# Patient Record
Sex: MALE | Race: WHITE | NOT HISPANIC OR LATINO | Employment: UNEMPLOYED | URBAN - METROPOLITAN AREA
[De-identification: names, ages, dates, MRNs, and addresses within clinical notes are randomized per-mention and may not be internally consistent; named-entity substitution may affect disease eponyms.]

---

## 2017-10-11 ENCOUNTER — THERAPY VISIT (OUTPATIENT)
Dept: PHYSICAL THERAPY | Facility: CLINIC | Age: 22
End: 2017-10-11
Payer: COMMERCIAL

## 2017-10-11 DIAGNOSIS — M79.605 PAIN OF LEFT LOWER EXTREMITY: Primary | ICD-10-CM

## 2017-10-11 DIAGNOSIS — M54.2 NECK PAIN ON RIGHT SIDE: ICD-10-CM

## 2017-10-11 DIAGNOSIS — R07.81 RIB PAIN ON LEFT SIDE: ICD-10-CM

## 2017-10-11 PROCEDURE — 97110 THERAPEUTIC EXERCISES: CPT | Mod: GP | Performed by: PHYSICAL THERAPIST

## 2017-10-11 PROCEDURE — 97161 PT EVAL LOW COMPLEX 20 MIN: CPT | Mod: GP | Performed by: PHYSICAL THERAPIST

## 2017-10-11 NOTE — PROGRESS NOTES
Subjective:    Patient is a 22 year old male presenting with rehab left ankle/foot hpi.   Jerel Day is a 22 year old male with a left ankle condition.  Condition occurred with:  Other reason.  Condition occurred: other.  This is a chronic and recurrent condition  MD order . Jerel rolled his ankle over and injured his ankle and was in boot for a while at age 15. He was diagnosed with scoliosis at age 14. He was playing baseball and hockey and was unable to continue due to repeated hip injuries following ankle injury. He went to PT which helped a little bit. He was diagnosed with Schizophrenia Jan 2017 and is under treatment currently. He has been having neck pain and foot pain for years. Walking, sitting for prolonged period time increases pain back of his neck, left hip and left foot. He mentioned this to his provider and was sent to PT for further management.    Patient stated he was admitted in a mental health facility for the past 6 days. He mentions that no one seems to believe his pain issues are appropriate and think he is mentally ill which frustrates him a lot. He is back to college today and is nervous. .    Patient reports pain:  Lateral, metatarsal heads, great toe and lower leg.    Pain is described as aching and is intermittent and reported as 3/10.  Associated symptoms:  Buckling/giving out, loss of strength and loss of motion/stiffness. Pain is worse in the P.M..  Symptoms are exacerbated by walking and relieved by rest and activity/movement (massage).  Since onset symptoms are unchanged.        General health as reported by patient is fair.  Pertinent medical history includes:  History of fractures, high blood pressure, mental illness, depression, unexplained weight loss, migraines and smoking (numbness/ tingling, paina t rest, changes in skin color, dizziness, presistent fever/ chills, weakness, calf pain, swelling, warmth, scoliosis).  Medical allergies: yes (pencillin).  Other surgeries  include:  None reported.  Medication history: antipsychotic, antihistamin.  Current occupation is Student- Psychology.    Primary job tasks include:  Prolonged sitting.                                Objective:          Flexibility/Screens:       Lower Extremity:  Decreased left lower extremity flexibility:Hip Flexors; Hamstrings; Gastroc and Soleus    Decreased right lower extremity flexibility:  Hip Flexors; Hamstrings; Gastroc and Soleus          Ankle/Foot Evaluation  ROM:  AROM is normal.      Strength is normal.  LIGAMENT TESTING: normal              SPECIAL TESTS: normal    PALPATION:   Left ankle tenderness present at:  anterior tibialis; plantar fascia; anterior talofibular ligament; posterior talofibular ligament and calcaneofibular ligament    EDEMA: normal          MOBILITY TESTING: normal                        Cervical/Thoracic Evaluation  Cervical AROM: normal   Thoracic AROM: normal                Cervical Palpation:  : right lower ribs.  Tenderness present at Left:    Upper Trap; Levator; Erector Spinae and Suboccipitals                                         Hip Evaluation  HIP AROM:  AROM:    Left Hip:     Normal    Right Hip:   Normal                    Hip Strength:  Hip Strength:    Left:    Normal  Right:  Normal                          Hip Special Testing:    Left hip positive for the following special tests:  Dusty's and Jermaine      Hip Palpation:    Left hip tenderness present at:   IT Band; hip flexors; ASIS; Abductors and Gluteus Medius         Knee Evaluation:  ROM:  AROM: normal  Strength:  Normal            Ligament Testing:  Normal                Special Tests: Normal      Palpation:    Left knee tenderness present at:  Medial Joint Line and Lateral Joint Line    Edema:  Normal    Mobility Testing:  Normal                  General     ROS    Assessment/Plan:      Patient is a 22 year old male with cervical, thoracic, left side hip, left side knee and left side ankle complaints.     Patient has the following significant findings with corresponding treatment plan.                Diagnosis 1:  Left ankle, knee and hip pain, left rib pain, right sided neck pain, scoliosis  Pain -  hot/cold therapy, US, electric stimulation, manual therapy, STS, splint/taping/bracing/orthotics, self management, education, directional preference exercise and home program  Decreased ROM/flexibility - manual therapy, therapeutic exercise, therapeutic activity and home program  Impaired gait - gait training and home program  Decreased function - therapeutic activities and home program    Therapy Evaluation Codes:   1) History comprised of:   Personal factors that impact the plan of care:      Overall behavior pattern, Past/current experiences and Time since onset of symptoms.    Comorbidity factors that impact the plan of care are:      Concussion, Depression, Dizziness, High blood pressure, Mental illness, Migraines/headaches, Numbness/tingling, Pain at night/rest, Smoking and Weakness.     Medications impacting care: antipsychotic, antihistamin.  2) Examination of Body Systems comprised of:   Body structures and functions that impact the plan of care:      Ankle, Cervical spine, Hip, Knee and ribs.   Activity limitations that impact the plan of care are:      Jumping, Lifting, Sitting, Squatting/kneeling, Stairs, Standing and Walking.  3) Clinical presentation characteristics are:   Stable/Uncomplicated.  4) Decision-Making    Low complexity using standardized patient assessment instrument and/or measureable assessment of functional outcome.  Cumulative Therapy Evaluation is: Low complexity.    Previous and current functional limitations:  (See Goal Flow Sheet for this information)    Short term and Long term goals: (See Goal Flow Sheet for this information)     Communication ability:  Patient appears to be able to clearly communicate and understand verbal and written communication and follow directions  correctly.  Treatment Explanation - The following has been discussed with the patient:   RX ordered/plan of care  Anticipated outcomes  Possible risks and side effects  This patient would benefit from PT intervention to resume normal activities.   Rehab potential is good.    Frequency:  1 X week, once daily  Duration:  for 6 weeks  Discharge Plan:  Achieve all LTG.  Independent in home treatment program.  Reach maximal therapeutic benefit.    Patient thinks his whole body is misaligned and needs to be fixed. His ankle pain at age 15 and lead to other issues like knee and hip pain. He was diagnosed with scoliosis at age 15 and has been having neck and ribs issues since then. He wants to learn strengthening exercise and needs help to realign his body to be able to function without pain. Advised to to keep a open mind to therapy and work on issues that he needs to be addressed to gain strength in his body overall to be able to function and perform his ADL without pain.    Please refer to the daily flowsheet for treatment today, total treatment time and time spent performing 1:1 timed codes.

## 2017-10-20 ENCOUNTER — THERAPY VISIT (OUTPATIENT)
Dept: PHYSICAL THERAPY | Facility: CLINIC | Age: 22
End: 2017-10-20
Payer: COMMERCIAL

## 2017-10-20 DIAGNOSIS — M79.605 PAIN OF LEFT LOWER EXTREMITY: ICD-10-CM

## 2017-10-20 DIAGNOSIS — R07.81 RIB PAIN ON LEFT SIDE: ICD-10-CM

## 2017-10-20 DIAGNOSIS — M54.2 NECK PAIN ON RIGHT SIDE: ICD-10-CM

## 2017-10-20 PROCEDURE — 97112 NEUROMUSCULAR REEDUCATION: CPT | Mod: GP | Performed by: PHYSICAL THERAPIST

## 2017-10-20 PROCEDURE — 97110 THERAPEUTIC EXERCISES: CPT | Mod: GP | Performed by: PHYSICAL THERAPIST

## 2017-10-27 ENCOUNTER — THERAPY VISIT (OUTPATIENT)
Dept: PHYSICAL THERAPY | Facility: CLINIC | Age: 22
End: 2017-10-27
Payer: COMMERCIAL

## 2017-10-27 DIAGNOSIS — M79.605 PAIN OF LEFT LOWER EXTREMITY: ICD-10-CM

## 2017-10-27 DIAGNOSIS — R07.81 RIB PAIN ON LEFT SIDE: ICD-10-CM

## 2017-10-27 DIAGNOSIS — M54.2 NECK PAIN ON RIGHT SIDE: ICD-10-CM

## 2017-10-27 PROCEDURE — 97110 THERAPEUTIC EXERCISES: CPT | Mod: GP | Performed by: PHYSICAL THERAPIST

## 2017-10-27 PROCEDURE — 97530 THERAPEUTIC ACTIVITIES: CPT | Mod: GP | Performed by: PHYSICAL THERAPIST

## 2017-10-27 PROCEDURE — 97112 NEUROMUSCULAR REEDUCATION: CPT | Mod: GP | Performed by: PHYSICAL THERAPIST

## 2017-11-30 NOTE — TELEPHONE ENCOUNTER
APPT INFO    Date /Time: 12/8/17 at 11:15 AM   Reason for Appt: Neck/Throat Irritation   Ref Provider/Clinic: Self   Are there internal records? If yes, list: Yes;   FREDERICK Culver   Patient Contact (Y/N) & Call Details: Yes, emailed pt   Action: No records per pt     OUTSIDE RECORDS CHECKLIST     CLINIC NAME COMMENTS REC (x) IMG (x)   None

## 2017-12-05 PROBLEM — M54.2 NECK PAIN ON RIGHT SIDE: Status: RESOLVED | Noted: 2017-10-11 | Resolved: 2017-12-05

## 2017-12-05 PROBLEM — M79.605 PAIN OF LEFT LOWER EXTREMITY: Status: RESOLVED | Noted: 2017-10-11 | Resolved: 2017-12-05

## 2017-12-05 PROBLEM — R07.81 RIB PAIN ON LEFT SIDE: Status: RESOLVED | Noted: 2017-10-11 | Resolved: 2017-12-05

## 2017-12-05 NOTE — PROGRESS NOTES
Subjective:    HPI                    Objective:    System    Physical Exam    General     ROS    Assessment/Plan:      DISCHARGE REPORT    Progress reporting period is from 10/11/17 to 12/5/17.       SUBJECTIVE  Subjective changes noted by patient:   Subjective: Jerel got is massage today and feels better. Decreased pain with some misrotation.     Current pain level is  Current Pain level: 4/10.     Previous pain level was    .   Changes in function:  Yes (See Goal flowsheet attached for changes in current functional level)  Adverse reaction to treatment or activity: None    OBJECTIVE  Changes noted in objective findings:  Yes,   Objective: Jerel states his hip and lowerleg are rotated along with his neck while eprforming exercises. He wants to address the twisting as he thinks he is getting stronger. Explaines to the patient that his legs and neck are not rotated upon palpation. He thinks nobdy is able to understand his problem and wants to find the cure all by himself and also try otolaryngology to see if it can take care of his neck issues. PAtient wants to keep his chart open to see if he needs therapy after he has tried other options.      ASSESSMENT/PLAN  Updated problem list and treatment plan: Diagnosis 1:  Left ankle pain    STG/LTGs have been met or progress has been made towards goals:  Yes (See Goal flow sheet completed today.)  Assessment of Progress: The patient's condition is improving.  The patient's condition has potential to improve.  The patient has had set backs in their progress.  Self Management Plans:  Patient is independent in a home treatment program.  Patient is independent in self management of symptoms.  I have re-evaluated this patient and find that the nature, scope, duration and intensity of the therapy is appropriate for the medical condition of the patient.  Jerel continues to require the following intervention to meet STG and LTG's:  PT intervention is no longer required to meet  STG/LTG.    Recommendations:  This patient is ready to be discharged from therapy and continue their home treatment program.    Please refer to the daily flowsheet for treatment today, total treatment time and time spent performing 1:1 timed codes.

## 2017-12-08 ENCOUNTER — PRE VISIT (OUTPATIENT)
Dept: OTOLARYNGOLOGY | Facility: CLINIC | Age: 22
End: 2017-12-08

## 2017-12-08 ENCOUNTER — OFFICE VISIT (OUTPATIENT)
Dept: OTOLARYNGOLOGY | Facility: CLINIC | Age: 22
End: 2017-12-08

## 2017-12-08 VITALS — BODY MASS INDEX: 19.55 KG/M2 | HEIGHT: 69 IN | WEIGHT: 132 LBS

## 2017-12-08 DIAGNOSIS — K21.9 GASTROESOPHAGEAL REFLUX DISEASE WITHOUT ESOPHAGITIS: Primary | ICD-10-CM

## 2017-12-08 RX ORDER — QUETIAPINE FUMARATE 100 MG/1
TABLET, FILM COATED ORAL
Status: ON HOLD | COMMUNITY
Start: 2017-11-22 | End: 2018-01-09

## 2017-12-08 RX ORDER — DIPHENHYDRAMINE HCL 50 MG
50 CAPSULE ORAL
Status: ON HOLD | COMMUNITY
Start: 2017-10-09 | End: 2018-01-09

## 2017-12-08 RX ORDER — ALPRAZOLAM 0.5 MG
.5-1 TABLET ORAL
Status: ON HOLD | COMMUNITY
Start: 2017-11-03 | End: 2018-01-09

## 2017-12-08 RX ORDER — LITHIUM CARBONATE 300 MG/1
900 TABLET, FILM COATED, EXTENDED RELEASE ORAL
Status: ON HOLD | COMMUNITY
Start: 2017-10-20 | End: 2018-01-09

## 2017-12-08 NOTE — NURSING NOTE
Chief Complaint   Patient presents with     Consult     throat and neck irritation     Jayy Foster LPN

## 2017-12-08 NOTE — PATIENT INSTRUCTIONS
The patient presents with a history of a gastroesophageal reflux and neck discomfort.  The patient's physical examination is consistent with gastroesophageal reflux.  I have discussed with the patient dietary restrictions including avoiding eating spicy foods, greasy foods, and soda and well as the need to avoid caffeine and alcohol.  The patient and I have discussed avoiding eating within two hours of sleeping or lying down to rest.  The patient will be seen again in two months to assess response to these efforts to control the symptoms.   8/4/17-HgA1c 11.3, Glucose 303, 8/3/17- Albumin 4.0

## 2017-12-08 NOTE — LETTER
Date:December 11, 2017      Patient was self referred, no letter generated. Do not send.        [facility] Health Information

## 2017-12-08 NOTE — MR AVS SNAPSHOT
After Visit Summary   12/8/2017    Jerel Day    MRN: [id number]           Patient Information     Date Of Birth          1995        Visit Information        Provider Department      12/8/2017 11:15 AM Shelton Guo MD M [facility]        Today's Diagnoses     Gastroesophageal reflux disease without esophagitis    -  1      Care Instructions    The patient presents with a history of a gastroesophageal reflux and neck discomfort.  The patient's physical examination is consistent with gastroesophageal reflux.  I have discussed with the patient dietary restrictions including avoiding eating spicy foods, greasy foods, and soda and well as the need to avoid caffeine and alcohol.  The patient and I have discussed avoiding eating within two hours of sleeping or lying down to rest.  The patient will be seen again in two months to assess response to these efforts to control the symptoms.          Follow-ups after your visit        Your next 10 appointments already scheduled     Dec 08, 2017 11:15 AM CST   (Arrive by 11:00 AM)   New Patient Visit with MD HARJINDER Melgar [facility] ([facility])    [address] [place] [id number] [phone]            Feb 01, 2018  9:30 AM CST   (Arrive by 9:15 AM)   New Patient Visit with Shelton Guo MD   [facility] Nose [facility] Throat [facility])    [address] [place] [id number] [phone]              Who to contact     Please call your clinic at [phone] to:    Ask questions about your health    Make or cancel appointments    Discuss your medicines    Learn about your test results    Speak to your doctor   If you have compliments or concerns about an experience at your clinic, or if you wish to file a complaint, please contact [facility] Patient Relations at  "[phone] or email us at [email or web address]         Additional Information About Your Visit        [employer] Information     [employer] is an electronic gateway that provides easy, online access to your medical records. With [employer], you can request a clinic appointment, read your test results, renew a prescription or communicate with your care team.     To sign up for [employer] visit the website at [email or web address]   You will be asked to enter the access code listed below, as well as some personal information. Please follow the directions to create your username and password.     Your access code is: [email or web address]  Expires: 2018  6:30 AM     Your access code will  in 90 days. If you need help or a new code, please contact your [facility] or call [phone] for assistance.        Care EveryWhere ID     This is your Care EveryWhere ID. This could be used by other organizations to access your [place] medical records  [id number]        Your Vitals Were     Height BMI (Body Mass Index)                1.74 m (5' 8.5\") 19.78 kg/m2           Blood Pressure from Last 3 Encounters:   No data found for BP    Weight from Last 3 Encounters:   17 59.9 kg (132 lb)              We Performed the Following     LARYNGOSCOPY FLEX FIBEROPTIC, DIAGNOSTIC        Primary Care Provider Fax #    Physician No Ref-Primary [phone]       No address on file        Equal Access to Services     MUNIRA MADDOX : Hadii zev ku hadasho Sodorisali, waaxda luqadaha, qaybta kaalmada adeegyada, keagan moser . So [facility] [phone].    ATENCIÓN: Si habla español, tiene a grimm disposición servicios gratuitos de asistencia lingüística. Llame al [phone].    We comply with applicable federal civil rights laws and [place] laws. We do not discriminate on the basis of race, color, national origin, age, disability, sex, sexual orientation, or gender " identity.            Thank you!     Thank you for choosing [facility] E[facility] NOSE [facility] THROAT  for your care. Our goal is always to provide you with excellent care. Hearing back from our patients is one way we can continue to improve our services. Please take a few minutes to complete the written survey that you may receive in the mail after your visit with us. Thank you!             Your Updated Medication List - Protect others around you: Learn how to safely use, store and throw away your medicines at www.disposemymeds.org.          This list is accurate as of: 12/8/17  9:59 AM.  Always use your most recent med list.                   Brand Name Dispense Instructions for use Diagnosis    ALPRAZolam 0.5 MG tablet    XANAX     Take 0.5-1 mg by mouth        cholecalciferol 1000 UNIT tablet    vitamin D3     Take 1,000 Units by mouth        diphenhydrAMINE 50 MG capsule    BENADRYL     Take 50 mg by mouth        lithium 300 MG CR tablet    ESKALITH/LITHOBID     Take 900 mg by mouth        QUEtiapine 100 MG tablet    SEROquel     Take 3 tabs qHS - Also 1 daily prn

## 2017-12-08 NOTE — LETTER
12/8/2017       RE: Jerel Day  [address] [address]     Dear Colleague,    Thank you for referring your patient, Jerel Day, to the [facility] E[facility] at [facility]. Please see a copy of my visit note below.    The patient presents with a history of a globus sensation and mild dysphagia. He has been working with a chiropractor who has informed him that his neck muscles became twisted due to neurological issues and that his throat pain is related to his spine becoming of a lightning teodora for pain in his body.  The patient denies odynophagia, hemoptysis, hematemasis, or unexplained weight loss.  The patient reports that the symptoms have been progressively worsening over the past few months.  The patient reports symptoms of heartburn intermittently.  The patient denies sinusitis, rhinitis, facial pain, nasal obstruction or purulent nasal discharge. The patient denies chronic or recurrent tonsillitis, chronic or recurrent pharyngitis. The patient denies otalgia, otorrhea, eustachian tube dysfunction, ear infections, dizziness or tinnitus.     This patient is seen in consultation as a self referral to my clinic.     All other systems were reviewed and they are either negative or they are not directly pertinent to this Otolaryngology examination.    Past Medical History:    No past medical history on file.    Past Surgical History:    No past surgical history on file.    Medications:      Current Outpatient Prescriptions:      ALPRAZolam (XANAX) 0.5 MG tablet, Take 0.5-1 mg by mouth, Disp: , Rfl:      cholecalciferol (VITAMIN D3) 1000 UNIT tablet, Take 1,000 Units by mouth, Disp: , Rfl:      diphenhydrAMINE (BENADRYL) 50 MG capsule, Take 50 mg by mouth, Disp: , Rfl:      lithium (ESKALITH/LITHOBID) 300 MG CR tablet, Take 900 mg by mouth, Disp: , Rfl:      QUEtiapine (SEROQUEL) 100 MG tablet, Take 3 tabs qHS - Also 1 daily prn, Disp: , Rfl:      Allergies:    Penicillins    Physical Examination:    The patient is a well developed, well nourished male in no apparent distress.  He is normocephalic, atraumatic with pupils equally round and reactive to light.    Oral Cavity Examination:  Normal mucosa with no masses or lesions  Nasal Examination: Normal mucosa with no masses or lesions  Ear Examination: Ear canals clear, tympanic membranes and middle ear spaces normal  Neurological Examination: Facial nerve function intact and symmetric  Integumentary Examination: No lesions on the skin of the head and neck  Neck Examination: No masses or lesions, no lymphadenopathy  Endocrine Examination: Normal thyroid examination  Flexible Fiberoptic Laryngoscopy: Normal nasopharynx, valleculae, pyriform sinuses, false vocal cords and true vocal cords. There is some whitish coating on the base of the tongue consistent with gastroesophageal reflux.  The vocal cords are moving normally and there are no lesions or masses in the larynx.     Assessment and Plan:    The patient presents with a history of a gastroesophageal reflux and neck discomfort.  The patient's physical examination is consistent with gastroesophageal reflux.  I have discussed with the patient dietary restrictions including avoiding eating spicy foods, greasy foods, and soda and well as the need to avoid caffeine and alcohol.  The patient and I have discussed avoiding eating within two hours of sleeping or lying down to rest.  The patient will be seen again in two months to assess response to these efforts to control the symptoms.      Again, thank you for allowing me to participate in the care of your patient.      Sincerely,    Shelton Guo MD

## 2017-12-08 NOTE — PROGRESS NOTES
The patient presents with a history of a globus sensation and mild dysphagia. He has been working with a chiropractor who has informed him that his neck muscles became twisted due to neurological issues and that his throat pain is related to his spine becoming of a lightning teodora for pain in his body.  The patient denies odynophagia, hemoptysis, hematemasis, or unexplained weight loss.  The patient reports that the symptoms have been progressively worsening over the past few months.  The patient reports symptoms of heartburn intermittently.  The patient denies sinusitis, rhinitis, facial pain, nasal obstruction or purulent nasal discharge. The patient denies chronic or recurrent tonsillitis, chronic or recurrent pharyngitis. The patient denies otalgia, otorrhea, eustachian tube dysfunction, ear infections, dizziness or tinnitus.     This patient is seen in consultation as a self referral to my clinic.     All other systems were reviewed and they are either negative or they are not directly pertinent to this Otolaryngology examination.    Past Medical History:    No past medical history on file.    Past Surgical History:    No past surgical history on file.    Medications:      Current Outpatient Prescriptions:      ALPRAZolam (XANAX) 0.5 MG tablet, Take 0.5-1 mg by mouth, Disp: , Rfl:      cholecalciferol (VITAMIN D3) 1000 UNIT tablet, Take 1,000 Units by mouth, Disp: , Rfl:      diphenhydrAMINE (BENADRYL) 50 MG capsule, Take 50 mg by mouth, Disp: , Rfl:      lithium (ESKALITH/LITHOBID) 300 MG CR tablet, Take 900 mg by mouth, Disp: , Rfl:      QUEtiapine (SEROQUEL) 100 MG tablet, Take 3 tabs qHS - Also 1 daily prn, Disp: , Rfl:     Allergies:    Penicillins    Physical Examination:    The patient is a well developed, well nourished male in no apparent distress.  He is normocephalic, atraumatic with pupils equally round and reactive to light.    Oral Cavity Examination:  Normal mucosa with no masses or lesions  Nasal  Examination: Normal mucosa with no masses or lesions  Ear Examination: Ear canals clear, tympanic membranes and middle ear spaces normal  Neurological Examination: Facial nerve function intact and symmetric  Integumentary Examination: No lesions on the skin of the head and neck  Neck Examination: No masses or lesions, no lymphadenopathy  Endocrine Examination: Normal thyroid examination  Flexible Fiberoptic Laryngoscopy: Normal nasopharynx, valleculae, pyriform sinuses, false vocal cords and true vocal cords. There is some whitish coating on the base of the tongue consistent with gastroesophageal reflux.  The vocal cords are moving normally and there are no lesions or masses in the larynx.     Assessment and Plan:    The patient presents with a history of a gastroesophageal reflux and neck discomfort.  The patient's physical examination is consistent with gastroesophageal reflux.  I have discussed with the patient dietary restrictions including avoiding eating spicy foods, greasy foods, and soda and well as the need to avoid caffeine and alcohol.  The patient and I have discussed avoiding eating within two hours of sleeping or lying down to rest.  The patient will be seen again in two months to assess response to these efforts to control the symptoms.

## 2017-12-18 ENCOUNTER — OFFICE VISIT (OUTPATIENT)
Dept: ORTHOPEDICS | Facility: CLINIC | Age: 22
End: 2017-12-18
Payer: COMMERCIAL

## 2017-12-18 ENCOUNTER — RADIANT APPOINTMENT (OUTPATIENT)
Dept: GENERAL RADIOLOGY | Facility: CLINIC | Age: 22
End: 2017-12-18
Attending: FAMILY MEDICINE
Payer: COMMERCIAL

## 2017-12-18 VITALS
SYSTOLIC BLOOD PRESSURE: 104 MMHG | BODY MASS INDEX: 19.64 KG/M2 | HEIGHT: 69 IN | WEIGHT: 132.6 LBS | HEART RATE: 53 BPM | DIASTOLIC BLOOD PRESSURE: 71 MMHG

## 2017-12-18 DIAGNOSIS — M25.552 LEFT HIP PAIN: ICD-10-CM

## 2017-12-18 DIAGNOSIS — M24.852 SNAPPING HIP SYNDROME, LEFT: ICD-10-CM

## 2017-12-18 DIAGNOSIS — M25.552 LEFT HIP PAIN: Primary | ICD-10-CM

## 2017-12-18 NOTE — MR AVS SNAPSHOT
After Visit Summary   12/18/2017    Jerel Day    MRN: [id number]           Patient Information     Date Of Birth          1995        Visit Information        Provider Department      12/18/2017 1:20 PM aKrl Mireles MD  [facility]        Today's Diagnoses     Left hip pain    -  1    Snapping hip syndrome, left           Follow-ups after your visit        Additional Services     [facility], HAND, AND CHIROPRACTIC REFERRAL       Physical Therapy Referral  Recommend work on pelvic stability, left hip mobility                  Your next 10 appointments already scheduled     Jan 19, 2018 11:00 AM CST   Return Walk In Ortho with Karl Mireles MD    [facility] ([facility])    [address] [place] [address] [phone]            Feb 01, 2018  9:30 AM CST   (Arrive by 9:15 AM)   Return Visit with Shelton Guo MD   [facility] ([facility])    [address] [place] [address] [phone]              Who to contact     Please call your clinic at [phone] to:    Ask questions about your health    Make or cancel appointments    Discuss your medicines    Learn about your test results    Speak to your doctor   If you have compliments or concerns about an experience at your clinic, or if you wish to file a complaint, please contact [facility] Patient Relations at [phone] or email us at [email or web address]         Additional Information About Your Visit        MyChart Information     [employer] is an electronic gateway that provides easy, online access to your medical records. With [employer], you can request a clinic appointment, read your test results, renew a prescription or communicate with your care team.     To sign up for MyChart visit  "the website at [email or web address]   You will be asked to enter the access code listed below, as well as some personal information. Please follow the directions to create your username and password.     Your access code is: [email or web address]  Expires: 2018  6:30 AM     Your access code will  in 90 days. If you need help or a new code, please contact your [facility] or call [phone] for assistance.        Care EveryWhere ID     This is your Care EveryWhere ID. This could be used by other organizations to access your [place] medical records  [id number]        Your Vitals Were     Pulse Height BMI (Body Mass Index)             53 1.746 m (5' 8.75\") 19.72 kg/m2          Blood Pressure from Last 3 Encounters:   17 104/71    Weight from Last 3 Encounters:   17 60.1 kg (132 lb 9.6 oz)   17 59.9 kg (132 lb)              We Performed the Following     [facility] PT, HAND, AND CHIROPRACTIC REFERRAL        Primary Care Provider Fax #    Physician No Ref-Primary [phone]       No address on file        Equal Access to Services     OTIS MADDOX : Hadii zev barrientoso Chary, waaxda luqadaha, qaybta kaalmada adeegyada, keagan moser . So [facility] [phone].    ATENCIÓN: Si habla español, tiene a grimm disposición servicios gratuitos de asistencia lingüística. Llame al [phone].    We comply with applicable federal civil rights laws and [place] laws. We do not discriminate on the basis of race, color, national origin, age, disability, sex, sexual orientation, or gender identity.            Thank you!     Thank you for choosing [facility] CLINIC  for your care. Our goal is always to provide you with excellent care. Hearing back from our patients is one way we can continue to improve our services. Please take a few minutes to complete the written survey that you may receive in the mail after your visit with " us. Thank you!             Your Updated Medication List - Protect others around you: Learn how to safely use, store and throw away your medicines at www.disposemymeds.org.          This list is accurate as of: 12/18/17  3:15 PM.  Always use your most recent med list.                   Brand Name Dispense Instructions for use Diagnosis    ALPRAZolam 0.5 MG tablet    XANAX     Take 0.5-1 mg by mouth        cholecalciferol 1000 UNIT tablet    vitamin D3     Take 1,000 Units by mouth        diphenhydrAMINE 50 MG capsule    BENADRYL     Take 50 mg by mouth        lithium 300 MG CR tablet    ESKALITH/LITHOBID     Take 900 mg by mouth        QUEtiapine 100 MG tablet    SEROquel     Take 3 tabs qHS - Also 1 daily prn

## 2017-12-18 NOTE — PROGRESS NOTES
SPORTS & ORTHOPEDIC WALK-IN VISIT 12/18/2017    Primary Care Physician: None    Reason for visit:     What part of your body is injured / painful?  left hip    What caused the injury /pain? Unsure    How long ago did your injury occur or pain begin? several years ago    What are your most bothersome symptoms? Pain and Clicking, popping    How would you characterize your symptom?  aching    What makes your symptoms better? Rest, yoga    What makes your symptoms worse? Sitting too long    Have you been previously seen for this problem? Chiropractor, April,2017    Medical History:    Any recent changes to your medical history? No    Any new medication prescribed since last visit? No    Have you had surgery on this body part before? No    Social History:    Occupation: Student at [employer]    Handedness: Right    Exercise: Occasionally at home, push ups    Review of Systems:    Do you have fever, chills, weight loss? No    Do you have any vision problems? No    Do you have any chest pain or edema? Yes,    Do you have any shortness of breath or wheezing?  No    Do you have stomach problems? No    Do you have any numbness or focal weakness? No    Do you have diabetes? No    Do you have problems with bleeding or clotting? No    Do you have an rashes or other skin lesions? No

## 2017-12-18 NOTE — Clinical Note
"12/18/2017       RE: Jerel Day  [address] [address]     Dear Colleague,    Thank you for referring your patient, Jerel Day, to the [facility] SP[facility]TS AND ORTHOPAEDIC WALK IN CLINIC at [facility]. Please see a copy of my visit note below.          SPORTS & ORTHOPEDIC WALK-IN VISIT 12/18/2017    Primary Care Physician: None    Reason for visit:     What part of your body is injured / painful?  left hip    What caused the injury /pain? Unsure    How long ago did your injury occur or pain begin? several years ago    What are your most bothersome symptoms? Pain and Clicking, popping    How would you characterize your symptom?  aching    What makes your symptoms better? Rest, yoga    What makes your symptoms worse? Sitting too long    Have you been previously seen for this problem? Chiropractor, April,2017    Medical History:    Any recent changes to your medical history? No    Any new medication prescribed since last visit? No    Have you had surgery on this body part before? No    Social History:    Occupation: Student at [employer]    Handedness: Right    Exercise: Occasionally at home, push ups    Review of Systems:    Do you have fever, chills, weight loss? No    Do you have any vision problems? No    Do you have any chest pain or edema? Yes,    Do you have any shortness of breath or wheezing?  No    Do you have stomach problems? No    Do you have any numbness or focal weakness? No    Do you have diabetes? No    Do you have problems with bleeding or clotting? No    Do you have an rashes or other skin lesions? No           [facility] Walk[facility] Clinic Note      Patient is a 22 year old {GENDER:425453::\"male\"} who presents to the office today for: ***     Began: ***  Located:***  Quality:***  Aggravating factors: ***  Alleviating factors:***  Prior injury:***  Denies weakness, numbness, tingling, clicking, locking, or catching.      Past Medical " "History, Current Medications, and Allergies are reviewed in the electronic medical record as appropriate.     ROS: Pertinent items are noted in HPI.  Constitutional: negative for fevers, chills and malaise***  Cardiovascular: negative for dyspnea, fatigue, lower extremity edema***  Integument/breast: negative for rash, skin lesion(s) and skin color change***  Neurological: negative for paresthesia and weakness***      EXAM:/71  Pulse 53  Ht 5' 8.75\" (1.746 m)  Wt 132 lb 9.6 oz (60.1 kg)  BMI 19.72 kg/m2    ***    Radiographs: ***      Assessment: Patient is a 22 year old {GENDER:440453::\"male\"} with ***    Recommendations: ***              Again, thank you for allowing me to participate in the care of your patient.      Sincerely,    Karl Mireles MD    "

## 2017-12-18 NOTE — PROGRESS NOTES
"[facility] Sports and Orthopedic Walk-in Clinic Note      Patient is a 22 year old male who presents to the office today for: left hip pain.   Has had left hip issues for years but seems to be worse over the recent months.  Reports clicking and popping in the anterior hip/groin area.  Also has some pain in the left hip and pelvic region although he has difficulty describing this pain as well as localizing.  No radiation of symptoms down his leg.  No tingling numbness or weakness.  Pain is worse when sitting for too long.  He has tried chiropractic care which helps for a brief period.  Stretching and rest also seem to help.  He has never had a specific injury or trauma to the left hip.  No back pain.      Past Medical History, Current Medications, and Allergies are reviewed in the electronic medical record as appropriate.     ROS: Pertinent items are noted in HPI.  Constitutional: negative for fevers, chills and malaise  Cardiovascular: negative for dyspnea, fatigue, lower extremity edema  Integument/breast: negative for rash, skin lesion(s) and skin color change  Neurological: negative for paresthesia and weakness      EXAM:/71  Pulse 53  Ht 5' 8.75\" (1.746 m)  Wt 132 lb 9.6 oz (60.1 kg)  BMI 19.72 kg/m2    Exam:  Patient is alert, in no acute distress, pleasant and conversational.    Gait: Nonantalgic.  Normal heel toe gait    Standing Exam:  Cervical, Thoracic and Lumbar Spine nontender to palpation at spinous processes.     lumbar spine AROM normal. Negative Stork sign.    Patient is able to perform a 2 legged-squat without any reported pain    left Hip:  Supine PROM:  Flexion: Approximately 115 , no tenderness.  External rotation: approximately 60 , with some mild discomfort  Internal rotation: Approximately 30 , no tenderness  No subjective pain reported with range of motion testing. ROM IS symmetric with uninjured side     Strength Testing:  Hip flexion: 5/5.  Hip adduction: 5/5.  Hip abduction, " gluteus makenna: 4+/5.  Hip abduction, gluteus medius: 4-/5  No subjective pain reported with strength testing. Strength IS symmetric with uninjured side.     Palpation:  negative tenderness to palpation over the greater trochanter.  positive  tenderness to palpation over psoas  negative tenderness to palpation over ASIS  negative tenderness to palpation over Iliac crest  negative tenderness to palpation along the piriformis.  negative tenderness to palpation of the SI joint    Special Tests:  negative Dusty's test.   positive  CRISTIN's test for pain in groin.   negative FADIR's test  negative Scour test  negative Reported pain with resisted hip flexion to opposite shoulder     Neurovascularly intact in bilateral lower extremities        Radiographs: X-rays of the left hip were performed and reviewed independently demonstrating no acute abnormality.  Essentially normal left hip.      Assessment: Patient is a 22 year old male with left hip pain and discomfort.  Some parts of the history and exam support diagnosis of snapping hip, although other parts the history not clearly correlated.    Recommendations:   Recommended course of physical therapy to work on dynamic pelvic stabilization and strengthening about the hip.  May consider ice and ice cup massage to the hip flexor tendons.  May also use NSAIDs as needed.  Follow-up in 6 weeks for reevaluation.    Karl Mireles MD

## 2018-01-02 ENCOUNTER — HOSPITAL ENCOUNTER (INPATIENT)
Facility: CLINIC | Age: 23
LOS: 6 days | Discharge: HOME | End: 2018-01-09
Attending: PSYCHIATRY & NEUROLOGY | Admitting: PSYCHIATRY & NEUROLOGY
Payer: COMMERCIAL

## 2018-01-02 DIAGNOSIS — F29 ATYPICAL PSYCHOSIS (H): ICD-10-CM

## 2018-01-02 DIAGNOSIS — F25.0 SCHIZOAFFECTIVE DISORDER, BIPOLAR TYPE (H): ICD-10-CM

## 2018-01-02 LAB
ALBUMIN UR-MCNC: NEGATIVE MG/DL
AMPHETAMINES UR QL SCN: NEGATIVE
APPEARANCE UR: CLEAR
BARBITURATES UR QL: NEGATIVE
BENZODIAZ UR QL: POSITIVE
BILIRUB UR QL STRIP: NEGATIVE
CANNABINOIDS UR QL SCN: POSITIVE
COCAINE UR QL: NEGATIVE
COLOR UR AUTO: YELLOW
ETHANOL UR QL SCN: NEGATIVE
GLUCOSE UR STRIP-MCNC: NEGATIVE MG/DL
HGB UR QL STRIP: NEGATIVE
KETONES UR STRIP-MCNC: NEGATIVE MG/DL
LEUKOCYTE ESTERASE UR QL STRIP: NEGATIVE
MUCOUS THREADS #/AREA URNS LPF: PRESENT /LPF
NITRATE UR QL: NEGATIVE
OPIATES UR QL SCN: NEGATIVE
PH UR STRIP: 6 PH (ref 5–7)
RBC #/AREA URNS AUTO: <1 /HPF (ref 0–2)
SOURCE: ABNORMAL
SP GR UR STRIP: 1.01 (ref 1–1.03)
UROBILINOGEN UR STRIP-MCNC: NORMAL MG/DL (ref 0–2)
WBC #/AREA URNS AUTO: 1 /HPF (ref 0–2)

## 2018-01-02 PROCEDURE — 81001 URINALYSIS AUTO W/SCOPE: CPT | Performed by: FAMILY MEDICINE

## 2018-01-02 PROCEDURE — 80307 DRUG TEST PRSMV CHEM ANLYZR: CPT | Performed by: FAMILY MEDICINE

## 2018-01-02 PROCEDURE — 99284 EMERGENCY DEPT VISIT MOD MDM: CPT | Mod: Z6 | Performed by: PSYCHIATRY & NEUROLOGY

## 2018-01-02 PROCEDURE — 99285 EMERGENCY DEPT VISIT HI MDM: CPT | Mod: 25 | Performed by: PSYCHIATRY & NEUROLOGY

## 2018-01-02 PROCEDURE — 80320 DRUG SCREEN QUANTALCOHOLS: CPT | Performed by: FAMILY MEDICINE

## 2018-01-02 PROCEDURE — 90791 PSYCH DIAGNOSTIC EVALUATION: CPT

## 2018-01-02 ASSESSMENT — ENCOUNTER SYMPTOMS
FEVER: 0
CONFUSION: 1
ABDOMINAL PAIN: 0
DECREASED CONCENTRATION: 1
NERVOUS/ANXIOUS: 1
DYSPHORIC MOOD: 0
HALLUCINATIONS: 1
SLEEP DISTURBANCE: 1
SHORTNESS OF BREATH: 0

## 2018-01-02 NOTE — IP AVS SNAPSHOT
Young Adult [facility]    [facility] [address]    [address] [address]    Phone:  [phone]                                       After Visit Summary   1/2/2018    Jerel Day    MRN: [id number]           After Visit Summary Signature Page     I have received my discharge instructions, and my questions have been answered. I have discussed any challenges I see with this plan with the nurse or doctor.    ..........................................................................................................................................  Patient/Patient Representative Signature      ..........................................................................................................................................  Patient Representative Print Name and Relationship to Patient    ..................................................               ................................................  Date                                            Time    ..........................................................................................................................................  Reviewed by Signature/Title    ...................................................              ..............................................  Date                                                            Time

## 2018-01-02 NOTE — ED NOTES
Bed: ED10  Expected date:   Expected time:   Means of arrival:   Comments:  [address]. 22 y.o M. Behavioral issues. Cooperative at this time. Yellow. 20 mins

## 2018-01-02 NOTE — IP AVS SNAPSHOT
MRN:[id number]                      After Visit Summary   1/2/2018    Jerel Day    MRN: [id number]           Patient Information     Date Of Birth          1995        Designated Caregiver       Most Recent Value    Caregiver    Will someone help with your care after discharge? no      About your hospital stay     You were admitted on:  January 3, 2018 You last received care in the:  [facility]    You were discharged on:  January 9, 2018       Who to Call     For medical emergencies, please call 911.  For non-urgent questions about your medical care, please call your primary care provider or clinic, None          Attending Provider     Provider Specialty    Missael España MD Emergency Medicine    Jermaine Lopez MD Psychiatry       Primary Care Provider Fax #    Physician No Ref-Primary [phone]      Your next 10 appointments already scheduled     Donnell 15, 2018  1:00 PM CST   Navigation Evaluation with Shai Londono [facility] ([facility])    [address] [address]   [phone]            Jan 19, 2018 11:00 AM CST   Return Walk In Ortho with Karl Mireles MD   [facility] Orthopaedic Walk In [facility] ([facility])    [address] [place] [id number] [phone]            Feb 01, 2018  9:30 AM CST   (Arrive by 9:15 AM)   Return Visit with Shelton Guo MD   [facility] Nose [facility] Throat ([facility])    [address] [id number] [phone]              Further instructions from your care team         Behavioral Discharge Planning and Instructions      Summary: You were admitted on 1/2/2018 to [facility] due to psychosis.  You were treated by Debra Naegele, APRN, CNP and discharged on 01/09/18  to Home    Principal Diagnosis: Schizophrenia.    Health Care  "Follow-up Appointments:    [facility] Intake Appointment  Date: Monday, 1/15/18  Time:  1:00pm  Provider:  Shai Londono  [facility]   [facility], [address] 2nd Floor   [address] [place] [address]   Phone: [phone]     Medication Management  Date: Friday, 1/26/17  Time: 4:00pm      Provider: Dr. Donaldson  Address: [facility]. [address] [place] [address] [facility] [place]  [address].   Phone:[phone]   [facility] has faxed the Discharge Summary and AVS to this provider at Fax: [phone]      Attend all scheduled appointments with your outpatient providers. Call at least 24 hours in advance if you need to reschedule an appointment to ensure continued access to your outpatient providers.   Major Treatments, Procedures and Findings: You were provided with: a psychiatric assessment, assessed for medical stability, medication evaluation and/or management, group therapy and milieu management    Symptoms to Report: feeling more aggressive, increased confusion, losing more sleep, mood getting worse or thoughts of suicide    Early warning signs can include:  increased depression or anxiety sleep disturbances increased thoughts or behaviors of suicide or self-harm  increased unusual thinking, such as paranoia or hearing voices    Safety and Wellness:  Take all medicines as directed.  Make no changes unless your doctor suggests them.      Follow treatment recommendations.  Refrain from alcohol and non-prescribed drugs.  If there is a concern for safety, call 911.    Resources: Mental health crisis response for your Critical access hospital is offered 24 hours a day, 7 days a week. A trained counselor will assess your current situation, offer support and counseling and connect you with local resources. Please call  [facility] Crisis (COPE) Response - Adult [phone]    Text 4 Life: txt \"LIFE\" to [address] for immediate support and crisis intervention  Crisis text line: Text " "\"START\" to 760-062. Free, confidential, .  Crisis Intervention: 542.178.4445 or 884-749-3746. Call anytime for help.     The treatment team has appreciated the opportunity to work with you. Jerel, please take care and make your recovery a daily recovery. If you have any questions or concerns our unit number is [phone].  You will be receiving a follow-up phone call within the next three days from a representative from behavioral health.  You have identified the best phone number to reach you as [phone] (home)               Pending Results     No orders found from 2017 to 1/3/2018.            Admission Information     Date & Time Provider Department Dept. Phone    2018 Jermaine Lopez MD [facility]patient Mental Health [phone]      Your Vitals Were     Blood Pressure Pulse Temperature Respirations Height Weight    120/68 56 97.5  F (36.4  C) (Oral) 16 1.756 m (5' 9.13\") 60.1 kg (132 lb 7.9 oz)    Pulse Oximetry BMI (Body Mass Index)                100% 19.49 kg/m2          MyChart Information     [employer] lets you send messages to your doctor, view your test results, renew your prescriptions, schedule appointments and more. To sign up, go to [email or web address] . Click on \"Log in\" on the left side of the screen, which will take you to the Welcome page. Then click on \"Sign up Now\" on the right side of the page.     You will be asked to enter the access code listed below, as well as some personal information. Please follow the directions to create your username and password.     Your access code is: [email or web address]  Expires: 2018  6:30 AM     Your access code will  in 90 days. If you need help or a new code, please call your [facility] or [phone].        Care EveryWhere ID     This is your Care EveryWhere ID. This could be used by other organizations to access your [place] medical records  [id number]        Equal Access to Services     MUNIRA MADDOX AH: Cristobal brothers " alfredo Diez, waaxda luqadaha, qaybta kaalmada adeegtessada, keagan tonyin hayaakp maiergaurang eliscarol laDavidsusan aly. So [facility] [phone].    ATENCIÓN: Si cindyla kirsten, tiene a grimm disposición servicios gratuitos de asistencia lingüística. Case al [phone].    We comply with applicable federal civil rights laws and [place] laws. We do not discriminate on the basis of race, color, national origin, age, disability, sex, sexual orientation, or gender identity.               Review of your medicines      START taking        Dose / Directions    OLANZapine 10 MG tablet   Commonly known as:  zyPREXA   Used for:  Schizoaffective disorder, bipolar type (H)        Dose:  10 mg   Take 1 tablet (10 mg) by mouth At Bedtime   Quantity:  30 tablet   Refills:  1         CONTINUE these medicines which may have CHANGED, or have new prescriptions. If we are uncertain of the size of tablets/capsules you have at home, strength may be listed as something that might have changed.        Dose / Directions    lithium 450 MG CR tablet   Commonly known as:  ESKALITH   This may have changed:    - medication strength  - when to take this   Used for:  Schizoaffective disorder, bipolar type (H)        Dose:  900 mg   Take 2 tablets (900 mg) by mouth At Bedtime   Quantity:  60 tablet   Refills:  1         STOP taking     ALPRAZolam 0.5 MG tablet   Commonly known as:  XANAX           cholecalciferol 1000 UNIT tablet   Commonly known as:  vitamin D3           diphenhydrAMINE 50 MG capsule   Commonly known as:  BENADRYL           QUEtiapine 100 MG tablet   Commonly known as:  SEROquel                Where to get your medicines      These medications were sent to [facility] [place], [place] - [address]  [address] [address]     Phone:  [phone]     lithium 450 MG CR tablet    OLANZapine 10 MG tablet                Protect others around you: Learn how to safely use, store and throw away your medicines  at www.disposemymeds.org.             Medication List: This is a list of all your medications and when to take them. Check marks below indicate your daily home schedule. Keep this list as a reference.      Medications           Morning Afternoon Evening Bedtime As Needed    lithium 450 MG CR tablet   Commonly known as:  ESKALITH   Take 2 tablets (900 mg) by mouth At Bedtime   Last time this was given:  900 mg on 1/8/2018  8:40 PM                                   OLANZapine 10 MG tablet   Commonly known as:  zyPREXA   Take 1 tablet (10 mg) by mouth At Bedtime

## 2018-01-03 PROBLEM — F25.9 SCHIZOAFFECTIVE DISORDER (H): Status: ACTIVE | Noted: 2018-01-03

## 2018-01-03 LAB
ALBUMIN SERPL-MCNC: 3.5 G/DL (ref 3.4–5)
ALP SERPL-CCNC: 66 U/L (ref 40–150)
ALT SERPL W P-5'-P-CCNC: 37 U/L (ref 0–70)
ANION GAP SERPL CALCULATED.3IONS-SCNC: 6 MMOL/L (ref 3–14)
AST SERPL W P-5'-P-CCNC: 19 U/L (ref 0–45)
BASOPHILS # BLD AUTO: 0 10E9/L (ref 0–0.2)
BASOPHILS NFR BLD AUTO: 0.4 %
BILIRUB SERPL-MCNC: 0.3 MG/DL (ref 0.2–1.3)
BUN SERPL-MCNC: 13 MG/DL (ref 7–30)
CALCIUM SERPL-MCNC: 8.5 MG/DL (ref 8.5–10.1)
CHLORIDE SERPL-SCNC: 107 MMOL/L (ref 94–109)
CHOLEST SERPL-MCNC: 135 MG/DL
CO2 SERPL-SCNC: 29 MMOL/L (ref 20–32)
CREAT SERPL-MCNC: 0.73 MG/DL (ref 0.66–1.25)
DIFFERENTIAL METHOD BLD: NORMAL
EOSINOPHIL # BLD AUTO: 0.3 10E9/L (ref 0–0.7)
EOSINOPHIL NFR BLD AUTO: 3.7 %
ERYTHROCYTE [DISTWIDTH] IN BLOOD BY AUTOMATED COUNT: 12.6 % (ref 10–15)
GFR SERPL CREATININE-BSD FRML MDRD: >90 ML/MIN/1.7M2
GLUCOSE SERPL-MCNC: 73 MG/DL (ref 70–99)
HCT VFR BLD AUTO: 42.7 % (ref 40–53)
HDLC SERPL-MCNC: 46 MG/DL
HGB BLD-MCNC: 13.9 G/DL (ref 13.3–17.7)
IMM GRANULOCYTES # BLD: 0 10E9/L (ref 0–0.4)
IMM GRANULOCYTES NFR BLD: 0.5 %
LDLC SERPL CALC-MCNC: 70 MG/DL
LITHIUM SERPL-SCNC: 0.88 MMOL/L (ref 0.6–1.2)
LYMPHOCYTES # BLD AUTO: 1.5 10E9/L (ref 0.8–5.3)
LYMPHOCYTES NFR BLD AUTO: 19.1 %
MCH RBC QN AUTO: 30.9 PG (ref 26.5–33)
MCHC RBC AUTO-ENTMCNC: 32.6 G/DL (ref 31.5–36.5)
MCV RBC AUTO: 95 FL (ref 78–100)
MONOCYTES # BLD AUTO: 0.6 10E9/L (ref 0–1.3)
MONOCYTES NFR BLD AUTO: 7.9 %
NEUTROPHILS # BLD AUTO: 5.5 10E9/L (ref 1.6–8.3)
NEUTROPHILS NFR BLD AUTO: 68.4 %
NONHDLC SERPL-MCNC: 89 MG/DL
NRBC # BLD AUTO: 0 10*3/UL
NRBC BLD AUTO-RTO: 0 /100
PLATELET # BLD AUTO: 241 10E9/L (ref 150–450)
POTASSIUM SERPL-SCNC: 4 MMOL/L (ref 3.4–5.3)
PROT SERPL-MCNC: 6.8 G/DL (ref 6.8–8.8)
RBC # BLD AUTO: 4.5 10E12/L (ref 4.4–5.9)
SODIUM SERPL-SCNC: 142 MMOL/L (ref 133–144)
TRIGL SERPL-MCNC: 93 MG/DL
TSH SERPL DL<=0.005 MIU/L-ACNC: 1.53 MU/L (ref 0.4–4)
WBC # BLD AUTO: 8 10E9/L (ref 4–11)

## 2018-01-03 PROCEDURE — 99223 1ST HOSP IP/OBS HIGH 75: CPT | Mod: AI | Performed by: CLINICAL NURSE SPECIALIST

## 2018-01-03 PROCEDURE — 36415 COLL VENOUS BLD VENIPUNCTURE: CPT | Performed by: CLINICAL NURSE SPECIALIST

## 2018-01-03 PROCEDURE — 84443 ASSAY THYROID STIM HORMONE: CPT | Performed by: CLINICAL NURSE SPECIALIST

## 2018-01-03 PROCEDURE — 90853 GROUP PSYCHOTHERAPY: CPT

## 2018-01-03 PROCEDURE — 80178 ASSAY OF LITHIUM: CPT | Performed by: CLINICAL NURSE SPECIALIST

## 2018-01-03 PROCEDURE — 25000132 ZZH RX MED GY IP 250 OP 250 PS 637: Performed by: PSYCHIATRY & NEUROLOGY

## 2018-01-03 PROCEDURE — 25000132 ZZH RX MED GY IP 250 OP 250 PS 637: Performed by: CLINICAL NURSE SPECIALIST

## 2018-01-03 PROCEDURE — 97150 GROUP THERAPEUTIC PROCEDURES: CPT | Mod: GO

## 2018-01-03 PROCEDURE — 80053 COMPREHEN METABOLIC PANEL: CPT | Performed by: CLINICAL NURSE SPECIALIST

## 2018-01-03 PROCEDURE — 80061 LIPID PANEL: CPT | Performed by: CLINICAL NURSE SPECIALIST

## 2018-01-03 PROCEDURE — 12400001 ZZH R&B MH UMMC

## 2018-01-03 PROCEDURE — 85025 COMPLETE CBC W/AUTO DIFF WBC: CPT | Performed by: CLINICAL NURSE SPECIALIST

## 2018-01-03 RX ORDER — OLANZAPINE 10 MG/1
10 TABLET, ORALLY DISINTEGRATING ORAL AT BEDTIME
Status: DISCONTINUED | OUTPATIENT
Start: 2018-01-03 | End: 2018-01-09

## 2018-01-03 RX ORDER — TRAZODONE HYDROCHLORIDE 50 MG/1
50 TABLET, FILM COATED ORAL
Status: DISCONTINUED | OUTPATIENT
Start: 2018-01-03 | End: 2018-01-09 | Stop reason: HOSPADM

## 2018-01-03 RX ORDER — ACETAMINOPHEN 325 MG/1
650 TABLET ORAL EVERY 6 HOURS PRN
Status: DISCONTINUED | OUTPATIENT
Start: 2018-01-03 | End: 2018-01-09 | Stop reason: HOSPADM

## 2018-01-03 RX ORDER — OLANZAPINE 5 MG/1
5-10 TABLET, ORALLY DISINTEGRATING ORAL 3 TIMES DAILY PRN
Status: DISCONTINUED | OUTPATIENT
Start: 2018-01-03 | End: 2018-01-09 | Stop reason: HOSPADM

## 2018-01-03 RX ORDER — DOCUSATE SODIUM 100 MG/1
100 CAPSULE, LIQUID FILLED ORAL 2 TIMES DAILY PRN
Status: DISCONTINUED | OUTPATIENT
Start: 2018-01-03 | End: 2018-01-09 | Stop reason: HOSPADM

## 2018-01-03 RX ORDER — HYDROXYZINE HYDROCHLORIDE 25 MG/1
25-50 TABLET, FILM COATED ORAL EVERY 4 HOURS PRN
Status: DISCONTINUED | OUTPATIENT
Start: 2018-01-03 | End: 2018-01-09 | Stop reason: HOSPADM

## 2018-01-03 RX ORDER — LITHIUM CARBONATE 450 MG
900 TABLET, EXTENDED RELEASE ORAL AT BEDTIME
Status: DISCONTINUED | OUTPATIENT
Start: 2018-01-03 | End: 2018-01-09 | Stop reason: HOSPADM

## 2018-01-03 RX ORDER — QUETIAPINE FUMARATE 100 MG/1
300 TABLET, FILM COATED ORAL AT BEDTIME
Status: DISCONTINUED | OUTPATIENT
Start: 2018-01-03 | End: 2018-01-03

## 2018-01-03 RX ORDER — ALUMINA, MAGNESIA, AND SIMETHICONE 2400; 2400; 240 MG/30ML; MG/30ML; MG/30ML
30 SUSPENSION ORAL EVERY 4 HOURS PRN
Status: DISCONTINUED | OUTPATIENT
Start: 2018-01-03 | End: 2018-01-09 | Stop reason: HOSPADM

## 2018-01-03 RX ADMIN — QUETIAPINE FUMARATE 300 MG: 100 TABLET ORAL at 02:25

## 2018-01-03 RX ADMIN — OLANZAPINE 10 MG: 5 TABLET, ORALLY DISINTEGRATING ORAL at 14:30

## 2018-01-03 RX ADMIN — LITHIUM CARBONATE 900 MG: 450 TABLET, EXTENDED RELEASE ORAL at 02:25

## 2018-01-03 ASSESSMENT — ACTIVITIES OF DAILY LIVING (ADL)
RETIRED_EATING: 0-->INDEPENDENT
DRESS: INDEPENDENT
PRIOR_FUNCTIONAL_LEVEL_COMMENT: IND
DRESS: 0-->INDEPENDENT
RETIRED_COMMUNICATION: 0-->UNDERSTANDS/COMMUNICATES WITHOUT DIFFICULTY
BATHING: 0-->INDEPENDENT
COGNITION: 2 - DIFFICULTY WITH ORGANIZING THOUGHTS
DRESS: INDEPENDENT
GROOMING: INDEPENDENT
ORAL_HYGIENE: INDEPENDENT
TOILETING: 0-->INDEPENDENT
HYGIENE/GROOMING: INDEPENDENT
ORAL_HYGIENE: INDEPENDENT
TRANSFERRING: 1-->ASSISTIVE EQUIPMENT
SWALLOWING: 0-->SWALLOWS FOODS/LIQUIDS WITHOUT DIFFICULTY
FALL_HISTORY_WITHIN_LAST_SIX_MONTHS: NO
AMBULATION: 0-->INDEPENDENT
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
## 2018-01-03 NOTE — PROGRESS NOTES
01/03/18 0213   Patient Belongings   Did you bring any home meds/supplements to the hospital?  No   Patient Belongings clothing;shoes   Disposition of Belongings Locker   Belongings Search Yes   Clothing Search Yes   Second Staff Duc GATICA.     Items in patient's storage bin: grey hooded sweatshirt w/ strings, 2 blue t-shirts, gray long johns, black sweatpants w/ strings, black stocking cap, sneakers w/ laces.    *at the time of admission, no items were sent to security.    Brought 1/5/18:  Green t-shirt, red t-shirt, gray pants, brown sweatshirt, orange long-sleeve, gray socks (too long for unit)    A               Admission:  I am responsible for any personal items that are not sent to the safe or pharmacy.  [facility] is not responsible for loss, theft or damage of any property in my possession.    Signature:  _________________________________ Date: _______  Time: _____                                              Staff Signature:  ____________________________ Date: ________  Time: _____      2nd Staff person, if patient is unable/unwilling to sign:    Signature: ________________________________ Date: ________  Time: _____     Discharge:  [facility] has returned all of my personal belongings:    Signature: _________________________________ Date: ________  Time: _____                                          Staff Signature:  ____________________________ Date: ________  Time: _____
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
## 2018-01-03 NOTE — ED NOTES
Report given to Tristin RN on unit 4A. He asked for the MD to put a MSSA order since patient was positive for Benzo and with medications to support treatment, writer asked MD and was told that this is not something they do. Nurse on 4A was informed and he said he would call back. Waiting on call back from 4A to transport patient
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
## 2018-01-03 NOTE — PROGRESS NOTES
"Patient in his room--tearful,curled up in bed, seemed very distraught. Stated that he just wanted to \"go\" --and when pressed further he stated that he didn't want to be alive and be done \"with it\" but  stated he had \"no plans\".  States he is hearing voices but that they are \"real\" about something that was \"stolen\" from him \"a long time ago\".  Difficult to get further information from him.  Offered Xyprexa and he intially declined but then was receptive when this writer offered to bring it to his room.  He did take the Zyprexa.  Will continue to monitor.   10 minutes post Zyprexa, patient sleeping quietly.  "

## 2018-01-03 NOTE — PROGRESS NOTES
Pt was present and visible in the milieu  Pt attended most groups and was an active participant    Pt spent much of the day in the milieu. When not in the milieu the pt spent time napping in own room. Pt had a very flat and quiet affect during the day. Pt was social with very few other pts or staff. Pt was polite when approached. Pt seemed to have good insight into own situation. Pt endorsed some feeling of depression and described it like an existential depression. Pt denied SI but hesitated. When pushed farther on the subject pt stated always having some amount of thought about SI but that today it was very minimal. Pt also endorsed some visual hallucinations stating that if something was stared at for a while it would distort or change color.     Pt endorsed SI SIB       01/03/18 1413   Behavioral Health   Hallucinations visual   Thinking distractable   Orientation person: oriented;place: oriented;time: oriented   Memory baseline memory   Insight admits / accepts   Judgement intact   Eye Contact at examiner   Affect blunted, flat   Mood mood is calm   Physical Appearance/Attire attire appropriate to age and situation   Hygiene well groomed   Suicidality chronic thoughts with no stated plan   1. Wish to be Dead No   2. Non-Specific Active Suicidal Thoughts  No   Self Injury other (see comment)  (denies)   Elopement (no concerns at this time)   Activity withdrawn;other (see comment)  (Present in the milieu)   Speech clear;coherent   Medication Sensitivity no stated side effects;no observed side effects   Psychomotor / Gait balanced;steady   Activities of Daily Living   Hygiene/Grooming independent   Oral Hygiene independent   Dress independent   Room Organization independent

## 2018-01-03 NOTE — PROGRESS NOTES
Attendence: Pt. Attended scheduled 2 of 3 OT sessions today.   Observations: pt was friendly and positive, remaining engaged in the groups. Pt did appear to be distracted due to requiring repetition 2-3x times in order to remember it was pt turn, otherwise pt answered questions. Pt was seen crying just prior to last OT group but received assist by other staff. Pt did not return.     01/03/18 1600   Occupational Therapy   Type of Intervention structured groups   Response Participates with encouragement   Hours 2

## 2018-01-03 NOTE — PLAN OF CARE
Problem: Patient Care Overview  Goal: Team Discussion  Team Plan:  BEHAVIORAL TEAM DISCUSSION    Participants: Debra Naegele APRN CNS, Mango Lane RN, Sayda Booker RN, Mesha CASTELAN  Progress: Initial evaluation  Continued Stay Criteria/Rationale: New patient admitted for psychosis  Medical/Physical: see medical consult  Precautions:   Behavioral Orders   Procedures     Code 1 - Restrict to Unit     Routine Programming     As clinically indicated     Status 15     Every 15 minutes.     Plan: Psychiatric Assessment. Medication Management. Therapeutic Mileu. Individual and group support.  Rationale for change in precautions or plan: initial plan

## 2018-01-04 PROCEDURE — 25000132 ZZH RX MED GY IP 250 OP 250 PS 637: Performed by: CLINICAL NURSE SPECIALIST

## 2018-01-04 PROCEDURE — 99232 SBSQ HOSP IP/OBS MODERATE 35: CPT | Performed by: CLINICAL NURSE SPECIALIST

## 2018-01-04 PROCEDURE — 12400007 ZZH R&B MH INTERMEDIATE UMMC

## 2018-01-04 PROCEDURE — 97150 GROUP THERAPEUTIC PROCEDURES: CPT | Mod: GO

## 2018-01-04 PROCEDURE — 90853 GROUP PSYCHOTHERAPY: CPT

## 2018-01-04 PROCEDURE — 25000132 ZZH RX MED GY IP 250 OP 250 PS 637: Performed by: PSYCHIATRY & NEUROLOGY

## 2018-01-04 RX ADMIN — LITHIUM CARBONATE 900 MG: 450 TABLET, EXTENDED RELEASE ORAL at 21:00

## 2018-01-04 RX ADMIN — OLANZAPINE 10 MG: 10 TABLET, ORALLY DISINTEGRATING ORAL at 21:00

## 2018-01-04 RX ADMIN — VITAMIN D, TAB 1000IU (100/BT) 1000 UNITS: 25 TAB at 10:15

## 2018-01-04 RX ADMIN — HYDROXYZINE HYDROCHLORIDE 50 MG: 25 TABLET ORAL at 15:05

## 2018-01-04 ASSESSMENT — ACTIVITIES OF DAILY LIVING (ADL)
GROOMING: INDEPENDENT
DRESS: INDEPENDENT
ORAL_HYGIENE: INDEPENDENT
GROOMING: INDEPENDENT
ORAL_HYGIENE: INDEPENDENT
DRESS: INDEPENDENT

## 2018-01-04 NOTE — PROGRESS NOTES
Patient received prn for agitation at 1430. Patient went to sleep.  Rounds completed q. 15 mins. Nurse assessed patient.  Patient is sleeping.  Breathing is normal.  Skin color is pink.  Chest rise and fall evenly.  Patient arouses to voice.  Patient did not receive bedtime medication to prevent increased drowsiness.  Will continue to monitor patient.

## 2018-01-04 NOTE — PROGRESS NOTES
Pt spent entire shift in his room sleeping besides when his parents came to visit. Pt was visited by parents for about 30min. Pt was woken up for visiting, was not observed talking much during visit, and went right back to bed when finished visiting.          01/03/18 2200   Behavioral Health   Hallucinations other (see comment)  (BHUPENDRA)   Thinking other (see comment)  (BHUPENDRA)   Orientation other (see comment)  (BHUPENDRA)   Memory other (see comment)  (BHUPENDRA)   Insight other (see comment)  (BHUPENDRA)   Judgement (BHUPENDRA)   Eye Contact (BHUPENDRA)   Affect blunted, flat   Mood mood is calm   Physical Appearance/Attire attire appropriate to age and situation   Hygiene neglected grooming - unclean body, hair, teeth   Suicidality other (see comments)  (BHUPENDRA)   Self Injury other (see comment)  (BHUPENDRA)   Elopement (BHUPENDRA)   Activity isolative;withdrawn   Speech other (see comments)  (BHUPENDAR)   Medication Sensitivity other (see comment)  (BHUPENDRA)   Psychomotor / Gait balanced   Psycho Education   Type of Intervention 1:1 intervention   Response unavailable   Activities of Daily Living   Hygiene/Grooming independent   Oral Hygiene independent   Dress independent   Room Organization independent

## 2018-01-04 NOTE — PLAN OF CARE
"Problem: Cognitive Impairment (Psychotic Signs/Symptoms) (Adult)  Goal: Improved Thought Clarity/Organization (Psychotic Signs/Symptoms)  Outcome: No Change  Pt continues to have hallucinations but can't really state if they are auditory or visual. Pt has been testing the unit doors asking, \"when do I get to leave\" \"when can I get off this floor?\" Pt has not been put on elopement precautions and is in behavioral scrubs. Pt declines to do 1:1 check in with writer besides what writer previously charted. Will continue to monitor.       "

## 2018-01-04 NOTE — PROGRESS NOTES
Pt continues to laugh to self and not make sentences that make sense. Pt requested medication for anxiety, prn hydroxyzine given.

## 2018-01-04 NOTE — PROGRESS NOTES
Re: First Episode      Referral made for our first episode program.      Nicole Cordova, LPCC, [facility]

## 2018-01-04 NOTE — PROGRESS NOTES
Attendence: Pt. Attended scheduled 2 of 2 OT sessions today.   Observations: pt required encouragement to join all groups though was agreeable and open, particularly to peer encouragement. Pt did appear distractable during group, pt appeared to have difficulty remaining on task completing projects and tasks, and would inconsistently engage in discussion, though would smile intermittently and laugh with peers. When spoken to pt answered 50% of the time, though remained in groups.     01/04/18 1600   Occupational Therapy   Type of Intervention structured groups   Response Participates   Hours 2
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
## 2018-01-04 NOTE — PROGRESS NOTES
"Pt approaches desk and asks staff if the unit is alcohol free. Writer informs pt that it is. He says he could \"really use a beer\" and that it would \"hit him hard right now\" which would be nice. Writer restates this is an alcohol free unit. Pt then asks if he can get a Xanax. Writer informs nurse he would like an anxiety medication.   "

## 2018-01-04 NOTE — PROGRESS NOTES
"Visible in the milieu, reported that he feels good sometime. Denies SI, SIB, and endorsed hallucinations. According to Patient, he hears voces that tell him  \"to do something influential such as watching TV\". Asked to tell this writer more about how watching television creates influence, Pt responded: \" I saw something on television that tells me about remote presence\". He stated that he feels depressed all the time because he does not have stable life-style or money to enable him to buy RV van or school bus  to travel around the country. Pt rates his mood at seven out of ten. Overall,this  patient appears distractible, confused, delusional, closes his eyes while talking, looks away with great concentration in space, then opens his eyes wide with incongruent smile. He appears socially withdrawn, and accepts redirections.       01/04/18 1742   Behavioral Health   Hallucinations auditory   Thinking confused;delusional;distractable   Orientation person: oriented;place: oriented   Memory baseline memory   Insight poor   Judgement impaired   Eye Contact at examiner;into space   Affect blunted, flat   Mood anxious   Physical Appearance/Attire appears stated age   Hygiene other (see comment)  (Adequate)   Suicidality other (see comments)  (Pt denies)   1. Wish to be Dead No   2. Non-Specific Active Suicidal Thoughts  No   Self Injury other (see comment)  (Pt denies)   Elopement Loitering near exit doors   Activity withdrawn   Speech flight of ideas;tangential;other (see comments)  (makes illogical statements)   Psychomotor / Gait balanced;steady   Sleep/Rest/Relaxation   Day/Evening Time Hours up all shift   Safety   Elopement status 15;no shoes;behavioral scrubs (pajamas);signs posted on unit entrance / exit doors   Coping/Psychosocial   Verbalized Emotional State acceptance;happiness   Supportive Measures active listening utilized;verbalization of feelings encouraged;relaxation techniques promoted;problem solving " facilitated;positive reinforcement provided   Trust Relationship/Rapport thoughts/feelings acknowledged;questions encouraged;questions answered;empathic listening provided   Activities of Daily Living   Hygiene/Grooming independent   Oral Hygiene independent   Dress independent   Room Organization independent   Activity   Activity Assistance Provided independent

## 2018-01-04 NOTE — PROGRESS NOTES
"Pt came to medication window per writer request. Pt was given morning vitamin. When writer was asking pt questions regarding orientation to person, place, time and date, pt became very guarded and said \"these questions are not going to help me with my memory\". Pt thought he was in [place], but answered all the other questions appropriately. When writer asked about his mental health pt answered, \"I don't want to talk to everyone about my mental health, but I did have hallucinations when I was just on the exercise bike.\" Writer then asked if they were auditory or visual. Pt stated \"I could see stuff in my mind\" pt then walked away. Pt then said \"I could use something like Adderal to control my thinking.\"  "

## 2018-01-05 PROCEDURE — 25000132 ZZH RX MED GY IP 250 OP 250 PS 637: Performed by: CLINICAL NURSE SPECIALIST

## 2018-01-05 PROCEDURE — 97150 GROUP THERAPEUTIC PROCEDURES: CPT | Mod: GO

## 2018-01-05 PROCEDURE — 25000132 ZZH RX MED GY IP 250 OP 250 PS 637: Performed by: PSYCHIATRY & NEUROLOGY

## 2018-01-05 PROCEDURE — 99232 SBSQ HOSP IP/OBS MODERATE 35: CPT | Performed by: CLINICAL NURSE SPECIALIST

## 2018-01-05 PROCEDURE — 12400007 ZZH R&B MH INTERMEDIATE UMMC

## 2018-01-05 PROCEDURE — 90853 GROUP PSYCHOTHERAPY: CPT

## 2018-01-05 RX ADMIN — VITAMIN D, TAB 1000IU (100/BT) 1000 UNITS: 25 TAB at 08:10

## 2018-01-05 RX ADMIN — OLANZAPINE 10 MG: 10 TABLET, ORALLY DISINTEGRATING ORAL at 21:18

## 2018-01-05 RX ADMIN — LITHIUM CARBONATE 900 MG: 450 TABLET, EXTENDED RELEASE ORAL at 21:18

## 2018-01-05 ASSESSMENT — ACTIVITIES OF DAILY LIVING (ADL)
ORAL_HYGIENE: INDEPENDENT
DRESS: INDEPENDENT
GROOMING: INDEPENDENT
ORAL_HYGIENE: INDEPENDENT
DRESS: SCRUBS (BEHAVIORAL HEALTH);INDEPENDENT
GROOMING: INDEPENDENT

## 2018-01-05 NOTE — PROGRESS NOTES
"Pt was visible in the milieu for most of the day  Pt attended one some groups and participated    Pt spent the day alternating between own room and being in the milieu. While in own room pt would frequently lay on bed awake or nap. When in the milieu pt would sit quietly off to the side and would not socialize with others. Pt spent some time loitering near the exit door. Pt did not need to be redirected away form the door because the pt would only linger for a minute or so at a time. Pt seemed confused and very distractable during the day. Pt was seen staring wide at eyed at his own hand or standing near a wall with eyes closed for a few minutes. Pt reported hearing voices. Pt stated the voices werent saying anything mean but were simply making him worry about illogical things like grandparents not being where they were supposed to be. Pt reported depressed and anxious. Pt also stated having chronic thoughts of SI but no current active plan. During a check in with this writer when asked about thoughts of SI pt stated \"I fasciously thought about jumping off the bridge over there\" and then nodded toward a bridge visible from the window.        01/05/18 1350   Behavioral Health   Hallucinations auditory   Thinking distractable;poor concentration;delusional   Orientation person: oriented;place: oriented   Memory baseline memory   Insight poor   Judgement impaired   Eye Contact at examiner;into space   Affect blunted, flat   Mood anxious;depressed   Physical Appearance/Attire attire appropriate to age and situation   Hygiene well groomed   Suicidality chronic thoughts with no stated plan   1. Wish to be Dead No   2. Non-Specific Active Suicidal Thoughts  No   Self Injury other (see comment)  (denies)   Elopement Loitering near exit doors   Activity withdrawn   Speech clear;pressured   Medication Sensitivity no stated side effects;no observed side effects   Psychomotor / Gait balanced;steady   Activities of Daily Living "   Hygiene/Grooming independent   Oral Hygiene independent   Dress independent   Room Organization independent

## 2018-01-05 NOTE — PROGRESS NOTES
Attendence: Pt. Attended scheduled 3 of 3 OT sessions today.   Observations: pt comes and goes, wonders in and out of group often requiring extra encouragement to remain in group, pt completes tasks remaining focused for approx 10-15mn at a time, though pt appears disorganized in behavior, pt often has his eyes closed while in conversation or while standing. Pt conversation minimal though roughly appropriate with peers. Pt will laugh frequently throughout group while not engaged with peer conversation appearing to be responding to internal stimuli.      01/05/18 1600   Occupational Therapy   Type of Intervention structured groups   Response Participates   Hours 3

## 2018-01-05 NOTE — PROGRESS NOTES
"[facility], [place]   Psychiatric Progress Note        Interim History:   The patient's care was discussed with the treatment team during the daily team meeting and/or staff's chart notes were reviewed.  Staff report patient is attending groups.     Patient signed a CONNOR for his parents. Left messages for his parents. Patient has been improving but continues to be disorganized. He is less volatile. He continues to talk about pressures, closes his eyes due to light patterns and makes odd statements about his surroundings. Continue Zyprexa 10 mg and Lithium 900 mg.     Patient does not feel he is supported at home. Discussed with patient that he was referred to the [facility] program for more support at home. He appeared to be encouraged with this information.          Medications:       cholecalciferol  1,000 Units Oral Daily     lithium  900 mg Oral At Bedtime     OLANZapine zydis  10 mg Oral At Bedtime          Allergies:     Allergies   Allergen Reactions     Penicillins      Rash, Generalized          Labs:   No results found for this or any previous visit (from the past 24 hour(s)).       Psychiatric Examination:     /76  Pulse 70  Temp 98  F (36.7  C) (Oral)  Resp 16  Ht 1.756 m (5' 9.13\")  Wt 60.1 kg (132 lb 7.9 oz)  SpO2 100%  BMI 19.49 kg/m2  Weight is 132 lbs 7.94 oz  Body mass index is 19.49 kg/(m^2).  Orthostatic Vitals       Most Recent      Sitting Orthostatic /76 01/05 0800    Sitting Orthostatic Pulse (bpm) 70 01/05 0800    Standing Orthostatic /84 01/04 0847    Standing Orthostatic Pulse (bpm) 72 01/04 0847      Appearance: awake, alert, adequately groomed and dressed in hospital scrubs  Attitude:  cooperative  Eye Contact:  good  Mood:  anxious  Affect:  intensity is blunted  Speech:  normal prosody  Psychomotor Behavior:  no evidence of tardive dyskinesia, dystonia, or tics  Throught Process:  disorganized  Associations:  loosening of associations " present  Thought Content:  passive suicidal ideation present  Insight:  limited  Judgement:  limited  Oriented to:  time, person, and place  Attention Span and Concentration:  intact  Recent and Remote Memory:  intact            Precautions:     Behavioral Orders   Procedures     Code 1 - Restrict to Unit     Elopement precautions     Patient tried to leave     Routine Programming     As clinically indicated     Status 15     Every 15 minutes.          DIagnoses:   1.  Schizophrenia.   2.  Cannabis use disorder, moderate.   3.  Alcohol use disorder, moderate.   4.  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          Plan:     Legal: Voluntary      Medication management: Started Zyprexa 10 mg and discontinue Seroquel 300 mg.      Dispo: Stabilization with medications and return to home with [facility] program.

## 2018-01-06 PROCEDURE — 25000132 ZZH RX MED GY IP 250 OP 250 PS 637: Performed by: PSYCHIATRY & NEUROLOGY

## 2018-01-06 PROCEDURE — 12400007 ZZH R&B MH INTERMEDIATE UMMC

## 2018-01-06 PROCEDURE — 25000132 ZZH RX MED GY IP 250 OP 250 PS 637: Performed by: CLINICAL NURSE SPECIALIST

## 2018-01-06 RX ADMIN — VITAMIN D, TAB 1000IU (100/BT) 1000 UNITS: 25 TAB at 10:02

## 2018-01-06 RX ADMIN — OLANZAPINE 10 MG: 10 TABLET, ORALLY DISINTEGRATING ORAL at 21:29

## 2018-01-06 RX ADMIN — LITHIUM CARBONATE 900 MG: 450 TABLET, EXTENDED RELEASE ORAL at 21:29

## 2018-01-06 ASSESSMENT — ACTIVITIES OF DAILY LIVING (ADL)
GROOMING: INDEPENDENT
DRESS: SCRUBS (BEHAVIORAL HEALTH);INDEPENDENT
GROOMING: INDEPENDENT
ORAL_HYGIENE: INDEPENDENT
ORAL_HYGIENE: INDEPENDENT
LAUNDRY: UNABLE TO COMPLETE
DRESS: SCRUBS (BEHAVIORAL HEALTH)

## 2018-01-06 NOTE — PROGRESS NOTES
Pt had an okay shift. Pt was present on milieu but was in and out of groups. Pt appeared responding to hallucinations and was often observed talking to self with eyes closed, as well as touching the pictures on the walls. Pt had visit with family and that seemed to go well. Pt used colored pencils and doretha briefly, but did not remain with this activity for long. Pt did not interact much with other pts and kept to himself. Pt denied mental health symptoms. Pt mentioned wanting to leave at beginning of shift, but did not mention it again.    SI/SIB: denies  Appetite: typical  Sleep: typical  Hallucinations: appears responding, auditory       01/05/18 1800   Behavioral Health   Hallucinations auditory;appears responding   Thinking confused;distractable;poor concentration   Orientation person: oriented;place: oriented   Memory baseline memory   Insight poor   Judgement impaired   Eye Contact into space;staring;blinking;at examiner   Affect full range affect   Mood anxious;mood is calm   Physical Appearance/Attire attire appropriate to age and situation   Hygiene well groomed   Suicidality other (see comments)  (denies)   1. Wish to be Dead No   2. Non-Specific Active Suicidal Thoughts  No   Self Injury other (see comment)  (denies)   Elopement Loitering near exit doors;Statements about wanting to leave   Activity withdrawn;other (see comment)  (present on milieu)   Speech flight of ideas;pressured;clear   Medication Sensitivity no stated side effects;no observed side effects   Psychomotor / Gait paces;balanced;steady   Activities of Daily Living   Hygiene/Grooming independent   Oral Hygiene independent   Dress scrubs (behavioral health);independent   Room Organization independent   Activity   Activity Assistance Provided independent

## 2018-01-06 NOTE — PROGRESS NOTES
"Pt indicated goal is to get out of here, plans towards discharge include talking to a doctor  Pt was withdrawn and isolated, did not participate in any group, not quite visible in the milieu but was out for meals and when parents visited  Pt indicated feeling depressed at 6/10, irritable,sad,confused and anxious  Pt indicated responding to auditory hallucination\" I always here voices\"  Pt indicated voices telling him to take steps to get better and sometime,critic/attack on self esteem  Pt denies SI/SIB thoughts, denies homicidal ideations  PT was concern about integrating in the society and finding a girl friend  Pt indicated good appetite,good sleep with pain on back and hip        01/06/18 1313   Behavioral Health   Hallucinations auditory;appears responding   Thinking confused;distractable;poor concentration   Orientation person: oriented;place: oriented;date: oriented;time: oriented   Memory baseline memory   Insight admits / accepts;poor   Judgement impaired   Eye Contact at examiner   Affect sad;full range affect;irritable   Mood depressed;hopeless;anxious;irritable   Physical Appearance/Attire disheveled;attire appropriate to age and situation   Suicidality other (see comments)  (Pt denies SI thoughts)   1. Wish to be Dead No   2. Non-Specific Active Suicidal Thoughts  No   Elopement Loitering near exit doors;Statements about wanting to leave  (Pt is monitored for risk of elopement)   Safety   Elopement behavioral scrubs (pajamas);signs posted on unit entrance / exit doors;staff positioned near patient during heightened activity on unit;distraction;engagement in unit activities;identify factors which lead to feelings of wanting leave   Psycho Education   Type of Intervention 1:1 intervention   Response participates, initiates socially appropriate   Hours 0.5   Daily Care   Activity activity encouraged   Activities of Daily Living   Hygiene/Grooming independent   Oral Hygiene independent   Dress scrubs " (behavioral health);independent   Room Organization independent   Activity   Activity Assistance Provided independent

## 2018-01-07 PROCEDURE — 97150 GROUP THERAPEUTIC PROCEDURES: CPT | Mod: GO

## 2018-01-07 PROCEDURE — 25000132 ZZH RX MED GY IP 250 OP 250 PS 637: Performed by: CLINICAL NURSE SPECIALIST

## 2018-01-07 PROCEDURE — 12400007 ZZH R&B MH INTERMEDIATE UMMC

## 2018-01-07 PROCEDURE — 25000132 ZZH RX MED GY IP 250 OP 250 PS 637: Performed by: PSYCHIATRY & NEUROLOGY

## 2018-01-07 PROCEDURE — 90853 GROUP PSYCHOTHERAPY: CPT

## 2018-01-07 RX ADMIN — LITHIUM CARBONATE 900 MG: 450 TABLET, EXTENDED RELEASE ORAL at 20:27

## 2018-01-07 RX ADMIN — OLANZAPINE 10 MG: 10 TABLET, ORALLY DISINTEGRATING ORAL at 20:27

## 2018-01-07 ASSESSMENT — ACTIVITIES OF DAILY LIVING (ADL)
LAUNDRY: UNABLE TO COMPLETE
ORAL_HYGIENE: INDEPENDENT
DRESS: INDEPENDENT
DRESS: INDEPENDENT
ORAL_HYGIENE: INDEPENDENT
GROOMING: INDEPENDENT
GROOMING: INDEPENDENT

## 2018-01-07 NOTE — PROGRESS NOTES
Pt was occasionally visible in the milieu  Pt attended some groups    Pt alternated between long stretches of sleeping and sitting in a meditative position on the floor. While present in the milieu for groups and meals pt was withdrawn and hardly spoke to anyone. Pt had a flat affect and put on a very dishelved look. Pt had an overall calm demeanor. Pt was also seen standing in the milieu with eyes shut for a couple of minutes at time without moving.       01/07/18 1432   Behavioral Health   Hallucinations appears responding   Thinking distractable   Orientation person: oriented;place: oriented   Memory baseline memory   Insight admits / accepts   Judgement impaired   Eye Contact at examiner   Affect blunted, flat   Mood depressed;anxious   Physical Appearance/Attire untidy   Hygiene neglected grooming - unclean body, hair, teeth   Suicidality other (see comments)  (none stated or observed)   Self Injury other (see comment)  (none stated or observed)   Elopement (no concerns)   Activity withdrawn   Speech clear   Medication Sensitivity no observed side effects   Psychomotor / Gait balanced;steady   Activities of Daily Living   Hygiene/Grooming independent   Oral Hygiene independent   Dress independent   Room Organization independent

## 2018-01-07 NOTE — PLAN OF CARE
Problem: Psychotic Symptoms  Goal: Psychotic Symptoms  Signs and symptoms of listed problems will be absent or manageable.  Outcome: No Change  Patient visible in milieu, calm and cooperative.  Flat affect, denies SI/SIB, rated depression at 6/10, anxiety at 4/10.  Patient seemed responding to internal stimuli.  Took scheduled medications with no problem.  Sleeping comfortably in room, will continue to monitor closely.

## 2018-01-07 NOTE — PROGRESS NOTES
Attendence: Pt. Attended scheduled 3 of 3 OT sessions today.   Observations: pt minimally verbalized throughout group, smiling incongruently, pt often had eyes closed for 5-10 minutes at a time, and would stare at paper for 4-5minutes, though did engage in focused task for up to 10 minutes throughout group, pt did painting and when prompted contributed to group discussion but would always required a cue to what the topic was       01/07/18 1600   Occupational Therapy   Type of Intervention structured groups   Response Participates with cues/redirection   Hours 3

## 2018-01-08 PROCEDURE — H2032 ACTIVITY THERAPY, PER 15 MIN: HCPCS

## 2018-01-08 PROCEDURE — 25000132 ZZH RX MED GY IP 250 OP 250 PS 637: Performed by: CLINICAL NURSE SPECIALIST

## 2018-01-08 PROCEDURE — 99231 SBSQ HOSP IP/OBS SF/LOW 25: CPT | Performed by: CLINICAL NURSE SPECIALIST

## 2018-01-08 PROCEDURE — 12400007 ZZH R&B MH INTERMEDIATE UMMC

## 2018-01-08 PROCEDURE — 25000132 ZZH RX MED GY IP 250 OP 250 PS 637: Performed by: PSYCHIATRY & NEUROLOGY

## 2018-01-08 RX ADMIN — VITAMIN D, TAB 1000IU (100/BT) 1000 UNITS: 25 TAB at 10:21

## 2018-01-08 RX ADMIN — LITHIUM CARBONATE 900 MG: 450 TABLET, EXTENDED RELEASE ORAL at 20:40

## 2018-01-08 RX ADMIN — OLANZAPINE 10 MG: 10 TABLET, ORALLY DISINTEGRATING ORAL at 20:40

## 2018-01-08 ASSESSMENT — ACTIVITIES OF DAILY LIVING (ADL)
DRESS: SCRUBS (BEHAVIORAL HEALTH);INDEPENDENT
ORAL_HYGIENE: INDEPENDENT
GROOMING: INDEPENDENT
DRESS: SCRUBS (BEHAVIORAL HEALTH)
GROOMING: INDEPENDENT
ORAL_HYGIENE: INDEPENDENT

## 2018-01-08 NOTE — PROGRESS NOTES
Pt indicated goal is to get discharge to leave  Plans for discharge include talking to doctor, not interested in completing personal care plan  Pt indicated paranoid thoughts,irritable, and anxious  Pt denies SI/SIB thoughts and concern is about getting out     01/08/18 1351   Behavioral Health   Hallucinations denies / not responding to hallucinations   Thinking confused;distractable   Orientation person: oriented;place: oriented;date: oriented;time: oriented   Memory baseline memory   Insight admits / accepts;poor   Judgement impaired   Eye Contact blinking;out of corner of eyes;at examiner   Affect blunted, flat;irritable   Mood anxious;irritable   Physical Appearance/Attire disheveled;attire appropriate to age and situation   Suicidality other (see comments)  (Pt denies SI thoughts)   1. Wish to be Dead No   2. Non-Specific Active Suicidal Thoughts  No   Elopement Loitering near exit doors;Statements about wanting to leave;Hallucinations directing behavior  (Pt is monitored for risk of elopement)   Activity (Pt participated in grp,socialized ,visible in the milieu)   Speech clear;coherent   Medication Sensitivity no stated side effects;no observed side effects   Psychomotor / Gait balanced;steady   Safety   Elopement room away from unit doors;behavioral scrubs (pajamas);signs posted on unit entrance / exit doors;staff positioned near patient during heightened activity on unit;distraction;engagement in unit activities;identify factors which lead to feelings of wanting leave   Psycho Education   Type of Intervention 1:1 intervention   Response participates with encouragement   Hours 0.5   Daily Care   Activity activity encouraged   Activities of Daily Living   Hygiene/Grooming independent   Oral Hygiene independent   Dress scrubs (behavioral health);independent   Activity   Activity Assistance Provided independent   Behavioral Health Interventions   Psychotic Symptoms decrease environmental stimulation;redirection  of intrusive behaviors;redirection of aggressive behaviors;assist patient in developing safety plan;assist patient in following safety plan;encourage nutrition and hydration;encourage participation / independence with adls;provide emotional support;establish therapeutic relationship;assist with developing & utilizing healthy coping strategies;build upon strengths   Social and Therapeutic Interventions (Psychotic Symptoms) encourage socialization with peers;encourage effective boundaries with peers;encourage participation in therapeutic groups and milieu activities
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
## 2018-01-08 NOTE — PROGRESS NOTES
"   01/08/18 1700   Art Therapy   Type of Intervention 1:1 intervention   Response participates with cues/redirection   Hours 2.5   Pt attended all morning of groups. He made several paintings, was engaged in the conversation and said he was interested in pursuing art classes.     In the afternoon he was falling asleep at the table, groggy and not tracking with the group activity. He also was in and out of the room and unable to focus. This was a big change from the mornings presentation.     In the morning he was irritated with a female peer that he felt was being \" passive aggressive\" with himself and another female peer.  "

## 2018-01-08 NOTE — PROGRESS NOTES
"[facility], [place]   Psychiatric Progress Note        Interim History:   The patient's care was discussed with the treatment team during the daily team meeting and/or staff's chart notes were reviewed.  Staff report patient is attending groups. Patient stated he liked the yoga group.     Patient is more organized. Patient says that he notices he can follow the group instructions better. He is talking less about pressures, light patterns and is able to stay on track with the conversation.     Patient has been taking Zyprexa 10 mg. He feels this medication has been helpful to him. Continue Lithium 900 mg. Discussed his condition . He asked questions about having a thought disorder and how that impacts him going to the [facility].     Patient referred to the [facility] Program. Discussed treatment plan with mother today. Proposed discharge is Tuesday or Wednesday.          Medications:       cholecalciferol  1,000 Units Oral Daily     lithium  900 mg Oral At Bedtime     OLANZapine zydis  10 mg Oral At Bedtime          Allergies:     Allergies   Allergen Reactions     Penicillins      Rash, Generalized          Labs:   No results found for this or any previous visit (from the past 24 hour(s)).       Psychiatric Examination:     /74  Pulse 63  Temp 97.8  F (36.6  C) (Oral)  Resp 16  Ht 1.756 m (5' 9.13\")  Wt 60.1 kg (132 lb 7.9 oz)  SpO2 100%  BMI 19.49 kg/m2  Weight is 132 lbs 7.94 oz  Body mass index is 19.49 kg/(m^2).  Orthostatic Vitals       Most Recent      Sitting Orthostatic /74 01/08 0845    Sitting Orthostatic Pulse (bpm) 63 01/08 0845    Standing Orthostatic /72 01/08 0845    Standing Orthostatic Pulse (bpm) 85 01/08 0845            Appearance: awake, alert, dressed in hospital scrubs and slightly unkempt  Attitude:  cooperative  Eye Contact:  fair  Mood:  better  Affect:  appropriate and in normal range  Speech:  normal prosody  Psychomotor Behavior:  no " evidence of tardive dyskinesia, dystonia, or tics  Throught Process:  linear  Associations:  no loose associations  Thought Content:  no evidence of suicidal ideation or homicidal ideation  Insight:  limited  Judgement:  limited  Oriented to:  time, person, and place  Attention Span and Concentration:  intact  Recent and Remote Memory:  intact         Precautions:     Behavioral Orders   Procedures     Code 1 - Restrict to Unit     Elopement precautions     Patient tried to leave     Routine Programming     As clinically indicated     Status 15     Every 15 minutes.          DIagnoses:   1.  Schizophrenia.   2.  Cannabis use disorder, moderate.   3.  Alcohol use disorder, moderate.   4.  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           Plan:     Legal: Voluntary       Medication management: Started Zyprexa 10 mg and discontinue Seroquel 300 mg.       Dispo: Stabilization with medications and return to home with [facility] program. Proposed discharge date is 1/9.

## 2018-01-08 NOTE — PROGRESS NOTES
Writer spoke with pt's mother Mesha about the [facility] program. Pt was referred to the program by another CTC. Writer sent follow-up msg via [employer] to the  inquiring about pt's fit for the program.

## 2018-01-08 NOTE — PROGRESS NOTES
Writer met with patient. Introduced self and role. Will continue discharge planning and schedule appointments when ready to d/c.  At this time waiting to hear back from [facility]

## 2018-01-09 VITALS
HEIGHT: 69 IN | BODY MASS INDEX: 19.62 KG/M2 | DIASTOLIC BLOOD PRESSURE: 68 MMHG | HEART RATE: 56 BPM | RESPIRATION RATE: 16 BRPM | WEIGHT: 132.5 LBS | SYSTOLIC BLOOD PRESSURE: 120 MMHG | TEMPERATURE: 97.5 F | OXYGEN SATURATION: 100 %

## 2018-01-09 PROCEDURE — H2032 ACTIVITY THERAPY, PER 15 MIN: HCPCS

## 2018-01-09 PROCEDURE — 25000132 ZZH RX MED GY IP 250 OP 250 PS 637: Performed by: PSYCHIATRY & NEUROLOGY

## 2018-01-09 PROCEDURE — 99239 HOSP IP/OBS DSCHRG MGMT >30: CPT | Performed by: CLINICAL NURSE SPECIALIST

## 2018-01-09 RX ORDER — LITHIUM CARBONATE 450 MG
900 TABLET, EXTENDED RELEASE ORAL AT BEDTIME
Qty: 60 TABLET | Refills: 1 | Status: SHIPPED | OUTPATIENT
Start: 2018-01-09 | End: 2018-03-05

## 2018-01-09 RX ORDER — OLANZAPINE 10 MG/1
10 TABLET ORAL AT BEDTIME
Status: DISCONTINUED | OUTPATIENT
Start: 2018-01-09 | End: 2018-01-09 | Stop reason: HOSPADM

## 2018-01-09 RX ORDER — OLANZAPINE 10 MG/1
10 TABLET ORAL AT BEDTIME
Qty: 30 TABLET | Refills: 1 | Status: SHIPPED | OUTPATIENT
Start: 2018-01-09 | End: 2018-01-29

## 2018-01-09 RX ADMIN — VITAMIN D, TAB 1000IU (100/BT) 1000 UNITS: 25 TAB at 10:17

## 2018-01-09 ASSESSMENT — ACTIVITIES OF DAILY LIVING (ADL)
ORAL_HYGIENE: INDEPENDENT
GROOMING: INDEPENDENT
LAUNDRY: WITH SUPERVISION
DRESS: SCRUBS (BEHAVIORAL HEALTH)

## 2018-01-09 NOTE — PROGRESS NOTES
"   01/09/18 1700   General Information   Art Directive house-tree-person  (ASSESSMENT)   Task Orientation    Task orientation skills works independently;other (see comments)   Task orientation concerns hurries through tasks   Social Interaction   Social interaction skills other (see comments)   Social interaction concerns other (see comments)   Product/Content   Product/Content image reflects current feelings;image reflects positive aspects;devalues product;spontaneous and free image;has own expressive language;presence of a metaphor/theme   Developmental level   Approximate developmental level of art expression age appropriate expression;age appropriate motor skills   Pt has been doing lots of abstract watercolors in groups. He is very interested in experimentation with the amt of water and color mixing. For this assessment he rushed through it. It is very quickly drawn with a blue colored pencil. He expressed an interest in taking college level art course in group and asked questions about education for art.  He states that he is here for \" bad health.\"  He \"Jerel\" lives in the house. The feeling inside is blue and sad. He likes that it is spacious, he doesn't like that the upstairs is slanted.  Tree is 60 years ld and has lots of leaves that he likes. He doesn't like that it leans towards the house. The tree needs sunlight.  Person is 66 years old and standing. He is proud he has a ?\"house\" ( difficult to read ). He doesn't like that the person has no face. He least likes that the legs are uneven.He needs love. Pt is sometimes focused in group and sometimes tired and confused, mornings are clearer for him than afternoons writer has observed.  "

## 2018-01-09 NOTE — DISCHARGE INSTRUCTIONS
Behavioral Discharge Planning and Instructions      Summary: You were admitted on 1/2/2018 to [facility] due to psychosis.  You were treated by Debra Naegele, APRN, CNP and discharged on 01/09/18  to Home    Principal Diagnosis: Schizophrenia.    Health Care Follow-up Appointments:   [facility] NAVIG[facility] Intake Appointment  Date: Monday, 1/15/18  Time:  1:00pm  Provider:  Shai Londono  [facility]   [facility], [address], 2nd Floor   [address] [place] [address]   Phone: [phone]     Medication Management  Date: Friday, 1/26/17  Time: 4:00pm      Provider: Dr. Donaldson  Address: [address] [facility] [facility] [place]  [address].   Phone:[phone]   [facility] has faxed the Discharge Summary and AVS to this provider at Fax: [phone]      Attend all scheduled appointments with your outpatient providers. Call at least 24 hours in advance if you need to reschedule an appointment to ensure continued access to your outpatient providers.   Major Treatments, Procedures and Findings: You were provided with: a psychiatric assessment, assessed for medical stability, medication evaluation and/or management, group therapy and milieu management    Symptoms to Report: feeling more aggressive, increased confusion, losing more sleep, mood getting worse or thoughts of suicide    Early warning signs can include:  increased depression or anxiety sleep disturbances increased thoughts or behaviors of suicide or self-harm  increased unusual thinking, such as paranoia or hearing voices    Safety and Wellness:  Take all medicines as directed.  Make no changes unless your doctor suggests them.      Follow treatment recommendations.  Refrain from alcohol and non-prescribed drugs.  If there is a concern for safety, call 101.    Resources: Mental health crisis response for your [facility] is offered 24 hours a day, 7 days a week. A trained counselor will assess your  "current situation, offer support and counseling and connect you with local resources. Please call  [facility] Crisis (COPE) Response - Adult [phone]    Text 4 Life: txt \"LIFE\" to 49409 for immediate support and crisis intervention  Crisis text line: Text \"START\" to 580-015. Free, confidential, 24/7.  Crisis Intervention: 395.915.7651 or 908-238-5563. Call anytime for help.     The treatment team has appreciated the opportunity to work with you. Jerel, please take care and make your recovery a daily recovery. If you have any questions or concerns our unit number is [phone].  You will be receiving a follow-up phone call within the next three days from a representative from behavioral health.  You have identified the best phone number to reach you as [phone] (home)             "

## 2018-01-09 NOTE — PROGRESS NOTES
Writer spoke with patient's mother regarding discharge appointments. Writer met with patient and went over discharge plans and patient's relapse prevention plan. Patient verbalized understanding.  Copy of plan placed in chart.
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
## 2018-01-09 NOTE — PLAN OF CARE
Problem: Psychotic Symptoms  Goal: Psychotic Symptoms  Signs and symptoms of listed problems will be absent or manageable.   Outcome: Improving  Pt was discharged into the care of Mother. Discharge teaching, including f/u care and medication teaching, complete. Pt completed Coping Plan and denies SI/SIB/HI. [facility] Relapse Prevention Plan completed with patient. Logged on billing sheet. Copy given to patient and original placed in patient's chart.

## 2018-01-09 NOTE — PLAN OF CARE
"Problem: Psychotic Symptoms  Goal: Psychotic Symptoms  Signs and symptoms of listed problems will be absent or manageable.   Outcome: Improving   01/08/18 2002   Psychotic Symptoms   Psychotic Symptoms Assessed all   Psychotic Symptoms Present anxiety   48 HOUR ASSESSMENT: Pt has had a good day.  Pt reports he feels better today and is ready to be discharged.  Pt reports a \"little anxiety\" about his parents wanting him to go right back to school.  However, pt agreed he was going to take life one day at a time.  To monitor.      "

## 2018-01-18 ENCOUNTER — OFFICE VISIT (OUTPATIENT)
Dept: PSYCHIATRY | Facility: CLINIC | Age: 23
End: 2018-01-18
Payer: COMMERCIAL

## 2018-01-18 DIAGNOSIS — F25.9 SCHIZOAFFECTIVE DISORDER (H): Primary | ICD-10-CM

## 2018-01-19 ENCOUNTER — OFFICE VISIT (OUTPATIENT)
Dept: ORTHOPEDICS | Facility: CLINIC | Age: 23
End: 2018-01-19
Payer: COMMERCIAL

## 2018-01-19 DIAGNOSIS — M25.552 LEFT HIP PAIN: Primary | ICD-10-CM

## 2018-01-19 NOTE — MR AVS SNAPSHOT
After Visit Summary   1/19/2018    Jerel Day    MRN: [id number]           Patient Information     Date Of Birth          1995        Visit Information        Provider Department      1/19/2018 11:00 AM Karl Mireles MD [facility] Sports and Orthopaedic Walk In Clinic        Today's Diagnoses     Left hip pain    -  1       Follow-ups after your visit        Additional Services     PHYSICAL THERAPY REFERRAL (External-Prints)       Physical Therapy Referral                  Your next 10 appointments already scheduled     Jan 23, 2018  4:00 PM CST   Navigation Evaluation with Shai Londono [facility] Psychiatry ([facility])    [address]  [facility] [address]   [phone]            Feb 01, 2018  9:30 AM CST   (Arrive by 9:15 AM)   Return Visit with Shelton Guo MD   [facility] Ear Nose and Throat ([facility])    [address]  [address] [facility] [id number] [phone]              Who to contact     Please call your clinic at [phone] to:    Ask questions about your health    Make or cancel appointments    Discuss your medicines    Learn about your test results    Speak to your doctor   If you have compliments or concerns about an experience at your clinic, or if you wish to file a complaint, please contact [facility] Patient Relations at [phone] or email us at [email or web address]         Additional Information About Your Visit        MyChart Information     LiveOpst is an electronic gateway that provides easy, online access to your medical records. With ReelBig, you can request a clinic appointment, read your test results, renew a prescription or communicate with your care team.     To sign up for LiveOpst visit the website at [email or web address]   You will be asked to enter the access code listed below, as well as some personal  information. Please follow the directions to create your username and password.     Your access code is: [email or web address]  Expires: 2018  6:30 AM     Your access code will  in 90 days. If you need help or a new code, please contact your [facility] or call [phone] for assistance.        Care EveryWhere ID     This is your Care EveryWhere ID. This could be used by other organizations to access your [place] medical records  [id number]         Blood Pressure from Last 3 Encounters:   18 120/68   17 104/71    Weight from Last 3 Encounters:   18 132 lb 7.9 oz (60.1 kg)   17 132 lb 9.6 oz (60.1 kg)   17 132 lb (59.9 kg)              We Performed the Following     PHYSICAL THERAPY REFERRAL (External-Prints)        Primary Care Provider Fax #    Physician No Ref-Primary [phone]       No address on file        Equal Access to Services     MUNIRA MADDOX : Hadii aad ku hadasho Soomaali, waaxda luqadaha, qaybta kaalmada adeegyada, waxay sallie moser . So [facility] [phone].    ATENCIÓN: Si habla español, tiene a grimm disposición servicios gratuitos de asistencia lingüística. Llame al [phone].    We comply with applicable federal civil rights laws and [place] laws. We do not discriminate on the basis of race, color, national origin, age, disability, sex, sexual orientation, or gender identity.            Thank you!     Thank you for choosing [facility] SPORTS AND ORTHOPAEDIC WALK IN CLINIC  for your care. Our goal is always to provide you with excellent care. Hearing back from our patients is one way we can continue to improve our services. Please take a few minutes to complete the written survey that you may receive in the mail after your visit with us. Thank you!             Your Updated Medication List - Protect others around you: Learn how to safely use, store and throw away your medicines at www.disposemymeds.org.           This list is accurate as of: 1/19/18 12:36 PM.  Always use your most recent med list.                   Brand Name Dispense Instructions for use Diagnosis    lithium 450 MG CR tablet    ESKALITH    60 tablet    Take 2 tablets (900 mg) by mouth At Bedtime    Schizoaffective disorder, bipolar type (H)       OLANZapine 10 MG tablet    zyPREXA    30 tablet    Take 1 tablet (10 mg) by mouth At Bedtime    Schizoaffective disorder, bipolar type (H)

## 2018-01-19 NOTE — PROGRESS NOTES
SPORTS & ORTHOPEDIC WALK-IN FOLLOW-UP VISIT 1/19/2018    Interval History:     Follow up reason: 1 month recheck left hip    Date of injury: Chronic pain    Date last seen: 12/18/17    Following Therapeutic Plan: No, has not done PT, but done to rec center and done things on his own. Has iced    Pain: Unchanged, feels like a different kind of pain    Function: Unchanged    Interval History: N/A     Medical History:    Any recent changes to your medical history? No    Any new medication prescribed since last visit? Yes, Zyprexa and Eskalith

## 2018-01-19 NOTE — PROGRESS NOTES
[facility] Sports and Orthopedic Walk-in Clinic Note      Patient is a 22 year old male who presents to the office today for: follow up left hip   Has not done PT since last visit. Left hip symptoms are unchanged. Describes symptoms as out of the green and but not quite red, more of a teal. Describes mixed up wires in leg, back, and hip. He has a diagram of how his pain feels and it was reviewed with the patient, though the description is mostly nonsensical.       Recently was admitted to hospital for psychosis. No other changes in medical history. Medication changes are noted in EMR.       ROS: Pertinent items are noted in HPI.      EXAM:There were no vitals taken for this visit.    Exam:  General: alert, disheveled and unkempt, tangential speech,     Gait: Nonantalgic.  Normal heel toe gait    left Hip:  Supine PROM:  Flexion: Approximately 115 , with mild discomfort at full flexion  External rotation: approximately 60 , no tenderness.  Internal rotation: Approximately 30 , no tenderness      Strength Testing:  Hip flexion: 5/5.  Hip adduction: 5/5.  Hip abduction, gluteus makenna: 4+/5.  Hip abduction, gluteus medius: 4-/5    Palpation:  negative tenderness to palpation over the greater trochanter.  negative tenderness to palpation over psoas  negative tenderness to palpation over ASIS  negative tenderness to palpation over Iliac crest    Special Tests:  negative Dusty's test.       Neurovascularly intact in bilateral lower extremities    Radiographs: none new      Assessment: Patient is a 22 year old male with left hip pain. Etiology very unclear today because of his very odd description of his symptoms.     Recommendations:   Given home exercises.   Recommended follow up with PT  May use nsaids prn for pain  Recommended follow up with primary care.     Karl Mireles MD

## 2018-01-24 ENCOUNTER — OFFICE VISIT (OUTPATIENT)
Dept: PSYCHIATRY | Facility: CLINIC | Age: 23
End: 2018-01-24
Payer: COMMERCIAL

## 2018-01-24 ENCOUNTER — OFFICE VISIT (OUTPATIENT)
Dept: PSYCHIATRY | Facility: CLINIC | Age: 23
End: 2018-01-24

## 2018-01-24 DIAGNOSIS — F25.0 SCHIZOAFFECTIVE DISORDER, BIPOLAR TYPE (H): Primary | ICD-10-CM

## 2018-01-24 NOTE — MR AVS SNAPSHOT
After Visit Summary   1/24/2018    Jerel Day    MRN: [id number]           Patient Information     Date Of Birth          1995        Visit Information        Provider Department      1/24/2018 6:00 PM Shai Londono, [facility] Psychiatry        Today's Diagnoses     Schizoaffective disorder, bipolar type (H)    -  1       Follow-ups after your visit        Your next 10 appointments already scheduled     Jan 29, 2018  3:45 PM CST   Navigate Medication Follow Up with JAVY Bhatt CNP   [facility] Psychiatry ([facility])    [address] [place] [address]   [phone]            Jan 29, 2018  4:15 PM CST   Navigate Family Therapy with Shai Londono [facility] Psychiatry ([facility])    [address] [place] [address]   [phone]            Jan 29, 2018  4:15 PM CST   Navigate Psychotherapy with Anahi Summers [facility] Psychiatry ([facility])    [address] [place] [address]   [phone]            Feb 01, 2018  9:30 AM CST   (Arrive by 9:15 AM)   Return Visit with Shelton Guo MD   [facility] Ear Nose and Throat ([facility])    [address] [place] [address] [phone]              Who to contact     Please call your clinic at [phone] to:    Ask questions about your health    Make or cancel appointments    Discuss your medicines    Learn about your test results    Speak to your doctor   If you have compliments or concerns about an experience at your clinic, or if you wish to file a complaint, please contact [facility] Patient Relations at [phone] or email us at [email or web address]         Additional Information About Your Visit        MyChart Information     Buzzwiret is an electronic gateway that provides easy, online access to your medical records. With  Peas-Corphart, you can request a clinic appointment, read your test results, renew a prescription or communicate with your care team.     To sign up for Convoke Systems visit the website at [email or web address]   You will be asked to enter the access code listed below, as well as some personal information. Please follow the directions to create your username and password.     Your access code is: [email or web address]  Expires: 2018  6:30 AM     Your access code will  in 90 days. If you need help or a new code, please contact your [facility] or call [phone] for assistance.        Care EveryWhere ID     This is your Care EveryWhere ID. This could be used by other organizations to access your [place] medical records  [id number]         Blood Pressure from Last 3 Encounters:   18 120/68   17 104/71    Weight from Last 3 Encounters:   18 60.1 kg (132 lb 7.9 oz)   17 60.1 kg (132 lb 9.6 oz)   17 59.9 kg (132 lb)              Today, you had the following     No orders found for display       Primary Care Provider Fax #    Physician No Ref-Primary [phone]       No address on file        Equal Access to Services     MUNIRA MADDOX : Hadii zev ku hadasho Soomaali, waaxda luqadaha, qaybta kaalmada adeegyada, keagan moser . So [facility] [phone].    ATENCIÓN: Si habla español, tiene a grimm disposición servicios gratuitos de asistencia lingüística. Llame al [phone].    We comply with applicable federal civil rights laws and [place] laws. We do not discriminate on the basis of race, color, national origin, age, disability, sex, sexual orientation, or gender identity.            Thank you!     Thank you for choosing [facility] PSYCHIATRY  for your care. Our goal is always to provide you with excellent care. Hearing back from our patients is one way we can continue to improve our services. Please take a few minutes to complete the written  survey that you may receive in the mail after your visit with us. Thank you!             Your Updated Medication List - Protect others around you: Learn how to safely use, store and throw away your medicines at www.disposemymeds.org.          This list is accurate as of 1/24/18 11:59 PM.  Always use your most recent med list.                   Brand Name Dispense Instructions for use Diagnosis    lithium 450 MG CR tablet    ESKALITH    60 tablet    Take 2 tablets (900 mg) by mouth At Bedtime    Schizoaffective disorder, bipolar type (H)       OLANZapine 10 MG tablet    zyPREXA    30 tablet    Take 1 tablet (10 mg) by mouth At Bedtime    Schizoaffective disorder, bipolar type (H)

## 2018-01-24 NOTE — MR AVS SNAPSHOT
After Visit Summary   1/24/2018    Jerel Day    MRN: [id number]           Patient Information     Date Of Birth          1995        Visit Information        Provider Department      1/24/2018 6:30 PM Brittani Cage APRN CNP [facility] Psych[facility]        Today's Diagnoses     Schizoaffective disorder, bipolar type (H)    -  1      Care Instructions    - Continue olanzapine 10mg and lithium 900mg daily          Follow-ups after your visit        Follow-up notes from your care team     Return in about 2 weeks (around 2/7/2018).      Your next 10 appointments already scheduled     Feb 08, 2018  1:00 PM CST   Navigate Psychotherapy with Anahi Summers [facility] ([facility])    [address]  [facility] [address] [phone]            Feb 08, 2018  1:00 PM CST   Navigate Family Therapy with Shai Londono [facility])    [address]  [facility] [address] [phone]            Feb 14, 2018  1:00 PM CST   Navigate Family Therapy with Shai Londono [facility] Psychiatry ([facility])    [address]  [facility] [address] [phone]            Feb 14, 2018  1:00 PM CST   Navigate Psychotherapy with Anahi Summers [facility] Psychiatry ([facility])    [address]  [facility] [address] [phone]            Feb 14, 2018  2:45 PM CST   Navigate Medication Follow Up with JAVY Bhatt CNP   [facility] ([facility])    [address]  [facility] [address] [phone]              Who to contact     Please call your clinic at [phone] to:    Ask questions about your health    Make or cancel appointments    Discuss your medicines    Learn about your test results    Speak to your doctor   If you have compliments or concerns about an experience at your clinic, or if you wish to  file a complaint, please contact [facility] Patient Relations at [phone] or email us at [email or web address]         Additional Information About Your Visit        Specialized PharmaceuticalssharLockdown Networks Information     [email or web address] is an electronic gateway that provides easy, online access to your medical records. With [email or web address], you can request a clinic appointment, read your test results, renew a prescription or communicate with your care team.     To sign up for [email or web address] visit the website at [email or web address]   You will be asked to enter the access code listed below, as well as some personal information. Please follow the directions to create your username and password.     Your access code is: [email or web address]  Expires: 2018  6:30 AM     Your access code will  in 90 days. If you need help or a new code, please contact your [facility] or call [phone] for assistance.        Care EveryWhere ID     This is your Care EveryWhere ID. This could be used by other organizations to access your [place] medical records  [id number]         Blood Pressure from Last 3 Encounters:   18 119/66   18 120/68   17 104/71    Weight from Last 3 Encounters:   18 64.4 kg (142 lb)   18 63.6 kg (140 lb 3.2 oz)   18 60.1 kg (132 lb 7.9 oz)              Today, you had the following     No orders found for display       Primary Care Provider Fax #    Physician No Ref-Primary [phone]       No address on file        Equal Access to Services     OTIS [facility] : Hadii aad ku hadasho Soomaali, waaxda luqadaha, qaybta kaalmada adeegyada, keagan moser . So [facility] [phone].    ATENCIÓN: Si habla español, tiene a grimm disposición servicios gratuitos de asistencia lingüística. Llame al [phone].    We comply with applicable federal civil rights laws and [place] laws. We do not discriminate on the basis of race, color,  national origin, age, disability, sex, sexual orientation, or gender identity.            Thank you!     Thank you for choosing [facility] PSYCHIATRY  for your care. Our goal is always to provide you with excellent care. Hearing back from our patients is one way we can continue to improve our services. Please take a few minutes to complete the written survey that you may receive in the mail after your visit with us. Thank you!             Your Updated Medication List - Protect others around you: Learn how to safely use, store and throw away your medicines at www.disposemymeds.org.          This list is accurate as of 1/24/18 11:59 PM.  Always use your most recent med list.                   Brand Name Dispense Instructions for use Diagnosis    lithium 450 MG CR tablet    ESKALITH    60 tablet    Take 2 tablets (900 mg) by mouth At Bedtime    Schizoaffective disorder, bipolar type (H)
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
## 2018-01-28 NOTE — PROGRESS NOTES
NAVIGATE Program Explanation of Findings  A Part of the [facility] First Episode of Psychosis Program     Patient Name: Jerel Day  /Age:  1995 (22 year old)  Diagnosis(es): Schizoaffective disorder, bipolar type   Diagnostic Assessment Completed: Yes; please see in chart review for details    1. Type of contact:   Explanation of Findings  NAVIGATE Orientation    2. People present:   Client: Yes  Significant Other/Family/Friend: Mother and Father  NAVIGATE Director/Family Clinician: AGA Narvaez LGSW  NAVIGATE IRT: AGA Reynolds LGSW NAVIGATE Prescriber: Brittani Cage    3. Total number of persons who participated in contact: 5, including NAVIGATE team    4. Length of Actual Contact: 30 minutes, Record Minutes Travel Time: 0 minutes    5. Location of contact:  Psychiatry Clinic, [facility]    6. Techniques utilized:   [place] announced at beginning of session  Review of each team member's role and summarization of progress made  Review of diagnosis, symptoms to substantiate the diagnosis, and affected level of functioning  Review of safety risks  (ie SI and HI) and characteristics and interventions to mitigate risk  Review of medications  Review of goals and associated strengths, barriers, objectives, and interventions  Review of frequency of appointments and anticipated length of treatment  Illicit client/family feedback    7. Assessment/Progress Note:     The above named individuals met for the purposes of reviewing the findings of the diagnostic assessment recently completed.     Informed Jerel of diagnostic impressions corresponding with diagnoses of schizoaffective disorder, bipolar type. Jerel's response was unremarkable. Family's response was unremarkable, though both parents and Jerel spoke openly about symptoms that align with diagnosis.     Informed Jerel that he does seem appropriate for NAVIGATE. Writer has provided verbal and/or written information about [facility] NAVIGATE including review  of services outlined below.    NAVIGATE Services:  -Medication Management (Psychiatry)  -Individual Resiliency Training/IRT (Counseling)  -Supported Employment and Education/SEE  -Family Psychoeducation and Support  -Case Management/CM     Jerel agreed to engage in all Navigate services. Family agreed to services. Jerel's goals for treatment include going back to school, reducing body aches, and improving thinking.     8. Plan/Referrals:     Will begin Navigate week of 1/29    Shai Londono [facility]   NAVIGATE Direction & Family Clinician    Attestation:    I did not see this patient directly. This patient is discussed with me in individual clinical social work supervision, and I agree with the plan as documented.     AGA Huerta, [facility], January 28, 2018

## 2018-01-29 ENCOUNTER — OFFICE VISIT (OUTPATIENT)
Dept: PSYCHIATRY | Facility: CLINIC | Age: 23
End: 2018-01-29
Payer: COMMERCIAL

## 2018-01-29 VITALS
TEMPERATURE: 98.2 F | HEART RATE: 60 BPM | WEIGHT: 140.2 LBS | BODY MASS INDEX: 21.25 KG/M2 | HEIGHT: 68 IN | RESPIRATION RATE: 16 BRPM | DIASTOLIC BLOOD PRESSURE: 66 MMHG | SYSTOLIC BLOOD PRESSURE: 119 MMHG

## 2018-01-29 DIAGNOSIS — F25.0 SCHIZOAFFECTIVE DISORDER, BIPOLAR TYPE (H): Primary | ICD-10-CM

## 2018-01-29 RX ORDER — ARIPIPRAZOLE 10 MG/1
10 TABLET ORAL DAILY
Qty: 30 TABLET | Refills: 1 | Status: SHIPPED | OUTPATIENT
Start: 2018-01-29 | End: 2018-05-01

## 2018-01-29 ASSESSMENT — PAIN SCALES - GENERAL: PAINLEVEL: MODERATE PAIN (4)
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
## 2018-01-29 NOTE — PROGRESS NOTES
"NAVIGATE Medication Management Progress Note  A Part of the [facility] First Episode of Psychosis Program    NAVIGATE Enrollee: Jerel Day (1995)     MRN: [id number]  Date:  1/29/18         Contributors to the Assessment     Chart Reviewed.   Interview completed with Jerel Day.  Collateral information obtained from mother and father.         Chief Complaint       \"I wonder about the metabolism of my medications\"           Interim History      Jerel Day is a 22 year old male who was last seen in clinic on 1/24/18 at which time olanzapine 10mg and lithium 900mg daily were continued. The patient reports poor treatment adherence.  History was provided by Jerel who was a poor/disorganized  historian.    Since the last visit:  - Planning to move from his current living situation this week. He is not looking forward to moving back home with his parents. He is attempting to decide if he should move elsewhere, move to a new location in [facility] or go on vacation to avoid being there too much. He is mostly worried about his substance use in the parent home. He has been been using cannabis again and he thinks this will cause a lot of tension.   - Finished \"around 3/4\" of his degree but never really \"solidified his area of study\". He is interested in being an  but hasn't determined what area he would be interested in.   - Taking medications somewhat sporadically, mostly when he he remembers which seems to be when he \"needs a boost\" or possibly when he is experiencing symptoms. He is \"mostly using alcohol instead\". He reports having 3 shots at a time, sometimes starting right in the morning.  - Spending most of his time \"sitting around thinking\" and not doing much else, this is not satisfactory for him.   - Exploring the option of psychedelics, he has been researching this since last summer. He thinks that this will \"replant a part of his brain\" or do a reset.   - Sleep is \"apnea\", as in he had 1 hour of sleep last " "night. He was up all night researching.   - \"Drugz with a 'z' are more helpful than medicines with a 'cine'\", he is \"trying to connect the dots\". He is \"always fizzing up and the medications go in and dissolve\". He is concerned about how it would affect his body and if it would impact his ability to be creative.   - He has taken risperidone in the past and was really \"clampy\", it took down his potential and made him sleepy.     DEPRESSION:  reports-suicidal ideation, (passive), depressed mood, insomnia and poor concentration /memory;  DENIES- anhedonia, low energy, hypersomnia and feeling worthless  KELLI/HYPOMANIA:  reports-decreased sleep need, increased activity, grandiosity, distractibility  and racing thoughts;  DENIES- excessive spending  PSYCHOSIS:  reports-delusions, auditory hallucinations, disorganized behavior and disorganized speech (difficulty communicating);  DENIES- visual hallucinations  ANXIETY:  social anxiety and nervous/overwhelmed  SLEEP:  Sleep may be reduced again, impulsivity may be increased during nighttime     EATING DISORDER: none     RECENT SUBSTANCE USE:     ALCOHOL- Daily EtOH use, 3-13 shots of hard liquor + beer and wine daily          TOBACCO- 1/2 PPD             CAFFEINE- 1 cups/day of coffee  OPIOIDS- none       NARCAN KIT- N/A       CANNABIS- daily use          OTHER ILLICIT DRUGS- hallucinogens      CURRENT SOCIAL HISTORY:  FINANCIAL SUPPORT- family or friend       CHILDREN- none       LIVING SITUATION- Currently transitioning from a rental unit on the [facility] back to his parent's [facility] in [place], [place]      SOCIAL/ SPIRITUAL SUPPORT- unknown       FEELS SAFE AT HOME- Yes      MEDICAL ROS:  Reports none      Denies akathisia, unusual movements and wt gain   10 point ROS neg other than the symptoms noted above in the HPI. Patient does report multiple physical concern including back, neck, hip and foot pain, concerns about metabolism. These complaints have been evaluated by " sports medicine and primary care with unremarkable findings. PT exercises have been recommended.      Of note, history is positive for multiple sports injuries (former ) including concussion x2 with LOC once; ankle and hip injuries; scoliosis. His disorganized communication is likely interfering with an adequate health assessment.         First Episode of Psychosis History      DUP (duration untreated psychosis):  Likely first experienced auditory hallucinations during the spring of 2016, possibly in the context of cannabis and LSD use. Did not seek treatment until behavior and presentation became significantly disorganized in January 2017. Medication adherence has been poor over the course of this last year.   Route to initial care: ED for disorganized behavior, somatic concerns   Medication adherence overall:  Fair to poor  General frequency of visits:  Recommend weekly to biweekly  Participation in groups:  none  Cognitive Remediation:  none  Other treatment history: See pertinent background      Reviewed for completion of First Episode work-up:  Yes  First episode workup:  Not Done (if completed, see LABS for results)  MATRICS Consensus Cognitive Battery:  Not Done (if completed, see LABS for results)         Medical/Surgical History     Penicillins    Patient Active Problem List   Diagnosis     Schizoaffective disorder (H)            Medications     Current Outpatient Prescriptions   Medication Sig Dispense Refill     cholecalciferol (VITAMIN D3) 1000 UNIT tablet Take 1,000 Units by mouth       ALPRAZOLAM PO Take 0.5 mg by mouth daily       lithium (ESKALITH) 450 MG CR tablet Take 2 tablets (900 mg) by mouth At Bedtime 60 tablet 1     OLANZapine (ZYPREXA) 10 MG tablet Take 1 tablet (10 mg) by mouth At Bedtime 30 tablet 1     Previous medication trials:  Zoloft- stopped because of ASE (?)  fluoxetine  Risperidone 1 mg, reported akathisia at 3mg dose  Haloperidol 5mg bid, reported dystonia relieved  "with benadryl   Divalproex 750mg  Quetiapine 300mg- discontinued 1/2/18  Olanzapine 10mg- discontinued 1/29/18, EPS?  Alprazolam 0.5-1mg PRN          Vitals     /66 (BP Location: Right arm, Patient Position: Chair, Cuff Size: Adult Regular)  Pulse 60  Temp 98.2  F (36.8  C)  Resp 16  Ht 1.73 m (5' 8.11\")  Wt 63.6 kg (140 lb 3.2 oz)  BMI 21.25 kg/m2      Weight prior to medication: 130s         Mental Status Exam     Alertness: alert  and oriented  Appearance: casually groomed and malodorous  Behavior/Demeanor: cooperative, with fair  eye contact   Speech: pressured and disorganized  Language: intact  Psychomotor: fidgety  Mood: anxious  Affect: full range; was congruent to mood; was congruent to content  Thought Process/Associations: difficult to follow, disorganized, loose associations and neologisms/ word salad  Thought Content:  Reports suicidal and violent ideation, delusions, preoccupations, over-valued ideas and paranoid ideation;  Denies obsessions , phobia  and magical thinking  Perception:  Reports auditory hallucinations;  Denies visual hallucinations  Insight: poor  Judgment: limited  Cognition: does  appear grossly intact; formal cognitive testing was not done         Labs and Data     RATING SCALES:  AIMS: determine next due    PHQ9 TODAY =   No flowsheet data found.    ANTIPSYCHOTIC LABS ROUTINE    [glu, A1C, lipids (focus LDL), liver enzymes, WBC, ANEU, Hgb, plts]   q12 mo  Recent Labs   Lab Test  01/03/18   1054   GLC  73     Recent Labs   Lab Test  01/03/18   1054   CHOL  135   TRIG  93   LDL  70   HDL  46     Recent Labs   Lab Test  01/03/18   1054   AST  19   ALT  37   ALKPHOS  66     Recent Labs   Lab Test  01/03/18   1054   WBC  8.0   ANEU  5.5   HGB  13.9   PLT  241            Psychiatric Diagnoses     Schizoaffective disorder, bipolar type (F25.0)         Assessment     This patient is a 22 year old male who provides a history supporting the diagnoses listed directly above.  " Further diagnostic clarification is not needed.  There are medical comorbidities which impact this treatment [suicidal ideation, psychosis [sxs include delusions, AH, disorganized behavior], mutiple psychotropic trials, severe med reaction, psych hosp (<3) and SUBSTANCE USE: hallucinogens and cannabis].     DISCUSSION: Patient presents as disorganized; demonstrates difficulty in communicating due to use of word salad and loose associations. Patient reports concerns about adhering to his medication but seemed receptive to discussing how medications work when taken regularly. Patient seemed to be contemplative about restarting medications. Discussed possible switch from olanzapine to aripiprazole for the option of HO. Patient at first said he would consider it and then changed his mind when his parents were included in the discussion and agreed to switch medications now as he was restarting his commitment to take meds every day.      SUICIDE RISK ASSESSMENT [details described above]:  Today Jerel Day reports passive suicidal thoughts.  In addition, he has notable risk factors for self-harm including worsening symptoms, hallucinations [command, persecutory ], substance use [alcohol, cannabis, hallucinogens], off meds, recent loss and male.  However, risk is mitigated by no plan or intent, h/o seeking help when needed, good social support   and stable housing.  Based on all available evidence he does not appear to be at imminent risk for self-harm therefore does not meet criteria for a 72-hr hold/  involuntary hospitalization.  However, based on degree of symptoms therapy and close psych FU was recommended which the pt did agree to.      [place] PRESCRIPTION MONITORING PROGRAM [] was not checked today:  not using controlled substances.     PSYCHOTROPIC DRUG INTERACTIONS:   No major interactions.  Concomitant use of olanzapine and lithium may increase s/e of dizziness, drowsiness, confusion, and difficulty  concentrating     MANAGEMENT:  Monitoring for adverse effects, routine vitals, routine labs, using lowest therapeutic dose of [olanzapine and lithium] and patient is aware of risks         Plan     1) PSYCHOTROPIC MEDICATIONS:  Due to poor adherence, no need to cross-titrate    - Stop olanzapine 10mg  - Start aripiprazole 10mg  - Continue/RESTART lithium 900mg at HS    2) THERAPY:  Continue    3) NEXT DUE:    Labs- Results available in care everywhere, last Li level 0.88. Levels should be checked again due to sporadic adherence.  Rating Scales- AIMS at RTC    4) REFERRALS:    No Referrals needed    5) RTC: 2 weeks    6) CRISIS NUMBERS:   Provided routinely in AVS.    TREATMENT RISK STATEMENT:  The risks, benefits, alternatives and potential adverse effects have been discussed and are understood by the pt. The pt understands the risks of using street drugs or alcohol. There are no medical contraindications, the pt agrees to treatment with the ability to do so. The pt knows to call the clinic for any problems or to access emergency care if needed.  Medical and substance use concerns are documented above.  Psychotropic drug interaction check was done, including changes made today.      PROVIDER:  JAVY Bhatt CNP

## 2018-01-29 NOTE — MR AVS SNAPSHOT
After Visit Summary   1/29/2018    Jerel Day    MRN: [id number]           Patient Information     Date Of Birth          1995        Visit Information        Provider Department      1/29/2018 4:15 PM Shai Londono [facility]        Today's Diagnoses     Schizoaffective disorder, bipolar type (H)    -  1       Follow-ups after your visit        Your next 10 appointments already scheduled     Feb 05, 2018  3:00 PM CST   Navigate Psychotherapy with Anahi Summers [facility] ([facility])    [address] [place] [address]   [phone]            Feb 08, 2018  1:00 PM CST   Navigate Psychotherapy with Anahi Summers [facility] ([facility])    [address] [place] [address]   [phone]            Feb 08, 2018  1:00 PM CST   Navigate Family Therapy with Shai Londono [facility] ([facility])    [address] [place] [address]   [phone]            Feb 14, 2018  1:00 PM CST   Navigate Family Therapy with Shai Londono [facility] ([facility])    [address] [place] [address]   [phone]            Feb 14, 2018  1:00 PM CST   Navigate Psychotherapy with Anahi Summers [facility] ([facility])    [address] [place] [address]   [phone]            Feb 14, 2018  2:45 PM CST   Navigate Medication Follow Up with JAVY Bhatt CNP   [facility] ([facility])    [address] [place] [address] [phone]              Who to contact     Please call your clinic at [phone] to:    Ask questions about your health    Make or cancel appointments    Discuss your medicines    Learn about your test results    Speak to your doctor   If you have compliments or concerns about an experience  at your clinic, or if you wish to file a complaint, please contact [facility] Patient Relations at [phone] or email us at [email or web address]         Additional Information About Your Visit        [employer] Information     [employer] is an electronic gateway that provides easy, online access to your medical records. With [employer], you can request a clinic appointment, read your test results, renew a prescription or communicate with your care team.     To sign up for [employer] visit the website at [email or web address]   You will be asked to enter the access code listed below, as well as some personal information. Please follow the directions to create your username and password.     Your access code is: [email or web address]  Expires: 2018  6:30 AM     Your access code will  in 90 days. If you need help or a new code, please contact your [facility] or call [phone] for assistance.        Care EveryWhere ID     This is your Care EveryWhere ID. This could be used by other organizations to access your [place] medical records  [id number]         Blood Pressure from Last 3 Encounters:   18 119/66   18 120/68   17 104/71    Weight from Last 3 Encounters:   18 64.4 kg (142 lb)   18 63.6 kg (140 lb 3.2 oz)   18 60.1 kg (132 lb 7.9 oz)              Today, you had the following     No orders found for display         Today's Medication Changes          These changes are accurate as of 18 11:59 PM.  If you have any questions, ask your nurse or doctor.               Start taking these medicines.        Dose/Directions    ARIPiprazole 10 MG tablet   Commonly known as:  ABILIFY   Used for:  Schizoaffective disorder, bipolar type (H)   Started by:  Brittani Cage APRN CNP        Dose:  10 mg   Take 1 tablet (10 mg) by mouth daily   Quantity:  30 tablet   Refills:  1            Where to get your medicines       These medications were sent to [id number] - [place], [place] - [address]  [address], [facility] [address]     Phone:  [phone]     ARIPiprazole 10 MG tablet                Primary Care Provider Fax #    Physician No Ref-Primary [phone]       No address on file        Equal Access to Services     [facility]: Hadii aad ku hadasho Soomaali, waaxda luqadaha, qaybta kaalmada adeegyada, waxay tonyin haylindan adeeg elisnoreensantiago moser . So [facility] [phone].    ATENCIÓN: Si habla español, tiene a grimm disposición servicios gratuitos de asistencia lingüística. Case al [phone].    We comply with applicable federal civil rights laws and [place] laws. We do not discriminate on the basis of race, color, national origin, age, disability, sex, sexual orientation, or gender identity.            Thank you!     Thank you for choosing [facility]  for your care. Our goal is always to provide you with excellent care. Hearing back from our patients is one way we can continue to improve our services. Please take a few minutes to complete the written survey that you may receive in the mail after your visit with us. Thank you!             Your Updated Medication List - Protect others around you: Learn how to safely use, store and throw away your medicines at www.disposemymeds.org.          This list is accurate as of 1/29/18 11:59 PM.  Always use your most recent med list.                   Brand Name Dispense Instructions for use Diagnosis    ALPRAZOLAM PO      Take 0.5 mg by mouth daily        ARIPiprazole 10 MG tablet    ABILIFY    30 tablet    Take 1 tablet (10 mg) by mouth daily    Schizoaffective disorder, bipolar type (H)       cholecalciferol 1000 UNIT tablet    vitamin D3     Take 1,000 Units by mouth        lithium 450 MG CR tablet    ESKALITH    60 tablet    Take 2 tablets (900 mg) by mouth At Bedtime    Schizoaffective disorder, bipolar type (H)

## 2018-01-29 NOTE — PATIENT INSTRUCTIONS
- Recommend N-acetyl Cysteine (NAC) and Fish oil supplements   - Consider changing from Zyprexa to Abilify

## 2018-01-29 NOTE — MR AVS SNAPSHOT
After Visit Summary   2018    Jerel Day    MRN: [id number]           Patient Information     Date Of Birth          1995        Visit Information        Provider Department      2018 3:45 PM Brittani Cage APRN CNP [facility]        Care Instructions    - Recommend N-acetyl Cysteine (NAC) and Fish oil supplements   - Consider changing from Zyprexa to Abilify          Follow-ups after your visit        Follow-up notes from your care team     Return in about 2 weeks (around 2018).      Your next 10 appointments already scheduled     2018  9:30 AM CST   (Arrive by 9:15 AM)   Return Visit with Shelton Guo MD   [facility] ([facility] [place])    [address] [place] [address] [phone]              Who to contact     Please call your clinic at [phone] to:    Ask questions about your health    Make or cancel appointments    Discuss your medicines    Learn about your test results    Speak to your doctor   If you have compliments or concerns about an experience at your clinic, or if you wish to file a complaint, please contact [facility] Patient Relations at [phone] or email us at [email or web address]         Additional Information About Your Visit        MyChart Information     [employer] is an electronic gateway that provides easy, online access to your medical records. With [employer], you can request a clinic appointment, read your test results, renew a prescription or communicate with your care team.     To sign up for [employer] visit the website at [email or web address]   You will be asked to enter the access code listed below, as well as some personal information. Please follow the directions to create your username and password.     Your access code is: [email or web address]  Expires: 2018  6:30 AM     Your access code will  in 90 days.  "If you need help or a new code, please contact your [facility] or call [phone] for assistance.        Care EveryWhere ID     This is your Care EveryWhere ID. This could be used by other organizations to access your [place] medical records  [id number]        Your Vitals Were     Pulse Temperature Respirations Height BMI (Body Mass Index)       60 98.2  F (36.8  C) 16 5' 8.11\" (173 cm) 21.25 kg/m2        Blood Pressure from Last 3 Encounters:   01/29/18 119/66   01/09/18 120/68   12/18/17 104/71    Weight from Last 3 Encounters:   01/29/18 140 lb 3.2 oz (63.6 kg)   01/03/18 132 lb 7.9 oz (60.1 kg)   12/18/17 132 lb 9.6 oz (60.1 kg)              Today, you had the following     No orders found for display       Primary Care Provider Fax #    Physician No Ref-Primary [phone]       No address on file        Equal Access to Services     [facility] MAD[facility] : Hadii aad ku hadasho Soomaali, waaxda luqadaha, qaybta kaalmada adeegyada, keagan moser . So [facility] [phone].    ATENCIÓN: Si habla español, tiene a grimm disposición servicios gratuitos de asistencia lingüística. Llame al [phone].    We comply with applicable federal civil rights laws and [place] laws. We do not discriminate on the basis of race, color, national origin, age, disability, sex, sexual orientation, or gender identity.            Thank you!     Thank you for choosing [facility]  for your care. Our goal is always to provide you with excellent care. Hearing back from our patients is one way we can continue to improve our services. Please take a few minutes to complete the written survey that you may receive in the mail after your visit with us. Thank you!             Your Updated Medication List - Protect others around you: Learn how to safely use, store and throw away your medicines at www.disposemymeds.org.          This list is accurate as of 1/29/18  5:09 PM.  Always use your " most recent med list.                   Brand Name Dispense Instructions for use Diagnosis    ALPRAZOLAM PO      Take 0.5 mg by mouth daily        cholecalciferol 1000 UNIT tablet    vitamin D3     Take 1,000 Units by mouth        lithium 450 MG CR tablet    ESKALITH    60 tablet    Take 2 tablets (900 mg) by mouth At Bedtime    Schizoaffective disorder, bipolar type (H)       OLANZapine 10 MG tablet    zyPREXA    30 tablet    Take 1 tablet (10 mg) by mouth At Bedtime    Schizoaffective disorder, bipolar type (H)

## 2018-01-29 NOTE — MR AVS SNAPSHOT
After Visit Summary   1/29/2018    Jerel Day    MRN: [id number]           Patient Information     Date Of Birth          1995        Visit Information        Provider Department      1/29/2018 4:15 PM Anahi Summers [facility] Psychiatry        Today's Diagnoses     Schizoaffective disorder, bipolar type (H)    -  1       Follow-ups after your visit        Your next 10 appointments already scheduled     Jan 31, 2018  4:00 PM CST   Navigate Family Therapy with Shai Londono [facility] Psychiatry ([facility])    [address] [place] [address]   [phone]            Jan 31, 2018  4:00 PM CST   Navigate Psychotherapy with Anahi Summers [facility] Psychiatry (Critical access hospital)    [address] [place] [address]   [phone]            Feb 01, 2018  9:30 AM CST   (Arrive by 9:15 AM)   Return Visit with Shelton Guo MD   [facility] ([facility])    [address] [place] [address]   [phone]            Feb 05, 2018  3:00 PM CST   Navigate Psychotherapy with Anahi Summers [facility] ([facility])    [address] [place] [address]   [phone]            Feb 05, 2018  3:00 PM CST   Navigate Family Therapy with Shai Londono [facility] ([facility])    [address] [place] [address]   [phone]            Feb 08, 2018  1:00 PM CST   Navigate Psychotherapy with Anahi Summers [facility] ([facility])    [address] [place] [address]   [phone]            Feb 08, 2018  1:00 PM CST   Navigate Family Therapy with Shai Londono [facility] ([facility])    [address] [place] [address]   [phone]            Feb 14, 2018  1:00 PM CST    Navigate Family Therapy with Shai Londono, [facility] Psychiatry ([facility])    [address]  [facility] [address] [phone]            2018  1:00 PM CST   Navigate Psychotherapy with Anahigaston Summers, [facility] Psychiatry ([facility])    [address]  [facility] [address] [phone]            2018  2:45 PM CST   Navigate Medication Follow Up with JAVY Bhatt [facility] Psychiatry ([facility])    [address]  [facility] [id number] [phone]              Who to contact     Please call your clinic at [phone] to:    Ask questions about your health    Make or cancel appointments    Discuss your medicines    Learn about your test results    Speak to your doctor   If you have compliments or concerns about an experience at your clinic, or if you wish to file a complaint, please contact [facility] Patient Relations at [phone] or email us at [email or web address]         Additional Information About Your Visit        InterAtlashartydy Information     Group [employer] Record[employer] is an electronic gateway that provides easy, online access to your medical records. With [employer], you can request a clinic appointment, read your test results, renew a prescription or communicate with your care team.     To sign up for [employer] visit the website at [email or web address]   You will be asked to enter the access code listed below, as well as some personal information. Please follow the directions to create your username and password.     Your access code is: [email or web address]  Expires: 2018  6:30 AM     Your access code will  in 90 days. If you need help or a new code, please contact your [facility] or call [phone] for assistance.        Care EveryWhere ID     This is your Care EveryWhere ID. This could be used by other  organizations to access your [place] medical records  [id number]         Blood Pressure from Last 3 Encounters:   01/29/18 119/66   01/09/18 120/68   12/18/17 104/71    Weight from Last 3 Encounters:   01/29/18 63.6 kg (140 lb 3.2 oz)   01/03/18 60.1 kg (132 lb 7.9 oz)   12/18/17 60.1 kg (132 lb 9.6 oz)              Today, you had the following     No orders found for display         Today's Medication Changes          These changes are accurate as of 1/29/18 11:59 PM.  If you have any questions, ask your nurse or doctor.               Start taking these medicines.        Dose/Directions    ARIPiprazole 10 MG tablet   Commonly known as:  ABILIFY   Used for:  Schizoaffective disorder, bipolar type (H)   Started by:  Brittani Cage APRN CNP        Dose:  10 mg   Take 1 tablet (10 mg) by mouth daily   Quantity:  30 tablet   Refills:  1            Where to get your medicines      These medications were sent to [facility] #[id number] - [address] [address]     Phone:  [phone]     ARIPiprazole 10 MG tablet                Primary Care Provider Fax #    Physician No Ref-Primary [phone]       No address on file        Equal Access to Services     MUNIRA MADDOX : Cristobal barrientoso Sobrittany, waaxda luqadaha, qaybta kaalmada adeegyada, keagan lu. So [facility] [phone].    ATENCIÓN: Si habla español, tiene a grimm disposición servicios gratuitos de asistencia lingüística. Llame al [phone].    We comply with applicable federal civil rights laws and [place] laws. We do not discriminate on the basis of race, color, national origin, age, disability, sex, sexual orientation, or gender identity.            Thank you!     Thank you for choosing [email or web address] PSYCHIATRY  for your care. Our goal is always to provide you with excellent care. Hearing back from our patients is one way we can continue to improve our services. Please take a  few minutes to complete the written survey that you may receive in the mail after your visit with us. Thank you!             Your Updated Medication List - Protect others around you: Learn how to safely use, store and throw away your medicines at www.disposemymeds.org.          This list is accurate as of 1/29/18 11:59 PM.  Always use your most recent med list.                   Brand Name Dispense Instructions for use Diagnosis    ALPRAZOLAM PO      Take 0.5 mg by mouth daily        ARIPiprazole 10 MG tablet    ABILIFY    30 tablet    Take 1 tablet (10 mg) by mouth daily    Schizoaffective disorder, bipolar type (H)       cholecalciferol 1000 UNIT tablet    vitamin D3     Take 1,000 Units by mouth        lithium 450 MG CR tablet    ESKALITH    60 tablet    Take 2 tablets (900 mg) by mouth At Bedtime    Schizoaffective disorder, bipolar type (H)

## 2018-01-30 ASSESSMENT — ANXIETY QUESTIONNAIRES
1. FEELING NERVOUS, ANXIOUS, OR ON EDGE: NEARLY EVERY DAY
2. NOT BEING ABLE TO STOP OR CONTROL WORRYING: MORE THAN HALF THE DAYS
3. WORRYING TOO MUCH ABOUT DIFFERENT THINGS: MORE THAN HALF THE DAYS
7. FEELING AFRAID AS IF SOMETHING AWFUL MIGHT HAPPEN: MORE THAN HALF THE DAYS
6. BECOMING EASILY ANNOYED OR IRRITABLE: NEARLY EVERY DAY
5. BEING SO RESTLESS THAT IT IS HARD TO SIT STILL: MORE THAN HALF THE DAYS
GAD7 TOTAL SCORE: 16

## 2018-01-30 ASSESSMENT — PATIENT HEALTH QUESTIONNAIRE - PHQ9
SUM OF ALL RESPONSES TO PHQ QUESTIONS 1-9: 17
5. POOR APPETITE OR OVEREATING: MORE THAN HALF THE DAYS

## 2018-01-30 NOTE — PROGRESS NOTES
NAVIGATE Clinician Contact & Progress Note  For Individual Resiliency Training (IRT)  A Part of the [facility] First Episode of Psychosis Program    NAVIGATE Enrollee: Jerel Day (1995)     MRN: [id number]  Date:  1/29/18  Diagnosis: Schizoaffective disorder, bipolar type (F25.0)  Clinician: LILLIANA Individual Resiliency Trainer, EDELMIRA Reynolds     1. Type of contact: (majority of time spent)  IRT Session    2. People present:   Client  NAVIGATE IRT/writer    3. Total number of persons who participated in contact: 2, including writer    4. Length of Actual Contact: Start Time: 4:15; End Time: 5:20   Traveled?  No    5. Location of contact:  Psychiatry Clinic, [facility]    6. Did the client complete the home practice option(s) from the previous session: NA     7. Motivational Teaching Strategies:  Connect info and skills with personal goals  Promote hope and positive expectations  Explore pros and cons of change  Re-frame experiences in positive light    8. Educational Teaching Strategies:  Review of written material/education  Relate information to client's experience  Ask questions to check comprehension  Break down information into small chunks  Adopt client's language    9. CBT Teaching Strategies:  Reinforcement and shaping (positive feedback for steps towards goals, gains in knowledge & skills, follow-through on home assignments)    Social skills training (rationale for skill, modeling, role play practice, feedback, plan home practice)    10. IRT Module(s) Addressed:  Module 1 - Orientation    11. Techniques utilized:   [place] announced at beginning of session  Review of goal  Summarize progress made in current session    12. Mental Status Exam:    Alertness: alert  and oriented  Appearance: casually groomed  Behavior/Demeanor: cooperative, with fair  eye contact   Speech: regular rate and rhythm  Language: intact. Preferred language identified as English.  Psychomotor: normal or unremarkable  Mood:  "anxious  Affect: flat; was not congruent to mood; was not congruent to content  Thought Process/Associations: overinclusive , loose associations and neologisms/ word salad  Thought Content:  Reports suicidal ideation and delusions;  Denies none  Perception:  Reports derealization;  Denies visual hallucinations  Insight: limited  Judgment: limited  Cognition: does  appear grossly intact; formal cognitive testing was not done  Suicidal ideation: reports SI, denies intent,  and denies plan  Homicidal Ideation: reports    13. Assessment/Progress Note:     This writer met with Jerel for an initial orientation session. Jerel talked using word salad, loose associations, and metaphors throughout the session. Jerel stated he is at a 4/10 with suicidal thoughts currently, but does not have a plan or the means. He noted if he feels suicidal, he can go into another dimension to cope. He shared he is at a 1/10 for homicidal ideation. He stated he would never aggressively attack someone else, but he only has these feelings with those who \"push him.\" He also shared he does not have the means or intent to hurt anyone at this time. This writer provided crisis resources to him. He mentioned he feels as though he is dizzy because his spine is not aligned with his hips and his metabolic system is off. He shared he will be living with his parents and knows he will be bored there, but feels he can spend time in the basement alone. He noted he drank about 1 bottle of [place]osc[place] wine in the morning today. He typically drinks anywhere from 1 beer with 2 shots to 13 shots daily. He is only concerned about how this can change his motivation and lethargic state. He endorses smoking marijuana daily currently. One of his main goals is to have a girlfriend. He feels this needs to happen before he can move out of his parent's house. [place] will wait for a significant other first. He asked this writer several " "times about how to find a significant other. Another goal of his is to have more friendships. He noted he struggles with social interaction and does not feel he has the skills to obtain friends. At the end of the session, Jerel stated he didn't know if he could make it to the next appointment because he may be in [place]. He said he often thinks about visiting [place] on a whim and staying there for a while. Jerel then joined his parents and Brittani (prescriber) for a summary of the visits and determination of next steps.     14. Plan/Referrals:     This writer will meet with Jerel to continue orientation to IRT services.    This writer will continue to assess for safety and provide coping mechanisms.     Billing for \"Interactive Complexity\"?  No    EDELMIRA Reynolds    NAVIGATE Individual Resiliency Trainer    Attestation:    I did not see this patient directly. This patient is discussed with me in individual clinical social work supervision, and I agree with the plan as documented.     AGA Huerta, [facility], February 2, 2018    "

## 2018-01-31 ENCOUNTER — BEH TREATMENT PLAN (OUTPATIENT)
Dept: PSYCHIATRY | Facility: CLINIC | Age: 23
End: 2018-01-31

## 2018-01-31 ENCOUNTER — OFFICE VISIT (OUTPATIENT)
Dept: PSYCHIATRY | Facility: CLINIC | Age: 23
End: 2018-01-31
Payer: COMMERCIAL

## 2018-01-31 DIAGNOSIS — F25.0 SCHIZOAFFECTIVE DISORDER, BIPOLAR TYPE (H): Primary | ICD-10-CM

## 2018-01-31 ASSESSMENT — ANXIETY QUESTIONNAIRES: GAD7 TOTAL SCORE: 16

## 2018-01-31 ASSESSMENT — PATIENT HEALTH QUESTIONNAIRE - PHQ9: SUM OF ALL RESPONSES TO PHQ QUESTIONS 1-9: 19

## 2018-01-31 NOTE — MR AVS SNAPSHOT
After Visit Summary   1/31/2018    Jerel Day    MRN: [id number]           Patient Information     Date Of Birth          1995        Visit Information        Provider Department      1/31/2018 4:00 PM Shai Londono [facility] Psychiatry        Today's Diagnoses     Schizoaffective disorder, bipolar type (H)    -  1       Follow-ups after your visit        Your next 10 appointments already scheduled     Feb 05, 2018  3:00 PM CST   Navigate Psychotherapy with Anahi Summers [facility] Psychiatry ([facility])    [address] [place] [address]   [phone]            Feb 08, 2018  1:00 PM CST   Navigate Psychotherapy with Anahi Summers [facility] ([facility])    [address] [place] [address]   [phone]            Feb 08, 2018  1:00 PM CST   Navigate Family Therapy with Shai Londono [facility] ([facility])    [address] [place] [address]   [phone]            Feb 14, 2018  1:00 PM CST   Navigate Family Therapy with Shai Londono [facility] Psychiatry ([facility])    [address] [place] [address]   [phone]            Feb 14, 2018  1:00 PM CST   Navigate Psychotherapy with Anahi Summers [facility] Psychiatry ([facility])    [address] [place] [address]   [phone]            Feb 14, 2018  2:45 PM CST   Navigate Medication Follow Up with JAVY Bhatt CNP   [facility]iatry ([facility])    [address] [place] [address] [phone]              Who to contact     Please call your clinic at [phone] to:    Ask questions about your health    Make or cancel appointments    Discuss your medicines    Learn about your test results    Speak to your doctor   If you have compliments or concerns about an experience  at your clinic, or if you wish to file a complaint, please contact [facility] Patient Relations at [phone] or email us at [email or web address]         Additional Information About Your Visit        1006.tvharelias Information     Knotice is an electronic gateway that provides easy, online access to your medical records. With K[employer], you can request a clinic appointment, read your test results, renew a prescription or communicate with your care team.     To sign up for Knotice visit the website at [email or web address]   You will be asked to enter the access code listed below, as well as some personal information. Please follow the directions to create your username and password.     Your access code is: [email or web address]  Expires: 2018  6:30 AM     Your access code will  in 90 days. If you need help or a new code, please contact your [facility] or call [phone] for assistance.        Care EveryWhere ID     This is your Care EveryWhere ID. This could be used by other organizations to access your [place] medical records  [id number]         Blood Pressure from Last 3 Encounters:   18 119/66   18 120/68   17 104/71    Weight from Last 3 Encounters:   18 64.4 kg (142 lb)   18 63.6 kg (140 lb 3.2 oz)   18 60.1 kg (132 lb 7.9 oz)              Today, you had the following     No orders found for display       Primary Care Provider Fax #    Physician No Ref-Primary [phone]       No address on file        Equal Access to Services     MUNIRA MADDOX : Hadii aad ku hadasho Sodorisali, waaxda luqadaha, qaybta kaalmada tracey, keagan lu. So [facility] [phone].    ATENCIÓN: Si habla español, tiene a grimm disposición servicios gratuitos de asistencia lingüística. Llame al [phone].    We comply with applicable federal civil rights laws and [place] laws. We do not  discriminate on the basis of race, color, national origin, age, disability, sex, sexual orientation, or gender identity.            Thank you!     Thank you for choosing [facility] PSYCHIATRY  for your care. Our goal is always to provide you with excellent care. Hearing back from our patients is one way we can continue to improve our services. Please take a few minutes to complete the written survey that you may receive in the mail after your visit with us. Thank you!             Your Updated Medication List - Protect others around you: Learn how to safely use, store and throw away your medicines at www.disposemymeds.org.          This list is accurate as of 1/31/18 11:59 PM.  Always use your most recent med list.                   Brand Name Dispense Instructions for use Diagnosis    ALPRAZOLAM PO      Take 0.5 mg by mouth daily        ARIPiprazole 10 MG tablet    ABILIFY    30 tablet    Take 1 tablet (10 mg) by mouth daily    Schizoaffective disorder, bipolar type (H)       cholecalciferol 1000 UNIT tablet    vitamin D3     Take 1,000 Units by mouth        lithium 450 MG CR tablet    ESKALITH    60 tablet    Take 2 tablets (900 mg) by mouth At Bedtime    Schizoaffective disorder, bipolar type (H)

## 2018-01-31 NOTE — MR AVS SNAPSHOT
After Visit Summary   1/31/2018    Jerel Day    MRN: [id number]           Patient Information     Date Of Birth          1995        Visit Information        Provider Department      1/31/2018 4:00 PM Anahi Summers [facility] Psychiatry        Today's Diagnoses     Schizoaffective disorder, bipolar type (H)    -  1       Follow-ups after your visit        Your next 10 appointments already scheduled     Feb 05, 2018  3:00 PM CST   Navigate Psychotherapy with Anahi Summers [facility] Psychiatry ([facility])    [address] [place] [address]   [phone]            Feb 08, 2018  1:00 PM CST   Navigate Psychotherapy with Anahi Summers [facility] Psychiatry ([facility])    [address] [place] [address]   [phone]            Feb 08, 2018  1:00 PM CST   Nav[facility])    [address] [place] [address]   [phone]            Feb 14, 2018  1:00 PM CST   Navigate Family Therapy with Shai Londono [facility] Psychiatry ([facility])    [address] [place] [address]   [phone]            Feb 14, 2018  1:00 PM CST   Navigate Psychotherapy with Anahi Summers [facility] Psychiatry ([facility])    [address] [place] [address]   [phone]            Feb 14, 2018  2:45 PM CST   Navigate Medication Follow Up with JAVY Bhatt CNP   [facility] Psychiatry ([facility])    [address] [place] [address] [phone]              Who to contact     Please call your clinic at [phone] to:    Ask questions about your health    Make or cancel appointments    Discuss your medicines    Learn about your test results    Speak to your doctor   If you have compliments or concerns about an experience at  your clinic, or if you wish to file a complaint, please contact [facility] Physicians Patient Relations at [phone] or email us at [email or web address]         Additional Information About Your Visit        Control Medical Technologyhariversity Information     NoviMedicine is an electronic gateway that provides easy, online access to your medical records. With NoviMedicine, you can request a clinic appointment, read your test results, renew a prescription or communicate with your care team.     To sign up for NoviMedicine visit the website at [email or web address]   You will be asked to enter the access code listed below, as well as some personal information. Please follow the directions to create your username and password.     Your access code is: [email or web address]  Expires: 2018  6:30 AM     Your access code will  in 90 days. If you need help or a new code, please contact your [facility] Clinic or call [phone] for assistance.        Care EveryWhere ID     This is your Care EveryWhere ID. This could be used by other organizations to access your [place] medical records  [id number]         Blood Pressure from Last 3 Encounters:   18 119/66   18 120/68   17 104/71    Weight from Last 3 Encounters:   18 64.4 kg (142 lb)   18 63.6 kg (140 lb 3.2 oz)   18 60.1 kg (132 lb 7.9 oz)              Today, you had the following     No orders found for display       Primary Care Provider Fax #    Physician No Ref-Primary [phone]       No address on file        Equal Access to Services     MUNIRA MADDOX : Hadii zev muniz Sobrittany, waaxda luqadaha, qaybta kaalmada tracey, keagan lu. So [facility] [phone].    ATENCIÓN: Si habla español, tiene a grimm disposición servicios gratuitos de asistencia lingüística. Llame al [phone].    We comply with applicable federal civil rights laws and [place] laws. We do not discriminate  on the basis of race, color, national origin, age, disability, sex, sexual orientation, or gender identity.            Thank you!     Thank you for choosing [facility] PSYCHIATRY  for your care. Our goal is always to provide you with excellent care. Hearing back from our patients is one way we can continue to improve our services. Please take a few minutes to complete the written survey that you may receive in the mail after your visit with us. Thank you!             Your Updated Medication List - Protect others around you: Learn how to safely use, store and throw away your medicines at www.disposemymeds.org.          This list is accurate as of 1/31/18 11:59 PM.  Always use your most recent med list.                   Brand Name Dispense Instructions for use Diagnosis    ALPRAZOLAM PO      Take 0.5 mg by mouth daily        ARIPiprazole 10 MG tablet    ABILIFY    30 tablet    Take 1 tablet (10 mg) by mouth daily    Schizoaffective disorder, bipolar type (H)       cholecalciferol 1000 UNIT tablet    vitamin D3     Take 1,000 Units by mouth        lithium 450 MG CR tablet    ESKALITH    60 tablet    Take 2 tablets (900 mg) by mouth At Bedtime    Schizoaffective disorder, bipolar type (H)

## 2018-02-01 ENCOUNTER — OFFICE VISIT (OUTPATIENT)
Dept: OTOLARYNGOLOGY | Facility: CLINIC | Age: 23
End: 2018-02-01
Payer: COMMERCIAL

## 2018-02-01 VITALS — BODY MASS INDEX: 21.52 KG/M2 | HEIGHT: 68 IN | WEIGHT: 142 LBS

## 2018-02-01 DIAGNOSIS — R13.10 DYSPHAGIA, UNSPECIFIED TYPE: Primary | ICD-10-CM

## 2018-02-01 ASSESSMENT — PAIN SCALES - GENERAL: PAINLEVEL: MILD PAIN (2)

## 2018-02-01 NOTE — MR AVS SNAPSHOT
"              After Visit Summary   2/1/2018    Jerel Day    MRN: [id number]           Patient Information     Date Of Birth          1995        Visit Information        Provider Department      2/1/2018 9:30 AM Shelton Guo MD [facility] Ear Nose and Throat        Today's Diagnoses     Dysphagia, unspecified type    -  1      Care Instructions    The patient presents with a history of a gastroesophageal reflux and neck discomfort.  The patient has noted some improvement of his symptoms since his last visit, but he continues to have some symptoms. The patient will be referred for a CT scan of the neck and a video swallow evaluation and he will be seen again after these evaluations are completed.           Follow-ups after your visit        Additional Services     SPEECH THERAPY REFERRAL       *This therapy referral will be filtered to a centralized scheduling office at [facility] and the patient will receive a call to schedule an appointment at a [facility] location most convenient for them. *     [facility] provides Speech Therapy evaluation and treatment and many specialty services across the [facility] system.  If requesting a specialty program, please choose from the list below.  If you have not heard from the scheduling office within 2 business days, please call [phone] for all locations, with the exception of [place], please call [phone].       Treatment: Evaluation & Treatment  Speech Treatment Diagnosis: Dysphagia  Special Instructions: Eval and Treat  Special Programs: Video Swallow Study    Please be aware that coverage of these services is subject to the terms and limitations of your health insurance plan.  Call member services at your health plan with any benefit or coverage questions.      **Note to Provider:  If you are referring outside of [facility] for the therapy appointment, please list the name of the location in the \"special " "instructions\" above, print the referral and give to the patient to schedule the appointment.                  Your next 10 appointments already scheduled     Feb 01, 2018 12:20 PM CST   (Arrive by 12:05 PM)   CT SOFT TISSUE NECK W/O & W CONTRAST with UC[facility]1   [facility] CT ([facility])    [address] [id number] [phone]           Please bring any scans or X-rays taken at other hospitals, if similar tests were done. Also bring a list of your medicines, including vitamins, minerals and over-the-counter drugs. It is safest to leave personal items at home.  Be sure to tell your doctor:   If you have any allergies.   If there s any chance you are pregnant.   If you are breastfeeding.   If you have any special needs.  You will have contrast for this exam. To prepare:   Do not eat or drink for 2 hours before your exam. If you need to take medicine, you may take it with small sips of water. (We may ask you to take liquid medicine as well.)   The day before your exam, drink extra fluids at least six 8-ounce glasses (unless your doctor tells you to restrict your fluids).  Patients over 70 or patients with diabetes or kidney problems:   If you haven t had a blood test (creatinine test) within the last 30 days, go to your clinic or Diagnostic Imaging Department for this test.  If you have diabetes:   If your kidney function is normal, continue taking your metformin (Avandamet, Glucophage, Glucovance, Metaglip) on the day of your exam.   If your kidney function is abnormal, wait 48 hours before restarting this medicine.  Please wear loose clothing, such as a sweat suit or jogging clothes. Avoid snaps, zippers and other metal. We may ask you to undress and put on a hospital gown.  If you have any questions, please call the Imaging Department where you will have your exam.            Feb 05, 2018  3:00 PM CST   Navigate Psychotherapy with Anahi Summers, " [facility] ([facility])    [address]  [facility] [address]   [phone]            Feb 05, 2018  3:00 PM CST   Navigate Family Therapy with Shai Londono [facility] ([facility])    [address]  [facility] [address]   [phone]            Feb 08, 2018  1:00 PM CST   Navigate Psychotherapy with Anahi Summers [facility] ([facility])    [address]  [facility] [address]   [phone]            Feb 08, 2018  1:00 PM CST   Navigate Family Therapy with Shai Londono [facility] ([facility])    [address] [place] [address]   [phone]            Feb 14, 2018  1:00 PM CST   Navigate Family Therapy with Shai Londono [facility] ([facility])    [address] [place] [address]   [phone]            Feb 14, 2018  1:00 PM CST   Navigate Psychotherapy with Anahi Summers [facility] ([facility])    [address] [place] [address]   [phone]            Feb 14, 2018  2:45 PM CST   Navigate Medication Follow Up with JAVY Bhatt [facility] ([facility])    [address]  [facility] [address]   [phone]              Future tests that were ordered for you today     Open Future Orders        Priority Expected Expires Ordered    XR Video Swallow w Esophagram Routine 2/1/2018 2/1/2019 2/1/2018    CT Soft tissue neck w & w/o contrast Routine  2/1/2019 2/1/2018    Urea nitrogen Routine  2/1/2019 2/1/2018    Creatinine Routine  2/1/2019 2/1/2018            Who to contact     Please call your clinic at [phone] to:    Ask questions about your health    Make or cancel appointments    Discuss your medicines    Learn about your test results    Speak to your doctor   If you  "have compliments or concerns about an experience at your clinic, or if you wish to file a complaint, please contact [facility] Patient Relations at [phone] or email us at [email or web address]         Additional Information About Your Visit        [employer] Information     eReceipts is an electronic gateway that provides easy, online access to your medical records. With eReceipts, you can request a clinic appointment, read your test results, renew a prescription or communicate with your care team.     To sign up for eReceipts visit the website at [email or web address]   You will be asked to enter the access code listed below, as well as some personal information. Please follow the directions to create your username and password.     Your access code is: [email or web address]  Expires: 2018  6:30 AM     Your access code will  in 90 days. If you need help or a new code, please contact your [facility] or call [phone] for assistance.        Care EveryWhere ID     This is your Care EveryWhere ID. This could be used by other organizations to access your [place] medical records  [id number]        Your Vitals Were     Height BMI (Body Mass Index)                1.73 m (5' 8.11\") 21.52 kg/m2           Blood Pressure from Last 3 Encounters:   18 119/66   18 120/68   17 104/71    Weight from Last 3 Encounters:   18 64.4 kg (142 lb)   18 63.6 kg (140 lb 3.2 oz)   18 60.1 kg (132 lb 7.9 oz)              We Performed the Following     SPEECH THERAPY REFERRAL        Primary Care Provider Fax #    Physician No Ref-Primary [phone]       No address on file        Equal Access to Services     MUNIRA MADDOX : Cristobal Diez, dariel gan, kaela webb, keagan lu. So [facility] [phone].    ATENCIÓN: Si habla español, tiene a grimm disposición servicios " marissa de asistencia lingüística. Case morataya [phone].    We comply with applicable federal civil rights laws and [place] laws. We do not discriminate on the basis of race, color, national origin, age, disability, sex, sexual orientation, or gender identity.            Thank you!     Thank you for choosing [facility] EAR NOSE AND THROAT  for your care. Our goal is always to provide you with excellent care. Hearing back from our patients is one way we can continue to improve our services. Please take a few minutes to complete the written survey that you may receive in the mail after your visit with us. Thank you!             Your Updated Medication List - Protect others around you: Learn how to safely use, store and throw away your medicines at www.disposemymeds.org.          This list is accurate as of 2/1/18  9:40 AM.  Always use your most recent med list.                   Brand Name Dispense Instructions for use Diagnosis    ALPRAZOLAM PO      Take 0.5 mg by mouth daily        ARIPiprazole 10 MG tablet    ABILIFY    30 tablet    Take 1 tablet (10 mg) by mouth daily    Schizoaffective disorder, bipolar type (H)       cholecalciferol 1000 UNIT tablet    vitamin D3     Take 1,000 Units by mouth        lithium 450 MG CR tablet    ESKALITH    60 tablet    Take 2 tablets (900 mg) by mouth At Bedtime    Schizoaffective disorder, bipolar type (H)

## 2018-02-01 NOTE — PROGRESS NOTES
The patient presents with a history of a globus sensation and mild dysphagia. He has been working with a chiropractor who has informed him that his neck muscles became twisted due to neurological issues and that his throat pain is related to his spine becoming of a lightning teodora for pain in his body.  The patient denies odynophagia, hemoptysis, hematemasis, or unexplained weight loss.  The patient reports some improvement of his symptoms since his last visit, but he continues to be concerned about the tightness in his neck and throat.     All other systems were reviewed and they are either negative or they are not directly pertinent to this Otolaryngology examination.    Past Medical History:    Past Medical History:   Diagnosis Date     Anxiety      Arthritis      Depressive disorder      Gastroesophageal reflux disease      Head injury      Problem, psychiatric        Past Surgical History:    No past surgical history on file.    Medications:      Current Outpatient Prescriptions:      cholecalciferol (VITAMIN D3) 1000 UNIT tablet, Take 1,000 Units by mouth, Disp: , Rfl:      ALPRAZOLAM PO, Take 0.5 mg by mouth daily, Disp: , Rfl:      ARIPiprazole (ABILIFY) 10 MG tablet, Take 1 tablet (10 mg) by mouth daily, Disp: 30 tablet, Rfl: 1     lithium (ESKALITH) 450 MG CR tablet, Take 2 tablets (900 mg) by mouth At Bedtime, Disp: 60 tablet, Rfl: 1    Allergies:    Penicillins    Physical Examination:    The patient is a well developed, well nourished male in no apparent distress.  He is normocephalic, atraumatic with pupils equally round and reactive to light.    Oral Cavity Examination:  Normal mucosa with no masses or lesions  Nasal Examination: Normal mucosa with no masses or lesions  Neurological Examination: Facial nerve function intact and symmetric  Integumentary Examination: No lesions on the skin of the head and neck  Neck Examination: No masses or lesions, no lymphadenopathy  Endocrine Examination: Normal  thyroid examination  Mirror Examination of the Larynx: Normal nasopharynx, valleculae, pyriform sinuses, false vocal cords and true vocal cords. There is some whitish coating on the base of the tongue consistent with gastroesophageal reflux.  The vocal cords are moving normally and there are no lesions or masses in the larynx.     Assessment and Plan:    The patient presents with a history of a gastroesophageal reflux and neck discomfort.  The patient has noted some improvement of his symptoms since his last visit, but he continues to have some symptoms. The patient will be referred for a CT scan of the neck and a video swallow evaluation and he will be seen again after these evaluations are completed.

## 2018-02-01 NOTE — LETTER
2/1/2018       RE: Jerel Day  [address] [facility] [address]     Dear Colleague,    Thank you for referring your patient, Jerel Day, to the [facility] EAR NOSE AND THROAT at [facility]. Please see a copy of my visit note below.    The patient presents with a history of a globus sensation and mild dysphagia. He has been working with a chiropractor who has informed him that his neck muscles became twisted due to neurological issues and that his throat pain is related to his spine becoming of a lightning teodora for pain in his body.  The patient denies odynophagia, hemoptysis, hematemasis, or unexplained weight loss.  The patient reports some improvement of his symptoms since his last visit, but he continues to be concerned about the tightness in his neck and throat.     All other systems were reviewed and they are either negative or they are not directly pertinent to this Otolaryngology examination.    Past Medical History:    Past Medical History:   Diagnosis Date     Anxiety      Arthritis      Depressive disorder      Gastroesophageal reflux disease      Head injury      Problem, psychiatric        Past Surgical History:    No past surgical history on file.    Medications:      Current Outpatient Prescriptions:      cholecalciferol (VITAMIN D3) 1000 UNIT tablet, Take 1,000 Units by mouth, Disp: , Rfl:      ALPRAZOLAM PO, Take 0.5 mg by mouth daily, Disp: , Rfl:      ARIPiprazole (ABILIFY) 10 MG tablet, Take 1 tablet (10 mg) by mouth daily, Disp: 30 tablet, Rfl: 1     lithium (ESKALITH) 450 MG CR tablet, Take 2 tablets (900 mg) by mouth At Bedtime, Disp: 60 tablet, Rfl: 1    Allergies:    Penicillins    Physical Examination:    The patient is a well developed, well nourished male in no apparent distress.  He is normocephalic, atraumatic with pupils equally round and reactive to light.    Oral Cavity Examination:  Normal mucosa with no masses or lesions  Nasal  Examination: Normal mucosa with no masses or lesions  Neurological Examination: Facial nerve function intact and symmetric  Integumentary Examination: No lesions on the skin of the head and neck  Neck Examination: No masses or lesions, no lymphadenopathy  Endocrine Examination: Normal thyroid examination  Mirror Examination of the Larynx: Normal nasopharynx, valleculae, pyriform sinuses, false vocal cords and true vocal cords. There is some whitish coating on the base of the tongue consistent with gastroesophageal reflux.  The vocal cords are moving normally and there are no lesions or masses in the larynx.     Assessment and Plan:    The patient presents with a history of a gastroesophageal reflux and neck discomfort.  The patient has noted some improvement of his symptoms since his last visit, but he continues to have some symptoms. The patient will be referred for a CT scan of the neck and a video swallow evaluation and he will be seen again after these evaluations are completed.       Again, thank you for allowing me to participate in the care of your patient.      Sincerely,    Shelton Guo MD

## 2018-02-01 NOTE — PATIENT INSTRUCTIONS
The patient presents with a history of a gastroesophageal reflux and neck discomfort.  The patient has noted some improvement of his symptoms since his last visit, but he continues to have some symptoms. The patient will be referred for a CT scan of the neck and a video swallow evaluation and he will be seen again after these evaluations are completed.

## 2018-02-01 NOTE — NURSING NOTE
"Chief Complaint   Patient presents with     RECHECK     throat and neck irritation      Height 1.73 m (5' 8.11\"), weight 64.4 kg (142 lb).  Declined to see  . States that he is getting psychological therapy on [place].  Jayy Foster LPN    "

## 2018-02-02 ASSESSMENT — PATIENT HEALTH QUESTIONNAIRE - PHQ9: SUM OF ALL RESPONSES TO PHQ QUESTIONS 1-9: 13

## 2018-02-03 NOTE — PROGRESS NOTES
NAVIGATE Clinician Contact & Progress Note   For Family Education Program    NAVIGATE Enrollee: Jerel Day (1995)     MRN: [id number]  Date:  1/29/18  Diagnosis(es):   Schizoaffective disorder, bipolar type  Clinician: NAVIGATE Director & Family Clinician, Shai Londono [facility]     1. Type of contact: (majority of time spent)   Family Session    2. People present:   Writer  Client: No  Significant Other/Family/Friend:  Mother and Father    3. Total number of persons who participated in contact: 3, including writer    4. Length of Actual Contact: Start Time: 415; End Time: 515   Traveled?    No     5. Location of contact:  [facility], [place]    6. Did the client complete the home practice option(s) from the previous session:   Not Applicable    7. Motivational Teaching Strategies:   Connect info and skills with personal goals, Promote hope and positive expectations, Re-frame experiences in positive light    8. Educational Teaching Strategies:   Review of written material/education, Relate information to client's experience, Ask questions to check comprehension, Break down information into small chunks    9. CBT Teaching Strategies:   Reinforcement and shaping  positive feedback for steps towards goals gains in knowledge & skills, Relapse prevention planning  review of stressors early warning signs written plan to respond to signs    10. Psychoeducational Topic(s) Addressed:  Family Education Orientation & Tip Sheet  [place] Manuel's Story  Just the Facts - Psychosis  Just the Facts - Medications for Psychosis  Just the Facts - Coping with Stress  Just the Facts - Strategies to Build Resiliency  Just the Facts - Relapse Prevention Planning  Just the Facts - Developing a Collaboration with Mental Health Professionals  Just the Facts - Effective Communication  Just the Facts - A Relative s Guide to Supporting Recovery from Psychosis  Just the Facts - Basic Facts About Alcohol and Drugs    11. Techniques  utilized:   [place] announced at beginning of session  Review of homework  Review of goal  Review of previous meeting  Present new material  Role-play  Problem-solving practice  Help family choose a home practice option  Summarize progress made in current session  Review of safety risks  (ie SI and HI) and characteristics and interventions to mitigate risk  Review of medications  Review of goals and associated strengths, barriers, objectives, and interventions  Review of frequency of appointments and anticipated length of treatment  Illicit client/family feedback    12. Assessment/Progress Note:   Met with Jerel's parents and reviewed symptoms medication use functioning and crisis planning. Writer provided crisis phone numbers and steps to take if they are concerned about Jerel safety and well-being. We discussed the plan moving forward and writer also recommended Jerel see Anahi Summers IRT, two times a week, due to his level of acuity. Writer also indicated our number one goal at this time is Jerel taking his medication and stabilization.    13. Plan/Referrals:   Writer will update team and discuss two times per week visits with IRT and one time per week with Med management. Next week will complete family orientation. Writer requested family permission to tape for program [place] to which they agreed. Parents will sign permission document prior to next week's meeting.    Will meet with family weekly as schedule allows for evidence based family psychoeducation and therapeutic support aimed at maximizing Jerel's opportunity for recovery from psychosis.     Shai Londono, [facility]   NAVIGATE Director & Family Clinician     Attestation:    I did not see this patient directly. This patient is discussed with me in individual clinical social work supervision, and I agree with the plan as documented.     AGA Huerta, [facility], February 7, 2018

## 2018-02-03 NOTE — PROGRESS NOTES
NAVIGATE Orientation  A Part of the [facility] First Episode of Psychosis Program     Patient Name: Jerel Day  /Age:  1995 (22 year old)  Diagnosis(es): Schizoaffective disorder, bipolar type    Spoke with Jerel's mother briefly to check in and verify upcoming scheduled appointments. Also checked in on Jerel's functioning. She indicated Jerel was doing better than mother, referencing he is speaking more coherently, and appears to be a bit less disorganized. We briefly reviewed what to do in a crisis, and writer reinforced goal of Jerel taking his medications as primary.         Shai Londono, [facility]  NAVIGATE Director & Family Clinician

## 2018-02-05 ENCOUNTER — OFFICE VISIT (OUTPATIENT)
Dept: PSYCHIATRY | Facility: CLINIC | Age: 23
End: 2018-02-05
Payer: COMMERCIAL

## 2018-02-05 DIAGNOSIS — F25.0 SCHIZOAFFECTIVE DISORDER, BIPOLAR TYPE (H): Primary | ICD-10-CM

## 2018-02-05 NOTE — PROGRESS NOTES
[facility] NAVIGATE Program Treatment Plan Summary  A Part of the [facility] First Episode of Psychosis Program     NAVIGATE Enrollee: Jerel Day  /Age:  1995 (22 year old)    Date of Initial Service: 18  Date of INTIAL Treatment Plan: 18  Last Review/Update Date:  18  90-Day Review Date: 18    The following is a REVIEWED treatment plan?  No   The following is an UPDATED treatment plan?  No    Participants at Collaborative Treatment Planning Meeting:   Client    NAVIGATE IRT Clinician: AGA Reynolds    1. DSM-V Diagnosis (include numeric code)  Schizoaffective disorder, bipolar type (F25.0)    2. Current symptoms and circumstances that substantiate the diagnosis:  Jerel has been experiencing delusions, auditory hallucinations, visual hallucinations, and negative symptoms.     3. How symptoms and/or behaviors are affecting level of function:   Jerel has not been able to fully engage in school, work, relationships, or the community.     4. Risk Assessment:  Suicide:  Assessed Level of Immediate Risk: High  Ideation: Varies, but past ideation  Plan:  No  Means: No  Intent: No (past intent)    Homicide/Violence:  Assessed Level of Immediate Risk: Low  Ideation: No  Plan: No  Means: No  Intent: No    If on a medication, please include name and dosage:    Current Outpatient Prescriptions   Medication     cholecalciferol (VITAMIN D3) 1000 UNIT tablet     ALPRAZOLAM PO     ARIPiprazole (ABILIFY) 10 MG tablet     lithium (ESKALITH) 450 MG CR tablet     No current facility-administered medications for this visit.        5. Treatment Goals    Domain: Illness Management & Recovery  Goal: Identify and engage possible areas of improvement related to +/- symptoms, ability to manage illness, medications, and/or substance use/abuse, SI/SIB/HI    Objectives & Target Date   - Continue with mediation recommendations, Target Date: 18  - Complete a safety plan with therapist and share with support system,  Target Date: 5/1/18  - Verbalize an understanding of the motives for self-destructive behavior patterns, Target Date: 5/1/18  - Verbally report and demonstrate an increased sense of hope for self, Target Date: 5/1/18    - Identify steps to enhancing independence and moving into apartment, Target Date: 5/1/18   - Identify negative automatic thoughts and replace them with positive self-talk messages, Target Date: 5/1/18   - Verbally identify the stressors that contribute to the reactive psychosis, Target Date: 5/1/18   - Report a diminishing or absence of hallucinations and/or delusions, Target Date: 5/1/18   - Decrease the frequency of somatic complaints, Target Date: 5/1/18   - Report an absence or decrease of negative symptoms (depression, slowed speech, low energy, low motivation), Target Date: 5/1/18  - Develop and implement relapse prevention strategies for managing possible future episodes of psychosis, Target Date: 5/1/18  - Increase communication with the parents, resulting in feeling attended to and understood, Target Date: 5/1/18  - Participate in family therapy to explore and change family dynamics that contribute to the emergence of irritability, Target Date: 5/1/18  - Family members verbalize increased understanding of an knowledge about Jerel s illness and treatment, Target Date: 5/1/18   - Family members increase positive support of Jerel to reduce the chances of acute exacerbation of the psychotic episode, Target Date: 5/1/18  - Family members terminate any hostile, critical responses to Jerel and increase their statements of praise, optimism, and affirmation, Target Date: 5/1/18   - Family members share their feelings of guilt, frustration, and fear associated with Jerel s mental illness, Target Date: 5/1/18   - Family members identify specific activities for Jerel that will facilitate development of positive self-esteem, Target Date: 5/1/18   - Learn and implement calming, communication, conflict  resolution, and thinking skills as part of a new way to manage reactions to frustration, Target Date: 5/1/18    Strengths   - Bravery & Valor    - Intelligence  - Determination    - Caution, Prudence, & Discretion    - Curiosity    - Honest, Authentic, Genuine    - Industry, diligence, & perseverance    - Judgment, critical thinking, & open-mindedness    - Love of learning     - Medication adherence (P)  - Modesty & Humility    - Perspective ([place])    - Supportive friends, family, recovery environment (P)    Barriers  - Communication, limited communication with family/support network  - Coping, limited coping strategies  - Depression and/or Hopelessness  - Drug/Alcohol use/abuse/dependence  - Environmental Stress (e.g. family conflict or criticism) (S)  - Grandiose or Persecutory Delusions (S)  - Impulsive  - Male (S)  - Recent loss of social support (S)  - SI/SIB/HI  - Single (S)  - Symptoms of psychosis, positive (delusions, hallucinations)  - Symptoms of psychosis, negative (flat affect, avolition, anhedonia, alogia, and/or apathy)    Provider & Intervention   IRT  - Motivational Interviewing (Connect info and skills with personal goals, Promote hope and positive expectations, Explore pros and cons of change, Re-frame experiences in a positive light)  - Educational Teaching Strategies (Review written material/education on: Assessment/Initial Goal Setting, Education about Psychosis, Relapse Prevention Planning, Processing the Psychotic Episode, Developing Resiliency -Standard Sessions, Building a Bridging to Your Goals, Dealing with Negative Feelings, Coping with Symptoms, Substance Use, Having Fun and Developing Good Relationships, Making Choices about Smoking, Nutrition and Exercise, Developing Resiliency)  - CBT (Reinforcement and shaping, Social skills training, Relapse prevention planning, Coping skills training, Relaxation training, Cognitive restructuring, Behavioral tailoring)  Family  Therapy  - Motivational Interviewing (Connect info and skills with personal goals, Promote hope and positive expectations, Explore pros and cons of change, Re-frame experiences in a positive light)  - Educational Teaching Strategies (Review written material/education on: Psychosis, Medications for psychosis, Coping with stress, Strategies to build resiliency, Relapse prevention planning, Developing a collaboration with mental health professionals, Effective communication, A relative s guide to supporting recovery from psychosis, Basic facts about alcohol and drugs)  - CBT (Reinforcement and shaping, Social skills training, Relapse prevention planning, Coping skills training, Relaxation training, Cognitive restructuring, Behavioral tailoring)      Domain: Health & Basic Living Needs  Goal: Identify and engage possible areas of improvement related to basic needs being met and maintaining or improving overall health and well-being    Objectives & Target Date   - Learn and implement healthy nutritional practices, Target Date: 5/1/18  - Reestablish a consistent eating and sleeping pattern, Target Date: 5/1/18   - Establish a plan to increase physical exercise, Target Date: 5/1/18  - Explore motivation for treatment toward making a commitment to change, Target Date: 5/1/18   - Decrease the level of denial around using substances as evidenced by fewer statements about minimizing amount of use and its negative impact on life, Target Date: 5/1/18    - Verbalize a commitment to a harm reduction approach to using substances, Target Date: 5/1/18   - Verbalize an understanding of personal, social, and family factors that can contribute to development of chemical dependence and pose risks for relapse, Target Date: 5/1/18    - Enroll and participate in CBT to learn and implement knowledge and skills for overcoming substance use, Target Date: 5/1/18   - Identify, challenge and replace destructive self-talk with positive,  strength-building self-talk, Target Date: 5/1/18   - Learn and implement coping strategies to manage urges to use substances, Target Date: 5/1/18    - Implement relapse prevention strategies for managing possible future situations with high risk for relapse, Target Date: 5/1/18   - Develop a written aftercare plan that will support the maintenance of long-term sobriety, Target Date: 5/1/18  - Family members demonstrate support of [place]s sobriety by minimizing environmental triggers for future substance use, Target Date: 5/1/18  - Family members agree to not financially support Bradfords substance use, Target Date: 5/1/18  - Family members verbally reinforce Jerel's active attempts to decrease substance by providing praise, encouragement, and affirmations, Target Date: 5/1/18     Strengths   - Bravery & Valor    - Intelligence  - Determination    - Caution, Prudence, & Discretion    - Curiosity    - Honest, Authentic, Genuine    - Industry, diligence, & perseverance    - Judgment, critical thinking, & open-mindedness    - Love of learning     - Medication adherence (P)  - Modesty & Humility    - Perspective ([place])    - Supportive friends, family, recovery environment (P)    Barriers   - Communication, limited communication with family/support network  - Coping, limited coping strategies  - Depression and/or Hopelessness  - Drug/Alcohol use/abuse/dependence  - Environmental Stress (e.g. family conflict or criticism) (S)  - Grandiose or Persecutory Delusions (S)  - Impulsive  - Male (S)  - Recent loss of social support (S)  - SI/SIB/HI  - Single (S)  - Symptoms of psychosis, positive (delusions, hallucinations)  - Symptoms of psychosis, negative (flat affect, avolition, anhedonia, alogia, and/or apathy)    Provider & Intervention   IRT  - Motivational Interviewing (Connect info and skills with personal goals, Promote hope and positive expectations, Explore pros and cons of change, Re-frame experiences in a positive  light)  - Educational Teaching Strategies (Review written material/education on: Assessment/Initial Goal Setting, Education about Psychosis, Relapse Prevention Planning, Processing the Psychotic Episode, Developing Resiliency -Standard Sessions, Building a Bridging to Your Goals, Dealing with Negative Feelings, Coping with Symptoms, Substance Use, Having Fun and Developing Good Relationships, Making Choices about Smoking, Nutrition and Exercise, Developing Resiliency)  - CBT (Reinforcement and shaping, Social skills training, Relapse prevention planning, Coping skills training, Relaxation training, Cognitive restructuring, Behavioral tailoring)      Domain: Family & Other Supports  Goal: Identify and engage possible areas of improvement related to engaging family, friends and other supports    Objectives & Target Date     - Identify strengths and interests that can be used to initiate social contacts and develop peer friendships, Target Date: 5/1/18   - Learn and implement calming and coping strategies to manage anxiety symptoms and focus attention usefully during moments of social anxiety, Target Date: 5/1/18     - Learn and implement social problem-solving skills for managing social stresses, solving daily problems, and resolving conflicts effectively, Target Date: 5/1/18   - Strengthen the social support network with friends by initiating social contact and participating in social activities with peers, Target Date: 5/1/18    - Increase the frequency of positive interactions with parents, Target Date: 5/1/18  - Increase participation in interpersonal or peer group activities, Target Date: 5/1/18    - Family members learn skills that strengthen and support Jerel's positive behavior change, Target Date: 5/1/18   - Family members teach and reinforce healthy social skills and attitudes, Target Date: 5/1/18      Strengths  - Bravery & Valor    - Intelligence  - Determination    - Caution, Prudence, & Discretion     - Curiosity    - Honest, Authentic, Genuine    - Industry, diligence, & perseverance    - Judgment, critical thinking, & open-mindedness    - Love of learning     - Medication adherence (P)  - Modesty & Humility    - Perspective ([place])    - Supportive friends, family, recovery environment (P)    Barriers   - Communication, limited communication with family/support network  - Coping, limited coping strategies  - Depression and/or Hopelessness  - Drug/Alcohol use/abuse/dependence  - Environmental Stress (e.g. family conflict or criticism) (S)  - Grandiose or Persecutory Delusions (S)  - Impulsive  - Male (S)  - Recent loss of social support (S)  - SI/SIB/HI  - Single (S)  - Symptoms of psychosis, positive (delusions, hallucinations)  - Symptoms of psychosis, negative (flat affect, avolition, anhedonia, alogia, and/or apathy)    Provider & Intervention   Family Therapy  - Motivational Interviewing (Connect info and skills with personal goals, Promote hope and positive expectations, Explore pros and cons of change, Re-frame experiences in a positive light)  - Educational Teaching Strategies (Review written material/education on: Psychosis, Medications for psychosis, Coping with stress, Strategies to build resiliency, Relapse prevention planning, Developing a collaboration with mental health professionals, Effective communication, A relative s guide to supporting recovery from psychosis, Basic facts about alcohol and drugs)  - CBT (Reinforcement and shaping, Social skills training, Relapse prevention planning, Coping skills training, Relaxation training, Cognitive restructuring, Behavioral tailoring)      Domain: Social, Academic, & Employment  Goal: Identify and engage possible areas of improvement related to education, employment, and social activities     Objectives & Target Date   - Explore areas of interest for continued educational opportunities, Target Date: 5/1/18   - Explore areas of interest for employment  "purposes, Target Date: 5/1/18  - Increase participation in interpersonal or peer group activities, Target Date: 5/1/18  - Family members provide praise, encouragement for Jerel's attempts and successes at school/work, Target Date: 5/1/18     Strengths  - Bravery & Valor    - Intelligence  - Determination    - Caution, Prudence, & Discretion    - Curiosity    - Honest, Authentic, Genuine    - Industry, diligence, & perseverance    - Judgment, critical thinking, & open-mindedness    - Love of learning     - Medication adherence (P)  - Modesty & Humility    - Perspective ([place])    - Supportive friends, family, recovery environment (P)    Barriers  - Command Hallucinations  - Communication, limited communication with family/support network  - Coping, limited coping strategies  - Depression and/or Hopelessness  - Drug/Alcohol use/abuse/dependence  - Environmental Stress (e.g. family conflict or criticism) (S)  - Grandiose or Persecutory Delusions (S)  - Impulsive  - Male (S)  - Recent loss of social support (S)  - SI/SIB/HI  - Single (S)  - Symptoms of psychosis, positive (delusions, hallucinations)  - Symptoms of psychosis, negative (flat affect, avolition, anhedonia, alogia, and/or apathy)       Provider & Intervention   SEE  - Career Inventory  - Education Assistance & Supports (Discuss/plan use of student disability services, School searching, Interview preparation/skills training, Complete forms/applications, Follow along supports for requesting accommodations, development and use of natural supports, problem solving difficulties, stress management, develop/use of coping skills for symptoms, develop/use of coping skills for cognitive difficulties, social skills training)  - Employment Assistance & Supports (Information gathering/planning re: \"benefits applications\" or \"work incentive planning\", Job searching, Interview preparation/skills training, Complete resume, Complete applications, Follow along supports for " requesting accommodations, development and use of natural supports, problem solving difficulties, stress management, develop/use of coping skills for symptoms, develop/use of coping skills for cognitive difficulties, social skills training)    6. Frequency of Sessions: Weekly    7. Expected duration of treatment:  1 year     8. Participants in therapy plan (family, friends, support network): Family, [facility], Anahi Summers [facility], Ana Simon      See scanned document for Acknowledgement of Current Treatment Plan    Regulatory Guidelines for Updating Treatment Plan  [place] Medical Assistance: Reviewed & signed at least every 90 days  Medicare:  Update per policy

## 2018-02-05 NOTE — PROGRESS NOTES
NAVIGATE Clinician Contact & Progress Note  For Individual Resiliency Training (IRT)  A Part of the [facility] First Episode of Psychosis Program    NAVIGATE Enrollee: Jerel Day (1995)     MRN: [id number]  Date:  1/31/18  Diagnosis: Schizoaffective disorder, bipolar type (F25.0)  Clinician: LILLIANA Individual Resiliency Trainer, EDELMIRA Reynolds     1. Type of contact: (majority of time spent)  IRT Session    2. People present:   Client  NAVIGATE IRT/writer    3. Total number of persons who participated in contact: 2, including writer    4. Length of Actual Contact: Start Time: 4:00; End Time: 5:00   Traveled?  No    5. Location of contact:  Psychiatry Clinic, [facility]    6. Did the client complete the home practice option(s) from the previous session: NA     7. Motivational Teaching Strategies:  Connect info and skills with personal goals  Promote hope and positive expectations  Explore pros and cons of change  Re-frame experiences in positive light    8. Educational Teaching Strategies:  Review of written material/education  Relate information to client's experience  Ask questions to check comprehension  Break down information into small chunks  Adopt client's language    9. CBT Teaching Strategies:  Reinforcement and shaping (positive feedback for steps towards goals, gains in knowledge & skills, follow-through on home assignments)    10. IRT Module(s) Addressed:  Module 1 - Orientation    11. Techniques utilized:   Stopover announced at beginning of session  Review of previous meeting  Present new material  Problem-solving practice  Summarize progress made in current session    12. Mental Status Exam:    Alertness: alert  and oriented  Appearance: casually groomed  Behavior/Demeanor: pleasant and guarded, with good  eye contact   Speech: normal and regular rate and rhythm  Language: intact. Preferred language identified as English.  Psychomotor: fidgety  Mood: worried  Affect: appropriate; was congruent  "to mood; was congruent to content  Thought Process/Associations: disorganized and loose associations  Thought Content:  Reports delusions;  Denies suicidal and violent ideation  Perception:  Reports none;  Denies auditory hallucinations and visual hallucinations  Insight: fair  Judgment: fair  Cognition: does  appear grossly intact; formal cognitive testing was not done  Suicidal ideation: denies SI, denies intent,  and denies plan  Homicidal Ideation: denies    13. Assessment/Progress Note:     This writer met with Jerel for a follow-up Orientation session to IRT services. Jerel was observed to have less word salad and loose associations than the prior visit. He was able to remain focused on the conversation and was more engaged. He shared he has not had any suicidal or homicidal ideation since the last visit. He noted this is because he is \"not in that place anymore.\" He feels as though the SI/HI come and go, depending on his thoughts and mood. He reported an increase in neck and hip pain. Jerel shared he no longer wants to move to [place], but moreso wants to visit his cousin there. He noted he does not get enough Vitamin D in [place] and needs the sun for this reason. He thinks this will balance out his internal systems. He shared taking a supplement doesn't metabolize the same way. He is feeling bored living at this parents' house and wants to explore activities. However, he is concerned about the cost and having to be in a social situation. He mentioned he feels he does not have anyone to go with to events and doesn't feel \"good enough to be around others.\" He is open to attending activities with Ana Michel (SEE). His interests are painting, cooking, listening to music, and massages. He said he would like to start cooking more within his home, but would need to talk with his parents more about the groceries bought. He also feels like he is not a great cook. He is willing to look into recipes he would like " "to try over this week for home practice. He also shared he is uncertain about his future living and employment/education options. He is thinking about either joining the workforce and starting his own business or transferring to a college in [place].     14. Plan/Referrals:     This writer will follow up with Ana Michel (SEE) about potential activities in the community.    This writer will continue to consult with the team.    This writer will continue to monitor safety and symptoms, as well as coping mechanisms.     Billing for \"Interactive Complexity\"?  No    Anahi Summers ARIANNE TIJERINA Individual Resiliency Trainer    Attestation:    I did not see this patient directly. This patient is discussed with the NAVIGATE Team Director, me in individual clinical social work supervision, and I agree with the plan as documented.     AGA Huerta, [facility], February 7, 2018    "

## 2018-02-05 NOTE — MR AVS SNAPSHOT
After Visit Summary   2/5/2018    Jerel Day    MRN: [id number]           Patient Information     Date Of Birth          1995        Visit Information        Provider Department      2/5/2018 3:00 PM Anahi Summers [facility] Psychiatry         Follow-ups after your visit        Your next 10 appointments already scheduled     Feb 08, 2018  1:00 PM CST   Navigate Psychotherapy with Anahi Summers [facility] Psychiatry ([facility])    [address]  [facility] [address]   [phone]            Feb 08, 2018  1:00 PM CST   Navigate Family Therapy with Shai Londono [facility] Psychiatry ([facility])    [address]  [facility] [address] [phone]            Feb 14, 2018  1:00 PM CST   Navigate Family Therapy with Shai Londono [facility] Psychiatry ([facility])    [address]  [facility] [address] [phone]            Feb 14, 2018  1:00 PM CST   Navigate Psychotherapy with Anahi Summers [facility] Psychiatry ([facility])    [address]  [facility] [address] [phone]            Feb 14, 2018  2:45 PM CST   Navigate Medication Follow Up with JAVY Bhatt [facility] Psychiatry ([facility])    [address]  [facility] [address] [phone]              Who to contact     Please call your clinic at [phone] to:    Ask questions about your health    Make or cancel appointments    Discuss your medicines    Learn about your test results    Speak to your doctor   If you have compliments or concerns about an experience at your clinic, or if you wish to file a complaint, please contact [facility] Patient Relations at [phone] or email us at [email or web address]         Additional Information About Your Visit        MyChart Information     Kent is an  electronic gateway that provides easy, online access to your medical records. With Str[employer] Springs, you can request a clinic appointment, read your test results, renew a prescription or communicate with your care team.     To sign up for Struts & Springs visit the website at [email or web address]   You will be asked to enter the access code listed below, as well as some personal information. Please follow the directions to create your username and password.     Your access code is: [email or web address]  Expires: 2018  6:30 AM     Your access code will  in 90 days. If you need help or a new code, please contact your [facility] or call [phone] for assistance.        Care EveryWhere ID     This is your Care EveryWhere ID. This could be used by other organizations to access your [place] medical records  [id number]         Blood Pressure from Last 3 Encounters:   18 119/66   18 120/68   17 104/71    Weight from Last 3 Encounters:   18 142 lb (64.4 kg)   18 140 lb 3.2 oz (63.6 kg)   18 132 lb 7.9 oz (60.1 kg)              Today, you had the following     No orders found for display       Primary Care Provider Fax #    Physician No Ref-Primary [phone]       No address on file        Equal Access to Services     MUNIRA MADDOX : Hadii aad ku hadasho Sodorisali, waaxda luqadaha, qaybta kaalmada adecarmenzada, keagan moser . So [facility] [phone].    ATENCIÓN: Si habla español, tiene a grimm disposición servicios gratuitos de asistencia lingüística. Llame al [phone].    We comply with applicable federal civil rights laws and [place] laws. We do not discriminate on the basis of race, color, national origin, age, disability, sex, sexual orientation, or gender identity.            Thank you!     Thank you for choosing [facility] PSYCHIATRY  for your care. Our goal is always to provide you with excellent care. Hearing back from our patients is  one way we can continue to improve our services. Please take a few minutes to complete the written survey that you may receive in the mail after your visit with us. Thank you!             Your Updated Medication List - Protect others around you: Learn how to safely use, store and throw away your medicines at www.disposemymeds.org.          This list is accurate as of 2/5/18  4:09 PM.  Always use your most recent med list.                   Brand Name Dispense Instructions for use Diagnosis    ALPRAZOLAM PO      Take 0.5 mg by mouth daily        ARIPiprazole 10 MG tablet    ABILIFY    30 tablet    Take 1 tablet (10 mg) by mouth daily    Schizoaffective disorder, bipolar type (H)       cholecalciferol 1000 UNIT tablet    vitamin D3     Take 1,000 Units by mouth        lithium 450 MG CR tablet    ESKALITH    60 tablet    Take 2 tablets (900 mg) by mouth At Bedtime    Schizoaffective disorder, bipolar type (H)

## 2018-02-06 ENCOUNTER — TELEPHONE (OUTPATIENT)
Dept: PSYCHIATRY | Facility: CLINIC | Age: 23
End: 2018-02-06

## 2018-02-06 ASSESSMENT — PATIENT HEALTH QUESTIONNAIRE - PHQ9: SUM OF ALL RESPONSES TO PHQ QUESTIONS 1-9: 17

## 2018-02-06 NOTE — PROGRESS NOTES
NAVIGATE Clinician Contact & Progress Note  For Individual Resiliency Training (IRT)  A Part of the [facility] First Episode of Psychosis Program    NAVIGATE Enrollee: Jerel Day (1995)     MRN: [id number]  Date:  2/05/18  Diagnosis: Schizoaffective disorder, bipolar type (F25.0)  Clinician: LILLIANA Individual Resiliency Trainer, EDELMIRA Reynolds     1. Type of contact: (majority of time spent)  IRT Session    2. People present:   Client  NAVIGATE IRT/writer    3. Total number of persons who participated in contact: 2, including writer    4. Length of Actual Contact: Start Time: 3:00; End Time: 4:00   Traveled?  No    5. Location of contact:  [facility]iatry [facility], [facility]    6. Did the client complete the home practice option(s) from the previous session: No    7. Motivational Teaching Strategies:  Connect info and skills with personal goals  Promote hope and positive expectations  Explore pros and cons of change  Re-frame experiences in positive light    8. Educational Teaching Strategies:  Review of written material/education  Relate information to client's experience  Ask questions to check comprehension  Break down information into small chunks  Adopt client's language    9. CBT Teaching Strategies:  Reinforcement and shaping (positive feedback for steps towards goals, gains in knowledge & skills, follow-through on home assignments)    Social skills training (rationale for skill, modeling, role play practice, feedback, plan home practice)    Coping skills training (review current coping skills, increase currently used skills, model new skill, role play new skill, feedback, plan home practice)    10. IRT Module(s) Addressed:  Module 1 - Orientation  Module 2 - Assessment/Initial Goal Setting    11. Techniques utilized:   Arrington announced at beginning of session  Review of homework  Review of goal  Review of previous meeting  Present new material  Problem-solving practice  Help client choose a home practice  option  Summarize progress made in current session    12. Mental Status Exam:    Alertness: alert  and oriented  Appearance: casually groomed  Behavior/Demeanor: cooperative and pleasant, with good  eye contact   Speech: normal and regular rate and rhythm  Language: intact. Preferred language identified as English.  Psychomotor: normal or unremarkable  Mood: anxious  Affect: flat; was congruent to mood; was congruent to content  Thought Process/Associations: loose associations  Thought Content:  Reports suicidal ideation, delusions and paranoid ideation;  Denies violent ideation  Perception:  Reports auditory hallucinations and visual hallucinations;  Denies none  Insight: good  Judgment: fair  Cognition: does  appear grossly intact; formal cognitive testing was not done  Suicidal ideation: denies current SI, denies intent,  and denies plan  Homicidal Ideation: denies    13. Assessment/Progress Note:     This writer met with Jerel for a follow-up IRT Session. This writer completed the [place] Symptom Index (CSI) with Jerel. He indicated loneliness, hearing/seeing something, forgetfulness, paranoia, and racing thoughts daily. He shared he often feels as though others have a malicious intent and he finds it difficult to trust others. He experiences paranoia that others will kill him about twice per week. He noted this past week, he viewed a helicopter overhead and thought they were going to shoot him. Jerel endorses passive suicidal thoughts, but does indicate he's had a plan in the past. He also noted he does not have the necessary means. He shared he does not want to die, but does not have a lot to look forward to. He denied any thoughts of self-harm at this time. This writer provided him crisis resources. Jerel noted he has been sleeping a lot. He mentioned he sleeps at least 7 hours at night and then takes naps regularly. He has noticed trouble with concentration during transition times and if he feels he is not  "being productive. He also does not experience as much enjoyment from activities as he formerly did. Jerel has had auditory hallucinations in a whisper saying things like, \"Jerel is a loser.\" He also has a visual hallucination of blue/pink light specs out of the corner of his eyes. He feels he can visualize what is wrong within his body. He noted he believes others are able to read his thoughts. He also stated he sometimes knows what others are thinking. In the past, he has had command hallucinations telling him to jump off a bridge. However, he denies any commands since October. Jerel stated he has an Internet/porn addiction and needs to find other activities that are fulfilling and enjoyable. He feels as though his parents are restricting his freedom by taking away his ability to drive. He believes many community activities do not have people his own age. He said he is confused about social norms, such as when to ask someone for their number or to spend time together.     14. Plan/Referrals:     This writer will continue to monitor safety and symptoms.    This writer will provide him with social skill training, coping skills, and psycho-education.     Billing for \"Interactive Complexity\"?  No    EDELMIRA Reynolds Individual Resiliency Trainer    Attestation:    I did not see this patient directly. This patient is discussed with the NAVIGATE Team Director, me in individual clinical social work supervision, and I agree with the plan as documented.     AGA Huerta, [facility], February 7, 2018    "

## 2018-02-06 NOTE — TELEPHONE ENCOUNTER
NAVIGATE SEE Outgoing Telephone Call  For Supported Employment & Education    NAVIGATE Enrollee: Jerel Day (1995)     MRN: [id number]  Date of Call: 2/6/2018  Contacted: PEPE on home phone    Discussed:   PEPE to schedule appt with SEE on Thur before appts. Asked family to call back otherwise introductions can be made briefly before appts at 1.     Ana Michel

## 2018-02-08 ENCOUNTER — OFFICE VISIT (OUTPATIENT)
Dept: PSYCHIATRY | Facility: CLINIC | Age: 23
End: 2018-02-08
Payer: COMMERCIAL

## 2018-02-08 DIAGNOSIS — F25.0 SCHIZOAFFECTIVE DISORDER, BIPOLAR TYPE (H): Primary | ICD-10-CM

## 2018-02-08 NOTE — Clinical Note
"Bee Peter,  Could you do more comprehensive mental status and use MOCA or something else to assess for driving safety? At bottom of \"assessemnt/progress note\" section, I comment about this. "

## 2018-02-08 NOTE — MR AVS SNAPSHOT
After Visit Summary   2018    Jerel Day    MRN: [id number]           Patient Information     Date Of Birth          1995        Visit Information        Provider Department      2018 12:30 PM Ana Michel [facility] Psychiat[facility]         Follow-ups after your visit        Your next 10 appointments already scheduled     2018  1:00 PM CST   Navigate Family Therapy with Shai HARJINDER Bry [facility] Psychiatry ([facility])    [address]  [facility] [address] [phone]            2018  1:00 PM CST   Navigate Psychotherapy with Anahi Kristopher, [facility] Psychiatry ([facility])    [address]  [facility] [address] [phone]            2018  2:45 PM CST   Navigate Medication Follow Up with JAVY Bhatt [facility] Psychiatry ([facility])    [address]  [facility] [address] [phone]              Who to contact     Please call your clinic at [phone] to:    Ask questions about your health    Make or cancel appointments    Discuss your medicines    Learn about your test results    Speak to your doctor            Additional Information About Your Visit        MyChart Information     Peopleclick Authoria is an electronic gateway that provides easy, online access to your medical records. With People[employer] Author[employer], you can request a clinic appointment, read your test results, renew a prescription or communicate with your care team.     To sign up for CaseReadert visit the website at [email or web address]   You will be asked to enter the access code listed below, as well as some personal information. Please follow the directions to create your username and password.     Your access code is: [email or web address]  Expires: 2018  6:30 AM     Your access code will  in 90 days. If you need help or a new code, please contact your [facility] or  call [phone] for assistance.        Care EveryWhere ID     This is your Care EveryWhere ID. This could be used by other organizations to access your [place] medical records  [id number]         Blood Pressure from Last 3 Encounters:   01/29/18 119/66   01/09/18 120/68   12/18/17 104/71    Weight from Last 3 Encounters:   02/01/18 142 lb (64.4 kg)   01/29/18 140 lb 3.2 oz (63.6 kg)   01/03/18 132 lb 7.9 oz (60.1 kg)              Today, you had the following     No orders found for display       Primary Care Provider Office Phone # Fax #    [facility] [phone]       [address] [place] [address]        Equal Access to Services     MUNIRA MADDOX : Hadii aad ku hadasho Soomaali, waaxda luqadaha, qaybta kaalmada adeegyada, keagan lu. So [facility] [phone].    ATENCIÓN: Si habla español, tiene a grimm disposición servicios gratuitos de asistencia lingüística. Case al [phone].    We comply with applicable federal civil rights laws and [place] laws. We do not discriminate on the basis of race, color, national origin, age, disability, sex, sexual orientation, or gender identity.            Thank you!     Thank you for choosing [facility] PSYCHIATRY  for your care. Our goal is always to provide you with excellent care. Hearing back from our patients is one way we can continue to improve our services. Please take a few minutes to complete the written survey that you may receive in the mail after your visit with us. Thank you!             Your Updated Medication List - Protect others around you: Learn how to safely use, store and throw away your medicines at www.disposemymeds.org.          This list is accurate as of 2/8/18  1:04 PM.  Always use your most recent med list.                   Brand Name Dispense Instructions for use Diagnosis    ALPRAZOLAM PO      Take 0.5 mg by mouth daily        ARIPiprazole 10 MG tablet    ABILIFY    30 tablet     Take 1 tablet (10 mg) by mouth daily    Schizoaffective disorder, bipolar type (H)       cholecalciferol 1000 UNIT tablet    vitamin D3     Take 1,000 Units by mouth        lithium 450 MG CR tablet    ESKALITH    60 tablet    Take 2 tablets (900 mg) by mouth At Bedtime    Schizoaffective disorder, bipolar type (H)

## 2018-02-08 NOTE — MR AVS SNAPSHOT
After Visit Summary   2018    Jerel Day    MRN: [id number]           Patient Information     Date Of Birth          1995        Visit Information        Provider Department      2018 1:00 PM Anahi Summers [facility] Psychiatry         Follow-ups after your visit        Your next 10 appointments already scheduled     2018  1:00 PM CST   Navigate Family Therapy with Shai Londono [facility] Psychiatry ([facility])    [address]  [facility] [address]   [phone]            2018  1:00 PM CST   Navigate Psychotherapy with Anahi Kristopher [facility] Psychiatry ([facility])    [address]  [facility] [address] [phone]            2018  2:45 PM CST   Navigate Medication Follow Up with JAVY Bhatt [facility] Psychiatry ([facility])    [address]  [facility] [address] [phone]              Who to contact     Please call your clinic at [phone] to:    Ask questions about your health    Make or cancel appointments    Discuss your medicines    Learn about your test results    Speak to your doctor            Additional Information About Your Visit        MyChart Information     AdCampt is an electronic gateway that provides easy, online access to your medical records. With Siteminis, you can request a clinic appointment, read your test results, renew a prescription or communicate with your care team.     To sign up for AdCampt visit the website at [email or web address]   You will be asked to enter the access code listed below, as well as some personal information. Please follow the directions to create your username and password.     Your access code is: [email or web address]  Expires: 2018  6:30 AM     Your access code will  in 90 days. If you need help or a new code, please contact your [facility] or  call [phone] for assistance.        Care EveryWhere ID     This is your Care EveryWhere ID. This could be used by other organizations to access your [place] medical records  [id number]         Blood Pressure from Last 3 Encounters:   01/29/18 119/66   01/09/18 120/68   12/18/17 104/71    Weight from Last 3 Encounters:   02/01/18 142 lb (64.4 kg)   01/29/18 140 lb 3.2 oz (63.6 kg)   01/03/18 132 lb 7.9 oz (60.1 kg)              Today, you had the following     No orders found for display       Primary Care Provider Office Phone # Fax #    [facility] [phone]       [address] [place] [address]        Equal Access to Services     MUNIRA MADDOX : Hadii aad ku hadasho Soomaali, waaxda luqadaha, qaybta kaalmada adeegyada, keagan lu. So [facility] [phone].    ATENCIÓN: Si habla español, tiene a grimm disposición servicios gratuitos de asistencia lingüística. Case al [phone].    We comply with applicable federal civil rights laws and [place] laws. We do not discriminate on the basis of race, color, national origin, age, disability, sex, sexual orientation, or gender identity.            Thank you!     Thank you for choosing [facility] PSYCHIATRY  for your care. Our goal is always to provide you with excellent care. Hearing back from our patients is one way we can continue to improve our services. Please take a few minutes to complete the written survey that you may receive in the mail after your visit with us. Thank you!             Your Updated Medication List - Protect others around you: Learn how to safely use, store and throw away your medicines at www.disposemymeds.org.          This list is accurate as of 2/8/18  2:57 PM.  Always use your most recent med list.                   Brand Name Dispense Instructions for use Diagnosis    ALPRAZOLAM PO      Take 0.5 mg by mouth daily        ARIPiprazole 10 MG tablet    ABILIFY    30 tablet     Take 1 tablet (10 mg) by mouth daily    Schizoaffective disorder, bipolar type (H)       cholecalciferol 1000 UNIT tablet    vitamin D3     Take 1,000 Units by mouth        lithium 450 MG CR tablet    ESKALITH    60 tablet    Take 2 tablets (900 mg) by mouth At Bedtime    Schizoaffective disorder, bipolar type (H)

## 2018-02-08 NOTE — MR AVS SNAPSHOT
After Visit Summary   2018    Jerel Day    MRN: [id number]           Patient Information     Date Of Birth          1995        Visit Information        Provider Department      2018 1:00 PM Shai Londono, [facility]         Follow-ups after your visit        Your next 10 appointments already scheduled     2018  1:00 PM CST   Navigate Family Therapy with Shai Londono [facility] ([facility])    [address]  [facility] [address]   [phone]            2018  1:00 PM CST   Navigate Psychotherapy with Anahi Kristopher [facility] ([facility])    [address]  [facility] [id number] [phone]            2018  2:45 PM CST   Navigate Medication Follow Up with JAVY Bhatt [facility] ([facility])    [address]  [facility] [id number] [phone]              Who to contact     Please call your clinic at [phone] to:    Ask questions about your health    Make or cancel appointments    Discuss your medicines    Learn about your test results    Speak to your doctor            Additional Information About Your Visit        MyChart Information     Titansant is an electronic gateway that provides easy, online access to your medical records. With KBLE, you can request a clinic appointment, read your test results, renew a prescription or communicate with your care team.     To sign up for Titansant visit the website at [email or web address]   You will be asked to enter the access code listed below, as well as some personal information. Please follow the directions to create your username and password.     Your access code is: [email or web address]  Expires: 2018  6:30 AM     Your access code will  in 90 days. If you need help or a new code, please contact your [facility] or  call [phone] for assistance.        Care EveryWhere ID     This is your Care EveryWhere ID. This could be used by other organizations to access your [facility] medical records  [id number]         Blood Pressure from Last 3 Encounters:   01/29/18 119/66   01/09/18 120/68   12/18/17 104/71    Weight from Last 3 Encounters:   02/01/18 142 lb (64.4 kg)   01/29/18 140 lb 3.2 oz (63.6 kg)   01/03/18 132 lb 7.9 oz (60.1 kg)              Today, you had the following     No orders found for display       Primary Care Provider Office Phone # Fax #    [facility] [phone]       [address] [place] [address]        Equal Access to Services     MUNIRA MADDOX : Hadii aad ku hadasho Soomaali, waaxda luqadaha, qaybta kaalmada adeegyada, keagan lu. So [facility] [phone].    ATENCIÓN: Si habla español, tiene a grimm disposición servicios gratuitos de asistencia lingüística. Case al [phone].    We comply with applicable federal civil rights laws and [place] laws. We do not discriminate on the basis of race, color, national origin, age, disability, sex, sexual orientation, or gender identity.            Thank you!     Thank you for choosing [facility]  for your care. Our goal is always to provide you with excellent care. Hearing back from our patients is one way we can continue to improve our services. Please take a few minutes to complete the written survey that you may receive in the mail after your visit with us. Thank you!             Your Updated Medication List - Protect others around you: Learn how to safely use, store and throw away your medicines at www.disposemymeds.org.          This list is accurate as of 2/8/18  2:57 PM.  Always use your most recent med list.                   Brand Name Dispense Instructions for use Diagnosis    ALPRAZOLAM PO      Take 0.5 mg by mouth daily        ARIPiprazole 10 MG tablet    ABILIFY    30 tablet     Take 1 tablet (10 mg) by mouth daily    Schizoaffective disorder, bipolar type (H)       cholecalciferol 1000 UNIT tablet    vitamin D3     Take 1,000 Units by mouth        lithium 450 MG CR tablet    ESKALITH    60 tablet    Take 2 tablets (900 mg) by mouth At Bedtime    Schizoaffective disorder, bipolar type (H)

## 2018-02-09 NOTE — PROGRESS NOTES
LILLIANA SEE Progress Note   For Supported Employment & Education    LILLIANA Enrollee: Jerel Day (1995)     MRN: [id number]  Date:  2/9/2018  Clinician: LILLIANA Supported Employment & , Ana Michel    SEE met with Jerel Day and his parents to introduce services and explain what that SEE will conduct orientation and begin completing the Career and Education Inventory. Also explained that SEE will work with person to create goals and work with them to achieve these goals.     Orientation appointment set for next Tuesday at noon at [facility].    Ana Michel, Supported Employment and , LILLIANA

## 2018-02-13 ENCOUNTER — OFFICE VISIT (OUTPATIENT)
Dept: PSYCHIATRY | Facility: CLINIC | Age: 23
End: 2018-02-13
Payer: COMMERCIAL

## 2018-02-13 DIAGNOSIS — F25.0 SCHIZOAFFECTIVE DISORDER, BIPOLAR TYPE (H): Primary | ICD-10-CM

## 2018-02-13 NOTE — PROGRESS NOTES
NAVIGATE SEE Progress Note   For Supported Employment & Education    NAVIGATE Enrollee: Jerel Day (1995)     MRN: [id number]  Date:  2/13/18  Clinician: NAVIGATE Supported Employment & , Ana Michel    1. Client Status Update:    Jerel Day is interested in employment Orientation to SEE services completed    2. People present:   SEE/Writer  Client: Jerel Day       3. Total number of persons who participated in contact: (do not count yourself/SEE) 1    4. Length of Actual Contact: 60 minutes   Traveled? Yes  Total Travel Time: 40 minutes    5. Location of contact:   Client's Home    6. Brief description of session, contact, or client status (include: strategies, interventions, client reaction to contact, next steps, etc)    This writer and Jerel met at his home in [place] to complete Orientation to SEE Services and begin the CEI. Jerel has been playing instruments, listening to music and playing video games and reports that his strengths are analytical and abstract thinking, talking to others, sports (hockey and baseball) and outdoorsy-type activities like hiking and camping. Jerel reports that he has significant neck and hip pain that started when first had psychosis. He stretches, takes xanax and sometimes does PT to cope with his feelings of being uncomfortable.     Jerel is interested in getting a better daytime routine, reducing his feelings of paranoia, getting a job through practicing interview skills, brushing up his resume and 'sorting out his schooling'. Jerel would like to visit friends on the [facility] and visit [place], though he would need to drive to [place] and he isn't sure if he could or should go. Jerel is not happy with his current living situation and would like to make new friends, try new activities and find roommates to live with. Jerel understands this takes time and says he has trouble meeting new people. He is open to participating in activities with his  "SEE.    Jerel reports associating with his diagnosis of schizoaffective disorder, bipolar type, but has significantly improved since taking his medications (8/10). He says he still feels paranoid and can hear voices that 'confuse him by alternating dimensions'. He reports that he isn't sure that IRT will help him but is willing to try it, though he \"has a ton of appts and I've been tired'. Jerel reports sleeping 10-12 hours per night but sometimes he doesn't need to sleep at all.     Jerel showed this writer the area where he spends most of his time in his basement. There are several pinball games, LOTR posters, many pillows and video games and a [employer] plant set up where he is growing herbs and house plants.   Jerel said he may be in [place] next week so he denied another scheduled appt until he knows more about his plans.     7. Completion of mutually agreed upon client task from previous meeting:   Not Applicable    8. Orientation and Treatment Planning:   SEE orientation meeting    9. Assessment:   Gathering SEE information/inventory regarding work and/or education history, skills, goals, and preferences with client    10. Placement:   Not Applicable    11. Follow Along Supports: (for clients who are working or attending school)    Not Applicable    12. Mutually agreed upon client task for next meeting:     na    13. Next Meeting Scheduled for: nika TIJEIRNA Supported Employment &   "

## 2018-02-13 NOTE — PROGRESS NOTES
NAVIGATE Clinician Contact & Progress Note  For Individual Resiliency Training (IRT)  A Part of the [facility] First Episode of Psychosis Program    NAVIGATE Enrollee: Jerel Day (1995)     MRN: [id number]  Date:  2/08/18  Diagnosis: Schizoaffective disorder, bipolar type (F25.0)  Clinician: LILLIANA Individual Resiliency Trainer, EDELMIRA Reynolsd     1. Type of contact: (majority of time spent)  IRT Session    2. People present:   Client  NAVIGATE IRT/writer    3. Total number of persons who participated in contact: 2, including writer    4. Length of Actual Contact: Start Time: 1:00; End Time: 2:00   Traveled?  No    5. Location of contact:  Psychiatry Clinic, [facility]    6. Did the client complete the home practice option(s) from the previous session: No    7. Motivational Teaching Strategies:  Connect info and skills with personal goals  Promote hope and positive expectations  Explore pros and cons of change  Re-frame experiences in positive light    8. Educational Teaching Strategies:  Review of written material/education  Relate information to client's experience  Ask questions to check comprehension  Break down information into small chunks  Adopt client's language    9. CBT Teaching Strategies:  Reinforcement and shaping (positive feedback for steps towards goals, gains in knowledge & skills, follow-through on home assignments)    Coping skills training (review current coping skills, increase currently used skills, model new skill, role play new skill, feedback, plan home practice)    10. IRT Module(s) Addressed:  Module 2 - Assessment/Initial Goal Setting    11. Techniques utilized:   [place] announced at beginning of session  Review of homework  Review of goal  Review of previous meeting  Present new material  Problem-solving practice  Help client choose a home practice option    12. Mental Status Exam:    Alertness: alert  and oriented  Appearance: casually groomed  Behavior/Demeanor: guarded, with  good  eye contact   Speech: normal and regular rate and rhythm  Language: intact. Preferred language identified as English.  Psychomotor: normal or unremarkable  Mood: depressed and agitated  Affect: guarded and indifferent; was congruent to mood; was congruent to content  Thought Process/Associations: perseverative  Thought Content:  Reports delusions;  Denies suicidal and violent ideation  Perception:  Reports auditory hallucinations and visual hallucinations;  Denies none  Insight: good  Judgment: fair  Cognition: does  appear grossly intact; formal cognitive testing was not done  Suicidal ideation: reports SI, denies intent,  and denies plan  Homicidal Ideation: denies    13. Assessment/Progress Note:     This writer met with Jerel for a follow-up IRT Session. Jerel reported experiencing brain fog, drowsiness, low motivation, low energy, dry mouth, and some internal restlessness. He said he needs a Xanax refill and will be talking to his outside prescriber about this. He noted a decrease in frequency and intensity of his auditory hallucination (whisper). He has not had thought broadcasting. He stated the improvement can be attributed to having his neck in the right place currently. He noted he is at a 2 out of 10 in suicidal ideation and has not had any command hallucinations, intent, or plans. He mentioned he has been drinking about twice a week, but less of an amount than when he was in college. He has smoked marijuana once throughout this week, about .2 grams. This is his reported typical amount per week. He has experienced an increase in paranoid thinking and anxiety when using marijuana and drinking in the past. This writer and Jerel discussed his IRT goals. His long-term goal includes getting a job. He wrote down the steps needed to obtain this, which are: searching for job opportunities, completing a resume, filling out 3 applications (total), practicing interview questions with Ana, buying interview  "clothes, and attending an interview. He stated he feels he needs to move out of his parent's house prior to getting a job. He indicated to locate an apartment, he will: reach out to people on a Facebook subleasing group, research apartment buildings, visit and view apartments, and then finalize lease and paperwork. He noted his parents are funding his apartment, so he does not need to be concerned about budgeting. Another goal of his is to find new relationships (girlfriend and friends). He is unsure how to work towards this goal, but agreed to talk through social skills in IRT. His main focus currently is getting permission to drive from his parents. He said his parents want him to be \"stable,\" but he is not sure what would fit into this category. He agreed to join a family session to talk over their suggestions prior to getting access to a car. He also is agreeable to continue talking through stressors in IRT. Following this session, Jerel joined his family and Shai Londono. Please see Shai Londono's note dated 2/8/18 for more information.     14. Plan/Referrals:     This writer will continue to identify goals and steps to achieve them.    This writer will continue with Education About Psychosis.     Billing for \"Interactive Complexity\"?  No    EDELMIRA Reynolds Individual Resiliency Trainer    Attestation:    I did not see this patient directly. This patient is discussed with me in individual clinical social work supervision, and I agree with the plan as documented.     AGA Alexandre, SHIRA, February 13, 2018    Attestation:    I did not see this patient directly. This patient is discussed with the NAVIGATE Team Director, AGA Narvaez, SHIRA, and myself in individual clinical social work supervision, and I agree with the plan as documented.     AGA Huerta, SHIRA, 7/6/18  "

## 2018-02-13 NOTE — PROGRESS NOTES
NAVIGATE Clinician Contact & Progress Note   For Family Education Program    NAVIGATE Enrollee: Jerel Day (1995)     MRN: [id number]  Date:  2/08/18  Diagnosis(es): Schizoaffective disorder, bipolar type  Clinician: NAVIGATE Director & Family Clinician, Shai Londono [facility]     1. Type of contact: (majority of time spent)   Family Session, Family/Other Support Contact    2. People present:   Writer  Client: Yes - (client present for 35 minutes of 95 minute session)   Significant Other/Family/Friend:  Mother and Father    3. Total number of persons who participated in contact: 4, including writer    4. Length of Actual Contact: Start Time: 1pm; End Time: 235pm   Traveled?    No     5. Location of contact:  Psychiatry Clinic, [place]    6. Did the client complete the home practice option(s) from the previous session:   Not Applicable    7. Motivational Teaching Strategies:   Connect info and skills with personal goals, Promote hope and positive expectations, Re-frame experiences in positive light    8. Educational Teaching Strategies:   Review of written material/education, Relate information to client's experience, Ask questions to check comprehension, Break down information into small chunks, Adopt client's language     9. CBT Teaching Strategies:   Reinforcement and shaping  gains in knowledge & skills follow-through on home assignments    10. Psychoeducational Topic(s) Addressed:  Family Education Orientation & Tip Sheet    11. Techniques utilized:   [place] announced at beginning of session  Review of previous meeting  Present new material  Help family choose a home practice option  Summarize progress made in current session  Review of each team member's role and summarization of progress made  Review of diagnosis, symptoms to substantiate the diagnosis, and affected level of functioning  Review of safety risks  (ie SI and HI) and characteristics and interventions to mitigate risk  Review of  "medications  Review of goals and associated strengths, barriers, objectives, and interventions  Review of frequency of appointments and anticipated length of treatment  Illicit client/family feedback    12. Assessment/Progress Note:   Reviewed NAVIGATE treatment components and associated team members. Provided an introduction to the NAVIGATE Family Program. Family  was open to meeting weekly for family therapy appts.  Dialogued about safety. Identified [place] SI/HI safety risk as High due to first episode, past SI/HI. Discussed safety plan to include utilization of crisis hotlines (eg Crisis Text Line ([place] \"[place]\" [place] [address] or call 6-360-401-IKMX, 1-670.954.6117)), calling 9-1-1, and visiting the nearest ED should there be concerns for Jerel's safety or the safety of others. Discussed the \"Tip Sheet for Helping People in NAVIGATE\" and identified tips that seemed relevant to family's situation, particularly:    - Keeping expectations minimal, but don't let them all go  - Encourage but do not nag. Choose your battles  - Help your relative keep to as close to a normal routine as possible  - Don't argue with a relative over worrisome thoughts  - Continue to do enjoyable activities together    Offered hope for recovery. Praised family for their involvement and seeking services. Informed them of evidence suggesting recovery rates tend to be higher with family involvement. Emphasized the importance of self care.     Met for additional 35 minutes to discuss safety and Jerel's use of family vehicles. This issue by family and Jerel's report has been distressing and led to Jerel threatening to damage things in the home due to not being able to drive.  Expressed concerns around symptoms; impulsivity, disorganization, memory, focus, concentration, internal stimuli. Jerel subtly acknowledged, though continued to express strong desire to use car.  Jerel's insight and judgment on these issues are poor, and family negotiated use of vehicle " to be within a 5 mile radius-Giving him access to health club, gas station, grocery store, hockey rink, and several other stores. Jerel reluctantly accepted this compromise-Writer indicated team would like to complete more comprehensive evaluation of symptoms to assess for safety around driving in next medication management appointment with Brittani Cage (Navigate Prescriber).     Home practice identified as:  Read Carlos Manuel's Story.     Overall family was engaged in conversation. They did express interest in continuing to meet for family therapy and psychoeducation. As of today's appt their insight into Jerel's mental illness appears fair. They seem they would benefit from continued clinical intervention aimed at assisting them implement helpful strategies at home and increase their understanding of psychosis.    13. Plan/Referrals:     Will meet with family weekly as schedule allows for evidence based family psychoeducation and therapeutic support aimed at maximizing Jerel's opportunity for recovery from psychosis.     Shai Londono, [facility]   NAVIGATE Director & Family Clinician

## 2018-02-13 NOTE — MR AVS SNAPSHOT
After Visit Summary   2/13/2018    Jerel Day    MRN: [id number]           Patient Information     Date Of Birth          1995        Visit Information        Provider Department      2/13/2018 12:00 PM Ana Michel [facility] Psychiat[facility]        Today's Diagnoses     Schizoaffective disorder, bipolar type (H)    -  1       Follow-ups after your visit        Your next 10 appointments already scheduled     Feb 14, 2018  1:00 PM CST   Navigate Family Therapy with Shai Londono [facility] ([facility])    [address] [place] [address]   [phone]            Feb 14, 2018  1:00 PM CST   Navigate Psychotherapy with Anahi Summers [facility] ([facility])    [address] [place] [address]   [phone]            Feb 14, 2018  2:45 PM CST   Navigate Medication Follow Up with JAVY Bhatt [facility] ([facility])    [address] [place] [address]   [phone]            Feb 26, 2018  3:00 PM CST   Navigate Psychotherapy with Anahi Summers [facility] ([facility])    [address] [place] [address]   [phone]            Feb 26, 2018  3:00 PM CST   Navigate Family Therapy with Shai Londono [facility] ([facility])    [address] [place] [address]   [phone]            Mar 05, 2018  3:00 PM CST   Navigate Psychotherapy with Anahi Summers [facility] ([facility])    [address] [place] [address]   [phone]            Mar 05, 2018  3:00 PM CST   Navigate Family Therapy with Shai Londono [facility] ([facility])    [address] [place] [address]   [phone]            Mar 14, 2018  5:00 PM CDT   Navigate Psychotherapy with Anahi Summers  [facility] ([facility])    [address]  [facility] [address] [phone]            Mar 14, 2018  5:00 PM CDT   Navigate Family Therapy with Shai Londono, [facility] ([facility])    [address]  [facility] [address]   [phone]              Who to contact     Please call your clinic at [phone] to:    Ask questions about your health    Make or cancel appointments    Discuss your medicines    Learn about your test results    Speak to your doctor            Additional Information About Your Visit        MyChart Information     National Recovery Serviceshart is an electronic gateway that provides easy, online access to your medical records. With [employer], you can request a clinic appointment, read your test results, renew a prescription or communicate with your care team.     To sign up for Recogniat visit the website at [email or web address]   You will be asked to enter the access code listed below, as well as some personal information. Please follow the directions to create your username and password.     Your access code is: [email or web address]  Expires: 2018  6:30 AM     Your access code will  in 90 days. If you need help or a new code, please contact your [facility] or call [phone] for assistance.        Care EveryWhere ID     This is your Care EveryWhere ID. This could be used by other organizations to access your [place] medical records  [id number]         Blood Pressure from Last 3 Encounters:   18 119/66   18 120/68   17 104/71    Weight from Last 3 Encounters:   18 64.4 kg (142 lb)   18 63.6 kg (140 lb 3.2 oz)   18 60.1 kg (132 lb 7.9 oz)              Today, you had the following     No orders found for display       Primary Care Provider Office Phone # Fax #    [facility] [phone]       [address] [place] [address]  101  [facility] [address]        Equal Access to Services     [facility] : Hadii zev fuentes flavio Diez, wazhouda lucrisslatonyaha, qamandata salvadordelilahkeagan maravillaonreensantiago lu. So [facility] [phone].    ATENCIÓN: Si habla español, tiene a grimm disposición servicios gratuitos de asistencia lingüística. Julioame al [phone].    We comply with applicable federal civil rights laws and [place] laws. We do not discriminate on the basis of race, color, national origin, age, disability, sex, sexual orientation, or gender identity.            Thank you!     Thank you for choosing [facility]  for your care. Our goal is always to provide you with excellent care. Hearing back from our patients is one way we can continue to improve our services. Please take a few minutes to complete the written survey that you may receive in the mail after your visit with us. Thank you!             Your Updated Medication List - Protect others around you: Learn how to safely use, store and throw away your medicines at www.disposemymeds.org.          This list is accurate as of 2/13/18  4:05 PM.  Always use your most recent med list.                   Brand Name Dispense Instructions for use Diagnosis    ALPRAZOLAM PO      Take 0.5 mg by mouth daily        ARIPiprazole 10 MG tablet    ABILIFY    30 tablet    Take 1 tablet (10 mg) by mouth daily    Schizoaffective disorder, bipolar type (H)       cholecalciferol 1000 UNIT tablet    vitamin D3     Take 1,000 Units by mouth        lithium 450 MG CR tablet    ESKALITH    60 tablet    Take 2 tablets (900 mg) by mouth At Bedtime    Schizoaffective disorder, bipolar type (H)

## 2018-02-14 ENCOUNTER — OFFICE VISIT (OUTPATIENT)
Dept: PSYCHIATRY | Facility: CLINIC | Age: 23
End: 2018-02-14
Payer: COMMERCIAL

## 2018-02-14 VITALS
DIASTOLIC BLOOD PRESSURE: 58 MMHG | WEIGHT: 147.8 LBS | SYSTOLIC BLOOD PRESSURE: 114 MMHG | HEIGHT: 68 IN | HEART RATE: 58 BPM | BODY MASS INDEX: 22.4 KG/M2 | TEMPERATURE: 99 F

## 2018-02-14 DIAGNOSIS — F25.0 SCHIZOAFFECTIVE DISORDER, BIPOLAR TYPE (H): Primary | ICD-10-CM

## 2018-02-14 ASSESSMENT — PAIN SCALES - GENERAL: PAINLEVEL: NO PAIN (1)

## 2018-02-14 NOTE — MR AVS SNAPSHOT
After Visit Summary   2/14/2018    Jerel Day    MRN: [id number]           Patient Information     Date Of Birth          1995        Visit Information        Provider Department      2/14/2018 1:00 [facility]         Follow-ups after your visit        Your next 10 appointments already scheduled     Feb 20, 2018  1:00 PM CST   Navigate Family Therapy with Shai Londono [facility] ([facility])    [address] [place] [address]   [phone]            Feb 20, 2018  1:00 PM CST   Navigate Psychotherapy with Anahi Summers [facility] ([facility])    [address] [place] [address]   [phone]            Feb 26, 2018  3:00 PM CST   Navigate Psychotherapy with Anahi Summers [facility] ([facility])    [address] [place] [address]   [phone]            Feb 26, 2018  3:00 PM CST   Navigate Family Therapy with Shai Londono [facility] ([facility])    [address] [place] [address]   [phone]            Mar 05, 2018  3:00 PM CST   Navigate Psychotherapy with Anahi Summers [facility] ([facility])    [address] [place] [address]   [phone]            Mar 05, 2018  3:00 PM CST   Navigate Family Therapy with Shai Londono [facility] ([facility]    [address] [place] [address]   [phone]            Mar 14, 2018  5:00 PM CDT   Navigate Psychotherapy with Anahi Summers [facility] ([facility])    [address] [place] [address]   [phone]            Mar 14, 2018  5:00 PM CDT   Navigate Family Therapy with Shai Londono [facility] ([facility])    [address]  [facility] [address] [phone]              Who to contact     Please call your clinic at [phone] to:    Ask questions about your health    Make or cancel appointments    Discuss your medicines    Learn about your test results    Speak to your doctor            Additional Information About Your Visit        MyChart Information     B[employer] Cycle[employer]st is an electronic gateway that provides easy, online access to your medical records. With StackAdapt, you can request a clinic appointment, read your test results, renew a prescription or communicate with your care team.     To sign up for StackAdapt visit the website at [email or web address]   You will be asked to enter the access code listed below, as well as some personal information. Please follow the directions to create your username and password.     Your access code is: [email or web address]  Expires: 2018  6:30 AM     Your access code will  in 90 days. If you need help or a new code, please contact your [facility] or call [phone] for assistance.        Care EveryWhere ID     This is your Care EveryWhere ID. This could be used by other organizations to access your [place] medical records  [id number]         Blood Pressure from Last 3 Encounters:   18 114/58   18 119/66   18 120/68    Weight from Last 3 Encounters:   18 147 lb 12.8 oz (67 kg)   18 142 lb (64.4 kg)   18 140 lb 3.2 oz (63.6 kg)              Today, you had the following     No orders found for display       Primary Care Provider Office Phone # Fax #    [facility] [phone]       [facility] [address] [address]        Equal Access to Services     MUNIRA MADDOX AH: Hadii zev Diez, wazhouda luqadaha, qaybta kaalmada adeegyada, keagan lu. So [facility] [phone].    ATENCIÓN: Si habla español, tiene a grimm disposición servicios gratuitos de  asistencia lingüística. Case al [phone].    We comply with applicable federal civil rights laws and [place] laws. We do not discriminate on the basis of race, color, national origin, age, disability, sex, sexual orientation, or gender identity.            Thank you!     Thank you for choosing [facility]  for your care. Our goal is always to provide you with excellent care. Hearing back from our patients is one way we can continue to improve our services. Please take a few minutes to complete the written survey that you may receive in the mail after your visit with us. Thank you!             Your Updated Medication List - Protect others around you: Learn how to safely use, store and throw away your medicines at www.disposemymeds.org.          This list is accurate as of 2/14/18  3:37 PM.  Always use your most recent med list.                   Brand Name Dispense Instructions for use Diagnosis    ALPRAZOLAM PO      Take 0.5 mg by mouth daily        ARIPiprazole 10 MG tablet    ABILIFY    30 tablet    Take 1 tablet (10 mg) by mouth daily    Schizoaffective disorder, bipolar type (H)       cholecalciferol 1000 UNIT tablet    vitamin D3     Take 1,000 Units by mouth        lithium 450 MG CR tablet    ESKALITH    60 tablet    Take 2 tablets (900 mg) by mouth At Bedtime    Schizoaffective disorder, bipolar type (H)

## 2018-02-14 NOTE — MR AVS SNAPSHOT
After Visit Summary   2/14/2018    Jerel Day    MRN: [id number]           Patient Information     Date Of Birth          1995        Visit Information        Provider Department      2/14/2018 1:00 PM Anahi Summers [facility]         Follow-ups after your visit        Your next 10 appointments already scheduled     Feb 20, 2018  1:00 PM CST   Navigate Family Therapy with Shai Londono [facility] ([facility])    [address] [place] [address]   [phone]            Feb 20, 2018  1:00 PM CST   Navigate Psychotherapy with Anahi Summers [facility] ([facility])    [address] [place] [address]   [phone]            Feb 26, 2018  3:00 PM CST   Navigate Psychotherapy with Anahi Summers [facility] ([facility])    [address] [place] [address]   [phone]            Feb 26, 2018  3:00 PM CST   Navigate Family Therapy with Shai Londono [facility] ([facility])    [address] [place] [address]   [phone]            Mar 05, 2018  3:00 PM CST   Navigate Psychotherapy with Anahi Summers [facility] ([facility])    [address] [place] [address]   [phone]            Mar 05, 2018  3:00 PM CST   Navigate Family Therapy with Shai Londono [facility]    [address] [place] [address]   [phone]            Mar 14, 2018  5:00 PM CDT   Navigate Psychotherapy with Anahi Summers [facility] ([facility])    [address] [place] [address]   [phone]            Mar 14, 2018  5:00 PM CDT   Navigate Family Therapy with Shai Londono [facility]    [address]  [facility] [address] [phone]              Who to contact     Please call your clinic at [phone] to:    Ask questions about your health    Make or cancel appointments    Discuss your medicines    Learn about your test results    Speak to your doctor            Additional Information About Your Visit        MyChart Information     DDVTECHt is an electronic gateway that provides easy, online access to your medical records. With FamilyApp, you can request a clinic appointment, read your test results, renew a prescription or communicate with your care team.     To sign up for FamilyApp visit the website at [email or web address]   You will be asked to enter the access code listed below, as well as some personal information. Please follow the directions to create your username and password.     Your access code is: [email or web address]  Expires: 2018  6:30 AM     Your access code will  in 90 days. If you need help or a new code, please contact your [facility] or call [phone] for assistance.        Care EveryWhere ID     This is your Care EveryWhere ID. This could be used by other organizations to access your [place] medical records  [id number]         Blood Pressure from Last 3 Encounters:   18 114/58   18 119/66   18 120/68    Weight from Last 3 Encounters:   18 147 lb 12.8 oz (67 kg)   18 142 lb (64.4 kg)   18 140 lb 3.2 oz (63.6 kg)              Today, you had the following     No orders found for display       Primary Care Provider Office Phone # Fax #    [facility] [phone]       [employer] [address] [address]        Equal Access to Services     MUNIRA MADDOX AH: Hadii zev Diez, wazhouda luqadaha, qaybta kaalmada adeegyada, keagan lu. So [facility] [phone].    ATENCIÓN: Si habla español, tiene a grimm disposición servicios gratuitos de  asistencia lingüística. Case al [phone].    We comply with applicable federal civil rights laws and [place] laws. We do not discriminate on the basis of race, color, national origin, age, disability, sex, sexual orientation, or gender identity.            Thank you!     Thank you for choosing [facility] PSYCHIAT[facility]  for your care. Our goal is always to provide you with excellent care. Hearing back from our patients is one way we can continue to improve our services. Please take a few minutes to complete the written survey that you may receive in the mail after your visit with us. Thank you!             Your Updated Medication List - Protect others around you: Learn how to safely use, store and throw away your medicines at www.disposemymeds.org.          This list is accurate as of 2/14/18  3:37 PM.  Always use your most recent med list.                   Brand Name Dispense Instructions for use Diagnosis    ALPRAZOLAM PO      Take 0.5 mg by mouth daily        ARIPiprazole 10 MG tablet    ABILIFY    30 tablet    Take 1 tablet (10 mg) by mouth daily    Schizoaffective disorder, bipolar type (H)       cholecalciferol 1000 UNIT tablet    vitamin D3     Take 1,000 Units by mouth        lithium 450 MG CR tablet    ESKALITH    60 tablet    Take 2 tablets (900 mg) by mouth At Bedtime    Schizoaffective disorder, bipolar type (H)

## 2018-02-14 NOTE — MR AVS SNAPSHOT
After Visit Summary   2/14/2018    Jerel Day    MRN: [id number]           Patient Information     Date Of Birth          1995        Visit Information        Provider Department      2/14/2018 2:45 PM Brittani Cage APRN CNP [facility] Psychiatry         Follow-ups after your visit        Your next 10 appointments already scheduled     Feb 20, 2018  1:00 PM CST   Navigate Family Therapy with Shai Londono [facility] ([facility])    [address] [place] [address]   [phone]            Feb 20, 2018  1:00 PM CST   Navigate Psychotherapy with Anahi Summers [facility] ([facility])    [address] [place] [address]   [phone]            Feb 26, 2018  3:00 PM CST   Navigate Psychotherapy with Anahi Summers [facility] ([facility])    [address]  [facility] [address]   [phone]            Feb 26, 2018  3:00 PM CST   Navigate Family Therapy with Shai Londono [facility] ([facility])    [address]  [facility] [address]   [phone]            Mar 05, 2018  2:15 PM CST   Navigate Medication Follow Up with JAVY Bhatt [facility] Psychiatry ([facility])    [address] [place] [address]   [phone]            Mar 05, 2018  3:00 PM CST   Navigate Psychotherapy with Anahi Summers [facility] ([facility])    [address]  [facility] [address]   [phone]            Mar 05, 2018  3:00 PM CST   Navigate Family Therapy with Shai Londono [facility] ([facility])    [address] [place] [address]   [phone]            Mar 14, 2018  5:00 PM CDT   Navigate Psychotherapy with Anahi Summers [facility]    [address]  [facility] [address] [phone]            Mar 14, 2018  5:00 PM CDT   Navigate Family Therapy with [facility] ([facility])    [address]  [facility] [address] [phone]              Who to contact     Please call your clinic at [phone] to:    Ask questions about your health    Make or cancel appointments    Discuss your medicines    Learn about your test results    Speak to your doctor            Additional Information About Your Visit        Angstrohart Information     Medialive is an electronic gateway that provides easy, online access to your medical records. With Medialive, you can request a clinic appointment, read your test results, renew a prescription or communicate with your care team.     To sign up for Medialive visit the website at [email or web address]   You will be asked to enter the access code listed below, as well as some personal information. Please follow the directions to create your username and password.     Your access code is: [email or web address]  Expires: 2018  6:30 AM     Your access code will  in 90 days. If you need help or a new code, please contact your [facility] or call [phone] for assistance.        Care EveryWhere ID     This is your Care EveryWhere ID. This could be used by other organizations to access your [place] medical records  [id number]        Your Vitals Were     Pulse Temperature Height BMI (Body Mass Index)          58 99  F (37.2  C) (Temporal) 5' 8.11\" (173 cm) 22.4 kg/m2         Blood Pressure from Last 3 Encounters:   18 114/58   18 119/66   18 120/68    Weight from Last 3 Encounters:   18 147 lb 12.8 oz (67 kg)   18 142 lb (64.4 kg)   18 140 lb 3.2 oz (63.6 kg)              Today, you had the following     No orders found for display       Primary Care Provider Office Phone # Fax #    [facility] [phone]       [address] [place] [address]        Equal Access to Services     MUNIRA MADDOX : Hadii aad ku hadgustavo Diez, wazhouda luqana maria, qamandata kadelilahda tracey, keagan olguin laDavidsusan lu. So [facility] [phone].    ATENCIÓN: Si habla español, tiene a grimm disposición servicios gratuitos de asistencia lingüística. Llame al [phone].    We comply with applicable federal civil rights laws and [place] laws. We do not discriminate on the basis of race, color, national origin, age, disability, sex, sexual orientation, or gender identity.            Thank you!     Thank you for choosing [facility] PS[facility]AT[facility]  for your care. Our goal is always to provide you with excellent care. Hearing back from our patients is one way we can continue to improve our services. Please take a few minutes to complete the written survey that you may receive in the mail after your visit with us. Thank you!             Your Updated Medication List - Protect others around you: Learn how to safely use, store and throw away your medicines at www.disposemymeds.org.          This list is accurate as of 2/14/18  4:02 PM.  Always use your most recent med list.                   Brand Name Dispense Instructions for use Diagnosis    ALPRAZOLAM PO      Take 0.5 mg by mouth daily        ARIPiprazole 10 MG tablet    ABILIFY    30 tablet    Take 1 tablet (10 mg) by mouth daily    Schizoaffective disorder, bipolar type (H)       cholecalciferol 1000 UNIT tablet    vitamin D3     Take 1,000 Units by mouth        lithium 450 MG CR tablet    ESKALITH    60 tablet    Take 2 tablets (900 mg) by mouth At Bedtime    Schizoaffective disorder, bipolar type (H)
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
## 2018-02-15 ENCOUNTER — TELEPHONE (OUTPATIENT)
Dept: PSYCHIATRY | Facility: CLINIC | Age: 23
End: 2018-02-15

## 2018-02-15 NOTE — PROGRESS NOTES
NAVIGATE Clinician Contact & Progress Note  For Individual Resiliency Training (IRT)  A Part of the [facility] First Episode of Psychosis Program    NAVIGATE Enrollee: Jerel Day (1995)     MRN: [id number]  Date:  2/14/18  Diagnosis: Schizoaffective disorder, bipolar type (F25.0)  Clinician: LILLIANA Individual Resiliency Trainer, EDELMIRA Reynolds     1. Type of contact: (majority of time spent)  IRT Session    2. People present:   Client  NAVIGATE IRT/writer    3. Total number of persons who participated in contact: 2, including writer    4. Length of Actual Contact: Start Time: 1:00; End Time: 2:00   Traveled?  No    5. Location of contact:  Psychiatry Clinic, [facility]    6. Did the client complete the home practice option(s) from the previous session: NA     7. Motivational Teaching Strategies:  Connect info and skills with personal goals  Promote hope and positive expectations  Explore pros and cons of change  Re-frame experiences in positive light    8. Educational Teaching Strategies:  Review of written material/education  Relate information to client's experience  Ask questions to check comprehension  Break down information into small chunks  Adopt client's language    9. CBT Teaching Strategies:  Reinforcement and shaping (positive feedback for steps towards goals, gains in knowledge & skills, follow-through on home assignments)    Relapse prevention planning (review of stressors, early warning signs, written plan to respond to signs, rehearse plan)    Cognitive restructuring (identify thoughts related to negative feelings, examine the evidence, change thought or form action plan)    10. IRT Module(s) Addressed:  Module 2 - Assessment/Initial Goal Setting  Module 3 -- Education About Psychosis    11. Techniques utilized:   Creighton announced at beginning of session  Review of homework  Review of goal  Review of previous meeting  Present new material  Problem-solving practice  Summarize progress made in  "current session    12. Mental Status Exam:    Alertness: alert  and oriented  Appearance: casually groomed  Behavior/Demeanor: cooperative, pleasant and calm, with good  eye contact   Speech: normal and regular rate and rhythm  Language: intact. Preferred language identified as English.  Psychomotor: fidgety  Mood: anxious  Affect: flat; was not congruent to mood; was congruent to content  Thought Process/Associations: perseverative, loose associations and neologisms/ word salad  Thought Content:  Reports delusions and paranoid ideation;  Denies suicidal and violent ideation  Perception:  Reports auditory hallucinations;  Denies none  Insight: fair  Judgment: fair  Cognition: does  appear grossly intact; formal cognitive testing was not done  Suicidal ideation: denies SI, denies intent,  and denies plan  Homicidal Ideation: denies    13. Assessment/Progress Note:     This writer met with Jerel for a follow-up IRT Session. Jerel mentioned he has had an increase in auditory hallucinations. He has been hearing the voices of women he's had a romantic interest in previously. He did not want to share what the voices were saying to him, and noted he felt embarrassed about it. Jerel stated due to these hallucinations, he has been perseverating on how to obtain a girlfriend. He said he is unsure how to navigate the computer/electronic-based communication style prevalent today. He would like to learn what the social \"norms\" are outside of the Internet. Jerel denied any suicidal thoughts and reported a decrease in paranoia. He said this improvement is due to yoga and the \"right kind of marijuana.\" He shared marijuana can sometimes cause him to have more anxiety or paranoia, but he has to test it out to find if it will have adverse effects or not. He asked this writer if he could be referred for a spinal evaluation. He then asked if these thoughts are more tied to physical misalignment or psychosis. He currently believes both " "psychosis and scoliosis have an equal part in causing symptoms. Jerel noted he used to use CBD oil and Kratom to feel relaxed, but is not currently able to afford these. He said Kratom was a sedative that diffused his metabolic energy to places outside of his spine. Jerel stated he is not happy with his current life situation. He feels he is not being productive and wants to get started with a job as soon as possible. He no longer feels as though returning to school is an option for him. He shared talking about his \"problems\" is helpful, but he wants to accomplish something tangible. Jerel said he wants to visit his friend in [place], in order to obtain Vitamin D. When discussing this friend, Jerel switched between describing the friend as male or female several times. He ultimately said he is male and a childhood friend. Jerel feels as though he's broken his promise to this friend, as he said he would visit in January. He is frustrated that his parents won't allow him to utilize the car to drive there. He said if he continues to feel frustrated, he plans to take out a loan to buy a car or fly to [place]. However, after discussing in more detail, he seemed contemplative about this. Jerel mentioned he has been participating in [employer] hockey regularly in [place]. He feels this may be an opportunity to meet friends or a girlfriend. He is nervous [place] doesn't have people his age to connect with. Jerel reported music as a strong coping skill of his. However, lately when playing music, he has had increased paranoia about the neighbors overhearing it. He then described negative self-talk, such as, \"They think I'm not very good at it.\"     14. Plan/Referrals:     This writer will continue to assess for symptoms and coping mechanisms.    This writer will provide psycho-education and cognitive restructuring, aimed at promoting a positive recovery.     Billing for \"Interactive Complexity\"?  No    Anahi Summers ARIANNE    NAVIGATE " Individual Resiliency Trainer      Attestation:    I did not see this patient directly. This patient is discussed with me in individual clinical social work supervision, and I agree with the plan as documented.     AGA Alexandre, SHIRA, February 19, 2018    Attestation:    I did not see this patient directly. This patient is discussed with the NAVIGATE Team Director, AGA Narvaez, SHIRA, and myself in individual clinical social work supervision, and I agree with the plan as documented.     AGA Huerta, SHIRA, 7/6/18

## 2018-02-15 NOTE — TELEPHONE ENCOUNTER
Medical Certification form completed, signed by NP and faxed to number provided on 2/15/18. Original mailed back to patient and copy sent to be scanned into EPIC as well.    Ana María Mcdonough CMA

## 2018-02-16 ENCOUNTER — TELEPHONE (OUTPATIENT)
Dept: PSYCHIATRY | Facility: CLINIC | Age: 23
End: 2018-02-16

## 2018-02-16 ASSESSMENT — PATIENT HEALTH QUESTIONNAIRE - PHQ9: SUM OF ALL RESPONSES TO PHQ QUESTIONS 1-9: 15

## 2018-02-16 NOTE — TELEPHONE ENCOUNTER
"-pt's father Raf called back to discuss pt's ability to drive.   -Writer went over some of Brittani Cage's APRN CNP assessment findings in last clinic visit in relation to pt's driving.   -Terrelljennifer states that one of the main reasons they are concerned about his driving is pt's wish to take family car and drive down to [place] to see a friend.  They are mainly worried as this would be at least a 24 hour drive, causing concern for pt's safety while driving that long.   -Domer agreed that they are also worried about pt using substances before driving, or not taking medications.    -Raf also reported that they are aware that pt has not been taking his medications on a regular basis, writer enquired as to how many doses he misses in a week.  Raf states that his wife would know more exactly, but he knows that he misses his medications a couple of times a week, but also does not take them at regular times.  Raf states that pt seems to view his medications as an option and therefore takes them \"optionally.\"      -Raf states they have tried to suggest that pt talks more about driving with Navigate Team, but when he brings this up pt often threatens to not attend Navigate appointments.    -Raf stated he knows that Navigate team is still in the building rapport phase, but wants to continue to discuss this issue in future Navigate appointments.   -Raf states that he is unsure how to talk to his son about some of their concerns, writer suggested that in next appointment with Shai Londono, they discuss communication strategies.  -writer also let Raf know that this information will passed onto other members of Navigate team.      "

## 2018-02-16 NOTE — TELEPHONE ENCOUNTER
-Writer called pt's father to discuss pt being able to drive.   -Received voicemail; left message to return call to clinic.

## 2018-02-20 ENCOUNTER — OFFICE VISIT (OUTPATIENT)
Dept: PSYCHIATRY | Facility: CLINIC | Age: 23
End: 2018-02-20
Payer: COMMERCIAL

## 2018-02-20 DIAGNOSIS — F25.0 SCHIZOAFFECTIVE DISORDER, BIPOLAR TYPE (H): Primary | ICD-10-CM

## 2018-02-20 NOTE — MR AVS SNAPSHOT
After Visit Summary   2/20/2018    Jerel Day    MRN: [id number]           Patient Information     Date Of Birth          1995        Visit Information        Provider Department      [facility]2018 [facility]         Follow-ups after your visit        Your next 10 appointments already scheduled     Feb 26, 2018  3:00 PM CST   Navigate Psychotherapy with Anahi Summers [facility] Psychiatry ([facility])    [address] [place] [address]   [phone]            Feb 26, 2018  3:00 PM CST   Navigate Family Therapy with Shai Londono [facility] Psychiatry ([facility])    [address] [place] [address]   [phone]            Mar 05, 2018  2:15 PM CST   Navigate Medication Follow Up with JAVY Bhatt [facility] Psychiatry ([facility])    [address] [place] [address]   [phone]            Mar 05, 2018  3:00 PM CST   Navigate Psychotherapy with Anahi Summers [facility] Psychiatry ([facility])    [address] [place] [address]   [phone]            Mar 05, 2018  3:00 PM CST   Navigate Family Therapy with Shai Londono [facility] Psychiatry ([facility])    [address] [place] [address]   [phone]            Mar 08, 2018 10:00 AM CST   XR VIDEO SPEECH EVALUATION WITH ESOPHAGRAM with UCXR2, UC GIGU [facility] Imaging Center Xray ([facility])    [address] [place] [address] [phone]           Please bring a list of your current medicines to your exam. (Include vitamins, minerals and over-the-counter medicines.) Leave your valuables at home.  Tell the doctor if there is a chance you could be pregnant.  Do not eat for 4 hours before the exam. Keep drinking clear liquids until 2 hours before  the exam.  You may take pain medicine (with a sip of water) up to 4 hours before the exam.  Do not swallow any other medicines unless your doctor tells you to. Talk to your doctor to be sure it s safe to stop your medicines.  Please call the Imaging Department at your exam site with any questions.            Mar 08, 2018 10:00 AM CST   Video Swallow with BOBBI Garcia   [facility] Rehab ([facility] [place])    [address]  4th Floor  [facility] [id number] [phone]           Please check in for this visit in Imaging on the 1st floor.            Mar 14, 2018  5:00 PM CDT   Navigate Psychotherapy with Anahi Summers [facility] Psychiatry ([email or web address]    [address] [place] [id number] [phone]            Mar 14, 2018  5:00 PM CDT   Navigate Family Therapy with Shai Londono [facility] Psychiatry ([facility])    [address] [place] [phone]              Who to contact     Please call your clinic at [phone] to:    Ask questions about your health    Make or cancel appointments    Discuss your medicines    Learn about your test results    Speak to your doctor            Additional Information About Your Visit        MyChart Information     ZeroTurnaroundt is an electronic gateway that provides easy, online access to your medical records. With MyDocTime, you can request a clinic appointment, read your test results, renew a prescription or communicate with your care team.     To sign up for ZeroTurnaroundt visit the website at [email or web address]   You will be asked to enter the access code listed below, as well as some personal information. Please follow the directions to create your username and password.     Your access code is: [email or web address]  Expires: 2018  6:30 AM     Your access code will  in 90 days. If you need help or a new code, please contact your [facility]  [place] [facility] or call [phone] for assistance.        Care EveryWhere ID     This is your Care EveryWhere ID. This could be used by other organizations to access your [place] medical records  [id number]         Blood Pressure from Last 3 Encounters:   02/14/18 114/58   01/29/18 119/66   01/09/18 120/68    Weight from Last 3 Encounters:   02/14/18 147 lb 12.8 oz (67 kg)   02/01/18 142 lb (64.4 kg)   01/29/18 140 lb 3.2 oz (63.6 kg)              Today, you had the following     No orders found for display       Primary Care Provider Office Phone # Fax #    [facility] [phone]       [address] [place] [address]        Equal Access to Services     MUNIRA MADDOX : Hadii zev fuentes hadasho Soomaali, waaxda luqadaha, qaybta kaalmada adeegyada, keagan lu. So [facility] [phone].    ATENCIÓN: Si habla español, tiene a grimm disposición servicios gratuitos de asistencia lingüística. Case al [phone].    We comply with applicable federal civil rights laws and [place] laws. We do not discriminate on the basis of race, color, national origin, age, disability, sex, sexual orientation, or gender identity.            Thank you!     Thank you for choosing [facility] PSYCHIATRY  for your care. Our goal is always to provide you with excellent care. Hearing back from our patients is one way we can continue to improve our services. Please take a few minutes to complete the written survey that you may receive in the mail after your visit with us. Thank you!             Your Updated Medication List - Protect others around you: Learn how to safely use, store and throw away your medicines at www.disposemymeds.org.          This list is accurate as of 2/20/18  3:02 PM.  Always use your most recent med list.                   Brand Name Dispense Instructions for use Diagnosis    ALPRAZOLAM PO      Take 0.5 mg by mouth daily        ARIPiprazole 10 MG  tablet    ABILIFY    30 tablet    Take 1 tablet (10 mg) by mouth daily    Schizoaffective disorder, bipolar type (H)       cholecalciferol 1000 UNIT tablet    vitamin D3     Take 1,000 Units by mouth        lithium 450 MG CR tablet    ESKALITH    60 tablet    Take 2 tablets (900 mg) by mouth At Bedtime    Schizoaffective disorder, bipolar type (H)

## 2018-02-20 NOTE — MR AVS SNAPSHOT
After Visit Summary   2/20/2018    Jerel Day    MRN: [id number]           Patient Information     Date Of Birth          1995        Visit Information        Provider Department      2/20/2018 12:00 PM Anahi Summers [facility] Psychiatry         Follow-ups after your visit        Your next 10 appointments already scheduled     Feb 26, 2018  3:00 PM CST   Navigate Psychotherapy with Anahi Summers [facility] Psychiatry ([facility])    [address] [place] [address]   [phone]            Feb 26, 2018  3:00 PM CST   Navigate Family Therapy with Shai Londono [facility] Psychiatry ([facility])    [address] [place] [address]   [phone]            Mar 05, 2018  2:15 PM CST   Navigate Medication Follow Up with JAVY Bhatt [facility] Psychiatry ([facility])    [address] [place] [address]   [phone]            Mar 05, 2018  3:00 PM CST   Navigate Psychotherapy with Anahi Summers [facility] Psychiatry ([facility])    [address]  [facility] [address]   [phone]            Mar 05, 2018  3:00 PM CST   Navigate Family Therapy with Shai Londono [facility] Psychiatry ([facility])    [address] [place] [address]   [phone]            Mar 08, 2018 10:00 AM CST   XR VIDEO SPEECH EVALUATION WITH ESOPHAGRAM with UCXR2, UC GIGU [facility] Imaging Center Xray ([facility])    [address] [place] [address] [phone]           Please bring a list of your current medicines to your exam. (Include vitamins, minerals and over-the-counter medicines.) Leave your valuables at home.  Tell the doctor if there is a chance you could be pregnant.  Do not eat for 4 hours before the exam. Keep drinking clear liquids until 2 hours before the  exam.  You may take pain medicine (with a sip of water) up to 4 hours before the exam.  Do not swallow any other medicines unless your doctor tells you to. Talk to your doctor to be sure it s safe to stop your medicines.  Please call the Imaging Department at your exam site with any questions.            Mar 08, 2018 10:00 AM CST   Video Swallow with BOBBI Garcia   [facility] Rehab ([facility] [place])    [address]  4th Floor  [facility] [id number] [phone]           Please check in for this visit in Imaging on the 1st floor.            Mar 14, 2018  5:00 PM CDT   Navigate Psychotherapy with Anahi Summers [facility] Psychiatry ([facility])    [address] [place] [id number] [phone]            Mar 14, 2018  5:00 PM CDT   Navigate Family Therapy with Shai Londono [facility] Psychiatry ([facility])    [address] [place] [phone]              Who to contact     Please call your clinic at [phone] to:    Ask questions about your health    Make or cancel appointments    Discuss your medicines    Learn about your test results    Speak to your doctor            Additional Information About Your Visit        MyChart Information     [employer] is an electronic gateway that provides easy, online access to your medical records. With [employer], you can request a clinic appointment, read your test results, renew a prescription or communicate with your care team.     To sign up for [employer] visit the website at [email or web address]   You will be asked to enter the access code listed below, as well as some personal information. Please follow the directions to create your username and password.     Your access code is: [email or web address]  Expires: 2018  6:30 AM     Your access code will  in 90 days. If you need help or a new code, please contact your [facility] or call [phone] for assistance.        Care EveryWhere ID     This is your Care EveryWhere ID. This could be used by other organizations to access your [place] medical records  [id number]         Blood Pressure from Last 3 Encounters:   02/14/18 114/58   01/29/18 119/66   01/09/18 120/68    Weight from Last 3 Encounters:   02/14/18 147 lb 12.8 oz (67 kg)   02/01/18 142 lb (64.4 kg)   01/29/18 140 lb 3.2 oz (63.6 kg)              Today, you had the following     No orders found for display       Primary Care Provider Office Phone # Fax #    [facility] [phone]       [address] [place] [address]        Equal Access to Services     MUNIRA MADDOX : Hadii aad ku hadasho Soomaali, waaxda luqadaha, qaybta kaalmada adegaurangyada, keagan lu. So [facility] [phone].    ATENCIÓN: Si habla español, tiene a grimm disposición servicios gratuitos de asistencia lingüística. Case al [phone].    We comply with applicable federal civil rights laws and [place] laws. We do not discriminate on the basis of race, color, national origin, age, disability, sex, sexual orientation, or gender identity.            Thank you!     Thank you for choosing [facility] PSYCHIATRY  for your care. Our goal is always to provide you with excellent care. Hearing back from our patients is one way we can continue to improve our services. Please take a few minutes to complete the written survey that you may receive in the mail after your visit with us. Thank you!             Your Updated Medication List - Protect others around you: Learn how to safely use, store and throw away your medicines at www.disposemymeds.org.          This list is accurate as of 2/20/18  3:02 PM.  Always use your most recent med list.                   Brand Name Dispense Instructions for use Diagnosis    ALPRAZOLAM PO      Take 0.5 mg by mouth daily        ARIPiprazole 10 MG tablet     ABILIFY    30 tablet    Take 1 tablet (10 mg) by mouth daily    Schizoaffective disorder, bipolar type (H)       cholecalciferol 1000 UNIT tablet    vitamin D3     Take 1,000 Units by mouth        lithium 450 MG CR tablet    ESKALITH    60 tablet    Take 2 tablets (900 mg) by mouth At Bedtime    Schizoaffective disorder, bipolar type (H)

## 2018-02-22 ASSESSMENT — ANXIETY QUESTIONNAIRES
2. NOT BEING ABLE TO STOP OR CONTROL WORRYING: SEVERAL DAYS
6. BECOMING EASILY ANNOYED OR IRRITABLE: MORE THAN HALF THE DAYS
1. FEELING NERVOUS, ANXIOUS, OR ON EDGE: MORE THAN HALF THE DAYS
7. FEELING AFRAID AS IF SOMETHING AWFUL MIGHT HAPPEN: MORE THAN HALF THE DAYS
5. BEING SO RESTLESS THAT IT IS HARD TO SIT STILL: SEVERAL DAYS
3. WORRYING TOO MUCH ABOUT DIFFERENT THINGS: SEVERAL DAYS
IF YOU CHECKED OFF ANY PROBLEMS ON THIS QUESTIONNAIRE, HOW DIFFICULT HAVE THESE PROBLEMS MADE IT FOR YOU TO DO YOUR WORK, TAKE CARE OF THINGS AT HOME, OR GET ALONG WITH OTHER PEOPLE: SOMEWHAT DIFFICULT
GAD7 TOTAL SCORE: 11

## 2018-02-22 ASSESSMENT — PATIENT HEALTH QUESTIONNAIRE - PHQ9: 5. POOR APPETITE OR OVEREATING: MORE THAN HALF THE DAYS

## 2018-02-22 NOTE — PROGRESS NOTES
NAVIGATE Clinician Contact & Progress Note   For Family Education Program    NAVIGATE Enrollee: Jerel Day (1995)     MRN: [id number]  Date:  2/20/18  Diagnosis(es):   Schizoaffective disorder, bipolar type  Clinician: NAVIGATE Director & Family Clinician, Shai Londono [facility]     1. Type of contact: (majority of time spent)   Family Session    2. People present:   Writer  Client: No  Significant Other/Family/Friend:  Mother and Father    3. Total number of persons who participated in contact: 3, including writer    4. Length of Actual Contact: Start Time: 12pm; End Time: 1pm   Traveled?    No     5. Location of contact:  Psychiatry Clinic, [place]    6. Did the client complete the home practice option(s) from the previous session:   Completed    7. Motivational Teaching Strategies:   Connect info and skills with personal goals, Promote hope and positive expectations, Re-frame experiences in positive light    8. Educational Teaching Strategies:   Review of written material/education, Relate information to client's experience, Ask questions to check comprehension, Break down information into small chunks    9. CBT Teaching Strategies:   Reinforcement and shaping  positive feedback for steps towards goals gains in knowledge & skills follow-through on home assignments    10. Psychoeducational Topic(s) Addressed:  Carlos Manuel's Story    11. Techniques utilized:   [place] announced at beginning of session  Review of homework  Review of goal  Review of previous meeting  Present new material  Problem-solving practice  Help family choose a home practice option  Summarize progress made in current session  Review of safety risks  (ie SI and HI) and characteristics and interventions to mitigate risk  Review of medications  Review of goals and associated strengths, barriers, objectives, and interventions  Illicit client/family feedback    12. Assessment/Progress Note:     Reviewed & discussed Carlos Manuel's Story,  expectations/hoped for outcomes of treatment, and reviewed past week.  Family reported Jerel as becoming more agitated and restless with being at home, and marginal medication adherence. Family reports Jerel does not like to be reminded to take his medications but they notice he forgets or misses doses if they do not provide such reminders. Family reported no new symptoms, and indicated his communication and engagement in activities has improved. However, organization in thought and planning remains challenging along with excessive perseveration and fixation on ways to gain more independence and freedom such as taking trips out of state. Jerel feels as though his freedoms are being restricted too much, by his parents.      In discussing Carlos Manuel's story, parents verbalize realistic hopes for recovery, while indicating they just want Jerel to feel happy and healthy in his life.     Overall family was engaged in conversation. They did express interest in continuing to meet for family therapy and psychoeducation. As of today's appt their insight into Jerel's mental illness appears adequate. They seem they would benefit from continued clinical intervention aimed at assisting them implement helpful strategies at home and increase their understanding of psychosis.    13. Plan/Referrals:     Will meet with family weekly as schedule allows for evidence based family psychoeducation and therapeutic support aimed at maximizing Jerel's opportunity for recovery from psychosis.         Shai Londono, [facility]   NAVIGATE Director & Family Clinician

## 2018-02-22 NOTE — PROGRESS NOTES
NAVIGATE Clinician Contact & Progress Note  For Individual Resiliency Training (IRT)  A Part of the [facility] First Episode of Psychosis Program    NAVIGATE Enrollee: Jerel Day (1995)     MRN: [id number]  Date:  2/20/18  Diagnosis: Schizoaffective disorder, bipolar type (F25.0)  Clinician: LILLIANA Individual Resiliency Trainer, EDELMIRA Reynolds     1. Type of contact: (majority of time spent)  IRT Session    2. People present:   Client  NAVIGATE IRT/writer    3. Total number of persons who participated in contact: 2, including writer    4. Length of Actual Contact: Start Time: 12:00; End Time: 1:00   Traveled?  No    5. Location of contact:  Psychiatry Clinic, [facility]    6. Did the client complete the home practice option(s) from the previous session: NA     7. Motivational Teaching Strategies:  Connect info and skills with personal goals  Promote hope and positive expectations  Explore pros and cons of change  Re-frame experiences in positive light    8. Educational Teaching Strategies:  Review of written material/education  Relate information to client's experience  Ask questions to check comprehension  Break down information into small chunks  Adopt client's language    9. CBT Teaching Strategies:  Reinforcement and shaping (positive feedback for steps towards goals, gains in knowledge & skills, follow-through on home assignments)    Coping skills training (review current coping skills, increase currently used skills, model new skill, role play new skill, feedback, plan home practice)    10. IRT Module(s) Addressed:  Module 2 - Assessment/Initial Goal Setting    11. Techniques utilized:   [place] announced at beginning of session  Review of previous meeting  Present new material  Problem-solving practice  Help client choose a home practice option  Summarize progress made in current session    12. Mental Status Exam:    Alertness: alert  and oriented  Appearance: casually groomed  Behavior/Demeanor:  "passive, with fair  eye contact   Speech: normal and regular rate and rhythm  Language: intact. Preferred language identified as English.  Psychomotor: fidgety  Mood: anxious  Affect: flat; was not congruent to mood; was not congruent to content  Thought Process/Associations: perseverative  Thought Content:  Reports delusions and paranoid ideation;  Denies suicidal and violent ideation  Perception:  Reports auditory hallucinations and visual hallucinations;  Denies none  Insight: fair  Judgment: fair  Cognition: does  appear grossly intact; formal cognitive testing was not done  Suicidal ideation: denies SI, denies intent,  and denies plan  Homicidal Ideation: denies    13. Assessment/Progress Note:     This writer met with Jerel for a follow-up IRT Session. Jerel stated he has had a change in symptoms over the past week. He is experiencing visual hallucinations of a face similar to his own. He noted this face typically says things like, \"I'm going to hack your information.\" He said he knows they are hacking his personality features/looks and turning it into a movie. He has experienced ideas of reference when watching several movies, feeling as though they are about him. He mentioned the voices have been more negative this past week, targeting his self-esteem. He indicated an increase in paranoia in public spaces and feels he needs to hide regularly. He is noticing an increase in \"dullness\" and is not enjoying music as much as previously. He indicated he would like to stop taking his medications because they are creating a dull feeling and are \"not benefiting\" him. He agreed to continue taking them because his parents enforce the need for medication. He is open to talking with Brittani Cage (prescriber) more about the changes he's noticed. Jerel stated he is planning to go to [place] to visit his friend tomorrow. He is hoping to buy a bus ticket later today. He shared frustration that his parents wanted to discuss this " "more with the NAVIGATE Team before giving approval. Jerel is hoping to test his ability to problem solve by visiting [place]. He feels he cannot do so in [place]. Jerel was observed to have rigid thinking and this writer utilized reality testing and thought challenging throughout. This writer and Jerel problem solved ways to cope with symptoms if they increase (whether in [place] or [place]).     14. Plan/Referrals:     This writer will continue to provide evidence-based psycho-education and coping skills, aimed at improving recovery for Jerel.    This writer will continue to consult with the team.    Billing for \"Interactive Complexity\"?  No    EDELMIRA Reynolds Individual Resiliency Trainer      Attestation:    I did not see this patient directly. This patient is discussed with me in individual clinical social work supervision, and I agree with the plan as documented.     AGA Alexandre, SHIRA, February 22, 2018    Attestation:    I did not see this patient directly. This patient is discussed with the NAVIGATE Team Director, AGA Narvaez, SHIRA, and myself in individual clinical social work supervision, and I agree with the plan as documented.     AGA Huerta, SHIRA, 7/6/18  "

## 2018-02-23 ASSESSMENT — ANXIETY QUESTIONNAIRES: GAD7 TOTAL SCORE: 11

## 2018-02-23 ASSESSMENT — PATIENT HEALTH QUESTIONNAIRE - PHQ9: SUM OF ALL RESPONSES TO PHQ QUESTIONS 1-9: 14

## 2018-02-26 ENCOUNTER — TELEPHONE (OUTPATIENT)
Dept: PSYCHIATRY | Facility: CLINIC | Age: 23
End: 2018-02-26

## 2018-02-26 DIAGNOSIS — F25.0 SCHIZOAFFECTIVE DISORDER, BIPOLAR TYPE (H): Primary | ICD-10-CM

## 2018-02-26 NOTE — TELEPHONE ENCOUNTER
NAVIGATE SEE Outgoing Telephone Call  For Supported Employment & Education    NAVIGATE Enrollee: Jerel Day (1995)     MRN: [id number]  Date of Call: 2/26/2018  Contacted: Jerel    Discussed:   Texted Jerel to schedule appt to complete CEI. Jerel responded and said he is in [place] and will call when he returns.    Ana Michel

## 2018-02-27 ENCOUNTER — TELEPHONE (OUTPATIENT)
Dept: PSYCHIATRY | Facility: CLINIC | Age: 23
End: 2018-02-27

## 2018-02-27 NOTE — TELEPHONE ENCOUNTER
-Writer called pt's father to ask if family and pt wanted appointments this week, as they are not on the schedule.   -Received voicemail; left message to return call to clinic.

## 2018-02-28 NOTE — TELEPHONE ENCOUNTER
-Writer received a phone call from Samanta pt's mother, she informed writer that Jerel was out of town and that they prefer to wait until next week for appointments.  She thanked writer for checking in.

## 2018-03-05 ENCOUNTER — TELEPHONE (OUTPATIENT)
Dept: PSYCHIATRY | Facility: CLINIC | Age: 23
End: 2018-03-05

## 2018-03-05 ENCOUNTER — OFFICE VISIT (OUTPATIENT)
Dept: PSYCHIATRY | Facility: CLINIC | Age: 23
End: 2018-03-05
Payer: COMMERCIAL

## 2018-03-05 VITALS
SYSTOLIC BLOOD PRESSURE: 115 MMHG | WEIGHT: 146.4 LBS | BODY MASS INDEX: 21.68 KG/M2 | DIASTOLIC BLOOD PRESSURE: 65 MMHG | HEART RATE: 67 BPM | TEMPERATURE: 98.4 F | HEIGHT: 69 IN | RESPIRATION RATE: 16 BRPM

## 2018-03-05 DIAGNOSIS — F25.0 SCHIZOAFFECTIVE DISORDER, BIPOLAR TYPE (H): Primary | ICD-10-CM

## 2018-03-05 RX ORDER — LITHIUM CARBONATE 450 MG
900 TABLET, EXTENDED RELEASE ORAL AT BEDTIME
Qty: 60 TABLET | Refills: 1 | Status: SHIPPED | OUTPATIENT
Start: 2018-03-05 | End: 2018-05-01

## 2018-03-05 ASSESSMENT — PAIN SCALES - GENERAL: PAINLEVEL: MILD PAIN (3)

## 2018-03-05 NOTE — Clinical Note
Jerel is willing to start Maintena. Order was sent to Frank, could you plan to schedule administration with him next week at [facility]? Let me know if there needs to be any additional documentation to support the PA.  Also- he has not had Li levels drawn. Are we able to do that here or would he need to go to his primary?

## 2018-03-05 NOTE — MR AVS SNAPSHOT
After Visit Summary   3/5/2018    Jerel Day    MRN: [id number]           Patient Information     Date Of Birth          1995        Visit Information        Provider Department      3/5/2018 3:00 PM Anahi Summers [facility]         Follow-ups after your visit        Your next 10 appointments already scheduled     Mar 08, 2018 10:00 AM CST   XR VIDEO SPEECH EVALUATION WITH ESOPHAGRAM with UCXR2, UC GIGU RAD   [facility] ([facility])    [address] [place] [address]  1st Floor  [facility] [id number] [phone]           Please bring a list of your current medicines to your exam. (Include vitamins, minerals and over-the-counter medicines.) Leave your valuables at home.  Tell the doctor if there is a chance you could be pregnant.  Do not eat for 4 hours before the exam. Keep drinking clear liquids until 2 hours before the exam.  You may take pain medicine (with a sip of water) up to 4 hours before the exam.  Do not swallow any other medicines unless your doctor tells you to. Talk to your doctor to be sure it s safe to stop your medicines.  Please call the Imaging Department at your exam site with any questions.            Mar 08, 2018 10:00 AM CST   Video Swallow with BOBBI Garcia   [facility] ([facility])    [address]  4th Floor  [facility] [id number] [phone]           Please check in for this visit in Imaging on the 1st floor.            Mar 14, 2018  5:00 PM CDT   Navigate Psychotherapy with Anahi Summers [facility] ([facility])    [address]  [facility] [address]   [phone]            Mar 14, 2018  5:00 PM CDT   Navigate Family Therapy with Shai Londono [facility] ([facility])    [address]  [facility] [address] [phone]            Mar 19, 2018  3:00 PM CDT   Navigate Family  Therapy with Shai Londono, [facility] ([facility])    [address]  [facility] [address]   [phone]            Mar 19, 2018  3:00 PM CDT   Navigate Psychotherapy with Anahi Kristopher [facility] ([facility])    [address]  [facility] [address]   [phone]            Mar 28, 2018  4:00 PM CDT   Navigate Family Therapy with Shai Londono [facility] ([facility])    [address]  [facility] [address]   [phone]            Mar 28, 2018  4:00 PM CDT   Navigate Psychotherapy with Anahi Kristopher [facility] ([facility])    [address]  [facility] [address]   [phone]            2018  5:00 PM CDT   Navigate Family Therapy with Shai Londono [facility])    [address]  [facility] [address]   [phone]              Who to contact     Please call your clinic at [phone] to:    Ask questions about your health    Make or cancel appointments    Discuss your medicines    Learn about your test results    Speak to your doctor            Additional Information About Your Visit        MyChart Information     [employer] is an electronic gateway that provides easy, online access to your medical records. With [facility], you can request a clinic appointment, read your test results, renew a prescription or communicate with your care team.     To sign up for [employer] visit the website at [email or web address]   You will be asked to enter the access code listed below, as well as some personal information. Please follow the directions to create your username and password.     Your access code is: [email or web address]  Expires: 2018  6:30 AM     Your access code will  in 90 days. If you need help or a new code, please contact your [facility]  Clinic or call [phone] for assistance.        Care EveryWhere ID     This is your Care EveryWhere ID. This could be used by other organizations to access your [facility] medical records  [id number]         Blood Pressure from Last 3 Encounters:   03/05/18 115/65   02/14/18 114/58   01/29/18 119/66    Weight from Last 3 Encounters:   03/05/18 146 lb 6.4 oz (66.4 kg)   02/14/18 147 lb 12.8 oz (67 kg)   02/01/18 142 lb (64.4 kg)              Today, you had the following     No orders found for display       Primary Care Provider Office Phone # Fax #    [facility] [phone]       [address] [place] [address]        Equal Access to Services     MUNIRA MADDOX : Hadii aad ku hadasho Soomaali, waaxda luqadaha, qaybta kaalmada adegaurangyada, keagan lu. So [facility] [phone].    ATENCIÓN: Si habla español, tiene a grmim disposición servicios gratuitos de asistencia lingüística. Case al [phone].    We comply with applicable federal civil rights laws and [place] laws. We do not discriminate on the basis of race, color, national origin, age, disability, sex, sexual orientation, or gender identity.            Thank you!     Thank you for choosing [facility]  for your care. Our goal is always to provide you with excellent care. Hearing back from our patients is one way we can continue to improve our services. Please take a few minutes to complete the written survey that you may receive in the mail after your visit with us. Thank you!             Your Updated Medication List - Protect others around you: Learn how to safely use, store and throw away your medicines at www.disposemymeds.org.          This list is accurate as of 3/5/18  3:26 PM.  Always use your most recent med list.                   Brand Name Dispense Instructions for use Diagnosis    ALPRAZOLAM PO      Take 0.5 mg by mouth daily        ARIPiprazole 10 MG tablet    ABILIFY    30  tablet    Take 1 tablet (10 mg) by mouth daily    Schizoaffective disorder, bipolar type (H)       cholecalciferol 1000 UNIT tablet    vitamin D3     Take 1,000 Units by mouth        lithium 450 MG CR tablet    ESKALITH    60 tablet    Take 2 tablets (900 mg) by mouth At Bedtime    Schizoaffective disorder, bipolar type (H)

## 2018-03-05 NOTE — PROGRESS NOTES
"NAVIGATE Medication Management Progress Note  A Part of the [facility] First Episode of Psychosis Program    NAVIGATE Enrollee: Jerel Day (1995)     MRN: [id number]  Date:  3/05/18         Contributors to the Assessment     Chart Reviewed.   Interview completed with Jerel Day.  Collateral information obtained from Jerel's parents         Chief Complaint       \"I am feeling loopy today\"           Interim History      Jerel Day is a 22 year old male who was last seen in clinic on 2/14/18 at which time he lithium 900mg and  aripiprazole 10mg were continued. The patient reports poor treatment adherence even with assistance from his mother.  History was provided by Jerel who was a fair historian.  Since the last visit:  - Feeling \"loopy\" today. He has been sleeping on and off all day. Feeling really depressed. Increased AH- command and threatening. Feels like the messages are ominous. This has been going on since he was home. Weather, boredom seem to be contributing factors.   - Went to [place] for 6 days to see a friend at his college; wishes he would have done more but was glad to get home. He smoked cannabis while at his friends, has not smoked since he was home. He didn't take meds while he was out of town. Resumed meds when he returned home on March 1st.   - He is noticing that his peers are moving past him.   - He only has doctor appts scheduled in the upcoming week  - Feeling like he was \"hacked\" since his phone's recent operating system update.   - Still fixated on back pain, feeling like his muscles aren't aligning.  - Denies ASE with meds, \"I don't think much about it\"  - Appetite has been \"OK\", some foods are not really appealing   - Fleeting SI  - Suspicious: someone I hacking his phone, feeling like someone might be out to get him or attack him or kidnap him. Sometimes people he knows. Thoughts that people might read his mind.   - Feels like he has been clenching his teeth- some soreness, some headache. " "  AIMS today- 0  - Reports daily anxiety, often related to karyn and not knowing what to prioritize in his life.   - Paranoia r/t neighbors listening to him. If he is \"confident\" this doesn't bother him as much.   - Sleeping \"more than usual\", around 10-12. His usual is 7-8 hours/night, but he is still feeling fatigued during the day. The past couple of days he has been   - Feeling annoyed with his parents and his sister. Wants to consider moving out again, maybe to a warmer climate.  -  He is constantly worried that he has to be determining what other people are thinking and their intent toward him.  - VH/bright lights, he is wearing sunglasses indoors sometimes to help cope with discomfort from this s/e.  - He has been granted privileges to drive within a 5 mile radius. He says he hasn't had the interest to do this in the past week, he has been sleeping all day.  - He denies drinking since [place].      Collateral information from parents is that Jerel has been taking his medications sporadically since being home as well. They are noticing an increase in sx and are concerned. Jerel agreed to start Abilify Maintena to reduce pill burden, at least for a trial period to see how well he can tolerate the injection.     DEPRESSION:  reports-suicidal ideation, (passive), depressed mood, insomnia and poor concentration /memory;  DENIES- anhedonia, low energy, hypersomnia and feeling worthless  KELLI/HYPOMANIA:  reports-distractibility  and racing thoughts;  DENIES- excessive spending, decreased sleep need, increased activity and grandiosity  PSYCHOSIS:  reports-delusions, auditory hallucinations, disorganized behavior and disorganized speech (difficulty communicating);  DENIES- visual hallucinations  ANXIETY:  social anxiety and nervous/overwhelmed  SLEEP:  Sleep has increased, 9-10 hours per night  EATING DISORDER: none     RECENT SUBSTANCE USE:     ALCOHOL- Denies current dauly use. Previously- nearly daily EtOH use, 3-13 " shots of hard liquor + beer and wine           TOBACCO- 1/2 PPD             CAFFEINE- 1 cups/day of coffee  OPIOIDS- none       NARCAN KIT- N/A       CANNABIS- Denies current daily use          OTHER ILLICIT DRUGS- hallucinogens (previously)      CURRENT SOCIAL HISTORY:  FINANCIAL SUPPORT- family or friend       CHILDREN- none       LIVING SITUATION- Currently staying with his parent's [facility] in [place], [place]      SOCIAL/ SPIRITUAL SUPPORT- unknown       FEELS SAFE AT HOME- Yes      MEDICAL ROS:  Reports none      Denies akathisia, unusual movements and wt gain   10 point ROS neg other than the symptoms noted above in the HPI. Patient does report multiple physical concern including back, neck, hip and foot pain, concerns about metabolism. These complaints have been evaluated by sports medicine and primary care with unremarkable findings. PT exercises have been recommended.      Of note, history is positive for multiple sports injuries (former ) including concussion x2 with LOC once; ankle and hip injuries; scoliosis. His disorganized communication is likely interfering with an adequate health assessment.         First Episode of Psychosis History      DUP (duration untreated psychosis):  Likely first experienced auditory hallucinations during the spring of 2016, possibly in the context of cannabis and LSD use. Did not seek treatment until behavior and presentation became significantly disorganized in January 2017. Medication adherence has been poor over the course of this last year.   Route to initial care: ED for disorganized behavior, somatic concerns   Medication adherence overall:  Fair to poor  General frequency of visits:  Recommend weekly to biweekly  Participation in groups:  none  Cognitive Remediation:  none  Other treatment history: See pertinent background      Reviewed for completion of First Episode work-up:  Yes  First episode workup:  Not Done (if completed, see LABS for results)  MATRICS  Consensus Cognitive Battery:  Not Done (if completed, see LABS for results)         Medical/Surgical History     Penicillins    Patient Active Problem List   Diagnosis     Schizoaffective disorder (H)            Medications     Current Outpatient Prescriptions   Medication Sig Dispense Refill     cholecalciferol (VITAMIN D3) 1000 UNIT tablet Take 1,000 Units by mouth       ALPRAZOLAM PO Take 0.5 mg by mouth daily       ARIPiprazole (ABILIFY) 10 MG tablet Take 1 tablet (10 mg) by mouth daily 30 tablet 1     lithium (ESKALITH) 450 MG CR tablet Take 2 tablets (900 mg) by mouth At Bedtime 60 tablet 1     Previous medication trials:  Zoloft- stopped because of ASE (?)  fluoxetine  Risperidone 1 mg, reported akathisia at 3mg dose  Haloperidol 5mg bid, reported dystonia relieved with benadryl   Divalproex 750mg  Quetiapine 300mg- discontinued 1/2/18  Olanzapine 10mg- discontinued 1/29/18, EPS?  Alprazolam 0.5-1mg PRN          Vitals     There were no vitals taken for this visit.      Weight prior to medication: 130s         Mental Status Exam     Alertness: alert and oriented  Appearance: casually groomed   Behavior/Demeanor: cooperative, with good eye contact   Speech: spontaneous and unremarkable   Language: intact  Psychomotor: fidgety  Mood: anxious  Affect: full range; was congruent to mood; was congruent to content  Thought Process/Associations: more organized, still containaing loose associations and neologisms/ word salad  Thought Content:  Reports suicidal and violent ideation (without intent or plan), delusions, preoccupations, over-valued ideas and paranoid ideation;  Denies obsessions , phobia  and magical thinking  Perception:  Reports auditory hallucinations;  Denies visual hallucinations  Insight: poor  Judgment: limited  Cognition: does  appear grossly intact; formal cognitive testing was not done         Labs and Data     RATING SCALES:  AIMS: done 3/5/18 with total score of 0    PHQ9 TODAY = 21  PHQ-9  "SCORE 2/15/2018 2/21/2018 2/22/2018   Total Score 15 17 14       ANTIPSYCHOTIC LABS ROUTINE    [glu, A1C, lipids (focus LDL), liver enzymes, WBC, ANEU, Hgb, plts]   q12 mo  Recent Labs   Lab Test  01/03/18   1054   GLC  73     Recent Labs   Lab Test  01/03/18   1054   CHOL  135   TRIG  93   LDL  70   HDL  46     Recent Labs   Lab Test  01/03/18   1054   AST  19   ALT  37   ALKPHOS  66     Recent Labs   Lab Test  01/03/18   1054   WBC  8.0   ANEU  5.5   HGB  13.9   PLT  241            Psychiatric Diagnoses     Schizoaffective disorder, bipolar type (F25.0)         Assessment     This patient is a 22 year old male who provides a history supporting the diagnoses listed directly above.  Further diagnostic clarification is not needed.  There are medical comorbidities which impact this treatment [suicidal ideation, psychosis [sxs include delusions, AH, disorganized behavior], multiple psychotropic trials, severe med reaction, psych hosp (<3) and SUBSTANCE USE: hallucinogens and cannabis].     DISCUSSION: Patient presents as more organized; disorganized communication consisting of word salad and loose associations are still observed but do not impede his ability for expressive and receptive communication. Patient thinks \"medications aren't doing much\", helping or hurting, so he is amenable to continue to take medications as prescribed. He does admit that he did not take meds while in [place], however reports that he resumed them after returning home. He reports ongoing annoyance and frustration with parents reminding him to take his medications every day. Denies substance use since returning home, endorses cannabis and EtOH use in [place]. Depression is worse today also. Patient was not able to make the connection between med adherence and improvment in mood and psychotic sx.   Collateral info from parents reveal that he has not been adherent with medications since his return. They think he has taken maybe 2 doses and they are " unsure of what or how he has been taking his medications. They are concerned because he is reporting and they are observing an increase in sx, similar to what they have observed in the past in the context of med noncompliance. Due to hx of multiple relapse, HO was discussed and Jerel was amenable to it.    SUICIDE RISK ASSESSMENT [details described above]:  Today Jerel Day reports passive suicidal thoughts.  In addition, he has notable risk factors for self-harm including hallucinations [command, persecutory ], substance use [alcohol, cannabis, hallucinogens], hx of stopping meds, recent loss and male.  However, risk is mitigated by no plan or intent, h/o seeking help when needed, good social support and stable housing.  Based on all available evidence he does not appear to be at imminent risk for self-harm therefore does not meet criteria for a 72-hr hold/  involuntary hospitalization.  However, based on degree of symptoms therapy and close psych FU was recommended which the pt did agree to.      [place] PRESCRIPTION MONITORING PROGRAM [] was not checked today:  last ALPRAZOLAM 0.5 MG TABLET  dispensed 12/01/2017    PSYCHOTROPIC DRUG INTERACTIONS:   No major interactions.  Concomitant use of aripiprazole and lithium may increase risk for EPS     MANAGEMENT:  Monitoring for adverse effects, routine vitals, routine labs, using lowest therapeutic dose of [olanzapine and lithium] and patient is aware of risks         Plan     1) PSYCHOTROPIC MEDICATIONS:  - Continue lithium 900mg at HS  - Continue aripiprazole 10mg oral medication  - Start Abilify Maintena 400mg 3/14/18   - Continue aripiprazole 10mg oral medication for 1 days after the injection    2) THERAPY:  Continue IRT. Encouraged to meet with Ana WEBER to help with organizing some decisions around housing and school.   Meet with RN Coordinator for IM on 3/14/18    3) NEXT DUE:    Labs- Results available in care everywhere, last Li level 0.88. Levels should  be checked again due to sporadic adherence.  Rating Scales- AIMS 9/2018 or sooner if needed    4) REFERRALS:    No Referrals needed    5) RTC: 2 weeks    6) CRISIS NUMBERS:   Provided routinely in AVS.    TREATMENT RISK STATEMENT:  The risks, benefits, alternatives and potential adverse effects have been discussed and are understood by the pt. The pt understands the risks of using street drugs or alcohol. There are no medical contraindications, the pt agrees to treatment with the ability to do so. The pt knows to call the clinic for any problems or to access emergency care if needed.  Medical and substance use concerns are documented above.  Psychotropic drug interaction check was done, including changes made today.      PROVIDER:  JAVY Bhatt [facility]      Psychiatry Clinic Individual Psychotherapy Note                                                                     [16]   Start time - 2:43pm       End time - 3:55  Date reviewed - 2/14/18       Date next due - 2/14/19    Subjective: This supportive psychotherapy session addressed issues related to family of origin and difficulty transitioning back home and health /somatic concerns that may be deusions (?).  Patient's reaction: Pre-contemplation in the context of mental status appropriate for ambulatory setting.  Progress: fair  Psychotherapy services during this visit included  myself and the patient.   Treatment Plan      SYMPTOMS; PROBLEMS   MEASURABLE GOALS;    FUNCTIONAL IMPROVEMENT INTERVENTIONS;   GAINS MADE DISCHARGE CRITERIA   Medications:  educate patient and reduce use obstacles   reduce frequency/ intensity of false beliefs monitoring of adherence marked symptom improvement   Psychosocial: health issues, limited social support and mental health symptoms   learn 2 new ways of coping with routine stressors increase coping skills marked symptom improvement

## 2018-03-05 NOTE — TELEPHONE ENCOUNTER
Brittani Cage, Brittani Mota CNP, RN                     Jerel is willing to start Maintena. Order was sent to Frank, could you plan to schedule administration with him next week at RTC?   Let me know if there needs to be any additional documentation to support the PA.

## 2018-03-05 NOTE — PATIENT INSTRUCTIONS
- Continue aripiprazole 10mg oral medication  - Start Abilify Maintena 400mg 3/14/18   - Continue aripiprazole 10mg oral medication for 1 days after the injection

## 2018-03-05 NOTE — TELEPHONE ENCOUNTER
-Received message from JAVY Bhatt CNP stating she is starting pt on Abilify Maintena.   -Sent referral paperwork to Frank to get PA initiated/get injection filled and delivered to clinic.

## 2018-03-05 NOTE — MR AVS SNAPSHOT
After Visit Summary   3/5/2018    Jeerl Day    MRN: [id number]           Patient Information     Date Of Birth          1995        Visit Information        Provider Department      3/5/2018 2:00 PM Shai Londono, [facility]        Today's Diagnoses     Schizoaffective disorder, bipolar type (H)    -  1       Follow-ups after your visit        Your next 10 appointments already scheduled     Mar 14, 2018  5:00 PM CDT   Navigate Psychotherapy with Anahi Summers [facility]iat[facility] ([facility])    [address]  [facility] [address]   [phone]            Mar 14, 2018  5:00 PM CDT   Navigate Family Therapy with Shai Londono [facility]iatry ([facility])    [address]  [facility] [address]   [phone]            Mar 19, 2018  2:15 PM CDT   Navigate Medication Follow Up with JAVY Bhatt [facility] Psychiatry ([facility])    [address]  [facility] [address]   [phone]            Mar 19, 2018  3:00 PM CDT   Navigate Family Therapy with Shai Londono [facility]iatry ([facility])    [address]  [facility] [address]   [phone]            Mar 19, 2018  3:00 PM CDT   Navigate Psychotherapy with Anahi Summers [facility] ([facility])    [address]  [facility] [address]   [phone]            Mar 28, 2018  4:00 PM CDT   Navigate Family Therapy with Shai Londono [facility] ([facility])    [address]  [facility] [address]   [phone]            Mar 28, 2018  4:00 PM CDT   Navigate Psychotherapy with Anahi Summers [facility] ([facility])    [address]  [facility] [address]   [phone]            Apr 04, 2018  5:00 PM CDT   Navigate Family Therapy with Shai GRANDE  Bry, [facility] Psychiatry ([facility])    [address]  [facility] [address]   [phone]            2018  5:00 PM CDT   Navigate Psychotherapy with Anahigaston Summers [facility]iatry ([facility])    [address]  [facility] [address]   [phone]            2018  5:00 PM CDT   Navigate Family Therapy with Shai HARJINDER Londono, [facility]iatry ([facility])    [address]  [facility] [address]   [phone]              Who to contact     Please call your clinic at [phone] to:    Ask questions about your health    Make or cancel appointments    Discuss your medicines    Learn about your test results    Speak to your doctor            Additional Information About Your Visit        [employer] Information     DealerRater is an electronic gateway that provides easy, online access to your medical records. With DealerRater, you can request a clinic appointment, read your test results, renew a prescription or communicate with your care team.     To sign up for DealerRater visit the website at [email or web address]   You will be asked to enter the access code listed below, as well as some personal information. Please follow the directions to create your username and password.     Your access code is: [email or web address]  Expires: 2018  7:30 AM     Your access code will  in 90 days. If you need help or a new code, please contact your [facility] or call [phone] for assistance.        Care EveryWhere ID     This is your Care EveryWhere ID. This could be used by other organizations to access your [place] medical records  [id number]         Blood Pressure from Last 3 Encounters:   18 115/65   18 114/58   18 119/66    Weight from Last 3 Encounters:   18 66.4 kg (146 lb 6.4 oz)   18 67 kg (147 lb 12.8 oz)   18 64.4 kg (142  lb)              Today, you had the following     No orders found for display         Today's Medication Changes          These changes are accurate as of 3/5/18 11:59 PM.  If you have any questions, ask your nurse or doctor.               These medicines have changed or have updated prescriptions.        Dose/Directions    * ARIPiprazole 10 MG tablet   Commonly known as:  ABILIFY   This may have changed:  Another medication with the same name was added. Make sure you understand how and when to take each.   Used for:  Schizoaffective disorder, bipolar type (H)   Changed by:  Brittani Cage APRN CNP        Dose:  10 mg   Take 1 tablet (10 mg) by mouth daily   Quantity:  30 tablet   Refills:  1       * ARIPiprazole  MG extended release inj syringe   Commonly known as:  ABILIFY MAINTENA   This may have changed:  You were already taking a medication with the same name, and this prescription was added. Make sure you understand how and when to take each.   Used for:  Schizoaffective disorder, bipolar type (H)   Changed by:  Brittani Cage APRN CNP        Dose:  400 mg   Inject 1 Syringe (400 mg) into the muscle once for 1 dose   Quantity:  1 Syringe   Refills:  0       * Notice:  This list has 2 medication(s) that are the same as other medications prescribed for you. Read the directions carefully, and ask your doctor or other care provider to review them with you.         Where to get your medicines      These medications [place] [facility] [place] [address], [place] [address]     Phone:  [phone]     ARIPiprazole  MG extended release inj syringe         These medications were sent to [facility][id number] - [facility] [address] [address] [facility] [address]     Phone:  [phone]     lithium 450 MG CR tablet                Primary Care Provider Office Phone # Fax #    [facility]  [phone]       [address] [place] [address]        Equal Access to Services     MUNIRA MADDOX : Hadii zev fuentes flavio Diez, dariel gan, kaela kadelilahda tracey, keagan olguin lamarco akp seema Xiao [facility] [phone].    ATENCIÓN: Si habla español, tiene a grimm disposición servicios gratuitos de asistencia lingüística. Llame al [phone].    We comply with applicable federal civil rights laws and [place] laws. We do not discriminate on the basis of race, color, national origin, age, disability, sex, sexual orientation, or gender identity.            Thank you!     Thank you for choosing [facility]  for your care. Our goal is always to provide you with excellent care. Hearing back from our patients is one way we can continue to improve our services. Please take a few minutes to complete the written survey that you may receive in the mail after your visit with us. Thank you!             Your Updated Medication List - Protect others around you: Learn how to safely use, store and throw away your medicines at www.disposemymeds.org.          This list is accurate as of 3/5/18 11:59 PM.  Always use your most recent med list.                   Brand Name Dispense Instructions for use Diagnosis    ALPRAZOLAM PO      Take 0.5 mg by mouth daily        * ARIPiprazole 10 MG tablet    ABILIFY    30 tablet    Take 1 tablet (10 mg) by mouth daily    Schizoaffective disorder, bipolar type (H)       * ARIPiprazole  MG extended release inj syringe    ABILIFY MAINTENA    1 Syringe    Inject 1 Syringe (400 mg) into the muscle once for 1 dose    Schizoaffective disorder, bipolar type (H)       cholecalciferol 1000 UNIT tablet    vitamin D3     Take 1,000 Units by mouth        lithium 450 MG CR tablet    ESKALITH    60 tablet    Take 2 tablets (900 mg) by mouth At Bedtime    Schizoaffective disorder, bipolar type (H)       * Notice:  This list has 2 medication(s) that are the same  as other medications prescribed for you. Read the directions carefully, and ask your doctor or other care provider to review them with you.

## 2018-03-06 NOTE — PROGRESS NOTES
NAVIGATE Clinician Contact & Progress Note  For Individual Resiliency Training (IRT)  A Part of the [facility] First Episode of Psychosis Program    NAVIGATE Enrollee: Jerel Day (1995)     MRN: [id number]  Date:  3/05/18  Diagnosis: Schizoaffective disorder, bipolar type (F25.0)  Clinician: LILLIANA Individual Resiliency Trainer, EDELMIRA Reynolds     1. Type of contact: (majority of time spent)  IRT Session    2. People present:   Client  NAVIGATE IRT/writer    3. Total number of persons who participated in contact: 2, including writer    4. Length of Actual Contact: Start Time: 2:55; End Time: 3:30   Traveled?  No    5. Location of contact:  Psychiatry Clinic, [facility]    6. Did the client complete the home practice option(s) from the previous session: NA     7. Motivational Teaching Strategies:  Connect info and skills with personal goals  Promote hope and positive expectations  Explore pros and cons of change  Re-frame experiences in positive light    8. Educational Teaching Strategies:  Review of written material/education  Relate information to client's experience  Ask questions to check comprehension  Break down information into small chunks  Adopt client's language    9. CBT Teaching Strategies:  Reinforcement and shaping (positive feedback for steps towards goals, gains in knowledge & skills, follow-through on home assignments)    10. IRT Module(s) Addressed:  Module 2 - Assessment/Initial Goal Setting  Module 3 - Education about Psychosis    11. Techniques utilized:   [place] announced at beginning of session  Review of goal  Review of previous meeting  Present new material  Problem-solving practice  Help client choose a home practice option  Summarize progress made in current session    12. Mental Status Exam:    Alertness: drowsy and sleepy  Appearance: disheveled  Behavior/Demeanor: calm and passive, with poor eye contact   Speech: normal and regular rate and rhythm  Language: intact. Preferred  language identified as English.  Psychomotor: slowed  Mood: depressed  Affect: flat and indifferent; was congruent to mood; was congruent to content  Thought Process/Associations: unremarkable  Thought Content:  Reports delusions and paranoid ideation;  Denies suicidal and violent ideation  Perception:  Reports none;  Denies auditory hallucinations and visual hallucinations  Insight: adequate  Judgment: adequate for safety  Cognition: does  appear grossly intact; formal cognitive testing was not done  Suicidal ideation: denies SI, denies intent,  and denies plan  Homicidal Ideation: denies    13. Assessment/Progress Note:     This writer met with Jerel following his prescriber appointment. Jerel appeared to be very tired and sluggish. He stated he slept more than 20 hours, including naps throughout the day. He stated this is because he is feeling depressed. He reported a decrease in appetite and feelings of hopelessness/sadness. He denied any suicidal, homicidal, or self-harm thoughts at this time. Jerel reported paranoia, but is beginning to reality test the thought that a hacker is stealing his information. He did mention the hacker should be brought to justice, if it is real. He stated he had a good time visiting his friend in [place]. He did not expect his friend to be as busy with classes, but Jerel enjoyed touring the campus. He denied any obstacles besides the transportation time (bus ride) being lengthy. Jerel then wanted to discuss the possibility of him moving out of his parents' house into an apartment. He stated he would like to regain his independence, but is unsure if it would be best to have roommates. This writer provided education about why social interaction is important to recovery. He then completed a pro's and con's list for moving, as well as individually/with roommates. He indicated he may struggle with isolation, financial burdens, and home care (chores). He mentioned he would like to continue  "talking about this topic during the next session. Jerel agreed to talk with the research coordinator following the appointment.     14. Plan/Referrals:     This writer will continue consulting with the team.     This writer will continue providing education about psychosis, aimed at improving recovery.     Billing for \"Interactive Complexity\"?  No    EDELMIRA ReynoldsATE Individual Resiliency Trainer    Attestation:    I did not see this patient directly. This patient is discussed with me in individual clinical social work supervision, and I agree with the plan as documented.     AGA Alexandre, SHIRA, March 9, 2018    Attestation:    I did not see this patient directly. This patient is discussed with the NAVIGATE Team Director, AGA Narvaez, SHIRA, and myself in individual clinical social work supervision, and I agree with the plan as documented.     AGA Huerta, SHIRA, 7/6/18  "

## 2018-03-08 ENCOUNTER — RADIANT APPOINTMENT (OUTPATIENT)
Dept: GENERAL RADIOLOGY | Facility: CLINIC | Age: 23
End: 2018-03-08
Attending: OTOLARYNGOLOGY
Payer: COMMERCIAL

## 2018-03-08 ENCOUNTER — THERAPY VISIT (OUTPATIENT)
Dept: SPEECH THERAPY | Facility: CLINIC | Age: 23
End: 2018-03-08
Attending: OTOLARYNGOLOGY
Payer: COMMERCIAL

## 2018-03-08 DIAGNOSIS — R13.12 OROPHARYNGEAL DYSPHAGIA: Primary | ICD-10-CM

## 2018-03-08 DIAGNOSIS — R13.10 DYSPHAGIA, UNSPECIFIED TYPE: ICD-10-CM

## 2018-03-08 NOTE — MR AVS SNAPSHOT
After Visit Summary   3/8/2018    Jerel Day    MRN: [id number]           Patient Information     Date Of Birth          1995        Visit Information        Provider Department      3/8/2018 10:00 AM Sanjeev, Azeb, SLP [facility]        Today's Diagnoses     Oropharyngeal dysphagia    -  1       Follow-ups after your visit        Your next 10 appointments already scheduled     Mar 14, 2018  5:00 PM CDT   Navigate Psychotherapy with Anahi Summers [facility]iatry ([facility])    [address]  [facility] [address]   [phone]            Mar 14, 2018  5:00 PM CDT   Navigate Family Therapy with Shai Londono [facility] Psychiatry ([facility])    [address] [place] [address]   [phone]            Mar 19, 2018  2:15 PM CDT   Navigate Medication Follow Up with JAVY Bhatt [facility] Psychiatry ([facility])    [address] [place] [address]   [phone]            Mar 19, 2018  3:00 PM CDT   Navigate Family Therapy with Shai Londono [facility]iat[facility] ([facility])    [address] [place] [address]   [phone]            Mar 19, 2018  3:00 PM CDT   Navigate Psychotherapy with Anahi Summers [facility] ([facility])    [address]  [facility] [address]   [phone]            Mar 28, 2018  4:00 PM CDT   Navigate Family Therapy with Shai Londono [facility]iatry ([facility])    [address]  [facility] [address]   [phone]            Mar 28, 2018  4:00 PM CDT   Navigate Psychotherapy with Anahi Summers [facility]iatry ([facility])    [address]  [facility] [address]   [phone]            Apr 04, 2018  5:00 PM CDT   Navigate Family Therapy with Shai Londono [facility]  Psychiatry ([facility])    [address]  [facility] [address]   [phone]            2018  5:00 PM CDT   Navigate Psychotherapy with Anahi Summers [facility] Psychiatry ([facility])    [address]  [facility] [address]   [phone]            2018  5:00 PM CDT   Navigate Family Therapy with Shai Londono [facility]iat[facility] ([facility])    [address]  [facility] [address]   [phone]              Who to contact     Please call your clinic at [phone] to:    Ask questions about your health    Make or cancel appointments    Discuss your medicines    Learn about your test results    Speak to your doctor            Additional Information About Your Visit        [employer] Information     RENTISH is an electronic gateway that provides easy, online access to your medical records. With RENTISH, you can request a clinic appointment, read your test results, renew a prescription or communicate with your care team.     To sign up for RENTISH visit the website at [email or web address]   You will be asked to enter the access code listed below, as well as some personal information. Please follow the directions to create your username and password.     Your access code is: [email or web address]  Expires: 2018  6:30 AM     Your access code will  in 90 days. If you need help or a new code, please contact your [facility] or call [phone] for assistance.        Care EveryWhere ID     This is your Care EveryWhere ID. This could be used by other organizations to access your [place] medical records  [id number]         Blood Pressure from Last 3 Encounters:   18 115/65   18 114/58   18 119/66    Weight from Last 3 Encounters:   18 66.4 kg (146 lb 6.4 oz)   18 67 kg (147 lb 12.8 oz)   18 64.4 kg (142 lb)               Today, you had the following     No orders found for display       Primary Care Provider Office Phone # Fax #    [facility] [phone]       [facility] [address] [address]        Equal Access to Services     MUNIRA MADDOX : Hadsari zev fuentes floydo Soomaali, waaxda luqadaha, qaybta kaalmada adeegyada, keagan tonyin hayaan liviergaurang olguin shanti lu. So [facility] [phone].    ATENCIÓN: Si habla español, tiene a grimm disposición servicios gratuitos de asistencia lingüística. Llame al [phone].    We comply with applicable federal civil rights laws and [place] laws. We do not discriminate on the basis of race, color, national origin, age, disability, sex, sexual orientation, or gender identity.            Thank you!     Thank you for choosing [facility]  for your care. Our goal is always to provide you with excellent care. Hearing back from our patients is one way we can continue to improve our services. Please take a few minutes to complete the written survey that you may receive in the mail after your visit with us. Thank you!             Your Updated Medication List - Protect others around you: Learn how to safely use, store and throw away your medicines at www.disposemymeds.org.          This list is accurate as of 3/8/18 10:47 AM.  Always use your most recent med list.                   Brand Name Dispense Instructions for use Diagnosis    ALPRAZOLAM PO      Take 0.5 mg by mouth daily        * ARIPiprazole 10 MG tablet    ABILIFY    30 tablet    Take 1 tablet (10 mg) by mouth daily    Schizoaffective disorder, bipolar type (H)       * ARIPiprazole  MG extended release inj syringe    ABILIFY MAINTENA    1 Syringe    Inject 1 Syringe (400 mg) into the muscle once for 1 dose    Schizoaffective disorder, bipolar type (H)       cholecalciferol 1000 UNIT tablet    vitamin D3     Take 1,000 Units by mouth        lithium 450 MG CR tablet    ESKALITH    60 tablet    Take 2  tablets (900 mg) by mouth At Bedtime    Schizoaffective disorder, bipolar type (H)       * Notice:  This list has 2 medication(s) that are the same as other medications prescribed for you. Read the directions carefully, and ask your doctor or other care provider to review them with you.

## 2018-03-08 NOTE — PROGRESS NOTES
03/08/18 1000   General Information   Type Of Visit Initial   Start Of Care Date 03/08/18   Referring Physician Dr. Shelton Guo   Orders Evaluate And Treat   Orders Comment Video swallow study   Medical Diagnosis Dysphagia   Onset Of Illness/injury Or Date Of Surgery 09/01/17   Precautions/limitations No Known Precautions/limitations   Hearing WFL   Pertinent History of Current Problem/OT: Additional Occupational Profile Info Jerel Day is a 22-year-old male with a PMH significant for anxiety, depressive disorder, GERD, and a head injury. Pt was referred by ENT for a VFSS d/t reports of globus sensation and dysphagia. Upon clinical interview, pt reported eating regular textures and thin liquids. He reported he feels as if the muscles in his throat/neck become twisted at times and he sometimes has trouble getting food and liquids such as beer to go through his throat. He reported this began ~6 months ago. He stated he most frequently feels foods such as crackers getting stuck in his throat. He endorsed he often uses a double swallow and is more conscious about his swallow. He reported occasionally feeling pills get stuck in his throat. He endorsed occasional coughing/choking with PO intake, occuring ~1x every few days. He reported he was told he had some reflux but he does not currently take any medications for this. He denied current odynophagia, unintentional weight loss, or recent pneumonias.    Respiratory Status Room air   Prior Level Of Function Swallowing   Prior Level Of Function Comment Regular textures, thin liquids   General Observations Pt appeared anxious throughout the study. He was pleasant and fully participatory.    Patient/family Goals To get through the swallow study.    Pain Assessment   Pain Reported No   Fall Risk Screen   Fall screen completed by SLP   Have you fallen 2 or more times in the past year? No   Have you fallen and had an injury in the past year? No   Is patient a fall  risk? No   Clinical Swallow Evaluation   Oral Musculature generally intact   Structural Abnormalities none present   Dentition present and adequate   Mucosal Quality good   Mandibular Strength and Mobility intact   Oral Labial Strength and Mobility WFL   Lingual Strength and Mobility WFL   Velar Elevation intact   Laryngeal Function Swallow;Voicing initiated   Oral Musculature Comments WFL   Additional evaluation(s) completed today Yes;Recommended   Rationale for completing additional evaluation VFSS recommended to further assess pharyngeal swallow given report of feeling of pharyngeal stasis with PO intake.    VFSS Evaluation   VFSS Additional Documentation Yes   VFSS Eval: Radiology   Radiologist Resident   Views Taken left lateral;A/P   Physical Location of Procedure [facility]   VFSS Eval: Thin Liquid Texture Trial   Mode of Presentation, Thin Liquid spoon;cup;straw;self-fed   Order of Presentation 1, 2, 3, 4, 7, 10, 11, 12   Preparatory Phase WFL   Oral Phase, Thin Liquid other (see comments)  (bolus fractionation)   Pharyngeal Phase, Thin Liquid Delayed swallow reflex   Rosenbek's Penetration Aspiration Scale: Thin Liquid Trial Results 1 - no aspiration, contrast does not enter airway   Diagnostic Statement Occasional bolus fractionation. Mild delay in swallow response. Effective airway protection. Trace pharyngeal residuals.    VFSS Eval: Puree Solid Texture Trial   Mode of Presentation, Puree spoon;self-fed   Order of Presentation 5, 6   Preparatory Phase WFL   Oral Phase, Puree other (see comments)  (bolus fractionation)   Pharyngeal Phase, Puree other (see comments)  (residue on BOT)   Rosenbek's Penetration Aspiration Scale: Puree Food Trial Results 1 - no aspiration, contrast does not enter airway   Successful Strategies Trialed During Procedure, Puree other (see comments)  (liquid wash)   Diagnostic Statement Bolus fractionation with larger bite. Prompt swallow response with effective airway  protection. 1x mild residue on BOT of tongue which cleared with use of liquid wash.    VFSS Eval: Solid Food Texture Trial   Mode of Presentation, Solid self-fed   Order of Presentation 8, 9   Preparatory Phase WFL   Oral Phase, Solid other (see comments)  (bolus fractionation)   Pharyngeal Phase, Solid WFL   Rosenbek's Penetration Aspiration Scale: Solid Food Trial Results 1 - no aspiration, contrast does not enter airway   Diagnostic Statement Adequate mastication. Bolus fractionation present. Prompt swallow response with effective airway protection and pharyngeal clearance.    Swallow Compensations   Swallow Compensations Alternate viscosity of consistencies   Results No difficulties noted   Educational Assessment   Barriers to Learning No barriers   Esophageal Phase of Swallow   Patient reports or presents with symptoms of esophageal dysphagia Yes   Esophageal comments GERD   Swallow Eval: Clinical Impressions   Skilled Criteria for Therapy Intervention No problems identified which require skilled intervention   Dysphagia Outcome Severity Scale (DIANA) Level 6 - DIANA   Treatment Diagnosis Functional oral and pharyngeal swallow   Diet texture recommendations Regular diet;Thin liquids   Recommended Feeding/Eating Techniques alternate between small bites and sips of food/liquid;small sips/bites;other (see comments)  (double swallow, oral cares)   Anticipated Discharge Disposition home   Risks and Benefits of Treatment have been explained. Yes   Patient, family and/or staff in agreement with Plan of Care Yes   Clinical Impression Comments Pt presents with functional oral and pharyngeal swallow. Oral phase is marked by adequate strength, coordination, and ROM of all oral musculature. Pt demonstrates adequate mastication and bolus control. Frequent bolus fractionation is noted with larger bites and solid textures. Pharyngeal phase is characterized by frequent mild delay in swallow response with thin liquids; prompt  swallow response present with solid textures. He exhibits consistent, effective airway protection with no penetration or aspiration of any consistency. Trace pharyngeal residuals present with thin liquids and 1x mild residue noted on base of tongue after bite of puree textures. Residuals are effectively cleared with subsequent dry swallow or liquid wash. Barium tablet was swallowed without difficulty.     Given results of today's VFSS, pt is at low risk of aspiration. Recommend continue regular textures and thin liquids with general swallowing precautions including small bites/sips, alternate solids/liquids, double swallow as needed, and regular oral cares. Pt participated in education re: results and recommendations of VFSS after today's evaluation. No additional SLP services are warranted at this time.    Total Session Time   Total Session Time 30   Total Evaluation Time 30     Thank you for the referral of Jerel Day. If you have any questions about this report, please contact me using the information below.     Azeb Pace MA, CCC-SLP  Speech-Language Pathologist   [facility] [place]  Department of Otolaryngology/D&T - 4th floor  Pager: [phone]  Phone: [phone]  Email: [email or web address]

## 2018-03-09 ENCOUNTER — OFFICE VISIT (OUTPATIENT)
Dept: PSYCHIATRY | Facility: CLINIC | Age: 23
End: 2018-03-09
Payer: COMMERCIAL

## 2018-03-09 ENCOUNTER — TELEPHONE (OUTPATIENT)
Dept: PSYCHIATRY | Facility: CLINIC | Age: 23
End: 2018-03-09

## 2018-03-09 DIAGNOSIS — F25.0 SCHIZOAFFECTIVE DISORDER, BIPOLAR TYPE (H): Primary | ICD-10-CM

## 2018-03-09 ASSESSMENT — PATIENT HEALTH QUESTIONNAIRE - PHQ9: SUM OF ALL RESPONSES TO PHQ QUESTIONS 1-9: 20

## 2018-03-09 NOTE — MR AVS SNAPSHOT
After Visit Summary   3/9/2018    Jerel Day    MRN: [id number]           Patient Information     Date Of Birth          1995        Visit Information        Provider Department      3/9/2018 2:30 PM Ana Michel [facility] Psychiat[facility]         Follow-ups after your visit        Your next 10 appointments already scheduled     Mar 14, 2018  5:00 PM CDT   Navigate Psychotherapy with Anahi Summers [facility] Psychiatry ([facility])    [address] [place] [address]   [phone]            Mar 14, 2018  5:00 PM CDT   Navigate Family Therapy with Shai Londono [facility] ([facility])    [address] [place] [address]   [phone]            Mar 19, 2018  2:15 PM CDT   Navigate Medication Follow Up with JAVY Bhatt [facility] Psychiatry ([facility])    [address] [place] [address]   [phone]            Mar 19, 2018  3:00 PM CDT   Navigate Family Therapy with Shai Londono [facility] Psychiatry ([facility])    [address] [place] [address]   [phone]            Mar 19, 2018  3:00 PM CDT   Navigate Psychotherapy with Anahi Summers [facility]iatry ([facility])    [address] [place] [address]   [phone]            Mar 28, 2018  4:00 PM CDT   Navigate Family Therapy with Shai Londono [facility] ([facility])    [address] [place] [address]   [phone]            Mar 28, 2018  4:00 PM CDT   Navigate Psychotherapy with Anahi Summers [facility]iat[facility] ([facility])    [address] [place] [address]   [phone]            Apr 04, 2018  5:00 PM CDT   Navigate Family Therapy with Shai Londono [facility] Psychiat[facility] ([facility]    [address]  [address]  [facility] [address]   [phone]            2018  5:00 PM CDT   Navigate Psychotherapy with Anahi Summers [facility] Psychiatry ([facility])    [address]  [facility] [address]   [phone]            2018  5:00 PM CDT   Navigate Family Therapy with Shai Londono [facility] Psychiatry ([facility])    [address]  [facility] [address]   [phone]              Who to contact     Please call your clinic at [phone] to:    Ask questions about your health    Make or cancel appointments    Discuss your medicines    Learn about your test results    Speak to your doctor            Additional Information About Your Visit        [employer] Information     [employer] is an electronic gateway that provides easy, online access to your medical records. With [employer] you can request a clinic appointment, read your test results, renew a prescription or communicate with your care team.     To sign up for [employer] visit the website at [email or web address]   You will be asked to enter the access code listed below, as well as some personal information. Please follow the directions to create your username and password.     Your access code is: [email or web address]  Expires: 2018  6:30 AM     Your access code will  in 90 days. If you need help or a new code, please contact your [facility] or call [phone] for assistance.        Care EveryWhere ID     This is your Care EveryWhere ID. This could be used by other organizations to access your [facility] medical records  [id number]         Blood Pressure from Last 3 Encounters:   18 115/65   18 114/58   18 119/66    Weight from Last 3 Encounters:   18 146 lb 6.4 oz (66.4 kg)   18 147 lb 12.8 oz (67 kg)   18 142 lb (64.4 kg)              Today, you had the following     No orders found for display        Primary Care Provider Office Phone # Fax #    [facility] [phone] [address] [address]        Equal Access to Services     MUNIRA MADDOX : Hadii zev ku floydo Sodorisali, waaxda luqadaha, qaybta kaalmada adewally, keagan olguin laDavidsusan lu. So [facility] [phone].    ATENCIÓN: Si habla español, tiene a grimm disposición servicios gratuitos de asistencia lingüística. Llame al [phone].    We comply with applicable federal civil rights laws and [place] laws. We do not discriminate on the basis of race, color, national origin, age, disability, sex, sexual orientation, or gender identity.            Thank you!     Thank you for choosing [facility]  for your care. Our goal is always to provide you with excellent care. Hearing back from our patients is one way we can continue to improve our services. Please take a few minutes to complete the written survey that you may receive in the mail after your visit with us. Thank you!             Your Updated Medication List - Protect others around you: Learn how to safely use, store and throw away your medicines at www.disposemymeds.org.          This list is accurate as of 3/9/18  7:53 AM.  Always use your most recent med list.                   Brand Name Dispense Instructions for use Diagnosis    ALPRAZOLAM PO      Take 0.5 mg by mouth daily        * ARIPiprazole 10 MG tablet    ABILIFY    30 tablet    Take 1 tablet (10 mg) by mouth daily    Schizoaffective disorder, bipolar type (H)       * ARIPiprazole  MG extended release inj syringe    ABILIFY MAINTENA    1 Syringe    Inject 1 Syringe (400 mg) into the muscle once for 1 dose    Schizoaffective disorder, bipolar type (H)       cholecalciferol 1000 UNIT tablet    vitamin D3     Take 1,000 Units by mouth        lithium 450 MG CR tablet    ESKALITH    60 tablet    Take 2 tablets (900 mg) by mouth At Bedtime    Schizoaffective disorder,  bipolar type (H)       * Notice:  This list has 2 medication(s) that are the same as other medications prescribed for you. Read the directions carefully, and ask your doctor or other care provider to review them with you.

## 2018-03-09 NOTE — TELEPHONE ENCOUNTER
-Writer received a phone call from [facility] stating that they figured out how to run patients insurance for injection.   -That being said co-pay for injection is $1,199.81.  Sosa states she is going to contact the [facility] Rep they work with on Monday and see if they can get a coupon card to bring down the price of the injection.  They will keep writer updated on what they find out from the rep.

## 2018-03-12 NOTE — PROGRESS NOTES
NAVIGATE SEE Progress Note   For Supported Employment & Education    NAVIGATE Enrollee: Jerel Day (1995)     MRN: [id number]  Date:  3/9/18  Clinician: MIQUELATE Supported Employment & , Ana Michel    1. Client Status Update:    Jerel Day is interested in employment Client working on completing Career Profile    2. People present:   SEE/Writer  Client: Jerel Day     3. Total number of persons who participated in contact: (do not count yourself/SEE) 1    4. Length of Actual Contact: 60 minutes   Traveled? Yes  Total Travel Time: 40 minutes,   5. Location of contact:   Client's Home    6. Brief description of session, contact, or client status (include: strategies, interventions, client reaction to contact, next steps, etc)    Jerel and this writer met to complete the Career and Education Inventory. Jerel reports feeling very tired, bored and depressed. He says he has been playing guitar and doing PT for his neck and back, both of which have been helpful but has been sleeping a lot. Jerel says his goals are to find his own apt, write a resume, get a summer job and find more activities of things to do. This writer emailed Jerel the Things to Do list and asked that he go through it and choose a few activities to engage in with his SEE. Jerel said he would be open to doing that and finding a volunteer opportunity working with animals.   Jerel reports that money is a current stressor and would like more allowance from his parents. Jerel said he parents received a bill from [employer] for $29,000 which has been causing the family stress. Jerel said he and his sister are not getting along and have been struggling to share the family vehicle. Jerel reports smoking a pipe with tobacco and marijuana about 3 days a week but has been drinking alcohol a lot less than what he was.   When asked what some of his skills and past work history was, Jerel listed that he was a  in high school at the  [employer] and was awarded the employee of the year award. He says he has no other work history but would like to work 'as a  or as a manager of a nonprofit'. 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     7. Completion of mutually agreed upon client task from previous meeting:   Not Applicable    8. Orientation and Treatment Planning:   Developing SEE treatment plan goals    9. Assessment:   Gathering SEE information/inventory regarding work and/or education history, skills, goals, and preferences with client    10. Placement:   Not Applicable    11. Follow Along Supports: (for clients who are working or attending school)    Not Applicable    12. Mutually agreed upon client task for next meeting:     Go through things to do and find activities. Search for a volunteer opp    13. Next Meeting Scheduled for: nika LAFLEURATE Supported Employment &

## 2018-03-13 NOTE — PROGRESS NOTES
NAVIGATE Clinician Contact & Progress Note   For Family Education Program    NAVIGATE Enrollee: Jerel Day (1995)     MRN: [id number]  Date:  3/05/18  Diagnosis(es):  Schizoaffective disorder, bipolar type  Clinician: NAVIGATE Director & Family Clinician, SHIRA Alexandre     1. Type of contact: (majority of time spent)   Family Session    2. People present:   Writer  Client: No  Significant Other/Family/Friend:  Mother and Father    3. Total number of persons who participated in contact: 3, including writer    4. Length of Actual Contact: Start Time: 2pm; End Time: 3pm   Traveled?    No     5. Location of contact:  Psychiatry Clinic, [place]    6. Did the client complete the home practice option(s) from the previous session:   Completed    7. Motivational Teaching Strategies:   Connect info and skills with personal goals  Promote hope and positive expectations    8. Educational Teaching Strategies:   Review of written material/education  Relate information to client's experience  Ask questions to check comprehension  Break down information into small chunks  Adopt client's language     9. CBT Teaching Strategies:   Reinforcement and shaping (positive feedback for steps towards goals and gains in knowledge & skills)    10. Psychoeducational Topic(s) Addressed:  Just the Facts - Basic Facts About Alcohol and Drugs      11. Techniques utilized:   [place] announced at beginning of session  Review of homework  Review of goal  Review of previous meeting  Present new material  Role-play  Problem-solving practice  Help family choose a home practice option  Summarize progress made in current session      12. Assessment/Progress Note:     Herb with Jerel's parents for update on symptoms progress and recovery. Parents indicated Jerel had stopped taking his medications and left on a [employer] bus to [place] last week to visit friends. They indicated his thinking and planning remains disorganized however noted  "he did make his way to and from [place] safely without incident. Further, parents indicated Jerel's planning for what's next in terms of school and work continues to be a changing day-to-day, and that recently Jerel reported some depressive symptoms to his mother about these things. However, parents indicated Jerel does not seem to have much awareness into his symptoms and they are continually concerned about his impulsivity.     Writer queried family around areas of personality in adolescence to date. Parents indicated Jerel has always seem to struggle with perspective-taking, having her demonstrating empathy or expressing a full range of emotions.  Mom provided an example of when she received a breast cancer diagnosis and was going to chemotherapy sharing that Jerel's dad asked him to go with her to which he replied I'll only go with you because Dad can't. Parents report they have never seen him demonstrate sadness or concern for others, but has never been violent or intentionally \"mean\" to other people, rather, just not able to connect or engage beyond a superficial level.  We reviewed first part of Just the Facts Alcohol and Drugs, and gave a homework practice option of understanding Jerel's reasons and the pros and cons for alcohol and marijuana use. They reported he uses alcohol a couple times a week and also carry's marijuana in his backpack most of the time. Dad indicated he would engage in this discussion with Jerel for our discussion during next week session.      13. Plan/Referrals:     Will meet with family weekly as schedule allows for evidence based family psychoeducation and therapeutic support aimed at maximizing Jerel's opportunity for recovery from psychosis.     Shai Londono, [facility]   NAVIGATE Director & Family Clinician     "

## 2018-03-14 ENCOUNTER — APPOINTMENT (OUTPATIENT)
Dept: PSYCHIATRY | Facility: CLINIC | Age: 23
End: 2018-03-14
Payer: COMMERCIAL

## 2018-03-14 ENCOUNTER — OFFICE VISIT (OUTPATIENT)
Dept: PSYCHIATRY | Facility: CLINIC | Age: 23
End: 2018-03-14
Payer: COMMERCIAL

## 2018-03-14 DIAGNOSIS — F25.0 SCHIZOAFFECTIVE DISORDER, BIPOLAR TYPE (H): Primary | ICD-10-CM

## 2018-03-14 NOTE — MR AVS SNAPSHOT
After Visit Summary   3/14/2018    Jerel Day    MRN: [id number]           Patient Information     Date Of Birth          1995        Visit Information        Provider Department      3/14/2018 5:00 PM Anahi Summers [facility] Psychiatry        Today's Diagnoses     Schizoaffective disorder, bipolar type (H)    -  1       Follow-ups after your visit        Your next 10 appointments already scheduled     Mar 28, 2018  4:00 PM CDT   Navigate Family Therapy with Shai Londono [facility] Psychiatry ([facility])    [address] [place] [address]   [phone]            Mar 28, 2018  4:00 PM CDT   Navigate Psychotherapy with Anahi Summers [facility] Psychiatry ([facility])    [address] [place] [address]   [phone]            Apr 04, 2018  5:00 PM CDT   Navigate Family Therapy with Shai Londono [facility]iat[facility] ([facility])    [address]  [facility] [address]   [phone]            Apr 04, 2018  5:00 PM CDT   Navigate Psychotherapy with Anahi Summers [facility]ry ([facility])    [address] [place] [address]   [phone]            Apr 11, 2018  5:00 PM CDT   Navigate Family Therapy with Shai Londono [facility] ([facility])    [address] [place] [address]   [phone]            Apr 11, 2018  5:00 PM CDT   Navigate Psychotherapy with Anahi Summers [facility]iatry ([facility])    [address]  [facility] [address]   [phone]            Apr 18, 2018  3:00 PM CDT   Navigate Psychotherapy with Anahi Summers [facility] ([facility])    [address] [place] [address]   [phone]            Apr 18, 2018  3:00 PM CDT   Navigate Family Therapy with Shai Londono [facility]    [facility] Psychiatry ([facility])    [address]  [facility] [address]   [phone]            2018  4:15 PM CDT   Navigate Medication Follow Up with JAVY Bhatt CNP   [facility] Psychiatry ([facility])    [address]  [facility] [address]   [phone]            2018  5:00 PM CDT   Navigate Psychotherapy with EDELMIRA Reynolds   [facility]iat[facility] ([facility])    [address]  [facility] [address]   [phone]              Who to contact     Please call your clinic at [phone] to:    Ask questions about your health    Make or cancel appointments    Discuss your medicines    Learn about your test results    Speak to your doctor            Additional Information About Your Visit        MyChart Information     Kontest is an electronic gateway that provides easy, online access to your medical records. With Kontest, you can request a clinic appointment, read your test results, renew a prescription or communicate with your care team.     To sign up for Kontest visit the website at [email or web address]   You will be asked to enter the access code listed below, as well as some personal information. Please follow the directions to create your username and password.     Your access code is: [email or web address]  Expires: 2018  7:30 AM     Your access code will  in 90 days. If you need help or a new code, please contact your [facility] or call [phone] for assistance.        Care EveryWhere ID     This is your Care EveryWhere ID. This could be used by other organizations to access your [place] medical records  [id number]         Blood Pressure from Last 3 Encounters:   18 115/63   18 115/65   18 114/58    Weight from Last 3 Encounters:   18 67.5 kg (148 lb 12.8 oz)   18 66.4 kg (146 lb 6.4 oz)   18 67 kg (147 lb  12.8 oz)              Today, you had the following     No orders found for display       Primary Care Provider Office Phone # Fax #    [facility] [phone] [address] [address]        Equal Access to Services     MUNIRA MADDOX : Hadii aad ku hadsurendrao Soomaali, waaxda luqadaha, qaybta kaalmada adeegyada, waxalberta jimenezin haylindan adegaurang clau shanti lu. So [facility] [phone].    ATENCIÓN: Si habla español, tiene a grimm disposición servicios gratuitos de asistencia lingüística. Llame al [phone].    We comply with applicable federal civil rights laws and [place] laws. We do not discriminate on the basis of race, color, national origin, age, disability, sex, sexual orientation, or gender identity.            Thank you!     Thank you for choosing [facility]  for your care. Our goal is always to provide you with excellent care. Hearing back from our patients is one way we can continue to improve our services. Please take a few minutes to complete the written survey that you may receive in the mail after your visit with us. Thank you!             Your Updated Medication List - Protect others around you: Learn how to safely use, store and throw away your medicines at www.disposemymeds.org.          This list is accurate as of 3/14/18 11:59 PM.  Always use your most recent med list.                   Brand Name Dispense Instructions for use Diagnosis    ALPRAZOLAM PO      Take 0.5 mg by mouth daily        ARIPiprazole 10 MG tablet    ABILIFY    30 tablet    Take 1 tablet (10 mg) by mouth daily    Schizoaffective disorder, bipolar type (H)       cholecalciferol 1000 UNIT tablet    vitamin D3     Take 1,000 Units by mouth        lithium 450 MG CR tablet    ESKALITH    60 tablet    Take 2 tablets (900 mg) by mouth At Bedtime    Schizoaffective disorder, bipolar type (H)

## 2018-03-14 NOTE — MR AVS SNAPSHOT
After Visit Summary   3/14/2018    Jerel Day    MRN: [id number]           Patient Information     Date Of Birth          1995        Visit Information        Provider Department      3/14/2018 5:00 PM Shai Londono, [facility] Psychiatry        Today's Diagnoses     Schizoaffective disorder, bipolar type (H)    -  1       Follow-ups after your visit        Your next 10 appointments already scheduled     Mar 28, 2018  4:00 PM CDT   Navigate Family Therapy with Shai Londono [facility] Psychiatry ([facility])    [address] [place] [address]   [phone]            Mar 28, 2018  4:00 PM CDT   Navigate Psychotherapy with Anahi Summers [facility] ([facility])    [address] [place] [address]   [phone]            Apr 04, 2018  5:00 PM CDT   Navigate Family Therapy with Shai Londono [facility] Psychiatry ([facility])    [address] [place] [address]   [phone]            Apr 04, 2018  5:00 PM CDT   Navigate Psychotherapy with Anahi Summers [facility] ([facility])    [address] [place] [address]   [phone]            Apr 11, 2018  5:00 PM CDT   Navigate Family Therapy with Shai Londono [facility] ([facility])    [address] [place] [address]   [phone]            Apr 11, 2018  5:00 PM CDT   Navigate Psychotherapy with Anahi Summers [facility] ([facility])    [address]  [facility] [address]   [phone]            Apr 18, 2018  3:00 PM CDT   Navigate Psychotherapy with Anahi Summers [facility] ([facility])    [address] [place] [address]   [phone]            Apr 18, 2018  3:00 PM CDT   [facility]   [facility] ([facility])    [address]  [facility] [address]   [phone]            2018  4:15 PM CDT   Navigate Medication Follow Up with JAVY Bhatt CNP   [facility]iatry ([facility])    [address]  [facility] [address]   [phone]            2018  5:00 PM CDT   Navigate Psychotherapy with EDELMIRA Reynolds   [facility] ([facility])    [address]  [facility] [address] [phone]              Who to contact     Please call your clinic at [phone] to:    Ask questions about your health    Make or cancel appointments    Discuss your medicines    Learn about your test results    Speak to your doctor            Additional Information About Your Visit        On2 Technologieshart Information     PromisePay is an electronic gateway that provides easy, online access to your medical records. With PromisePay, you can request a clinic appointment, read your test results, renew a prescription or communicate with your care team.     To sign up for PromisePay visit the website at [email or web address]   You will be asked to enter the access code listed below, as well as some personal information. Please follow the directions to create your username and password.     Your access code is: [email or web address]  Expires: 2018  7:30 AM     Your access code will  in 90 days. If you need help or a new code, please contact your [facility] or call [phone] for assistance.        Care EveryWhere ID     This is your Care EveryWhere ID. This could be used by other organizations to access your [place] medical records  [id number]         Blood Pressure from Last 3 Encounters:   18 115/63   18 115/65   18 114/58    Weight from Last 3 Encounters:   18 67.5 kg (148 lb 12.8 oz)   18 66.4 kg (146 lb 6.4 oz)   18 67 kg (147  lb 12.8 oz)              Today, you had the following     No orders found for display       Primary Care Provider Office Phone # Fax #    [facility] [phone]       [facility] [address] [address]        Equal Access to Services     MUNIRA MADDOX : Hadii zev ku hadsurendrao Soomaali, waaxda luqadaha, qaybta kaalmada adeegyada, waxalberta jimenezin haylindan russell gillisnoreensantiago lu. So [facility] [place] [phone].    ATENCIÓN: Si habla español, tiene a grimm disposición servicios gratuitos de asistencia lingüística. Llame al [phone].    We comply with applicable federal civil rights laws and [place] laws. We do not discriminate on the basis of race, color, national origin, age, disability, sex, sexual orientation, or gender identity.            Thank you!     Thank you for choosing [facility] PSYCHIATRY  for your care. Our goal is always to provide you with excellent care. Hearing back from our patients is one way we can continue to improve our services. Please take a few minutes to complete the written survey that you may receive in the mail after your visit with us. Thank you!             Your Updated Medication List - Protect others around you: Learn how to safely use, store and throw away your medicines at www.disposemymeds.org.          This list is accurate as of 3/14/18 11:59 PM.  Always use your most recent med list.                   Brand Name Dispense Instructions for use Diagnosis    ALPRAZOLAM PO      Take 0.5 mg by mouth daily        ARIPiprazole 10 MG tablet    ABILIFY    30 tablet    Take 1 tablet (10 mg) by mouth daily    Schizoaffective disorder, bipolar type (H)       cholecalciferol 1000 UNIT tablet    vitamin D3     Take 1,000 Units by mouth        lithium 450 MG CR tablet    ESKALITH    60 tablet    Take 2 tablets (900 mg) by mouth At Bedtime    Schizoaffective disorder, bipolar type (H)

## 2018-03-15 NOTE — TELEPHONE ENCOUNTER
-Writer called and spoke with Sosa at [facility].  They are still working to see if they can obtain a coupon card to lower price of injection.  They will follow up with writer when they have more information.

## 2018-03-15 NOTE — TELEPHONE ENCOUNTER
-León Day returned writer's phone call.    -Writer informed León of the heaven and Frank's attempts to get a coupon card to lower the cost, writer informed León that they will inform them of final cost injection would be as soon as Frank hears back from [employer] Rep on coupon card.

## 2018-03-15 NOTE — TELEPHONE ENCOUNTER
-Writer called numbers listed for León Day, pt's father, Jerel answered one of these phone numbers and stated that he was unsure if he wanted to get the injection, and is still thinking about it.  Encouraged Jerel to discuss this more with JAVY Bhatt CNP at next appointment.   -Writer left a message for León Shay on secondary number.

## 2018-03-15 NOTE — TELEPHONE ENCOUNTER
-Received a phone call from Sosa from [facility].    -States that she got a coupon, but it is not showing money saved.   -She is going to call [employer] services to see what can be done.

## 2018-03-16 ENCOUNTER — TELEPHONE (OUTPATIENT)
Dept: PSYCHIATRY | Facility: CLINIC | Age: 23
End: 2018-03-16

## 2018-03-16 ENCOUNTER — VIRTUAL VISIT (OUTPATIENT)
Dept: PSYCHIATRY | Facility: CLINIC | Age: 23
End: 2018-03-16
Payer: COMMERCIAL

## 2018-03-16 DIAGNOSIS — F25.0 SCHIZOAFFECTIVE DISORDER, BIPOLAR TYPE (H): Primary | ICD-10-CM

## 2018-03-16 NOTE — TELEPHONE ENCOUNTER
-Writer called and spoke with Sosa from [facility] letting her know that parents agreed to pay the copay for the injection.   -Sosa has León Day phone number so she is going to call him to arrange payment, once this is taken care of Sosa can get injection out to clinic.   -Writer informed Sosa that pt does have appointments on Monday.  She states she will try to have injection delivered today by 5pm or injection will be delivered Monday morning before pt's appointments.

## 2018-03-16 NOTE — PROGRESS NOTES
"Returned a call to Jerel and his father Raf to inform them that the charge for Opal Mcgregor was an out of pocket expense to be paid toward their high-deductible plan. Weighed pros and cons of paying the cost of the injection and ultimately Raf decided the cost was worth it to help Jerel maintain stability.  Jerel was apprehensive because of the cost. He thinks that the expenses incurred during his hospitalization and talked about suing the insurance company and thought maybe he would post his hospital bill online to start a discussion about how he had been targeted. He wonders if [employer] is targeting his family because they have money?  Writer discussed high-deductible insurance plans with Jerel in an attempt to challenge his thoughts that this was an action to target him directly. Reassured him that his deductible would be met soon, if it hadn't already and once his dad was able to get everything straightened out, they might be reimbursed for any overpayments. Reminded Jerel about the benefits he had identified when he initially agreed to take the Maintena, including the strain that daily oral medications has placed on his relationship with his mother. He has expressed that he wants to move out because he is frusterated with her daily \"check ins\" to see if he has taken the medication.   Ultimately Jerel agreed to initiate the injection next week. Will f/u with Brittani Estrella RN about having the IM delivered for his appt on Monday.     This call was initiated at 1100 and terminated at 1132.  "

## 2018-03-16 NOTE — TELEPHONE ENCOUNTER
Brittani Cage APRN CNP Swanson, Melanie A, RN        Caller: Unspecified (1 week ago,  3:42 PM)                     I just got off the phone with them. Jerel agrees to have the IM, parents agreed to pay for it.   Would we be able to have it here by Monday for his appt?   Also- how best to process the charge for the parents?            Previous Messages

## 2018-03-16 NOTE — MR AVS SNAPSHOT
After Visit Summary   3/16/2018    Jerel Day    MRN: [id number]           Patient Information     Date Of Birth          1995        Visit Information        Provider Department      3/16/2018 10:30 AM Brittani Cage APRN CNP [facility] Psychiatry        Today's Diagnoses     Schizoaffective disorder, bipolar type (H)    -  1       Follow-ups after your visit        Your next 10 appointments already scheduled     Mar 19, 2018  2:15 PM CDT   Navigate Medication Follow Up with JAVY Bhatt CNP   [facility] ([facility])    [address] [place] [address]   [phone]            Mar 19, 2018  3:00 PM CDT   Navigate Family Therapy with Shai Londono [facility]iatry ([facility])    [address] [place] [address]   [phone]            Mar 19, 2018  3:00 PM CDT   Navigate Psychotherapy with Anahi Summers [facility]iatry ([facility])    [address] [place] [address]   [phone]            Mar 28, 2018  4:00 PM CDT   Navigate Family Therapy with Shai Londono [facility]iatry ([facility])    [address] [place] [address]   [phone]            Mar 28, 2018  4:00 PM CDT   Navigate Psychotherapy with Anahi Summers [facility]iatry ([facility])    [address] [place] [address]   [phone]            Apr 04, 2018  5:00 PM CDT   Navigate Family Therapy with Shai Londono [facility]iatry ([facility])    [address] [place] [address]   [phone]            Apr 04, 2018  5:00 PM CDT   Navigate Psychotherapy with Anahi Summers [facility]iat[facility] ([facility])    [address] [place] [address]   [phone]            Apr 11, 2018  5:00 PM CDT   Navigate Family Therapy with  Shai Londono, [facility]iat[facility] ([facility])    [address]  [facility] [address]   [phone]            2018  5:00 PM CDT   Navigate Psychotherapy with Anahi Summers [facility] Psychiatry ([facility])    [address]  [facility] [address]   [phone]            2018  3:00 PM CDT   Navigate Psychotherapy with Anahi Summers [facility] Psychiatry ([facility])    [address]  [facility] [address]   [phone]              Who to contact     Please call your clinic at [phone] to:    Ask questions about your health    Make or cancel appointments    Discuss your medicines    Learn about your test results    Speak to your doctor            Additional Information About Your Visit        MyChart Information     The Old Reader is an electronic gateway that provides easy, online access to your medical records. With The Old Reader, you can request a clinic appointment, read your test results, renew a prescription or communicate with your care team.     To sign up for The Old Reader visit the website at [email or web address]   You will be asked to enter the access code listed below, as well as some personal information. Please follow the directions to create your username and password.     Your access code is: [email or web address]  Expires: 2018  7:30 AM     Your access code will  in 90 days. If you need help or a new code, please contact your [facility] or call [phone] for assistance.        Care EveryWhere ID     This is your Care EveryWhere ID. This could be used by other organizations to access your [place] medical records  [id number]         Blood Pressure from Last 3 Encounters:   18 115/65   18 114/58   18 119/66    Weight from Last 3 Encounters:   18 66.4 kg (146 lb 6.4 oz)   18 67 kg (147 lb 12.8 oz)   18 64.4 kg  (142 lb)              Today, you had the following     No orders found for display       Primary Care Provider Office Phone # Fax #    [facility] [phone]       [facility] [address] [address]        Equal Access to Services     MNUIRA MADDOX : Hadii aad ku hadsurendrao Soomaali, waaxda luqadaha, qaybta kaalmada adeegyada, waxay tonyin haylindan adegaurang olguin shanti lu. So [facility] [phone].    ATENCIÓN: Si habla español, tiene a grimm disposición servicios gratuitos de asistencia lingüística. Llame al [phone].    We comply with applicable federal civil rights laws and [place] laws. We do not discriminate on the basis of race, color, national origin, age, disability, sex, sexual orientation, or gender identity.            Thank you!     Thank you for choosing [facility] PSYCHIATRY  for your care. Our goal is always to provide you with excellent care. Hearing back from our patients is one way we can continue to improve our services. Please take a few minutes to complete the written survey that you may receive in the mail after your visit with us. Thank you!             Your Updated Medication List - Protect others around you: Learn how to safely use, store and throw away your medicines at www.disposemymeds.org.          This list is accurate as of 3/16/18 11:59 AM.  Always use your most recent med list.                   Brand Name Dispense Instructions for use Diagnosis    ALPRAZOLAM PO      Take 0.5 mg by mouth daily        ARIPiprazole 10 MG tablet    ABILIFY    30 tablet    Take 1 tablet (10 mg) by mouth daily    Schizoaffective disorder, bipolar type (H)       cholecalciferol 1000 UNIT tablet    vitamin D3     Take 1,000 Units by mouth        lithium 450 MG CR tablet    ESKALITH    60 tablet    Take 2 tablets (900 mg) by mouth At Bedtime    Schizoaffective disorder, bipolar type (H)

## 2018-03-16 NOTE — TELEPHONE ENCOUNTER
-Received a phone call from Sosa at [facility].   -She stated that she tried several other things to get cost of injection lowered, but it is still showing that copay for injection is $1,199.81.  Sosa states that in her attempts to try to get injection price lowered she found that they have a high deductible and this is the reason they cannot get price lower.    -She states she is going to try one more thing, but has little hope that it would work.

## 2018-03-16 NOTE — TELEPHONE ENCOUNTER
LILLIANA (Patient F/U call)  A Part of the [facility] First Episode of Psychosis Program     Patient Name: Jerel Day  /Age:  1995 (22 year old)  Diagnosis(es): Schizoaffective Disorder, bipolar type    Spoke with Jerel to check in on symptoms and safety. Jerel indicated he has not had any SI, is sleeping and eating ok, and currently feels safe.  He said he is worried about his future, and worried he may have to wok a minimum wage job, and this thought has been bothering him. He stated he is feeling pretty irritable, but has had no verbal or other altercations with family.  He indicated he has been taking his medications as prescribed, and that he has only missed one dose in the past week.  He also indicated he has refrained from any marijuana use since our last visit in clinic, and he has just stayed home.     He asked if there were alternative (more natural) vitamins/supplements he could use in place of his current meds, as he is worried about current medications stifling creativity and other.  Writer indicated there were no other vitamins/supplements that I was aware of, that would assist in eliminating current set of symptoms. Reinforced the importance of continuing no MJ use, and continuing to take medications daily.  Encouraged him to speak with Navigate Prescriber about additional vitamins/supplements in his next medication visit.     Jerel indicated he felt safe through the weekend.  I encouraged him to use provided crisis resources, talk with patients, and go to hospital if he is feeling suicidal and unsafe.  Also reminded Jerel that recovery is possible, and to stick with his treatment plan, and the recommendations provided.       Shai Londono, [facility]  NAVIGATE Director & Family Clinician

## 2018-03-19 ENCOUNTER — OFFICE VISIT (OUTPATIENT)
Dept: PSYCHIATRY | Facility: CLINIC | Age: 23
End: 2018-03-19
Payer: COMMERCIAL

## 2018-03-19 ENCOUNTER — ALLIED HEALTH/NURSE VISIT (OUTPATIENT)
Dept: PSYCHIATRY | Facility: CLINIC | Age: 23
End: 2018-03-19
Payer: COMMERCIAL

## 2018-03-19 ENCOUNTER — TELEPHONE (OUTPATIENT)
Dept: PSYCHIATRY | Facility: CLINIC | Age: 23
End: 2018-03-19

## 2018-03-19 VITALS
TEMPERATURE: 98.3 F | HEIGHT: 69 IN | DIASTOLIC BLOOD PRESSURE: 63 MMHG | SYSTOLIC BLOOD PRESSURE: 115 MMHG | WEIGHT: 148.8 LBS | BODY MASS INDEX: 22.04 KG/M2 | HEART RATE: 64 BPM | RESPIRATION RATE: 16 BRPM

## 2018-03-19 DIAGNOSIS — F25.0 SCHIZOAFFECTIVE DISORDER, BIPOLAR TYPE (H): Primary | ICD-10-CM

## 2018-03-19 ASSESSMENT — PAIN SCALES - GENERAL: PAINLEVEL: NO PAIN (1)

## 2018-03-19 NOTE — PROGRESS NOTES
"NAVIGATE Medication Management Progress Note  A Part of the [facility] First Episode of Psychosis Program    NAVIGATE Enrollee: Jerel Day (1995)     MRN: [id number]  Date:  3/19/18         Contributors to the Assessment     Chart Reviewed.   Interview completed with Jerel Day.         Chief Complaint       \"I am feeling loopy today\"           Interim History      Jerel Day is a 22 year old male who was last seen in clinic on 3/5/18 at which time he lithium 900mg and  aripiprazole 10mg were continued. The patient reports adequate treatment adherence with assistance from his mother.  History was provided by Jerel who was a fair historian.  Since the last visit:  - He is feeling like he is consuming a lot of calories and his brain isn't getting any of them. His brain is dried up   - Slept well last night at his brother's. Overall, sleep hs been poor because he is not able to get comfortable. Denies that this is r/t pain specifically.  - Continues to focus on somatic concerns, especially with his neck. He relates this to taking LSD and doing yoga and taking his practice \"too far\". He thinks that he was moving in a way that was beyond the practice he had developed.   - Thoughts of death are daily but passing. Denies plan or intent.  - Resentful and annoyed with parents who just think he is sick and won't let him drive the car or do things. He is also annoyed that his mom has jose a checking in about his medications.   - He acknowledges that he is having difficulty with communication and this is lending itself to some isolation. He thinks his future might involve getting a van and move west and living alone.   - Denies ASE with meds, \"I don't think much about it\". Doesn't think the meds are doing much.   - Appetite has been \"OK\", some foods are not really appealing   - VH/bright lights, he is wearing sunglasses indoors sometimes to help cope with discomfort from this s/e.      DEPRESSION:  reports-suicidal ideation, " (passive), depressed mood, insomnia and poor concentration /memory;  DENIES- anhedonia, low energy, hypersomnia and feeling worthless  KELLI/HYPOMANIA:  reports-distractibility  and racing thoughts;  DENIES- excessive spending, decreased sleep need, increased activity and grandiosity  PSYCHOSIS:  reports-delusions, auditory hallucinations, disorganized behavior and disorganized speech (difficulty communicating);  DENIES- visual hallucinations  ANXIETY:  social anxiety and nervous/overwhelmed  SLEEP:  Sleep has increased, 9-10 hours per night  EATING DISORDER: none     RECENT SUBSTANCE USE:     ALCOHOL- Denies current dauly use. Previously- nearly daily EtOH use, 3-13 shots of hard liquor + beer and wine           TOBACCO- 1/2 PPD             CAFFEINE- 1 cups/day of coffee  OPIOIDS- none       NARCAN KIT- N/A       CANNABIS- Denies current daily use          OTHER ILLICIT DRUGS- hallucinogens (previously)      CURRENT SOCIAL HISTORY:  FINANCIAL SUPPORT- family or friend       CHILDREN- none       LIVING SITUATION- Currently staying with his parent's [facility] in [place], [place]      SOCIAL/ SPIRITUAL SUPPORT- unknown       FEELS SAFE AT HOME- Yes      MEDICAL ROS:  Reports none      Denies akathisia, unusual movements and wt gain   10 point ROS neg other than the symptoms noted above in the HPI. Patient does report multiple physical concern including back, neck, hip and foot pain, concerns about metabolism. These complaints have been evaluated by sports medicine and primary care with unremarkable findings. PT exercises have been recommended.      Of note, history is positive for multiple sports injuries (former ) including concussion x2 with LOC once; ankle and hip injuries; scoliosis. His disorganized communication is likely interfering with an adequate health assessment.         First Episode of Psychosis History      DUP (duration untreated psychosis):  Likely first experienced auditory hallucinations during  "the spring of 2016, possibly in the context of cannabis and LSD use. Did not seek treatment until behavior and presentation became significantly disorganized in January 2017. Medication adherence has been poor over the course of this last year.   Route to initial care: ED for disorganized behavior, somatic concerns   Medication adherence overall:  Fair to poor  General frequency of visits:  Recommend weekly to biweekly  Participation in groups:  none  Cognitive Remediation:  none  Other treatment history: See pertinent background      Reviewed for completion of First Episode work-up:  Yes  First episode workup:  Not Done (if completed, see LABS for results)  MATRICS Consensus Cognitive Battery:  Not Done (if completed, see LABS for results)         Medical/Surgical History     Penicillins    Patient Active Problem List   Diagnosis     Schizoaffective disorder (H)            Medications     Current Outpatient Prescriptions   Medication Sig Dispense Refill     lithium (ESKALITH) 450 MG CR tablet Take 2 tablets (900 mg) by mouth At Bedtime 60 tablet 1     cholecalciferol (VITAMIN D3) 1000 UNIT tablet Take 1,000 Units by mouth       ALPRAZOLAM PO Take 0.5 mg by mouth daily       ARIPiprazole (ABILIFY) 10 MG tablet Take 1 tablet (10 mg) by mouth daily 30 tablet 1     Previous medication trials:  Zoloft- stopped because of ASE (?)  fluoxetine  Risperidone 1 mg, reported akathisia at 3mg dose  Haloperidol 5mg bid, reported dystonia relieved with benadryl   Divalproex 750mg  Quetiapine 300mg- discontinued 1/2/18  Olanzapine 10mg- discontinued 1/29/18, EPS?  Alprazolam 0.5-1mg PRN          Vitals     /63 (BP Location: Right arm, Patient Position: Chair, Cuff Size: Adult Regular)  Pulse 64  Temp 98.3  F (36.8  C)  Resp 16  Ht 1.75 m (5' 8.9\")  Wt 67.5 kg (148 lb 12.8 oz)  BMI 22.04 kg/m2      Weight prior to medication: 130s         Mental Status Exam     Alertness: alert and oriented  Appearance: casually groomed "   Behavior/Demeanor: cooperative, with good eye contact   Speech: spontaneous and unremarkable   Language: intact  Psychomotor: fidgety  Mood: anxious  Affect: full range; was congruent to mood; was congruent to content  Thought Process/Associations: more organized, still containaing loose associations and neologisms/ word salad  Thought Content:  Reports suicidal and violent ideation (without intent or plan), delusions, preoccupations, over-valued ideas and paranoid ideation;  Denies obsessions , phobia  and magical thinking  Perception:  Reports auditory hallucinations;  Denies visual hallucinations  Insight: poor  Judgment: limited  Cognition: does  appear grossly intact; formal cognitive testing was not done         Labs and Data     RATING SCALES:  AIMS: done 3/5/18 with total score of 0    PHQ9 TODAY = 18  PHQ-9 SCORE 2/21/2018 2/22/2018 3/8/2018   Total Score 17 14 20       ANTIPSYCHOTIC LABS ROUTINE    [glu, A1C, lipids (focus LDL), liver enzymes, WBC, ANEU, Hgb, plts]   q12 mo  Recent Labs   Lab Test  01/03/18   1054   GLC  73     Recent Labs   Lab Test  01/03/18   1054   CHOL  135   TRIG  93   LDL  70   HDL  46     Recent Labs   Lab Test  01/03/18   1054   AST  19   ALT  37   ALKPHOS  66     Recent Labs   Lab Test  01/03/18   1054   WBC  8.0   ANEU  5.5   HGB  13.9   PLT  241            Psychiatric Diagnoses     Schizoaffective disorder, bipolar type (F25.0)         Assessment     This patient is a 22 year old male who provides a history supporting the diagnoses listed directly above.  Further diagnostic clarification is not needed.  There are medical comorbidities which impact this treatment [suicidal ideation, psychosis [sxs include delusions, AH, disorganized behavior], multiple psychotropic trials, severe med reaction, psych hosp (<3) and SUBSTANCE USE: hallucinogens and cannabis].     DISCUSSION: Patient presents as more organized; disorganized communication consisting of word salad and loose  "associations are still observed but do not impede his ability for expressive and receptive communication. Patient thinks \"medications aren't doing much\", helping or hurting, so he is amenable to continue to take medications as prescribed but continues to report ongoing annoyance and frustration with parents reminding him to take his medications every day. He continues to be amenable to HO- plan to administer today,   Depression and SI continues to be a concern, patient was not able to make the connection between med adherence and improvment in mood and psychotic sx.     SUICIDE RISK ASSESSMENT [details described above]:  Today Jerel Day reports passive suicidal thoughts.  In addition, he has notable risk factors for self-harm including hallucinations [command, persecutory ], substance use [alcohol, cannabis, hallucinogens], hx of stopping meds, recent loss and male.  However, risk is mitigated by no plan or intent, h/o seeking help when needed, good social support and stable housing.  Based on all available evidence he does not appear to be at imminent risk for self-harm therefore does not meet criteria for a 72-hr hold/  involuntary hospitalization.  However, based on degree of symptoms therapy and close psych FU was recommended which the pt did agree to.      [place] PRESCRIPTION MONITORING PROGRAM [] was not checked today:  last ALPRAZOLAM 0.5 MG TABLET  dispensed 12/01/2017    PSYCHOTROPIC DRUG INTERACTIONS:   No major interactions.  Concomitant use of aripiprazole and lithium may increase risk for EPS     MANAGEMENT:  Monitoring for adverse effects, routine vitals, routine labs, using lowest therapeutic dose of [olanzapine and lithium] and patient is aware of risks         Plan     1) PSYCHOTROPIC MEDICATIONS:  - Continue lithium 900mg at HS  - Start Abilify Maintena 400mg 3/19/18   - Continue aripiprazole 10mg oral medication for 14 days after the injection    2) THERAPY:  Continue IRT. Encouraged to meet " with Ana WEBER to help with organizing some decisions around housing and school.   Meet with [facility] for IM on 3/19/18    3) NEXT DUE:    Labs- Results available in care everywhere, last Li level 0.88. Levels should be checked again due to sporadic adherence.  Rating Scales- AIMS 9/2018 or sooner if needed    4) REFERRALS:    No Referrals needed    5) RTC: 2 weeks    6) CRISIS NUMBERS:   Provided routinely in AVS.    TREATMENT RISK STATEMENT:  The risks, benefits, alternatives and potential adverse effects have been discussed and are understood by the pt. The pt understands the risks of using street drugs or alcohol. There are no medical contraindications, the pt agrees to treatment with the ability to do so. The pt knows to call the clinic for any problems or to access emergency care if needed.  Medical and substance use concerns are documented above.  Psychotropic drug interaction check was done, including changes made today.      PROVIDER:  JAVY Bhatt [facility]      Psychiatry Clinic Individual Psychotherapy Note                                                                     [16]   Start time - 2:43pm       End time - 3:55  Date reviewed - 2/14/18       Date next due - 2/14/19    Subjective: This supportive psychotherapy session addressed issues related to family of origin and difficulty transitioning back home and health /somatic concerns that may be deusions (?).  Patient's reaction: Pre-contemplation in the context of mental status appropriate for ambulatory setting.  Progress: fair  Psychotherapy services during this visit included  myself and the patient.   Treatment Plan      SYMPTOMS; PROBLEMS   MEASURABLE GOALS;    FUNCTIONAL IMPROVEMENT INTERVENTIONS;   GAINS MADE DISCHARGE CRITERIA   Medications:  educate patient and reduce use obstacles   reduce frequency/ intensity of false beliefs monitoring of adherence marked symptom improvement   Psychosocial: health issues, limited social support and  mental health symptoms   learn 2 new ways of coping with routine stressors increase coping skills marked symptom improvement

## 2018-03-19 NOTE — TELEPHONE ENCOUNTER
Writer received verbal orders from JAVY Bhatt CNP to give Abilify Maintena 400 mg IM injection today after prescriber visit.

## 2018-03-19 NOTE — MR AVS SNAPSHOT
After Visit Summary   3/19/2018    Jerel Day    MRN: [id number]           Patient Information     Date Of Birth          1995        Visit Information        Provider Department      3/19/2018 2:45 PM [facility] PSYCHIATRY NURSE [facility] Psychiatry         Follow-ups after your visit        Your next 10 appointments already scheduled     Mar 28, 2018  4:00 PM CDT   Navigate Family Therapy with Shai Londono [facility] Psychiatry ([facility])    [address]  [facility] [address]   [phone]            Mar 28, 2018  4:00 PM CDT   Navigate Psychotherapy with Anahi Summers [facility] Psychiatry ([facility])    [address]  [facility] [address]   [phone]            Apr 04, 2018  5:00 PM CDT   Navigate Family Therapy with Shai Londono [facility] Psychiatry ([facility])    [address]  [facility] [address]   [phone]            Apr 04, 2018  5:00 PM CDT   Navigate Psychotherapy with Anahi Summers [facility] Psychiatry ([facility])    [address]  [facility] [address]   [phone]            Apr 11, 2018  5:00 PM CDT   Navigate Family Therapy with Shai Londono [facility]ry ([facility])    [address] [place] [address]   [phone]            Apr 11, 2018  5:00 PM CDT   Navigate Psychotherapy with Anahi Summers [facility] Psychiatry ([facility])    [address]  [facility] [address]   [phone]            Apr 18, 2018  3:00 PM CDT   Navigate Psychotherapy with Anahi Summers [facility] Psychiatry ([facility])    [address]  [facility] [address]   [phone]            Apr 18, 2018  3:00 PM CDT   Navigate Family Therapy with Shai Londono [facility] Psychiatry ([facility])    [address]  [facility] [address]   [phone]            2018  4:15 PM CDT   Navigate Medication Follow Up with JAVY Bhatt CNP   [facility]    [address]  [facility] [address]   [phone]            2018  5:00 PM CDT   Navigate Psychotherapy with EDELMIRA Reynolds   [facility] ([facility])    [address]  [facility] [address] [phone]              Who to contact     Please call your clinic at [phone] to:    Ask questions about your health    Make or cancel appointments    Discuss your medicines    Learn about your test results    Speak to your doctor            Additional Information About Your Visit        MEPS Real-TimeharDogVacay Information     Ozmott is an electronic gateway that provides easy, online access to your medical records. With Ozmott, you can request a clinic appointment, read your test results, renew a prescription or communicate with your care team.     To sign up for Ozmott visit the website at [email or web address]   You will be asked to enter the access code listed below, as well as some personal information. Please follow the directions to create your username and password.     Your access code is: [email or web address]  Expires: 2018  7:30 AM     Your access code will  in 90 days. If you need help or a new code, please contact your [facility] or call [phone] for assistance.        Care EveryWhere ID     This is your Care EveryWhere ID. This could be used by other organizations to access your [place] medical records  [id number]         Blood Pressure from Last 3 Encounters:   18 115/63   18 115/65   18 114/58    Weight from Last 3 Encounters:   18 148 lb 12.8 oz (67.5 kg)   18 146 lb 6.4 oz (66.4 kg)   18 147 lb 12.8 oz (67 kg)              Today, you had the following     No orders found for  cassandra       Primary Care Provider Office Phone # Fax #    [facility] [phone]       [facility] [address] [address]        Equal Access to Services     MUNIRA MADDOX : Cristobal fuentes floydo Sodorisali, waaxda luqadaha, qaybta kaalmada adewally, keagan olguin laDavidsusan lu. So [facility] [phone].    ATENCIÓN: Si habla español, tiene a grimm disposición servicios gratuitos de asistencia lingüística. Llame al [phone].    We comply with applicable federal civil rights laws and [place] laws. We do not discriminate on the basis of race, color, national origin, age, disability, sex, sexual orientation, or gender identity.            Thank you!     Thank you for choosing [facility] PSYCHIATRY  for your care. Our goal is always to provide you with excellent care. Hearing back from our patients is one way we can continue to improve our services. Please take a few minutes to complete the written survey that you may receive in the mail after your visit with us. Thank you!             Your Updated Medication List - Protect others around you: Learn how to safely use, store and throw away your medicines at www.disposemymeds.org.          This list is accurate as of 3/19/18  3:50 PM.  Always use your most recent med list.                   Brand Name Dispense Instructions for use Diagnosis    ALPRAZOLAM PO      Take 0.5 mg by mouth daily        ARIPiprazole 10 MG tablet    ABILIFY    30 tablet    Take 1 tablet (10 mg) by mouth daily    Schizoaffective disorder, bipolar type (H)       cholecalciferol 1000 UNIT tablet    vitamin D3     Take 1,000 Units by mouth        lithium 450 MG CR tablet    ESKALITH    60 tablet    Take 2 tablets (900 mg) by mouth At Bedtime    Schizoaffective disorder, bipolar type (H)

## 2018-03-19 NOTE — MR AVS SNAPSHOT
After Visit Summary   3/19/2018    Jerel Day    MRN: [id number]           Patient Information     Date Of Birth          1995        Visit Information        Provider Department      3/19/2018 3:00 PM Anahi Summers [facility] Psychiatry        Today's Diagnoses     Schizoaffective disorder, bipolar type (H)    -  1       Follow-ups after your visit        Your next 10 appointments already scheduled     Mar 28, 2018  4:00 PM CDT   Navigate Family Therapy with Shai Londono [facility] Psychiatry ([facility])    [address]  [facility] [address]   [phone]            Mar 28, 2018  4:00 PM CDT   Navigate Psychotherapy with Anahi Summers [facility] Psychiatry ([facility])    [address] [place] [address]   [phone]            Apr 04, 2018  5:00 PM CDT   Navigate Family Therapy with Shai Londono [facility] ([facility])    [address]  [facility] [address]   [phone]            Apr 04, 2018  5:00 PM CDT   Navigate Psychotherapy with Anahi Summers [facility] Psychiatry ([facility])    [address]  [facility] [address]   [phone]            Apr 11, 2018  5:00 PM CDT   Navigate Family Therapy with Shai Londono [facility]ry ([facility])    [address] [place] [address]   [phone]            Apr 11, 2018  5:00 PM CDT   Navigate Psychotherapy with Anahi Summers [facility] Psychiatry ([facility])    [address]  [facility] [address]   [phone]            Apr 18, 2018  3:00 PM CDT   Navigate Psychotherapy with Anahi Summers [facility] ([facility])    [address]  [facility] [address]   [phone]            Apr 18, 2018  3:00 PM CDT   Navigate Family Therapy with Shai Londono [facility]    [facility] ([facility])    [address]  [facility] [address]   [phone]            2018  4:15 PM CDT   Navigate Medication Follow Up with JAVY Bhatt CNP   [facility] ([facility])    [address]  [facility] [address]   [phone]            2018  5:00 PM CDT   Navigate Psychotherapy with EDELMIRA Reynolds   [facility] ([facility])    [address]  [facility] [address]   [phone]              Who to contact     Please call your clinic at [phone] to:    Ask questions about your health    Make or cancel appointments    Discuss your medicines    Learn about your test results    Speak to your doctor            Additional Information About Your Visit        MyChart Information     [employer] is an electronic gateway that provides easy, online access to your medical records. With [employer], you can request a clinic appointment, read your test results, renew a prescription or communicate with your care team.     To sign up for [employer] visit the website at [email or web address]   You will be asked to enter the access code listed below, as well as some personal information. Please follow the directions to create your username and password.     Your access code is: [email or web address]  Expires: 2018  7:30 AM     Your access code will  in 90 days. If you need help or a new code, please contact your [facility] or call [phone] for assistance.        Care EveryWhere ID     This is your Care EveryWhere ID. This could be used by other organizations to access your [place] medical records  [id number]         Blood Pressure from Last 3 Encounters:   18 115/63   18 115/65   18 114/58    Weight from Last 3 Encounters:   18 67.5 kg (148 lb 12.8 oz)   18 66.4 kg (146 lb 6.4 oz)   18 67 kg (147 lb  12.8 oz)              Today, you had the following     No orders found for display         Today's Medication Changes          These changes are accurate as of 3/19/18 11:59 PM.  If you have any questions, ask your nurse or doctor.               These medicines have changed or have updated prescriptions.        Dose/Directions    * ARIPiprazole 10 MG tablet   Commonly known as:  ABILIFY   This may have changed:  Another medication with the same name was added. Make sure you understand how and when to take each.   Used for:  Schizoaffective disorder, bipolar type (H)   Changed by:  Brittani Cage APRN CNP        Dose:  10 mg   Take 1 tablet (10 mg) by mouth daily   Quantity:  30 tablet   Refills:  1       * ARIPiprazole  MG extended release inj syringe   Commonly known as:  ABILIFY MAINTENA   This may have changed:  You were already taking a medication with the same name, and this prescription was added. Make sure you understand how and when to take each.   Used for:  Schizoaffective disorder, bipolar type (H)   Changed by:  Brittani Cage APRN CNP        Dose:  400 mg   Inject 1 Syringe (400 mg) into the muscle once for 1 dose   Quantity:  1 Syringe   Refills:  11       * Notice:  This list has 2 medication(s) that are the same as other medications prescribed for you. Read the directions carefully, and ask your doctor or other care provider to review them with you.         Where to get your medicines      These [id number] - [address] [address]     Phone:  [phone]     ARIPiprazole  MG extended release inj syringe                Primary Care Provider Office Phone # Fax #    [facility] [phone]       [address] [place] [address]        Equal Access to Services     MUNIRA MADDOX AH: dariel Guajardo qaybta kaalmada adeegyada,  keagan rizo ryan magaña'aan ah. So [facility] [phone].    ATENCIÓN: Si caprice curtis, tiene a grimm disposición servicios gratuitos de asistencia lingüística. Case morataya [phone].    We comply with applicable federal civil rights laws and [place] laws. We do not discriminate on the basis of race, color, national origin, age, disability, sex, sexual orientation, or gender identity.            Thank you!     Thank you for choosing [facility] PSYCHIAT[facility]  for your care. Our goal is always to provide you with excellent care. Hearing back from our patients is one way we can continue to improve our services. Please take a few minutes to complete the written survey that you may receive in the mail after your visit with us. Thank you!             Your Updated Medication List - Protect others around you: Learn how to safely use, store and throw away your medicines at www.disposemymeds.org.          This list is accurate as of 3/19/18 11:59 PM.  Always use your most recent med list.                   Brand Name Dispense Instructions for use Diagnosis    ALPRAZOLAM PO      Take 0.5 mg by mouth daily        * ARIPiprazole 10 MG tablet    ABILIFY    30 tablet    Take 1 tablet (10 mg) by mouth daily    Schizoaffective disorder, bipolar type (H)       * ARIPiprazole  MG extended release inj syringe    ABILIFY MAINTENA    1 Syringe    Inject 1 Syringe (400 mg) into the muscle once for 1 dose    Schizoaffective disorder, bipolar type (H)       cholecalciferol 1000 UNIT tablet    vitamin D3     Take 1,000 Units by mouth        lithium 450 MG CR tablet    ESKALITH    60 tablet    Take 2 tablets (900 mg) by mouth At Bedtime    Schizoaffective disorder, bipolar type (H)       * Notice:  This list has 2 medication(s) that are the same as other medications prescribed for you. Read the directions carefully, and ask your doctor or other care provider to review them with you.

## 2018-03-19 NOTE — MR AVS SNAPSHOT
After Visit Summary   3/19/2018    Jerel Day    MRN: [id number]           Patient Information     Date Of Birth          1995        Visit Information        Provider Department      3/19/2018 3:00 PM Shai Londono, [facility] Psychiatry        Today's Diagnoses     Schizoaffective disorder, bipolar type (H)    -  1       Follow-ups after your visit        Your next 10 appointments already scheduled     Mar 28, 2018  4:00 PM CDT   Navigate Family Therapy with Shai Londono [facility] Psychiatry ([facility])    [address] [place] [address]   [phone]            Mar 28, 2018  4:00 PM CDT   Navigate Psychotherapy with Anahi Summers [facility] Psychiatry ([facility])    [address] [place] [address]   [phone]            Apr 04, 2018  5:00 PM CDT   Navigate Family Therapy with Shai Londono [facility] ([facility])    [address]  [facility] [address]   [phone]            Apr 04, 2018  5:00 PM CDT   Navigate Psychotherapy with Anahi Summers [facility] Psychiatry ([facility])    [address] [place] [address]   [phone]            Apr 11, 2018  5:00 PM CDT   Navigate Family Therapy with Shai Londono [facility] ([facility])    [address] [place] [address]   [phone]            Apr 11, 2018  5:00 PM CDT   Navigate Psychotherapy with Anahi Summers [facility] ([facility])    [address]  [facility] [address]   [phone]            Apr 18, 2018  3:00 PM CDT   Navigate Psychotherapy with Anahi Summers [facility] Psychiatry ([facility])    [address] [place] [address]   [phone]            Apr 18, 2018  3:00 PM CDT   [facility]   [facility] ([facility])    [address]  [facility] [address]   [phone]            2018  4:15 PM CDT   Navigate Medication Follow Up with JAVY Bhatt CNP   [facility] Psychiatry ([facility])    [address]  [facility] [address]   [phone]            2018  5:00 PM CDT   Navigate Psychotherapy with EDELMIRA Reynolds   [facility] Psychiatry ([facility])    [address]  [facility] [address] [phone]              Who to contact     Please call your clinic at [phone] to:    Ask questions about your health    Make or cancel appointments    Discuss your medicines    Learn about your test results    Speak to your doctor            Additional Information About Your Visit        [employer] Information     [employer] is an electronic gateway that provides easy, online access to your medical records. With [employer], you can request a clinic appointment, read your test results, renew a prescription or communicate with your care team.     To sign up for [employer] visit the website at [email or web address]   You will be asked to enter the access code listed below, as well as some personal information. Please follow the directions to create your username and password.     Your access code is: [email or web address]  Expires: 2018  7:30 AM     Your access code will  in 90 days. If you need help or a new code, please contact your [facility] or call [phone] for assistance.        Care EveryWhere ID     This is your Care EveryWhere ID. This could be used by other organizations to access your [place] medical records  [id number]         Blood Pressure from Last 3 Encounters:   18 115/63   18 115/65   18 114/58    Weight from Last 3 Encounters:   18 67.5 kg (148 lb 12.8 oz)   18 66.4 kg (146 lb 6.4 oz)   18 67 kg (147  lb 12.8 oz)              Today, you had the following     No orders found for display         Today's Medication Changes          These changes are accurate as of 3/19/18 11:59 PM.  If you have any questions, ask your nurse or doctor.               These medicines have changed or have updated prescriptions.        Dose/Directions    * ARIPiprazole 10 MG tablet   Commonly known as:  ABILIFY   This may have changed:  Another medication with the same name was added. Make sure you understand how and when to take each.   Used for:  Schizoaffective disorder, bipolar type (H)   Changed by:  Brittani Cage APRN CNP        Dose:  10 mg   Take 1 tablet (10 mg) by mouth daily   Quantity:  30 tablet   Refills:  1       * ARIPiprazole  MG extended release inj syringe   Commonly known as:  ABILIFY MAINTENA   This may have changed:  You were already taking a medication with the same name, and this prescription was added. Make sure you understand how and when to take each.   Used for:  Schizoaffective disorder, bipolar type (H)   Changed by:  Brittani Cage APRN CNP        Dose:  400 mg   Inject 1 Syringe (400 mg) into the muscle once for 1 dose   Quantity:  1 Syringe   Refills:  11       * Notice:  This list has 2 medication(s) that are the same as other medications prescribed for you. Read the directions carefully, and ask your doctor or other care provider to review them with you.         Where to get your medicines      [id number] [place] [address] [place] [address] [facility] [address]     Phone:  [phone]     ARIPiprazole  MG extended release inj syringe                Primary Care Provider Office Phone # Fax #    [facility] [phone]       [address] [place] [address] [address] [place] [address]        Equal Access to Services     MUNIRA MADDOX AH: dariel Guajardo qaybta kaalmada adeegyada,  keagan rizo ryan magaña'aan ah. So [facility] [phone].    ATENCIÓN: Si caprice curtis, tiene a grimm disposición servicios gratuitos de asistencia lingüística. Case morataya [phone].    We comply with applicable federal civil rights laws and [place] laws. We do not discriminate on the basis of race, color, national origin, age, disability, sex, sexual orientation, or gender identity.            Thank you!     Thank you for choosing [facility] PSYCHIATRY  for your care. Our goal is always to provide you with excellent care. Hearing back from our patients is one way we can continue to improve our services. Please take a few minutes to complete the written survey that you may receive in the mail after your visit with us. Thank you!             Your Updated Medication List - Protect others around you: Learn how to safely use, store and throw away your medicines at www.disposemymeds.org.          This list is accurate as of 3/19/18 11:59 PM.  Always use your most recent med list.                   Brand Name Dispense Instructions for use Diagnosis    ALPRAZOLAM PO      Take 0.5 mg by mouth daily        * ARIPiprazole 10 MG tablet    ABILIFY    30 tablet    Take 1 tablet (10 mg) by mouth daily    Schizoaffective disorder, bipolar type (H)       * ARIPiprazole  MG extended release inj syringe    ABILIFY MAINTENA    1 Syringe    Inject 1 Syringe (400 mg) into the muscle once for 1 dose    Schizoaffective disorder, bipolar type (H)       cholecalciferol 1000 UNIT tablet    vitamin D3     Take 1,000 Units by mouth        lithium 450 MG CR tablet    ESKALITH    60 tablet    Take 2 tablets (900 mg) by mouth At Bedtime    Schizoaffective disorder, bipolar type (H)       * Notice:  This list has 2 medication(s) that are the same as other medications prescribed for you. Read the directions carefully, and ask your doctor or other care provider to review them with you.

## 2018-03-19 NOTE — MR AVS SNAPSHOT
After Visit Summary   3/19/2018    Jerel Day    MRN: [id number]           Patient Information     Date Of Birth          1995        Visit Information        Provider Department      3/19/2018 2:15 PM Brittani Cage APRN [facility] Psychiatry         Follow-ups after your visit        Your next 10 appointments already scheduled     Mar 28, 2018  4:00 PM CDT   Navigate Family Therapy with Shai Londono [facility] Psychiatry ([facility])    [address] [place] [address]   [phone]            Mar 28, 2018  4:00 PM CDT   Navigate Psychotherapy with Anahi Summers [facility] Psychiatry ([facility])    [address] [place] [address]   [phone]            Apr 04, 2018  5:00 PM CDT   Navigate Family Therapy with Shai Londono [facility] Psychiatry ([facility])    [address] [place] [address]   [phone]            Apr 04, 2018  5:00 PM CDT   Navigate Psychotherapy with Anahi Summers [facility] Psychiatry ([facility])    [address] [place] [address]   [phone]            Apr 11, 2018  5:00 PM CDT   Navigate Family Therapy with Shai Londono [facility] Psychiatry ([facility])    [address] [place] [address]   [phone]            Apr 11, 2018  5:00 PM CDT   Navigate Psychotherapy with Anahi Summers [facility] Psychiatry ([facility])    [address] [place] [address]   [phone]            Apr 18, 2018  3:00 PM CDT   Navigate Psychotherapy with Anahi Summers [facility] Psychiatry ([facility])    [address] [place] [address]   [phone]            Apr 18, 2018  3:00 PM CDT   Navigate Family Therapy with Shai Londono [facility]ry ([facility]    [address]  [facility] [address]   [phone]            2018  4:15 PM CDT   Navigate Medication Follow Up with JAVY Bhatt CNP   [facility] Psychiatry ([facility])    [address]  [facility] [address]   [phone]            2018  5:00 PM CDT   Navigate Psychotherapy with EDELMIRA Reynolds   [facility] Psychiat[facility] ([facility])    [address] [facility] [address]  [facility] [address] [phone]              Who to contact     Please call your clinic at [phone] to:    Ask questions about your health    Make or cancel appointments    Discuss your medicines    Learn about your test results    Speak to your doctor            Additional Information About Your Visit        [employer] Information     [employer] is an electronic gateway that provides easy, online access to your medical records. With [employer], you can request a clinic appointment, read your test results, renew a prescription or communicate with your care team.     To sign up for [employer] visit the website at [email or web address]   You will be asked to enter the access code listed below, as well as some personal information. Please follow the directions to create your username and password.     Your access code is: [email or web address]  Expires: 2018  7:30 AM     Your access code will  in 90 days. If you need help or a new code, please contact your [facility] or call [phone] for assistance.        Care EveryWhere ID     This is your Care EveryWhere ID. This could be used by other organizations to access your [place] medical records  [id number]        Your Vitals Were     Pulse Temperature Respirations Height BMI (Body Mass Index)       64 98.3  F (36.8  C) 16 5' 8.9\" (175 cm) 22.04 kg/m2        Blood Pressure from Last 3 Encounters:   18 115/63   18 115/65   18 114/58    Weight from Last 3 Encounters:   18 148 lb " 12.8 oz (67.5 kg)   03/05/18 146 lb 6.4 oz (66.4 kg)   02/14/18 147 lb 12.8 oz (67 kg)              Today, you had the following     No orders found for display       Primary Care Provider Office Phone # Fax #    [facility] [phone]       [facility] [address] [address]        Equal Access to Services     MUNIRA MADDOX : Hadii aad ku hadasho Soomaali, waaxda luqadaha, qaybta kaalmada adeegyada, waxay idiin hayaan adeeg elisnoreensh lacatarina . So [facility] [phone].    ATENCIÓN: Si cindyla espmarisa, tiene a grimm disposición servicios gratuitos de asistencia lingüística. Llame al [phone].    We comply with applicable federal civil rights laws and [place] laws. We do not discriminate on the basis of race, color, national origin, age, disability, sex, sexual orientation, or gender identity.            Thank you!     Thank you for choosing [facility] PSYCHIATRY  for your care. Our goal is always to provide you with excellent care. Hearing back from our patients is one way we can continue to improve our services. Please take a few minutes to complete the written survey that you may receive in the mail after your visit with us. Thank you!             Your Updated Medication List - Protect others around you: Learn how to safely use, store and throw away your medicines at www.disposemymeds.org.          This list is accurate as of 3/19/18  3:50 PM.  Always use your most recent med list.                   Brand Name Dispense Instructions for use Diagnosis    ALPRAZOLAM PO      Take 0.5 mg by mouth daily        ARIPiprazole 10 MG tablet    ABILIFY    30 tablet    Take 1 tablet (10 mg) by mouth daily    Schizoaffective disorder, bipolar type (H)       cholecalciferol 1000 UNIT tablet    vitamin D3     Take 1,000 Units by mouth        lithium 450 MG CR tablet    ESKALITH    60 tablet    Take 2 tablets (900 mg) by mouth At Bedtime    Schizoaffective disorder, bipolar type (H)

## 2018-03-19 NOTE — NURSING NOTE
Chief Complaint   Patient presents with     Imm/Inj     Abilify Maintena 400 mg        -Jerel Day comes into clinic today at the request of JAVY Bhatt CNP Ordering Provider for Med Injection only Abilify Maintena .    Medication checked by: Denver Pinto RNCC  Prior to injection verified patient identity using patient's name and date of birth.   Patient instructed to report any s/e from injection to RN; pt does have therapy appointment in clinic following this appointment.      Appearance: Casual Dress, weather appropriate    Mood: good  Affect: flat   Eye contact: poor   Side effects: denies   Level of cooperation: cooperative   Education: provided education on how injection works; Jerel verbalized understanding and asked questions to further clarify  Next appt: 3/28//18 with SUSAN Reynolds        This service provided today was under the supervising provider of the day JAVY Bhatt CNP, who was available if needed.    MILAGRO THOMPSON

## 2018-03-20 NOTE — PROGRESS NOTES
NAVIGATE Clinician Contact & Progress Note   For Family Education Program    NAVIGATE Enrollee: Jerel Day (1995)     MRN: [id number]  Date:  3/14/18  Diagnosis(es): Schizoaffective disorder, bipolar type  Clinician: NAVIGATE Director & Family Clinician, SHIRA Alexandre     1. Type of contact: (majority of time spent)  Family Session    2. People present:   Writer  Client: No  Significant Other/Family/Friend:  Mother and Father    3. Total number of persons who participated in contact: 3, including writer    4. Length of Actual Contact: Start Time: 5pm; End Time: 6pm   Traveled?    No     [facility]    6. Did the client complete the home practice option(s) from the previous session: Completed    7. Motivational Teaching Strategies:  Connect info and skills with personal goals  Promote hope and positive expectations    8. Educational Teaching Strategies:  Review of written material/education  Relate information to client's experience  Ask questions to check comprehension  Break down information into small chunks    9. CBT Teaching Strategies:  Coping skills training (review current coping skills, increase currently used skills, feedback and plan home practice)    10. Psychoeducational Topic(s) Addressed:  Just the Facts - Coping with Stress    11. Techniques utilized:   [place] announced at beginning of session  Review of homework  Review of goal  Review of previous meeting  Present new material  Help client choose a home practice option  Summarize progress made in current session    12. Assessment/Progress Note:   Began Just the Facts - Coping with Stress. Engaged family in a discussion of their own experiences with stress and observations of Jerel under stress.     Life Events Checklist and stressful life events completed for both parents. Both indicated stressful events in past year related to maternal medical condition, paternal job related stress, and  Jerel's symptoms and return to living at home due. Daily Hassles Checklist also completed for both and included managing Bradfords behaviors, household chores, concern about Bradfords safety and well-being, suggesting a high level of stress being experienced by both parents.  Conversed about how to recognize stress, and discussed strategies already used to cope with stress.     Home practice identified as: Go over life events and daily hassles checklist with a family member or supportive person to identify stressful events. Ask your family member what daily events he or she finds stressful.  Jerel's father said he wanted to complete this with Jerel.       13. Plan/Referrals:     Will meet with family weekly as schedule allows for evidence based family psychoeducation and therapeutic support aimed at maximizing Jerel's opportunity for recovery from psychosis.     Shai Londono, [facility]   NAVIGATE Director & Family Clinician

## 2018-03-20 NOTE — PROGRESS NOTES
NAVIGATE Clinician Contact & Progress Note  For Individual Resiliency Training (IRT)  A Part of the [facility] First Episode of Psychosis Program    NAVIGATE Enrollee: Jerel Day (1995)     MRN: [id number]  Date:  3/19/18  Diagnosis: Schizoaffective disorder, bipolar type (F25.0)  Clinician: [facility] Individual Resiliency Trainer, EDELMIRA Reynolds     1. Type of contact: (majority of time spent)  IRT Session    2. People present:   Client  NAVIGATE IRT/writer    3. Total number of persons who participated in contact: 2, including writer    4. Length of Actual Contact: Start Time: 3:05; End Time: 3:55   Traveled?  No    5. Location of contact:  Psychiatry Clinic, [facility]    6. Did the client complete the home practice option(s) from the previous session: NA     7. Motivational Teaching Strategies:  Connect info and skills with personal goals  Promote hope and positive expectations  Explore pros and cons of change  Re-frame experiences in positive light    8. Educational Teaching Strategies:  Review of written material/education  Relate information to client's experience  Ask questions to check comprehension  Break down information into small chunks  Adopt client's language    9. CBT Teaching Strategies:  Reinforcement and shaping (positive feedback for steps towards goals, gains in knowledge & skills, follow-through on home assignments)    Relapse prevention planning (review of stressors, early warning signs, written plan to respond to signs, rehearse plan)    Coping skills training (review current coping skills, increase currently used skills, model new skill, role play new skill, feedback, plan home practice)    10. IRT Module(s) Addressed:  Module 2 - Assessment/Initial Goal Setting  Module 3 - Education about Psychosis    11. Techniques utilized:   [place] announced at beginning of session  Review of goal  Review of previous meeting  Present new material  Problem-solving practice  Help client choose a home  practice option  Summarize progress made in current session    12. Mental Status Exam:    Alertness: oriented and drowsy  Appearance: casually groomed  Behavior/Demeanor: calm and passive, with good  eye contact   Speech: normal and regular rate and rhythm  Language: intact. Preferred language identified as English.  Psychomotor: slowed  Mood: depressed and agitated  Affect: flat and indifferent; was congruent to mood; was congruent to content  Thought Process/Associations: unremarkable  Thought Content:  Reports delusions and preoccupations;  Denies suicidal and violent ideation  Perception:  Reports auditory hallucinations;  Denies visual hallucinations  Insight: adequate  Judgment: good  Cognition: does  appear grossly intact; formal cognitive testing was not done  Suicidal ideation: denies SI, denies intent,  and denies plan  Homicidal Ideation: denies    13. Assessment/Progress Note:     This writer met with Jerel for a follow-up IRT session. He said he is feeling better now, as he got 10 hours of sleep. He has abstained from marijuana and has taken his medications regularly since 3/14/18. He did drink 1 beer and 2 shots for [employer] [employer]. He continues to believe he was hacked and his information was stolen, but believes there is nothing he can do to change this. This writer provided psycho-education about the stress-vulnerability model. He indicated his main stressors include: not having a car, financial concerns, living situation, relationships, and deciding between returning to school or entering the workforce. He stated he has a family history of anxiety. He recently obtained a gym membership and plans to increase his exercise. His reported symptoms include: slowed speech, muted reactions, discomfort, paranoia, auditory hallucinations, and over alertness to reactions in social situations. He stated he currently isn't finding much fulfillment in life. He stated going to a bar to meet others, visiting the  "music store in [place], or going to [place] to explore breathing at different elevations would bring meaning to him. He is experiencing significant loneliness and is unsure how to reconnect with high school friends. He believes the former friends won't engage because his scoliosis has changed his personality.  He agreed to continue abstaining from substances and playing his guitar to de-stress over the week.     14. Plan/Referrals:     This writer will continue to explore symptoms and coping mechanisms.     This writer will provide social skills to target loneliness.     This writer will continue to consult with the team.    Billing for \"Interactive Complexity\"?  No    EDELMIRA Reynolds Individual Resiliency Trainer    Attestation:    I did not see this patient directly. This patient is discussed with me in individual clinical social work supervision, and I agree with the plan as documented.     AGA Alexandre, SHIRA, March 20, 2018    Attestation:    I did not see this patient directly. This patient is discussed with the NAVIGATE Team Director, AGA Narvaez, SHIRA, and myself in individual clinical social work supervision, and I agree with the plan as documented.     AGA Huerta, SHIRA, 7/6/18  "

## 2018-03-20 NOTE — PROGRESS NOTES
NAVIGATE Clinician Contact & Progress Note  For Individual Resiliency Training (IRT)  A Part of the [facility] First Episode of Psychosis Program    NAVIGATE Enrollee: Jerel Day (1995)     MRN: [id number]  Date:  3/14/18  Diagnosis: Schizoaffective disorder, bipolar type (F25.0)  Clinician: [facility]IA[facility] Individual Resiliency Trainer, EDELMIRA Reynolds     1. Type of contact: (majority of time spent)  IRT Session    2. People present:   Client  NAVIGATE IRT/writer    3. Total number of persons who participated in contact: 2, including writer    4. Length of Actual Contact: Start Time: 5:05; End Time: 6:20   Traveled?  No    5. Location of contact:  Psychiatry Clinic, [facility]    6. Did the client complete the home practice option(s) from the previous session: No    7. Motivational Teaching Strategies:  Connect info and skills with personal goals  Promote hope and positive expectations  Explore pros and cons of change  Re-frame experiences in positive light    8. Educational Teaching Strategies:  Review of written material/education  Relate information to client's experience  Ask questions to check comprehension  Break down information into small chunks  Adopt client's language    9. CBT Teaching Strategies:  Reinforcement and shaping (positive feedback for steps towards goals, gains in knowledge & skills, follow-through on home assignments)    Coping skills training (review current coping skills, increase currently used skills, model new skill, role play new skill, feedback, plan home practice)    10. IRT Module(s) Addressed:  Module 3 - Education about Psychosis  Module 9 - Coping with Symptoms    11. Techniques utilized:   [place] announced at beginning of session  Review of homework  Review of goal  Review of previous meeting  Present new material  Problem-solving practice  Summarize progress made in current session    12. Mental Status Exam:    Alertness: drowsy and sleepy  Appearance: casually  "groomed  Behavior/Demeanor: agitated and guarded, with fair  eye contact   Speech: normal and regular rate and rhythm  Language: intact. Preferred language identified as English.  Psychomotor: slowed  Mood: anxious, irritable and agitated  Affect: indifferent; was congruent to mood; was congruent to content  Thought Process/Associations: perseverative  Thought Content:  Reports suicidal ideation, preoccupations and paranoid ideation;  Denies violent ideation  Perception:  Reports auditory hallucinations;  Denies visual hallucinations  Insight: adequate  Judgment: fair  Cognition: does  appear grossly intact; formal cognitive testing was not done  Suicidal ideation: reports SI, denies intent,  and denies plan  Homicidal Ideation: denies    13. Assessment/Progress Note:     This writer met with Jerel for a follow-up IRT session. He first asked this writer if the session could be cut short because of increased paranoia and stomach pain. He shared he has been receiving synchretic messages from the tv and computer. He was locked out of his personal computer and can no longer log in. He is concerned there are hackers taking his money and information. He has also been experiencing auditory hallucinations that are \"taunting\" him. He had rigid thinking and irritability regarding this and stated, \"There's nothing that can fix it. I'm either going to lose all my money or not.\" He mentioned some paranoia about institutions trying to get money from him, as he received a $29,000 bill from the hospital stay. However, he also believes [facility] is trying to drain his money. He mentioned he is not feeling safe and he said, \"the only way to be safe is to kill myself or move to an isolated island.\" He described many feelings of hopelessness and was not able to safety plan. He noted he has a \"gut instinct\" telling him that he's unsafe. He did not agree to tell his parents about his current thoughts or safety concerns. He agreed going to " "a hospital might be helpful, but did not want to incur more hospital bills.     This writer then asked Shai Londono, , to assess Jerel for safety. Please see Shai Londono's encounter dated 3/14/18 for more information.     14. Plan/Referrals:     This writer will monitor safety and coping skills.     This writer will follow up on safety plan.     Billing for \"Interactive Complexity\"?  Yes - This client was agitated and irritable throughout. He was describing unsafe thoughts and hopelessness. Therefore, the session was geared towards safety planning and distress tolerance.     Anahi Summers [facility]NE [facility] Individual Resiliency Trainer    Attestation:    I did not see this patient directly. This patient is discussed with me in individual clinical social work supervision, and I agree with the plan as documented.     AGA Alexandre, [facility], March 20, 2018    Attestation:    I did not see this patient directly. This patient is discussed with the NAVIGATE Team Director, AGA Narvaez, LICSW, and myself in individual clinical social work supervision, and I agree with the plan as documented.     AGA Huerta, LICSW, 7/6/18  "

## 2018-03-21 ENCOUNTER — TELEPHONE (OUTPATIENT)
Dept: PSYCHIATRY | Facility: CLINIC | Age: 23
End: 2018-03-21

## 2018-03-21 ASSESSMENT — PATIENT HEALTH QUESTIONNAIRE - PHQ9: SUM OF ALL RESPONSES TO PHQ QUESTIONS 1-9: 21

## 2018-03-21 NOTE — TELEPHONE ENCOUNTER
-Writer received a phone call from Robin at [facility], he is a care coordinator there.   -He informed writer that he has been speaking with León Day, pt's father regarding price of injection and the issues they have been having with their insurance.   -Robin informed writer that family has met their deductible for out of pocket costs.   -Injection will need a PA going forth, information for PA was provided by [facility].   -[facility] is the pharmacy benefits-their number for prior auth's is [phone], informed writer that they might ask for a password which is Anhelo.   -Fax number for jovany is [phone].  -Writer will follow up with [place] [facility] regarding PA needed for injection

## 2018-03-22 ASSESSMENT — PATIENT HEALTH QUESTIONNAIRE - PHQ9: SUM OF ALL RESPONSES TO PHQ QUESTIONS 1-9: 1

## 2018-03-22 NOTE — PROGRESS NOTES
"NAVIGATE Clinician Contact & Progress Note   For Family Education Program    NAVIGATE Enrollee: Jerel Day (1995)     MRN: [id number]  Date:  2/14/18  Diagnosis(es): Schizoaffective disorder, bipolar type  Clinician: NAVIGATE Director & Family Clinician, Shai Londono [facility]     1. Type of contact: (majority of time spent)  Family Session    2. People present:   Writer  Client: No  Significant Other/Family/Friend:  Mother and Father    3. Total number of persons who participated in contact: 3, including writer    4. Length of Actual Contact: Start Time: 1pm; End Time: 2pm   Traveled?    No     5. Location of contact:  Psychiatry Clinic, [place]    6. Did the client complete the home practice option(s) from the previous session: Completed    7. Motivational Teaching Strategies:  Connect info and skills with personal goals  Promote hope and positive expectations    8. Educational Teaching Strategies:  Review of written material/education  Relate information to client's experience  Ask questions to check comprehension  Break down information into small chunks  Adopt client's language     9. CBT Teaching Strategies:  Reinforcement and shaping (positive feedback for steps towards goals, gains in knowledge & skills and follow-through on home assignments)    10. Psychoeducational Topic(s) Addressed:  Just the Facts - Psychosis    [place] announced at beginning of session  Review of homework  Review of goal  Review of previous meeting  Present new material  Role-play  Summarize progress made in current session    12. Assessment/Progress Note:     Began Just the Facts - Psychosis. Identified psychosis as different for each individual but consisting of common types of symptoms (hallucinations, delusions, confused thinking). Family members reported Jerel has experienced all. Other \"sometimes\" associated symptoms were discussed. Family reported Jerel has experienced high levels of energy, impulsivity, racing " thoughts, poor communication, irritability.     Explained how a diagnosis of psychosis and affiliated illnesses are made. Reviewed criteria for schizophreniform, schizophrenia, and schizoaffective disorders. Identified Bradfords as best categorized as schizoaffective.     Dialogued about potential causes of psychosis and the Stress-Vulnerability Model. Emphasized that no body causes psychosis and causes are most often easily identified in hindsight.      Defined first episode psychosis as being in three phases - prodrome, acute and recovery. Family was able to identified Jerel as having experienced prodrome and acute phase(s).    Discussed treatment recommendations to include antipsychotic medication, individual, group, and family therapy, taking steps toward school/employment goals, socialization, abstinence from alcohol and drugs, learning strategies to manage stress and symptoms, exercise and health eating, strong family communication, and ongoing involvement in NAVIGATE.     Home practice identified as: Discuss symptoms of psychosis with a family member, and ask Jerel what he is experiencing.    Overall family was engaged in conversation. They did express interest in continuing to meet for family therapy and psychoeducation. As of today's appt their insight into Annita mental illness appears adequate. They seem they would benefit from continued clinical intervention aimed at assisting them implement helpful strategies at home and increase their understanding of psychosis.    13. Plan/Referrals:     Will meet with family weekly as schedule allows for evidence based family psychoeducation and therapeutic support aimed at maximizing Bradfords opportunity for recovery from psychosis.     Shai Londono, [facility]   NAVIGATE Director & Family Clinician

## 2018-03-22 NOTE — PROGRESS NOTES
NAVIGATE Clinician Contact & Progress Note   For Family Education Program    NAVIGATE Enrollee: Jerel Day (1995)     MRN: [id number]  Date:  3/19/18  Diagnosis(es): Schizoaffective disorder, bipolar type  Clinician: NAVIGATE Director & Family Clinician, [facility]o [facility]     1. Type of contact: (majority of time spent)  Family Session    2. People present:   Writer  Client: No  Significant Other/Family/Friend:  Mother and Father    3. Total number of persons who participated in contact: 3, including writer    4. Length of Actual Contact: Start Time: 3pm; End Time: 4pm   Traveled?    No     [facility]. [facility]    6. Did the client complete the home practice option(s) from the previous session: Completed    7. Motivational Teaching Strategies:  Connect info and skills with personal goals  Promote hope and positive expectations  Re-frame experiences in positive light    8. Educational Teaching Strategies:  Review of written material/education  Relate information to client's experience  Ask questions to check comprehension  Break down information into small chunks  Adopt client's language     9. CBT Teaching Strategies:  Reinforcement and shaping (positive feedback for steps towards goals and gains in knowledge & skills)    10. Psychoeducational Topic(s) Addressed:  Just the Facts - Coping with Stress    11. Techniques utilized:   [place] announced at beginning of session  Review of homework  Review of goal  Review of previous meeting  Present new material  Help client choose a home practice option  Summarize progress made in current session    12. Assessment/Progress Note:     Reviewed homework and the remainder of Just the Facts - Coping with Stress module.  Engaged family in a discussion of their own experiences with stress and observations of Jerel under stress.     Home practice identified as: Continue to review and notice coping strategies used by self and Jerel.   Openly discuss and explore additional ways in which family members can keep stress levels and conflict low. Discussed Just the Facts-Effective Communication module, and strategies that can be used to increase positive communication, while also setting safe and appropriate limits around Jerel's at times, aggessive and terse communications and behavior.    Overall family was engaged in conversation. They did express interest in continuing to meet for family therapy and psychoeducation. As of today's appt their insight into Jerel's mental illness appears good. They seem they would benefit from continued clinical intervention aimed at assisting them implement helpful strategies at home and increase their understanding of psychosis.    13. Plan/Referrals:     Will meet with family weekly as schedule allows for evidence based family psychoeducation and therapeutic support aimed at maximizing Jerel's opportunity for recovery from psychosis.       Shai Londono, [facility]   NAVIGATE Director & Family Clinician

## 2018-03-22 NOTE — TELEPHONE ENCOUNTER
-updated [facility] that issues with injection should be solved, but injection will need a PA going forth.  -Sosa at [facility] will make a note of this.    -Writer will initiate PA.

## 2018-03-27 ENCOUNTER — TELEPHONE (OUTPATIENT)
Dept: PSYCHIATRY | Facility: CLINIC | Age: 23
End: 2018-03-27

## 2018-03-27 DIAGNOSIS — F25.0 SCHIZOAFFECTIVE DISORDER, BIPOLAR TYPE (H): Primary | ICD-10-CM

## 2018-03-27 NOTE — TELEPHONE ENCOUNTER
Last seen: 3/19/18  RTC: unknown  Cancel: none  No-show: none  Next appt: 4/18/18     Incoming refill from [facility] via fax    Medication requested: Abilify Maintena 400 mg IM   Directions: Inject 400 mg IM once  Month   Qty: 1 syringe   Last refilled: 3/16/18    Routed to JAVY Bhatt CNP as last visit note is unsigned.

## 2018-03-27 NOTE — TELEPHONE ENCOUNTER
Brittani Cage APRN CNP Swanson, Melanie A, RN        Caller: Unspecified (Today,  8:33 AM)                     Yes, I would approve a year for this order-- if this is possible with all of the insurance issues??

## 2018-03-29 ENCOUNTER — OFFICE VISIT (OUTPATIENT)
Dept: PSYCHIATRY | Facility: CLINIC | Age: 23
End: 2018-03-29
Payer: COMMERCIAL

## 2018-03-29 DIAGNOSIS — F25.0 SCHIZOAFFECTIVE DISORDER, BIPOLAR TYPE (H): Primary | ICD-10-CM

## 2018-03-29 NOTE — MR AVS SNAPSHOT
After Visit Summary   3/29/2018    Jerel Day    MRN: [id number]           Patient Information     Date Of Birth          1995        Visit Information        Provider Department      3/29/2018 1:00 [facility]         Follow-ups after your visit        Your next 10 appointments already scheduled     Apr 04, 2018  5:00 PM CDT   Navigate Family Therapy with Shai Londono [facility] Psychiatry ([facility])    [address] [place] [address]   [phone]            Apr 04, 2018  5:00 PM CDT   Navigate Psychotherapy with Anahi Summers [facility] ([facility]    [address] [place] [address]   [phone]            Apr 11, 2018  5:00 PM CDT   Navigate Family Therapy with Shai Londono [facility]    [address] [place] [address]   [phone]            Apr 11, 2018  5:00 PM CDT   Navigate Psychotherapy with Anahi Summers [facility] Psychiatry ([facility])    [address] [place] [address]   [phone]            Apr 18, 2018  3:00 PM CDT   Navigate Psychotherapy with Anahi Summers [facility] Psychiatry ([facility])    [address] [place] [address]   [phone]            Apr 18, 2018  3:00 PM CDT   Navigate Family Therapy with Shai Londono [facility] Psychiatry ([facility])    [address]  [facility] [address]   [phone]            Apr 18, 2018  4:15 PM CDT   Navigate Medication Follow Up with JAVY Bhatt [facility] Psychiatry ([facility])    [address] [place] [address]   [phone]            Apr 25, 2018  5:00 PM CDT   Navigate Psychotherapy with Anahi Summers [facility]ry [facility])    [address]  [place] [address]  [facility] [address]   [phone]            2018  5:00 PM CDT   [facility] ([facility])    [address]  [facility] [address]   [phone]              Who to contact     Please call your clinic at [phone] to:    Ask questions about your health    Make or cancel appointments    Discuss your medicines    Learn about your test results    Speak to your doctor            Additional Information About Your Visit        Strobehart Information     GeoQuip is an electronic gateway that provides easy, online access to your medical records. With GeoQuip, you can request a clinic appointment, read your test results, renew a prescription or communicate with your care team.     To sign up for GeoQuip visit the website at [email or web address]   You will be asked to enter the access code listed below, as well as some personal information. Please follow the directions to create your username and password.     Your access code is: [email or web address]  Expires: 2018  7:30 AM     Your access code will  in 90 days. If you need help or a new code, please contact your [facility] or call [phone] for assistance.        Care EveryWhere ID     This is your Care EveryWhere ID. This could be used by other organizations to access your [facility] medical records  [id number]         Blood Pressure from Last 3 Encounters:   18 115/63   18 115/65   18 114/58    Weight from Last 3 Encounters:   18 148 lb 12.8 oz (67.5 kg)   18 146 lb 6.4 oz (66.4 kg)   18 147 lb 12.8 oz (67 kg)              Today, you had the following     No orders found for display       Primary Care Provider Office Phone # Fax #    [facility] [phone]       [address] [place] [address]        Equal Access to Services      MUNIRA MADDOX : Hadii aad ku flavio Diez, waaxda luqadaha, qaybta kaalmada adewally, keagan sallie kelechikp wells hebersantiago moser . So [facility] [phone].    ATENCIÓN: Si habla kirsten, tiene a grimm disposición servicios gratuitos de asistencia lingüística. Llame al [phone].    We comply with applicable federal civil rights laws and [place] laws. We do not discriminate on the basis of race, color, national origin, age, disability, sex, sexual orientation, or gender identity.            Thank you!     Thank you for choosing [facility] PSY[facility]IATRY  for your care. Our goal is always to provide you with excellent care. Hearing back from our patients is one way we can continue to improve our services. Please take a few minutes to complete the written survey that you may receive in the mail after your visit with us. Thank you!             Your Updated Medication List - Protect others around you: Learn how to safely use, store and throw away your medicines at www.disposemymeds.org.          This list is accurate as of 3/29/18  2:02 PM.  Always use your most recent med list.                   Brand Name Dispense Instructions for use Diagnosis    ALPRAZOLAM PO      Take 0.5 mg by mouth daily        * ARIPiprazole 10 MG tablet    ABILIFY    30 tablet    Take 1 tablet (10 mg) by mouth daily    Schizoaffective disorder, bipolar type (H)       * ARIPiprazole  MG extended release inj syringe    ABILIFY MAINTENA    1 Syringe    Inject 1 Syringe (400 mg) into the muscle every 28 days    Schizoaffective disorder, bipolar type (H)       cholecalciferol 1000 UNIT tablet    vitamin D3     Take 1,000 Units by mouth        lithium 450 MG CR tablet    ESKALITH    60 tablet    Take 2 tablets (900 mg) by mouth At Bedtime    Schizoaffective disorder, bipolar type (H)       * Notice:  This list has 2 medication(s) that are the same as other medications prescribed for you. Read the directions carefully, and ask your doctor or other care  provider to review them with you.

## 2018-03-29 NOTE — MR AVS SNAPSHOT
After Visit Summary   3/29/2018    Jerel Day    MRN: [id number]           Patient Information     Date Of Birth          1995        Visit Information        Provider Department      3/29/2018 1:00 PM Anahi Summers [facility] Psychiatry         Follow-ups after your visit        Your next 10 appointments already scheduled     Apr 04, 2018  5:00 PM CDT   Navigate Family Therapy with Shai Londono [facility] Psychiatry ([facility])    [address] [place] [address]   [phone]            Apr 04, 2018  5:00 PM CDT   Navigate Psychotherapy with Anahi Summers [facility] Psychiatry ([facility])    [address] [place] [address]   [phone]            Apr 11, 2018  5:00 PM CDT   Navigate Family Therapy with Shai Londono [facility] Psychiatry ([facility])    [address] [place] [address]   [phone]            Apr 11, 2018  5:00 PM CDT   Navigate Psychotherapy with Anahi Summers [facility] Psychiatry ([facility])    [address] [place] [address]   [phone]            Apr 18, 2018  3:00 PM CDT   Navigate Psychotherapy with Anahi Summers [facility] Psychiatry ([facility])    [address] [place] [address]   [phone]            Apr 18, 2018  3:00 PM CDT   Navigate Family Therapy with Shai Londono [facility] Psychiatry ([facility])    [address]  [facility] [address]   [phone]            Apr 18, 2018  4:15 PM CDT   Navigate Medication Follow Up with JAVY Bhatt [facility] Psychiatry ([facility])    [address] [place] [address]   [phone]            Apr 25, 2018  5:00 PM CDT   Navigate Psychotherapy with Anahi Summers [facility] Psychiatry ([facility])    [address]  [facility] [address] [phone]            2018  5:00 PM CDT   [facility])    [address]  [facility] [address] [phone]              Who to contact     Please call your clinic at [phone] to:    Ask questions about your health    Make or cancel appointments    Discuss your medicines    Learn about your test results    Speak to your doctor            Additional Information About Your Visit        NeoantigenicsharSkyhood Information     EQO is an electronic gateway that provides easy, online access to your medical records. With EQO, you can request a clinic appointment, read your test results, renew a prescription or communicate with your care team.     To sign up for EQO visit the website at [email or web address]   You will be asked to enter the access code listed below, as well as some personal information. Please follow the directions to create your username and password.     Your access code is: [email or web address]  Expires: 2018  7:30 AM     Your access code will  in 90 days. If you need help or a new code, please contact your [facility] or call [phone] for assistance.        Care EveryWhere ID     This is your Care EveryWhere ID. This could be used by other organizations to access your [place] medical records  [id number]         Blood Pressure from Last 3 Encounters:   18 115/63   18 115/65   18 114/58    Weight from Last 3 Encounters:   18 148 lb 12.8 oz (67.5 kg)   18 146 lb 6.4 oz (66.4 kg)   18 147 lb 12.8 oz (67 kg)              Today, you had the following     No orders found for display       Primary Care Provider Office Phone # Fax #    [facility] [phone]       [facility] [address] [address]        Equal Access to Services     MUNIRA MADDOX  AH: Rosauraii zev phillipssurendraisabel Chary, wazhouda luqadaha, qaybta kapina webb, keagan tonyin hayaakp wells clau shanti lu. So [facility] [phone].    ATENCIÓN: Si caprice curtis, tiene a grimm disposición servicios gratuitos de asistencia lingüística. Llame al [phone].    We comply with applicable federal civil rights laws and [place] laws. We do not discriminate on the basis of race, color, national origin, age, disability, sex, sexual orientation, or gender identity.            Thank you!     Thank you for choosing [facility] PSYCHIATRY  for your care. Our goal is always to provide you with excellent care. Hearing back from our patients is one way we can continue to improve our services. Please take a few minutes to complete the written survey that you may receive in the mail after your visit with us. Thank you!             Your Updated Medication List - Protect others around you: Learn how to safely use, store and throw away your medicines at www.disposemymeds.org.          This list is accurate as of 3/29/18  2:03 PM.  Always use your most recent med list.                   Brand Name Dispense Instructions for use Diagnosis    ALPRAZOLAM PO      Take 0.5 mg by mouth daily        * ARIPiprazole 10 MG tablet    ABILIFY    30 tablet    Take 1 tablet (10 mg) by mouth daily    Schizoaffective disorder, bipolar type (H)       * ARIPiprazole  MG extended release inj syringe    ABILIFY MAINTENA    1 Syringe    Inject 1 Syringe (400 mg) into the muscle every 28 days    Schizoaffective disorder, bipolar type (H)       cholecalciferol 1000 UNIT tablet    vitamin D3     Take 1,000 Units by mouth        lithium 450 MG CR tablet    ESKALITH    60 tablet    Take 2 tablets (900 mg) by mouth At Bedtime    Schizoaffective disorder, bipolar type (H)       * Notice:  This list has 2 medication(s) that are the same as other medications prescribed for you. Read the directions carefully, and ask your doctor or other care provider to  review them with you.

## 2018-03-29 NOTE — PROGRESS NOTES
NAVIGATE SEE Progress Note   For Supported Employment & Education    NAVIGATE Enrollee: Jerel Day (1995)     MRN: [id number]  Date:  3/29/18  Clinician: MIQUELATE Supported Employment & , Ana Michel    1. Client Status Update:   Jerel Day is interested in employment (Client developed employement goals)    2. People present:   SEE/Writer  Client: Jerel Day    3. Total number of persons who participated in contact: (do not count yourself/SEE) 1    4. Length of Actual Contact: 40 minutes   Traveled? No    5. Location of contact:  Psychiatry Clinic, [place]    6. Brief description of session, contact, or client status (include: strategies, interventions, client reaction to contact, next steps, etc)    Jerel and this writer met at the [facility] [place]. Jerel says he has been 'up to the same old things' like playing guitar, video games, going for walks, working out, spending time with family and looking for some part time jobs. He says he has been feeling a little down lately and not sure what he wants to do for school or work. He would like his own apartment but realizes he will need money for this. He also feels unsure about returning to school especially since he doesn't have a clear idea of what his major could be since he has mostly generals complete at this time. He did say he 'just wants to get his degree done, but am not sure if its the right time'. This writer offered to review his transcripts and see what majors might be possible in the future.   Jerel said he is not currently interested in volunteering, but would like a very part time job just to get some money. He suggested maybe a restaurant job in [place], where he'd like to move. He does not have a resume and said he isn't very good at interviewing, so the next appt we will start a resume and take the  [facility] interest  online to see what types of jobs might be a good fit for him.   Jerel informed this  writer that he is concerned about his injections and that it hurt his arm for a week so he is debating whether or not to get another one. This writer will route comment to Brittani Cage.    7. Completion of mutually agreed upon client task from previous meeting:  Nothing Completed     8. Orientation and Treatment Planning:  Pursuing current SEE goals    9. Assessment:  Gathering SEE information/inventory regarding work and/or education history, skills, goals, and preferences with client    10. Placement:  Not Applicable    11. Follow Along Supports: (for clients who are working or attending school)   Not Applicable    12. Mutually agreed upon client task for next meeting:     Come up with list of skills for resume    13. Next Meeting Scheduled for: next wed at 1pm at his house    Ana LAFLEURATE Supported Employment &

## 2018-03-29 NOTE — MR AVS SNAPSHOT
After Visit Summary   3/29/2018    Jerel Day    MRN: [id number]           Patient Information     Date Of Birth          1995        Visit Information        Provider Department      3/29/2018 2:00 PM Ana Michel [facility] Psychiatry         Follow-ups after your visit        Your next 10 appointments already scheduled     Apr 04, 2018  5:00 PM CDT   Navigate Family Therapy with Shai Londono [facility] Psychiatry ([facility])    [address] [place] [address]   [phone]            Apr 04, 2018  5:00 PM CDT   Navigate Psychotherapy with Anahi Summers [facility] Psychiatry ([facility])    [address] [place] [address]   [phone]            Apr 11, 2018  5:00 PM CDT   Navigate Family Therapy with Shai Londono [facility] Psychiatry ([facility])    [address] [place] [address]   [phone]            Apr 11, 2018  5:00 PM CDT   Navigate Psychotherapy with Anahi Summers [facility] Psychiatry ([facility])    [address] [place] [address]   [phone]            Apr 18, 2018  3:00 PM CDT   Navigate Psychotherapy with Anahi Summers [facility] Psychiatry ([facility])    [address] [place] [address]   [phone]            Apr 18, 2018  3:00 PM CDT   Navigate Family Therapy with Shai Londono [facility] Psychiatry ([facility])    [address]  [facility] [address]   [phone]            Apr 18, 2018  4:15 PM CDT   Navigate Medication Follow Up with JAVY Bhatt [facility] Psychiatry ([facility])    [address]  [facility] [address]   [phone]            Apr 25, 2018  5:00 PM CDT   Navigate Psychotherapy with Anahi Summers [facility] Psychiatry ([facility])    [address]  [facility] [address] [phone]            2018  5:00 PM CDT   [facility])    [address]  [facility] [address] [phone]              Who to contact     Please call your clinic at [phone] to:    Ask questions about your health    Make or cancel appointments    Discuss your medicines    Learn about your test results    Speak to your doctor            Additional Information About Your Visit        FliptopharVoiceTrust Information     AcademixDirect is an electronic gateway that provides easy, online access to your medical records. With AcademixDirect, you can request a clinic appointment, read your test results, renew a prescription or communicate with your care team.     To sign up for AcademixDirect visit the website at [email or web address]   You will be asked to enter the access code listed below, as well as some personal information. Please follow the directions to create your username and password.     Your access code is: [email or web address]  Expires: 2018  7:30 AM     Your access code will  in 90 days. If you need help or a new code, please contact your [facility] or call [phone] for assistance.        Care EveryWhere ID     This is your Care EveryWhere ID. This could be used by other organizations to access your [place] medical records  [id number]         Blood Pressure from Last 3 Encounters:   18 115/63   18 115/65   18 114/58    Weight from Last 3 Encounters:   18 148 lb 12.8 oz (67.5 kg)   18 146 lb 6.4 oz (66.4 kg)   18 147 lb 12.8 oz (67 kg)              Today, you had the following     No orders found for display       Primary Care Provider Office Phone # Fax #    [facility] [phone]       [facility] [address] [address]        Equal Access to Services     MUNIRA MADDOX  AH: Rosauraii zev phillipssurendraisabel Chary, wazhouda luqadaha, qaybta kapina webb, keagan tonyin hayaakp wells clau shanti lu. So [facility] [phone].    ATENCIÓN: Si caprice curtis, tiene a grimm disposición servicios gratuitos de asistencia lingüística. Llame al [phone].    We comply with applicable federal civil rights laws and [place] laws. We do not discriminate on the basis of race, color, national origin, age, disability, sex, sexual orientation, or gender identity.            Thank you!     Thank you for choosing [facility] PSYCHIATRY  for your care. Our goal is always to provide you with excellent care. Hearing back from our patients is one way we can continue to improve our services. Please take a few minutes to complete the written survey that you may receive in the mail after your visit with us. Thank you!             Your Updated Medication List - Protect others around you: Learn how to safely use, store and throw away your medicines at www.disposemymeds.org.          This list is accurate as of 3/29/18  2:58 PM.  Always use your most recent med list.                   Brand Name Dispense Instructions for use Diagnosis    ALPRAZOLAM PO      Take 0.5 mg by mouth daily        * ARIPiprazole 10 MG tablet    ABILIFY    30 tablet    Take 1 tablet (10 mg) by mouth daily    Schizoaffective disorder, bipolar type (H)       * ARIPiprazole  MG extended release inj syringe    ABILIFY MAINTENA    1 Syringe    Inject 1 Syringe (400 mg) into the muscle every 28 days    Schizoaffective disorder, bipolar type (H)       cholecalciferol 1000 UNIT tablet    vitamin D3     Take 1,000 Units by mouth        lithium 450 MG CR tablet    ESKALITH    60 tablet    Take 2 tablets (900 mg) by mouth At Bedtime    Schizoaffective disorder, bipolar type (H)       * Notice:  This list has 2 medication(s) that are the same as other medications prescribed for you. Read the directions carefully, and ask your doctor or other care provider to  review them with you.

## 2018-03-30 NOTE — PROGRESS NOTES
NAVIGATE Clinician Contact & Progress Note  For Individual Resiliency Training (IRT)  A Part of the [facility] First Episode of Psychosis Program    NAVIGATE Enrollee: Jerel Day (1995)     MRN: [id number]  Date:  3/29/18  Diagnosis: Schizoaffective disorder, bipolar type (F25.0)  Clinician: LILLIANA Individual Resiliency Trainer, EDELMIRA Reynolds     1. Type of contact: (majority of time spent)  IRT Session    2. People present:   Client  NAVIGATE IRT/writer    3. Total number of persons who participated in contact: 2, including writer    4. Length of Actual Contact: Start Time: 1:05; End Time: 1:42   Traveled?  No    5. Location of contact:  Psychiatry Clinic, [facility]    6. Did the client complete the home practice option(s) from the previous session: No    7. Motivational Teaching Strategies:  Connect info and skills with personal goals  Promote hope and positive expectations  Explore pros and cons of change  Re-frame experiences in positive light    8. Educational Teaching Strategies:  Review of written material/education  Relate information to client's experience  Ask questions to check comprehension  Break down information into small chunks  Adopt client's language    9. CBT Teaching Strategies:  Reinforcement and shaping (positive feedback for steps towards goals, gains in knowledge & skills, follow-through on home assignments)    Relapse prevention planning (review of stressors, early warning signs, written plan to respond to signs, rehearse plan)    10. IRT Module(s) Addressed:  Module 3 - Education about Psychosis  Module 10 - Substance Use    11. Techniques utilized:   [place] announced at beginning of session  Review of previous meeting  Present new material  Problem-solving practice  Help client choose a home practice option  Summarize progress made in current session    12. Mental Status Exam:    Alertness: drowsy  Appearance: casually groomed  Behavior/Demeanor: cooperative and calm, with fair   "eye contact   Speech: normal and regular rate and rhythm  Language: intact. Preferred language identified as English.  Psychomotor: normal or unremarkable   Mood: depressed  Affect: flat; was congruent to mood; was congruent to content  Thought Process/Associations: unremarkable  Thought Content:  Reports delusions and paranoid ideation;  Denies suicidal and violent ideation  Perception:  Reports auditory hallucinations;  Denies none  Insight: adequate  Judgment: adequate for safety  Cognition: does  appear grossly intact; formal cognitive testing was not done  Suicidal ideation: denies SI, denies intent,  and denies plan  Homicidal Ideation: denies    13. Assessment/Progress Note:     This writer met with Jerel for a follow-up IRT Session. He mentioned he has been feeling less depressed. He used a small amount of marijuana once since last week. He reported drinking a 20 oz. beer yesterday and \"feeling a buzz\" on Friday night. He said marijuana helps him to feel euphoria and have less stomach pain. He does notice an increase in paranoia if he's using in a \"bad surrounding,\" such as when people around him are stressed or if there is bad traffic. However, he indicated marijuana improves his mood, makes him more creative, allows him to socialize, and increases his humor. He noted his use has caused him to have lowered motivation, stress with roommates in past, intensified paranoia, and arguments with parents.  He is not experiencing the same amount of euphoria as prior to the episode. Alcohol is a way for him to connect with friends and makes it easier to communicate. He does report hangover effects (headache, upset stomach) and says stretching helps with this. He shared tobacco has been a way to increase his focus/concentration. He believes tobacco has caused more anxiety and rumination, which decreases his energy. Jerel discussed having paranoia daily. He said he believes the entity is sometimes a person and " "occasionally an alien. He believes they are taking the information he is learning and broadcasting it. He is able to reality test this most days. He said the paranoia is much better on days when his back is in alignment. Jerel denied any suicidal or homicidal thoughts at this time.    14. Plan/Referrals:     This writer will continue to utilize motivational interviewing and psycho-education to reduce substance use.     This writer will continue to provide coping mechanisms and stress management skills.     Billing for \"Interactive Complexity\"?  No    [facility] Individual Resiliency Trainer    Attestation:    I did not see this patient directly. This patient is discussed with me in individual clinical social work supervision, and I agree with the plan as documented.     AGA Alexandre, SHIRA, April 1, 2018    Attestation:    I did not see this patient directly. This patient is discussed with the NAVIGATE Team Director, AGA Narvaez LICSW, and myself in individual clinical social work supervision, and I agree with the plan as documented.     AGA Huerta, SHIRA, 7/6/18  "

## 2018-03-31 ASSESSMENT — PATIENT HEALTH QUESTIONNAIRE - PHQ9: SUM OF ALL RESPONSES TO PHQ QUESTIONS 1-9: 12

## 2018-04-01 NOTE — PROGRESS NOTES
NAVIGATE Clinician Contact & Progress Note   For Family Education Program    NAVIGATE Enrollee: Jerel Day (1995)     MRN: [id number]  Date:  3/29/18  Diagnosis(es): Schizoaffective disorder, bipolar type  Clinician: NAVIGATE Director & Family Clinician, [facility]     1. Type of contact: (majority of time spent)  Family Session     2. People present:   Writer  Significant Other/Family/Friend: Mother, Father     3. Total number of persons who participated in contact: 3, including writer     4. Length of Actual Contact: Start Time: 1pm; End Time: 2pm                          Traveled?    No      5. Location of contact:  Psychiatry Clinic, [place]     6. Did the client complete the home practice option(s) from the previous session: Completed     7. Motivational Teaching Strategies:  Connect info and skills with personal goals  Promote hope and positive expectations  Re-frame experiences in positive light     8. Educational Teaching Strategies:  Review of written material/education  Relate information to client's experience  Ask questions to check comprehension  Break down information into small chunks  Adopt client's language      9. CBT Teaching Strategies:  Reinforcement and shaping (positive feedback for steps towards goals, gains in knowledge & skills and follow-through on home assignments)     10. Psychoeducational Topic(s) Addressed:  Just the Facts - Effective Communication     11. Techniques utilized:   [place] announced at beginning of session  Review of homework  Review of goal  Review of previous meeting  Present new material  Help client choose a home practice option  Summarize progress made in current session     12. Assessment/Progress Note:      Began Just the Facts - Effective Communication. Discussed how psychosis can disrupt communication.  Acknowledged that these problems with communication can lead to high levels of stress in families, thus potentially interfering with  "relationships and contributing to an increase or relapse in symptoms of psychosis.     Taught strategies/pointers for improving communication, resolving conflict, and developing a supportive family environment including - get to the point, use \"I\" statements, use feeling words, speak for yourself and not for others, listen at least as much as you talk, and focus on behavior instead of personality. Parents indicated the following communication problems/challenges;    -Responding to irritability  -Using \"I\" statements and expressing positive feelings  -Intervening versus not intervening in the absence of self care and self med-mgmt  -Responding to impulsivity   -Making requests for assistance   -Interacting verbally outside of an illness mgmt. Or practical (day to day) context.  -Engaging in fun activities together    Taught techniques for improved communication including:  -Expressing positive feelings (ie tell the person what he/she did that pleased you and how it made you feel)  -Making positive requests (ie make a specific request and tell the person how you would feel if that request were granted)  -Expressing negative feelings (ie tell the person what he/she did that displeased you, how it made you feel, and if possible, make a request for change)  -Compromise and negotiation (ie explain your viewpoint, listen to the other's viewpoint, repeat back what you heard, suggest a compromise, and be open to talk ing over other possible compromises)  -Requesting a time-out (ie indicate that the situation is stressful, tell the person that it is interfering with good communications, explain that you would like to take a temporary break, and say when you will be ready to talk and problem solve the situation)    Home practice identified as: Father indicated he will practice listening, and mother indicated she would practice expressing positive feelings and using \"I\" statements.  Writer Emphasized the importance of " practice.    Overall family was engaged in conversation. They did express interest in continuing to meet for family therapy and psychoeducation. As of today's appt their insight into Jerel's mental illness appears adequate. They seem they would benefit from continued clinical intervention aimed at assisting them implement helpful strategies at home and increase their understanding of psychosis.       13. Plan/Referrals:       Will complete second part of Just the Facts - Effective Communication     Will meet with family weekly as schedule allows for evidence based family psychoeducation and therapeutic support aimed at maximizing Kervin's opportunity for recovery from psychosis.        Shai Londono, [facility]   NAVIGATE Director & Family Clinician

## 2018-04-04 ENCOUNTER — TELEPHONE (OUTPATIENT)
Dept: PSYCHIATRY | Facility: CLINIC | Age: 23
End: 2018-04-04

## 2018-04-04 ENCOUNTER — OFFICE VISIT (OUTPATIENT)
Dept: PSYCHIATRY | Facility: CLINIC | Age: 23
End: 2018-04-04
Payer: COMMERCIAL

## 2018-04-04 DIAGNOSIS — F25.0 SCHIZOAFFECTIVE DISORDER, BIPOLAR TYPE (H): Primary | ICD-10-CM

## 2018-04-04 NOTE — TELEPHONE ENCOUNTER
NAVIGATE SEE Incoming Telephone Call  For Supported Employment & Education    NAVIGATE Enrollee: Ralf Day (1995)     MRN: [id number]  Incoming Call Received on: 4/4/18 12:45pm  Call Received from: Ralf WEBER's response to incoming call:   This writer had texted ralf reminding him of his appt at 1pm at his house. He called this writer back at 1245 and stated that he would not be able to attend the appt as he was at the [facility] with his brother. He requested that I meet with him tomorrow at 530 pm as he has an appt in clinic w/anahi. This writer informed him that his appt with Anahi is actually scheduled for today. We rescheduled appt for Thur at 1pm at his house.     Ana Michel

## 2018-04-04 NOTE — MR AVS SNAPSHOT
After Visit Summary   4/4/2018    Jerel Day    MRN: [id number]           Patient Information     Date Of Birth          1995        Visit Information        Provider Department      4/4/2018 5:00 PM Shai Londono, [facility] Psychiatry        Today's Diagnoses     Schizoaffective disorder, bipolar type (H)    -  1       Follow-ups after your visit        Your next 10 appointments already scheduled     Apr 11, 2018  5:00 PM CDT   Navigate Family Therapy with Shai Londono, [facility] Psychiatry ([facility])    [address] [place] [id number]   [phone]            Apr 11, 2018  5:00 PM CDT   Navigate Psychotherapy with Anahi Summers [facility] Psychiatry ([facility])    [address] [place] [address]   [phone]            Apr 18, 2018  3:00 PM CDT   Navigate Psychotherapy with Anahi Summers [facility] Psychiatry ([facility])    [address] [place] [address]   [phone]            Apr 18, 2018  3:00 PM CDT   Navigate Family Therapy with Shai Londono [facility] Psychiatry ([facility])    [address] [place] [address]   [phone]            Apr 18, 2018  4:15 PM CDT   Navigate Medication Follow Up with JAVY Bhatt [facility] Psychiatry ([facility])    [address] [place] [id number]   [phone]              Who to contact     Please call your clinic at [phone] to:    Ask questions about your health    Make or cancel appointments    Discuss your medicines    Learn about your test results    Speak to your doctor            Additional Information About Your Visit        Jamalonhart Information     [email or web address] is an electronic gateway that provides easy, online access to your medical records. With [email or web address], you can request a clinic appointment, read your test results, renew a  prescription or communicate with your care team.     To sign up for MyChart visit the website at [email or web address]   You will be asked to enter the access code listed below, as well as some personal information. Please follow the directions to create your username and password.     Your access code is: [email or web address]  Expires: 2018  7:30 AM     Your access code will  in 90 days. If you need help or a new code, please contact your [facility] or call [phone] for assistance.        Care EveryWhere ID     This is your Care EveryWhere ID. This could be used by other organizations to access your [place] medical records  [id number]         Blood Pressure from Last 3 Encounters:   18 115/63   18 115/65   18 114/58    Weight from Last 3 Encounters:   18 67.5 kg (148 lb 12.8 oz)   18 66.4 kg (146 lb 6.4 oz)   18 67 kg (147 lb 12.8 oz)              Today, you had the following     No orders found for display       Primary Care Provider Office Phone # Fax #    [facility] [phone]       [address] [place] [address]        Equal Access to Services     MUNIRA MADDOX AH: Hadii aad ku hadasho Soomaali, waaxda luqadaha, qaybta kaalmada adeegyada, waxay idiin hayaan russell moser . So [facility] [phone].    ATENCIÓN: Si habla español, tiene a grimm disposición servicios gratuitos de asistencia lingüística. Llame al [phone].    We comply with applicable federal civil rights laws and [place] laws. We do not discriminate on the basis of race, color, national origin, age, disability, sex, sexual orientation, or gender identity.            Thank you!     Thank you for choosing [facility] PSYCHIATRY  for your care. Our goal is always to provide you with excellent care. Hearing back from our patients is one way we can continue to improve our services. Please take a few minutes to complete the written  survey that you may receive in the mail after your visit with us. Thank you!             Your Updated Medication List - Protect others around you: Learn how to safely use, store and throw away your medicines at www.disposemymeds.org.          This list is accurate as of 4/4/18 11:59 PM.  Always use your most recent med list.                   Brand Name Dispense Instructions for use Diagnosis    ALPRAZOLAM PO      Take 0.5 mg by mouth daily        * ARIPiprazole 10 MG tablet    ABILIFY    30 tablet    Take 1 tablet (10 mg) by mouth daily    Schizoaffective disorder, bipolar type (H)       * ARIPiprazole  MG extended release inj syringe    ABILIFY MAINTENA    1 Syringe    Inject 1 Syringe (400 mg) into the muscle every 28 days    Schizoaffective disorder, bipolar type (H)       cholecalciferol 1000 UNIT tablet    vitamin D3     Take 1,000 Units by mouth        lithium 450 MG CR tablet    ESKALITH    60 tablet    Take 2 tablets (900 mg) by mouth At Bedtime    Schizoaffective disorder, bipolar type (H)       * Notice:  This list has 2 medication(s) that are the same as other medications prescribed for you. Read the directions carefully, and ask your doctor or other care provider to review them with you.

## 2018-04-04 NOTE — MR AVS SNAPSHOT
After Visit Summary   4/4/2018    Jerel Day    MRN: [id number]           Patient Information     Date Of Birth          1995        Visit Information        Provider Department      4/4/2018 5:00 PM Anahi Summers [facility] Psychiatry        Today's Diagnoses     Schizoaffective disorder, bipolar type (H)    -  1       Follow-ups after your visit        Your next 10 appointments already scheduled     Apr 11, 2018  5:00 PM CDT   Navigate Family Therapy with Shai Londono [facility] Psychiatry ([facility])    [address] [place] [address]   [phone]            Apr 11, 2018  5:00 PM CDT   Navigate Psychotherapy with Anahi Summers [facility] Psychiatry ([facility])    [address] [place] [address]   [phone]            Apr 18, 2018  3:00 PM CDT   Navigate Psychotherapy with Anahi Summers [facility] Psychiatry ([facility])    [address] [place] [address]   [phone]            Apr 18, 2018  3:00 PM CDT   Navigate Family Therapy with Shai Londono [facility] Psychiatry ([facility])    [address] [place] [address]   [phone]            Apr 18, 2018  4:15 PM CDT   Navigate Medication Follow Up with JAVY Bhatt [facility] Psychiatry ([facility])    [address] [place] [address]   [phone]            Apr 25, 2018  5:00 PM CDT   Navigate Psychotherapy with Anahi Summers [facility] Psychiatry ([facility])    [address] [place] [address]   [phone]            Apr 25, 2018  5:00 PM CDT   Navigate Family Therapy with Shai Londono [facility] Psychiatry ([facility])    [address] [place] [id number] [phone]              Who to contact     Please call your clinic at [phone]  to:    Ask questions about your health    Make or cancel appointments    Discuss your medicines    Learn about your test results    Speak to your doctor            Additional Information About Your Visit        MyChart Information     [employer] is an electronic gateway that provides easy, online access to your medical records. With [employer], you can request a clinic appointment, read your test results, renew a prescription or communicate with your care team.     To sign up for [employer] visit the website at [email or web address]   You will be asked to enter the access code listed below, as well as some personal information. Please follow the directions to create your username and password.     Your access code is: [email or web address]  Expires: 2018  7:30 AM     Your access code will  in 90 days. If you need help or a new code, please contact your [facility] or call [phone] for assistance.        Care EveryWhere ID     This is your Care EveryWhere ID. This could be used by other organizations to access your [place] medical records  [id number]         Blood Pressure from Last 3 Encounters:   18 115/63   18 115/65   18 114/58    Weight from Last 3 Encounters:   18 67.5 kg (148 lb 12.8 oz)   18 66.4 kg (146 lb 6.4 oz)   18 67 kg (147 lb 12.8 oz)              Today, you had the following     No orders found for display       Primary Care Provider Office Phone # Fax #    [facility] [phone]       [address] [place] [address]        Equal Access to Services     [facility] : Hadii aad ku hadasho Soomaali, waaxda luqadaha, qaybta kaalmada adeegyada, keagan lu. So [facility] [phone].    ATENCIÓN: Si habla español, tiene a grimm disposición servicios gratuitos de asistencia lingüística. Julioame al [phone].    We comply with applicable federal civil rights laws and  [place] laws. We do not discriminate on the basis of race, color, national origin, age, disability, sex, sexual orientation, or gender identity.            Thank you!     Thank you for choosing [facility] PSYCHIATRY  for your care. Our goal is always to provide you with excellent care. Hearing back from our patients is one way we can continue to improve our services. Please take a few minutes to complete the written survey that you may receive in the mail after your visit with us. Thank you!             Your Updated Medication List - Protect others around you: Learn how to safely use, store and throw away your medicines at www.disposemymeds.org.          This list is accurate as of 4/4/18 11:59 PM.  Always use your most recent med list.                   Brand Name Dispense Instructions for use Diagnosis    ALPRAZOLAM PO      Take 0.5 mg by mouth daily        * ARIPiprazole 10 MG tablet    ABILIFY    30 tablet    Take 1 tablet (10 mg) by mouth daily    Schizoaffective disorder, bipolar type (H)       * ARIPiprazole  MG extended release inj syringe    ABILIFY MAINTENA    1 Syringe    Inject 1 Syringe (400 mg) into the muscle every 28 days    Schizoaffective disorder, bipolar type (H)       cholecalciferol 1000 UNIT tablet    vitamin D3     Take 1,000 Units by mouth        lithium 450 MG CR tablet    ESKALITH    60 tablet    Take 2 tablets (900 mg) by mouth At Bedtime    Schizoaffective disorder, bipolar type (H)       * Notice:  This list has 2 medication(s) that are the same as other medications prescribed for you. Read the directions carefully, and ask your doctor or other care provider to review them with you.

## 2018-04-05 ENCOUNTER — OFFICE VISIT (OUTPATIENT)
Dept: PSYCHIATRY | Facility: CLINIC | Age: 23
End: 2018-04-05
Payer: COMMERCIAL

## 2018-04-05 DIAGNOSIS — F25.0 SCHIZOAFFECTIVE DISORDER, BIPOLAR TYPE (H): Primary | ICD-10-CM

## 2018-04-05 NOTE — MR AVS SNAPSHOT
After Visit Summary   4/5/2018    Jerel Day    MRN: [id number]           Patient Information     Date Of Birth          1995        Visit Information        Provider Department      4/5/2018 1:00 PM Ana Michel [facility] Psychiatry        Today's Diagnoses     Schizoaffective disorder, bipolar type (H)    -  1       Follow-ups after your visit        Your next 10 appointments already scheduled     Apr 11, 2018  5:00 PM CDT   Navigate Family Therapy with Shai Londono [facility] Psychiatry ([facility])    [address] [place] [address]   [phone]            Apr 11, 2018  5:00 PM CDT   Navigate Psychotherapy with Anahi Summers [facility] Psychiatry ([facility])    [address] [place] [address]   [phone]            Apr 18, 2018  3:00 PM CDT   Navigate Psychotherapy with Anahi Summers [facility] Psychiatry ([facility])    [address] [place] [address]   [phone]            Apr 18, 2018  3:00 PM CDT   Navigate Family Therapy with Shai Londono [facility] Psychiatry ([facility])    [address] [place] [address]   [phone]            Apr 18, 2018  4:15 PM CDT   Navigate Medication Follow Up with JAVY Bhatt [facility] Psychiatry ([facility])    [address] [place] [address]   [phone]            Apr 25, 2018  5:00 PM CDT   Navigate Psychotherapy with Anahi Summers [facility] Psychiatry ([facility])    [address] [place] [address] [phone]            Apr 25, 2018  5:00 PM CDT   Navigate Family Therapy with Shai Londono [facility] Psychiatry ([facility])    [address] [place] [id number] [phone]              Who to contact     Please call your clinic at [phone]  to:    Ask questions about your health    Make or cancel appointments    Discuss your medicines    Learn about your test results    Speak to your doctor            Additional Information About Your Visit        MyChart Information     [employer] is an electronic gateway that provides easy, online access to your medical records. With [employer], you can request a clinic appointment, read your test results, renew a prescription or communicate with your care team.     To sign up for [employer] visit the website at [email or web address]   You will be asked to enter the access code listed below, as well as some personal information. Please follow the directions to create your username and password.     Your access code is: [email or web address]  Expires: 2018  7:30 AM     Your access code will  in 90 days. If you need help or a new code, please contact your [facility] or call [phone] for assistance.        Care EveryWhere ID     This is your Care EveryWhere ID. This could be used by other organizations to access your [facility] medical records  [id number]         Blood Pressure from Last 3 Encounters:   18 115/63   18 115/65   18 114/58    Weight from Last 3 Encounters:   18 67.5 kg (148 lb 12.8 oz)   18 66.4 kg (146 lb 6.4 oz)   18 67 kg (147 lb 12.8 oz)              Today, you had the following     No orders found for display       Primary Care Provider Office Phone # Fax #    [facility] [phone]       [address] [place] [address]        Equal Access to Services     [facility] : Hadii aad ku hadasho Soomaali, waaxda luqadaha, qaybta kaalmada adeegyada, keagan lu. So [facility] [phone].    ATENCIÓN: Si habla español, tiene a grimm disposición servicios gratuitos de asistencia lingüística. Julioame al [phone].    We comply with applicable federal civil rights laws and  [place] laws. We do not discriminate on the basis of race, color, national origin, age, disability, sex, sexual orientation, or gender identity.            Thank you!     Thank you for choosing [facility] PSYCHIATRY  for your care. Our goal is always to provide you with excellent care. Hearing back from our patients is one way we can continue to improve our services. Please take a few minutes to complete the written survey that you may receive in the mail after your visit with us. Thank you!             Your Updated Medication List - Protect others around you: Learn how to safely use, store and throw away your medicines at www.disposemymeds.org.          This list is accurate as of 4/5/18 11:59 PM.  Always use your most recent med list.                   Brand Name Dispense Instructions for use Diagnosis    ALPRAZOLAM PO      Take 0.5 mg by mouth daily        * ARIPiprazole 10 MG tablet    ABILIFY    30 tablet    Take 1 tablet (10 mg) by mouth daily    Schizoaffective disorder, bipolar type (H)       * ARIPiprazole  MG extended release inj syringe    ABILIFY MAINTENA    1 Syringe    Inject 1 Syringe (400 mg) into the muscle every 28 days    Schizoaffective disorder, bipolar type (H)       cholecalciferol 1000 UNIT tablet    vitamin D3     Take 1,000 Units by mouth        lithium 450 MG CR tablet    ESKALITH    60 tablet    Take 2 tablets (900 mg) by mouth At Bedtime    Schizoaffective disorder, bipolar type (H)       * Notice:  This list has 2 medication(s) that are the same as other medications prescribed for you. Read the directions carefully, and ask your doctor or other care provider to review them with you.

## 2018-04-05 NOTE — PROGRESS NOTES
NAVIGSTEPHEN Clinician Contact & Progress Note  For Individual Resiliency Training (IRT)  A Part of the [facility] First Episode of Psychosis Program    NAVIGATE Enrollee: Jerel Day (1995)     MRN: [id number]  Date:  4/04/18  Diagnosis: Schizoaffective disorder, bipolar type (F25.0)  Clinician: LILLIANA Individual Resiliency Trainer, EDELMIRA Reynolds     1. Type of contact: (majority of time spent)  IRT Session    2. People present:   Writer  Client: Jerel Day    3. Total number of persons who participated in contact: 2, including writer    4. Length of Actual Contact: Start Time: 5:10; End Time: 5:55   Traveled?    No     5. Location of contact:  [facility] [place]    6. Did the client complete the home practice option(s) from the previous session: Nothing Completed    7. Motivational Teaching Strategies:  Connect info and skills with personal goals  Promote hope and positive expectations  Re-frame experiences in positive light    8. Educational Teaching Strategies:  Relate information to client's experience  Ask questions to check comprehension  Break down information into small chunks  Adopt client's language     9. CBT Teaching Strategies:  Reinforcement and shaping (positive feedback for steps towards goals and gains in knowledge & skills)  Relapse prevention planning (review of stressors)    10. IRT Module(s) Addressed:  Module 10 - Substance Use    11. Techniques utilized:   [place] announced at beginning of session  Review of homework  Review of goal  Review of previous meeting  Problem-solving practice  Summarize progress made in current session    12. Mental Status Exam:    Alertness: alert , drowsy and sleepy  Appearance: disheveled  Behavior/Demeanor: calm, passive and guarded, with fair  eye contact   Speech: normal and regular rate and rhythm  Language: intact. Preferred language identified as English.  Psychomotor: normal or unremarkable  Mood: depressed  Affect: flat and indifferent;  "was congruent to mood; was not congruent to content  Thought Process/Associations: unremarkable  Thought Content:  Reports delusions and paranoid ideation;  Denies suicidal and violent ideation  Perception:  Reports none;  Denies visual hallucinations  Insight: adequate  Judgment: good  Cognition: does  appear grossly intact; formal cognitive testing was not done  Suicidal ideation: denies SI, denies intent,  and denies plan  Homicidal Ideation: denies    13. Assessment/Progress Note:     This writer met with Jerel for a follow-up IRT Session. Jerel mentioned he is continuing to experience paranoia daily and is noticing brain fog or a reduced thinking speed. He stated his back has begun hurting more frequently and he would like to start pain medications. He has experienced anhedonia while playing video games or searching the Internet. He has made some new friends near his brother's apartment. However, he would like to meet more women and is unsure how to initiate this. He described feeling lonely often. He agreed to purchase a new book that he has been interested in and begin reading, in order to increase enjoyment. This writer had Jerel describe an enjoyable day. He said this would include: yoga, breakfast, playing guitar, going to lunch for [employer], and going for a walk around the city. Most of the above he currently is doing, but feels limited on transportation. He reported having low self-esteem. After saying this, he was observed to \"shut down\" emotionally and did not want to continue the conversation. This writer re-engaged him with a conversation about substance use. He said he has used a small amount of marijuana, but more alcohol over the past week. He would like more information regarding how substances alter psychosis symptoms.     14. Plan/Referrals:     This writer will send articles and videos noting the impact of marijuana to his email.     This writer will continue to provide education and coping " "strategies.    This writer will continue to consult with the team.    Billing for \"Interactive Complexity\"?    No      [facility] Individual Resiliency Trainer    Attestation:    I did not see this patient directly. This patient is discussed with me in individual clinical social work supervision, and I agree with the plan as documented.     AGA Alexandre, SHIRA, April 13, 2018    Attestation:    I did not see this patient directly. This patient is discussed with the NAVIGATE Team Director, AGA Narvaez, SHIRA, and myself in individual clinical social work supervision, and I agree with the plan as documented.     AGA Huerta, SHIRA, 7/6/18  "

## 2018-04-06 NOTE — PROGRESS NOTES
NAVIGATE SEE Progress Note   For Supported Employment & Education    NAVIGATE Enrollee: Jerel Day (1995)     MRN: [id number]  Date:  4/5/18  Clinician: NAVIGATE Supported Employment & , Ana Michel    1. Client Status Update:   Jerel Day is interested in employment (Client completed Career Profile)    2. People present:   SEE/Writer  Client: Jerel Day    3. Total number of persons who participated in contact: (do not count yourself/SEE) 1    4. Length of Actual Contact: 75 minutes   Traveled? Yes  Total Travel Time: 30 minutes    5. Location of contact:  Client's Home    6. Brief description of session, contact, or client status (include: strategies, interventions, client reaction to contact, next steps, etc)    Jerel and this writer met at his home in [place]. Jerel says he is has been doing ok, but has been bored and feels a little depressed. Jerel and this writer created a resume for him and reviewed his previous work and school experiences. Of note, Jerel was an AP scholar in high school, had an almost perfect GPA and took 10 AP tests. 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 Jerel also took the [place] D[employer] interest profile assessment though the results were not helpful to Jerel. Our next appt we will review part time jobs that this writer and Jerel will explore and weigh the pros and cons and/or tour businesses        7. Completion of mutually agreed upon client task from previous meeting:  Not Applicable    8. Orientation and Treatment Planning:  Developing SEE treatment plan goals    9. Assessment:  Gathering SEE information/inventory regarding work and/or education history, skills, goals, and preferences with client    10. Placement:  Not Applicable    11. Follow Along Supports: (for clients who are working or attending school)    Not Applicable    12. Mutually agreed upon client task for next meeting:     na    13. Next Meeting Scheduled for: next wed at 1pm    Ana TIJERINA Supported Employment &

## 2018-04-10 ASSESSMENT — PATIENT HEALTH QUESTIONNAIRE - PHQ9: SUM OF ALL RESPONSES TO PHQ QUESTIONS 1-9: 12

## 2018-04-10 NOTE — PROGRESS NOTES
NAVIGATE Clinician Contact & Progress Note   For Family Education Program    NAVIGATE Enrollee: Jerel Day (1995)     MRN: [id number]  Date:  4/04/18  Diagnosis(es): Schizoaffective disorder, bipolar type  Clinician: NAVIGATE Director & Family Clinician, SHIRA Alexandre     1. Type of contact: (majority of time spent)  Family Session    2. People present:   Writer  Client: No  Significant Other/Family/Friend:  Mother and Father    3. Total number of persons who participated in contact: 3, including writer    4. Length of Actual Contact: Start Time: 5pm; End Time: 6pm   Traveled?    No     [facility]    6. Did the client complete the home practice option(s) from the previous session: Completed    7. Motivational Teaching Strategies:  Connect info and skills with personal goals  Promote hope and positive expectations  Re-frame experiences in positive light    8. Educational Teaching Strategies:  Review of written material/education  Relate information to client's experience  Ask questions to check comprehension  Break down information into small chunks  Adopt client's language     9. CBT Teaching Strategies:  Reinforcement and shaping (positive feedback for steps towards goals, gains in knowledge & skills and follow-through on home assignments)    10. Psychoeducational Topic(s) Addressed:  Family Education Orientation & Tip Sheet  Carlos Manuel's Story  Just the Facts - Psychosis  Just the Facts - Medications for Psychosis  Just the Facts - Coping with Stress  Just the Facts - Strategies to Build Resiliency  Just the Facts - Relapse Prevention Planning  Just the Facts - Developing a Collaboration with Mental Health Professionals  Just the Facts - Effective Communication  Just the Facts - A Relative s Guide to Supporting Recovery from Psychosis    11. Techniques utilized:   [place] announced at beginning of session  Review of homework  Review of goal  Review of  previous meeting  Present new material  Role-play  Problem-solving practice  Help client choose a home practice option  Summarize progress made in current session    12. Assessment/Progress Note:     Completed Clinical Guidelines for  Just the Facts - A Relative s Guide to Supporting Recovery from Psychosis   This module is designed to provide relatives with key points on how they can support recovery from psychosis--by supporting engagement in treatment, by keeping conflict and tension in the family to a minimum, and by pursuing personally meaningful goals. Reviewed the key points of supporting recovery from earlier handouts;      Take medication as prescribed.     Avoid drug and alcohol use.     Participate in a rehabilitation program and/or find something productive to do.     Limit the amount of stress experienced within the family.     Informed participants about the link between low rates of family conflict and criticism and better outcomes.  Informed participants that data show that the client who has relatives who are pursuing personal goals and continuing to develop themselves does better.  Encouraged participants to support engagement in treatment, give praise for positive behavior rather than criticism for negative behavior, and take care of themselves.    Homework Practice Options    What are four ways relatives can help support recovery?   Describe the impact of intensive criticism on a person with psychosis.    13. Plan/Referrals:     Will meet with family weekly as schedule allows for evidence based family psychoeducation and therapeutic support aimed at maximizing Jerel's opportunity for recovery from psychosis.     Family will complete the four ways they can help support Jerel's ongoing recovery      Shai Londono, [facility]   NAVIGATE Director & Family Clinician

## 2018-04-11 ENCOUNTER — OFFICE VISIT (OUTPATIENT)
Dept: PSYCHIATRY | Facility: CLINIC | Age: 23
End: 2018-04-11
Payer: COMMERCIAL

## 2018-04-11 DIAGNOSIS — F25.0 SCHIZOAFFECTIVE DISORDER, BIPOLAR TYPE (H): Primary | ICD-10-CM

## 2018-04-11 NOTE — MR AVS SNAPSHOT
After Visit Summary   4/11/2018    Jerel Day    MRN: [id number]           Patient Information     Date Of Birth          1995        Visit Information        Provider Department      4/11/2018 1:00 PM Ana Michel [facility] Psychiatry        Today's Diagnoses     Schizoaffective disorder, bipolar type (H)    -  1       Follow-ups after your visit        Your next 10 appointments already scheduled     Apr 18, 2018  3:00 PM CDT   Navigate Psychotherapy with Anahi Summers [facility] Psychiatry ([facility])    [address] [place] [address]   [phone]            Apr 18, 2018  4:15 PM CDT   Navigate Medication Follow Up with JAVY Bhatt [facility] Psychiatry ([facility])    [address] [place] [address]   [phone]            May 10, 2018  1:00 PM CDT   Navigate Family Therapy with Shai Londono [facility] Psychiatry ([facility])    [address] [place] [address]   [phone]            May 10, 2018  1:00 PM CDT   Navigate Psychotherapy with Anahi Summers [facility] Psychiatry ([facility])    [address] [place] [address]   [phone]            May 16, 2018  3:00 PM CDT   Navigate Psychotherapy with Anahi Summers [facility] Psychiatry ([facility])    [address] [place] [address]   [phone]            May 23, 2018  3:00 PM CDT   Navigate Psychotherapy with Anahi Summers [facility] Psychiatry ([facility])    [address]  [facility] [address]   [phone]            May 30, 2018  5:00 PM CDT   Navigate Family Therapy with Shai Londono [facility] Psychiatry ([facility])    [address] [place] [address]   [phone]            May 30, 2018  5:00 PM CDT   Navigate Psychotherapy with Anahi Summers  [facility] Psychiatry ([facility])    [address]  [facility] [address] [phone]              Who to contact     Please call your clinic at [phone] to:    Ask questions about your health    Make or cancel appointments    Discuss your medicines    Learn about your test results    Speak to your doctor            Additional Information About Your Visit        MyChart Information     [employer] is an electronic gateway that provides easy, online access to your medical records. With [employer], you can request a clinic appointment, read your test results, renew a prescription or communicate with your care team.     To sign up for [employer] visit the website at [email or web address]   You will be asked to enter the access code listed below, as well as some personal information. Please follow the directions to create your username and password.     Your access code is: [email or web address]  Expires: 2018  7:30 AM     Your access code will  in 90 days. If you need help or a new code, please contact your [facility] or call [phone] for assistance.        Care EveryWhere ID     This is your Care EveryWhere ID. This could be used by other organizations to access your [facility] medical records  [id number]         Blood Pressure from Last 3 Encounters:   18 115/63   18 115/65   18 114/58    Weight from Last 3 Encounters:   18 67.5 kg (148 lb 12.8 oz)   18 66.4 kg (146 lb 6.4 oz)   18 67 kg (147 lb 12.8 oz)              Today, you had the following     No orders found for display       Primary Care Provider Office Phone # Fax #    Valerie Rocallet [facility] [phone]       [address] [place] [address]        Equal Access to Services     MUNIRA MADDOX AH: Cristobal muniz Sobrittany, waaxda luqadaha, qaybta kaalmada adeegyakofi, keagan lu. So [facility]  [phone].    ATENCIÓN: Si caprice curtis, tiene a grimm disposición servicios gratuitos de asistencia lingüística. Case morataya [phone].    We comply with applicable federal civil rights laws and [place] laws. We do not discriminate on the basis of race, color, national origin, age, disability, sex, sexual orientation, or gender identity.            Thank you!     Thank you for choosing [facility] PSYCHIATRY  for your care. Our goal is always to provide you with excellent care. Hearing back from our patients is one way we can continue to improve our services. Please take a few minutes to complete the written survey that you may receive in the mail after your visit with us. Thank you!             Your Updated Medication List - Protect others around you: Learn how to safely use, store and throw away your medicines at www.disposemymeds.org.          This list is accurate as of 4/11/18 11:59 PM.  Always use your most recent med list.                   Brand Name Dispense Instructions for use Diagnosis    ALPRAZOLAM PO      Take 0.5 mg by mouth daily        * ARIPiprazole 10 MG tablet    ABILIFY    30 tablet    Take 1 tablet (10 mg) by mouth daily    Schizoaffective disorder, bipolar type (H)       * ARIPiprazole  MG extended release inj syringe    ABILIFY MAINTENA    1 Syringe    Inject 1 Syringe (400 mg) into the muscle every 28 days    Schizoaffective disorder, bipolar type (H)       cholecalciferol 1000 UNIT tablet    vitamin D3     Take 1,000 Units by mouth        lithium 450 MG CR tablet    ESKALITH    60 tablet    Take 2 tablets (900 mg) by mouth At Bedtime    Schizoaffective disorder, bipolar type (H)       * Notice:  This list has 2 medication(s) that are the same as other medications prescribed for you. Read the directions carefully, and ask your doctor or other care provider to review them with you.

## 2018-04-11 NOTE — MR AVS SNAPSHOT
After Visit Summary   4/11/2018    Jerel Day    MRN: [id number]           Patient Information     Date Of Birth          1995        Visit Information        Provider Department      4/11/2018 5:00 PM Shai Londono, [facility] Psychiatry        Today's Diagnoses     Schizoaffective disorder, bipolar type (H)    -  1       Follow-ups after your visit        Your next 10 appointments already scheduled     Apr 18, 2018  3:00 PM CDT   Navigate Psychotherapy with Anahi Summers [facility] Psychiatry ([facility])    [address] [place] [address]   [phone]            Apr 18, 2018  4:15 PM CDT   Navigate Medication Follow Up with JAVY Bhatt [facility] Psychiatry ([facility])    [address] [place] [address]   [phone]            May 10, 2018  1:00 PM CDT   Navigate Family Therapy with Shai Londono [facility] Psychiatry ([facility])    [address] [place] [address]   [phone]            May 10, 2018  1:00 PM CDT   Navigate Psychotherapy with Anahi Summers [facility] Psychiatry ([facility])    [address] [place] [address]   [phone]            May 16, 2018  3:00 PM CDT   Navigate Psychotherapy with Anahi Summers [facility] Psychiatry ([facility])    [address] [place] [address]   [phone]            May 23, 2018  3:00 PM CDT   Navigate Psychotherapy with Anahi Summers [facility] Psychiatry ([facility])    [address] [place] [address]   [phone]            May 30, 2018  5:00 PM CDT   Navigate Family Therapy with Shai Londono [facility] Psychiatry ([facility])    [address] [place] [address]   [phone]            May 30, 2018  5:00 PM CDT   Navigate Psychotherapy with Anahi  EDELMIRA Summers   [email or web address]ry ([email or web address])    [address]  [facility] [id number] [phone]              Who to contact     Please call your clinic at [phone] to:    Ask questions about your health    Make or cancel appointments    Discuss your medicines    Learn about your test results    Speak to your doctor            Additional Information About Your Visit        MyChart Information     Kirusa is an electronic gateway that provides easy, online access to your medical records. With Kirusa, you can request a clinic appointment, read your test results, renew a prescription or communicate with your care team.     To sign up for Kirusa visit the website at [email or web address]   You will be asked to enter the access code listed below, as well as some personal information. Please follow the directions to create your username and password.     Your access code is: [email or web address]  Expires: 2018  7:30 AM     Your access code will  in 90 days. If you need help or a new code, please contact your [facility] or call [phone] for assistance.        Care EveryWhere ID     This is your Care EveryWhere ID. This could be used by other organizations to access your [place] medical records  [id number]         Blood Pressure from Last 3 Encounters:   18 115/63   18 115/65   18 114/58    Weight from Last 3 Encounters:   18 67.5 kg (148 lb 12.8 oz)   18 66.4 kg (146 lb 6.4 oz)   18 67 kg (147 lb 12.8 oz)              Today, you had the following     No orders found for display       Primary Care Provider Office Phone # Fax #    Valerie Rocallet [facility] [phone]       [address] [place] [address]        Equal Access to Services     MUNIRA MADDOX AH: Cristobal muniz Sobrittany, waaxda luqadaha, qaybta kaalmada adeegyada, keagan lu. So [facility]  [phone].    ATENCIÓN: Si caprice curtis, tiene a grimm disposición servicios gratuitos de asistencia lingüística. Case morataya [phone].    We comply with applicable federal civil rights laws and [place] laws. We do not discriminate on the basis of race, color, national origin, age, disability, sex, sexual orientation, or gender identity.            Thank you!     Thank you for choosing [facility] PSYCHIATRY  for your care. Our goal is always to provide you with excellent care. Hearing back from our patients is one way we can continue to improve our services. Please take a few minutes to complete the written survey that you may receive in the mail after your visit with us. Thank you!             Your Updated Medication List - Protect others around you: Learn how to safely use, store and throw away your medicines at www.disposemymeds.org.          This list is accurate as of 4/11/18 11:59 PM.  Always use your most recent med list.                   Brand Name Dispense Instructions for use Diagnosis    ALPRAZOLAM PO      Take 0.5 mg by mouth daily        * ARIPiprazole 10 MG tablet    ABILIFY    30 tablet    Take 1 tablet (10 mg) by mouth daily    Schizoaffective disorder, bipolar type (H)       * ARIPiprazole  MG extended release inj syringe    ABILIFY MAINTENA    1 Syringe    Inject 1 Syringe (400 mg) into the muscle every 28 days    Schizoaffective disorder, bipolar type (H)       cholecalciferol 1000 UNIT tablet    vitamin D3     Take 1,000 Units by mouth        lithium 450 MG CR tablet    ESKALITH    60 tablet    Take 2 tablets (900 mg) by mouth At Bedtime    Schizoaffective disorder, bipolar type (H)       * Notice:  This list has 2 medication(s) that are the same as other medications prescribed for you. Read the directions carefully, and ask your doctor or other care provider to review them with you.

## 2018-04-11 NOTE — MR AVS SNAPSHOT
After Visit Summary   4/11/2018    Jerel Day    MRN: [id number]           Patient Information     Date Of Birth          1995        Visit Information        Provider Department      4/11/2018 5:00 PM Anahi Summers [facility] Psychiatry        Today's Diagnoses     Schizoaffective disorder, bipolar type (H)    -  1       Follow-ups after your visit        Your next 10 appointments already scheduled     Apr 18, 2018  3:00 PM CDT   Navigate Psychotherapy with Anahi Summers [facility] Psychiatry ([facility])    [address] [place] [address]   [phone]            Apr 18, 2018  4:15 PM CDT   Navigate Medication Follow Up with JAVY Bhatt [facility] Psychiatry ([facility])    [address] [place] [address]   [phone]            May 10, 2018  1:00 PM CDT   Navigate Family Therapy with Shai Londono [facility] Psychiatry ([facility])    [address] [place] [address]   [phone]            May 10, 2018  1:00 PM CDT   Navigate Psychotherapy with Anahi Summers [facility] Psychiatry ([facility])    [address] [place] [address]   [phone]            May 16, 2018  3:00 PM CDT   Navigate Psychotherapy with Anahi Summers [facility] Psychiatry ([facility])    [address] [place] [address]   [phone]            May 23, 2018  3:00 PM CDT   Navigate Psychotherapy with Anahi Summers [facility] Psychiatry ([facility])    [address] [place] [address]   [phone]            May 30, 2018  5:00 PM CDT   Navigate Family Therapy with Shai Londono [facility] Psychiatry ([facility])    [address] [place] [address]   [phone]            May 30, 2018  5:00 PM CDT   Navigate Psychotherapy with Anahi Summers  [facility] Psychiatry ([facility])    [address]  [facility] [address] [phone]              Who to contact     Please call your clinic at [phone] to:    Ask questions about your health    Make or cancel appointments    Discuss your medicines    Learn about your test results    Speak to your doctor            Additional Information About Your Visit        MyChart Information     Rei-Frontiert is an electronic gateway that provides easy, online access to your medical records. With Phase Focus, you can request a clinic appointment, read your test results, renew a prescription or communicate with your care team.     To sign up for Rei-Frontiert visit the website at [email or web address]   You will be asked to enter the access code listed below, as well as some personal information. Please follow the directions to create your username and password.     Your access code is: [email or web address]  Expires: 2018  7:30 AM     Your access code will  in 90 days. If you need help or a new code, please contact your [facility] or call [phone] for assistance.        Care EveryWhere ID     This is your Care EveryWhere ID. This could be used by other organizations to access your [place] medical records  [id number]         Blood Pressure from Last 3 Encounters:   18 115/63   18 115/65   18 114/58    Weight from Last 3 Encounters:   18 67.5 kg (148 lb 12.8 oz)   18 66.4 kg (146 lb 6.4 oz)   18 67 kg (147 lb 12.8 oz)              Today, you had the following     No orders found for display       Primary Care Provider Office Phone # Fax #    aVlerie Rocallet [facility] [phone]       [address] [place] [address]        Equal Access to Services     MUNIRA MADDOX AH: Cristobal muniz Sobrittany, waaxda luqadaha, qaybta kaalmada adeegyakofi, keagan lu. So [facility]  [phone].    ATENCIÓN: Si caprice curtis, tiene a grimm disposición servicios gratuitos de asistencia lingüística. Case morataya [phone].    We comply with applicable federal civil rights laws and [place] laws. We do not discriminate on the basis of race, color, national origin, age, disability, sex, sexual orientation, or gender identity.            Thank you!     Thank you for choosing [facility] PSYCHIATRY  for your care. Our goal is always to provide you with excellent care. Hearing back from our patients is one way we can continue to improve our services. Please take a few minutes to complete the written survey that you may receive in the mail after your visit with us. Thank you!             Your Updated Medication List - Protect others around you: Learn how to safely use, store and throw away your medicines at www.disposemymeds.org.          This list is accurate as of 4/11/18 11:59 PM.  Always use your most recent med list.                   Brand Name Dispense Instructions for use Diagnosis    ALPRAZOLAM PO      Take 0.5 mg by mouth daily        * ARIPiprazole 10 MG tablet    ABILIFY    30 tablet    Take 1 tablet (10 mg) by mouth daily    Schizoaffective disorder, bipolar type (H)       * ARIPiprazole  MG extended release inj syringe    ABILIFY MAINTENA    1 Syringe    Inject 1 Syringe (400 mg) into the muscle every 28 days    Schizoaffective disorder, bipolar type (H)       cholecalciferol 1000 UNIT tablet    vitamin D3     Take 1,000 Units by mouth        lithium 450 MG CR tablet    ESKALITH    60 tablet    Take 2 tablets (900 mg) by mouth At Bedtime    Schizoaffective disorder, bipolar type (H)       * Notice:  This list has 2 medication(s) that are the same as other medications prescribed for you. Read the directions carefully, and ask your doctor or other care provider to review them with you.

## 2018-04-13 NOTE — PROGRESS NOTES
" NAVIGATE SEE Progress Note   For Supported Employment & Education    NAVIGATE Enrollee: Jerel Day (1995)     MRN: [id number]  Date:  4/11/18   Clinician: NAVIGATE Supported Employment & , Ana Michel    1. Client Status Update:   Jerel Day is interested in employment (Other, explain: reviewed positions)    2. People present:   SEE/Writer  Client: Jerel Day      3. Total number of persons who participated in contact: (do not count yourself/SEE) 1    4. Length of Actual Contact: 15 minutes   Traveled? Yes  Total Travel Time: 40 minutes    5. Location of contact:  Telephone    6. Brief description of session, contact, or client status (include: strategies, interventions, client reaction to contact, next steps, etc)    This writer texted Jerel at 11am to remind him of his 1pm appt. THis writer did not hear from Jerel but still drove to his house. No one was home and Jerel's phone was off when writer attempted to call. 15 min later Jerel called this writer and said he was on campus and apologized. He wanted to still talk on the phone so we reviewed a few positions that this writer had found. Jerel is interested in a position where he will have minimal communication with customers but still be challenged. A few of the positions we reviewed Jerel had applied to earlier in the year but said that \"he never got a call back because I was too honest on the assessments\". This writer offered an appt tomorrow or Friday but he declined and watned to meet next week.      7. Completion of mutually agreed upon client task from previous meeting:  Not Applicable    8. Orientation and Treatment Planning:  Pursuing current SEE goals    9. Assessment:  Gathering SEE information/inventory regarding work and/or education history, skills, goals, and preferences with client    10. Placement:  Employment  (Job searching (Internet search))    11. Follow Along Supports: (for clients who are working or " attending school)   Not Applicable    12. Mutually agreed upon client task for next meeting:     Find 2 positions he is interested in applying    13. Next Meeting Scheduled for: next week wed at 1      Ana TIJERINA Supported Employment &

## 2018-04-16 NOTE — PROGRESS NOTES
"NAVIGATE Clinician Contact & Progress Note   For Family Education Program    NAVIGATE Enrollee: Jerel Day (1995)     MRN: [id number]  Date:  4/11/18  Diagnosis(es): Schizoaffective disorder, bipolar type  Clinician: NAVIGATE Director & Family Clinician, Shai Londono [facility]     1. Type of contact: (majority of time spent)  Family Session    2. People present:   Writer  Client: No  Significant Other/Family/Friend:  Mother and Father    3. Total number of persons who participated in contact: 3, including writer    4. Length of Actual Contact: Start Time: 5pm; End Time: 6pm   Traveled?    No     5. Location of contact:  Psychiatry Clinic, [place]    6. Did the client complete the home practice option(s) from the previous session: Completed    7. Motivational Teaching Strategies:  Connect info and skills with personal goals  Promote hope and positive expectations    8. Educational Teaching Strategies:  Review of written material/education  Relate information to client's experience    9. CBT Teaching Strategies:  Reinforcement and shaping (positive feedback for steps towards goals and gains in knowledge & skills)    10. Psychoeducational Topic(s) Addressed:  Family Tip Sheet  Just the Facts - A Relative s Guide to Supporting Recovery from Psychosis    11. Techniques utilized:   Plains announced at beginning of session  Review of homework  Review of goal  Review of previous meeting  Summarize progress made in current session    12. Assessment/Progress Note:     Reviewed homework from past week, and reviewed tip sheet prior to shifting to bi-weekly or monthly visits.  Family has completed the 12 family session modules, but will continue with Navigate family program on a less frequent visit schedule. (Per Navigate model)    Discussed safety plan to include utilization of crisis hotlines (eg Crisis Text Line ([place] \"[place]\" [place] [address] or call 7-991-201-CYVD, 1-431.354.6784)), calling 9-1-1, and visiting the " "nearest ED should there be concerns for Jerel's safety or the safety of others. Discussed the \"Tip Sheet for Helping People in NAVIGATE\" and identified tips that seemed relevant to family's situation, particularly:    - Keeping expectations minimal, but don't let them all go  - Encourage but do not nag. Choose your battles  - Help your relative keep to as close to a normal routine as possible  - Don't argue with a relative over worrisome thoughts  - Continue to do enjoyable activities together    Offered hope for recovery. Praised family for their involvement and seeking services. Informed them of evidence suggesting recovery rates tend to be higher with family involvement. Emphasized the importance of self care.       Overall family was engaged in conversation. They did express interest in continuing to meet for family therapy and psychoeducation. As of today's appt their insight into Jerel's mental illness appears good. They seem they would benefit from continued clinical intervention aimed at assisting them implement helpful strategies at home and increase their understanding of psychosis.     13. Plan/Referrals:     Will meet with family bi-weekly or monthly as schedule allows for evidence based family psychoeducation and therapeutic support aimed at maximizing Jerel's opportunity for recovery from psychosis.       Shai Londono, [facility]   NAVIGATE Director & Family Clinician   "

## 2018-04-18 ENCOUNTER — TELEPHONE (OUTPATIENT)
Dept: PSYCHIATRY | Facility: CLINIC | Age: 23
End: 2018-04-18

## 2018-04-18 ENCOUNTER — OFFICE VISIT (OUTPATIENT)
Dept: PSYCHIATRY | Facility: CLINIC | Age: 23
End: 2018-04-18
Payer: COMMERCIAL

## 2018-04-18 VITALS
WEIGHT: 153.4 LBS | SYSTOLIC BLOOD PRESSURE: 111 MMHG | RESPIRATION RATE: 16 BRPM | BODY MASS INDEX: 22.72 KG/M2 | HEART RATE: 60 BPM | DIASTOLIC BLOOD PRESSURE: 64 MMHG | HEIGHT: 69 IN

## 2018-04-18 DIAGNOSIS — F25.0 SCHIZOAFFECTIVE DISORDER, BIPOLAR TYPE (H): Primary | ICD-10-CM

## 2018-04-18 ASSESSMENT — PAIN SCALES - GENERAL: PAINLEVEL: NO PAIN (0)

## 2018-04-18 NOTE — TELEPHONE ENCOUNTER
-Received a call from [employer]osa stating that they are working on getting injection through insurance as when we had last spoke it was a cost exceeds issue, and were told by insurance that they just need to call to get an override.  They have been unable to talk to correct person at insurance company to get issue resloved.  Sosa reports she knows injection is due this week and has been working on this.  Sosa will let clinic know once they have it figured out.

## 2018-04-18 NOTE — MR AVS SNAPSHOT
After Visit Summary   4/18/2018    Jerel Day    MRN: [id number]           Patient Information     Date Of Birth          1995        Visit Information        Provider Department      4/18/2018 4:15 PM Brittani Cage APRN CNP [facility]        Today's Diagnoses     Schizoaffective disorder, bipolar type (H)    -  1       Follow-ups after your visit        Follow-up notes from your care team     Return in about 1 month (around 5/18/2018).      Your next 10 appointments already scheduled     May 10, 2018  1:00 PM CDT   Navigate Family Therapy with Shai Londono [facility] ([facility])    [address] [place] [address]   [phone]            May 10, 2018  1:00 PM CDT   Navigate Psychotherapy with Anahi Summers [facility] ([facility])    [address] [place] [address]   [phone]            May 16, 2018  3:00 PM CDT   Navigate Psychotherapy with Anahi Summers [facility] ([facility])    [address] [place] [address]   [phone]            May 23, 2018  2:30 PM CDT   Nurse Visit with [facility] PSYCHIATRY NURSE   [facility] ([facility])    [address] [place] [id number]   [phone]            May 23, 2018  3:00 PM CDT   Navigate Psychotherapy with Anahi Summers [facility] ([facility])    [address] [place] [address]   [phone]            May 30, 2018  5:00 PM CDT   Navigate Family Therapy with Shai Londono [facility] ([facility])    [address] [place] [id number]   [phone]            May 30, 2018  5:00 PM CDT   Navigate Psychotherapy with Anahi Summers [facility] ([facility])    [address] [place] [address]   [phone]     "          Who to contact     Please call your clinic at [phone] to:    Ask questions about your health    Make or cancel appointments    Discuss your medicines    Learn about your test results    Speak to your doctor            Additional Information About Your Visit        MyChart Information     [employer] is an electronic gateway that provides easy, online access to your medical records. With [employer], you can request a clinic appointment, read your test results, renew a prescription or communicate with your care team.     To sign up for [employer] visit the website at [email or web address]   You will be asked to enter the access code listed below, as well as some personal information. Please follow the directions to create your username and password.     Your access code is: [email or web address]  Expires: 2018  7:30 AM     Your access code will  in 90 days. If you need help or a new code, please contact your [facility] or call [phone] for assistance.        Care EveryWhere ID     This is your Care EveryWhere ID. This could be used by other organizations to access your [facility] medical records  [id number]        Your Vitals Were     Pulse Respirations Height BMI (Body Mass Index)          60 16 1.74 m (5' 8.5\") 22.99 kg/m2         Blood Pressure from Last 3 Encounters:   18 106/60   18 111/64   18 115/63    Weight from Last 3 Encounters:   18 69.6 kg (153 lb 6.4 oz)   18 67.5 kg (148 lb 12.8 oz)   18 66.4 kg (146 lb 6.4 oz)              Today, you had the following     No orders found for display         Where to get your medicines      These medications were [place] to [facility] [place] [address], [place] [address]     Phone:  [phone]     lithium 450 MG CR tablet          Primary Care Provider Office Phone # Fax #    [facility] [phone] "       [address] [address]        Equal Access to Services     MUNIRA MADDOX : Hadii aad ku hadsurendraisabel Ninoskabrittany, wazhoukofi gan, liannewilbur francodelilahkofi webb, keagan gillisnoreensantiago lu. So [facility] [phone].    ATENCIÓN: Si habla español, tiene a grimm disposición servicios gratuitos de asistencia lingüística. Llame al [phone].    We comply with applicable federal civil rights laws and [place] laws. We do not discriminate on the basis of race, color, national origin, age, disability, sex, sexual orientation, or gender identity.            Thank you!     Thank you for choosing [facility]  for your care. Our goal is always to provide you with excellent care. Hearing back from our patients is one way we can continue to improve our services. Please take a few minutes to complete the written survey that you may receive in the mail after your visit with us. Thank you!             Your Updated Medication List - Protect others around you: Learn how to safely use, store and throw away your medicines at www.disposemymeds.org.          This list is accurate as of 4/18/18 11:59 PM.  Always use your most recent med list.                   Brand Name Dispense Instructions for use Diagnosis    ALPRAZOLAM PO      Take 0.5 mg by mouth daily        ARIPiprazole  MG extended release inj syringe    ABILIFY MAINTENA    1 Syringe    Inject 1 Syringe (400 mg) into the muscle every 28 days    Schizoaffective disorder, bipolar type (H)       cholecalciferol 1000 UNIT tablet    vitamin D3     Take 1,000 Units by mouth        lithium 450 MG CR tablet    ESKALITH    60 tablet    Take 2 tablets (900 mg) by mouth At Bedtime    Schizoaffective disorder, bipolar type (H)

## 2018-04-18 NOTE — PROGRESS NOTES
NAVIGSTEPHEN Clinician Contact & Progress Note  For Individual Resiliency Training (IRT)  A Part of the [facility] First Episode of Psychosis Program    NAVIGATE Enrollee: Jerel Day (1995)     MRN: [id number]  Date:  4/11/18  Diagnosis: Schizoaffective disorder, bipolar type (F25.0)  Clinician: LILLIANA Individual Resiliency Trainer, EDELMIRA Reynolds     1. Type of contact: (majority of time spent)  IRT Session    2. People present:   Writer  Client: Jerel Day    3. Total number of persons who participated in contact: 2, including writer    4. Length of Actual Contact: Start Time: 5:10; End Time: 6:00   Traveled?    No     5. Location of contact:  [facility], [place]    6. Did the client complete the home practice option(s) from the previous session: Not Applicable    7. Motivational Teaching Strategies:  Connect info and skills with personal goals  Promote hope and positive expectations  Explore pros and cons of change  Re-frame experiences in positive light    8. Educational Teaching Strategies:  Review of written material/education  Relate information to client's experience  Break down information into small chunks  Adopt client's language     9. CBT Teaching Strategies:  Reinforcement and shaping (positive feedback for steps towards goals and gains in knowledge & skills)  Social skills training (rationale for skill, modeling, feedback and plan home practice)    10. IRT Module(s) Addressed:  Module 11 - Having Fun and Developing Good Relationships    11. Techniques utilized:   Gilman announced at beginning of session  Review of goal  Review of previous meeting  Present new material  Problem-solving practice  Help client choose a home practice option  Summarize progress made in current session    12. Mental Status Exam:    Alertness: alert  and oriented  Appearance: casually groomed  Behavior/Demeanor: cooperative, pleasant and calm, with good  eye contact   Speech: normal and regular rate and  "rhythm  Language: intact. Preferred language identified as English.  Psychomotor: normal or unremarkable  Mood: description consistent with euthymia  Affect: appropriate; was congruent to mood; was congruent to content  Thought Process/Associations: unremarkable  Thought Content:  Reports delusions;  Denies suicidal and violent ideation  Perception:  Reports auditory hallucinations;  Denies visual hallucinations  Insight: adequate  Judgment: adequate for safety  Cognition: does  appear grossly intact; formal cognitive testing was not done  Suicidal ideation: denies SI, denies intent,  and denies plan  Homicidal Ideation: denies    13. Assessment/Progress Note:     This writer met with Jerel for a follow-up IRT session. Throughout the session, Jerel was engaged and appropriately expressing affect. He stated he has taken his Lithium at a half dose, ingesting one pill instead of two. He mentioned he forgets to take the Lithium about once every 4-5 days. He noted he continues to use marijuana once a week, about .4 grams. He indicated alcohol use to the point of intoxication happens about once a week as well. He said marijuana helps his back pain and assists him in \"bursting through the mucus membrane.\" However, he has noticed becoming increasingly distressed and restless in the middle of the high. Jerel shared about his recent delusional thoughts. He stated he feels [employer] is taking his data and making playlists with it. He also said his old roommate, Kenroy, is jealous of him and is likely taking his data. He recalled when he was in college, he believed his teachers were changing his grades at inappropriate times. Jerel stated he has been attending parties at his brother's apartment at the  of . He met a woman on the bus when commuting to the [facility]. He smiled throughout when discussing their conversation. This writer helped Jerel to problem solve how to move the conversation forward. He then said he met another woman at " "his brother's apartment building during a party. He is unsure how to initiate a conversation. This writer discussed the pro's and con's of doing so with Jerel and then discussed when/where it could be appropriate. Jerel denied any suicidal or homicidal thoughts at this time.     14. Plan/Referrals:     This writer will continue to provide social skills training and problem solving support.    This writer will provide coping skills and stress management strategies.     This writer will continue to consult with the team.    Billing for \"Interactive Complexity\"?    No    EDELMIRA Reynolds Individual Resiliency Trainer    Attestation:    I did not see this patient directly. This patient is discussed with me in individual clinical social work supervision, and I agree with the plan as documented.     AGA Alexandre, SHIRA, April 20, 2018    Attestation:    I did not see this patient directly. This patient is discussed with the NAVIGATE Team Director, AGA Narvaez, SHIRA, and myself in individual clinical social work supervision, and I agree with the plan as documented.     AGA Huerta, SHIRA, 7/6/18  "

## 2018-04-18 NOTE — PROGRESS NOTES
"NAVIGATE Medication Management Progress Note  A Part of the [facility] First Episode of Psychosis Program    NAVIGATE Enrollee: Jerel Day (1995)     MRN: [id number]  Date:  4/18/18         Contributors to the Assessment     Chart Reviewed.   Interview completed with Jerel Day.         Chief Complaint       \"I am feeling loopy today\"           Interim History      Jerel Day is a 22 year old male who was last seen in clinic on 3/27/18 at which time  lithium 900mg and Abilify Maintena 400mg were continued. The patient reports poor treatment adherence.  History was provided by Jerel who was a good to fair historian.  Since the last visit:  - He is taking only half the dose of lithium, maybe only taking one dose a day. He doesn't think that it has ever been helpful for him, he doesn't think there has ever been a difference in his mood and he is concerned about side effects.  - He is smoking cannabis once or twice a week and feels that it has been helpful in addressing his symptoms of depression.  - He is feeling lonely and this is causing him to feel depressed. He realizes that he has no connections with friends or with a woman. He would like to have a relationship but he does not have any prospects. He is sleeping 15 hours a day because he is so depressed.   - Still some sensitivity to bright lights but overall less focus on somatic concerns, less complaints about his neck.   - Thoughts of death are daily but passing. Denies plan or intent.  - Resentful and annoyed with parents and with his situation. [place] wanting to be closer to people his own age. He wants to be successful and he feels like he is doing nothing.   - Denies ASE with meds, \"I don't think much about it\". Doesn't think the meds are doing much.   - Appetite has been \"OK\", he is trying to eat good goods like avocados  - VH/bright lights, he is wearing sunglasses indoors sometimes to help cope with discomfort from " this s/e.      DEPRESSION:  reports-suicidal ideation, (passive), depressed mood, hypersomnia and poor concentration /memory, distractability;  DENIES- anhedonia, low energy, insomnia and feeling worthless  KELLI/HYPOMANIA:  reports-none;  DENIES- excessive spending, decreased sleep need, increased activity and grandiosity  PSYCHOSIS:  reports-delusions, auditory hallucinations, disorganized behavior and disorganized speech (difficulty communicating)-- disorganized sx have improved greatly with the addition of the HO;  DENIES- visual hallucinations  ANXIETY:  social anxiety and nervous/overwhelmed  SLEEP:  Sleep has increased, 9-10 hours per night, sometime up to 15 hours a day with naps  EATING DISORDER: none     RECENT SUBSTANCE USE:     ALCOHOL- Denies current daily use. Previously- nearly daily EtOH use, 3-13 shots of hard liquor + beer and wine           TOBACCO- 1/2 PPD             CAFFEINE- 1 cups/day of coffee  OPIOIDS- none       NARCAN KIT- N/A       CANNABIS- Denies current daily use          OTHER ILLICIT DRUGS- hallucinogens (previously)      CURRENT SOCIAL HISTORY:  FINANCIAL SUPPORT- family or friend       CHILDREN- none       LIVING SITUATION- Currently staying with his parent's [facility] in [place], [place]      SOCIAL/ SPIRITUAL SUPPORT- unknown       FEELS SAFE AT HOME- Yes      MEDICAL ROS:  Reports none      Denies akathisia, unusual movements and wt gain   10 point ROS neg other than the symptoms noted above in the HPI. Patient does report multiple physical concern including back, neck, hip and foot pain, concerns about metabolism. These complaints have been evaluated by sports medicine and primary care with unremarkable findings. PT exercises have been recommended.      Of note, history is positive for multiple sports injuries (former ) including concussion x2 with LOC once; ankle and hip injuries; scoliosis. His disorganized communication is likely interfering with an adequate health  assessment.         First Episode of Psychosis History      DUP (duration untreated psychosis):  Likely first experienced auditory hallucinations during the spring of 2016, possibly in the context of cannabis and LSD use. Did not seek treatment until behavior and presentation became significantly disorganized in January 2017. Medication adherence has been poor over the course of this last year.   Route to initial care: ED for disorganized behavior, somatic concerns   Medication adherence overall:  Fair to poor  General frequency of visits:  Recommend weekly to biweekly  Participation in groups:  none  Cognitive Remediation:  none  Other treatment history: See pertinent background      Reviewed for completion of First Episode work-up:  Yes  First episode workup:  Not Done (if completed, see LABS for results)  MATRICS Consensus Cognitive Battery:  Not Done (if completed, see LABS for results)         Medical/Surgical History     Penicillins    Patient Active Problem List   Diagnosis     Schizoaffective disorder (H)            Medications     Current Outpatient Prescriptions   Medication Sig Dispense Refill     ALPRAZOLAM PO Take 0.5 mg by mouth daily       ARIPiprazole (ABILIFY) 10 MG tablet Take 1 tablet (10 mg) by mouth daily 30 tablet 1     ARIPiprazole ER (ABILIFY MAINTENA) 400 MG extended release inj syringe Inject 1 Syringe (400 mg) into the muscle every 28 days 1 Syringe 11     cholecalciferol (VITAMIN D3) 1000 UNIT tablet Take 1,000 Units by mouth       lithium (ESKALITH) 450 MG CR tablet Take 2 tablets (900 mg) by mouth At Bedtime 60 tablet 1     Previous medication trials:  Zoloft- stopped because of ASE (?)  fluoxetine  Risperidone 1 mg, reported akathisia at 3mg dose  Haloperidol 5mg bid, reported dystonia relieved with benadryl   Divalproex 750mg  Quetiapine 300mg- discontinued 1/2/18  Olanzapine 10mg- discontinued 1/29/18, EPS?  Alprazolam 0.5-1mg PRN          Vitals     /64 (BP Location: Right  "arm, Patient Position: Chair, Cuff Size: Adult Regular)  Pulse 60  Resp 16  Ht 1.74 m (5' 8.5\")  Wt 69.6 kg (153 lb 6.4 oz)  BMI 22.99 kg/m2      Weight prior to medication: 130s         Mental Status Exam     Alertness: alert and oriented  Appearance: casually groomed   Behavior/Demeanor: cooperative, with good eye contact   Speech: spontaneous and unremarkable   Language: intact  Psychomotor: fidgety  Mood: anxious  Affect: full range; was congruent to mood; was congruent to content  Thought Process/Associations: more organized, still containaing loose associations and neologisms/ word salad  Thought Content:  Reports suicidal and violent ideation (without intent or plan), delusions, preoccupations, over-valued ideas and paranoid ideation;  Denies obsessions , phobia  and magical thinking  Perception:  Reports auditory hallucinations;  Denies visual hallucinations  Insight: poor  Judgment: limited  Cognition: does  appear grossly intact; formal cognitive testing was not done         Labs and Data     RATING SCALES:  AIMS: done 3/5/18 with total score of 0    PHQ9 TODAY = 18  PHQ-9 SCORE 3/21/2018 3/30/2018 4/9/2018   Total Score 1 12 12       ANTIPSYCHOTIC LABS ROUTINE    [glu, A1C, lipids (focus LDL), liver enzymes, WBC, ANEU, Hgb, plts]   q12 mo  Recent Labs   Lab Test  01/03/18   1054   GLC  73     Recent Labs   Lab Test  01/03/18   1054   CHOL  135   TRIG  93   LDL  70   HDL  46     Recent Labs   Lab Test  01/03/18   1054   AST  19   ALT  37   ALKPHOS  66     Recent Labs   Lab Test  01/03/18   1054   WBC  8.0   ANEU  5.5   HGB  13.9   PLT  241            Psychiatric Diagnoses     Schizoaffective disorder, bipolar type (F25.0)         Assessment     This patient is a 22 year old male who provides a history supporting the diagnoses listed directly above.  Further diagnostic clarification is not needed.  There are medical comorbidities which impact this treatment [suicidal ideation, psychosis [sxs include " "delusions, AH, disorganized behavior], multiple psychotropic trials, severe med reaction, psych hosp (<3) and SUBSTANCE USE: hallucinogens and cannabis].     DISCUSSION: Patient presents as more organized; disorganized communication consisting of word salad and loose associations are still observed but are much more infrequent and do not impede his ability for expressive and receptive communication. Patient thinks \"medications aren't doing much\", helping or hurting, so he is amenable to continue to take medications as prescribed but continues to report ongoing annoyance and frustration with parents reminding him to take his medications every day. He continues to be amenable to HO- he is due for monthly administration today. But there are ongoing issues with his insurance paying for the medication. RN will administer once the medication is secured.  Depressive sx continue to increase and seem to be related to patient having awareness that he is not currently working toward specific goals of occupational and social success. The patient was not able to make the connection between med adherence and improvment in mood and psychotic sx. Will continue to work on med adherence to address mood sx. AP med is clearly providing benefit for psychotic sx, uma disorganized thinking.   No immediate safety concerns were identified today.    SUICIDE RISK ASSESSMENT [details described above]:  Today Jerel Day reports passive suicidal thoughts.  In addition, he has notable risk factors for self-harm including hallucinations [command, persecutory ], substance use [alcohol, cannabis, hallucinogens], hx of stopping meds, recent loss and male.  However, risk is mitigated by no plan or intent, h/o seeking help when needed, good social support and stable housing.  Based on all available evidence he does not appear to be at imminent risk for self-harm therefore does not meet criteria for a 72-hr hold/  involuntary hospitalization.  " However, based on degree of symptoms therapy and close psych FU was recommended which the pt did agree to.      [place] PRESCRIPTION MONITORING PROGRAM [] was not checked today:  last ALPRAZOLAM 0.5 MG TABLET  dispensed 12/01/2017    PSYCHOTROPIC DRUG INTERACTIONS:   No major interactions.  Concomitant use of aripiprazole and lithium may increase risk for EPS     MANAGEMENT:  Monitoring for adverse effects, routine vitals, routine labs, using lowest therapeutic dose of [olanzapine and lithium] and patient is aware of risks         Plan     1) PSYCHOTROPIC MEDICATIONS:  - Continue lithium 900mg at HS and Abilify Maintena 400mg- next administration due 4/18/18    2) THERAPY:  Continue IRT. Encouraged to meet with Ana WEBER to help with organizing some decisions around housing and school.     3) NEXT DUE:    Labs- Results available in care everywhere, last Li level 0.88. Levels should be checked again due to sporadic adherence.  Rating Scales- AIMS 9/2018 or sooner if needed    4) REFERRALS:    No Referrals needed    5) RTC: 2 weeks    6) CRISIS NUMBERS:   Provided routinely in AVS.    TREATMENT RISK STATEMENT:  The risks, benefits, alternatives and potential adverse effects have been discussed and are understood by the pt. The pt understands the risks of using street drugs or alcohol. There are no medical contraindications, the pt agrees to treatment with the ability to do so. The pt knows to call the clinic for any problems or to access emergency care if needed.  Medical and substance use concerns are documented above.  Psychotropic drug interaction check was done, including changes made today.      PROVIDER:  [facility]      Psychiatry Clinic Individual Psychotherapy Note                                                                     [16]   Start time - 2:43pm       End time - 3:55  Date reviewed - 2/14/18       Date next due - 2/14/19    Subjective: This supportive psychotherapy session  addressed issues related to family of origin and difficulty transitioning back home and health /somatic concerns that may be deusions (?).  Patient's reaction: Pre-contemplation in the context of mental status appropriate for ambulatory setting.  Progress: fair  Psychotherapy services during this visit included  myself and the patient.   Treatment Plan      SYMPTOMS; PROBLEMS   MEASURABLE GOALS;    FUNCTIONAL IMPROVEMENT INTERVENTIONS;   GAINS MADE DISCHARGE CRITERIA   Medications:  educate patient and reduce use obstacles   reduce frequency/ intensity of false beliefs monitoring of adherence marked symptom improvement   Psychosocial: health issues, limited social support and mental health symptoms   learn 2 new ways of coping with routine stressors increase coping skills marked symptom improvement

## 2018-04-18 NOTE — MR AVS SNAPSHOT
After Visit Summary   4/18/2018    Jerel Day    MRN: [id number]           Patient Information     Date Of Birth          1995        Visit Information        Provider Department      4/18/2018 3:00 PM Anahi Summers [facility] Psychiatry        Today's Diagnoses     Schizoaffective disorder, bipolar type (H)    -  1       Follow-ups after your visit        Your next 10 appointments already scheduled     May 10, 2018  1:00 PM CDT   Navigate Family Therapy with Shai Londono [facility] Psychiatry ([facility])    [address] [place] [address]   [phone]            May 10, 2018  1:00 PM CDT   Navigate Psychotherapy with Anahi Summers [facility] Psychiatry ([facility])    [address] [place] [address]   [phone]            May 16, 2018  3:00 PM CDT   Navigate Psychotherapy with Anahi Summers [facility]ry ([facility])    [address] [place] [address]   [phone]            May 23, 2018  3:00 PM CDT   Navigate Psychotherapy with Anahi Summers [facility]ry ([facility])    [address] [place] [address]   [phone]            May 30, 2018  5:00 PM CDT   Navigate Family Therapy with Shai Londono [facility] Psychiatry ([facility])    [address] [place] [address]   [phone]            May 30, 2018  5:00 PM CDT   Navigate Psychotherapy with Anahi Summers [facility]iat[facility] ([facility])    [address]  [facility] [address]   [phone]              Who to contact     Please call your clinic at [phone] to:    Ask questions about your health    Make or cancel appointments    Discuss your medicines    Learn about your test results    Speak to your doctor            Additional Information About Your Visit        Armand  Information     [employer] is an electronic gateway that provides easy, online access to your medical records. With [employer], you can request a clinic appointment, read your test results, renew a prescription or communicate with your care team.     To sign up for [employer] visit the website at [email or web address]   You will be asked to enter the access code listed below, as well as some personal information. Please follow the directions to create your username and password.     Your access code is: [email or web address]  Expires: 2018  7:30 AM     Your access code will  in 90 days. If you need help or a new code, please contact your [facility] or call [phone] for assistance.        Care EveryWhere ID     This is your Care EveryWhere ID. This could be used by other organizations to access your [facility] medical records  [id number]         Blood Pressure from Last 3 Encounters:   18 111/64   18 115/63   18 115/65    Weight from Last 3 Encounters:   18 69.6 kg (153 lb 6.4 oz)   18 67.5 kg (148 lb 12.8 oz)   18 66.4 kg (146 lb 6.4 oz)              Today, you had the following     No orders found for display       Primary Care Provider Office Phone # Fax #    [facility] [phone]       [address] [place] [address]        Equal Access to Services     MUNIRA MADDOX : Hadii aad ku hadasho Soomaali, waaxda luqadaha, qaybta kaalmada adeegyada, keagan moser . So [facility] [phone].    ATENCIÓN: Si habla kisrten, tiene a grimm disposición servicios gratuitos de asistencia lingüística. Llame al [phone].    We comply with applicable federal civil rights laws and [place] laws. We do not discriminate on the basis of race, color, national origin, age, disability, sex, sexual orientation, or gender identity.            Thank you!     Thank you for choosing [facility] PSYCHIATRY  for your  care. Our goal is always to provide you with excellent care. Hearing back from our patients is one way we can continue to improve our services. Please take a few minutes to complete the written survey that you may receive in the mail after your visit with us. Thank you!             Your Updated Medication List - Protect others around you: Learn how to safely use, store and throw away your medicines at www.disposemymeds.org.          This list is accurate as of 4/18/18 11:59 PM.  Always use your most recent med list.                   Brand Name Dispense Instructions for use Diagnosis    ALPRAZOLAM PO      Take 0.5 mg by mouth daily        * ARIPiprazole 10 MG tablet    ABILIFY    30 tablet    Take 1 tablet (10 mg) by mouth daily    Schizoaffective disorder, bipolar type (H)       * ARIPiprazole  MG extended release inj syringe    ABILIFY MAINTENA    1 Syringe    Inject 1 Syringe (400 mg) into the muscle every 28 days    Schizoaffective disorder, bipolar type (H)       cholecalciferol 1000 UNIT tablet    vitamin D3     Take 1,000 Units by mouth        lithium 450 MG CR tablet    ESKALITH    60 tablet    Take 2 tablets (900 mg) by mouth At Bedtime    Schizoaffective disorder, bipolar type (H)       * Notice:  This list has 2 medication(s) that are the same as other medications prescribed for you. Read the directions carefully, and ask your doctor or other care provider to review them with you.

## 2018-04-20 NOTE — TELEPHONE ENCOUNTER
-Writer received a phone call from [employer] stating that the extra insurance information given to writer by pt's father was very helpful.  They were able to get the injection to go through and will be sending the injection out on Monday.  Writer will call to update Pt's father and patient.  Patient appears to be coming in on Wednesday for a team meeting.  Will see if we can give injection then.

## 2018-04-20 NOTE — PROGRESS NOTES
NAVIGATE Clinician Contact & Progress Note  For Individual Resiliency Training (IRT)  A Part of the [facility] First Episode of Psychosis Program    NAVIGATE Enrollee: Jerel Day (1995)     MRN: [id number]  Date:  4/18/18  Diagnosis: Schizoaffective disorder, bipolar type (F25.0)  Clinician: LILLIANA Individual Resiliency Trainer, EDELMIRA Reynolds     1. Type of contact: (majority of time spent)  IRT Session    2. People present:   Writer  Client: Jerel Day    3. Total number of persons who participated in contact: 2, including writer    4. Length of Actual Contact: Start Time: 3:20; End Time: 3:55    Traveled?    No     5. Location of contact:  Psychiatry Clinic, [facility]    6. Did the client complete the home practice option(s) from the previous session: Nothing Completed    7. Motivational Teaching Strategies:  Promote hope and positive expectations  Re-frame experiences in positive light    8. Educational Teaching Strategies:  Review of written material/education  Relate information to client's experience  Ask questions to check comprehension  Break down information into small chunks  Adopt client's language     9. CBT Teaching Strategies:  Reinforcement and shaping (positive feedback for steps towards goals and gains in knowledge & skills)  Coping skills training (increase currently used skills)    10. IRT Module(s) Addressed:  Module 9 - Coping with Symptoms  Module 10 - Substance Use    11. Techniques utilized:   [place] announced at beginning of session  Review of homework  Review of goal  Review of previous meeting  Present new material  Problem-solving practice  Help client choose a home practice option  Summarize progress made in current session    12. Mental Status Exam:    Alertness: drowsy and sleepy  Appearance: casually groomed  Behavior/Demeanor: passive and guarded, with fair  eye contact   Speech: normal and regular rate and rhythm  Language: intact. Preferred language identified as  "English.  Psychomotor: normal or unremarkable  Mood: depressed  Affect: restricted; was congruent to mood; was congruent to content  Thought Process/Associations: unremarkable  Thought Content:  Reports paranoid ideation;  Denies suicidal and violent ideation  Perception:  Reports none;  Denies none  Insight: adequate  Judgment: adequate for safety  Cognition: does  appear grossly intact; formal cognitive testing was not done  Suicidal ideation: denies SI, denies intent,  and denies plan  Homicidal Ideation: denies    13. Assessment/Progress Note:     This writer met with Jerel for a follow-up IRT Session. Jerel said he is depressed because of the dreary weather. Jerel noted his paranoia has improved slightly this week and he is unsure why. He is sleeping about 15 hours a day, including naps, but continues to feel tired/groggy. During the meeting, Jerel was intermittently irritated. He said his goals are to get an apartment and to obtain a job, but he does not feel closer to either. He indicated he would be reaching out to Ana Michel (TIA) for a follow-up appointment. He shared he was rejected earlier in the week by the woman he met on the bus. She was uncomfortable with continuing their communication. This writer reviewed the potential benefits in discussing coping with paranoia and anxiety. Jerel described having panic attacks while driving. He was engaged in increasing awareness of his automatic thoughts when driving, but then said, \"it's not that bad.\" He stated his drinking has increased to 2 40 oz. beers per week. He continues to use the same amount of marijuana weekly. Jerel would like to move to every other week IRT sessions. This will be further discussed at the treatment planning meeting.     14. Plan/Referrals:     This writer will meet with Jerel and the NAVIGATE team for a treatment planning meeting to discuss his goals and treatment next steps.     Billing for \"Interactive Complexity\"?    No      Anahi " EDELMIRA Summers    [facility] Individual Resiliency Trainer    Attestation:    I did not see this patient directly. This patient is discussed with me in individual clinical social work supervision, and I agree with the plan as documented.     AGA Alexandre, SHIRA, April 20, 2018    Attestation:    I did not see this patient directly. This patient is discussed with the NAVIGATE Team Director, AGA Narvaez, SHIRA, and myself in individual clinical social work supervision, and I agree with the plan as documented.     AGA Huerta, SHIRA, 7/6/18

## 2018-04-23 ENCOUNTER — OFFICE VISIT (OUTPATIENT)
Dept: PSYCHIATRY | Facility: CLINIC | Age: 23
End: 2018-04-23
Payer: COMMERCIAL

## 2018-04-23 DIAGNOSIS — F25.0 SCHIZOAFFECTIVE DISORDER, BIPOLAR TYPE (H): Primary | ICD-10-CM

## 2018-04-23 NOTE — MR AVS SNAPSHOT
After Visit Summary   4/23/2018    Jerel Day    MRN: [id number]           Patient Information     Date Of Birth          1995        Visit Information        Provider Department      4/23/2018 2:30 PM Ana Michel [facility] Psychiatry        Today's Diagnoses     Schizoaffective disorder, bipolar type (H)    -  1       Follow-ups after your visit        Your next 10 appointments already scheduled     Apr 25, 2018  3:00 PM CDT   Nurse Visit with [facility] PSYCHIATRY NURSE   [facility] Psychiat[facility] ([facility])    [address] [place] [address]   [phone]            May 10, 2018  1:00 PM CDT   Navigate Family Therapy with Shai Londono [facility] Psychiatry ([facility])    [address] [place] [address]   [phone]            May 10, 2018  1:00 PM CDT   Navigate Psychotherapy with Anahi Summers [facility] Psychiatry ([facility])    [address] [place] [address]   [phone]            May 16, 2018  3:00 PM CDT   Navigate Psychotherapy with Anahi Summers [facility] Psychiatry ([facility])    [address] [place] [address]   [phone]            May 23, 2018  3:00 PM CDT   Navigate Psychotherapy with Anahi Summers [facility]iatry ([facility])    [address] [place] [address]   [phone]            May 30, 2018  5:00 PM CDT   Navigate Family Therapy with Shai Londono [facility] Psychiatry ([facility])    [address] [place] [address]   [phone]            May 30, 2018  5:00 PM CDT   Navigate Psychotherapy with Anahi Summers [facility]iatry ([facility])    [address] [place] [address]   [phone]              Who to contact     Please call your clinic at [phone] to:    Ask questions about  your health    Make or cancel appointments    Discuss your medicines    Learn about your test results    Speak to your doctor            Additional Information About Your Visit        MyChart Information     [employer] is an electronic gateway that provides easy, online access to your medical records. With WhoAPI, you can request a clinic appointment, read your test results, renew a prescription or communicate with your care team.     To sign up for WhoAPI visit the website at [email or web address]   You will be asked to enter the access code listed below, as well as some personal information. Please follow the directions to create your username and password.     Your access code is: [email or web address]  Expires: 2018  7:30 AM     Your access code will  in 90 days. If you need help or a new code, please contact your [facility] or call [phone] for assistance.        Care EveryWhere ID     This is your Care EveryWhere ID. This could be used by other organizations to access your [place] medical records  [id number]         Blood Pressure from Last 3 Encounters:   18 111/64   18 115/63   18 115/65    Weight from Last 3 Encounters:   18 69.6 kg (153 lb 6.4 oz)   18 67.5 kg (148 lb 12.8 oz)   18 66.4 kg (146 lb 6.4 oz)              Today, you had the following     No orders found for display       Primary Care Provider Office Phone # Fax #    [facility] [phone]       [address] [place] [address]        Equal Access to Services     [facility] : Hadii aad ku hadasho Soomaali, waaxda luqadaha, qaybta kaalmada adeegyada, keagan lu. So [facility] [phone].    ATENCIÓN: Si habla español, tiene a grimm disposición servicios gratuitos de asistencia lingüística. Llame al [phone].    We comply with applicable federal civil rights laws and [place] laws. We do not  discriminate on the basis of race, color, national origin, age, disability, sex, sexual orientation, or gender identity.            Thank you!     Thank you for choosing [facility] PSYCHIATRY  for your care. Our goal is always to provide you with excellent care. Hearing back from our patients is one way we can continue to improve our services. Please take a few minutes to complete the written survey that you may receive in the mail after your visit with us. Thank you!             Your Updated Medication List - Protect others around you: Learn how to safely use, store and throw away your medicines at www.disposemymeds.org.          This list is accurate as of 4/23/18 11:59 PM.  Always use your most recent med list.                   Brand Name Dispense Instructions for use Diagnosis    ALPRAZOLAM PO      Take 0.5 mg by mouth daily        * ARIPiprazole 10 MG tablet    ABILIFY    30 tablet    Take 1 tablet (10 mg) by mouth daily    Schizoaffective disorder, bipolar type (H)       * ARIPiprazole  MG extended release inj syringe    ABILIFY MAINTENA    1 Syringe    Inject 1 Syringe (400 mg) into the muscle every 28 days    Schizoaffective disorder, bipolar type (H)       cholecalciferol 1000 UNIT tablet    vitamin D3     Take 1,000 Units by mouth        lithium 450 MG CR tablet    ESKALITH    60 tablet    Take 2 tablets (900 mg) by mouth At Bedtime    Schizoaffective disorder, bipolar type (H)       * Notice:  This list has 2 medication(s) that are the same as other medications prescribed for you. Read the directions carefully, and ask your doctor or other care provider to review them with you.

## 2018-04-24 ASSESSMENT — PATIENT HEALTH QUESTIONNAIRE - PHQ9
SUM OF ALL RESPONSES TO PHQ QUESTIONS 1-9: 13

## 2018-04-24 NOTE — PROGRESS NOTES
NAVIGATE SEE Progress Note   For Supported Employment & Education    NAVIGATE Enrollee: Jerel Day (1995)     MRN: [id number]  Date:  4/23/18  Clinician: MIQUELATE Supported Employment & , Ana Michel    1. Client Status Update:   Jerel Day is interested in employment (Client developed employement goals)    2. People present:   SEE/Writer  Client: Jerel Day      3. Total number of persons who participated in contact: (do not count yourself/SEE) 1    4. Length of Actual Contact: 60 minutes   Traveled? Yes  Total Travel Time: 40 minutes    5. Location of contact:  Client's Home    6. Brief description of session, contact, or client status (include: strategies, interventions, client reaction to contact, next steps, etc)    Jerel and this writer met for a SEE Session. Jerel reports feeling better but still a little down. He got his hair cut after attempting to trim it and is ready to start working. We reviewed different types of positions available in his area and what he liked about each posting and what he didn't. Jerel is looking for a position where he has little in person customer interaction, uses a computer, has minimal to no heavy lifting and that pays at least $13/hr.   Jerel is interested in applying to several positions we found, including , , and . Jerel and this writer plan on visiting on Wed after his tx planning meeting to start applying. Jerel is also interested in interview help and practice as well as planning for a return to school.     7. Completion of mutually agreed upon client task from previous meeting:  Partially Completed    8. Orientation and Treatment Planning:  Pursuing current SEE goals    9. Assessment:  Assisting client to visit work or school settings to develop client preferences and goals re: work and/or school    10. Placement:  Employment  (Job searching (Internet search))    11. Follow Along Supports: (for  clients who are working or attending school)   Not Applicable    12. Mutually agreed upon client task for next meeting:     Review positions that SEE Emailed to him, start writing cover letters    13. Next Meeting Scheduled for: wed, 4/25    Ana TIJERINA Supported Employment &

## 2018-04-25 ENCOUNTER — OFFICE VISIT (OUTPATIENT)
Dept: PSYCHIATRY | Facility: CLINIC | Age: 23
End: 2018-04-25
Payer: COMMERCIAL

## 2018-04-25 ENCOUNTER — BEH TREATMENT PLAN (OUTPATIENT)
Dept: PSYCHIATRY | Facility: CLINIC | Age: 23
End: 2018-04-25

## 2018-04-25 ENCOUNTER — TELEPHONE (OUTPATIENT)
Dept: PSYCHIATRY | Facility: CLINIC | Age: 23
End: 2018-04-25

## 2018-04-25 ENCOUNTER — ALLIED HEALTH/NURSE VISIT (OUTPATIENT)
Dept: PSYCHIATRY | Facility: CLINIC | Age: 23
End: 2018-04-25
Payer: COMMERCIAL

## 2018-04-25 VITALS — SYSTOLIC BLOOD PRESSURE: 106 MMHG | DIASTOLIC BLOOD PRESSURE: 60 MMHG

## 2018-04-25 DIAGNOSIS — F25.0 SCHIZOAFFECTIVE DISORDER, BIPOLAR TYPE (H): Primary | ICD-10-CM

## 2018-04-25 NOTE — PROGRESS NOTES
NAVIGATE Program Treatment Planning Meeting  A Part of the [facility] First Episode of Psychosis Program     Patient Name: Jerel Day  /Age:  1995 (22 year old)  Diagnosis(es):   Schizoaffective disorder, bipolar type (F25.0)  Treatment Plan Encounter Dated:  18; please see in chart review for details  Treatment Plan was Reviewed?  No  Treatment Plan was Updated?  Yes    1. Type of contact:   Treatment Planning Meeting    2. People present:   Client: Yes  Significant Other/Family/Friend: Mother and Father  NAVIGATE Director/Family Clinician: AGA Narvaez, ARIANNE TIJERINA IRT: AGA Reynolds, ARIANNE TIJERINA Supported : Ana TIJERINA Prescriber: JAVY Bhatt    3. Total number of persons who participated in contact: 6, including NAVIGATE team    4. Length of Actual Contact: 2:15 minutes, Record Minutes Travel Time: 3:10 minutes    5. Location of contact:  Psychiatry Clinic, [facility]    6. Techniques utilized:   [place] announced at beginning of session  Review of each team member's role and summarization of progress made  Review of diagnosis, symptoms to substantiate the diagnosis, and affected level of functioning  Review of safety risks  (ie SI and HI) and characteristics and interventions to mitigate risk  Review of medications  Review of goals:    - Domain: Illness Management & Recovery. Goal: Identify and engage possible areas of improvement related to +/- symptoms, ability to manage illness, medications, and/or substance use/abuse, SI/SIB/HI    - Domain: Health & Basic Living Needs. Goal: Identify and engage possible areas of improvement related to basic needs being met and maintaining or improving overall health and well-being    - Domain: Family & Other Supports. Goal: Identify and engage possible areas of improvement related to engaging family, friends and other supports    - Domain: Social, Academic & Employment. Goal: Identify and engage possible  areas of improvement related to education, employment, and social activities     Review of associated strengths, barriers, objectives, and interventions  Review of frequency of appointments and anticipated length of treatment  Illicit client/family feedback    7. Assessment/Progress Note:     The above named individuals met for the purposes of a reviewing and completing a client-centered, strengths-based treatment plan utilizing a shared decision making model of care. Treatment recommendations have been outlined in the treatment plan encounter dated 4/25/18. Please see the treatment plan encounter in chart review for details.      The client/family seemed agreeable to all current goals and updated a few in each area. Jerel was engaged and expressed appropriate affect throughout.     8. Plan/Referrals:     This writer will continue to modify and review Jerel's goals and motivations.     90 Day Treatment Plan Review Date: 7/25/18    JAVY Bhatt CNP Health NAVIGATE Program    Attestation:    I did see this patient directly. This patient is discussed with me in individual clinical social work supervision, and I agree with the plan as documented.     AGA Alexandre, [facility], May 3, 2018

## 2018-04-25 NOTE — MR AVS SNAPSHOT
After Visit Summary   4/25/2018    Jerel Day    MRN: [id number]           Patient Information     Date Of Birth          1995        Visit Information        Provider Department      4/25/2018 1:45 PM Ana Michel; Shai Londono, [facility]; Anahi Summers, [facility]; Brittani Cage APRN CNP [facility] Psychiatry         Follow-ups after your visit        Your next 10 appointments already scheduled     May 10, 2018  1:00 PM CDT   Navigate Family Therapy with Shai Londono [facility] Psychiatry ([facility])    [address] [place] [address]   [phone]            May 10, 2018  1:00 PM CDT   Navigate Psychotherapy with Anahi Summers [facility]    [address] [place] [address]   [phone]            May 16, 2018  3:00 PM CDT   Navigate Psychotherapy with Anahi Summers [facility]iat[facility]    [address] [place] [address]   [phone]            May 23, 2018  2:30 PM CDT   Nurse Visit with [facility] PSYCHIATRY NURSE   [facility]    [address] [place] [address]   [phone]            May 23, 2018  3:00 PM CDT   Navigate Psychotherapy with Anahi Summers [facility]iatry ([facility])    [address] [place] [address] [phone]            May 30, 2018  5:00 PM CDT   Navigate Family Therapy with Shai Londono [facility] Psychiatry ([facility])    [address] [place] [address] [phone]            May 30, 2018  5:00 PM CDT   Navigate Psychotherapy with EDELMIRA Reynolds   [facility]    [address] [place] [id number] [phone]              Who to contact     Please call your clinic at [phone] to:    Ask questions about your  health    Make or cancel appointments    Discuss your medicines    Learn about your test results    Speak to your doctor            Additional Information About Your Visit        MyChart Information     [employer] is an electronic gateway that provides easy, online access to your medical records. With [facility] you can request a clinic appointment, read your test results, renew a prescription or communicate with your care team.     To sign up for [facility]. visit the website at [email or web address]   You will be asked to enter the access code listed below, as well as some personal information. Please follow the directions to create your username and password.     Your access code is: [email or web address]  Expires: 2018  7:30 AM     Your access code will  in 90 days. If you need help or a new code, please contact your [facility] or call [phone] for assistance.        Care EveryWhere ID     This is your Care EveryWhere ID. This could be used by other organizations to access your [place] medical records  [id number]         Blood Pressure from Last 3 Encounters:   18 106/60   18 111/64   18 115/63    Weight from Last 3 Encounters:   18 153 lb 6.4 oz (69.6 kg)   18 148 lb 12.8 oz (67.5 kg)   18 146 lb 6.4 oz (66.4 kg)              Today, you had the following     No orders found for display       Primary Care Provider Office Phone # Fax #    [facility] [phone]       [address] [place] [address]        Equal Access to Services     [facility] : Hadii aad ku hadasho Soomaali, waaxda luqadaha, qaybta kaalmada adeegyada, keagan lu. So [facility] [phone].    ATENCIÓN: Si habla español, tiene a grimm disposición servicios gratuitos de asistencia lingüística. Llame al [phone].    We comply with applicable federal civil rights laws and [place] laws. We do not  discriminate on the basis of race, color, national origin, age, disability, sex, sexual orientation, or gender identity.            Thank you!     Thank you for choosing [facility] PSYCHIATRY  for your care. Our goal is always to provide you with excellent care. Hearing back from our patients is one way we can continue to improve our services. Please take a few minutes to complete the written survey that you may receive in the mail after your visit with us. Thank you!             Your Updated Medication List - Protect others around you: Learn how to safely use, store and throw away your medicines at www.disposemymeds.org.          This list is accurate as of 4/25/18  3:34 PM.  Always use your most recent med list.                   Brand Name Dispense Instructions for use Diagnosis    ALPRAZOLAM PO      Take 0.5 mg by mouth daily        * ARIPiprazole 10 MG tablet    ABILIFY    30 tablet    Take 1 tablet (10 mg) by mouth daily    Schizoaffective disorder, bipolar type (H)       * ARIPiprazole  MG extended release inj syringe    ABILIFY MAINTENA    1 Syringe    Inject 1 Syringe (400 mg) into the muscle every 28 days    Schizoaffective disorder, bipolar type (H)       cholecalciferol 1000 UNIT tablet    vitamin D3     Take 1,000 Units by mouth        lithium 450 MG CR tablet    ESKALITH    60 tablet    Take 2 tablets (900 mg) by mouth At Bedtime    Schizoaffective disorder, bipolar type (H)       * Notice:  This list has 2 medication(s) that are the same as other medications prescribed for you. Read the directions carefully, and ask your doctor or other care provider to review them with you.

## 2018-04-25 NOTE — NURSING NOTE
Chief Complaint   Patient presents with     Imm/Inj     Abilify Maintena 400 mg IM      Jerel Day comes into clinic today at the request of DIO Bhatt CNP Ordering Provider for Med Injection only Abilifiy Maintena 400 mg IM .    Medication checked by: BYRON Gabriel  Prior to administration pt identified by name and : yes    Appearance: Dressed appropriately    Mood: Per patient stated it was ok; states Treatment Planning meeting went smoothly   Affect: flat  Eye contact: fair   Side effects: none  Level of cooperation: Patient reported that arm was sore after last injection and states that he may have clenched his arm during injection.  Writer offered to give injection in ventroglut instead.  Patient agreed at first, but then once writer land marked area and showed him where injection would go he was hesitant.  Patient has had some somatic complaints regarding hip area in past.  Ultimately patient requested that injection be given in deltoid.  Education: Educated patient on injection efficacy vs. Oral efficacy   Next appt: 18 for next injection        This service provided today was under the supervising provider of the day JAVY Bhatt CNP, who was available if needed.    MILAGRO THOMPSON

## 2018-04-25 NOTE — MR AVS SNAPSHOT
After Visit Summary   4/25/2018    Jerel Day    MRN: [id number]           Patient Information     Date Of Birth          1995        Visit Information        Provider Department      4/25/2018 3:00 PM [facility] PSYCHIATRY NURSE [facility] Psychiat[facility]         Follow-ups after your visit        Your next 10 appointments already scheduled     May 10, 2018  1:00 PM CDT   Navigate Family Therapy with Shai Londono [facility] Psychiatry ([facility])    [address] [place] [address]   [phone]            May 10, 2018  1:00 PM CDT   Navigate Psychotherapy with Anahi Summers [facility] Psychiatry ([facility])    [address] [place] [address]   [phone]            May 16, 2018  3:00 PM CDT   Navigate Psychotherapy with Anahi Summers [facility] Psychiatry ([facility])    [address] [place] [address]   [phone]            May 23, 2018  2:30 PM CDT   Nurse Visit with [facility] PSYCHIATRY NURSE   [facility] Psychiatry ([facility])    [address] [place] [address]   [phone]            May 23, 2018  3:00 PM CDT   Navigate Psychotherapy with Anahi Summers [facility] Psychiatry ([facility])    [address] [place] [address]   [phone]            May 30, 2018  5:00 PM CDT   Navigate Family Therapy with Shai Londono [facility] Psychiatry ([facility])    [address] [place] [address]   [phone]            May 30, 2018  5:00 PM CDT   Navigate Psychotherapy with Anahi Summers [facility] Psychiatry ([facility])    [address]  [facility] [address]   [phone]              Who to contact     Please call your clinic at [phone] to:    Ask questions about your health    Make or cancel appointments    Discuss your  medicines    Learn about your test results    Speak to your doctor            Additional Information About Your Visit        MyChart Information     Castle[employer]hart is an electronic gateway that provides easy, online access to your medical records. With [employer], you can request a clinic appointment, read your test results, renew a prescription or communicate with your care team.     To sign up for [employer] visit the website at [email or web address]   You will be asked to enter the access code listed below, as well as some personal information. Please follow the directions to create your username and password.     Your access code is: [email or web address]  Expires: 2018  7:30 AM     Your access code will  in 90 days. If you need help or a new code, please contact your [facility] or call [phone] for assistance.        Care EveryWhere ID     This is your Care EveryWhere ID. This could be used by other organizations to access your [place] medical records  [id number]         Blood Pressure from Last 3 Encounters:   18 106/60   18 111/64   18 115/63    Weight from Last 3 Encounters:   18 153 lb 6.4 oz (69.6 kg)   18 148 lb 12.8 oz (67.5 kg)   18 146 lb 6.4 oz (66.4 kg)              Today, you had the following     No orders found for display       Primary Care Provider Office Phone # Fax #    [facility] [phone]       [address] [place] [address]        Equal Access to Services     MUNIRA MADDOX AH: Hadii aad ku hadasho Soomaali, waaxda luqadaha, qaybta kaalmada adeegyada, keagan lu. So [facility] [phone].    ATENCIÓN: Si habla español, tiene a grimm disposición servicios gratuitos de asistencia lingüística. Llame al [phone].    We comply with applicable federal civil rights laws and [place] laws. We do not discriminate on the basis of race, color, national origin, age,  disability, sex, sexual orientation, or gender identity.            Thank you!     Thank you for choosing [facility] PSYCHIATRY  for your care. Our goal is always to provide you with excellent care. Hearing back from our patients is one way we can continue to improve our services. Please take a few minutes to complete the written survey that you may receive in the mail after your visit with us. Thank you!             Your Updated Medication List - Protect others around you: Learn how to safely use, store and throw away your medicines at www.disposemymeds.org.          This list is accurate as of 4/25/18  3:40 PM.  Always use your most recent med list.                   Brand Name Dispense Instructions for use Diagnosis    ALPRAZOLAM PO      Take 0.5 mg by mouth daily        * ARIPiprazole 10 MG tablet    ABILIFY    30 tablet    Take 1 tablet (10 mg) by mouth daily    Schizoaffective disorder, bipolar type (H)       * ARIPiprazole  MG extended release inj syringe    ABILIFY MAINTENA    1 Syringe    Inject 1 Syringe (400 mg) into the muscle every 28 days    Schizoaffective disorder, bipolar type (H)       cholecalciferol 1000 UNIT tablet    vitamin D3     Take 1,000 Units by mouth        lithium 450 MG CR tablet    ESKALITH    60 tablet    Take 2 tablets (900 mg) by mouth At Bedtime    Schizoaffective disorder, bipolar type (H)       * Notice:  This list has 2 medication(s) that are the same as other medications prescribed for you. Read the directions carefully, and ask your doctor or other care provider to review them with you.

## 2018-04-27 NOTE — PROGRESS NOTES
[facility] NAVIGATE Program Treatment Plan Summary  A Part of the [facility] First Episode of Psychosis Program     NAVIGATE Enrollee: Jerel Day  /Age:  1995 (22 year old)     Date of Initial Service: 18  Date of INTIAL Treatment Plan: 18  Last Review/Update Date:  18                                    90-Day Review Date: 18     The following is a REVIEWED treatment plan?  No   The following is an UPDATED treatment plan?  No     Participants at Collaborative Treatment Planning Meeting:                          Client                           NAVIGATE IRT Clinician: AGA Reynolds [facility]    LILLIANA Family Clinician: AGA Narvaez [facility]    MIQUELATE SEE: Ana LAFLEURATE Prescriber: Dr. Fauzia ZABALA APRN    Client's Mother    Client's Father     1. DSM-V Diagnosis (include numeric code)  Schizoaffective disorder, bipolar type (F25.0)     2. Current symptoms and circumstances that substantiate the diagnosis:  Jerel has been experiencing delusions, auditory hallucinations, visual hallucinations, and negative symptoms.      3. How symptoms and/or behaviors are affecting level of function:       Jerel has not been able to fully engage in school, work, relationships, or the community.      4. Risk Assessment:  Suicide:  Assessed Level of Immediate Risk: High  Ideation: Varies, but past ideation  Plan:  No  Means: No  Intent: No (past intent)     Homicide/Violence:  Assessed Level of Immediate Risk: Low  Ideation: No  Plan: No  Means: No  Intent: No     Current Outpatient Prescriptions   Medication     ARIPiprazole ER (ABILIFY MAINTENA) 400 MG extended release inj syringe     lithium (ESKALITH) 450 MG CR tablet     No current facility-administered medications for this visit.            5. Treatment Goals     Domain: Illness Management & Recovery  Goal: Identify and engage possible areas of improvement related to +/- symptoms, ability to manage illness, medications, and/or substance  "use/abuse, SI/SIB/HI     Objectives & Target Date     Continue with mediation recommendations, Target Date: 7/25/18    Verbalize an understanding of the motives for self-destructive behavior patterns, Target Date: 7/25/18    Verbally report and demonstrate an increased sense of hope for self, Target Date: 7/25/18     Identify steps to enhancing independence and moving into apartment, Target Date: 7/25/18     Identify negative automatic thoughts and replace them with positive self-talk messages, Target Date: 7/25/18    Verbally identify the stressors that contribute to the reactive psychosis, Target Date: 7/25/18     Report a diminishing or absence of hallucinations and/or delusions, Target Date: 7/25/18     Decrease the frequency of somatic complaints, Target Date: 7/25/18    Decrease stress regarding transportation limitations, Target Date: 7/25/18    Improve critical thinking skills, Target Date: 7/25/18    Understand and decrease the frequency of \"addiction cycles\", Target Date: 7/25/18    Develop and implement relapse prevention strategies for managing possible future episodes of psychosis, Target Date: 7/25/18    Increase communication with the parents, resulting in feeling attended to and understood, Target Date: 7/25/18    Participate in family therapy to explore and change family dynamics that contribute to the emergence of irritability, Target Date: 7/25/18     Family members increase positive support of Jerel to reduce the chances of acute exacerbation of the psychotic episode, Target Date: 7/25/18    Family members terminate any hostile, critical responses to Jerel and increase their statements of praise, optimism, and affirmation, Target Date: 7/25/18    Family members identify specific activities for Jerle that will facilitate development of positive self-esteem, Target Date: 7/25/18     Strengths     Bravery & Valor      Intelligence    Determination      Caution, Prudence, & Discretion      Curiosity "      Honest, Authentic, Genuine      Industry, diligence, & perseverance      Judgment, critical thinking, & open-mindedness      Love of learning       Medication adherence (P)    Modesty & Humility      Perspective ([place])      Supportive friends, family, recovery environment (P)     Barriers    Communication, limited communication with family/support network    Coping, limited coping strategies    Depression and/or Hopelessness    Drug/Alcohol use/abuse/dependence    Environmental Stress (e.g. family conflict or criticism) (S)    Grandiose or Persecutory Delusions (S)    Male (S)    Recent loss of social support (S)    SI/SIB/HI    Single (S)    Symptoms of psychosis, positive (delusions, hallucinations)    Symptoms of psychosis, negative (flat affect, avolition, anhedonia, alogia, and/or apathy)     Provider & Intervention   IRT    Motivational Interviewing (Connect info and skills with personal goals, Promote hope and positive expectations, Explore pros and cons of change, Re-frame experiences in a positive light)    Educational Teaching Strategies (Review written material/education on: Assessment/Initial Goal Setting, Education about Psychosis, Relapse Prevention Planning, Processing the Psychotic Episode, Developing Resiliency -Standard Sessions, Building a Bridging to Your Goals, Dealing with Negative Feelings, Coping with Symptoms, Substance Use, Having Fun and Developing Good Relationships, Making Choices about Smoking, Nutrition and Exercise, Developing Resiliency)    CBT (Reinforcement and shaping, Social skills training, Relapse prevention planning, Coping skills training, Relaxation training, Cognitive restructuring, Behavioral tailoring)  Family Therapy    Motivational Interviewing (Connect info and skills with personal goals, Promote hope and positive expectations, Explore pros and cons of change, Re-frame experiences in a positive light)    Educational Teaching Strategies (Review written  material/education on: Psychosis, Medications for psychosis, Coping with stress, Strategies to build resiliency, Relapse prevention planning, Developing a collaboration with mental health professionals, Effective communication, A relative s guide to supporting recovery from psychosis, Basic facts about alcohol and drugs)    CBT (Reinforcement and shaping, Social skills training, Relapse prevention planning, Coping skills training, Relaxation training, Cognitive restructuring, Behavioral tailoring)        Domain: Health & Basic Living Needs  Goal: Identify and engage possible areas of improvement related to basic needs being met and maintaining or improving overall health and well-being     Objectives & Target Date     Learn and implement healthy nutritional practices, Target Date: 7/25/18    Reestablish a consistent eating and sleeping pattern, Target Date: 7/25/18     Establish a plan to increase physical exercise, Target Date: 7/25/18    Explore motivation for treatment toward making a commitment to change, Target Date: 7/25/18     Decrease the level of denial around using substances as evidenced by fewer statements about minimizing amount of use and its negative impact on life, Target Date: 7/25/18      Verbalize a commitment to a harm reduction approach to using substances, Target Date: 7/25/18    Increase understanding of sleep's impact on well-being, Target Date: 7/25/18    Increase understanding of how nicotine impacts symptoms and well-being, Target Date: 7/25/18    Improve knowledge and understanding regarding how medications can decrease symptoms, Target Date: 7/25/18     Enroll and participate in CBT to learn and implement knowledge and skills for overcoming substance use, Target Date: 7/25/18     Learn and implement coping strategies to manage urges to use substances, Target Date: 7/25/18    Implement relapse prevention strategies for managing possible future situations with high risk for relapse, Target  Date: 7/25/18    Develop a written aftercare plan that will support the maintenance of long-term sobriety, Target Date: 7/25/18    Family members demonstrate support of [place]s sobriety by minimizing environmental triggers for future substance use, Target Date: 7/25/18    Family members agree to not financially support [place]s substance use, Target Date: 7/25/18    Family members verbally reinforce Jerel's active attempts to decrease substance by providing praise, encouragement, and affirmations, Target Date: 7/25/18     Strengths     Bravery & Valor      Intelligence    Determination      Caution, Prudence, & Discretion      Curiosity      Honest, Authentic, Genuine      Industry, diligence, & perseverance      Judgment, critical thinking, & open-mindedness      Love of learning       Medication adherence (P)    Modesty & Humility      Perspective ([place])      Supportive friends, family, recovery environment (P)     Barriers     Communication, limited communication with family/support network    Coping, limited coping strategies    Depression and/or Hopelessness    Drug/Alcohol use/abuse/dependence    Environmental Stress (e.g. family conflict or criticism) (S)    Grandiose or Persecutory Delusions (S)    Male (S)    Recent loss of social support (S)    SI/SIB/HI    Single (S)    Symptoms of psychosis, positive (delusions, hallucinations)    Symptoms of psychosis, negative (flat affect, avolition, anhedonia, alogia, and/or apathy)     Provider & Intervention   IRT    Motivational Interviewing (Connect info and skills with personal goals, Promote hope and positive expectations, Explore pros and cons of change, Re-frame experiences in a positive light)    Educational Teaching Strategies (Review written material/education on: Assessment/Initial Goal Setting, Education about Psychosis, Relapse Prevention Planning, Processing the Psychotic Episode, Developing Resiliency -Standard Sessions, Building a Bridging to Your  Goals, Dealing with Negative Feelings, Coping with Symptoms, Substance Use, Having Fun and Developing Good Relationships, Making Choices about Smoking, Nutrition and Exercise, Developing Resiliency)    CBT (Reinforcement and shaping, Social skills training, Relapse prevention planning, Coping skills training, Relaxation training, Cognitive restructuring, Behavioral tailoring)        Domain: Family & Other Supports  Goal: Identify and engage possible areas of improvement related to engaging family, friends and other supports     Objectives & Target Date        Identify strengths and interests that can be used to initiate social contacts and develop peer friendships, Target Date: 7/25/18     Learn and implement calming and coping strategies to manage anxiety symptoms and focus attention usefully during moments of social anxiety, Target Date: 7/25/18      Learn and implement social problem-solving skills for managing social stresses, solving daily problems, and resolving conflicts effectively, Target Date: 7/25/18    Increase meaningful time spent with family members, Target Date: 7/25/18     Increase participation in interpersonal or peer group activities, Target Date: 7/25/18     Family members learn skills that strengthen and support Jerel's positive behavior change, Target Date: 7/25/18     Family members teach and reinforce healthy social skills and attitudes, Target Date: 7/25/18      Strengths    Bravery & Valor      Intelligence    Determination      Caution, Prudence, & Discretion      Curiosity      Honest, Authentic, Genuine      Industry, diligence, & perseverance      Judgment, critical thinking, & open-mindedness      Love of learning       Medication adherence (P)    Modesty & Humility      Perspective ([place])      Supportive friends, family, recovery environment (P)     Barriers     Communication, limited communication with family/support network    Coping, limited coping strategies    Depression and/or  Hopelessness    Drug/Alcohol use/abuse/dependence    Environmental Stress (e.g. family conflict or criticism) (S)    Grandiose or Persecutory Delusions (S)    Male (S)    Recent loss of social support (S)    SI/SIB/HI    Single (S)    Symptoms of psychosis, positive (delusions, hallucinations)    Symptoms of psychosis, negative (flat affect, avolition, anhedonia, alogia, and/or apathy)     Provider & Intervention   Family Therapy    Motivational Interviewing (Connect info and skills with personal goals, Promote hope and positive expectations, Explore pros and cons of change, Re-frame experiences in a positive light)    Educational Teaching Strategies (Review written material/education on: Psychosis, Medications for psychosis, Coping with stress, Strategies to build resiliency, Relapse prevention planning, Developing a collaboration with mental health professionals, Effective communication, A relative s guide to supporting recovery from psychosis, Basic facts about alcohol and drugs)    CBT (Reinforcement and shaping, Social skills training, Relapse prevention planning, Coping skills training, Relaxation training, Cognitive restructuring, Behavioral tailoring)        Domain: Social, Academic, & Employment  Goal: Identify and engage possible areas of improvement related to education, employment, and social activities      Objectives & Target Date     Explore areas of interest for continued educational opportunities, Target Date: 7/25/18    Explore areas of interest for employment purposes, Target Date: 7/25/18    Participate in informational interviews within prospective career opportunities, Target Date: 7/25/18    Maintain a job that provides a livable wage, Target Date: 7/25/18    Family members provide praise, encouragement for Jerel's attempts and successes at school/work, Target Date: 7/25/18      Strengths    Bravery & Valor      Intelligence    Determination      Caution, Prudence, & Discretion      Curiosity  "     Honest, Authentic, Genuine      Industry, diligence, & perseverance      Judgment, critical thinking, & open-mindedness      Love of learning       Medication adherence (P)    Modesty & Humility      Perspective ([place])      Supportive friends, family, recovery environment (P)     Barriers    Command Hallucinations    Communication, limited communication with family/support network    Coping, limited coping strategies    Depression and/or Hopelessness    Drug/Alcohol use/abuse/dependence    Environmental Stress (e.g. family conflict or criticism) (S)    Grandiose or Persecutory Delusions (S)    Male (S)    Recent loss of social support (S)    SI/SIB/HI    Single (S)    Symptoms of psychosis, positive (delusions, hallucinations)    Symptoms of psychosis, negative (flat affect, avolition, anhedonia, alogia, and/or apathy)         Provider & Intervention   SEE    Education Assistance & Supports (Discuss/plan use of student disability services, School searching, Interview preparation/skills training, Complete forms/applications, Follow along supports for requesting accommodations, development and use of natural supports, problem solving difficulties, stress management, develop/use of coping skills for symptoms, develop/use of coping skills for cognitive difficulties, social skills training)    Employment Assistance & Supports (Information gathering/planning re: \"benefits applications\" or \"work incentive planning\", Job searching, Interview preparation/skills training, Complete resume, Complete applications, Follow along supports for requesting accommodations, development and use of natural supports, problem solving difficulties, stress management, develop/use of coping skills for symptoms, develop/use of coping skills for cognitive difficulties, social skills training)     6. Frequency of Sessions: Weekly     7. Expected duration of treatment:  1 year                       8. Participants in therapy plan (family, " friends, support network): Family, Shai Sutherlandan MSW LICSW, Anahi Summers MSW LGSW, Dr. Fauzia ZABALA APRN, Brittani Estrella RN, Ana Michel        See scanned document for Acknowledgement of Current Treatment Plan     Regulatory Guidelines for Updating Treatment Plan  [place] Medical Assistance: Reviewed & signed at least every 90 days  Medicare:  Update per policy

## 2018-04-28 ASSESSMENT — PATIENT HEALTH QUESTIONNAIRE - PHQ9: SUM OF ALL RESPONSES TO PHQ QUESTIONS 1-9: 12

## 2018-04-30 ENCOUNTER — OFFICE VISIT (OUTPATIENT)
Dept: PSYCHIATRY | Facility: CLINIC | Age: 23
End: 2018-04-30
Payer: COMMERCIAL

## 2018-04-30 DIAGNOSIS — F25.0 SCHIZOAFFECTIVE DISORDER, BIPOLAR TYPE (H): Primary | ICD-10-CM

## 2018-04-30 NOTE — Clinical Note
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 :)

## 2018-04-30 NOTE — MR AVS SNAPSHOT
After Visit Summary   4/30/2018    Jerel Day    MRN: [id number]           Patient Information     Date Of Birth          1995        Visit Information        Provider Department      4/30/2018 1:00 PM Ana Michel [facility] Psychiatry        Today's Diagnoses     Schizoaffective disorder, bipolar type (H)    -  1       Follow-ups after your visit        Your next 10 appointments already scheduled     May 10, 2018  1:00 PM CDT   Navigate Family Therapy with Shai Londono [facility] Psychiatry ([facility])    [address] [place] [address]   [phone]            May 10, 2018  1:00 PM CDT   Navigate Psychotherapy with Anahi Summers [facility] Psychiatry ([facility])    [address] [place] [address]   [phone]            May 16, 2018  3:00 PM CDT   Navigate Psychotherapy with Anahi Summers [facility]iatry ([facility]    [address] [place] [address]   [phone]            May 23, 2018  2:30 PM CDT   Nurse Visit with [facility] PSYCHIATRY NURSE   [facility] Psychiatry ([facility])    [address] [place] [address]   [phone]            May 23, 2018  3:00 PM CDT   Navigate Psychotherapy with Anahi Summers [facility] Psychiatry ([facility])    [address] [place] [address]   [phone]            May 30, 2018  5:00 PM CDT   Navigate Family Therapy with Shai Londono [facility] Psychiatry ([facility])    [address] [place] [address]   [phone]            May 30, 2018  5:00 PM CDT   Navigate Psychotherapy with Anahi Summers [facility] Psychiatry ([facility])    [address] [place] [address]   [phone]              Who to contact     Please call your clinic at [phone] to:    Ask questions about  your health    Make or cancel appointments    Discuss your medicines    Learn about your test results    Speak to your doctor            Additional Information About Your Visit        MyChart Information     Tidalwave Tradert is an electronic gateway that provides easy, online access to your medical records. With RocketOz, you can request a clinic appointment, read your test results, renew a prescription or communicate with your care team.     To sign up for RocketOz visit the website at [email or web address]   You will be asked to enter the access code listed below, as well as some personal information. Please follow the directions to create your username and password.     Your access code is: [email or web address]  Expires: 2018  7:30 AM     Your access code will  in 90 days. If you need help or a new code, please contact your [facility] or call [phone] for assistance.        Care EveryWhere ID     This is your Care EveryWhere ID. This could be used by other organizations to access your [place] medical records  [id number]         Blood Pressure from Last 3 Encounters:   18 106/60   18 111/64   18 115/63    Weight from Last 3 Encounters:   18 69.6 kg (153 lb 6.4 oz)   18 67.5 kg (148 lb 12.8 oz)   18 66.4 kg (146 lb 6.4 oz)              Today, you had the following     No orders found for display       Primary Care Provider Office Phone # Fax #    [facility] [phone]       [address] [place] [address]        Equal Access to Services     [facility] : Hadii aad ku hadasho Soomaali, waaxda luqadaha, qaybta kaalmada adeegyada, keagan lu. So [facility] [phone].    ATENCIÓN: Si habla español, tiene a grimm disposición servicios gratuitos de asistencia lingüística. Llame al [phone].    We comply with applicable federal civil rights laws and [place] laws. We do not  discriminate on the basis of race, color, national origin, age, disability, sex, sexual orientation, or gender identity.            Thank you!     Thank you for choosing [facility] PSYCHIATRY  for your care. Our goal is always to provide you with excellent care. Hearing back from our patients is one way we can continue to improve our services. Please take a few minutes to complete the written survey that you may receive in the mail after your visit with us. Thank you!             Your Updated Medication List - Protect others around you: Learn how to safely use, store and throw away your medicines at www.disposemymeds.org.          This list is accurate as of 4/30/18  3:58 PM.  Always use your most recent med list.                   Brand Name Dispense Instructions for use Diagnosis    ALPRAZOLAM PO      Take 0.5 mg by mouth daily        * ARIPiprazole 10 MG tablet    ABILIFY    30 tablet    Take 1 tablet (10 mg) by mouth daily    Schizoaffective disorder, bipolar type (H)       * ARIPiprazole  MG extended release inj syringe    ABILIFY MAINTENA    1 Syringe    Inject 1 Syringe (400 mg) into the muscle every 28 days    Schizoaffective disorder, bipolar type (H)       cholecalciferol 1000 UNIT tablet    vitamin D3     Take 1,000 Units by mouth        lithium 450 MG CR tablet    ESKALITH    60 tablet    Take 2 tablets (900 mg) by mouth At Bedtime    Schizoaffective disorder, bipolar type (H)       * Notice:  This list has 2 medication(s) that are the same as other medications prescribed for you. Read the directions carefully, and ask your doctor or other care provider to review them with you.

## 2018-05-01 RX ORDER — LITHIUM CARBONATE 450 MG
900 TABLET, EXTENDED RELEASE ORAL AT BEDTIME
Qty: 60 TABLET | Refills: 1 | Status: SHIPPED | OUTPATIENT
Start: 2018-05-01 | End: 2018-07-02

## 2018-05-03 ENCOUNTER — OFFICE VISIT (OUTPATIENT)
Dept: PSYCHIATRY | Facility: CLINIC | Age: 23
End: 2018-05-03
Payer: COMMERCIAL

## 2018-05-03 DIAGNOSIS — F25.0 SCHIZOAFFECTIVE DISORDER, BIPOLAR TYPE (H): Primary | ICD-10-CM

## 2018-05-03 NOTE — MR AVS SNAPSHOT
After Visit Summary   5/3/2018    Jerel Day    MRN: [id number]           Patient Information     Date Of Birth          1995        Visit Information        Provider Department      5/3/2018 1:00 PM Shai Londono, [facility] Psychiatry        Today's Diagnoses     Schizoaffective disorder, bipolar type (H)    -  1       Follow-ups after your visit        Your next 10 appointments already scheduled     May 10, 2018  1:00 PM CDT   Navigate Family Therapy with Shai Londono [facility]iatry ([facility])    [address] [place] [address]   [phone]            May 10, 2018  1:00 PM CDT   Navigate Psychotherapy with Anahi Summers [facility] Psychiatry ([facility])    [address] [place] [address]   [phone]            May 16, 2018  3:00 PM CDT   Navigate Psychotherapy with Anahi Summers [facility]iatry ([facility]    [address] [place] [address]   [phone]            May 23, 2018  2:30 PM CDT   Nurse Visit with [facility] PSYCHIATRY NURSE   [facility] Psychiatry ([facility])    [address] [place] [address]   [phone]            May 23, 2018  3:00 PM CDT   Navigate Psychotherapy with Anahi Summers [facility] Psychiatry ([facility])    [address] [place] [address]   [phone]            May 30, 2018  5:00 PM CDT   Navigate Family Therapy with Shai Londono [facility] Psychiatry ([facility])    [address] [place] [address] [phone]            May 30, 2018  5:00 PM CDT   Navigate Psychotherapy with Anahi Summers [facility]iatry ([facility])    [address] [place] [address] [phone]              Who to contact     Please call your clinic at [phone] to:    Ask questions about  your health    Make or cancel appointments    Discuss your medicines    Learn about your test results    Speak to your doctor            Additional Information About Your Visit        MyChart Information     Accel Diagnosticst is an electronic gateway that provides easy, online access to your medical records. With [employer], you can request a clinic appointment, read your test results, renew a prescription or communicate with your care team.     To sign up for Little Quest visit the website at [email or web address]   You will be asked to enter the access code listed below, as well as some personal information. Please follow the directions to create your username and password.     Your access code is: [email or web address]  Expires: 2018  7:30 AM     Your access code will  in 90 days. If you need help or a new code, please contact your [facility] or call [phone] for assistance.        Care EveryWhere ID     This is your Care EveryWhere ID. This could be used by other organizations to access your [place] medical records  [id number]         Blood Pressure from Last 3 Encounters:   18 106/60   18 111/64   18 115/63    Weight from Last 3 Encounters:   18 69.6 kg (153 lb 6.4 oz)   18 67.5 kg (148 lb 12.8 oz)   18 66.4 kg (146 lb 6.4 oz)              Today, you had the following     No orders found for display       Primary Care Provider Office Phone # Fax #    [facility] [phone]       [address] [place] [address]        Equal Access to Services     [facility] : Hadii aad ku hadasho Soomaali, waaxda luqadaha, qaybta kaalmada adeegyada, keagan lu. So [facility] [phone].    ATENCIÓN: Si habla español, tiene a grimm disposición servicios gratuitos de asistencia lingüística. Llame al [phone].    We comply with applicable federal civil rights laws and [place] laws. We do not  discriminate on the basis of race, color, national origin, age, disability, sex, sexual orientation, or gender identity.            Thank you!     Thank you for choosing [facility] PSYCHIATRY  for your care. Our goal is always to provide you with excellent care. Hearing back from our patients is one way we can continue to improve our services. Please take a few minutes to complete the written survey that you may receive in the mail after your visit with us. Thank you!             Your Updated Medication List - Protect others around you: Learn how to safely use, store and throw away your medicines at www.disposemymeds.org.          This list is accurate as of 5/3/18 11:59 PM.  Always use your most recent med list.                   Brand Name Dispense Instructions for use Diagnosis    ALPRAZOLAM PO      Take 0.5 mg by mouth daily        ARIPiprazole  MG extended release inj syringe    ABILIFY MAINTENA    1 Syringe    Inject 1 Syringe (400 mg) into the muscle every 28 days    Schizoaffective disorder, bipolar type (H)       cholecalciferol 1000 UNIT tablet    vitamin D3     Take 1,000 Units by mouth        lithium 450 MG CR tablet    ESKALITH    60 tablet    Take 2 tablets (900 mg) by mouth At Bedtime    Schizoaffective disorder, bipolar type (H)

## 2018-05-03 NOTE — MR AVS SNAPSHOT
After Visit Summary   5/3/2018    Jerel Day    MRN: [id number]           Patient Information     Date Of Birth          1995        Visit Information        Provider Department      5/3/2018 11:00 AM Ana Michel [facility] Psychiatry        Today's Diagnoses     Schizoaffective disorder, bipolar type (H)    -  1       Follow-ups after your visit        Your next 10 appointments already scheduled     May 10, 2018  1:00 PM CDT   Navigate Family Therapy with Shai Londono [facility] Psychiatry ([facility])    [address] [place] [address]   [phone]            May 10, 2018  1:00 PM CDT   Navigate Psychotherapy with Anahi Summers [facility] Psychiatry ([facility])    [address] [place] [address]   [phone]            May 16, 2018  3:00 PM CDT   Navigate Psychotherapy with Anahi Summers [facility]iatry ([facility])    [address] [place] [address]   [phone]            May 23, 2018  2:30 PM CDT   Nurse Visit with [facility] PSYCHIATRY NURSE   [facility]iatry ([facility])    [address] [place] [address]   [phone]            May 23, 2018  3:00 PM CDT   Navigate Psychotherapy with Anahi Summers [facility]iatry ([facility])    [address] [place] [address]   [phone]            May 30, 2018  5:00 PM CDT   Navigate Family Therapy with Shai Londono [facility] Psychiatry ([facility])    [address] [place] [address]   [phone]            May 30, 2018  5:00 PM CDT   Navigate Psychotherapy with Anahi Summers [facility] Psychiatry ([facility])    [address] [place] [address]   [phone]              Who to contact     Please call your clinic at [phone] to:    Ask questions about  your health    Make or cancel appointments    Discuss your medicines    Learn about your test results    Speak to your doctor            Additional Information About Your Visit        MyChart Information     SafeAwaket is an electronic gateway that provides easy, online access to your medical records. With Ash Access Technology, you can request a clinic appointment, read your test results, renew a prescription or communicate with your care team.     To sign up for Ash Access Technology visit the website at [email or web address]   You will be asked to enter the access code listed below, as well as some personal information. Please follow the directions to create your username and password.     Your access code is: [email or web address]  Expires: 2018  7:30 AM     Your access code will  in 90 days. If you need help or a new code, please contact your [facility] or call [phone] for assistance.        Care EveryWhere ID     This is your Care EveryWhere ID. This could be used by other organizations to access your [facility] medical records  [id number]         Blood Pressure from Last 3 Encounters:   18 106/60   18 111/64   18 115/63    Weight from Last 3 Encounters:   18 69.6 kg (153 lb 6.4 oz)   18 67.5 kg (148 lb 12.8 oz)   18 66.4 kg (146 lb 6.4 oz)              Today, you had the following     No orders found for display       Primary Care Provider Office Phone # Fax #    [facility] [phone]       [address] [place] [address]        Equal Access to Services     [facility] : Hadii aad ku hadasho Soomaali, waaxda luqadaha, qaybta kaalmada adeegyada, keagan lu. So [facility] [phone].    ATENCIÓN: Si habla español, tiene a grimm disposición servicios gratuitos de asistencia lingüística. Llame al [phone].    We comply with applicable federal civil rights laws and [place] laws. We do not  discriminate on the basis of race, color, national origin, age, disability, sex, sexual orientation, or gender identity.            Thank you!     Thank you for choosing [facility] PSYCHIATRY  for your care. Our goal is always to provide you with excellent care. Hearing back from our patients is one way we can continue to improve our services. Please take a few minutes to complete the written survey that you may receive in the mail after your visit with us. Thank you!             Your Updated Medication List - Protect others around you: Learn how to safely use, store and throw away your medicines at www.disposemymeds.org.          This list is accurate as of 5/3/18 11:59 PM.  Always use your most recent med list.                   Brand Name Dispense Instructions for use Diagnosis    ALPRAZOLAM PO      Take 0.5 mg by mouth daily        ARIPiprazole  MG extended release inj syringe    ABILIFY MAINTENA    1 Syringe    Inject 1 Syringe (400 mg) into the muscle every 28 days    Schizoaffective disorder, bipolar type (H)       cholecalciferol 1000 UNIT tablet    vitamin D3     Take 1,000 Units by mouth        lithium 450 MG CR tablet    ESKALITH    60 tablet    Take 2 tablets (900 mg) by mouth At Bedtime    Schizoaffective disorder, bipolar type (H)

## 2018-05-05 ASSESSMENT — PATIENT HEALTH QUESTIONNAIRE - PHQ9: SUM OF ALL RESPONSES TO PHQ QUESTIONS 1-9: 12

## 2018-05-07 ENCOUNTER — OFFICE VISIT (OUTPATIENT)
Dept: PSYCHIATRY | Facility: CLINIC | Age: 23
End: 2018-05-07
Payer: COMMERCIAL

## 2018-05-07 DIAGNOSIS — F25.0 SCHIZOAFFECTIVE DISORDER, BIPOLAR TYPE (H): Primary | ICD-10-CM

## 2018-05-07 NOTE — MR AVS SNAPSHOT
After Visit Summary   5/7/2018    Jerel Day    MRN: [id number]           Patient Information     Date Of Birth          1995        Visit Information        Provider Department      5/7/2018 2:00 PM Ana Michel [facility] Psychiatry        Today's Diagnoses     Schizoaffective disorder, bipolar type (H)    -  1       Follow-ups after your visit        Your next 10 appointments already scheduled     May 10, 2018  1:00 PM CDT   Navigate Family Therapy with Shai Londono [facility] Psychiatry ([facility])    [address] [place] [address]   [phone]            May 10, 2018  1:00 PM CDT   Navigate Psychotherapy with Anahi Summers [facility]iatry ([facility]    [address] [place] [address]   [phone]            May 16, 2018  3:00 PM CDT   Navigate Psychotherapy with Anahi Summers [facility]iatry ([facility])    [address] [place] [address]   [phone]            May 23, 2018  2:30 PM CDT   Nurse Visit with [facility] PSYCHIATRY NURSE   [facility]iatry ([facility])    [address] [place] [address]   [phone]            May 23, 2018  3:00 PM CDT   Navigate Psychotherapy with Anahi Summers [facility]iatry ([facility])    [address] [place] [address]   [phone]            May 30, 2018  5:00 PM CDT   Navigate Family Therapy with Shai Londono [facility] Psychiatry ([facility])    [address] [place] [address]   [phone]            May 30, 2018  5:00 PM CDT   Navigate Psychotherapy with Anahi Summers [facility] Psychiatry ([facility])    [address] [place] [address]   [phone]              Who to contact     Please call your clinic at [phone] to:    Ask questions about your  health    Make or cancel appointments    Discuss your medicines    Learn about your test results    Speak to your doctor            Additional Information About Your Visit        MyChart Information     Kamegot is an electronic gateway that provides easy, online access to your medical records. With [facility], you can request a clinic appointment, read your test results, renew a prescription or communicate with your care team.     To sign up for [facility] visit the website at [email or web address]   You will be asked to enter the access code listed below, as well as some personal information. Please follow the directions to create your username and password.     Your access code is: [email or web address]  Expires: 2018  7:30 AM     Your access code will  in 90 days. If you need help or a new code, please contact your [facility] or call [phone] for assistance.        Care EveryWhere ID     This is your Care EveryWhere ID. This could be used by other organizations to access your [place] medical records  [id number]         Blood Pressure from Last 3 Encounters:   18 106/60   18 111/64   18 115/63    Weight from Last 3 Encounters:   18 69.6 kg (153 lb 6.4 oz)   18 67.5 kg (148 lb 12.8 oz)   18 66.4 kg (146 lb 6.4 oz)              Today, you had the following     No orders found for display       Primary Care Provider Office Phone # Fax #    [facility] [phone]       [address] [place] [address]        Equal Access to Services     [facility] : Hadii aad ku hadasho Soomaali, waaxda luqadaha, qaybta kaalmada adeegyada, keagan lu. So [facility] [phone].    ATENCIÓN: Si habla español, tiene a grimm disposición servicios gratuitos de asistencia lingüística. Llame al [phone].    We comply with applicable federal civil rights laws and [place] laws. We do not  discriminate on the basis of race, color, national origin, age, disability, sex, sexual orientation, or gender identity.            Thank you!     Thank you for choosing [facility] PSYCHIATRY  for your care. Our goal is always to provide you with excellent care. Hearing back from our patients is one way we can continue to improve our services. Please take a few minutes to complete the written survey that you may receive in the mail after your visit with us. Thank you!             Your Updated Medication List - Protect others around you: Learn how to safely use, store and throw away your medicines at www.disposemymeds.org.          This list is accurate as of 5/7/18 11:59 PM.  Always use your most recent med list.                   Brand Name Dispense Instructions for use Diagnosis    ALPRAZOLAM PO      Take 0.5 mg by mouth daily        ARIPiprazole  MG extended release inj syringe    ABILIFY MAINTENA    1 Syringe    Inject 1 Syringe (400 mg) into the muscle every 28 days    Schizoaffective disorder, bipolar type (H)       cholecalciferol 1000 UNIT tablet    vitamin D3     Take 1,000 Units by mouth        lithium 450 MG CR tablet    ESKALITH    60 tablet    Take 2 tablets (900 mg) by mouth At Bedtime    Schizoaffective disorder, bipolar type (H)

## 2018-05-07 NOTE — PROGRESS NOTES
NAVIGATE SEE Progress Note   For Supported Employment & Education    NAVIGATE Enrollee: Jerel Day (1995)     MRN: [id number]  Date:  5/3/18  Clinician: MIQUELATE Supported Employment & , Ana Michel    1. Client Status Update:   Jerel Day is interested in employment (Client visited potential place of employment)    2. People present:   SEE/Writer  Client: Jerel Day    3. Total number of persons who participated in contact: (do not count yourself/SEE) 1    4. Length of Actual Contact: 60 minutes   Traveled? Yes  Total Travel Time: 40 minutes    5. Location of contact:  Client's Home    6. Brief description of session, contact, or client status (include: strategies, interventions, client reaction to contact, next steps, etc)    Jerel and this writer met for a f/u SEE session. Jerel was asked to write a cover letter for the [employer] store we visited last week and Jerel shared this with me. He did well, though there were a few grammatic errors and run-on sentences, Jerel said he did not want to fix it. This writer encouraged him to before dropping it off. Jerel has an appt with Shai Londono today for cognitive testing so Jerel plans on dropping it off before that. Jerel completed the application and did well. We reviewed several more job postings and applied to [employer] and a [employer] place.     7. Completion of mutually agreed upon client task from previous meeting:  Completed    8. Orientation and Treatment Planning:  Pursuing current SEE goals    9. Assessment:  Assisting client to visit work or school settings to develop client preferences and goals re: work and/or school    10. Placement:  Employment  (Interview preparation/skills training)    11. Follow Along Supports: (for clients who are working or attending school)   Not Applicable    12. Mutually agreed upon client task for next meeting:     Keep looking and applying for jobs    13. Next Meeting Scheduled for: Monday  at 2    Ana TIJERINA Supported Employment &

## 2018-05-08 NOTE — PROGRESS NOTES
"NAVIGATE SEE Progress Note   For Supported Employment & Education    NAVIGATE Enrollee: Jerel Day (1995)     MRN: [id number]  Date:  5/7/18  Clinician: NAVIGATE Supported Employment & , Ana Michel    1. Client Status Update:   Jerel Day is interested in employment (Client developed employement goals)    2. People present:   SEE/Writer  Client: Jerel Day    3. Total number of persons who participated in contact: (do not count yourself/SEE) 1    4. Length of Actual Contact: 60 minutes   Traveled? Yes  Total Travel Time: 40 minutes    5. Location of contact:  Client's Home    6. Brief description of session, contact, or client status (include: strategies, interventions, client reaction to contact, next steps, etc)    Jerel and this writer met for a SEE session. Jerel reports that he head back from [employer] and has an interview tomorrow. We looked at [employer] for their interview questions and practiced. Jerel did very well with the role play and answered questions really well, but with flat affect. This writer and Jerel practiced smiling and introducing ourselves as well as using intonation and tips to stay positive. Jerel said he didn't feel great about it because the person who called him for the interview \"sounded mean\". We discussed that he potentially was having a bad day or was rushed when he called and to not take it personally. We wrote down potential questions for Jerel to ask as well as chose an interview outfit.     7. Completion of mutually agreed upon client task from previous meeting:  Completed    8. Orientation and Treatment Planning:  Pursuing current SEE goals    9. Assessment:  Assisting client to visit work or school settings to develop client preferences and goals re: work and/or school    10. Placement:  Employment  (Interview preparation/skills training)    11. Follow Along Supports: (for clients who are working or attending school)   Not " Applicable    12. Mutually agreed upon client task for next meeting:     Practice with parents, f/u with writer after interview    13. Next Meeting Scheduled for: next week    Ana TIJERINA Supported Employment &

## 2018-05-10 ENCOUNTER — OFFICE VISIT (OUTPATIENT)
Dept: PSYCHIATRY | Facility: CLINIC | Age: 23
End: 2018-05-10
Payer: COMMERCIAL

## 2018-05-10 DIAGNOSIS — F25.0 SCHIZOAFFECTIVE DISORDER, BIPOLAR TYPE (H): Primary | ICD-10-CM

## 2018-05-10 NOTE — PROGRESS NOTES
"NAVIGATE Program Explanation of Findings  A Part of the [facility] First Episode of Psychosis Program     Patient Name: Jerel Dya  /Age:  1995 (22 year old)  Diagnosis(es): Schizoaffective disorder bipolar type  Cognitive Testing Completed: 5/3/2018; please see in chart review for details    1. Type of contact:   Explanation of Findings    2. People present:   Client: Yes  NAVIGATE Director/Family Clinician: AGA Narvaez LICSW NAVIGATE IRT: AGA Reynolds LGSW    3. Total number of persons who participated in contact: 3, including NAVIGATE team    4. Length of Actual Contact: 30 minutes    Start: 1pm    End: 130pm    5. Location of contact:  Psychiatry Clinic, [facility]    6. Techniques utilized:   Review of cognitive testing results  Illicit client/family feedback    7. Assessment/Progress Note:     The above named individuals met for the purposes of reviewing the findings of the cognitive testing recently completed.      Jerel agreed with agreed with some of the results.  He thought he may have over reported in some areas.  Jerel did not wish to have his family present for this review.     Informed Jerel that these results will inform treatment planning, and recommended he choose the \"problem\" areas that he wants to work on with team members.                                                         8. Plan/Referrals:     Jerel will continue NAVIGATE treatment.    SHIRA Alexandre   NAVIGSTEPHEN Direction & Family Clinician    "

## 2018-05-10 NOTE — MR AVS SNAPSHOT
After Visit Summary   5/10/2018    Jerel Day    MRN: [id number]           Patient Information     Date Of Birth          1995        Visit Information        Provider Department      5/10/2018 1:00 PM [facility] Psychiatry        Today's Diagnoses     Schizoaffective disorder, bipolar type (H)    -  1       Follow-ups after your visit        Your next 10 appointments already scheduled     May 16, 2018  3:00 PM CDT   Navigate Psychotherapy with [facility] Psychiatry ([facility])    [address]  [facility] [address]   [phone]            May 23, 2018  2:30 PM CDT   Nurse Visit with [facility] PSYCHIATRY NURSE   [facility] Psychiatry ([facility])    [address]  [facility] [address] [phone]            May 23, 2018  3:00 PM CDT   Navigate Psychotherapy with [facility] Psychiatry ([facility])    [address]  [facility] [address]   [phone]            May 30, 2018  5:00 PM CDT   Navigate Family Therapy with Shai Londono [facility] Psychiatry ([facility])    [address]  [facility] [address] [phone]            May 30, 2018  5:00 PM CDT   Navigate Psychotherapy with [facility] Psychiatry ([facility])    [address]  [facility] [address] [phone]              Who to contact     Please call your clinic at [phone] to:    Ask questions about your health    Make or cancel appointments    Discuss your medicines    Learn about your test results    Speak to your doctor            Additional Information About Your Visit        EmbraneharCloudOn Information     WARSTUFF is an electronic gateway that provides easy, online access to your medical records. With WARSTUFF, you can request a clinic appointment, read your test results, renew a prescription or communicate  with your care team.     To sign up for lingoking GmbHhart visit the website at [email or web address]   You will be asked to enter the access code listed below, as well as some personal information. Please follow the directions to create your username and password.     Your access code is: [email or web address]  Expires: 2018  7:30 AM     Your access code will  in 90 days. If you need help or a new code, please contact your [facility] or call [phone] for assistance.        Care EveryWhere ID     This is your Care EveryWhere ID. This could be used by other organizations to access your [place] medical records  [id number]         Blood Pressure from Last 3 Encounters:   18 106/60   18 111/64   18 115/63    Weight from Last 3 Encounters:   18 69.6 kg (153 lb 6.4 oz)   18 67.5 kg (148 lb 12.8 oz)   18 66.4 kg (146 lb 6.4 oz)              Today, you had the following     No orders found for display       Primary Care Provider Office Phone # Fax #    [facility] [phone]       [address] [place] [address]        Equal Access to Services     MUNIRA MADDOX AH: Hadii zev ku hadasho Soomaali, waaxda luqadaha, qaybta kaalmada adeegyada, keagan rizo haysusan moser . So [facility] [phone].    ATENCIÓN: Si habla español, tiene a grimm disposición servicios gratuitos de asistencia lingüística. Llame al [phone].    We comply with applicable federal civil rights laws and [place] laws. We do not discriminate on the basis of race, color, national origin, age, disability, sex, sexual orientation, or gender identity.            Thank you!     Thank you for choosing [facility] PSYCHIATRY  for your care. Our goal is always to provide you with excellent care. Hearing back from our patients is one way we can continue to improve our services. Please take a few minutes to complete the written survey that you may receive  in the mail after your visit with us. Thank you!             Your Updated Medication List - Protect others around you: Learn how to safely use, store and throw away your medicines at www.disposemymeds.org.          This list is accurate as of 5/10/18  3:39 PM.  Always use your most recent med list.                   Brand Name Dispense Instructions for use Diagnosis    ALPRAZOLAM PO      Take 0.5 mg by mouth daily        ARIPiprazole  MG extended release inj syringe    ABILIFY MAINTENA    1 Syringe    Inject 1 Syringe (400 mg) into the muscle every 28 days    Schizoaffective disorder, bipolar type (H)       cholecalciferol 1000 UNIT tablet    vitamin D3     Take 1,000 Units by mouth        lithium 450 MG CR tablet    ESKALITH    60 tablet    Take 2 tablets (900 mg) by mouth At Bedtime    Schizoaffective disorder, bipolar type (H)

## 2018-05-10 NOTE — MR AVS SNAPSHOT
After Visit Summary   5/10/2018    Jerle Day    MRN: [id number]           Patient Information     Date Of Birth          1995        Visit Information        Provider Department      5/10/2018 1:00 PM Shai Londono, [facility] Psychiatry        Today's Diagnoses     Schizoaffective disorder, bipolar type (H)    -  1       Follow-ups after your visit        Your next 10 appointments already scheduled     May 16, 2018  3:00 PM CDT   Navigate Psychotherapy with [facility] Sum[facility] Psychiatry ([facility])    [address]  [facility] [address]   [phone]            May 23, 2018  2:30 PM CDT   Nurse Visit with [facility] PSYCHIATRY NURSE   [facility] Psychiatry ([facility])    [address]  [facility] [address]   [phone]            May 23, 2018  3:00 PM CDT   Navigate Psychotherapy with Anahi Summers [facility] Psychiatry ([facility])    [address]  [facility] [address]   [phone]            May 30, 2018  5:00 PM CDT   Navigate Family Therapy with Shai Londono [facility] Psychiatry ([facility])    [address]  [facility] [address] [phone]            May 30, 2018  5:00 PM CDT   Navigate Psychotherapy with Anahi Summers [facility] Psychiatry ([facility])    [address]  [facility] [address] [phone]              Who to contact     Please call your clinic at [phone] to:    Ask questions about your health    Make or cancel appointments    Discuss your medicines    Learn about your test results    Speak to your doctor            Additional Information About Your Visit        Graphite Softwarehart Information     [employer] is an electronic gateway that provides easy, online access to your medical records. With [employer], you can request a clinic appointment, read your test results, renew a prescription or  communicate with your care team.     To sign up for Loom Decorhart visit the website at [email or web address]   You will be asked to enter the access code listed below, as well as some personal information. Please follow the directions to create your username and password.     Your access code is: [email or web address]  Expires: 2018  7:30 AM     Your access code will  in 90 days. If you need help or a new code, please contact your [facility] or call [phone] for assistance.        Care EveryWhere ID     This is your Care EveryWhere ID. This could be used by other organizations to access your [place] medical records  [id number]         Blood Pressure from Last 3 Encounters:   18 106/60   18 111/64   18 115/63    Weight from Last 3 Encounters:   18 69.6 kg (153 lb 6.4 oz)   18 67.5 kg (148 lb 12.8 oz)   18 66.4 kg (146 lb 6.4 oz)              Today, you had the following     No orders found for display       Primary Care Provider Office Phone # Fax #    [facility] [phone]       [address] [place] [address]        Equal Access to Services     MUNIRA MADDOX AH: Hadii zev ku hadasho Soomaali, waaxda luqadaha, qaybta kaalmada adeegyada, keagan rizo haysusan moser . So [facility] [phone].    ATENCIÓN: Si habla español, tiene a grimm disposición servicios gratuitos de asistencia lingüística. Llame al [phone].    We comply with applicable federal civil rights laws and [place] laws. We do not discriminate on the basis of race, color, national origin, age, disability, sex, sexual orientation, or gender identity.            Thank you!     Thank you for choosing [facility] PSYCHIATRY  for your care. Our goal is always to provide you with excellent care. Hearing back from our patients is one way we can continue to improve our services. Please take a few minutes to complete the written survey that you  may receive in the mail after your visit with us. Thank you!             Your Updated Medication List - Protect others around you: Learn how to safely use, store and throw away your medicines at www.disposemymeds.org.          This list is accurate as of 5/10/18  1:51 PM.  Always use your most recent med list.                   Brand Name Dispense Instructions for use Diagnosis    ALPRAZOLAM PO      Take 0.5 mg by mouth daily        ARIPiprazole  MG extended release inj syringe    ABILIFY MAINTENA    1 Syringe    Inject 1 Syringe (400 mg) into the muscle every 28 days    Schizoaffective disorder, bipolar type (H)       cholecalciferol 1000 UNIT tablet    vitamin D3     Take 1,000 Units by mouth        lithium 450 MG CR tablet    ESKALITH    60 tablet    Take 2 tablets (900 mg) by mouth At Bedtime    Schizoaffective disorder, bipolar type (H)

## 2018-05-10 NOTE — PROGRESS NOTES
NAVIGSTEPHEN Clinician Contact & Progress Note  For Individual Resiliency Training (IRT)  A Part of the [facility] First Episode of Psychosis Program    NAVIGATE Enrollee: Jerel Day (1995)     MRN: [id number]  Date:  5/10/18  Diagnosis: Schizoaffective disorder, bipolar type (F25.0)  Clinician: LILLIANA Individual Resiliency Trainer, EDELMIRA Reynolds     1. Type of contact: (majority of time spent)  IRT Session    2. People present:   Writer  Client: Jerel Day    3. Total number of persons who participated in contact: 2, including writer    4. Length of Actual Contact: Start Time: 1:30; End Time: 2:00   Traveled?    No     5. Location of contact:  Psychiatry Clinic, [place]    6. Did the client complete the home practice option(s) from the previous session: Not Applicable    7. Motivational Teaching Strategies:  Connect info and skills with personal goals  Promote hope and positive expectations  Re-frame experiences in positive light    8. Educational Teaching Strategies:  Review of written material/education  Relate information to client's experience  Adopt client's language     9. CBT Teaching Strategies:  Reinforcement and shaping (positive feedback for steps towards goals and gains in knowledge & skills)    10. IRT Module(s) Addressed:  Module 2 - Assessment/Initial Goal Setting    11. Techniques utilized:   Review of goal  Present new material  Problem-solving practice  Help client choose a home practice option  Summarize progress made in current session    12. Mental Status Exam:    Alertness: alert  and oriented  Appearance: casually groomed  Behavior/Demeanor: cooperative, pleasant and calm, with good  eye contact   Speech: normal and regular rate and rhythm  Language: intact. Preferred language identified as English.  Psychomotor: normal or unremarkable  Mood: worried  Affect: appropriate; was congruent to mood; was congruent to content  Thought Process/Associations: unremarkable  Thought Content:  " Reports preoccupations;  Denies suicidal and violent ideation  Perception:  Reports auditory hallucinations;  Denies visual hallucinations  Insight: good  Judgment: good  Cognition: does  appear grossly intact; formal cognitive testing was not done  Suicidal ideation: denies SI, denies intent,  and denies plan  Homicidal Ideation: denies    13. Assessment/Progress Note:     This writer, Shai Londono ([facility]; Family Clinician), and Jerel met from 1:00-1:30 for a review of cognitive testing findings. Jerel was engaged throughout the review and indicated agreement with the results. During the testing, depression and ruminative thoughts were his highest self-reported symptoms. Jerel agreed to target both of these in IRT sessions. He stated he fe with the depression mostly by being social and interacting with others. However, his interactions do not feel as meaningful as they once did. This writer introduced him to the 3 good things exercise. He verbally disclosed 3 positive events that occurred today and was appropriately laughing/engaged throughout. He is willing to continue this exercise as a home practice prior to next week. Jerel stated he typically ruminates about negative past experiences with others, his hospital stay, and prior drug use. He noted he has thoughts about the hospital once a week currently. 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 He is feeling anxious about the new atmosphere. He stated his marijuana and alcohol use has returned to it's prior state. However, he does not feel it is affecting his functioning. He noted his paranoia/hallucinations come and go and have decreased in intensity. He denied any suicidal or homicidal thoughts at this time.    14. Plan/Referrals:     This writer will continue to utilize motivational interviewing to assess readiness for change.     This writer will target depression and rumination utilizing CBT methods.     Billing for \"Interactive Complexity\"?  "   No    [facility] Individual Resiliency Trainer    Attestation:    I did not see this patient directly. This patient is discussed with me in individual clinical social work supervision, and I agree with the plan as documented.     AGA Alexandre, SHIRA, May 11, 2018    Attestation:    I did not see this patient directly. This patient is discussed with the NAVIGATE Team Director, AGA Narvaez, SHIRA, and myself in individual clinical social work supervision, and I agree with the plan as documented.     AGA Huerta, SHIRA, 7/6/18

## 2018-05-11 ASSESSMENT — PATIENT HEALTH QUESTIONNAIRE - PHQ9: SUM OF ALL RESPONSES TO PHQ QUESTIONS 1-9: 11

## 2018-05-14 NOTE — MR AVS SNAPSHOT
After Visit Summary   3/5/2018    Jerel Day    MRN: [id number]           Patient Information     Date Of Birth          1995        Visit Information        Provider Department      3/5/2018 2:15 PM Brittani Cage APRN CNP [facility] Psychiatry        Today's Diagnoses     Schizoaffective disorder, bipolar type (H)    -  1      Care Instructions    - Continue aripiprazole 10mg oral medication  - Start Abilify Maintena 400mg 3/14/18   - Continue aripiprazole 10mg oral medication for 1 days after the injection          Follow-ups after your visit        Follow-up notes from your care team     Return in about 2 weeks (around 3/19/2018).      Your next 10 appointments already scheduled     Mar 08, 2018 10:00 AM CST   XR VIDEO SPEECH EVALUATION WITH ESOPHAGRAM with [id number] GIGU RAD   [facility] Xray ([facility])    [address] [place] [address]  1st [facility] [address] [phone]           Please bring a list of your current medicines to your exam. (Include vitamins, minerals and over-the-counter medicines.) Leave your valuables at home.  Tell the doctor if there is a chance you could be pregnant.  Do not eat for 4 hours before the exam. Keep drinking clear liquids until 2 hours before the exam.  You may take pain medicine (with a sip of water) up to 4 hours before the exam.  Do not swallow any other medicines unless your doctor tells you to. Talk to your doctor to be sure it s safe to stop your medicines.  Please call the Imaging Department at your exam site with any questions.            Mar 08, 2018 10:00 AM CST   Video Swallow with BOBBI Garcia   [facility] ([facility])    [address] [place] [address]  4th [facility] [address] [phone]           Please check in for this visit in Imaging on the 1st floor.            Mar 14, 2018  5:00 PM CDT   Navigate Psychotherapy with EDELMIRA Reynolds   [facility] Psychiatry  PT DAILY TREATMENT NOTE     Patient Name: Zee Moon  OUUS:  : 1968  [x]  Patient  Verified  Payor: Liudmila Nunez / Plan: Damien Bustos / Product Type: PPO /    In time:10:30  Out time:10:55  Total Treatment Time (min): 25  Visit #: 18 of 22    Treatment Area: Trochanteric bursitis, left hip [M70.62]    SUBJECTIVE  Pain Level (0-10 scale): 4/10  Any medication changes, allergies to medications, adverse drug reactions, diagnosis change, or new procedure performed?: [x] No    [] Yes (see summary sheet for update)  Subjective functional status/changes:   [] No changes reported  \"My back hurts and I'm sick to my stomach today\"    OBJECTIVE      25 min Therapeutic Exercise:  [x] See flow sheet :   Rationale: increase ROM, increase strength and improve coordination to improve the patients ability to perform ADLs with less pain. With   [] TE   [] TA   [] neuro   [] other: Patient Education: [x] Review HEP    [] Progressed/Changed HEP based on:   [] positioning   [] body mechanics   [] transfers   [] heat/ice application    [] other:      Other Objective/Functional Measures:      Pain Level (0-10 scale) post treatment: 4/10    ASSESSMENT/Changes in Function: Pt reported nausea today, therefore pt level of participation was adjusted with certain ex. Pt tolerated tx with no adverse affects. Patient will continue to benefit from skilled PT services to modify and progress therapeutic interventions, address functional mobility deficits, address ROM deficits, address strength deficits, analyze and address soft tissue restrictions, analyze and cue movement patterns, analyze and modify body mechanics/ergonomics and assess and modify postural abnormalities to attain remaining goals.      []  See Plan of Care  []  See progress note/recertification  []  See Discharge Summary         Progress towards goals / Updated goals:  Long Term Goals: To be accomplished in ([facility])    [address]  [facility] [address]   [phone]            Mar 14, 2018  5:00 PM CDT   Navigate Family Therapy with Shai Londono [facility] Psychiatry ([facility])    [address]  [facility] [address]   [phone]            Mar 19, 2018  3:00 PM CDT   Navigate Family Therapy with Shai Londono [facility] Psychiatry ([facility])    [address] [place] [address]   [phone]            Mar 19, 2018  3:00 PM CDT   Navigate Psychotherapy with Anahi Summers [facility] Psychiatry ([facility])    [address] [place] [address]   [phone]            Mar 28, 2018  4:00 PM CDT   Navigate Family Therapy with Shai Londono [facility] Psychiatry ([facility])    [address] [place] [address]   [phone]            Mar 28, 2018  4:00 PM CDT   Navigate Psychotherapy with Anahi Summers [facility] Psychiatry ([facility])    [address] [place] [address]   [phone]            Apr 04, 2018  5:00 PM CDT   Navigate Family Therapy with Shai Londono [facility] Psychiatry ([facility])    [address] [place] [address]   [phone]              Who to contact     Please call your clinic at [phone] to:    Ask questions about your health    Make or cancel appointments    Discuss your medicines    Learn about your test results    Speak to your doctor            Additional Information About Your Visit        [employer] Information     [employer] is an electronic gateway that provides easy, online access to your medical records. With [employer], you can request a clinic appointment, read your test results, renew a prescription or communicate with your care team.     To sign up for [employer] visit the website at  2 weeks:                        XBBNSSO will increase B LE strength to 5/5 MMT throughout to aid in completion of ADLs.                       IPKSYH at IE: L LE grossly 4/5                        Current: 5/5  Met 5/10/2018                            Patient will report pain no greater than 1-2/10 throughout entire day to aid in completion of ADLs                        Status at IE: 10at worst                        Current: no pain in hip at rest, but pain with activity progressing 5/10/2018                            Patent will perform 5 sit<>stands pain-free to demonstrate improvement in status and aid and completion of ADLs.                       QHZLKP at IE: pain with 1 sit<>stand                        Current: 6/10 pain when performing sit-stand from home couch progressing 5/10/2018                            Patient will improve FOTO score to 59 points to demonstrate improvement in functional status.                         VFTDYK at IE:50                        XOCQOLX: 09, progressing 5/10/2018    PLAN  [x]  Upgrade activities as tolerated     [x]  Continue plan of care  []  Update interventions per flow sheet       []  Discharge due to:_  []  Other:_      Christofer Juarez, TERENCE 5/14/2018  11:17 AM    Future Appointments  Date Time Provider Poncho Dias   5/17/2018 1:30 PM Aurelia Mckeon, PT, DPT [facility] "[email or web address]   You will be asked to enter the access code listed below, as well as some personal information. Please follow the directions to create your username and password.     Your access code is: [email or web address]  Expires: 2018  6:30 AM     Your access code will  in 90 days. If you need help or a new code, please contact your [facility] or call [phone] for assistance.        Care EveryWhere ID     This is your Care EveryWhere ID. This could be used by other organizations to access your [facility] medical records  [id number]        Your Vitals Were     Pulse Temperature Respirations Height BMI (Body Mass Index)       67 98.4  F (36.9  C) 16 1.75 m (5' 8.9\") 21.68 kg/m2        Blood Pressure from Last 3 Encounters:   18 115/65   18 114/58   18 119/66    Weight from Last 3 Encounters:   18 66.4 kg (146 lb 6.4 oz)   18 67 kg (147 lb 12.8 oz)   18 64.4 kg (142 lb)              Today, you had the following     No orders found for display         Today's Medication Changes          These changes are accurate as of 3/5/18  4:10 PM.  If you have any questions, ask your nurse or doctor.               These medicines have changed or have updated prescriptions.        Dose/Directions    * ARIPiprazole 10 MG tablet   Commonly known as:  ABILIFY   This may have changed:  Another medication with the same name was added. Make sure you understand how and when to take each.   Used for:  Schizoaffective disorder, bipolar type (H)   Changed by:  Brittani Cage APRN CNP        Dose:  10 mg   Take 1 tablet (10 mg) by mouth daily   Quantity:  30 tablet   Refills:  1       * ARIPiprazole  MG extended release inj syringe   Commonly known as:  ABILIFY MAINTENA   This may have changed:  You were already taking a medication with the same name, and this prescription was added. Make sure you understand how and when to take each.   Used " for:  Schizoaffective disorder, bipolar type (H)   Changed by:  Brittani Cage APRN CNP        Dose:  400 mg   Inject 1 Syringe (400 mg) into the muscle once for 1 dose   Quantity:  1 Syringe   Refills:  0       * Notice:  This list has 2 medication(s) that are the same as other medications prescribed for you. Read the directions carefully, and ask your doctor or other care provider to review them with you.         Where to get your medicines      These medications were [place] [facility] [place] [address], [place] [address]     Phone:  [phone]     ARIPiprazole  MG extended release inj syringe         These medications were sent to [id number] - [place], [place] - [address] [facility]  [address] [facility] [address]     Phone:  [phone]     lithium 450 MG CR tablet                Primary Care Provider Office Phone # Fax #    [facility] [phone]       [address] [place] [address]        Equal Access to Services     OTIS MADDOX : Hadii zev ku hadasho Sobrittany, waaxda luqadaha, qaybta kaalmada rtacey, keagan lu. So [facility] [phone].    ATENCIÓN: Si habla español, tiene a grimm disposición servicios gratuitos de asistencia lingüística. [facility] [phone].    We comply with applicable federal civil rights laws and [place] laws. We do not discriminate on the basis of race, color, national origin, age, disability, sex, sexual orientation, or gender identity.            Thank you!     Thank you for choosing [facility] PSYCHIATRY  for your care. Our goal is always to provide you with excellent care. Hearing back from our patients is one way we can continue to improve our services. Please take a few minutes to complete the written survey that you may receive in the mail after your visit with us. Thank you!             Your Updated Medication List - Protect  others around you: Learn how to safely use, store and throw away your medicines at www.disposemymeds.org.          This list is accurate as of 3/5/18  4:10 PM.  Always use your most recent med list.                   Brand Name Dispense Instructions for use Diagnosis    ALPRAZOLAM PO      Take 0.5 mg by mouth daily        * ARIPiprazole 10 MG tablet    ABILIFY    30 tablet    Take 1 tablet (10 mg) by mouth daily    Schizoaffective disorder, bipolar type (H)       * ARIPiprazole  MG extended release inj syringe    ABILIFY MAINTENA    1 Syringe    Inject 1 Syringe (400 mg) into the muscle once for 1 dose    Schizoaffective disorder, bipolar type (H)       cholecalciferol 1000 UNIT tablet    vitamin D3     Take 1,000 Units by mouth        lithium 450 MG CR tablet    ESKALITH    60 tablet    Take 2 tablets (900 mg) by mouth At Bedtime    Schizoaffective disorder, bipolar type (H)       * Notice:  This list has 2 medication(s) that are the same as other medications prescribed for you. Read the directions carefully, and ask your doctor or other care provider to review them with you.

## 2018-05-15 ENCOUNTER — TELEPHONE (OUTPATIENT)
Dept: PSYCHIATRY | Facility: CLINIC | Age: 23
End: 2018-05-15

## 2018-05-15 NOTE — TELEPHONE ENCOUNTER
NAVIGATE (Telephone Call)  A Part of the [facility] First Episode of Psychosis Program     Patient Name: Jerel Day  /Age:  1995 (22 year old)    Returned call and left voice message for Mesha Shay, along with times she could call writer back.    Shai Londono, [facility]  NAVIGATE Director & Family Clinician

## 2018-05-23 ENCOUNTER — OFFICE VISIT (OUTPATIENT)
Dept: PSYCHIATRY | Facility: CLINIC | Age: 23
End: 2018-05-23
Payer: COMMERCIAL

## 2018-05-23 ENCOUNTER — ALLIED HEALTH/NURSE VISIT (OUTPATIENT)
Dept: PSYCHIATRY | Facility: CLINIC | Age: 23
End: 2018-05-23
Payer: COMMERCIAL

## 2018-05-23 VITALS
WEIGHT: 155.6 LBS | HEIGHT: 69 IN | DIASTOLIC BLOOD PRESSURE: 60 MMHG | HEART RATE: 72 BPM | BODY MASS INDEX: 23.05 KG/M2 | SYSTOLIC BLOOD PRESSURE: 100 MMHG

## 2018-05-23 VITALS
DIASTOLIC BLOOD PRESSURE: 60 MMHG | BODY MASS INDEX: 23.22 KG/M2 | WEIGHT: 155 LBS | HEART RATE: 74 BPM | SYSTOLIC BLOOD PRESSURE: 114 MMHG

## 2018-05-23 DIAGNOSIS — F25.0 SCHIZOAFFECTIVE DISORDER, BIPOLAR TYPE (H): Primary | ICD-10-CM

## 2018-05-23 NOTE — Clinical Note
SHAHNAZI: he wants to talk to you about switching medications or trying the oral form.  Every time he comes in he tells me he does not like his injection

## 2018-05-23 NOTE — NURSING NOTE
Jerel Day comes into clinic today at the request of JAVY Bhatt PMHNP-BC Ordering Provider for Med Injection only Abilify Maintena 400 mg .    Medication checked by: Ginger Myles RNCC  Prior to administration pt identified by name and : yes     Appearance: Dressed weather appropriate    Mood: Good  Affect: Flat   Eye contact: fair  Side effects: Denies    Level of cooperation: Jerel again was very reluctant to get injection states that he has been sore from it and does not like this.  Writer again discussed giving injection in glut instead of deltoid.  Jerel agreed to try it in the glut as he reported being sore for a week when it was given in his deltoid.  Jerel reported that he has hip problems, writer assured him that the injection should not further bother his hips as it is going into the muscle not bone.    Education: Education given regarding how injection works.  Jerel reported that he does not like getting his injection and currently he just gets it because JAVY Bhatt PMHNP-BC and his parents want him to get it.  Jerel wishes to talk to JAVY Bhatt PMHNP-BC at his next appointment as he is not sure that the injection is a good fit for him.    Next appt: Has therapy appointment following injection appointment.          This service provided today was under the supervising provider of the day Dr. Lashay Loomis, who was available if needed.    MILAGRO THOMPSON

## 2018-05-23 NOTE — MR AVS SNAPSHOT
After Visit Summary   5/23/2018    Jerel Day    MRN: [id number]           Patient Information     Date Of Birth          1995        Visit Information        Provider Department      5/23/2018 3:00 PM Anahi Summers [facility] Psychiatry        Today's Diagnoses     Schizoaffective disorder, bipolar type (H)    -  1       Follow-ups after your visit        Your next 10 appointments already scheduled     Jun 13, 2018  3:00 PM CDT   Navigate Psychotherapy with Anahi Summers [facility] Psychiatry ([facility])    [address] [place] [address]   [phone]            Jun 27, 2018  4:00 PM CDT   Navigate Family Therapy with Shai Londono [facility] Psychiatry ([facility])    [address] [place] [address]   [phone]            Jun 27, 2018  5:15 PM CDT   Navigate Medication Follow Up with JAVY Bhatt [facility] Psychiatry ([facility])    [address] [place] [address]   [phone]            Jul 09, 2018  4:30 PM CDT   Navigate Medication Follow Up with JAVY Bhatt [facility] Psychiatry ([facility])    [address] [place] [address]   [phone]            Jul 11, 2018  3:00 PM CDT   Navigate Psychotherapy with Anahi Summers [facility] Psychiatry ([facility])    [address] [place] [address]   [phone]            Jul 18, 2018  3:00 PM CDT   Navigate Psychotherapy with Anahi Summers [facility] Psychiatry ([facility])    [address]  [facility] [address]   [phone]            Jul 25, 2018  5:00 PM CDT   Navigate Psychotherapy with Anahi Summers [facility] Psychiatry ([facility])    [address] [place] [address]   [phone]            Jul 25, 2018  5:00 PM CDT   Navigate Family Therapy with  "[facility]   [facility] ([facility])    [address]  [facility] [address] [phone]              Who to contact     Please call your clinic at [phone] to:    Ask questions about your health    Make or cancel appointments    Discuss your medicines    Learn about your test results    Speak to your doctor            Additional Information About Your Visit        Care EveryWhere ID     This is your Care EveryWhere ID. This could be used by other organizations to access your [facility] medical records  [id number]        Your Vitals Were     Pulse Height BMI (Body Mass Index)             72 1.74 m (5' 8.5\") 23.31 kg/m2          Blood Pressure from Last 3 Encounters:   05/30/18 107/61   05/23/18 100/60   05/23/18 114/60    Weight from Last 3 Encounters:   05/30/18 71.4 kg (157 lb 6.4 oz)   05/23/18 70.6 kg (155 lb 9.6 oz)   05/23/18 70.3 kg (155 lb)              Today, you had the following     No orders found for display       Primary Care Provider Office Phone # Fax #    [facility] [phone]       [address] [place] [address]        Equal Access to Services     MUNIRA MADDOX AH: Hadii aad ku hadasho Soomaali, waaxda luqadaha, qaybta kaalmada adeegyada, waxay idiin haylindan russell lu. So [facility] [phone].    ATENCIÓN: Si habla español, tiene a grimm disposición servicios gratuitos de asistencia lingüística. lilia al [phone].    We comply with applicable federal civil rights laws and [place] laws. We do not discriminate on the basis of race, color, national origin, age, disability, sex, sexual orientation, or gender identity.            Thank you!     Thank you for choosing [facility] PSYCHIATRY  for your care. Our goal is always to provide you with excellent care. Hearing back from our patients is one way we can continue to improve our services. Please take a few minutes to complete the written survey that you " may receive in the mail after your visit with us. Thank you!             Your Updated Medication List - Protect others around you: Learn how to safely use, store and throw away your medicines at www.disposemymeds.org.          This list is accurate as of 5/23/18 11:59 PM.  Always use your most recent med list.                   Brand Name Dispense Instructions for use Diagnosis    ARIPiprazole  MG extended release inj syringe    ABILIFY MAINTENA    1 Syringe    Inject 1 Syringe (400 mg) into the muscle every 28 days    Schizoaffective disorder, bipolar type (H)       lithium 450 MG CR tablet    ESKALITH    60 tablet    Take 2 tablets (900 mg) by mouth At Bedtime    Schizoaffective disorder, bipolar type (H)

## 2018-05-24 ENCOUNTER — OFFICE VISIT (OUTPATIENT)
Dept: PSYCHIATRY | Facility: CLINIC | Age: 23
End: 2018-05-24
Payer: COMMERCIAL

## 2018-05-24 DIAGNOSIS — F25.0 SCHIZOAFFECTIVE DISORDER, BIPOLAR TYPE (H): Primary | ICD-10-CM

## 2018-05-24 ASSESSMENT — PATIENT HEALTH QUESTIONNAIRE - PHQ9: SUM OF ALL RESPONSES TO PHQ QUESTIONS 1-9: 12

## 2018-05-24 NOTE — MR AVS SNAPSHOT
After Visit Summary   5/24/2018    Jerel Day    MRN: [id number]           Patient Information     Date Of Birth          1995        Visit Information        Provider Department      5/24/2018 3:00 PM Ana Michel [facility] Psychiatry        Today's Diagnoses     Schizoaffective disorder, bipolar type (H)    -  1       Follow-ups after your visit        Your next 10 appointments already scheduled     May 30, 2018  5:00 PM CDT   Navigate Family Therapy with Shai Londono [facility] Psychiatry ([facility])    [address]  [facility] [address] [phone]            May 30, 2018  5:00 PM CDT   Navigate Psychotherapy with Anahi Summers [facility] Psychiatry ([facility])    [address]  [facility] [address] [phone]              Who to contact     Please call your clinic at [phone] to:    Ask questions about your health    Make or cancel appointments    Discuss your medicines    Learn about your test results    Speak to your doctor            Additional Information About Your Visit        Care EveryWhere ID     This is your Care EveryWhere ID. This could be used by other organizations to access your [facility] medical records  [id number]         Blood Pressure from Last 3 Encounters:   05/23/18 100/60   05/23/18 114/60   04/25/18 106/60    Weight from Last 3 Encounters:   05/23/18 70.6 kg (155 lb 9.6 oz)   05/23/18 70.3 kg (155 lb)   04/18/18 69.6 kg (153 lb 6.4 oz)              Today, you had the following     No orders found for display       Primary Care Provider Office Phone # Fax #    [facility] [phone]       [address] [place] [address]        Equal Access to Services     MUNIRA MADDOX : Cristobal Diez, wapascual gan, qasharee kaalmakofi webb, keagan lu. [facility] [phone].    ATENCIÓN: Si caprice curtis,  tiene a grimm disposición servicios gratuitos de asistencia lingüística. Case morataya [phone].    We comply with applicable federal civil rights laws and [place] laws. We do not discriminate on the basis of race, color, national origin, age, disability, sex, sexual orientation, or gender identity.            Thank you!     Thank you for choosing [facility] PSYCHIATRY  for your care. Our goal is always to provide you with excellent care. Hearing back from our patients is one way we can continue to improve our services. Please take a few minutes to complete the written survey that you may receive in the mail after your visit with us. Thank you!             Your Updated Medication List - Protect others around you: Learn how to safely use, store and throw away your medicines at www.disposemymeds.org.          This list is accurate as of 5/24/18  4:26 PM.  Always use your most recent med list.                   Brand Name Dispense Instructions for use Diagnosis    ALPRAZOLAM PO      Take 0.5 mg by mouth daily        ARIPiprazole  MG extended release inj syringe    ABILIFY MAINTENA    1 Syringe    Inject 1 Syringe (400 mg) into the muscle every 28 days    Schizoaffective disorder, bipolar type (H)       cholecalciferol 1000 UNIT tablet    vitamin D3     Take 1,000 Units by mouth        lithium 450 MG CR tablet    ESKALITH    60 tablet    Take 2 tablets (900 mg) by mouth At Bedtime    Schizoaffective disorder, bipolar type (H)

## 2018-05-24 NOTE — PROGRESS NOTES
NAVIGATE SEE Progress Note   For Supported Employment & Education    NAVIGATE Enrollee: Jerel Day (1995)     MRN: [id number]  Date:  5/24/18  Clinician: LILLIANA Supported Employment & , Ana Michel    1. Client Status Update:   Jerel Day is interested in education (Client developed educational goals) and employment (Client started competitive employment position)    2. People present:   SEE/Writer  Client: Jerel Day      3. Total number of persons who participated in contact: (do not count yourself/SEE) 1    4. Length of Actual Contact: 30 minutes   Traveled? Yes  Total Travel Time: 25 minutes    5. Location of contact:  Other: [employer]    6. Brief description of session, contact, or client status (include: strategies, interventions, client reaction to contact, next steps, etc)    Jerel and this writer met for a SEE f/u session. 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 Jerel reports that he enjoys the job, though its a lot of standing. He is currently working 4 7-hour shifts per week at $12/hr. He says he enjoys his coworkers and customers and wants to keep the job for a few months over the summer.   Jerel would like assistance with deciding when to go to back to college, where he would like to live, what courses to take and careers in the psychology field. Jerel appeared distracted and was laughing throughout the session. Ended a little early due to not being able to focus.    7. Completion of mutually agreed upon client task from previous meeting:  Partially Completed    8. Orientation and Treatment Planning:  Developing SEE treatment plan goals    9. Assessment:  Assisting client to visit work or school settings to develop client preferences and goals re: work and/or school    10. Placement:  Not Applicable    11. Follow Along Supports: (for  clients who are working or attending school)   Employment  (Assisting with requesting accommodations)    12. Mutually agreed upon client task for next meeting:     Get password reset for My[employer] page in order to review courses    13. Next Meeting Scheduled for: next week - ralf will text writer his schedule.    Ana TIJERINA Supported Employment &

## 2018-05-30 ENCOUNTER — OFFICE VISIT (OUTPATIENT)
Dept: PSYCHIATRY | Facility: CLINIC | Age: 23
End: 2018-05-30
Payer: COMMERCIAL

## 2018-05-30 VITALS
WEIGHT: 157.4 LBS | SYSTOLIC BLOOD PRESSURE: 107 MMHG | DIASTOLIC BLOOD PRESSURE: 61 MMHG | TEMPERATURE: 98.3 F | HEART RATE: 87 BPM | BODY MASS INDEX: 23.58 KG/M2

## 2018-05-30 DIAGNOSIS — F25.0 SCHIZOAFFECTIVE DISORDER, BIPOLAR TYPE (H): Primary | ICD-10-CM

## 2018-05-30 ASSESSMENT — ANXIETY QUESTIONNAIRES
2. NOT BEING ABLE TO STOP OR CONTROL WORRYING: SEVERAL DAYS
GAD7 TOTAL SCORE: 7
5. BEING SO RESTLESS THAT IT IS HARD TO SIT STILL: SEVERAL DAYS
7. FEELING AFRAID AS IF SOMETHING AWFUL MIGHT HAPPEN: SEVERAL DAYS
6. BECOMING EASILY ANNOYED OR IRRITABLE: SEVERAL DAYS
3. WORRYING TOO MUCH ABOUT DIFFERENT THINGS: SEVERAL DAYS
1. FEELING NERVOUS, ANXIOUS, OR ON EDGE: SEVERAL DAYS

## 2018-05-30 ASSESSMENT — PAIN SCALES - GENERAL: PAINLEVEL: MILD PAIN (3)

## 2018-05-30 ASSESSMENT — PATIENT HEALTH QUESTIONNAIRE - PHQ9: 5. POOR APPETITE OR OVEREATING: SEVERAL DAYS

## 2018-05-30 NOTE — MR AVS SNAPSHOT
After Visit Summary   5/30/2018    Jerel Day    MRN: [id number]           Patient Information     Date Of Birth          1995        Visit Information        Provider Department      5/30/2018 3:15 PM Brittani Cage APRN [facility]        Today's Diagnoses     Schizoaffective disorder, bipolar type (H)    -  1       Follow-ups after your visit        Follow-up notes from your care team     Return in about 1 month (around 6/30/2018).      Your next 10 appointments already scheduled     Jun 11, 2018  3:00 PM CDT   Navigate Psychotherapy with Shai Londono [facility] ([facility])    [address] [place] [address]   [phone]            Jun 27, 2018  4:00 PM CDT   Navigate Psychotherapy with Shai Londono [facility]iat[facility] ([facility])    [address] [place] [address]   [phone]            Jun 27, 2018  5:15 PM CDT   Navigate Medication Follow Up with JAVY Bhatt [facility] ([facility])    [address] [place] [address]   [phone]            Jul 11, 2018  3:00 PM CDT   Navigate Psychotherapy with Shai Londono [facility] ([facility])    [address] [place] [address]   [phone]            Jul 11, 2018  4:15 PM CDT   Navigate Medication Follow Up with JAVY Bhatt [facility] ([facility])    [address] [place] [address]   [phone]            Jul 25, 2018  5:00 PM CDT   Navigate Psychotherapy with Anahi Summers [facility]iat[facility] ([facility])    [address] [place] [address]   [phone]            Jul 25, 2018  5:00 PM CDT   Navigate Psychotherapy with Shai Londono [facility] ([facility])    [address]  [facility] [address]   [phone]              Who to contact     Please call your clinic at [phone] to:    Ask questions about your health    Make or cancel appointments    Discuss your medicines    Learn about your test results    Speak to your doctor            Additional Information About Your Visit        Care EveryWhere ID     This is your Care EveryWhere ID. This could be used by other organizations to access your [place] medical records  [id number]        Your Vitals Were     Pulse Temperature BMI (Body Mass Index)             87 98.3  F (36.8  C) 23.58 kg/m2          Blood Pressure from Last 3 Encounters:   05/30/18 107/61   05/23/18 100/60   05/23/18 114/60    Weight from Last 3 Encounters:   05/30/18 71.4 kg (157 lb 6.4 oz)   05/23/18 70.6 kg (155 lb 9.6 oz)   05/23/18 70.3 kg (155 lb)               Primary Care Provider Office Phone # Fax #    [facility] [phone]       [address] [place] [address]        Equal Access to Services     MUNIRA MADDOX : Hadii zev fuentes hadsurendrao Sobrittany, waaxda luqadaha, qaybta kaalmakofi webb, keagan lu. So [facility] [phone].    ATENCIÓN: Si habla español, tiene a grimm disposición servicios gratuitos de asistencia lingüística. Case al [phone].    We comply with applicable federal civil rights laws and [place] laws. We do not discriminate on the basis of race, color, national origin, age, disability, sex, sexual orientation, or gender identity.            Thank you!     Thank you for choosing [facility]  for your care. Our goal is always to provide you with excellent care. Hearing back from our patients is one way we can continue to improve our services. Please take a few minutes to complete the written survey that you may receive in the mail after your visit with us. Thank you!             Your Updated Medication List - Protect others around you: Learn how to safely  use, store and throw away your medicines at www.disposemymeds.org.          This list is accurate as of 5/30/18 11:59 PM.  Always use your most recent med list.                   Brand Name Dispense Instructions for use Diagnosis    ARIPiprazole  MG extended release inj syringe    ABILIFY MAINTENA    1 Syringe    Inject 1 Syringe (400 mg) into the muscle every 28 days    Schizoaffective disorder, bipolar type (H)       lithium 450 MG CR tablet    ESKALITH    60 tablet    Take 2 tablets (900 mg) by mouth At Bedtime    Schizoaffective disorder, bipolar type (H)

## 2018-05-30 NOTE — PROGRESS NOTES
"NAVIGATE Medication Management Progress Note  A Part of the [facility] First Episode of Psychosis Program    NAVIGATE Enrollee: Jerel Day (1995)     MRN: [id number]  Date:  5/30/18         Contributors to the Assessment     Chart Reviewed.   Interview completed with Jerel Day.         Chief Complaint       \"I am feeling loopy today\"           Interim History      Jerel Day is a 22 year old male who was last seen in clinic on 4/18/18 at which time  lithium 900mg and Abilify Maintena 400mg were continued. The patient reports poor treatment adherence.  History was provided by Jerel who was a good to fair historian.  Since the last visit:  - He noticed an improvement in depressive sx for a period of around 2-3 weeks after the weather warmed up and he started his new job. He is now feeling like it is too hot and his job is a burden- he wasn't able to go up to his cabin over the weekend because he was working.   - He is only taking his lithium once every few days, he is just taking it when he feels unstable- like he is bouncing off the walls. He just takes it \"randomly\".  We talked about how this can help with depressive sx.  - He feels like he wants to stop the shot, he has physical discomfort. He notices that he appears well on the outside but he isn't feeling that good inside.   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   - His eating is \"not as good\", he has been eating a lot of food from the deli at [employer].   - He is smoking cannabis once every three days and feels that it has been helpful in addressing his symptoms of depression. He last smoked this morning.  - He continues to feel like it is difficult to talk to other people. He doesn't have the courage to talk to people. Priorities right now include improving organization, increasing social connections and his back issues.  - Still some focus on somatic concerns, less complaints about his neck.   - " "Thoughts of death are daily but passing. Denies plan or intent.  - Resentful and annoyed with parents and with his situation. [place] be closer to people his own age. He wants to be successful and he feels like he is doing nothing.   - Denies ASE with meds, \"I don't think much about it\". Doesn't think the meds are doing much.     DEPRESSION:  reports-suicidal ideation, (passive), depressed mood, hypersomnia and poor concentration /memory, distractability;  DENIES- anhedonia, low energy, insomnia and feeling worthless  KELLI/HYPOMANIA:  reports-none;  DENIES- excessive spending, decreased sleep need, increased activity and grandiosity  PSYCHOSIS:  reports-delusions, auditory hallucinations, disorganized behavior and disorganized speech (difficulty communicating)-- disorganized sx have improved greatly with the addition of the HO;  DENIES- visual hallucinations  ANXIETY:  social anxiety and nervous/overwhelmed  SLEEP:  Sleep has increased, 9-10 hours per night, sometime up to 15 hours a day with naps  EATING DISORDER: none     RECENT SUBSTANCE USE:     ALCOHOL- Denies current daily use. Previously- nearly daily EtOH use, 3-13 shots of hard liquor + beer and wine           TOBACCO- 1/2 PPD             CAFFEINE- 1 cups/day of coffee  OPIOIDS- none       NARCAN KIT- N/A       CANNABIS- Denies current daily use          OTHER ILLICIT DRUGS- hallucinogens (previously)      CURRENT SOCIAL HISTORY:  FINANCIAL SUPPORT- family or friend       CHILDREN- none       LIVING SITUATION- Currently staying with his parent's [facility] in [place], [place]      SOCIAL/ SPIRITUAL SUPPORT- unknown       FEELS SAFE AT HOME- Yes      MEDICAL ROS:  Reports none      Denies akathisia, unusual movements and wt gain   10 point ROS neg other than the symptoms noted above in the HPI. Patient does report multiple physical concern including back, neck, hip and foot pain, concerns about metabolism. These complaints " have been evaluated by sports medicine and primary care with unremarkable findings. PT exercises have been recommended.      Of note, history is positive for multiple sports injuries (former ) including concussion x2 with LOC once; ankle and hip injuries; scoliosis. His disorganized communication is likely interfering with an adequate health assessment.         First Episode of Psychosis History      DUP (duration untreated psychosis):  Likely first experienced auditory hallucinations during the spring of 2016, possibly in the context of cannabis and LSD use. Did not seek treatment until behavior and presentation became significantly disorganized in January 2017. Medication adherence has been poor over the course of this last year.   Route to initial care: ED for disorganized behavior, somatic concerns   Medication adherence overall:  Fair to poor  General frequency of visits:  Recommend weekly to biweekly  Participation in groups:  none  Cognitive Remediation:  none  Other treatment history: See pertinent background      Reviewed for completion of First Episode work-up:  Yes  First episode workup:  Not Done (if completed, see LABS for results)  MATRICS Consensus Cognitive Battery:  Not Done (if completed, see LABS for results)         Medical/Surgical History     Penicillins    Patient Active Problem List   Diagnosis     Schizoaffective disorder (H)            Medications     Current Outpatient Prescriptions   Medication Sig Dispense Refill     ALPRAZOLAM PO Take 0.5 mg by mouth daily       ARIPiprazole ER (ABILIFY MAINTENA) 400 MG extended release inj syringe Inject 1 Syringe (400 mg) into the muscle every 28 days 1 Syringe 11     cholecalciferol (VITAMIN D3) 1000 UNIT tablet Take 1,000 Units by mouth       lithium (ESKALITH) 450 MG CR tablet Take 2 tablets (900 mg) by mouth At Bedtime 60 tablet 1     Previous medication trials:  Zoloft- stopped because of ASE (?)  fluoxetine  Risperidone 1 mg,  reported akathisia at 3mg dose  Haloperidol 5mg bid, reported dystonia relieved with benadryl   Divalproex 750mg  Quetiapine 300mg- discontinued 1/2/18  Olanzapine 10mg- discontinued 1/29/18, EPS?  Alprazolam 0.5-1mg PRN          Vitals     /61 (BP Location: Right arm, Patient Position: Chair, Cuff Size: Adult Regular)  Pulse 87  Temp 98.3  F (36.8  C)  Wt 71.4 kg (157 lb 6.4 oz)  BMI 23.58 kg/m2      Weight prior to medication: 130s         Mental Status Exam     Alertness: alert and oriented  Appearance: casually groomed   Behavior/Demeanor: cooperative, with good eye contact   Speech: spontaneous and unremarkable   Language: intact  Psychomotor: fidgety  Mood: anxious  Affect: full range; was congruent to mood; was congruent to content  Thought Process/Associations: more organized, still containaing loose associations and neologisms/ word salad  Thought Content:  Reports suicidal and violent ideation (without intent or plan), delusions, preoccupations, over-valued ideas and paranoid ideation;  Denies obsessions , phobia  and magical thinking  Perception:  Reports auditory hallucinations;  Denies visual hallucinations  Insight: poor  Judgment: limited  Cognition: does  appear grossly intact; formal cognitive testing was not done         Labs and Data     RATING SCALES:  AIMS: done 3/5/18 with total score of 0    PHQ9 TODAY = 12  PHQ-9 SCORE 5/3/2018 5/10/2018 5/23/2018   Total Score 12 11 12       ANTIPSYCHOTIC LABS ROUTINE    [glu, A1C, lipids (focus LDL), liver enzymes, WBC, ANEU, Hgb, plts]   q12 mo  Recent Labs   Lab Test  01/03/18   1054   GLC  73     Recent Labs   Lab Test  01/03/18   1054   CHOL  135   TRIG  93   LDL  70   HDL  46     Recent Labs   Lab Test  01/03/18   1054   AST  19   ALT  37   ALKPHOS  66     Recent Labs   Lab Test  01/03/18   1054   WBC  8.0   ANEU  5.5   HGB  13.9   PLT  241            Psychiatric Diagnoses     Schizoaffective disorder, bipolar type (F25.0)         Assessment  "    This patient is a 22 year old male who provides a history supporting the diagnoses listed directly above.  Further diagnostic clarification is not needed.  There are medical comorbidities which impact this treatment [suicidal ideation, psychosis [sxs include delusions, AH, disorganized behavior], multiple psychotropic trials, severe med reaction, psych hosp (<3) and SUBSTANCE USE: hallucinogens and cannabis].     DISCUSSION: Patient presents as more organized; disorganized communication consisting of word salad and loose associations are still observed but are much more infrequent and do not impede his ability for expressive and receptive communication. Patient thinks \"medications aren't doing much\", helping or hurting, so he is amenable to continue to take medications as prescribed but continues to report ongoing annoyance and frustration with parents reminding him to take his medications every day. He does express some discomfort with the HO but is willing to continue it in order to continue to work toward his goals of increasing social interaction with his peers and his occupational goals.  Depressive sx continue to increase and seem to be related to patient having awareness that he is not currently working toward specific goals of occupational and social success. We discussed the benefits of adhering to Lithium to address depresive sx. The patient remains ambivalent but this does not seem to be due to ASE, more so that he does not see a clear benefit. Will continue to work toward a goal of adherence. He did agree to at least attempt to take med daily.   No immediate safety concerns were identified today.    SUICIDE RISK ASSESSMENT [details described above]:  Today Jerel Day reports passive suicidal thoughts.  In addition, he has notable risk factors for self-harm including hallucinations [command, persecutory ], substance use [alcohol, cannabis, hallucinogens], hx of stopping meds, recent loss and male. "  However, risk is mitigated by no plan or intent, h/o seeking help when needed, good social support and stable housing.  Based on all available evidence he does not appear to be at imminent risk for self-harm therefore does not meet criteria for a 72-hr hold/  involuntary hospitalization.  However, based on degree of symptoms therapy and close psych FU was recommended which the pt did agree to.      [place] PRESCRIPTION MONITORING PROGRAM [] was not checked today:  last ALPRAZOLAM 0.5 MG TABLET  dispensed 12/01/2017    PSYCHOTROPIC DRUG INTERACTIONS:   No major interactions.  Concomitant use of aripiprazole and lithium may increase risk for EPS     MANAGEMENT:  Monitoring for adverse effects, routine vitals, routine labs, using lowest therapeutic dose of [olanzapine and lithium] and patient is aware of risks         Plan     1) PSYCHOTROPIC MEDICATIONS:  - Continue lithium 900mg at HS and Abilify Maintena 400mg    2) THERAPY:  Continue IRT and SEE    3) NEXT DUE:    Labs- Results available in care everywhere, last Li level 0.88. Levels should be checked again due to sporadic adherence.  Rating Scales- AIMS 9/2018 or sooner if needed    4) REFERRALS:    No Referrals needed    5) RTC: 4 weeks    6) CRISIS NUMBERS:   Provided routinely in AVS.    TREATMENT RISK STATEMENT:  The risks, benefits, alternatives and potential adverse effects have been discussed and are understood by the pt. The pt understands the risks of using street drugs or alcohol. There are no medical contraindications, the pt agrees to treatment with the ability to do so. The pt knows to call the clinic for any problems or to access emergency care if needed.  Medical and substance use concerns are documented above.  Psychotropic drug interaction check was done, including changes made today.      PROVIDER:  JAVY Bhatt [facility]      Psychiatry Clinic Individual Psychotherapy Note                                                                     [16]    Start time - 3:20pm       End time - 3:40  Date reviewed - 2/14/18       Date next due - 2/14/19    Subjective: This supportive psychotherapy session addressed issues related to family of origin  and health .  Patient's reaction: Pre-contemplation in the context of mental status appropriate for ambulatory setting.  Progress: fair  Psychotherapy services during this visit included  myself and the patient.   Treatment Plan      SYMPTOMS; PROBLEMS   MEASURABLE GOALS;    FUNCTIONAL IMPROVEMENT INTERVENTIONS;   GAINS MADE DISCHARGE CRITERIA   Medications:  educate patient and reduce use obstacles   reduce frequency/ intensity of false beliefs monitoring of adherence marked symptom improvement   Psychosocial: health issues, limited social support and mental health symptoms   learn 2 new ways of coping with routine stressors increase coping skills marked symptom improvement

## 2018-05-30 NOTE — MR AVS SNAPSHOT
After Visit Summary   5/30/2018    Jerel Day    MRN: [id number]           Patient Information     Date Of Birth          1995        Visit Information        Provider Department      5/30/2018 5:00 PM Shai Londono, [facility] Psychiatry        Today's Diagnoses     Schizoaffective disorder, bipolar type (H)    -  1       Follow-ups after your visit        Your next 10 appointments already scheduled     Jun 13, 2018  3:00 PM CDT   Navigate Psychotherapy with Anahi Summers [facility] Psychiatry ([facility])    [address] [place] [address]   [phone]            Jun 27, 2018  4:00 PM CDT   Navigate Family Therapy with Shai Londono [facility] Psychiatry ([facility])    [address] [place] [address]   [phone]            Jun 27, 2018  5:15 PM CDT   Navigate Medication Follow Up with JAVY Bhatt [facility] Psychiatry ([facility])    [address] [place] [address]   [phone]            Jul 09, 2018  4:30 PM CDT   Navigate Medication Follow Up with JAVY Bhatt [facility] Psychiatry ([facility])    [address] [place] [address]   [phone]            Jul 11, 2018  3:00 PM CDT   Navigate Psychotherapy with Anahi Summers [facility] Psychiatry ([facility])    [address] [place] [address]   [phone]            Jul 18, 2018  3:00 PM CDT   Navigate Psychotherapy with Anahi Summers [facility]iatry ([facility])    [address]  [facility] [address]   [phone]            Jul 25, 2018  5:00 PM CDT   Navigate Psychotherapy with Anahi Summers [facility] Psychiatry ([facility])    [address] [place] [address]   [phone]            Jul 25, 2018  5:00 PM CDT   Navigate Family Therapy with  Shai Londono, [facility] Psychiatry ([facility])    [address]  [facility] [id number] [phone]              Who to contact     Please call your clinic at [phone] to:    Ask questions about your health    Make or cancel appointments    Discuss your medicines    Learn about your test results    Speak to your doctor            Additional Information About Your Visit        Care EveryWhere ID     This is your Care EveryWhere ID. This could be used by other organizations to access your [place] medical records  [id number]         Blood Pressure from Last 3 Encounters:   05/30/18 107/61   05/23/18 100/60   05/23/18 114/60    Weight from Last 3 Encounters:   05/30/18 71.4 kg (157 lb 6.4 oz)   05/23/18 70.6 kg (155 lb 9.6 oz)   05/23/18 70.3 kg (155 lb)              Today, you had the following     No orders found for display       Primary Care Provider Office Phone # Fax #    [facility] [phone]       [address] [place] [address]        Equal Access to Services     [facility]IS [facility] : Hadii aad ku hadasho Soomaali, waaxda luqadaha, qaybta kaalmada adeegyada, keagan moser . So [facility] [phone].    ATENCIÓN: Si habla español, tiene a grimm disposición servicios gratChiasmaos de asistencia lingüística. [facility] [phone].    We comply with applicable federal civil rights laws and [place] laws. We do not discriminate on the basis of race, color, national origin, age, disability, sex, sexual orientation, or gender identity.            Thank you!     Thank you for choosing [facility] PSYCHIATRY  for your care. Our goal is always to provide you with excellent care. Hearing back from our patients is one way we can continue to improve our services. Please take a few minutes to complete the written survey that you may receive in the mail after your visit with us. Thank you!             Your Updated Medication List -  Protect others around you: Learn how to safely use, store and throw away your medicines at www.disposemymeds.org.          This list is accurate as of 5/30/18 11:59 PM.  Always use your most recent med list.                   Brand Name Dispense Instructions for use Diagnosis    ARIPiprazole  MG extended release inj syringe    ABILIFY MAINTENA    1 Syringe    Inject 1 Syringe (400 mg) into the muscle every 28 days    Schizoaffective disorder, bipolar type (H)       lithium 450 MG CR tablet    ESKALITH    60 tablet    Take 2 tablets (900 mg) by mouth At Bedtime    Schizoaffective disorder, bipolar type (H)

## 2018-05-30 NOTE — MR AVS SNAPSHOT
After Visit Summary   5/30/2018    Jerel Day    MRN: [id number]           Patient Information     Date Of Birth          1995        Visit Information        Provider Department      5/30/2018 5:00 PM Anahi Summers [facility] Psychiatry        Today's Diagnoses     Schizoaffective disorder, bipolar type (H)    -  1       Follow-ups after your visit        Your next 10 appointments already scheduled     Jun 13, 2018  3:00 PM CDT   Navigate Psychotherapy with Anahi Summers [facility] Psychiatry ([facility])    [address] [place] [address]   [phone]            Jun 27, 2018  4:00 PM CDT   Navigate Family Therapy with Shai Londono [facility] Psychiatry ([facility])    [address] [place] [address]   [phone]            Jun 27, 2018  5:15 PM CDT   Navigate Medication Follow Up with JAVY Bhatt [facility] Psychiatry ([facility])    [address] [place] [address]   [phone]            Jul 09, 2018  4:30 PM CDT   Navigate Medication Follow Up with JAVY Bhatt [facility] Psychiatry ([facility])    [address] [place] [address]   [phone]            Jul 11, 2018  3:00 PM CDT   Navigate Psychotherapy with Anahi Summers [facility] Psychiatry ([facility])    [address] [place] [address]   [phone]            Jul 18, 2018  3:00 PM CDT   Navigate Psychotherapy with Anahi Summers [facility] Psychiatry ([facility])    [address]  [facility] [address]   [phone]            Jul 25, 2018  5:00 PM CDT   Navigate Psychotherapy with Anahi Summers [facility] Psychiatry ([facility])    [address] [place] [address]   [phone]            Jul 25, 2018  5:00 PM CDT   Navigate Family Therapy with  Shai Londono, [facility] Psychiatry ([facility])    [address]  [facility] [id number] [phone]              Who to contact     Please call your clinic at [phone] to:    Ask questions about your health    Make or cancel appointments    Discuss your medicines    Learn about your test results    Speak to your doctor            Additional Information About Your Visit        Care EveryWhere ID     This is your Care EveryWhere ID. This could be used by other organizations to access your [place] medical records  [id number]         Blood Pressure from Last 3 Encounters:   05/30/18 107/61   05/23/18 100/60   05/23/18 114/60    Weight from Last 3 Encounters:   05/30/18 71.4 kg (157 lb 6.4 oz)   05/23/18 70.6 kg (155 lb 9.6 oz)   05/23/18 70.3 kg (155 lb)              Today, you had the following     No orders found for display       Primary Care Provider Office Phone # Fax #    [facility] [phone]       [address] [place] [address]        Equal Access to Services     OTIS [facility] : Hadii aad ku hadasho Soomaali, waaxda luqadaha, qaybta kaalmada adeegyada, keagan moser . So [facility] [phone].    ATENCIÓN: Si habla español, tiene a grimm disposición servicios gratBUSINESS INTELLIGENCE INTERNATIONALos de asistencia lingüística. [facility] [phone].    We comply with applicable federal civil rights laws and [place] laws. We do not discriminate on the basis of race, color, national origin, age, disability, sex, sexual orientation, or gender identity.            Thank you!     Thank you for choosing [facility] PSYCHIATRY  for your care. Our goal is always to provide you with excellent care. Hearing back from our patients is one way we can continue to improve our services. Please take a few minutes to complete the written survey that you may receive in the mail after your visit with us. Thank you!             Your Updated Medication List -  Protect others around you: Learn how to safely use, store and throw away your medicines at www.disposemymeds.org.          This list is accurate as of 5/30/18 11:59 PM.  Always use your most recent med list.                   Brand Name Dispense Instructions for use Diagnosis    ARIPiprazole  MG extended release inj syringe    ABILIFY MAINTENA    1 Syringe    Inject 1 Syringe (400 mg) into the muscle every 28 days    Schizoaffective disorder, bipolar type (H)       lithium 450 MG CR tablet    ESKALITH    60 tablet    Take 2 tablets (900 mg) by mouth At Bedtime    Schizoaffective disorder, bipolar type (H)

## 2018-05-31 NOTE — PROGRESS NOTES
NAVIGSTEPHEN Clinician Contact & Progress Note  For Individual Resiliency Training (IRT)  A Part of the [facility] First Episode of Psychosis Program    NAVIGATE Enrollee: Jerel Day (1995)     MRN: [id number]  Date:  5/30/18  Diagnosis: Schizoaffective disorder, bipolar type (F25.0)  Clinician: LILLIANA Individual Resiliency Trainer, EDELMIRA Reynolds     1. Type of contact: (majority of time spent)  IRT Session    2. People present:   Writer  Client: Jerel Day    3. Total number of persons who participated in contact: 2, including writer    4. Length of Actual Contact: Start Time: 5:00; End Time: 5:35   Traveled?    No     5. Location of contact:  Psychiatry Clinic, [place]    6. Did the client complete the home practice option(s) from the previous session: Nothing Completed    7. Motivational Teaching Strategies:  Connect info and skills with personal goals  Promote hope and positive expectations  Re-frame experiences in positive light    8. Educational Teaching Strategies:  Relate information to client's experience  Ask questions to check comprehension  Adopt client's language     9. CBT Teaching Strategies:  Reinforcement and shaping (positive feedback for steps towards goals and gains in knowledge & skills)    10. IRT Module(s) Addressed:  Module 2 - Assessment/Initial Goal Setting    11. Techniques utilized:   [place] announced at beginning of session  Review of homework  Review of goal  Review of previous meeting  Problem-solving practice  Help client choose a home practice option  Summarize progress made in current session    12. Mental Status Exam:    Alertness: drowsy and sleepy  Appearance: casually groomed  Behavior/Demeanor: pleasant, calm and passive, with fair  eye contact   Speech: normal and regular rate and rhythm  Language: intact. Preferred language identified as English.  Psychomotor: slowed  Mood: depressed  Affect: restricted; was congruent to mood; was congruent to content  Thought  Process/Associations: unremarkable  Thought Content:  Reports delusions and preoccupations;  Denies suicidal and violent ideation  Perception:  Reports auditory hallucinations;  Denies visual hallucinations  Insight: adequate  Judgment: adequate for safety  Cognition: does  appear grossly intact; formal cognitive testing was not done  Suicidal ideation: denies SI, denies intent,  and denies plan  Homicidal Ideation: denies    13. Assessment/Progress Note:     This writer met with Jerel for a follow-up IRT Session. He discussed being stressed at work lately due to a power outage and being busier. However, he is enjoying being engaged in an activity outside of the home and spending time with his coworkers. He denied any use of alcohol within the past week, and says his marijuana intake is about the same. He has noticed an increase in tiredness/fatigue and is now sleeping more than 10 hours a day. He is mildly concerned about his increase in snacking habits. He acknowledges ongoing paranoia, but reports it is manageable right now. Jerel wanted to discuss budgeting his money in further detail. He has not received his first check yet, but would like to problem solve spending habits at that time. He asked this writer for a list of activities to do around [place]. He has not yet explored websites with posted events. This writer agreed to send him some websites via email. At a separate NAVIGATE meeting, Jerel indicated he would be more comfortable in IRT if a male was facilitating. This writer discussed this with Jerel in further detail. He noted he is hoping to talk about relationship advice and how to get his back stronger. He feels these topics would be easier to disclose to a male. Jerel was offered long-term or short-term IRT with Shai Londono. He would like to move to every other week sessions, beginning with Shai Londono, and then transitioning back to meeting with this writer.      14. Plan/Referrals:     This writer  "will continue to provide IRT services, as needed.    Billing for \"Interactive Complexity\"?    No      EDELMIRA ReynoldsATE Individual Resiliency Trainer    Attestation:    I did not see this patient directly. This patient is discussed with me in individual clinical social work supervision, and I agree with the plan as documented.     AGA Alexandre, SHIRA, Ayana 15, 2018    Attestation:    I did not see this patient directly. This patient is discussed with the NAVIGATE Team Director, AGA Narvaez, SHIRA, and myself in individual clinical social work supervision, and I agree with the plan as documented.     AGA Huerta, SHIRA, 7/6/18  "

## 2018-06-01 ASSESSMENT — PATIENT HEALTH QUESTIONNAIRE - PHQ9: SUM OF ALL RESPONSES TO PHQ QUESTIONS 1-9: 12

## 2018-06-02 NOTE — PROGRESS NOTES
NAVIGSTEPHEN Clinician Contact & Progress Note  For Individual Resiliency Training (IRT)  A Part of the [facility] First Episode of Psychosis Program    NAVIGATE Enrollee: Jerel Day (1995)     MRN: [id number]  Date:  5/23/18  Diagnosis: Schizoaffective disorder, bipolar type (F25.0)  Clinician: LILLIANA Individual Resiliency Trainer, EDELMIRA Reynolds     1. Type of contact: (majority of time spent)  IRT Session    2. People present:   Writer  Client: Jerel Day    3. Total number of persons who participated in contact: 2, including writer    4. Length of Actual Contact: Start Time: 3:00; End Time: 3:40   Traveled?    No     5. Location of contact:  Psychiatry Clinic, [place]    6. Did the client complete the home practice option(s) from the previous session: Nothing Completed    7. Motivational Teaching Strategies:  Connect info and skills with personal goals  Promote hope and positive expectations  Re-frame experiences in positive light    8. Educational Teaching Strategies:  Relate information to client's experience  Ask questions to check comprehension  Adopt client's language     9. CBT Teaching Strategies:  Reinforcement and shaping (positive feedback for steps towards goals and gains in knowledge & skills)  Cognitive restructuring (identify thoughts related to negative feelings)    10. IRT Module(s) Addressed:  Module 3 - Education about Psychosis    11. Techniques utilized:   [place] announced at beginning of session  Review of homework  Review of goal  Present new material  Problem-solving practice  Help client choose a home practice option  Summarize progress made in current session    12. Mental Status Exam:    Alertness: drowsy and sleepy  Appearance: casually groomed  Behavior/Demeanor: calm, passive and guarded, with good  eye contact   Speech: normal and regular rate and rhythm  Language: intact. Preferred language identified as English.  Psychomotor: normal or unremarkable  Mood:  "irritable  Affect: restricted; was congruent to mood; was congruent to content  Thought Process/Associations: unremarkable  Thought Content:  Reports delusions;  Denies suicidal and violent ideation  Perception:  Reports none;  Denies visual hallucinations  Insight: adequate  Judgment: fair  Cognition: does  appear grossly intact; formal cognitive testing was not done  Suicidal ideation: denies SI, denies intent,  and denies plan  Homicidal Ideation: denies    13. Assessment/Progress Note:     This writer met with Jerel following his medication injection. He was unable to complete the homework because he forgot about it. This writer and Jerel problem solved how he can remember home practice activities in the future. He stated he is enjoying his work and having something to do most days. However, he is getting concerned about driving in traffic. He said it is difficult for him to not ruminate on anxieties about driving when at [facility] appointments. He would prefer community based services. He noted he is currently using both alcohol and marijuana about twice per week. He said he sometimes feels fearful when he is in public. He reported a significant decrease in paranoia currently. However, he is enjoying some auditory hallucinations because he feels powerful. He stated he occasionally feels as though machines (such as a computer) are telling him information and knowledge. He then talks back and has an ongoing dialogue. He mentioned he would feel a loss of identity if he were to lose these voices. He is hoping to sign up for classes shortly. This writer encouraged Jerel to speak more about this with Ana Michel.     14. Plan/Referrals:     This writer will continue to assess for symptoms and begin to provide cognitive restructuring.     Billing for \"Interactive Complexity\"?    No      EDELMIRA Reynolds Individual Resiliency Trainer    Attestation:    I did not see this patient directly. This patient is " discussed with me in individual clinical social work supervision, and I agree with the plan as documented.     AGA Alexandre, LICARIANNE, Ayana 15, 2018    Attestation:    I did not see this patient directly. This patient is discussed with the NAVIGATE Team Director, AGA Narvaez, LICSW, and myself in individual clinical social work supervision, and I agree with the plan as documented.     AGA Huerta, SHIRA, 7/6/18

## 2018-06-05 NOTE — PROGRESS NOTES
NAVIGATE Clinician Contact & Progress Note   For Family Education Program    NAVIGATE Enrollee: Jerel Day (1995)     MRN: [id number]  Date:  5/30/18  Diagnosis(es): Schizoaffective disorder, bipolar type  Clinician: NAVIGATE Director & Family Clinician, Shai Londono [facility]     1. Type of contact: (majority of time spent)  Family Session    2. People present:   Writer  Client: No  Significant Other/Family/Friend:  Mother and Father    3. Total number of persons who participated in contact: 3, including writer    4. Length of Actual Contact: Start Time: 5pm; End Time: 6pm   Traveled?    No     5. Location of contact:  Psychiatry Clinic, [place]    6. Did the client complete the home practice option(s) from the previous session: Not Applicable    7. Motivational Teaching Strategies:  Connect info and skills with personal goals  Promote hope and positive expectations    8. Educational Teaching Strategies:  Review of written material/education  Relate information to client's experience    9. CBT Teaching Strategies:  Relapse prevention planning (review of stressors and early warning signs)    10. Psychoeducational Topic(s) Addressed:  Just the Facts - Relapse Prevention Planning      11. Techniques utilized:   [place] announced at beginning of session  Review of goal  Review of previous meeting  Problem-solving practice  Summarize progress made in current session    12. Assessment/Progress Note:     Reviewed Jerel's current status as stable but symptomatic.     Discussed Jerel's goals and relevant progress, and ways family can help with goals of supporting engagement in outside activities; school and employment. Reviewed Jerel's participation in [facility] which will continue with a reduction in IRT visits.    Monitored for early warning signs. Family reported they suspect Jerel is continuing to use marijuana but they continue to see him engaged in activities, with few contacts with friends. We reviewed  relapse prevention strategies and ways in which family members can support Jerel.    13. Plan/Referrals:     Will meet with family monthly as schedule allows for evidence based family psychoeducation and therapeutic support aimed at maximizing Jerel's opportunity for recovery from psychosis.     Shai Londono, [facility]   NAVIGATE Director & Family Clinician

## 2018-06-07 ENCOUNTER — OFFICE VISIT (OUTPATIENT)
Dept: PSYCHIATRY | Facility: CLINIC | Age: 23
End: 2018-06-07
Payer: COMMERCIAL

## 2018-06-07 DIAGNOSIS — F25.0 SCHIZOAFFECTIVE DISORDER, BIPOLAR TYPE (H): Primary | ICD-10-CM

## 2018-06-07 NOTE — MR AVS SNAPSHOT
After Visit Summary   6/7/2018    Jerel Day    MRN: [id number]           Patient Information     Date Of Birth          1995        Visit Information        Provider Department      6/7/2018 2:00 PM Ana Michel [facility] Psychiatry        Today's Diagnoses     Schizoaffective disorder, bipolar type (H)    -  1       Follow-ups after your visit        Your next 10 appointments already scheduled     Jun 11, 2018  3:00 PM CDT   Navigate Family Therapy with Shai Londono [facility] Psychiatry ([facility])    [address] [place] [address]   [phone]            Jun 27, 2018  4:00 PM CDT   Navigate Family Therapy with Shai Londono [facility] Psychiatry ([facility])    [address] [place] [address]   [phone]            Jun 27, 2018  5:15 PM CDT   Navigate Medication Follow Up with JAVY Bhatt [facility] Psychiatry ([facility])    [address] [place] [address]   [phone]            Jul 11, 2018  3:00 PM CDT   Navigate Family Therapy with Shai Londono [facility] Psychiatry ([facility])    [address] [place] [address]   [phone]            Jul 11, 2018  4:15 PM CDT   Navigate Medication Follow Up with JAVY Bhatt [facility] Psychiatry ([facility])    [address] [place] [address]   [phone]            Jul 25, 2018  5:00 PM CDT   Navigate Psychotherapy with Anahi Summers [facility] Psychiatry ([facility])    [address]  [facility] [address]   [phone]            Jul 25, 2018  5:00 PM CDT   Navigate Family Therapy with Shai Londono [facility] Psychiatry ([facility])    [address] [place] [address]   [phone]              Who to contact     Please call your clinic at  [phone] to:    Ask questions about your health    Make or cancel appointments    Discuss your medicines    Learn about your test results    Speak to your doctor            Additional Information About Your Visit        Care EveryWhere ID     This is your Care EveryWhere ID. This could be used by other organizations to access your [facility] medical records  [id number]         Blood Pressure from Last 3 Encounters:   05/30/18 107/61   05/23/18 100/60   05/23/18 114/60    Weight from Last 3 Encounters:   05/30/18 71.4 kg (157 lb 6.4 oz)   05/23/18 70.6 kg (155 lb 9.6 oz)   05/23/18 70.3 kg (155 lb)              Today, you had the following     No orders found for display       Primary Care Provider Office Phone # Fax #    [facility] [phone]       [address] [place] [address]        Equal Access to Services     MUNIRA MADDOX : Hadii zev fuentes hadasho Sodorisali, waaxda luqadaha, qaybta kaalmada adeegyada, keagan lu. So [facility] [place] [phone].    ATENCIÓN: Si habla español, tiene a grimm disposición servicios gratuitos de asistencia lingüística. Case al [id number].    We comply with applicable federal civil rights laws and [place] laws. We do not discriminate on the basis of race, color, national origin, age, disability, sex, sexual orientation, or gender identity.            Thank you!     Thank you for choosing [facility] PSYCHIATRY  for your care. Our goal is always to provide you with excellent care. Hearing back from our patients is one way we can continue to improve our services. Please take a few minutes to complete the written survey that you may receive in the mail after your visit with us. Thank you!             Your Updated Medication List - Protect others around you: Learn how to safely use, store and throw away your medicines at www.disposemymeds.org.          This list is accurate as of 6/7/18 11:59 PM.  Always use your most recent med  list.                   Brand Name Dispense Instructions for use Diagnosis    ARIPiprazole  MG extended release inj syringe    ABILIFY MAINTENA    1 Syringe    Inject 1 Syringe (400 mg) into the muscle every 28 days    Schizoaffective disorder, bipolar type (H)       lithium 450 MG CR tablet    ESKALITH    60 tablet    Take 2 tablets (900 mg) by mouth At Bedtime    Schizoaffective disorder, bipolar type (H)

## 2018-06-08 ENCOUNTER — OFFICE VISIT (OUTPATIENT)
Dept: PSYCHIATRY | Facility: CLINIC | Age: 23
End: 2018-06-08
Payer: COMMERCIAL

## 2018-06-08 DIAGNOSIS — F25.0 SCHIZOAFFECTIVE DISORDER, BIPOLAR TYPE (H): Primary | ICD-10-CM

## 2018-06-08 NOTE — MR AVS SNAPSHOT
After Visit Summary   6/8/2018    Jerel Day    MRN: [id number]           Patient Information     Date Of Birth          1995        Visit Information        Provider Department      6/8/2018 2:00 PM Ana Michel [facility] Psychiatry        Today's Diagnoses     Schizoaffective disorder, bipolar type (H)    -  1       Follow-ups after your visit        Your next 10 appointments already scheduled     Jun 20, 2018  1:00 PM CDT   Navigate Psychotherapy with Shai Londono [facility] Psychiatry ([facility])    [address] [place] [address]   [phone]            Jun 27, 2018  4:00 PM CDT   Navigate Psychotherapy with Shai Londono [facility] Psychiatry ([facility])    [address] [place] [address]   [phone]            Jun 27, 2018  5:15 PM CDT   Navigate Medication Follow Up with JAVY Bhatt [facility] Psychiatry ([facility])    [address] [place] [address]   [phone]            Jul 11, 2018  3:00 PM CDT   Navigate Psychotherapy with Shai Londono [facility] Psychiatry ([facility])    [address] [place] [address]   [phone]            Jul 11, 2018  4:15 PM CDT   Navigate Medication Follow Up with JAVY Bhatt [facility] Psychiatry ([facility])    [address] [place] [address]   [phone]            Jul 25, 2018  5:00 PM CDT   Navigate Psychotherapy with Anahi Summers [facility] Psychiatry ([facility])    [address]  [facility] [place] [facility] [address]   [phone]            Jul 25, 2018  5:00 PM CDT   Navigate Psychotherapy with Shai Londono [facility] Psychiatry ([facility])    [address] [place] [address]   [phone]              Who to contact     Please call your clinic at  [phone] to:    Ask questions about your health    Make or cancel appointments    Discuss your medicines    Learn about your test results    Speak to your doctor            Additional Information About Your Visit        Care EveryWhere ID     This is your Care EveryWhere ID. This could be used by other organizations to access your [place] medical records  [id number]         Blood Pressure from Last 3 Encounters:   05/30/18 107/61   05/23/18 100/60   05/23/18 114/60    Weight from Last 3 Encounters:   05/30/18 71.4 kg (157 lb 6.4 oz)   05/23/18 70.6 kg (155 lb 9.6 oz)   05/23/18 70.3 kg (155 lb)              Today, you had the following     No orders found for display       Primary Care Provider Office Phone # Fax #    [facility] [phone]       [address] [place] [address]        Equal Access to Services     MUNIRA MADDOX : Hadii zev fuentes hadasho Sodorisali, waaxda luqadaha, qaybta kaalmada adeegyada, keagan lu. So [facility] [phone].    ATENCIÓN: Si habla español, tiene a grimm disposición servicios gratuitos de asistencia lingüística. Case al [phone].    We comply with applicable federal civil rights laws and [place] laws. We do not discriminate on the basis of race, color, national origin, age, disability, sex, sexual orientation, or gender identity.            Thank you!     Thank you for choosing [facility] PSYCHIATRY  for your care. Our goal is always to provide you with excellent care. Hearing back from our patients is one way we can continue to improve our services. Please take a few minutes to complete the written survey that you may receive in the mail after your visit with us. Thank you!             Your Updated Medication List - Protect others around you: Learn how to safely use, store and throw away your medicines at www.disposemymeds.org.          This list is accurate as of 6/8/18 11:59 PM.  Always use your most recent med  list.                   Brand Name Dispense Instructions for use Diagnosis    ARIPiprazole  MG extended release inj syringe    ABILIFY MAINTENA    1 Syringe    Inject 1 Syringe (400 mg) into the muscle every 28 days    Schizoaffective disorder, bipolar type (H)       lithium 450 MG CR tablet    ESKALITH    60 tablet    Take 2 tablets (900 mg) by mouth At Bedtime    Schizoaffective disorder, bipolar type (H)

## 2018-06-08 NOTE — PROGRESS NOTES
LILLIANA SEE Incoming Telephone Call  For Supported Employment & Education    NAVIGATE Enrollee: Ralf Day (1995)     MRN: [id number]  Incoming Call Received on: 6/7/18  Call Received from: ralf WEBER's response to incoming call:   This writer texted Ralf to confirm where he would like to meet (campus or home). Ralf called and informed this writer that he 'tossed out my hip' and couldn't meet today. Chatted a bit about school and if he got his password reset (he did). Ralf said this stuff would 'be boring' - writer agreed but thought we could get organized and find out how many credits he has, what major to plan on returning to, etc. Writer offered to visit Ralf at work tomorrow (Fri) and he agreed.     Ana Michel

## 2018-06-11 NOTE — PROGRESS NOTES
NAVIGATE SEE Progress Note   For Supported Employment & Education    NAVIGATE Enrollee: Jerel Day (1995)     MRN: [id number]  Date:  6/8/18  Clinician: LILLIANA Supported Employment & , Ana Michel    1. Client Status Update:   Jerel Day is interested in education (Client developed educational goals) and employment (Client continuing competitive employment)    2. People present:   SEE/Writer  Client: Jerel Day      3. Total number of persons who participated in contact: (do not count yourself/SEE) 1    4. Length of Actual Contact: 20 minutes   Traveled? Yes  Total Travel Time: 40 minutes    5. Location of contact:  Employer/Business    6. 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 Jerel said he might be able to go on break but needed an hour or so. Instead this writer chatted with Jerel as he made pizzas. Jerel says he doesn't really like his job and is ready to go back to school. Jerel wants to meet next week to discuss as he wants to sign up for classes soon.     7. Completion of mutually agreed upon client task from previous meeting:  Not Applicable    8. Orientation and Treatment Planning:  Pursuing current SEE goals    9. Assessment:  Assessing client's need for follow-along supports    10. Placement:  Not Applicable    11. Follow Along Supports: (for clients who are working or attending school)   Employment  (Following along job supports (no disclosure))    12. Mutually agreed upon client task for next meeting:     Let writer know when can meet    13. Next Meeting Scheduled for: nika TIJERINA Supported Employment &

## 2018-06-12 ASSESSMENT — ANXIETY QUESTIONNAIRES
2. NOT BEING ABLE TO STOP OR CONTROL WORRYING: MORE THAN HALF THE DAYS
5. BEING SO RESTLESS THAT IT IS HARD TO SIT STILL: SEVERAL DAYS
3. WORRYING TOO MUCH ABOUT DIFFERENT THINGS: SEVERAL DAYS
7. FEELING AFRAID AS IF SOMETHING AWFUL MIGHT HAPPEN: SEVERAL DAYS
1. FEELING NERVOUS, ANXIOUS, OR ON EDGE: MORE THAN HALF THE DAYS
6. BECOMING EASILY ANNOYED OR IRRITABLE: MORE THAN HALF THE DAYS
GAD7 TOTAL SCORE: 10

## 2018-06-12 ASSESSMENT — PATIENT HEALTH QUESTIONNAIRE - PHQ9: 5. POOR APPETITE OR OVEREATING: SEVERAL DAYS

## 2018-06-13 ENCOUNTER — OFFICE VISIT (OUTPATIENT)
Dept: PSYCHIATRY | Facility: CLINIC | Age: 23
End: 2018-06-13
Payer: COMMERCIAL

## 2018-06-13 DIAGNOSIS — F25.0 SCHIZOAFFECTIVE DISORDER, BIPOLAR TYPE (H): Primary | ICD-10-CM

## 2018-06-13 ASSESSMENT — ANXIETY QUESTIONNAIRES
GAD7 TOTAL SCORE: 7
GAD7 TOTAL SCORE: 10

## 2018-06-13 NOTE — MR AVS SNAPSHOT
After Visit Summary   6/13/2018    Jerel Day    MRN: [id number]           Patient Information     Date Of Birth          1995        Visit Information        Provider Department      6/13/2018 12:30 PM Ana Michel [facility] Psychiatry        Today's Diagnoses     Schizoaffective disorder, bipolar type (H)    -  1       Follow-ups after your visit        Your next 10 appointments already scheduled     Jun 20, 2018 12:45 PM CDT   Nurse Visit with [facility] PSYCHIATRY NURSE   [facility] Psychiatry ([facility])    [address] [place] [address]   [phone]            Jun 20, 2018  1:00 PM CDT   Navigate Psychotherapy with Shai Londono [facility]iat[facility] ([facility])    [address] [place] [address]   [phone]            Jun 27, 2018  4:00 PM CDT   Navigate Psychotherapy with Shai Londono [facility] ([facility])    [address] [place] [address]   [phone]            Jun 27, 2018  5:15 PM CDT   Navigate Medication Follow Up with JAVY Bhatt CNP   [facility] ([facility])    [address] [place] [address]   [phone]            Jul 11, 2018  3:00 PM CDT   Navigate Psychotherapy with Shai Londono [facility])    [address]  [facility] [address]   [phone]            Jul 11, 2018  4:15 PM CDT   Navigate Medication Follow Up with JAVY Bhatt CNP   [facility] Psychiatry ([facility])    [address]  [facility] [address]   [phone]            Jul 25, 2018  5:00 PM CDT   Navigate Psychotherapy with Anahi Summers [facility]iatry [facility])    [address]  [facility] [address]   [phone]            Jul 25, 2018  5:00 PM CDT   Navigate Psychotherapy with  Shai Londono, [facility] Psychiatry ([facility])    [address]  [facility] [id number] [phone]              Who to contact     Please call your clinic at [phone] to:    Ask questions about your health    Make or cancel appointments    Discuss your medicines    Learn about your test results    Speak to your doctor            Additional Information About Your Visit        Care EveryWhere ID     This is your Care EveryWhere ID. This could be used by other organizations to access your [place] medical records  [id number]         Blood Pressure from Last 3 Encounters:   05/30/18 107/61   05/23/18 100/60   05/23/18 114/60    Weight from Last 3 Encounters:   05/30/18 71.4 kg (157 lb 6.4 oz)   05/23/18 70.6 kg (155 lb 9.6 oz)   05/23/18 70.3 kg (155 lb)              Today, you had the following     No orders found for display       Primary Care Provider Office Phone # Fax #    [facility] [phone]       [address] [place] [address]        Equal Access to Services     [facility] : Hadii aad ku hadasho Soomaali, waaxda luqadaha, qaybta kaalmada adeegyada, keagan moser . So [facility] [phone].    ATENCIÓN: Si habla español, tiene a grimm disposición servicios gratIterasios de asistencia lingüística. [facility] [phone].    We comply with applicable federal civil rights laws and [place] laws. We do not discriminate on the basis of race, color, national origin, age, disability, sex, sexual orientation, or gender identity.            Thank you!     Thank you for choosing [facility] PSYCHIATRY  for your care. Our goal is always to provide you with excellent care. Hearing back from our patients is one way we can continue to improve our services. Please take a few minutes to complete the written survey that you may receive in the mail after your visit with us. Thank you!             Your Updated Medication List -  Protect others around you: Learn how to safely use, store and throw away your medicines at www.disposemymeds.org.          This list is accurate as of 6/13/18 11:59 PM.  Always use your most recent med list.                   Brand Name Dispense Instructions for use Diagnosis    ARIPiprazole  MG extended release inj syringe    ABILIFY MAINTENA    1 Syringe    Inject 1 Syringe (400 mg) into the muscle every 28 days    Schizoaffective disorder, bipolar type (H)       lithium 450 MG CR tablet    ESKALITH    60 tablet    Take 2 tablets (900 mg) by mouth At Bedtime    Schizoaffective disorder, bipolar type (H)

## 2018-06-15 NOTE — PROGRESS NOTES
NAVIGATE SEE Progress Note   For Supported Employment & Education    NAVIGATE Enrollee: Jerel Day (1995)     MRN: [id number]  Date:  6/13/18  Clinician: MIQUELATE Supported Employment & , Ana Michel    1. Client Status Update:   Jerel Day is interested in education (Client developed educational goals)    2. People present:   SEE/Writer  Client: Jerel Day      3. Total number of persons who participated in contact: (do not count yourself/SEE) 1    4. Length of Actual Contact: 60 minutes   Traveled? Yes  Total Travel Time: 40 minutes    5. Location of contact:  Client's Home    6. Brief description of session, contact, or client status (include: strategies, interventions, client reaction to contact, next steps, etc)    Jerel and this writer met at his home to begin the process of re-enrolling Jerel into courses at the [employer]. At previous appts Jerel could not figure out his password but got it reset so we were able to view his credits and request an appt with his advisor at the . Jerel has completed 75% of courses necessary for a degree in psychology. He is interested in neuro-psych or bio-psych   Where he can utilize his previous experience in math/engineering courses. His grade point average is a 3.2 and did very well in his into psych classes. He has 2 withdrawls from last fall and plans on re-taking those courses next year. Jerel would like his SEE to attend this appt and assist with setting up supports through the [facility] as well.    7. Completion of mutually agreed upon client task from previous meeting:  Completed    8. Orientation and Treatment Planning:  Pursuing current SEE goals    9. Assessment:  Assessing client's need for follow-along supports    10. Placement:  Education (Discussion and planning re: use of office of student disability services)    11. Follow Along Supports: (for clients who are working or attending school)   Education (Assisting with requesting  accommodations)    12. Mutually agreed upon client task for next meeting:     Talk to parents about his plan    13. Next Meeting Scheduled for: next week with advisor    Ana Michel  [facility] Supported Employment &

## 2018-06-18 ENCOUNTER — OFFICE VISIT (OUTPATIENT)
Dept: PSYCHIATRY | Facility: CLINIC | Age: 23
End: 2018-06-18
Payer: COMMERCIAL

## 2018-06-18 DIAGNOSIS — F25.0 SCHIZOAFFECTIVE DISORDER, BIPOLAR TYPE (H): Primary | ICD-10-CM

## 2018-06-20 ENCOUNTER — VIRTUAL VISIT (OUTPATIENT)
Dept: PSYCHIATRY | Facility: CLINIC | Age: 23
End: 2018-06-20
Payer: COMMERCIAL

## 2018-06-20 ENCOUNTER — TELEPHONE (OUTPATIENT)
Dept: PSYCHIATRY | Facility: CLINIC | Age: 23
End: 2018-06-20

## 2018-06-20 DIAGNOSIS — F25.0 SCHIZOAFFECTIVE DISORDER, BIPOLAR TYPE (H): Primary | ICD-10-CM

## 2018-06-20 NOTE — PROGRESS NOTES
PSYCHIATRY CLINIC TELEPHONE CALL    EASTON Montoya 1995  Diagnosis: Schizoaffective disorder, bipolar type    Patient is an established patient: Yes    Patient or family initiated this call and patient or family called the clinic: Yes: Patient has concerns about his HO medication, he wanted to discuss before the next admin    A telephone appt was set up: No, patient made a request to be contacted    Date of most recent E/M visit: 18    Date of next scheduled visit: 18    Length of this call: 23 minutes    Summary of this call: Patient has concerns about continuing his injection because he has experienced adverse reactions after previous injections- he wonders if the medication was injected into a blood vessel, he experienced some muscle twitching/cramping around the injection site.     Assessment and Plan: Patient agreed to have his IM this month because it has already been ordered and delivered. He would like to discuss switching to oral medications at next E&M although he is not sure he wants to continue medications at all because he hasn't found one that he thinks works well for him.     Pt reports that he works the rest of the week and he will call in by Friday to schedule his injection for next week. Will f/u if patient does not reach out.

## 2018-06-20 NOTE — MR AVS SNAPSHOT
After Visit Summary   6/20/2018    Jerel Day    MRN: [id number]           Patient Information     Date Of Birth          1995        Visit Information        Provider Department      6/20/2018 1:45 PM Brittani Cage APRN [facility] Psychiatry        Today's Diagnoses     Schizoaffective disorder, bipolar type (H)    -  1       Follow-ups after your visit        Follow-up notes from your care team     Return in about 1 week (around 6/27/2018).      Your next 10 appointments already scheduled     Jun 27, 2018  4:00 PM CDT   Navigate Psychotherapy with Shai Londono [facility] Psychiatry ([facility])    [address] [place] [address]   [phone]            Jun 27, 2018  5:15 PM CDT   Navigate Medication Follow Up with JAVY Bhatt CNP   [facility] Psychiatry ([facility])    [address] [place] [address]   [phone]            Jul 11, 2018  3:00 PM CDT   Navigate Psychotherapy with Shai Londono [facility] Psychiatry ([facility])    [address] [place] [address]   [phone]            Jul 11, 2018  4:15 PM CDT   Navigate Medication Follow Up with JAVY Bhatt [facility] Psychiatry ([facility])    [address] [place] [address]   [phone]            Jul 25, 2018  5:00 PM CDT   Navigate Psychotherapy with Anahi Summers [facility] Psychiatry ([facility])    [address] [place] [address]   [phone]            Jul 25, 2018  5:00 PM CDT   Navigate Psychotherapy with Shai Londono [facility] Psychiatry ([facility])    [address] [place] [address]   [phone]              Who to contact     Please call your clinic at [phone] to:    Ask questions about your health    Make or cancel appointments    Discuss your  medicines    Learn about your test results    Speak to your doctor            Additional Information About Your Visit        Care EveryWhere ID     This is your Care EveryWhere ID. This could be used by other organizations to access your [place] medical records  [id number]         Blood Pressure from Last 3 Encounters:   05/30/18 107/61   05/23/18 100/60   05/23/18 114/60    Weight from Last 3 Encounters:   05/30/18 71.4 kg (157 lb 6.4 oz)   05/23/18 70.6 kg (155 lb 9.6 oz)   05/23/18 70.3 kg (155 lb)              Today, you had the following     No orders found for display       Primary Care Provider Office Phone # Fax #    [facility] [phone]       [address] [place] [address]        Equal Access to Services     MUNIRA MADDOX : Hadii aad ku hadasho Sobrittany, waaxda luqadaha, qaybta kaalmada tracey, keagan moser . So [facility] [phone].    ATENCIÓN: Si habla español, tiene a grimm disposición servicios gratuitos de asistencia lingüística. Case al [phone].    We comply with applicable federal civil rights laws and [place] laws. We do not discriminate on the basis of race, color, national origin, age, disability, sex, sexual orientation, or gender identity.            Thank you!     Thank you for choosing [facility] PSYCHIATRY  for your care. Our goal is always to provide you with excellent care. Hearing back from our patients is one way we can continue to improve our services. Please take a few minutes to complete the written survey that you may receive in the mail after your visit with us. Thank you!             Your Updated Medication List - Protect others around you: Learn how to safely use, store and throw away your medicines at www.disposemymeds.org.          This list is accurate as of 6/20/18  3:04 PM.  Always use your most recent med list.                   Brand Name Dispense Instructions for use Diagnosis    ARIPiprazole  MG  extended release inj syringe    ABILIFY MAINTENA    1 Syringe    Inject 1 Syringe (400 mg) into the muscle every 28 days    Schizoaffective disorder, bipolar type (H)       lithium 450 MG CR tablet    ESKALITH    60 tablet    Take 2 tablets (900 mg) by mouth At Bedtime    Schizoaffective disorder, bipolar type (H)

## 2018-06-21 NOTE — PROGRESS NOTES
NAVIGATE Clinician Contact & Progress Note  For Individual Resiliency Training (IRT)  A Part of the [facility] First Episode of Psychosis Program    NAVIGATE Enrollee: Jerel Day (1995)     MRN: [id number]  Date:  6/18/18  Diagnosis: Schizoaffective disorder, bipolar type  Clinician: NAVIGATE Director & Family Clinician, SHIRA Alexandre     1. Type of contact: (majority of time spent)  IRT Session  Other (specify): Tx decision made to change to male therapist to work on areas patient prefers to address with male.     2. People present:   Client  NAVIGATE IRT/writer    3. Total number of persons who participated in contact: 2, including writer    4. Length of Actual Contact: Start Time: 3pm; End Time: 4pm   Traveled?  No    5. Location of contact:  Psychiatry Clinic, [facility]    6. Did the client complete the home practice option(s) from the previous session: NA     7. Motivational Teaching Strategies:  Connect info and skills with personal goals  Promote hope and positive expectations  Explore pros and cons of change  Re-frame experiences in positive light    8. Educational Teaching Strategies:  Review of written material/education  Relate information to client's experience  Ask questions to check comprehension  Break down information into small chunks  Adopt client's language    9. CBT Teaching Strategies:  Reinforcement and shaping (positive feedback for steps towards goals, gains in knowledge & skills, follow-through on home assignments)    10. IRT Module(s) Addressed:  Module 2 - Assessment/Initial Goal Setting    11. Techniques utilized:   [place] announced at beginning of session  Treatment goal development  Review of previous meeting with IRT Anahi Summers  Problem-solving practice  Help client choose a home practice option  Summarize progress made in current session    12. Mental Status Exam:    Alertness: alert  and oriented  Appearance: well groomed  Behavior/Demeanor: cooperative and pleasant,  "with poor eye contact   Speech: normal  Language: intact. Preferred language identified as English.  Psychomotor: normal or unremarkable  Mood: unremarkable  Affect: blunted; was congruent to mood; was congruent to content  Thought Process/Associations: perseverative  Thought Content:  Reports delusions, preoccupations and paranoid ideation;  Denies suicidal and violent ideation, phobia , magical thinking and over-valued ideas  Perception:  Reports auditory hallucinations and perceptual distortions (When driving-colors/light flashes);  Denies Other  Insight: limited  Judgment: limited  Cognition: does  appear grossly intact; formal cognitive testing was done  Suicidal ideation: denies SI, denies intent,  and denies plan  Homicidal Ideation: denies    13. Assessment/Progress Note:     Met with Jerel to discuss his goals, areas of interests, needs, and next steps to treatment.  Session focused on \"Building a Bridging to Your Goals\".  Majority of session spent focused on what matters most to Jerel, and areas he would like to see improve related to improving overall QOL.  Discussed his goals of returning to school and maintaining his current job, in addition to goals documented below. At conclusion of session, writer asked Jerel to rank order top goals.  They are as follows;    GOALS (Patient Identified)     1. Improve socialization (carrying on a conversation, joining a group, developing friendships, inviting friends/acquaiintences to participate/join in an activity of interest.    2. Decrease amount of time spent ruminating     3. Improve communication when experiencing higher levels of irritability     4. Explore personality and interests/hobbies.     5. Cultivate and broaden friend network    Homework option:  Complete [place] Inventory    14. Plan/Referrals:     Will meet ongoing in IRT sessions focused on his goals and continued recovery.     Writer will send Jerel \"Jungian Typology\" (Arthur-Rock) to complete " "outside of session for review during next session.    Billing for \"Interactive Complexity\"?  No    Shai Londono, [facility]    NAVIGATE Individual Resiliency Trainer  "

## 2018-06-25 ENCOUNTER — TELEPHONE (OUTPATIENT)
Dept: PSYCHIATRY | Facility: CLINIC | Age: 23
End: 2018-06-25

## 2018-06-25 ENCOUNTER — OFFICE VISIT (OUTPATIENT)
Dept: PSYCHIATRY | Facility: CLINIC | Age: 23
End: 2018-06-25
Payer: COMMERCIAL

## 2018-06-25 DIAGNOSIS — F25.0 SCHIZOAFFECTIVE DISORDER, BIPOLAR TYPE (H): Primary | ICD-10-CM

## 2018-06-25 NOTE — TELEPHONE ENCOUNTER
----- Message from Isabelle Ramirez MA sent at 6/25/2018  8:52 AM CDT -----  Regarding: Injection   Contact: [phone]  Caller: Mesha    Relationship to Patient: Mother    Call Back Number: [phone]    Reason for Call: Pt mother would like to know when the pt's next injection is.  Please advise

## 2018-06-25 NOTE — TELEPHONE ENCOUNTER
-Writer called and spoke with Mesha.  She will talk with patient tonight to figure out his schedule and then remind him to call in and make an injection appointment.

## 2018-06-25 NOTE — MR AVS SNAPSHOT
After Visit Summary   6/25/2018    Jerel Day    MRN: [id number]           Patient Information     Date Of Birth          1995        Visit Information        Provider Department      6/25/2018 2:00 PM Ana Michel [facility] Psychiatry        Today's Diagnoses     Schizoaffective disorder, bipolar type (H)    -  1       Follow-ups after your visit        Your next 10 appointments already scheduled     Jun 27, 2018  5:15 PM CDT   Navigate Medication Follow Up with JAVY Bhatt [facility] Psychiatry ([facility])    [address] [place] [address]   [phone]            Jul 05, 2018 10:00 AM CDT   Navigate Psychotherapy with Shai Londono [facility] Psychiatry ([facility])    [address] [place] [address]   [phone]            Jul 11, 2018  3:00 PM CDT   Navigate Psychotherapy with Shai Londono [facility] Psychiatry ([facility])    [address] [place] [address]   [phone]            Jul 11, 2018  4:15 PM CDT   Navigate Medication Follow Up with JAVY Bhatt [facility] Psychiatry ([facility])    [address] [place] [address]   [phone]            Jul 25, 2018  5:00 PM CDT   Navigate Psychotherapy with Anahi Summers [facility] Psychiatry ([facility])    [address] [place] [address]   [phone]            Jul 25, 2018  5:00 PM CDT   Navigate Psychotherapy with Shai Londono [facility] Psychiatry ([facility])    [address] [place] [address]   [phone]              Who to contact     Please call your clinic at [phone] to:    Ask questions about your health    Make or cancel appointments    Discuss your medicines    Learn about your test results    Speak to your doctor            Additional Information About Your  Visit        Care EveryWhere ID     This is your Care EveryWhere ID. This could be used by other organizations to access your [place] medical records  [id number]         Blood Pressure from Last 3 Encounters:   05/30/18 107/61   05/23/18 100/60   05/23/18 114/60    Weight from Last 3 Encounters:   05/30/18 71.4 kg (157 lb 6.4 oz)   05/23/18 70.6 kg (155 lb 9.6 oz)   05/23/18 70.3 kg (155 lb)              Today, you had the following     No orders found for display       Primary Care Provider Office Phone # Fax #    [facility] [phone]       [address] [place] [address]        Equal Access to Services     MUNIRA MADDOX : Hadii zev barrientoso Sobrittany, waaxda luqadaha, qaybta kaalmada adeegyada, keagan moser . So [facility] [phone].    ATENCIÓN: Si habla español, tiene a grimm disposición servicios gratuitos de asistencia lingüística. Llame al [phone].    We comply with applicable federal civil rights laws and [place] laws. We do not discriminate on the basis of race, color, national origin, age, disability, sex, sexual orientation, or gender identity.            Thank you!     Thank you for choosing [email or web address] PSYCHIATRY  for your care. Our goal is always to provide you with excellent care. Hearing back from our patients is one way we can continue to improve our services. Please take a few minutes to complete the written survey that you may receive in the mail after your visit with us. Thank you!             Your Updated Medication List - Protect others around you: Learn how to safely use, store and throw away your medicines at www.disposemymeds.org.          This list is accurate as of 6/25/18 11:59 PM.  Always use your most recent med list.                   Brand Name Dispense Instructions for use Diagnosis    ARIPiprazole  MG extended release inj syringe    ABILIFY MAINTENA    1 Syringe    Inject 1 Syringe (400 mg) into the muscle  every 28 days    Schizoaffective disorder, bipolar type (H)       lithium 450 MG CR tablet    ESKALITH    60 tablet    Take 2 tablets (900 mg) by mouth At Bedtime    Schizoaffective disorder, bipolar type (H)

## 2018-06-26 ENCOUNTER — OFFICE VISIT (OUTPATIENT)
Dept: PSYCHIATRY | Facility: CLINIC | Age: 23
End: 2018-06-26
Payer: COMMERCIAL

## 2018-06-26 DIAGNOSIS — F25.0 SCHIZOAFFECTIVE DISORDER, BIPOLAR TYPE (H): Primary | ICD-10-CM

## 2018-06-26 NOTE — MR AVS SNAPSHOT
After Visit Summary   6/26/2018    Jerel Day    MRN: [id number]           Patient Information     Date Of Birth          1995        Visit Information        Provider Department      6/26/2018 5:00 PM Ana Michel [facility] Psychiatry        Today's Diagnoses     Schizoaffective disorder, bipolar type (H)    -  1       Follow-ups after your visit        Your next 10 appointments already scheduled     Jun 27, 2018  4:30 PM CDT   Nurse Visit with [facility] PSYCHIATRY NURSE   [facility] Psychiatry ([facility])    [address] [place] [address]   [phone]            Jun 27, 2018  5:15 PM CDT   Navigate Medication Follow Up with JAVY Bhatt CNP   [facility] Psychiatry ([facility])    [address] [place] [address]   [phone]            Jul 05, 2018 10:00 AM CDT   Navigate Psychotherapy with Shai Londono [facility]iatry ([facility])    [address] [place] [address]   [phone]            Jul 11, 2018  3:00 PM CDT   Navigate Psychotherapy with Shai Londono [facility] Psychiatry ([facility])    [address] [place] [address]   [phone]            Jul 11, 2018  4:15 PM CDT   Navigate Medication Follow Up with JAVY Bhatt CNP   [facility] Psychiatry ([facility])    [address] [place] [address]   [phone]            Jul 25, 2018  5:00 PM CDT   Navigate Psychotherapy with Anahi Summers [facility] Psychiatry ([facility])    [address]  [facility] [address]   [phone]            Jul 25, 2018  5:00 PM CDT   Navigate Psychotherapy with Shai Londono [facility]iatry ([facility])    [address] [place] [address]   [phone]              Who to contact     Please call your clinic at [phone]  to:    Ask questions about your health    Make or cancel appointments    Discuss your medicines    Learn about your test results    Speak to your doctor            Additional Information About Your Visit        Care EveryWhere ID     This is your Care EveryWhere ID. This could be used by other organizations to access your [place] medical records  [id number]         Blood Pressure from Last 3 Encounters:   05/30/18 107/61   05/23/18 100/60   05/23/18 114/60    Weight from Last 3 Encounters:   05/30/18 71.4 kg (157 lb 6.4 oz)   05/23/18 70.6 kg (155 lb 9.6 oz)   05/23/18 70.3 kg (155 lb)              Today, you had the following     No orders found for display       Primary Care Provider Office Phone # Fax #    [facility] [phone]       [facility] [address] [address]        Equal Access to Services     MUNIRA MADDOX : Hadii aad ku hadasho Sobrittany, waaxda luqadaha, qaybta kaalmada adeegyada, keagan moser . So [facility] [phone].    ATENCIÓN: Si habla español, tiene a grimm disposición servicios gratuitos de asistencia lingüística. Llame al [phone].    We comply with applicable federal civil rights laws and [place] laws. We do not discriminate on the basis of race, color, national origin, age, disability, sex, sexual orientation, or gender identity.            Thank you!     Thank you for choosing [facility] PSYCHIATRY  for your care. Our goal is always to provide you with excellent care. Hearing back from our patients is one way we can continue to improve our services. Please take a few minutes to complete the written survey that you may receive in the mail after your visit with us. Thank you!             Your Updated Medication List - Protect others around you: Learn how to safely use, store and throw away your medicines at www.disposemymeds.org.          This list is accurate as of 6/26/18 11:59 PM.  Always use your most recent med list.                    Brand Name Dispense Instructions for use Diagnosis    ARIPiprazole  MG extended release inj syringe    ABILIFY MAINTENA    1 Syringe    Inject 1 Syringe (400 mg) into the muscle every 28 days    Schizoaffective disorder, bipolar type (H)       lithium 450 MG CR tablet    ESKALITH    60 tablet    Take 2 tablets (900 mg) by mouth At Bedtime    Schizoaffective disorder, bipolar type (H)

## 2018-06-27 ENCOUNTER — ALLIED HEALTH/NURSE VISIT (OUTPATIENT)
Dept: PSYCHIATRY | Facility: CLINIC | Age: 23
End: 2018-06-27

## 2018-06-27 ENCOUNTER — OFFICE VISIT (OUTPATIENT)
Dept: PSYCHIATRY | Facility: CLINIC | Age: 23
End: 2018-06-27
Payer: COMMERCIAL

## 2018-06-27 VITALS
BODY MASS INDEX: 23.58 KG/M2 | DIASTOLIC BLOOD PRESSURE: 59 MMHG | SYSTOLIC BLOOD PRESSURE: 107 MMHG | HEART RATE: 63 BPM | WEIGHT: 157.4 LBS

## 2018-06-27 VITALS
DIASTOLIC BLOOD PRESSURE: 59 MMHG | BODY MASS INDEX: 23.58 KG/M2 | HEART RATE: 63 BPM | WEIGHT: 157.4 LBS | SYSTOLIC BLOOD PRESSURE: 107 MMHG

## 2018-06-27 DIAGNOSIS — F25.0 SCHIZOAFFECTIVE DISORDER, BIPOLAR TYPE (H): Primary | ICD-10-CM

## 2018-06-27 NOTE — MR AVS SNAPSHOT
After Visit Summary   6/27/2018    Jerel Day    MRN: [id number]           Patient Information     Date Of Birth          1995        Visit Information        Provider Department      6/27/2018 5:15 PM Brittani Cage APRN [facility] Psychiatry        Today's Diagnoses     Schizoaffective disorder, bipolar type (H)    -  1       Follow-ups after your visit        Follow-up notes from your care team     Return in about 3 weeks (around 7/18/2018).      Your next 10 appointments already scheduled     Jul 05, 2018 10:00 AM CDT   Navigate Psychotherapy with Shai Londono [facility] Psychiatry ([facility])    [address]  [facility] [address]   [phone]            Jul 11, 2018  3:00 PM CDT   Navigate Psychotherapy with Shai Londono [facility] ([facility])    [address] [place] [address]   [phone]            Jul 11, 2018  4:15 PM CDT   Navigate Medication Follow Up with JAVY Bhatt [facility] Psychiatry ([facility])    [address] [place] [address]   [phone]            Jul 25, 2018  5:00 PM CDT   Navigate Psychotherapy with Anahi Summers [facility] Psychiatry ([facility])    [address]  [facility] [address]   [phone]            Jul 25, 2018  5:00 PM CDT   Navigate Psychotherapy with Shai Londono [facility] ([facility])    [address] [place] [address]   [phone]              Who to contact     Please call your clinic at [phone] to:    Ask questions about your health    Make or cancel appointments    Discuss your medicines    Learn about your test results    Speak to your doctor            Additional Information About Your Visit        Care EveryWhere ID     This is your Care EveryWhere ID. This could be used by other organizations to access  your [place] medical records  [id number]        Your Vitals Were     Pulse BMI (Body Mass Index)                63 23.58 kg/m2           Blood Pressure from Last 3 Encounters:   06/27/18 107/59   06/27/18 107/59   05/30/18 107/61    Weight from Last 3 Encounters:   06/27/18 71.4 kg (157 lb 6.4 oz)   06/27/18 71.4 kg (157 lb 6.4 oz)   05/30/18 71.4 kg (157 lb 6.4 oz)              Today, you had the following     No orders found for display         Today's Medication Changes          These changes are accurate as of 6/27/18 11:59 PM.  If you have any questions, ask your nurse or doctor.               Start taking these medicines.        Dose/Directions    ARIPiprazole 10 MG tablet   Commonly known as:  ABILIFY   Used for:  Schizoaffective disorder, bipolar type (H)   Replaces:  ARIPiprazole  MG extended release inj syringe   Started by:  Brittani Cage APRN CNP        Dose:  10 mg   Take 1 tablet (10 mg) by mouth daily   Quantity:  30 tablet   Refills:  3         Stop taking these medicines if you haven't already. Please contact your care team if you have questions.     ARIPiprazole  MG extended release inj syringe   Commonly known as:  ABILIFY MAINTENA   Replaced by:  ARIPiprazole 10 MG tablet   Stopped by:  Brittani Cage APRN CNP                Where to get your medicines      These medications were sent to [id number] - [place] [address] [place] [address] [facility] [address]     Phone:  [phone]     ARIPiprazole 10 MG tablet                Primary Care Provider Office Phone # Fax #    [facility] [phone]       [address] [place] [address] [address] [place] [address]        Equal Access to Services     MUNIRA MADDOX AH: Cristobal Diez, wazhouda luqadaha, qaybta kaalmada tracey, keagan lu. So [facility] [phone].    ATENCIÓN: Si habla español, tiene a grimm disposición servicios gratuitos de  david lingüísticdanyell. Case al [id number].    We comply with applicable federal civil rights laws and [place] laws. We do not discriminate on the basis of race, color, national origin, age, disability, sex, sexual orientation, or gender identity.            Thank you!     Thank you for choosing [facility] PSYCHIATRY  for your care. Our goal is always to provide you with excellent care. Hearing back from our patients is one way we can continue to improve our services. Please take a few minutes to complete the written survey that you may receive in the mail after your visit with us. Thank you!             Your Updated Medication List - Protect others around you: Learn how to safely use, store and throw away your medicines at www.disposemymeds.org.          This list is accurate as of 6/27/18 11:59 PM.  Always use your most recent med list.                   Brand Name Dispense Instructions for use Diagnosis    ARIPiprazole 10 MG tablet    ABILIFY    30 tablet    Take 1 tablet (10 mg) by mouth daily    Schizoaffective disorder, bipolar type (H)

## 2018-06-27 NOTE — PROGRESS NOTES
NAVIGATE SEE Outgoing Telephone Call  For Supported Employment & Education    NAVIGATE Enrollee: Ralf Day (1995)     MRN: [id number]  Date of Call: 6/27/2018  Contacted: ralf    Discussed:   Ralf did not answer the phone when this writer called. Followed up on 6/27 with text and ralf said the meeting went well but he will need to declare his major first before registering for classes.    Ana Michel

## 2018-06-27 NOTE — NURSING NOTE
Jerel Day comes into clinic today at the request of JAVY Bhatt PMHNP-BC Ordering Provider for Med Injection only Abilify Maintena 400 mg .    Medication checked by: n/a  Prior to administration pt identified by name and : yes     Appearance: Weather appropriate; reports feeling fatigued today    Mood: congruent   Affect: flat  Eye contact: fair  Side effects: states that felt some cramping after he moved his hip  Level of cooperation: cooperative   Education: none  Next appt: today with provider         This service provided today was under the supervising provider of the day JAVY Bhatt, KEN, who was available if needed.    MILAGRO THOMPSON

## 2018-06-27 NOTE — Clinical Note
Heads up-- I saw that Jerel is scheduled with you this week. He is not willing to take HO and was not open to discussing pros and cons. He essentially told me that he thinks my opinion around medications is biased and the beliefs I hold about adherence to treatment is not applicable to him. It is pretty likely that he will be stopping medication in August

## 2018-06-27 NOTE — MR AVS SNAPSHOT
After Visit Summary   6/27/2018    Jerel Day    MRN: [id number]           Patient Information     Date Of Birth          1995        Visit Information        Provider Department      6/27/2018 4:30 PM [facility] PSY[facility]IAT[facility] NURSE [facility]        Today's Diagnoses     Schizoaffective disorder, bipolar type (H)    -  1       Follow-ups after your visit        Your next 10 appointments already scheduled     Jul 05, 2018 10:00 AM CDT   Navigate Psychotherapy with Shai Londono [facility] ([facility])    [address] [place] [id number]   [phone]            Jul 11, 2018  3:00 PM CDT   Navigate Psychotherapy with Shai Londono [facility] ([facility])    [address] [place] [address]   [phone]            Jul 11, 2018  4:15 PM CDT   Navigate Medication Follow Up with JAVY Bhatt [facility] ([facility])    [address] [place] [id number]   [phone]            Jul 25, 2018  5:00 PM CDT   Navigate Psychotherapy with Anahi Summers [facility] ([facility])    [address] [place] [id number]   [phone]            Jul 25, 2018  5:00 PM CDT   Navigate Psychotherapy with Shai Londono [facility] ([facility])    [address] [place] [id number]   [phone]              Who to contact     Please call your clinic at [phone] to:    Ask questions about your health    Make or cancel appointments    Discuss your medicines    Learn about your test results    Speak to your doctor            Additional Information About Your Visit        Care EveryWhere ID     This is your Care EveryWhere ID. This could be used by other organizations to access your [facility] medical records  [id number]        Your Vitals Were     Pulse BMI (Body  Mass Index)                63 23.58 kg/m2           Blood Pressure from Last 3 Encounters:   06/27/18 107/59   06/27/18 107/59   05/30/18 107/61    Weight from Last 3 Encounters:   06/27/18 71.4 kg (157 lb 6.4 oz)   06/27/18 71.4 kg (157 lb 6.4 oz)   05/30/18 71.4 kg (157 lb 6.4 oz)              We Performed the Following     INJECTION INTRAMUSCULAR OR SUB-Q        Primary Care Provider Office Phone # Fax #    [facility] [phone]       [facility] [facility] [address] [facility] [address] [place] [id number]        Equal Access to Services     MUNIRA MADDOX : Cristobal Diez, wapascual gan, qasharee kaalmada tracey, keagan lu. So [facility] [place] [phone].    ATENCIÓN: Si habla español, tiene a grimm disposición servicios gratuitos de asistencia lingüística. Llame al [phone].    We comply with applicable federal civil rights laws and [place] laws. We do not discriminate on the basis of race, color, national origin, age, disability, sex, sexual orientation, or gender identity.            Thank you!     Thank you for choosing [facility]  for your care. Our goal is always to provide you with excellent care. Hearing back from our patients is one way we can continue to improve our services. Please take a few minutes to complete the written survey that you may receive in the mail after your visit with us. Thank you!             Your Updated Medication List - Protect others around you: Learn how to safely use, store and throw away your medicines at www.disposemymeds.org.          This list is accurate as of 6/27/18  5:17 PM.  Always use your most recent med list.                   Brand Name Dispense Instructions for use Diagnosis    ARIPiprazole  MG extended release inj syringe    ABILIFY MAINTENA    1 Syringe    Inject 1 Syringe (400 mg) into the muscle every 28 days    Schizoaffective disorder, bipolar type (H)       lithium 450 MG CR tablet    ESKALITH    60  tablet    Take 2 tablets (900 mg) by mouth At Bedtime    Schizoaffective disorder, bipolar type (H)

## 2018-06-27 NOTE — PROGRESS NOTES
"NAVIGATE Medication Management Progress Note  A Part of the [facility] First Episode of Psychosis Program    NAVIGATE Enrollee: Jerel Day (1995)     MRN: [id number]  Date:  6/27/18         Contributors to the Assessment     Chart Reviewed.   Interview completed with Jerel Day.         Chief Complaint       \"I am feeling loopy today\"           Interim History      Jerel Day is a 22 year old male who was last seen in clinic on 5/30/18 at which time lithium 900mg at HS and Abilify Maintena 400mg were continued. The patient reports poor treatment adherence.  History was provided by Jerel who was a good to fair historian.  Since the last visit:  - He is noticing a better mood because he is enjoying the pay check from his job. Still doesn't like the schedule and doesn't often feels like the work isn't always very interesting.  - He isn't taking the lithium, he \"pops\" one every once and awhile. He was reminded that he would need to do labs if he were taking it to check levels. We discussed just discontinuing it rather than having him continue taking it so sporadically.   - He is adamant that this is the last injection he is willing to receive. He has ongoing physical discomfort when the injection is administered. He continues to worry about what the medications are doing inside of his body after they are injected, he notices that he appears well on the outside but he isn't feeling that good inside. He feels like the medications have a general effect on his health- he notices that he is feeling more \"gray\", less emotions.   - He is interested in something that might help with reducing AH and some of the pain and somatic concerns- back cracking. He has found benzodiazepines in the past  To be moderately helpful. He does not think that the Abilify has been helpful at all. He doesn't notice any benefit or side effect, he doesn't think it does much of anything.   - He is smoking cannabis 2-3 times a week and feels that " it has been helpful in addressing his symptoms of depression. Last use was yesterday. He does not think that cannabis has any impact on sx or any detrimental effect at all.   - He is noticing some anxiety with driving and social settings.   - He is applying for classes this fall at the [employer] and hopes that he can take 9 credits  - Still some focus on somatic concerns, less complaints about his neck.   - Denies SI/SIB  - Of note, the patient does not think he will continue to adhere to medications when they are switched to the oral formulation. He thinks he would like to try to not take medications again because he would prefer to not take medications that alter his mind and mood. When reminded of previous trials without medications and the set backs that occurred, he did not acknowledge these periods of time as being medication non-compliant. He stated he was willing to take the risk of relapse because sometimes it is just nice to have a couple weeks in the hospital. He does not worry it would jeopardize his ability to complete his fall semester.    DEPRESSION:  reports-suicidal ideation, (passive), depressed mood, hypersomnia and poor concentration /memory, distractability;  DENIES- anhedonia, low energy, insomnia and feeling worthless  KELLI/HYPOMANIA:  reports-none;  DENIES- excessive spending, decreased sleep need, increased activity and grandiosity  PSYCHOSIS:  reports-delusions, auditory hallucinations, disorganized behavior and disorganized speech (difficulty communicating)-- disorganized sx have improved greatly with the addition of the HO;  DENIES- visual hallucinations  ANXIETY:  social anxiety and nervous/overwhelmed  SLEEP:  Sleep has increased, 9-10 hours per night, sometime up to 15 hours a day with naps  EATING DISORDER: none     RECENT SUBSTANCE USE:     ALCOHOL- Denies current daily use. Previously- nearly daily EtOH use, 3-13 shots of hard liquor + beer and wine           TOBACCO- 1/2 PPD              CAFFEINE- 1 cups/day of coffee  OPIOIDS- none       NARCAN KIT- N/A       CANNABIS- Denies current daily use          OTHER ILLICIT DRUGS- hallucinogens (previously)      CURRENT SOCIAL HISTORY:  FINANCIAL SUPPORT- family or friend       CHILDREN- none       LIVING SITUATION- Currently staying with his parent's  in [place], [place]      SOCIAL/ SPIRITUAL SUPPORT- unknown       FEELS SAFE AT HOME- Yes      MEDICAL ROS:  Reports none      Denies akathisia, unusual movements and wt gain   10 point ROS neg other than the symptoms noted above in the HPI. Patient does report multiple physical concern including back, neck, hip and foot pain, concerns about metabolism. These complaints have been evaluated by sports medicine and primary care with unremarkable findings. PT exercises have been recommended.      Of note, history is positive for multiple sports injuries (former ) including concussion x2 with LOC once; ankle and hip injuries; scoliosis. His disorganized communication is likely interfering with an adequate health assessment.         First Episode of Psychosis History      DUP (duration untreated psychosis):  Likely first experienced auditory hallucinations during the spring of 2016, possibly in the context of cannabis and LSD use. Did not seek treatment until behavior and presentation became significantly disorganized in January 2017. Medication adherence has been poor over the course of this last year.   Route to initial care: ED for disorganized behavior, somatic concerns   Medication adherence overall:  Fair to poor  General frequency of visits:  Recommend weekly to biweekly  Participation in groups:  none  Cognitive Remediation:  none  Other treatment history: See pertinent background      Reviewed for completion of First Episode work-up:  Yes  First episode workup:  Not Done (if completed, see LABS for results)  MATRICS Consensus Cognitive Battery:  Not Done (if completed, see LABS for  results)         Medical/Surgical History     Penicillins    Patient Active Problem List   Diagnosis     Schizoaffective disorder (H)            Medications     Current Outpatient Prescriptions   Medication Sig Dispense Refill     ARIPiprazole ER (ABILIFY MAINTENA) 400 MG extended release inj syringe Inject 1 Syringe (400 mg) into the muscle every 28 days 1 Syringe 11     lithium (ESKALITH) 450 MG CR tablet Take 2 tablets (900 mg) by mouth At Bedtime 60 tablet 1     Previous medication trials:  Zoloft- stopped because of ASE (?)  fluoxetine  Risperidone 1 mg, reported akathisia at 3mg dose  Haloperidol 5mg bid, reported dystonia relieved with benadryl   Divalproex 750mg  Quetiapine 300mg- discontinued 1/2/18  Olanzapine 10mg- discontinued 1/29/18, EPS?  Alprazolam 0.5-1mg PRN          Vitals     /59  Pulse 63  Wt 71.4 kg (157 lb 6.4 oz)  BMI 23.58 kg/m2      Weight prior to medication: 130s         Mental Status Exam     Alertness: alert and oriented  Appearance: casually groomed   Behavior/Demeanor: cooperative, with good eye contact   Speech: spontaneous and unremarkable   Language: intact  Psychomotor: fidgety  Mood: anxious  Affect: full range; was congruent to mood; was congruent to content  Thought Process/Associations: more organized, still containaing loose associations and neologisms/ word salad  Thought Content:  Reports suicidal and violent ideation (without intent or plan), delusions, preoccupations, over-valued ideas and paranoid ideation;  Denies obsessions , phobia  and magical thinking  Perception:  Reports auditory hallucinations;  Denies visual hallucinations  Insight: poor  Judgment: limited  Cognition: does  appear grossly intact; formal cognitive testing was not done         Labs and Data     RATING SCALES:  AIMS: done 3/5/18 with total score of 0    PHQ9 TODAY = 10  PHQ-9 SCORE 5/10/2018 5/23/2018 5/31/2018   Total Score 11 12 12       ANTIPSYCHOTIC LABS ROUTINE    [glu, A1C, lipids  "(focus LDL), liver enzymes, WBC, ANEU, Hgb, plts]   q12 mo  Recent Labs   Lab Test  01/03/18   1054   GLC  73     Recent Labs   Lab Test  01/03/18   1054   CHOL  135   TRIG  93   LDL  70   HDL  46     Recent Labs   Lab Test  01/03/18   1054   AST  19   ALT  37   ALKPHOS  66     Recent Labs   Lab Test  01/03/18   1054   WBC  8.0   ANEU  5.5   HGB  13.9   PLT  241            Psychiatric Diagnoses     Schizoaffective disorder, bipolar type (F25.0)         Assessment     This patient is a 22 year old male who provides a history supporting the diagnoses listed directly above.  Further diagnostic clarification is not needed.  There are medical comorbidities which impact this treatment [suicidal ideation, psychosis [sxs include delusions, AH, disorganized behavior], multiple psychotropic trials, severe med reaction, psych hosp (<3) and SUBSTANCE USE: hallucinogens and cannabis].     DISCUSSION: Patient presents as more organized; disorganized communication consisting of word salad and loose associations are still observed but are much more infrequent and do not impede his ability for expressive and receptive communication. Patient thinks \"medications aren't doing much\", helping or hurting,  And he does not see connection with making progress toward social, academic or occupational goals. He is ready to try again without medications and is not concerned about consequences of relapse because \"sometimes it is nice to be in the hospital for a week or two\". Patient was laughing during appt, states that he is not concerned about relapse because that risk is not applicable to him.  Depressive sx continue and the patient seems to have moved toward attributing them to medication use. He is not willing to take lithium regularly and so we discontinued it today to minimize risk with sporadic use.   No immediate safety concerns were identified today.    SUICIDE RISK ASSESSMENT [details described above]:  Today Jerel Day reports " passive suicidal thoughts.  In addition, he has notable risk factors for self-harm including hallucinations [command, persecutory ], substance use [alcohol, cannabis, hallucinogens], hx of stopping meds, recent loss and male.  However, risk is mitigated by no plan or intent, h/o seeking help when needed, good social support and stable housing.  Based on all available evidence he does not appear to be at imminent risk for self-harm therefore does not meet criteria for a 72-hr hold/  involuntary hospitalization.  However, based on degree of symptoms therapy and close psych FU was recommended which the pt did agree to.      [place] PRESCRIPTION MONITORING PROGRAM [] was not checked today:  last ALPRAZOLAM 0.5 MG TABLET  dispensed 12/01/2017    PSYCHOTROPIC DRUG INTERACTIONS:   No major interactions.  Concomitant use of aripiprazole and lithium may increase risk for EPS     MANAGEMENT:  Monitoring for adverse effects, routine vitals, routine labs, using lowest therapeutic dose of [olanzapine and lithium] and patient is aware of risks         Plan     1) PSYCHOTROPIC MEDICATIONS:  - Continue lithium 900mg at    RTC in 3 weeks to discuss the change in aripiprazole from HO to oral    2) THERAPY:  Continue IRT and SEE    3) NEXT DUE:    Labs- Results available in care everywhere, last Li level 0.88. Levels should be checked again due to sporadic adherence.  Rating Scales- AIMS 9/2018 or sooner if needed    4) REFERRALS:    No Referrals needed    5) RTC: 3 weeks    6) CRISIS NUMBERS:   Provided routinely in AVS.    TREATMENT RISK STATEMENT:  The risks, benefits, alternatives and potential adverse effects have been discussed and are understood by the pt. The pt understands the risks of using street drugs or alcohol. There are no medical contraindications, the pt agrees to treatment with the ability to do so. The pt knows to call the clinic for any problems or to access emergency care if needed.  Medical and substance use  concerns are documented above.  Psychotropic drug interaction check was done, including changes made today.      PROVIDER:  JAVY Bhatt [facility]      Psychiatry Clinic Individual Psychotherapy Note                                                                     [16]   Start time - 3:20pm       End time - 3:40  Date reviewed - 2/14/18       Date next due - 2/14/19    Subjective: This supportive psychotherapy session addressed issues related to family of origin  and health .  Patient's reaction: Pre-contemplation in the context of mental status appropriate for ambulatory setting.  Progress: fair  Psychotherapy services during this visit included  myself and the patient.   Treatment Plan      SYMPTOMS; PROBLEMS   MEASURABLE GOALS;    FUNCTIONAL IMPROVEMENT INTERVENTIONS;   GAINS MADE DISCHARGE CRITERIA   Medications:  educate patient and reduce use obstacles   reduce frequency/ intensity of false beliefs monitoring of adherence marked symptom improvement   Psychosocial: health issues, limited social support and mental health symptoms   learn 2 new ways of coping with routine stressors increase coping skills marked symptom improvement

## 2018-06-28 ASSESSMENT — PATIENT HEALTH QUESTIONNAIRE - PHQ9: SUM OF ALL RESPONSES TO PHQ QUESTIONS 1-9: 10

## 2018-07-02 RX ORDER — ARIPIPRAZOLE 10 MG/1
10 TABLET ORAL DAILY
Qty: 30 TABLET | Refills: 3 | Status: SHIPPED | OUTPATIENT
Start: 2018-07-02 | End: 2018-08-15

## 2018-07-10 ENCOUNTER — TELEPHONE (OUTPATIENT)
Dept: PSYCHIATRY | Facility: CLINIC | Age: 23
End: 2018-07-10

## 2018-07-10 NOTE — TELEPHONE ENCOUNTER
"NAVIGATE (F/U-Parent Concerns)  A Part of the [facility] First Episode of Psychosis Program     Patient Name: Jerel Day  /Age:  1995 (22 year old)    See email correspondence below;    \"umpencrypt\" Re: Jerel TRAN Myron Zimmer,  I'm sorry to hear, but glad you will be attending. Oral medications are always more difficult for people to manage, and we've known Jerel has been considering stopping injections for the past few months. I will discuss the content of this email with Brittani and team, and we will address these things during Wednesday appointment with Brittani.    Shai Londono, [facility]  Director-NAVIGATE Program  [facility]       Georgina Day <[email or web address]>  Yesterday, 3:34 PM  Shai Londono    From: Georgina Day <[email or web address]>  Date: 2018 at 2:57:17 PM CDT  To: [email or web address]  Subject: Jerel Gibbons,  Couple of concerns about Jerel Day:  1. Wants to stop monthly shots. They seem to make him very tired. Also complains about injection pain. Very concerned about him trying to take oral medication again.  2. Not doing very good with personal hygiene remembering to brush teeth especially an issue. Looks like he has some decaying on front teeth. He has a dentist appointment next week. Maybe you can remind him that these are important items for potential girlfriends.  3. Seems to have frequent cough and has had 3 vomiting episodes in the past 3-4 weeks.  Can you please forward this to Dr. Finley. I don t have her email. I will likely be going with Jerel to his appointments on Wednesday.  Thanks,  Mesha Day       "

## 2018-07-11 ENCOUNTER — OFFICE VISIT (OUTPATIENT)
Dept: PSYCHIATRY | Facility: CLINIC | Age: 23
End: 2018-07-11
Payer: COMMERCIAL

## 2018-07-11 VITALS
BODY MASS INDEX: 23.7 KG/M2 | TEMPERATURE: 97.9 F | DIASTOLIC BLOOD PRESSURE: 69 MMHG | SYSTOLIC BLOOD PRESSURE: 109 MMHG | HEART RATE: 58 BPM | WEIGHT: 158.2 LBS

## 2018-07-11 DIAGNOSIS — F25.0 SCHIZOAFFECTIVE DISORDER, BIPOLAR TYPE (H): Primary | ICD-10-CM

## 2018-07-11 RX ORDER — HYDROXYZINE PAMOATE 50 MG/1
CAPSULE ORAL
Qty: 60 CAPSULE | Refills: 0 | Status: ON HOLD | OUTPATIENT
Start: 2018-07-11 | End: 2019-09-17

## 2018-07-11 ASSESSMENT — PAIN SCALES - GENERAL: PAINLEVEL: NO PAIN (0)

## 2018-07-11 NOTE — MR AVS SNAPSHOT
After Visit Summary   7/11/2018    Jerel Day    MRN: [id number]           Patient Information     Date Of Birth          1995        Visit Information        Provider Department      7/11/2018 3:00 PM Shai Londono, [facility]        Today's Diagnoses     Schizoaffective disorder, bipolar type (H)    -  1       Follow-ups after your visit        Your next 10 appointments already scheduled     Jul 25, 2018  5:00 PM CDT   Navigate Psychotherapy with Shai Londono [facility] ([facility])    [address]  [facility] [address] [phone]            Aug 13, 2018  3:15 PM CDT   Navigate Medication Follow Up with JAVY Bhatt CNP   [facility] Psychiatry ([facility])    [address]  [facility] [id number] [phone]              Who to contact     Please call your clinic at [phone] to:    Ask questions about your health    Make or cancel appointments    Discuss your medicines    Learn about your test results    Speak to your doctor            Additional Information About Your Visit        Care EveryWhere ID     This is your Care EveryWhere ID. This could be used by other organizations to access your [facility] medical records  [id number]         Blood Pressure from Last 3 Encounters:   07/11/18 109/69   06/27/18 107/59   06/27/18 107/59    Weight from Last 3 Encounters:   07/11/18 71.8 kg (158 lb 3.2 oz)   06/27/18 71.4 kg (157 lb 6.4 oz)   06/27/18 71.4 kg (157 lb 6.4 oz)              Today, you had the following     No orders found for display         Today's Medication Changes          These changes are accurate as of 7/11/18 11:59 PM.  If you have any questions, ask your nurse or doctor.               Start taking these medicines.        Dose/Directions    hydrOXYzine 50 MG capsule   Commonly known as:  VISTARIL   Used for:  Schizoaffective disorder, bipolar type (H)   Started by:  Fauzia  JAVY Peter CNP        Take 50mg to 100mg as needed for anxiety   Quantity:  60 capsule   Refills:  0            Where to get your medicines      These medications were sent to [id number] - [facility] [address] [address] [facility], [facility] [address]     Phone:  [phone]     hydrOXYzine 50 MG capsule                Primary Care Provider Office Phone # Fax #    [facility] [phone]       [address]  [facility] [address]        Equal Access to Services     [facility]: Hadii aad ku hadasho Soomaali, waaxda luqadaha, qaybta kaalmada adeegyada, keagan moser . So [facility] [phone].    ATENCIÓN: Si habla español, tiene a grimm disposición servicios gratuitos de asistencia lingüística. [facility] [phone].    We comply with applicable federal civil rights laws and [place] laws. We do not discriminate on the basis of race, color, national origin, age, disability, sex, sexual orientation, or gender identity.            Thank you!     Thank you for choosing [facility]  for your care. Our goal is always to provide you with excellent care. Hearing back from our patients is one way we can continue to improve our services. Please take a few minutes to complete the written survey that you may receive in the mail after your visit with us. Thank you!             Your Updated Medication List - Protect others around you: Learn how to safely use, store and throw away your medicines at www.disposemymeds.org.          This list is accurate as of 7/11/18 11:59 PM.  Always use your most recent med list.                   Brand Name Dispense Instructions for use Diagnosis    ARIPiprazole 10 MG tablet    ABILIFY    30 tablet    Take 1 tablet (10 mg) by mouth daily    Schizoaffective disorder, bipolar type (H)       hydrOXYzine 50 MG capsule    VISTARIL    60 capsule    Take 50mg to 100mg as needed for anxiety    Schizoaffective  disorder, bipolar type (H)

## 2018-07-11 NOTE — PATIENT INSTRUCTIONS
Starting July 23, take 10mg of oral Abilify for 2 weeks and then reduce Abilify to 5mg (1/2 tab)     Use hydroxyzine 50-100mg (1-2 caps) as needed for anxiety

## 2018-07-11 NOTE — PROGRESS NOTES
"NAVIGATE Medication Management Progress Note  A Part of the [facility] First Episode of Psychosis Program    NAVIGATE Enrollee: Jerel Day (1995)     MRN: [id number]  Date:  7/11/18         Contributors to the Assessment     Chart Reviewed.   Interview completed with Jerel Day.         Chief Complaint       \"I am feeling loopy today\"           Interim History      Jerel Day is a 22 year old male who was last seen in clinic on 5/30/18 at which time lithium 900mg at HS and Abilify Maintena 400mg were continued. The patient reports poor treatment adherence.  History was provided by Jerel who was a good to fair historian.  Since the last visit:  - He reports that he talked to his mom about stopping the IM and she did not demonstrate concern.  - He is feeling like he has been \"compulsive\" with his EtOH and nicotine use and porn viewing. He notices that this is especially happening when he is not being stimulated in other ways. He relates that this is an ASE but also that this was happening before.  - He is feeling like he is working enough. Work continues to go well.   - He is noticing that he has low motivation for most things in life, he is \"bored with the status quo\". Hygiene is poor, he is not interested in much. He does acknowledge that he is depressed. He does not think he wants to try a medication for depression. He is thinking about dosing with hallucinogens   - He is wanting to taper off medications, created a taper plan to resume oral meds and taper dose down over the next month. Mom was informed of this plan at the end of the day.  - He is sleeping pretty well, usually a total of 9 hours a day with naps.  - Appetite has been increased  - Discussed trying hydroxyzine for anxiety, he was open to considering it  - He is smoking cannabis 2-3 times a week and feels that it has been helpful in addressing his symptoms of depression. Last use was yesterday. He does not think that cannabis has any impact on sx " or any detrimental effect at all.   - He is applying for classes this fall at the [employer] and hopes that he can take 9 credits  - Denies SI/SIB    DEPRESSION:  reports-suicidal ideation, (passive), depressed mood, hypersomnia and poor concentration /memory, distractability;  DENIES- anhedonia, low energy, insomnia and feeling worthless  KELLI/HYPOMANIA:  reports-none;  DENIES- excessive spending, decreased sleep need, increased activity and grandiosity  PSYCHOSIS:  reports-delusions, auditory hallucinations, disorganized behavior and disorganized speech (difficulty communicating)-- disorganized sx have improved greatly with the addition of the HO;  DENIES- visual hallucinations  ANXIETY:  social anxiety and nervous/overwhelmed  SLEEP:  Sleep has increased, 9-10 hours per night, sometime up to 15 hours a day with naps  EATING DISORDER: none     RECENT SUBSTANCE USE:     ALCOHOL- Denies current daily use. Previously- nearly daily EtOH use, 3-13 shots of hard liquor + beer and wine           TOBACCO- 1/2 PPD             CAFFEINE- 1 cups/day of coffee  OPIOIDS- none       NARCAN KIT- N/A       CANNABIS- Denies current daily use          OTHER ILLICIT DRUGS- hallucinogens (previously)      CURRENT SOCIAL HISTORY:  FINANCIAL SUPPORT- family or friend       CHILDREN- none       LIVING SITUATION- Currently staying with his parent's [facility] in [place], [place]      SOCIAL/ SPIRITUAL SUPPORT- unknown       FEELS SAFE AT HOME- Yes      MEDICAL ROS:  Reports none      Denies akathisia, unusual movements and wt gain   10 point ROS neg other than the symptoms noted above in the HPI. Patient does report multiple physical concern including back, neck, hip and foot pain, concerns about metabolism. These complaints have been evaluated by sports medicine and primary care with unremarkable findings. PT exercises have been recommended.      Of note, history is positive for multiple sports injuries (former ) including concussion x2  with LOC once; ankle and hip injuries; scoliosis. His disorganized communication is likely interfering with an adequate health assessment.         First Episode of Psychosis History      DUP (duration untreated psychosis):  Likely first experienced auditory hallucinations during the spring of 2016, possibly in the context of cannabis and LSD use. Did not seek treatment until behavior and presentation became significantly disorganized in January 2017. Medication adherence has been poor over the course of this last year.   Route to initial care: ED for disorganized behavior, somatic concerns   Medication adherence overall:  Fair to poor  General frequency of visits:  Recommend weekly to biweekly  Participation in groups:  none  Cognitive Remediation:  none  Other treatment history: See pertinent background      Reviewed for completion of First Episode work-up:  Yes  First episode workup:  Not Done (if completed, see LABS for results)  MATRICS Consensus Cognitive Battery:  Not Done (if completed, see LABS for results)         Medical/Surgical History     Penicillins    Patient Active Problem List   Diagnosis     Schizoaffective disorder (H)            Medications     Current Outpatient Prescriptions   Medication Sig Dispense Refill     ARIPiprazole (ABILIFY) 10 MG tablet Take 1 tablet (10 mg) by mouth daily 30 tablet 3     Previous medication trials:  Zoloft- stopped because of ASE (?)  fluoxetine  Risperidone 1 mg, reported akathisia at 3mg dose  Haloperidol 5mg bid, reported dystonia relieved with benadryl   Divalproex 750mg  Quetiapine 300mg- discontinued 1/2/18  Olanzapine 10mg- discontinued 1/29/18, EPS?  Alprazolam 0.5-1mg PRN          Vitals     /69 (BP Location: Right arm, Patient Position: Chair, Cuff Size: Adult Regular)  Pulse 58  Temp 97.9  F (36.6  C)  Wt 71.8 kg (158 lb 3.2 oz)  BMI 23.7 kg/m2      Weight prior to medication: 130s         Mental Status Exam     Alertness: alert and  oriented  Appearance: casually groomed   Behavior/Demeanor: cooperative, with good eye contact   Speech: spontaneous and unremarkable   Language: intact  Psychomotor: fidgety  Mood: anxious  Affect: full range; was congruent to mood; was congruent to content  Thought Process/Associations: more organized, still containaing loose associations and neologisms/ word salad  Thought Content:  Reports suicidal and violent ideation (without intent or plan), delusions, preoccupations, over-valued ideas and paranoid ideation;  Denies obsessions , phobia  and magical thinking  Perception:  Reports auditory hallucinations;  Denies visual hallucinations  Insight: poor  Judgment: limited  Cognition: does  appear grossly intact; formal cognitive testing was not done         Labs and Data     RATING SCALES:  AIMS: done 3/5/18 with total score of 0    PHQ9 TODAY = 10  PHQ-9 SCORE 5/31/2018 6/27/2018 7/2/2018   Total Score 12 10 10       ANTIPSYCHOTIC LABS ROUTINE    [glu, A1C, lipids (focus LDL), liver enzymes, WBC, ANEU, Hgb, plts]   q12 mo  Recent Labs   Lab Test  01/03/18   1054   GLC  73     Recent Labs   Lab Test  01/03/18   1054   CHOL  135   TRIG  93   LDL  70   HDL  46     Recent Labs   Lab Test  01/03/18   1054   AST  19   ALT  37   ALKPHOS  66     Recent Labs   Lab Test  01/03/18   1054   WBC  8.0   ANEU  5.5   HGB  13.9   PLT  241            Psychiatric Diagnoses     Schizoaffective disorder, bipolar type (F25.0)         Assessment     This patient is a 22 year old male who provides a history supporting the diagnoses listed directly above.  Further diagnostic clarification is not needed.  There are medical comorbidities which impact this treatment [suicidal ideation, psychosis [sxs include delusions, AH, disorganized behavior], multiple psychotropic trials, severe med reaction, psych hosp (<3) and SUBSTANCE USE: hallucinogens and cannabis].     DISCUSSION: Patient presents as more organized; disorganized communication  "consisting of word salad and loose associations are still observed but are much more infrequent and do not impede his ability for expressive and receptive communication. Patient thinks \"medications aren't doing much\", helping or hurting,  And he does not see connection with making progress toward social, academic or occupational goals. He is ready to try again without medications and is not concerned about consequences of relapse because \"sometimes it is nice to be in the hospital for a week or two\". Patient was laughing during appt, states that he is not concerned about relapse because that risk is not applicable to him.  Depressive sx continue and the patient seems to have moved toward attributing them to medication use. He is not willing to take lithium regularly and so we discontinued it today to minimize risk with sporadic use.   No immediate safety concerns were identified today.    SUICIDE RISK ASSESSMENT [details described above]:  Today Jerel Day reports passive suicidal thoughts.  In addition, he has notable risk factors for self-harm including hallucinations [command, persecutory ], substance use [alcohol, cannabis, hallucinogens], hx of stopping meds, recent loss and male.  However, risk is mitigated by no plan or intent, h/o seeking help when needed, good social support and stable housing.  Based on all available evidence he does not appear to be at imminent risk for self-harm therefore does not meet criteria for a 72-hr hold/  involuntary hospitalization.  However, based on degree of symptoms therapy and close psych FU was recommended which the pt did agree to.      [place] PRESCRIPTION MONITORING PROGRAM [] was not checked today:  last ALPRAZOLAM 0.5 MG TABLET  dispensed 12/01/2017    PSYCHOTROPIC DRUG INTERACTIONS:   No major interactions.  Concomitant use of aripiprazole and lithium may increase risk for EPS     MANAGEMENT:  Monitoring for adverse effects, routine vitals, routine labs, using " lowest therapeutic dose of [olanzapine and lithium] and patient is aware of risks         Plan     1) PSYCHOTROPIC MEDICATIONS:  Starting July 23, take 10mg of oral Abilify for 2 weeks and then reduce Abilify to 5mg (1/2 tab)     Use hydroxyzine 50-100mg (1-2 caps) as needed for anxiety     2) THERAPY:  Continue IRT and SEE    3) NEXT DUE:    Labs- Results available in care everywhere, last Li level 0.88. Levels should be checked again due to sporadic adherence.  Rating Scales- AIMS 9/2018 or sooner if needed    4) REFERRALS:    No Referrals needed    5) RTC: 3 weeks    6) CRISIS NUMBERS:   Provided routinely in AVS.    TREATMENT RISK STATEMENT:  The risks, benefits, alternatives and potential adverse effects have been discussed and are understood by the pt. The pt understands the risks of using street drugs or alcohol. There are no medical contraindications, the pt agrees to treatment with the ability to do so. The pt knows to call the clinic for any problems or to access emergency care if needed.  Medical and substance use concerns are documented above.  Psychotropic drug interaction check was done, including changes made today.      PROVIDER:  JAVY Bhatt [facility]      Psychiatry Clinic Individual Psychotherapy Note                                                                     [16]   Start time - 3:20pm       End time - 3:40  Date reviewed - 2/14/18       Date next due - 2/14/19    Subjective: This supportive psychotherapy session addressed issues related to family of origin  and health .  Patient's reaction: Pre-contemplation in the context of mental status appropriate for ambulatory setting.  Progress: fair  Psychotherapy services during this visit included  myself and the patient.   Treatment Plan      SYMPTOMS; PROBLEMS   MEASURABLE GOALS;    FUNCTIONAL IMPROVEMENT INTERVENTIONS;   GAINS MADE DISCHARGE CRITERIA   Medications:  educate patient and reduce use obstacles   reduce frequency/ intensity of  false beliefs monitoring of adherence marked symptom improvement   Psychosocial: health issues, limited social support and mental health symptoms   learn 2 new ways of coping with routine stressors increase coping skills marked symptom improvement

## 2018-07-11 NOTE — MR AVS SNAPSHOT
After Visit Summary   7/11/2018    Jerel Day    MRN: [id number]           Patient Information     Date Of Birth          1995        Visit Information        Provider Department      7/11/2018 4:15 PM Brittani Cage APRN CNP [facility]iat[facility]        Today's Diagnoses     Schizoaffective disorder, bipolar type (H)    -  1      Care Instructions    Starting July 23, take 10mg of oral Abilify for 2 weeks and then reduce Abilify to 5mg (1/2 tab)     Use hydroxyzine 50-100mg (1-2 caps) as needed for anxiety           Follow-ups after your visit        Follow-up notes from your care team     Return in about 1 month (around 8/11/2018).      Your next 10 appointments already scheduled     Jul 25, 2018  5:00 PM CDT   Navigate Psychotherapy with SHIRA Richey   [facility] ([facility])    [address]  [facility] [id number] [phone]            Aug 13, 2018  3:15 PM CDT   Navigate Medication Follow Up with JAVY Bhatt CNP   [facility] ([facility])    [address]  [facility] [id number] [phone]              Who to contact     Please call your clinic at [phone] to:    Ask questions about your health    Make or cancel appointments    Discuss your medicines    Learn about your test results    Speak to your doctor            Additional Information About Your Visit        Care EveryWhere ID     This is your Care EveryWhere ID. This could be used by other organizations to access your [facility] medical records  [id number]        Your Vitals Were     Pulse Temperature BMI (Body Mass Index)             58 97.9  F (36.6  C) 23.7 kg/m2          Blood Pressure from Last 3 Encounters:   07/11/18 109/69   06/27/18 107/59   06/27/18 107/59    Weight from Last 3 Encounters:   07/11/18 158 lb 3.2 oz (71.8 kg)   06/27/18 157 lb 6.4 oz (71.4 kg)   06/27/18 157 lb 6.4 oz (71.4 kg)              Today, you had the  following     No orders found for display         Today's Medication Changes          These changes are accurate as of 7/11/18  4:43 PM.  If you have any questions, ask your nurse or doctor.               Start taking these medicines.        Dose/Directions    hydrOXYzine 50 MG capsule   Commonly known as:  VISTARIL   Used for:  Schizoaffective disorder, bipolar type (H)   Started by:  Brittani Cage APRN CNP        Take 50mg to 100mg as needed for anxiety   Quantity:  60 capsule   Refills:  0            Where to get your medicines      [id number] - [place], [place] - [address]  [address], [facility] [address]     Phone:  [phone]     hydrOXYzine 50 MG capsule                Primary Care Provider Office Phone # Fax #    [facility] [phone]       [address] [place] [phone]        Equal Access to Services     MUNIRA MADDOX : Hadii zev barrientoso Sobrittany, waaxda luqadaha, qaybta kaalmada adeegyada, keagan moser . So [facility] [phone].    ATENCIÓN: Si habla español, tiene a grimm disposición servicios gratuitos de asistencia lingüística. Llame al [phone].    We comply with applicable federal civil rights laws and [place] laws. We do not discriminate on the basis of race, color, national origin, age, disability, sex, sexual orientation, or gender identity.            Thank you!     Thank you for choosing [email or web address]  for your care. Our goal is always to provide you with excellent care. Hearing back from our patients is one way we can continue to improve our services. Please take a few minutes to complete the written survey that you may receive in the mail after your visit with us. Thank you!             Your Updated Medication List - Protect others around you: Learn how to safely use, store and throw away your medicines at www.disposemymeds.org.          This list is  accurate as of 7/11/18  4:43 PM.  Always use your most recent med list.                   Brand Name Dispense Instructions for use Diagnosis    ARIPiprazole 10 MG tablet    ABILIFY    30 tablet    Take 1 tablet (10 mg) by mouth daily    Schizoaffective disorder, bipolar type (H)       hydrOXYzine 50 MG capsule    VISTARIL    60 capsule    Take 50mg to 100mg as needed for anxiety    Schizoaffective disorder, bipolar type (H)

## 2018-07-18 ASSESSMENT — PATIENT HEALTH QUESTIONNAIRE - PHQ9: SUM OF ALL RESPONSES TO PHQ QUESTIONS 1-9: 12

## 2018-07-18 NOTE — PROGRESS NOTES
NAVIGSTEPHEN Clinician Contact & Progress Note  For Individual Resiliency Training (IRT)  A Part of the [facility] First Episode of Psychosis Program    NAVIGATE Enrollee: Jerel Day (1995)     MRN: [id number]  Date:  7/11/18  Diagnosis: Schizoaffective disorder, bipolar type  Clinician: LILLIANA Individual Resiliency Trainer, Shai Londono [facility]     1. Type of contact: (majority of time spent)  IRT Session    2. People present:   Client  NAVIGATE Director/IRT/writer    3. Total number of persons who participated in contact: 2, including writer    4. Length of Actual Contact: Start Time: 3pm; End Time: 4pm   Traveled?  No     5. Location of contact:  Psychiatry Clinic, [facility]    6. Did the client complete the home practice option(s) from the previous session: Yes    7. Motivational Teaching Strategies:  Connect info and skills with personal goals  Promote hope and positive expectations  Explore pros and cons of change  Re-frame experiences in positive light    8. Educational Teaching Strategies:  Review of written material/education  Relate information to client's experience  Ask questions to check comprehension  Break down information into small chunks  Adopt client's language    9. CBT Teaching Strategies:  Reinforcement and shaping (positive feedback for steps towards goals, gains in knowledge & skills, follow-through on home assignments)  Social skills training (rationale for skill, modeling, role play practice, feedback, plan home practice)    10. IRT Module(s) Addressed:  Module 11 - Having Fun and Developing Good Relationships    11. Techniques utilized:   [place] announced at beginning of session  Review of homework  Review of goal  Review of previous meeting  Present new material  Role-play  Problem-solving practice  Help client choose a home practice option  Summarize progress made in current session    12. Mental Status Exam:    Alertness: alert  and oriented  Appearance: well  "groomed  Behavior/Demeanor: cooperative and pleasant, with fair  eye contact   Speech: normal  Language: intact and no problems. Preferred language identified as English.  Psychomotor: normal or unremarkable  Mood: Unremarkable  Affect: flat; was congruent to mood; was congruent to content  Thought Process/Associations: unremarkable  Thought Content:  Reports none;  Denies none  Perception:  Reports auditory hallucinations;  Denies depersonalization and derealization  Insight: limited  Judgment: limited  Cognition: does  appear grossly intact; formal cognitive testing was done  Suicidal ideation: denies SI, denies intent,  and denies plan  Homicidal Ideation: denies    13. Assessment/Progress Note:     Completed & reviewed [place] reading through full personality profile.  Discussed aspects of personality in a mental health self care/maintenance context, as well as work/school/goals context.  Identified how Jerel can leverage his strengths while working on \"problem\" areas.  Also focused on tangible skills related to improved functioning in relationships and interpersonal communication. Addressed \"having fun\" and cultivating a broader relationship network with those who share similar interests (work, academic, etc)    14. Plan/Referrals:     Will focus on skills based work related to communication.     Billing for \"Interactive Complexity\"?  No    SHIRA Alexandre    NAVIGATE Individual Resiliency Trainer    "

## 2018-07-25 ENCOUNTER — OFFICE VISIT (OUTPATIENT)
Dept: PSYCHIATRY | Facility: CLINIC | Age: 23
End: 2018-07-25
Payer: COMMERCIAL

## 2018-07-25 DIAGNOSIS — F25.0 SCHIZOAFFECTIVE DISORDER, BIPOLAR TYPE (H): Primary | ICD-10-CM

## 2018-07-25 NOTE — MR AVS SNAPSHOT
After Visit Summary   7/25/2018    Jerel Day    MRN: [id number]           Patient Information     Date Of Birth          1995        Visit Information        Provider Department      7/25/2018 5:00 PM Shai Londono, [facility] Psychiatry        Today's Diagnoses     Schizoaffective disorder, bipolar type (H)    -  1       Follow-ups after your visit        Your next 10 appointments already scheduled     Aug 13, 2018  3:15 PM CDT   Navigate Medication Follow Up with JAVY Bhatt CNP   [facility] ([facility])    [address]  [facility] [id number] [phone]              Who to contact     Please call your clinic at [phone] to:    Ask questions about your health    Make or cancel appointments    Discuss your medicines    Learn about your test results    Speak to your doctor            Additional Information About Your Visit        Care EveryWhere ID     This is your Care EveryWhere ID. This could be used by other organizations to access your [place] medical records  [id number]         Blood Pressure from Last 3 Encounters:   07/11/18 109/69   06/27/18 107/59   06/27/18 107/59    Weight from Last 3 Encounters:   07/11/18 71.8 kg (158 lb 3.2 oz)   06/27/18 71.4 kg (157 lb 6.4 oz)   06/27/18 71.4 kg (157 lb 6.4 oz)              Today, you had the following     No orders found for display       Primary Care Provider Office Phone # Fax #    [facility] [phone]       [address] [place] [address]        Equal Access to Services     [facility] : Hadii aad ku hadasho Soomaali, waaxda luqadaha, qaybta kaalmada adeegyakofi, waxay sallie moser . So [facility] [phone].    ATENCIÓN: Si habla español, tiene a grimm disposición servicios gratuitos de asistencia lingüística. Case al [phone].    We comply with applicable federal civil rights laws and [place] laws. We do not  discriminate on the basis of race, color, national origin, age, disability, sex, sexual orientation, or gender identity.            Thank you!     Thank you for choosing [facility]  for your care. Our goal is always to provide you with excellent care. Hearing back from our patients is one way we can continue to improve our services. Please take a few minutes to complete the written survey that you may receive in the mail after your visit with us. Thank you!             Your Updated Medication List - Protect others around you: Learn how to safely use, store and throw away your medicines at www.disposemymeds.org.          This list is accurate as of 7/25/18 11:59 PM.  Always use your most recent med list.                   Brand Name Dispense Instructions for use Diagnosis    ARIPiprazole 10 MG tablet    ABILIFY    30 tablet    Take 1 tablet (10 mg) by mouth daily    Schizoaffective disorder, bipolar type (H)       hydrOXYzine 50 MG capsule    VISTARIL    60 capsule    Take 50mg to 100mg as needed for anxiety    Schizoaffective disorder, bipolar type (H)

## 2018-07-31 NOTE — PROGRESS NOTES
LILLIANA Clinician Contact & Progress Note  For Individual Resiliency Training (IRT)  A Part of the [facility] First Episode of Psychosis Program    NAVIGATE Enrollee: Jerel Day (1995)     MRN: [id number]  Date:  7/25/18  Diagnosis: Schizoaffective disorder, bipolar type  Clinician: LILLIANA Individual Resiliency Trainer, Shai Londono [facility]     1. Type of contact: (majority of time spent)  IRT Session    2. People present:   Client  NAVIGATE IRT/writer    3. Total number of persons who participated in contact: 2, including writer    4. Length of Actual Contact: Start Time: 5pm; End Time: 6pm   Traveled?  No  5. Location of contact:  Psychiatry Clinic, [facility]    6. Did the client complete the home practice option(s) from the previous session: No    7. Motivational Teaching Strategies:  Connect info and skills with personal goals  Promote hope and positive expectations  Explore pros and cons of change  Re-frame experiences in positive light    8. Educational Teaching Strategies:  Review of written material/education  Relate information to client's experience  Ask questions to check comprehension  Break down information into small chunks  Adopt client's language    9. CBT Teaching Strategies:  Reinforcement and shaping (positive feedback for steps towards goals, gains in knowledge & skills, follow-through on home assignments)    Social skills training (rationale for skill, modeling, role play practice, feedback, plan home practice)    Relapse prevention planning (review of stressors, early warning signs, written plan to respond to signs, rehearse plan)    Coping skills training (review current coping skills, increase currently used skills, model new skill, role play new skill, feedback, plan home practice)    Relaxation training (model relaxation technique, practice technique, feedback, plan home practice)    Cognitive restructuring (identify thoughts related to negative feelings, examine the evidence,  "change thought or form action plan)    Behavioral tailoring (fit taking medication into client's daily routine)    10. IRT Module(s) Addressed:  Module 4 - Relapse Prevention Planning (Medication)  Module 10 - Substance Use    11. Techniques utilized:   [place] announced at beginning of session  Review of homework  Review of goal  Review of previous meeting  Present new material  Role-play  Problem-solving practice  Help client choose a home practice option  Summarize progress made in current session    12. Mental Status Exam:  Alertness: alert  and oriented  Appearance: well groomed  Behavior/Demeanor: cooperative and pleasant, with fair  eye contact   Speech: normal  Language: intact. Preferred language identified as English.  Psychomotor: normal or unremarkable  Mood: unremarkable  Affect: full range; was congruent to mood; was congruent to content  Thought Process/Associations: remarkable for , circumstantial and loose associations  Thought Content:  Reports preoccupations;  Denies suicidal and violent ideation, obsessions , phobia , magical thinking and over-valued ideas  Perception:  Reports auditory hallucinations and tactile hallucinations (possible);  Denies visual hallucinations and visual distortion seen as shadows   Insight: limited  Judgment: limited  Cognition: does  appear grossly intact; formal cognitive testing was done  Suicidal ideation: denies SI, denies intent,  and denies plan  Homicidal Ideation: denies    13. Assessment/Progress Note:     Spent half of session exploring nature, frequency, and intensity of auditory hallucinations and \"pains\" he's experiencing.  Spent half of session reviewing relapse prevention, pro's and con's of staying on medication, and Jerel's ongoing marijuana use.    Jerel articulated very well his AH to be occurring daily with a 5 of 10 intensity rating.  He reports AH \"plays on my ego\", sometimes good, sometimes bad, but reports being able to manage, yet doesn't like " "them. Reported he's feeling daily body pains mostly in neck and, joints, hip, and reports when he looks in the mirror, his body doesn't look right.     Jerel reported wanting to continue to smoke MJ due to the benefits of decreasing pain and assistance with \"compelling thinking\". He reports current medications are not helping, and he wants to stop taking them, and was at best contemplative.  Jerel indicated he is wheening himself off, and will no longer continue with the injection.      We discussed these areas and worked to reframe thinking and decision making.     14. Plan/Referrals:     Jerel to explore the concepts of tactile hallucinations and somatic complaints.   Will review aspects of INTP personality in context of relationships and thinking style.     Billing for \"Interactive Complexity\"?  No    [facility]ai Londono [facility]    NAVIGATE Individual Resiliency Trainer    "

## 2018-08-06 ENCOUNTER — TELEPHONE (OUTPATIENT)
Dept: PSYCHIATRY | Facility: CLINIC | Age: 23
End: 2018-08-06

## 2018-08-06 NOTE — TELEPHONE ENCOUNTER
NAVIGATE SEE Email Communication  For Supported Employment & Education    NAVIGATE Enrollee: Ralf Shay (1995)     MRN: [id number]  Date of text: 8/6/2018  Contacted: ralf    Discussed:   Writer texted Ralf to schedule appt    Ana Michel

## 2018-08-21 ENCOUNTER — OFFICE VISIT (OUTPATIENT)
Dept: PSYCHIATRY | Facility: CLINIC | Age: 23
End: 2018-08-21
Payer: COMMERCIAL

## 2018-08-21 DIAGNOSIS — F25.0 SCHIZOAFFECTIVE DISORDER, BIPOLAR TYPE (H): Primary | ICD-10-CM

## 2018-08-21 NOTE — MR AVS SNAPSHOT
After Visit Summary   8/21/2018    Jerel Day    MRN: [id number]           Patient Information     Date Of Birth          1995        Visit Information        Provider Department      8/21/2018 4:00 PM Shai Londono, [facility] Psychiatry        Today's Diagnoses     Schizoaffective disorder, bipolar type (H)    -  1       Follow-ups after your visit        Your next 10 appointments already scheduled     Sep 12, 2018  5:00 PM CDT   Navigate Psychotherapy with Shai Londono [facility] Psychiatry ([facility])    [address] [place] [address]   [phone]            Sep 12, 2018  6:15 PM CDT   Navigate Medication Follow Up with JAVY Bhatt CNP   [facility] Psychiatry ([facility])    [address] [place] [address]   [phone]            Oct 17, 2018  5:00 PM CDT   Navigate Psychotherapy with Shai Londono [facility] ([facility]    [address] [place] [address]   [phone]            Oct 17, 2018  6:15 PM CDT   Navigate Medication Follow Up with JAVY Bhatt CNP   [facility]    [address] [place] [address]   [phone]            Nov 14, 2018  5:00 PM CST   Navigate Psychotherapy with Shai Londono [facility] Psychiatry ([facility])    [address] [place] [address]   [phone]            Nov 14, 2018  6:15 PM CST   Navigate Medication Follow Up with JAVY Bhatt CNP   [facility]    [address] [place] [address] [phone]              Who to contact     Please call your clinic at [phone] to:    Ask questions about your health    Make or cancel appointments    Discuss your medicines    Learn about your test results    Speak to your doctor            Additional  Information About Your Visit        B[employer] Information     [employer] is an electronic gateway that provides easy, online access to your medical records. With Bocom, you can request a clinic appointment, read your test results, renew a prescription or communicate with your care team.     To sign up for Bocom visit the website at [email or web address]   You will be asked to enter the access code listed below, as well as some personal information. Please follow the directions to create your username and password.     Your access code is: [email or web address]  Expires: 2018  9:01 AM     Your access code will  in 90 days. If you need help or a new code, please contact your [facility] or call [phone] for assistance.        Care EveryWhere ID     This is your Care EveryWhere ID. This could be used by other organizations to access your [place] medical records  [id number]         Blood Pressure from Last 3 Encounters:   18 109/69   18 107/59   18 107/59    Weight from Last 3 Encounters:   18 71.8 kg (158 lb 3.2 oz)   18 71.4 kg (157 lb 6.4 oz)   18 71.4 kg (157 lb 6.4 oz)              Today, you had the following     No orders found for display       Primary Care Provider Office Phone # Fax #    [facility] [phone]       [address] [place] [address]        Equal Access to Services     MUNIRA MADDOX AH: Hadii aad ku hadasho Soomaali, waaxda luqadaha, qaybta kaalmada adeegyada, waxay tonyin ryan lu. So [facility] [phone].    ATENCIÓN: Si cindyla kirsten, tiene a grimm disposición servicios gratuitos de asistencia lingüística. Llame al [phone].    We comply with applicable federal civil rights laws and [place] laws. We do not discriminate on the basis of race, color, national origin, age, disability, sex, sexual orientation, or gender identity.            Thank you!     Thank  you for choosing [facility]  for your care. Our goal is always to provide you with excellent care. Hearing back from our patients is one way we can continue to improve our services. Please take a few minutes to complete the written survey that you may receive in the mail after your visit with us. Thank you!             Your Updated Medication List - Protect others around you: Learn how to safely use, store and throw away your medicines at www.disposemymeds.org.          This list is accurate as of 8/21/18 11:59 PM.  Always use your most recent med list.                   Brand Name Dispense Instructions for use Diagnosis    hydrOXYzine 50 MG capsule    VISTARIL    60 capsule    Take 50mg to 100mg as needed for anxiety    Schizoaffective disorder, bipolar type (H)

## 2018-08-21 NOTE — MR AVS SNAPSHOT
After Visit Summary   8/21/2018    Jerel Day    MRN: [id number]           Patient Information     Date Of Birth          1995        Visit Information        Provider Department      8/21/2018 4:00 PM Ana Michel [facility] Psychiatry        Today's Diagnoses     Schizoaffective disorder, bipolar type (H)    -  1       Follow-ups after your visit        Your next 10 appointments already scheduled     Sep 12, 2018  5:00 PM CDT   Navigate Psychotherapy with SHIRA Richey   [facility] ([facility]    [address] [place] [address]   [phone]            Sep 12, 2018  6:15 PM CDT   Navigate Medication Follow Up with JAVY Bhatt CNP   [facility]iat[facility] ([facility])    [address] [place] [address]   [phone]            Oct 17, 2018  5:00 PM CDT   Navigate Psychotherapy with SHIRA Richey   [facility] ([facility])    [address] [place] [address]   [phone]            Oct 17, 2018  6:15 PM CDT   Navigate Medication Follow Up with JAVY Bhatt CNP   [facility] ([facility])    [address] [place] [address]   [phone]            Nov 14, 2018  5:00 PM CST   Navigate Psychotherapy with SHIRA Richey   [facility] ([facility])    [address] [place] [address]   [phone]            Nov 14, 2018  6:15 PM CST   Navigate Medication Follow Up with JAVY Bhatt CNP   [facility] ([facility])    [address] [place] [address] [phone]              Who to contact     Please call your clinic at [phone] to:    Ask questions about your health    Make or cancel appointments    Discuss your medicines    Learn about your test results    Speak to your doctor            Additional  Information About Your Visit        [employer] Information     [employer] is an electronic gateway that provides easy, online access to your medical records. With [employer], you can request a clinic appointment, read your test results, renew a prescription or communicate with your care team.     To sign up for [employer] visit the website at [email or web address]   You will be asked to enter the access code listed below, as well as some personal information. Please follow the directions to create your username and password.     Your access code is: [email or web address]  Expires: 2018  9:01 AM     Your access code will  in 90 days. If you need help or a new code, please contact your [facility] or call [phone] for assistance.        Care EveryWhere ID     This is your Care EveryWhere ID. This could be used by other organizations to access your [place] medical records  [id number]         Blood Pressure from Last 3 Encounters:   18 109/69   18 107/59   18 107/59    Weight from Last 3 Encounters:   18 71.8 kg (158 lb 3.2 oz)   18 71.4 kg (157 lb 6.4 oz)   18 71.4 kg (157 lb 6.4 oz)              Today, you had the following     No orders found for display       Primary Care Provider Office Phone # Fax #    [facility] [phone]       [address] [place] [address]        Equal Access to Services     MUNIRA MADDOX AH: Hadii aad ku hadasho Soomaali, waaxda luqadaha, qaybta kaalmada adeegyada, waxay tonyin ryan lu. So [facility] [phone].    ATENCIÓN: Si cindyla kirsten, tiene a grimm disposición servicios gratuitos de asistencia lingüística. Llame al [phone].    We comply with applicable federal civil rights laws and [place] laws. We do not discriminate on the basis of race, color, national origin, age, disability, sex, sexual orientation, or gender identity.            Thank you!     Thank  you for choosing [facility]  for your care. Our goal is always to provide you with excellent care. Hearing back from our patients is one way we can continue to improve our services. Please take a few minutes to complete the written survey that you may receive in the mail after your visit with us. Thank you!             Your Updated Medication List - Protect others around you: Learn how to safely use, store and throw away your medicines at www.disposemymeds.org.          This list is accurate as of 8/21/18 11:59 PM.  Always use your most recent med list.                   Brand Name Dispense Instructions for use Diagnosis    hydrOXYzine 50 MG capsule    VISTARIL    60 capsule    Take 50mg to 100mg as needed for anxiety    Schizoaffective disorder, bipolar type (H)

## 2018-08-24 NOTE — PROGRESS NOTES
NAVIGATE SEE Progress Note   For Supported Employment & Education    NAVIGATE Enrollee: Jerel Day (1995)     MRN: [id number]  Date:  8/21/18  Clinician: LILLIANA Supported Employment & , Ana Michel    1. Client Status Update:   Jerel Day is interested in education (Client enrolled in class or school (e.g. GED course, certificate program, communicaty college or other educational programs)) and employment (Client stopped competitive employment)    2. People present:   SEE/Writer  Client: Jerel Day      3. Total number of persons who participated in contact: (do not count yourself/SEE) 1    4. Length of Actual Contact: 20 minutes   Traveled? No    5. Location of contact:  Psychiatry Clinic, [place]    6. Brief description of session, contact, or client status (include: strategies, interventions, client reaction to contact, next steps, etc)    Writer met with Jerel at the beginning of the family therapy session. Since our last meeting in July, Jerel has enrolled in courses at the [employer], found an apartment to move into and left his part time position at [employer]. Jerel is interested in assistance from his SEE to organize his school work, obtain accommodations and to learn about budgeting. Jerel says his parents will be paying for his apartment and will help with some school costs but that once he gets established in school, he would like to get a job. Jerel agreed to call the disability services center and inform this writer when his appointment is so I can join.    7. Completion of mutually agreed upon client task from previous meeting:  Not Applicable    8. Orientation and Treatment Planning:  Pursuing current SEE goals    9. Assessment:  Assessing client's need for follow-along supports    10. Placement:  Education (Discussion and planning re: use of office of student disability services)    11. Follow Along Supports: (for clients who are working or attending  school)   Not Applicable    12. Mutually agreed upon client task for next meeting:     Make appt with disability services center and invite SEE    13. Next Meeting Scheduled for: next 2 weeks    Ana TIJERINA Supported Employment &

## 2018-08-28 NOTE — PROGRESS NOTES
NAVIGATE Clinician Contact & Progress Note  For Individual Resiliency Training (IRT)  A Part of the [facility] First Episode of Psychosis Program    NAVIGATE Enrollee: Jerel Day (1995)     MRN: [id number]  Date:  8/21/18  Diagnosis: Schizoaffective disorder, bipolar type  Clinician: NAVIGATE Director, SHIRA Alexandre     1. Type of contact: (majority of time spent)  IRT Session    2. People present:   Client  NAVIGATE Director/writer      3. Total number of persons who participated in contact: 2, including writer    4. Length of Actual Contact: Start Time: 4pm; End Time: 5pm   Traveled?  No    5. Location of contact:  Psychiatry Clinic, [facility]    6. Did the client complete the home practice option(s) from the previous session: Partial    7. Motivational Teaching Strategies:  Connect info and skills with personal goals  Promote hope and positive expectations  Explore pros and cons of change  Re-frame experiences in positive light    8. Educational Teaching Strategies:  Review of written material/education  Relate information to client's experience  Ask questions to check comprehension  Break down information into small chunks  Adopt client's language    9. CBT Teaching Strategies:  Reinforcement and shaping (positive feedback for steps towards goals, gains in knowledge & skills, follow-through on home assignments)    Relapse prevention planning (review of stressors, early warning signs, written plan to respond to signs, rehearse plan)      10. IRT Module(s) Addressed:  Reviewed module's 4, 10, 14 - Relapse Prevention Planning, Substance Use, Developing Resiliency -Individualized Sessions    11. Techniques utilized:   Aurora announced at beginning of session  Review of homework  Review of goal  Review of previous meeting  Present new material  Problem-solving practice  Help client choose a home practice option  Summarize progress made in current session    12. Mental Status Exam:    Alertness:  "oriented  Appearance: casually groomed  Behavior/Demeanor: cooperative and pleasant, with poor eye contact   Speech: regular rate and rhythm  Language: no obvious problem. Preferred language identified as English.  Psychomotor: normal or unremarkable  Mood: unremarkable  Affect: full range; was congruent to mood; was congruent to content  Thought Process/Associations: circumstantial and loose associations  Thought Content:  Reports paranoid ideation;  Denies suicidal and violent ideation, obsessions , phobia , magical thinking and over-valued ideas  Perception:  Reports auditory hallucinations and tactile hallucinations (and somatic complaints);  Denies visual hallucinations, depersonalization and derealization  Insight: limited  Judgment: limited  Cognition: does  appear grossly intact; formal cognitive testing was done  Suicidal ideation: denies SI, denies intent,  and denies plan  Homicidal Ideation: denies    13. Assessment/Progress Note:     Majority of session spent providing supportive therapy discussing patient return to [employer] for fall semester, and move to own apartment.  Also discussed patient's choice to stop taking all medications.     Reviewed safety plan, coping strategies (yoga & video games), and warning signs which patient reported as; problems with body alignment, limping around, complaining of pain, pelvis rolls, poor grades. Asked patient how he would like team to intervene if psychotic symptoms return, and he requested we tell him of our observations, and if he needs to go to the hospital, then he would go.     14. Plan/Referrals:     RTC w/I next month per patient preference. Writer asked to touch base with him via phone weekly-Patient stated will call if needed.    Billing for \"Interactive Complexity\"?  No    Shai Londono [facility] Hospital[facility]IANNE LAFLEURATE Individual Resiliency Trainer    "

## 2018-08-29 ASSESSMENT — PATIENT HEALTH QUESTIONNAIRE - PHQ9: SUM OF ALL RESPONSES TO PHQ QUESTIONS 1-9: 13

## 2018-09-04 ENCOUNTER — TELEPHONE (OUTPATIENT)
Dept: PSYCHIATRY | Facility: CLINIC | Age: 23
End: 2018-09-04

## 2018-09-04 DIAGNOSIS — F25.0 SCHIZOAFFECTIVE DISORDER, BIPOLAR TYPE (H): Primary | ICD-10-CM

## 2018-09-04 NOTE — TELEPHONE ENCOUNTER
NAVIGATE SEE Outgoing Telephone Call  For Supported Employment & Education    NAVIGATE Enrollee: Jerel Day (1995)     MRN: [id number]  Date of Call: 9/4/2018  Contacted: Jerel    Discussed:   Writer reached out to schedule SEE appt this week with Jerel. Writer offered Thursday appt as I will be on campus. Jerel said he would call the [facility] and get back to me when we can meet. Writer also confirmed that I am working on his medical documentation with Brittani Estrella for his accommodations.     Ana Michel

## 2018-09-06 ENCOUNTER — OFFICE VISIT (OUTPATIENT)
Dept: PSYCHIATRY | Facility: CLINIC | Age: 23
End: 2018-09-06
Payer: COMMERCIAL

## 2018-09-06 DIAGNOSIS — F25.0 SCHIZOAFFECTIVE DISORDER, BIPOLAR TYPE (H): Primary | ICD-10-CM

## 2018-09-06 NOTE — MR AVS SNAPSHOT
After Visit Summary   9/6/2018    Jerel Day    MRN: [id number]           Patient Information     Date Of Birth          1995        Visit Information        Provider Department      9/6/2018 2:15 PM Ana Michel [facility]        Today's Diagnoses     Schizoaffective disorder, bipolar type (H)    -  1       Follow-ups after your visit        Your next 10 appointments already scheduled     Sep 12, 2018  5:00 PM CDT   Navigate Psychotherapy with SHIRA Richey   [facility] ([facility])    [address] [place] [address]   [phone]            Sep 12, 2018  6:15 PM CDT   Navigate Medication Follow Up with JAVY Bhatt CNP   [facility] ([facility])    [address] [place] [address]   [phone]            Oct 17, 2018  5:00 PM CDT   Navigate Psychotherapy with SHIRA Richey   [facility] ([facility])    [address] [place] [address]   [phone]            Oct 17, 2018  6:15 PM CDT   Navigate Medication Follow Up with JAVY Bhatt CNP   [facility] ([facility])    [address] [place] [address]   [phone]            Nov 14, 2018  5:00 PM CST   Navigate Psychotherapy with SHIRA Richey   [facility] ([facility])    [address] [place] [address]   [phone]            Nov 14, 2018  6:15 PM CST   Navigate Medication Follow Up with JAVY Bhatt CNP   [facility] ([facility])    [address] [place] [id number] [phone]              Who to contact     Please call your clinic at [phone] to:    Ask questions about your health    Make or cancel appointments    Discuss your medicines    Learn about your test results    Speak to your doctor            Additional Information  About Your Visit        micecloud Information     micecloud is an electronic gateway that provides easy, online access to your medical records. With micecloud, you can request a clinic appointment, read your test results, renew a prescription or communicate with your care team.     To sign up for micecloud visit the website at [email or web address]   You will be asked to enter the access code listed below, as well as some personal information. Please follow the directions to create your username and password.     Your access code is: [email or web address]  Expires: 2018  9:01 AM     Your access code will  in 90 days. If you need help or a new code, please contact your [facility] or call [phone] for assistance.        Care EveryWhere ID     This is your Care EveryWhere ID. This could be used by other organizations to access your [place] medical records  [id number]         Blood Pressure from Last 3 Encounters:   18 109/69   18 107/59   18 107/59    Weight from Last 3 Encounters:   18 71.8 kg (158 lb 3.2 oz)   18 71.4 kg (157 lb 6.4 oz)   18 71.4 kg (157 lb 6.4 oz)              Today, you had the following     No orders found for display       Primary Care Provider Office Phone # Fax #    [facility] [phone]       [address] [place] [id number]        Equal Access to Services     MUNIRA MADDOX AH: Hadii aad ku hadasho Soomaali, waaxda luqadaha, qaybta kaalmada adeegyada, waxay idiin ryan lu. So [facility] [phone].    ATENCIÓN: Si habla kirsten, tiene a grimm disposición servicios gratuitos de asistencia lingüística. Llame al [phone].    We comply with applicable federal civil rights laws and [place] laws. We do not discriminate on the basis of race, color, national origin, age, disability, sex, sexual orientation, or gender identity.            Thank you!     Thank you for  choosing [facility]  for your care. Our goal is always to provide you with excellent care. Hearing back from our patients is one way we can continue to improve our services. Please take a few minutes to complete the written survey that you may receive in the mail after your visit with us. Thank you!             Your Updated Medication List - Protect others around you: Learn how to safely use, store and throw away your medicines at www.disposemymeds.org.          This list is accurate as of 9/6/18  4:12 PM.  Always use your most recent med list.                   Brand Name Dispense Instructions for use Diagnosis    hydrOXYzine 50 MG capsule    VISTARIL    60 capsule    Take 50mg to 100mg as needed for anxiety    Schizoaffective disorder, bipolar type (H)

## 2018-09-06 NOTE — PROGRESS NOTES
NAVIGATE SEE Progress Note   For Supported Employment & Education    NAVIGATE Enrollee: Jerel Day (1995)     MRN: [id number]  Date:  9/6/18  Clinician: LILLIANA Supported Employment & , Ana Michel    1. Client Status Update:   Jerel Day is interested in education (Client continuing a class or school program)    2. People present:   SEE/Writer  Client: Jerel Day      3. Total number of persons who participated in contact: (do not count yourself/SEE) 1    4. Length of Actual Contact: 60 minutes   Traveled? Yes  Total Travel Time: 25 minutes    5. Location of contact:  School    6. Brief description of session, contact, or client status (include: strategies, interventions, client reaction to contact, next steps, etc)    Writer met Jerel at a coffee shop on campus. Jerel has moved into a new apartment on his own and said he hasn't done much socializing. Jerel was withdrawn throughout the appt and said he has been feeling 'only okay'. He is currently taking 3 courses for 10 credits total. Jerel asked this writer if we could walk to the bookstore and  his books. On the way there we dicussed ways to stay on top of his homework and studying such as scheduling time for reading and studying, utilizing disability and academic resources on campus. Jerel appeared to have difficulty understanding and was responding to internal stimuli. Jerel asked several times for me to repeat what I had said. On the walk back to the coffee shop from the bookstore, Jerel put in headphones and didn't respond to most of my comments. Jerel said that he would follow up with this writer in a few weeks to see how classes have been going.     7. Completion of mutually agreed upon client task from previous meeting:  Not Applicable    8. Orientation and Treatment Planning:  Pursuing current SEE goals    9. Assessment:  Assessing client's need for follow-along supports    10. Placement:  Education (Discussion and  planning re: use of office of student disability services)    11. Follow Along Supports: (for clients who are working or attending school)   Education (Reviewing coping skills for symptoms for school)    12. Mutually agreed upon client task for next meeting:     Order and  last book, call luiz to schedule internet install    13. Next Meeting Scheduled for: nika LAFLEURATE Supported Employment &

## 2018-09-12 ENCOUNTER — OFFICE VISIT (OUTPATIENT)
Dept: PSYCHIATRY | Facility: CLINIC | Age: 23
End: 2018-09-12
Payer: COMMERCIAL

## 2018-09-12 DIAGNOSIS — F25.0 SCHIZOAFFECTIVE DISORDER, BIPOLAR TYPE (H): Primary | ICD-10-CM

## 2018-09-12 ASSESSMENT — ANXIETY QUESTIONNAIRES
5. BEING SO RESTLESS THAT IT IS HARD TO SIT STILL: SEVERAL DAYS
6. BECOMING EASILY ANNOYED OR IRRITABLE: SEVERAL DAYS
2. NOT BEING ABLE TO STOP OR CONTROL WORRYING: SEVERAL DAYS
GAD7 TOTAL SCORE: 7
3. WORRYING TOO MUCH ABOUT DIFFERENT THINGS: SEVERAL DAYS
7. FEELING AFRAID AS IF SOMETHING AWFUL MIGHT HAPPEN: SEVERAL DAYS
1. FEELING NERVOUS, ANXIOUS, OR ON EDGE: SEVERAL DAYS

## 2018-09-12 ASSESSMENT — PATIENT HEALTH QUESTIONNAIRE - PHQ9: 5. POOR APPETITE OR OVEREATING: SEVERAL DAYS

## 2018-09-12 NOTE — PROGRESS NOTES
"NAVIGATE Medication Management Progress Note  A Part of the [facility] First Episode of Psychosis Program    NAVIGATE Enrollee: Jerel Day (1995)     MRN: [id number]  Date:  9/12/18         Contributors to the Assessment     Chart Reviewed.   Interview completed with Jerel Day.         Chief Complaint       \"Things are fine\"           Interim History      Jerel Day is a 22 year old male who was last seen in clinic on 8/15/18 at which time Abilify 10mg was started with the plan to taper. The patient reports poor treatment adherence.  History was provided by Jerel who was a good to fair historian.  Since the last visit:  - He is taking 10 credits. He is \"not doing very much to keep up right now\". He has been playing [employer]la and video games and playing on the Internet. He has increased his cannabis use to 1/2gram a day.  - He does not have concerns with stopping meds right now  - He is not hanging out with peers and hasn't been seeing brother as much. He is lonely.   - He continues to notice an increase in irritability.   - Checking in with Shai once a month for talk therapy, this is helpful   - He is planning to go to [place] for a massage. He has been seen here in the past for  (freshman year)   - He has low energy and interest, he is not eating much     DEPRESSION:  reports-suicidal ideation, (passive), depressed mood, hypersomnia and poor concentration /memory, distractability; DENIES- anhedonia, low energy, insomnia and feeling worthless  KELLI/HYPOMANIA:  reports-none;  DENIES- excessive spending, decreased sleep need, increased activity and grandiosity  PSYCHOSIS:  reports-delusions, auditory hallucinations, disorganized behavior and disorganized speech (difficulty communicating)-- disorganized sx have improved greatly with the addition of the HO;  DENIES- visual hallucinations  ANXIETY:  social anxiety and nervous/overwhelmed  SLEEP:  Sleep has increased, 9-10 hours per night, sometime up to 15 hours " a day with naps  EATING DISORDER: none     RECENT SUBSTANCE USE:     ALCOHOL- Denies current daily use. Previously- nearly daily EtOH use, 3-13 shots of hard liquor + beer and wine           TOBACCO- 1/2 PPD             CAFFEINE- 1 cups/day of coffee  OPIOIDS- none       NARCAN KIT- N/A       CANNABIS- Denies current daily use          OTHER ILLICIT DRUGS- hallucinogens (previously)      CURRENT SOCIAL HISTORY:  FINANCIAL SUPPORT- family or friend       CHILDREN- none       LIVING SITUATION- Currently staying with his parent's [facility] in [place], [place]      SOCIAL/ SPIRITUAL SUPPORT- unknown       FEELS SAFE AT HOME- Yes      MEDICAL ROS:  Reports none      Denies akathisia, unusual movements and wt gain   10 point ROS neg other than the symptoms noted above in the HPI. Patient does report multiple physical concern including back, neck, hip and foot pain, concerns about metabolism. These complaints have been evaluated by sports medicine and primary care with unremarkable findings. PT exercises have been recommended.      Of note, history is positive for multiple sports injuries (former ) including concussion x2 with LOC once; ankle and hip injuries; scoliosis. His disorganized communication is likely interfering with an adequate health assessment.         First Episode of Psychosis History      DUP (duration untreated psychosis):  Likely first experienced auditory hallucinations during the spring of 2016, possibly in the context of cannabis and LSD use. Did not seek treatment until behavior and presentation became significantly disorganized in January 2017. Medication adherence has been poor over the course of this last year.   Route to initial care: ED for disorganized behavior, somatic concerns   Medication adherence overall:  Fair to poor  General frequency of visits:  Recommend weekly to biweekly  Participation in groups:  none  Cognitive Remediation:  none  Other treatment history: See pertinent  background      Reviewed for completion of First Episode work-up:  Yes  First episode workup:  Not Done (if completed, see LABS for results)  MATRICS Consensus Cognitive Battery:  Not Done (if completed, see LABS for results)         Medical/Surgical History     Penicillins    Patient Active Problem List   Diagnosis     Schizoaffective disorder (H)            Medications     Current Outpatient Prescriptions   Medication Sig Dispense Refill     hydrOXYzine (VISTARIL) 50 MG capsule Take 50mg to 100mg as needed for anxiety 60 capsule 0     Previous medication trials:  Zoloft- stopped because of ASE (?)  fluoxetine  Risperidone 1 mg, reported akathisia at 3mg dose  Haloperidol 5mg bid, reported dystonia relieved with benadryl   Divalproex 750mg  Quetiapine 300mg- discontinued 1/2/18  Olanzapine 10mg- discontinued 1/29/18, EPS?  Alprazolam 0.5-1mg PRN          Vitals     There were no vitals taken for this visit.      Weight prior to medication: 130s         Mental Status Exam     Alertness: alert and oriented  Appearance: casually groomed   Behavior/Demeanor: cooperative, with good eye contact   Speech: spontaneous and unremarkable   Language: intact  Psychomotor: fidgety  Mood: anxious  Affect: full range; was congruent to mood; was congruent to content  Thought Process/Associations: more organized, still containaing loose associations and neologisms/ word salad  Thought Content:  Reports suicidal and violent ideation (without intent or plan), delusions, preoccupations, over-valued ideas and paranoid ideation;  Denies obsessions , phobia  and magical thinking  Perception:  Reports auditory hallucinations;  Denies visual hallucinations  Insight: poor  Judgment: limited  Cognition: does  appear grossly intact; formal cognitive testing was not done         Labs and Data     RATING SCALES:  AIMS: done 3/5/18 with total score of 0     PHQ-9 SCORE 7/2/2018 7/17/2018 8/21/2018   Total Score 10 12 13       ANTIPSYCHOTIC LABS  ROUTINE    [glu, A1C, lipids (focus LDL), liver enzymes, WBC, ANEU, Hgb, plts]   q12 mo  Recent Labs   Lab Test  01/03/18   1054   GLC  73     Recent Labs   Lab Test  01/03/18   1054   CHOL  135   TRIG  93   LDL  70   HDL  46     Recent Labs   Lab Test  01/03/18   1054   AST  19   ALT  37   ALKPHOS  66     Recent Labs   Lab Test  01/03/18   1054   WBC  8.0   ANEU  5.5   HGB  13.9   PLT  241            Psychiatric Diagnoses     Schizoaffective disorder, bipolar type (F25.0)         Assessment     This patient is a 22 year old male who provides a history supporting the diagnoses listed directly above.  Further diagnostic clarification is not needed.  There are medical comorbidities which impact this treatment [suicidal ideation, psychosis [sxs include delusions, AH, disorganized behavior], multiple psychotropic trials, severe med reaction, psych hosp (<3) and SUBSTANCE USE: hallucinogens and cannabis].     DISCUSSION: Patient and his mother were educated that he likely still is receiving some benefit from medications and that it could be several weeks before they would notice rebound sx. Neither pt or his mother seemed concerned. Pt is interested in benzodiazepine medications or something to help him focus in school. I was clear that I was prescribing the medications that I felt were indicated for improving his focus and anxiety. He did not identify Abilify as being helpful for anything during the course of taking it and maintains that he will not continue it.  Pt was encouraged to f/u in a month or to reach out if he decided to transfer care so that we can provide coordination of care.      [place] PRESCRIPTION MONITORING PROGRAM [] was not checked today:  last ALPRAZOLAM 0.5 MG TABLET  dispensed 12/01/2017    PSYCHOTROPIC DRUG INTERACTIONS:   No major interactions.  Concomitant use of aripiprazole and lithium may increase risk for EPS     MANAGEMENT:  Monitoring for adverse effects, routine vitals, routine labs, using  lowest therapeutic dose of [olanzapine and lithium] and patient is aware of risks         Plan     1) PSYCHOTROPIC MEDICATIONS:  Continue to use hydroxyzine 50-100mg (1-2 caps) as needed for anxiety     2) THERAPY:  Continue IRT and SEE-- pt is unsure if he will    3) NEXT DUE:    Labs- January 2018, Test for Lymes?   Rating Scales- AIMS 9/2018 or sooner if needed    4) REFERRALS:    No Referrals needed    5) RTC: 1 month    6) CRISIS NUMBERS:   Provided routinely in AVS.    TREATMENT RISK STATEMENT:  The risks, benefits, alternatives and potential adverse effects have been discussed and are understood by the pt. The pt understands the risks of using street drugs or alcohol. There are no medical contraindications, the pt agrees to treatment with the ability to do so. The pt knows to call the clinic for any problems or to access emergency care if needed.  Medical and substance use concerns are documented above.  Psychotropic drug interaction check was done, including changes made today.      PROVIDER:  JAVY Bhatt CNP

## 2018-09-12 NOTE — MR AVS SNAPSHOT
After Visit Summary   9/12/2018    Jerel Day    MRN: [id number]           Patient Information     Date Of Birth          1995        Visit Information        Provider Department      9/12/2018 6:15 PM Brittani Cage APRN CNP [facility] Psychiatry        Today's Diagnoses     Schizoaffective disorder, bipolar type (H)    -  1       Follow-ups after your visit        Your next 10 appointments already scheduled     Oct 17, 2018  5:00 PM CDT   [facility]    [address] [place] [address]   [phone]            Oct 17, 2018  6:15 PM CDT   Navigate Medication Follow Up with JAVY Bhatt CNP   [facility] Psychiatry ([facility])    [address] [place] [address]   [phone]            Nov 14, 2018  5:00 PM CST   [facility]    [address] [place] [address]   [phone]            Nov 14, 2018  6:15 PM CST   Navigate Medication Follow Up with JAVY Bhatt CNP   [facility] Psychiatry ([facility])    [address] [place] [address]   [phone]              Who to contact     Please call your clinic at [phone] to:    Ask questions about your health    Make or cancel appointments    Discuss your medicines    Learn about your test results    Speak to your doctor            Additional Information About Your Visit        Try [employer] Information     [employer] is an electronic gateway that provides easy, online access to your medical records. With [employer], you can request a clinic appointment, read your test results, renew a prescription or communicate with your care team.     To sign up for [employer] visit the website at [email or web address]   You will be asked to enter the access code listed below, as  well as some personal information. Please follow the directions to create your username and password.     Your access code is: [email or web address]  Expires: 2018  9:01 AM     Your access code will  in 90 days. If you need help or a new code, please contact your [facility] or call [phone] for assistance.        Care EveryWhere ID     This is your Care EveryWhere ID. This could be used by other organizations to access your [place] medical records  [id number]         Blood Pressure from Last 3 Encounters:   18 109/69   18 107/59   18 107/59    Weight from Last 3 Encounters:   18 71.8 kg (158 lb 3.2 oz)   18 71.4 kg (157 lb 6.4 oz)   18 71.4 kg (157 lb 6.4 oz)              Today, you had the following     No orders found for display       Primary Care Provider Office Phone # Fax #    [facility] [phone]       [address] [place] [address]        Equal Access to Services     MUNIRA MADDOX : Hadii aad ku hadasho Sodorisali, waaxda luqadaha, qaybta kaalmada adeegyada, keagan moser . So [facility] [phone].    ATENCIÓN: Si habla español, tiene a grimm disposición servicios gratuitos de asistencia lingüística. LlK[facility]tering [facility] [phone].    We comply with applicable federal civil rights laws and [place] laws. We do not discriminate on the basis of race, color, national origin, age, disability, sex, sexual orientation, or gender identity.            Thank you!     Thank you for choosing [facility] PSYCHIATRY  for your care. Our goal is always to provide you with excellent care. Hearing back from our patients is one way we can continue to improve our services. Please take a few minutes to complete the written survey that you may receive in the mail after your visit with us. Thank you!             Your Updated Medication List - Protect others around you: Learn how to safely use, store and throw  away your medicines at www.disposemymeds.org.          This list is accurate as of 9/12/18  6:53 PM.  Always use your most recent med list.                   Brand Name Dispense Instructions for use Diagnosis    hydrOXYzine 50 MG capsule    VISTARIL    60 capsule    Take 50mg to 100mg as needed for anxiety    Schizoaffective disorder, bipolar type (H)

## 2018-09-12 NOTE — MR AVS SNAPSHOT
After Visit Summary   9/12/2018    Jerel Day    MRN: [id number]           Patient Information     Date Of Birth          1995        Visit Information        Provider Department      9/12/2018 5:00 PM Shai Londono, [facility] Psychiatry        Today's Diagnoses     Schizoaffective disorder, bipolar type (H)    -  1       Follow-ups after your visit        Your next 10 appointments already scheduled     Oct 17, 2018  5:00 PM CDT   Navigate Psychotherapy with Shai Londono [facility] Psychiatry ([facility])    [address] [place] [address]   [phone]            Oct 17, 2018  6:15 PM CDT   Navigate Medication Follow Up with JAVY Bhatt CNP   [facility] Psychiatry ([facility])    [address] [place] [address]   [phone]            Nov 14, 2018  5:00 PM CST   Navigate Psychotherapy with Shai Londono [facility] Psychiatry ([facility])    [address] [place] [address]   [phone]            Nov 14, 2018  6:15 PM CST   Navigate Medication Follow Up with JAVY Bhatt CNP   [facility] Psychiatry ([facility])    [address] [place] [address]   [phone]              Who to contact     Please call your clinic at [phone] to:    Ask questions about your health    Make or cancel appointments    Discuss your medicines    Learn about your test results    Speak to your doctor            Additional Information About Your Visit        [employer] Information     [employer] is an electronic gateway that provides easy, online access to your medical records. With [employer], you can request a clinic appointment, read your test results, renew a prescription or communicate with your care team.     To sign up for [employer] visit the website at [email or web address]   You will be asked to enter the access code listed below, as well  as some personal information. Please follow the directions to create your username and password.     Your access code is: [email or web address]  Expires: 2018  9:01 AM     Your access code will  in 90 days. If you need help or a new code, please contact your [facility] or call [phone] for assistance.        Care EveryWhere ID     This is your Care EveryWhere ID. This could be used by other organizations to access your [place] medical records  [id number]         Blood Pressure from Last 3 Encounters:   18 109/69   18 107/59   18 107/59    Weight from Last 3 Encounters:   18 71.8 kg (158 lb 3.2 oz)   18 71.4 kg (157 lb 6.4 oz)   18 71.4 kg (157 lb 6.4 oz)              Today, you had the following     No orders found for display       Primary Care Provider Office Phone # Fax #    [facility] [phone]       [address] [place] [address]        Equal Access to Services     OTIS MADDOX : Hadii aad ku hadasho Soomaali, waaxda luqadaha, qaybta kaalmada adeegyada, keagan lorenzn russell moser . So [facility] [phone].    ATENCIÓN: Si habla español, tiene a grimm disposición servicios gratuitos de asistencia lingüística. Llame al [phone].    We comply with applicable federal civil rights laws and [place] laws. We do not discriminate on the basis of race, color, national origin, age, disability, sex, sexual orientation, or gender identity.            Thank you!     Thank you for choosing [facility] PSYCHIATRY  for your care. Our goal is always to provide you with excellent care. Hearing back from our patients is one way we can continue to improve our services. Please take a few minutes to complete the written survey that you may receive in the mail after your visit with us. Thank you!             Your Updated Medication List - Protect others around you: Learn how to safely use, store and throw away  your medicines at www.disposemymeds.org.          This list is accurate as of 9/12/18 11:59 PM.  Always use your most recent med list.                   Brand Name Dispense Instructions for use Diagnosis    hydrOXYzine 50 MG capsule    VISTARIL    60 capsule    Take 50mg to 100mg as needed for anxiety    Schizoaffective disorder, bipolar type (H)

## 2018-09-14 ASSESSMENT — ANXIETY QUESTIONNAIRES: GAD7 TOTAL SCORE: 7

## 2018-09-14 ASSESSMENT — PATIENT HEALTH QUESTIONNAIRE - PHQ9: SUM OF ALL RESPONSES TO PHQ QUESTIONS 1-9: 13

## 2018-10-01 ENCOUNTER — OFFICE VISIT (OUTPATIENT)
Dept: PSYCHIATRY | Facility: CLINIC | Age: 23
End: 2018-10-01
Payer: COMMERCIAL

## 2018-10-01 DIAGNOSIS — F25.0 SCHIZOAFFECTIVE DISORDER, BIPOLAR TYPE (H): Primary | ICD-10-CM

## 2018-10-01 NOTE — MR AVS SNAPSHOT
After Visit Summary   10/1/2018    Jerel Day    MRN: [id number]           Patient Information     Date Of Birth          1995        Visit Information        Provider Department      10/1/2018 2:45 PM Ana Michel [facility] Psychiatry        Today's Diagnoses     Schizoaffective disorder, bipolar type (H)    -  1       Follow-ups after your visit        Your next 10 appointments already scheduled     Oct 17, 2018  5:00 PM CDT   Navigate Psychotherapy with SHIRA Richey   [facility] Psychiatry ([facility])    [address] [place] [address]   [phone]            Oct 17, 2018  6:15 PM CDT   Navigate Medication Follow Up with JAVY Bhatt CNP   [facility] ([facility])    [address] [place] [address]   [phone]            Nov 14, 2018  5:00 PM CST   Navigate Psychotherapy with SHIRA Richey   [facility] ([facility])    [address] [place] [address]   [phone]            Nov 14, 2018  6:15 PM CST   Navigate Medication Follow Up with JAVY Bhatt CNP   [facility] ([facility])    [address] [place] [address]   [phone]              Who to contact     Please call your clinic at [phone] to:    Ask questions about your health    Make or cancel appointments    Discuss your medicines    Learn about your test results    Speak to your doctor            Additional Information About Your Visit        [employer] Information     [employer] is an electronic gateway that provides easy, online access to your medical records. With [employer], you can request a clinic appointment, read your test results, renew a prescription or communicate with your care team.     To sign up for [employer] visit the website at [email or web address]   You will be asked to enter the access code listed below, as well as  some personal information. Please follow the directions to create your username and password.     Your access code is: [email or web address]  Expires: 2018  9:01 AM     Your access code will  in 90 days. If you need help or a new code, please contact your [facility] or call [phone] for assistance.        Care EveryWhere ID     This is your Care EveryWhere ID. This could be used by other organizations to access your [place] medical records  [id number]         Blood Pressure from Last 3 Encounters:   18 109/69   18 107/59   18 107/59    Weight from Last 3 Encounters:   18 71.8 kg (158 lb 3.2 oz)   18 71.4 kg (157 lb 6.4 oz)   18 71.4 kg (157 lb 6.4 oz)              Today, you had the following     No orders found for display       Primary Care Provider Office Phone # Fax #    [facility] [phone]       [address] [place] [address]        Equal Access to Services     [facility] : Hadii aad ku hadasho Soomaali, waaxda luqadaha, qaybta kaalmada adeegyada, keagan rizo haylindan russell moser . So [facility] [phone].    ATENCIÓN: Si habla español, tiene a grimm disposición servicios gratuitos de asistencia lingüística. Llame al [phone].    We comply with applicable federal civil rights laws and [place] laws. We do not discriminate on the basis of race, color, national origin, age, disability, sex, sexual orientation, or gender identity.            Thank you!     Thank you for choosing [facility] PSYCHIATRY  for your care. Our goal is always to provide you with excellent care. Hearing back from our patients is one way we can continue to improve our services. Please take a few minutes to complete the written survey that you may receive in the mail after your visit with us. Thank you!             Your Updated Medication List - Protect others around you: Learn how to safely use, store and throw away  your medicines at www.disposemymeds.org.          This list is accurate as of 10/1/18 11:59 PM.  Always use your most recent med list.                   Brand Name Dispense Instructions for use Diagnosis    hydrOXYzine 50 MG capsule    VISTARIL    60 capsule    Take 50mg to 100mg as needed for anxiety    Schizoaffective disorder, bipolar type (H)

## 2018-10-02 NOTE — PROGRESS NOTES
NAVIGATE Clinician Contact & Progress Note   For Family Education Program    NAVIGATE Enrollee: Jerel Day (1995)     MRN: [id number]  Date:  9/12/18  Diagnosis(es): Schizoaffective disorder, bipolar type  Clinician: NAVIGATE Director & Family Clinician, [facility]     1. Type of contact: (majority of time spent)  IRT Session    2. People present:   Writer  Client: Yes     3. Total number of persons who participated in contact: 2, including writer    4. Length of Actual Contact: Start Time: 5pm; End Time: 6pm   Traveled?    No     5. Location of contact:  [facility]    6. Did the client complete the home practice option(s) from the previous session: Partially Completed    7. Motivational Teaching Strategies:  Connect info and skills with personal goals  Promote hope and positive expectations  Explore pros and cons of change    8. Educational Teaching Strategies:  Review of written material/education  Relate information to client's experience    9. CBT Teaching Strategies:  Reinforcement and shaping (positive feedback for steps towards goals and gains in knowledge & skills)    10. Psychoeducational Topic(s) Addressed:  Just the Facts - Developing a Collaboration with Mental Health Professionals      11. Techniques utilized:   [place] announced at beginning of session  Review of goal  Review of previous meeting  Problem-solving practice  Summarize progress made in current session    12. Assessment/Progress Note:     Reviewed Jerel's current status utilizing a relapse prevention lens by monitoring for early warning signs and triggers. Jerel identified potential triggers to include smoking marijuana and too much stress. Jerel identified early warning signs to includnot going to class and not leaving the house. Reviewed ways to mitigrate risk for relapse including use of coping skills, family support and NAVIGATE support.      Discussed Jerel's goals and relevant progress: Jerel  "indicated he is enrolled in school, living alone in an apartment, and reports he is doing well overall. Jerel did indicate he's having a hard time with motivation toward reading/completing homework and is contemplative about improving this stating \"I'll take my first test and see where I'm at\". Jerel indicates he has not noticed any considerable changes since stopping his medications.  Discussed safety plan and staying connected with IRT and SEE services. Also encouraged reductions/elimination of smoking marijuana.     Reviewed Jerel's participation in NAVIGATE. Jerel participating in IRT and SEE NAVIGATE services.    13. Plan/Referrals:     Will meet with Jerel monthly as schedule allows for evidence based individual resiliency training and therapeutic support aimed at maximizing Jerel's opportunity for recovery from psychosis.         Shai Londono, [facility]   NAVIGATE Director & Family Clinician       "

## 2018-10-02 NOTE — PROGRESS NOTES
NAVIGATE SEE Progress Note   For Supported Employment & Education    NAVIGATE Enrollee: Jerel Day (1995)     MRN: [id number]  Date:  10/1/18  Clinician: MIQUELATE Supported Employment & , Ana Michel    1. Client Status Update:   Jerel Day is interested in education (Client participated in benefits counseling)    2. People present:   SEE/Writer  Client: Jerel Day  Teacher/Instructor    3. Total number of persons who participated in contact: (do not count yourself/SEE) 2    4. Length of Actual Contact: 60 minutes   Traveled? Yes  Total Travel Time: 25 minutes    5. Location of contact:  School    6. Brief description of session, contact, or client status (include: strategies, interventions, client reaction to contact, next steps, etc)    Jerel texted this writer at noon requesting that I attend his [facility] intake meeting today at 3 to sign up for accommodations. We decided to meet at 2:45 to review accommodations and discuss what Jerel is looking for. Jerel, his coordinator and this writer met in an office and Jerel was able to describe the difficulties he is having in his courses, including: trouble focusing and concentrating, difficulty keeping up with readings and taking longer on tests. The coordinator was able to approve Jerel having testing accommodations and will consider extensions on assignments once documentation is received. Jerel would like this writer to send the letter so I will reach out to Brittani Cage.   During the appt, Jerel had his eyes closed, was difficult to understand and reported feeling very tired and that his neck hurt. Jerel also said he wasn't sure if he really needed accommodations so we briefly discussed pros of having at least a safety net if he needed.     7. Completion of mutually agreed upon client task from previous meeting:  Nothing Completed    8. Orientation and Treatment Planning:  Pursuing current SEE goals    9. Assessment:  Assessing client's  need for follow-along supports    10. Placement:  Not Applicable    11. Follow Along Supports: (for clients who are working or attending school)   Education (Assisting with requesting accommodations)    12. Mutually agreed upon client task for next meeting:     Sign up online, find out when next tests are    13. Next Meeting Scheduled for: next thur, time tbmanuel LAFLEURATE Supported Employment &

## 2018-10-17 ENCOUNTER — OFFICE VISIT (OUTPATIENT)
Dept: PSYCHIATRY | Facility: CLINIC | Age: 23
End: 2018-10-17
Payer: COMMERCIAL

## 2018-10-17 DIAGNOSIS — F25.0 SCHIZOAFFECTIVE DISORDER, BIPOLAR TYPE (H): Primary | ICD-10-CM

## 2018-10-17 NOTE — MR AVS SNAPSHOT
After Visit Summary   10/17/2018    Jerel Day    MRN: [id number]           Patient Information     Date Of Birth          1995        Visit Information        Provider Department      10/17/2018 5:00 PM Shai Londono, [facility] Psychiatry        Today's Diagnoses     Schizoaffective disorder, bipolar type (H)    -  1       Follow-ups after your visit        Your next 10 appointments already scheduled     Nov 14, 2018  5:00 PM CST   [facility]    [address]  [facility] [id number] [phone]            Nov 14, 2018  6:15 PM CST   Navigate Medication Follow Up with JAVY Bhatt CNP   [facility] Psychiatry ([facility])    [address] [place] [id number] [phone]              Who to contact     Please call your clinic at [phone] to:    Ask questions about your health    Make or cancel appointments    Discuss your medicines    Learn about your test results    Speak to your doctor            Additional Information About Your Visit        Care EveryWhere ID     This is your Care EveryWhere ID. This could be used by other organizations to access your [place] medical records  [id number]         Blood Pressure from Last 3 Encounters:   07/11/18 109/69   06/27/18 107/59   06/27/18 107/59    Weight from Last 3 Encounters:   07/11/18 71.8 kg (158 lb 3.2 oz)   06/27/18 71.4 kg (157 lb 6.4 oz)   06/27/18 71.4 kg (157 lb 6.4 oz)              Today, you had the following     No orders found for display       [facility] Care Provider Office Phone # Fax #    [facility] [phone]       [address] [place] [address] [address] [place] [address]        Equal Access to Services     MUNIRA MADDOX : Cristobal barrientoso Sobrittany, waaxda luqadaha, qaybta kaalmada adeegyada, keagan lu. So [facility]  [phone].    ATENCIÓN: Si caprice curtis, tiene a grimm disposición servicios gratuitos de asistencia lingüística. Case al [phone].    We comply with applicable federal civil rights laws and [place] laws. We do not discriminate on the basis of race, color, national origin, age, disability, sex, sexual orientation, or gender identity.            Thank you!     Thank you for choosing [facility] PSYCHIATRY  for your care. Our goal is always to provide you with excellent care. Hearing back from our patients is one way we can continue to improve our services. Please take a few minutes to complete the written survey that you may receive in the mail after your visit with us. Thank you!             Your Updated Medication List - Protect others around you: Learn how to safely use, store and throw away your medicines at www.disposemymeds.org.          This list is accurate as of 10/17/18 11:59 PM.  Always use your most recent med list.                   Brand Name Dispense Instructions for use Diagnosis    hydrOXYzine 50 MG capsule    VISTARIL    60 capsule    Take 50mg to 100mg as needed for anxiety    Schizoaffective disorder, bipolar type (H)

## 2018-10-18 ENCOUNTER — TELEPHONE (OUTPATIENT)
Dept: PSYCHIATRY | Facility: CLINIC | Age: 23
End: 2018-10-18

## 2018-10-21 NOTE — PROGRESS NOTES
NAVIGATE Clinician Contact & Progress Note       NAVIGATE Enrollee: Jerel Day (1995)     MRN: [id number]  Date:  10/17/18  Diagnosis(es):  Schizoaffective disorder, bipolar type  Clinician: NAVIGATE Director & Family Clinician, Shai Londono [facility]     1. Type of contact: (majority of time spent)  IRT Session    2. People present:   Writer  Client: Yes     3. Total number of persons who participated in contact: 2, including writer    4. Length of Actual Contact: Start Time: 5pm; End Time: 610pm   Traveled?    No     [facility]    6. Did the client complete the home practice option(s) from the previous session: Nothing Completed    7. Motivational Teaching Strategies:  Connect info and skills with personal goals  Promote hope and positive expectations  Explore pros and cons of change    8. Educational Teaching Strategies:  Relate information to client's experience  Ask questions to check comprehension  Break down information into small chunks  Adopt client's language     9. CBT Teaching Strategies:  Reinforcement and shaping (positive feedback for steps towards goals)  Relapse prevention planning (review of stressors, early warning signs, written plan to respond to signs and rehearse plan)  Coping skills training (review current coping skills, increase currently used skills and feedback)    10. Psychoeducational Topic(s) Addressed:  Just the Facts - Medications for Psychosis  Just the Facts - Coping with Stress  Just the Facts - Relapse Prevention Planning    11. Techniques utilized:   Review of goal  Review of previous meeting  Problem-solving practice  Summarize progress made in current session    12. Assessment/Progress Note:         Met with Jerel for an individual session. Jerel reported a considerable increase in positive symptoms and also reported he is failing school and isolating himself in his apartment. Jerel states he thinks he needs to drop out of  "school and just look for a job, and continues to be at best, contemplative about restarting antipsychotic medications. Discussed what might be getting in the way of restarting medications to which Jerel acknowledged he doesn't necessarily want to lose the voices, as he considers them companions, albeit unhelpful at times. Jerel Indicated his auditory hallucinations are happening non-stop, and he is currently unable to read, focus, or concentrate on much of anything. Writer queried Jerel on suicide and ideation, to which Jerel said he is not feeling suicidal.  He did however indicate he is feeling somewhat helpless about his future.  Jerel indicated suicidal ideation is infrequent and passive.  We discussed his safety plan of calling the treatment team, a crisis number, or his mother/father, if he is concerned about his safety. Jerel agreed to do this and stated \"I am not there\".  Writer asked Jerel if he thinks he needs to go to the hospital, and he said \"no, I didn't have the best experience there\".    Discussed ways to mitigate or decrease symptoms to include spending time with family, stopping marijuana use, and keeping stress low. Explored the option of Jerel returning home to which he said he has no interest in doing, stating that would be worse for his symptoms and morale. Jerel also expressed concern about driving, especially at night. I asked him if it was okay to get a ride with his mother or have a parent follow him home from this appointment. He indicated it would be okay for his mother to follow him home but did not want a ride.    Writer requested to see Jerel next week, and asked him to strongly consider meeting with Brittani Cage for a medication management appointment.     Following our session, writer spoke with Jerel's mom and indicated Jerel was not doing well and for them to increase supervision and connection with him where possible. Writer also indicated Jerel had expressed concerns about driving, and " asked mother if she could follow him home, or give him a ride back to his apartment.  Jerel's mother said she would call Jerel's  father for this. Prescriber Brittani Cage was also there, and had a conversation with Jerel and his mother about restarting medication to include sending in a prescription that Jerel could start with any time. Jerel indicated he would consider.       13. Plan/Referrals:     Will meet with Jerel weekly for ongoing therapeutic support aimed at maximizing Jerel's opportunity for recovery from psychosis.       Shai Londono, [facility]   NAVIGATE Director & Family Clinician

## 2018-10-22 ENCOUNTER — TELEPHONE (OUTPATIENT)
Dept: PSYCHIATRY | Facility: CLINIC | Age: 23
End: 2018-10-22

## 2018-10-22 NOTE — TELEPHONE ENCOUNTER
NAVIGATE SEE Outgoing Telephone Call  For Supported Employment & Education    NAVIGATE Enrollee: Ralf Day (1995)     MRN: [id number]  Date of Call: 10/22/2018  Contacted: ralf    Discussed:   Writer PEPE to schedule follow up SEE meeting on campus.      Ana Michel

## 2018-10-22 NOTE — TELEPHONE ENCOUNTER
"NAVIGATE (Email F/U w/Patient Mother)  A Part of the [facility] First Episode of Psychosis Program     Patient Name: Jerel Day  /Age:  1995 (23 year old)  Diagnosis(es):  Schizoaffective disorder, bipolar type      Email with Samanta Day (10/22/2018)     \"umpencrypt\" Re: Jerel Day Concerns  Shai Iglesias Samanta,    We have offered all interventions that we know would help but he has declined (Including restarting medications).  We have also done safety planning with him and he indicated he was agreeable to calling us, letting you know, calling a crisis line, or going to the hospital if things get worse.     I have asked him to come in weekly to see me, so will keep assessing him.  I would like to see you guys with Jerel next week if possible.  I have Wednesday at 3pm or 4pm available.       Recommendations  -I recommend staying in contact with him in person/via phone as often as you can (Daily)  -Encourage him to restart his medications (Let us know if/when he is agreeable, and our prescriber can call in a refill prescription same day)  -Ask him when you see/talk to him if he is/feels safe.  If he is not feeling safe, I would take him to the ED to be assessed.    Please feel free to call or email if you have other questions.  Don't hesitate to call [facility] Crisis line (COPE) to visit him if your concerns about his safety increase.        24-7 mobile crisis services  Mobile crisis teams will respond to crises in [facility], 24 hours a day, 7 days a week.  For information about creating a safer home for people who may pose a danger to themselves or others, visit the Mental health crisis page   Adult crisis   To reach our mobile team, call [phone].   Call Community Outreach for Psychiatric Emergencies (COPE) when a severe disturbance of mood or thinking threatens a person's safety or the safety of others. COPE professionals are available to manage the immediate crisis and provide a " clinical assessment. Telephone consultations also are available. This service is available to all adults 18 and older, in [facility].     SHIRA Narvaez  Director-NAVIGATE Program  [facility] Psychiatry [facility], [address]  [address] [place] [address]  Office Phone: [phone]  Clinic Phone: [phone]  Fax: [phone]    [email or web address]     Georgina Day <[email or web address]>  Today, 9:40 AM  Shai Londono  CAUTION: This email originated from outside of the organization. Do not click links or open attachments unless you recognize the sender and know the content is safe.  Jerel Gibbons definitely showing signs of psychosis over the weekend. Not completely out of touch but having moments of being out of touch. For example he thought  he needed to have surgery to have his nose replaced because he was out of alignment and he thought he could influence where the hockey puck was moving during the game (he was watching not playing). Definitely more difficult for León and I to keep an eye on him when he is not living at home.     His Brother has expressed concerns about personal hygiene and not doing laundry.    I guess looking for guidance on how to proceed and trying to prevent a total train derailment. I believe you were going to setup an appointment for him this week.    Thanks,  Mesha Day  [phone]

## 2018-12-10 ENCOUNTER — TELEPHONE (OUTPATIENT)
Dept: PSYCHIATRY | Facility: CLINIC | Age: 23
End: 2018-12-10

## 2018-12-10 NOTE — TELEPHONE ENCOUNTER
NAVIGATE SEE Outgoing Telephone Call  For Supported Employment & Education    NAVIGATE Enrollee: Jerel Day (1995)     MRN: [id number]  Date of Call: 12/10/2018  Contacted: [facility]    Discussed:   Writer called to speak with Jerel while he is inpatient at [facility]. When called, the hospital reported that he was not in- patient. Writer texted Jerel's phone to check in how he is doing. Jerel responded and said he has been doing better and has been playing the guitar a lot. He also mentioned he would like me to meet with his  to 'sort out this Clark stuff'. Writer asked Jerel to text this writer once he has an appt scheduled.    Ana Michel

## 2018-12-12 ENCOUNTER — TELEPHONE (OUTPATIENT)
Dept: PSYCHIATRY | Facility: CLINIC | Age: 23
End: 2018-12-12

## 2018-12-12 ENCOUNTER — OFFICE VISIT (OUTPATIENT)
Dept: PSYCHIATRY | Facility: CLINIC | Age: 23
End: 2018-12-12
Payer: COMMERCIAL

## 2018-12-12 DIAGNOSIS — F25.0 SCHIZOAFFECTIVE DISORDER, BIPOLAR TYPE (H): Primary | ICD-10-CM

## 2018-12-13 NOTE — PROGRESS NOTES
LILLIANA SEE Incoming Telephone Call  For Supported Employment & Education    NAVIGATE Enrollee: Jerel Day (1995)     MRN: [id number]  Incoming Call Received on: 12/12/18  Call Received from: Jerel WEBER's response to incoming call:   Jerel called this writer and explained that he needed a medical form faxed to the [employer] [employer] [facility] [employer] in order to withdraw from school due to medical reasons. Jerel said he had met with his advisor on 12/11 and she gave him 2 forms that he and JAVY Bhatt needed to sign. Jerel reported had either left the forms in his apartment or that his mom had sent these forms to SHIRA Narvaez but wasn't sure. Jerel called this writer at 11:30 and stated the forms were due by noon. This writer explained it would be difficult to get the forms signed and faxed by noon, but if he knew the name of the forms, I could attempt to find them on the University website, but he did not know the names of the forms or where to send them. Jerel seemed confused about his school status and what the ramifications were for dropping, attempting to obtain incompletes and/or that he would not be able to qualify for a tuition refund as there is one week left of class in the semester. This writer offered to meet with Jerel to discuss his options and how we could appeal to obtain a refund due to extenuating circumstances due to his recent hospitalization.       Ana Michel

## 2018-12-14 ENCOUNTER — TELEPHONE (OUTPATIENT)
Dept: PSYCHIATRY | Facility: CLINIC | Age: 23
End: 2018-12-14

## 2018-12-14 NOTE — TELEPHONE ENCOUNTER
-Writer filled out Medical Supplement form for [facility] and faxed it to [phone]

## 2018-12-18 ENCOUNTER — OFFICE VISIT (OUTPATIENT)
Dept: PSYCHIATRY | Facility: CLINIC | Age: 23
End: 2018-12-18
Payer: COMMERCIAL

## 2018-12-18 DIAGNOSIS — F25.0 SCHIZOAFFECTIVE DISORDER, BIPOLAR TYPE (H): Primary | ICD-10-CM

## 2018-12-19 ENCOUNTER — OFFICE VISIT (OUTPATIENT)
Dept: PSYCHIATRY | Facility: CLINIC | Age: 23
End: 2018-12-19
Payer: COMMERCIAL

## 2018-12-19 DIAGNOSIS — F25.0 SCHIZOAFFECTIVE DISORDER, BIPOLAR TYPE (H): Primary | ICD-10-CM

## 2018-12-19 RX ORDER — HYDROXYZINE PAMOATE 50 MG/1
50 CAPSULE ORAL 3 TIMES DAILY PRN
Qty: 60 CAPSULE | Refills: 0 | Status: SHIPPED | OUTPATIENT
Start: 2018-12-19 | End: 2018-12-29

## 2018-12-19 NOTE — PROGRESS NOTES
"NAVIGATE SEE Progress Note   For Supported Employment & Education    NAVIGATE Enrollee: Jerel Day (1995)     MRN: [id number]  Date:  12/18/18  Clinician: NAVIGATE Supported Employment & , Ana Michel    1. Client Status Update:   Jerel Day is interested in education (Client withdrew from education program or class)    2. People present:   SEE/Writer  Client: Jerel Day  Other: [employer] Student [employer] Staff, [employer] staff (via telephone)    3. Total number of persons who participated in contact: (do not count yourself/SEE) 2    4. Length of Actual Contact: 60 minutes   Traveled? Yes  Total Travel Time: 20 minutes    5. Location of contact:  School, Other: [employer]    6. Brief description of session, contact, or client status (include: strategies, interventions, client reaction to contact, next steps, etc)    Jerel and this writer [facility] Jerel was disheveled and wearing sunglasses, large headphones and sweats. Jerel reports he has been \"doing ok\" since leaving the hospital. He reports he is living in his apartment alone but sees his brother occasionally or goes to the [facility] center to stretch and lift weights. He says he is under committment and is now required to take an HO of Abilify. Jerel says he doesn't think this medication helps and is upset that he is required to take a shot when he fears needles. This writer asked if Jerel had been eating enough, showering or doing laundry. He reports he isn't eating well and hasn't done laundry. He asked this writer where he could get quarters and writer provided suggestions. Writer encouraged him to shower and eat nutritious foods daily and encouraged him to wash his clothing at least on a weekly basis.    Jerel has a  named Nola and gave this writer permission to call her to coordinate services. Her phone number is [phone]. We discussed his goals and he would like to: continue working out, meet " "new people, potentially get a part time job, reduce his spending and urges to use/vera as well as travel with his family to [place] and \"travel to other states on my own\".     This writer brought a print out of the medical supplement form as requested by Jerel. He asked if we could walk to the student center and turn it in, which we did. While walking, Jerel had his headphones on and didn't talk with this writer. We arrived and turned the form in. We also called [employer] to see what the process is for appealing tuition payments. Jerel will need a petition form, medical supplement form, letter from his doctor and personal statement by Jerel on what had happened. Jerel showed this writer his emails and had been attempting to drop his courses since the beginning of October. Writer asked if Jerel could work on his statement and I could work on the medical documentation. Scheduled appt for January 3rd but Jerel said he would \"most likely cancel it\". Writer offered Jerel help with any of the above stated areas and encouraged him to text this writer if he needs assistance.    7. Completion of mutually agreed upon client task from previous meeting:  Nothing Completed    8. Orientation and Treatment Planning:  Developing SEE treatment plan goals    9. Assessment:  Assessing client's need for follow-along supports    10. Placement:  Not Applicable    11. Follow Along Supports: (for clients who are working or attending school)   Education (Reviewing stress management for school ,completing forms)    12. Mutually agreed upon client task for next meeting:     Start personal statement    13. Next Meeting Scheduled for: jan 3rd at 1130.    Ana TIJERINA Supported Employment &   "

## 2018-12-19 NOTE — PROGRESS NOTES
"NAVIGATE Medication Management Progress Note  A Part of the [facility] First Episode of Psychosis Program    NAVIGATE Enrollee: Jerel Day (1995)     MRN: [id number]  Date:  12/19/18         Contributors to the Assessment     Chart Reviewed.   Interview completed with Jerel Day.         Chief Complaint       \"Things are fine\"           Interim History      Jerel Day is a 23 year old male who was last seen in clinic on 9/12/18 at which time pt was not medicated. The patient reports good treatment adherence.  History was provided by Jerel who was a good to fair historian.  Since the last visit:  - He is having ongoing frustrations with his parents, especially around not being able to access his money in the stock market   - He is spending time playing the [employer] a lot. He wants to be able to be really good and would like to play at various bars  - He is concerned that he is not \"measuring up\" with peers  - Last IM on Abilify Maintenna 400 mg 12/6  - Pt reports some compulsive bx around spending, he is buying food and vape juice and other various things  - He reports feeling a \"shivering feeling\", the medication feels like it is coursing through his blood and to his brain and causing \"puffiness\". This lasted for 20min and the soreness lasted a week. He reports that when administered in the glute it would shoot through his leg and into his penis.   - He does not feel like his thinking has been cleared/improved, he thinks this is worse since the hospitalization. He continues to hear AH that are telling him things that do not \"swirl with him\", like they will tell him his glasses are in the bathroom when they aren't- friendly but helpful. He will sometimes have more insidious voices that are less friendly, ego-dystonic content. He considers this to be the \"old me\".   - He is usually falling asleep and staying asleep easily. He is dreaming more and this is enjoyable   - He feels like he has been snacking more, " concerned about weight gain.  - Feeling lonely and isolated     PSYCH ROS:  Clinician Rating Form in [facility]  1. Depressed Mood: Rating 3  0 Not reported    1 Very mild occasionally feels sad or  down ; of questionable clinical significance   2 Mild occasionally feels moderately depressed or often feels sad or  down    3 Moderate occasionally feels very depressed or often feels moderately depressed   4 Moderately severe often feels very depressed   5 Severe feels very depressed most of the time   6 Very severe constant extremely painful feelings of depression   U Unable to assess      2. Anxiety/Worry: Ratin  0 Not reported    1 Very mild occasionally feels a little anxious; of questionable clinical significance   2 Mild occasionally feels moderately anxious or often feels a little anxious or worried   3 Moderate occasionally feels very anxious or often feels moderately anxious   4 Moderately severe often feels very anxious or often feels moderately anxious   5 Severe feels very anxious or worried most of the time   6 Very severe patient is continually preoccupied with severe anxiety   U Unable to assess      3. Suicidal Ideation/Behavior: Ratin   0 Not reported    1 Very mild occasional thoughts of dying,  I d be better off dead  or  I wish I were dead    2 Mild frequents thoughts of dying or occasional thoughts of killing self, without plan or method   3 Moderate often thinks of suicide or has though of a specific method   4 Moderately severe has mentally rehearsed a specific method of suicide or has made a suicide attempt with questionable intent to die (e.r. takes aspirins and then tells family)   5 Severe has made preparations for a potentially lethal suicide attempt (e.g acquires a gun and bullets for an attempt)   6 Very severe has made a suicide attempt with an intent to die   U Unable to assess       4. Elevated/Expansive Mood: Ratin  0 Not at all    1 Very mild questionable; more cheerful than  most people in his/her circumstances but of only possible clinical significance   2 Mild brief elevated/expansive mood but only somewhat out of proportion to the circumstances   3 Moderate brief/occasional elevation of mood which is clearly out of proportion to the circumstances   4 Moderately severe sustained/frequent elevation of mood which is clearly out of proportion to the circumstances   5 Severe mood is euphoric most of the time   6 Very severe sustained elevation;  everything is wonderful  almost all of the time   U Unable to assess      5. Hostility/Anger/Irritability/Aggressiveness: Ratin  0 Not at all    1 Very mild occasional irritability of doubtful clinical significance   2 Mild occasionally feels angry or mild or indirect expressions of anger, e.g. sarcasm, disrespect or hostile gestures   3 Moderate frequently feels angry, frequent irritability or occasional direct expression of anger, e.g. yelling at others   4 Moderately severe often feels very angry, often yells at others or occasionally threatens to harm others   5 Severe has acted on his anger by becoming physically abusive on one or two occasions or makes frequent threats to harm others or is very angry most of the time   6 Very severe has been physically aggressive and/or required intervention to prevent assaultiveness on several occasions; or any serious assaultive act   U Unable to assess      6. Impulsive Behavior: Rating: 3  0 Not at all    1 Very mild one instance of impulsive behavior which is of doubtful clinical significance   2 Mild occasional impulsive acts, e.g. making phone calls at odd hours   3 Moderate occasional impulsive acts with some potential negative consequence, e.g. leaving work abruptly; changing plans without thinking   4 Moderately severe impulsive acts with definite negative consequences, e.g. overspending on non-essentials; repeated reckless sexual behavior   5 Severe impulsive acts with direct negative  consequences, e.g. spends entire income on nonessentials without regard for basic needs   6 Very severe impulsive behavior which is potentially life threatening, e.g. jumps from dangerous height (without suicidal intent) or criminal behavior, e.g. impulsive robbery   U Unable to assess      7. Suspiciousness: Ratin  0 Not present    1 Very mild Seems on guard. Reluctant to respond to some  personal  questions. Reports being overly self-conscious in public   2 Mild Describes incidents in which others have harmed or wanted to harm him/her that sound plausible. Patient feels as if others are watching, laughing, or criticizing him/her in public, but this occurs only occasionally or rarely. Little or no preoccupation   3 Moderate Says others are talking about him/her maliciously, have negative intentions, or may harm him/her. Beyond the likelihood of plausibility, but not delusional. Incidents of suspected persecution occur occasionally (less than once per week) with some preoccupation   4 Moderately severe Same as 3, but incidents occur frequently such as more than once a week. Patient is moderately preoccupied with ideas of persecution OR patient reports persecutory delusions expressed with much doubt (e.g. partial delusion)   5 Severe Delusional -- speaks of Mafia plots, the FBI, or others poisoning his/her food, persecution by supernatural forces   6 Extremely severe Same as 5, but the beliefs are bizarre or more preoccupying. Patient tends to disclose or act on persecutory delusions.   U Unable to assess      8. Unusual Thought Content: Ratin  0 Not present    1 Very mild Ideas of reference (people may stare or may laugh at him), ideas of persecution (people may mistreat him). Unusual beliefs in psychic esquivel, spirits, UFOs, or unrealistic beliefs in one's own abilities. Not strongly held. Some doubt   2 Mild Same as 1, but degree of reality distortion is more severe as indicated by highly unusual ideas or  greater conviction. Content may be typical of delusions (even bizarre), but without full conviction. The delusion does not seem to have fully formed, but is considered as one possible explanation for an unusual experience   3 Moderate Delusion present but no preoccupation or functional impairment. May be an encapsulated delusion or a firmly endorsed absurd belief about past delusional circumstances   4 Moderately severe Full delusion(s) present with some preoccupation OR some areas of functioning disrupted by delusional thinking   5 Severe Full delusion(s) present with much preoccupation OR many areas of functioning are disrupted by delusional thinking   6 Extremely severe Full delusions present with almost   U Unable to assess      9. Hallucinations: Ratin  0 Not present    1 Very mild While resting or going to sleep, sees visions, smells odors, or hears voices, sounds or whispers in the absence of external stimulation, but no impairment in functioning   2 Mild While in a clear state of consciousness, hears a voice calling the subject s name, experiences non-verbal auditory hallucinations (e.g., sounds or whispers), formless visual hallucinations, or has sensory experiences in the presence of a modality-relevant stimulus (e.g., visual illusions) infrequently (e.g., 1-2 times per week) and with no functional impairment   3 Moderate Occasional verbal, visual, gustatory, olfactory, or tactile hallucinations with no functional impairment OR non-verbal auditory hallucinations/visual illusions more than infrequently or with impairment   4 Moderately severe Experiences daily hallucinations OR some areas of functioning are disrupted by hallucinations   5 Severe Experiences verbal or visual hallucinations several times a day OR many areas of functioning are disrupted by these hallucinations   6 Extremely severe Persistent verbal or visual hallucinations throughout the day OR most areas of functioning are disrupted by  these hallucinations   U Unable to assess      10. Conceptual Disorganization: Ratin  0 Not present    1 Very mild Peculiar use of words or rambling but speech is comprehensible   2 Mild Speech a bit hard to understand or make sense of due to tangentiality, circumstantiality, or sudden topic shifts   3 Moderate Speech difficult to understand due to tangentiality, circumstantiality, idiosyncratic speech, or topic shifts on many occasions OR 1-2 instances of incoherent phrases   4 Moderately severe Speech difficult to understand due to circumstantiality, tangentiality, neologisms, blocking, or topic shifts most of the time OR 3-5 instances of incoherent phrases   5 Severe Speech is incomprehensible due to severe impairments most of the time. Many PSRS items cannot be rated by self-report alone   6 Extremely severe Speech is incomprehensible throughout interview   U Unable to assess      11. Avolition/Apathy: Rating: 3  0 Not at all    1 Very mild questionable decrease in time spent in goal-directed activities   2 Mild spends less time in goal-directed activities than is appropriate for situation and age   3 Moderate initiates activities at times but does not follow through   4 Moderately severe rarely initiates activity but will passively engage with encouragement   5 Severe almost never initiates activities; requires assistance to accomplish basic activities   6 Very severe does not initiate or persist in any goal-directed activity even with outside assistance   U Unable to assess      12. Asociality/Low Social Drive: Rating: 3  0 Not at all    1 Very mild questionable   2 Mild slow to initiate social interactions but usually responds to overtures by others   3 Moderate rarely initiates social interactions; sometimes responds to overtures by others   4 Moderately severe does not initiate but sometimes responds to overtures by others; little social interaction outside close family members   5 Severe never  initiates and rarely encourages conversations or activities; avoids being with others unless prodded, may have contacts with family   6 Very severe avoids being with others (even family members) whenever possible, extreme social isolation   U Unable to assess      13. Adherence: Days: 0  The longest, continuous amount of time in days, since the last visit when the subject did not take medication     14. EPS Part I: Ratin  Rate Elbow Rigidity for all subjects  0 Normal   1 Slight stiffness and resistance   2 Moderate stiffness and resistance   3 Marked rigidity with difficulty in passive movement   4 Extreme stiffness and rigidity with almost a frozen joint   U Unable to assess           EPS Part 2: Signs of EPS: 0  Are there are other signs of EPS (eg diminished arm swing, postural instability, cogwheeling, tremor, akinesia) present based upon patient report or exam?  0 No   1 Yes     15. Akathesia: Ratin  0 No restlessness reported or observed   1 Mild restlessness observed; e.g., occasional jiggling of the foot occurs when subject is seated   2 Moderate restlessness observed; e.g., on several occasions, jiggles foot, crosses and uncrosses legs or twists a part of the body   3 Restlessness is frequently observed; e.g., the foot or legs moving most of the time   4 Restlessness persistently observed; e.g., subject cannot sit still, may get up and walk   U Unable to assess     16. Dyskinetic Movement Ratings: Ratin  0 None   1 Minimal, may be extreme normal   2 Mild   3 Moderate   4 Severe   U Unable to assess     SIDE EFFECT ASSESSMENT:  Clinician Rating Form in [facility] - Side Effect Assessment  Address side effects reported by the patient and rate using this scale  0 Not present   1 Minimal, may be extreme normal   2 Mild   3 Moderate   4 Severe   U Unable to assess   P Present, but not related     Feeling dizzy or faint: 0  Blurred vision: 0  Dry mouth: 0   Too much saliva/droolin  Nausea:  0  Constipation: 0  Increased appetite: 0  Weight gain: 0  Weight loss: 0  Feeling tired/fatigue: 0  Daytime sedation: 0  Hypersomnia: 0  Insomnia: 0  Low libido: 0  Other problems with sex: 0  Breast enlargement or discharge: 0  Irregular Menstruation or amenorrhea: NA  Other (please list and rate): 0    RECENT SUBSTANCE USE:  Clinician Rating Form in [facility] - Substance Use Assessment  Alcohol Use Severity: Ratin  0 none   1 use without impairment: drinks but no immediate social or medical impairment   2 use with impairment: e.g. becomes grossly intoxicated; alcohol use or withdrawal compromises school, work or social functioning; alcohol use or withdrawal exacerbates symptoms (e.g. gets depressed when drinking)     Marijuana Use Severity: Ratin  0 none   1 occasional use without impairment: e.g. uses marijuana a few days a month and has no immediate social or medical impairment   2 frequent use without impairment: e.g. uses marijuana several or more days a week but has no immediate social or medical impairment   3 use with impairment: e.g. becomes grossly intoxicated; marijuana use compromises school, work or social functioning; marijuana use exacerbates symptoms (e.g. gets paranoid when using)     Other Drug Use Severity: Ratin  0 none   1 occasional use without impairment: e.g. uses drug(s) a few days a month and has no immediate social or medical impairment   2 frequent use without impairment: e.g. uses drug(s) several or more days a week but has no immediate social or medical impairment   3 use with impairment: e.g. becomes grossly intoxicated; drug use compromises school, work or social functioning; drug use exacerbates symptoms (e.g. gets paranoid when using)         CURRENT SOCIAL HISTORY:  FINANCIAL SUPPORT- family or friend       CHILDREN- none       LIVING SITUATION- Currently staying with his parent's [facility] in [place], [place] but plans to move back to his apartment on campus    SOCIAL/ SPIRITUAL  SUPPORT- unknown       FEELS SAFE AT HOME- Yes      MEDICAL ROS:  Reports none      Denies akathisia, unusual movements and wt gain   10 point ROS neg other than the symptoms noted above in the HPI. Patient does report multiple physical concern including back, neck, hip and foot pain, concerns about metabolism. These complaints have been evaluated by sports medicine and primary care with unremarkable findings. PT exercises have been recommended.      Of note, history is positive for multiple sports injuries (former ) including concussion x2 with LOC once; ankle and hip injuries; scoliosis. His disorganized communication is likely interfering with an adequate health assessment.         First Episode of Psychosis History      DUP (duration untreated psychosis):  Likely first experienced auditory hallucinations during the spring of 2016, possibly in the context of cannabis and LSD use. Did not seek treatment until behavior and presentation became significantly disorganized in January 2017. Medication adherence has been poor over the course of this last year.   Route to initial care: ED for disorganized behavior, somatic concerns   Medication adherence overall:  Fair to poor  General frequency of visits:  Recommend weekly to biweekly  Participation in groups:  none  Cognitive Remediation:  none  Other treatment history: See pertinent background      Reviewed for completion of First Episode work-up:  Yes  First episode workup:  Not Done (if completed, see LABS for results)  MATRICS Consensus Cognitive Battery:  Not Done (if completed, see LABS for results)         Medical/Surgical History     Penicillins    Patient Active Problem List   Diagnosis     Schizoaffective disorder (H)            Medications     Current Outpatient Medications   Medication Sig Dispense Refill     hydrOXYzine (VISTARIL) 50 MG capsule Take 50mg to 100mg as needed for anxiety 60 capsule 0     Previous medication trials:  Zoloft-  stopped because of ASE (?)  fluoxetine  Risperidone 1 mg, reported akathisia at 3mg dose  Haloperidol 5mg bid, reported dystonia relieved with benadryl   Divalproex 750mg  Quetiapine 300mg- discontinued 1/2/18  Olanzapine 10mg- discontinued 1/29/18, EPS?  Alprazolam 0.5-1mg PRN          Vitals     There were no vitals taken for this visit.      Weight prior to medication: 130s         Mental Status Exam     Alertness: alert and oriented  Appearance: casually groomed   Behavior/Demeanor: cooperative, with good eye contact   Speech: spontaneous and unremarkable   Language: intact  Psychomotor: fidgety  Mood: anxious  Affect: full range; was congruent to mood; was congruent to content  Thought Process/Associations: more organized, still containaing loose associations and neologisms/ word salad  Thought Content:  Reports suicidal and violent ideation (without intent or plan), delusions, preoccupations, over-valued ideas and paranoid ideation;  Denies obsessions , phobia  and magical thinking  Perception:  Reports auditory hallucinations;  Denies visual hallucinations  Insight: poor  Judgment: limited  Cognition: does  appear grossly intact; formal cognitive testing was not done         Labs and Data     RATING SCALES:  AIMS: done 3/5/18 with total score of 0     PHQ-9 SCORE 7/17/2018 8/21/2018 9/12/2018   PHQ-9 Total Score 12 13 13       ANTIPSYCHOTIC LABS ROUTINE    [glu, A1C, lipids (focus LDL), liver enzymes, WBC, ANEU, Hgb, plts]   q12 mo  Recent Labs   Lab Test 01/03/18  1054   GLC 73     Recent Labs   Lab Test 01/03/18  1054   CHOL 135   TRIG 93   LDL 70   HDL 46     Recent Labs   Lab Test 01/03/18  1054   AST 19   ALT 37   ALKPHOS 66     Recent Labs   Lab Test 01/03/18  1054   WBC 8.0   ANEU 5.5   HGB 13.9               Psychiatric Diagnoses     Schizoaffective disorder, bipolar type (F25.0)         Assessment     This patient is a 23 year old male who provides a history supporting the diagnoses listed  directly above.  Further diagnostic clarification is not needed.          [place] PRESCRIPTION MONITORING PROGRAM [] was not checked today:  last ALPRAZOLAM 0.5 MG TABLET  dispensed 12/01/2017    PSYCHOTROPIC DRUG INTERACTIONS:   No major interactions.  Concomitant use of aripiprazole and lithium may increase risk for EPS     MANAGEMENT:  Monitoring for adverse effects, routine vitals, routine labs, using lowest therapeutic dose of [olanzapine and lithium] and patient is aware of risks         Plan     1) PSYCHOTROPIC MEDICATIONS:  Continue to use hydroxyzine 50-100mg (1-2 caps) as needed for anxiety   Continue Abilify Maintena 400mg qmonthly    2) THERAPY:  Continue IRT and SEE    3) NEXT DUE:    Labs- January 2018, Test for Lymes?   Rating Scales- AIMS 1/2019 or sooner if needed    4) REFERRALS:    No Referrals needed    5) RTC: 1 month    6) CRISIS NUMBERS:   Provided routinely in AVS.    TREATMENT RISK STATEMENT:  The risks, benefits, alternatives and potential adverse effects have been discussed and are understood by the pt. The pt understands the risks of using street drugs or alcohol. There are no medical contraindications, the pt agrees to treatment with the ability to do so. The pt knows to call the clinic for any problems or to access emergency care if needed.  Medical and substance use concerns are documented above.  Psychotropic drug interaction check was done, including changes made today.      PROVIDER:  JAVY Bhatt CNP

## 2018-12-20 ENCOUNTER — TELEPHONE (OUTPATIENT)
Dept: PSYCHIATRY | Facility: CLINIC | Age: 23
End: 2018-12-20

## 2018-12-20 NOTE — TELEPHONE ENCOUNTER
NAVIGATE SEE Outgoing Telephone Call  For Supported Employment & Education    NAVIGATE Enrollee: Jerel Day (1995)     MRN: [id number]  Date of Call: 12/20/2018  Contacted: Edna    Discussed:   Per parents instruction, this writer called and LVM to parents that Jerel and SOY had met this week and turned in all required medical forms to the [facility]. Writer informed parents that Jerel and SOY are planning to meet Thur, 1/3 on campus to review his personal statement for school and begin searching for jobs. Left call back # for questions or concerns by parents.     Ana Michel

## 2019-01-02 ENCOUNTER — OFFICE VISIT (OUTPATIENT)
Dept: PSYCHIATRY | Facility: CLINIC | Age: 24
End: 2019-01-02
Payer: COMMERCIAL

## 2019-01-02 DIAGNOSIS — F25.0 SCHIZOAFFECTIVE DISORDER, BIPOLAR TYPE (H): Primary | ICD-10-CM

## 2019-01-03 ENCOUNTER — OFFICE VISIT (OUTPATIENT)
Dept: PSYCHIATRY | Facility: CLINIC | Age: 24
End: 2019-01-03
Payer: COMMERCIAL

## 2019-01-03 DIAGNOSIS — F25.0 SCHIZOAFFECTIVE DISORDER, BIPOLAR TYPE (H): Primary | ICD-10-CM

## 2019-01-03 ASSESSMENT — PATIENT HEALTH QUESTIONNAIRE - PHQ9
5. POOR APPETITE OR OVEREATING: SEVERAL DAYS
SUM OF ALL RESPONSES TO PHQ QUESTIONS 1-9: 10

## 2019-01-03 ASSESSMENT — ANXIETY QUESTIONNAIRES
2. NOT BEING ABLE TO STOP OR CONTROL WORRYING: MORE THAN HALF THE DAYS
3. WORRYING TOO MUCH ABOUT DIFFERENT THINGS: MORE THAN HALF THE DAYS
1. FEELING NERVOUS, ANXIOUS, OR ON EDGE: MORE THAN HALF THE DAYS
6. BECOMING EASILY ANNOYED OR IRRITABLE: MORE THAN HALF THE DAYS
GAD7 TOTAL SCORE: 12
5. BEING SO RESTLESS THAT IT IS HARD TO SIT STILL: MORE THAN HALF THE DAYS
7. FEELING AFRAID AS IF SOMETHING AWFUL MIGHT HAPPEN: SEVERAL DAYS

## 2019-01-03 NOTE — PROGRESS NOTES
LILLIANA Clinician Contact & Progress Note  For Individual Resiliency Training (IRT)  A Part of the [facility] First Episode of Psychosis Program    NAVIGATE Enrollee: Jerel Day (1995)     MRN: [id number]  Date:  1/02/19  Diagnosis: Schizoaffective disorder, bipolar type  Clinician: LILLIANA Individual Resiliency Trainer, Shai Londono [facility]     1. Type of contact: (majority of time spent)  IRT Session    2. People present:   Client  NAVIGATE Director/writer    3. Total number of persons who participated in contact: 2, including writer    4. Length of Actual Contact: Start Time: 3pm; End Time: 4pm   Traveled?  No     5. Location of contact:  Psychiatry Clinic, [facility]    6. Did the client complete the home practice option(s) from the previous session: NA     7. Motivational Teaching Strategies:  Connect info and skills with personal goals  Promote hope and positive expectations  Explore pros and cons of change  Re-frame experiences in positive light    8. Educational Teaching Strategies:  Review of written material/education  Relate information to client's experience  Ask questions to check comprehension  Break down information into small chunks  Adopt client's language    9. CBT Teaching Strategies:  -Reinforcement and shaping (positive feedback for steps towards goals, gains in knowledge & skills, follow-through on home assignments)  -Relapse prevention planning (review of stressors, early warning signs, written plan to respond to signs, rehearse plan)    10. IRT Module(s) Addressed:  Module - Assessment/Goal Setting    11. Techniques utilized:   Aleppo announced at beginning of session  Review of homework  Review of goal  Review of previous meeting  Present new material  Role-play  Problem-solving practice  Help client choose a home practice option  Summarize progress made in current session    12. Mental Status Exam:    Alertness: drowsy  Appearance: disheveled  Behavior/Demeanor: passive, with fair   "eye contact   Speech: normal  Language: aphasic. Preferred language identified as English.  Psychomotor: slowed  Mood: description consistent with euthymia  Affect: blunted; was congruent to mood; was congruent to content  Thought Process/Associations: circumstantial, tangential, disorganized and response delay  Thought Content:  Reports delusions and paranoid ideation;  Denies suicidal and violent ideation  Perception:  Reports auditory hallucinations and perceptual distortions ([Perceptual distortions when playing his keyboard and seeing what looks like \"someone sliced the air in half\");  Denies none  Insight: poor  Judgment: poor  Cognition: does  appear grossly intact; formal cognitive testing was not done  Suicidal ideation: denies SI, denies intent,  and denies plan  Homicidal Ideation: denies    13. Assessment/Progress Note:     Jerel reports concern about not improving with how he's thinking and feeling in relation to his symptoms.  He reported having discontinued marijuana use for past 60 days, and also having received his HO (long acting injectable).  His parents did not attend this appointment as they indicated to this writer they would.   Jerel continues to reside alone in an apartment, and admittedly is pretty isolated but states things are going ok, as living with his parents would be much worse. Jerel indicated he does not like being on commitment, and he doesn't want to keep coming to [facility] on a weekly basis.  He indicated he preferred every 6-8 weeks.  I encouraged him to reconsider this type of schedule, and he agreed to come back in 2 weeks.  He did however indicate he would call if things were getting worse symptom wise. He reported feeling safe, with no homicidal, violent, or suicidal ideation.  Jerel completed and we reviewed in session his responses to the PHQ-9, JUNAID-7, symptom review checklist, and his recent hospitalization.      PHQ-9 (10)   JUNAID-7 (12)  Symptom review checklist (Indicates " "daily symptoms with positive, negative, cognition symptoms/problems)       Discussed and defined the concept of recovery and resiliency and explored how it relates to recovery and treatment.  Writer reinforced messages of hope and optimism along with reviewing the benefits of taking a resiliency perspective to help set personal goals. Acknowledged past difficulties with recent hospitalization and provided empathy for the difficult situation, but encouraged Jerel to think optimistically about the future.      Discussed goals to which Jerel reported the following;    -Acquire more furniture for his apartment  -Discuss disability with [facility]   -Consider jobs to apply for  -Develop relationships and date    14. Plan/Referrals:      -Jerel reported he would f/u with SEE Ana Michel regarding jobs, and potentially for help with finding furniture to assist and improve his daily living situation in his apartment.    -Jerel reported he would reach out to his brother to spend time with, as he stated he has continued to do.     Billing for \"Interactive Complexity\"?  No    Shai Londono [facility]    NAVIGATE Individual Resiliency Trainer  "

## 2019-01-04 ASSESSMENT — ANXIETY QUESTIONNAIRES: GAD7 TOTAL SCORE: 12

## 2019-01-08 NOTE — PROGRESS NOTES
"NAVIGATE SEE Progress Note   For Supported Employment & Education    NAVIGATE Enrollee: Jerel Day (1995)     MRN: [id number]  Date:  1/3/19    Clinician: [facility] Supported Employment & , Ana Michel    1. Client Status Update:   Jerel Day is interested in education (Client withdrew from education program or class) and employment (Client developed employement goals)    2. People present:   SEE/Writer  Client: Jerel Day    3. Total number of persons who participated in contact: (do not count yourself/SEE) 1    4. Length of Actual Contact: 35 minutes   Traveled? Yes  Total Travel Time: 40 minutes    5. Location of contact:  Other: [employer] [place]    6. Brief description of session, contact, or client status (include: strategies, interventions, client reaction to contact, next steps, etc)     and Jerel met at [facility] for a SEE session. Jerel reports he has been \"just okay\" lately but was happy to hear his forms were all turned in for school. Jerel reports he has been going to malls, restaurants and has been walking around campus lately and plans on going on vacation with his family soon to [place]. Writer asked Jerel if he had been working on a personal statement for the [employer] (to obtain tuition reimbursement) but he has not worked on it yet. Jerel is still unsure at this time if he wants to enroll in courses for Spring semester so we discussed the pros and cons as well as his other options (file for medical leave, will give him up to 2 years off to re-enroll). Jerel said he would like to finish school but doesn't feel ready. When we discussed the steps he would need to go on Medical Leave, Jerel thought it was a lot of work and would rather take a few easy classes each semester instead.   We then discussed what it would take for him to be successful while taking classes. Jerel was able to identify that he needs to take his medication, study more often, get internet at " "home, not smoke any marijuana, physically attend classes and potentially utilize his accommodations more. This writer also encouraged him to meet with his advisor each semester, talk to his parents regarding tuition and meet with [facility] team more often.   We discussed Jerel's need for furniture and this writer offered resources to Jerel, such as Brid[employer]. Jerel declined to have any supports as he \"is really picky and doesn't want old furniture\". Writer suggested he go to [employer] or [employer] to search for needed furniture. Jerel reports he is sleeping on a futon and would like a couch and coffee table. Writer offered to go with Jerel but he said he wouldn't need assistance.     7. Completion of mutually agreed upon client task from previous meeting:  Nothing Completed    8. Orientation and Treatment Planning:  Pursuing current SEE goals    9. Assessment:  Assisting client to visit work or school settings to develop client preferences and goals re: work and/or school    10. Placement:  Not Applicable    11. Follow Along Supports: (for clients who are working or attending school)   Not Applicable    12. Mutually agreed upon client task for next meeting:     See above    13. Next Meeting Scheduled for: next week Friday at 1:30    Ana LAFLEURATE Supported Employment &   "

## 2019-01-09 ENCOUNTER — ALLIED HEALTH/NURSE VISIT (OUTPATIENT)
Dept: PSYCHIATRY | Facility: CLINIC | Age: 24
End: 2019-01-09
Payer: COMMERCIAL

## 2019-01-09 VITALS — OXYGEN SATURATION: 97 % | HEART RATE: 63 BPM | DIASTOLIC BLOOD PRESSURE: 66 MMHG | SYSTOLIC BLOOD PRESSURE: 107 MMHG

## 2019-01-09 DIAGNOSIS — F25.0 SCHIZOAFFECTIVE DISORDER, BIPOLAR TYPE (H): Primary | ICD-10-CM

## 2019-01-09 NOTE — NURSING NOTE
Jerel Day comes into clinic today at the request of JAVY Bhatt, PMHNP-BC Ordering Provider for Med Injection only Abilifshirley Oleaa .    Medication double checked by: Crisitano Lora, FRANNIECC  Prior to administration pt identified by name and : yes    Appearance: Dressed casually, smelled of strong body odor    Mood: pretty good  Affect: flat  Eye contact: poor  Side effects: states feels dizzy after injection   Level of cooperation: cooperative   Education: none needed   Next appt: 4 weeks         This service provided today was under the supervising provider of the day Vince Zheng MD, who was available if needed.    MILAGRO THOMPSON

## 2019-01-11 ENCOUNTER — TELEPHONE (OUTPATIENT)
Dept: PSYCHIATRY | Facility: CLINIC | Age: 24
End: 2019-01-11

## 2019-01-11 ENCOUNTER — OFFICE VISIT (OUTPATIENT)
Dept: PSYCHIATRY | Facility: CLINIC | Age: 24
End: 2019-01-11
Payer: COMMERCIAL

## 2019-01-11 DIAGNOSIS — F25.0 SCHIZOAFFECTIVE DISORDER, BIPOLAR TYPE (H): Primary | ICD-10-CM

## 2019-01-11 NOTE — TELEPHONE ENCOUNTER
NAVIGATE SEE Outgoing Telephone Call  For Supported Employment & Education    NAVIGATE Enrollee: Jerel Day (1995)     MRN: [id number]  Date of Call: 1/11/2019  Contacted: Georgina    Discussed:   Writer PEPE informed parents that Jerel made his appt today and that we plan on meeting next on 1/15 at 3pm with his . Left call back # if family/parents have questions or concerns regarding school or him getting a job.    Ana Michel

## 2019-01-11 NOTE — PROGRESS NOTES
NAVIGATE SEE Progress Note   For Supported Employment & Education    NAVIGATE Enrollee: Jerel Day (1995)     MRN: [id number]  Date:  1/11/19  Clinician: LILLIANA Supported Employment & , Ana Michel    1. Client Status Update:   Jerel Day is interested in education (Client continuing a class or school program)    2. People present:   SEE/Writer  Client: Jerel Day    3. Total number of persons who participated in contact: (do not count yourself/SEE) 1    4. Length of Actual Contact: 30 minutes   Traveled? Yes  Total Travel Time: 40 minutes    5. Location of contact:  Other: Coffee shop    6. Brief description of session, contact, or client status (include: strategies, interventions, client reaction to contact, next steps, etc)     [facility]. Jerel reports things are going ok, though he said he doesn't have much energy and that he feels 'low, and weak and has been having a lot of weird dreams'. He reports stomach problems too due to nicotine overuse with his ecig. Jerel reports using 30mg fluid and typically smokes all day. Jerel says he tries to get out of the house at least once a day going to restaurants, the library and [employer]. Jerel said he has a meeting next week with his  and said that I could join to update her. This writer encouraged Jerel to continue with hygiene and that he could use a haircut and his nails clipped.    Jerel showed this writer a string of emails to his school counselor  and it looks as if he is attempting to take one or two elective classes, though  informed him he would need to take more upper level psych or communication courses to stay on track with his degree. Jerel gave this writer his log in information in order to run a DARS report, which Jerel is interested in seeing what classes he could take. Jerel reported after 30 min that he was planning on going to his  family cabin and asked to leave early.     7. Completion of mutually agreed upon client task from previous meeting:  Nothing Completed    8. Orientation and Treatment Planning:  Pursuing current SEE goals    9. Assessment:  Gathering SEE information/inventory regarding work and/or education history, skills, goals, and preferences with client    10. Placement:  Not Applicable    11. Follow Along Supports: (for clients who are working or attending school)   Not Applicable    12. Mutually agreed upon client task for next meeting:     na    13. Next Meeting Scheduled for: next week tue at 3    [facility] Supported Employment &

## 2019-01-15 ENCOUNTER — OFFICE VISIT (OUTPATIENT)
Dept: PSYCHIATRY | Facility: CLINIC | Age: 24
End: 2019-01-15
Payer: COMMERCIAL

## 2019-01-15 DIAGNOSIS — F25.0 SCHIZOAFFECTIVE DISORDER, BIPOLAR TYPE (H): Primary | ICD-10-CM

## 2019-01-15 NOTE — PROGRESS NOTES
"NAVIGATE SEE Progress Note   For Supported Employment & Education    NAVIGATE Enrollee: Jerel Day (1995)     MRN: [id number]  Date:  1/15/19  Clinician: [facility] Supported Employment & , Ana Michel    1. Client Status Update:   Jerel Day is interested in education (Client withdrew from education program or class) and employment (Client developed employement goals)    2. People present:   SEE/Writer  Client: Jerel Day  Other:      3. Total number of persons who participated in contact: (do not count yourself/SEE) 2    4. Length of Actual Contact: 60 minutes   Traveled? Yes  Total Travel Time: 25 minutes    5. Location of contact:  Client's Home    6. Brief description of session, contact, or client status (include: strategies, interventions, client reaction to contact, next steps, etc)    WriterJerel and his  from [facility] met at [place]s apartment. Bradfords apartment has two chairs and a kitchen chair, the rest of Bradfords belongings were scattered across the floor and had dirty food containers and bowls on his floor as well. This writer encouraged Jerel to keep food off the floors, as rodents and pests will be attracted to it. Jerel said that \"it wasn't that bad\" but when he looked at his floor and realized he had dirty dishes, he picked one up and put it in the kitchen. We also saw Jerel's bedroom, which also does not have any furniture other than a small futon pillow on the floor that he sleeps on. Jerel was open to going to [employer] or having bridging provide furniture for him. He also does not have internet, but says he has been playing the guitar, seeing his brother and going out to eat to keep busy.    Jerel is still unsure if he wants to attend school this semester (classes start in 7 days) but would like to look for a part time job nearby his apartment. 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 He would like this " writer to help him weigh his options for school and social security.    Jerel reports that he likes the medication he is on and that he feels much better than he did before. He reports he is doing dishes and laundry every few weeks but is bathing once a day (no shower curtain).    7. Completion of mutually agreed upon client task from previous meeting:  Nothing Completed    8. Orientation and Treatment Planning:  Pursuing current SEE goals    9. Assessment:  Gathering SEE information/inventory regarding work and/or education history, skills, goals, and preferences with client    10. Placement:  Not Applicable    11. Follow Along Supports: (for clients who are working or attending school)   Not Applicable    12. Mutually agreed upon client task for next meeting:     Sign up for courses, reach out to advisor and copy this writer on the email    13. Next Meeting Scheduled for: next week tuesday    Ana TIJERINA Supported Employment &

## 2019-01-16 ENCOUNTER — OFFICE VISIT (OUTPATIENT)
Dept: PSYCHIATRY | Facility: CLINIC | Age: 24
End: 2019-01-16
Payer: COMMERCIAL

## 2019-01-16 DIAGNOSIS — F25.0 SCHIZOAFFECTIVE DISORDER, BIPOLAR TYPE (H): Primary | ICD-10-CM

## 2019-01-16 ASSESSMENT — ANXIETY QUESTIONNAIRES
3. WORRYING TOO MUCH ABOUT DIFFERENT THINGS: SEVERAL DAYS
1. FEELING NERVOUS, ANXIOUS, OR ON EDGE: NEARLY EVERY DAY
2. NOT BEING ABLE TO STOP OR CONTROL WORRYING: SEVERAL DAYS
5. BEING SO RESTLESS THAT IT IS HARD TO SIT STILL: SEVERAL DAYS
7. FEELING AFRAID AS IF SOMETHING AWFUL MIGHT HAPPEN: NOT AT ALL
GAD7 TOTAL SCORE: 10
6. BECOMING EASILY ANNOYED OR IRRITABLE: MORE THAN HALF THE DAYS

## 2019-01-16 ASSESSMENT — PATIENT HEALTH QUESTIONNAIRE - PHQ9: 5. POOR APPETITE OR OVEREATING: MORE THAN HALF THE DAYS

## 2019-01-16 NOTE — Clinical Note
FYI-Navigate pt. That  will see you for meds.  I do individual with him per his and family request.  He's been in the program for quite awhile-Lot's of adherence issues, numerous hospitalizations.

## 2019-01-17 ASSESSMENT — PATIENT HEALTH QUESTIONNAIRE - PHQ9: SUM OF ALL RESPONSES TO PHQ QUESTIONS 1-9: 10

## 2019-01-18 ENCOUNTER — TELEPHONE (OUTPATIENT)
Dept: PSYCHIATRY | Facility: CLINIC | Age: 24
End: 2019-01-18

## 2019-01-18 ASSESSMENT — ANXIETY QUESTIONNAIRES: GAD7 TOTAL SCORE: 10

## 2019-01-18 NOTE — TELEPHONE ENCOUNTER
NAVIGATE SEE Outgoing Telephone Call  For Supported Employment & Education    NAVIGATE Enrollee: Jerel Day (1995)     MRN: [id number]  Date of Call: 1/18/2019  Contacted: Jerel    Discussed:   Writer called Jerel per his request to follow up on his decision to take classes this semester. Classes start on Tuesday and Jerel still feels indifferent about it. He has not searched for classes and hasn't looked online if any are available. Writer encouraged him to look today or tomorrow at his DARS report and see what courses are available and if he can sign up. If he decides NOT to sign up for classes, this writer will fax in a medical withdrawal form on Monday so he doesn't get dropped as an active student.     Ana Michel

## 2019-01-21 NOTE — PROGRESS NOTES
NAVIGATE Clinician Contact & Progress Note  For Individual Resiliency Training (IRT)  A Part of the [facility] First Episode of Psychosis Program    NAVIGATE Enrollee: Jerel Day (1995)     MRN: [id number]  Date:  1/16/19  Diagnosis: Schizoaffective disorder  Clinician: LILLIANA Individual Resiliency Trainer, SHIRA Alexandre     1. Type of contact: (majority of time spent)  IRT Session    2. People present:   Client  NAVIGATE Director/Clinician, SHIRA Narvaez      3. Total number of persons who participated in contact: 2, including writer    4. Length of Actual Contact: Start Time: 5pm; End Time: 6pm   Traveled?  No    5. Location of contact:  Psychiatry Clinic, [facility]    6. Did the client complete the home practice option(s) from the previous session: No    7. Motivational Teaching Strategies:  Connect info and skills with personal goals  Promote hope and positive expectations  Explore pros and cons of change  Re-frame experiences in positive light    8. Educational Teaching Strategies:  Review of written material/education  Relate information to client's experience  Ask questions to check comprehension  Break down information into small chunks  Adopt client's language    9. CBT Teaching Strategies:  Reinforcement and shaping (positive feedback for steps towards goals, gains in knowledge & skills, follow-through on home assignments)  Social skills training (rationale for skill, modeling, role play practice, feedback, plan home practice)  Cognitive restructuring (identify thoughts related to negative feelings, examine the evidence, change thought or form action plan)    10. IRT Module(s) Addressed:  Module 2 - Assessment/Goal Setting    11. Techniques utilized:   Herron announced at beginning of session  Review of homework  Review of goal  Review of previous meeting  Present new material  Role-play  Problem-solving practice  Help client choose a home practice option  Summarize progress made in  current session    12. Mental Status Exam:    Alertness: alert  and oriented  Appearance: disheveled  Behavior/Demeanor: cooperative and pleasant, with fair  eye contact   Speech: normal  Language: intact. Preferred language identified as English.  Psychomotor: normal or unremarkable  Mood: description consistent with euthymia and unremarkabla  Affect: restricted; was congruent to mood; was congruent to content  Thought Process/Associations: remarkable for , loose associations and flight of ideas  Thought Content:  Reports delusions and paranoid ideation;  Denies suicidal and violent ideation, preoccupations, obsessions , phobia  and magical thinking  Perception:  Reports visual hallucinations;  Denies visual hallucinations, visual distortion seen as shadows , depersonalization and derealization  Insight: limited  Judgment: limited  Cognition: does  appear grossly intact; formal cognitive testing was done  Suicidal ideation: denies SI, denies intent,  and denies plan  Homicidal Ideation: denies    13. Assessment/Progress Note:     Met with Jerel and worked on assessment/goal setting.  Reviewed and prioritized stated goals related to physical/mental health, social/occupational health/needs, relationships.  Goals identified and recommended intended to decrease isolation, promote well being, and support recovery, resilience, and overall quality of life.  (Identifed goals/action items indicated in box below-Jerel highlighted in red those areas that at top priority-Numbers under Domain category indicate rank order 1-10 scale of need/want to engage)    Domain Goals or Needs Resources Needed Action Item/Next Step   Self-Care 9 Hygiene improve - smell Better deodorant, cologne shopping, laundary    Apartment 8 More comfort Couch Thrift store   Relationships 7 More socialization New acquaintances Go to more activities   Fun 3 Vacation Money Job   Employment 6 Job Better resume Finish more school   Academic 4 Finish school  "Flexible classes Find better suited class   Physical Health 5 Get stronger Chiro/massage Make appointment   Socialization 1 More friends/girlfriend More confidence Talk to more girls   Substance Use 10 Stop nicotine More follow through Use less   Other 2 Make /instruction Branch out with musicians   Socialization Make music Fun  Academic Physical health   Darty,/party Start engaging Vacation Continue psychology Massage   Intramural Find shows [place] or [place] Or back to IE Workouts   Bar Concerts [place] Could go to different school meditation   Restaurant [employer] music scene meetups Find the right classes Yoga   Gig Buy mandolin camping Get a  Quit nicotine      Email adivisor running              14. Plan/Referrals:     Will meet and review progress toward identified goals. Next visit in 2 weeks (patient preference).    Billing for \"Interactive Complexity\"?  No    SHIRA Alexandre    NAVIGATE Individual Resiliency Trainer  "

## 2019-01-29 ENCOUNTER — OFFICE VISIT (OUTPATIENT)
Dept: PSYCHIATRY | Facility: CLINIC | Age: 24
End: 2019-01-29
Payer: COMMERCIAL

## 2019-01-29 DIAGNOSIS — F25.0 SCHIZOAFFECTIVE DISORDER, BIPOLAR TYPE (H): Primary | ICD-10-CM

## 2019-01-30 NOTE — PROGRESS NOTES
NAVIGATE SEE Progress Note   For Supported Employment & Education    NAVIGATE Enrollee: Jerel Day (1995)     MRN: [id number]  Date:  1/29/19  Clinician: LILLIANA Supported Employment & , Ana Michel    1. Client Status Update:   Jerel Day is interested in education (Client enrolled in class or school (e.g. GED course, certificate program, communicaty college or other educational programs)) and employment (Client developed employement goals)    2. People present:   SEE/Writer  Client: Jerel Day      3. Total number of persons who participated in contact: (do not count yourself/SEE) 1    4. Length of Actual Contact: 15 minutes   Traveled? No    5. Location of contact:  Telephone    6. Brief description of session, contact, or client status (include: strategies, interventions, client reaction to contact, next steps, etc)    Writer called Jerel for a follow up conversation regarding his courses this semester. Jerel is currently enrolled in one course but is interested in signing up for another one. Jerel would like to see what options he has to sign up for classes, if he has the chance. Jerel does need a part time exemption form completed for his financial aid, which this writer offered to fax to the [facility]. Jerel still would like to meet on Thursday to review his resume, apply to some jobs and search his dars for courses that can assist him graduate. 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     7. Completion of mutually agreed upon client task from previous meeting:  Completed    8. Orientation and Treatment Planning:  Pursuing current SEE goals    9. Assessment:  Assessing client's need for follow-along supports    10. Placement:  Education (Discussion and planning re: use of office of student disability services)    11. Follow Along Supports: (for clients who are working or attending school)   Education  (Assisting with requesting accommodations)    12. Mutually agreed upon client task for next meeting:     na    13. Next Meeting Scheduled for: thur at 130    Ana [facility] Supported Employment &

## 2019-02-05 ENCOUNTER — OFFICE VISIT (OUTPATIENT)
Dept: PSYCHIATRY | Facility: CLINIC | Age: 24
End: 2019-02-05
Payer: COMMERCIAL

## 2019-02-05 DIAGNOSIS — F25.0 SCHIZOAFFECTIVE DISORDER, BIPOLAR TYPE (H): Primary | ICD-10-CM

## 2019-02-06 ENCOUNTER — OFFICE VISIT (OUTPATIENT)
Dept: PSYCHIATRY | Facility: CLINIC | Age: 24
End: 2019-02-06
Payer: COMMERCIAL

## 2019-02-06 ENCOUNTER — ALLIED HEALTH/NURSE VISIT (OUTPATIENT)
Dept: PSYCHIATRY | Facility: CLINIC | Age: 24
End: 2019-02-06
Payer: COMMERCIAL

## 2019-02-06 VITALS
SYSTOLIC BLOOD PRESSURE: 99 MMHG | DIASTOLIC BLOOD PRESSURE: 64 MMHG | WEIGHT: 149 LBS | HEART RATE: 56 BPM | BODY MASS INDEX: 22.33 KG/M2 | TEMPERATURE: 97.3 F

## 2019-02-06 VITALS — SYSTOLIC BLOOD PRESSURE: 120 MMHG | DIASTOLIC BLOOD PRESSURE: 67 MMHG | HEART RATE: 60 BPM

## 2019-02-06 DIAGNOSIS — F25.0 SCHIZOAFFECTIVE DISORDER, BIPOLAR TYPE (H): Primary | ICD-10-CM

## 2019-02-06 ASSESSMENT — PAIN SCALES - GENERAL: PAINLEVEL: MILD PAIN (3)

## 2019-02-06 NOTE — NURSING NOTE
Jerel Day comes into clinic today at the request of JAVY Bhatt, PMHNP-BC Ordering Provider for Med Injection only Abilify Maintena .    Medication double checked by: Denver Pinto RNCC  Prior to administration pt identified by name and : yes    Appearance: dressed casually, smelled of body odor    Mood: ok   Affect: flat   Eye contact: poor  Side effects: reports some arm soreness after injection   Level of cooperation: cooperative   Education: none needed   Next appt: 4 weeks       Was there drug waste? No  Multi-dose vial: No      This service provided today was under the supervising provider of the day Vince Zheng MD, who was available if needed.    MILAGRO THOMPSON

## 2019-02-07 ASSESSMENT — PATIENT HEALTH QUESTIONNAIRE - PHQ9: SUM OF ALL RESPONSES TO PHQ QUESTIONS 1-9: 9

## 2019-02-08 NOTE — PROGRESS NOTES
"NAVIGATE Medication Management Progress Note  A Part of the [facility] First Episode of Psychosis Program    NAVIGATE Enrollee: Jerel Day (1995)     MRN: [id number]  Date:  19         Contributors to the Assessment     Chart Reviewed.   Interview completed with Jerel Day.         Chief Complaint       \"Things are fine\"           Interim History      Jerel Day is a 23 year old male who was last seen in clinic on 18 at which time pt was not medicated. The patient reports good treatment adherence.  History was provided by Jerel who was a fair historian.  Since the last visit:  -says he is dong well overall, continues to be social with friends, continues to be engaged at school though he is unsure of what he wants to do afterward.  - he reports feeling the \"blood flow the wrong way in his neck\"   - He does not feel like his thinking has been cleared/improved, he thinks this is worse since the hospitalization. He continues to hear AH but is not distressed by them  - He is usually falling asleep and staying asleep easily.   -occasional arguments with parents    PSYCH ROS:  Clinician Rating Form in [facility]  1. Depressed Mood: Rating 2  0 Not reported    1 Very mild occasionally feels sad or  down ; of questionable clinical significance   2 Mild occasionally feels moderately depressed or often feels sad or  down    3 Moderate occasionally feels very depressed or often feels moderately depressed   4 Moderately severe often feels very depressed   5 Severe feels very depressed most of the time   6 Very severe constant extremely painful feelings of depression   U Unable to assess      2. Anxiety/Worry: Ratin  0 Not reported    1 Very mild occasionally feels a little anxious; of questionable clinical significance   2 Mild occasionally feels moderately anxious or often feels a little anxious or worried   3 Moderate occasionally feels very anxious or often feels moderately anxious   4 Moderately severe " often feels very anxious or often feels moderately anxious   5 Severe feels very anxious or worried most of the time   6 Very severe patient is continually preoccupied with severe anxiety   U Unable to assess      3. Suicidal Ideation/Behavior: Ratin   0 Not reported    1 Very mild occasional thoughts of dying,  I d be better off dead  or  I wish I were dead    2 Mild frequents thoughts of dying or occasional thoughts of killing self, without plan or method   3 Moderate often thinks of suicide or has though of a specific method   4 Moderately severe has mentally rehearsed a specific method of suicide or has made a suicide attempt with questionable intent to die (e.r. takes aspirins and then tells family)   5 Severe has made preparations for a potentially lethal suicide attempt (e.g acquires a gun and bullets for an attempt)   6 Very severe has made a suicide attempt with an intent to die   U Unable to assess       4. Elevated/Expansive Mood: Ratin  0 Not at all    1 Very mild questionable; more cheerful than most people in his/her circumstances but of only possible clinical significance   2 Mild brief elevated/expansive mood but only somewhat out of proportion to the circumstances   3 Moderate brief/occasional elevation of mood which is clearly out of proportion to the circumstances   4 Moderately severe sustained/frequent elevation of mood which is clearly out of proportion to the circumstances   5 Severe mood is euphoric most of the time   6 Very severe sustained elevation;  everything is wonderful  almost all of the time   U Unable to assess      5. Hostility/Anger/Irritability/Aggressiveness: Ratin  0 Not at all    1 Very mild occasional irritability of doubtful clinical significance   2 Mild occasionally feels angry or mild or indirect expressions of anger, e.g. sarcasm, disrespect or hostile gestures   3 Moderate frequently feels angry, frequent irritability or occasional direct expression of  anger, e.g. yelling at others   4 Moderately severe often feels very angry, often yells at others or occasionally threatens to harm others   5 Severe has acted on his anger by becoming physically abusive on one or two occasions or makes frequent threats to harm others or is very angry most of the time   6 Very severe has been physically aggressive and/or required intervention to prevent assaultiveness on several occasions; or any serious assaultive act   U Unable to assess      6. Impulsive Behavior: Ratin  0 Not at all    1 Very mild one instance of impulsive behavior which is of doubtful clinical significance   2 Mild occasional impulsive acts, e.g. making phone calls at odd hours   3 Moderate occasional impulsive acts with some potential negative consequence, e.g. leaving work abruptly; changing plans without thinking   4 Moderately severe impulsive acts with definite negative consequences, e.g. overspending on non-essentials; repeated reckless sexual behavior   5 Severe impulsive acts with direct negative consequences, e.g. spends entire income on nonessentials without regard for basic needs   6 Very severe impulsive behavior which is potentially life threatening, e.g. jumps from dangerous height (without suicidal intent) or criminal behavior, e.g. impulsive robbery   U Unable to assess      7. Suspiciousness: Ratin  0 Not present    1 Very mild Seems on guard. Reluctant to respond to some  personal  questions. Reports being overly self-conscious in public   2 Mild Describes incidents in which others have harmed or wanted to harm him/her that sound plausible. Patient feels as if others are watching, laughing, or criticizing him/her in public, but this occurs only occasionally or rarely. Little or no preoccupation   3 Moderate Says others are talking about him/her maliciously, have negative intentions, or may harm him/her. Beyond the likelihood of plausibility, but not delusional. Incidents of suspected  persecution occur occasionally (less than once per week) with some preoccupation   4 Moderately severe Same as 3, but incidents occur frequently such as more than once a week. Patient is moderately preoccupied with ideas of persecution OR patient reports persecutory delusions expressed with much doubt (e.g. partial delusion)   5 Severe Delusional -- speaks of Mafia plots, the FBI, or others poisoning his/her food, persecution by supernatural forces   6 Extremely severe Same as 5, but the beliefs are bizarre or more preoccupying. Patient tends to disclose or act on persecutory delusions.   U Unable to assess      8. Unusual Thought Content: Rating: 3  0 Not present    1 Very mild Ideas of reference (people may stare or may laugh at him), ideas of persecution (people may mistreat him). Unusual beliefs in psychic esquivel, spirits, UFOs, or unrealistic beliefs in one's own abilities. Not strongly held. Some doubt   2 Mild Same as 1, but degree of reality distortion is more severe as indicated by highly unusual ideas or greater conviction. Content may be typical of delusions (even bizarre), but without full conviction. The delusion does not seem to have fully formed, but is considered as one possible explanation for an unusual experience   3 Moderate Delusion present but no preoccupation or functional impairment. May be an encapsulated delusion or a firmly endorsed absurd belief about past delusional circumstances   4 Moderately severe Full delusion(s) present with some preoccupation OR some areas of functioning disrupted by delusional thinking   5 Severe Full delusion(s) present with much preoccupation OR many areas of functioning are disrupted by delusional thinking   6 Extremely severe Full delusions present with almost   U Unable to assess      9. Hallucinations: Rating: 3  0 Not present    1 Very mild While resting or going to sleep, sees visions, smells odors, or hears voices, sounds or whispers in the absence of  external stimulation, but no impairment in functioning   2 Mild While in a clear state of consciousness, hears a voice calling the subject s name, experiences non-verbal auditory hallucinations (e.g., sounds or whispers), formless visual hallucinations, or has sensory experiences in the presence of a modality-relevant stimulus (e.g., visual illusions) infrequently (e.g., 1-2 times per week) and with no functional impairment   3 Moderate Occasional verbal, visual, gustatory, olfactory, or tactile hallucinations with no functional impairment OR non-verbal auditory hallucinations/visual illusions more than infrequently or with impairment   4 Moderately severe Experiences daily hallucinations OR some areas of functioning are disrupted by hallucinations   5 Severe Experiences verbal or visual hallucinations several times a day OR many areas of functioning are disrupted by these hallucinations   6 Extremely severe Persistent verbal or visual hallucinations throughout the day OR most areas of functioning are disrupted by these hallucinations   U Unable to assess      10. Conceptual Disorganization: Ratin  0 Not present    1 Very mild Peculiar use of words or rambling but speech is comprehensible   2 Mild Speech a bit hard to understand or make sense of due to tangentiality, circumstantiality, or sudden topic shifts   3 Moderate Speech difficult to understand due to tangentiality, circumstantiality, idiosyncratic speech, or topic shifts on many occasions OR 1-2 instances of incoherent phrases   4 Moderately severe Speech difficult to understand due to circumstantiality, tangentiality, neologisms, blocking, or topic shifts most of the time OR 3-5 instances of incoherent phrases   5 Severe Speech is incomprehensible due to severe impairments most of the time. Many PSRS items cannot be rated by self-report alone   6 Extremely severe Speech is incomprehensible throughout interview   U Unable to assess      11.  Avolition/Apathy: Ratin  0 Not at all    1 Very mild questionable decrease in time spent in goal-directed activities   2 Mild spends less time in goal-directed activities than is appropriate for situation and age   3 Moderate initiates activities at times but does not follow through   4 Moderately severe rarely initiates activity but will passively engage with encouragement   5 Severe almost never initiates activities; requires assistance to accomplish basic activities   6 Very severe does not initiate or persist in any goal-directed activity even with outside assistance   U Unable to assess      12. Asociality/Low Social Drive: Ratin  0 Not at all    1 Very mild questionable   2 Mild slow to initiate social interactions but usually responds to overtures by others   3 Moderate rarely initiates social interactions; sometimes responds to overtures by others   4 Moderately severe does not initiate but sometimes responds to overtures by others; little social interaction outside close family members   5 Severe never initiates and rarely encourages conversations or activities; avoids being with others unless prodded, may have contacts with family   6 Very severe avoids being with others (even family members) whenever possible, extreme social isolation   U Unable to assess      13. Adherence: Days: 0  The longest, continuous amount of time in days, since the last visit when the subject did not take medication     14. EPS Part I: Ratin  Rate Elbow Rigidity for all subjects  0 Normal   1 Slight stiffness and resistance   2 Moderate stiffness and resistance   3 Marked rigidity with difficulty in passive movement   4 Extreme stiffness and rigidity with almost a frozen joint   U Unable to assess           EPS Part 2: Signs of EPS: 0  Are there are other signs of EPS (eg diminished arm swing, postural instability, cogwheeling, tremor, akinesia) present based upon patient report or exam?  0 No   1 Yes     15.  Akathesia: Ratin  0 No restlessness reported or observed   1 Mild restlessness observed; e.g., occasional jiggling of the foot occurs when subject is seated   2 Moderate restlessness observed; e.g., on several occasions, jiggles foot, crosses and uncrosses legs or twists a part of the body   3 Restlessness is frequently observed; e.g., the foot or legs moving most of the time   4 Restlessness persistently observed; e.g., subject cannot sit still, may get up and walk   U Unable to assess     16. Dyskinetic Movement Ratings: Ratin  0 None   1 Minimal, may be extreme normal   2 Mild   3 Moderate   4 Severe   U Unable to assess     SIDE EFFECT ASSESSMENT:  Clinician Rating Form in [facility] - Side Effect Assessment  Address side effects reported by the patient and rate using this scale  0 Not present   1 Minimal, may be extreme normal   2 Mild   3 Moderate   4 Severe   U Unable to assess   P Present, but not related     Feeling dizzy or faint: 0  Blurred vision: 0  Dry mouth: 0   Too much saliva/droolin  Nausea: 0  Constipation: 0  Increased appetite: 0  Weight gain: 0  Weight loss: 0  Feeling tired/fatigue: 0  Daytime sedation: 0  Hypersomnia: 0  Insomnia: 0  Low libido: 0  Other problems with sex: 0  Breast enlargement or discharge: 0  Irregular Menstruation or amenorrhea: NA  Other (please list and rate): 0    RECENT SUBSTANCE USE:  Clinician Rating Form in [facility] - Substance Use Assessment  Alcohol Use Severity: Ratin  0 none   1 use without impairment: drinks but no immediate social or medical impairment   2 use with impairment: e.g. becomes grossly intoxicated; alcohol use or withdrawal compromises school, work or social functioning; alcohol use or withdrawal exacerbates symptoms (e.g. gets depressed when drinking)     Marijuana Use Severity: Ratin  0 none   1 occasional use without impairment: e.g. uses marijuana a few days a month and has no immediate social or medical impairment   2 frequent  use without impairment: e.g. uses marijuana several or more days a week but has no immediate social or medical impairment   3 use with impairment: e.g. becomes grossly intoxicated; marijuana use compromises school, work or social functioning; marijuana use exacerbates symptoms (e.g. gets paranoid when using)     Other Drug Use Severity: Ratin  0 none   1 occasional use without impairment: e.g. uses drug(s) a few days a month and has no immediate social or medical impairment   2 frequent use without impairment: e.g. uses drug(s) several or more days a week but has no immediate social or medical impairment   3 use with impairment: e.g. becomes grossly intoxicated; drug use compromises school, work or social functioning; drug use exacerbates symptoms (e.g. gets paranoid when using)         CURRENT SOCIAL HISTORY:  FINANCIAL SUPPORT- family or friend       CHILDREN- none       LIVING SITUATION- Currently staying with his parent's [facility] in [place], [place] but plans to move back to his apartment on campus    SOCIAL/ SPIRITUAL SUPPORT- unknown       FEELS SAFE AT HOME- Yes      MEDICAL ROS:  Reports none      Denies akathisia, unusual movements and wt gain   10 point ROS neg other than the symptoms noted above in the HPI. Patient does report multiple physical concern including back, neck, hip and foot pain, concerns about metabolism. These complaints have been evaluated by sports medicine and primary care with unremarkable findings. PT exercises have been recommended.      Of note, history is positive for multiple sports injuries (former ) including concussion x2 with LOC once; ankle and hip injuries; scoliosis. His disorganized communication is likely interfering with an adequate health assessment.         First Episode of Psychosis History      DUP (duration untreated psychosis):  Likely first experienced auditory hallucinations during the spring of 2016, possibly in the context of cannabis and LSD use. Did  not seek treatment until behavior and presentation became significantly disorganized in January 2017. Medication adherence has been poor over the course of this last year.   Route to initial care: ED for disorganized behavior, somatic concerns   Medication adherence overall:  Fair to poor  General frequency of visits:  Recommend weekly to biweekly  Participation in groups:  none  Cognitive Remediation:  none  Other treatment history: See pertinent background      Reviewed for completion of First Episode work-up:  Yes  First episode workup:  Not Done (if completed, see LABS for results)  MATRICS Consensus Cognitive Battery:  Not Done (if completed, see LABS for results)         Medical/Surgical History     Penicillins    Patient Active Problem List   Diagnosis     Schizoaffective disorder (H)            Medications     Current Outpatient Medications   Medication Sig Dispense Refill     ARIPiprazole ER (ABILIFY MAINTENA) 400 MG extended release inj syringe Inject 1 Syringe (400 mg) into the muscle every 28 days 1 Syringe 11     hydrOXYzine (VISTARIL) 50 MG capsule Take 50mg to 100mg as needed for anxiety 60 capsule 0     Previous medication trials:  Zoloft- stopped because of ASE (?)  fluoxetine  Risperidone 1 mg, reported akathisia at 3mg dose  Haloperidol 5mg bid, reported dystonia relieved with benadryl   Divalproex 750mg  Quetiapine 300mg- discontinued 1/2/18  Olanzapine 10mg- discontinued 1/29/18, EPS?  Alprazolam 0.5-1mg PRN          Vitals     BP 99/64 (BP Location: Left arm, Patient Position: Chair, Cuff Size: Adult Regular)   Pulse 56   Temp 97.3  F (36.3  C)   Wt 67.6 kg (149 lb)   BMI 22.33 kg/m        Weight prior to medication: 130s         Mental Status Exam     Alertness: alert and oriented  Appearance: casually groomed   Behavior/Demeanor: cooperative, with good eye contact   Speech: spontaneous and unremarkable   Language: intact  Psychomotor: fidgety  Mood: anxious  Affect: full range; was  congruent to mood; was congruent to content  Thought Process/Associations: more organized, still containaing loose associations and neologisms/ word salad  Thought Content:  Reports suicidal and violent ideation (without intent or plan), delusions, preoccupations, over-valued ideas and paranoid ideation;  Denies obsessions , phobia  and magical thinking  Perception:  Reports auditory hallucinations;  Denies visual hallucinations  Insight: poor  Judgment: limited  Cognition: does  appear grossly intact; formal cognitive testing was not done         Labs and Data     RATING SCALES:  AIMS: done 3/5/18 with total score of 0     PHQ-9 SCORE 1/3/2019 1/16/2019 2/7/2019   PHQ-9 Total Score 10 10 9       ANTIPSYCHOTIC LABS ROUTINE    [glu, A1C, lipids (focus LDL), liver enzymes, WBC, ANEU, Hgb, plts]   q12 mo  Recent Labs   Lab Test 01/03/18  1054   GLC 73     Recent Labs   Lab Test 01/03/18  1054   CHOL 135   TRIG 93   LDL 70   HDL 46     Recent Labs   Lab Test 01/03/18  1054   AST 19   ALT 37   ALKPHOS 66     Recent Labs   Lab Test 01/03/18  1054   WBC 8.0   ANEU 5.5   HGB 13.9               Psychiatric Diagnoses     Schizoaffective disorder, bipolar type (F25.0)         Assessment     This patient is a 23 year old male who provides a history supporting the diagnoses listed directly above.  Further diagnostic clarification is not needed.          [place] PRESCRIPTION MONITORING PROGRAM [] was not checked today:  last ALPRAZOLAM 0.5 MG TABLET  dispensed 12/01/2017    PSYCHOTROPIC DRUG INTERACTIONS:   No major interactions.  Concomitant use of aripiprazole and lithium may increase risk for EPS     MANAGEMENT:  Monitoring for adverse effects, routine vitals, routine labs, using lowest therapeutic dose of [olanzapine and lithium] and patient is aware of risks         Plan     1) PSYCHOTROPIC MEDICATIONS:  Continue to use hydroxyzine 50-100mg (1-2 caps) as needed for anxiety   Continue Abilify Maintena 400mg  qmonthly    2) THERAPY:  Continue IRT and SEE    3) REFERRALS:    No Referrals needed    4) RTC: 1 month    5) CRISIS NUMBERS:   Provided routinely in AVS.    TREATMENT RISK STATEMENT:  The risks, benefits, alternatives and potential adverse effects have been discussed and are understood by the pt. The pt understands the risks of using street drugs or alcohol. There are no medical contraindications, the pt agrees to treatment with the ability to do so. The pt knows to call the clinic for any problems or to access emergency care if needed.  Medical and substance use concerns are documented above.  Psychotropic drug interaction check was done, including changes made today.    Time spent with patient  >45min    PROVIDER:  Vince Zheng M.D., Ph.D.     Dept. of Psychiatry   [facility]

## 2019-02-11 ENCOUNTER — TELEPHONE (OUTPATIENT)
Dept: PSYCHIATRY | Facility: CLINIC | Age: 24
End: 2019-02-11

## 2019-02-11 DIAGNOSIS — F25.0 SCHIZOAFFECTIVE DISORDER, BIPOLAR TYPE (H): Primary | ICD-10-CM

## 2019-02-11 NOTE — TELEPHONE ENCOUNTER
NAVIGATE SEE Outgoing Telephone Call  For Supported Employment & Education    NAVIGATE Enrollee: Jerel Day (1995)     MRN: [id number]  Date of Call: 2/11/2019  Contacted: Valerie Osullivan    Discussed:   Writer ez for Jerel Padilla's disability  at the [facility]. She had called requesting additional information about Jerel's diagnosis. Writer also followed up with her via email. Stating:    Harris Padilla,  My name is Ana Michel and I am an  working with a student of yours, Jerel Day. You had recently talked with our Nurse care coordinator Brittani Estrella requesting additional information about Jerel's diagnosis. I'm following up with you to see if you received the necessary documentation and if any other information is needed. Please feel free to email or call, thank you!    Ana Michel

## 2019-02-12 ENCOUNTER — OFFICE VISIT (OUTPATIENT)
Dept: PSYCHIATRY | Facility: CLINIC | Age: 24
End: 2019-02-12
Payer: COMMERCIAL

## 2019-02-12 ENCOUNTER — TELEPHONE (OUTPATIENT)
Dept: PSYCHIATRY | Facility: CLINIC | Age: 24
End: 2019-02-12

## 2019-02-12 DIAGNOSIS — F25.0 SCHIZOAFFECTIVE DISORDER, BIPOLAR TYPE (H): Primary | ICD-10-CM

## 2019-02-12 NOTE — Clinical Note
Harris Gibbons, we are scheduled for a meeting with Jerel's  on Thursday of this week so he can get a SMRT assessment (social security, in home supports, etc). Let me know if you have any questions!

## 2019-02-12 NOTE — PROGRESS NOTES
NAVIGATE SEE Progress Note   For Supported Employment & Education    NAVIGATE Enrollee: Jerel Day (1995)     MRN: [id number]  Date:  2/12/19  Clinician: LILLIANA Supported Employment & , Ana Michel    1. Client Status Update:   Jerel Day is interested in education (Client participated in benefits counseling, Client continuing a class or school program)    2. People present:   SEE/Writer  Client: Jerel Day  Significant Other/Family/Friend:  Mother  Other:     3. Total number of persons who participated in contact: (do not count yourself/SEE) 3    4. Length of Actual Contact: 25 minutes   Traveled? No    5. Location of contact:  Telephone    6. Brief description of session, contact, or client status (include: strategies, interventions, client reaction to contact, next steps, etc)    Writer called Jerel Riojas'  to report on the phone call that Mesha had left this writer (see phone encounter 2/5 for more details). Inquired about additional in home services (Social security, CADI waiver, etc) that Jerel would qualify for and asked for her availability later in the week to meet with Jerel. Nola confirmed that Jerel should have a SMRT assessment and that she would apply for a CADI waiver and Social security as well.      Writer then called Jerel and described some of these additional services and how it could help him in his next apartment and offered to have a meeting with his . Jerel was open to having this meeting on Thursday at 11:30 at a coffee shop (as his apt is messy). Writer suggested that we hold the meeting in his apartment but understood if he didn't want to meet there. Jerel said he needs to be out of his apartment by the end of the month but has an apartment that he is planning on moving into that his parents will pay for. I also asked how school was going and Jerel reports that he has been going to his class but hasn't done much of  the work. We discussed additional forms that the [facility] is working on and writer informed Jerel that I was in contact with his school. Confirmed meeting time and writer offered a text reminder on Thursday so he doesn't forget.    Writer then called Mesha, Jerel's mom to inform her of the meeting and our plan. Mesha was grateful for the opportunity that he gets more services but declined to attend the meeting as she stated that she may upset Jerel if she is present. Mesha mentioned that Jerel's upcoming lease is only for 6 months and that he potentially plans on moving in with his brother after that 6 mo is up. Writer will follow up with Mesha after our meeting.    7. Completion of mutually agreed upon client task from previous meeting:  Not Applicable    8. Orientation and Treatment Planning:  Pursuing current SEE goals    9. Assessment:  Assessing client's need for follow-along supports    10. Placement:  Not Applicable    11. Follow Along Supports: (for clients who are working or attending school)   Not Applicable    12. Mutually agreed upon client task for next meeting:     na    13. Next Meeting Scheduled for: Thursday at 11:30 with     [facility] Supported Employment &

## 2019-02-12 NOTE — TELEPHONE ENCOUNTER
NAVIGATE SEE Outgoing Telephone Call  For Supported Employment & Education    NAVIGATE Enrollee: Jerel Day (1995)     MRN: [id number]  Date of Call: 2/12/2019  Contacted: AGA Riojas    Discussed:   Writer PEPE for  expressing concerns regarding phone call from Jerel's mom Mesha (see telephone encounter dated 2/5). Asked for call back for increase in services and support as well as potential mental health report.    Ana Michel

## 2019-02-13 ENCOUNTER — OFFICE VISIT (OUTPATIENT)
Dept: PSYCHIATRY | Facility: CLINIC | Age: 24
End: 2019-02-13
Payer: COMMERCIAL

## 2019-02-13 DIAGNOSIS — F25.0 SCHIZOAFFECTIVE DISORDER, BIPOLAR TYPE (H): Primary | ICD-10-CM

## 2019-02-14 ENCOUNTER — OFFICE VISIT (OUTPATIENT)
Dept: PSYCHIATRY | Facility: CLINIC | Age: 24
End: 2019-02-14
Payer: COMMERCIAL

## 2019-02-14 DIAGNOSIS — F25.0 SCHIZOAFFECTIVE DISORDER, BIPOLAR TYPE (H): Primary | ICD-10-CM

## 2019-02-15 NOTE — PROGRESS NOTES
NAVIGATE Clinician Contact & Progress Note   For Family Education Program    NAVIGATE Enrollee: Jerel Day (1995)     MRN: [id number]  Date:  2/13/19  Diagnosis(es): Schizoaffective disorder, bipolar type  Clinician: NAVIGATE Director & Family Clinician, SHIRA Alexandre     1. Type of contact: (majority of time spent)  Family Session    2. People present:   Writer  Client: Yes   Significant Other/Family/Friend:  Father    3. Total number of persons who participated in contact: 3, including writer    4. Length of Actual Contact: Start Time: 6pm; End Time: 7pm   Traveled?    No     5. Location of contact:  [facility] [place]    6. Did the client complete the home practice option(s) from the previous session: Not Applicable    7. Motivational Teaching Strategies:  Connect info and skills with personal goals  Promote hope and positive expectations    8. Educational Teaching Strategies:  Review of written material/education  Relate information to client's experience  Ask questions to check comprehension    9. CBT Teaching Strategies:  Reinforcement and shaping (positive feedback for steps towards goals and gains in knowledge & skills)  Relapse prevention planning (review of stressors and early warning signs)    10. Psychoeducational Topic(s) Addressed:  Just the Facts - Relapse Prevention Planning    11. Techniques utilized:   [place] announced at beginning of session  Review of goal  Review of previous meeting  Problem-solving practice  Help client choose a home practice option  Summarize progress made in current session    12. Assessment/Progress Note:     Reviewed Jerel's current status utilizing a relapse prevention lens by monitoring for early warning signs and triggers. Family identified warning signs to include poor hygiene, self care, thought disorganization, irritability, poor school attendance.  Reviewed ways to mitigrate risk to include increased family contact, utilization of case  "manager and other community supports, reduce/eliminate use of mj, continued medication adherence, socialization.    Jerel and his father informed this writer that Jerel's apartment building is being demolished, and they have found a new apartment for him, signing a 6 month lease.  This writer expressed significant concerns of Jerel living independently, expressing he has been unable to demonstrate an ability to adequately care for himself in a way that supports his well being and continued recovery. Writer expressed the need to increase contact and discuss alternative shared living options, which Jerel was disinterested in considering.    Jerel appeared cognitively intact, still reports consistent a/h, but reported no v/h, h/i, v/i, sib. Jerel reports MJ use of 3 times per week, and also reported he is buying some form of CBD capsules.  Jerel reports mood is \"ok\" with anxiety being moderate.  He reports some command hallucinations encouraging him to do certain things, none of which he reports are dangerous or violent. He reports these are not distressing to him most of the time, and is able to recognize them as part of his illness, though sometimes get's lost in them.      Writer shared of upcoming employment transition, and offered to transition their care to other members of the team.  Both Jerel and his father were open to this transition, so appointments will be scheduled for next week.     Jerel expressed interest in continuing to see Ana Michel for SEE services, and reports follow through related to his commitment expectations.      Home practice: Make a list of \"ADL's\" and complete them daily (shower, eat, remove garbage/old food, clean dishes.  In addition to this, review goal sheet and select one thing each day to focus on.        13. Plan/Referrals:     -Attend IRT visit with Tammi Connell week of 2/18  -Family to meet with Ana Rose to discuss family support needs  -Work on goals and ADL's as indicated " above    Shai Londono, [facility]   NAVIGATE Director & Family Clinician

## 2019-02-15 NOTE — PROGRESS NOTES
"NAVIGATE SEE Progress Note   For Supported Employment & Education    NAVIGATE Enrollee: Jerel Day (1995)     MRN: [id number]  Date:  2/14/19  Clinician: NAVIGATE Supported Employment & , Ana Michel    1. Client Status Update:   Jerel Day is interested in education (Client participated in benefits counseling)    2. People present:   SEE/Writer  Client: Jerel Day  Other:  Nola Summers ARIANNE  Phone call w mom to follow up    3. Total number of persons who participated in contact: (do not count yourself/SEE) 2    4. Length of Actual Contact: 60 minutes   Traveled? Yes  Total Travel Time: 40 minutes    5. Location of contact:  Other: [employer]    6. Brief description of session, contact, or client status (include: strategies, interventions, client reaction to contact, next steps, etc)    Writer, Jerel and his  Nola all met at the [facility] [employer] to check in on how school has been going as well as offer Jerel additional services and resources to assist him at home. Jerel reports he is enrolled in one class at the [employer] and says that he thinks its \"going okay\" but isn't sure what his grade is.  This writer has been in contact with his advisor, Sydnee in order to send documentation about Jerel's concerns regarding his symptoms and accommodations.  Jerel, this writer and Nola discussed the SMRT process, applying for social security and a CADI waiver. We discussed the process of these applications and what is expected of Jerel. Jerel was open to this process and getting additional services in his apartment such as meal delivery, homemaker services, peer support services, [facility] as well as shopping/pca services. Jerel signed releases of information for the SMRT team to contact him and access NAVIGATE and other medical records.   Jerel reports that last night he had 'pretty bad' auditory hallucinations and reported that the voices were mean and were telling him to use " his e-cigarette more than he wanted. He also reported sleep has been disrupted because of the AH and that last night he took hydroxyzine for sleep last night for the first time. He reports continued neck, back and knee pain. Writer offered to share this information with his doctor.  This writer called and lvm for Mesha Day with information about this meeting and offered a phone call if she had any questions.       7. Completion of mutually agreed upon client task from previous meeting:  Completed    8. Orientation and Treatment Planning:  Pursuing current SEE goals    9. Assessment:  Assessing client's need for follow-along supports    10. Placement:  Not Applicable    11. Follow Along Supports: (for clients who are working or attending school)   Not Applicable    12. Mutually agreed upon client task for next meeting:     Send accommodation letters to his instructor    13. Next Meeting Scheduled for: next week wed at 1130    [facility] Supported Employment &

## 2019-02-20 ENCOUNTER — OFFICE VISIT (OUTPATIENT)
Dept: PSYCHIATRY | Facility: CLINIC | Age: 24
End: 2019-02-20
Payer: COMMERCIAL

## 2019-02-20 DIAGNOSIS — F25.0 SCHIZOAFFECTIVE DISORDER, BIPOLAR TYPE (H): Primary | ICD-10-CM

## 2019-02-20 NOTE — PROGRESS NOTES
NAVIGATE SEE Progress Note   For Supported Employment & Education    NAVIGATE Enrollee: Jerel Day (1995)     MRN: [id number]  Date:  2/20/19  Clinician: LILLIANA Supported Employment & , Ana Michel    1. Client Status Update:   Jerel Day is interested in education (Client continuing a class or school program)    2. People present:   SEE/Writer  Client: Jerel Day      3. Total number of persons who participated in contact: (do not count yourself/SEE) 1    4. Length of Actual Contact: 15 minutes   Traveled? No    5. Location of contact:  Telephone    6. Brief description of session, contact, or client status (include: strategies, interventions, client reaction to contact, next steps, etc)    Jerel and this writer spoke on the phone instead of meeting in person due to the weather. We rescheduled to meet in person tomorrow, 2/21 at 1:00 at a coffee shop. Jerel reports school is going well and is planning on attending the Job and Internship fair this Friday, 2/22. Jerel would like assistance with planning/practicing speaking to potential employers and would want some help printing his resume. We agreed to meet tomorrow to go over these items as well as begin the application process for Social Security.     7. Completion of mutually agreed upon client task from previous meeting:  Not Applicable    8. Orientation and Treatment Planning:  Pursuing current SEE goals    9. Assessment:  Assessing client's need for follow-along supports    10. Placement:  Not Applicable    11. Follow Along Supports: (for clients who are working or attending school)   Education (Assisting with development and use of natural support for school)    12. Mutually agreed upon client task for next meeting:     na    13. Next Meeting Scheduled for: 2/21 at 1:00    Ana TIJERINA Supported Employment &

## 2019-02-21 ENCOUNTER — OFFICE VISIT (OUTPATIENT)
Dept: PSYCHIATRY | Facility: CLINIC | Age: 24
End: 2019-02-21
Payer: COMMERCIAL

## 2019-02-21 DIAGNOSIS — F25.0 SCHIZOAFFECTIVE DISORDER, BIPOLAR TYPE (H): Primary | ICD-10-CM

## 2019-02-22 ENCOUNTER — OFFICE VISIT (OUTPATIENT)
Dept: PSYCHIATRY | Facility: CLINIC | Age: 24
End: 2019-02-22
Payer: COMMERCIAL

## 2019-02-22 DIAGNOSIS — F25.0 SCHIZOAFFECTIVE DISORDER, BIPOLAR TYPE (H): Primary | ICD-10-CM

## 2019-02-22 NOTE — PROGRESS NOTES
"NAVIGATE SEE Progress Note   For Supported Employment & Education    NAVIGATE Enrollee: Jerel Day (1995)     MRN: [id number]  Date:  2/22/19  Clinician: NAVIGATE Supported Employment & , Ana Michel    1. Client Status Update:   Jerel Day is interested in employment (Client had in person contact with potential employer)    2. People present:   SEE/Writer  Client: Jerel Day  Potential employer    3. Total number of persons who participated in contact: (do not count yourself/SEE) 1    4. Length of Actual Contact: 120 minutes   Traveled? Yes  Total Travel Time: 40 minutes    5. Location of contact:  Other: Job Fair    6. Brief description of session, contact, or client status (include: strategies, interventions, client reaction to contact, next steps, etc)     and Jerel met at [facility] and took a Lyft to the [facility] for the [facility] Internship and Job Fair. Jerel had cut his hair, was wearing a button-down and tie and dress pants/shoes. He came prepared with printed resumes and while in the car, we practiced his \"elevator speech\". Writer encouraged Jerel to introduce himself, state his major and what questions he had for each company. Jerel says he wanted to use the job fair today as good practice for the future and to learn about what's out there. Jerel approached several tables and did well interacting. He asked appropriate questions but stated that he felt uncomfortable talking to so many new people. Writer provided positive feedback for being outside his comfort zone. Will meet again next week to discuss applying for benefits as Jerel said today he believes it might affect his ability to get a job.    7. Completion of mutually agreed upon client task from previous meeting:  Completed    8. Orientation and Treatment Planning:  Pursuing current SEE goals    9. Assessment:  Assessing client's need for follow-along supports    10. " Placement:  Employment  (Job searching (At business/employers))    11. Follow Along Supports: (for clients who are working or attending school)   Not Applicable    12. Mutually agreed upon client task for next meeting:     na    13. Next Meeting Scheduled for: next week    Ana TIJERINA Supported Employment &

## 2019-02-25 NOTE — PROGRESS NOTES
NAVIGATE SEE Progress Note   For Supported Employment & Education    NAVIGATE Enrollee: Jerel Day (1995)     MRN: [id number]  Date:  2/21/19  Clinician: MIQUELATE Supported Employment & , Ana Michel    1. Client Status Update:   Jerel Day is interested in employment (Client participated in benefits counseling)    2. People present:   SEE/Writer  Client: Jerel Day    3. Total number of persons who participated in contact: (do not count yourself/SEE) 1    4. Length of Actual Contact: 45 minutes   Traveled? Yes  Total Travel Time: 20 minutes    5. Location of contact:  Other: Coffee Shop    6. Brief description of session, contact, or client status (include: strategies, interventions, client reaction to contact, next steps, etc)    Jerel and this writer met to discuss applying for social security benefits and to practice/review for the [facility] Job and Internship fair tomorrow, 2/22. Jerel attempted to create a social security login on the website for easy application. Unfortunately, Jerel was not able to verify his identity. We discussed next steps including applying in person or using a paper application. Jerel was unsure if he wanted to apply after finding out we may need to attend an in-person meeting and that it might affect his wages if he started working. Writer offered a longer appointment time next week to discuss social security benefits in further detail and to weigh the pros and cons.     We then reviewed the website for the job fair and writer encouraged Jerel to review the employer list and see if he has any companies he is particularly interested in. Writer reminded Jerel of job fair etiquette and a checklist of what he will need to do/bring including: wash hair/shower, wear button down/tie, dress pants and nice shoes, copies of resume, student ID and cell phone. We role played his elevator speech and practiced introductions. Jerel then asked if this writer could attend the  job fair with him and writer agreed. Planning on meeting tomorrow at [facility] to take Raz to job fair together.      7. Completion of mutually agreed upon client task from previous meeting:  Completed    8. Orientation and Treatment Planning:  Pursuing current SEE goals    9. Assessment:  Assessing client's need for follow-along supports    10. Placement:  Not Applicable    11. Follow Along Supports: (for clients who are working or attending school)   Education (Providing social skills training for school)    12. Mutually agreed upon client task for next meeting:     See above    13. Next Meeting Scheduled for: 2/22 at 10a    Ana Michel  [facility] Supported Employment &

## 2019-02-27 ENCOUNTER — APPOINTMENT (OUTPATIENT)
Dept: PSYCHIATRY | Facility: CLINIC | Age: 24
End: 2019-02-27
Payer: COMMERCIAL

## 2019-03-06 ENCOUNTER — OFFICE VISIT (OUTPATIENT)
Dept: PSYCHIATRY | Facility: CLINIC | Age: 24
End: 2019-03-06
Payer: COMMERCIAL

## 2019-03-06 ENCOUNTER — ALLIED HEALTH/NURSE VISIT (OUTPATIENT)
Dept: PSYCHIATRY | Facility: CLINIC | Age: 24
End: 2019-03-06
Payer: COMMERCIAL

## 2019-03-06 VITALS — HEART RATE: 79 BPM | DIASTOLIC BLOOD PRESSURE: 64 MMHG | SYSTOLIC BLOOD PRESSURE: 99 MMHG

## 2019-03-06 DIAGNOSIS — F25.0 SCHIZOAFFECTIVE DISORDER, BIPOLAR TYPE (H): Primary | ICD-10-CM

## 2019-03-06 NOTE — PROGRESS NOTES
NAVIGATE Clinician Contact & Progress Note   For Family Education Program    NAVIGATE Enrollee: Jerel Day (1995)     MRN: [id number]  Date:  3/06/19  Diagnosis(es):  Schizoaffective disorder, bipolar type   Clinician: NAVIGATE Director & Family Clinician, Shai Londono [facility]     1. Type of contact: (majority of time spent)  Family Session    2. People present:   Writer  Client: Yes   Significant Other/Family/Friend:  Mother and Father    3. Total number of persons who participated in contact: 4, including writer    4. Length of Actual Contact: Start Time: 5pm; End Time: 6pm   Traveled?    No     5. Location of contact:  Psychiatry Clinic, [place]    6. Did the client complete the home practice option(s) from the previous session: Completed    7. Motivational Teaching Strategies:  Connect info and skills with personal goals  Promote hope and positive expectations    8. Educational Teaching Strategies:  Review of written material/education  Relate information to client's experience  Ask questions to check comprehension    9. CBT Teaching Strategies:  Reinforcement and shaping (positive feedback for steps towards goals and gains in knowledge & skills)    10. Psychoeducational Topic(s) Addressed:  Relapse Prevention Planning    11. Techniques utilized:   [place] announced at beginning of session  Review of goal  Review of previous meeting  Present new material  Problem-solving practice  Help client choose a home practice option  Summarize progress made in current session    12. Assessment/Progress Note:     Reviewed Jerel's current status utilizing a relapse prevention lens by monitoring for early warning signs and triggers. Reviewed ways to mitigrate risk for relapse including use of coping skills, family support and NAVIGATE support. Discussed Jerel's goals and relevant progress, and ways family can be helpful.     Writer facilitated patient/parents to discuss and complete below safety plan template  for upcoming family trip to [place] (Leaving on 3/9/19).  We reviewed their completed plans, and reinforced the practice of good self care, with an emphasis also on maintaining good communication.  Reviewed Jerel's participation in NAVIGATE. Jerel and family are participating in all NAVIGATE services.        List coping strategies -What are your most used coping strategies when under stress.   Plan for taking meds -How will you ensure you are taking medications consistently, given the time change?   Plan for managing conflict -Take a bread if voices raised  -Agree to disagree  -If differing opinions about what to do, discuss alternatives ahead of time.   Sleep: Plan for getting enough sleep/naps -Poor sleep exacerbates symptoms.  Have a plan to ensure you are getting enough sleep.   Communication plan if symptomatic and/or needing space -How Jerel will inform family members, and what should family do/not do to be supportive.  -If feeling that way, what do you need to do to take care of yourself?   Emergency/Crisis response (hospital or clinic nearest you) -Address of clinic/hospital  -Phone number   Triggers to symptom exacerbation -Stress  -Conflict/arguing  -Lack of sleep  -Poor nutrition     Phone support: Call clinic to schedule phone check in if needed If you would like to connect with Tammi or Ana, contact the clinic and request to be put on their schedule for a 15 minute phone check-in. You can call the main clinic number at [phone].   Communicate and plan ahead regarding activities -Planned events  -Independent events  -What to do if you disagree     Unsafe behaviors -Swearing, yelling, arguing, property destruction, stopping medications, violence/threats of violence, going off alone w/o telling someone where you are, drug use, not sleeping, not communicating.       Home practice identified: Add to safety plan prior to leaving for [place] as needed.       13. Plan/Referrals:     Will meet with family  monthly as schedule allows for evidence based family psychoeducation and therapeutic support aimed at maximizing Jerel's opportunity for recovery from psychosis.       Shai Londono, [facility]   NAVIGATE Director & Family Clinician

## 2019-03-06 NOTE — NURSING NOTE
Jerel Day comes into clinic today at the request of Vince Zheng MD Ordering Provider for Med Injection only Abilify Maintena.    Medication checked by: Ginger Myles RNCC  Prior to administration pt identified by name and : yes    Appearance: dressed casually, smelled of MONICA   Mood: depressed  Affect: flat  Eye contact: poor  Side effects: arm soreness post injection   Level of cooperation: cooperative   Education: none needed  Next appt: 4 weeks       Medication provided by Pharmacy      This service provided today was under the supervising provider of the day Rhett Lui MD, who was available if needed.    MILAGRO THOMPSON

## 2019-03-07 ENCOUNTER — OFFICE VISIT (OUTPATIENT)
Dept: PSYCHIATRY | Facility: CLINIC | Age: 24
End: 2019-03-07
Payer: COMMERCIAL

## 2019-03-07 ENCOUNTER — TELEPHONE (OUTPATIENT)
Dept: PSYCHIATRY | Facility: CLINIC | Age: 24
End: 2019-03-07

## 2019-03-07 DIAGNOSIS — F25.0 SCHIZOAFFECTIVE DISORDER, BIPOLAR TYPE (H): Primary | ICD-10-CM

## 2019-03-07 ASSESSMENT — ANXIETY QUESTIONNAIRES
1. FEELING NERVOUS, ANXIOUS, OR ON EDGE: MORE THAN HALF THE DAYS
2. NOT BEING ABLE TO STOP OR CONTROL WORRYING: SEVERAL DAYS
3. WORRYING TOO MUCH ABOUT DIFFERENT THINGS: SEVERAL DAYS
5. BEING SO RESTLESS THAT IT IS HARD TO SIT STILL: SEVERAL DAYS
GAD7 TOTAL SCORE: 8
6. BECOMING EASILY ANNOYED OR IRRITABLE: SEVERAL DAYS
7. FEELING AFRAID AS IF SOMETHING AWFUL MIGHT HAPPEN: SEVERAL DAYS

## 2019-03-07 ASSESSMENT — PATIENT HEALTH QUESTIONNAIRE - PHQ9
5. POOR APPETITE OR OVEREATING: SEVERAL DAYS
SUM OF ALL RESPONSES TO PHQ QUESTIONS 1-9: 12

## 2019-03-07 NOTE — TELEPHONE ENCOUNTER
NAVIGATE Family Peer Support  A Part of the [facility] First Episode of Psychosis Program     Patient Name: Jerel Day  /Age:  1995 (23 year old)  Date of Encounter: 3/7/19  Length of Contact: 5 minutes    Reached out to offer resources and support to father, León Day.     León was at work so couldn't talk.  Family will be on vacation in [place] 3/8-3/15/19.  This writer will call the following week of 3/1/19.    CONSTANTINO FletcherATE Family Peer    [Billing Code 95775 for No Billable Service as family peer support is a nonbillable service]

## 2019-03-07 NOTE — PROGRESS NOTES
NAVIGATE SEE Progress Note   For Supported Employment & Education    NAVIGATE Enrollee: Jerel Day (1995)     MRN: [id number]  Date:  3/7/19  Clinician: LILLIANA Supported Employment & , Ana Michel    1. Client Status Update:   Jerel Day is interested in employment (Client visited potential place of employment)    2. People present:   SEE/Writer  Client: Jerel Day    3. Total number of persons who participated in contact: (do not count yourself/SEE) 1    4. Length of Actual Contact: 35 minutes   Traveled? Yes  Total Travel Time: 40 minutes    5. Location of contact:  Other: [employer], [facility]    6. Brief description of session, contact, or client status (include: strategies, interventions, client reaction to contact, next steps, etc)     and Jerel met at [employer] for a follow up SEE session. Jerel appeared tired and said that he hasn't been sleeping well and found out he received an F on an exam for his Cultural Psychology exam. He denies being upset about failing and feels strongly that he will be able to pass the class. He reports he moved into a new apartment, which he enjoys. Jerel reports that he is not interested in applying for social security or any other benefits as he feels its 'embarrassing. He says he would just like to get a job and focus on making some money, rather than school. Jerel says he does not plan on re-enrolling in the fall semester. When asked in what areas I can support him, Jerel said that he would like some help looking for part time jobs near his home where he can make $15/hr.  and Jerel plan on meeting when he returns from family vacation next week.    7. Completion of mutually agreed upon client task from previous meeting:  Not Applicable    8. Orientation and Treatment Planning:  Pursuing current SEE goals    9. Assessment:  Assessing client's need for follow-along supports    10. Placement:  Not  Applicable    11. Follow Along Supports: (for clients who are working or attending school)   Not Applicable    12. Mutually agreed upon client task for next meeting:     na    13. Next Meeting Scheduled for: tbd - once he returns    Ana TIJERINA Supported Employment &

## 2019-03-08 ASSESSMENT — ANXIETY QUESTIONNAIRES: GAD7 TOTAL SCORE: 8

## 2019-03-19 ENCOUNTER — OFFICE VISIT (OUTPATIENT)
Dept: PSYCHIATRY | Facility: CLINIC | Age: 24
End: 2019-03-19
Payer: COMMERCIAL

## 2019-03-19 DIAGNOSIS — F25.0 SCHIZOAFFECTIVE DISORDER, BIPOLAR TYPE (H): Primary | ICD-10-CM

## 2019-03-20 ENCOUNTER — OFFICE VISIT (OUTPATIENT)
Dept: PSYCHIATRY | Facility: CLINIC | Age: 24
End: 2019-03-20
Payer: COMMERCIAL

## 2019-03-20 ENCOUNTER — OFFICE VISIT (OUTPATIENT)
Dept: PSYCHIATRY | Facility: CLINIC | Age: 24
End: 2019-03-20

## 2019-03-20 VITALS
SYSTOLIC BLOOD PRESSURE: 121 MMHG | BODY MASS INDEX: 22.66 KG/M2 | HEART RATE: 76 BPM | TEMPERATURE: 97.6 F | WEIGHT: 151.2 LBS | DIASTOLIC BLOOD PRESSURE: 76 MMHG

## 2019-03-20 DIAGNOSIS — F25.0 SCHIZOAFFECTIVE DISORDER, BIPOLAR TYPE (H): Primary | ICD-10-CM

## 2019-03-20 DIAGNOSIS — M54.2 CERVICAL PAIN: Primary | ICD-10-CM

## 2019-03-20 DIAGNOSIS — F25.0 SCHIZOAFFECTIVE DISORDER, BIPOLAR TYPE (H): ICD-10-CM

## 2019-03-20 RX ORDER — GABAPENTIN 100 MG/1
100 CAPSULE ORAL 2 TIMES DAILY PRN
Qty: 60 CAPSULE | Refills: 1 | Status: ON HOLD | OUTPATIENT
Start: 2019-03-20 | End: 2019-09-17

## 2019-03-20 RX ORDER — GABAPENTIN 100 MG/1
100 CAPSULE ORAL 2 TIMES DAILY PRN
Qty: 60 CAPSULE | Refills: 1 | Status: SHIPPED | OUTPATIENT
Start: 2019-03-20 | End: 2019-03-20

## 2019-03-20 ASSESSMENT — PAIN SCALES - GENERAL: PAINLEVEL: MILD PAIN (3)

## 2019-03-20 NOTE — PROGRESS NOTES
"NAVIGATE Family Peer Support  A Part of the [facility] First Episode of Psychosis Program     Patient Name: Jerel Day  /Age:  1995 (23 year old)  Date of Encounter: 19  Length of Contact: 55 minutes    Reached out to offer resources and support to father, León Day.     This writer had a discussion with León regarding Jerel's behaviors that aren't always age appropriate, and Jerel's potential need to control certain situations due to lack of independence in certain areas of his life.     León expressed that he often feels like the \"middle man\" between family members and trying to keep conflict low. Parents are concerned about Jerel's future living situation and feel he needs more support with cleaning, washing dishes, personal hygiene.  This writer shared information regarding ILS services and other services funded through a [facility] waiver.  This writer encouraged León to reach out to [facility]  for resources.    León expressed concern over Jerel's finances and that it is a point of conflict.  This writer suggested León and Mesha (mother) discuss this with PRABHA Rose for suggestions.    Jerel doesn't get along with older sister, Tonya, and is disrespectful to her.  Younger son, Chilo, has expressed resentment toward Jerel.  This writer suggested family therapy with siblings and/or individual therapy for siblings to address their unique issues.    This writer was introduced to Mesha after the appointment, shared the [facility] resource packet, and offered future support.    CONSTANTINO FletcherATE Family Peer    [Billing Code 72365 for No Billable Service as family peer support is a nonbillable service]    "

## 2019-03-21 ASSESSMENT — PATIENT HEALTH QUESTIONNAIRE - PHQ9: SUM OF ALL RESPONSES TO PHQ QUESTIONS 1-9: 13
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
## 2019-03-21 NOTE — PROGRESS NOTES
"NAVIGATE Medication Management Progress Note  A Part of the [facility] First Episode of Psychosis Program    NAVIGATE Enrollee: Jerel Day (1995)     MRN: [id number]  Date:  3/20/19         Contributors to the Assessment     Chart Reviewed.   Interview completed with Jerel Day.         Chief Complaint       \"Things are fine\"           Interim History      Jerel Day is a 23 year old male who was last seen in clinic on 18 at which time pt was doing ok overall. The patient reports good treatment adherence.  History was provided by Jerel who was a fair historian.  Since the last visit:  -says he is dong well overall, continues to be moderately social with friends, continues to be engaged at school though he has some difficulty in some classes lately  - he continues to reports feeling neck and nerve pain.    - He does feel like his thinking has been cleared/improved on the medication. He continues to hear AH but is usually not distressed by them.  -He feels occasionally that he is between dimensions and that another being is controlling his body and if he tries to do anything other than its will he feels pain. Denies AH to harm self or others.   - He is usually falling asleep and staying asleep easily.   -occasional arguments with parents    PSYCH ROS:  Clinician Rating Form in [facility]  1. Depressed Mood: Rating 2  0 Not reported    1 Very mild occasionally feels sad or  down ; of questionable clinical significance   2 Mild occasionally feels moderately depressed or often feels sad or  down    3 Moderate occasionally feels very depressed or often feels moderately depressed   4 Moderately severe often feels very depressed   5 Severe feels very depressed most of the time   6 Very severe constant extremely painful feelings of depression   U Unable to assess      2. Anxiety/Worry: Ratin  0 Not reported    1 Very mild occasionally feels a little anxious; of questionable clinical significance   2 Mild " occasionally feels moderately anxious or often feels a little anxious or worried   3 Moderate occasionally feels very anxious or often feels moderately anxious   4 Moderately severe often feels very anxious or often feels moderately anxious   5 Severe feels very anxious or worried most of the time   6 Very severe patient is continually preoccupied with severe anxiety   U Unable to assess      3. Suicidal Ideation/Behavior: Ratin   0 Not reported    1 Very mild occasional thoughts of dying,  I d be better off dead  or  I wish I were dead    2 Mild frequents thoughts of dying or occasional thoughts of killing self, without plan or method   3 Moderate often thinks of suicide or has though of a specific method   4 Moderately severe has mentally rehearsed a specific method of suicide or has made a suicide attempt with questionable intent to die (e.r. takes aspirins and then tells family)   5 Severe has made preparations for a potentially lethal suicide attempt (e.g acquires a gun and bullets for an attempt)   6 Very severe has made a suicide attempt with an intent to die   U Unable to assess       4. Elevated/Expansive Mood: Ratin  0 Not at all    1 Very mild questionable; more cheerful than most people in his/her circumstances but of only possible clinical significance   2 Mild brief elevated/expansive mood but only somewhat out of proportion to the circumstances   3 Moderate brief/occasional elevation of mood which is clearly out of proportion to the circumstances   4 Moderately severe sustained/frequent elevation of mood which is clearly out of proportion to the circumstances   5 Severe mood is euphoric most of the time   6 Very severe sustained elevation;  everything is wonderful  almost all of the time   U Unable to assess      5. Hostility/Anger/Irritability/Aggressiveness: Ratin  0 Not at all    1 Very mild occasional irritability of doubtful clinical significance   2 Mild occasionally feels angry or  mild or indirect expressions of anger, e.g. sarcasm, disrespect or hostile gestures   3 Moderate frequently feels angry, frequent irritability or occasional direct expression of anger, e.g. yelling at others   4 Moderately severe often feels very angry, often yells at others or occasionally threatens to harm others   5 Severe has acted on his anger by becoming physically abusive on one or two occasions or makes frequent threats to harm others or is very angry most of the time   6 Very severe has been physically aggressive and/or required intervention to prevent assaultiveness on several occasions; or any serious assaultive act   U Unable to assess      6. Impulsive Behavior: Ratin  0 Not at all    1 Very mild one instance of impulsive behavior which is of doubtful clinical significance   2 Mild occasional impulsive acts, e.g. making phone calls at odd hours   3 Moderate occasional impulsive acts with some potential negative consequence, e.g. leaving work abruptly; changing plans without thinking   4 Moderately severe impulsive acts with definite negative consequences, e.g. overspending on non-essentials; repeated reckless sexual behavior   5 Severe impulsive acts with direct negative consequences, e.g. spends entire income on nonessentials without regard for basic needs   6 Very severe impulsive behavior which is potentially life threatening, e.g. jumps from dangerous height (without suicidal intent) or criminal behavior, e.g. impulsive robbery   U Unable to assess      7. Suspiciousness: Ratin  0 Not present    1 Very mild Seems on guard. Reluctant to respond to some  personal  questions. Reports being overly self-conscious in public   2 Mild Describes incidents in which others have harmed or wanted to harm him/her that sound plausible. Patient feels as if others are watching, laughing, or criticizing him/her in public, but this occurs only occasionally or rarely. Little or no preoccupation   3 Moderate  Says others are talking about him/her maliciously, have negative intentions, or may harm him/her. Beyond the likelihood of plausibility, but not delusional. Incidents of suspected persecution occur occasionally (less than once per week) with some preoccupation   4 Moderately severe Same as 3, but incidents occur frequently such as more than once a week. Patient is moderately preoccupied with ideas of persecution OR patient reports persecutory delusions expressed with much doubt (e.g. partial delusion)   5 Severe Delusional -- speaks of [place] plots, the FBI, or others poisoning his/her food, persecution by supernatural forces   6 Extremely severe Same as 5, but the beliefs are bizarre or more preoccupying. Patient tends to disclose or act on persecutory delusions.   U Unable to assess      8. Unusual Thought Content: Ratin  0 Not present    1 Very mild Ideas of reference (people may stare or may laugh at him), ideas of persecution (people may mistreat him). Unusual beliefs in psychic esquivel, spirits, UFOs, or unrealistic beliefs in one's own abilities. Not strongly held. Some doubt   2 Mild Same as 1, but degree of reality distortion is more severe as indicated by highly unusual ideas or greater conviction. Content may be typical of delusions (even bizarre), but without full conviction. The delusion does not seem to have fully formed, but is considered as one possible explanation for an unusual experience   3 Moderate Delusion present but no preoccupation or functional impairment. May be an encapsulated delusion or a firmly endorsed absurd belief about past delusional circumstances   4 Moderately severe Full delusion(s) present with some preoccupation OR some areas of functioning disrupted by delusional thinking   5 Severe Full delusion(s) present with much preoccupation OR many areas of functioning are disrupted by delusional thinking   6 Extremely severe Full delusions present with almost   U Unable to assess       9. Hallucinations: Rating: 3  0 Not present    1 Very mild While resting or going to sleep, sees visions, smells odors, or hears voices, sounds or whispers in the absence of external stimulation, but no impairment in functioning   2 Mild While in a clear state of consciousness, hears a voice calling the subject s name, experiences non-verbal auditory hallucinations (e.g., sounds or whispers), formless visual hallucinations, or has sensory experiences in the presence of a modality-relevant stimulus (e.g., visual illusions) infrequently (e.g., 1-2 times per week) and with no functional impairment   3 Moderate Occasional verbal, visual, gustatory, olfactory, or tactile hallucinations with no functional impairment OR non-verbal auditory hallucinations/visual illusions more than infrequently or with impairment   4 Moderately severe Experiences daily hallucinations OR some areas of functioning are disrupted by hallucinations   5 Severe Experiences verbal or visual hallucinations several times a day OR many areas of functioning are disrupted by these hallucinations   6 Extremely severe Persistent verbal or visual hallucinations throughout the day OR most areas of functioning are disrupted by these hallucinations   U Unable to assess      10. Conceptual Disorganization: Ratin  0 Not present    1 Very mild Peculiar use of words or rambling but speech is comprehensible   2 Mild Speech a bit hard to understand or make sense of due to tangentiality, circumstantiality, or sudden topic shifts   3 Moderate Speech difficult to understand due to tangentiality, circumstantiality, idiosyncratic speech, or topic shifts on many occasions OR 1-2 instances of incoherent phrases   4 Moderately severe Speech difficult to understand due to circumstantiality, tangentiality, neologisms, blocking, or topic shifts most of the time OR 3-5 instances of incoherent phrases   5 Severe Speech is incomprehensible due to severe impairments most  of the time. Many PSRS items cannot be rated by self-report alone   6 Extremely severe Speech is incomprehensible throughout interview   U Unable to assess      11. Avolition/Apathy: Ratin  0 Not at all    1 Very mild questionable decrease in time spent in goal-directed activities   2 Mild spends less time in goal-directed activities than is appropriate for situation and age   3 Moderate initiates activities at times but does not follow through   4 Moderately severe rarely initiates activity but will passively engage with encouragement   5 Severe almost never initiates activities; requires assistance to accomplish basic activities   6 Very severe does not initiate or persist in any goal-directed activity even with outside assistance   U Unable to assess      12. Asociality/Low Social Drive: Ratin  0 Not at all    1 Very mild questionable   2 Mild slow to initiate social interactions but usually responds to overtures by others   3 Moderate rarely initiates social interactions; sometimes responds to overtures by others   4 Moderately severe does not initiate but sometimes responds to overtures by others; little social interaction outside close family members   5 Severe never initiates and rarely encourages conversations or activities; avoids being with others unless prodded, may have contacts with family   6 Very severe avoids being with others (even family members) whenever possible, extreme social isolation   U Unable to assess      13. Adherence: Days: 0  The longest, continuous amount of time in days, since the last visit when the subject did not take medication     14. EPS Part I: Ratin  Rate Elbow Rigidity for all subjects  0 Normal   1 Slight stiffness and resistance   2 Moderate stiffness and resistance   3 Marked rigidity with difficulty in passive movement   4 Extreme stiffness and rigidity with almost a frozen joint   U Unable to assess           EPS Part 2: Signs of EPS: 0  Are there are other  signs of EPS (eg diminished arm swing, postural instability, cogwheeling, tremor, akinesia) present based upon patient report or exam?  0 No   1 Yes     15. Akathesia: Ratin  0 No restlessness reported or observed   1 Mild restlessness observed; e.g., occasional jiggling of the foot occurs when subject is seated   2 Moderate restlessness observed; e.g., on several occasions, jiggles foot, crosses and uncrosses legs or twists a part of the body   3 Restlessness is frequently observed; e.g., the foot or legs moving most of the time   4 Restlessness persistently observed; e.g., subject cannot sit still, may get up and walk   U Unable to assess     16. Dyskinetic Movement Ratings: Ratin  0 None   1 Minimal, may be extreme normal   2 Mild   3 Moderate   4 Severe   U Unable to assess     SIDE EFFECT ASSESSMENT:  Clinician Rating Form in [facility] - Side Effect Assessment  Address side effects reported by the patient and rate using this scale  0 Not present   1 Minimal, may be extreme normal   2 Mild   3 Moderate   4 Severe   U Unable to assess   P Present, but not related     Feeling dizzy or faint: 0  Blurred vision: 0  Dry mouth: U   Too much saliva/droolin  Nausea: U  Constipation: 0  Increased appetite: 0  Weight gain: 0  Weight loss: 0  Feeling tired/fatigue: 0  Daytime sedation: 0  Hypersomnia: 0  Insomnia: 0  Low libido: 0  Other problems with sex: 0  Breast enlargement or discharge: 0  Irregular Menstruation or amenorrhea: NA  Other (please list and rate): 0    RECENT SUBSTANCE USE:  Clinician Rating Form in [facility] - Substance Use Assessment  Alcohol Use Severity: Ratin  0 none   1 use without impairment: drinks but no immediate social or medical impairment   2 use with impairment: e.g. becomes grossly intoxicated; alcohol use or withdrawal compromises school, work or social functioning; alcohol use or withdrawal exacerbates symptoms (e.g. gets depressed when drinking)     Marijuana Use Severity:  Ratin  0 none   1 occasional use without impairment: e.g. uses marijuana a few days a month and has no immediate social or medical impairment   2 frequent use without impairment: e.g. uses marijuana several or more days a week but has no immediate social or medical impairment   3 use with impairment: e.g. becomes grossly intoxicated; marijuana use compromises school, work or social functioning; marijuana use exacerbates symptoms (e.g. gets paranoid when using)     Other Drug Use Severity: Ratin  0 none   1 occasional use without impairment: e.g. uses drug(s) a few days a month and has no immediate social or medical impairment   2 frequent use without impairment: e.g. uses drug(s) several or more days a week but has no immediate social or medical impairment   3 use with impairment: e.g. becomes grossly intoxicated; drug use compromises school, work or social functioning; drug use exacerbates symptoms (e.g. gets paranoid when using)         CURRENT SOCIAL HISTORY:  FINANCIAL SUPPORT- family or friend       CHILDREN- none       LIVING SITUATION- Currently staying with his parent's  in [place], [place] but plans to move back to his apartment on campus    SOCIAL/ SPIRITUAL SUPPORT- unknown       FEELS SAFE AT HOME- Yes      MEDICAL ROS:  Reports none      Denies akathisia, unusual movements and wt gain   10 point ROS neg other than the symptoms noted above in the HPI. Patient does report multiple physical concern including back, neck, hip and foot pain, concerns about metabolism. These complaints have been evaluated by sports medicine and primary care with unremarkable findings. PT exercises have been recommended.      Of note, history is positive for multiple sports injuries (former ) including concussion x2 with LOC once; ankle and hip injuries; scoliosis. His disorganized communication is likely interfering with an adequate health assessment.         First Episode of Psychosis History      DUP  (duration untreated psychosis):  Likely first experienced auditory hallucinations during the spring of 2016, possibly in the context of cannabis and LSD use. Did not seek treatment until behavior and presentation became significantly disorganized in January 2017. Medication adherence has been poor over the course of this last year.   Route to initial care: ED for disorganized behavior, somatic concerns   Medication adherence overall:  Fair to poor  General frequency of visits:  Recommend weekly to biweekly  Participation in groups:  none  Cognitive Remediation:  none  Other treatment history: See pertinent background      Reviewed for completion of First Episode work-up:  Yes  First episode workup:  Not Done (if completed, see LABS for results)  MATRICS Consensus Cognitive Battery:  Not Done (if completed, see LABS for results)         Medical/Surgical History     Penicillins    Patient Active Problem List   Diagnosis     Schizoaffective disorder (H)            Medications     Current Outpatient Medications   Medication Sig Dispense Refill     ARIPiprazole ER (ABILIFY MAINTENA) 400 MG extended release inj syringe Inject 1 Syringe (400 mg) into the muscle every 28 days 1 Syringe 11     gabapentin (NEURONTIN) 100 MG capsule Take 1 capsule (100 mg) by mouth 2 times daily as needed (nerve pain) 60 capsule 1     hydrOXYzine (VISTARIL) 50 MG capsule Take 50mg to 100mg as needed for anxiety 60 capsule 0     Previous medication trials:  Zoloft- stopped because of ASE (?)  fluoxetine  Risperidone 1 mg, reported akathisia at 3mg dose  Haloperidol 5mg bid, reported dystonia relieved with benadryl   Divalproex 750mg  Quetiapine 300mg- discontinued 1/2/18  Olanzapine 10mg- discontinued 1/29/18, EPS?  Alprazolam 0.5-1mg PRN          Vitals     /76   Pulse 76   Temp 97.6  F (36.4  C)   Wt 68.6 kg (151 lb 3.2 oz)   BMI 22.66 kg/m        Weight prior to medication: 130s         Mental Status Exam     Alertness: alert and  oriented  Appearance: casually groomed   Behavior/Demeanor: cooperative, with good eye contact   Speech: spontaneous and unremarkable   Language: intact  Psychomotor: fidgety  Mood: anxious  Affect: full range; was congruent to mood; was congruent to content  Thought Process/Associations: more organized, still containaing loose associations and neologisms/ word salad  Thought Content:  Reports suicidal and violent ideation (without intent or plan), delusions, preoccupations, over-valued ideas and paranoid ideation;  Denies obsessions , phobia  and magical thinking  Perception:  Reports auditory hallucinations;  Denies visual hallucinations  Insight: poor  Judgment: limited  Cognition: does  appear grossly intact; formal cognitive testing was not done         Labs and Data     RATING SCALES:  AIMS: done 3/5/18 with total score of 0     PHQ-9 SCORE 2/7/2019 3/7/2019 3/21/2019   PHQ-9 Total Score 9 12 13       ANTIPSYCHOTIC LABS ROUTINE    [glu, A1C, lipids (focus LDL), liver enzymes, WBC, ANEU, Hgb, plts]   q12 mo  Recent Labs   Lab Test 01/03/18  1054   GLC 73     Recent Labs   Lab Test 01/03/18  1054   CHOL 135   TRIG 93   LDL 70   HDL 46     Recent Labs   Lab Test 01/03/18  1054   AST 19   ALT 37   ALKPHOS 66     Recent Labs   Lab Test 01/03/18  1054   WBC 8.0   ANEU 5.5   HGB 13.9               Psychiatric Diagnoses     Schizoaffective disorder, bipolar type (F25.0)         Assessment     This patient is a 23 year old male who provides a history supporting the diagnoses listed directly above.  Further diagnostic clarification is not needed.          [place] PRESCRIPTION MONITORING PROGRAM [] was not checked today:  last ALPRAZOLAM 0.5 MG TABLET  dispensed 12/01/2017    PSYCHOTROPIC DRUG INTERACTIONS:   No major interactions.  Concomitant use of aripiprazole and lithium may increase risk for EPS     MANAGEMENT:  Monitoring for adverse effects, routine vitals, routine labs, using lowest therapeutic dose of  [olanzapine and lithium] and patient is aware of risks         Plan     1) PSYCHOTROPIC MEDICATIONS:  Start neurontin PRN BID for neck pain and anxiety  Continue Abilify Maintena 400mg qmonthly    2) THERAPY:  Continue IRT and SEE    3) REFERRALS:    No Referrals needed    4) RTC: 1 month    5) CRISIS NUMBERS:   Provided routinely in AVS.    TREATMENT RISK STATEMENT:  The risks, benefits, alternatives and potential adverse effects have been discussed and are understood by the pt. The pt understands the risks of using street drugs or alcohol. There are no medical contraindications, the pt agrees to treatment with the ability to do so. The pt knows to call the clinic for any problems or to access emergency care if needed.  Medical and substance use concerns are documented above.  Psychotropic drug interaction check was done, including changes made today.    Time spent with patient  >45min    PROVIDER:  Vince Zheng M.D., Ph.D.     Dept. of Psychiatry   [facility]

## 2019-03-28 NOTE — PROGRESS NOTES
NAVIGATE Clinician Contact & Progress Note   For Family Education Program    NAVIGATE Enrollee: Jerel Day (1995)     MRN: [id number]  Date:  3/20/19  Diagnosis(es):   Schizoaffective disorder, bipolar type  Clinician: MIQUELATE Director & Family Clinician, SHIRA Camarena     1. Type of contact: (majority of time spent)  Family Session    2. People present:   Writer  Client: No  Significant Other/Family/Friend:  Mother and Father    3. Total number of persons who participated in contact: 3, including writer    4. Length of Actual Contact: Start Time: 5:00; End Time: 6:00   Traveled?    No     5. Location of contact:  [facility], [place]    6. Did the client complete the home practice option(s) from the previous session: Not Applicable    7. Motivational Teaching Strategies:  Connect info and skills with personal goals  Promote hope and positive expectations  Explore pros and cons of change  Re-frame experiences in positive light    8. Educational Teaching Strategies:  Review of written material/education  Relate information to client's experience  Ask questions to check comprehension  Break down information into small chunks  Adopt client's language     9. CBT Teaching Strategies:  Reinforcement and shaping (positive feedback for steps towards goals, gains in knowledge & skills and follow-through on home assignments)  Relapse prevention planning (review of stressors, early warning signs, written plan to respond to signs and rehearse plan)  Behavioral tailoring (communication and problem solving)    10. Psychoeducational Topic(s) Addressed:  Just the Facts - Relapse Prevention Planning  Just the Facts - Problem Solving    11. Techniques utilized:   [place] announced at beginning of session  Review of homework  Review of goal  Review of previous meeting  Present new material  Problem-solving practice  Help client choose a home practice option  Summarize progress made in current  session    12. Assessment/Progress Note:     Met with family for the first time [facility].     Reviewed Jerel's current status utilizing a relapse prevention lens by monitoring for early warning signs and triggers. Family identified potential triggers to include ongoing mental health symptoms and school. Family identified worrisome observations to include poor hygiene, food safety, limited to no attention to IADLs and not letting family into his apartment. Family reported hope that Jerel would agree to a MNChoices assessment and subsequent in-home supports. Family stated they are providing financial support for Jerel's independent living arrangements including but not limited to rent and groceries with the expectation that Jerel either be going to school or working. Seemed unclear if Jerel is fully aware of this expectation. Nonetheless he is taking one college course. Discussed an ongoing source of friction between Jerel and dad including Jerel's request to access all of his investments so he can purchase a van too live in and travel to a warmer climate with.  Reviewed ways to mitigrate risk for relapse including use of coping skills, family support and NAVIGATE support. Overall, family demonstrated great ability to listen reflectively and empathize.      Home practice identified as continued observation of potential warning signs of relapse  and for family to generate questions/goals for Jerel and the team. Reviewed Jerel's participation in NAVIGATE. Jerel and family are participating in all NAVIGATE services.    13. Plan/Referrals:     Will meet with family monthly as schedule allows for evidence based family psychoeducation and therapeutic support aimed at maximizing Jerel's opportunity for recovery from psychosis.     Ana Rose, [facility]   NAVIGATE Director & Family Clinician

## 2019-04-01 ENCOUNTER — OFFICE VISIT (OUTPATIENT)
Dept: PSYCHIATRY | Facility: CLINIC | Age: 24
End: 2019-04-01
Payer: COMMERCIAL

## 2019-04-01 DIAGNOSIS — F25.0 SCHIZOAFFECTIVE DISORDER, BIPOLAR TYPE (H): Primary | ICD-10-CM

## 2019-04-02 ENCOUNTER — OFFICE VISIT (OUTPATIENT)
Dept: PSYCHIATRY | Facility: CLINIC | Age: 24
End: 2019-04-02
Payer: COMMERCIAL

## 2019-04-02 DIAGNOSIS — F25.0 SCHIZOAFFECTIVE DISORDER, BIPOLAR TYPE (H): Primary | ICD-10-CM

## 2019-04-02 NOTE — PROGRESS NOTES
NAVIGATE SEE Progress Note   For Supported Employment & Education    NAVIGATE Enrollee: Jerel Day (1995)     MRN: [id number]  Date:  4/2/19  Clinician: LILLIANA Supported Employment & , Ana Michel    1. Client Status Update:   Jerel Day is interested in education (Client continuing a class or school program) and employment (Client participated in benefits counseling)    2. People present:   SEE/Writer  Client: Jerel Day      3. Total number of persons who participated in contact: (do not count yourself/SEE) 1    4. Length of Actual Contact: 60 minutes   Traveled? Yes  Total Travel Time: 15 minutes    5. Location of contact:  Other: [facility] [employer] [address]    6. Brief description of session, contact, or client status (include: strategies, interventions, client reaction to contact, next steps, etc)    Writer and Jerel met for a follow up SEE session.     School: Jerel failed his last exam in his psychology course this semester and is meeting with his professor today at 2pm to discuss if he would be able to still turn in some journals and complete a quiz for partial credit. He will also be inquiring about the material that he missed while he was on vacation and any other study tips. Writer wrote down these points for Jerel to bring with him to their appt.     Services: Discussed social security and his meeting as well as my conversation with Nola, his . Jerel discussed medications and how he doesn't like the injection as it makes him feel discomfort in the injection site. He did say that the antipsychotics are helpful in some ways such as reducing pain, less penny and depression. Writer encouraged Jerel to talk to Dr. Zheng about changing medications after his commitment or planning with him to taper (rather than going cold turkey). Jerel is motivated by the reduction in symptoms and his fear of returning to the hospital.     Employment: Jerel walked down  [address] a few times this week looking for places that were hiring. He applied at the [employer] and discussed jobs at [employer] though he wouldn't be making as much as he would like if he worked in fast food or retail. We searched online and found 4 other positions he would be interested in. Jerel requested that this writer continue to email him postings and if he feels they are a good fit, he will apply.     7. Completion of mutually agreed upon client task from previous meeting:  Partially Completed    8. Orientation and Treatment Planning:  Pursuing current SEE goals    9. Assessment:  Assessing client's need for follow-along supports    10. Placement:  Employment  (Job searching (At business/employers))    11. Follow Along Supports: (for clients who are working or attending school)   Education (Assisting with development and use of natural support for school)    12. Mutually agreed upon client task for next meeting:     Apply to at least 1 job, meet with professor    13. Next Meeting Scheduled for: next week    Ana TIJERINA Supported Employment &

## 2019-04-02 NOTE — PROGRESS NOTES
MIQUELATE SEE Incoming Telephone Call  For Supported Employment & Education    NAVIGATE Enrollee: Jerel Day (1995)     MRN: [id number]  Incoming Call Received on: 4/1/19  Call Received from: Nola WEBER's response to incoming call:   Nola called this writer in the am for a check in how Jerel has been doing. This writer reports that Jerel has made most of his appts with the NAVIGATE team (has rescheduled a few SEE appts but has made efforts to communicate), is consistently getting his injection and recently returned from a vacation with his family in [place].     We discussed the fact that Jerel is no longer interested in applying for social security or a CADI waiver and is now focused on finding a part time job. He is currently failing his one course at the [employer] but will be visiting his professor for office hours to see if it is manageable to stay in the course and obtain a passing grade.     This writer did report that Jerel intends to stop his medications after his commitment. This writer has encouraged Jerel to discuss with his doctor a way to taper off or explore other medications that might be beneficial as Jerel has identified that antipsychotic medications are helpful for him in some areas (focus/attention, back and neck pain, not as depressed) but he does not like the injection. After consultation with the team, this writer will also call and update Jerel's mom who is not aware Jerel has turned down outside services.    Ana Michel

## 2019-04-03 ENCOUNTER — OFFICE VISIT (OUTPATIENT)
Dept: PSYCHIATRY | Facility: CLINIC | Age: 24
End: 2019-04-03
Payer: COMMERCIAL

## 2019-04-03 ENCOUNTER — ALLIED HEALTH/NURSE VISIT (OUTPATIENT)
Dept: PSYCHIATRY | Facility: CLINIC | Age: 24
End: 2019-04-03
Payer: COMMERCIAL

## 2019-04-03 VITALS — DIASTOLIC BLOOD PRESSURE: 63 MMHG | SYSTOLIC BLOOD PRESSURE: 105 MMHG | HEART RATE: 80 BPM

## 2019-04-03 DIAGNOSIS — F25.0 SCHIZOAFFECTIVE DISORDER, BIPOLAR TYPE (H): Primary | ICD-10-CM

## 2019-04-03 NOTE — NURSING NOTE
Jerel Day comes into clinic today at the request of Vince Zheng MD Ordering Provider for Med Injection only Abilify Maintena .    Medication checked by: Ginger Myles RNCC  Prior to administration pt identified by name and : yes    Appearance: dressed casually   Mood: angry   Affect: blunt  Eye contact: poor  Side effects: denies   Level of cooperation: cooperative  Education: none needed   Next appt: 4 weeks     Patient reported that his commitment is up on 19 and is wanting to discontinue abilify after that.        Medication provided by Pharmacy      This service provided today was under the supervising provider of the day Vince Zheng MD, who was available if needed.    MILAGRO THOMPSON

## 2019-04-04 ASSESSMENT — ANXIETY QUESTIONNAIRES
6. BECOMING EASILY ANNOYED OR IRRITABLE: SEVERAL DAYS
GAD7 TOTAL SCORE: 8
2. NOT BEING ABLE TO STOP OR CONTROL WORRYING: SEVERAL DAYS
7. FEELING AFRAID AS IF SOMETHING AWFUL MIGHT HAPPEN: SEVERAL DAYS
5. BEING SO RESTLESS THAT IT IS HARD TO SIT STILL: SEVERAL DAYS
1. FEELING NERVOUS, ANXIOUS, OR ON EDGE: MORE THAN HALF THE DAYS
3. WORRYING TOO MUCH ABOUT DIFFERENT THINGS: SEVERAL DAYS

## 2019-04-04 ASSESSMENT — PATIENT HEALTH QUESTIONNAIRE - PHQ9
5. POOR APPETITE OR OVEREATING: SEVERAL DAYS
SUM OF ALL RESPONSES TO PHQ QUESTIONS 1-9: 11

## 2019-04-05 ASSESSMENT — ANXIETY QUESTIONNAIRES: GAD7 TOTAL SCORE: 8

## 2019-04-08 ENCOUNTER — TELEPHONE (OUTPATIENT)
Dept: PSYCHIATRY | Facility: CLINIC | Age: 24
End: 2019-04-08

## 2019-04-08 NOTE — TELEPHONE ENCOUNTER
-Writer received phone call back from Nola.  She states that her final report due to the court in a couple of weeks, and that she will be gathering more information from team.  Writer gave the recommendation that commitment should be extended due to patient stating clearly during last visit that he would not continue medications.  She states she is going to discuss case with supervisor and will also call back to talk with Dr. Zheng.      -Per Nola commitment expires on 5/26/19

## 2019-04-08 NOTE — TELEPHONE ENCOUNTER
NAVIGATE Outreach  A Part of the [facility] First Episode of Psychosis Program     Patient Name: Jerel Day  /Age:  1995 (23 year old)    Writer called Nola Mac,  at [facility].  Received voicemail asking for a call back to discuss commitment with her.      Brittani Estrella RN   NAVIGATE RN Care Coordinator

## 2019-04-17 ENCOUNTER — OFFICE VISIT (OUTPATIENT)
Dept: PSYCHIATRY | Facility: CLINIC | Age: 24
End: 2019-04-17
Payer: COMMERCIAL

## 2019-04-17 DIAGNOSIS — F25.0 SCHIZOAFFECTIVE DISORDER, BIPOLAR TYPE (H): Primary | ICD-10-CM

## 2019-04-17 NOTE — PROGRESS NOTES
NAVIGATE Clinician Contact & Progress Note   For Family Education Program    NAVIGATE Enrollee: Jerel Day (1995)     MRN: [id number]  Date:  4/17/19  Diagnosis(es):   Schizoaffective disorder, bipolar type  Clinician: MIQUELATE Director & Family Clinician, SHIRA Camarena     1. Type of contact: (majority of time spent)  Family Session    2. People present:   Writer  Client: No  Significant Other/Family/Friend:  Mother and Father    3. Total number of persons who participated in contact: 3, including writer    4. Length of Actual Contact: Start Time: 5:05; End Time: 6:05pm   Traveled?    No     5. Location of contact:  Psychiatry Clinic, [place]    6. Did the client complete the home practice option(s) from the previous session: Completed    7. Motivational Teaching Strategies:  Connect info and skills with personal goals  Promote hope and positive expectations  Explore pros and cons of change  Re-frame experiences in positive light    8. Educational Teaching Strategies:  Review of written material/education  Relate information to client's experience  Ask questions to check comprehension  Break down information into small chunks  Adopt client's language     9. CBT Teaching Strategies:  Reinforcement and shaping (positive feedback for steps towards goals, gains in knowledge & skills and follow-through on home assignments)  Relapse prevention planning (review of stressors, early warning signs, written plan to respond to signs and rehearse plan)  Behavioral tailoring (communication and problem solving)    10. Psychoeducational Topic(s) Addressed:  Just the Facts - Relapse Prevention Planning  Just the Facts - Problem Solving     11. Techniques utilized:   [place] announced at beginning of session  Review of homework  Review of goal  Review of previous meeting  Present new material  Problem-solving practice  Help client choose a home practice option  Summarize progress made in current  session    12. Assessment/Progress Note:     Reviewed Jerel's current status utilizing a relapse prevention lens by monitoring for early warning signs and triggers. Family identified potential triggers to include ongoing mental health symptoms, school stress, employment search. Family identified early warning signs to include not seeing Jerel as much. Reviewed ways to mitigrate risk for relapse including use of coping skills, family support and NAVIGATE support. Family reported Jerel does not seem to report as many paranoid thoughts, somatic complaints, or feeling the need to move to a warmer climate. Family presented with several questions for writer and other team members; see bullet pointed list below. Themes of conversation were regarding independent living ability, level of family support and associated expectations, and alternative living options. Mom reported Jerel was agreeable to looking at a different apartment when his lease is up in August 2019. He also accepted mom's help to find an apartment.     Questions from family:  -What is the status of Jerel receiving a tuition refund for a past incomplete semester?  -What is the anticipated outcome of Jerel's current college course?  -What level of disclosure, if any, did Jerel agree to for employment purposes?  -Does Jerel still report throwing up a lot as he has previously? If so, could this be due to food safety concerns in his home (e.g. Not refrigerating food when necessary)?  -Would Jerel agree to participating in a treatment planning meeting with him, family, and team members?  -What are Jerel's goals? (e.g. Previous and up-to-date IRT goal plan)   -Does Jerel identify as being lonely? Might this serve as motivation for a group living environment?  -What does the team recommend for continually assessing safety, particularly with respect to ability to live independently?  -What are treatment objectives, interventions (on behalf of the team and Jerel) and associated  time frames? (I.e. Where are there opportunities to check progress and if not progressing, reassess the objective and/or the intervention?)  -Where does the team view Jerel with respect to recovery (e.g. Improving, regressing, plateaued)?      Reviewed Jerel's participation in NAVIGATE. Jerel and family are participating in all NAVIGATE services. Home practice identified as continued observation of potential warning signs of relapse, dad inviting Jerel out to a [employer] game, and exploring possible expectations to set once moved into his new apartment in order to continue to financial support Jerel living independently (e.g. Family comes by once a month to see condition of the apartment, hiring a house cleaning service a routine basis, etc.)    13. Plan/Referrals:     Will meet with family monthly as schedule allows for evidence based family psychoeducation and therapeutic support aimed at maximizing Jerel's opportunity for recovery from psychosis.     Ana Rose, [facility]   NAVIGATE Director & Family Clinician

## 2019-04-17 NOTE — Clinical Note
Harris Cadena & Ana. Thinking we should schedule a meeting for us to discuss Jerel's plan of care in more detail. See my note for the many many questions family had during their visit with me this evening. Ana

## 2019-04-19 NOTE — PROGRESS NOTES
LILLIANA Clinician Contact & Progress Note  For Individual Resiliency Training (IRT)  A Part of the [facility] First Episode of Psychosis Program    NAVIGATE Enrollee: Jerel Day (1995)     MRN: [id number]  Date:  3/20/19  Diagnosis: Schizoaffective disorder, bipolar type       Clinician: LILLIANA Individual Resiliency Trainer, EDELMIRA Morgan     1. Type of contact: (majority of time spent)  IRT Session    2. People present:   Writer  Client: Jerel Day    3. Total number of persons who participated in contact: 2, including writer    4. Length of Actual Contact: Start Time: 5pm; End Time: 6pm   Traveled?    No     5. Location of contact:  Psychiatry Clinic, [place]    6. Did the client complete the home practice option(s) from the previous session: Not Applicable    7. Motivational Teaching Strategies:  Connect info and skills with personal goals  Promote hope and positive expectations  Explore pros and cons of change  Re-frame experiences in positive light    8. Educational Teaching Strategies:  Review of written material/education  Relate information to client's experience  Ask questions to check comprehension  Break down information into small chunks  Adopt client's language     9. CBT Teaching Strategies:  Reinforcement and shaping (positive feedback for steps towards goals and gains in knowledge & skills)  Relapse prevention planning (review of stressors and early warning signs)  Coping skills training (review current coping skills and increase currently used skills)  Behavioral tailoring (fit taking medication into client's daily routine)    10. IRT Module(s) Addressed:  Module 2 - Assessment/Initial Goal Setting    11. Techniques utilized:   [place] announced at beginning of session  Review of goal  Present new material  Problem-solving practice  Help client choose a home practice option  Summarize progress made in current session    12. Mental Status Exam:    Alertness: alert  and  "sleepy  Appearance: disheveled  Behavior/Demeanor: cooperative and pleasant, with poor eye contact   Speech: slowed  Language: intact and no obvious problem. Preferred language identified as English.  Psychomotor: slowed  Mood: description consistent with euthymia  Affect: flat; was congruent to mood; was congruent to content  Thought Process/Associations: remarkable for  and response delay  Thought Content:  Reports delusions;  Denies suicidal ideation and violent ideation  Perception:  Reports none;  Denies auditory hallucinations and visual hallucinations  Insight: limited  Judgment: limited  Cognition: does  appear grossly intact; formal cognitive testing was not done  Suicidal ideation: denies SI, denies intent,  and denies plan  Homicidal Ideation: denies    13. Assessment/Progress Note:     Writer met with Jerel for initial IRT session since NAVIGATE Director and Family Clinician - AGA Narvaez, [facility] departure. Re-introduced self and set agenda to check-in, discuss therapy and recovery goals and hear from Jerel's perspective what he feels is going well and what he wants to continue to work on. During check-in, Jerel shared positively about recent trip to [place] with his brother, mom and dad. He shared positively about going to restaurants and discussed his favorite foods. Writer assessed current SI/HI/VI, which Jerel denied. Jerel reflected that he felt like he was \"able to manage distress\" while he was in [place]. Writer engaged Jerel in conversation about goals previously identified with Shai Londono in IRT; Jerel shared \"small gains\" in that he found a new apartment and feels healthier. Inquired as to what Jerel finds helpful in therapy sessions; Jerel shared assistance in strategizing next steps and organizing thoughts is beneficial during session. Writer offered validation and support. Discussed some of Jerel's interests to include guitar, music, video games, yoga, meditation and aroma therapy. Identified " "goal of most importance to Jerel at this time to be make more social connections. Assigned home practice: Jerel will attend a breakfast-on-the-go at his apartment building and engage in conversation with a fellow resident.     14. Plan/Referrals:     Will meet in two weeks for next IRT session. Client and family aware they can reach out to writer directly and/or NAVIGATE team should concerns or needs arise prior to next scheduled appointment.     Billing for \"Interactive Complexity\"?    No      EDELMIRA Morgan    NAVIGATE Individual Resiliency Trainer    Attestation:    I did not see this patient directly. This patient is discussed with me in individual clinical social work supervision, and I agree with the plan as documented.     AGA Camarena, LICSW, April 19, 2019      "

## 2019-04-23 ENCOUNTER — OFFICE VISIT (OUTPATIENT)
Dept: PSYCHIATRY | Facility: CLINIC | Age: 24
End: 2019-04-23
Payer: COMMERCIAL

## 2019-04-23 ENCOUNTER — TELEPHONE (OUTPATIENT)
Dept: PSYCHIATRY | Facility: CLINIC | Age: 24
End: 2019-04-23

## 2019-04-23 DIAGNOSIS — F25.0 SCHIZOAFFECTIVE DISORDER, BIPOLAR TYPE (H): Primary | ICD-10-CM

## 2019-04-23 NOTE — TELEPHONE ENCOUNTER
Vince Zheng MD Swanson, Melanie A, RN   Caller: Unspecified (Today,  9:39 AM)             Thank you, I think that is probably a good idea.

## 2019-04-23 NOTE — TELEPHONE ENCOUNTER
-Writer received a phone call from Nola.  Stated that she is going submit his paperwork to the courts today.  She is going to ask for an extension based on conversation with writer that he would stop all of his medications.  She reports that she will discuss with Jerel in the next couple of days and wanted us to be aware of this.

## 2019-04-23 NOTE — TELEPHONE ENCOUNTER
NAVIGATE SEE Outgoing Telephone Call  For Supported Employment & Education    NAVIGATE Enrollee: Jerel Day (1995)     MRN: [id number]  Date of Call: 4/23/2019  Contacted: Kimberly    Discussed:   Writer called and lvm regarding today's appt and update on goals for Jerel including passing his course at the [employer], turning in his tuition refund application, study skills, finding a job and informed family that commitment may be extended. Left contact info for family to return call with questions or concerns.     Ana Michel

## 2019-04-25 ENCOUNTER — OFFICE VISIT (OUTPATIENT)
Dept: PSYCHIATRY | Facility: CLINIC | Age: 24
End: 2019-04-25
Payer: COMMERCIAL

## 2019-04-25 DIAGNOSIS — F25.0 SCHIZOAFFECTIVE DISORDER, BIPOLAR TYPE (H): Primary | ICD-10-CM

## 2019-04-25 NOTE — PROGRESS NOTES
"NAVIGATE SEE Progress Note   For Supported Employment & Education    NAVIGATE Enrollee: Jerel Day (1995)     MRN: [id number]  Date:  4/23/19  Clinician: NAVIGATE Supported Employment & , Ana Michel    1. Client Status Update:   Jerel Day is interested in education (Client continuing a class or school program)    2. People present:   SEE/Writer  Client: Jerel Day    3. Total number of persons who participated in contact: (do not count yourself/SEE) 1    4. Length of Actual Contact: 35 minutes   Traveled? Yes  Total Travel Time: 40 minutes    5. Location of contact:  Other: [facility]    6. Brief description of session, contact, or client status (include: strategies, interventions, client reaction to contact, next steps, etc)     and Jerel met at [facility] for a follow up SEE session. Jerel had improved hygiene and stated he had been \"talking all morning with a homeless woman. She asked for money but I gave her a cigarette and we talked. She was nice.\"  Jerel also mentioned he is meeting a friend today to make music in his apartment and feels this friend is his best support. He said his friend has \"been through some of the same things I have and he keeps me on track.\"     Jerel is currently taking one course at the [employer], which he is currently failing. Jerel and this writer discussed this and Jerel feels that at time his TA treats him unfairly when grading his papers. Jerel said that the last assignment was to write a paper on immigrants and refugees and Jerel had said that he \"doesn't care one way or another what happens to them\" and that his TA failed him on that assignment. Jerel said he was upset and emailed the TA but that he told Jerel he did not feel comfortable meeting with him or discussing his paper. This writer offered to attend a session with the TA or professor to discuss his writing style and difficulty with being able to write out his " ideas clearly. Jerel denied wanting direct contact from his SEE but is interested in an extended session next week to study for the exam. Jerel reports that if he gets a B on the exam that he would be able to pass the class.     7. Completion of mutually agreed upon client task from previous meeting:  Partially Completed    8. Orientation and Treatment Planning:  Pursuing current SEE goals    9. Assessment:  Assessing client's need for follow-along supports    10. Placement:  Not Applicable    11. Follow Along Supports: (for clients who are working or attending school)   Education (Reviewing coping skills for symptoms for school)    12. Mutually agreed upon client task for next meeting:     na    13. Next Meeting Scheduled for: erwin 4/25     Ana LAFLEURATE Supported Employment &

## 2019-04-26 ENCOUNTER — OFFICE VISIT (OUTPATIENT)
Dept: PSYCHIATRY | Facility: CLINIC | Age: 24
End: 2019-04-26
Payer: COMMERCIAL

## 2019-04-26 DIAGNOSIS — F25.0 SCHIZOAFFECTIVE DISORDER, BIPOLAR TYPE (H): Primary | ICD-10-CM

## 2019-04-29 NOTE — PROGRESS NOTES
NAVIGATE SEE Incoming Telephone Call  For Supported Employment & Education    NAVIGATE Enrollee: Ralf Day (1995)     MRN: [id number]  Incoming Call Received on: 4/25/19  Call Received from: Naga Ericksonle's mom    SEE's response to incoming call:   Georgina called this writer back from an updated voicemail left on Tue, 4/23. Writer provided update to Georgina on goals Ralf is working on in SEE (passing his course, finding a part time job, discussing benefits, budgeting, applying for tuition refund for fall 2018, etc) and that Nola had filed paperwork to extend his commitment. Writer was not able to answer all of Georgina's questions regarding the commitment, but would follow up with Nola after she talked with ralf about it and will follow up with Georgina. Parents are concerned mostly right now about Ralf's ability to live independently and his spending habits. Writer reported that Ralf is open to working on budgeting once he gets a job. Will f/u with family wed 5/1.    Ana Michel

## 2019-04-29 NOTE — PROGRESS NOTES
LILLIANA Clinician Contact & Progress Note  For Individual Resiliency Training (IRT)  A Part of the [facility] First Episode of Psychosis Program    NAVIGATE Enrollee: Jerel Day (1995)     MRN: [id number]  Date:  4/03/19  Diagnosis: Schizoaffective disorder, bipolar type  Clinician: LILLIANA Individual Resiliency Trainer, EDELMIRA Morgan     1. Type of contact: (majority of time spent)  IRT Session    2. People present:   Writer  Client: Jerel Day    3. Total number of persons who participated in contact: 2, including writer    4. Length of Actual Contact: Start Time: 5pm; End Time: 6pm   Traveled?    No     5. Location of contact:  Psychiatry Clinic, [place]    6. Did the client complete the home practice option(s) from the previous session: Not Completed    7. Motivational Teaching Strategies:  Connect info and skills with personal goals  Promote hope and positive expectations  Explore pros and cons of change  Re-frame experiences in positive light    8. Educational Teaching Strategies:  Review of written material/education  Relate information to client's experience  Ask questions to check comprehension  Break down information into small chunks  Adopt client's language     9. CBT Teaching Strategies:  Reinforcement and shaping (positive feedback for steps towards goals and gains in knowledge & skills)  Relapse prevention planning (review of stressors and early warning signs)  Coping skills training (review current coping skills and increase currently used skills)  Behavioral tailoring (fit taking medication into client's daily routine)    10. IRT Module(s) Addressed:  Module 2 - Assessment/Initial Goal Setting  Module 13 - Nutrition and Exercise    11. Techniques utilized:   [place] announced at beginning of session  Review of homework  Review of goal  Review of previous meeting  Present new material  Problem-solving practice  Help client choose a home practice option  Summarize progress made in  "current session    12. Mental Status Exam:    Alertness: sleepy  Appearance: adequately groomed  Behavior/Demeanor: cooperative and guarded, with poor eye contact   Speech: slowed  Language: intact. Preferred language identified as English.  Psychomotor: slowed  Mood: description consistent with euthymia  Affect: flat and guarded; was congruent to mood; was congruent to content  Thought Process/Associations: remarkable for  and response delay  Thought Content:  Reports delusions;  Denies suicidal ideation and violent ideation  Perception:  Reports auditory hallucinations;  Denies visual hallucinations  Insight: limited  Judgment: limited  Cognition: does  appear grossly intact; formal cognitive testing was not done  Suicidal ideation: denies SI, denies intent,  and denies plan  Homicidal Ideation: denies    13. Assessment/Progress Note:     Writer met with Jerel on this date for IRT session. Writer set agenda to check-in, discuss and assess symptoms, explore material in IRT modules, discuss ways in which Jerel can expand coping strategies and stress-management techniques for ongoing symptom management, and check-in regarding goals for ongoing recovery.    During check-in, Jerel reported feeling \"about the same.\" He shared positively about going out to eat multiples times this past week and walking more around the city and to class. Jerel reports most of his symptoms are \"pretty suppressed.\" He reports he is still hearing voices everyday, predominantly 1 voice, but sometimes more. He reports they don't tell him to do things, but rather tell me to stop doing things i.e. vaping. He reports the voices overall are \"better recently\" and identifies being more relaxed, improving his nutrition, taking medication and working out has been helpful. Writer offered praise and encouragement to Jerel and the ways in which he is actively taking steps that promote his overall well-being and recovery.     Reviewed previous goal plan that " "Jerel had created with NAVIGATE Director and Family Clinician - AGA Narvae[facility]. Jerel provided the following update related to goals:    -Regarding self-care and improving hygiene: achieved.   Updated goal: wash clothes at least every 3 weeks.  Resources needed: motivation. Strategy: picture feeling of having clean clothes when it is time to do laundry.  Assigned home practice: by next session will have washed clothes 1x.     -Regarding apartment and getting couch: achieved.   Updated goal: Get a poster for apartment wall.  Resources needed: Money.  Action item/next steps: keep working with NAVIGATE SEE - YOSEPH Raymundo to explore work opportunities.     -Regarding relationships: wanting more socialization.  Action item/next steps: attend breakfast-on-the-go at apartment.     Additionally discussed goals related to personal interests such as guitar, music and chess.  and Jerel looked up clubs at the [facility] and provided contact information for 2 clubs that interested Jerel. Will continue to explore, monitor and assess goals.    14. Plan/Referrals:     Next IRT session scheduled in 2 weeks. Will continue goal discussion. Client and family aware they can reach out to writer directly and/or NAVIGATE team should concerns or needs arise prior to next scheduled appointment.     Billing for \"Interactive Complexity\"?    No      Tammi Connell ARIANNE LAFLEURATE Individual Resiliency Trainer    Attestation:    I did not see this patient directly. This patient is discussed with me in individual clinical social work supervision, and I agree with the plan as documented.     Ana Rose, AGA, LICSW, May 2, 2019      "

## 2019-04-30 ENCOUNTER — TELEPHONE (OUTPATIENT)
Dept: PSYCHIATRY | Facility: CLINIC | Age: 24
End: 2019-04-30

## 2019-04-30 ENCOUNTER — OFFICE VISIT (OUTPATIENT)
Dept: PSYCHIATRY | Facility: CLINIC | Age: 24
End: 2019-04-30
Payer: COMMERCIAL

## 2019-04-30 DIAGNOSIS — F25.0 SCHIZOAFFECTIVE DISORDER, BIPOLAR TYPE (H): Primary | ICD-10-CM

## 2019-04-30 NOTE — PROGRESS NOTES
"NAVIGATE SEE Progress Note   For Supported Employment & Education    NAVIGATE Enrollee: Jerel Day (1995)     MRN: [id number]  Date:  4/30/19  Clinician: NAVIGATE Supported Employment & , Ana Michel    1. Client Status Update:   Jerel Day is interested in education (Client continuing a class or school program)    2. People present:   SEE/Writer  Client: Jerel Day    3. Total number of persons who participated in contact: (do not count yourself/SEE) 1    4. Length of Actual Contact: 70 minutes   Traveled? Yes  Total Travel Time: 20 minutes    5. Location of contact:  Other: [facility]    6. Brief description of session, contact, or client status (include: strategies, interventions, client reaction to contact, next steps, etc)    Jerel and this writer met at [facility] to study for his exam that will take place this Thursday and to complete his applications/statement for the [employer]. We studied briefly but talked mostly about his commitment. He met with his  yesterday and she told him that our nurse Brittani informed her that Jerel did not plan on taking medications after this month and that she would potentially extend his commitment. Jerel was upset and seemed confused about what this means for him. Jerel told me that he felt Navigate \"stabbed him in the back\" and that he would like to discontinue all services. He also said that he told Nola that he would kill himself if the commitment was extended. When I asked what Nola's response to this was, Jerel said she responded by saying \"okay.\"    I did my best to explain its our role to communicate with the  on his progress and to share information that would keep him out of the hospital. He tried to cancel his IRT appt for tomorrow but we talked a bit more and decided that he would come in and meet with me and Tammi in order to talk it through. He said his parents would most likely drive him to the appt " but didn't want their involvement.      7. Completion of mutually agreed upon client task from previous meeting:  Nothing Completed    8. Orientation and Treatment Planning:  Pursuing current SEE goals    9. Assessment:  Assessing client's need for follow-along supports    10. Placement:  Not Applicable    11. Follow Along Supports: (for clients who are working or attending school)   Education (Reviewing stress management for school)    12. Mutually agreed upon client task for next meeting:     na    13. Next Meeting Scheduled for: tomorrow, 5/1    Ana TIJERINA Supported Employment &

## 2019-04-30 NOTE — TELEPHONE ENCOUNTER
"NAVIGATE Outreach  A Part of the [facility] First Episode of Psychosis Program     Patient Name: Jerel Day  /Age:  1995 (23 year old)    Contact: Writer called client regarding cancelled appointment for tomorrow at 5pm. Jerel confirmed he had cancelled his appointment, but is willing to come in to discuss circumstances; voiced his concerns regarding NAVIGATE program recommending extension of his commitment. Jerel additionally shared he revoked the release for NAVIGATE to communicate with his -Nola. Writer offered support, validation and echoed writer's desire to meet with Jerel to provide space for him to share. Writer assessed for safety. Jerel shared he will feel suicidal if his [place] order is extended as he expressed to NAVIGATE SEE - YOSEPH Raymundo on this date (see note for detail). Reported he is not feeling suicidal currently. Also reported he would be \"hesistant to share\" as he doesn't want things to be \"used against him.\" Jerel again replied \"no\" when writer asked if he is feeling suicidal presently. Writer reviewed crisis resources and encouraged client to utilize resources and/or go to the nearest ED for evaluation if suicidal thoughts/urges become imminent. Offered praise and encouragement for Jerel's willingness to attend appointment tomorrow and confirmed plan to meet at 5pm.     Plan: IRT session scheduled tomorrow at 5pm. Note routed to team.    Tammi Connell AM, LGSW  MIQUELATE Individual Resiliency  & Family Clinician    "

## 2019-04-30 NOTE — PROGRESS NOTES
"NAVIGATE SEE Progress Note   For Supported Employment & Education    NAVIGATE Enrollee: Jerel Day (1995)     MRN: [id number]  Date:  4/26/19  Clinician: NAVIGATE Supported Employment & , Ana Michel    1. Client Status Update:   Jerel Day is interested in education (Client continuing a class or school program)    2. People present:   SEE/Writer  Client: Jerel Day    3. Total number of persons who participated in contact: (do not count yourself/SEE) 1    4. Length of Actual Contact: 45 minutes   Traveled? Yes  Total Travel Time: 25 minutes    5. Location of contact:  Other: Coffee shop    6. Brief description of session, contact, or client status (include: strategies, interventions, client reaction to contact, next steps, etc)     and Jerel met for a follow up SEE session. Writer brought application forms for Jerel to sign/complete related to applying for a tuition refund. We completed the forms and made edits to his personal statement. After a first draft was complete of the letter, Jerel did say he wanted to work on it from home and make it \"more in his words\". Writer emailed draft to Jerel that we had and he will work on it at home.   Jerel mentioned that his  scheduled a meeting with him for Monday and will follow up with me with any questions. Jerel would like to schedule a longer session for Tuesday, 4/30 to study for his final exam.    7. Completion of mutually agreed upon client task from previous meeting:  Completed    8. Orientation and Treatment Planning:  Pursuing current SEE goals    9. Assessment:  Assessing client's need for follow-along supports    10. Placement:  Not Applicable    11. Follow Along Supports: (for clients who are working or attending school)   Education (Assisting with requesting accommodations, Assisting with development and use of natural support for school and Other, specify: completing forms)    12. Mutually agreed upon client " task for next meeting:     Edit his version of letter    13. Next Meeting Scheduled for: Tuesday, 4/30 at 11:30    Ana TIJERINA Supported Employment &

## 2019-04-30 NOTE — TELEPHONE ENCOUNTER
NAVIGATE SEE Outgoing Telephone Call  For Supported Employment & Education    NAVIGATE Enrollee: Jerel Day (1995)     MRN: [id number]  Date of Call: 4/30/2019  Contacted: Mesha    Discussed:   LVHARJINDER and informed Mesha that Jerel was agreeable to come in tomorrow for his IRT appt (he had attempted to cancel earlier) if this writer were present. Let Mesha know that Jerel still maintains his appts and SHIRA Morgan and I will meet to plan for future with him.    Ana Michel

## 2019-05-01 ENCOUNTER — OFFICE VISIT (OUTPATIENT)
Dept: PSYCHIATRY | Facility: CLINIC | Age: 24
End: 2019-05-01
Payer: COMMERCIAL

## 2019-05-01 ENCOUNTER — ALLIED HEALTH/NURSE VISIT (OUTPATIENT)
Dept: PSYCHIATRY | Facility: CLINIC | Age: 24
End: 2019-05-01
Payer: COMMERCIAL

## 2019-05-01 VITALS — SYSTOLIC BLOOD PRESSURE: 113 MMHG | DIASTOLIC BLOOD PRESSURE: 71 MMHG | HEART RATE: 69 BPM

## 2019-05-01 DIAGNOSIS — F25.0 SCHIZOAFFECTIVE DISORDER, BIPOLAR TYPE (H): Primary | ICD-10-CM

## 2019-05-01 NOTE — NURSING NOTE
Jerel Day comes into clinic today at the request of Vince Zheng MD Ordering Provider for Med Injection only Abilify Maintena .    Medication checked by: Ginger Myles RNCC  Prior to administration pt identified by name and : yes    Appearance: dressed casually   Mood: angry   Affect: flat  Eye contact: poor  Side effects: some post injection soreness  Level of cooperation: cooperative  Education: none needed   Next appt: 4 weeks       Medication provided by Pharmacy      This service provided today was under the supervising provider of the day Vince Zheng MD, who was available if needed.    MILAGRO THOMPSON

## 2019-05-02 ASSESSMENT — ANXIETY QUESTIONNAIRES
2. NOT BEING ABLE TO STOP OR CONTROL WORRYING: MORE THAN HALF THE DAYS
GAD7 TOTAL SCORE: 15
6. BECOMING EASILY ANNOYED OR IRRITABLE: MORE THAN HALF THE DAYS
7. FEELING AFRAID AS IF SOMETHING AWFUL MIGHT HAPPEN: MORE THAN HALF THE DAYS
5. BEING SO RESTLESS THAT IT IS HARD TO SIT STILL: MORE THAN HALF THE DAYS
1. FEELING NERVOUS, ANXIOUS, OR ON EDGE: NEARLY EVERY DAY
3. WORRYING TOO MUCH ABOUT DIFFERENT THINGS: MORE THAN HALF THE DAYS

## 2019-05-02 ASSESSMENT — PATIENT HEALTH QUESTIONNAIRE - PHQ9
SUM OF ALL RESPONSES TO PHQ QUESTIONS 1-9: 12
5. POOR APPETITE OR OVEREATING: MORE THAN HALF THE DAYS

## 2019-05-03 ASSESSMENT — ANXIETY QUESTIONNAIRES: GAD7 TOTAL SCORE: 15

## 2019-05-06 NOTE — PROGRESS NOTES
NAVIGATE SEE Progress Note   For Supported Employment & Education    NAVIGATE Enrollee: Jerel Day (1995)     MRN: [id number]  Date:  5/1/19  Clinician: LILLIANA Supported Employment & , Ana Michel    1. Client Status Update:   Jerel Day is interested in education (Client developed educational goals) and employment (Other, explain: IRT Appt)    2. People present:   SEE/Writer  Client: Jerel Day  NAVIGATE Director/Family Clinican, AGA Morgan LISCW    3. Total number of persons who participated in contact: (do not count yourself/SEE) 2    4. Length of Actual Contact: 60 minutes   Traveled? No    5. Location of contact:  Psychiatry Clinic, [place]    6. Brief description of session, contact, or client status (include: strategies, interventions, client reaction to contact, next steps, etc)    This writer sat in on Jerel's IRT appt with clinician AGA Morgan LISCW (please see her note for more detail). Writer attended to support Jerel share some symptoms he has been experiencing and to discuss his continuation with the NAVIGATE program. Jerel heard from his  that his commitment may be extended due to his statements that he wanted to discontinue medications. This has caused Jerel stress and he expressed that he was not interested in continuing the program. After speaking with Tammi and myself, Jerel did agree to continue in Navigate and see his IRT and SEE and was open to taking or trying other medications.      Ana TIJERINA Supported Employment &

## 2019-05-09 ENCOUNTER — OFFICE VISIT (OUTPATIENT)
Dept: PSYCHIATRY | Facility: CLINIC | Age: 24
End: 2019-05-09
Payer: COMMERCIAL

## 2019-05-09 DIAGNOSIS — F25.0 SCHIZOAFFECTIVE DISORDER, BIPOLAR TYPE (H): Primary | ICD-10-CM

## 2019-05-09 NOTE — PROGRESS NOTES
NAVIGATE SEE Progress Note   For Supported Employment & Education    NAVIGATE Enrollee: Jerel Day (1995)     MRN: [id number]  Date:  5/9/19  Clinician: MIQUELATE Supported Employment & , Ana Michel    1. Client Status Update:   Jerel Day is interested in employment (Client developed employement goals)    2. People present:   SEE/Writer  Client: Jerel Day    3. Total number of persons who participated in contact: (do not count yourself/SEE) 1    4. Length of Actual Contact: 15 minutes   Traveled? Yes  Total Travel Time: 15 minutes    5. Location of contact:  Other: Cafe near Jerel's home    6. Brief description of session, contact, or client status (include: strategies, interventions, client reaction to contact, next steps, etc)    Jerel and this writer met for a SEE follow up session and to apply for jobs. Jerel reports that he passed his Psychology class and felt proud that he got 75% on his final which led to him getting a C and passing. He is still unsure about taking classes in the fall but is interested in obtaining a part time position for the summer.  Jerel and this writer reviewed several online positions including a  for [employer], Sales at [employer] and a position at [employer] on campus. Jerel apologized and had to leave early due to his friend coming over to do yoga. Will meet again next week and will send Jerel list of postings for him to apply independently. This writer will f/u with network for positions he was interested in.       7. Completion of mutually agreed upon client task from previous meeting:  Completed    8. Orientation and Treatment Planning:  Pursuing current SEE goals    9. Assessment:  Assisting client to visit work or school settings to develop client preferences and goals re: work and/or school    10. Placement:  Employment  (Job searching (Internet search))    11. Follow Along Supports: (for clients who are working or attending  school)   Not Applicable    12. Mutually agreed upon client task for next meeting:     Apply to positions/update coverletter    13. Next Meeting Scheduled for: next week, nika TIJERINA Supported Employment &

## 2019-05-09 NOTE — PROGRESS NOTES
NAVIGATE Clinician Contact & Progress Note   For Family Education Program    NAVIGATE Enrollee: Jerel Day (1995)     MRN: [id number]  Date:  5/01/19  Diagnosis(es):   Schizoaffective disorder, bipolar type  Clinician: MIQUELATE Director & Family Clinician, SHIRA Camarena      1. Type of contact: (majority of time spent)  Family Session     2. People present:   Writer  Client: No  Significant Other/Family/Friend:  Mother and Father     3. Total number of persons who participated in contact: 3, including writer     4. Length of Actual Contact: Start Time: 5:00; End Time: 6:00pm                Traveled?    No                  5. Location of contact:  Psychiatry Clinic, [place]     6. Did the client complete the home practice option(s) from the previous session: Completed     7. Motivational Teaching Strategies:  Connect info and skills with personal goals  Promote hope and positive expectations  Explore pros and cons of change  Re-frame experiences in positive light     8. Educational Teaching Strategies:  Review of written material/education  Relate information to client's experience  Ask questions to check comprehension  Break down information into small chunks  Adopt client's language      9. CBT Teaching Strategies:  Reinforcement and shaping (positive feedback for steps towards goals, gains in knowledge & skills and follow-through on home assignments)  Relapse prevention planning (review of stressors, early warning signs, written plan to respond to signs and rehearse plan)  Behavioral tailoring (communication and problem solving)     10. Psychoeducational Topic(s) Addressed:  Just the Facts - Relapse Prevention Planning  Just the Facts - Problem Solving      11. Techniques utilized:   [place] announced at beginning of session  Review of homework  Review of goal  Review of previous meeting  Present new material  Problem-solving practice  Help client choose a home practice  option  Summarize progress made in current session     12. Assessment/Progress Note:      Reviewed Jerel's current status utilizing a relapse prevention lens by monitoring for early warning signs and triggers. Family identified potential triggers to include ongoing mental health symptoms, school stress, employment search. Family identified early warning signs to include isolation, arguing (most often about money) and reporting he wants to stop Navigate. Reviewed ways to mitigrate risk for relapse including use of coping skills, family support and NAVIGATE support. Utilized a problem solving model for how they may be able to talk with Jerel about options.     Problem or Goal:  Jerel is interested in stopping Navigate  Potential Next Steps:  Potential Solutions Advantages Disadvantages   1. Continue Navigate as is - This option seems to offer more freedom because it pleases the court for Jerel to continue with treatment    - Navigate can help Jerel achieve his goal of studying and finding a job whereas most other programs do not help with this    - Navigate has been proven to provide better outcomes for symptom management and return to work or school compared to other programs,  just taking medications, just doing therapy, or not receiving any treatment    - Other advantages?   - Jerel may not want to continue with the Navigate program    - Other disadvantages?    2. Change frequency of Navigate appointments - This option seems to offer more freedom because it pleases the court for Jerel to continue with treatment    - Navigate can help Jerel achieve his goal of studying and finding a job whereas most other programs do not help with this    - Navigate has been proven to provide better outcomes for symptom management and return to work or school compared to other programs,  just taking medications, just doing therapy, or not receiving any treatment    - Other advantages?   - Jerel may not want to continue with the Navigate  program    - Other disadvantages?    3. Stop Navigate and start a different program located on the light rail or bus [place] - Jerel could get to appointments by himself    - Other advantages? - The new program is not familiar with Jerel s situations so Jerel would have to explain everything from the beginning    - Other disadvantages?     4. Stop Navigate, stop medications and do not start a different program - Advantages? - The Navigate Director feels strongly that this option may lead to hospitalization. When someone refuses to participate in treatment when on commitment and court ordered the  may revoke the commitment which may result in hospitalization    - This could lead     - Other disadvantages     5. Other options?         - Advantages? - Disadvantages?     Safety Planning & Crisis Resources:  Remove any/all potentially harmful objects  [facility] [phone]  Crisis Text Line (Text [place] to [phone])    Provided the above information to family in writing.      Reviewed Jerel's participation in NAVIGATE. Jerel and family are participating in all NAVIGATE services. Home practice identified as continued observation of potential warning signs of relapse and talking with Jerel about his opinion re: alternatives and pros/cons of alteratives should he continue to express intent to discontinue Navigate.      13. Plan/Referrals:      Will meet with family monthly as schedule allows for evidence based family psychoeducation and therapeutic support aimed at maximizing Jerel's opportunity for recovery from psychosis.      Ana Rose, [facility]   NAVIGATE Director & Family Clinician

## 2019-05-15 ENCOUNTER — OFFICE VISIT (OUTPATIENT)
Dept: PSYCHIATRY | Facility: CLINIC | Age: 24
End: 2019-05-15
Payer: COMMERCIAL

## 2019-05-15 DIAGNOSIS — F25.0 SCHIZOAFFECTIVE DISORDER, BIPOLAR TYPE (H): Primary | ICD-10-CM

## 2019-05-15 NOTE — Clinical Note
Here is most recent note from my session with Jerel Wednesday wherein he reported specific plan for suicide if commitment is extended. I am working to follow-up with him and have a phone call with  on Monday at 1pm.

## 2019-05-16 ASSESSMENT — PATIENT HEALTH QUESTIONNAIRE - PHQ9
5. POOR APPETITE OR OVEREATING: NEARLY EVERY DAY
SUM OF ALL RESPONSES TO PHQ QUESTIONS 1-9: 10

## 2019-05-16 ASSESSMENT — ANXIETY QUESTIONNAIRES
7. FEELING AFRAID AS IF SOMETHING AWFUL MIGHT HAPPEN: NEARLY EVERY DAY
1. FEELING NERVOUS, ANXIOUS, OR ON EDGE: MORE THAN HALF THE DAYS
3. WORRYING TOO MUCH ABOUT DIFFERENT THINGS: SEVERAL DAYS
GAD7 TOTAL SCORE: 14
5. BEING SO RESTLESS THAT IT IS HARD TO SIT STILL: MORE THAN HALF THE DAYS
6. BECOMING EASILY ANNOYED OR IRRITABLE: MORE THAN HALF THE DAYS
2. NOT BEING ABLE TO STOP OR CONTROL WORRYING: SEVERAL DAYS

## 2019-05-17 ENCOUNTER — TELEPHONE (OUTPATIENT)
Dept: PSYCHIATRY | Facility: CLINIC | Age: 24
End: 2019-05-17

## 2019-05-17 ASSESSMENT — ANXIETY QUESTIONNAIRES: GAD7 TOTAL SCORE: 14

## 2019-05-17 NOTE — PROGRESS NOTES
NAVIGATE Clinician Contact & Progress Note  For Individual Resiliency Training (IRT)  A Part of the [facility] First Episode of Psychosis Program    NAVIGATE Enrollee: Jerel Day (1995)     MRN: [id number]  Date:  5/15/19  Diagnosis: Schizoaffective disorder, bipolar type  Clinician: LILLIANA Individual Resiliency Trainer, EDELMIRA Morgan     1. Type of contact: (majority of time spent)  IRT Session    2. People present:   Writer  Client: Jerel Day  NAVIGATE SEE - YOSEPH Raymundo for first 15 minutes  NAVIGATE Family Clinician - SARTHAK Kovacs MA for training purposes    3. Total number of persons who participated in contact: 4, including writer for first 15 minutes; 3, including writer for remaining 45 minutes    4. Length of Actual Contact: Start Time: 5pm; End Time: 6pm   Traveled?    No     5. Location of contact:  Psychiatry Clinic, [place]    6. Did the client complete the home practice option(s) from the previous session: Nothing Completed    7. Motivational Teaching Strategies:  Connect info and skills with personal goals  Promote hope and positive expectations  Explore pros and cons of change  Re-frame experiences in positive light    8. Educational Teaching Strategies:  Review of written material/education  Relate information to client's experience  Ask questions to check comprehension  Break down information into small chunks  Adopt client's language     9. CBT Teaching Strategies:  Reinforcement and shaping (positive feedback for steps towards goals and gains in knowledge & skills)  Relapse prevention planning (review of stressors and early warning signs)  Coping skills training (review current coping skills and increase currently used skills)  Behavioral tailoring (fit taking medication into client's daily routine)    10. IRT Module(s) Addressed:  Module 4 - Relapse Prevention Planning  Module 9 - Coping with Symptoms    11. Techniques utilized:   [place] announced at  beginning of session  Review of goal  Review of previous meeting  Present new material  Problem-solving practice  Help client choose a home practice option  Summarize progress made in current session    12. Mental Status Exam:    Alertness: alert  and slow to respond  Appearance: adequately groomed  Behavior/Demeanor: cooperative, with poor eye contact   Speech: regular rate and rhythm  Language: intact. Preferred language identified as English.  Psychomotor: slowed  Mood: depressed and irritable  Affect: restricted and guarded; was congruent to mood; was congruent to content  Thought Process/Associations: remarkable for  and difficult to follow at times  Thought Content:  Reports suicidal ideation and delusions;  Denies violent ideation  Perception:  Reports auditory hallucinations and depersonalization;  Denies visual hallucinations  Insight: limited  Judgment: limited  Cognition: does  appear grossly intact; formal cognitive testing was not done  Suicidal ideation: reports SI, reports intent,  and reports plan if commitment is extended. Not currently feeling suicidal. No current plan, no current intent.   Homicidal Ideation: denies    13. Assessment/Progress Note:     Writer met with Jerel on this date for IRT. Joined by NAVIGATE Family Clinician - SARTHAK Kovacs MA for training purposes and initially NAVIGATE SEE - YOSEPH Rayumndo to check-in about school and work progress. Writer set agenda to check-in, discuss and assess symptoms, explore material in IRT modules, discuss ways in which Jerel can expand coping strategies and stress-management techniques for ongoing symptom management, and check-in regarding goals for ongoing recovery.    For first 15 minutes, Ana checked in about school and work. Celebrated together that Jerel passed his class. Highlighted his strength and resiliency in completing the course and passing. Ana and Jerel discussed plan to continue exploring potential job  "opportunities during SEE visits. Specifically, Ana plans to reach out regarding an opportunity at [employer] that may be a good fit for Jerel, in which he expressed some interest. Discussed option for team/Ana to reach out to -Nola to get an update regarding the upcoming end of Jerel's commitment and past conversations regarding possibility of extension; Jerel declined and shared he did not want the team to reach out.     Writer then met with Jerel and NAVIGATE Family Clinician - Jamil Lopez MA, SARTHAK. Checked in about how Jerel is feeling and recent experience of symptoms. Jerel reports \"feeling groggy\" and shared the past couple of weeks have been \"frustrating.\" He reports the voices have been \"malicious\" and described hearing multiple voices. Jerel spent the majority of the session describing multiple somatic complaints and exhibited low distress tolerance related to these experiences. He reports engaging in self-harm in the form of cutting, and showed writer and Jamil two superficial cuts, one on his thumb and one on his stomach, not requiring medical intervention. Explored the function of this behavior with Jerel who reports cutting offers a distraction form the \"tension\" and pain related to other symptoms, including voices and somatic complaints. Engaged Jerel in exploration of other coping skills that he finds helpful including listening to music, playing guitar, smoking cigarettes, meditating and stretching. Recommended Jerel utilize these coping strategies as a way to manage symptoms as opposed to engaging in self-harm and encouraged him to do so. Additionally offered other forms of coping that involve physical sensations including taking a cold shower, going for a walk, swimming or going in a hot tub or working out in the room in his apartment building. Encouraged Jerel to use these in place of cutting to offer physical distraction and relief. Jerel plans to check if his apt complex has a " swimming pool as he believe it does. Jerel expressed frustration and difficulty accepting some of his limitations; offered validation and normalized acceptance is not easy, especially related to ways in which we are limited.     Discussed commitment being up next week and hearing that is scheduled for 5/22. Queried Jerel regarding his hoped for outcome; Jerel hopes that the commitment will end and he will not need to take medication. Writer referenced previous conversation wherein Jerel said he would be open to potentially exploring other medication options, just not the injection or Haldol. Jerel referenced his somatic complaints and shared perspective that he is not interested in medication such as anti-psychotics as it is not addressing what is actually causing his symptoms. He also shared opinion that talk therapy and NAVIGATE is unhelpful overall as it is not addressing his needs. Queried Jerel as to what he thinks could be helpful and invited this discussion for future sessions. Jerel's openness to this opportunity seemed limited to fair. Jerel did state benzos seem to be helpful.     Jerel stated that he plans to kill himself if the commitment is extended. He has plan to jump of [address]. Assessed for current suicidal ideation, which Jerel denied. Writer attempted to safety plan with Jerel in the event that the commitment is extended and he is feeling this way, would he be willing to come in for an appointment, utilize crisis resources, go to nearest ED and/or call 911 for evaluation; Jerel was not able to contract for safety in the event that his commitment is extended. Writer encouraged Jerel to continue to use NAVIGATE team as support and worked to schedule next appointment with Dr. Zheng. Jerel agreed to appointment with Dr. Zheng on 6/12 but not sooner. Scheduled with Dr. Zheng on 6/12 at 4pm. Not scheduled with writer until 5/29 at 5pm. At this time, Jerel was not willing to come in and see writer sooner. Writer  "will follow-up with Jerel and offer additional appointment times and support.     14. Plan/Referrals:     Will route note to team including Dr. Zheng, NAVIGATE Director and Family Clinician - AGA Huerta, SHIRA, NAVIGATE SEE - YOSEPH Raymundo and Brittani Estrella, BYRON. Will consult with team regarding necessary next steps with regard to safety concerns.     Will follow-up with Jerel regarding safety concerns and to offer additional appointment times and support.     Billing for \"Interactive Complexity\"?    No    EDELMIRA MorganATE Individual Resiliency Trainer    Attestation:    I did not see this patient directly. This patient is discussed with me in individual clinical social work supervision, and I agree with the plan as documented.     AGA Camarena, SHIRA, May 17, 2019      "

## 2019-05-17 NOTE — PROGRESS NOTES
"NAVIGATE SEE Progress Note   For Supported Employment & Education    NAVIGATE Enrollee: Ralf Day (1995)     MRN: [id number]  Date:  5/15/19  Clinician: MIQUELATE Supported Employment & , Ana Michel    1. Client Status Update:   Ralf Day is interested in employment (Client developed employement goals)    2. People present:   SEE/Writer  Client: Ralf Day  NAVIGATE Family Clinian, Tammi Connell, MSW, LISCW, Other: SHIRA Kovacs    3. Total number of persons who participated in contact: (do not count yourself/SEE) 3    4. Length of Actual Contact: 30 minutes   Traveled? No    5. Location of contact:  Psychiatry Clinic, [place]    6. Brief description of session, contact, or client status (include: strategies, interventions, client reaction to contact, next steps, etc)    Writer attended ralf's IRT appt at the [facility] clinic. Ralf reports that he has been doing ok, he passed his class at the [employer] and said that he did feel proud about that accomplishment. Ralf has a cold so he has not gone into apply to jobs at restaurants. Ralf also says he hasn't had a chance to review the email I sent him with job postings but would want to visit a few places next week. Ralf was okay with this writer texting him to schedule a time next week. Ralf denied any new updates regarding his commitment. For more details, please see his IRT notes from SHIRA Morgan dated the same day.     7. Completion of mutually agreed upon client task from previous meeting:  Partially Completed    8. Orientation and Treatment Planning:  Pursuing current SEE goals    9. Assessment:  Assessing client's need for follow-along supports    10. Placement:  Employment  (Information gathering/planning re: \"benefits applications\" or \"work incentive planning\")    11. Follow Along Supports: (for clients who are working or attending school)   Not Applicable    12. Mutually agreed upon client task for next " meeting:     Review email    13. Next Meeting Scheduled for: tbd will reach out    Ana Michel  [facility] Supported Employment &

## 2019-05-17 NOTE — TELEPHONE ENCOUNTER
NAVIGATE Outreach  A Part of the [facility] First Episode of Psychosis Program     Patient Name: Jerel Day  /Age:  1995 (23 year old)    Contact: Writer attempted to call Jerel twice on this date. Phone goes straight to message that voicemail has not been set up. Writer attempting to reach Jerel to discuss safety concerns related to Jerel's statement that he plans to kill himself if his commitment is extended and need to communicate this to his -Nola.     Plan: Will try Jerel again later today. Will also call Nola to arrange phone meeting to discuss concerns for next week prior to Jerel's court hearing scheduled for 19. Planning for NAVIGATE Director and Family Clinician - AGA Huerta, LICSW to join call with Nola as well.     Tammi Connell AM, LGSW  NAVIGATE Individual Resiliency [place] & Family Clinician

## 2019-05-17 NOTE — TELEPHONE ENCOUNTER
NAVIGATE Outreach  A Part of the [facility] First Episode of Psychosis Program     Patient Name: Jerel Day  /Age:  1995 (23 year old)    Contact: Writer called and LVM for Jerel's -Nola Summers. Introduced self and role within NAVIGATE Team. Requested return call to arrange phone meeting for sometime next week on Monday or Tuesday to discuss safety and level of care concerns and considerations. Provided Nola with jenniferr's availability during those days and direct contact information for follow-up.     Plan: Will await return call.     Tammi Connell AM, LGSW  NAVIGATE Individual Resiliency [place] & Family Clinician    -------------  Update:    Received call back from Nola; scheduled call for 1pm on . Writer updated NAVIGATE Director and Family Clinician - AGA Huerta, LICSW who plans to join.     EDELMIRA Morgan on 2019 at 3:08 PM

## 2019-05-20 ENCOUNTER — TELEPHONE (OUTPATIENT)
Dept: PSYCHIATRY | Facility: CLINIC | Age: 24
End: 2019-05-20

## 2019-05-20 NOTE — TELEPHONE ENCOUNTER
NAVIGATE Outreach  A Part of the [facility] First Episode of Psychosis Program     Patient Name: Jerel Day  /Age:  1995 (23 year old)    Contact: Writer called and spoke to Jerel on phone. Let him know that writer will be connecting with his -Nola on this date to share safety concerns related to Jerel stating that he plan to kill himself by way of jumping off the [address] if his commitment is extended. Notified him that writer will not be sharing any other information with Nola as he revoked the release, but safety concerns fall under the limits to confidentiality. Jerel expressed understanding. Writer expressed care, concern and support to Jerel. Discussed plan for writer to reach out to Jerel later this week to check-in and encouraged Jerel to keep the team updated regarding his commitment.     Plan: Plan to check in with Jerel later this week. Next IRT scheduled for next week. Client aware they can reach out to writer directly and/or NAVIGATE team should concerns or needs arise prior to next scheduled appointment.     Tammi Connell AM, LGSW  NAVIGATE Individual Resiliency  & Family Clinician

## 2019-05-21 ENCOUNTER — TELEPHONE (OUTPATIENT)
Dept: PSYCHIATRY | Facility: CLINIC | Age: 24
End: 2019-05-21

## 2019-05-21 DIAGNOSIS — F25.0 SCHIZOAFFECTIVE DISORDER, BIPOLAR TYPE (H): Primary | ICD-10-CM

## 2019-05-21 NOTE — TELEPHONE ENCOUNTER
NAVIGATE Outreach  A Part of the [facility] First Episode of Psychosis Program     Patient Name: Jerel Day  /Age:  1995 (23 year old)    Contacted Jerel's dad, León by phone  breeching confidentiality to inform family that Jerel stated to Tammi Connell AM, LGSW, NAVIGATE IRT & Family Clinician he had a plan to kill himself by way of jumping off the [address] if his commitment is extended. Dad was not aware of this until writer's call. Commitment hearing is scheduled tomorrow, 19 and Jerel plans to attends. Family plans to drop off and pick Jerel up afterwards. Informed dad that writer and EDELMIRA Morgan spoke with Jerel's CM Nola yesterday, 19 to inform her of these safety concerns. CM reported often commitment outcomes are provided via letter or the individual's  some time after the court hearing. Developed a plan for CM to speak with her supervisor and the  so Jerel can be informed in person so a safety assessment can be completed in that moment. Reported intent to inform family of the commitment outcome as soon as further information is available.   Discussed safety planning with respect to dad informing mom and walking with other family so they can be on the alert for warning signs. Family has crisis resources if needed. Dad reported Jerel was invited up to the family cabin this weekend Fri-Mon and would have 24/7 supervision should he be informed of the outcome of his commitment hearing prior. Jerel does not seem to meet criteria for a 72 hour hold due to lack of a present day plan with intent to self harm or suicide. He is not agreeable to voluntary hospitalization.     Ana Rose, Pen[facility] Valley HospitalSW  NAVIGATE Director & Family Clinician

## 2019-05-21 NOTE — TELEPHONE ENCOUNTER
NAVIGATE SEE Outgoing Telephone Call  For Supported Employment & Education    NAVIGATE Enrollee: Jerel Day (1995)     MRN: [id number]  Date of Call: 5/21/2019  Contacted: Jerel    Discussed:   Writer texted Jerel reminding him to meet me at [facility] at 2pm today. Jerel called this writer and stated that he felt too nervous about his court date tomorrow that he wasn't focused enough to talk to a manager. Jerel asked if the manager was expecting him (he is not, we were just going to stop in) and wanted 'to be sure he wasn't leaving the team in the dust'. Writer praised Jerel for his thoughtfulness/professionalism and discussed the open positions there. Jerel would like to meet on Thursday of this week instead and would like more time to prepare. Writer reminded Jerel to wash hair with soap and wear business casual attire on Thursday.    Ana Michel

## 2019-05-21 NOTE — TELEPHONE ENCOUNTER
NAVIGATE Outreach  A Part of the [facility] First Episode of Psychosis Program     Patient Name: Jerel Day  /Age:  1995 (23 year old)    Contact: Writer and NAVIGATE Director and Family Clinician - AGA Huerta, LICSW received phone call as scheduled with Jerel's -Nola. Notified Nola of safety concerns and Jerel's statement to writer and NAVIGATE Family Clinician - SARTHAK Kovacs MA during last IRT session (5/15) that he had a plan to kill himself by way of jumping off the [address] if his commitment is extended. Confirmed with Nola that commitment hearing is scheduled for 19. Nola reported often commitment outcomes are provided via letter or the individual's  some time after the court hearing. Developed a plan for CM to speak with her supervisor and the  so Jerel can be informed in person so a safety assessment can be completed in that moment and appropriate level of care can be facilitated if needed. Jerel does not seem to meet criteria for a 72 hour hold due to lack of a present day plan with intent to self harm or suicide. He is not agreeable to voluntary hospitalization.     Plan: Nola reported intent to follow up with her supervisor and  regarding safety concerns. Requested she update the team as she is able on outcomes of court hearing.     Tammi Connell AM, LGSW  NAVIGATE Individual Resiliency [place] & Family Clinician

## 2019-05-22 ENCOUNTER — TELEPHONE (OUTPATIENT)
Dept: PSYCHIATRY | Facility: CLINIC | Age: 24
End: 2019-05-22

## 2019-05-22 NOTE — TELEPHONE ENCOUNTER
NAVIGATE Outreach  A Part of the [facility] First Episode of Psychosis Program     Patient Name: Jerel Day  /Age:  1995 (23 year old)    Contact: Writer received VM from Jerel's -Nola at 4:29pm on this date inquiring if either writer or NAVIGATE Director and Family Clinician - AGA Huerta, SHIRA could be at court hearing tomorrow or be available by phone.     Writer consulted and discussed with AGA Huerta LICSW; LVM for Nola stating that neither Ana or myself is available tomorrow. Reviewed plan for Jerel to be informed in person of decision regarding commitment so that a safety assessment can be completed in that moment and appropriate level of care can be facilitated if needed. Ana shared family's plan for involvement and presence at court tomorrow per her conversation with Dad on this date. See her note for further detail.     Plan: Will remain available for support and care coordination purposes.      Tammi Connell AM, LGSW  NAVIGATE Individual Resiliency [place] & Family Clinician

## 2019-05-22 NOTE — TELEPHONE ENCOUNTER
NAVIGATE Family Peer Support  A Part of the [facility] First Episode of Psychosis Program     Patient Name: Jerel Day  /Age:  1995 (23 year old)  Date of Encounter: 19  Length of Contact: 5 minutes    This writer reached out to patient's father, León, to offer family peer support.     At the time of this call, the family was at the [facility] going through the commitment hearing process and Jerel was being interviewed. León was unsure of the current status of the hearing given Jerel's fragile psychological state.    León stated that as of this morning, Jerel was unsure if he (Jerel) was going to go up north with the family due to his upcoming interview with the brewing company.  León stated that Jerel's brother, Chilo, may drive up with Jerel separately.  The family is uncomfortable with leaving Jerel at home for the long week-end. León was not sure what they were going to do if Jerel refuses to go up north.    This writer encouraged León to reach out to PRABHA Rose and/or EBONY Connell with updates and clarified that as a family peer this writer is here to support them, but family needs the guidance of mental health professionals during this safety planning process.    CONSTANTINO FletcherATE Family Peer    [Billing Code 92727 for No Billable Service as family peer support is a nonbillable service]

## 2019-05-22 NOTE — TELEPHONE ENCOUNTER
NAVIGATE Outreach  A Part of the [facility] First Episode of Psychosis Program     Patient Name: Jerel Day  /Age:  1995 (23 year old)    Contact: Writer received call from Jerel's CM-Nola. Informed writer that there was a continuance granted in court today as no records have yet been received from NAVIGATE Program. Writer shared requests typically go through BYRON Priest who facilitates request with medical records department. Yaneli Vaca processing requests through medical records can take a couple of days, but writer isn't entirely sure. Offered to pass along information to Brittani for follow-up.     Plan: Sent message to team with update including BYRON Priest for follow-up.    Tammi Connell AM, LGSW  NAVIGATE Individual Resiliency [place] & Family Clinician

## 2019-05-23 ENCOUNTER — OFFICE VISIT (OUTPATIENT)
Dept: PSYCHIATRY | Facility: CLINIC | Age: 24
End: 2019-05-23
Payer: COMMERCIAL

## 2019-05-23 DIAGNOSIS — F25.0 SCHIZOAFFECTIVE DISORDER, BIPOLAR TYPE (H): Primary | ICD-10-CM

## 2019-05-24 ENCOUNTER — OFFICE VISIT (OUTPATIENT)
Dept: PSYCHIATRY | Facility: CLINIC | Age: 24
End: 2019-05-24
Payer: COMMERCIAL

## 2019-05-24 DIAGNOSIS — F25.0 SCHIZOAFFECTIVE DISORDER, BIPOLAR TYPE (H): Primary | ICD-10-CM

## 2019-05-24 NOTE — PROGRESS NOTES
Writer called Jerel and he reported that his extension of his commitment was put on hold and that he was feeling 'okay' about it. He wishes to meet to conduct job development tomorrow, Friday 5/24 instead of today because he has another interview at [employer]. Will follow-up tomorrow.  YOSEPH Raymundo

## 2019-05-28 NOTE — PROGRESS NOTES
"NAVIGATE SEE Progress Note   For Supported Employment & Education    NAVIGATE Enrollee: Jerel Day (1995)     MRN: [id number]  Date:  5/24/19  Clinician: NAVIGATE Supported Employment & , Ana Michel    1. Client Status Update:   Jerel Day is interested in employment (Client had in person contact with potential employer)    2. People present:   SEE/Writer  Client: Jerel Day  Potential employer    3. Total number of persons who participated in contact: (do not count yourself/SEE) 2    4. Length of Actual Contact: 60 minutes   Traveled? Yes  Total Travel Time: 40 minutes    5. Location of contact:  Employer/Business    6. Brief description of session, contact, or client status (include: strategies, interventions, client reaction to contact, next steps, etc)     and Jerel met at [facility] to speak with the hiring manager and to observe a pizza runner so Jerel can get a good idea of what the job would be. We applied online using this writer's laptop and spoke with several members of the [facility] team.   We found that they are not actively hiring right now, but that they will most likely have openings in June. 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 They gave us tips on how to interview well and recommended that Jerel write a cover letter describing why he wants to work at [employer]. We thanked the staff and left.  Jerel has had three interviews in the last week at [facility] (Jerel reports went well), [employer] (Jerel reported this did NOT go well and ended up throwing a beverage on the ground while yelling \"clean up in aisle 3\" and was asked to not come back or they will call the police) as well as [employer] [facility]. Jerel is anxious to start working and be off of commitment. His most recent hearing was extended due to not having medical records.    7. Completion of mutually agreed upon client task from previous meeting:  Completed    8. Orientation " and Treatment Planning:  Pursuing current SEE goals    9. Assessment:  Assisting client to visit work or school settings to develop client preferences and goals re: work and/or school    10. Placement:  Employment  (Job searching (At business/employers))    11. Follow Along Supports: (for clients who are working or attending school)   Not Applicable    12. Mutually agreed upon client task for next meeting:     na    13. Next Meeting Scheduled for: next week    Ana TIJERINA Supported Employment &

## 2019-05-29 ENCOUNTER — OFFICE VISIT (OUTPATIENT)
Dept: PSYCHIATRY | Facility: CLINIC | Age: 24
End: 2019-05-29
Payer: COMMERCIAL

## 2019-05-29 ENCOUNTER — CARE COORDINATION (OUTPATIENT)
Dept: PSYCHIATRY | Facility: CLINIC | Age: 24
End: 2019-05-29

## 2019-05-29 ENCOUNTER — APPOINTMENT (OUTPATIENT)
Dept: PSYCHIATRY | Facility: CLINIC | Age: 24
End: 2019-05-29
Payer: COMMERCIAL

## 2019-05-29 ENCOUNTER — TELEPHONE (OUTPATIENT)
Dept: PSYCHIATRY | Facility: CLINIC | Age: 24
End: 2019-05-29

## 2019-05-29 DIAGNOSIS — F25.0 SCHIZOAFFECTIVE DISORDER, BIPOLAR TYPE (H): Primary | ICD-10-CM

## 2019-05-29 NOTE — TELEPHONE ENCOUNTER
NAVIGATE Outreach  A Part of the [facility] First Episode of Psychosis Program     Patient Name: Jerel Day  /Age:  1995 (23 year old)    Contact: Writer called Lynne and PEPE inquiring about commitment extension outcomes as this is directly related to previous safety concerns. Writer meeting with Jerel on this date at 5pm.     Will await return call from Nola.     Tammi Connell AM, LGSW  NAVIGATE Individual Resiliency Hana & Family Clinician

## 2019-05-29 NOTE — PROGRESS NOTES
NAVIGATE Care Coordination    NAVIGATE Enrollee: Jerel Day (1995)     MRN: [id number]  Exchanged Calls with: Jerel's Mom/Mesha and MONIQUE/Nola    Received voicemail from mom/Mesha stating Jerel is no longer on commitment. Returned Mesha's call.     Mesha reported speaking with the  who reported the commitment got dismissed due to a technicality. All of the paperwork and hearing needed to be scheduled within a particular 28 day window and it did not so the  dismissed. The  informed Mesha that Jerel's  is working to pursue a community commitment but that reportedly does not have a Clark clause.     Mesha reported speaking with Jerel last night to remind him of his appointments today and Jerel reported he plans to refuse his injection. She confirmed with him via text today that he plans to come to his therapy appt today 5/29 as does family to meet with writer. She is uncertain if he is aware of the community commitment petition.     Mesha identified thinking about how family might be able to provide financial incentives for continuing medications. Reported able to discuss further during this evening family appt.        LVM for MONIQUE/Nola who returned writer's call.     Nola confirmed Jerel is no longer on commitment. Generally speaking, Nola reported that to pursue a community commitment an Examiner's Support Statement needs to be provided by MD, Víctor, PA, or Clinical Nurse Specialist who has seen the person with 15 days. Since Jerel has not been seen by a provider of the discipline mentioned within the required time frame, Nola's only remaining option is to write a Reasonable Efforts Letter in lieu of an Examiner's Support Statement. This will result in Pre-petition Screening reaching out to the person and family directly, therefore, Nola will plan to talk with Jerel by phone prior to explain this process and assess for safety.       Will route to team as an FYI. Spoke with  Tammi Connell AM, SW, NAVIGATE IRT & Family Clinician in preparation for her appt with Jerel this evening.     [facility]a [facility]   [facility] NAVIGATE

## 2019-05-30 ASSESSMENT — ANXIETY QUESTIONNAIRES
1. FEELING NERVOUS, ANXIOUS, OR ON EDGE: SEVERAL DAYS
5. BEING SO RESTLESS THAT IT IS HARD TO SIT STILL: NEARLY EVERY DAY
GAD7 TOTAL SCORE: 13
4. TROUBLE RELAXING: NEARLY EVERY DAY
7. FEELING AFRAID AS IF SOMETHING AWFUL MIGHT HAPPEN: MORE THAN HALF THE DAYS
2. NOT BEING ABLE TO STOP OR CONTROL WORRYING: MORE THAN HALF THE DAYS
3. WORRYING TOO MUCH ABOUT DIFFERENT THINGS: SEVERAL DAYS
6. BECOMING EASILY ANNOYED OR IRRITABLE: SEVERAL DAYS

## 2019-05-30 ASSESSMENT — PATIENT HEALTH QUESTIONNAIRE - PHQ9: SUM OF ALL RESPONSES TO PHQ QUESTIONS 1-9: 11

## 2019-05-30 NOTE — PROGRESS NOTES
NAVIGSTEPHEN Clinician Contact & Progress Note  For Individual Resiliency Training (IRT)  A Part of the [employer] First Episode of Psychosis Program    NAVIGATE Enrollee: Jerel Day (1995)     MRN: [id number]  Date:  5/01/19  Diagnosis: Schizoaffective disorder, bipolar type  Clinician: LILLIANA Individual Resiliency Trainer, EDELMIRA Morgan     1. Type of contact: (majority of time spent)  IRT Session    2. People present:   Writer  Client: Jerel Day  Other: LILLIANA SEE - YOSEPH Raymundo    3. Total number of persons who participated in contact: 3, including writer    4. Length of Actual Contact: Start Time: 5; End Time: 6   Traveled?    No     5. Location of contact:  [facility], [place]    6. Did the client complete the home practice option(s) from the previous session: Not Applicable    7. Motivational Teaching Strategies:  Connect info and skills with personal goals  Promote hope and positive expectations  Explore pros and cons of change  Re-frame experiences in positive light    8. Educational Teaching Strategies:  Review of written material/education  Relate information to client's experience  Ask questions to check comprehension  Break down information into small chunks  Adopt client's language     9. CBT Teaching Strategies:  Reinforcement and shaping (positive feedback for steps towards goals and gains in knowledge & skills)  Relapse prevention planning (review of stressors and early warning signs)  Coping skills training (review current coping skills, increase currently used skills and plan home practice)  Behavioral tailoring (fit taking medication into client's daily routine)    10. IRT Module(s) Addressed:  Module 2 - Assessment/Initial Goal Setting  Module 4 - Relapse Prevention Planning    11. Techniques utilized:   [place] announced at beginning of session  Review of goal  Review of previous meeting  Present new material  Problem-solving practice  Help client choose a home  "practice option  Summarize progress made in current session    12. Mental Status Exam:    Alertness: alert   Appearance: adequately groomed  Behavior/Demeanor: cooperative and guarded, with poor eye contact   Speech: regular rate and rhythm  Language: intact and no obvious problem. Preferred language identified as English.  Psychomotor: normal or unremarkable  Mood: depressed, worried, irritable and labile  Affect: restricted and guarded; was congruent to mood; was congruent to content  Thought Process/Associations: remarkable for , tangential and difficult to follow  Thought Content:  Reports delusions and preoccupations;  Denies suicidal ideation and violent ideation  Perception:  Reports auditory hallucinations;  Denies visual hallucinations  Insight: limited  Judgment: limited  Cognition: does  appear grossly intact; formal cognitive testing was not done  Suicidal ideation: denies SI, denies intent,  and denies plan  Homicidal Ideation: denies    13. Assessment/Progress Note:     Writer met with Jerel on this date for IRT session. Requested that LILLIANA SEE - YOSEPH Raymundo join for support and continuity of services as Jerel has been working with Ana on an ongoing basis, whereas writer is relatively new to Jerel. Writer set agenda to check-in, discuss and assess symptoms, explore material in IRT modules, discuss ways in which Jerel can expand coping strategies and stress-management techniques for ongoing symptom management, and check-in regarding goals for ongoing recovery.    During check-in, Jerel shared that he will feel \"completedly alienated\" if he gets his Clark order extended. Per Jerel, his -Nola informed him that his commitment may be extended due to statements he had made about plans to discontinue medication once Clark order is up. Jerel expressed frustration with this and shared that he wished to revoke the release of information he had previously signed between LILLIANA and " "CM-Nola; he additionally requested to revoke CONNOR between NAVIGATE and parents at this time. Jerel expressed confusion as to why commitment would be extended as he has been following all of the requirements of his order; writer offered validation and space for him to express this confusion and related frustration. Explored options for Jerel including reaching out to Nola to get questions regarding commitment addressed. Assessed for safety; Jerel denied active SI or HI/VI. He denied the presence of command hallucinations \"since the shot\" (injection).     Jerel endorsed multiple somatosensory complaints and related distress. He conceptualizes his current experience of symptoms as \"astroprojecting\" and describes his current relationship with the voices right now as \"pleasant.\" Additionally engaged Jerel in reflection surrounding any observed improvements or progress he has noticed since the last time being in the hospital; identified this as goal of upcoming sessions if Jerel is willing to continue with IRT services with writer. Jerel ultimately agreed to continue to meet for IRT. He additionally stated that he would be open and willing to engage in conversation with Dr. Zheng regarding other medication options that could help manage symptoms. Jerel mentioned being open to an oral dose of Abilify, just not the injection.     14. Plan/Referrals:     Jerel has revoked release between LILLIANA and his parents as well as LILLIANA and his -Nola. Updated NAVIGATE team.    Will plan to meet in two weeks for next IRT session. Client and family aware they can reach out to writer directly and/or NAVIGATE team should concerns or needs arise prior to next scheduled appointment.     Billing for \"Interactive Complexity\"?    No      EDELMIRA Morgan Individual Resiliency Trainer    Attestation:    I did not see this patient directly. This patient is discussed with me in individual clinical social work supervision, " and I agree with the plan as documented.     AGA C[place], [facility], May 30, 2019

## 2019-05-31 ENCOUNTER — OFFICE VISIT (OUTPATIENT)
Dept: PSYCHIATRY | Facility: CLINIC | Age: 24
End: 2019-05-31
Payer: COMMERCIAL

## 2019-05-31 DIAGNOSIS — F25.0 SCHIZOAFFECTIVE DISORDER, BIPOLAR TYPE (H): Primary | ICD-10-CM

## 2019-05-31 ASSESSMENT — ANXIETY QUESTIONNAIRES: GAD7 TOTAL SCORE: 13

## 2019-05-31 NOTE — PROGRESS NOTES
NAVIGATE SEE Progress Note   For Supported Employment & Education    NAVIGATE Enrollee: Jerel Day (1995)     MRN: [id number]  Date:  5/31/19  Clinician: LILLIANA Supported Employment & , Ana Michel    1. Client Status Update:   Jerel Day is interested in employment (Client started competitive employment position)    2. People present:   SEE/Writer  Client: Jerel Day    3. Total number of persons who participated in contact: (do not count yourself/SEE) 1    4. Length of Actual Contact: 15 minutes   Traveled? No    5. Location of contact:  Telephone    6. Brief description of session, contact, or client status (include: strategies, interventions, client reaction to contact, next steps, etc)    Writebrendan and Jerel met via telephone to prepare for his first day of work today at a [employer]. Jerel says he has a cold but is still willing to go in. He would prefer to have a higher paying job but said that he felt it was ok for now and that maybe he will work there and continue school until he gets his degree. We discussed first day anxiety and/or what to bring to work and questions he might have.   Jerel was on his way to a physical therapy appt and needed to end the call early. Writer encouraged Jerel to have fun, try and relax while at work and remember that he is making money. Asked him f/u after first day to see how it went.     7. Completion of mutually agreed upon client task from previous meeting:  Completed    8. Orientation and Treatment Planning:  Pursuing current SEE goals    9. Assessment:  Assessing client's need for follow-along supports    10. Placement:  Employment  (Discussion and Planning re: disclosure and role of SEE)    11. Follow Along Supports: (for clients who are working or attending school)   Employment  (Providing social skills training for work)    12. Mutually agreed upon client task for next meeting:     See above    13. Next Meeting Scheduled for:  next week    Ana TIJERINA Supported Employment &

## 2019-05-31 NOTE — PROGRESS NOTES
NAVIGATE Clinician Contact & Progress Note   For Family Education Program    NAVIGATE Enrollee: Jerel Day (1995)     MRN: [id number]  Date:  5/15/19  Diagnosis(es):   Schizoaffective disorder, bipolar type  Clinician: MIQUELATE Director & Family Clinician, SHIRA Camarena      1. Type of contact: (majority of time spent)  Family Session     2. People present:   Writer  Client: No  Significant Other/Family/Friend:  Mother and Father     3. Total number of persons who participated in contact: 3, including writer     4. Length of Actual Contact: Start Time: 5:15; End Time: 6:00pm                Traveled?    No                  5. Location of contact:  Psychiatry Clinic, [place]     6. Did the client complete the home practice option(s) from the previous session: Completed     7. Motivational Teaching Strategies:  Connect info and skills with personal goals  Promote hope and positive expectations  Explore pros and cons of change  Re-frame experiences in positive light     8. Educational Teaching Strategies:  Review of written material/education  Relate information to client's experience  Ask questions to check comprehension  Break down information into small chunks  Adopt client's language      9. CBT Teaching Strategies:  Reinforcement and shaping (positive feedback for steps towards goals, gains in knowledge & skills and follow-through on home assignments)  Relapse prevention planning (review of stressors, early warning signs, written plan to respond to signs and rehearse plan)  Behavioral tailoring (communication and problem solving)     10. Psychoeducational Topic(s) Addressed:  Just the Facts - Relapse Prevention Planning  Just the Facts - Problem Solving      11. Techniques utilized:   [place] announced at beginning of session  Review of homework  Review of goal  Review of previous meeting  Present new material  Problem-solving practice  Help client choose a home practice  "option  Summarize progress made in current session     12. Assessment/Progress Note:      Reviewed Jerel's current status utilizing a relapse prevention lens by monitoring for early warning signs and triggers. Family identified potential triggers to include ongoing mental health symptoms, lack of structure now that school finished and he is seeking employment, looking for work, waiting for his commitment court hearing this month. Family identified early warning signs to include somatic complaints about his back and spine with need to see a chriopractor. Family report this was similar to complaints during previous episodes of psychosis.. Reviewed ways to mitigrate risk for relapse including use of coping skills, family support and NAVIGATE support. Dialoged about potential contributors to Jerel's refusal of additional support services, I.e. waivered services. In summary, family theorize Jerel has a sense of pride and perhaps may be embarrassed to accept services and to allow others into his apartment. Additionally, they feel he may worry about getting struck in an entry level job and therefore thinks of entrepreneurial endeavors. He recently asked family, \"What if I play guitar on the stress for money?\" Family question if he has low self-esteem. Overall, family seemed be receptive to the exercise of putting themselves in Jerel's shoes and empathize.     Reviewed Jerel's participation in NAVIGATE. Jerel and family are participating in all NAVIGATE services. Home practice identified as continued observation of potential warning signs of relapse and continuing to do fun activities as a family.     13. Plan/Referrals:      Will meet with family monthly as schedule allows for evidence based family psychoeducation and therapeutic support aimed at maximizing Jerel's opportunity for recovery from psychosis.      Ana Rose, [facility]   NAVIGATE Director & Family Clinician    "

## 2019-05-31 NOTE — PROGRESS NOTES
NAVIGATE Clinician Contact & Progress Note   For Family Education Program    NAVIGATE Enrollee: Jerel Day (1995)     MRN: [id number]  Date:  5/29/19  Diagnosis(es):   Schizoaffective disorder, bipolar type  Clinician: MIQUELATE Director & Family Clinician, SHIRA Camarena      1. Type of contact: (majority of time spent)  Family Session     2. People present:   Writer  Client: No  Significant Other/Family/Friend:  Mother and Father     3. Total number of persons who participated in contact: 3, including writer     4. Length of Actual Contact: Start Time: 5:00; End Time: 6:00pm                Traveled?    No                  5. Location of contact:  [facility], [place]     6. Did the client complete the home practice option(s) from the previous session: Completed     7. Motivational Teaching Strategies:  Connect info and skills with personal goals  Promote hope and positive expectations  Explore pros and cons of change  Re-frame experiences in positive light     8. Educational Teaching Strategies:  Review of written material/education  Relate information to client's experience  Ask questions to check comprehension  Break down information into small chunks  Adopt client's language      9. CBT Teaching Strategies:  Reinforcement and shaping (positive feedback for steps towards goals, gains in knowledge & skills and follow-through on home assignments)  Relapse prevention planning (review of stressors, early warning signs, written plan to respond to signs and rehearse plan)  Behavioral tailoring (communication and problem solving)     10. Psychoeducational Topic(s) Addressed:  Just the Facts - Relapse Prevention Planning  Just the Facts - Problem Solving      11. Techniques utilized:   Ruth announced at beginning of session  Review of homework  Review of goal  Review of previous meeting  Present new material  Problem-solving practice  Help client choose a home practice  option  Summarize progress made in current session     12. Assessment/Progress Note:      Acknowledged Jerel continues to have revoked his CONNOR for family. Nonetheless, recently breeching confidentiality to inform family that Jerel stated to Tammi Connell AM, LGSW, NAVIGATE IRT & Family Clinician he had a plan to kill himself by way of jumping [place] [address] if his commitment is extended. Shared information with family about knowledge of the status of Jerel's commitment (dismissed; mom aware; see writer's telephone encounter from 5/29 for details). Provided information and guidance for navigation ending of commitment, petitioning for community commitment verses civil commitment from inpatient, and guardianship. Family reported knowledge that Jerel refused his injection this evening. Given this, focused today's appt on developing a relapse prevention plan so family can be aware of warning signs. Family observed Jerel relapse last Summer/Fall after stopping his injection June 2018, switching to orals and discontinuing orals August 2018. Jerel was hospitalized in November 2018 for approx one month. Acknowledged this relapse time frame lands them around the time Jerel moves to a new apartment which is yet to be found. Family will proceed with caution. Currently they identify him as being a good spirits with some concern for hypomania given less sleep, expressing entrepreneurial business plans, somatic complaints and ideas of reference. Per family's request, also utilized a problem solving model for working through mom's idea of offering a financial incentive for Jerel to continue his injections.     Jerel s Relapse Prevention Plan   Per family 5-29-19    What are the warning signs that need to be watched for?    - Poor sleep or not sleeping  - More talkative  - Elaborate business planning (e.g investing, starting businesses)  - More apt to share mixed music tracks  - Physical complaints  - Isolative (e.g. not eating with  "others)  - More impulsive  - Anxiety  - Sensitive to body language and other s mannerisms  - Paranoia  - Increase in voices or presence of command voices   Other Previously Identified Unsafe Behaviors (i.e. other warning signs):  - Swearing  - Yelling  - Arguing  - Property destruction  - Violence or threats of violence  - Drug use  - Not communicating  - Going off alone without telling someone where you are     What types of triggers/stressors need to be watched out for?    - Poor sleep  - Poor nutrition  - Stress (e.g. thinking about commitment; work?)  - Conflict and arguing  - Stopping medication  - Drug use     What can we do if these things happen again?    Some coping strategies to use if experiencing an early warning sign:    1. Listen to music  2. Play an instrument  3. Get adequate sleep  4. For managing conflict:  - Take a breath if voices are raised  - Agree to disagree  - If differing opinions about what to do, discuss alternatives ahead of time    Who I would like to assist me and what I would like them to do:    ?    Some other strategies family might use:    1. Increase opportunities to see Jerel face-to-face  2. Ask Jerel if something is wrong  3. Talk as a family and come up with next steps  4. Talk to the NAVIGATE team     Who can assist the person in NAVIGATE and what they do?    1. Dr Vince Zheng and Brittani Estrella, RN ([phone]) can listen and help with medications  2. Tammi Connell, Individual Therapist ([phone]) can listen, help problem solve, and be supportive  3. Ana Rose, Family Therapist ([phone]) can listen, help problem solve, and be supportive  4. Ana Michel, Supported Education and  ([phone]) can listen about and/or talk through work or school stressors     Who should be contacted in case of an emergency?    1. [facility] ([phone])  2. Crisis Text Line ([place] \"LIFE\" to [address] or call 2-270-486-TALK, " 1-805.481.9164)  3. Family - parents or brother          Problem Solving or Goal-Setting Sheet  1. Discuss the problem or goal:  Jerel does not want to continue injections. How could we incentivize treatment?    2. Brainstorm possible solutions & 3. Briefly evaluate each solution  A. Possible Solution Advantages Disadvantages     We will continue to support you financially (e.g. rent, food, etc) if you continue to treatment. If not, you will have to financially support yourself. We can spend down your money for your expenses     -Jerel continues to receive treatment  -He can save his personal invested funds for a later date  -Others?   -This is an extra stipulation that Jerel s siblings do not have  -Others?     B. Possible Solution Advantages Disadvantages     We can offer a gift card per injection received     -Jerel continues to receive treatment in the form of an injection which research has shown to provide for better outcomes and symptom management than taking the oral form of medication   -He will receive additional dollars to spend per month  -Others?     -This incentive might not be great enough  -Others?     C. Possible Solution Advantages Disadvantages     Other?                   4. Choose the best solution. Consider how easy it would be to implement the solution and how likely it is to be effective.  Plan to try solution B.    5. Plan the implementation.  When will the plan be implemented?  ?    What resources are needed and how will they be obtained?  Gift cards. Family has obtained and provided these in the past.     Who will do what to implement the solution?  Mesha will talk directly to Jerel and offer a gift card for every injection received    List what might go wrong in the implementation and how to overcome it.  - If wanting to only take orals:  o How will we know if you are taking the medication?  o You can reflect on past difficulties with remembering to take medications  o You can hold firm that  this is for injections only    - What if he is not incentivized by the amount of a gift card?  o You can ask him what it would take to considering restarting the injection  o If presenting an unreasonable ask you can express that it beyond what you are comfortable with. Ask for another suggested amount that may be more reasonable.  o If asking for his invested money you can inform him that LILLIANA (i.e. Ana Rose) is still in support of family managing that money for his own safety. You can offer to have us discuss this as a group like you, he and Shai Londono did in the past.     Practice any difficult parts of the plan.   Who will check that all the steps of the plan have been implemented?    6. Review implementation at next family meeting. Revise as needed.     Reviewed Jerel's participation in NAVIG[facility]. Jerel and family are participating in all NAVIGATE services. Home practice identified as continued observation of potential warning signs of relapse, sharing safety and relapse prevention planning with other family members as appropriate (e.g. Brother), proposing a financial incentive for continued injections.     Provided family with the above information in writing along with verification of the following appts with Jerel's permission.   -Wednesday 6/12 at 3:00-4:00 with Tammi Connell and at 4:00-4:30 with Dr. Vince Zheng (we are scheduled at 6:00 but I can see you at 3:00, 4:00 or 5:00 that day if you prefer; let me know if you want me to change the time)   -Wednesday 6/26 at 5:00 with Tammi Connell (we meet at 5:00 for family)    13. Plan/Referrals:      Will meet with family monthly as schedule allows for evidence based family psychoeducation and therapeutic support aimed at maximizing Jerel's opportunity for recovery from psychosis.     Will share the above information with the team.     Ana Rose, [facility]   NAVIGATE Director & Family Clinician

## 2019-06-03 ENCOUNTER — TELEPHONE (OUTPATIENT)
Dept: PSYCHIATRY | Facility: CLINIC | Age: 24
End: 2019-06-03

## 2019-06-03 NOTE — TELEPHONE ENCOUNTER
"NAVIGATE Outreach  A Part of the [facility] First Episode of Psychosis Program     Patient Name: Jerel Day  /Age:  1995 (23 year old)    Contact: Writer called to check-in with Jerel as previously discussed during last week's IRT session. Jerel stated he is \"doing well\" and shared positively about having his first shift at his new job yesterday. Shared that he is still in the training phase, but that overall it is going well. Writer offered validation and encouragement to Jerel. Suggested that he should be proud of himself to which he responded, \"I don't know it's a minimum wage job.\" Offered consideration that this is a great opportunity for the time being and reminded him that it doesn't need to be a forever job. Highlighted strengths in getting the job and engaging in the training process. Checked in about symptoms; Jerel stated symptoms feel \"manageable.\" He also shared that he quit using nicotine to help with symptoms; writer reflected positively on this. Inquired if Jerel wants to make an IRT appointment for this week; he opted to just keep appt with writer and Dr. Zheng for next Wednesday at 3pm and 4pm. Encouraged him to reach out if concerns or needs arise prior to next Wednesday.     Plan: Next IRT and med appt scheduled for next Wednesday at 3pm and 4pm. Client and family aware they can reach out to writer directly and/or NAVIGATE team should concerns or needs arise prior to next scheduled appointment.     Tammi Connell AM, LGSW  NAVIGATE Individual Resiliency [place] & Family Clinician    "

## 2019-06-05 NOTE — PROGRESS NOTES
NAVIGATE SEE Progress Note   For Supported Employment & Education    NAVIGATE Enrollee: Jerel Day (1995)     MRN: [id number]  Date:  5/29/19  Clinician: LILLIANA Supported Employment & , Ana Michel    1. Client Status Update:   Jerel Day is interested in employment (Client had interview with potential employer)    2. People present:   SEE/Writer  Client: Jerel Day  Other: Tammi Connell, AGA, EDELMIRA, Jamil Lopez    3. Total number of persons who participated in contact: (do not count yourself/SEE) 3    4. Length of Actual Contact: 60 minutes   Traveled? No    5. Location of contact:  Psych[facility], [place]    6. Brief description of session, contact, or client status (include: strategies, interventions, client reaction to contact, next steps, etc)    Writer shadowed IRT appt for Jerel. Please see note from Tammi Connell for more details.   Jerel recently had an interview at [employer] and was offered a job, first shift is this Friday for orientation and onboarding. Willing to think about staying in school.    Ana TIJERINA Supported Employment &

## 2019-06-06 ENCOUNTER — TELEPHONE (OUTPATIENT)
Dept: PSYCHIATRY | Facility: CLINIC | Age: 24
End: 2019-06-06

## 2019-06-06 NOTE — TELEPHONE ENCOUNTER
NAVIGATE Outreach  A Part of the [facility] First Episode of Psychosis Program     Patient Name: Jerel Day  /Age:  1995 (23 year old)    Contact: Writer received phone call from Mindy Caballero [phone] from [facility] Pre-Petition Screening. Writer called back and LVM for Mindy; notified her writer does not have a release of information at this time from Jerel to communicate with her, but is open if she has information she would like to provide writer. Left direct number for follow-up.     Plan: Will await return call.     Tammi Connell AM, LGSW  NAVIGATE Individual Resiliency  & Family Clinician

## 2019-06-12 ENCOUNTER — OFFICE VISIT (OUTPATIENT)
Dept: PSYCHIATRY | Facility: CLINIC | Age: 24
End: 2019-06-12
Payer: COMMERCIAL

## 2019-06-12 ENCOUNTER — APPOINTMENT (OUTPATIENT)
Dept: PSYCHIATRY | Facility: CLINIC | Age: 24
End: 2019-06-12
Payer: COMMERCIAL

## 2019-06-12 ENCOUNTER — BEH TREATMENT PLAN (OUTPATIENT)
Dept: PSYCHIATRY | Facility: CLINIC | Age: 24
End: 2019-06-12

## 2019-06-12 DIAGNOSIS — F25.0 SCHIZOAFFECTIVE DISORDER, BIPOLAR TYPE (H): Primary | ICD-10-CM

## 2019-06-12 NOTE — Clinical Note
Family is wondering if Jerel would be open to a treatment plan meeting of sorts to discuss goals. They have done this in the past. Tammi, can you ask Jerel about this at your next visit?Also, Family has a trip planned 7/31 - 8/6 to [place]. They are flying and Jerel is planning to attend. Jerel shared he is looking forward to it. Family acknowledges Jerel's participate could change given everything that is going on. As we approach this date and if it looks like Jerel with go with (or even if he does not go) I would like to see a present day safety plan and have it reviewed with family. Ana

## 2019-06-13 ASSESSMENT — ANXIETY QUESTIONNAIRES
7. FEELING AFRAID AS IF SOMETHING AWFUL MIGHT HAPPEN: MORE THAN HALF THE DAYS
6. BECOMING EASILY ANNOYED OR IRRITABLE: MORE THAN HALF THE DAYS
3. WORRYING TOO MUCH ABOUT DIFFERENT THINGS: MORE THAN HALF THE DAYS
1. FEELING NERVOUS, ANXIOUS, OR ON EDGE: NEARLY EVERY DAY
2. NOT BEING ABLE TO STOP OR CONTROL WORRYING: MORE THAN HALF THE DAYS
5. BEING SO RESTLESS THAT IT IS HARD TO SIT STILL: MORE THAN HALF THE DAYS
GAD7 TOTAL SCORE: 16

## 2019-06-13 ASSESSMENT — PATIENT HEALTH QUESTIONNAIRE - PHQ9
SUM OF ALL RESPONSES TO PHQ QUESTIONS 1-9: 17
5. POOR APPETITE OR OVEREATING: NEARLY EVERY DAY

## 2019-06-13 NOTE — PROGRESS NOTES
[facility] NAVIGATE Program Treatment Plan Summary  A Part of the [facility] First Episode of Psychosis Program     NAVIGATE Enrollee: Jerel Day  /Age:  1995 (23 year old)  MRN: [id number]    Date of Initial Service: 18  Date of INTIAL Treatment Plan: 18  Last Review/Update Date:  18  Today's Date: 19  Next 90-Day Review Due:  19    The following represents UPDATED and reviewed treatment plan.    1. DSM-V Diagnosis (include numeric code)  Schizoaffective, bipolar type, 295.70 (F25)    2. Current symptoms and circumstances that substantiate the diagnosis    Jerel has a history of psychosis (paranoia, delusions, odd beliefs per family/friends, auditory hallucinations, command auditory hallucinations, visual hallucinations, disorganized speech, disorganized behavior and negative symptoms (diminished emotional expression, avolition and anhedonia)), SI and SIB. He presents with current symptoms of psychosis (paranoia, delusions, auditory hallucinations, command auditory hallucinations and negative symptoms (diminished emotional expression, avolition and anhedonia)).     3. How symptoms and/or behaviors are affecting level of function    Jerel s systems are impacting functioning with respect to ADLs, IADLs, social relationships, familial relationships and employment.    4. Risk Assessment:  Suicide:  Assessed Level of Immediate Risk: High  Ideation: No  Plan:  No; command SI to jump off bridge.  Means: Yes; has access to bridge.  Intent: No    Homicide/Violence:  Assessed Level of Immediate Risk: Medium  Ideation: No  Plan: No  Means: No  Intent: No    5. Medications    Current Outpatient Medications   Medication     ARIPiprazole ER (ABILIFY MAINTENA) 400 MG extended release inj syringe     gabapentin (NEURONTIN) 100 MG capsule     hydrOXYzine (VISTARIL) 50 MG capsule     Current Facility-Administered Medications   Medication     ARIPiprazole ER (ABILIFY MAINTENA) extended release inj syringe 400  mg       6. Strengths     Bravery & Valor     Curiosity    Humor & Playfulness   Kind & Generous    Love of learning       7. Barriers & Suicide Risk Factors     Command Hallucinations  Communication, limited to no communication with family/support network  Coping, limited to no coping strategies  High Premorbid IQ  Impulsive  Male  SIB  Single  Symptoms of psychosis, positive (delusions and/or hallucinations)  Symptoms of psychosis, negative (flat affect, avolition, anhedonia, alogia, and/or apathy)  Symptoms of psychosis, cognitive (memory, attention and concentration, and/or executive functioning difficulties)  Treatment Non-Adherence     8. Treatment Domains and Goals    Domain 1: Illness Management & Recovery  Identify and engage possible areas of improvement related to medication optimization, psychosis (paranoia, delusions, odd beliefs per family/friends, auditory hallucinations, command auditory hallucinations, disorganized speech, disorganized behavior and negative symptoms (diminished emotional expression, avolition and anhedonia)), depression (low mood nearly every day, anhedonia most of the time, low energy, difficulty concentrating, thinking or making decisions and suicidal ideation without plan, without intent), anxiety, SI, SIB and low self-esteem and ability to management illness.     Measurable Objectives Interventions Target Dates & Discharge Criteria   Medication Objectives    -Paricipate in a medication evaluation   -Take medications as prescribed and have reduced frequency and severity of symptoms  -Learn and implement strategies for overcoming barriers to taking medication  -Support system assists with overcoming barriers to taking medications   Medication Management  -Prescribe and monitor medications  -Monitor and treat side effects  -Psychoeducation  -Behavioral activation  -Initial and routine lab work    IRT/Psychotherapy  -Psychoeducation  -Motivation interviewing  -CBT  -Behavioral  activation  -Mindfulness  -Family involvement during portions of sessions    Family Therapy  -Psychoeducation  -Motivational interviewing  -Behavioral family therapy  -CBT  -Behavioral activation    Case Management  -Motivational interviewing  -Care coordination with school   Target date:   6 months from 6/12/19    Discharge criteria:  Marked and sustained symptom improvement     Gains Made:  -Paricipate in a medication evaluation      Individual s Objectives    -Complete a safety plan with therapist and share with support system  -Define what recovery means to self  -Identify psychosocial areas of need  -Identify top 5 strengths and use those strengths when working toward goal achievement; simultaneously choose one area for improvement and identify two actionable steps toward improvement  -Create a goal plan consisting of one long-term goal, three short-term goals, and actionable steps toward short-term goal achievement  -Demonstrate understanding of psychosis (paranoia, delusions, auditory hallucinations and command auditory hallucinations), depression (low mood nearly every day, anhedonia most of the time, low energy and difficulty concentrating, thinking or making decisions), anxiety, SI and SIB in the context of self with respect to symptoms, causes, course, medications and the impact of stress  -Learn at least 2 coping strategies to successfully target current symptoms  -Demonstrate understanding for how substance use impacts symptoms, identify stage of change, and experiment with reduced use or abstinence from all illicit substances   -Learn strategies to build positive emotions and facilitate resiliency   -Develop and implement a relapse prevention plan including identification of warning signs, triggers, coping mechanisms, and how other persons can be supportive if symptoms increase or reemerge   -Process the psychotic episode by demonstrating understanding of how the episode impacted self, identifying  positive coping strategies and resiliency used during that time, challenging self-stigmatizing beliefs, and developing a positive attitude towards facing future life challenges  -Identify primary styles of thinking, and demonstrate understanding of and use cognitive restructuring to successfully deal with negative feelings  -Identify persistent symptoms that interfere with activities and/or enjoyment and successfully implement two coping strategies to reduce symptoms severity   Medication Management  -Prescribe and monitor medications  -Monitor and treat side effects  -Psychoeducation  -Initial and routine lab work    IRT/Psychotherapy  -Psychoeducation  -Motivation interviewing  -CBT  -Behavioral activation  -Mindfulness  -Family involvement during portions of sessions    Family Therapy  -Psychoeducation  -Motivational interviewing  -Behavioral family therapy  -CBT  -Behavioral activation    Case Management  -Motivational interviewing   Target date:   6 months from 6/12/19    Discharge criteria:  Marked and sustained symptom improvement     Jerel demonstrates understanding of mental illness     Jerel successfully implements strategies to cope with stressors and/or symptoms to mitigate risk for increase in symptom severity or relapse    Gains Made:  -Identify psychosocial areas of need  -Learn at least 2 coping strategies to successfully target current symptoms     Support System Objectives    -Supports increase the safety of the home by removing firearms or other lethal weapons from the client's easy access   -Supports agree to provide supervision and monitor suicidal potential   -Supports, including family members, terminate any hostile, critical responses to the client and increase their statements of praise, optimism, and affirmation   -Supports, including family members, verbalize realistic expectations and discipline methods   -Supports, including family members, verbally reinforce the client's active attempts  to build self-esteem and rapport   -Supports verbalize increased understanding of an knowledge about the client's illness and treatment   -Identify psychosocial areas of need  -Verbalize understanding of the client's long-term and short-term goals  -Demonstrate understanding of psychosis (paranoia, delusions, auditory hallucinations and command auditory hallucinations), depression (low mood nearly every day, anhedonia most of the time, low energy and difficulty concentrating, thinking or making decisions), SI and SIB in the context of the client with respect to symptoms, causes, course, medications and the impact of stress  -Learn the client's signs of stress and possible coping skills  -Demonstrate understanding for how substance use impacts symptoms and how to support decrease in or abstinence from illicit substance use  -Learn skills that strengthen and support the client's positive behavior change  -Learn strategies to build positive emotions and facilitate resiliency including use of a resiliency story  -Develop and implement a relapse prevention plan including identification of warning signs, triggers, coping mechanisms, and how other persons can be supportive if symptoms increase or reemerge   -Learn and implement communication skills to enhance communication and respect among family members  -Learn and implement problem-solving and/or conflict resolution skills to manage familial, personal and interpersonal problems constructively   Medication Management  -Prescribe and monitor medications  -Monitor and treat side effects  -Psychoeducation  -Initial and routine lab work    IRT/Psychotherapy  -Psychoeducation  -Motivation interviewing  -CBT  -Behavioral activation  -Family involvement during portions of sessions    Family Therapy  -Psychoeducation  -Motivational interviewing  -Behavioral family therapy  -CBT  -Behavioral activation    Case Management  -Motivational interviewing   Target date:   6 months from  6/12/19    Discharge criteria:  Support system demonstrates understanding of mental illness     Support system successfully implements strategies to assist Jerel cope with stressors and/or symptoms to mitigate risk for increase in symptom severity or relapse     Gains Made:  -Identify psychosocial areas of need       Domain 2: Health & Basic Living Needs  Identify and engage possible areas of improvement related to basic needs being met and maintaining or improving overall health and well-being     Measurable Objectives Interventions Discharge Criteria   -Verbalize an accurate understanding of factors influencing eating, health, and weight  -Learn and implement at least healthy nutritional practices  -Learn budgeting strategies for finances and develop a personal budget  -Identify areas of improvement for ADLs and IADLs and implement at least two skills with improved outcomes   Medication Management  -Prescribe and monitor medications  -Monitor and treat side effects  -Psychoeducation  -Initial and routine lab work    IRT/Psychotherapy  -Psychoeducation  -Motivation interviewing  -CBT  -Behavioral activation  -Mindfulness  -Family involvement during portions of sessions    Family Therapy  -Psychoeducation  -Motivational interviewing  -Behavioral family therapy  -CBT  -Behavioral activation    Supported Education & Employment  -Motivational interviewing  -Individualized placement and support   -Behavioral Activation  -Family involvement    Case Management  -Motivational interviewing   Target date:   6 months from 6/12/19    Discharge criteria:  Jerel, his supports and treatment team report no unmet health and basic living needs    Gains Made:  -Identify areas of improvement for ADLs and IADLs and implement at least two skills with improved outcomes       Domain 3: Family & Other Supports  Identify and engage possible areas of improvement related to engaging family, friends and other supports     Measurable Objectives  Interventions Discharge Criteria   -Identify support system  -Improve the quality of relationships by developing skills to better understand other people, communicate more effectively, manage disclosure, and understand social cues  -Learn and implement problem-solving and/or conflict resolution skills to manage personal and interpersonal problems constructively  -Identify and implement two approaches to how strengths and interests can be used to initiate social contacts and develop peer friendships  -Learn and implement calming and coping strategies to manage anxiety symptoms and focus attention usefully during moments of social anxiety    -Strengthen the social support network with friends by initiating social contact and participating in social activities with peers   -Increase participation in interpersonal or peer group activities   -Renew two old fun activities or develop two new fun activity   Medication Management  -Prescribe and monitor medications  -Monitor and treat side effects  -Psychoeducation  -Initial and routine lab work    IRT/Psychotherapy  -Psychoeducation  -Motivation interviewing  -CBT  -Behavioral activation  -Family involvement during portions of sessions    Family Therapy  -Psychoeducation  -Motivational interviewing  -Behavioral family therapy  -CBT  -Behavioral activation    Supported Education & Employment  -Motivational interviewing  -Individualized placement and support   -Behavioral Activation  -Family involvement    Case Management  -Motivational interviewing   Target date:   6 months from 6/12/19    Discharge criteria:  Jerel and his support system report feeling equipped with the necessary skills to communicate and problem solve during times of disagreement    Conflict with supports and peers are resolved constructively and consistently over time; 6 months    Gains Made:  -Identified social clubs/groups at school that may be of interest to Jerel       Domain 4: Academic and  Employment  Identify and engage possible areas of improvement relates to education and employment     Measurable Objectives Interventions Discharge Criteria   -Explore areas of interest for continued educational opportunities   -Explore areas of interest for employment purposes  -Stay current with schoolwork, completing assignments and interacting appropriately with peers and teachers   -Utilize accommodations, effective study and test-taking skills on a regular basis to improve academic performance  -Increase participate in school-related activities   -Obtain/maintain gainful employment    Medication Management  -Prescribe and monitor medications  -Monitor and treat side effects  -Psychoeducation  -Initial and routine lab work    IRT/Psychotherapy  -Psychoeducation  -Motivation interviewing  -CBT  -Behavioral activation  -Family involvement during portions of sessions    Family Therapy  -Psychoeducation  -Motivational interviewing  -Behavioral family therapy  -CBT  -Behavioral activation    Supported Education & Employment  -Motivational interviewing  -Individualized placement and support   -Behavioral Activation  -Family involvement    Case Management  -Motivational interviewing   Target date:   6 months from 6/12/19    Discharge criteria:  Work and school goals are achieved and maintained without follow along NAVIGATE Supported Education and Employment supports for 6 months    Gains Made:  -Explore areas of interest for continued educational opportunities   -Explore areas of interest for employment purposes  -Obtain/maintain gainful employment        9. Frequency of sessions and expected duration of treatment:   1-4x per month Medication Management with Prescriber ongoing  6 months of weekly IRT/Individual Psychotherapy followed by 12-18 months of biweekly or monthly IRT  2-4x per month Supported Education and Employment Services for 6 months  2-4x per month Family Education and Support Services for 6  months    10. Participants in therapy plan:   Jerel Day  Support System: Mom and Dad    NAVIGATE Team:   -Director/Family Clinician: AAG Huerta, LICSW  -Family Clinician: EDELMIRA Morgan  -SEE: YOSEPH Raymundo  -Family : Sabra Villeda CFPS  -Prescriber: Dr. Vince Zheng    See scanned document for Acknowledgement of Current Treatment Plan    Regulatory Guidelines for Updating Treatment Plan  [place] Medical Assistance: Reviewed & signed at least every 90 days  Medicare:  Update per policy

## 2019-06-14 ASSESSMENT — ANXIETY QUESTIONNAIRES: GAD7 TOTAL SCORE: 16

## 2019-06-17 ENCOUNTER — TELEPHONE (OUTPATIENT)
Dept: PSYCHIATRY | Facility: CLINIC | Age: 24
End: 2019-06-17

## 2019-06-17 DIAGNOSIS — F25.0 SCHIZOAFFECTIVE DISORDER, BIPOLAR TYPE (H): Primary | ICD-10-CM

## 2019-06-17 NOTE — TELEPHONE ENCOUNTER
NAVIGATE SEE Outgoing Telephone Call  For Supported Employment & Education    NAVIGATE Enrollee: Jerel Day (1995)     MRN: [id number]  Date of Call: 6/17/2019  Contacted: Jerel    Discussed:   Writer called Jerel to see if he would like to reschedule his no showed appt from last Friday. Jerel apologized as his phone broke on Thursday and wasn't able to call anyone. Writer encouraged Jerel to set up his voicemail as well since he has been looking for jobs. Jerel reports he has been in a lot of pain this week, but his mood has been good. This writer offered appt this week for SEE and IRT. Jerel stated he did not want to schedule an IRT appt as he is meeting with EDELMIRA Morgan next Wednesday but agreed to meet with SEE this Wednesday at 11am to search for jobs.    Ana Michel
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
## 2019-06-19 ENCOUNTER — OFFICE VISIT (OUTPATIENT)
Dept: PSYCHIATRY | Facility: CLINIC | Age: 24
End: 2019-06-19
Payer: COMMERCIAL

## 2019-06-19 DIAGNOSIS — F25.0 SCHIZOAFFECTIVE DISORDER, BIPOLAR TYPE (H): Primary | ICD-10-CM

## 2019-06-19 NOTE — Clinical Note
MAMI Beltrán asked that I attend IRT next week. He couldn't really tell me why, but said 'it would be nice'. I'll plan on attending.

## 2019-06-20 NOTE — PROGRESS NOTES
NAVIGATE SEE Progress Note   For Supported Employment & Education    NAVIGATE Enrollee: Ralf Day (1995)     MRN: [id number]  Date:  6/19/19  Clinician: LILLIANA Supported Employment & , Ana Michel    1. Client Status Update:   Ralf Day is interested in employment (Client stopped competitive employment)    2. People present:   SEE/Writer  Client: Ralf Day    3. Total number of persons who participated in contact: (do not count yourself/SEE) 1    4. Length of Actual Contact: 35 minutes   Traveled? Yes  , Total Travel Time: 40 minutes    5. Location of contact:  Other: [employer]    6. Brief description of session, contact, or client status (include: strategies, interventions, client reaction to contact, next steps, etc)     and Ralf met at [facility] for a follow up SEE session. Ralf reports an increase in neck and back pain as well as AH since stopping medications. We discussed Ralf's employment goals and Ralf reports that he is 'not well enough right now to work or go to school'. We discussed ways to help with his symptoms, including talking with Dr. Zheng about medications next wed. Ralf reports being open to trying oral medications as his sxs are worsening.   We discussed potential activities ralf could participate in while he works on symptoms, such as yoga, physical therapy, cranio sacral therapy, volunteering or finding a new hobby, going for walks, etc. Ralf was open to continuing his PT and trying massage or cranio sacral. Writer provided a website for Ralf to look at and see if it might be a good fit for him.   Ralf asked that this writer attend his IRT session next week.     7. Completion of mutually agreed upon client task from previous meeting:  Nothing Completed    8. Orientation and Treatment Planning:  Pursuing current SEE goals    9. Assessment:  Assessing client's need for follow-along supports    10. Placement:  Not Applicable    11. Follow Along  Supports: (for clients who are working or attending school)   Not Applicable    12. Mutually agreed upon client task for next meeting:     Review online materials, look at a few classes    13. Next Meeting Scheduled for: next wed 5    Ana TIJERINA Supported Employment &

## 2019-06-25 ENCOUNTER — TELEPHONE (OUTPATIENT)
Dept: PSYCHIATRY | Facility: CLINIC | Age: 24
End: 2019-06-25

## 2019-06-25 NOTE — TELEPHONE ENCOUNTER
"NAVIGATE Incoming Telephone Call    NAVIGATE Enrollee: Jerel Day (1995)     MRN: [id number]  Incoming Call Received on: 6/24/19 via voicemail  Call Received from: Mom, Mesha Zimmer left a voicemail message with no request for call back. Her intent was to inform the team that following his last Navigate appt 2 weeks ago he informed family of his need for a new cell phone. He reported hearing voices, subsequently feeling angry and stomped on his phone. Mesha mentioned it was able to be repaired but it had looked as if it would not be repairable. Since this incident, Mesha reported Jerel spent time with his parents Father's Day and over the past weekend at which point he seemed \"pretty good.\" No imminent safety concerns identified.     Will route to the team as an FYI.    Ana Rose [facility]    [facility] NAVIGATE     "

## 2019-06-26 ENCOUNTER — OFFICE VISIT (OUTPATIENT)
Dept: PSYCHIATRY | Facility: CLINIC | Age: 24
End: 2019-06-26
Payer: COMMERCIAL

## 2019-06-26 VITALS
BODY MASS INDEX: 23.79 KG/M2 | DIASTOLIC BLOOD PRESSURE: 72 MMHG | WEIGHT: 158.8 LBS | HEART RATE: 84 BPM | SYSTOLIC BLOOD PRESSURE: 116 MMHG

## 2019-06-26 DIAGNOSIS — F25.0 SCHIZOAFFECTIVE DISORDER, BIPOLAR TYPE (H): Primary | ICD-10-CM

## 2019-06-27 ASSESSMENT — ANXIETY QUESTIONNAIRES
6. BECOMING EASILY ANNOYED OR IRRITABLE: MORE THAN HALF THE DAYS
2. NOT BEING ABLE TO STOP OR CONTROL WORRYING: MORE THAN HALF THE DAYS
1. FEELING NERVOUS, ANXIOUS, OR ON EDGE: MORE THAN HALF THE DAYS
7. FEELING AFRAID AS IF SOMETHING AWFUL MIGHT HAPPEN: SEVERAL DAYS
3. WORRYING TOO MUCH ABOUT DIFFERENT THINGS: MORE THAN HALF THE DAYS
GAD7 TOTAL SCORE: 13
4. TROUBLE RELAXING: MORE THAN HALF THE DAYS
5. BEING SO RESTLESS THAT IT IS HARD TO SIT STILL: MORE THAN HALF THE DAYS

## 2019-06-27 ASSESSMENT — PATIENT HEALTH QUESTIONNAIRE - PHQ9: SUM OF ALL RESPONSES TO PHQ QUESTIONS 1-9: 13

## 2019-06-27 NOTE — PROGRESS NOTES
NAVIGATE Clinician Contact & Progress Note  For Individual Resiliency Training (IRT)  A Part of the [facility] First Episode of Psychosis Program    NAVIGATE Enrollee: Jerel Day (1995)     MRN: [id number]  Date:  5/29/19  Diagnosis: Schizoaffective disorder, bipolar type       Clinician: LILLIANA Individual Resiliency Trainer, EDELMIRA Morgan     1. Type of contact: (majority of time spent)  IRT Session    2. People present:   Writer  Client: Jerel Day  NAVIGATE Family Clinician - Jamil Lopez MA, LMFT  NAVIGATE SEE - YOSEPH Raymundo    3. Total number of persons who participated in contact: 4, including writer    4. Length of Actual Contact: Start Time: 5; End Time: 6   Traveled?    No     5. Location of contact:  Psychiatry Clinic, [place]    6. Did the client complete the home practice option(s) from the previous session: Completed    7. Motivational Teaching Strategies:  Connect info and skills with personal goals  Promote hope and positive expectations  Explore pros and cons of change  Re-frame experiences in positive light    8. Educational Teaching Strategies:  Review of written material/education  Relate information to client's experience  Ask questions to check comprehension  Break down information into small chunks  Adopt client's language     9. CBT Teaching Strategies:  Reinforcement and shaping (positive feedback for steps towards goals and gains in knowledge & skills)  Relapse prevention planning (review of stressors and early warning signs)  Coping skills training (review current coping skills and increase currently used skills)  Behavioral tailoring (fit taking medication into client's daily routine)    10. IRT Module(s) Addressed:  Module 2 - Assessment/Initial Goal Setting  Module 3 - Education about Psychosis  Module 4 - Relapse Prevention Planning    11. Techniques utilized:   [place] announced at beginning of session  Review of goal  Review of previous meeting  Present  new material  Problem-solving practice  Help client choose a home practice option  Summarize progress made in current session    12. Mental Status Exam:    Alertness: alert  and slow to respond  Appearance: casually groomed  Behavior/Demeanor: cooperative, pleasant and guarded, with poor eye contact   Speech: slowed  Language: intact and no obvious problem. Preferred language identified as English.  Psychomotor: slowed  Mood: description consistent with euthymia  Affect: restricted and guarded; was congruent to mood; was congruent to content  Thought Process/Associations: remarkable for , difficult to follow, disorganized, loose associations and response delay  Thought Content:  Reports delusions, preoccupations and over-valued ideas;  Denies suicidal ideation and violent ideation  Perception:  Reports auditory hallucinations and depersonalization;  Denies visual hallucinations  Insight: limited  Judgment: limited  Cognition: does  appear grossly intact; formal cognitive testing was not done  Suicidal ideation: denies SI, denies intent,  and denies plan  Homicidal Ideation: denies    13. Assessment/Progress Note:     Writer, NAVIGATE SEE - YOSEPH Raymundo and NAVIGATE Family Clinician - SARTHAK Kovacs MA (for training purposes) met with Jerel on this date for follow-up IRT session. Writer set agenda to check-in, discuss and assess symptoms, explore material in IRT modules, discuss ways in which Jerel can expand coping strategies and stress-management techniques for ongoing symptom management, and check-in regarding goals for ongoing recovery.    Writer began by reviewing limits to confidentiality with Jerel as reviewed during phone call with him on 5/20 (see note for full detail). Writer reiterated any concerns with immediate danger to self or others fall under the limits to confidentiality. Jerel did not have any further questions or concerns. He shared positively that his commitment was not extended  "due to a paperwork error and that it ended on 5/26. He reports he was \"very euphoric\" when he first heard this news. He reflected on wanting to have a say in his treatment and frustration with that being taken away during commitment. Writer normalized this response and offered validation. Inquired as to treatment goals for Jerel now considering he has the ability to choose. Jerel expressed an \"aversion\" to \"non-natural stuff\" and described green tea and aroma therapy as natural interventions he finds helpful and is open to. Writer inquired if Jerel is still willing to meet with Dr. Zheng to discuss potential medication options; Jerel shared he continues to be open to this. Confirmed scheduled appt for 6/12. With regards to medication, Jerel voiced hope to find medication that \"would help me sink and swim\" and hopes to \"get comfortable with the right med at the right time.\" Writer shared hope for opportunity to collaborate with Jerel with regards to his treatment options and decisions. Jerel shared positively about getting a job at [employer] with start date for Friday. Offered encouragement, validation and praise for this accomplishment.     Writer assessed for safety. Jerel denied any SI/SIB. Jerel denied any HI/VI. Jerel reports voices remain the same. Reviewed safety plan as previously discussed to include utilize crisis resources, call 911 and/or go to nearest ED for evaluation if safety concerns become imminent. Jerel was able to contract for safety. Will plan to meet in two weeks for next IRT session.     14. Plan/Referrals:     Next session scheduled in two weeks. Client and family aware they can reach out to writer directly and/or NAVIGATE team should concerns or needs arise prior to next scheduled appointment.     Billing for \"Interactive Complexity\"?    No      EDELMIRA MorganATE Individual Resiliency Trainer    Attestation:    I did not see this patient directly. This patient is discussed with me in " individual clinical social work supervision, and I agree with the plan as documented.     AGA Camarena, [facility], July 8, 2019

## 2019-06-28 ASSESSMENT — ANXIETY QUESTIONNAIRES: GAD7 TOTAL SCORE: 13

## 2019-06-30 NOTE — PROGRESS NOTES
LILLIANA Clinician Contact & Progress Note   For Family Education Program    NAVIGATE Enrollee: Jerel Day (1995)     MRN: [id number]  Date:  6/26/19  Diagnosis(es):   Schizoaffective disorder, bipolar type  Clinician: LILLIANA Director & Family Clinician, SHIRA Camarena      1. Type of contact: (majority of time spent)  Family Session     2. People present:   Writer  Client: No  Significant Other/Family/Friend:  Mother and Father    Writer met with Jerel and YOSEPH Raymundo NAVIGATE SEE for 10 min prior to this family visit to try and troubleshoot disclosure to family about the $302.30/month out of pocket cost associated with starting a new med, Vraylar. Jerel was unwilling to talk with family and/or accept their financial support in starting this medication. He was unwilling to discuss options for applying for his own insurance (I.e. [place]).      3. Total number of persons who participated in contact: 3, including writer     4. Length of Actual Contact: Start Time: 5:15; End Time: 6:00pm                Traveled?    No     5. Location of contact:  Psychiatry Clinic, [place]     6. Did the client complete the home practice option(s) from the previous session: Completed     7. Motivational Teaching Strategies:  Connect info and skills with personal goals  Promote hope and positive expectations  Explore pros and cons of change  Re-frame experiences in positive light     8. Educational Teaching Strategies:  Review of written material/education  Relate information to client's experience  Ask questions to check comprehension  Break down information into small chunks  Adopt client's language      9. CBT Teaching Strategies:  Reinforcement and shaping (positive feedback for steps towards goals, gains in knowledge & skills and follow-through on home assignments)  Relapse prevention planning (review of stressors, early warning signs, written plan to respond to signs and rehearse  plan)  Behavioral tailoring (communication and problem solving)     10. Psychoeducational Topic(s) Addressed:  Just the Facts - Relapse Prevention Planning  Just the Facts - Problem Solving      11. Techniques utilized:   [place] announced at beginning of session  Review of homework  Review of goal  Review of previous meeting  Present new material  Problem-solving practice  Help client choose a home practice option  Summarize progress made in current session     12. Assessment/Progress Note:      Acknowledged Jerel continues to have revoked his CONNOR for family. In the session - Nonetheless, recently breeched confidentiality to inform family that Jerel stated to Tammi Connell AM, LGSW, NAVIGATE IRT & Family Clinician he had a plan to kill himself by way of jumping [place] [address] if his commitment is extended. Shared information with family about knowledge of the status of Jerel's commitment (dismissed; mom aware; see writer's telephone encounter from 5/29 for details). Provided information and guidance for navigation ending of commitment, petitioning for community commitment verses civil commitment from inpatient, and guardianship. This session - in the spirit of safety planning, informed family that Dr Zheng did not writer an Examiner's Support Statement for community commitment. Additionally, pre-petition screening did not feel it would be likely that the community commitment would be supported. Family report they have still not heard from Jerel's  or pre-petition screening. Acknowledged offered support around families frustrations, feelings of helplessness, fear of impending and inevitable hospitalization.    Primary focus of today's appt was reviewing triggers and warning signs for relapse. Family share the notion that Jerel is amidst relapsing in the context of discontinuing medications. They report observing an increase in symptoms as evidence by Jerel's behavior including but not limited to  purposefully breaking his cell phone twice. Family report no imminent safety concerns. Engaged family in discussions around anticipatory safety planning. For example, guided family through problem solving future living arrangements if and if not Jerel were to be rehospitalized given Jerel seems to be struggling with successfully living independently. Continue to highlight familial strengths, inclusion, and positive efforts at scheduling mood enhancing activities.    Reviewed Jerel's participation in NAVIGATE. Jerel and family are participating in all NAVIGATE services without taking meds. Home practice identified as continued observation of potential warning signs of relapse, sharing safety and relapse prevention planning with other family members as appropriate (e.g. Brother), proposing a financial incentive for continued injections.     13. Plan/Referrals:      Will meet with family monthly as schedule allows for evidence based family psychoeducation and therapeutic support aimed at maximizing Jerel's opportunity for recovery from psychosis.      Will share the above information with the team.      Ana Rose, [facility]   NAVIGATE Director & Family Clinician

## 2019-07-03 ENCOUNTER — OFFICE VISIT (OUTPATIENT)
Dept: PSYCHIATRY | Facility: CLINIC | Age: 24
End: 2019-07-03
Payer: COMMERCIAL

## 2019-07-03 DIAGNOSIS — F25.0 SCHIZOAFFECTIVE DISORDER, BIPOLAR TYPE (H): Primary | ICD-10-CM

## 2019-07-03 ASSESSMENT — ANXIETY QUESTIONNAIRES
5. BEING SO RESTLESS THAT IT IS HARD TO SIT STILL: SEVERAL DAYS
7. FEELING AFRAID AS IF SOMETHING AWFUL MIGHT HAPPEN: SEVERAL DAYS
GAD7 TOTAL SCORE: 8
6. BECOMING EASILY ANNOYED OR IRRITABLE: SEVERAL DAYS
3. WORRYING TOO MUCH ABOUT DIFFERENT THINGS: SEVERAL DAYS
2. NOT BEING ABLE TO STOP OR CONTROL WORRYING: SEVERAL DAYS
1. FEELING NERVOUS, ANXIOUS, OR ON EDGE: MORE THAN HALF THE DAYS

## 2019-07-03 ASSESSMENT — PATIENT HEALTH QUESTIONNAIRE - PHQ9
SUM OF ALL RESPONSES TO PHQ QUESTIONS 1-9: 11
5. POOR APPETITE OR OVEREATING: SEVERAL DAYS

## 2019-07-03 NOTE — PROGRESS NOTES
NAVIGATE Clinician Contact & Progress Note   For Family Education Program    NAVIGATE Enrollee: Jerel Day (1995)     MRN: [id number]  Date:  7/03/19  Diagnosis(es):   Schizoaffective disorder, bipolar type  Clinician: LILLIANA Family Clinician, SHIRA Camarena and others represented below    1. Type of contact: (majority of time spent)  Family Session  Treatment Planning Meeting    2. People present:   Writer  Client: Yes - Jerel was present for the entire visit  Significant Other/Family/Friend:  Mother and Father  NAVIGATE IRT & Family Clinician, Tammi Connell AM, LGARIANNE TIJERINA SEE, YOSEPH Raymundo    3. Length of Actual Contact: Start Time: 12:20; End Time: 1:05pm   Traveled?    No     4. Location of contact:  Psychiatry Clinic, [place]    5. Did the client complete the home practice option(s) from the previous session: Not Applicable    6. Motivational Teaching Strategies:  Connect info and skills with personal goals  Promote hope and positive expectations  Explore pros and cons of change  Re-frame experiences in positive light    7. Educational Teaching Strategies:  Review of written material/education  Relate information to client's experience  Ask questions to check comprehension  Break down information into small chunks  Adopt client's language     8. CBT Teaching Strategies:  Reinforcement and shaping (positive feedback for steps towards goals and gains in knowledge & skills)  Behavioral tailoring (problem solving and communication skills)    9. Psychoeducational Topic(s) Addressed:  Just the Facts - Problem Solving    10. Techniques utilized:   [place] announced at beginning of session  Review of each team member's role and summarization of progress made  Review of goals across treatment plan domains to identify successes, areas of opportunity and to increase family awareness to open upon possible opportunities for support  - Domain: Illness Management & Recovery. Goal:  Identify and engage possible areas of improvement related to +/- symptoms, ability to manage illness, medications, and/or substance use/abuse, SI/SIB/HI  - Domain: Health & Basic Living Needs. Goal: Identify and engage possible areas of improvement related to basic needs being met and maintaining or improving overall health and well-being  - Domain: Family & Other Supports. Goal: Identify and engage possible areas of improvement related to engaging family, friends and other supports  - Domain: Social, Academic & Employment. Goal: Identify and engage possible areas of improvement related to education, employment, and social activities   Review of associated strengths, barriers, objectives, and interventions to maximize the opportunity for desirable outcomes  Illicit client/family feedback   Present new material  Problem-solving practice  Help client choose a home practice option  Summarize progress made in current session    11. Assessment/Progress Note:     The above named individuals met for the purposes of a reviewing and completing a client-centered, strengths-based treatment plan review utilizing a shared decision making model of care. Utilized a team approach with Jerel and family to help facilitate challenging discussions around treatment non-adherence and Jerel's desire for independence. Treatment recommendations have been outlined in the treatment plan encounter dated 6/12/19. Please see the treatment plan encounter in chart review for details.     Meeting highlights included a discussion of Jerel's strengths - awareness and accountability, determination, independence, flexible, articulate, resilient, creative, humorous, and brave - and successes in Navigate. Facilitated discussed around goals. Jerel expressed desire for financial independence. Jerel defined financial independence as having access to his invests in his dad's [employer] account. His understanding was dad would continue to hold this money until Jerel  "was \"better.\" Jerel feels he is \"better;\" family and team were not aligned with Jerel. In fact, family identified areas of concerns to include safety and security, and ADLs (I.e. Need for in home assistance). Jerel's preference would be to receive all of his invested money so he can manage and spend it independently. Family and team were not in support given current vulnerabilities in the context of relapse of psychotic symptoms. None-the-less, the team modeled healthy communication and problem solving strategies with regard to Jerel's report for needing access to his finances. Jerel's spending wish list includes the following items - investments, clothing, acupuncture, plants, phone repair/replacement, paying back a friend ($80), headphones, furniture, concert tickets. Facilitated negotiations for how Jerel may be able to access some of his money. Agreed upon goal and home practice identified as exchanging some ($2000) but not all investments into liquid assets so Jerel can independently purchase items on the wish list outlined above.     Overall Jerel seemed agitated and interruptive as his finances have been an ongoing source of tension. Family made good attempts at expressing negative emotions and concerns for Jerel's wellbeing. Helpful clinical techniques utilized during today's appointment appeared to be communication and problem solving techniques including expressing positive emotions, exploring all possible solutions, negotiating, and agreeing to disagree.     Family did express interest in continuing to meet for family therapy and psychoeducation. Jerel is agreeable to continuing to meet for IRT. As of today's appt their insight into patient's mental illness appears adequate whereas Jerel's insight seems poor. With regard to family dynamics, overall family seem as if they are able to interact in a guarded manner. Family did seem like they would benefit from continued clinical intervention aimed at assisting them " implement helpful strategies at home and increase to their understanding of psychosis.    12. Plan/Referrals:     Will meet with family biweekly as schedule allows for evidence based family psychoeducation and therapeutic support aimed at maximizing Jerel's opportunity for recovery from psychosis.     Tammi Connell AM, LGSW, MIQUELATE IRT & Family Clinician will meet with Jerel biweekly for continued safety assessment and evidence psychoeducation and therapeutic support aimed at maximizing Jerel's opportunity for recovery from psychosis.     YOSEPH Raymundo, LILLIANA SEE will meet with Jerel biweekly for continued exploration of employment and academic opportunities, financial independence and ADL related community support services.     Ana Rose [facility]   NAVIGATE Director & Family Clinician

## 2019-07-04 ASSESSMENT — ANXIETY QUESTIONNAIRES: GAD7 TOTAL SCORE: 8
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
## 2019-07-10 NOTE — PROGRESS NOTES
LILLIANA SEE Progress Note   For Supported Employment & Education    NAVIGATE Enrollee: Jerel Day (1995)     MRN: [id number]  Date:  7/3/19  Clinician: LILLIANA Supported Employment & , Ana Michel    1. Client Status Update:   Jerel Day is interested in education (Other, explain: Treatment Planning meeting) and employment (Client developed employement goals)    2. People present:   SEE/Writer  Client: Jerel Day  Significant Other/Family/Friend:  Mother and Father, NAVIGATE Director/Family Clinian, Ana Rose, MSW, LISCW, LILLIANA IRT & Family Clinician, Tammi Connell AM ARIANNE    3. Length of Actual Contact: 60 minutes   Traveled? No    4. Location of contact:  Psychiatry Clinic, [place]    5. Brief description of session, contact, or client status (include: strategies, interventions, client reaction to contact, next steps, etc)    Writer participated in Jerel's treatment planning meeting where we discussed [place] strengths and upcoming goals. The majority of the meeting was focused on Jerel's goal of becoming financially independent. It is still unclear about whether Jerel would like to get a job or stay in school but this writer and Jerel will continue to meet to discuss goals surrounding this area. Please see SHIRA Morgan's note for more details.    6. Completion of mutually agreed upon client task from previous meeting:  Not Applicable    7. Orientation and Treatment Planning:  Developing SEE treatment plan goals    8. Assessment:  Assessing client's need for follow-along supports    9. Placement:  Not Applicable    10. Follow Along Supports: (for clients who are working or attending school)   Not Applicable    11. Mutually agreed upon client task for next meeting:     F/u next week to meet    12. Next Meeting Scheduled for: tbd - Jerel's phone is currently broken    Ana TIJERINA Supported Employment &

## 2019-07-11 NOTE — PROGRESS NOTES
LILLIANA Clinician Contact & Progress Note  For Individual Resiliency Training (IRT)  A Part of the [facility] First Episode of Psychosis Program    NAVIGATE Enrollee: Jerel Day (1995)     MRN: [id number]  Date:  6/12/19  Diagnosis: Schizoaffective disorder, bipolar type       Clinician: LILLIANA Individual Resiliency Trainer, EDELMIRA Morgan     1. Type of contact: (majority of time spent)  IRT Session    2. People present:   Writer  Client: Jerel Day  LILLIANA Family Clinician - SARTHAK Kovacs MA    3. Total number of persons who participated in contact: 3, including writer    4. Length of Actual Contact: Start Time: 3pm; End Time: 4pm   Traveled?    No     [facility]    6. Did the client complete the home practice option(s) from the previous session: Completed    7. Motivational Teaching Strategies:  Connect info and skills with personal goals  Promote hope and positive expectations  Explore pros and cons of change  Re-frame experiences in positive light    8. Educational Teaching Strategies:  Review of written material/education  Relate information to client's experience  Ask questions to check comprehension  Break down information into small chunks  Adopt client's language     9. CBT Teaching Strategies:  Reinforcement and shaping (positive feedback for steps towards goals and gains in knowledge & skills)  Relapse prevention planning (review of stressors and early warning signs)  Coping skills training (review current coping skills and increase currently used skills)  Behavioral tailoring (fit taking medication into client's daily routine)    10. IRT Module(s) Addressed:  Module 2 - Assessment/Initial Goal Setting  Module 3 - Education about Psychosis  Module 4 - Relapse Prevention Planning    11. Techniques utilized:   [place] announced at beginning of session  Review of goal  Review of previous meeting  Present new material  Problem-solving  "practice  Help client choose a home practice option  Summarize progress made in current session    12. Mental Status Exam:    Alertness: alert  and slow to respond  Appearance: casually groomed  Behavior/Demeanor: cooperative, pleasant and guarded, with poor eye contact   Speech: slowed  Language: intact and no obvious problem. Preferred language identified as English.  Psychomotor: slowed  Mood: irritable, agitated and labile  Affect: restricted and guarded; was congruent to mood; was congruent to content  Thought Process/Associations: remarkable for , difficult to follow, disorganized, loose associations and response delay  Thought Content:  Reports delusions, preoccupations and over-valued ideas;  Denies suicidal ideation and violent ideation  Perception:  Reports auditory hallucinations with commands [details in Interim History] and depersonalization;  Denies visual hallucinations  Insight: limited  Judgment: limited  Cognition: does  appear grossly intact; formal cognitive testing was not done  Suicidal ideation: denies SI, denies intent,  and denies plan  Homicidal Ideation: denies    13. Assessment/Progress Note:     Writer, Jerel and NAVIGATE Family Clinician - SARTHAK Kovacs MA met for IRT on this date. Writer set agenda to check-in, discuss and assess symptoms, explore material in IRT modules, discuss ways in which Jerel can expand coping strategies and stress-management techniques for ongoing symptom management, and check-in regarding goals for ongoing recovery and re-visit treatment plan.    During check-in, Jerel shared overall he is feeling \"mostly good\" with \"some bad spots.\" He shared positively about being up at his cabin with family where he went fishing and rested on the couch. He also shared the work \"doesn't want me back\" as he was taking too long of breaks; Jerel expressed confusion about this and stated he doesn't feel this was the case. Jerel reports noticing he is getting more frustrated " "with \"little things\" and reported SH in the form of cutting \"a couple times\" and also reported punching the refrigerator. Cuts were superficial in nature and hand/knuckles did appear bruised. Writer shared concern related to SH and engaged Jerel in consideration of other coping strategies that could be helpful during moments of irritability and frustration including listening to music, making music, going for a walk and/or resting. Jerel denied any suicidal ideation however he did report command hallucinations that were suicidal in nature 1-2x telling him to jump off a bridge. Jerel denied experiencing any urges to follow through with this. Jerel denied any command hallucinations of violent or homicidal nature, and denied any HI/VI independent of hallucinations. Reviewed safety plan to include reaching out to family, utilizing crisis number, calling 911 and/or going directly to hospital should Jerel experience an increase in thoughts or urges that are suicidal or violent in nature. Jerel was able to contract for safety.     Engaged Jerel in review of BEH Treatment Plan and updated treatment plan as documented in separate encounter. Jerel was able to identify and articulate specific treatment goals as well as strengths and barriers to achieving these goals. See separate encounter for full detail. Jerel did share perspective wondering if a \"magic mushroom\" could help. Writer offered psychoeducation related to substances and the negative impact they can have on symptoms. See BEH Treatment Plan encounter for full details of treatment plan.     Overall, Jerel was pleasant and engaged throughout the session. He contributed to conversation surrounding goals and treatment plan.  then joined Jerel for his medication appointment with Dr. Zheng where medications were discussed including Vraylar. Jerel was not interested in started medications today, but was agreeable to follow-up appt with both  and Dr. Zheng in two weeks.  " "    14. Plan/Referrals:     Next appointments (both IRT and Med Management) scheduled in two weeks. Client and family aware they can reach out to writer directly and/or NAVIGATE team should concerns or needs arise prior to next scheduled appointment.     Billing for \"Interactive Complexity\"?    No      Tammi Connell ARIANNE    NAVIGATE Individual Resiliency Trainer  Attestation:    I did not see this patient directly. This patient is discussed with me in individual clinical social work supervision, and I agree with the plan as documented.     AGA Camarena, LICSW, July 15, 2019    "

## 2019-07-17 ENCOUNTER — OFFICE VISIT (OUTPATIENT)
Dept: PSYCHIATRY | Facility: CLINIC | Age: 24
End: 2019-07-17
Payer: COMMERCIAL

## 2019-07-17 DIAGNOSIS — F25.0 SCHIZOAFFECTIVE DISORDER, BIPOLAR TYPE (H): Primary | ICD-10-CM

## 2019-07-18 ENCOUNTER — TELEPHONE (OUTPATIENT)
Dept: PSYCHIATRY | Facility: CLINIC | Age: 24
End: 2019-07-18

## 2019-07-18 NOTE — TELEPHONE ENCOUNTER
NAVIGATE Family Peer Support  A Part of the [facility] First Episode of Psychosis Program     Patient Name: Jerel Day  /Age:  1995 (23 year old)  Date of Encounter: 19  Length of Contact: 30 minutes    This writer reached out to offer resources and support to patient's father, León, and to follow-up on Jerel's current status.    León stated that Jerel hasn't taken medication since the commitment lapsed.  According to León's observations, Jerel's symptoms don't currently seem to warrant inpatient treatment although Jerel is disorganized and parents have concerns over driving safety.    [place] since [place]rel's current apartment lease expires on 19. Due to high sibling conflict, Jerel's sister will be living with their grandmother until 19 when she moves into her own apartment.      A family trip to [place] will take place from 19-19 and Jerel will be attending. León and Mesha have spoken with other family members regarding a crisis plan.    The involvement of [facility]  was discussed. León stated that Jerel refused to sign an CONNOR for parents. This writer encouraged León to provide information via voicemail to  regarding Jerel's current condition for future engagement and to make sure she has Jerel's new address for correspondence.    This writer will follow-up mid August after the [place] trip.    CONSTANTINO Fletcher Family Peer    [Billing Code 12738 for No Billable Service as family peer support is a nonbillable service]

## 2019-07-22 ENCOUNTER — TELEPHONE (OUTPATIENT)
Dept: PSYCHIATRY | Facility: CLINIC | Age: 24
End: 2019-07-22
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
## 2019-07-24 ENCOUNTER — OFFICE VISIT (OUTPATIENT)
Dept: PSYCHIATRY | Facility: CLINIC | Age: 24
End: 2019-07-24
Payer: COMMERCIAL

## 2019-07-24 ENCOUNTER — TELEPHONE (OUTPATIENT)
Dept: PSYCHIATRY | Facility: CLINIC | Age: 24
End: 2019-07-24

## 2019-07-24 DIAGNOSIS — F25.0 SCHIZOAFFECTIVE DISORDER, BIPOLAR TYPE (H): Primary | ICD-10-CM

## 2019-07-24 NOTE — TELEPHONE ENCOUNTER
NAVIGATE Outreach  A Part of the [facility] First Episode of Psychosis Program     Patient Name: Jerel Day  /Age:  1995 (23 year old)    Contact: Writer called Jerel regarding cancellation of appt today. Jerel confirmed he needed to cancel today; reported he is doing well, but won't be able to make it in. Jerel was open to rescheduling and requested a Wednesday late afternoon/evening appt. Jerel is not in town next week therefore next Wednesday does not work. Next available appt after next week that worked for both Jerel and writer is  at 4pm. Jerel agreeable to this date/time; writer scheduled appt. Encouraged Jerel to reach out to writer and/or NAVIGATE team should needs or concerns arise prior to this scheduled appt.     Plan: Next IRT scheduled for  at 4pm. Client and family aware they can reach out to writer directly and/or NAVIGATE team should concerns or needs arise prior to next scheduled appointment.     Tammi Connell AM, LGSW  NAVIGATE Individual Resiliency  & Family Clinician

## 2019-07-24 NOTE — PROGRESS NOTES
NAVIGATE Clinician Contact & Progress Note   For Family Education Program    NAVIGATE Enrollee: Jerel Day (1995)     MRN: [id number]  Date:  7/24/19  Diagnosis(es):   Schizoaffective disorder, bipolar type  Clinician: LILLIANA Family Clinician, SHIRA Camarena and others represented below    1. Type of contact: (majority of time spent)  Family Session    2. People present:   Writer  Client: No  Significant Other/Family/Friend:  Mother and Father  NAVIGATE IRT & Family Clinician, Tammi Connell AM, LGSW    3. Length of Actual Contact: Start Time: 5:00; End Time: 6:00pm   Traveled?    No     4. Location of contact:  [facility]iatry [facility], [place]    5. Did the client complete the home practice option(s) from the previous session: Completed    6. Motivational Teaching Strategies:  Connect info and skills with personal goals  Promote hope and positive expectations  Explore pros and cons of change  Re-frame experiences in positive light    7. Educational Teaching Strategies:  Review of written material/education  Relate information to client's experience  Ask questions to check comprehension  Break down information into small chunks  Adopt client's language     8. CBT Teaching Strategies:  Reinforcement and shaping (positive feedback for steps towards goals and gains in knowledge & skills)  Behavioral tailoring (problem solving and communication skills)    9. Psychoeducational Topic(s) Addressed:  Just the Facts - Problem Solving    10. Techniques utilized:   [place] announced at beginning of session  Review of homework  Review of goal  Review of previous meeting  Present new material  Role-play  Problem-solving practice  Help client choose a home practice option  Summarize progress made in current session    11. Assessment/Progress Note:     The focus (generally speaking) of today's session was problem solving future conversations and boundaries with Jerel about using the family car given he  returned home late last night after taking the care keys from his brother and driving to [place] and [place]. Discussed this in the context of relapse, easily agitated, impulsive. Guided family through problem solving next steps. Family chose to focus on how to have a conversation with Jerel about boundaries and consequences related to using the car owned and insured by mom and dad. Discussed how to validate and empathize while delivering a message for concern for safety and well being - yes this seems unfair.     Boundaries (Consequences)  -Care usage in [place] only; communicate if leaving the metro area overnight (lost usage 1 month)  -Coordinate with Chilo (brother) prior to using the car (keys will go in lock box)  -No driving under the influence with or without legal consequences (lost usage 6 months)  - is responsible for paying tickets (will be deducted from spending money; already provided by parents)  -Preference is for Chilo to drive the car (lost usage 1 month)    Agreed upon goals are to continue to monitor for safety and have the above conversation after their vacation. Progress toward goal completion seems limited. Overall family seemed cooperative and pleasant.     Family did express interest in continuing to meet for family therapy and psychoeducation. As of today's appt their insight into patient's mental illness appears adequate. With regard to family dynamics, overall family seems as if they are able to interact in a cooperative and pleasant manner. Family did seem like they would benefit from continued clinical intervention aimed at assisting them implement helpful strategies at home and increase to their understanding of psychosis.    12. Plan/Referrals:     Will meet with family biweekly as schedule allows for evidence based family psychoeducation and therapeutic support aimed at maximizing Jerel's opportunity for recovery from psychosis.     Ana Rose, [facility]   NAVIGATE Director & Family  Clinician

## 2019-07-25 NOTE — PROGRESS NOTES
LILLIANA Clinician Contact & Progress Note  For Individual Resiliency Training (IRT)  A Part of the [facility] First Episode of Psychosis Program    NAVIGATE Enrollee: Jerel Day (1995)     MRN: [id number]  Date:  6/26/19  Diagnosis: Schizoaffective disorder, bipolar type       Clinician: LILLIANA Individual Resiliency Trainer, EDELMIRA Morgan     1. Type of contact: (majority of time spent)  IRT Session    2. People present:   Writer  Client: Jerel Day  LILLIANA SEE - YOSEPH Raymundo for first 10 minutes    3. Total number of persons who participated in contact: 3, including writer for first 10 minutes; 2, including writer for last 50 minutes    4. Length of Actual Contact: Start Time: 5pm; End Time: 6pm   Traveled?    No     5. Location of contact:  Psychiatry Clinic, [place]    6. Did the client complete the home practice option(s) from the previous session: Completed    7. Motivational Teaching Strategies:  Connect info and skills with personal goals  Promote hope and positive expectations  Explore pros and cons of change  Re-frame experiences in positive light    8. Educational Teaching Strategies:  Review of written material/education  Relate information to client's experience  Ask questions to check comprehension  Break down information into small chunks  Adopt client's language     9. CBT Teaching Strategies:  Reinforcement and shaping (positive feedback for steps towards goals and gains in knowledge & skills)  Relapse prevention planning (review of stressors and early warning signs)  Coping skills training (review current coping skills and increase currently used skills)  Behavioral tailoring (fit taking medication into client's daily routine)    10. IRT Module(s) Addressed:  Module 2 - Assessment/Initial Goal Setting  Module 3 - Education about Psychosis  Module 4 - Relapse Prevention Planning    11. Techniques utilized:   [place] announced at beginning of session  Review of  goal  Review of previous meeting  Present new material  Problem-solving practice  Help client choose a home practice option  Summarize progress made in current session    12. Mental Status Exam:    Alertness: alert  and slow to respond  Appearance: casually groomed  Behavior/Demeanor: cooperative, pleasant and guarded, with poor eye contact   Speech: slowed  Language: intact and no obvious problem. Preferred language identified as English.  Psychomotor: slowed  Mood: description consistent with euthymia  Affect: restricted and guarded; was congruent to mood; was congruent to content  Thought Process/Associations: remarkable for  and response delay  Thought Content:  Reports delusions, preoccupations and over-valued ideas;  Denies suicidal ideation and violent ideation  Perception:  Reports auditory hallucinations without commands [details in Interim History] and depersonalization;  Denies visual hallucinations  Insight: limited  Judgment: limited  Cognition: does  appear grossly intact; formal cognitive testing was not done  Suicidal ideation: denies SI, denies intent,  and denies plan  Homicidal Ideation: denies    13. Assessment/Progress Note:     Writer met with Jerel for IRT on this date. Writer set agenda to check-in, discuss and assess symptoms, explore material in IRT modules, discuss ways in which Jerel can expand coping strategies and stress-management techniques for ongoing symptom management, and check-in regarding goals for ongoing recovery.     and Jerel were initially joined by NAVIGATE SEE - YOSEPH Raymundo who had just attended appt with Jerel and Dr. Zheng where medication options were discussed. Ana shared Vraylar was discussed with Jerel but he is concerned about the out of pocket cost.  then met with Jerel who confirmed he is not interested in Vraylar at this time due to this OOP cost.  offered validation and also wondered if parents would be willing to assist. Jerel was  "not open to discussing this further, but shared he would maybe be open to talking about meds again in the fall when school would be starting. Writer offered support. With regards to symptoms, Jerel reports ongoing AH; he shared that he broke his phone recently as he felt the voices were communicating with him through the phone either from the \"government\" or \"tech companies.\" He plans to get a new phone soon. Jerel denied any SI and denied engaging in any SIB since last visit with writer. Writer reflected positively on this and validated Jerel's use of positive coping skills including walking, which he reports is helpful. Jerel then brought out his school ID and 's license. He looked at them for a while, then commented on how different he looked. He shared pictures with writer and writer engaged Jerel in reflection on how he feels he looks different. 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 Writer shared observation of Jerel's strengths in care for these kids and engaged Jerel in consideration of his own strengths. Jerel identified strengths to include his compassion, being nice and understanding. Writer offered validation for Jerel's participation in NAVIGATE appts and hope for recovery.    With Jerel's permission, met with parents and NAVIGATE Director and Family Clinician - AGA Huerta, SHIRA; scheduled future appts including a meeting all together on 7/3 at 12pm for treatment planning purposes.      14. Plan/Referrals:     Next appt scheduled for 7/3 at 12pm with family and NAVIGATE Director and Family Clinician - AGA Huerta, SHIRA to discuss treatment plan and goals. Client and family aware they can reach out to writer directly and/or NAVIGATE team should concerns or needs arise prior to " "next scheduled appointment.     Billing for \"Interactive Complexity\"?    No      EDELMIRA Morgan    NAVIGATE Individual Resiliency Trainer  Attestation:    I did not see this patient directly. This patient is discussed with me in individual clinical social work supervision, and I agree with the plan as documented.     AGA Camarena, LICSW, August 11, 2019      "

## 2019-07-26 NOTE — PROGRESS NOTES
Writer met with Jerel, julius and NAVIGATE Director and Family Clinician - AGA Huerta, LICSW on this date for session. See Ana's documentation from this date for full detail.     EDELMIRA Morgan  NAVIGATE Individual Resiliency  and Family Clinician    This is a non-billable encounter as it was billed in separate encounter.

## 2019-08-08 ENCOUNTER — OFFICE VISIT (OUTPATIENT)
Dept: PSYCHIATRY | Facility: CLINIC | Age: 24
End: 2019-08-08
Payer: COMMERCIAL

## 2019-08-08 DIAGNOSIS — F25.0 SCHIZOAFFECTIVE DISORDER, BIPOLAR TYPE (H): Primary | ICD-10-CM

## 2019-08-09 ASSESSMENT — ANXIETY QUESTIONNAIRES
6. BECOMING EASILY ANNOYED OR IRRITABLE: MORE THAN HALF THE DAYS
7. FEELING AFRAID AS IF SOMETHING AWFUL MIGHT HAPPEN: MORE THAN HALF THE DAYS
3. WORRYING TOO MUCH ABOUT DIFFERENT THINGS: MORE THAN HALF THE DAYS
2. NOT BEING ABLE TO STOP OR CONTROL WORRYING: MORE THAN HALF THE DAYS
5. BEING SO RESTLESS THAT IT IS HARD TO SIT STILL: MORE THAN HALF THE DAYS
GAD7 TOTAL SCORE: 14
1. FEELING NERVOUS, ANXIOUS, OR ON EDGE: MORE THAN HALF THE DAYS

## 2019-08-09 ASSESSMENT — PATIENT HEALTH QUESTIONNAIRE - PHQ9
SUM OF ALL RESPONSES TO PHQ QUESTIONS 1-9: 18
5. POOR APPETITE OR OVEREATING: MORE THAN HALF THE DAYS

## 2019-08-10 ASSESSMENT — ANXIETY QUESTIONNAIRES: GAD7 TOTAL SCORE: 14

## 2019-08-13 NOTE — TELEPHONE ENCOUNTER
-Writer called and spoke with Ada at [facility] Prior Auth.   -She informed writer that claim gets denied because of a cost exceeds; they need to have the pharmacy call every time that it would be due and then they will look at plan, and approve it.  So per Ada injection does not need a PA.    Detail Level: Detailed

## 2019-08-14 ENCOUNTER — HOSPITAL ENCOUNTER (INPATIENT)
Facility: CLINIC | Age: 24
LOS: 35 days | Discharge: PSYCHIATRIC OR SUBSTANCE USE FACILITY | DRG: 885 | End: 2019-09-18
Attending: EMERGENCY MEDICINE | Admitting: PSYCHIATRY & NEUROLOGY
Payer: COMMERCIAL

## 2019-08-14 DIAGNOSIS — F25.0 SCHIZOAFFECTIVE DISORDER, BIPOLAR TYPE (H): ICD-10-CM

## 2019-08-14 DIAGNOSIS — R45.851 SUICIDAL IDEATION: ICD-10-CM

## 2019-08-14 DIAGNOSIS — M54.2 CERVICAL PAIN: ICD-10-CM

## 2019-08-14 LAB
ALCOHOL BREATH TEST: 0 (ref 0–0.01)
AMPHETAMINES UR QL SCN: NEGATIVE
BARBITURATES UR QL: NEGATIVE
BENZODIAZ UR QL: NEGATIVE
CANNABINOIDS UR QL SCN: POSITIVE
COCAINE UR QL: NEGATIVE
ETHANOL UR QL SCN: NEGATIVE
OPIATES UR QL SCN: NEGATIVE

## 2019-08-14 PROCEDURE — 99207 ZZC CDG-MDM COMPONENT: MEETS HIGH - UP CODED: CPT | Performed by: CLINICAL NURSE SPECIALIST

## 2019-08-14 PROCEDURE — 99223 1ST HOSP IP/OBS HIGH 75: CPT | Mod: AI | Performed by: CLINICAL NURSE SPECIALIST

## 2019-08-14 PROCEDURE — 99285 EMERGENCY DEPT VISIT HI MDM: CPT | Mod: 25 | Performed by: EMERGENCY MEDICINE

## 2019-08-14 PROCEDURE — 25000132 ZZH RX MED GY IP 250 OP 250 PS 637: Performed by: CLINICAL NURSE SPECIALIST

## 2019-08-14 PROCEDURE — 82075 ASSAY OF BREATH ETHANOL: CPT | Performed by: EMERGENCY MEDICINE

## 2019-08-14 PROCEDURE — 80320 DRUG SCREEN QUANTALCOHOLS: CPT | Performed by: FAMILY MEDICINE

## 2019-08-14 PROCEDURE — 99285 EMERGENCY DEPT VISIT HI MDM: CPT | Mod: Z6 | Performed by: EMERGENCY MEDICINE

## 2019-08-14 PROCEDURE — 12400001 ZZH R&B MH UMMC

## 2019-08-14 PROCEDURE — 80307 DRUG TEST PRSMV CHEM ANLYZR: CPT | Performed by: FAMILY MEDICINE

## 2019-08-14 PROCEDURE — 90791 PSYCH DIAGNOSTIC EVALUATION: CPT

## 2019-08-14 RX ORDER — OLANZAPINE 5 MG/1
5-10 TABLET ORAL 3 TIMES DAILY PRN
Status: DISCONTINUED | OUTPATIENT
Start: 2019-08-14 | End: 2019-09-18 | Stop reason: HOSPADM

## 2019-08-14 RX ORDER — OLANZAPINE 10 MG/2ML
10 INJECTION, POWDER, FOR SOLUTION INTRAMUSCULAR
Status: DISCONTINUED | OUTPATIENT
Start: 2019-08-14 | End: 2019-08-14

## 2019-08-14 RX ORDER — OLANZAPINE 5 MG/1
10 TABLET, ORALLY DISINTEGRATING ORAL AT BEDTIME
Status: DISCONTINUED | OUTPATIENT
Start: 2019-08-14 | End: 2019-08-15

## 2019-08-14 RX ORDER — OLANZAPINE 10 MG/2ML
10 INJECTION, POWDER, FOR SOLUTION INTRAMUSCULAR 3 TIMES DAILY PRN
Status: DISCONTINUED | OUTPATIENT
Start: 2019-08-14 | End: 2019-09-18 | Stop reason: HOSPADM

## 2019-08-14 RX ORDER — TRAZODONE HYDROCHLORIDE 50 MG/1
50 TABLET, FILM COATED ORAL
Status: DISCONTINUED | OUTPATIENT
Start: 2019-08-14 | End: 2019-09-18 | Stop reason: HOSPADM

## 2019-08-14 RX ORDER — ACETAMINOPHEN 325 MG/1
650 TABLET ORAL EVERY 4 HOURS PRN
Status: DISCONTINUED | OUTPATIENT
Start: 2019-08-14 | End: 2019-09-18 | Stop reason: HOSPADM

## 2019-08-14 RX ORDER — HYDROXYZINE HYDROCHLORIDE 25 MG/1
25-50 TABLET, FILM COATED ORAL EVERY 4 HOURS PRN
Status: DISCONTINUED | OUTPATIENT
Start: 2019-08-14 | End: 2019-09-18 | Stop reason: HOSPADM

## 2019-08-14 RX ORDER — ALUMINA, MAGNESIA, AND SIMETHICONE 2400; 2400; 240 MG/30ML; MG/30ML; MG/30ML
30 SUSPENSION ORAL EVERY 4 HOURS PRN
Status: DISCONTINUED | OUTPATIENT
Start: 2019-08-14 | End: 2019-09-18 | Stop reason: HOSPADM

## 2019-08-14 RX ORDER — GABAPENTIN 100 MG/1
100 CAPSULE ORAL 2 TIMES DAILY PRN
Status: DISCONTINUED | OUTPATIENT
Start: 2019-08-14 | End: 2019-09-06

## 2019-08-14 RX ORDER — HYDROXYZINE HYDROCHLORIDE 25 MG/1
25 TABLET, FILM COATED ORAL EVERY 4 HOURS PRN
Status: DISCONTINUED | OUTPATIENT
Start: 2019-08-14 | End: 2019-08-14

## 2019-08-14 RX ORDER — OLANZAPINE 10 MG/1
10 TABLET ORAL
Status: DISCONTINUED | OUTPATIENT
Start: 2019-08-14 | End: 2019-08-14

## 2019-08-14 RX ORDER — BISACODYL 10 MG
10 SUPPOSITORY, RECTAL RECTAL DAILY PRN
Status: DISCONTINUED | OUTPATIENT
Start: 2019-08-14 | End: 2019-09-18 | Stop reason: HOSPADM

## 2019-08-14 RX ADMIN — OLANZAPINE 10 MG: 5 TABLET, ORALLY DISINTEGRATING ORAL at 22:10

## 2019-08-14 ASSESSMENT — ENCOUNTER SYMPTOMS
SORE THROAT: 0
DIAPHORESIS: 0
TREMORS: 0
SEIZURES: 0
SLEEP DISTURBANCE: 1
NAUSEA: 0
ACTIVITY CHANGE: 1
NECK STIFFNESS: 0
FEVER: 0
CHEST TIGHTNESS: 0
LIGHT-HEADEDNESS: 0
PALPITATIONS: 0
SHORTNESS OF BREATH: 0
NERVOUS/ANXIOUS: 1
WOUND: 0
HEADACHES: 0
FATIGUE: 0
WEAKNESS: 0
BRUISES/BLEEDS EASILY: 0
HALLUCINATIONS: 1
ABDOMINAL PAIN: 0
NECK PAIN: 0
APPETITE CHANGE: 1
MYALGIAS: 0
DIARRHEA: 0
JOINT SWELLING: 0
DECREASED CONCENTRATION: 1
DIZZINESS: 0
RHINORRHEA: 0
DYSPHORIC MOOD: 1
ARTHRALGIAS: 0
COUGH: 0

## 2019-08-14 ASSESSMENT — ACTIVITIES OF DAILY LIVING (ADL)
AMBULATION: 0-->INDEPENDENT
HYGIENE/GROOMING: INDEPENDENT
LAUNDRY: WITH SUPERVISION
SWALLOWING: 0-->SWALLOWS FOODS/LIQUIDS WITHOUT DIFFICULTY
DRESS: SCRUBS (BEHAVIORAL HEALTH);INDEPENDENT
TRANSFERRING: 0-->INDEPENDENT
RETIRED_EATING: 0-->INDEPENDENT
BATHING: 0-->INDEPENDENT
ORAL_HYGIENE: INDEPENDENT
TOILETING: 0-->INDEPENDENT
HYGIENE/GROOMING: SHOWER;INDEPENDENT
DRESS: 0-->INDEPENDENT
DRESS: SCRUBS (BEHAVIORAL HEALTH);INDEPENDENT
FALL_HISTORY_WITHIN_LAST_SIX_MONTHS: NO
COGNITION: 0 - NO COGNITION ISSUES REPORTED
ORAL_HYGIENE: INDEPENDENT
RETIRED_COMMUNICATION: 0-->UNDERSTANDS/COMMUNICATES WITHOUT DIFFICULTY

## 2019-08-14 NOTE — PROGRESS NOTES
LILLIANA Clinician Contact & Progress Note  For Individual Resiliency Training (IRT)  A Part of the [facility] First Episode of Psychosis Program    NAVIGATE Enrollee: Jerel Day (1995)     MRN: [id number]  Date:  7/17/19  Diagnosis: Schizoaffective disorder, bipolar type       Clinician: LILLIANA Individual Resiliency Trainer, EDELMIRA Morgan     1. Type of contact: (majority of time spent)  IRT Session    2. People present:   Writer  Client: Jerel Day      3. Total number of persons who participated in contact: 2, including writer     4. Length of Actual Contact: Start Time: 5pm; End Time: 6pm   Traveled?    No     5. Location of contact:  [facility], [place]    6. Did the client complete the home practice option(s) from the previous session: Did not completed    7. Motivational Teaching Strategies:  Connect info and skills with personal goals  Promote hope and positive expectations  Explore pros and cons of change  Re-frame experiences in positive light    8. Educational Teaching Strategies:  Review of written material/education  Relate information to client's experience  Ask questions to check comprehension  Break down information into small chunks  Adopt client's language     9. CBT Teaching Strategies:  Reinforcement and shaping (positive feedback for steps towards goals and gains in knowledge & skills)  Relapse prevention planning (review of stressors and early warning signs)  Coping skills training (review current coping skills and increase currently used skills)  Behavioral tailoring (fit taking medication into client's daily routine)    10. IRT Module(s) Addressed:  Module 2 - Assessment/Initial Goal Setting  Module 3 - Education about Psychosis  Module 4 - Relapse Prevention Planning  Safety Planning    11. Techniques utilized:   [place] announced at beginning of session  Review of goal  Review of previous meeting  Present new material  Problem-solving practice  Help client choose a  "home practice option  Summarize progress made in current session    12. Mental Status Exam:    Alertness: alert  and slow to respond  Appearance: casually groomed  Behavior/Demeanor: cooperative, pleasant and guarded, with poor eye contact   Speech: slowed  Language: intact and no obvious problem. Preferred language identified as English.  Psychomotor: slowed  Mood: description consistent with euthymia  Affect: restricted and guarded; was congruent to mood; was congruent to content  Thought Process/Associations: remarkable for , difficult to follow, disorganized, loose associations and response delay  Thought Content:  Reports delusions, preoccupations and over-valued ideas;  Denies suicidal ideation and violent ideation  Perception:  Reports auditory hallucinations with commands [details in Interim History] and depersonalization;  Denies visual hallucinations  Insight: limited  Judgment: limited  Cognition: does  appear grossly intact; formal cognitive testing was not done  Suicidal ideation: denies SI, denies intent,  and denies plan; reports command hallucinations to SH and command hallucinations with suicidal content with no urges and no specificity  Homicidal Ideation: denies    13. Assessment/Progress Note:     Writer met with Jerel on this date for IRT. Writer set agenda to check-in, discuss and assess symptoms, explore material in IRT modules, discuss ways in which Jerel can expand coping strategies and stress-management techniques for ongoing symptom management, and check-in regarding goals for ongoing recovery.    During check-in, Jerel reported the following with regards to symptoms since last visit:    Auditory hallucinations: reports, everyday \"but dampened.\" Related distress level: 6.   o Command hallucinations: reports with suicidal and self-harm content, not specific, experiencing everyday. Urges: denies. Intent: denies. Coping strategies used to manage command hallucinations: \"use reflexes to stay on " "top of situation.\" Jerel was able to contract for safety; see safety plan below.    Visual hallucinations: none reported. Related distress level: N/A.    Tactile hallucinations: none reported. Related distress level: N/A.    Other reported symptoms: none reported. Jerel presented with difficult to follow thought process, loose associations and sometimes speaking in metaphors. This is consistent with presentation in recent sessions with writer.     With regards to safety, Jerel reported the following:    Suicidal ideation: denies. Plan: denies. Urges: denies. Intent: denies.     Self-harm: Thoughts - denies. Urges - denies. Intent - denies.    Homicidal or violent ideation: denies. Specific individual(s): denies. Plan: denies. Urges: denies. Intent: denies.    Discussed overall safety plan should symptoms feel unmanageable or safety concerns become imminent to include: reaching out to family, utilizing crisis resources and/or getting to nearest ED (Jerel has voiced preference for [place]) for evaluation. Jerel was able to contract for safety.     Assessed additional treatment components aimed at promoting symptom management and overall recovery.     With regards to medication, Jerel reports: continued disinterest in medications. Not open to seeing Dr. Zheng for continued conversation at this point.     With regards to recent sleep, Jerel reports: no concerns.    With regards to diet and nutrition, Jerel reports: no concerns.    With regards to recent substance use, Jerel reports: marijuana use, seems to be daily although could not get specific detail. This is not a change since last appointment. Briefly reviewed the negative impact substances can have on symptoms of psychosis, Jerel verbalized understanding but voiced no interest in stopping marijuana use. Jerel reports smoking, \"makes me feel really good especially when people are talking to me.\"    Jerel identified current stressors to include relationship stressors. " "Discussed recently utilized coping strategies and techniques to include make music, go to [employer] with brother and friends, go swimming. Encouraged Jerel to continue using positive and effective coping strategies. Utilized time to review family of origin and dynamics growing up. Jerel also shared about high school and eventually going to school at the [employer] and living on campus his first year. He wished he would have lived closer to other freshman and felt he was in a \"secluded\" dorm. Upon reflecting on freshman year, Jerel stated, \"I think I was mostly an automatic car that had to shift to manual.\" Discussed current family dynamics and upcoming trip to [place] and recent trip to the [place]. Jerel identified activities in which he engaged at the cabin and shared positively about them including fishing, canoeing, boating, eating and playing board games.     Overall, Jerel was engaged and slightly guarded throughout the session. Symptom assessment, safety assessment, discussion and identification of coping strategies, and exploration of material in IRT modules was all in support of Jerel's self-identified goal(s) as identified in most recent BEH Treatment Plan. Progress toward goal completion seems limited.    14. Plan/Referrals:     Next appt scheduled in two weeks. Client and family aware they can reach out to writer directly and/or NAVIGATE team should concerns or needs arise prior to next scheduled appointment.     Billing for \"Interactive Complexity\"?    No      EDELMIRA Morgan    NAVIGATE Individual Resiliency Trainer  Attestation:    I did not see this patient directly. This patient is discussed with me in individual clinical social work supervision, and I agree with the plan as documented.     AGA Camarena, LICSW, August 15, 2019    "

## 2019-08-14 NOTE — PLAN OF CARE
BEHAVIORAL TEAM DISCUSSION    Participants: 4A Provider: Debra Naegele, APRN, CNS; 4A RN's: Pavan Lane RN; 4A CTC's: Heather Spain (CTC).  Progress: No Change.  Continued Stay Criteria/Rationale: psychosis   Medical/Physical: Deferred (see medical notes).  Precautions:    Behavioral Orders   Procedures     Code 1 - Restrict to Unit     Routine Programming     As clinically indicated     Status 15     Every 15 minutes.     Suicide precautions     Patients on Suicide Precautions should have a Combination Diet ordered that includes a Diet selection(s) AND a Behavioral Tray selection for Safe Tray - with utensils, or Safe Tray - NO utensils    Command Hallucinations to harm self     Plan: The following services will be provided to the patient; psychiatric assessment, medication management, therapeutic milieu, individual and group support, art therapy, and skills/OT groups.   Rationale for change in precautions or plan: N/A

## 2019-08-14 NOTE — ED TRIAGE NOTES
"Pt.  brought in by parents.  SI with \"no specific Plan\".  Speech very hesitant.  Pt. sits with eyes close and slow to answer questions.  "

## 2019-08-14 NOTE — ED PROVIDER NOTES
History     Chief Complaint   Patient presents with     Suicidal     HPI  Jerel Day is a 23 year old male with history of schizoaffective disorder bipolar type who presents with auditory hallucinations, some of command type telling him to kill himself. He has not taken his medications for many months. Exhibits disorganized thoughts and bizarre thoughts. His parents state he had not been adequately taking care of himself. Uses marijuana. Denies alcohol or other drug use. Denies illness or recent injury. He is guarded and does not answer all questions. Also notes some thoughts of hurting others.      I have reviewed the Medications, Allergies, Past Medical and Surgical History, and Social History in the [employer] system.  Past Medical History:   Diagnosis Date     Anxiety      Arthritis      Depressive disorder      Gastroesophageal reflux disease      Head injury      Problem, psychiatric        Review of Systems   Constitutional: Positive for activity change and appetite change. Negative for diaphoresis, fatigue and fever.   HENT: Negative for congestion, rhinorrhea and sore throat.    Eyes: Negative for visual disturbance.   Respiratory: Negative for cough, chest tightness and shortness of breath.    Cardiovascular: Negative for chest pain, palpitations and leg swelling.   Gastrointestinal: Negative for abdominal pain, diarrhea and nausea.   Musculoskeletal: Negative for arthralgias, gait problem, joint swelling, myalgias, neck pain and neck stiffness.   Skin: Negative for rash and wound.   Allergic/Immunologic: Negative for immunocompromised state.   Neurological: Negative for dizziness, tremors, seizures, syncope, weakness, light-headedness and headaches.   Hematological: Does not bruise/bleed easily.   Psychiatric/Behavioral: Positive for decreased concentration, dysphoric mood, hallucinations, sleep disturbance and suicidal ideas. The patient is nervous/anxious.    All other systems reviewed and are  negative.      Physical Exam   BP: 125/83  Heart Rate: 63  Temp: 97.3  F (36.3  C)  Resp: 16  Weight: 68.9 kg (152 lb)  SpO2: 97 %      Physical Exam   Constitutional: He is oriented to person, place, and time. He appears well-developed and well-nourished. No distress.   HENT:   Head: Normocephalic and atraumatic.   Nose: Nose normal.   Mouth/Throat: Oropharynx is clear and moist.   Eyes: Pupils are equal, round, and reactive to light. Conjunctivae and EOM are normal. No scleral icterus.   Neck: Normal range of motion. Neck supple.   Cardiovascular: Normal rate, regular rhythm, normal heart sounds and intact distal pulses.   Pulmonary/Chest: Effort normal and breath sounds normal. No respiratory distress.   Abdominal: Soft. Bowel sounds are normal. There is no tenderness.   Musculoskeletal: Normal range of motion. He exhibits no edema or tenderness.   Neurological: He is alert and oriented to person, place, and time.   Skin: Skin is warm and dry. No rash noted. He is not diaphoretic.   Psychiatric: His affect is blunt. He is withdrawn and actively hallucinating. He is not agitated. Thought content is paranoid. He expresses suicidal ideation. He expresses no homicidal ideation. He is noncommunicative.   Nursing note and vitals reviewed.      ED Course        Procedures             Critical Care time:  none             Labs Ordered and Resulted from Time of ED Arrival Up to the Time of Departure from the ED   DRUG ABUSE SCREEN 6 CHEM DEP URINE ([facility]) - Abnormal; Notable for the following components:       Result Value    Cannabinoids Qual Urine Positive (*)     All other components within normal limits   ALCOHOL BREATH TEST POCT - Normal            Assessments & Plan (with Medical Decision Making)   Acute psychosis, suicidal, auditory hallucinations, disorganized thoughts, not taking adequate care of himself. He is ambivalent about admission. He requires emergency admission to psychiatry for stabilization and further  evaluation and treatment.    I have reviewed the nursing notes.    I have reviewed the findings, diagnosis, plan and need for follow up with the patient.    New Prescriptions    No medications on file       Final diagnoses:   Schizoaffective disorder, bipolar type (H)   Suicidal ideation       8/14/2019   [facility], [place], EMERGENCY DEPARTMENT     Jim Lamb MD  08/14/19 0526

## 2019-08-14 NOTE — H&P
"Admitted:     08/14/2019      IDENTIFYING INFORMATION:  Jerel Day is a 23-year-old single  male, presenting with exacerbation of psychosis and auditory hallucinations that are command, telling him to harm himself.      CHIEF COMPLAINT:  \"You have preserved ideas about me.\"      HISTORY OF PRESENT ILLNESS:  Jerel Day is a 23-year-old single  male, presenting with exacerbation of thought disorder symptoms including auditory hallucinations that are command in nature, telling him to kill himself.  The patient is making nonsensical statements such as, \"I went to yoga.  They took me to detox instead of getting a back massage.  I really needed something urgently.\"  The patient was concerned that there were devices in the interview room that were recording our conversation.  Assured the patient there were no recording devices in the room.  CTC was present for part of the interview.  [facility] also told the patient there were no recording devices in the interview room.  The patient reports he does not want to take medications.  He does not feel they are effective for him.  The patient stated that it interfered with his yoga, and he did not think they were beneficial in any way.      PSYCHIATRIC REVIEW OF SYSTEMS:  The patient would not answer questions regarding depressive mood.  The patient reported adequate appetite.  He reported adequate sleep.  The patient was unable to concentrate or attend to the conversation.  The patient reports that he has passive suicidal thinking due to command hallucinations.  The patient reports 2 days ago, he was having homicidal thinking towards someone in his yoga class because that person brushed against him.  The patient is denying any symptoms.  The patient is reporting auditory hallucinations that are command.  The patient reports visual hallucinations that are color in nature.  The patient reports colors of red and green that have caused him to look inward, and he is " "now \"transparent.\"  The patient also expressed feelings of paranoia.  He thought the conversation was being recorded.  The patient denies any symptoms of PTSD, eating disorder or OCD.      PSYCHIATRIC HISTORY:  The patient has a history of mental illness commitment to [facility] from 11/2018 to 05/26/2019 with Eduardo.  The patient discontinued taking his medication of Abilify Maintena 400 mg month and experienced an exacerbation of his mental health symptoms.  The patient has a history of using LSD in 2017.  Since that time, he has experienced paranoid ideation and thought disorder symptoms.  The patient has a history of being treated with Risperdal Consta, Abilify Maintena and Zyprexa.      PAST MEDICAL HISTORY:  Arthritis, gastroesophageal reflux disease, head injury.      SUBSTANCE ABUSE HISTORY:  U-tox is positive for marijuana.  The patient has a history of using recently marijuana, alcohol and vapes.  He has a history of going to detox at [facility].  The patient also has a history of using hallucinogens including LSD.      FAMILY HISTORY:  Maternal great aunt has a history of schizophrenia.  Paternal family members endorse anxiety.  Maternal family members endorse anxiety and depression.      SOCIAL HISTORY:  The parents just moved the patient out of his apartment.  He was unable to live by himself and to manage his ADLs.  Records indicate the patient had trash piled in the apartment.      MEDICAL REVIEW OF SYSTEMS:  Reviewed documentation for a 10-point systems review completed by Jim Lamb MD, dated 08/14/2019.  No changes are noted.      PHYSICAL EXAMINATION:   VITAL SIGNS:  Blood pressure 125/83, heart rate 63, temperature 97.3 Fahrenheit, respirations 16, weight 152 pounds.  SpO2 is at 97%.  Reviewed documentation for physical examination completed by Jim Lamb MD, dated 08/14/2019.  No changes are noted.      MENTAL STATUS EXAMINATION:  The patient appears his stated age.  He is dressed in scrubs.  He " "has adequate hygiene.  The patient was standing at the nurses' station.  At first, he did not want to meet with provider because he was looking at his menu.  Patient then decided that he would meet with provider.  The patient walked slowly to the interview room and said, \"I have no urgency here.\"  The patient was mostly calm and somewhat cooperative throughout the interview.  The patient had his eyes closed throughout the interview.  When he did open his eyes, he was looking at the ceiling.  The patient did not display any psychomotor abnormalities.  Speech was sometimes latent.  He used a very soft volume.  He was not pressured.  The patient was very guarded with his answers.  The patient described mood as calm.  Affect was flat, congruent.  Thought process was disorganized.  Associations were not intact.  Thought content displayed evidence of psychosis.  He is experiencing auditory hallucinations and sometimes visual hallucinations.  The patient is endorsing passive suicidal thoughts due to command hallucinations.  The patient reports homicidal thinking towards a person he met in a yoga class 2 days ago.  The patient no longer has any homicidal ideation towards his person.  Insight and judgment are impaired.  Cognition appears intact to interviewing including orientation to person, place, time, situation, use of language and fund of knowledge.  Recent and remote memory grossly intact.  Muscle strength, tone and gait appeared within normal limits upon observation.      ASSESSMENT:   1.  Schizoaffective disorder.   2.  Cannabis use disorder.   3.  History of medication noncompliance   PLAN:   1.  The patient has been admitted to Behavioral Unit 4 4A on a 72-hour hold, which was initiated on 08/14.  We will continue hold.  The patient is a danger to self.  He is unable to care for self due to exacerbation of his psychosis.  Petitioned for MI commitment with Eduardo. Paperwork submitted to .  2.  Will " schedule 10 mg of Zyprexa at bedtime to address psychosis.  The patient reports he does not feel medications are helpful to him.  He does not want to take medications.  Discussed risks, benefits and side effects of medication with patient.   3.  Psychosocial treatments to be addressed with CTC.   4.  Estimated length of stay 5-7 days.         DEBRA A. NAEGELE, APRN, CNS             D: 2019   T: 2019   MT:       Name:     NUSRAT ROJO   MRN:      [id number]        Account:      [id number]   :      1995        Admitted:     2019                   Document: [id number]       cc: Primary Care Physician

## 2019-08-14 NOTE — PROGRESS NOTES
"INITIAL PSYCHOSOCIAL ASSESSMENT    I have reviewed the chart and interviewed the patient.     Presenting Problem  Per ED provider note, \"Jerel Day is a 23 year old male with history of schizoaffective disorder bipolar type who presents with auditory hallucinations, some of command type telling him to kill himself. He has not taken his medications for many months. Exhibits disorganized thoughts and bizarre thoughts. His parents state he had not been adequately taking care of himself. Uses marijuana. Denies alcohol or other drug use. Denies illness or recent injury. He is guarded and does not answer all questions. Also notes some thoughts of hurting others.\" Zainab Fernandez MD 08/14/2019      Orders Placed This Encounter      72 Hour Hold    Is patient under a civil commitment/legal guardian?  No    History of Mental Illness and Chemical Health History  Patient has a history os Schizoaffective Bipolar type. Patients current symptoms consist of visual and auditory hallucinations. Patient reports using alcohol and marijuana occasionally and states that he has used LSD 6 times over the past 2 years. Pt was hospitalized at [facility] in 2018 on unit 4A and per [facility] twice in 2017 unsure of location.       Family Description(Constellation, family psychiatric hx)  Patient has 1 older sister and 1 younger brother. Patients maternal great aunt has schizophrenia, paternal side has anxiety and maternal side has anxiety and depression.     Significant Life Events (Trauma/Ilness/Death)  Patient states that he had a broken collar bone and a \"impinchment in my left hip\".      Living Situation  Parents just moved pt out of his apartment he is unable to live alone to manage ADL's.       Criminal hx and Legal Issues  Denies     Ethnic/Cultural Issues   The patient does not identify any ethnic or cultural issues that impact treatment     Spiritual Orientation  Undesignated      Service " History  None     Educational/Financial/Occupational  Some college     Social functioning (organization, interests)   None     Current Health Care Providers  Medication Management: Dr. Vince Zheng [facility]  [phone]  Therapist:   Primary Care:   :   [facility]/Home Health nurse:   Home Health Nurse:

## 2019-08-14 NOTE — ED NOTES
ED to Behavioral Floor Handoff    SITUATION  Jerel Day is a 23 year old male who speaks English and lives in a home with family members The patient arrived in the ED by private car from home with a complaint of Suicidal  .The patient's current symptoms started/worsened 1 day(s) ago and during this time the symptoms have increased.   In the ED, pt was diagnosed with   Final diagnoses:   Schizoaffective disorder, bipolar type (H)   Suicidal ideation        Initial vitals were: BP: 125/83  Heart Rate: 63  Temp: 97.3  F (36.3  C)  Resp: 16  Weight: 68.9 kg (152 lb)  SpO2: 97 %   --------  Is the patient diabetic? No   If yes, last blood glucose? --     If yes, was this treated in the ED? --  --------No  Is the patient inebriated (ETOH) No or Impaired on other substances?   MSSA done? N/A  Last MSSA score: --    Were withdrawal symptoms treated? N/A  Does the patient have a seizure history? No. If yes, date of most recent seizure--  --------  Is the patient patient experiencing suicidal ideation? reports occasional suicidal thoughts representing feeling that life is not worth feeling    Homicidal ideation? reports current or recent homicidal ideation or behaviors including previous room mate    Self-injurious behavior/urges? denies current or recent self injurious behavior or ideation.  ------  Was pt aggressive in the ED No  Was a code called No  Is the pt now cooperative? Yes  -------  Meds given in ED: Medications - No data to display   Family present during ED course? Yes  Family currently present? No    BACKGROUND  Does the patient have a cognitive impairment or developmental disability? No  Allergies:   Allergies   Allergen Reactions     Penicillins      Rash, Generalized   .   Social demographics are   Social History     Socioeconomic History     Marital status: Single     Spouse name: None     Number of children: None     Years of education: None     Highest education level: None   Occupational History      None   Social Needs     Financial resource strain: None     Food insecurity:     Worry: None     Inability: None     Transportation needs:     Medical: None     Non-medical: None   Tobacco Use     Smoking status: Former Smoker     Packs/day: 0.25     Years: 1.00     Pack years: 0.25     Types: Cigarettes     Smokeless tobacco: Never Used   Substance and Sexual Activity     Alcohol use: Yes     Comment: socially     Drug use: Yes     Types: Marijuana     Comment: last used yesterday     Sexual activity: None   Lifestyle     Physical activity:     Days per week: None     Minutes per session: None     Stress: None   Relationships     Social connections:     Talks on phone: None     Gets together: None     Attends Pentecostalism service: None     Active member of club or organization: None     Attends meetings of clubs or organizations: None     Relationship status: None     Intimate partner violence:     Fear of current or ex partner: None     Emotionally abused: None     Physically abused: None     Forced sexual activity: None   Other Topics Concern     Parent/sibling w/ CABG, MI or angioplasty before 65F 55M? Not Asked   Social History Narrative     None        ASSESSMENT  Labs results   Labs Ordered and Resulted from Time of ED Arrival Up to the Time of Departure from the ED   DRUG ABUSE SCREEN 6 CHEM DEP URINE ([facility]) - Abnormal; Notable for the following components:       Result Value    Cannabinoids Qual Urine Positive (*)     All other components within normal limits   ALCOHOL BREATH TEST POCT - Normal      Imaging Studies: No results found for this or any previous visit (from the past 24 hour(s)).   Most recent vital signs /83   Temp 97.3  F (36.3  C) (Oral)   Resp 16   Wt 68.9 kg (152 lb)   SpO2 97%   BMI 22.78 kg/m     Abnormal labs/tests/findings requiring intervention:---   Pain control: pt had none  Nausea control: pt had none    RECOMMENDATION  Are any infection precautions needed (MRSA, VRE, etc.)?  No If yes, what infection? --  ---  Does the patient have mobility issues? independently. If yes, what device does the pt use? ---  ---  Is patient on 72 hour hold or commitment? Yes If on 72 hour hold, have hold and rights been given to patient? Yes  Are admitting orders written if after 10 p.m. ?N/A  Tasks needing to be completed:---     Caroline Su   [facility]-- [phone] [facility]   [phone] [facility]

## 2019-08-14 NOTE — PROGRESS NOTES
"   08/14/19 0832   Patient Belongings   Patient Belongings locker   Patient Belongings Put in Hospital Secure Location (Security or Locker, etc.) cell phone/electronics;clothing;shoes   Belongings Search Yes   Clothing Search Yes       In pt bin: Black t-shirt, pair of shorts, jacket, birkenstocks, sunglasses,  and a cell-phone    NOTHING SENT TO SECURITY    Brought 8/20/19:  Contacts (med bin), gray pants, brown pants, blue plaid shirt, blue striped shirt, gray t-shirt, blue t-shirt, underwear (2), socks (2)    Brought in by parents on 9/13/19: \"All creatures great and small\" and \"sherlock colindres.\" and glasses    Brought 9/15/19:  Gray t-shirts, 1 pair underwear, 1 pair of shorts      A               Admission:  I am responsible for any personal items that are not sent to the safe or pharmacy.  [facility] is not responsible for loss, theft or damage of any property in my possession.    Signature:  _________________________________ Date: _______  Time: _____                                              Staff Signature:  ____________________________ Date: ________  Time: _____      2nd Staff person, if patient is unable/unwilling to sign:    Signature: ________________________________ Date: ________  Time: _____     Discharge:  [facility] has returned all of my personal belongings:    Signature: _________________________________ Date: ________  Time: _____                                          Staff Signature:  ____________________________ Date: ________  Time: _____         "

## 2019-08-14 NOTE — PROGRESS NOTES
"Writer met with provider and pt. Pt presented with tangential speech and often used words in the incorrect format. Pt accused the provider of having preserved ideas about him. During the interview the pt told the provider that the way questions were being asked were irritating. Provider tried several times to end the the interview. As the pt was walking out he proceeded to call the provider a \"bitch\". As the provider closed the door the pt continued to say insulting things about the provider.   "

## 2019-08-14 NOTE — PLAN OF CARE
"Admission Assessment    Pt on 72 hour hold (ends 8/19/2019 at 0520)    Pt admitted to station 4A at 0830 on 08/14/19. He is guarded in his interactions, but agreeable to safety search. Pt presents with flat and blunted affect, often avoiding eye contact with those speaking to him. Provided pt with toiletries and breakfast tray upon arriving to the unit.     Allergies and medications reviewed with pt. He reports that he has not been taking his medications. Pt became irritated and paranoid with this writer when verifying prior to admission medications. Pt reports, \"What is the reason for you asking me these questions? Who is telling you I was taking those medications?\" States that he does smoke marijuana, but denies use of additional drugs. Endorses auditory hallucinations.     Reports that he does not have a supportive home life, and that this is a major stressor for him. States that he is not currently in school, and does not plan to do so.     Utilizes contacts, and reports mild hearing difficulty in his right ear. Declines meeting with dayana. Requesting vegetarian diet, which has been ordered.     As of 10:38 AM, pt attending OT clinic. No additional concerns at this time. Will continue to assess and offer support.     Addendum: Pt has been talkative with staff and displaying a full range of affect. Continues to express concerns regarding his stay in the hospital. Often speaks in metaphors to express his feelings, \"I feel like the provider is giving me a giant hamburger tatyana and I have to chew on it.\" However, he has been calm and pleasant.  "

## 2019-08-15 ENCOUNTER — APPOINTMENT (OUTPATIENT)
Dept: CT IMAGING | Facility: CLINIC | Age: 24
DRG: 885 | End: 2019-08-15
Attending: PHYSICIAN ASSISTANT
Payer: COMMERCIAL

## 2019-08-15 PROCEDURE — 25000132 ZZH RX MED GY IP 250 OP 250 PS 637: Performed by: CLINICAL NURSE SPECIALIST

## 2019-08-15 PROCEDURE — 70450 CT HEAD/BRAIN W/O DYE: CPT

## 2019-08-15 PROCEDURE — 25000128 H RX IP 250 OP 636

## 2019-08-15 PROCEDURE — 25000128 H RX IP 250 OP 636: Performed by: CLINICAL NURSE SPECIALIST

## 2019-08-15 PROCEDURE — 99232 SBSQ HOSP IP/OBS MODERATE 35: CPT | Performed by: CLINICAL NURSE SPECIALIST

## 2019-08-15 PROCEDURE — 99207 ZZC CDG-CODE CATEGORY CHANGED: CPT | Performed by: PHYSICIAN ASSISTANT

## 2019-08-15 PROCEDURE — 99219 ZZC INITIAL OBSERVATION CARE,LEVL II: CPT | Performed by: PHYSICIAN ASSISTANT

## 2019-08-15 PROCEDURE — 12400001 ZZH R&B MH UMMC

## 2019-08-15 RX ORDER — DIPHENHYDRAMINE HYDROCHLORIDE 50 MG/ML
50 INJECTION INTRAMUSCULAR; INTRAVENOUS ONCE
Status: COMPLETED | OUTPATIENT
Start: 2019-08-15 | End: 2019-08-15

## 2019-08-15 RX ORDER — LORAZEPAM 2 MG/ML
2 INJECTION INTRAMUSCULAR ONCE
Status: COMPLETED | OUTPATIENT
Start: 2019-08-15 | End: 2019-08-15

## 2019-08-15 RX ORDER — LORAZEPAM 2 MG/ML
2 INJECTION INTRAMUSCULAR EVERY 6 HOURS PRN
Status: DISCONTINUED | OUTPATIENT
Start: 2019-08-15 | End: 2019-09-18 | Stop reason: HOSPADM

## 2019-08-15 RX ORDER — LORAZEPAM 2 MG/ML
INJECTION INTRAMUSCULAR
Status: COMPLETED
Start: 2019-08-15 | End: 2019-08-15

## 2019-08-15 RX ORDER — LORAZEPAM 2 MG/1
2 TABLET ORAL EVERY 6 HOURS PRN
Status: DISCONTINUED | OUTPATIENT
Start: 2019-08-15 | End: 2019-09-18 | Stop reason: HOSPADM

## 2019-08-15 RX ORDER — DIPHENHYDRAMINE HYDROCHLORIDE 50 MG/ML
INJECTION INTRAMUSCULAR; INTRAVENOUS
Status: DISPENSED
Start: 2019-08-15 | End: 2019-08-16

## 2019-08-15 RX ORDER — LORAZEPAM 2 MG/ML
2 INJECTION INTRAMUSCULAR EVERY 6 HOURS PRN
Status: DISCONTINUED | OUTPATIENT
Start: 2019-08-15 | End: 2019-08-15

## 2019-08-15 RX ORDER — DIPHENHYDRAMINE HCL 50 MG
50 CAPSULE ORAL EVERY 6 HOURS PRN
Status: DISCONTINUED | OUTPATIENT
Start: 2019-08-15 | End: 2019-08-15

## 2019-08-15 RX ORDER — DIPHENHYDRAMINE HCL 50 MG
50 CAPSULE ORAL EVERY 6 HOURS PRN
Status: DISCONTINUED | OUTPATIENT
Start: 2019-08-15 | End: 2019-09-18 | Stop reason: HOSPADM

## 2019-08-15 RX ORDER — HALOPERIDOL 5 MG/ML
5 INJECTION INTRAMUSCULAR EVERY 6 HOURS PRN
Status: DISCONTINUED | OUTPATIENT
Start: 2019-08-15 | End: 2019-09-18 | Stop reason: HOSPADM

## 2019-08-15 RX ORDER — HALOPERIDOL 5 MG/ML
INJECTION INTRAMUSCULAR
Status: DISPENSED
Start: 2019-08-15 | End: 2019-08-16

## 2019-08-15 RX ORDER — LORAZEPAM 2 MG/1
2 TABLET ORAL EVERY 6 HOURS PRN
Status: DISCONTINUED | OUTPATIENT
Start: 2019-08-15 | End: 2019-08-15

## 2019-08-15 RX ORDER — HALOPERIDOL 5 MG/ML
INJECTION INTRAMUSCULAR
Status: COMPLETED
Start: 2019-08-15 | End: 2019-08-15

## 2019-08-15 RX ORDER — DIPHENHYDRAMINE HYDROCHLORIDE 50 MG/ML
50 INJECTION INTRAMUSCULAR; INTRAVENOUS EVERY 6 HOURS PRN
Status: DISCONTINUED | OUTPATIENT
Start: 2019-08-15 | End: 2019-08-15

## 2019-08-15 RX ORDER — HALOPERIDOL 5 MG/1
5 TABLET ORAL EVERY 6 HOURS PRN
Status: DISCONTINUED | OUTPATIENT
Start: 2019-08-15 | End: 2019-09-18 | Stop reason: HOSPADM

## 2019-08-15 RX ORDER — DIPHENHYDRAMINE HYDROCHLORIDE 50 MG/ML
50 INJECTION INTRAMUSCULAR; INTRAVENOUS EVERY 6 HOURS PRN
Status: DISCONTINUED | OUTPATIENT
Start: 2019-08-15 | End: 2019-09-18 | Stop reason: HOSPADM

## 2019-08-15 RX ORDER — OLANZAPINE 10 MG/1
10 TABLET, ORALLY DISINTEGRATING ORAL 2 TIMES DAILY
Status: DISCONTINUED | OUTPATIENT
Start: 2019-08-15 | End: 2019-08-29

## 2019-08-15 RX ORDER — HALOPERIDOL 5 MG/ML
10 INJECTION INTRAMUSCULAR ONCE
Status: COMPLETED | OUTPATIENT
Start: 2019-08-15 | End: 2019-08-15

## 2019-08-15 RX ADMIN — ACETAMINOPHEN 650 MG: 325 TABLET, FILM COATED ORAL at 21:47

## 2019-08-15 RX ADMIN — LORAZEPAM 2 MG: 2 INJECTION INTRAMUSCULAR; INTRAVENOUS at 14:29

## 2019-08-15 RX ADMIN — HALOPERIDOL 10 MG: 5 INJECTION INTRAMUSCULAR at 14:29

## 2019-08-15 RX ADMIN — LORAZEPAM 2 MG: 2 INJECTION INTRAMUSCULAR at 14:29

## 2019-08-15 RX ADMIN — DIPHENHYDRAMINE HYDROCHLORIDE 50 MG: 50 INJECTION, SOLUTION INTRAMUSCULAR; INTRAVENOUS at 14:30

## 2019-08-15 RX ADMIN — HALOPERIDOL LACTATE 10 MG: 5 INJECTION, SOLUTION INTRAMUSCULAR at 14:29

## 2019-08-15 ASSESSMENT — ACTIVITIES OF DAILY LIVING (ADL)
HYGIENE/GROOMING: WITH ASSISTANCE
DRESS: WITH ASSISTANCE
LAUNDRY: UNABLE TO COMPLETE
ORAL_HYGIENE: WITH ASSISTANCE

## 2019-08-15 NOTE — PROGRESS NOTES
Chief Complaint   Patient presents with   • Post-Op Complications     BIB EMS from pain MGT office. pt sent for abdominal distention, and redness, swelling at incision site.     Pt moaning on arrival. Received 50mcg fentanyl PTA. Pt finished chemo on 6/27. VSS.    Pt has not attended scheduled occupational therapy sessions. Encourage attendance and participation when appropriate for groups.

## 2019-08-15 NOTE — PROGRESS NOTES
"Pt had an okay shift. Pt appears responding and often talks and laughs to himself. Pt is disorganized/delusional. Pt stated he is seeing and hearing things but was unable to explain what his hallucinations are. Pt says he is having a \"Crisis of identity\". Pt says he will speak and it wont seem like his words. Pt says he thinks he is turning into his friend Luc. Conversations with pt are nonsensical and difficult to follow. Pt reports thoughts of suicide but stated it is more like wanting to be reborn with his \"new philosophy\".  Pt had good visit with parents. Pt ate dinner and showered. Pt is calm/cooperative.       08/14/19 1800   Behavioral Health   Hallucinations auditory;visual   Thinking distractable;paranoid;poor concentration   Orientation person: oriented   Memory baseline memory   Insight poor   Judgement impaired   Eye Contact into space;staring;at examiner   Affect blunted, flat   Mood mood is calm   Physical Appearance/Attire attire appropriate to age and situation   Hygiene well groomed   Suicidality other (see comments)  (denies)   1. Wish to be Dead No   2. Non-Specific Active Suicidal Thoughts  No   Self Injury other (see comment)  (denies)   Elopement   (none stated or observed)   Activity withdrawn;other (see comment)  (present on milieu)   Speech flight of ideas   Medication Sensitivity no stated side effects;no observed side effects   Psychomotor / Gait balanced;steady   Activities of Daily Living   Hygiene/Grooming shower;independent   Oral Hygiene independent   Dress scrubs (behavioral health);independent   Room Organization independent     "

## 2019-08-15 NOTE — PROGRESS NOTES
"[facility], [place]   Psychiatric Progress Note        Interim History:   The patient's care was discussed with the treatment team during the daily team meeting and/or staff's chart notes were reviewed.  Staff report patient is visible in the milieu.     Psychiatric symptoms and interventions:   Patient is nonsensical, tangential, has difficulty tracking the conversation. He is disorganized. Patient reports he does not think medications are helpful to him. Does not want to take them  He stopped his medications prior to hospitalization after his commitment terminated and he decompensated. He did take Zyprexa on 8/14.     Explained to patient I submitted a petition for commitment on 8/14. Patient was angry and said that I did not know how to do my job. Patient does not believe he needs commitment and it would be detrimental to his treatment. \"I just need a safe space.\" Patient called provider, \" a f......  bitch\".  When provider walked by the patient he gave her the finger.     Patient met with county screener. After their meeting patient slammed head into wall twice. He ran down the hallway , slipped so he did not hit the wall at full speed. Patient will have MRI ordered by IM.     Medical: UTOX positive for cannabinoids    Behavioral/psychology/social:   Encouraged patient to attend therapeutic programming as tolerated.   No roommate order. Patient is psychotic and paranoid. Command hallucinations to hurt others.          Medications:       OLANZapine zydis  10 mg Oral At Bedtime          Allergies:     Allergies   Allergen Reactions     Penicillins      Rash, Generalized          Labs:   No results found for this or any previous visit (from the past 24 hour(s)).       Psychiatric Examination:     /86   Pulse 52   Temp 97.6  F (36.4  C) (Tympanic)   Resp 16   Wt 67.4 kg (148 lb 9.4 oz)   SpO2 97%   BMI 22.26 kg/m    Weight is 148 lbs 9.44 oz  Body mass index is 22.26 " kg/m .  Orthostatic Vitals       Most Recent      Sitting Orthostatic /79 08/14 0816    Sitting Orthostatic Pulse (bpm) 59 08/14 0816    Standing Orthostatic /85 08/15 0900    Standing Orthostatic Pulse (bpm) 63 08/15 0900            Appearance: awake, alert and adequately groomed  Attitude:  evasive and guarded  Eye Contact:  fair  Mood:  angry  Affect:  labile  Speech:  rambling and abnormal syntax  Psychomotor Behavior:  no evidence of tardive dyskinesia, dystonia, or tics  Throught Process:  disorganized  Associations:  loosening of associations present  Thought Content:  passive suicidal ideation present and auditory hallucinations present  Insight:  limited  Judgement:  limited  Oriented to:  time, person, and place  Attention Span and Concentration:  fair  Recent and Remote Memory:  fair    Clinical Global Impressions  First:  Considering your total clinical experience with this particular patient population, how severe are the patient's symptoms at this time?: 6 (08/14/19 1120)  Compared to the patient's condition at the START of treatment, this patient's condition is:: 6 (08/14/19 1120)  Most recent:  Considering your total clinical experience with this particular patient population, how severe are the patient's symptoms at this time?: 6 (08/14/19 1120)  Compared to the patient's condition at the START of treatment, this patient's condition is:: 6 (08/14/19 1120)         Precautions:     Behavioral Orders   Procedures     Code 1 - Restrict to Unit     Routine Programming     As clinically indicated     Status 15     Every 15 minutes.     Suicide precautions     Patients on Suicide Precautions should have a Combination Diet ordered that includes a Diet selection(s) AND a Behavioral Tray selection for Safe Tray - with utensils, or Safe Tray - NO utensils    Command Hallucinations to harm self          DIagnoses:   1.  Schizoaffective disorder.   2.  Cannabis use disorder.           Plan:   Legal  status: Patient arrived on uit with 72 hour hold. Hold will continue. Petitioned for MICD commitment on 8/14. Patient is psychotic and unable to care for self. He is noncompliant with medications in the community.     Medication management: Zyprex 10 mg     Disposition plan: Stabilized with medications, structured environment with eyes on meds.

## 2019-08-15 NOTE — PROGRESS NOTES
At approximately 1420, Pt finished his meeting with the prepetition screener and entered the hallway agitated.  Pt yelled at and RN and threw his paperwork in the air.  Pt then walked to the pillar and banged his head against it.  Staff attempted redirection, but Pt pushed past and repeated head banging on a second wall, and then a third before falling to the ground.  Pt appeared to catch his fall as his head did not hit the ground.  Pt laid in place for about 10 minutes as staff checked vitals.  Pt stated at this time that he wanted an MRI.   Pt cursed at anyone who touched him and refused to get up.  Pt then sprang up and sprinted at full speed down the nazario.  Pt lost his balance about 15 ft before the wall/window and tumbled down, sliding to a stop against the wall.  Staff initiated code 21 and took control.

## 2019-08-15 NOTE — PROGRESS NOTES
The patient meet with the  from the [facility].  After the meeting, the patient walked out of the lounge and slammed his head on the column in the hallway.  As the patient walked to his room, the patient hit his head on the wall X2.  Patient fell to the ground.  After lying on the floor, the patient stood up and ran to the end of the nazario, slammed his head against the wall.  A code 21 is called.  Patient is placed in 5 points and given IM injection.  New orders are received.  Please refer to the EMAR for details.

## 2019-08-15 NOTE — PLAN OF CARE
"VIOLENT BEHAVIOR: SIB: pt was engaged in the process of talking with pre petition around 1330 and this had finished at 1400. Pt appeared distraught. Pt then came by the hallway pillar and literally slammed his forehead into the pillar. Then he looked at this RN and stated, \"I need to go to MRI\". This RN informed pt NO. Pt then ran to the white message board in the hallway and slammed his head again on the wall. Then he walked to the mirror and slammed his head a 3rd time and then fell back on the floor like he had knocked himself out but did well with holding his head up so as not to hit the floor. He was talking while on the floor, he refused to move, respirations WNL. After 10 minutes of lying on his the floor faining being passed out, he jumped up and sprinted at full speed towards the end of the nazario and attempt to nose dive into the wall but tripped and fell and slid into the wall, just bumping his head. All at about 1430 this last incident happened. He continued to slam his head x8 in the floor and the wall and the left door. We pulled him backwards. Herb BARRAZA saw pt for concussions, cut on his forehead and a bloody nose. This was quite behavioral on pt's part as he wants to find a way off this unit. Pt is in 5 points in his room. Dr Charles and Stefani ABBASI are aware of the situation as well as Zulma BURGOS, Nurse Manager, all present. Stefani called the pt's father and explained the situation. Pt will be on SIO for the 5 points, starting 1430.   "

## 2019-08-15 NOTE — CONSULTS
Brief medicine consult note:  HPI: Pt is a 22 yo male with reported hx of schizoaffective disorder admitted to  yesterday for AH telling him to kill himself. Please refer to pt's primary provider psychiatric H&P dated 8/14 for further details. Internal Medicine consulted to evaluate pt after self inflicted head trauma after being told he would be committed. Upon arriving to unit pt was already being manually restrained by RN and psych assoc staff. Pt noted to be continuing trying to hit head against floor. Apparently within prior 15 minutes, pt was slamming head very hard against the walls, then noted to sprint as fast as he could down nazario but tripped and slid head first into wall. Currently admits to headache and mid back pain but states the back pain is chronic . Denies visual disturbances. Is oriented to place, day, month and year. Has a mild nose bleed. Currently being placed in 5 point restraints.     Past Medical History:   Diagnosis Date     Anxiety      Arthritis      Depressive disorder      Gastroesophageal reflux disease      Head injury      Problem, psychiatric        History reviewed. No pertinent surgical history.    Current Facility-Administered Medications   Medication     acetaminophen (TYLENOL) tablet 650 mg     alum & mag hydroxide-simethicone (MYLANTA ES/MAALOX  ES) suspension 30 mL     bisacodyl (DULCOLAX) Suppository 10 mg     diphenhydrAMINE (BENADRYL) 50 MG/ML injection     diphenhydrAMINE (BENADRYL) injection 50 mg    Or     diphenhydrAMINE (BENADRYL) capsule 50 mg     gabapentin (NEURONTIN) capsule 100 mg     haloperidol (HALDOL) tablet 5 mg    Or     haloperidol lactate (HALDOL) injection 5 mg     haloperidol lactate (HALDOL) 5 MG/ML injection     hydrOXYzine (ATARAX) tablet 25-50 mg     LORazepam (ATIVAN) injection 2 mg    Or     LORazepam (ATIVAN) tablet 2 mg     magnesium hydroxide (MILK OF MAGNESIA) suspension 30 mL     nicotine (NICORETTE) gum 2-4 mg     OLANZapine (zyPREXA)  tablet 5-10 mg    Or     OLANZapine (zyPREXA) injection 10 mg     OLANZapine zydis (zyPREXA) ODT tab 10 mg     traZODone (DESYREL) tablet 50 mg          Allergies   Allergen Reactions     Penicillins      Rash, Generalized         Social History     Socioeconomic History     Marital status: Single     Spouse name: Not on file     Number of children: Not on file     Years of education: Not on file     Highest education level: Not on file   Occupational History     Not on file   Social Needs     Financial resource strain: Not on file     Food insecurity:     Worry: Not on file     Inability: Not on file     Transportation needs:     Medical: Not on file     Non-medical: Not on file   Tobacco Use     Smoking status: Former Smoker     Packs/day: 0.25     Years: 1.00     Pack years: 0.25     Types: Cigarettes     Smokeless tobacco: Never Used   Substance and Sexual Activity     Alcohol use: Yes     Comment: socially     Drug use: Yes     Types: Marijuana     Comment: last used yesterday     Sexual activity: Not on file   Lifestyle     Physical activity:     Days per week: Not on file     Minutes per session: Not on file     Stress: Not on file   Relationships     Social connections:     Talks on phone: Not on file     Gets together: Not on file     Attends Restorationist service: Not on file     Active member of club or organization: Not on file     Attends meetings of clubs or organizations: Not on file     Relationship status: Not on file     Intimate partner violence:     Fear of current or ex partner: Not on file     Emotionally abused: Not on file     Physically abused: Not on file     Forced sexual activity: Not on file   Other Topics Concern     Parent/sibling w/ CABG, MI or angioplasty before 65F 55M? Not Asked   Social History Narrative     Not on file       History reviewed. No pertinent family history.      ROS: 10 point ROS neg other than the symptoms noted above in the HPI.    PE:  GEN: Currently laying prone on  his bed being restrained to bed by several psych associates.   Blood pressure (!) 156/73, pulse 52, temperature 98.1  F (36.7  C), temperature source Tympanic, resp. rate 16, weight 67.4 kg (148 lb 9.4 oz), SpO2 97 %.  HEENT: Approximately 1x2 inch contusion noted on superior forehead with several small non-bleeding ulcerations.   BACK: No spinal tenderness  NEURO: AAOx3; PERRL; No tremor observed.       ROUTINE LABS:  CMPNo lab results found in last 7 days.  CBCNo lab results found in last 7 days.  INRNo lab results found in last 7 days.  Arterial Blood GasNo lab results found in last 7 days.    ASSESSMENT:  Acute headache most likely 2/2 contusion from recurrent self inflicted head trauma. Currently neuro exam grossly intact    PLAN:  When ok from psychiatric standpoint, obtain stat CT scan head w/o contrast. In interim, obtain neuro checks q1hrs. Prn Tylenol for MENDEZ. Medicine will continue to follow.        Herb Markham PA-C  Internal Medicine Hospitalist   [facility]  [phone]

## 2019-08-15 NOTE — PLAN OF CARE
"Pt has presented as quite irritable today. He states he can't understand why \"Stefani\" is committing me. \"I've been thru it b/4.\"!! Pt is quite agitated and has requested a different provider. This has been reported to his provider. Pt was able to calm in his room. Will continue to assess.  "

## 2019-08-15 NOTE — PROVIDER NOTIFICATION
08/15/19 7269   Seclusion or Restraint Order   In Person Face to Face Assessment Conducted Yes-Eval of pt's immediate situation, reaction to intervention, complete review of systems assessment, behavioral assessment & review/assessment of hx, drugs & meds, recent labs, etc, behavioral condition, need to continue/terminate restraint/seclusion   Patient Experienced No adverse physical outcome from seclusion/restraint initiation   Continuation of Seclus/Restraint indicated at this time Yes     No signs of injury from restraint and seclusion.   Jerel is lying with eyes close and refusing to respond to assessment questions, appears to be awake by reaction to writer speaking.   He has received PRN haldol, benadryl, and ativan 1429. No signs of side effects. RRR, no signs of distress.

## 2019-08-15 NOTE — PROGRESS NOTES
"At 1545, Patient assessed by RN. Patient on SIO with 1:1. Patient laying prone in 5 point restraints. Patient is sedated, calm and sleeping. Patient sherman coma scale score is 10. Patient refusing to verbally respond but is arousable  by voice, gentle shaking. Patient able to respond to verbal commands- asked to squeeze both hands at the same time.  was firm. Vitals WDL, circulation: skin is warm, pink, dorsal and radial pulses present (2+), capillary refill WDL.    AT 1600, Nursing Supervisor was paged and arrived 1615. Discussed with ANS about how to transport patient to CT scan due to violent/SIB as well as elopement risk. It was decided to transport patient with security and 2 staff while on a gurney. Code green was called to process patient out of restraints.  CT scan called at 1624 to confirm availability for patient within the next 30 minutes. CT scan staff stated it would be okay but there is an incoming patient arriving through the ED and we are advised to call before we leave the unit.  At 1635, Code green was called. Patient had restraints removed. RN assessed patient and he is sedated. Bode Coma scale is a 10. Patient responds to voice and perisitnent gentle shaking. Patient is able to follow verbal commands. Patient asked to verbalize why he is in restraints and patient mumbled \" yes, because I...\" then trailed off. Patient returned to sleep. Code green cancelled due to patient's condition. Patient is calm and asleep. Vitals WDL, circulation: skin is warm, pink, dorsal and radial pulses present (2+), capillary refill WDL.    At 1645, security arrived to escort patient down to CT scan. CT scan staff was notified but stated they are unable to take the patient down and will call back. Security left. Patient continued to sleep.  1736, CT scan staff called to notify staff that they are ready for the patient. Vitals WDL. Bode coma score continues to be a 10. Patient is notified about being " "transported to CT scan. During transfer onto [facility], patient is able to some follow commands such as \"roll onto your back\" -  due to level of sedation, it is inconsistent. Patient escorted off the unit in a gurney and accompanied by 2 security staff, 2 RN, and 2 unit staff. Patient remained calm and asleep during transport and was cooperative with CT scan.  At 1815, Patient is back onto the unit. Patient woke up and asked \"what happened?\" Patient was informed of CT scan process. Patient was agreeable with information. Patient offered dinner, he declined saying \" I will eat later.\" Patient is now asleep.  Verona coma scale 15. Patient is orientated, able to respond appropriately and follow verbal commands.  At 1941, Provider notified of CT scan result. Patient HS zyprexa to be held per provider. Will continue to monitor and maintain appropriate precautions. Will continue 1 hour neuro check until morning, per provider. Patient is resting.         "

## 2019-08-16 ENCOUNTER — TELEPHONE (OUTPATIENT)
Dept: PSYCHIATRY | Facility: CLINIC | Age: 24
End: 2019-08-16

## 2019-08-16 ENCOUNTER — APPOINTMENT (OUTPATIENT)
Dept: CT IMAGING | Facility: CLINIC | Age: 24
DRG: 885 | End: 2019-08-16
Attending: INTERNAL MEDICINE
Payer: COMMERCIAL

## 2019-08-16 PROCEDURE — 25000132 ZZH RX MED GY IP 250 OP 250 PS 637: Performed by: CLINICAL NURSE SPECIALIST

## 2019-08-16 PROCEDURE — 12400001 ZZH R&B MH UMMC

## 2019-08-16 PROCEDURE — 99232 SBSQ HOSP IP/OBS MODERATE 35: CPT | Performed by: CLINICAL NURSE SPECIALIST

## 2019-08-16 PROCEDURE — 72125 CT NECK SPINE W/O DYE: CPT

## 2019-08-16 RX ADMIN — OLANZAPINE 10 MG: 10 TABLET, ORALLY DISINTEGRATING ORAL at 09:53

## 2019-08-16 RX ADMIN — OLANZAPINE 10 MG: 10 TABLET, ORALLY DISINTEGRATING ORAL at 21:34

## 2019-08-16 ASSESSMENT — ACTIVITIES OF DAILY LIVING (ADL)
HYGIENE/GROOMING: INDEPENDENT
DRESS: INDEPENDENT
ORAL_HYGIENE: INDEPENDENT
HYGIENE/GROOMING: INDEPENDENT
DRESS: INDEPENDENT
ORAL_HYGIENE: INDEPENDENT

## 2019-08-16 NOTE — PROGRESS NOTES
Patient has been asleep for most of the shift (please see previous note). Patient has been sedated and lethargic. As evening progressed, patient became more arousable.  Patient able to independently reposition himself. Unable to assess patient for safety due to minimal verbal responses and lethargy. Patient is calm and cooperative with hourly neuro-checks. Patient did not eat dinner. Patient's HS medication (zyprexa 10 mg) was held due to his sedation. Patient received Tylenol 650 mg PRN for hip pain. Patient denies pain in head or back at this time. Will continue to assess and perform Q1h neuro checks.

## 2019-08-16 NOTE — PROGRESS NOTES
Brief medicine follow up note:  - Pt seen today regarding yesterday's severe self-inflicted head trauma from banging his head against walls and floor. Currently denies HA and other acute physical concerns. Per staff, q1hr neuro checks grossly normal. CT scan head w/o contrast last night with no acute intracranial pathology. Remains on 1:1 SIO. Discontinue neuro checks. Medicine signing off. Please feel free to call with questions.       Herb Markham PA-C  Internal Medicine Hospitalist   [facility]  [phone]

## 2019-08-16 NOTE — PROGRESS NOTES
Patient continued on Q hr neuros through out the night. Patient sleep comfortably all night and arousable every hour for neuro checks. Patient's pulse has been in the high 40s most part of the night and 50 at 05 am. Patient's eye have been PERRLA (Pupils equal,round, reflective to light and accommodation), arouse to touch and firm hand . Patient denies pain with neuro assessment. BP at 0600 is 100/78 and Pulse is 51. He is alert and responds to commands. Flory Coma Scale has been 14 all night long.

## 2019-08-16 NOTE — PLAN OF CARE
Chelsi from [facility] [address] called and stated pt placed on a court hold as of 15:51 today. Deputies will be out on Monday to serve papers.

## 2019-08-16 NOTE — PROGRESS NOTES
RN: patient reporting neck pain. Trauma yesterday.   CT Cervical spine w/o contrast ordered.     Addendum: results reviewed.   Recent Results (from the past 24 hour(s))   CT Cervical Spine w/o Contrast    Impression    Impression:   1. No acute fracture or traumatic subluxation.  2. Small thyroid nodules in the left and right lobes. Consider  dedicated thyroid ultrasound if this has not been previously assessed.       * Follow-up thyroid nodule as outpatient.       Misael Pozo MD  Hospitalist, Internal Medicine.   Pager: [phone]  8/16/2019

## 2019-08-16 NOTE — TELEPHONE ENCOUNTER
"NA[facility]IG[facility] Outreach  A Part of the [facility] First Episode of Psychosis Program     Patient Name: Jerel Day  /Age:  1995 (23 year old)    Contact: Writer called the the unit and spoke to Jerel. He updated writer that \"they\" are petitioning for commitment and stated, \"I michael beat myself up yesterday\" as a result. He shared he is feeling okay today, just sore. He shared negatively about staff giving him medications. Writer offered support and expressed care and concern. Validated his disappointment and negative feelings surrounding commitment and encouraged Jerel to take care of himself and rest while he is there. Discussed plan for writer to check-in next week and potentially come and visit Jerel if he is still inpatient. He hopes to be discharged by then. Writer again encouraged Jerel to take care of himself and rest. Jerel requested that writer connect with treatment team on the unit. Notified him writer left a message for his RN this morning with writer's name and contact info and have not received return call. Will call CTC on unit as well. Notified him writer is out on FTO Monday but will check-in Tuesday.     Writer called and LVM for [facility] for care coordination purposes. Provided writer's direct info and requested return call.     Plan: Writer will check-in with Jerel next week. Client and family aware they can reach out to writer directly and/or NAVIGATE team should concerns or needs arise prior to next scheduled appointment.     Tammi Connell AM, LGSW  [facility] [place] [facility]    "

## 2019-08-16 NOTE — PROGRESS NOTES
"[facility], [place]   Psychiatric Progress Note        Interim History:   The patient's care was discussed with the treatment team during the daily team meeting and/or staff's chart notes were reviewed.  Staff report patient has been resting comfortably in room with 1:1 obs.     Psychiatric symptoms and interventions:   Patient is nonsensical, tangential, has difficulty tracking the conversation. He is disorganized. Patient reports he does not think medications are helpful to him. Does not want to take them  He stopped his medications prior to hospitalization after his commitment terminated and he decompensated. He did take Zyprexa on 8/14.      Explained to patient I submitted a petition for commitment on 8/14. Patient was angry and said that I did not know how to do my job. Patient does not believe he needs commitment and it would be detrimental to his treatment. \"I just need a safe space.\" Patient called provider, \" a f......  bitch\".  When provider walked by the patient he gave her the finger.      Patient met with [facility] screener. After their meeting patient slammed head into wall twice. He ran down the hallway , slipped so he did not hit the wall at full speed. Patient will have MRI ordered by IM. Ravindrader notified father of head injury.  Father discussed patient's behavior prior to coming to the hospital and prior commitment was not extended.  Explained to father that a petition for commitment was initiated.     Zyprexa was increased to 10 mg BID.     8/16  Patient gave one word answers to questions. Asked patient how he was doing. Patient presented as angry regarding petition for commitment . Patient requested new provider. Dr. Charles will assume care on Monday.      Medical: UTOX positive for cannabinoids  8/15 MRI: Impression:   1. No acute intracranial pathology.  2. Air-fluid levels with frothy material in the maxillary sinuses.  This can be seen with acute sinusitis.  3. Soft " tissue swelling along the right and central aspect of the  frontal bone. No underlying calvarial fracture.  Internal medicine to follow patient for head injury.     Patient on neuro checks. Glascow coma scale at 14 (3-15)         Behavioral/psychology/social:   Encouraged patient to attend therapeutic programming as tolerated.   No roommate order. Patient is psychotic and paranoid. Command hallucinations to hurt others.   Patient will be on 1:1 observation due to self injurious behavior of hitting head against wall and running in the hallway, trying to hit body against wall.          Medications:       OLANZapine zydis  10 mg Oral BID          Allergies:     Allergies   Allergen Reactions     Penicillins      Rash, Generalized          Labs:   No results found for this or any previous visit (from the past 24 hour(s)).       Psychiatric Examination:     /78   Pulse 51   Temp 98.1  F (36.7  C) (Tympanic)   Resp 16   Wt 67.4 kg (148 lb 9.4 oz)   SpO2 99%   BMI 22.26 kg/m    Weight is 148 lbs 9.44 oz  Body mass index is 22.26 kg/m .  Orthostatic Vitals       Most Recent      Standing Orthostatic /85 08/15 0900    Standing Orthostatic Pulse (bpm) 63 08/15 0900          Appearance: awake, alert and adequately groomed  Attitude:  evasive and guarded  Eye Contact:  fair  Mood:  angry  Affect:  labile  Speech:  rambling and abnormal syntax  Psychomotor Behavior:  no evidence of tardive dyskinesia, dystonia, or tics  Throught Process:  disorganized  Associations:  loosening of associations present  Thought Content:  passive suicidal ideation present and auditory hallucinations present  Insight:  limited  Judgement:  limited  Oriented to:  time, person, and place  Attention Span and Concentration:  fair  Recent and Remote Memory:  fair  Clinical Global Impressions  First:  Considering your total clinical experience with this particular patient population, how severe are the patient's symptoms at this time?: 6  (08/14/19 1120)  Compared to the patient's condition at the START of treatment, this patient's condition is:: 6 (08/14/19 1120)  Most recent:  Considering your total clinical experience with this particular patient population, how severe are the patient's symptoms at this time?: 6 (08/14/19 1120)  Compared to the patient's condition at the START of treatment, this patient's condition is:: 6 (08/14/19 1120)         Precautions:     Behavioral Orders   Procedures     Code 1 - Restrict to Unit     Routine Programming     As clinically indicated     Self Injury Precaution     Patient hit his head against the wall multiple times.     Status 15     Every 15 minutes.     Status Individual Observation     Patient hit his head multiple times and required intervention.     Order Specific Question:   CONTINUOUS 24 hours / day     Answer:   5 feet     Order Specific Question:   Indications for SIO     Answer:   Self-injury risk     Suicide precautions     Patients on Suicide Precautions should have a Combination Diet ordered that includes a Diet selection(s) AND a Behavioral Tray selection for Safe Tray - with utensils, or Safe Tray - NO utensils    Command Hallucinations to harm self          DIagnoses:   1.  Schizoaffective disorder.   2.  Cannabis use disorder.          Plan:     Legal status: Patient arrived on uit with 72 hour hold. Hold will continue. Petitioned for MICD commitment on 8/14. Patient is psychotic and unable to care for self. He is noncompliant with medications in the community.      Medication management:  Increased to Zyprex 10 mg BID     Disposition plan: Stabilized with medications, structured environment with eyes on meds, patient did well on HO when on commitment. .

## 2019-08-16 NOTE — PLAN OF CARE
"  Pt presents with poor concentration, distractible thinking and delusional thought pattern. Affect blunted, tense and irritable. Mood calm. Pt seen to exhibit irritable mood intermittently. Pt heard making statements of wanting to leave facility. Pt heard making statements of distress surrounding legal situation. Pt states \"I feel like I'm backed into a corner.\" Activity isolative and withdrawn. Pt seen to spend majority of time in his room sleeping in bed. Pt came out of his room to take a shower.     Pt states his appetite is \"probably down.\" Pt did not eat breakfast or lunch. Pt given water upon request. In reference to sleep hygiene, Pt states \"I've been sleeping a lot.\" Pt sedated secondary to medication (see MAR). When asked if Pt is experiencing any adverse/side effects to medications, Pt states \"the Zyprexa melts part of my body or my brain. It gives me a melty feeling.\" Pt denies any other adverse/side effects to medications. Pt complains of generalized body aches and muscle knots in his back. Pt refused PRN medication multiple times.     Pt rates his anxiety a 6/10 and his depression a 5/10. Pt denies SI/HI. Pt denies AH.    Pt endorses SIB. Thoughts only. Pt rates SIB severity a 6/10 but states \"not so much anymore\" when asked if he is experiencing SIB thoughts. Pt endorses VH. Pt states \"I see a flash of light when I close my eyes. I see dark lines going up and down.\" Pt explains these VH \"come and go.\"    Pt's neuro checks discontinued this shift.    "

## 2019-08-17 PROCEDURE — 25000132 ZZH RX MED GY IP 250 OP 250 PS 637: Performed by: PSYCHIATRY & NEUROLOGY

## 2019-08-17 PROCEDURE — 12400001 ZZH R&B MH UMMC

## 2019-08-17 PROCEDURE — 25000132 ZZH RX MED GY IP 250 OP 250 PS 637: Performed by: CLINICAL NURSE SPECIALIST

## 2019-08-17 RX ORDER — PSEUDOEPHEDRINE HCL 120 MG/1
120 TABLET, FILM COATED, EXTENDED RELEASE ORAL 2 TIMES DAILY PRN
Status: DISCONTINUED | OUTPATIENT
Start: 2019-08-17 | End: 2019-09-18 | Stop reason: HOSPADM

## 2019-08-17 RX ADMIN — OLANZAPINE 10 MG: 10 TABLET, ORALLY DISINTEGRATING ORAL at 09:37

## 2019-08-17 RX ADMIN — OLANZAPINE 10 MG: 10 TABLET, ORALLY DISINTEGRATING ORAL at 21:35

## 2019-08-17 ASSESSMENT — ACTIVITIES OF DAILY LIVING (ADL)
DRESS: INDEPENDENT
LAUNDRY: UNABLE TO COMPLETE
ORAL_HYGIENE: INDEPENDENT
ORAL_HYGIENE: INDEPENDENT
HYGIENE/GROOMING: INDEPENDENT
DRESS: INDEPENDENT
HYGIENE/GROOMING: INDEPENDENT
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
## 2019-08-17 NOTE — PLAN OF CARE
Pt c/o upper neck pain at a 6/10. Some tenderness with palpation. Pt denies any paresthesias in upper extremities.
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
## 2019-08-18 PROCEDURE — 25000132 ZZH RX MED GY IP 250 OP 250 PS 637: Performed by: PSYCHIATRY & NEUROLOGY

## 2019-08-18 PROCEDURE — 12400001 ZZH R&B MH UMMC

## 2019-08-18 PROCEDURE — 25000132 ZZH RX MED GY IP 250 OP 250 PS 637: Performed by: CLINICAL NURSE SPECIALIST

## 2019-08-18 RX ADMIN — LORAZEPAM 2 MG: 2 TABLET ORAL at 17:37

## 2019-08-18 RX ADMIN — OLANZAPINE 10 MG: 10 TABLET, ORALLY DISINTEGRATING ORAL at 09:48

## 2019-08-18 RX ADMIN — OLANZAPINE 10 MG: 10 TABLET, ORALLY DISINTEGRATING ORAL at 21:35

## 2019-08-18 ASSESSMENT — ACTIVITIES OF DAILY LIVING (ADL)
DRESS: INDEPENDENT
ORAL_HYGIENE: INDEPENDENT
HYGIENE/GROOMING: INDEPENDENT
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
## 2019-08-18 NOTE — PROGRESS NOTES
Discussed situation with Dr. Lopez.  Will discontinue SIO, and move another patient in with Jerel so as to give Nael a private room.  (Safest options at this time as no private rooms on other units).

## 2019-08-18 NOTE — PROGRESS NOTES
Continues to deny SI/SIB, or thoughts to hurt others.  Affect has been generally flat, with poor eye contact.  PA noted he still seems confused.  Asked for used guitar for short time.  Family visited in 430 and patient left room several times, and then returned.  Still appears to be responding to internal stim.

## 2019-08-18 NOTE — PROGRESS NOTES
"Pt remained calm during shift. No attempts at elopement or self harm as of the time of this note (12:50). Pt statement indicates he is in denial about court commitment; when asked if he wanted to fill out his menu for Monday, Pt stated \"I won't be here that long.\"    Pt participated in morning community meeting. Pt was visible in milieu but mostly isolative.        08/18/19 1209   Pain/Comfort/Sleep   Pain Body Location back   Behavioral Health   Thinking distractable;confused;poor concentration   Orientation person: oriented;place: oriented   Insight denial of illness   Judgement impaired   Affect blunted, flat   Mood mood is calm   Physical Appearance/Attire attire appropriate to age and situation   Elopement Distress about legal situation (holds for mental health or criminal);Statements about wanting to leave     "

## 2019-08-19 PROCEDURE — 25000132 ZZH RX MED GY IP 250 OP 250 PS 637: Performed by: CLINICAL NURSE SPECIALIST

## 2019-08-19 PROCEDURE — G0177 OPPS/PHP; TRAIN & EDUC SERV: HCPCS

## 2019-08-19 PROCEDURE — 99232 SBSQ HOSP IP/OBS MODERATE 35: CPT | Performed by: PSYCHIATRY & NEUROLOGY

## 2019-08-19 PROCEDURE — 12400001 ZZH R&B MH UMMC

## 2019-08-19 PROCEDURE — 99207 ZZC CDG-MDM COMPONENT: MEETS MODERATE - UP CODED: CPT | Performed by: PSYCHIATRY & NEUROLOGY

## 2019-08-19 RX ADMIN — OLANZAPINE 10 MG: 10 TABLET, ORALLY DISINTEGRATING ORAL at 21:52

## 2019-08-19 RX ADMIN — OLANZAPINE 10 MG: 10 TABLET, ORALLY DISINTEGRATING ORAL at 11:59

## 2019-08-19 ASSESSMENT — ACTIVITIES OF DAILY LIVING (ADL)
HYGIENE/GROOMING: INDEPENDENT
DRESS: INDEPENDENT
DRESS: INDEPENDENT
HYGIENE/GROOMING: INDEPENDENT
ORAL_HYGIENE: INDEPENDENT
ORAL_HYGIENE: INDEPENDENT
LAUNDRY: UNABLE TO COMPLETE

## 2019-08-19 NOTE — PLAN OF CARE
"Pt quite irritated after check in with PA and asked that we not ask him questions about whether he was suicidal or not and stated \"I'm to provide current through that voltage.\" I explained to pt that it is part of our process of checking in with pt's to ask those questions but pt vehemently stated he did not want it to happen. I told him I would pass on his request in report. Pt also requested an ativan for his agitation which was given. Pt watched a movie after and took a shower. Pt's mood improved after.  "

## 2019-08-19 NOTE — PLAN OF CARE
"48 HOUR ASSESSMENT     Problem: Depressive Symptoms  Goal: Social and Therapeutic (Depression)  Description  Signs and symptoms of listed problems will be absent or manageable.  Outcome: No Change     Goal for Shift: \"Talk to the doctor\"     Goal status: met    D: Pt woke presents with full range affect. Patient denies all MH symptoms. He was short during the check-in.  Patient presents well-groomed and showered this shift.   Patient  attending some groups today.  Patient continues to be medication compliant.    Patient asked to not be questioned about SI/SIB. Writer asked him if he feels safe on the unit and he stated \"no\" then stated \"yes\" then replied well \"safe enough\"    Anxiety: denies  Depression: denies    A: Provided active listening, emotional encouragement and goal setting, safety planning safety checks q 15min, and medication administration.    R: Patient was behaviorally appropriate during this shift. Did not require any restraints or seclusion.     "

## 2019-08-19 NOTE — PLAN OF CARE
"INITIAL OT NOTE  Problem: OT General Care Plan  Goal: OT Goal 1    Pt attended 1 out of 3 OT groups offered. Pt actively participated in occupational therapy clinic. Pt was able to ask for assistance as needed, and independently initiated a creative expression task. Pt demonstrated good focus and planning, and required occasional assistance for problem solving how to fix his task upon making a mistake. Social with peers and writer throughout group. He shared that he has painting supplies at home, and appropriately asked for suggestions regarding how to \"find motivation\" to work on creative tasks. He was encouraged to schedule time into his day several times per week to facilitate developing a routine. He was also encouraged to utilize creative tasks as a coping skill when dealing with undesirable emotions. A peer also suggested setting a reminder on his phone in an effort to schedule time for creative tasks, and he appeared receptive to these suggestions. Calm, cooperative, and engaged throughout group today. Will continue to assess. Pt will be given self-assessment form, and OT staff will explain the purpose of including them in their treatment plan and offer options for meeting their needs. Initial assessment to be completed upon additional group participation.     "

## 2019-08-19 NOTE — PROGRESS NOTES
08/18/19 2200   Therapeutic Recreation   Type of Intervention structured groups   Activity game   Response Participates, initiates socially appropriate   Hours 1     Pt participated in Therapeutic Recreation group with focus on stress reduction, creative expression, and leisure participation. Engaged and cooperative in group recreational group via a group drawing game. Pt participated throughout entire duration of the group. Pt added to group discussion, sociable, and was appropriate with interactions. Showed progress in session goals. Pt mood was calm, but fidgeted with several things throughout the entire group.

## 2019-08-19 NOTE — PROGRESS NOTES
"[facility], [place]   Psychiatric Progress Note        Interim History:     Reason for hospitalization:  Jerel Day is a 23-year-old single  male, presenting with exacerbation of psychosis and auditory hallucinations that are command, telling him to harm himself.     Subjective: \"I don't need to be committed, I was doing fine before medications\"     As per today's meeting: Patient was in his bed. He mentioned he continued to hear voices, but they were \"ok,\" and didn't elaborate if they were commenting or violent. Felt he didn't need to be on medictions, nor be committed, and expressed resentment toward his team for doing so. Felt some \"shallow breaths\" at times, but denied other physical complaints.      Medical: UTOX positive for cannabinoids  8/15 MRI: Impression:   1. No acute intracranial pathology.  2. Air-fluid levels with frothy material in the maxillary sinuses.  This can be seen with acute sinusitis.  3. Soft tissue swelling along the right and central aspect of the  frontal bone. No underlying calvarial fracture.  Internal medicine to follow patient for head injury.     Patient on neuro checks. Glascow coma scale at 14 (3-15)         Behavioral/psychology/social:   Encouraged patient to attend therapeutic programming as tolerated.   No roommate order. Patient is psychotic and paranoid. Contineued hallucinations            Medications:       OLANZapine zydis  10 mg Oral BID          Allergies:     Allergies   Allergen Reactions     Penicillins      Rash, Generalized          Labs:   No results found for this or any previous visit (from the past 24 hour(s)).       Psychiatric Examination:     /71   Pulse 67   Temp 98.2  F (36.8  C) (Tympanic)   Resp 14   Wt 67.2 kg (148 lb 3.2 oz)   SpO2 97%   BMI 22.21 kg/m    Weight is 148 lbs 3.2 oz  Body mass index is 22.21 kg/m .  Orthostatic Vitals       Most Recent      Standing Orthostatic /85 08/15 0900    " Standing Orthostatic Pulse (bpm) 63 08/15 0900          Appearance: awake, alert and adequately groomed  Attitude:  evasive and guarded  Eye Contact:  fair  Mood:  angry  Affect:  labile  Speech:  rambling and abnormal syntax  Psychomotor Behavior:  no evidence of tardive dyskinesia, dystonia, or tics  Throught Process:  disorganized  Associations:  loosening of associations present  Thought Content:  passive suicidal ideation present and auditory hallucinations present  Insight:  limited  Judgement:  limited  Oriented to:  time, person, and place  Attention Span and Concentration:  fair  Recent and Remote Memory:  fair  Clinical Global Impressions  First:  Considering your total clinical experience with this particular patient population, how severe are the patient's symptoms at this time?: 6 (08/14/19 1120)  Compared to the patient's condition at the START of treatment, this patient's condition is:: 6 (08/14/19 1120)  Most recent:  Considering your total clinical experience with this particular patient population, how severe are the patient's symptoms at this time?: 6 (08/14/19 1120)  Compared to the patient's condition at the START of treatment, this patient's condition is:: 6 (08/14/19 1120)         Precautions:     Behavioral Orders   Procedures     Assault precautions     Code 1 - Restrict to Unit     Code 2     For CT only on 8/16     Elopement precautions     Routine Programming     As clinically indicated     Self Injury Precaution     Patient hit his head against the wall multiple times.     Status 15     Every 15 minutes.     Suicide precautions     Patients on Suicide Precautions should have a Combination Diet ordered that includes a Diet selection(s) AND a Behavioral Tray selection for Safe Tray - with utensils, or Safe Tray - NO utensils    Command Hallucinations to harm self          DIagnoses:   1.  Schizoaffective disorder.   2.  Cannabis use disorder.          Plan:     Legal status: Patient  arrived on uit with 72 hour hold. Hold will continue. Petitioned for MICD commitment on 8/14. Patient is psychotic and unable to care for self. He is noncompliant with medications in the community.      Medication management:   Zyprex 10 mg BID     Disposition plan: Stabilized with medications, structured environment with eyes on meds, patient did well on HO when on commitment. .           Jordan Charles MD  [facility] Psychiatry

## 2019-08-19 NOTE — PLAN OF CARE
BEHAVIORAL TEAM DISCUSSION    Participants: 4A Provider: Dr. Jordan Charles MD; 4A RN's: Marielena Rdz, RN ; 4A CTC's: Heather Spain ([facility]).  Progress: No Change.  Continued Stay Criteria/Rationale: psychosis and petitioning for commitment   Medical/Physical: None  Precautions:    Behavioral Orders   Procedures    Assault precautions    Code 1 - Restrict to Unit    Code 2     For CT only on 8/16    Elopement precautions    Routine Programming     As clinically indicated    Self Injury Precaution     Patient hit his head against the wall multiple times.    Status 15     Every 15 minutes.    Suicide precautions     Patients on Suicide Precautions should have a Combination Diet ordered that includes a Diet selection(s) AND a Behavioral Tray selection for Safe Tray - with utensils, or Safe Tray - NO utensils    Command Hallucinations to harm self     Plan:The following services will be provided to the patient; psychiatric assessment, medication management, therapeutic milieu, individual and group support, art therapy, and skills/OT groups.   Rationale for change in precautions or plan: N/A

## 2019-08-20 ENCOUNTER — TELEPHONE (OUTPATIENT)
Dept: PSYCHIATRY | Facility: CLINIC | Age: 24
End: 2019-08-20

## 2019-08-20 PROCEDURE — 25000132 ZZH RX MED GY IP 250 OP 250 PS 637: Performed by: CLINICAL NURSE SPECIALIST

## 2019-08-20 PROCEDURE — 12400001 ZZH R&B MH UMMC

## 2019-08-20 PROCEDURE — 99233 SBSQ HOSP IP/OBS HIGH 50: CPT | Performed by: PSYCHIATRY & NEUROLOGY

## 2019-08-20 RX ADMIN — OLANZAPINE 10 MG: 10 TABLET, ORALLY DISINTEGRATING ORAL at 09:19

## 2019-08-20 RX ADMIN — OLANZAPINE 10 MG: 10 TABLET, ORALLY DISINTEGRATING ORAL at 20:20

## 2019-08-20 ASSESSMENT — ACTIVITIES OF DAILY LIVING (ADL)
LAUNDRY: UNABLE TO COMPLETE
DRESS: SCRUBS (BEHAVIORAL HEALTH);INDEPENDENT
HYGIENE/GROOMING: INDEPENDENT
ORAL_HYGIENE: INDEPENDENT
HYGIENE/GROOMING: INDEPENDENT
ORAL_HYGIENE: INDEPENDENT
DRESS: SCRUBS (BEHAVIORAL HEALTH)

## 2019-08-20 NOTE — PROGRESS NOTES
Writer spoke with pt's mother and explained that at this time we do not have a discharge date for the pt. Writer informed her of his court date and explained that at this time a discharge plan has not been discussed.

## 2019-08-20 NOTE — TELEPHONE ENCOUNTER
[facility] Outreach  A Part of the [facility] First Episode of Psychosis Program     Patient Name: Jerel Day  /Age:  1995 (23 year old)    Contact: Received return call from Jerel. He shared with writer that he has court tomorrow. He isn't feeling like he is being understood on the unit by some staff and also described feeling triggered when he is asked about suicidal ideation. Writer offered support and normalized feeling more understood by some providers versus others. Discussed plan for writer to update NAVIGATE team tomorrow and follow up with inpatient team for care coordination purposes. Writer validated Jerel not wanting to be in the hospital and also encouraged him to rest and take care of himself. Offered to come and visit this week, schedule permitting; Jerel was in favor of this plan.     Plan: Plan for writer to update NAVIGATE team and follow up with inpatient team for care coordination purposes.    EDELMIRA Morgan, CAYLA, EDELMIRA  [facility] [place] [facility]

## 2019-08-20 NOTE — PROGRESS NOTES
"[facility], [place], [place]   Psychiatric Progress Note        Interim History:     Reason for hospitalization:  Jerel Day is a 23-year-old single  male, presenting with exacerbation of psychosis and auditory hallucinations that are command, telling him to harm himself.     Subjective: \"I should've never come here for help\"     As per today's meeting: Patient was in his bed, was irritable with tense affect. Initially discussing treatment plan with his nurse at which time he suspected that his physician was \"damning me or something.\" He said that he wished he never would have come to the hospital for help. He added \"I wish I would've just hit my head harder and killed myself.\" He said that he is feeling \"clamped down\" in the hospital. He said \"Why am I even being committed? I wasn't showing any positive or negative symptoms!\" He said that he feels that previous provider \"did this to me because I insulted her.\" He asked for patient relations information and was informed that  staff would beable to provide this information. He was informed that he is now on a court hold, and he punched himself in the face with moderate force. He said that the only reason he came to the hospital was \"for a tune up to get all the nicotine out of my system. That's it!\" Pt then got out of bed while appear quite tense and angry. Spoke loudly. Writer then terminated interview.         Medications:       OLANZapine zydis  10 mg Oral BID          Allergies:     Allergies   Allergen Reactions     Penicillins      Rash, Generalized          Labs:   No results found for this or any previous visit (from the past 24 hour(s)).       Psychiatric Examination:     /75   Pulse 70   Temp 97.3  F (36.3  C) (Tympanic)   Resp 14   Wt 68 kg (149 lb 14.6 oz)   SpO2 98%   BMI 22.46 kg/m    Weight is 149 lbs 14.6 oz  Body mass index is 22.46 kg/m .  Orthostatic Vitals       Most Recent      Standing " Orthostatic /85 08/15 0900    Standing Orthostatic Pulse (bpm) 63 08/15 0900          Appearance: awake, alert and adequately groomed. Pt hit himself in the face during interview when agitated.  Attitude:  evasive and guarded, agitated when informed of court hold  Eye Contact:  intense  Mood:  angry  Affect:  labile  Speech:  rambling and abnormal syntax  Psychomotor Behavior:  no evidence of tardive dyskinesia, dystonia, or tics  Throught Process:  disorganized, illogical  Associations:  loosening of associations present  Thought Content:  passive suicidal ideation present and auditory hallucinations present and evidence of SIB  Insight:  limited  Judgement:  limited  Oriented to:  time, person, and place  Attention Span and Concentration:  fair  Recent and Remote Memory:  fair    Clinical Global Impressions  First:  Considering your total clinical experience with this particular patient population, how severe are the patient's symptoms at this time?: 6 (08/14/19 1120)  Compared to the patient's condition at the START of treatment, this patient's condition is:: 6 (08/14/19 1120)  Most recent:  Considering your total clinical experience with this particular patient population, how severe are the patient's symptoms at this time?: 6 (08/14/19 1120)  Compared to the patient's condition at the START of treatment, this patient's condition is:: 6 (08/14/19 1120)         Precautions:     Behavioral Orders   Procedures     Assault precautions     Code 1 - Restrict to Unit     Code 2     For CT only on 8/16     Elopement precautions     Routine Programming     As clinically indicated     Self Injury Precaution     Patient hit his head against the wall multiple times.     Status 15     Every 15 minutes.     Suicide precautions     Patients on Suicide Precautions should have a Combination Diet ordered that includes a Diet selection(s) AND a Behavioral Tray selection for Safe Tray - with utensils, or Safe Tray - NO  utensils    Command Hallucinations to harm self          Diagnoses:     1.  Schizoaffective disorder.   2.  Cannabis use disorder.          Plan:     Legal status: Patient arrived on uit with 72 hour hold. Hold will continue. Petitioned for MICD commitment on 8/14. Patient is psychotic and unable to care for self. He is noncompliant with medications in the community.      Medication management:   Zyprex 10 mg BID    Medical: UTOX positive for cannabinoids  8/15 MRI: Impression:   1. No acute intracranial pathology.  2. Air-fluid levels with frothy material in the maxillary sinuses.  This can be seen with acute sinusitis.  3. Soft tissue swelling along the right and central aspect of the  frontal bone. No underlying calvarial fracture.  Internal medicine to follow patient for head injury.     Patient on neuro checks. Glascow coma scale at 14 (3-15)      Behavioral/psychology/social: Encouraged patient to attend therapeutic programming as tolerated.   No roommate order. Patient is psychotic and paranoid. Contineued hallucinations     Disposition plan: Stabilized with medications, structured environment with eyes on meds, patient did well on HO when if commitment is in place given patient's unwillingness to consider at this time.            Hyacinth Lazaro MD  [facility] Psychiat[facility]

## 2019-08-20 NOTE — PROGRESS NOTES
"Writer met with pt for apporximatly 30 minutes to discuss his petition paperwork. Pt went through the petition inch by inch attempting to find inaccuracies to get the petition thrown out. Pt proceeded to tell writer how nothing in the petition was correct. Pt states \"my name is not capitalized and that is seen as a form of disrespect\". Writer attempted to explain that it was capitalized but the person who wrote it wrote the upper half of his B small. Pt goes on to list off things like \"the screener was not asking the right questions\" and states \" I want to debbie the injustices that are happening\". Pt states that when he knocked himself out he heard a staff say that he was just acting. Writer asked how he heard it if he was knocked out. Pt states that he was in and out of consciousness and seeing stars. Pt states that he attempted to knock himself out to prove that he did not need to be here. Pt went on for quite some time with reasons why the document is inaccurate. Pt even asked why the document was stating that he was denying symptoms when he might have those symptoms. The conversation was again very disorderly.   "

## 2019-08-20 NOTE — PLAN OF CARE
"48 HOUR ASSESSMENT     Problem: Suicidal Behavior  Goal: Suicidal Behavior is Absent or Managed  Outcome: No Change    D: Pt was irritable this shift. He did take his morning medication and ate breakfast. Patient had questions regarding the commitment process as he thought the commitment process was being discussed and not being actively pursued. Writer explained the commitment process has started but he is not committed yet. Patient's court date is tomorrow at 10:30 am. Patient stated \"what's the point of going no one is even listening\". Shortly after meeting with his provider, the provider reported patient was punching the wall in his room. When writer went to the room patient was lying in bed with the sheets covering his face. He reported he banged his head again. And requested to see the results of his CT scan. Writer informed him his medical records would need to be requested from medical records.     Patients court paperwork was served this afternoon. Patient perseverated over the documents asking multiple times \"what do I do if the information on here is incorrect?\" Patient was referred to his court appointed  as well as his CTC.     Writer did not ask about suicidality per patient request.    A: Provided active listening, emotional encouragement and goal setting, safety planning safety checks q 15min, and medication administration.    R: Patient was irritable, increase safety check frequency. He did not require any restraints or seclusion.    "

## 2019-08-20 NOTE — PROGRESS NOTES
Pt has been in bed all evening. Pt states that he has been napping on and off. Pt would like to leave. Pt is calm and cooperative. Writer did not ask pt about SI or SIB per pt request.        08/19/19 2200   Behavioral Health   Hallucinations denies / not responding to hallucinations   Orientation person: oriented;place: oriented;date: oriented;time: oriented   Memory baseline memory   Insight poor   Judgement impaired   Eye Contact at examiner   Affect blunted, flat   Mood mood is calm   Physical Appearance/Attire attire appropriate to age and situation   Hygiene well groomed   1. Wish to be Dead No   2. Non-Specific Active Suicidal Thoughts  No   Activity isolative;withdrawn   Speech clear;coherent   Activities of Daily Living   Hygiene/Grooming independent   Oral Hygiene independent   Dress independent   Room Organization independent   Activity   Activity Assistance Provided independent

## 2019-08-20 NOTE — TELEPHONE ENCOUNTER
NAVIGATE Outreach  A Part of the [facility] First Episode of Psychosis Program     Patient Name: Jerel Day  /Age:  1995 (23 year old)    Contact: Writer called unit and asked to speak to Jerel who was unavailable. Left message for Jerel that writer called; staff took down writer's name and direct phone number. Will pass message along to Jerel.     Plan: Writer will continue to follow for support and care coordination purposes. Client and family aware they can reach out to writer directly and/or NAVIGATE team should concerns or needs arise prior to next scheduled appointment.       Tammi Connell, LGSW, CAYLA, LGSW  NAVIGATE Individual [facility]iliency  & Family Clinician

## 2019-08-21 PROCEDURE — 12400001 ZZH R&B MH UMMC

## 2019-08-21 PROCEDURE — 25000132 ZZH RX MED GY IP 250 OP 250 PS 637: Performed by: CLINICAL NURSE SPECIALIST

## 2019-08-21 RX ADMIN — OLANZAPINE 10 MG: 10 TABLET, ORALLY DISINTEGRATING ORAL at 08:44

## 2019-08-21 RX ADMIN — OLANZAPINE 10 MG: 10 TABLET, ORALLY DISINTEGRATING ORAL at 21:34

## 2019-08-21 ASSESSMENT — ACTIVITIES OF DAILY LIVING (ADL)
ORAL_HYGIENE: INDEPENDENT
DRESS: INDEPENDENT
LAUNDRY: WITH SUPERVISION
HYGIENE/GROOMING: INDEPENDENT

## 2019-08-21 NOTE — PROGRESS NOTES
"Pt had a decent shift. Pt reported feeling very nervous about court tomorrow, but was receptive to staff reassurance. Pt had a meeting with his , which he said helped him feel better. Pt was oriented to conversation with examiner, but said several nonsensical things. Pt stated his medication makes him feel sleepy and difficult to focus. Pt had visit with grandmother that went well. Pt's grandmother brought clothes for court (in pt bin) and contacts (in pt's med bin). Pt spent rest of evening sleeping in room. Pt reports thoughts of suicide in relation to \"not wanting to be in the hospital the rest of his life\". Pt was less irritable than reported earlier in shift. Pt found aromatherapy helpful. No SIB observed/reported on this shift.         08/20/19 1700   Behavioral Health   Hallucinations other (see comment)  (BHUPENDRA)   Thinking distractable;poor concentration   Orientation person: oriented;place: oriented;date: oriented;time: oriented   Memory baseline memory   Insight poor   Judgement impaired   Eye Contact at examiner   Affect sad   Mood mood is calm   Physical Appearance/Attire attire appropriate to age and situation   Hygiene other (see comment)  (adequate)   Suicidality thoughts only   1. Wish to be Dead No   2. Non-Specific Active Suicidal Thoughts  No   Self Injury other (see comment)  (denies)   Elopement   (none stated or observed)   Activity withdrawn   Speech rambling;clear;coherent   Medication Sensitivity no observed side effects;other (see comment)  (\"make it hard for me to focus\" \"makes me sedated\")   Psychomotor / Gait balanced;steady   Activities of Daily Living   Hygiene/Grooming independent   Oral Hygiene independent   Dress scrubs (behavioral health);independent   Room Organization independent     "

## 2019-08-21 NOTE — PROGRESS NOTES
Patient was off the unit for much of the day, leaving for court at 9am and returning to the unit around 1230 pm.  Patient ate his lunch and then promptly went to sleep.  Staff was unable during this time to check in fully with the patient.  However during his time awake there were no issues, and patient was pleasant after returning to the unit and undergoing the search process.  Patients court clothing were returned to his belongings after search.

## 2019-08-21 NOTE — PROGRESS NOTES
Writer received a call from court stating that the pt will have court on Monday 08/26/2019 at 9:45AM. They would like a provider to be available at that time. Writer explained that pt's provider is out and another provider is assisting the pt. Writer is not clear about which provider will be here Monday. Court states that they will cb Friday to find out the exact provider.

## 2019-08-22 ENCOUNTER — CARE COORDINATION (OUTPATIENT)
Dept: PSYCHIATRY | Facility: CLINIC | Age: 24
End: 2019-08-22

## 2019-08-22 PROCEDURE — 12400001 ZZH R&B MH UMMC

## 2019-08-22 PROCEDURE — 25000132 ZZH RX MED GY IP 250 OP 250 PS 637: Performed by: CLINICAL NURSE SPECIALIST

## 2019-08-22 PROCEDURE — 99233 SBSQ HOSP IP/OBS HIGH 50: CPT | Performed by: PSYCHIATRY & NEUROLOGY

## 2019-08-22 RX ORDER — BENZTROPINE MESYLATE 0.5 MG/1
0.5 TABLET ORAL 2 TIMES DAILY PRN
Status: DISCONTINUED | OUTPATIENT
Start: 2019-08-22 | End: 2019-09-18 | Stop reason: HOSPADM

## 2019-08-22 RX ADMIN — OLANZAPINE 10 MG: 10 TABLET, ORALLY DISINTEGRATING ORAL at 22:06

## 2019-08-22 RX ADMIN — OLANZAPINE 10 MG: 10 TABLET, ORALLY DISINTEGRATING ORAL at 08:36

## 2019-08-22 ASSESSMENT — ACTIVITIES OF DAILY LIVING (ADL)
DRESS: INDEPENDENT
ORAL_HYGIENE: INDEPENDENT
LAUNDRY: WITH SUPERVISION
HYGIENE/GROOMING: INDEPENDENT
ORAL_HYGIENE: INDEPENDENT
HYGIENE/GROOMING: INDEPENDENT
LAUNDRY: WITH SUPERVISION
DRESS: INDEPENDENT

## 2019-08-22 NOTE — PROGRESS NOTES
Pt isolative to room for most of evening. Pt had visit from family and a friend. Pt has flat affect, not very social with peers when in milieu. Pt denies all MH symptoms this evening, no dep, anxiety, IS, SI/SIB. Pt had no complaints and feels ready to discharge. Pt could not identify and barriers or things to work on before discharge.      08/21/19 2100   Behavioral Health   Hallucinations denies / not responding to hallucinations   Thinking poor concentration   Orientation person: oriented;place: oriented;date: oriented;time: oriented   Memory baseline memory   Insight poor;denial of illness   Judgement impaired   Eye Contact at examiner   Affect full range affect   Mood mood is calm   Physical Appearance/Attire attire appropriate to age and situation   Hygiene neglected grooming - unclean body, hair, teeth   Suicidality other (see comments)  (denies)   1. Wish to be Dead No   2. Non-Specific Active Suicidal Thoughts  No   Self Injury   (none)   Elopement   (none)   Activity isolative   Speech clear;coherent   Medication Sensitivity no stated side effects;no observed side effects   Psychomotor / Gait balanced;steady   Psycho Education   Type of Intervention 1:1 intervention   Response participates, initiates socially appropriate   Hours 0.5   Treatment Detail Check in   Group Therapy Session   Group Attendance refused to attend group session   Activities of Daily Living   Hygiene/Grooming independent   Oral Hygiene independent   Dress independent   Laundry with supervision   Room Organization independent

## 2019-08-22 NOTE — PROGRESS NOTES
"[facility], [place]   Psychiatric Progress Note        Interim History:     Reason for hospitalization:  Jerel Day is a 23-year-old single  male, presenting with exacerbation of psychosis and auditory hallucinations that are command, telling him to harm himself.     Subjective: \"I rather not take any medications. No one is listening!\"     As per today's meeting: Patient was in his bed, was irritable with very tense affect. Pt had several repetitive questions about court process. He said that he is adamantly opposed to HO because he is afraid of needles. He said that he is experiencing several side effects from Zyprexa, including \"parkinsonian symptoms and numbness in my hands.\" He said that he is experiencing stiffness and \"rigidity.\" For these reasons, he said that he would like Zyprexa, \"and any other antipsychotic medication\" to be stopped. He said that he does better when he is not on any medications. He again states that he never should've sought help by coming to the hospital. He said that he wanted a \"tune up,\" but is instead hospitalized involuntarily. He said that he does not feel heard. He said that hospitalization is solely attributed to an argument he had with a former provider. He said that he would like to file a complaint. He was advised to ask  staff for patient relations information. He does not feel he needs to be in the hospital. He had no additional questions or concerns.          Medications:       OLANZapine zydis  10 mg Oral BID          Allergies:     Allergies   Allergen Reactions     Penicillins      Rash, Generalized          Labs:   No results found for this or any previous visit (from the past 24 hour(s)).       Psychiatric Examination:     /83   Pulse 66   Temp 97.2  F (36.2  C) (Tympanic)   Resp 14   Wt 68.8 kg (151 lb 11.2 oz)   SpO2 98%   BMI 22.73 kg/m    Weight is 151 lbs 11.2 oz  Body mass index is 22.73 " kg/m .  Orthostatic Vitals       Most Recent      Standing Orthostatic /85 08/15 0900    Standing Orthostatic Pulse (bpm) 63 08/15 0900          Appearance: awake, alert and adequately groomed. Pt hit himself in the face during interview when agitated.  Attitude:  evasive and guarded, agitated  Eye Contact:  intense  Mood:  angry  Affect:  angry, tense  Speech:  rambling and abnormal syntax  Psychomotor Behavior:  no evidence of tardive dyskinesia, dystonia, or tics  Throught Process:  disorganized, illogical  Associations:  loosening of associations present  Thought Content:  passive suicidal ideation present and auditory hallucinations present and evidence of SIB  Insight:  limited  Judgement:  limited  Oriented to:  time, person, and place  Attention Span and Concentration:  fair  Recent and Remote Memory:  fair    Clinical Global Impressions  First:  Considering your total clinical experience with this particular patient population, how severe are the patient's symptoms at this time?: 6 (08/14/19 1120)  Compared to the patient's condition at the START of treatment, this patient's condition is:: 6 (08/14/19 1120)  Most recent:  Considering your total clinical experience with this particular patient population, how severe are the patient's symptoms at this time?: 6 (08/14/19 1120)  Compared to the patient's condition at the START of treatment, this patient's condition is:: 6 (08/14/19 1120)         Precautions:     Behavioral Orders   Procedures     Assault precautions     Code 1 - Restrict to Unit     Code 2     For CT only on 8/16     Elopement precautions     Routine Programming     As clinically indicated     Self Injury Precaution     Patient hit his head against the wall multiple times.     Status 15     Every 15 minutes.     Suicide precautions     Patients on Suicide Precautions should have a Combination Diet ordered that includes a Diet selection(s) AND a Behavioral Tray selection for Safe Tray -  with utensils, or Safe Tray - NO utensils    Command Hallucinations to harm self          Diagnoses:     1.  Schizoaffective disorder.   2.  Cannabis use disorder.          Plan:     Legal status: Patient arrived on uit with 72 hour hold. Hold will continue. Petitioned for MICD commitment on 8/14. Patient is psychotic and unable to care for self. He is noncompliant with medications in the community.      Medication management:   Zyprex 10 mg BID. Would recommend transitioning to alternative neuroleptic with plan to pursue HO, though patient declines at this time. Will add Cogentin 0.5 mg BID prn for any observed EPSE.    Medical: UTOX positive for cannabinoids  8/15 MRI: Impression:   1. No acute intracranial pathology.  2. Air-fluid levels with frothy material in the maxillary sinuses.  This can be seen with acute sinusitis.  3. Soft tissue swelling along the right and central aspect of the  frontal bone. No underlying calvarial fracture.  Internal medicine to follow patient for head injury.     Behavioral/psychology/social: Encouraged patient to attend therapeutic programming as tolerated.   No roommate order. Patient is psychotic and paranoid. Continued hallucinations     Disposition plan: Stabilized with medications, structured environment with eyes on meds, patient did well on HO when if commitment is in place given patient's unwillingness to consider at this time.            Hyacinth Lazaro MD  [facility] Psychiatry

## 2019-08-22 NOTE — PLAN OF CARE
Jerel has been isolative to his room, lying in bed. He has minimal interactions with writer, poor eye contact, stares away. He was out for breakfast and lunch, but did not appear to talk with peers.     He sleeps about 7 hours nightly. Appetite is ok. He remains tense and slightly agitated. He has refused groups. No significant changes.     He is medication compliant.

## 2019-08-23 PROCEDURE — 25000132 ZZH RX MED GY IP 250 OP 250 PS 637: Performed by: CLINICAL NURSE SPECIALIST

## 2019-08-23 PROCEDURE — 99233 SBSQ HOSP IP/OBS HIGH 50: CPT | Performed by: PSYCHIATRY & NEUROLOGY

## 2019-08-23 PROCEDURE — G0177 OPPS/PHP; TRAIN & EDUC SERV: HCPCS

## 2019-08-23 PROCEDURE — 12400001 ZZH R&B MH UMMC

## 2019-08-23 RX ADMIN — HYDROXYZINE HYDROCHLORIDE 25 MG: 25 TABLET ORAL at 13:51

## 2019-08-23 RX ADMIN — ACETAMINOPHEN 650 MG: 325 TABLET, FILM COATED ORAL at 21:57

## 2019-08-23 RX ADMIN — OLANZAPINE 10 MG: 10 TABLET, ORALLY DISINTEGRATING ORAL at 08:25

## 2019-08-23 RX ADMIN — OLANZAPINE 10 MG: 10 TABLET, ORALLY DISINTEGRATING ORAL at 21:57

## 2019-08-23 ASSESSMENT — ACTIVITIES OF DAILY LIVING (ADL)
DRESS: INDEPENDENT
ORAL_HYGIENE: INDEPENDENT
HYGIENE/GROOMING: INDEPENDENT

## 2019-08-23 NOTE — PLAN OF CARE
During 15 minute accountability checks, Jerel was observed facing the wall closely, silently staring at wall and occasionally looking to the side. He did not appear to be aware of staff presence.

## 2019-08-23 NOTE — PROGRESS NOTES
"NAVIGATE Outreach  A Part of the [facility] First Episode of Psychosis Program     Patient Name: Jerel Day  /Age:  1995 (23 year old)    Contact: Writer went to visit Jerel on the unit this morning at [facility]. Met with Jerel in room at the end of the nazario. Jerel presented with low mood but was engaged and receptive to visit. He shared things aren't going that well. He described feeling \"crunchies\" near his ear and continues to report distressing somatic experiences. He reported court was nerve-wracking but that he feels it went alright; he shared positively that his  is familiar to him. He expressed confusion as to why there is a petition for commitment when from his perspective, things have been going okay and nothing significant has happened suggesting otherwise. He shared he came to the hospital because he felt he was vaping too much, was stressed about moving out of his apartment and wanted a \"tune up.\" Writer offered validation, and also reviewed reasons commitment is generally pursued. Jerel also described incident on the unit where he hit his head into the wall and verbalized feeling disappointed \"because I did that to myself.\" It appeared he felt sad that he had hurt himself. Writer shared hope that he can not engage any further in self-harm behaviors and can heal and take care of himself.     Briefly discussed meds in the event there is a [place]; Jerel reported he remembers feeling \"a little better\" on Risperdal, then stated, \"but I don't like to admit that.\" He also reports feeling like he tolerated Abilify okay.     Writer encouraged Jerel to focus on what he can control; knowing his next court date is , encouraged him to rest and engage in any type of activities while inpatient that provide some positive experience, even if it is minimal. Writer and Jerel then spent time talking about the view from the window. Jerel pointed out where he has lived and where his new apartment is. Talked about " navigating campus and he remembered when he would get lost initially. Also discussed some books Jerel has been reading.     He was very pleasant and expressed appreciation for the visit. Reminded Jerel that writer will be out for a conference 8/26-9/4 but will check-in with him upon returning. Reminded him NAVIGATE team and NAVIGATE SEE - YOSEPH Raymundo are still available for support and care coordination purposes.     Plan: Messaged Dr. Lazaro, Dr. Zheng and Brittani Estrella, RNCC for care coordination purposes. Jerel is aware he can reach out to NAVIGATE team should any needs or concerns arise in writer's absence. Writer will follow-up upon returning 9/5.    EDELMIRA Morgan, CAYLA, LGARIANNE  NAVIGATE Individual Resiliency [facility] Family Clinician

## 2019-08-23 NOTE — PROGRESS NOTES
Jerel was isolative and withdrawn this evening. He spent the majority of the evening in his room. He came out to eat dinner and requested a shower afterwards. His affect was flat/blunted. No SI/SIB statements. He accepted a visit from his family but was observed interacting with them very minimally while laying in his bed.    Appetite: Good  Sleep: Slept the majority of the shift  Pain: N/A  SEs: None stated              08/22/19 2100   Behavioral Health   Hallucinations appears responding   Thinking poor concentration   Orientation person: oriented;place: oriented;date: oriented   Memory baseline memory   Insight denial of illness   Judgement impaired   Eye Contact into space   Affect blunted, flat   Mood mood is calm   Physical Appearance/Attire attire appropriate to age and situation   Hygiene well groomed   Suicidality other (see comments)  (declines question)   1. Wish to be Dead No   2. Non-Specific Active Suicidal Thoughts  No   Change in Protective Factors? No   Enviromental Risk Factors None   Self Injury other (see comment)  (none stated or observed)   Elopement   (no statements or behaviors)   Activity isolative;withdrawn   Speech clear;coherent   Medication Sensitivity no stated side effects;no observed side effects   Psychomotor / Gait balanced;steady   Safety   Suicidality Status 15   Assault status 15;private room   Elopement status 15;room away from unit doors   Activities of Daily Living   Hygiene/Grooming independent   Oral Hygiene independent   Dress independent   Laundry with supervision   Room Organization independent   Activity   Activity Assistance Provided independent

## 2019-08-23 NOTE — PROGRESS NOTES
08/23/19 1300   Behavioral Health   Hallucinations denies / not responding to hallucinations   Thinking poor concentration   Orientation person: oriented;place: oriented;time: oriented;date: oriented   Memory baseline memory   Insight poor   Judgement impaired   Eye Contact at floor   Affect blunted, flat   Mood anxious;depressed   Physical Appearance/Attire attire appropriate to age and situation   Hygiene neglected grooming - unclean body, hair, teeth   Suicidality other (see comments)  (denies)   1. Wish to be Dead No   2. Non-Specific Active Suicidal Thoughts  No   3. Active Sucidal Ideation with any Methods (Not Plan) Without Intent to Act  No   4. Active Suicidal Ideation with Some Intent to Act, Without Specific Plan  No   5. Active Suicidal Ideation with Specific Plan and Intent  No   Psycho Education   Type of Intervention 1:1 intervention   Response participates, initiates socially appropriate   Hours 0.5   Treatment Detail check in with pt     Pt was isolative today staying in room for a majority of the shift. Pt reports having major mood swings brought on by his own thinking/rumination. Pt states he is trying to self soothe by massaging his head during high anxiety moments. Pt denies SI/SIB/HI.

## 2019-08-23 NOTE — PROGRESS NOTES
"[facility], [place]   Psychiatric Progress Note        Interim History:     Reason for hospitalization:  Jerel Day is a 23-year-old single  male, presenting with exacerbation of psychosis and auditory hallucinations that are command, telling him to harm himself.     Subjective: \"I am just feeling very indecisive\"     Staff report: Pt is isolative and withdrawn. He spent the majority of the evening in his room. His affect was flat/blunted. No SI/SIB statements. He accepted a visit from his family but was observed interacting with them very minimally while laying in his bed.     As per today's meeting: Patient was lying in his bed. Pt's thought process was quite concrete, disorganized and illogical. He said that he believes he is in the hospital because of a push up contest while at [employer] with his parents.  He said that during this contest, he cracked his back, \"and I have been making these awkward movements and indecisive patterns.\" He said that this resulted in making it more difficult to \"align my path with the path I am going to choose.\" He could not explain how this context relates to indecisiveness. He said that he feels the medications are making it hard for him to think. He denied other side effects. Again voiced his opposition to medications. He denied SI and HI. He denied SIB urges. He had no additional questions or concerns.          Medications:       OLANZapine zydis  10 mg Oral BID          Allergies:     Allergies   Allergen Reactions     Penicillins      Rash, Generalized          Labs:   No results found for this or any previous visit (from the past 24 hour(s)).       Psychiatric Examination:     /79   Pulse 66   Temp 96.2  F (35.7  C) (Tympanic)   Resp 14   Wt 68.8 kg (151 lb 11.2 oz)   SpO2 97%   BMI 22.73 kg/m    Weight is 151 lbs 11.2 oz  Body mass index is 22.73 kg/m .  Orthostatic Vitals       Most Recent      Standing " Orthostatic /85 08/15 0900    Standing Orthostatic Pulse (bpm) 63 08/15 0900          Appearance: awake, alert and adequately groomed. Pt hit himself in the face during interview when agitated.  Attitude:  evasive and guarded, less agitated  Eye Contact:  poor  Mood:  indifferent  Affect: mood congruent, blunted  Speech:  rambling and abnormal syntax  Psychomotor Behavior:  no evidence of tardive dyskinesia, dystonia, or tics  Throught Process:  disorganized, illogical  Associations:  loosening of associations present  Thought Content:  Denies SI, SIB, HI. Evidence of ongoing psychosis.   Insight:  limited  Judgement:  limited  Oriented to:  time, person, and place  Attention Span and Concentration:  fair  Recent and Remote Memory:  fair    Clinical Global Impressions  First:  Considering your total clinical experience with this particular patient population, how severe are the patient's symptoms at this time?: 6 (08/14/19 1120)  Compared to the patient's condition at the START of treatment, this patient's condition is:: 6 (08/14/19 1120)  Most recent:  Considering your total clinical experience with this particular patient population, how severe are the patient's symptoms at this time?: 6 (08/14/19 1120)  Compared to the patient's condition at the START of treatment, this patient's condition is:: 6 (08/14/19 1120)         Precautions:     Behavioral Orders   Procedures     Assault precautions     Pt has been violent to self and shows the potential to be violent with others.     Code 1 - Restrict to Unit     Code 2     For CT only on 8/16     Elopement precautions     Routine Programming     As clinically indicated     Self Injury Precaution     Patient hit his head against the wall multiple times.     Status 15     Every 15 minutes.     Suicide precautions     Patients on Suicide Precautions should have a Combination Diet ordered that includes a Diet selection(s) AND a Behavioral Tray selection for Safe Tray  - with utensils, or Safe Tray - NO utensils    Command Hallucinations to harm self          Diagnoses:     1.  Schizoaffective disorder.   2.  Cannabis use disorder.          Plan:     Legal status: Patient arrived on uit with 72 hour hold. Hold will continue. Petitioned for MICD commitment on 8/14. Patient is psychotic and unable to care for self. He is noncompliant with medications in the community.      Medication management:   Zyprex 10 mg BID. Would recommend transitioning to alternative neuroleptic with plan to pursue HO, though patient declines at this time. Will add Cogentin 0.5 mg BID prn for any observed EPSE.    Medical: UTOX positive for cannabinoids  8/15 MRI: Impression:   1. No acute intracranial pathology.  2. Air-fluid levels with frothy material in the maxillary sinuses.  This can be seen with acute sinusitis.  3. Soft tissue swelling along the right and central aspect of the  frontal bone. No underlying calvarial fracture.  Internal medicine to follow patient for head injury.     Behavioral/psychology/social: Encouraged patient to attend therapeutic programming as tolerated.   No roommate order. Patient is psychotic and paranoid. Continued hallucinations     Disposition plan: Stabilized with medications, structured environment with eyes on meds, patient did well on HO when if commitment is in place given patient's unwillingness to consider at this time.            Hyacinth Lazaro MD  [facility] Psychiatry

## 2019-08-23 NOTE — PLAN OF CARE
1353 PRN hydroxyzine 25 mg given for anxiety per Jerel's request. Pending response. Resting comfortably

## 2019-08-24 PROCEDURE — 25000132 ZZH RX MED GY IP 250 OP 250 PS 637: Performed by: PSYCHIATRY & NEUROLOGY

## 2019-08-24 PROCEDURE — 25000132 ZZH RX MED GY IP 250 OP 250 PS 637: Performed by: CLINICAL NURSE SPECIALIST

## 2019-08-24 PROCEDURE — 12400001 ZZH R&B MH UMMC

## 2019-08-24 RX ADMIN — BENZTROPINE MESYLATE 0.5 MG: 0.5 TABLET ORAL at 17:37

## 2019-08-24 RX ADMIN — OLANZAPINE 10 MG: 10 TABLET, ORALLY DISINTEGRATING ORAL at 22:33

## 2019-08-24 RX ADMIN — OLANZAPINE 10 MG: 10 TABLET, ORALLY DISINTEGRATING ORAL at 09:27

## 2019-08-24 ASSESSMENT — ACTIVITIES OF DAILY LIVING (ADL)
ORAL_HYGIENE: INDEPENDENT
HYGIENE/GROOMING: INDEPENDENT
HYGIENE/GROOMING: INDEPENDENT
DRESS: INDEPENDENT
ORAL_HYGIENE: INDEPENDENT
DRESS: INDEPENDENT
LAUNDRY: UNABLE TO COMPLETE

## 2019-08-24 NOTE — PROGRESS NOTES
Jerel was visible in the milieu this evening. He attended and participated in groups. He reported lower back pain. He said he heard voices coming from his lower back---possibly an auditory hallucination. He said he felt that his mental health has been declining, but he feels hopeful that will improve shortly. His mood was calm throughout the evening. He spoke with staff for some time about literature and exercise.    Appetite: Good  Pain: Lower back pain  Sleep: Good  SEs: N/A                08/23/19 2000   Behavioral Health   Hallucinations auditory   Thinking poor concentration   Orientation person: oriented;place: oriented   Memory baseline memory   Insight poor   Judgement impaired   Eye Contact at examiner   Affect blunted, flat   Mood mood is calm   Physical Appearance/Attire attire appropriate to age and situation   Hygiene other (see comment)  (fair)   Suicidality other (see comments)  (denies)   1. Wish to be Dead No   2. Non-Specific Active Suicidal Thoughts  No   Change in Protective Factors? No   Enviromental Risk Factors None   Elopement   (no statements or behaviors)   Activity other (see comment)  (visible)   Speech clear;coherent   Medication Sensitivity no stated side effects;no observed side effects   Psychomotor / Gait balanced;steady   Safety   Suicidality Status 15   Assault status 15;minimal personal belongings in room   Activities of Daily Living   Hygiene/Grooming independent   Oral Hygiene independent   Dress independent   Room Organization independent

## 2019-08-24 NOTE — PLAN OF CARE
Problem: Psychotic Symptoms  Goal: Psychotic Symptoms  Description  Signs and symptoms of listed problems will be absent or manageable.  Flowsheets (Taken 8/24/2019 1001)  Psychotic Symptoms Present: mood;speech;insight;thought process;affect  Note:   Jerel c/o of the medication destroying his body. He c/o it is making him lethargic, weak, slowed thoughts, not thinking clearly, back pain and neck pain. He was unable or unwilling to accept education on benefits and side effects of Zyprexa and PRN are useful for the pain. He is compliant with Zyprexa, because he does not believe he has a choice.     He makes little eye contact, robotic monotone voice, rigid posture, but has improved with ability to engage in the conversation. His logic is poor and he lacks insight into which symptoms are related to his diagnosis or to side effects. He states the hallucinations are worse. However, he did not appear to be responding during this conversation.     Empathetic listening, education, and encouragement to comply with Plan of Care.

## 2019-08-25 PROCEDURE — 12400001 ZZH R&B MH UMMC

## 2019-08-25 PROCEDURE — 25000132 ZZH RX MED GY IP 250 OP 250 PS 637: Performed by: CLINICAL NURSE SPECIALIST

## 2019-08-25 RX ADMIN — OLANZAPINE 10 MG: 10 TABLET, ORALLY DISINTEGRATING ORAL at 08:30

## 2019-08-25 RX ADMIN — OLANZAPINE 10 MG: 10 TABLET, ORALLY DISINTEGRATING ORAL at 20:59

## 2019-08-25 ASSESSMENT — ACTIVITIES OF DAILY LIVING (ADL)
DRESS: INDEPENDENT
ORAL_HYGIENE: INDEPENDENT
HYGIENE/GROOMING: INDEPENDENT
HYGIENE/GROOMING: INDEPENDENT
LAUNDRY: UNABLE TO COMPLETE
DRESS: INDEPENDENT
ORAL_HYGIENE: INDEPENDENT

## 2019-08-25 NOTE — PROGRESS NOTES
"Pt remained in bed the majority of the evening. Pt was out of bed for dinner and that was the only time. Pt states that his back is really bothering him. Pt also states that mentally he feels \"foggy.\" Pt states rates depression at a 8 or 9 out of 10. Pt denies SI and SIB.       08/24/19 2000   Behavioral Health   Thinking distractable   Orientation person: oriented;place: oriented;date: oriented;time: oriented   Memory baseline memory   Insight poor   Judgement impaired   Eye Contact at floor   Affect blunted, flat   Mood mood is calm   Physical Appearance/Attire attire appropriate to age and situation   Hygiene neglected grooming - unclean body, hair, teeth   1. Wish to be Dead No   2. Non-Specific Active Suicidal Thoughts  No   Activity isolative;withdrawn   Speech clear;coherent   Psychomotor / Gait balanced;steady   Activities of Daily Living   Hygiene/Grooming independent   Oral Hygiene independent   Dress independent   Room Organization independent     "

## 2019-08-25 NOTE — PLAN OF CARE
"  Problem: Psychotic Symptoms  Goal: Psychotic Symptoms  Description  Signs and symptoms of listed problems will be absent or manageable.  Outcome: No Change     Jerel continues to express feeling \"someone other than myself\", accusing medication of making him physically and mentally feel worse. He blames the medication for the incident where he was smashing his head on the wall. Unable to discern back pain as not a side effect of medications, but a prior hx with treatment from a chiropractor from 2 years ago (states he planned to see the chiropractor before coming into hospital). Obtained soft care mattress. He did states his neck stiffness is better. Declined offer for tylenol and/ or cogentin.     He questioned consequences of not taking medications, referring to a garcia inside of him about western vs alternative medicine. Unable or willing to see any alternative than a 6 month commitment. He did state that he would kill himself before the six months is up.     He appears hopeless, tense, and depressed. He states his hallucinations are senses from outside. Unable to confirm or denies suicidal or self injurious thoughts as he \"feels dead inside already\".    "

## 2019-08-26 PROCEDURE — 25000125 ZZHC RX 250: Performed by: NURSE PRACTITIONER

## 2019-08-26 PROCEDURE — 90853 GROUP PSYCHOTHERAPY: CPT

## 2019-08-26 PROCEDURE — 25000132 ZZH RX MED GY IP 250 OP 250 PS 637: Performed by: CLINICAL NURSE SPECIALIST

## 2019-08-26 PROCEDURE — 12400001 ZZH R&B MH UMMC

## 2019-08-26 RX ADMIN — OLANZAPINE 10 MG: 10 TABLET, ORALLY DISINTEGRATING ORAL at 08:28

## 2019-08-26 RX ADMIN — OLANZAPINE 10 MG: 10 TABLET, ORALLY DISINTEGRATING ORAL at 21:51

## 2019-08-26 RX ADMIN — ACETAMINOPHEN 650 MG: 325 TABLET, FILM COATED ORAL at 21:50

## 2019-08-26 RX ADMIN — Medication: at 22:10

## 2019-08-26 ASSESSMENT — ACTIVITIES OF DAILY LIVING (ADL)
HYGIENE/GROOMING: INDEPENDENT
ORAL_HYGIENE: INDEPENDENT
DRESS: INDEPENDENT
DRESS: INDEPENDENT
HYGIENE/GROOMING: INDEPENDENT
ORAL_HYGIENE: INDEPENDENT

## 2019-08-26 NOTE — PROGRESS NOTES
Pt spent time between his room and the milieu throughout the evening. When out in the milieu, pt was active and social with peers and staff. When in his room, pt spent time resting and reading. Pt's parents visited this evening and then pt attended art group for a short time before going to bed.     During writer pt check in pt stated that he is currently dealing with back pain and voices. Pt states that the voices are mainly negative in nature, but that he is trying to spin them to be positive. Pt stated the focus is on toxins and carcinogens and that he is trying to invent something the clean the air. Pt denied feeling depression or anxiety to writer.        08/25/19 2000   Behavioral Health   Hallucinations auditory   Thinking poor concentration;paranoid   Orientation date: oriented;person: oriented;place: oriented;time: oriented   Memory baseline memory   Insight poor   Judgement impaired   Eye Contact staring   Affect blunted, flat   Mood mood is calm   Physical Appearance/Attire attire appropriate to age and situation   1. Wish to be Dead No   2. Non-Specific Active Suicidal Thoughts  No   Activity (WDL) WDL   Speech (WDL) WDL   Psychomotor Gait (WDL) WDL   Activities of Daily Living   Hygiene/Grooming independent   Oral Hygiene independent   Dress independent   Room Organization independent

## 2019-08-27 PROCEDURE — 25000132 ZZH RX MED GY IP 250 OP 250 PS 637: Performed by: CLINICAL NURSE SPECIALIST

## 2019-08-27 PROCEDURE — 12400001 ZZH R&B MH UMMC

## 2019-08-27 PROCEDURE — 25000132 ZZH RX MED GY IP 250 OP 250 PS 637: Performed by: PSYCHIATRY & NEUROLOGY

## 2019-08-27 PROCEDURE — 99233 SBSQ HOSP IP/OBS HIGH 50: CPT | Performed by: PSYCHIATRY & NEUROLOGY

## 2019-08-27 PROCEDURE — H2032 ACTIVITY THERAPY, PER 15 MIN: HCPCS

## 2019-08-27 RX ORDER — ARIPIPRAZOLE 10 MG/1
10 TABLET ORAL DAILY
Status: DISCONTINUED | OUTPATIENT
Start: 2019-08-27 | End: 2019-08-30

## 2019-08-27 RX ADMIN — OLANZAPINE 10 MG: 10 TABLET, ORALLY DISINTEGRATING ORAL at 08:56

## 2019-08-27 RX ADMIN — OLANZAPINE 10 MG: 10 TABLET, ORALLY DISINTEGRATING ORAL at 22:04

## 2019-08-27 RX ADMIN — ARIPIPRAZOLE 10 MG: 10 TABLET ORAL at 13:32

## 2019-08-27 ASSESSMENT — ACTIVITIES OF DAILY LIVING (ADL)
ORAL_HYGIENE: INDEPENDENT
DRESS: INDEPENDENT
HYGIENE/GROOMING: INDEPENDENT

## 2019-08-27 NOTE — PROGRESS NOTES
Writer called pre-petition screener Cyndi Caballero [phone]. No answer writer left . Writer was calling to get an update regarding court yesterday.

## 2019-08-27 NOTE — PROGRESS NOTES
08/26/19 2030   Group Therapy Session   Group Attendance attended group session   Total Time (minutes) 50   Group Type psychotherapeutic   Group Topic Covered other (see comments)   Patient Participation/Contribution cooperative with task;discussed personal experience with topic;listened actively   Patient participated in psychotherapy group which included a mindfulness activity focusing on the 5 senses and then processing as a group.    Jerel was in a pleasant mood, affect congruent, and actively engaged with group activity and processing. He inquired how to be mindful when your mind is numb and it's hard to think of anything.  Group members shared how they manage to stay present and be mindful. Good group cohesion.

## 2019-08-27 NOTE — PROGRESS NOTES
Writer spoke with pt's mother who state that they are in agreement with the providers decision not to discharge pt at this time. Writer updated mother on outcome of the commitment.

## 2019-08-27 NOTE — PROGRESS NOTES
08/26/19 2000   Behavioral Health   Hallucinations other (see comment)  (does not appear responding)   Thinking intact   Orientation person: oriented;place: oriented;date: oriented;time: oriented   Memory baseline memory   Insight poor   Judgement impaired   Eye Contact at examiner   Affect full range affect   Mood mood is calm   Physical Appearance/Attire attire appropriate to age and situation   Hygiene well groomed;other (see comment)  (pt showered)   Suicidality other (see comments)  (pt denied SI)   1. Wish to be Dead No   2. Non-Specific Active Suicidal Thoughts  No   Self Injury other (see comment)  (denies)   Elopement Distress about legal situation (holds for mental health or criminal)   Activity other (see comment)  (pt is visible in the milieu)   Speech clear;coherent   Medication Sensitivity no observed side effects;no stated side effects   Psychomotor / Gait steady;balanced   Activities of Daily Living   Hygiene/Grooming independent   Oral Hygiene independent   Dress independent   Room Organization independent     Pt reported feeling better now than earlier during his court appearance. Pt mood appears calm, with full range of affect. Pt was cooperative with staff and social with others. Pt showered. Pt attended and participated in group. Pt reported that he wants to go back to school and is interested in figuring out what career path he wants to take. Pt speech was clear and coherent and on topic. Pt had a visit with his family and friend this evening.

## 2019-08-27 NOTE — PLAN OF CARE
Problem: Psychotic Symptoms  Goal: Psychotic Symptoms  Description  Signs and symptoms of listed problems will be absent or manageable.  Flowsheets (Taken 8/27/2019 0836)  Psychotic Symptoms Present: thought process; affect; sleep  Note:   Jerel remains isolated to his room, frequently appears asleep, but eyes open with direction. Initiated conversation to inquire when he is discharging. Accepted explanation of waiting on paperwork and potential to be several days or more. He ability to converse was smoother, but delusional thought does appear when discussing a topic further.     He is compliant with starting Abilify PO. Affect remains tense, negative. He continues to blame medications for his mental health. He has not complained of stiff neck or back ache this shift. Appetite is good, out for meals. He showers, but appears disheveled.

## 2019-08-27 NOTE — PROGRESS NOTES
"[facility], [place]   Psychiatric Progress Note        Interim History:     Reason for hospitalization:  Jerel Day is a 23-year-old single  male, presenting with exacerbation of psychosis and auditory hallucinations that are command, telling him to harm himself.     Subjective: \"I feel better now that the court stuff is over\"     Staff report: On , patient reported AH that are negative, though noted he was trying to spin them to be positive. He also said that he would kill himself before the 6 month commitment . Pt was also focused on toxins and carcinogens in the air. Yesterday it was noted that patient appeared paranoid wrt court paperwork.     As per today's meeting: Patient was lying in his bed, reading a book. Pt's thought process was quite concrete, illogical at times, though goal oriented. Pt said that he is feeling better now that final hearing is over. He said that his understanding is that he agreed to a 6 month commitment, \"and with medications.\" However, he questioned whether or not he needs to take Zyprexa because \"it wasn't even on the paperwork.\" He also questioned whether or not HO can be pursued \"because that is not specifically stated in the paperwork.\" He notes overall improvement with regards to mood and thinking, though attributes this mostly to time spent in the hospital and eating habits rather than medications. He again states that he does not feel medications will be helpful, and does not wish to pursue HO because he is afraid of needles. Discussed HO medication options at length, including risperidone, Abilify, and Haldol. He said that his preference would be to restart Abilify Maintena \"If I have to choose one, but I feel like no one listens to me about being scared of needles.\" Discussed R/B/A, including benefits of HO wrt reducing readmission rates, history of positive response, etc. Pt said that he would like to return to college " "ASAP, though is open to staying with parents temporarily upon discharge and \"continuing with the [facility]igate program.\" Patient currently denies SI/HI/AH/VH/paranoia. He said that he will NOT seek help by coming to hospital in the future because he feels he has been treated poorly overall.            Medications:       OLANZapine zydis  10 mg Oral BID          Allergies:     Allergies   Allergen Reactions     Penicillins      Rash, Generalized          Labs:   No results found for this or any previous visit (from the past 24 hour(s)).       Psychiatric Examination:     /81   Pulse 75   Temp 97.1  F (36.2  C)   Resp 16   Wt 70.7 kg (155 lb 13.8 oz)   SpO2 98%   BMI 23.35 kg/m    Weight is 155 lbs 13.84 oz  Body mass index is 23.35 kg/m .  Orthostatic Vitals       Most Recent      Standing Orthostatic /85 08/15 0900    Standing Orthostatic Pulse (bpm) 63 08/15 0900          Appearance: awake, alert and adequately groomed.  Attitude:  evasive and guarded, less agitated though irritable  Eye Contact:  fair  Mood: \"a little better\"  Affect: mood congruent, blunted  Speech:  rambling, coherent  Psychomotor Behavior:  no evidence of tardive dyskinesia, dystonia, or tics  Throught Process:  illogical though more organized, goal oriented  Associations:  loosening of associations present  Thought Content:  Denies SI, SIB, HI. No overt evidence of psychosis on examination.   Insight:  limited  Judgement:  fair  Oriented to:  time, person, and place  Attention Span and Concentration:  fair  Recent and Remote Memory:  fair    Clinical Global Impressions  First:  Considering your total clinical experience with this particular patient population, how severe are the patient's symptoms at this time?: 6 (08/14/19 1120)  Compared to the patient's condition at the START of treatment, this patient's condition is:: 6 (08/14/19 1120)  Most recent:  Considering your total clinical experience with this particular patient " population, how severe are the patient's symptoms at this time?: 6 (08/14/19 1120)  Compared to the patient's condition at the START of treatment, this patient's condition is:: 6 (08/14/19 1120)         Precautions:     Behavioral Orders   Procedures     Assault precautions     Pt has been violent to self and shows the potential to be violent with others.     Code 1 - Restrict to Unit     Code 2     For CT only on 8/16     Elopement precautions     Routine Programming     As clinically indicated     Self Injury Precaution     Patient hit his head against the wall multiple times.     Status 15     Every 15 minutes.     Suicide precautions     Patients on Suicide Precautions should have a Combination Diet ordered that includes a Diet selection(s) AND a Behavioral Tray selection for Safe Tray - with utensils, or Safe Tray - NO utensils    Command Hallucinations to harm self          Diagnoses:     1.  Schizoaffective disorder.   2.  Cannabis use disorder.          Plan:     Legal status: Patient arrived on uit with 72 hour hold. Hold will continue. Petitioned for MICD commitment on 8/14. Patient is psychotic and unable to care for self. He is noncompliant with medications in the community. Final Clark/commitment hearing was on Monday, 8/26. Awaiting outcome.      Medication management:   Zyprex 10 mg BID. Recommended transitioning to alternative neuroleptic with plan to pursue HO. Pt initially reluctant though now willing to start oral Abilify.Had been stable on 10 mg daily without side effects. Will initiate 10 mg daily today. Would plan on pursuing HO if [facility] is in place. Continue Cogentin 0.5 mg BID prn for any observed EPSE.    Medical: UTOX positive for cannabinoids  8/15 MRI: Impression:   1. No acute intracranial pathology.  2. Air-fluid levels with frothy material in the maxillary sinuses.  This can be seen with acute sinusitis.  3. Soft tissue swelling along the right and central aspect of the  frontal  bone. No underlying calvarial fracture.  Internal medicine to follow patient for head injury.     Behavioral/psychology/social: Encouraged patient to attend therapeutic programming as tolerated.   No roommate order. Patient is psychotic and paranoid. Continued hallucinations. Would reassess in AM.     Disposition plan: Stabilized with medications, structured environment with eyes on meds, patient did well on HO and with first episode psychosis supports in place. Would obtain collateral from OP CM and parents to determine whether discharge to home is an option. This would also depend on level of progress made in inpatient setting.         Hyacinth Lazaro MD  [facility]

## 2019-08-28 ENCOUNTER — TELEPHONE (OUTPATIENT)
Dept: PSYCHIATRY | Facility: CLINIC | Age: 24
End: 2019-08-28

## 2019-08-28 PROCEDURE — 25000132 ZZH RX MED GY IP 250 OP 250 PS 637: Performed by: PSYCHIATRY & NEUROLOGY

## 2019-08-28 PROCEDURE — 25000132 ZZH RX MED GY IP 250 OP 250 PS 637: Performed by: CLINICAL NURSE SPECIALIST

## 2019-08-28 PROCEDURE — 12400001 ZZH R&B MH UMMC

## 2019-08-28 RX ADMIN — OLANZAPINE 10 MG: 10 TABLET, ORALLY DISINTEGRATING ORAL at 20:52

## 2019-08-28 RX ADMIN — OLANZAPINE 10 MG: 10 TABLET, ORALLY DISINTEGRATING ORAL at 08:44

## 2019-08-28 RX ADMIN — ARIPIPRAZOLE 10 MG: 10 TABLET ORAL at 08:44

## 2019-08-28 ASSESSMENT — ACTIVITIES OF DAILY LIVING (ADL)
DRESS: SCRUBS (BEHAVIORAL HEALTH);INDEPENDENT
ORAL_HYGIENE: INDEPENDENT
HYGIENE/GROOMING: INDEPENDENT

## 2019-08-28 NOTE — PROGRESS NOTES
"Pt had a good shift. Pt spent time reading in room. Pt said he is \"bored\" and would like to discharge before the weekend. Pt was upset he did not see his provider today and would like to speak to them tomorrow about only being on one medication. Pt says he is feeling better but does not have good insight into the medication being helpful. Pt stated he is having out of body experiences where he can see his body laying \"how it is supposed to so that it doesn't hurt\" but can't get to that position. Pt's statements seem to become more nonsensical the longer he is spoken with. Pt also reported auditory hallucinations of voices that are arguing, he said this is annoying but he can usually ignore it. Pt joked some with examiner and laughed appropriately. Denies SI/SIB. Pt had visit with parents that went well.       08/28/19 1600   Behavioral Health   Hallucinations auditory;visual   Thinking paranoid;poor concentration   Orientation person: oriented;place: oriented;date: oriented;time: oriented   Memory baseline memory   Insight admits / accepts   Judgement impaired   Eye Contact at examiner   Affect full range affect   Mood mood is calm   Physical Appearance/Attire attire appropriate to age and situation   Hygiene well groomed   Suicidality other (see comments)  (denies)   1. Wish to be Dead No   2. Non-Specific Active Suicidal Thoughts  No   Self Injury other (see comment)  (denies)   Elopement   (none stated or observed)   Activity withdrawn;other (see comment)  (present on milieu)   Speech clear;coherent   Medication Sensitivity no stated side effects;no observed side effects   Psychomotor / Gait balanced;steady   Activities of Daily Living   Hygiene/Grooming independent   Oral Hygiene independent   Dress scrubs (behavioral health);independent   Room Organization independent     "

## 2019-08-28 NOTE — PROGRESS NOTES
Pt spent most of the evening relaxing in his room and reading. Pt came out of hi room for dinner and then again when a friend came to visit. Pt's grandmother visited as well.     During pt/writer check in, pt denied depression, anxiety, SI and SIB. Pt stated that he was feeling paranoid this evening. Pt elaborated stating that his body felt like it was being controled by two opposing forces. Pt stated that one was relaxing and and the other was more of a pain sensation. Pt stated that this was not upsetting his and that he is feeling fine this evening.        08/27/19 1900   Behavioral Health   Hallucinations auditory   Thinking paranoid   Orientation person: oriented;place: oriented;date: oriented;time: oriented   Memory baseline memory   Insight admits / accepts   Judgement impaired   Eye Contact at examiner   Affect full range affect   Mood mood is calm   Physical Appearance/Attire attire appropriate to age and situation   Hygiene well groomed   1. Wish to be Dead No   2. Non-Specific Active Suicidal Thoughts  No   Self Injury other (see comment)  (denies)   Activity (WDL) WDL   Speech (WDL) WDL   Psychomotor Gait (WDL) WDL   Activities of Daily Living   Hygiene/Grooming independent   Oral Hygiene independent   Dress independent   Room Organization independent

## 2019-08-28 NOTE — TELEPHONE ENCOUNTER
NAVIGATE Family Peer Support  A Part of the [facility] First Episode of Psychosis Program     Patient Name: Jerel Day  /Age:  1995 (23 year old)  Date of Encounter: 19  Length of Contact: 20 minutes    This writer reached out to offer resources and support to patient's father, León. León would like this writer to reach out to patient's mother, Mesha, regarding IRTS information.  At the time of this phone call, Jerel is currently on [address] at [facility].      Family moved Jerel from apartment to home before hospitalization and decision was made to hold on next apartment.    León had questions about an IRTS as follow-up care. This writer referred family to  on the unit regarding insurance coverage and referral process.    This writer will also follow-up with Mesha for possible meeting with parents in the community.    [facility] Family Peer    [Billing Code 85764 for No Billable Service as family peer support is a nonbillable service]

## 2019-08-28 NOTE — PLAN OF CARE
"  Problem: Psychotic Symptoms  Goal: Psychotic Symptoms  Description  Signs and symptoms of listed problems will be absent or manageable.  Outcome: Improving    \"Zyprexa is destroying my brain\". Jerel appeared agitated during medication pass. He thought the Zyprexa would be stopped when Abilify was started. No eye contact, monotone, and tense.     Reading a [employer] book in the lounge. Identifies with characters with flaws. He was able to follow conversation, but had periods where it was obvious he was trying to minimize what he did not understand.     Spent more time in lounge, no socializing. No groups.   Daytime oversleeping showing improvement.        "

## 2019-08-28 NOTE — PROGRESS NOTES
08/27/19 2100   Therapeutic Recreation   Type of Intervention structured groups   Activity game   Response Participates, initiates socially appropriate   Hours 0.5     Pt participated in Therapeutic Recreation group with focus on leisure participation, social engagement, and strategic cognitive reasoning.  Engaged and focused in the group recreational intervention via a group game.  Pt was a full participant throughout the entire duration of group. Showed progress in session goals. Pt had a flat, blunted affect with congruent mood. Pt seemed low energy throughout the group.

## 2019-08-29 PROCEDURE — 25000132 ZZH RX MED GY IP 250 OP 250 PS 637: Performed by: PSYCHIATRY & NEUROLOGY

## 2019-08-29 PROCEDURE — 99207 ZZC CDG-MDM COMPONENT: MEETS MODERATE - UP CODED: CPT | Performed by: PSYCHIATRY & NEUROLOGY

## 2019-08-29 PROCEDURE — 25000132 ZZH RX MED GY IP 250 OP 250 PS 637: Performed by: CLINICAL NURSE SPECIALIST

## 2019-08-29 PROCEDURE — 99232 SBSQ HOSP IP/OBS MODERATE 35: CPT | Performed by: PSYCHIATRY & NEUROLOGY

## 2019-08-29 PROCEDURE — 12400001 ZZH R&B MH UMMC

## 2019-08-29 RX ORDER — OLANZAPINE 10 MG/1
10 TABLET, ORALLY DISINTEGRATING ORAL AT BEDTIME
Status: DISCONTINUED | OUTPATIENT
Start: 2019-08-29 | End: 2019-08-30

## 2019-08-29 RX ORDER — OLANZAPINE 5 MG/1
5 TABLET, ORALLY DISINTEGRATING ORAL DAILY
Status: DISCONTINUED | OUTPATIENT
Start: 2019-08-30 | End: 2019-08-30

## 2019-08-29 RX ADMIN — ARIPIPRAZOLE 10 MG: 10 TABLET ORAL at 09:02

## 2019-08-29 RX ADMIN — OLANZAPINE 10 MG: 10 TABLET, ORALLY DISINTEGRATING ORAL at 20:46

## 2019-08-29 RX ADMIN — OLANZAPINE 10 MG: 10 TABLET, ORALLY DISINTEGRATING ORAL at 09:02

## 2019-08-29 NOTE — PLAN OF CARE
"Problem: OT General Care Plan  Goal: OT Goal 1  Pt will demonstrate consistent engagement in OT group activities that support recovery as evidenced by participating in >1 scheduled OT group/day for 5 days.     Pt actively participated in about x25 minutes (no charge) of occupational therapy clinic. Pt was able to ask for assistance as needed, and independently initiated a familiar creative expression task. Pt demonstrated fair focus, planning, and problem solving. Minimal interaction with peers observed, though he was able to accept compliments on his task, explaining \"I messed up at first, but I fixed it and I like how it turned out.\" Calm and cooperative throughout group. Will continue to assess. Continue to encourage group attendance and participation.        "

## 2019-08-29 NOTE — PROGRESS NOTES
"   08/29/19 1500   Behavioral Health   Hallucinations denies / not responding to hallucinations   Thinking distractable;poor concentration   Orientation person: oriented;place: oriented;date: oriented   Memory baseline memory   Insight admits / accepts   Judgement impaired   Affect blunted, flat;angry   Mood irritable   Physical Appearance/Attire attire appropriate to age and situation   Hygiene well groomed   Suicidality other (see comments)  (Pt denies SI)   1. Wish to be Dead (Past Month) No   2. Non-Specific Active Suicidal Thoughts (Past Month) No   Self Injury other (see comment)  (Pt denies SIB)   Elopement Distress about legal situation (holds for mental health or criminal)   Activity withdrawn   Speech clear;coherent   Medication Sensitivity other (see comment)  (\"muscle weakness\")   Psychomotor / Gait balanced;steady     Pt was observed in the milieu for meals, walking the nazario, and requests.  Pt stated in check-in that he wants to go home and not to an IRTS.  Pt was approached later by [facility] with the news that he was to go to an IRTS regardless.  Pt yelled at [facility], made two rude gestures, and slammed his door.  Pt was told later by provider this was true, but accepted the news without much resistance.  Pt denies SI/SIB.  Pt denies AH/VH.  Pt denies depression and anxiety.  "

## 2019-08-30 PROCEDURE — 25000132 ZZH RX MED GY IP 250 OP 250 PS 637: Performed by: PSYCHIATRY & NEUROLOGY

## 2019-08-30 PROCEDURE — 99207 ZZC CDG-MDM COMPONENT: MEETS MODERATE - UP CODED: CPT | Performed by: PSYCHIATRY & NEUROLOGY

## 2019-08-30 PROCEDURE — 99232 SBSQ HOSP IP/OBS MODERATE 35: CPT | Performed by: PSYCHIATRY & NEUROLOGY

## 2019-08-30 PROCEDURE — H2032 ACTIVITY THERAPY, PER 15 MIN: HCPCS

## 2019-08-30 PROCEDURE — 12400001 ZZH R&B MH UMMC

## 2019-08-30 PROCEDURE — G0177 OPPS/PHP; TRAIN & EDUC SERV: HCPCS

## 2019-08-30 RX ORDER — OLANZAPINE 5 MG/1
5 TABLET, ORALLY DISINTEGRATING ORAL DAILY
Status: COMPLETED | OUTPATIENT
Start: 2019-08-31 | End: 2019-09-01

## 2019-08-30 RX ORDER — OLANZAPINE 5 MG/1
5 TABLET, ORALLY DISINTEGRATING ORAL AT BEDTIME
Status: DISCONTINUED | OUTPATIENT
Start: 2019-08-30 | End: 2019-09-03

## 2019-08-30 RX ORDER — ARIPIPRAZOLE 10 MG/1
10 TABLET ORAL DAILY
Status: COMPLETED | OUTPATIENT
Start: 2019-08-31 | End: 2019-09-01

## 2019-08-30 RX ADMIN — OLANZAPINE 5 MG: 5 TABLET, ORALLY DISINTEGRATING ORAL at 21:36

## 2019-08-30 RX ADMIN — OLANZAPINE 5 MG: 5 TABLET, ORALLY DISINTEGRATING ORAL at 10:31

## 2019-08-30 RX ADMIN — ARIPIPRAZOLE 10 MG: 10 TABLET ORAL at 10:30

## 2019-08-30 ASSESSMENT — ACTIVITIES OF DAILY LIVING (ADL)
HYGIENE/GROOMING: INDEPENDENT
ORAL_HYGIENE: INDEPENDENT
HYGIENE/GROOMING: INDEPENDENT
DRESS: SCRUBS (BEHAVIORAL HEALTH);INDEPENDENT
DRESS: SCRUBS (BEHAVIORAL HEALTH)
ORAL_HYGIENE: INDEPENDENT

## 2019-08-30 NOTE — PLAN OF CARE
"48 HOUR ASSESSMENT     Problem: Psychotic Symptoms  Goal: Psychotic Symptoms  Description  Signs and symptoms of listed problems will be absent or manageable.  Outcome: No Change       D: Pt woke up around 0900 and completed morning vitals and took medication. He ate both breakfast and lunch.   Patient attended OT group.   Patient is organized and in conversation and presents well-groomed. Patient continues to be medication compliant. Continue to monitor for cheeking.     Patient denies SI/SIB/HI.   States his anxiety is low. And depression is gradually increasing the longer he is here.     Patient endorses auditory  hallucinations \"I feel like an outside force was in charge of my situation and everything that's happening to me... There are Martians too\" He endorses visual hallucinations in the form of \"bursts of color blue and white\"     A: Provided active listening, emotional encouragement and goal setting, safety planning safety checks q 15min, and medication administration.    R: Patient was behaviorally appropriate during this shift. Did not require any restraints or seclusion.    "

## 2019-08-30 NOTE — PLAN OF CARE
"Problem: OT General Care Plan  Goal: OT Goal 1  Pt will demonstrate consistent engagement in OT group activities that support recovery as evidenced by participating in >1 scheduled OT group/day for 5 days.      Pt attended 1 out of 3 OT groups offered. Pt actively participated in occupational therapy clinic. Pt was able to ask for assistance as needed, and independently initiated a familiar creative expression task. Pt demonstrated good focus, planning, problem solving, and attention to detail. He consistently reports making a mistake on his projects, though is successful in fixing his mistake independently with minimal encouragement. Pt appeared comfortable interacting with peers, though spent a majority of this group quietly engaged in his task. He was observed quietly and briefly laughing to himself. Calm and cooperative throughout this group.    OT Self-Assessment  Pt was given and completed a written self-assessment form. OT staff reviewed with pt and explained the value of having them involved in their treatment plan, and provided options to meet current needs/self-identified goals.     Pt identified \"loneliness, anxiety, depression, hallucination\" as stressors/events that led to hospitalization.    Pt identified the following symptoms that they are currently dealing with:   Emotions: Anxiety/fear, anger/resentment, mood swings, feeling depressed, guilt, despair, and feeling overwhelmed  Thoughts: Negative thoughts, racing thoughts, and memory problems   Behaviors: Increased substance use, budget/money concerns, and procrastinating     Self-identified coping skills: \"relaxation\"  Self-identified social supports: \"therapist, family, friends\"  Self-identified personal strengths: \"tough, flexible\"    Goals: Identify/express feelings in a better way, find resources and support, learn to make choices and take action, improve focus and concentration, and manage anxiety       "

## 2019-08-30 NOTE — PROGRESS NOTES
"[facility], [place]   Psychiatric Progress Note        Interim History:     Reason for hospitalization:  Jerel Day is a 23-year-old single  male, presenting with exacerbation of psychosis and auditory hallucinations that are command, telling him to harm himself.     Subjective: \"I'm ok, bored here\"     Staff report: Pt had a good shift. Pt was calm and friendly with staff. Pt reported feeling frustrated about earlier in shift, but found doing yoga helpful. Pt starts conversations as organized, but becomes more disorganized as conversation continues. Pt reported auditory hallucinations but did not elaborate. Pt enjoyed reading and watching movies. Denies SI/SIB. Pt and examiner had a good conversation about avoiding drugs to help with his psychosis.    As per today's meeting: Patient was seen in the milieu, reading a book, isolative from others. He was documented to go to some groups today. He tolerated medications, and was less irritable compared to before. Discussed medications plans. Denied current SI/HI/AH/VH.            Medications:       [START ON 8/31/2019] ARIPiprazole  10 mg Oral Daily     [START ON 9/2/2019] ARIPiprazole  15 mg Oral Daily     OLANZapine zydis  5 mg Oral At Bedtime     [START ON 8/31/2019] OLANZapine zydis  5 mg Oral Daily          Allergies:     Allergies   Allergen Reactions     Penicillins      Rash, Generalized          Labs:   No results found for this or any previous visit (from the past 24 hour(s)).       Psychiatric Examination:     /83 (BP Location: Right arm)   Pulse 81   Temp 98.1  F (36.7  C)   Resp 16   Wt 71.5 kg (157 lb 9.6 oz)   SpO2 94%   BMI 23.61 kg/m    Weight is 157 lbs 9.6 oz  Body mass index is 23.61 kg/m .  Orthostatic Vitals       Most Recent      Standing Orthostatic /85 08/15 0900    Standing Orthostatic Pulse (bpm) 63 08/15 0900          Appearance: awake, alert and adequately groomed.  Attitude:  evasive " "and guarded, less agitated though irritable  Eye Contact:  fair  Mood: \"Frustrated\"  Affect: mood congruent, blunted  Speech:  rambling, coherent  Psychomotor Behavior:  no evidence of tardive dyskinesia, dystonia, or tics  Throught Process:  illogical though more organized, goal oriented  Associations:  loosening of associations present  Thought Content:  Denies SI, SIB, HI. No overt evidence of psychosis on examination.   Insight:  limited  Judgement:  fair  Oriented to:  time, person, and place  Attention Span and Concentration:  fair  Recent and Remote Memory:  fair    Clinical Global Impressions  First:  Considering your total clinical experience with this particular patient population, how severe are the patient's symptoms at this time?: 6 (08/14/19 1120)  Compared to the patient's condition at the START of treatment, this patient's condition is:: 6 (08/14/19 1120)  Most recent:  Considering your total clinical experience with this particular patient population, how severe are the patient's symptoms at this time?: 6 (08/14/19 1120)  Compared to the patient's condition at the START of treatment, this patient's condition is:: 6 (08/14/19 1120)         Precautions:     Behavioral Orders   Procedures     Assault precautions     Pt has been violent to self and shows the potential to be violent with others.     Code 1 - Restrict to Unit     Code 2     For CT only on 8/16     Elopement precautions     Routine Programming     As clinically indicated     Self Injury Precaution     Patient hit his head against the wall multiple times.     Status 15     Every 15 minutes.     Suicide precautions     Patients on Suicide Precautions should have a Combination Diet ordered that includes a Diet selection(s) AND a Behavioral Tray selection for Safe Tray - with utensils, or Safe Tray - NO utensils    Command Hallucinations to harm self          Diagnoses:     1.  Schizoaffective disorder.   2.  Cannabis use disorder.          " Plan:     Legal status: Patient arrived on uit with 72 hour hold. Hold will continue. Petitioned for MICD commitment on 8/14. Patient is psychotic and unable to care for self. He is noncompliant with medications in the community. Final [place]/commitment hearing was on Monday, 8/26. Awaiting outcome.      Medication management:     - Abilify 10 mg Daily (hospital does not have Abilify Maintenna, and given it is the only medication that patient seems to have tolerated, we will continue the oral formulation of this for now). Increase to 15 mg on 9/2.   - Zyprexa 5 mg Daily and 5 mg HS. Discontinue 5 mg Daily dose in 2 days.     Medical: UTOX positive for cannabinoids  8/15 MRI: Impression:   1. No acute intracranial pathology.  2. Air-fluid levels with frothy material in the maxillary sinuses.  This can be seen with acute sinusitis.  3. Soft tissue swelling along the right and central aspect of the  frontal bone. No underlying calvarial fracture.  Internal medicine to follow patient for head injury.     Behavioral/psychology/social: Encouraged patient to attend therapeutic programming as tolerated.   No roommate order. Patient is psychotic and paranoid. Continued hallucinations. Would reassess in AM.     Disposition plan: Stabilized with medications, structured environment with eyes on meds, patient did well on HO and with first episode psychosis supports in place. Would obtain collateral from OP CM and parents to determine whether discharge to home is an option. This would also depend on level of progress made in inpatient setting.         Jordan Charles MD  [facility]

## 2019-08-30 NOTE — PROGRESS NOTES
Writer followed up with financial and was informed that a paper application had to be done. It will take  1-2 weeks [place] here [place] from the [place]

## 2019-08-30 NOTE — PROGRESS NOTES
"[facility], [place]   Psychiatric Progress Note        Interim History:     Reason for hospitalization:  Jerel Day is a 23-year-old single  male, presenting with exacerbation of psychosis and auditory hallucinations that are command, telling him to harm himself.     Subjective: \"I'm ok, just confused about why I can't go\"     Staff report: Pt was observed in the milieu for meals, walking the nazario, and requests.  Pt stated in check-in that he wants to go home and not to an IRTS.  Pt was approached later by [facility] with the news that he was to go to an IRTS regardless.  Pt yelled at [facility], made two rude gestures, and slammed his door.  Pt was told later by provider this was true, but accepted the news without much resistance.  Pt denies SI/SIB.  Pt denies AH/VH.  Pt denies depression and anxiety.    As per today's meeting: Patient was seen in the milieu, reading a book, isolative from others. He was documented to go to some groups today. He was confused about why he has to go to an IRTS, and rather go home. I spoke with father today, who supported the plan for Jerel to go to an IRTS as a safe transition from the hospital. I discussed the plan with Jerel, including going to an IRTS along with medication changes. Patient felt Abilify was a better medication choice for him in stabilizing his mood and behavior, which was also corroborated by his father.            Medications:       ARIPiprazole  10 mg Oral Daily     OLANZapine zydis  10 mg Oral At Bedtime     [START ON 8/30/2019] OLANZapine zydis  5 mg Oral Daily          Allergies:     Allergies   Allergen Reactions     Penicillins      Rash, Generalized          Labs:   No results found for this or any previous visit (from the past 24 hour(s)).       Psychiatric Examination:     /79 (BP Location: Right arm)   Pulse 86   Temp 98.2  F (36.8  C) (Tympanic)   Resp 16   Wt 71.5 kg (157 lb 9.6 oz)   SpO2 96%   BMI 23.61 kg/m  " "  Weight is 157 lbs 9.6 oz  Body mass index is 23.61 kg/m .  Orthostatic Vitals       Most Recent      Standing Orthostatic /85 08/15 0900    Standing Orthostatic Pulse (bpm) 63 08/15 0900          Appearance: awake, alert and adequately groomed.  Attitude:  evasive and guarded, less agitated though irritable  Eye Contact:  fair  Mood: \"Frustrated\"  Affect: mood congruent, blunted  Speech:  rambling, coherent  Psychomotor Behavior:  no evidence of tardive dyskinesia, dystonia, or tics  Throught Process:  illogical though more organized, goal oriented  Associations:  loosening of associations present  Thought Content:  Denies SI, SIB, HI. No overt evidence of psychosis on examination.   Insight:  limited  Judgement:  fair  Oriented to:  time, person, and place  Attention Span and Concentration:  fair  Recent and Remote Memory:  fair    Clinical Global Impressions  First:  Considering your total clinical experience with this particular patient population, how severe are the patient's symptoms at this time?: 6 (08/14/19 1120)  Compared to the patient's condition at the START of treatment, this patient's condition is:: 6 (08/14/19 1120)  Most recent:  Considering your total clinical experience with this particular patient population, how severe are the patient's symptoms at this time?: 6 (08/14/19 1120)  Compared to the patient's condition at the START of treatment, this patient's condition is:: 6 (08/14/19 1120)         Precautions:     Behavioral Orders   Procedures     Assault precautions     Pt has been violent to self and shows the potential to be violent with others.     Code 1 - Restrict to Unit     Code 2     For CT only on 8/16     Elopement precautions     Routine Programming     As clinically indicated     Self Injury Precaution     Patient hit his head against the wall multiple times.     Status 15     Every 15 minutes.     Suicide precautions     Patients on Suicide Precautions should have a " Combination Diet ordered that includes a Diet selection(s) AND a Behavioral Tray selection for Safe Tray - with utensils, or Safe Tray - NO utensils    Command Hallucinations to harm self          Diagnoses:     1.  Schizoaffective disorder.   2.  Cannabis use disorder.          Plan:     Legal status: Patient arrived on uit with 72 hour hold. Hold will continue. Petitioned for MICD commitment on 8/14. Patient is psychotic and unable to care for self. He is noncompliant with medications in the community. Final [facility]/commitment hearing was on Monday, 8/26. Awaiting outcome.      Medication management:     - Abilify 10 mg Daily (hospital does not have Abilify Maintenna, and given it is the only medication that patient seems to have tolerated, we will continue the oral formulation of this for now).   - Zyprexa 5 mg Daily and 10 mg HS    Medical: UTOX positive for cannabinoids  8/15 MRI: Impression:   1. No acute intracranial pathology.  2. Air-fluid levels with frothy material in the maxillary sinuses.  This can be seen with acute sinusitis.  3. Soft tissue swelling along the right and central aspect of the  frontal bone. No underlying calvarial fracture.  Internal medicine to follow patient for head injury.     Behavioral/psychology/social: Encouraged patient to attend therapeutic programming as tolerated.   No roommate order. Patient is psychotic and paranoid. Continued hallucinations. Would reassess in AM.     Disposition plan: Stabilized with medications, structured environment with eyes on meds, patient did well on HO and with first episode psychosis supports in place. Would obtain collateral from OP CM and parents to determine whether discharge to home is an option. This would also depend on level of progress made in inpatient setting.         Jordan Charles MD  [facility]

## 2019-08-30 NOTE — PROGRESS NOTES
Pt had a good shift. Pt was calm and friendly with staff. Pt reported feeling frustrated about earlier in shift, but found doing yoga helpful. Pt starts conversations as organized, but becomes more disorganized as conversation continues. Pt reported auditory hallucinations but did not elaborate. Pt enjoyed reading and watching movies. Denies SI/SIB. Pt and examiner had a good conversation about avoiding drugs to help with his psychosis.         08/29/19 2200   Behavioral Health   Hallucinations auditory   Thinking distractable;poor concentration   Orientation person: oriented;place: oriented;date: oriented;time: oriented   Memory baseline memory   Insight insight appropriate to situation;insight appropriate to events   Judgement impaired   Eye Contact at examiner   Affect full range affect   Mood mood is calm   Physical Appearance/Attire attire appropriate to age and situation   Hygiene well groomed   Suicidality other (see comments)  (denies)   1. Wish to be Dead (Past Month) No   2. Non-Specific Active Suicidal Thoughts (Past Month) No   Self Injury other (see comment)  (denies)   Elopement Distress about legal situation (holds for mental health or criminal)   Activity withdrawn;other (see comment)  (present on milieu)   Speech clear;coherent   Medication Sensitivity no observed side effects;other (see comment)  (inner ear pain)   Psychomotor / Gait balanced;steady   Activities of Daily Living   Hygiene/Grooming independent   Oral Hygiene independent   Dress scrubs (behavioral health);independent   Room Organization independent

## 2019-08-30 NOTE — PROGRESS NOTES
INITIAL OT ASSESSMENT       08/19/19 1600   General Information   Date Initially Attended OT 08/19/19   Clinical Impression   Affect Restricted   Orientation Oriented to person;Oriented to place   Appearance and ADLs General cleanliness observed in most areas   Attention to Internal Stimuli Appears distracted/preoccupied internally   Interaction Skills Withdrawn;Interacts appropriately with staff;Interacts appropriately with peers   Ability to Communicate Needs Does so with prompts   Verbal Content Articulate;Clear;Appropriate to topic   Ability to Maintain Boundaries Maintains appropriate physical boundaries;Maintains appropriate verbal boundaries   Participation Participates with minimal encouragement   Concentration Concentrates 20-30 minutes   Ability to Concentrate Without difficulty;With structure   Follows and Comprehends Directions Independently follows 1 step verbal directions   Memory Needs further assessment  (endorsed memory problems on self-assessment)   Organization Independently organizes medium tasks   Decision Making Independent   Planning and Problem Solving Occasionally needs assist/feedback   Ability to Apply and Learn Concepts Applies within group structure   Frustrations / Stress Tolerance Independently identifies sources of frustration/stress   Level of Insight Some insight   Self Esteem Can identify positives;Takes risks with support and encouragement;Accepts positive feedback   Social Supports Has knowledge of support systems

## 2019-08-31 PROCEDURE — 12400001 ZZH R&B MH UMMC

## 2019-08-31 PROCEDURE — H2032 ACTIVITY THERAPY, PER 15 MIN: HCPCS

## 2019-08-31 PROCEDURE — 25000132 ZZH RX MED GY IP 250 OP 250 PS 637: Performed by: PSYCHIATRY & NEUROLOGY

## 2019-08-31 RX ADMIN — OLANZAPINE 5 MG: 5 TABLET, ORALLY DISINTEGRATING ORAL at 20:38

## 2019-08-31 RX ADMIN — ARIPIPRAZOLE 10 MG: 10 TABLET ORAL at 11:02

## 2019-08-31 RX ADMIN — OLANZAPINE 5 MG: 5 TABLET, ORALLY DISINTEGRATING ORAL at 11:02

## 2019-08-31 ASSESSMENT — ACTIVITIES OF DAILY LIVING (ADL)
HYGIENE/GROOMING: INDEPENDENT
ORAL_HYGIENE: INDEPENDENT
DRESS: INDEPENDENT
LAUNDRY: UNABLE TO COMPLETE
ORAL_HYGIENE: INDEPENDENT
DRESS: INDEPENDENT
HYGIENE/GROOMING: INDEPENDENT

## 2019-08-31 NOTE — PROGRESS NOTES
Pt attended community meeting and yoga group today. After lunch, pt spent the rest of the shift resting. Pt reported sleep was poor due to another pt having a code last night. Pt reported his concern about discharging this weekend. Pt did not report any hallucinations, SI or SIB.         08/31/19 1400   Behavioral Health   Hallucinations   (paige)   Thinking paranoid;delusional;poor concentration   Orientation time: oriented;date: oriented;place: oriented;person: oriented   Memory baseline memory   Insight poor   Judgement impaired   Eye Contact at examiner   Affect full range affect   Mood mood is calm   Physical Appearance/Attire attire appropriate to age and situation   Hygiene well groomed   Suicidality   (none reported or observed)   Self Injury   (none reported or observed)   Elopement Distress about legal situation (holds for mental health or criminal)   Activity   (present in milieu)   Speech clear;rambling   Medication Sensitivity no stated side effects   Psychomotor / Gait steady;balanced   Activities of Daily Living   Hygiene/Grooming independent   Oral Hygiene independent   Dress independent   Laundry unable to complete   Room Organization independent

## 2019-08-31 NOTE — PROGRESS NOTES
Participated in Music Therapy group with focus on mood elevation, validation and decreasing anxiety and improved group cohesiveness. Engaged and cooperative in music listening interventions.   Showed progress in session goals.     Asked writer about the process of becoming a Music Therapist, as he is a  and majoring in Psychology, he stated.     In discussing what Music Therapy is, Jerel was interested, but did not appear able to fully process clearly at this time.

## 2019-08-31 NOTE — PROGRESS NOTES
Pt had a good shift. Pt was more present on milieu than typical, as well as more social with peers. Pt presented with a bright affect and enjoyed a visit with his brother. Pt denies SI/SIB. Pt had some paranoia about his legal situation and his CONNOR he had signed for his mother. Pt inspected the CONNOR to see if his signature had been forged. Pt agreed it was his signature and moved on. Pt did not give examiner a straight answer about hallucinations. Pt took medication without issue.        08/30/19 2200   Behavioral Health   Hallucinations other (see comment)  (BHUPENDRA)   Thinking distractable;paranoid;poor concentration   Orientation person: oriented;place: oriented;date: oriented;time: oriented   Memory baseline memory   Insight insight appropriate to situation;insight appropriate to events;other (see comment)  (limited)   Judgement impaired   Eye Contact at examiner   Affect full range affect   Mood mood is calm   Physical Appearance/Attire attire appropriate to age and situation   Hygiene well groomed   Suicidality other (see comments)  (denies)   1. Wish to be Dead (Past Month) No   2. Non-Specific Active Suicidal Thoughts (Past Month) No   Self Injury other (see comment)  (denies)   Elopement Distress about legal situation (holds for mental health or criminal)   Activity other (see comment)  (present on milieu)   Speech rambling;clear;coherent   Medication Sensitivity no stated side effects;no observed side effects   Psychomotor / Gait balanced;steady   Activities of Daily Living   Hygiene/Grooming independent   Oral Hygiene independent   Dress scrubs (behavioral health);independent   Room Organization independent

## 2019-09-01 PROCEDURE — 12400001 ZZH R&B MH UMMC

## 2019-09-01 PROCEDURE — 25000132 ZZH RX MED GY IP 250 OP 250 PS 637: Performed by: PSYCHIATRY & NEUROLOGY

## 2019-09-01 RX ADMIN — ARIPIPRAZOLE 10 MG: 10 TABLET ORAL at 09:03

## 2019-09-01 RX ADMIN — OLANZAPINE 5 MG: 5 TABLET, ORALLY DISINTEGRATING ORAL at 09:03

## 2019-09-01 RX ADMIN — OLANZAPINE 5 MG: 5 TABLET, ORALLY DISINTEGRATING ORAL at 21:31

## 2019-09-01 ASSESSMENT — ACTIVITIES OF DAILY LIVING (ADL)
ORAL_HYGIENE: INDEPENDENT
ORAL_HYGIENE: INDEPENDENT
LAUNDRY: UNABLE TO COMPLETE
HYGIENE/GROOMING: INDEPENDENT
DRESS: INDEPENDENT
HYGIENE/GROOMING: INDEPENDENT
DRESS: INDEPENDENT

## 2019-09-01 NOTE — PROGRESS NOTES
"  Pt attended the last quarter of music therapy group. Pt was engaged and active in group \"Music Pictionary\" and \"Music Scattegories\" game. Pt was able to contribute answers and guesses. Respectful, energetic, bright.  " Chief Complaint   Patient presents with     Blood Draw     Labs drawn in clinic from Rt hand by MA     Pt tolerated well.  Lexis Baker MA

## 2019-09-01 NOTE — PLAN OF CARE
"48 Hour Assessment       Problem: Psychotic Symptoms  Goal: Psychotic Symptoms  Description  Signs and symptoms of listed problems will be absent or manageable.  Outcome: No Change     D: Pt woke up and completed morning routine. He presents with a full range affect. Patient is disorganized and illogical in conversation.     Patient presents well-groomed. Patient continues to be medication compliant.   Patient denies SI/SIB/HI.     Patient expressed concerns \"I feel like my body is sending messages to my brain\"  \" I feel like someone else is projecting their body onto mine...and if they reach a certain angle they will collide\"     Patient also complained of a headache and back pain.      Patient continues to endorse auditory hallucinations and visual hallucinations,      A: Provided active listening, emotional encouragement and goal setting, safety planning safety checks q 15min, and medication administration.    R: Patient was behaviorally appropriate during this shift. Did not require any restraints or seclusion.             "

## 2019-09-01 NOTE — PROGRESS NOTES
08/31/19 2200   Behavioral Health   Hallucinations denies / not responding to hallucinations   Thinking poor concentration   Orientation place: oriented;person: oriented;date: oriented;time: oriented   Memory baseline memory   Insight poor   Judgement impaired   Eye Contact at examiner   Affect full range affect   Mood mood is calm   Physical Appearance/Attire attire appropriate to age and situation   Hygiene well groomed   Suicidality other (see comments)  (pt denied)   1. Wish to be Dead (Past Month) No   2. Non-Specific Active Suicidal Thoughts (Past Month) No   Self Injury other (see comment)  (pt denied)   Elopement Distress about legal situation (holds for mental health or criminal)   Activity withdrawn;isolative   Speech clear   Medication Sensitivity no observed side effects;no stated side effects   Psychomotor / Gait steady;balanced   Activities of Daily Living   Hygiene/Grooming independent   Oral Hygiene independent   Dress independent   Room Organization independent     Pt was isolative to his room. Pt reported he was very tired since last night. No concerns were stated. Pt was cooperative with staff.

## 2019-09-02 PROCEDURE — H2032 ACTIVITY THERAPY, PER 15 MIN: HCPCS

## 2019-09-02 PROCEDURE — 25000132 ZZH RX MED GY IP 250 OP 250 PS 637: Performed by: PSYCHIATRY & NEUROLOGY

## 2019-09-02 PROCEDURE — 12400001 ZZH R&B MH UMMC

## 2019-09-02 RX ORDER — IBUPROFEN 400 MG/1
800 TABLET, FILM COATED ORAL
Status: COMPLETED | OUTPATIENT
Start: 2019-09-02 | End: 2019-09-06

## 2019-09-02 RX ADMIN — ARIPIPRAZOLE 15 MG: 10 TABLET ORAL at 08:30

## 2019-09-02 RX ADMIN — OLANZAPINE 5 MG: 5 TABLET, ORALLY DISINTEGRATING ORAL at 21:01

## 2019-09-02 NOTE — PROGRESS NOTES
09/02/19 1300   Behavioral Health   Hallucinations auditory   Thinking intact   Orientation person: oriented;place: oriented;date: oriented   Memory baseline memory   Insight insight appropriate to situation   Judgement intact   Eye Contact at examiner   Affect full range affect   Mood mood is calm   Physical Appearance/Attire attire appropriate to age and situation   Hygiene well groomed   Suicidality thoughts only   1. Wish to be Dead (Past Month) Yes   2. Non-Specific Active Suicidal Thoughts (Past Month) No   Self Injury other (see comment)  (Pt denies SI)   Elopement   (none observed)   Activity other (see comment)  (visible, active, social, participating)   Speech clear;coherent   Medication Sensitivity other (see comment)  (low energy)   Psychomotor / Gait balanced;steady     Pt was observed in the milieu eating meals, watching tv, and participating in groups.  Pt stated his food has been sufficient, his sleep has been good, but feels low energy and some sluggish thinking.  Pt states his thoughts are a lot clearer now, but has trouble finding the right words when speaking.  Pt spoke about some insights into his own thoughts and believes the taper on zyprexa has helped. Pt endorsed AH in the form of derogatory statements about himself and a command to kill himself by hanging (RN notified).  This leads to thoughts only of SI; Pt stated he doesn't want to actually die, but the voices say he should.  Pt stated he can stay safe on the unit.  Pt denies SIB.  Pt denies VH.  Pt rated depression at 3, anxiety at 6-7.  Pt stated his anxiety is helped by meditation and has timed his relaxation to help when the voices are ramping up.

## 2019-09-02 NOTE — PROGRESS NOTES
"     Art Therapy 9/2/19   Type of Intervention structured groups   Response participates with encouragement   Hours 1.5   Treatment Detail    (Art Therapy -poetry and painting      Problem -1.  Schizoaffective disorder.   2.  Cannabis use disorder.   3.  History of medication noncompliance      Goal- process, express, cope and regulate feelings and emotions through Art Therapy directives.     Outcome- Pt reported feeling irritated, about he said \" It will resolve itself. He was happy to select music for the group, and he was pleasant and cooperative in group                           "

## 2019-09-02 NOTE — PROGRESS NOTES
09/01/19 2200   Significant Event   Significant Event Other (see comments)  (shift summary)     Pt was visible in the milieu, social, watched TV, participated in group, calm and cooperative.  No stated SI, SIB or hallucinations. Full range affect and independent with ADLS.

## 2019-09-02 NOTE — PROVIDER NOTIFICATION
Patient requested Ibuprofen PRN instead of acetaminophen.  On call provider is notified of patient.  New order is received.  Please refer to the EMAR for details.

## 2019-09-03 PROCEDURE — 25000132 ZZH RX MED GY IP 250 OP 250 PS 637: Performed by: CLINICAL NURSE SPECIALIST

## 2019-09-03 PROCEDURE — 99207 ZZC CDG-MDM COMPONENT: MEETS MODERATE - UP CODED: CPT | Performed by: PSYCHIATRY & NEUROLOGY

## 2019-09-03 PROCEDURE — 12400001 ZZH R&B MH UMMC

## 2019-09-03 PROCEDURE — H2032 ACTIVITY THERAPY, PER 15 MIN: HCPCS

## 2019-09-03 PROCEDURE — G0177 OPPS/PHP; TRAIN & EDUC SERV: HCPCS

## 2019-09-03 PROCEDURE — 99232 SBSQ HOSP IP/OBS MODERATE 35: CPT | Performed by: PSYCHIATRY & NEUROLOGY

## 2019-09-03 PROCEDURE — 25000132 ZZH RX MED GY IP 250 OP 250 PS 637: Performed by: PSYCHIATRY & NEUROLOGY

## 2019-09-03 RX ORDER — OLANZAPINE 5 MG/1
5 TABLET, ORALLY DISINTEGRATING ORAL AT BEDTIME
Status: COMPLETED | OUTPATIENT
Start: 2019-09-03 | End: 2019-09-03

## 2019-09-03 RX ADMIN — ACETAMINOPHEN 650 MG: 325 TABLET, FILM COATED ORAL at 22:24

## 2019-09-03 RX ADMIN — OLANZAPINE 5 MG: 5 TABLET, ORALLY DISINTEGRATING ORAL at 22:24

## 2019-09-03 RX ADMIN — ARIPIPRAZOLE 15 MG: 10 TABLET ORAL at 09:19

## 2019-09-03 ASSESSMENT — ACTIVITIES OF DAILY LIVING (ADL)
ORAL_HYGIENE: INDEPENDENT
LAUNDRY: UNABLE TO COMPLETE
HYGIENE/GROOMING: INDEPENDENT
DRESS: SCRUBS (BEHAVIORAL HEALTH)

## 2019-09-03 NOTE — PROGRESS NOTES
"[facility], [place]   Psychiatric Progress Note        Interim History:     Reason for hospitalization:  Jerel Day is a 23-year-old single  male, presenting with exacerbation of psychosis and auditory hallucinations that are command, telling him to harm himself.     Subjective: \"I'm ok, bored here\"     Staff report: Pt had an unremarkable shift. He presents with a full range affect. Patient's thoughts appear to be a little more organized and logical. Patient presents well-groomed. Patient did not attend any unit programming. Patient continues to be medication compliant    As per today's meeting: Patient was seen in the milieu, reading a book, isolative from others. He was documented to go to some groups today. He tolerated medications, and was less irritable compared to before. Discussed medications plans. Denied current SI/HI/AH/VH.            Medications:       ARIPiprazole  15 mg Oral Daily     OLANZapine zydis  5 mg Oral At Bedtime          Allergies:     Allergies   Allergen Reactions     Penicillins      Rash, Generalized          Labs:   No results found for this or any previous visit (from the past 24 hour(s)).       Psychiatric Examination:     /80   Pulse 97   Temp 98.6  F (37  C) (Tympanic)   Resp 16   Wt 70.4 kg (155 lb 3.3 oz)   SpO2 97%   BMI 23.26 kg/m    Weight is 155 lbs 3.26 oz  Body mass index is 23.26 kg/m .  Orthostatic Vitals       Most Recent      Standing Orthostatic /85 08/15 0900    Standing Orthostatic Pulse (bpm) 63 08/15 0900          Appearance: awake, alert and adequately groomed.  Attitude:  evasive and guarded, less agitated though irritable  Eye Contact:  fair  Mood: \"Frustrated\"  Affect: mood congruent, blunted  Speech:  rambling, coherent  Psychomotor Behavior:  no evidence of tardive dyskinesia, dystonia, or tics  Throught Process:  illogical though more organized, goal oriented  Associations:  loosening of associations " present  Thought Content:  Denies SI, SIB, HI. No overt evidence of psychosis on examination.   Insight:  limited  Judgement:  fair  Oriented to:  time, person, and place  Attention Span and Concentration:  fair  Recent and Remote Memory:  fair    Clinical Global Impressions  First:  Considering your total clinical experience with this particular patient population, how severe are the patient's symptoms at this time?: 6 (08/14/19 1120)  Compared to the patient's condition at the START of treatment, this patient's condition is:: 6 (08/14/19 1120)  Most recent:  Considering your total clinical experience with this particular patient population, how severe are the patient's symptoms at this time?: 6 (08/14/19 1120)  Compared to the patient's condition at the START of treatment, this patient's condition is:: 6 (08/14/19 1120)         Precautions:     Behavioral Orders   Procedures     Assault precautions     Pt has been violent to self and shows the potential to be violent with others.     Code 1 - Restrict to Unit     Code 2     For CT only on 8/16     Elopement precautions     Routine Programming     As clinically indicated     Self Injury Precaution     Patient hit his head against the wall multiple times.     Status 15     Every 15 minutes.     Suicide precautions     Patients on Suicide Precautions should have a Combination Diet ordered that includes a Diet selection(s) AND a Behavioral Tray selection for Safe Tray - with utensils, or Safe Tray - NO utensils    Command Hallucinations to harm self          Diagnoses:     1.  Schizoaffective disorder.   2.  Cannabis use disorder.          Plan:     Legal status: Patient arrived on uit with 72 hour hold. Hold will continue. Petitioned for MICD commitment on 8/14. Patient is psychotic and unable to care for self. He is noncompliant with medications in the community. Final [place]/commitment hearing was on Monday, 8/26. Awaiting outcome.      Medication management:     -  Abilify 15 mg Daily (hospital does not have Abilify Maintenna, and given it is the only medication that patient seems to have tolerated, we will continue the oral formulation of this for now).   - Zyprexa 5 mg HS for 1 more dose.     Medical: UTOX positive for cannabinoids  8/15 MRI: Impression:   1. No acute intracranial pathology.  2. Air-fluid levels with frothy material in the maxillary sinuses.  This can be seen with acute sinusitis.  3. Soft tissue swelling along the right and central aspect of the  frontal bone. No underlying calvarial fracture.  Internal medicine to follow patient for head injury.     Behavioral/psychology/social: Encouraged patient to attend therapeutic programming as tolerated.   No roommate order. Patient is psychotic and paranoid. Continued hallucinations. Would reassess in AM.     Disposition plan: Stabilized with medications, structured environment with eyes on meds, patient did well on HO and with first episode psychosis supports in place. Would obtain collateral from OP CM and parents to determine whether discharge to home is an option. This would also depend on level of progress made in inpatient setting.         Jordan Charles MD  [facility]

## 2019-09-03 NOTE — PROGRESS NOTES
"   09/02/19 2156   Behavioral Health   Hallucinations denies / not responding to hallucinations   Thinking intact;poor concentration   Orientation person: oriented;place: oriented;date: oriented;time: oriented   Memory baseline memory   Insight insight appropriate to situation   Judgement intact   Eye Contact at examiner   Affect full range affect   Mood mood is calm   Physical Appearance/Attire appears stated age;attire appropriate to age and situation;neat   Hygiene well groomed   Suicidality other (see comments)  (pt denies)   1. Wish to be Dead (Past Month) No   2. Non-Specific Active Suicidal Thoughts (Past Month) No   Self Injury other (see comment)  (pt denies)   Elopement   (no behaviors noted)   Speech coherent;clear   Psychomotor / Gait balanced;steady   Activities of Daily Living   Hygiene/Grooming independent   Oral Hygiene independent   Dress scrubs (behavioral health);independent   Room Organization independent   Significant Event   Significant Event Other (see comments)  (shift summary)   Behavioral Health Interventions   Social and Therapeutic Interventions (Psychotic Symptoms) encourage socialization with peers;encourage effective boundaries with peers;encourage participation in therapeutic groups and milieu activities         Pt was visible in the lounge throughout the shift. Pt denies SI/SIB at this time. Pt stated \"I want to know my discharge date and time.\" pt also staff \"can I see my court papers. Pt was social with peers and staff. Pt also had a \"good\" visit with brother.  "

## 2019-09-03 NOTE — PLAN OF CARE
"Problem: OT General Care Plan  Goal: OT Goal 1  Pt will demonstrate consistent engagement in OT group activities that support recovery as evidenced by participating in >1 scheduled OT group/day for 5 days.      Pt attended 1 out of 3 OT groups offered. Pt actively participated in a structured occupational therapy group with a focus on connecting song titles to life events and personal feelings. Using a list of song titles, pt identified You Can't Always Get What You Want, Come Sail Away, [place], Free Falling, and Walking on Sunshine as song titles that relate to his life, though did not elaborate on why he selected each song. Pt also independently identified \"Eye of the Tiger\" as a song that he personally relates to, and shared about learning how to play this song on the [employer]. Demonstrated active listening and tracked the conversation when peers were sharing, and initiated asking a peer about playing the guitar. Pt then completed a visual scanning task while listening to identified songs to facilitate focus and organization. Demonstrated fair attention to task. Calm, pleasant, and cooperative throughout this group. Affect appeared to brighten in interactions.     "

## 2019-09-03 NOTE — PLAN OF CARE
"48 Hour assessment       Problem: Suicidal Behavior  Goal: Suicidal Behavior is Absent or Managed  Outcome: Improving       D: Pt had an unremarkable shift. He presents with a full range affect. Patient's thoughts appear to be a little more organized and logical. Patient presents well-groomed. Patient did not attend any unit programming. Patient continues to be medication compliant     Patient denies SI/SIB/HI.   Patient anxiety rated 2/10 and  depression rated 4/10.     Patient reported he felt \"perturbed\" earlier in the day due to dreams he had last night.   Patient denies VH today. He continues to endorse AH but states they are now positive thoughts. Patient is more present in the milieu but does not attend groups.     A: Provided active listening, emotional encouragement and goal setting, safety planning safety checks q 15min, and medication administration.    R: Patient was behaviorally appropriate during this shift. Did not require any restraints or seclusion.    "

## 2019-09-04 ENCOUNTER — NURSING HOME VISIT (OUTPATIENT)
Dept: PSYCHIATRY | Facility: CLINIC | Age: 24
End: 2019-09-04

## 2019-09-04 DIAGNOSIS — F25.0 SCHIZOAFFECTIVE DISORDER, BIPOLAR TYPE (H): Primary | ICD-10-CM

## 2019-09-04 PROCEDURE — G0177 OPPS/PHP; TRAIN & EDUC SERV: HCPCS

## 2019-09-04 PROCEDURE — 25000132 ZZH RX MED GY IP 250 OP 250 PS 637: Performed by: PSYCHIATRY & NEUROLOGY

## 2019-09-04 PROCEDURE — 12400001 ZZH R&B MH UMMC

## 2019-09-04 RX ADMIN — ARIPIPRAZOLE 15 MG: 10 TABLET ORAL at 09:02

## 2019-09-04 ASSESSMENT — ACTIVITIES OF DAILY LIVING (ADL)
LAUNDRY: WITH SUPERVISION
HYGIENE/GROOMING: INDEPENDENT
DRESS: INDEPENDENT
ORAL_HYGIENE: INDEPENDENT
ORAL_HYGIENE: INDEPENDENT
HYGIENE/GROOMING: INDEPENDENT
DRESS: INDEPENDENT

## 2019-09-04 NOTE — PROGRESS NOTES
"Participated in Music Therapy group with focus on mood elevation, validation and decreasing anxiety and improved group cohesiveness. Engaged and cooperative in music listening interventions.   Showed progress in session goals.     At times appeared to be \"stimming\" on the music, sitting back with eyes closed and hands in the air.  During these times, MT turned the volume down slightly to break the \"trance\"-like state he appeared to be entering.  "

## 2019-09-04 NOTE — PROGRESS NOTES
Patient was talkative and active in milieu. Patient was having auditory hallucinations and said that they are making suggestions rather than demands of him however he is inclined to do what they say because they thinks they are trying to help. Patient rambles and says things that don't make a lot of sense but it seems to help patient to tell someone his thoughts. Patient denied SI/SIB but is feeling anxious because of the voices and because he doesn't know what he wants to do once he leaves. Patient was able to identify that he is in the middle of a manic episode and was saying that he can feel it in his body when he is having manic episodes. Patient is happy to be alive.          09/03/19 2100   Behavioral Health   Hallucinations auditory   Thinking distractable;other (see comment)  (patient is able to focus on topics but is distractable)   Orientation date: oriented;place: oriented;person: oriented;time: oriented   Memory baseline memory   Insight admits / accepts   Judgement impaired   Eye Contact at examiner;blinking   Affect full range affect;tense   Mood mood is calm   Physical Appearance/Attire appears stated age;attire appropriate to age and situation   Hygiene well groomed   Suicidality other (see comments)  (denies)   1. Wish to be Dead (Past Month) No   2. Non-Specific Active Suicidal Thoughts (Past Month) No   Duration (Lifetime) NA   Change in Protective Factors? No   Enviromental Risk Factors None   Self Injury other (see comment)  (denies)   Elopement   (none stated or observed)   Activity other (see comment)  (active in group and milieu)   Speech clear;coherent   Medication Sensitivity no observed side effects;no stated side effects   Psychomotor / Gait balanced;steady   Music Therapy   Type of Participation Music therapy group   Response Participates with cues/redirection   Hours 1

## 2019-09-04 NOTE — PROGRESS NOTES
09/04/19 1806   Behavioral Health   Hallucinations denies / not responding to hallucinations   Thinking poor concentration   Orientation person: oriented;place: oriented;date: oriented;time: oriented   Memory baseline memory   Insight admits / accepts;poor   Judgement impaired   Eye Contact at examiner   Affect blunted, flat   Mood mood is calm   Hygiene   (Pt did not shower during  shift)   Suicidality   (Pt denies SI thoughts)   1. Wish to be Dead (Past Month) No   2. Non-Specific Active Suicidal Thoughts (Past Month) No   Self Injury   (Pt denies SIB thoughts)   Elopement   (No elopemt risk observed)   Activity   (Pt socialized,visible in the milieu)   Speech clear;coherent   Medication Sensitivity no stated side effects;no observed side effects   Psychomotor / Gait balanced;steady   Psycho Education   Type of Intervention 1:1 intervention   Response participates with encouragement   Hours 0.5   Daily Care   Activity activity encouraged   Activities of Daily Living   Hygiene/Grooming independent   Oral Hygiene independent   Dress independent   Laundry with supervision   Activity   Activity Assistance Provided independent   Behavioral Health Interventions   Social and Therapeutic Interventions (Psychotic Symptoms) encourage socialization with peers;encourage effective boundaries with peers;encourage participation in therapeutic groups and milieu activities   Pt denies SI/SIB  Pt denies visual or auditory hallucination  Pt indicated feeling anxious @ 5/10 and headache @ 5/10  Pt participated in groups  fairly socialized and was visible in the milieu  Pt reported good appetite and ate dinner

## 2019-09-04 NOTE — PROGRESS NOTES
[facility] Clinician Contact & Progress Note  For Individual Resiliency Training (IRT)  A Part of the [employer] First Episode of Psychosis Program    NAVIGATE Enrollee: Jerel Day (1995)     MRN: [id number]  Date:  8/08/19  Diagnosis: Schizoaffective disorder, bipolar type       Clinician: [facility] Individual Resiliency Trainer, EDELMIRA Morgan     1. Type of contact: (majority of time spent)  IRT Session    2. People present:   Writer  Client: Jerel Day      3. Total number of persons who participated in contact: 2, including writer     4. Length of Actual Contact: Start Time: 2pm; End Time: 3pm   Traveled?    No     5. Location of contact:  [facility], [place]    6. Did the client complete the home practice option(s) from the previous session: Did not complete    7. Motivational Teaching Strategies:  Connect info and skills with personal goals  Promote hope and positive expectations  Explore pros and cons of change  Re-frame experiences in positive light    8. Educational Teaching Strategies:  Review of written material/education  Relate information to client's experience  Ask questions to check comprehension  Break down information into small chunks  Adopt client's language     9. CBT Teaching Strategies:  Reinforcement and shaping (positive feedback for steps towards goals and gains in knowledge & skills)  Relapse prevention planning (review of stressors and early warning signs)  Coping skills training (review current coping skills and increase currently used skills)  Behavioral tailoring (fit taking medication into client's daily routine)    10. IRT Module(s) Addressed:  Module 2 - Assessment/Initial Goal Setting  Module 3 - Education about Psychosis  Module 4 - Relapse Prevention Planning  Safety Planning    11. Techniques utilized:   [place] announced at beginning of session  Review of goal  Review of previous meeting  Present new material  Problem-solving practice  Help client choose a  "home practice option  Summarize progress made in current session    12. Mental Status Exam:    Alertness: alert  and slow to respond  Appearance: casually groomed  Behavior/Demeanor: cooperative, pleasant and guarded, with poor eye contact   Speech: slowed  Language: intact and no obvious problem. Preferred language identified as English.  Psychomotor: slowed  Mood: description consistent with euthymia  Affect: restricted and guarded; was congruent to mood; was congruent to content  Thought Process/Associations: remarkable for , difficult to follow, disorganized, loose associations and response delay  Thought Content:  Reports suicidal ideation without plan; without intent [details in Interim History], delusions, preoccupations and over-valued ideas;  Denies violent ideation  Perception:  Reports auditory hallucinations with commands [details in Interim History], visual hallucinations and depersonalization;  Denies none  Insight: limited  Judgment: limited  Cognition: does  appear grossly intact; formal cognitive testing was not done  Suicidal ideation: reports passive SI, denies intent,  and denies plan; reports command hallucinations to SH and command hallucinations with suicidal content with no urges and no specificity  Homicidal Ideation: denies    13. Assessment/Progress Note:     Writer met with Jerel on this date for IRT. Writer set agenda to check-in, discuss and assess symptoms, explore material in IRT modules, discuss ways in which Jerel can expand coping strategies and stress-management techniques for ongoing symptom management, and check-in regarding goals for ongoing recovery.    During check-in, Jerel reported the following with regards to symptoms since last visit:    Auditory hallucinations: reports, everyday. Describes them as \"telling me how to fix my body\" and states there is \"usually implied self-harm\" with those instructions.   o Command hallucinations: reports with suicidal and self-harm content, " "not specific, experiencing everyday. Urges: denies. Intent: denies. Coping strategies used to manage command hallucinations: \"usually just dismiss it.\" Writer assessed safety multiple times; Jerel was able to contract for safety repeatedly, see safety plan below.    Visual hallucinations: reported; described \"seeing images on door where my spine and back muscles are arching together.\"     Tactile hallucinations: difficult to gauge whether Jerel is experiencing more somatic delusions or tactile hallucinations; regardless, Jerel reports feeling there is a \"crack down the middle of my nervous system\" and reports feeling he is \"fused together in the wrong way.\" Reports feeling he is leaking cerebral spinal fluid due to \"cracks in lumbar area.\"    Other reported symptoms: none reported. Jerel presented with difficult to follow thought process, loose associations and sometimes spoke in metaphors that writer couldn't understand. This is consistent with presentation in recent sessions with writer.     With regards to safety, Jerel reported the following:    Suicidal ideation: denies. Plan: denies. Urges: denies. Intent: denies.     Self-harm: Thoughts - denies. Urges - denies. Intent - denies.    Homicidal or violent ideation: denies. Specific individual(s): denies. Plan: denies. Urges: denies. Intent: denies.    Discussed overall safety plan should symptoms feel unmanageable or safety concerns become imminent to include: reaching out to family, utilizing crisis resources and/or getting to nearest ED for evaluation. Writer assessed safety multiple times; Jerel agreed to go to ED for evaluation (voiced preference for [place]) should he notice urges or intent to follow through with command suicidal hallucinations or if he begins to experience active SI apart from command hallucinations. At the time of today's appt he denied any urges or intent to follow through with command suicidal hallucinations. Additionally denied SI and " "HI/VI.    Assessed additional treatment components aimed at promoting symptom management and overall recovery.     With regards to medication, Jerel reports: continued disinterest in medications. Not open to seeing Dr. Zheng for continued conversation at this point.     With regards to recent sleep, Jerel reports: no concerns.    With regards to diet and nutrition, Jerel reports: no concerns.    With regards to recent substance use, Jerel reports: marijuana use, seems to be daily although could not get specific detail. This is not a change since last appointment. Briefly reviewed the negative impact substances can have on symptoms of psychosis, Jerel verbalized understanding but voiced no interest in stopping marijuana use. Jerel reports smoking, \"makes me feel really good especially when people are talking to me.\"    Majority of session was used to assess safety. Jerel was able to contract for safety with above plan. Writer engaged Jerel in progressive muscle relaxation exercise; following writer's guided exercise in which Jerel participated, Jerel continued to move his body in different ways and hold positions engaging in gracie then relaxing his muscles; writer followed Jerel and matched his movements. He spoke positively of this exercise; plan to continue exploring additional helpful coping tools in future sessions. Encouraged Jerel to use PMR outside of session in the meantime. Scheduled next appt for next week.     Overall, Jerel was engaged and slightly guarded throughout the session. Symptom assessment, safety assessment, discussion and identification of coping strategies, and exploration of material in IRT modules was all in support of Jerel's self-identified goal(s) as identified in most recent BEH Treatment Plan. Progress toward goal completion seems limited.    14. Plan/Referrals:     Next appt scheduled next week. Jerel agreed to go to ED for evaluation (voiced preference for [place]) should he notice urges or " "intent to follow through with command suicidal hallucinations or if he begins to experience active SI apart from command hallucinations. At the time of today's appt he denied any urges or intent to follow through with command suicidal hallucinations. Additionally denied SI and HI/VI.    Billing for \"Interactive Complexity\"?    No      EDELMIRA Morgan    NAVIGATE Individual Resiliency Trainer    Attestation:    I have reviewed this note but have not examined the patient. This patient is discussed with me in individual clinical social work supervision. I agree with the plan as documented.    AGA Huerta, LICSW, September 10, 2019      "

## 2019-09-04 NOTE — PROGRESS NOTES
Pt's mother states that Abilify was keeping him together to a point, but doesn't feel that it was completely doing all it needed to. Writer gave mother an update on pt's current mental status.

## 2019-09-04 NOTE — PLAN OF CARE
"Problem: OT General Care Plan  Goal: OT Goal 1  Pt will demonstrate consistent engagement in OT group activities that support recovery as evidenced by participating in >1 scheduled OT group/day for 5 days.      Pt attended 1 out of 3 OT groups offered. Pt actively participated in a structured occupational therapy topic group involving identification of coping skills beginning with every letter of the alphabet facilitated through group discussion. Pt consistently contributed ideas of positive coping skills, and was receptive and respectful of ideas contributed by peers. Pt identified \"adapting to the environment, respect, and playing guitar\" as his most important coping skills, and identified \"joe\" as a coping skill that he intends to start using more frequently. He demonstrated some insight when reflecting on his \"thought patterns,\" and offered the example: \"when I watch Spongebob, I think that I revealed the secret formula to [place] [villain].\" He also explained that he no longer has a [employer] account because he felt like every show had a character that depicted him. Pt actively participated in x15 minutes (no charge) of occupational therapy clinic. Pt was able to ask for assistance as needed, and independently initiated a familiar creative expression task. Pt demonstrated good attention to task, even while sitting next to a peer who was somewhat disruptive. He expressed satisfaction with his project. Calm, pleasant, and cooperative throughout groups today. He initiated conversation with peers and writer more frequently, and appeared to brighten in interactions.       "

## 2019-09-04 NOTE — PLAN OF CARE
"  Pt presents with blunted and tense affect. Affect seen to be full range upon interaction. Eye contact seen to be at examiner, at the floor and into space. Activity isolative and withdrawn. Pt seen to spend majority of time in his room. Pt seen in milieu for meal time.     When asked how Pt's appetite is, Pt states \"it was higher when I first got her. It's plateaud a little bit but its still high.\" Pt states his sleep hygiene is \"okay. Not too bad.\" When asked if Pt is experiencing any adverse/side effects to medications, Pt states \"my thoughts are slower and my tongue is heavy.\" Pt attributes these effects to \"Zyprexa and maybe Abilify.\"     When asked if Pt is experiencing any acute physical ailments, Pt states \"today, no. I was sore and having shooting pains before.\" Pt rates his anxiety a 2/10 and his depression a 4/10. Pt denies SI/SIB/HI.     When asked if Pt is experiencing AH/VH, Pt states \"not so much this morning. Yesterday I was. Well, it was more like racing thoughts yesterday.\"   "

## 2019-09-05 PROCEDURE — 99207 ZZC CDG-MDM COMPONENT: MEETS MODERATE - UP CODED: CPT | Performed by: PSYCHIATRY & NEUROLOGY

## 2019-09-05 PROCEDURE — G0177 OPPS/PHP; TRAIN & EDUC SERV: HCPCS

## 2019-09-05 PROCEDURE — 12400001 ZZH R&B MH UMMC

## 2019-09-05 PROCEDURE — 99232 SBSQ HOSP IP/OBS MODERATE 35: CPT | Performed by: PSYCHIATRY & NEUROLOGY

## 2019-09-05 PROCEDURE — 25000132 ZZH RX MED GY IP 250 OP 250 PS 637: Performed by: PSYCHIATRY & NEUROLOGY

## 2019-09-05 PROCEDURE — 25000132 ZZH RX MED GY IP 250 OP 250 PS 637: Performed by: CLINICAL NURSE SPECIALIST

## 2019-09-05 RX ORDER — OLANZAPINE 10 MG/1
10 TABLET ORAL AT BEDTIME
Status: DISCONTINUED | OUTPATIENT
Start: 2019-09-05 | End: 2019-09-06

## 2019-09-05 RX ADMIN — ARIPIPRAZOLE 15 MG: 10 TABLET ORAL at 08:13

## 2019-09-05 RX ADMIN — ACETAMINOPHEN 650 MG: 325 TABLET, FILM COATED ORAL at 20:05

## 2019-09-05 RX ADMIN — OLANZAPINE 10 MG: 10 TABLET, FILM COATED ORAL at 20:06

## 2019-09-05 ASSESSMENT — ACTIVITIES OF DAILY LIVING (ADL)
DRESS: SCRUBS (BEHAVIORAL HEALTH)
DRESS: SCRUBS (BEHAVIORAL HEALTH)
ORAL_HYGIENE: INDEPENDENT
LAUNDRY: UNABLE TO COMPLETE
HYGIENE/GROOMING: INDEPENDENT
HYGIENE/GROOMING: INDEPENDENT
ORAL_HYGIENE: INDEPENDENT

## 2019-09-05 NOTE — PLAN OF CARE
"Problem: OT General Care Plan  Goal: OT Goal 1  Pt will demonstrate consistent engagement in OT group activities that support recovery as evidenced by participating in >1 scheduled OT group/day for 5 days.      Pt attended 2 out of 2 OT groups offered. Pt actively participated in occupational therapy clinic. Pt was able to ask for assistance as needed, and independently initiated a familiar creative expression task. Pt demonstrated good focus, planning, and attention to detail. Intermittently social with peers when not actively focused on his task. Pt actively participated in a structured occupational therapy group with a discussion focused on various mental health topics, including mental health management, social situations, healthy support systems, healthy mind/body, and activities of daily living. Pt responded to prompts thoughtfully and insightfully. When prompted to discuss how he would explain mental illness to a 10 year old, he stated \"I'd explain that it's just a different way of thinking.\" He briefly discussed stigma associated with mental illness. He laughed to himself x1, then quickly apologized to a peer and reassured this peer that he was not laughing at what they said, and alluded to being preoccupied with his own thoughts. Other than this brief incident, he consistently tracked the group conversation, and appropriately elaborated on responses offered by peers. Pleasant, cooperative, and engaged throughout groups. Affect appeared to brighten in interactions.   "

## 2019-09-05 NOTE — PROGRESS NOTES
"   09/05/19 1351   Behavioral Health   Hallucinations auditory   Thinking confused;intact   Orientation person: oriented;place: oriented;date: oriented   Memory baseline memory   Insight admits / accepts   Judgement impaired   Eye Contact cultural specific   Affect full range affect   Mood anxious;mood is calm   Physical Appearance/Attire attire appropriate to age and situation   Hygiene well groomed   1. Wish to be Dead (Past Month) No   2. Non-Specific Active Suicidal Thoughts (Past Month) No   Speech clear;coherent   Medication Sensitivity no stated side effects   Psychomotor / Gait slow;balanced;steady   Psycho Education   Type of Intervention 1:1 intervention   Response participates, initiates socially appropriate   Hours 0.5   Treatment Detail IMR   Activities of Daily Living   Hygiene/Grooming independent   Oral Hygiene independent   Dress scrubs (behavioral health)   Room Organization independent   Pt ate meals and was social with peers and staff. Pt attended and participated in group activities. Pt states \"I hear voices that are putting me down and making me feel discouraged.\" Pt states he does not feel depressed now but my anxiety is very high. Pt states he has no thought or desire to harm self or others at this time.   "

## 2019-09-06 PROCEDURE — 25000132 ZZH RX MED GY IP 250 OP 250 PS 637: Performed by: PSYCHIATRY & NEUROLOGY

## 2019-09-06 PROCEDURE — H2032 ACTIVITY THERAPY, PER 15 MIN: HCPCS

## 2019-09-06 PROCEDURE — 99207 ZZC CDG-MDM COMPONENT: MEETS MODERATE - UP CODED: CPT | Performed by: PSYCHIATRY & NEUROLOGY

## 2019-09-06 PROCEDURE — 99232 SBSQ HOSP IP/OBS MODERATE 35: CPT | Performed by: PSYCHIATRY & NEUROLOGY

## 2019-09-06 PROCEDURE — 93005 ELECTROCARDIOGRAM TRACING: CPT | Performed by: PSYCHIATRY & NEUROLOGY

## 2019-09-06 PROCEDURE — 12400001 ZZH R&B MH UMMC

## 2019-09-06 PROCEDURE — G0177 OPPS/PHP; TRAIN & EDUC SERV: HCPCS

## 2019-09-06 RX ORDER — GABAPENTIN 300 MG/1
300 CAPSULE ORAL 2 TIMES DAILY PRN
Status: DISCONTINUED | OUTPATIENT
Start: 2019-09-06 | End: 2019-09-18 | Stop reason: HOSPADM

## 2019-09-06 RX ADMIN — IBUPROFEN 800 MG: 400 TABLET, FILM COATED ORAL at 14:57

## 2019-09-06 RX ADMIN — ARIPIPRAZOLE 15 MG: 10 TABLET ORAL at 10:58

## 2019-09-06 RX ADMIN — OLANZAPINE 15 MG: 10 TABLET, FILM COATED ORAL at 22:24

## 2019-09-06 ASSESSMENT — ACTIVITIES OF DAILY LIVING (ADL)
DRESS: INDEPENDENT
HYGIENE/GROOMING: INDEPENDENT
HYGIENE/GROOMING: INDEPENDENT
ORAL_HYGIENE: INDEPENDENT
DRESS: INDEPENDENT
LAUNDRY: UNABLE TO COMPLETE
ORAL_HYGIENE: INDEPENDENT

## 2019-09-06 NOTE — PROGRESS NOTES
"[facility], [place]   Psychiatric Progress Note        Interim History:     Reason for hospitalization:  Jerel Day is a 23-year-old single  male, presenting with exacerbation of psychosis and auditory hallucinations that are command, telling him to harm himself.     Subjective: \"I'm ok, bored here\"     Staff report:Pt ate meals and was social with peers and staff. Pt attended and participated in group activities. Pt states \"I hear voices that are putting me down and making me feel discouraged.\" Pt states he does not feel depressed now but my anxiety is very high. Pt states he has no thought or desire to harm self or others at this time.     As per today's meeting: Patient was seen in the milieu, social with peers. He tells me that he hears some more discouraging voices, and feels that being off Zyprexa had a role in that. He was documented to go to some groups today. He tolerated medications, and was less irritable compared to before. Discussed medications plans. Denied current SI/HI/AH/VH.            Medications:       ARIPiprazole  15 mg Oral Daily     OLANZapine  10 mg Oral At Bedtime          Allergies:     Allergies   Allergen Reactions     Penicillins      Rash, Generalized          Labs:   No results found for this or any previous visit (from the past 24 hour(s)).       Psychiatric Examination:     /86 (BP Location: Right arm)   Pulse 73   Temp 98.3  F (36.8  C) (Tympanic)   Resp 16   Wt 70.4 kg (155 lb 4.8 oz)   SpO2 99%   BMI 23.27 kg/m    Weight is 155 lbs 4.8 oz  Body mass index is 23.27 kg/m .  Orthostatic Vitals       Most Recent      Standing Orthostatic /85 08/15 0900    Standing Orthostatic Pulse (bpm) 63 08/15 0900          Appearance: awake, alert and adequately groomed.  Attitude:  evasive and guarded, less agitated though irritable  Eye Contact:  fair  Mood: \"Frustrated\"  Affect: mood congruent, blunted  Speech:  rambling, " coherent  Psychomotor Behavior:  no evidence of tardive dyskinesia, dystonia, or tics  Throught Process:  illogical though more organized, goal oriented  Associations:  loosening of associations present  Thought Content:  Denies SI, SIB, HI. No overt evidence of psychosis on examination.   Insight:  limited  Judgement:  fair  Oriented to:  time, person, and place  Attention Span and Concentration:  fair  Recent and Remote Memory:  fair    Clinical Global Impressions  First:  Considering your total clinical experience with this particular patient population, how severe are the patient's symptoms at this time?: 6 (08/14/19 1120)  Compared to the patient's condition at the START of treatment, this patient's condition is:: 6 (08/14/19 1120)  Most recent:  Considering your total clinical experience with this particular patient population, how severe are the patient's symptoms at this time?: 6 (08/14/19 1120)  Compared to the patient's condition at the START of treatment, this patient's condition is:: 6 (08/14/19 1120)         Precautions:     Behavioral Orders   Procedures     Assault precautions     Pt has been violent to self and shows the potential to be violent with others.     Code 1 - Restrict to Unit     Code 2     For CT only on 8/16     Elopement precautions     Routine Programming     As clinically indicated     Self Injury Precaution     Patient hit his head against the wall multiple times.     Status 15     Every 15 minutes.     Suicide precautions     Patients on Suicide Precautions should have a Combination Diet ordered that includes a Diet selection(s) AND a Behavioral Tray selection for Safe Tray - with utensils, or Safe Tray - NO utensils    Command Hallucinations to harm self          Diagnoses:     1.  Schizoaffective disorder.   2.  Cannabis use disorder.          Plan:     Legal status: MI Committed with [facility].      Medication management:     - Abilify 15 mg Daily (hospital does not have Abilify  [facility], and given it is the only medication that patient seems to have tolerated, we will continue the oral formulation of this for now).   - Zyprexa 10 mg HS.     Medical: UTOX positive for cannabinoids  8/15 MRI: Impression:   1. No acute intracranial pathology.  2. Air-fluid levels with frothy material in the maxillary sinuses.  This can be seen with acute sinusitis.  3. Soft tissue swelling along the right and central aspect of the  frontal bone. No underlying calvarial fracture.  Internal medicine to follow patient for head injury.     Behavioral/psychology/social: Encouraged patient to attend therapeutic programming as tolerated.   No roommate order. Patient is psychotic and paranoid. Continued hallucinations. Would reassess in AM.     Disposition plan: Likely eventual discharge to an IRTS setting.         Jordan Charles MD  [facility] Psychiatry

## 2019-09-06 NOTE — PLAN OF CARE
"Problem: OT General Care Plan  Goal: OT Goal 1  Pt will demonstrate consistent engagement in OT group activities that support recovery as evidenced by participating in >1 scheduled OT group/day for 5 days.      Pt attended 3 out of 3 OT groups offered, which is a significant improvement in group attendance. Pt actively participated in occupational therapy clinic. Pt was able to ask for assistance as needed, and independently initiated a familiar creative expression task. Pt demonstrated good focus, planning, and problem solving. Social with peers throughout. Pt actively participated in a mental health management group with a focus on coping through movement to facilitate relaxation and stress management via chair yoga. Pt followed and engaged in about 75% of the yoga poses, though was occasionally observed engaging in self-directed, and somewhat odd movements with his eyes closed. He appeared more receptive to the verbal instruction than the visual demonstration, and kept his eyes closed while participating in the movements for a majority of group. He reflected on feeling his breath go to places where his muscles were tight, which helped him relax his muscles. Some delusional content overheard in his conversations, such as suggesting that e-cigarettes could be \"a conspiracy from a country that doesn't like us; maybe [place] is putting extra chemicals in them.\" Overall, he appears more social, engaged, and brighter throughout groups in comparison to earlier in his hospital stay.      "

## 2019-09-06 NOTE — PROGRESS NOTES
"Pt had a good shift. Pt was present in groups, social with peers and appeared brighten upon approach.  At check in pt ranked his depression at a 5 (on a scale from 1-10, 10 being the worst). Pt ranked his anxiety at an 0. Pt stated he hears voices. Pt explained that he feels like his medication is slowing his thoughts and speech and \"it makes my muscles mushy\". Pt asked to know more about discharger, pt directed him to discuss this with his doctor.          09/06/19 1100   Behavioral Health   Hallucinations auditory   Thinking distractable   Orientation person: oriented;place: oriented;date: oriented;time: oriented   Memory confabulation   Insight poor   Judgement impaired   Eye Contact at examiner   Affect full range affect   Mood mood is calm   Physical Appearance/Attire appears stated age;attire appropriate to age and situation   Hygiene well groomed   Suicidality other (see comments)  (pt denies )   1. Wish to be Dead (Past Month) No   2. Non-Specific Active Suicidal Thoughts (Past Month) No   Self Injury other (see comment)  (pt denies )   Elopement   (none observed )   Activity other (see comment)  (pt present in groups)   Speech clear;coherent   Medication Sensitivity other (see comment)  (concerns about slow thoughts and mushy muscles)   Psychomotor / Gait steady;balanced;slow   Activities of Daily Living   Hygiene/Grooming independent   Oral Hygiene independent   Dress independent   Laundry unable to complete   Room Organization independent     "

## 2019-09-06 NOTE — PROGRESS NOTES
"[facility], [place]   Psychiatric Progress Note        Interim History:     Reason for hospitalization:  Jerel Day is a 23-year-old single  male, presenting with exacerbation of psychosis and auditory hallucinations that are command, telling him to harm himself.     Subjective: \"I'm ok, bored here\"     Staff report:Pt had a good shift. Pt was present in groups, social with peers and appeared brighten upon approach.  At check in pt ranked his depression at a 5 (on a scale from 1-10, 10 being the worst). Pt ranked his anxiety at an 0. Pt stated he hears voices. Pt explained that he feels like his medication is slowing his thoughts and speech and \"it makes my muscles mushy\". Pt asked to know more about discharger, pt directed him to discuss this with his doctor.     As per today's meeting: Patient was seen in the milieu, social with peers. Some voices, but feels they are with him most of the time. Has some odd, nonsensical thoughts. He was documented to go to some groups today. He tolerated medications, and was less irritable compared to before. Discussed medications plans. Denied current SI/HI/AH/VH.            Medications:       ARIPiprazole  15 mg Oral Daily     OLANZapine  10 mg Oral At Bedtime          Allergies:     Allergies   Allergen Reactions     Penicillins      Rash, Generalized          Labs:   No results found for this or any previous visit (from the past 24 hour(s)).       Psychiatric Examination:     /78   Pulse 73   Temp 96.7  F (35.9  C) (Tympanic)   Resp 16   Wt 70.4 kg (155 lb 4.8 oz)   SpO2 97%   BMI 23.27 kg/m    Weight is 155 lbs 4.8 oz  Body mass index is 23.27 kg/m .  Orthostatic Vitals       Most Recent      Standing Orthostatic /85 08/15 0900    Standing Orthostatic Pulse (bpm) 63 08/15 0900          Appearance: awake, alert and adequately groomed.  Attitude:  evasive and guarded, less agitated though irritable  Eye Contact:  " "fair  Mood: \"Frustrated\"  Affect: mood congruent, blunted  Speech:  rambling, coherent  Psychomotor Behavior:  no evidence of tardive dyskinesia, dystonia, or tics  Throught Process:  illogical though more organized, goal oriented  Associations:  loosening of associations present  Thought Content:  Denies SI, SIB, HI. No overt evidence of psychosis on examination.   Insight:  limited  Judgement:  fair  Oriented to:  time, person, and place  Attention Span and Concentration:  fair  Recent and Remote Memory:  fair    Clinical Global Impressions  First:  Considering your total clinical experience with this particular patient population, how severe are the patient's symptoms at this time?: 6 (08/14/19 1120)  Compared to the patient's condition at the START of treatment, this patient's condition is:: 6 (08/14/19 1120)  Most recent:  Considering your total clinical experience with this particular patient population, how severe are the patient's symptoms at this time?: 6 (08/14/19 1120)  Compared to the patient's condition at the START of treatment, this patient's condition is:: 6 (08/14/19 1120)         Precautions:     Behavioral Orders   Procedures     Assault precautions     Pt has been violent to self and shows the potential to be violent with others.     Code 1 - Restrict to Unit     Code 2     For CT only on 8/16     Elopement precautions     Routine Programming     As clinically indicated     Self Injury Precaution     Patient hit his head against the wall multiple times.     Status 15     Every 15 minutes.     Suicide precautions     Patients on Suicide Precautions should have a Combination Diet ordered that includes a Diet selection(s) AND a Behavioral Tray selection for Safe Tray - with utensils, or Safe Tray - NO utensils    Command Hallucinations to harm self          Diagnoses:     1.  Schizoaffective disorder.   2.  Cannabis use disorder.          Plan:     Legal status: MI Committed with [facility]. "      Medication management:     - Abilify 15 mg Daily (hospital does not have Abilify Maintenna, and given it is the only medication that patient seems to have tolerated, we will continue the oral formulation of this for now).   - Zyprexa 15 mg HS.     Medical: UTOX positive for cannabinoids  8/15 MRI: Impression:   1. No acute intracranial pathology.  2. Air-fluid levels with frothy material in the maxillary sinuses.  This can be seen with acute sinusitis.  3. Soft tissue swelling along the right and central aspect of the  frontal bone. No underlying calvarial fracture.  Internal medicine to follow patient for head injury.     Behavioral/psychology/social: Encouraged patient to attend therapeutic programming as tolerated.   No roommate order. Patient is psychotic and paranoid. Continued hallucinations. Would reassess in AM.     Disposition plan: Likely eventual discharge to an IRTS setting.         Jordan Charles MD  [facility]

## 2019-09-06 NOTE — PLAN OF CARE
Problem: Depressive Symptoms  Goal: Depressive Symptoms  Description  Signs and symptoms of listed problems will be absent or manageable.  Outcome: Improving   Pt has been calm, social and visible in the milieu.  Pt attended and participated in all groups.  Pt denies depression but did say he was anxious about going to an IRTS facility.  Pt reports he still trying to get used to that idea but he would rather go home.  Pt denies SI/SIB but did endorse some auditory hallucination which pt states is manageable.  Pt requested Tylenol for back pain.  Pt reports he would like to see a back specialist while he is here in the hospital.  Pt was encouraged to speak to his provider about it tomorrow.  Pt has been complaint with taking his medications.  No medication side effect noted or reported.  No restraints or seclusion on this shift.  Will continue to monitor and offer support as needed.

## 2019-09-07 PROCEDURE — 25000132 ZZH RX MED GY IP 250 OP 250 PS 637: Performed by: PSYCHIATRY & NEUROLOGY

## 2019-09-07 PROCEDURE — 25000132 ZZH RX MED GY IP 250 OP 250 PS 637: Performed by: CLINICAL NURSE SPECIALIST

## 2019-09-07 PROCEDURE — 12400001 ZZH R&B MH UMMC

## 2019-09-07 RX ORDER — LIDOCAINE HYDROCHLORIDE 20 MG/ML
5 SOLUTION OROPHARYNGEAL EVERY 4 HOURS PRN
Status: DISCONTINUED | OUTPATIENT
Start: 2019-09-07 | End: 2019-09-18 | Stop reason: HOSPADM

## 2019-09-07 RX ADMIN — ARIPIPRAZOLE 15 MG: 10 TABLET ORAL at 09:24

## 2019-09-07 RX ADMIN — OLANZAPINE 10 MG: 10 TABLET, FILM COATED ORAL at 19:42

## 2019-09-07 RX ADMIN — ACETAMINOPHEN 650 MG: 325 TABLET, FILM COATED ORAL at 19:45

## 2019-09-07 RX ADMIN — ACETAMINOPHEN 650 MG: 325 TABLET, FILM COATED ORAL at 09:24

## 2019-09-07 ASSESSMENT — ACTIVITIES OF DAILY LIVING (ADL)
ORAL_HYGIENE: INDEPENDENT
DRESS: INDEPENDENT
HYGIENE/GROOMING: INDEPENDENT
LAUNDRY: WITH SUPERVISION

## 2019-09-07 NOTE — PROGRESS NOTES
09/06/19 2200   Art Therapy   Type of Intervention structured groups- participated   Treatment Detail    (Art Therapy - affirmation / found poetry collage)   Problem- 1.  Schizoaffective disorder.   2.  Cannabis use disorder.   3.  History of medication noncompliance     Goal- cope, regulate and self expression through Art Therapy directives.     Outcome- pt was cooperative and pleasant. He worked on a thoughtful collage. It was about food as sustenance and he also had images of environments he likes. He was pleasant and cooperative. He is interested in learning about the Koran and talks to a peer about this before and after group.

## 2019-09-07 NOTE — PLAN OF CARE
"Angry tone of voice::: Pt asked many questions of this RN this morning. \"My back hurts, I cant move\". \"why hasn't my pain been addressed\"!! \" \"I don't want Stef[facility] as a provider\". \"Why cant I leave now\"!!! \"can I call a \"?!! This RN stated pt could call a  any time and also he can call pt relation's because he feels no one has addressed his back issues. (he was given tylenol for this) He requested this RN to find him and print off some . Then he requested all his medical records now. He was informed again he can do all these things upon discharge. He then slammed his fist down on the , swearing and refusing community group. Then he got on the phone. His questioning has an angry and hostile tone to it. He is quite on edge. He has had an increase in his Abilify for which he has been compliant on this. We will continue to assess.   "

## 2019-09-07 NOTE — PROGRESS NOTES
"Pt spent time between his room and the milieu. Pt spent his time socializing with staff and peers, watching a movie, and attended evening art group. During pt/writer check in, pt expressed negative thoughts towards his medications. Pt feels that his medication inhibits his thinking. Pt states that he wakes up feeling depressed, but as the day progresses, pt \"works out the knots\" and feels better in the evening. Pt went to sleep at 2230.        09/06/19 2200   Behavioral Health   Hallucinations auditory   Thinking poor concentration   Orientation person: oriented;place: oriented;date: oriented;time: oriented   Memory confabulation   Insight poor   Judgement impaired   Eye Contact at examiner   Affect full range affect   Mood mood is calm   Physical Appearance/Attire attire appropriate to age and situation   Hygiene well groomed   1. Wish to be Dead (Past Month) No   2. Non-Specific Active Suicidal Thoughts (Past Month) No   Self Injury other (see comment)  (denies)   Activity (WDL) WDL   Speech (WDL) WDL   Psychomotor Gait (WDL) WDL   Activities of Daily Living   Hygiene/Grooming independent   Oral Hygiene independent   Dress independent   Room Organization independent     "

## 2019-09-08 PROCEDURE — 12400001 ZZH R&B MH UMMC

## 2019-09-08 PROCEDURE — 99207 ZZC CDG-CODE CATEGORY CHANGED: CPT | Performed by: PHYSICIAN ASSISTANT

## 2019-09-08 PROCEDURE — 25000132 ZZH RX MED GY IP 250 OP 250 PS 637: Performed by: PSYCHIATRY & NEUROLOGY

## 2019-09-08 PROCEDURE — 25000125 ZZHC RX 250: Performed by: HOSPITALIST

## 2019-09-08 PROCEDURE — 99222 1ST HOSP IP/OBS MODERATE 55: CPT | Performed by: PHYSICIAN ASSISTANT

## 2019-09-08 PROCEDURE — 87529 HSV DNA AMP PROBE: CPT | Performed by: HOSPITALIST

## 2019-09-08 PROCEDURE — 25000132 ZZH RX MED GY IP 250 OP 250 PS 637: Performed by: PHYSICIAN ASSISTANT

## 2019-09-08 RX ADMIN — OLANZAPINE 15 MG: 10 TABLET, FILM COATED ORAL at 20:15

## 2019-09-08 RX ADMIN — MENTHOL 1 PATCH: 205.5 PATCH TOPICAL at 14:57

## 2019-09-08 RX ADMIN — LIDOCAINE HYDROCHLORIDE 5 ML: 20 SOLUTION ORAL; TOPICAL at 09:16

## 2019-09-08 RX ADMIN — LIDOCAINE HYDROCHLORIDE 5 ML: 20 SOLUTION ORAL; TOPICAL at 13:55

## 2019-09-08 RX ADMIN — ARIPIPRAZOLE 15 MG: 10 TABLET ORAL at 09:00

## 2019-09-08 ASSESSMENT — ACTIVITIES OF DAILY LIVING (ADL)
DRESS: INDEPENDENT
ORAL_HYGIENE: INDEPENDENT
HYGIENE/GROOMING: INDEPENDENT

## 2019-09-08 NOTE — PROGRESS NOTES
0916 and 1355 PRN lidocaine solution 5 ml for mouth pain. He reports its beneficial.     Believes mouth sores are due to lymph rising into his brain.     Oral mouth swaps of right bottom lip, right inner cheek, and left inner cheek. Results pending.     Continues to state hospital is not caring for his back problem. Unclear what he was looking for as he stated that he was compelled or pulled to the hospital then mumbled off.     Seen by Mindy Doshi PA-C. Started on icy hot patch for back pain.

## 2019-09-08 NOTE — PROGRESS NOTES
Writer spoke with Medical to discuss sores in pts mouth. Medical ordered swab for sores. Doctor stated he would like each sore swabbed separately. Per Lab each swab needs separate order. Order placed to specify specific location of swab. One order is for lip, one for left inner cheek and the other is for right inner cheek.

## 2019-09-08 NOTE — PROGRESS NOTES
09/08/19 1300   Behavioral Health   Hallucinations denies / not responding to hallucinations   Thinking poor concentration   Orientation person: oriented;place: oriented;date: oriented;time: oriented   Memory baseline memory   Insight insight appropriate to situation   Judgement intact   Eye Contact at examiner   Affect full range affect   Mood mood is calm   Physical Appearance/Attire attire appropriate to age and situation   Hygiene well groomed   Suicidality other (see comments)  (denies)   1. Wish to be Dead (Past Month) No   2. Non-Specific Active Suicidal Thoughts (Past Month) No   3. Active Sucidal Ideation with any Methods (Not Plan) Without Intent to Act (Past Month) No   4. Active Suicidal Ideation with Some Intent to Act, Without Specific Plan (Past Month) No   5. Active Suicidal Ideation with Specific Plan and Intent (Past Month) No   Psycho Education   Type of Intervention 1:1 intervention   Response participates, initiates socially appropriate   Hours 0.5   Treatment Detail check in with pt     Pt had a good shift with no behavioral issues. Pt observed to be in a good mood and was able to express that he feels like he has improved a lot since admission. Pt played the [employer] after lunch and was socializing with peers. Denies SI/SIB/HI.

## 2019-09-08 NOTE — PROGRESS NOTES
09/07/19 2030   Group Therapy Session   Group Attendance attended group session   Total Time (minutes) 50   Group Type psychotherapeutic   Group Topic Covered other (see comments)   Patient Participation/Contribution cooperative with task;discussed personal experience with topic;listened actively   Psychotherapy group goals: Identify and share responses to 4 questions (fears, loves/likes, things to let go, wants).    Jerel presented as pleasant though tired. He occasionally rested his eyes.  He reported this was due to his meds. He demonstrated full affect.  Jerel participated in the activity and engaged in the group discussion.

## 2019-09-08 NOTE — PROGRESS NOTES
Called for oral ulcers. Get swab for herpes pcr. Start visouc lidocaine. Medicine day team to see tomorrow. Please call them.

## 2019-09-08 NOTE — PROGRESS NOTES
patient reported feeling find mentally today, endorsing only his back pain. He reports feeling staff do not take his complaints seriously and finding help for his physical ailments took much longer than anticipated, explaining the lengthy explanations and outbursts. No issues this shift.       09/07/19 2134   Behavioral Health   Thinking poor concentration   Orientation person: oriented;place: oriented;date: oriented;time: oriented   Memory baseline memory   Insight poor   Judgement impaired   Eye Contact at examiner   Affect full range affect   Mood mood is calm   Physical Appearance/Attire appears stated age;attire appropriate to age and situation   Hygiene well groomed   1. Wish to be Dead (Past Month) No   2. Non-Specific Active Suicidal Thoughts (Past Month) No   Activity other (see comment)  (Visible with peers)   Speech clear;coherent   Medication Sensitivity no stated side effects;no observed side effects   Psychomotor / Gait balanced;steady   Psycho Education   Type of Intervention 1:1 intervention   Response participates, initiates socially appropriate   Hours 0.5   Activities of Daily Living   Hygiene/Grooming independent   Oral Hygiene independent   Dress independent   Laundry with supervision   Room Organization independent

## 2019-09-08 NOTE — CONSULTS
[facility], [place]  Consult Note - Hospitalist Service     Date of Admission:  8/14/2019  Consult Requested by: Dr. Charles  Reason for Consult: Mouth lesions    Assessment & Plan   Jerel Day is a 23 year old male with a history of GERD, anxiety, depression, and schizoaffective disorder who was admitted to inpatient behavioral [facility] on 8/14/19 with psychosis. Medicine consulted for mouth lesions.     Aphthous ulcerations: Three visible ulcerations c/w aphthous ulcers on lower lip, left buccal mucosa, and upper gumline. No e/o secondary infection   - HSV swabs obtained 9/7 and are pending   - Continue viscous lidocaine PRN for discomfort    Lumbar back pain: Paraspinal muscle tension and tenderness on examination, likely the culprit of his low back discomfort.    - Heat and ice PRN   - Tylenol PRN for discomfort   - Trial IcyHot patch    Medicine will follow pending HSV results. Please do not hesitate to contact if new questions or concerns arise.     Maxine Sheffield PA-C  [facility]  Hospitalist Service  Pager: [phone]      ______________________________________________________________________    Chief Complaint   Mouth sores    History is obtained from the patient    History of Present Illness   Jerel Day is a 23 year old male with a history of GERD, anxiety, depression, and schizoaffective disorder who was admitted to inpatient behavioral [facility] on 8/14/19 with psychosis. Medicine consulted for mouth lesions. He reports a history of canker sores, does not believe he has had a cold sore in the past. Typically gets canker sores on inner lips and along the gumlines. About 2 weeks ago developed a painful sore on the inner aspect of his lower lip, it hasn't improved much. It causes him discomfort with eating/drinking. Because of this discomfort he is requesting nutritional supplement today because he feels he has been eating less due  "to the mouth lesions.     He noticed a couple of other areas that are sore along his gumline and inner cheeks. He also has general gum discomfort and has noticed some bleeding of the gums when he brushes his teeth. He notes \"I brush my teeth really hard because of eating cereal.\"     He also is reporting lower back pain today. This has been going on for quite awhile now and he is considering seeing a chiropractor when he is discharged. Pain is dull, usually is uncomfortable when he is pressing on the area. No incontinence, paresthesias, LE weakness.     He is also requesting to review the results of his head CT that was obtained in August. I reviewed this report with the patient.     He continues to explain multiple other medical complaints in a very tangential manner and his ROS is pan-positive. Other complaints consisted of eye swelling (eyes do not appear swollen), having a skull fracture (negative CT after self-injurious behavior of hitting head on the wall), having \"severe enlarged lymph nodes in my neck,\" and feeling that all of his muscles are disintegrating. When asked if he has any chest pain he describes an episode of muscle spasms in his chest that occurred several years ago.     Review of Systems   Patient's ROS pan-positive in the setting of his acute psychiatric decompensation.     Past Medical History    I have reviewed this patient's medical history and updated it with pertinent information if needed.   Past Medical History:   Diagnosis Date     Anxiety      Arthritis      Depressive disorder      Gastroesophageal reflux disease      Head injury      Problem, psychiatric        Past Surgical History   I have reviewed this patient's surgical history and updated it with pertinent information if needed.  History reviewed. No pertinent surgical history.    Social History   I have reviewed this patient's social history and updated it with pertinent information if needed.  Social History     Tobacco Use "     Smoking status: Former Smoker     Packs/day: 0.25     Years: 1.00     Pack years: 0.25     Types: Cigarettes     Smokeless tobacco: Never Used   Substance Use Topics     Alcohol use: Yes     Comment: socially     Drug use: Yes     Types: Marijuana     Comment: last used yesterday       Family History   I have reviewed this patient's family history and updated it with pertinent information if needed.   History reviewed. No pertinent family history.    Medications   Current Facility-Administered Medications   Medication     acetaminophen (TYLENOL) tablet 650 mg     alum & mag hydroxide-simethicone (MYLANTA ES/MAALOX  ES) suspension 30 mL     ARIPiprazole (ABILIFY) tablet 15 mg     benzocaine (ORAJEL MAXIMUM STRENGTH) 20 % gel     benztropine (COGENTIN) tablet 0.5 mg     bisacodyl (DULCOLAX) Suppository 10 mg     diphenhydrAMINE (BENADRYL) injection 50 mg    Or     diphenhydrAMINE (BENADRYL) capsule 50 mg     gabapentin (NEURONTIN) capsule 300 mg     haloperidol (HALDOL) tablet 5 mg    Or     haloperidol lactate (HALDOL) injection 5 mg     hydrOXYzine (ATARAX) tablet 25-50 mg     lidocaine (XYLOCAINE) 2 % solution 5 mL     LORazepam (ATIVAN) injection 2 mg    Or     LORazepam (ATIVAN) tablet 2 mg     magnesium hydroxide (MILK OF MAGNESIA) suspension 30 mL     nicotine (NICORETTE) gum 2-4 mg     OLANZapine (zyPREXA) tablet 5-10 mg    Or     OLANZapine (zyPREXA) injection 10 mg     OLANZapine (zyPREXA) tablet 15 mg     pseudoePHEDrine (SUDAFED) 12 hr tablet 120 mg     traZODone (DESYREL) tablet 50 mg       Allergies   Allergies   Allergen Reactions     Penicillins      Rash, Generalized       Physical Exam   Vital Signs: Temp: 97.8  F (36.6  C) Temp src: Tympanic BP: 124/79   Heart Rate: 85   SpO2: 97 % O2 Device: None (Room air)    Weight: 158 lbs 4.8 oz    Constitutional: Awake and alert, in no apparent distress. Sitting comfortably in bed.   Eyes: Sclera clear, anicteric. No apparent periorbital edema.   ENT:  There is a small shallow ulceration with central white exudate on the lower inner lip, upper frontal gumline, and left buccal region. Slight edema and erythema of the upper gum tissue. No active bleeding or drainage. No other lesions visualized. Mucous membranes moist. Oropharynx clear, non-erythematous, no exudates or lesions. PERRL  Hematologic / Lymphatic: No cervical or supraclavicular lymphadenopathy.   Respiratory: Breathing comfortably on room air. CTA bilaterally with no crackles, wheezing, or rhonchi. Good air entry throughout.   Cardiovascular:  RRR, normal S1/S2. No rubs or murmurs. Intact bilateral pedal pulses. No peripheral edema.   Musculoskeletal: Bilateral lumbar paraspinal muscles are quite tense and also mildly tender to palpation. Spine non-tender, no step-off, crepitus, or deformities.   Skin: Good color. No jaundice. No visible rashes, lesions, or bruising of concern.   Neurologic: Alert and oriented to person, place, and time. No focal deficits. Moves all extremities. No tremor.   Neuropsychiatric: Tangential, has a difficult time following our conversation        Data   ROUTINE IP LABS (Last four results)  No lab results found in last 7 days.  No lab results found in last 7 days.  No lab results found in last 7 days.     No flowsheet data found.

## 2019-09-09 LAB
HSV1 DNA SPEC QL NAA+PROBE: NEGATIVE
HSV2 DNA SPEC QL NAA+PROBE: NEGATIVE
INTERPRETATION ECG - MUSE: NORMAL
SPECIMEN SOURCE: NORMAL

## 2019-09-09 PROCEDURE — G0177 OPPS/PHP; TRAIN & EDUC SERV: HCPCS

## 2019-09-09 PROCEDURE — 99232 SBSQ HOSP IP/OBS MODERATE 35: CPT | Performed by: PSYCHIATRY & NEUROLOGY

## 2019-09-09 PROCEDURE — 99207 ZZC CDG-MDM COMPONENT: MEETS MODERATE - UP CODED: CPT | Performed by: PSYCHIATRY & NEUROLOGY

## 2019-09-09 PROCEDURE — 25000132 ZZH RX MED GY IP 250 OP 250 PS 637: Performed by: PSYCHIATRY & NEUROLOGY

## 2019-09-09 PROCEDURE — 90853 GROUP PSYCHOTHERAPY: CPT

## 2019-09-09 PROCEDURE — 12400001 ZZH R&B MH UMMC

## 2019-09-09 RX ORDER — ARIPIPRAZOLE 2 MG/1
2 TABLET ORAL DAILY
Status: DISCONTINUED | OUTPATIENT
Start: 2019-09-13 | End: 2019-09-10

## 2019-09-09 RX ORDER — OLANZAPINE 10 MG/1
10 TABLET ORAL AT BEDTIME
Status: DISCONTINUED | OUTPATIENT
Start: 2019-09-09 | End: 2019-09-10

## 2019-09-09 RX ORDER — OLANZAPINE 5 MG/1
5 TABLET ORAL AT BEDTIME
Status: DISCONTINUED | OUTPATIENT
Start: 2019-09-11 | End: 2019-09-10

## 2019-09-09 RX ADMIN — MENTHOL 1 PATCH: 205.5 PATCH TOPICAL at 13:33

## 2019-09-09 RX ADMIN — OLANZAPINE 10 MG: 10 TABLET, FILM COATED ORAL at 22:22

## 2019-09-09 RX ADMIN — ARIPIPRAZOLE 15 MG: 10 TABLET ORAL at 09:05

## 2019-09-09 ASSESSMENT — ACTIVITIES OF DAILY LIVING (ADL)
DRESS: INDEPENDENT
HYGIENE/GROOMING: INDEPENDENT
ORAL_HYGIENE: INDEPENDENT

## 2019-09-09 NOTE — PLAN OF CARE
"Problem: OT General Care Plan  Goal: OT Goal 1  Pt will demonstrate consistent engagement in OT group activities that support recovery as evidenced by participating in >1 scheduled OT group/day for 5 days.      Pt attended 3 out of 3 OT groups offered. Pt actively participated in occupational therapy clinic. Pt was able to ask for assistance as needed, and independently initiated a familiar creative expression task. Pt demonstrated good focus, planning, and attention to detail. Social with peers and writer throughout, and politely initiated introducing himself to a peer who is newer on the unit. He shared that his project was inspired by the upcoming [employer] versus [facility] game, though shared that he doesn't plan to watch the game because he is \"superstitious; yesterday when I started watching the game the other team scored against the [employer]\" Pt actively participated in a structured occupational therapy group with a focus on facilitating self-reflection. Pt shared thoughtful responses regarding social supports, managing anxiety, and identifying emotions. He preferred questions that related to identifying emotions, and was quick-witted in answering questions that were more abstract in nature. He also offered a discussion question about managing internal conflict, and shared that he tends to \"isolate\" and reflect independently. Pleasant, cooperative, and engaged throughout groups.       "

## 2019-09-09 NOTE — PLAN OF CARE
"  Pt presents with poor concentration and distractible thinking. Affect blunted; however, affect seen to be full rage intermittently. Pt seen visible in the milieu for majority of shift. Pt seen to be social with peers.     Pt states his appetite is \"good\" but goes on to explain that he has \"gained a lot of weight from eating here.\" Pt states his sleep hygiene is \"fine.\" When asked if Pt is experiencing any adverse/side effects to medications, Pt states \"my hunger is up and I have a cramp in my neck. I had a cramp in my neck when I first came here and it recently came back.\" Temperature WDL. Pt not seen to have muscle rigidity. RN informed of Pt's complaint. Pt also complains of chronic middle back pain. Pt explains this back pain has been present for 2-3 years. Pt offered PRN medication. Pt was given an icy hot patch upon request.     Pt rates his anxiety a 2-3/10 and his depression a 4/10. Pt denies wish to be dead. Pt denies SI/SIB/HI. When asked if Pt is experiencing any AH/VH, Pt states \"not really today.\"   "

## 2019-09-09 NOTE — PROGRESS NOTES
Writer submitted referrals to the following IRTS Programs; [address], [facility], [facility], [facility], [facility], [facility] [place], [facility], [facility], [facility], [facility] , [facility], [facility]  and [facility]. Writer will follow-up regarding bed availability.

## 2019-09-09 NOTE — PROGRESS NOTES
[facility] Family Peer Support  A Part of the [facility] First Episode of Psychosis Program     Patient Name: Jerel Day  /Age:  1995 (23 year old)  Date of Encounter:  19  Length of Contact: 60 minutes    This writer met with patient's mother and father, Mesha and León, at the [facility] to offer resources and support.     Discussion topics included process of being admitted to an IRTS, potential living scenarios post-IRTS, pros and cons of group homes, and boundaries pertaining to behaviors at home.    Mesha and León shared concerns about Jerel returning home due to previous disruptive behaviors, such as playing his guitar late at night.  This writer empathized with those challenges and offered information on different types of supportive housing including ILS support.      This writer shared that finding the appropriate living situation may take time due to waiting lists. Coping strategies and boundary setting ideas were discussed.  This writer deferred to the [facility] mental [facility] professionals for guidance in this area.    [facility]    [Billing Code 98495 for No Billable Service as family peer support is a nonbillable service]

## 2019-09-09 NOTE — PROGRESS NOTES
Jerel had a positive evening. He was visible in the milieu. Spent some time playing guHedgeChatterr. He spoke with staff about his length of stay in the hospital. Patient was attempting to increase his understanding about his legal status. Patient said he feels as though his incident involving self-injurious behavior has led to his longer hospital stay. He had a visit with his parents, who reported he was quiet this evening. However, Jerel has been increasingly social with peers and exhibits a pleasant, conversational style when engaging other patients and staff.    Appetite: Good  Pain: N/A  Sleep: Good  SEs: None stated or observed              09/08/19 2200   Behavioral Health   Hallucinations visual;auditory   Thinking poor concentration;delusional   Orientation person: oriented;place: oriented;date: oriented   Memory baseline memory   Insight other (see comment)  (improving)   Judgement impaired   Eye Contact at examiner   Affect full range affect   Mood mood is calm   Physical Appearance/Attire attire appropriate to age and situation   Hygiene well groomed   Suicidality other (see comments)  (denies)   1. Wish to be Dead (Past Month) No   2. Non-Specific Active Suicidal Thoughts (Past Month) No   Elopement   (no behaviors or statements during shift)   Activity other (see comment)  (visible)   Speech clear;coherent   Medication Sensitivity no stated side effects;no observed side effects   Psychomotor / Gait balanced;steady   Activities of Daily Living   Hygiene/Grooming independent   Oral Hygiene independent   Dress independent   Room Organization independent   Behavioral Health Interventions   Social and Therapeutic Interventions (Psychotic Symptoms) encourage socialization with peers;encourage effective boundaries with peers;encourage participation in therapeutic groups and milieu activities

## 2019-09-09 NOTE — PROGRESS NOTES
"[facility], [place]   Psychiatric Progress Note        Interim History:     Reason for hospitalization:  Jerel Day is a 23-year-old single  male, presenting with exacerbation of psychosis and auditory hallucinations that are command, telling him to harm himself.     Subjective: \"I'm ok, bored here\"     Staff report:Jen had a positive evening. He was visible in the milieu. Spent some time playing gu[employer]. He spoke with staff about his length of stay in the hospital. Patient was attempting to increase his understanding about his legal status. Patient said he feels as though his incident involving self-injurious behavior has led to his longer hospital stay. He had a visit with his parents, who reported he was quiet this evening. However, Jerel has been increasingly social with peers and exhibits a pleasant, conversational style when engaging other patients and staff.    As per today's meeting: Patient was seen in the milieu, social with peers. Had some strange thoughts about his mind and perceptions, but was mild/moderately fixed on them, and not overtly obsessive. He denied SI/HI/AH/VH. Feels he has more control over this thoughts, but is worried about his weight gain, and the \"slowness\" sedation he feels due to the Zyprexa.            Medications:       ARIPiprazole  15 mg Oral Daily     menthol   Transdermal Q8H     OLANZapine  15 mg Oral At Bedtime          Allergies:     Allergies   Allergen Reactions     Penicillins      Rash, Generalized          Labs:   No results found for this or any previous visit (from the past 24 hour(s)).       Psychiatric Examination:     /86   Pulse 92   Temp 98  F (36.7  C) (Oral)   Resp 16   Wt 71.8 kg (158 lb 4.8 oz)   SpO2 97%   BMI 23.72 kg/m    Weight is 158 lbs 4.8 oz  Body mass index is 23.72 kg/m .  Orthostatic Vitals       Most Recent      Standing Orthostatic /85 08/15 0900    Standing Orthostatic Pulse (bpm) 63 " "08/15 0900          Appearance: awake, alert and adequately groomed.  Attitude:  evasive and guarded, less agitated though irritable  Eye Contact:  fair  Mood: \"Frustrated\"  Affect: mood congruent, blunted  Speech:  rambling, coherent  Psychomotor Behavior:  no evidence of tardive dyskinesia, dystonia, or tics  Throught Process:  illogical though more organized, goal oriented  Associations:  loosening of associations present  Thought Content:  Denies SI, SIB, HI. No overt evidence of psychosis on examination.   Insight:  limited  Judgement:  fair  Oriented to:  time, person, and place  Attention Span and Concentration:  fair  Recent and Remote Memory:  fair    Clinical Global Impressions  First:  Considering your total clinical experience with this particular patient population, how severe are the patient's symptoms at this time?: 6 (08/14/19 1120)  Compared to the patient's condition at the START of treatment, this patient's condition is:: 6 (08/14/19 1120)  Most recent:  Considering your total clinical experience with this particular patient population, how severe are the patient's symptoms at this time?: 6 (08/14/19 1120)  Compared to the patient's condition at the START of treatment, this patient's condition is:: 6 (08/14/19 1120)         Precautions:     Behavioral Orders   Procedures     Assault precautions     Pt has been violent to self and shows the potential to be violent with others.     Code 1 - Restrict to Unit     Code 2     For CT only on 8/16     Elopement precautions     Routine Programming     As clinically indicated     Self Injury Precaution     Patient hit his head against the wall multiple times.     Status 15     Every 15 minutes.     Suicide precautions     Patients on Suicide Precautions should have a Combination Diet ordered that includes a Diet selection(s) AND a Behavioral Tray selection for Safe Tray - with utensils, or Safe Tray - NO utensils    Command Hallucinations to harm self       "    Diagnoses:     1.  Schizoaffective disorder.   2.  Cannabis use disorder.          Plan:     Legal status: MI Committed with [facility].      Medication management:     - Abilify 15 mg Daily (hospital does not have Abilify Maintenna, and given it is the only medication that patient seems to have tolerated, we will continue the oral formulation of this for now).   - Start PM dose of Abilify 2 mg at 1600 starting 9/13.   - Taper down Zyprexa: 10 mg for 2 days, then 5 mg for 2 days    Medical: UTOX positive for cannabinoids  8/15 MRI: Impression:   1. No acute intracranial pathology.  2. Air-fluid levels with frothy material in the maxillary sinuses.  This can be seen with acute sinusitis.  3. Soft tissue swelling along the right and central aspect of the  frontal bone. No underlying calvarial fracture.  Internal medicine to follow patient for head injury.     Behavioral/psychology/social: Encouraged patient to attend therapeutic programming as tolerated.   No roommate order. Patient is psychotic and paranoid. Continued hallucinations. Would reassess in AM.     Disposition plan: Likely eventual discharge to an IRTS setting.         Jordan Charles MD  [facility] Psychiatry

## 2019-09-09 NOTE — PROGRESS NOTES
Writer met with pt and explained irts referral process. Pt states that if he has to stay here another 2 weeks he is going to have a problem.

## 2019-09-10 ENCOUNTER — THERAPY VISIT (OUTPATIENT)
Dept: PSYCHIATRY | Facility: CLINIC | Age: 24
End: 2019-09-10
Payer: COMMERCIAL

## 2019-09-10 DIAGNOSIS — F25.0 SCHIZOAFFECTIVE DISORDER, BIPOLAR TYPE (H): Primary | ICD-10-CM

## 2019-09-10 PROCEDURE — 25000132 ZZH RX MED GY IP 250 OP 250 PS 637: Performed by: PSYCHIATRY & NEUROLOGY

## 2019-09-10 PROCEDURE — 99233 SBSQ HOSP IP/OBS HIGH 50: CPT | Performed by: PSYCHIATRY & NEUROLOGY

## 2019-09-10 PROCEDURE — 99207 ZZC NO CHARGE VISIT/PATIENT NOT SEEN: CPT | Performed by: PHYSICIAN ASSISTANT

## 2019-09-10 PROCEDURE — G0177 OPPS/PHP; TRAIN & EDUC SERV: HCPCS

## 2019-09-10 PROCEDURE — H2032 ACTIVITY THERAPY, PER 15 MIN: HCPCS

## 2019-09-10 PROCEDURE — 12400001 ZZH R&B MH UMMC

## 2019-09-10 RX ORDER — ARIPIPRAZOLE 10 MG/1
20 TABLET ORAL DAILY
Status: DISCONTINUED | OUTPATIENT
Start: 2019-09-11 | End: 2019-09-18 | Stop reason: HOSPADM

## 2019-09-10 RX ORDER — ARIPIPRAZOLE 2 MG/1
2 TABLET ORAL DAILY
Status: DISCONTINUED | OUTPATIENT
Start: 2019-09-13 | End: 2019-09-10

## 2019-09-10 RX ORDER — OLANZAPINE 10 MG/1
10 TABLET ORAL AT BEDTIME
Status: DISCONTINUED | OUTPATIENT
Start: 2019-09-10 | End: 2019-09-10

## 2019-09-10 RX ORDER — ARIPIPRAZOLE 2 MG/1
2 TABLET ORAL ONCE
Status: COMPLETED | OUTPATIENT
Start: 2019-09-10 | End: 2019-09-10

## 2019-09-10 RX ORDER — OLANZAPINE 10 MG/2ML
10 INJECTION, POWDER, FOR SOLUTION INTRAMUSCULAR AT BEDTIME
Status: DISCONTINUED | OUTPATIENT
Start: 2019-09-10 | End: 2019-09-18 | Stop reason: HOSPADM

## 2019-09-10 RX ADMIN — OLANZAPINE 15 MG: 5 TABLET, ORALLY DISINTEGRATING ORAL at 21:28

## 2019-09-10 RX ADMIN — ARIPIPRAZOLE 2 MG: 2 TABLET ORAL at 18:06

## 2019-09-10 RX ADMIN — MENTHOL 1 PATCH: 205.5 PATCH TOPICAL at 07:47

## 2019-09-10 RX ADMIN — ARIPIPRAZOLE 15 MG: 10 TABLET ORAL at 07:47

## 2019-09-10 ASSESSMENT — ACTIVITIES OF DAILY LIVING (ADL)
HYGIENE/GROOMING: INDEPENDENT
ORAL_HYGIENE: INDEPENDENT
LAUNDRY: WITH SUPERVISION
DRESS: INDEPENDENT
DRESS: INDEPENDENT
HYGIENE/GROOMING: INDEPENDENT
ORAL_HYGIENE: INDEPENDENT

## 2019-09-10 NOTE — PROGRESS NOTES
"Observed Jerel throwing glass of water commenting he was \"pissy\" after meeting with Dr Deutsch. Later, he asked writer if the nurses names would be on the notes from medical records. When questioned why he was asking, he stated he felt a nurse's notes where responsible for him not discharging today. He appeared tense and agitated. He was able to calm himself while working on his art project.   "

## 2019-09-10 NOTE — PROGRESS NOTES
09/09/19 2212   Behavioral Health   Hallucinations denies / not responding to hallucinations   Thinking poor concentration   Orientation   (Adequately oriented)   Memory baseline memory   Insight insight appropriate to situation   Judgement impaired   Eye Contact at examiner   Affect full range affect   Mood mood is calm   Physical Appearance/Attire attire appropriate to age and situation   Hygiene neglected grooming - unclean body, hair, teeth   Suicidality other (see comments)  (Pt denies)   Self Injury other (see comment)  (Pt denies)   Psychomotor / Gait balanced;steady     Pt was out and about the unit, attending groups and actively participating in majority of the groups. Pt denies SI and SIB. Pt ate meals and expressed excitement about interview with a group he thinks will be helpful to him. Pt was totally engaged upon approach though at times incongruent and distractible.  Pt played guitar off rhythm and was obviously unpleasant to the hearing of other peers as it was overly loud.Pt overall, had a good shift. Pt will definitely benefit from shower and oral hygiene as he did not seem motivated to get one during this shift.

## 2019-09-10 NOTE — PROGRESS NOTES
"[facility], [place]   Psychiatric Progress Note        Interim History:     Reason for hospitalization:  Jerel Day is a 23-year-old single  male, presenting with exacerbation of psychosis and auditory hallucinations that are command, telling him to harm himself.     Subjective: \"Doing okay\"     Staff report: Jerel denies SI/SIB. He was engaged upon approach, though at times incongruent and distractible. Not motivated attend to ADLs. Reports feeling \"jittery.\" Appears calm and cooperative. Today, pt noted to be speaking tangentially with bizarre metaphors. Reporting hearing a voices following phone interview with IRTs.     As per today's meeting: Patient was seen in the milieu, social with peers. Met with this writer. He said that he is feeling \"clearer.\" He mentioned that he experiences memories of old friends entering his brain at times. He discussed reincarnation. Statements were at times disorganized and nonsensical. I expressed my concerns about ongoing symptoms and recommendation to continue to optimize Abilify while holding taper of Zyprexa. He became very angry, stating that the plan has changed several times under three different providers. He said that he is not experiencing any symptoms. He said \"Is this all because I said something weird about a hat?!\" He then said \"What do I need to do, bash my head in again, or get louder?\" Writer needed to terminate interview due to patient's escalating anger. Upon leaving the interview room, patient threw his glass of water on the floor.          Medications:       ARIPiprazole  15 mg Oral Daily     [START ON 9/13/2019] ARIPiprazole  2 mg Oral Daily     menthol   Transdermal Q8H     OLANZapine  10 mg Oral At Bedtime     [START ON 9/11/2019] OLANZapine  5 mg Oral At Bedtime          Allergies:     Allergies   Allergen Reactions     Penicillins      Rash, Generalized          Labs:   No results found for this or any previous " "visit (from the past 24 hour(s)).       Psychiatric Examination:     /82   Pulse 73   Temp 98.2  F (36.8  C) (Tympanic)   Resp 16   Wt 71.3 kg (157 lb 3 oz)   SpO2 97%   BMI 23.55 kg/m    Weight is 157 lbs 3.01 oz  Body mass index is 23.55 kg/m .  Orthostatic Vitals       Most Recent      Standing Orthostatic /85 08/15 0900    Standing Orthostatic Pulse (bpm) 63 08/15 0900          Appearance: awake, alert and adequately groomed.  Attitude:  evasive and guarded, irritable and agitated  Eye Contact:  intense  Mood: \"Frustrated\"  Affect: mood congruent, blunted  Speech:  rambling, coherent  Psychomotor Behavior:  no evidence of tardive dyskinesia, dystonia, or tics  Throught Process:  illogical, disorganized  Associations:  loosening of associations present  Thought Content:  Denies SI, SIB, HI. Disorganized though denied AH/VH during interview.   Insight:  limited  Judgement:  fair  Oriented to:  time, person, and place  Attention Span and Concentration:  fair  Recent and Remote Memory:  fair    Clinical Global Impressions  First:  Considering your total clinical experience with this particular patient population, how severe are the patient's symptoms at this time?: 6 (08/14/19 1120)  Compared to the patient's condition at the START of treatment, this patient's condition is:: 6 (08/14/19 1120)  Most recent:  Considering your total clinical experience with this particular patient population, how severe are the patient's symptoms at this time?: 6 (08/14/19 1120)  Compared to the patient's condition at the START of treatment, this patient's condition is:: 6 (08/14/19 1120)         Precautions:     Behavioral Orders   Procedures     Assault precautions     Pt has been violent to self and shows the potential to be violent with others.     Code 1 - Restrict to Unit     Code 2     For CT only on 8/16     Elopement precautions     Routine Programming     As clinically indicated     Self Injury Precaution    "  Patient hit his head against the wall multiple times.     Status 15     Every 15 minutes.     Suicide precautions     Patients on Suicide Precautions should have a Combination Diet ordered that includes a Diet selection(s) AND a Behavioral Tray selection for Safe Tray - with utensils, or Safe Tray - NO utensils    Command Hallucinations to harm self          Diagnoses:     1.  Schizoaffective disorder.   2.  Cannabis use disorder.          Plan:     Legal status: MI Committed with [facility].      Medication management:     - Increase Abilify to 20 mg daily (hospital does not have Abilify Maintenna, and given it is the only medication that patient seems to have tolerated, we will continue the oral formulation of this for now).   - Due to observed decompensation, will hold current dose of Zyprexa at 15 mg at bedtime for now. May increase further if ongoing sx of psychosis vs optimize Abilify if tolerating    Medical: UTOX positive for cannabinoids  8/15 MRI: Impression:   1. No acute intracranial pathology.  2. Air-fluid levels with frothy material in the maxillary sinuses.  This can be seen with acute sinusitis.  3. Soft tissue swelling along the right and central aspect of the  frontal bone. No underlying calvarial fracture.  Internal medicine to follow patient for head injury.     Behavioral/psychology/social: Encouraged patient to attend therapeutic programming as tolerated.   No roommate order. Patient is psychotic and paranoid. Continued hallucinations. Would reassess in AM.     Disposition plan: Likely eventual discharge to an IRTS setting when stable.         Fanny Lazaro MD  [facility] Psychiatry

## 2019-09-10 NOTE — PROGRESS NOTES
Behavioral Health  Note    Behavioral Health  Spirituality Group Note    UNIT 10NB    Name: Jerel Day YOB: 1995   MRN: [id number] Age: 23 year old      Patient attended -led group, which included discussion of spirituality, coping with illness and building resilience.    Patient attended group for 1 hrs.    The patient actively participated in group discussion and patient demonstrated an appreciation of topic's application for their personal circumstances.      Vitaly Zavala  Chaplain Resident  Pager [phone]

## 2019-09-10 NOTE — PLAN OF CARE
"Problem: OT General Care Plan  Goal: OT Goal 1  Pt will demonstrate consistent engagement in OT group activities that support recovery as evidenced by participating in >1 scheduled OT group/day for 5 days.      Pt attended 1 out of 2 OT groups offered. Pt actively participated in about half (no charge) of occupational therapy clinic. Pt was able to ask for assistance as needed, and independently returned to a creative expression task. Pt demonstrated good focus, planning, and attention to detail. Occasionally social with peers throughout, and reported having \"a bad meeting with the doctor.\" Pt actively participated in a structured occupational therapy group with a focus on a visual-spatial leisure task. Pt was able to follow 2-step directions of the novel task, and demonstrated strategic planning and problem solving throughout task. Pt remained focused for full duration of group. Pleasant, cooperative, and engaged throughout groups.     "

## 2019-09-10 NOTE — PLAN OF CARE
BEHAVIORAL TEAM DISCUSSION    Participants: 4A Provider: Dr. Hyacinth Lazaro MD; 4A RN's: Pavan Lane, RN; 4A CTC's: Heather Spain (CTC).  Progress: No Change.  Continued Stay Criteria/Rationale: paranoia/SI/HI   Medical/Physical: None  Precautions:    Behavioral Orders   Procedures    Assault precautions     Pt has been violent to self and shows the potential to be violent with others.    Code 1 - Restrict to Unit    Code 2     For CT only on 8/16    Elopement precautions    Routine Programming     As clinically indicated    Self Injury Precaution     Patient hit his head against the wall multiple times.    Status 15     Every 15 minutes.    Suicide precautions     Patients on Suicide Precautions should have a Combination Diet ordered that includes a Diet selection(s) AND a Behavioral Tray selection for Safe Tray - with utensils, or Safe Tray - NO utensils    Command Hallucinations to harm self     Plan: The following services will be provided to the patient; psychiatric assessment, medication management, therapeutic milieu, individual and group support, art therapy, and skills/OT groups.   Rationale for change in precautions or plan: N/A

## 2019-09-10 NOTE — PROGRESS NOTES
"0800 icy hot menthol patch applied to center back. He reports it is beneficial.    Declined lidocaine solution, he states the sores in his mouth are better.     C/o side effects of feeling jittery, \"elevated\", intense. He reports jitters are slowly improving. Appears calm, cooperative.   "

## 2019-09-10 NOTE — PROGRESS NOTES
"Patient speaking tangentially with bizarre metaphors but is oriented to where he is and understands that he may be discharging to an IRTS; seems generally hopeful about this plan. Denying HI/SI/SIB. Says he \"heard one voice after I hung up the phone [with IRTS staff]\" and that the voice was \"just one of my usual ones,\" criticizing him for being \"too positive\" during the conversation.       09/10/19 0900   Behavioral Health   Hallucinations auditory   Thinking delusional;intact  (waxing and waning during conversation)   Orientation person: oriented;place: oriented;date: oriented;time: oriented   Memory baseline memory   Insight insight appropriate to situation;insight appropriate to events   Judgement impaired   Eye Contact at examiner   Affect full range affect   Mood mood is calm   Physical Appearance/Attire attire appropriate to age and situation   Hygiene well groomed   Suicidality other (see comments)  (denies)   1. Wish to be Dead (Past Month) No   2. Non-Specific Active Suicidal Thoughts (Past Month) No   Self Injury   (no concerns)   Psychomotor / Gait balanced;steady   Activities of Daily Living   Hygiene/Grooming independent   Oral Hygiene independent   Dress independent   Laundry with supervision   Room Organization independent   Loc Bonner on 9/10/2019 at 9:59 AM    "

## 2019-09-10 NOTE — PROGRESS NOTES
"   09/09/19 2030   Group Therapy Session   Group Attendance attended group session   Total Time (minutes) 40   Group Type psychotherapeutic   Group Topic Covered other (see comments)   Patient Participation/Contribution cooperative with task;discussed personal experience with topic;listened actively   CBT activity was utilized to help individuals identify and process a problem while considering various coping strategies.    Jerel reported being in an \"even mood.\" He engage in the activity and processed with the group. His \"problem\" was related to developing positive friendships.  Jerel engaged the group in a good discussion about friendships, boundaries, and self-confidence.   "

## 2019-09-10 NOTE — PROGRESS NOTES
Brief Medicine Note    HSV swabs resulted and are negative for HSV 1/2. Continue supportive treatment with viscous lidocaine prn. Per charting, it appears that pt is tolerating oral intake without difficulty and sores are improving.    Medicine will sign off, please page the on-call provider if any intercurrent concerns.      Sharee Pérez PA-C  Hospitalist Service  Pager [phone]

## 2019-09-11 PROCEDURE — G0177 OPPS/PHP; TRAIN & EDUC SERV: HCPCS

## 2019-09-11 PROCEDURE — 12400001 ZZH R&B MH UMMC

## 2019-09-11 PROCEDURE — 25000132 ZZH RX MED GY IP 250 OP 250 PS 637: Performed by: PSYCHIATRY & NEUROLOGY

## 2019-09-11 RX ADMIN — ARIPIPRAZOLE 20 MG: 10 TABLET ORAL at 09:07

## 2019-09-11 RX ADMIN — MENTHOL 1 PATCH: 205.5 PATCH TOPICAL at 09:07

## 2019-09-11 RX ADMIN — OLANZAPINE 15 MG: 5 TABLET, ORALLY DISINTEGRATING ORAL at 21:57

## 2019-09-11 ASSESSMENT — ACTIVITIES OF DAILY LIVING (ADL)
ORAL_HYGIENE: INDEPENDENT
HYGIENE/GROOMING: INDEPENDENT
DRESS: INDEPENDENT
HYGIENE/GROOMING: INDEPENDENT
ORAL_HYGIENE: INDEPENDENT
LAUNDRY: WITH SUPERVISION
DRESS: INDEPENDENT

## 2019-09-11 NOTE — PROGRESS NOTES
Pt remained in his room from beginning of writers shift (1500) to 1845. Pt spent this time napping and reading. Since 1845 pt remained in the milieu for the rest of the night. Pt socialized, played some guitar and participated in the evening therapy group. During check in, pt stated that he was feeling a little down today. Pt spoke about an impending discharge next week, which pt is excited about. Pt denies SI and SIB       09/10/19 2000   Behavioral Health   Hallucinations auditory   Thinking delusional   Orientation person: oriented;place: oriented;date: oriented;time: oriented   Memory baseline memory   Insight insight appropriate to situation   Judgement intact   Eye Contact at examiner   Affect full range affect;blunted, flat   Mood mood is calm   Physical Appearance/Attire attire appropriate to age and situation   Hygiene neglected grooming - unclean body, hair, teeth   1. Wish to be Dead (Past Month) No   2. Non-Specific Active Suicidal Thoughts (Past Month) No   Self Injury other (see comment)  (denies)   Activity (WDL) WDL   Speech (WDL) WDL   Psychomotor Gait (WDL) WDL   Activities of Daily Living   Hygiene/Grooming independent   Oral Hygiene independent   Dress independent   Room Organization independent

## 2019-09-11 NOTE — PLAN OF CARE
Problem: Psychotic Symptoms  Goal: Psychotic Symptoms  Description  Signs and symptoms of listed problems will be absent or manageable.  Outcome: No Change     Jerel appears more tense today. He refused initial medication pass. Gave him time to cool down. He did take his medications. He c/o of side effects that are nonsensical- vague hand sensations, odd feeling in head. C/o medications has cause him to gain weight, up 9 lbs.     Isolative to his room. When he is out, he appears to be listening to staff conversations or staring off.    He remains upset with Dr Deutsch for increasing his medications. Given phone number for Patient Relations.      He made a vague threat- he might not be able to control his anger when he meets with Dr Lazaro. Notified Dr Lazaro via web text. Zulma Hyman aware.

## 2019-09-11 NOTE — PROGRESS NOTES
"NAVIGATE Medication Management Progress Note  A Part of the [facility] First Episode of Psychosis Program    NAVIGATE Enrollee: Jerel Day (1995)     MRN: [id number]  Date:  6/26/19         Contributors to the Assessment     Chart Reviewed.   Interview completed with Jerel Day.         Chief Complaint       \"Things are fine\"           Interim History      Jerel Day is a 23 year old male who was last seen in clinic on 3/20/19 at which time pt was doing ok overall. The patient reports poor treatment adherence.  History was provided by Jerel who was a fair historian.  Since the last visit:  The patient says he is doing ok however he endorses frequent hallucinations, paranoia(being tracked through his phone which he destroyed)  somatic delusions and today was attacked and bitten by a dog while walking outside on the sidewalk. He showed writer minor puncture wound without bleeding that did not appear infected and was counseled to dress the wound and go see his PCP.  The patient also has discontinued his medications since his commitment ended and while open to talking about it and willing to consider it has so far refused to restart.  Discussed starting cariprazine as it may have a better side effect profile for him.  He says he will consider taking it.    PSYCH ROS:  Clinician Rating Form in [facility]  1. Depressed Mood: Rating 2  0 Not reported    1 Very mild occasionally feels sad or  down ; of questionable clinical significance   2 Mild occasionally feels moderately depressed or often feels sad or  down    3 Moderate occasionally feels very depressed or often feels moderately depressed   4 Moderately severe often feels very depressed   5 Severe feels very depressed most of the time   6 Very severe constant extremely painful feelings of depression   U Unable to assess      2. Anxiety/Worry: Rating: 3  0 Not reported    1 Very mild occasionally feels a little anxious; of questionable clinical significance   2 Mild " occasionally feels moderately anxious or often feels a little anxious or worried   3 Moderate occasionally feels very anxious or often feels moderately anxious   4 Moderately severe often feels very anxious or often feels moderately anxious   5 Severe feels very anxious or worried most of the time   6 Very severe patient is continually preoccupied with severe anxiety   U Unable to assess      3. Suicidal Ideation/Behavior: Ratin   0 Not reported    1 Very mild occasional thoughts of dying,  I d be better off dead  or  I wish I were dead    2 Mild frequents thoughts of dying or occasional thoughts of killing self, without plan or method   3 Moderate often thinks of suicide or has though of a specific method   4 Moderately severe has mentally rehearsed a specific method of suicide or has made a suicide attempt with questionable intent to die (e.r. takes aspirins and then tells family)   5 Severe has made preparations for a potentially lethal suicide attempt (e.g acquires a gun and bullets for an attempt)   6 Very severe has made a suicide attempt with an intent to die   U Unable to assess       4. Elevated/Expansive Mood: Ratin  0 Not at all    1 Very mild questionable; more cheerful than most people in his/her circumstances but of only possible clinical significance   2 Mild brief elevated/expansive mood but only somewhat out of proportion to the circumstances   3 Moderate brief/occasional elevation of mood which is clearly out of proportion to the circumstances   4 Moderately severe sustained/frequent elevation of mood which is clearly out of proportion to the circumstances   5 Severe mood is euphoric most of the time   6 Very severe sustained elevation;  everything is wonderful  almost all of the time   U Unable to assess      5. Hostility/Anger/Irritability/Aggressiveness: Ratin  0 Not at all    1 Very mild occasional irritability of doubtful clinical significance   2 Mild occasionally feels angry or  mild or indirect expressions of anger, e.g. sarcasm, disrespect or hostile gestures   3 Moderate frequently feels angry, frequent irritability or occasional direct expression of anger, e.g. yelling at others   4 Moderately severe often feels very angry, often yells at others or occasionally threatens to harm others   5 Severe has acted on his anger by becoming physically abusive on one or two occasions or makes frequent threats to harm others or is very angry most of the time   6 Very severe has been physically aggressive and/or required intervention to prevent assaultiveness on several occasions; or any serious assaultive act   U Unable to assess      6. Impulsive Behavior: Ratin  0 Not at all    1 Very mild one instance of impulsive behavior which is of doubtful clinical significance   2 Mild occasional impulsive acts, e.g. making phone calls at odd hours   3 Moderate occasional impulsive acts with some potential negative consequence, e.g. leaving work abruptly; changing plans without thinking   4 Moderately severe impulsive acts with definite negative consequences, e.g. overspending on non-essentials; repeated reckless sexual behavior   5 Severe impulsive acts with direct negative consequences, e.g. spends entire income on nonessentials without regard for basic needs   6 Very severe impulsive behavior which is potentially life threatening, e.g. jumps from dangerous height (without suicidal intent) or criminal behavior, e.g. impulsive robbery   U Unable to assess      7. Suspiciousness: Ratin  0 Not present    1 Very mild Seems on guard. Reluctant to respond to some  personal  questions. Reports being overly self-conscious in public   2 Mild Describes incidents in which others have harmed or wanted to harm him/her that sound plausible. Patient feels as if others are watching, laughing, or criticizing him/her in public, but this occurs only occasionally or rarely. Little or no preoccupation   3 Moderate  Says others are talking about him/her maliciously, have negative intentions, or may harm him/her. Beyond the likelihood of plausibility, but not delusional. Incidents of suspected persecution occur occasionally (less than once per week) with some preoccupation   4 Moderately severe Same as 3, but incidents occur frequently such as more than once a week. Patient is moderately preoccupied with ideas of persecution OR patient reports persecutory delusions expressed with much doubt (e.g. partial delusion)   5 Severe Delusional -- speaks of [employer] plots, the FBI, or others poisoning his/her food, persecution by supernatural forces   6 Extremely severe Same as 5, but the beliefs are bizarre or more preoccupying. Patient tends to disclose or act on persecutory delusions.   U Unable to assess      8. Unusual Thought Content: Ratin  0 Not present    1 Very mild Ideas of reference (people may stare or may laugh at him), ideas of persecution (people may mistreat him). Unusual beliefs in psychic esquivel, spirits, UFOs, or unrealistic beliefs in one's own abilities. Not strongly held. Some doubt   2 Mild Same as 1, but degree of reality distortion is more severe as indicated by highly unusual ideas or greater conviction. Content may be typical of delusions (even bizarre), but without full conviction. The delusion does not seem to have fully formed, but is considered as one possible explanation for an unusual experience   3 Moderate Delusion present but no preoccupation or functional impairment. May be an encapsulated delusion or a firmly endorsed absurd belief about past delusional circumstances   4 Moderately severe Full delusion(s) present with some preoccupation OR some areas of functioning disrupted by delusional thinking   5 Severe Full delusion(s) present with much preoccupation OR many areas of functioning are disrupted by delusional thinking   6 Extremely severe Full delusions present with almost   U Unable to assess       9. Hallucinations: Ratin  0 Not present    1 Very mild While resting or going to sleep, sees visions, smells odors, or hears voices, sounds or whispers in the absence of external stimulation, but no impairment in functioning   2 Mild While in a clear state of consciousness, hears a voice calling the subject s name, experiences non-verbal auditory hallucinations (e.g., sounds or whispers), formless visual hallucinations, or has sensory experiences in the presence of a modality-relevant stimulus (e.g., visual illusions) infrequently (e.g., 1-2 times per week) and with no functional impairment   3 Moderate Occasional verbal, visual, gustatory, olfactory, or tactile hallucinations with no functional impairment OR non-verbal auditory hallucinations/visual illusions more than infrequently or with impairment   4 Moderately severe Experiences daily hallucinations OR some areas of functioning are disrupted by hallucinations   5 Severe Experiences verbal or visual hallucinations several times a day OR many areas of functioning are disrupted by these hallucinations   6 Extremely severe Persistent verbal or visual hallucinations throughout the day OR most areas of functioning are disrupted by these hallucinations   U Unable to assess      10. Conceptual Disorganization: Ratin  0 Not present    1 Very mild Peculiar use of words or rambling but speech is comprehensible   2 Mild Speech a bit hard to understand or make sense of due to tangentiality, circumstantiality, or sudden topic shifts   3 Moderate Speech difficult to understand due to tangentiality, circumstantiality, idiosyncratic speech, or topic shifts on many occasions OR 1-2 instances of incoherent phrases   4 Moderately severe Speech difficult to understand due to circumstantiality, tangentiality, neologisms, blocking, or topic shifts most of the time OR 3-5 instances of incoherent phrases   5 Severe Speech is incomprehensible due to severe impairments most  of the time. Many PSRS items cannot be rated by self-report alone   6 Extremely severe Speech is incomprehensible throughout interview   U Unable to assess      11. Avolition/Apathy: Ratin  0 Not at all    1 Very mild questionable decrease in time spent in goal-directed activities   2 Mild spends less time in goal-directed activities than is appropriate for situation and age   3 Moderate initiates activities at times but does not follow through   4 Moderately severe rarely initiates activity but will passively engage with encouragement   5 Severe almost never initiates activities; requires assistance to accomplish basic activities   6 Very severe does not initiate or persist in any goal-directed activity even with outside assistance   U Unable to assess      12. Asociality/Low Social Drive: Ratin  0 Not at all    1 Very mild questionable   2 Mild slow to initiate social interactions but usually responds to overtures by others   3 Moderate rarely initiates social interactions; sometimes responds to overtures by others   4 Moderately severe does not initiate but sometimes responds to overtures by others; little social interaction outside close family members   5 Severe never initiates and rarely encourages conversations or activities; avoids being with others unless prodded, may have contacts with family   6 Very severe avoids being with others (even family members) whenever possible, extreme social isolation   U Unable to assess      13. Adherence: Days: not taking any meds at this time  The longest, continuous amount of time in days, since the last visit when the subject did not take medication     14. EPS Part I: Ratin  Rate Elbow Rigidity for all subjects  0 Normal   1 Slight stiffness and resistance   2 Moderate stiffness and resistance   3 Marked rigidity with difficulty in passive movement   4 Extreme stiffness and rigidity with almost a frozen joint   U Unable to assess           EPS Part 2: Signs  of EPS: 0  Are there are other signs of EPS (eg diminished arm swing, postural instability, cogwheeling, tremor, akinesia) present based upon patient report or exam?  0 No   1 Yes     15. Akathesia: Ratin  0 No restlessness reported or observed   1 Mild restlessness observed; e.g., occasional jiggling of the foot occurs when subject is seated   2 Moderate restlessness observed; e.g., on several occasions, jiggles foot, crosses and uncrosses legs or twists a part of the body   3 Restlessness is frequently observed; e.g., the foot or legs moving most of the time   4 Restlessness persistently observed; e.g., subject cannot sit still, may get up and walk   U Unable to assess     16. Dyskinetic Movement Ratings: Ratin  0 None   1 Minimal, may be extreme normal   2 Mild   3 Moderate   4 Severe   U Unable to assess     SIDE EFFECT ASSESSMENT:  Clinician Rating Form in [facility] - Side Effect Assessment  Address side effects reported by the patient and rate using this scale  0 Not present   1 Minimal, may be extreme normal   2 Mild   3 Moderate   4 Severe   U Unable to assess   P Present, but not related     Feeling dizzy or faint: 0  Blurred vision: 0  Dry mouth: 0   Too much saliva/droolin  Nausea: 0  Constipation: 0  Increased appetite: 0  Weight gain: 0  Weight loss: 0  Feeling tired/fatigue: 0  Daytime sedation: 0  Hypersomnia: 0  Insomnia: 0  Low libido: 0  Other problems with sex: 0  Breast enlargement or discharge: 0  Irregular Menstruation or amenorrhea: NA  Other (please list and rate): 0    RECENT SUBSTANCE USE:  Clinician Rating Form in [facility] - Substance Use Assessment  Alcohol Use Severity: Ratin  0 none   1 use without impairment: drinks but no immediate social or medical impairment   2 use with impairment: e.g. becomes grossly intoxicated; alcohol use or withdrawal compromises school, work or social functioning; alcohol use or withdrawal exacerbates symptoms (e.g. gets depressed when drinking)      Marijuana Use Severity: Ratin  0 none   1 occasional use without impairment: e.g. uses marijuana a few days a month and has no immediate social or medical impairment   2 frequent use without impairment: e.g. uses marijuana several or more days a week but has no immediate social or medical impairment   3 use with impairment: e.g. becomes grossly intoxicated; marijuana use compromises school, work or social functioning; marijuana use exacerbates symptoms (e.g. gets paranoid when using)     Other Drug Use Severity: Ratin  0 none   1 occasional use without impairment: e.g. uses drug(s) a few days a month and has no immediate social or medical impairment   2 frequent use without impairment: e.g. uses drug(s) several or more days a week but has no immediate social or medical impairment   3 use with impairment: e.g. becomes grossly intoxicated; drug use compromises school, work or social functioning; drug use exacerbates symptoms (e.g. gets paranoid when using)         CURRENT SOCIAL HISTORY:  FINANCIAL SUPPORT- family or friend       CHILDREN- none       LIVING SITUATION- Currently staying with his parent's [facility] in [place], [place] but plans to move back to his apartment [place] campus    SOCIAL/ SPIRITUAL SUPPORT- unknown       FEELS SAFE AT HOME- Yes      MEDICAL ROS:  Reports none      Denies akathisia, unusual movements and wt gain   10 point ROS neg other than the symptoms noted above in the HPI. Patient does report multiple physical concern including back, neck, hip and foot pain, concerns about metabolism. These complaints have been evaluated by sports medicine and primary care with unremarkable findings. PT exercises have been recommended.      Of note, history is positive for multiple sports injuries (former ) including concussion x2 with LOC once; ankle and hip injuries; scoliosis. His disorganized communication is likely interfering with an adequate health assessment.         First Episode of  Psychosis History      DUP (duration untreated psychosis):  Likely first experienced auditory hallucinations during the spring of 2016, possibly in the context of cannabis and LSD use. Did not seek treatment until behavior and presentation became significantly disorganized in January 2017. Medication adherence has been poor over the course of this last year.   Route to initial care: ED for disorganized behavior, somatic concerns   Medication adherence overall:  Fair to poor  General frequency of visits:  Recommend weekly to biweekly  Participation in groups:  none  Cognitive Remediation:  none  Other treatment history: See pertinent background      Reviewed for completion of First Episode work-up:  Yes  First episode workup:  Not Done (if completed, see LABS for results)  MATRICS Consensus Cognitive Battery:  Not Done (if completed, see LABS for results)         Medical/Surgical History     Penicillins    Patient Active Problem List   Diagnosis     Schizoaffective disorder (H)     Psychosis (H)            Medications     No current outpatient medications on file.     Previous medication trials:  Zoloft- stopped because of ASE (?)  fluoxetine  Risperidone 1 mg, reported akathisia at 3mg dose  Haloperidol 5mg bid, reported dystonia relieved with benadryl   Divalproex 750mg  Quetiapine 300mg- discontinued 1/2/18  Olanzapine 10mg- discontinued 1/29/18, EPS?  Alprazolam 0.5-1mg PRN          Vitals     There were no vitals taken for this visit.      Weight prior to medication: 130s         Mental Status Exam     Alertness: alert and oriented  Appearance: disheveled   Behavior/Demeanor: cooperative, with good eye contact   Speech: spontaneous but mild delay  Language: intact  Psychomotor: fidgety  Mood: anxious but ok   Affect: full range; was congruent to mood; was congruent to content  Thought Process/Associations: frequent loose associations and neologisms  Thought Content:  Reports passive SI (without intent or plan),  delusions, preoccupations,  paranoid ideation;  Denies obsessions , phobia   Perception:  Reports auditory hallucinations;  Denies visual hallucinations  Insight: poor  Judgment: limited  Cognition: does  appear grossly intact; formal cognitive testing was not done  Memory: grossly intack  Gait/station: normal  Fund of knowledge: normal       Labs and Data     RATING SCALES:  AIMS: done 3/5/18 with total score of 0     PHQ-9 SCORE 6/27/2019 7/3/2019 8/9/2019   PHQ-9 Total Score 13 11 18       ANTIPSYCHOTIC LABS ROUTINE    [glu, A1C, lipids (focus LDL), liver enzymes, WBC, ANEU, Hgb, plts]   q12 mo  Recent Labs   Lab Test 01/03/18  1054   GLC 73     Recent Labs   Lab Test 01/03/18  1054   CHOL 135   TRIG 93   LDL 70   HDL 46     Recent Labs   Lab Test 01/03/18  1054   AST 19   ALT 37   ALKPHOS 66     Recent Labs   Lab Test 01/03/18  1054   WBC 8.0   ANEU 5.5   HGB 13.9               Psychiatric Diagnoses     Schizoaffective disorder, bipolar type (F25.0)         Assessment     This patient is a 23 year old male with a hx of schizoaffective do, currently off of medication and commitment due to a filing error.  Pt psychotic symptoms are poorly controlled and significantly impacting functioning.       [place] PRESCRIPTION MONITORING PROGRAM [] was not checked today:  last ALPRAZOLAM 0.5 MG TABLET  dispensed 12/01/2017    PSYCHOTROPIC DRUG INTERACTIONS:   No major interactions.  Concomitant use of aripiprazole and lithium may increase risk for EPS     MANAGEMENT:  Monitoring for adverse effects, routine vitals, routine labs, using lowest therapeutic dose of [olanzapine and lithium] and patient is aware of risks         Plan     1) PSYCHOTROPIC MEDICATIONS:  Start cariprazine 1.5mg daily    2) THERAPY:  Continue IRT and SEE    3) REFERRALS:    No Referrals needed    4) RTC: 1 month    5) CRISIS NUMBERS:   Provided routinely in AVS.    TREATMENT RISK STATEMENT:  The risks, benefits, alternatives and potential adverse  effects have been discussed and are understood by the pt. The pt understands the risks of using street drugs or alcohol. There are no medical contraindications, the pt agrees to treatment with the ability to do so. The pt knows to call the clinic for any problems or to access emergency care if needed.  Medical and substance use concerns are documented above.  Psychotropic drug interaction check was done, including changes made today.    Time spent with patient  >45min    PROVIDER:  Vince Zheng M.D., Ph.D.     Dept. of Psychiatry   [facility]

## 2019-09-11 NOTE — PROGRESS NOTES
09/10/19 2200   Therapeutic Recreation   Type of Intervention structured groups   Activity game   Response Participates, initiates socially appropriate   Hours 1     Pt participated in Therapeutic Recreation group with focus on stress reduction, creative expression, and leisure participation. Engaged and cooperative in group recreational group via a group drawing game. Pt participated throughout entire duration of the group. Pt added to group discussion, sociable, and was appropriate with interactions. Showed progress in session goals. Pt mood was calm. Appropriately shared sense of humor with peers during group and appeared to brighten with social interaction.

## 2019-09-11 NOTE — PLAN OF CARE
Problem: OT General Care Plan  Goal: OT Goal 1  Pt will demonstrate consistent engagement in OT group activities that support recovery as evidenced by participating in >1 scheduled OT group/day for 5 days.      Pt attended 1 out of 3 OT groups offered. Pt actively participated in a structured occupational therapy group with a focus on facilitating social and leisure engagement. Pt was able to follow 2 step directions of the familiar task. Pt remained attentive and engaged for the full duration of group. Affect appeared to brighten throughout group, as evidenced by frequent laughter with peers. Appropriately shared sense of humor with peers throughout.

## 2019-09-12 PROCEDURE — H2032 ACTIVITY THERAPY, PER 15 MIN: HCPCS

## 2019-09-12 PROCEDURE — 99232 SBSQ HOSP IP/OBS MODERATE 35: CPT | Performed by: PSYCHIATRY & NEUROLOGY

## 2019-09-12 PROCEDURE — 12400001 ZZH R&B MH UMMC

## 2019-09-12 PROCEDURE — 25000132 ZZH RX MED GY IP 250 OP 250 PS 637: Performed by: PSYCHIATRY & NEUROLOGY

## 2019-09-12 RX ADMIN — ARIPIPRAZOLE 20 MG: 10 TABLET ORAL at 08:59

## 2019-09-12 RX ADMIN — OLANZAPINE 15 MG: 5 TABLET, ORALLY DISINTEGRATING ORAL at 21:38

## 2019-09-12 RX ADMIN — MENTHOL 1 PATCH: 205.5 PATCH TOPICAL at 08:59

## 2019-09-12 ASSESSMENT — ACTIVITIES OF DAILY LIVING (ADL)
LAUNDRY: UNABLE TO COMPLETE
HYGIENE/GROOMING: INDEPENDENT
HYGIENE/GROOMING: INDEPENDENT
LAUNDRY: UNABLE TO COMPLETE
DRESS: INDEPENDENT
DRESS: INDEPENDENT
ORAL_HYGIENE: INDEPENDENT
ORAL_HYGIENE: INDEPENDENT

## 2019-09-12 NOTE — PROGRESS NOTES
Writer returned call to pt's mother who wanted more information about the IRTS. Pt provided information and answered questions.

## 2019-09-12 NOTE — PLAN OF CARE
"Problem: OT General Care Plan  Goal: OT Goal 1  Pt will demonstrate consistent engagement in OT group activities that support recovery as evidenced by participating in >1 scheduled OT group/day for 5 days.      Pt actively participated in the second half (no charge) of a structured occupational therapy group with a focus on self-reflection through discussing and identifying emotions. Pt appeared comfortable sharing past experiences regarding both desirable and undesirable emotions with peers, and respectfully listened and responded to peers when it was their turn to share. When prompted to identify the last time he felt hostile, he stated \"when I met with the doctor yesterday.\" He reported feeling happy after learning more details about his discharge date. He continues to be calm, pleasant, cooperative, and engaged throughout all groups he attends.        "

## 2019-09-12 NOTE — PROGRESS NOTES
Pt was active in the milieu and attended most of the groups this shift. Pt had a meeting today with his doctor, two staff members sat in the meeting with them. Pt was frustrated with his required medications but was able to stay calm throughout the meeting. Pt reported his sleep was poor last night. Pt had no concerns about his appetite. Pt reported having auditory hallucinations earlier in the shift but nothing currently. Pt described the auditory hallucinations as voices telling him negative relationship stuff. Pt denied SI and SIB.        09/12/19 1400   Behavioral Health   Hallucinations auditory   Thinking delusional;distractable   Orientation time: oriented;date: oriented;place: oriented;person: oriented   Memory baseline memory   Insight poor   Judgement impaired   Eye Contact at examiner   Affect full range affect   Mood mood is calm   Physical Appearance/Attire attire appropriate to age and situation   Hygiene well groomed   Suicidality   (denied)   1. Wish to be Dead (Past Month) No   2. Non-Specific Active Suicidal Thoughts (Past Month) No   Self Injury   (denied)   Elopement Statements about wanting to leave;Distress about legal situation (holds for mental health or criminal)   Activity   (active in milieu)   Speech coherent;clear   Medication Sensitivity no stated side effects   Psychomotor / Gait steady;balanced   Activities of Daily Living   Hygiene/Grooming independent   Oral Hygiene independent   Dress independent   Laundry unable to complete   Room Organization independent

## 2019-09-12 NOTE — PROGRESS NOTES
"[facility], [place]   Psychiatric Progress Note        Interim History:     Reason for hospitalization:  Jerel Day is a 23-year-old single  male, presenting with exacerbation of psychosis and auditory hallucinations that are command, telling him to harm himself.     Subjective: \"frustrated\"     Staff report: Pt appeared more tense on 9/11. He was reluctant to take scheduled medications, and reported side effects that were \"nonsensical-vague hand sensations, odd feeling in head.\" Also reported wt gain of up to 9 lbs on Zyprexa. He made vague threats toward this writer about possible inability to control his anger when meeting with me. Attending some groups. Had a better evening. He was visible in milieu and social with peers. He had a good visit with his parents.     As per today's meeting: Patient was seen in dance group, social with peers. Met with this writer with psych associates present for safety purposes given vague threats noted above. Pt asked several questions about his medication regimen. He had a difficult time understanding why two antipsychotics are indicated at this time. He feels that he does not need to be taking antipsychotic medications. He said that he has had side effect of wt gain from Zyprexa. He was informed of longer term plan to taper Zyprexa with ultimate goal of achieving Abilify monotherapy. Pt did appear to exhibit psychotic symptoms, such as suspecting that \"someone else is influencing my body by changing my dopamine levels.\" He remains hyper-focused on \"cracking in my back and feet.\"  He remains tense and agitated with regards to ongoing hospitalization, recommendation for antipsychotic medication, and court involvement. Addressed questions/concerns for approximately 30 minutes. Pt was able to remain mostly calm during our meeting today.          Medications:       ARIPiprazole  20 mg Oral Daily     menthol   Transdermal Q8H     OLANZapine " "zydis  15 mg Oral At Bedtime    Or     OLANZapine  10 mg Intramuscular At Bedtime          Allergies:     Allergies   Allergen Reactions     Penicillins      Rash, Generalized          Labs:   No results found for this or any previous visit (from the past 24 hour(s)).       Psychiatric Examination:     /73   Pulse 93   Temp 97.4  F (36.3  C) (Tympanic)   Resp 16   Wt 71.3 kg (157 lb 3 oz)   SpO2 97%   BMI 23.55 kg/m    Weight is 157 lbs 3.01 oz  Body mass index is 23.55 kg/m .  Orthostatic Vitals       Most Recent      Standing Orthostatic /85 08/15 0900    Standing Orthostatic Pulse (bpm) 63 08/15 0900          Appearance: awake, alert and disheveled  Attitude:  evasive and guarded, irritable though less agitated  Eye Contact:  intense  Mood: \"Frustrated\"  Affect: mood congruent, blunted  Speech:  rambling, coherent  Psychomotor Behavior:  no evidence of tardive dyskinesia, dystonia, or tics  Throught Process:  illogical though mostly linear, goal oriented  Associations:  loosening of associations present  Thought Content:  Denies SI, SIB, HI. Disorganized though denied AH/VH during interview. Ongoing evidence of paranoia and thought broadcasting  Insight:  limited  Judgement:  fair  Oriented to:  time, person, and place  Attention Span and Concentration:  fair  Recent and Remote Memory:  fair    Clinical Global Impressions  First:  Considering your total clinical experience with this particular patient population, how severe are the patient's symptoms at this time?: 6 (08/14/19 1120)  Compared to the patient's condition at the START of treatment, this patient's condition is:: 6 (08/14/19 1120)  Most recent:  Considering your total clinical experience with this particular patient population, how severe are the patient's symptoms at this time?: 6 (08/14/19 1120)  Compared to the patient's condition at the START of treatment, this patient's condition is:: 6 (08/14/19 1120)         Precautions: "     Behavioral Orders   Procedures     Assault precautions     Pt has been violent to self and shows the potential to be violent with others.     Code 1 - Restrict to Unit     Code 2     For CT only on 8/16     Elopement precautions     Routine Programming     As clinically indicated     Self Injury Precaution     Patient hit his head against the wall multiple times.     Status 15     Every 15 minutes.     Suicide precautions     Patients on Suicide Precautions should have a Combination Diet ordered that includes a Diet selection(s) AND a Behavioral Tray selection for Safe Tray - with utensils, or Safe Tray - NO utensils    Command Hallucinations to harm self          Diagnoses:     1.  Schizophrenia vs Schizoaffective disorder.   2.  Cannabis use disorder.          Plan:     Legal status: MI Committed with [facility].      Medication management:     - Resume Abilify 20 mg daily (hospital does not have Abilify Maintenna, and given it is the only medication that patient seems to have tolerated, we will continue the oral formulation of this for now).   - Due to recently observed decompensation in context of Zyprexa taper, will hold current dose of Zyprexa at 15 mg at bedtime for now. May increase further if ongoing sx of psychosis vs optimize Abilify if tolerating    Medical: UTOX positive for cannabinoids  8/15 MRI: Impression:   1. No acute intracranial pathology.  2. Air-fluid levels with frothy material in the maxillary sinuses.  This can be seen with acute sinusitis.  3. Soft tissue swelling along the right and central aspect of the  frontal bone. No underlying calvarial fracture.  Internal medicine to follow patient for head injury.     Behavioral/psychology/social: Encouraged patient to attend therapeutic programming as tolerated.   No roommate order. Patient is psychotic and paranoid. Continued hallucinations. Would reassess in AM.     Disposition plan: Tentative plan is to discharge patient to IR on 9/18  pending bed availability and response to recent medication changes.        Fanny Lazaro MD  [facility]

## 2019-09-12 NOTE — PROGRESS NOTES
Jerel expressed appreciation for male staff presence during meeting with provider.     Consider having male staff present at future meetings with provider.

## 2019-09-12 NOTE — PROGRESS NOTES
Jerel had a positive evening. He was visible in the milieu and social with others. His mood was calm. He had a good visit with his parents, who report he was quite talkative today. He attended and participated in groups.                  09/11/19 2100   Behavioral Health   Hallucinations denies / not responding to hallucinations   Thinking delusional   Orientation person: oriented;place: oriented   Memory other (see comment)  (BHUPENDRA)   Insight denial of illness   Judgement impaired   Eye Contact at examiner   Affect full range affect   Mood mood is calm   Physical Appearance/Attire attire appropriate to age and situation   Hygiene well groomed   Suicidality other (see comments)  (denies)   1. Wish to be Dead (Past Month) No   2. Non-Specific Active Suicidal Thoughts (Past Month) No   Change in Protective Factors? No   Enviromental Risk Factors None   Self Injury other (see comment)  (denies)   Elopement Statements about wanting to leave   Activity other (see comment)  (visible)   Speech clear;coherent   Medication Sensitivity no stated side effects;no observed side effects   Psychomotor / Gait balanced;steady   Safety   Assault status 15   Elopement status 15;no shoes;behavioral scrubs (pajamas)   Activities of Daily Living   Hygiene/Grooming independent   Oral Hygiene independent   Dress independent   Room Organization independent   Activity   Activity Assistance Provided independent

## 2019-09-13 PROCEDURE — H2032 ACTIVITY THERAPY, PER 15 MIN: HCPCS

## 2019-09-13 PROCEDURE — G0177 OPPS/PHP; TRAIN & EDUC SERV: HCPCS

## 2019-09-13 PROCEDURE — 12400001 ZZH R&B MH UMMC

## 2019-09-13 PROCEDURE — 99231 SBSQ HOSP IP/OBS SF/LOW 25: CPT | Performed by: PSYCHIATRY & NEUROLOGY

## 2019-09-13 PROCEDURE — 25000132 ZZH RX MED GY IP 250 OP 250 PS 637: Performed by: PSYCHIATRY & NEUROLOGY

## 2019-09-13 RX ADMIN — ARIPIPRAZOLE 20 MG: 10 TABLET ORAL at 08:59

## 2019-09-13 RX ADMIN — OLANZAPINE 15 MG: 5 TABLET, ORALLY DISINTEGRATING ORAL at 19:56

## 2019-09-13 ASSESSMENT — ACTIVITIES OF DAILY LIVING (ADL)
ORAL_HYGIENE: INDEPENDENT
ORAL_HYGIENE: INDEPENDENT
HYGIENE/GROOMING: INDEPENDENT
DRESS: INDEPENDENT
HYGIENE/GROOMING: INDEPENDENT
DRESS: INDEPENDENT
LAUNDRY: UNABLE TO COMPLETE

## 2019-09-13 NOTE — PROGRESS NOTES
"[facility], [place]   Psychiatric Progress Note        Interim History:     Reason for hospitalization:  Jerel Day is a 23-year-old single  male, presenting with exacerbation of psychosis and auditory hallucinations that are command, telling him to harm himself.     Subjective: \"fine\"     Staff report: Patient felt more comfortable having male staff present during meetings with this provider. Requesting to have male psych associate present during future sessions. Pt reported AH early in day yesterday, stating that they are voices telling him negative relationship stuff. Pt denied SI, SIB. Actively participating in groups. Calm and cooperative on unit with no episodes of aggressive behavior/agitation.     As per today's meeting: Met with this writer with psych associate present for safety purposes given recent vague threats made toward this writer earlier this week. Pt said that he is \"fine.\" He said that his mental health is \"fine.\" When asked if he is feeling ready to go to [facility], he replied \"I have been ready for the past 1,000 hours.\" He again minimized severity of symptoms at time of admission and need for 72 hr hold. He said that he continues to experience back pain 2/2 \"stretching in my room one night\" several months ago. He has been taking Tylenol at times. He inquired about mechanism of action of Tylenol and what receptors it works on. He feels his mood is low. He denies AH/VH/paranoia/SI/HI. He requests order to wear his own clothing, which was granted. Pt was able to remain calm during our meeting today.          Medications:       ARIPiprazole  20 mg Oral Daily     menthol   Transdermal Q8H     OLANZapine zydis  15 mg Oral At Bedtime    Or     OLANZapine  10 mg Intramuscular At Bedtime          Allergies:     Allergies   Allergen Reactions     Penicillins      Rash, Generalized          Labs:   No results found for this or any previous visit (from the past 24 " "hour(s)).       Psychiatric Examination:     /84 (BP Location: Right arm)   Pulse 74   Temp 97.4  F (36.3  C) (Tympanic)   Resp 16   Wt 71.4 kg (157 lb 8 oz)   SpO2 98%   BMI 23.60 kg/m    Weight is 157 lbs 8 oz  Body mass index is 23.6 kg/m .  Orthostatic Vitals       Most Recent      Standing Orthostatic /85 08/15 0900    Standing Orthostatic Pulse (bpm) 63 08/15 0900          Appearance: awake, alert and disheveled  Attitude:  evasive and guarded, irritable though less agitated  Eye Contact:  intense  Mood: \"Frustrated\"  Affect: mood congruent, blunted  Speech:  rambling, coherent  Psychomotor Behavior:  no evidence of tardive dyskinesia, dystonia, or tics  Throught Process:  illogical though mostly linear, goal oriented  Associations:  loosening of associations present  Thought Content:  Denies SI, SIB, HI. Disorganized though denied AH/VH during interview. Ongoing evidence of paranoia and thought broadcasting  Insight:  limited  Judgement:  fair  Oriented to:  time, person, and place  Attention Span and Concentration:  fair  Recent and Remote Memory:  fair    Clinical Global Impressions  First:  Considering your total clinical experience with this particular patient population, how severe are the patient's symptoms at this time?: 6 (08/14/19 1120)  Compared to the patient's condition at the START of treatment, this patient's condition is:: 6 (08/14/19 1120)  Most recent:  Considering your total clinical experience with this particular patient population, how severe are the patient's symptoms at this time?: 6 (08/14/19 1120)  Compared to the patient's condition at the START of treatment, this patient's condition is:: 6 (08/14/19 1120)         Precautions:     Behavioral Orders   Procedures     Assault precautions     Pt has been violent to self and shows the potential to be violent with others.     Code 1 - Restrict to Unit     Code 2     For CT only on 8/16     Elopement precautions     " Routine Programming     As clinically indicated     Self Injury Precaution     Patient hit his head against the wall multiple times.     Status 15     Every 15 minutes.     Suicide precautions     Patients on Suicide Precautions should have a Combination Diet ordered that includes a Diet selection(s) AND a Behavioral Tray selection for Safe Tray - with utensils, or Safe Tray - NO utensils    Command Hallucinations to harm self          Diagnoses:     1.  Schizophrenia vs Schizoaffective disorder.   2.  Cannabis use disorder.          Plan:     Legal status: MI Committed with [facility].      Medication management:     - Resume Abilify 20 mg daily (hospital does not have Abilify Maintenna, and given it is the only medication that patient seems to have tolerated, we will continue the oral formulation of this for now).   - Due to recently observed decompensation in context of Zyprexa taper, will hold current dose of Zyprexa at 15 mg at bedtime for now. May increase further if ongoing sx of psychosis vs optimize Abilify if tolerating    Medical: UTOX positive for cannabinoids  8/15 MRI: Impression:   1. No acute intracranial pathology.  2. Air-fluid levels with frothy material in the maxillary sinuses.  This can be seen with acute sinusitis.  3. Soft tissue swelling along the right and central aspect of the  frontal bone. No underlying calvarial fracture.  Internal medicine to follow patient for head injury.     Behavioral/psychology/social: Encouraged patient to attend therapeutic programming as tolerated.   No roommate order. Patient is psychotic and paranoid. Continued hallucinations. Would reassess in AM.     Disposition plan: Tentative plan is to discharge patient to [facility] on 9/18 pending bed availability and response to recent medication changes.        Fanny Lazaro MD  [facility]

## 2019-09-13 NOTE — PROGRESS NOTES
"Pt asked to be woken up before the session and did wake up to attend the session.  He was highly engaged and a leader in the session.  At one point he exclaimed:  \"where have I been for all these groups?!\"  This was his first DMT session and he responded well to both DMT interventions and group processes.       09/12/19 1015   Dance Movement Therapy   Type of Intervention structured groups   Response participates with encouragement   Hours 1     "

## 2019-09-13 NOTE — PROGRESS NOTES
Patient reports that he cannot feel himself, denies suicidal ideations, hallucinations, self-injurious thoughts and depression. He rates his anxiety at five and mood at five out of ten. Patient complained that he's getting some rash on his left hand, and was looked at by the RN.  He appears calm, socially withdrawn, displays blunted affects, and accepts redirections.     09/13/19 5417   Behavioral Health   Hallucinations denies / not responding to hallucinations   Thinking distractable   Orientation person: oriented;place: oriented   Memory baseline memory   Insight admits / accepts   Judgement   (Good)   Eye Contact at examiner   Affect blunted, flat   Mood mood is calm   Physical Appearance/Attire appears stated age;attire appropriate to age and situation   Hygiene well groomed   Suicidality other (see comments)  (Pt denies)   1. Wish to be Dead (Past Month) No   2. Non-Specific Active Suicidal Thoughts (Past Month) No   Self Injury other (see comment)  (Pt denies)   Elopement   (No concern)   Activity withdrawn   Speech clear;coherent   Medication Sensitivity no stated side effects;no observed side effects   Psychomotor / Gait balanced;steady   Overt Aggression Scale   Verbal Aggression 0   Aggression against Property 0   Auto-Aggression 0   Physical Aggression 0   Overt Aggression Total Score 0   Coping/Psychosocial   Verbalized Emotional State acceptance;anxiety   Activities of Daily Living   Hygiene/Grooming independent   Oral Hygiene independent   Dress independent   Room Organization independent

## 2019-09-13 NOTE — PLAN OF CARE
Problem: Depressive Symptoms  Goal: Depressive Symptoms  Description  Signs and symptoms of listed problems will be absent or manageable.  Outcome: Improving  Pt has been calm, social and visible in the milieu.  Pt attended all groups including Art therapy.  Pt denies depression and anxiety this shift.    Problem: Suicidal Behavior  Goal: Suicidal Behavior is Absent or Managed  Outcome: Improving  Pt denied SI/SIB.  Pt watched a movie and played the Navidogitar.  Pt did not exhibit any negative behaviors this shift.  Pt did not require any restraints or seclusion this shift.  Pt had a good visit with his brother.  To monitor and offer support as needed.

## 2019-09-13 NOTE — PLAN OF CARE
"Problem: OT General Care Plan  Goal: OT Goal 1  Pt will demonstrate consistent engagement in OT group activities that support recovery as evidenced by participating in >1 scheduled OT group/day for 5 days.      Pt attended 2 out of 3 OT groups offered. Pt actively participated in occupational therapy clinic. Pt was able to ask for assistance as needed, and independently initiated a familiar creative expression task. Pt demonstrated good focus, planning, problem solving, and attention to detail. Social with peers and writer throughout, and appeared to enjoy discussing how music helps him cope with his mental illness. Pt actively participated in a mental health management group with a focus on coping through movement to facilitate relaxation and stress management via chair yoga. Pt followed and engaged in 100% of the yoga poses, and shared that he enjoyed all movements other than the \"twists,\" and explained \"they got my lungs in a knot.\" He continues to be calm, pleasant, and cooperative throughout groups.        "

## 2019-09-13 NOTE — PROGRESS NOTES
"Pt had a good shift. Pt was excited to get his clothes back. At check in pt stated no to depression \"I just feel dull but I think it is because of the food\". \"to spice up my life I would always eat out or different foods and I cant do that here\" Pt stated not really but last night I had \"storm anxiety, It was like my bones were shaking, so last night I didn't sleep much but I am feeling better\". Pt stated no to SI/SIB/AH/VH. Only concern pt stated was weight gain and he wants to exercise more while he is here.      09/13/19 1436   Behavioral Health   Hallucinations denies / not responding to hallucinations   Thinking distractable;delusional   Orientation person: oriented;place: oriented;date: oriented;time: oriented   Memory baseline memory   Insight poor   Judgement impaired   Eye Contact at examiner   Affect full range affect   Mood mood is calm   Physical Appearance/Attire attire appropriate to age and situation   Hygiene other (see comment)  (adequate)   Suicidality other (see comments)  (pt denies )   1. Wish to be Dead (Past Month) No   2. Non-Specific Active Suicidal Thoughts (Past Month) No   Self Injury other (see comment)  (pt denies )   Elopement Statements about wanting to leave   Activity other (see comment)  (present in the milieu )   Speech clear;coherent   Medication Sensitivity no stated side effects;no observed side effects   Psychomotor / Gait balanced;steady   Activities of Daily Living   Hygiene/Grooming independent   Oral Hygiene independent   Dress independent   Laundry unable to complete   Room Organization independent     "

## 2019-09-13 NOTE — PROGRESS NOTES
09/13/19 2200   Art Therapy   Type of Intervention structured groups- participated   Treatment Detail    (Art Therapy - metaphor about letting go- season fall   Problem- 1.  Schizoaffective disorder.   2.  Cannabis use disorder.   3.  History of medication noncompliance      Goal- cope, regulate and self expression through Art Therapy directives.     Outcome- pt was cooperative and pleasant. His thoughts were clearer and more linear. He enjoyed talking about music especially. He likes to play multiple instruments, He said he wants to let go of his substance use and he talked about developing a practice of yoga and art, but he said the intranet gets in the way of having a daily art practice but he will try.

## 2019-09-14 PROCEDURE — 25000132 ZZH RX MED GY IP 250 OP 250 PS 637: Performed by: PSYCHIATRY & NEUROLOGY

## 2019-09-14 PROCEDURE — 25000132 ZZH RX MED GY IP 250 OP 250 PS 637: Performed by: CLINICAL NURSE SPECIALIST

## 2019-09-14 PROCEDURE — H2032 ACTIVITY THERAPY, PER 15 MIN: HCPCS

## 2019-09-14 PROCEDURE — 12400001 ZZH R&B MH UMMC

## 2019-09-14 RX ADMIN — ARIPIPRAZOLE 20 MG: 10 TABLET ORAL at 09:09

## 2019-09-14 RX ADMIN — ACETAMINOPHEN 650 MG: 325 TABLET, FILM COATED ORAL at 02:01

## 2019-09-14 RX ADMIN — OLANZAPINE 15 MG: 5 TABLET, ORALLY DISINTEGRATING ORAL at 22:13

## 2019-09-14 ASSESSMENT — ACTIVITIES OF DAILY LIVING (ADL)
DRESS: INDEPENDENT
ORAL_HYGIENE: INDEPENDENT
HYGIENE/GROOMING: INDEPENDENT
DRESS: INDEPENDENT
HYGIENE/GROOMING: INDEPENDENT
LAUNDRY: WITH SUPERVISION
ORAL_HYGIENE: INDEPENDENT

## 2019-09-14 NOTE — PROGRESS NOTES
Participated in Music Therapy group with focus on mood elevation, validation and decreasing anxiety and improved group cohesiveness. Engaged and cooperative in music listening interventions.   Showed progress in session goals. Spoke tangentially and nonsensically at times.  Good social engagement in group.

## 2019-09-14 NOTE — PLAN OF CARE
48 Hour assessment     Problem: Depressive Symptoms  Goal: Depressive Symptoms  Description  Signs and symptoms of listed problems will be absent or manageable.  Outcome: Improving    Patient denies feelings of depression today. Said he slept really well and did not have back pain.      Problem: Depressive Symptoms  Goal: Social and Therapeutic (Depression)  Description  Signs and symptoms of listed problems will be absent or manageable.  Outcome: Improving   Patient is more present in the milieu and social with peers.     Goal for Shift: stay afloat and  manage MH symptoms     Goal status: completed    D: Pt presents with full range affect. Patient is seemingly organized in conversation. Patient presents well-groomed. Patient continues to be medication compliant.     Patient denies SI/SIB/HI. He also denies anxiety and depression.     A: Provided active listening, emotional encouragement and goal setting, safety planning safety checks q 15min, and medication administration.    R: Patient was behaviorally appropriate during this shift. Did not require any restraints or seclusion.

## 2019-09-14 NOTE — PROGRESS NOTES
Patient participated in group activities, and reported that he feels good. He denies suicidal ideations, anxiety, self-injurious thoughts, rates his depression at four, and mood at seven out of ten. Patient stated that he hears voices sometimes. He appears calm, pleasant, relates well with others, displays incongruent affect, and follows directions.     09/14/19 1836   Behavioral Health   Hallucinations auditory   Thinking distractable   Orientation place: oriented;person: oriented   Memory baseline memory   Insight admits / accepts   Judgement   (Good)   Eye Contact at examiner   Affect incongruent   Mood mood is calm   Physical Appearance/Attire appears stated age;attire appropriate to age and situation   Hygiene well groomed   Suicidality other (see comments)  (Denies)   1. Wish to be Dead (Past Month) No   2. Non-Specific Active Suicidal Thoughts (Past Month) No   Self Injury other (see comment)  (Pt denies)   Elopement   (No concern)   Activity withdrawn   Speech coherent;clear   Medication Sensitivity no observed side effects;no stated side effects   Psychomotor / Gait balanced;steady   Coping/Psychosocial   Verbalized Emotional State acceptance;depression   Safety   Assault status 15   Elopement status 15   Activities of Daily Living   Hygiene/Grooming independent   Oral Hygiene independent   Dress independent   Room Organization independent   Activity   Activity Assistance Provided independent   Behavioral Health Interventions   Social and Therapeutic Interventions (Psychotic Symptoms) encourage socialization with peers;encourage effective boundaries with peers;encourage participation in therapeutic groups and milieu activities

## 2019-09-15 PROCEDURE — 25000132 ZZH RX MED GY IP 250 OP 250 PS 637: Performed by: PSYCHIATRY & NEUROLOGY

## 2019-09-15 PROCEDURE — 12400001 ZZH R&B MH UMMC

## 2019-09-15 RX ADMIN — OLANZAPINE 15 MG: 5 TABLET, ORALLY DISINTEGRATING ORAL at 20:24

## 2019-09-15 RX ADMIN — ARIPIPRAZOLE 20 MG: 10 TABLET ORAL at 09:31

## 2019-09-15 ASSESSMENT — ACTIVITIES OF DAILY LIVING (ADL)
DRESS: INDEPENDENT
HYGIENE/GROOMING: INDEPENDENT
DRESS: INDEPENDENT;SCRUBS (BEHAVIORAL HEALTH)
HYGIENE/GROOMING: INDEPENDENT
ORAL_HYGIENE: INDEPENDENT
ORAL_HYGIENE: INDEPENDENT

## 2019-09-15 NOTE — PROGRESS NOTES
Pt took part in some group activities during the shift. He reported that he feels happy, but bored. Patient denies all mental health symptoms and rates his mood at four out of ten. He expects to be discharged on Wednesday, but would like to know from his  if he may be allowed to be discharged on Monday or Tuesday. Overall, patient appears calm, pleasant socially appropriate, displays blunted affect, and follows directions.     09/15/19 4186   Behavioral Health   Hallucinations denies / not responding to hallucinations   Thinking distractable   Orientation place: oriented;person: oriented   Memory baseline memory   Insight admits / accepts   Judgement   (Good)   Eye Contact at examiner   Affect blunted, flat   Mood mood is calm   Physical Appearance/Attire appears stated age;attire appropriate to age and situation   Hygiene well groomed   Suicidality other (see comments)  (Pt denies)   1. Wish to be Dead (Past Month) No   2. Non-Specific Active Suicidal Thoughts (Past Month) No   Self Injury other (see comment)  (Pt denies)   Elopement   (No concern)   Activity other (see comment)  (Participates)   Speech clear;coherent   Medication Sensitivity no observed side effects;no stated side effects   Psychomotor / Gait balanced;steady   Coping/Psychosocial   Verbalized Emotional State acceptance;happiness   Safety   Suicidality Status 15   Assault status 15   Elopement status 15   Activities of Daily Living   Hygiene/Grooming independent   Oral Hygiene independent   Dress independent   Room Organization independent   Activity   Activity Assistance Provided independent   Hygiene Care Assistance   Hygiene Assistance independent   Behavioral Health Interventions   Social and Therapeutic Interventions (Psychotic Symptoms) encourage socialization with peers;encourage effective boundaries with peers;encourage participation in therapeutic groups and milieu activities

## 2019-09-15 NOTE — PROGRESS NOTES
09/15/19 1200 Behavioral Health   Hallucinations denies / not responding to hallucinations   Thinking distractable   Orientation person: oriented;place: oriented;date: oriented;time: oriented   Memory baseline memory   Insight other (see comment)  (fair)   Judgement impaired   Eye Contact at examiner   Affect blunted, flat   Mood mood is calm   Physical Appearance/Attire attire appropriate to age and situation   Hygiene well groomed   Suicidality other (see comments)  (pt denies)   1. Wish to be Dead (Past Month) No   2. Non-Specific Active Suicidal Thoughts (Past Month) No   Self Injury other (see comment)  (pt denies)   Elopement   (none stated or observed)   Activity withdrawn   Speech clear;coherent   Medication Sensitivity no stated side effects;no observed side effects   Psychomotor / Gait balanced;steady   Activities of Daily Living   Hygiene/Grooming independent   Oral Hygiene independent   Dress independent;scrubs (behavioral health)   Room Organization independent     Patient had a fair shift.    Patient did not require seclusion/restraints to manage behavior.    Jerel Day did not participate in groups and was visible in the milieu.    Notable mental health symptoms during this shift:decreased energy  distractable    Patient is working on these coping/social skills: Positive social behaviors    Other information about this shift: Pt said he was tired which is why he slept for a good portion of the shift. Pt thinks it might be from meds but he is unsure. Pt was calm, cooperative, and had no concerns during the shift.

## 2019-09-16 PROCEDURE — 99231 SBSQ HOSP IP/OBS SF/LOW 25: CPT | Performed by: PSYCHIATRY & NEUROLOGY

## 2019-09-16 PROCEDURE — 25000132 ZZH RX MED GY IP 250 OP 250 PS 637: Performed by: CLINICAL NURSE SPECIALIST

## 2019-09-16 PROCEDURE — 25000132 ZZH RX MED GY IP 250 OP 250 PS 637: Performed by: PSYCHIATRY & NEUROLOGY

## 2019-09-16 PROCEDURE — 12400001 ZZH R&B MH UMMC

## 2019-09-16 PROCEDURE — 25000132 ZZH RX MED GY IP 250 OP 250 PS 637: Performed by: PHYSICIAN ASSISTANT

## 2019-09-16 RX ADMIN — ACETAMINOPHEN 650 MG: 325 TABLET, FILM COATED ORAL at 13:23

## 2019-09-16 RX ADMIN — OLANZAPINE 15 MG: 5 TABLET, ORALLY DISINTEGRATING ORAL at 21:04

## 2019-09-16 RX ADMIN — MENTHOL 1 PATCH: 205.5 PATCH TOPICAL at 09:08

## 2019-09-16 RX ADMIN — ARIPIPRAZOLE 20 MG: 10 TABLET ORAL at 09:08

## 2019-09-16 ASSESSMENT — ACTIVITIES OF DAILY LIVING (ADL)
DRESS: INDEPENDENT
HYGIENE/GROOMING: INDEPENDENT
HYGIENE/GROOMING: INDEPENDENT
LAUNDRY: WITH SUPERVISION
DRESS: STREET CLOTHES
ORAL_HYGIENE: INDEPENDENT
ORAL_HYGIENE: INDEPENDENT

## 2019-09-16 NOTE — PROGRESS NOTES
Patient requested tylenol for wrist pain, only wanted partial dose: 325 mg given. Other 325 destroyed.

## 2019-09-16 NOTE — PROGRESS NOTES
Nola ferrari's [facility] cm will stop by on 09/17/2019 around 8:30am to see the pt.

## 2019-09-16 NOTE — PLAN OF CARE
"48 Hour assessment       Problem: Suicidal Behavior  Goal: Suicidal Behavior is Absent or Managed  Outcome: Improving       Pt woke up around and completed morning vitals and medications. He presents with blunted affect. He c/o back pain and requested icy hot patch along with PRN tylenol 325 mg which were given.     Patient endorses AH in the form of \"voices telling me my ex girlfriend is with someone new\" \"My old friend is riding the health care serpent saying others are controlling my movements.\"     Patient presents well-groomed. Patient did note attend groups.Patient continues to be medication compliant.  Patient sleeping through the night.     Patient denies SI/SIB/HI. He endorses anxiety and depression rated 4/10. He is depressed about still being here. He states he still does not agree with the commitment decision \"I don't see what behaviors I was showing that needed me to stay here for a month\" Patient states \"someone thought my thoughts were bizarre but they weren't\"     Provided active listening, emotional encouragement and goal setting, safety planning safety checks q 15min, and medication administration.    Patient was  calm and cooperative. Did not require any restraints or seclusion.     "

## 2019-09-16 NOTE — PROGRESS NOTES
"[facility], [place]   Psychiatric Progress Note        Interim History:     Reason for hospitalization:  Jerel Day is a 23-year-old single  male, presenting with exacerbation of psychosis and auditory hallucinations that are command, telling him to harm himself.     Subjective: \"fine\"     Staff report:  Pt denied SI, SIB. Actively participating in groups. Calm and cooperative on unit with no episodes of aggressive behavior/agitation. Denies anxiety or depressed mood. Does report hearing voices intermittently. Pt reports low energy and distractibility.     As per today's meeting: Met with this writer with psych associate present for safety purposes given recent vague threats made toward this writer last week, and per patient request. Pt said that he is \"fine.\" He said that his mental health is \"fine.\" When asked whether he is experiencing any bothersome thoughts, he said \"A few.\" When asked to elaborate, he replied \"I don't want to go into it because of fear that you wont release me on time if I do.\" Reassured him that this would not be the case, and encouraged him to be forthcoming about symptoms. He said \"Its really a fear that I will slip a disc and die.\" He feels this is a rational fear because he has had an injury to his back in the past, though he denies that he was ever informed that this would result in a disc problem. He said that he continues to have \"shooting pain\" in his back (chronic), and is worried it may lead to something more severe. He denies other symptoms of psychosis though remains guarded with this writer. With regards to side effects, he reports wt gain and sedation. He was offered metformin, though declined.  He denies AH/VH/paranoia/SI/HI. Pt was again able to remain calm during our meeting today. He is looking forward to discharge on Wednesday.          Medications:       ARIPiprazole  20 mg Oral Daily     menthol   Transdermal Q8H     OLANZapine " "zydis  15 mg Oral At Bedtime    Or     OLANZapine  10 mg Intramuscular At Bedtime          Allergies:     Allergies   Allergen Reactions     Penicillins      Rash, Generalized          Labs:   No results found for this or any previous visit (from the past 24 hour(s)).       Psychiatric Examination:     /82 (BP Location: Right arm)   Pulse 74   Temp 97.3  F (36.3  C) (Tympanic)   Resp 16   Wt 71.4 kg (157 lb 8 oz)   SpO2 99%   BMI 23.60 kg/m    Weight is 157 lbs 8 oz  Body mass index is 23.6 kg/m .  Orthostatic Vitals       Most Recent      Standing Orthostatic /85 08/15 0900    Standing Orthostatic Pulse (bpm) 63 08/15 0900          Appearance: awake, alert and disheveled  Attitude: mostly cooperative, evasive and guarded  Eye Contact: poor  Mood: \"Fine\"  Affect: mood congruent, blunted  Speech:  rambling, coherent  Psychomotor Behavior:  no evidence of tardive dyskinesia, dystonia, or tics  Throught Process:  illogical though mostly linear, goal oriented  Associations:  loosening of associations present  Thought Content:  Denies SI, SIB, HI. Possible delusional thought content surrounding back injury, though no other overt symptoms of psychosis reported or observed.  Insight:  limited  Judgement:  fair  Oriented to:  time, person, and place  Attention Span and Concentration:  fair  Recent and Remote Memory:  fair    Clinical Global Impressions  First:  Considering your total clinical experience with this particular patient population, how severe are the patient's symptoms at this time?: 6 (08/14/19 1120)  Compared to the patient's condition at the START of treatment, this patient's condition is:: 6 (08/14/19 1120)  Most recent:  Considering your total clinical experience with this particular patient population, how severe are the patient's symptoms at this time?: 6 (08/14/19 1120)  Compared to the patient's condition at the START of treatment, this patient's condition is:: 6 (08/14/19 1120)        "  Precautions:     Behavioral Orders   Procedures     Assault precautions     Pt has been violent to self and shows the potential to be violent with others.     Code 1 - Restrict to Unit     Code 2     For CT only on 8/16     Elopement precautions     Routine Programming     As clinically indicated     Self Injury Precaution     Patient hit his head against the wall multiple times.     Status 15     Every 15 minutes.     Suicide precautions     Patients on Suicide Precautions should have a Combination Diet ordered that includes a Diet selection(s) AND a Behavioral Tray selection for Safe Tray - with utensils, or Safe Tray - NO utensils    Command Hallucinations to harm self          Diagnoses:     1.  Schizophrenia vs Schizoaffective disorder.   2.  Cannabis use disorder.          Plan:     Legal status: MI Committed with [facility].      Medication management:     - Resume Abilify 20 mg daily (hospital does not have Abilify Maintenna, and given it is the only medication that patient seems to have tolerated, we will continue the oral formulation of this for now).   - Due to recently observed decompensation in context of Zyprexa taper, will continue to hold current dose of Zyprexa at 15 mg at bedtime for now. May increase further if ongoing sx of psychosis vs optimize Abilify if tolerating. Discussed tentative plan to resume taper after period of stability on outpatient basis with ultimate goal to pursue Abilify monotherapy over next several months.     Medical: UTOX positive for cannabinoids  8/15 MRI: Impression:   1. No acute intracranial pathology.  2. Air-fluid levels with frothy material in the maxillary sinuses.  This can be seen with acute sinusitis.  3. Soft tissue swelling along the right and central aspect of the  frontal bone. No underlying calvarial fracture.  Internal medicine to follow patient for head injury.     Behavioral/psychology/social: Encouraged patient to attend therapeutic programming as  tolerated.   No roommate order. Patient continues to experience residual sx of psychosis, including paranoia.    Disposition plan: Tentative plan is to discharge patient to IRTS on 9/18 pending bed availability and response to recent medication changes.        Fanny Lazaro MD  [facility] Psychiat[facility]

## 2019-09-17 PROCEDURE — 12400001 ZZH R&B MH UMMC

## 2019-09-17 PROCEDURE — H2032 ACTIVITY THERAPY, PER 15 MIN: HCPCS

## 2019-09-17 PROCEDURE — 25000132 ZZH RX MED GY IP 250 OP 250 PS 637: Performed by: PSYCHIATRY & NEUROLOGY

## 2019-09-17 PROCEDURE — 25000132 ZZH RX MED GY IP 250 OP 250 PS 637: Performed by: CLINICAL NURSE SPECIALIST

## 2019-09-17 PROCEDURE — G0177 OPPS/PHP; TRAIN & EDUC SERV: HCPCS

## 2019-09-17 RX ORDER — GABAPENTIN 300 MG/1
300 CAPSULE ORAL 2 TIMES DAILY PRN
Qty: 60 CAPSULE | Refills: 1 | Status: SHIPPED | OUTPATIENT
Start: 2019-09-17 | End: 2019-12-04

## 2019-09-17 RX ORDER — OLANZAPINE 15 MG/1
15 TABLET ORAL AT BEDTIME
Qty: 30 TABLET | Refills: 1 | Status: SHIPPED | OUTPATIENT
Start: 2019-09-17 | End: 2019-10-02

## 2019-09-17 RX ORDER — ARIPIPRAZOLE 20 MG/1
20 TABLET ORAL DAILY
Qty: 30 TABLET | Refills: 1 | Status: SHIPPED | OUTPATIENT
Start: 2019-09-18 | End: 2019-10-02

## 2019-09-17 RX ADMIN — ALUMINUM HYDROXIDE, MAGNESIUM HYDROXIDE, AND DIMETHICONE 30 ML: 400; 400; 40 SUSPENSION ORAL at 14:10

## 2019-09-17 RX ADMIN — ARIPIPRAZOLE 20 MG: 10 TABLET ORAL at 09:13

## 2019-09-17 RX ADMIN — OLANZAPINE 15 MG: 5 TABLET, ORALLY DISINTEGRATING ORAL at 21:15

## 2019-09-17 ASSESSMENT — ACTIVITIES OF DAILY LIVING (ADL)
HYGIENE/GROOMING: INDEPENDENT
ORAL_HYGIENE: INDEPENDENT
DRESS: INDEPENDENT
DRESS: INDEPENDENT
LAUNDRY: WITH SUPERVISION
ORAL_HYGIENE: INDEPENDENT
HYGIENE/GROOMING: INDEPENDENT

## 2019-09-17 NOTE — PROGRESS NOTES
Writer spoke with pt's mother and informed her to be here at 9:00am tomorrow morning to transport pt.

## 2019-09-17 NOTE — PLAN OF CARE
BEHAVIORAL TEAM DISCUSSION    Participants: 4A Provider: Dr. Jordan Charles MD; 4A RN's: Marielena Rdz, RN ; 4A CTC's: Heather Spain ([facility]).  Progress: Adequate for discharge .  Continued Stay Criteria/Rationale: Waiting for placement.   Medical/Physical: None  Precautions:    Behavioral Orders   Procedures    Assault precautions     Pt has been violent to self and shows the potential to be violent with others.    Code 1 - Restrict to Unit    Code 2     For CT only on 8/16    Elopement precautions    Routine Programming     As clinically indicated    Self Injury Precaution     Patient hit his head against the wall multiple times.    Status 15     Every 15 minutes.    Suicide precautions     Patients on Suicide Precautions should have a Combination Diet ordered that includes a Diet selection(s) AND a Behavioral Tray selection for Safe Tray - with utensils, or Safe Tray - NO utensils    Command Hallucinations to harm self     Plan: The following services will be provided to the patient; psychiatric assessment, medication management, therapeutic milieu, individual and group support, art therapy, and skills/OT groups.   Rationale for change in precautions or plan: N/A

## 2019-09-17 NOTE — DISCHARGE INSTRUCTIONS
Behavioral Discharge Planning and Instructions      Summary: You were admitted on 8/14/2019 to [address] due to Psychosis.  You were treated by Jordan Charles MD and discharged on 09/18/2019 to [address] [address] [phone]    Principal Diagnosis:   Schizophrenia vs Schizoaffective disorder.   Cannabis use disorder.     Health Care Follow-up Appointments:   Medication Management  Date: 10/02/2019  Time: 4:00pm        Provider: Dr. Vince Zheng  Address: [facility] [place] [address]   Phone:[phone]      Therapy Appointment   Date: 09/25/2019  Time: 6:00pm      Provider: Tammi Connell  Address: [facility] [place] [address]   Phone:[phone]      Family Therapy Appointment   Date: 09/25/2019  Time: 6:00pm      Provider: Ana Rose  Address: [facility] [place] [address]   Phone:[phone]      [facility]    Nola Summers  Phone: [phone]  Fax: [phone]      Attend all scheduled appointments with your outpatient providers. Call at least 24 hours in advance if you need to reschedule an appointment to ensure continued access to your outpatient providers.   Major Treatments, Procedures and Findings: You were provided with: a psychiatric assessment, assessed for medical stability, medication evaluation and/or management, group therapy, art therapy, milieu management, medical interventions and skills/OT groups.    Symptoms to Report: If you experience any of the following symptoms please report them right away to your provider or to family/friends; feeling more aggressive, increased confusion, losing more sleep, mood getting worse or thoughts of suicide.    Early warning signs can include: Early warning signs that could signal a potential relapse could include but  "not limited to the following; increased depression or anxiety sleep disturbances increased thoughts or behaviors of suicide or self-harm increased unusual thinking, such as paranoia or hearing voices.     Safety and Wellness:  Take all medicines as directed.  Make no changes unless your doctor suggests them. Follow treatment recommendations.  Refrain from alcohol and non-prescribed drugs. If there is a concern for safety, call 911.    Resources: Mental health crisis response for your [facility] is offered 24 hours a day, 7 days a week. A trained counselor will assess your current situation, offer support and counseling and connect you with local resources. Please call  [facility] Crisis (COPE) Response - Adult [phone]    Crisis Intervention: 634.425.8900 or 814-180-2277 (TTY: 930.494.2532).  Call anytime for help.  National [place] on Mental Illness (www.[place].johnson.org): 623.281.8555 or 938-968-2368.  Suicide Awareness Voices of Education (SAVE) (www.save.org): 110-157-UVIN (7867)  National Suicide Prevention Line (www.mentalhealthmn.org): 803-249-UWCD (6667)  Mental Health Consumer/Survivor Network of [place] (www.mhcsn.net): 321.471.4044 or 784-371-2807  Mental Health Association of [place] (www.mentalhealth.org): 985.256.8588 or 332-891-1177  Self- Management and Recovery Training., SMART-- Toll free: 679.921.7452  www.[employer].[place]  Text 4 Life: txt \"LIFE\" to 22296 for immediate support and crisis intervention  Crisis text line: Text \"[place]\" to 975924. Free, confidential, 24/7.    The treatment team has appreciated the opportunity to work with you. Jerel, please take care and make your recovery a daily recovery. If you have any questions or concerns our unit number is [phone].  You will be receiving a follow-up phone call within the next three days from a representative from behavioral health.  You have identified the best phone number to reach you as [phone] (home)       "

## 2019-09-17 NOTE — PLAN OF CARE
Problem: OT General Care Plan  Goal: OT Goal 1  Pt will demonstrate consistent engagement in OT group activities that support recovery as evidenced by participating in >1 scheduled OT group/day for 5 days.      Pt attended 2 out of 2 OT groups offered. Pt actively participated in occupational therapy clinic. Pt was able to ask for assistance as needed, and independently initiated a familiar creative expression task. Pt demonstrated good focus, planning, and attention to detail. He was less social during this group today, and chose to wear his headphones for a majority of group. Pt actively participated in a structured occupational therapy group with a focus on visuospatial problem solving and social engagement via a group game. Pt demonstrated understanding of the novel 3-step task after an initial explanation. Pt remained focused and engaged throughout full duration of group. Strategic in his task approach, and appropriately offered assistance to peers at times. More social with peers during this group, and appeared to brighten with social interaction. He continues to be pleasant, cooperative, and engaged throughout groups.

## 2019-09-17 NOTE — PROGRESS NOTES
"Patient has a painting of the number \"187\" (police code for homicide) in his room, which he made in group. States that \"it just kind of happened, I guess I should keep that away from prying eyes.\" Pleasant in conversation, states that he has at least one friend who has experiences with hearing voices and that they are able to provide some mutual support outside of the hospital. Patient endorses some paranoia and seems to have insight, stating \"sometimes I have concerns about the  coming through the headphones.\" States that he plans to quit smoking THC for a few months and feels this may help with the paranoia. Patient still speaking in odd metaphors occasionally but generally coherent. Patient denies SI, denies HI when asked directly, despite bolded note above. Patient states that medications seem to slow his thoughts more than he would like.       09/17/19 1100   Behavioral Health   Hallucinations auditory   Thinking paranoid;intact;other (see comment)  (aware of his paranoia)   Orientation person: oriented;place: oriented;date: oriented;time: oriented   Memory baseline memory   Insight admits / accepts   Judgement intact  (questionable)   Eye Contact at examiner   Affect full range affect   Mood mood is calm   Physical Appearance/Attire attire appropriate to age and situation   Hygiene well groomed   Suicidality   (denies)   1. Wish to be Dead (Past Month) No   2. Non-Specific Active Suicidal Thoughts (Past Month) No   Self Injury other (see comment)  (no )   Elopement   (no concerns)   Activity   (appropriate)   Speech clear;coherent   Medication Sensitivity   (some slowed thought)   Psychomotor / Gait balanced;steady   Activities of Daily Living   Hygiene/Grooming independent   Oral Hygiene independent   Dress independent   Laundry with supervision   Room Organization independent   Loc Bonner on 9/17/2019 at 11:37 AM    "

## 2019-09-17 NOTE — PROGRESS NOTES
Pt spent time between his room and the milieu this evening. Pt read a book while in his room and socialized, walked the nazario, and played guitar while in the milieu. Pt had visits from parents and a friend this evening.   Pt appeared to have a bright affect throughout the evening.        09/16/19 2200   Behavioral Health   Hallucinations denies / not responding to hallucinations   Thinking distractable   Orientation person: oriented;place: oriented;date: oriented;time: oriented   Memory baseline memory   Insight admits / accepts   Judgement intact   Affect full range affect   Mood mood is calm   Physical Appearance/Attire attire appropriate to age and situation   Hygiene well groomed   1. Wish to be Dead (Past Month) No   2. Non-Specific Active Suicidal Thoughts (Past Month) No   Self Injury other (see comment)  (denies)   Activity (WDL) WDL   Speech (WDL) WDL   Psychomotor Gait (WDL) WDL   Activities of Daily Living   Hygiene/Grooming independent   Oral Hygiene independent   Dress independent   Room Organization independent

## 2019-09-18 VITALS
TEMPERATURE: 97.9 F | SYSTOLIC BLOOD PRESSURE: 134 MMHG | OXYGEN SATURATION: 96 % | RESPIRATION RATE: 16 BRPM | HEART RATE: 92 BPM | BODY MASS INDEX: 23.97 KG/M2 | WEIGHT: 160 LBS | DIASTOLIC BLOOD PRESSURE: 84 MMHG

## 2019-09-18 PROCEDURE — 25000132 ZZH RX MED GY IP 250 OP 250 PS 637: Performed by: PSYCHIATRY & NEUROLOGY

## 2019-09-18 RX ADMIN — ARIPIPRAZOLE 20 MG: 10 TABLET ORAL at 08:26

## 2019-09-18 NOTE — PROGRESS NOTES
Writer met with pt and went over provisional discharge with pt. Writer explained that pt needs to complete IRTS and follow the conditions of the provisional discharge. Pt was in agreement and signed to PD.

## 2019-09-18 NOTE — PROGRESS NOTES
09/17/19 1900   Behavioral Health   Hallucinations denies / not responding to hallucinations   Thinking poor concentration   Orientation person: oriented;place: oriented   Memory baseline memory   Insight admits / accepts   Judgement intact   Eye Contact at examiner   Affect full range affect   Mood mood is calm   Physical Appearance/Attire attire appropriate to age and situation   Hygiene well groomed   Suicidality other (see comments)  (denied SI)   1. Wish to be Dead (Past Month) No   2. Non-Specific Active Suicidal Thoughts (Past Month) No   Self Injury other (see comment)  (denied SIB)   Elopement   (no concerns)   Activity other (see comment)  (pt was visible and social in milieu)   Speech clear;coherent   Medication Sensitivity no observed side effects;no stated side effects   Psychomotor / Gait steady;balanced   Activities of Daily Living   Hygiene/Grooming independent   Oral Hygiene independent   Dress independent   Room Organization independent     Pt denied all mental health and medical concerns. Pt appears to have improved focus. Pt appears to have full range of affect. Pt is social and visible in the milieu. Pt reported excitement over discharge tomorrow.

## 2019-09-18 NOTE — PROGRESS NOTES
DISCHARGE:  This RN and pt have reviewed all meds and aftercare plan. All belongings returned. Pt denies SI , anxiety or depression at this time. Pt requested medical records and his knife. Pt asked why his mother has to walk with him. Pt stated nothing was told to him. This RN reviewed all aftercare plan as well as his CTC Akeya having explained the discharge plan. Pt requested patient relations number as well as talking about getting a  but no explanation as to why he was going to contact these people. Pt presented as paranoid, suspicious. This was his general mood most of his stay on 4a. Pt was walked to security by a staff but asked why his mom would have to walk with him. It was explained that his mom was transporting him to an IRTS. He declined to sign a discharge AVS altho the plan was reviewed with him. States he is happy to discharge.

## 2019-09-25 ENCOUNTER — OFFICE VISIT (OUTPATIENT)
Dept: PSYCHIATRY | Facility: CLINIC | Age: 24
End: 2019-09-25
Payer: COMMERCIAL

## 2019-09-25 DIAGNOSIS — F25.0 SCHIZOAFFECTIVE DISORDER, BIPOLAR TYPE (H): Primary | ICD-10-CM

## 2019-09-30 NOTE — DISCHARGE SUMMARY
"[facility] [place], [place]   Psychiatric Discharge Summary      Jerel Day MRN# [id number]   Age: 23 year old YOB: 1995     Date of Admission:  8/14/2019  Date of Discharge:  09/18/19  Admitting Physician:  Debra Naegele, CNP  Discharge Physician:  Jordan Charles MD         Summary/Hospital Course/Disposition:   Reason for Hospitalization: Jerel Day is a 23-year-old single  male, presenting with exacerbation of thought disorder symptoms including auditory hallucinations that are command in nature, telling him to kill himself.  The patient is making nonsensical statements such as, \"I went to yoga.  They took me to detox instead of getting a back massage.  I really needed something urgently.\"  The patient was concerned that there were devices in the interview room that were recording our conversation.  Assured the patient there were no recording devices in the room.  CTC was present for part of the interview.  [facility] also told the patient there were no recording devices in the interview room.  The patient reports he does not want to take medications.  He does not feel they are effective for him.  The patient stated that it interfered with his yoga, and he did not think they were beneficial in any way.      Hospital Course:   Patient exhibited very elevated, delusional, disorganized behavior in the intial portion of his hospitalization. At one point, he ran full speed into a wall in order to induce trabmatic head injury. CT head after that incident was unremarkable. Petitioned for MI Commitment and Clark, which was supported. Patient exhibited stabilization in his behavior and symptoms through Abilify titration, up to 15 mg daily, and additional 2 mg at night. ZYprexa seems to be too sedating for him. Patient started to be more social , more reality based, and go to more groups. Discharged to an IRTs.        DIagnoses:     1.  Schizoaffective disorder.   2. "  Cannabis use disorder.        Labs:   No results found for this or any previous visit (from the past 24 hour(s)).         Consults:   Assessment & Plan     Jerel Day is a 23 year old male with a history of GERD, anxiety, depression, and schizoaffective disorder who was admitted to inpatient behavioral health on 8/14/19 with psychosis. Medicine consulted for mouth lesions.      Aphthous ulcerations: Three visible ulcerations c/w aphthous ulcers on lower lip, left buccal mucosa, and upper gumline. No e/o secondary infection   - HSV swabs obtained 9/7 and are pending   - Continue viscous lidocaine PRN for discomfort     Lumbar back pain: Paraspinal muscle tension and tenderness on examination, likely the culprit of his low back discomfort.    - Heat and ice PRN   - Tylenol PRN for discomfort   - Trial IcyHot patch     Medicine will follow pending HSV results. Please do not hesitate to contact if new questions or concerns arise.      Maxine Sheffield PA-C  [facility] [place]  Hospitalist Service  Pager: [phone]              Discharge Medications:        Review of your medicines      START taking      Dose / Directions   ARIPiprazole 20 MG tablet  Commonly known as:  ABILIFY  Used for:  Schizoaffective disorder, bipolar type (H)  Replaces:  ARIPiprazole  MG extended release inj syringe      Dose:  20 mg  Take 1 tablet (20 mg) by mouth daily  Quantity:  30 tablet  Refills:  1     OLANZapine 15 MG tablet  Commonly known as:  zyPREXA  Used for:  Schizoaffective disorder, bipolar type (H)      Dose:  15 mg  Take 1 tablet (15 mg) by mouth At Bedtime  Quantity:  30 tablet  Refills:  1        CONTINUE these medicines which may have CHANGED, or have new prescriptions. If we are uncertain of the size of tablets/capsules you have at home, strength may be listed as something that might have changed.      Dose / Directions   gabapentin 300 MG capsule  Commonly known as:   "NEURONTIN  This may have changed:      medication strength    how much to take    reasons to take this  Used for:  Cervical pain      Dose:  300 mg  Take 1 capsule (300 mg) by mouth 2 times daily as needed (nerve pain or anxiety)  Quantity:  60 capsule  Refills:  1        STOP taking    ARIPiprazole  MG extended release inj syringe  Commonly known as:  ABILIFY MAINTENA  Replaced by:  ARIPiprazole 20 MG tablet        cariprazine 1.5 MG Caps capsule  Commonly known as:  VRAYLAR        hydrOXYzine 50 MG capsule  Commonly known as:  VISTARIL              Where to get your medicines      These medications were sent to [facility] [address]  [address] [address]    Phone:  [phone]     ARIPiprazole 20 MG tablet    gabapentin 300 MG capsule    OLANZapine 15 MG tablet              Mental Status Examination:   Appearance: awake, alert and adequately groomed.  Attitude:  evasive and guarded, less agitated though irritable  Eye Contact:  fair  Mood: \"Frustrated\"  Affect: mood congruent, blunted  Speech:  rambling, coherent  Psychomotor Behavior:  no evidence of tardive dyskinesia, dystonia, or tics  Throught Process:  illogical though more organized, goal oriented  Associations:  loosening of associations present  Thought Content:  Denies SI, SIB, HI. No overt evidence of psychosis on examination.   Insight:  limited  Judgement:  fair  Oriented to:  time, person, and place  Attention Span and Concentration:  fair  Recent and Remote Memory:  fair         Discharge Plan:     Health Care Follow-up Appointments:   Medication Management  Date: 10/02/2019  Time: 4:00pm        Provider: Dr. Vince Zheng  Address: [facility], [address] [address]   Phone:[phone]       Therapy Appointment   Date: 09/25/2019  Time: 6:00pm      Provider: Tammi Connell  Address: [facility], [address] [place] [address]   Phone:[phone]       Family Therapy Appointment   Date: 09/25/2019  Time: 6:00pm      Provider: Ana Rose  Address: [facility] [place] [address]   Phone:[phone]       [facility]    Nola Kristopher  Phone: [phone]      Jordan Charles MD MD  [facility] Psychiatry

## 2019-09-30 NOTE — PROGRESS NOTES
NAVIGATE Clinician Contact & Progress Note   For Family Education Program    NAVIGATE Enrollee: Jerel Day (1995)     MRN: [id number]  Date:  9/25/19  Diagnosis(es):   Schizoaffective disorder, bipolar type  Clinician: LILLIANA Family Clinician, SHIRA Camarena and others represented below    1. Type of contact: (majority of time spent)  Family Session    2. People present:   Writer  Client: No  Significant Other/Family/Friend:  Mother and Father    3. Length of Actual Contact: Start Time: 6:00; End Time: 7:00pm   Traveled?    No     4. Location of contact:  [facility], [place]    5. Did the client complete the home practice option(s) from the previous session: Not Applicable    6. Motivational Teaching Strategies:  Connect info and skills with personal goals  Promote hope and positive expectations  Explore pros and cons of change  Re-frame experiences in positive light    7. Educational Teaching Strategies:  Review of written material/education  Relate information to client's experience  Ask questions to check comprehension  Break down information into small chunks  Adopt client's language     8. CBT Teaching Strategies:  Reinforcement and shaping (positive feedback for steps towards goals and gains in knowledge & skills)  Behavioral tailoring (problem solving and communication skills)    9. Psychoeducational Topic(s) Addressed:  Just the Facts - Problem Solving  Just the Facts - Relapse Prevention Planning    10. Techniques utilized:   Kila announced at beginning of session  Review of homework  Review of goal  Review of previous meeting  Present new material  Role-play  Problem-solving practice  Help client choose a home practice option  Summarize progress made in current session    11. Assessment/Progress Note:     The focus (generally speaking) of today's session was assessing current state of affairs post-Jerel's recent hospitalization and problem solving future  "conversations with Jerel about housing. Discussed this in the context of relapse prevetion. Guided family through problem solving next steps.     Jerel is now on MA and residing at [facility] for 90 days. He is on mental health commitment with Eduardo. His  is the same as his previous commitment Nola AMAYA. He is reporting to family he is enjoying his stay at the IRTS. Family has concern that he is vulnerable as he has reportedly \"borrowed\" other residents money and cigarettes upon request. He has a goal of working, stilling talking about entrepreneurial options. He is open to continuing medications but hopes to pair down to one single med. He is engaging in IRTS activities. His plan post-IRTS is unknown.     Discussed options for housing after the IRTS. Writer agreed with family's perception that the ideal situation would be housing with services or a group home. Identified need to complete a [place] assessment and how Jerel declined to in the past. Hope is to align Jerel's thinking of a group home or supported living environment as similar to that of an IRTS, of which he is currently enjoying. Discussed back-up options. Family seemed steered away from independent housing (I.e. family pays for an apartment) with services as this could result in prior living conditions where Jerel lives alone in an apartment and refused to have supportive services (e.g. homemaking, PCA, ARMHS, ILS). Both mom and dad are open to a 1-2 month \"transition\" period where eJrel lives at their home. They have not discussed this with him. If at home, parents would request Jerel engage in house rules and structure - rule about car access, loud music, cleanliness, medication, no threatening behaviors, no smoking cigarettes or cannabis in the house, and participation in chores. Reflected this wish list as greater than what Jerel has complied with in the past. Suggested aligning the wish list with that of the IRTS as Jerel seems " to like it there and seems to be doing well, so for example, have him do the chore at home that he was doing at the IRTS.    Agreed upon goals are to continue to monitor for relapse warning signs and triggers and give further thought to a housing back-up plan if Jerel had to return home temporarily. Progress toward goal completion seems fair. Overall family seemed cooperative and pleasant, and pleased with where Jerel is in his recovery.    Family did express interest in continuing to meet for family therapy and psychoeducation. As of today's appt their insight into patient's mental illness appears adequate. With regard to family dynamics, overall family seems as if they are able to interact in a cooperative and pleasant manner. Family did seem like they would benefit from continued clinical intervention aimed at assisting them implement helpful strategies at home and increase to their understanding of psychosis.    12. Plan/Referrals:     Will meet with family monthly as schedule allows for evidence based family psychoeducation and therapeutic support aimed at maximizing Jerel's opportunity for recovery from psychosis.     Recommended a treatment plan meeting with the team, Jerel and family prior to his IRTS discharge date.    Ana Rose [facility]   NAVIGATE Director & Family Clinician

## 2019-10-01 ENCOUNTER — ALLIED HEALTH/NURSE VISIT (OUTPATIENT)
Dept: PSYCHIATRY | Facility: CLINIC | Age: 24
End: 2019-10-01
Payer: COMMERCIAL

## 2019-10-01 DIAGNOSIS — F25.0 SCHIZOAFFECTIVE DISORDER, BIPOLAR TYPE (H): Primary | ICD-10-CM

## 2019-10-01 NOTE — PROGRESS NOTES
NAVIGATE SEE Progress Note   For Supported Employment & Education    NAVIGATE Enrollee: Jerel Day (1995)     MRN: [id number]  Date:  10/01/19  Clinician: LILLIANA Supported Employment & , Ana Michel    1. Client Status Update:   Jerel Day is interested in education (Client developed educational goals) and employment (Client developed employment goals)    2. People present:   SEE/Writer  Client: Jerel Day    3. Length of Actual Contact: 45 minutes   Traveled? Yes  Total Travel Time: 45 minutes    4. Location of contact:  Client's Home    5. Brief description of session, contact, or client status (include: strategies, interventions, client reaction to contact, next steps, etc)    Writer and Jerel Day met at his [facility] for a follow up Supported Employment and Education (SEE) session. Jerel Day has been working on the goal of getting a job and returning to school. Today's agenda included: checking in after discharge from hospital. Jerel reports that he is enjoying the [facility] facility, has been talking to other peers, attending group and other recreational activities.     Jerel appeared engaged throughout the appointment, made good eye contact and had goals that were appropriate for him. He reported eating more, improved hygiene and more social time, which he has enjoyed. Jerel would like to get a part time job where he can work to save up a little money, would like to start taking guitar lessons, would eventually like to return and complete college as well as quit smoking. Jerel would like this writer to send job postings to him via email and meet weekly or biweekly until he finds a job near the [facility].       6. Completion of mutually agreed upon client task from previous meeting:  Not Applicable    7. Orientation and Treatment Planning:  SEE orientation meeting    8. Assessment:  Gathering SEE information/inventory regarding work and/or education history, skills, goals, and  preferences with client    9. Placement:  Not Applicable    10. Follow Along Supports: (for clients who are working or attending school)   Not Applicable    11. Mutually agreed upon client task for next meeting:     na    12. Next Meeting Scheduled for: nika Beltrán would like  Text at the end of this week    Ana LAFLEURATE Supported Employment &

## 2019-10-02 ENCOUNTER — OFFICE VISIT (OUTPATIENT)
Dept: PSYCHIATRY | Facility: CLINIC | Age: 24
End: 2019-10-02
Payer: COMMERCIAL

## 2019-10-02 VITALS
BODY MASS INDEX: 24.91 KG/M2 | TEMPERATURE: 98 F | SYSTOLIC BLOOD PRESSURE: 134 MMHG | WEIGHT: 168.2 LBS | HEART RATE: 66 BPM | HEIGHT: 69 IN | DIASTOLIC BLOOD PRESSURE: 77 MMHG

## 2019-10-02 DIAGNOSIS — F25.0 SCHIZOAFFECTIVE DISORDER, BIPOLAR TYPE (H): ICD-10-CM

## 2019-10-02 DIAGNOSIS — M54.2 CERVICAL PAIN: ICD-10-CM

## 2019-10-02 RX ORDER — ARIPIPRAZOLE 10 MG/1
10 TABLET ORAL DAILY
Qty: 30 TABLET | Refills: 0 | Status: SHIPPED | OUTPATIENT
Start: 2019-10-02 | End: 2019-10-07

## 2019-10-02 RX ORDER — OLANZAPINE 20 MG/1
20 TABLET ORAL AT BEDTIME
Qty: 30 TABLET | Refills: 0 | Status: SHIPPED | OUTPATIENT
Start: 2019-10-02 | End: 2019-10-07

## 2019-10-02 ASSESSMENT — MIFFLIN-ST. JEOR: SCORE: 1752.29

## 2019-10-02 ASSESSMENT — PAIN SCALES - GENERAL: PAINLEVEL: MODERATE PAIN (4)

## 2019-10-03 ENCOUNTER — TELEPHONE (OUTPATIENT)
Dept: PSYCHIATRY | Facility: CLINIC | Age: 24
End: 2019-10-03

## 2019-10-03 DIAGNOSIS — F25.0 SCHIZOAFFECTIVE DISORDER, BIPOLAR TYPE (H): ICD-10-CM

## 2019-10-03 NOTE — PROGRESS NOTES
NAVIGATE Outreach  A Part of the [facility] First Episode of Psychosis Program     Patient Name: Jerel Day  /Age:  1995 (24 year old)    Contact: Writer met with Jerel in the hospital on this date. Spent time playing Jumbo Connect Four and discussing how he is feeling and his hopes for discharge. Jerel is eager to leave the hospital and discussed plan to go to an IRTS, which he is agreeable to. He reports he is feeling better; both physically and mentally. He shared with writer some of the paintings that he has worked on since being on the unit and the meaning of the different colors, shapes and designs based on how he was feeling. Reflected together on all of the progress Jerel has made; writer offered hope for continued recovery and well-being. Anticipate discharge potentially next week.    Plan: Plan to schedule IRT when Jerel is discharged from the hospital.    Tammi Connell, ARIANNE TIJERINA Individual Resiliency  & Family Clinician      This is a non-billable encounter as it was solely for the purposes of outreach and/or care coordination.     I have closed this encounter administratively.  Yudi Baxter PhD LP  [facility]  Clinical Affairs

## 2019-10-03 NOTE — TELEPHONE ENCOUNTER
NAVIGATE Outreach  A Part of the [facility] First Episode of Psychosis Program     Patient Name: Jerel Day  /Age:  1995 (24 year old)    Contact: Writer called and spoke to Jerel who apologized for missing appt yesterday. Writer assured Jerel it's no problem, but wanted to confirm appt Monday at 12pm. Suggested he call to arrange his ride right now and have someone at the IRTS help coordinate and remind him of appt Monday. Offered praise, validation and encouragement that Jerel made it to appt with Dr. Zheng yesterday at 4pm as well. Jerel planning to coordinate ride today with assistance from IRTS staff.     Plan: IRT scheduled for Monday at 12pm. Will route to team as well.    EDELMIRA Morgan  NAVIGATE Individual Resiliency  & Family Clinician

## 2019-10-03 NOTE — TELEPHONE ENCOUNTER
What is the concern that needs to be addressed by a nurse? Cale is looking for a copy of any medication changes or any other recommendations made at Jerel's appt on 10/02/19 w/ Dr Zheng.  Please fax to:  [phone]    May a detailed message be left on voicemail? Yes    Date of last office visit: 10/02/19    Message routed to:

## 2019-10-04 NOTE — TELEPHONE ENCOUNTER
-Writer attempted to call Clae back and received voicemail.  Left message asking for a return call.  Clinic number provided.

## 2019-10-04 NOTE — TELEPHONE ENCOUNTER
-Writer called and spoke with Cale asked for an CONNOR.  They will obtain one.  As soon as this is received writer can sent information to them.

## 2019-10-07 ENCOUNTER — OFFICE VISIT (OUTPATIENT)
Dept: PSYCHIATRY | Facility: CLINIC | Age: 24
End: 2019-10-07
Payer: COMMERCIAL

## 2019-10-07 ENCOUNTER — BEH TREATMENT PLAN (OUTPATIENT)
Dept: PSYCHIATRY | Facility: CLINIC | Age: 24
End: 2019-10-07

## 2019-10-07 DIAGNOSIS — F25.0 SCHIZOAFFECTIVE DISORDER, BIPOLAR TYPE (H): Primary | ICD-10-CM

## 2019-10-07 RX ORDER — ARIPIPRAZOLE 10 MG/1
10 TABLET ORAL DAILY
Qty: 30 TABLET | Refills: 0 | Status: SHIPPED | OUTPATIENT
Start: 2019-10-07 | End: 2019-11-06

## 2019-10-07 RX ORDER — OLANZAPINE 20 MG/1
20 TABLET ORAL AT BEDTIME
Qty: 30 TABLET | Refills: 0 | Status: SHIPPED | OUTPATIENT
Start: 2019-10-07 | End: 2019-11-06

## 2019-10-07 NOTE — TELEPHONE ENCOUNTER
-Returned call to Cale received voicemail.  Left message asking for a return call.  Clinic number provided.

## 2019-10-07 NOTE — TELEPHONE ENCOUNTER
-Received call back from Cale.  He faxed over an CONNOR.  Discussed medication, per Cale orders need to be resent to [facility].  Will also fax over updated med list.

## 2019-10-08 ENCOUNTER — TELEPHONE (OUTPATIENT)
Dept: PSYCHIATRY | Facility: CLINIC | Age: 24
End: 2019-10-08

## 2019-10-08 ASSESSMENT — PATIENT HEALTH QUESTIONNAIRE - PHQ9: SUM OF ALL RESPONSES TO PHQ QUESTIONS 1-9: 12

## 2019-10-08 NOTE — TELEPHONE ENCOUNTER
Prior Authorization Retail Medication Request    Medication/Dose: ARIPiprazole (ABILIFY) 10 MG tablet  ICD code (if different than what is on RX):  Schizoaffective disorder, bipolar type (H) (F25.0)  Previously Tried and Failed:    Rationale:      Insurance Name:  [employer]  Insurance ID:  [id number]      Pharmacy Information (if different than what is on RX)  Name:  [facility] - [place], [place] - [address]   Phone: [phone]

## 2019-10-08 NOTE — TELEPHONE ENCOUNTER
Central Prior Authorization Team   Phone: [phone]    PA Initiation    Medication: ARIPiprazole (ABILIFY) 10 MG tablet  Insurance Company: [employer] - Phone [phone] Fax [phone]  Pharmacy Filling the Rx: [facility] - [place], [place] - [address]  Filling Pharmacy Phone: [phone]  Filling Pharmacy Fax:    Start Date: 10/8/2019

## 2019-10-08 NOTE — TELEPHONE ENCOUNTER
Prior Authorization Not Needed per Insurance    Medication: ARIPiprazole (ABILIFY) 10 MG tablet  Insurance Company: [employer] - Phone [phone] Fax [phone]  Expected CoPay:      Pharmacy Filling the Rx: [facility] - [facility] [place], [place] - [address]  Pharmacy Notified: Yes - spoke to Meagan, who confirmed they filled the 20mg tabs and giving 1/2 tab for (10mg dose)  Patient Notified:

## 2019-10-18 ENCOUNTER — ALLIED HEALTH/NURSE VISIT (OUTPATIENT)
Dept: PSYCHIATRY | Facility: CLINIC | Age: 24
End: 2019-10-18
Payer: COMMERCIAL

## 2019-10-18 DIAGNOSIS — F25.0 SCHIZOAFFECTIVE DISORDER, BIPOLAR TYPE (H): Primary | ICD-10-CM

## 2019-10-21 ENCOUNTER — TELEPHONE (OUTPATIENT)
Dept: PSYCHIATRY | Facility: CLINIC | Age: 24
End: 2019-10-21

## 2019-10-21 DIAGNOSIS — F25.0 SCHIZOAFFECTIVE DISORDER, BIPOLAR TYPE (H): Primary | ICD-10-CM

## 2019-10-21 NOTE — TELEPHONE ENCOUNTER
NAVIGATE SEE Outgoing Telephone Call  For Supported Employment & Education    NAVIGATE Enrollee: Jerel Day (1995)     MRN: [id number]  Date of Call: 10/21/2019  Contacted: Narda Riojas Avivo    Discussed:   Writer called and LVM with Nola Summers to schedule time where we can both meet with Jerel at [facility] within the next few weeks. Described Jerel gave verbal consent to call her and has some ideas of what services he would like after the IRTS and wanted to coordinate care. Left return details.    Ana Michel

## 2019-10-21 NOTE — PROGRESS NOTES
"NAVIGATE SEE Progress Note   For Supported Employment & Education    NAVIGATE Enrollee: Jerel Day (1995)     MRN: [id number]  Date:  10/18/19  Clinician: NAVIGATE Supported Employment & , Ana Michel    1. Client Status Update:   Jerel Day is interested in education (Client withdrew from education program or class) and employment (Client developed employement goals)    2. People present:   SEE/Writer  Client: Jerel Day      3. Length of Actual Contact: 45 minutes   Traveled? Yes  Total Travel Time: 35 minutes    4. Location of contact:  Client's Home    5. Brief description of session, contact, or client status (include: strategies, interventions, client reaction to contact, next steps, etc)     and [place] [facility] [place] follow up Supported Employment and Education (SEE) session. Jerel Day has been working on the goal of finding meaningful activities, improving communication with his staff/peers and family. Today's agenda included: check in on the week and goal setting. Jerel met with this writer at his [facility] facility and reports that the last week has been \"pretty good\".     He reports that he and his group home went bowling, Jerel had a guitar lesson and was working with his brother to create a schedule where they can share the car and/or have Jerel's brother take him on errands. Jerel reports he lost his debit card for the third time and needs to go get another one. Jerel showed this writer his phone and it appears he had texted his brother many times without a response. Jerel says he feels irritated about it, but wants some help because he knows that he is \"bothering\" his brother everyday. This writer and Jerel role played some ways he could ask his brother for better/improved communication and/or how we could set a schedule and/or ask his house staff for rides to the bank if its necessary.     Jerel would like to continue taking guitar lessons, " "find a place to stay, set firm boundaries when interacting with his roommates (they ask for money, cigarettes, etc.) and reduce his smoking. We role played ways Jerel can say \"no' to his roommates and this writer witnessed him doing so and provided praise.     Jerel also requested that this writer attend his next meeting with Nola, his  to begin planning/applying for social security/CADI services. Writer agreed and called lvm with her to schedule.       6. Completion of mutually agreed upon client task from previous meeting:  Completed    7. Orientation and Treatment Planning:  Pursuing current SEE goals    8. Assessment:  Gathering SEE information/inventory regarding work and/or education history, skills, goals, and preferences with client    9. Placement:  Not Applicable    10. Follow Along Supports: (for clients who are working or attending school)   Not Applicable    11. Mutually agreed upon client task for next meeting:     Work on saying no to roommates    12. Next Meeting Scheduled for: tbd - when Nola can meet    Ana LAFLEURATE Supported Employment &   "

## 2019-10-23 ENCOUNTER — ALLIED HEALTH/NURSE VISIT (OUTPATIENT)
Dept: PSYCHIATRY | Facility: CLINIC | Age: 24
End: 2019-10-23
Payer: COMMERCIAL

## 2019-10-23 ENCOUNTER — OFFICE VISIT (OUTPATIENT)
Dept: PSYCHIATRY | Facility: CLINIC | Age: 24
End: 2019-10-23
Payer: COMMERCIAL

## 2019-10-23 DIAGNOSIS — F25.0 SCHIZOAFFECTIVE DISORDER, BIPOLAR TYPE (H): Primary | ICD-10-CM

## 2019-10-24 ENCOUNTER — VIRTUAL VISIT (OUTPATIENT)
Dept: PSYCHIATRY | Facility: CLINIC | Age: 24
End: 2019-10-24
Payer: COMMERCIAL

## 2019-10-24 DIAGNOSIS — F25.0 SCHIZOAFFECTIVE DISORDER, BIPOLAR TYPE (H): Primary | ICD-10-CM

## 2019-10-24 ASSESSMENT — ANXIETY QUESTIONNAIRES
6. BECOMING EASILY ANNOYED OR IRRITABLE: SEVERAL DAYS
GAD7 TOTAL SCORE: 8
5. BEING SO RESTLESS THAT IT IS HARD TO SIT STILL: SEVERAL DAYS
1. FEELING NERVOUS, ANXIOUS, OR ON EDGE: MORE THAN HALF THE DAYS
2. NOT BEING ABLE TO STOP OR CONTROL WORRYING: SEVERAL DAYS
7. FEELING AFRAID AS IF SOMETHING AWFUL MIGHT HAPPEN: SEVERAL DAYS
3. WORRYING TOO MUCH ABOUT DIFFERENT THINGS: SEVERAL DAYS

## 2019-10-24 ASSESSMENT — PATIENT HEALTH QUESTIONNAIRE - PHQ9
SUM OF ALL RESPONSES TO PHQ QUESTIONS 1-9: 9
5. POOR APPETITE OR OVEREATING: SEVERAL DAYS

## 2019-10-24 NOTE — PROGRESS NOTES
LILLIANA Clinician Contact & Progress Note  For Individual Resiliency Training (IRT)  A Part of the [facility] First Episode of Psychosis Program    NAVIGATE Enrollee: Jerel Day (1995)     MRN: [id number]  Date:  9/25/19  Diagnosis: Schizoaffective disorder, bipolar type       Clinician: LILLIANA Individual Resiliency Trainer, EDELMIRA Morgan     1. Type of contact: (majority of time spent)  IRT Session    2. People present:   Writer  Client: Jerel Day    3. Total number of persons who participated in contact: 2, including writer     4. Length of Actual Contact: Start Time: 6pm; End Time: 7pm   Traveled?    No     5. Location of contact:  Psychiatry Clinic, [place]    6. Did the client complete the home practice option(s) from the previous session: Did not completed    7. Motivational Teaching Strategies:  Connect info and skills with personal goals  Promote hope and positive expectations  Explore pros and cons of change  Re-frame experiences in positive light    8. Educational Teaching Strategies:  Review of written material/education  Relate information to client's experience  Ask questions to check comprehension  Break down information into small chunks  Adopt client's language     9. CBT Teaching Strategies:  Reinforcement and shaping (positive feedback for steps towards goals and gains in knowledge & skills)  Relapse prevention planning (review of stressors and early warning signs)  Coping skills training (review current coping skills and increase currently used skills)  Behavioral tailoring (fit taking medication into client's daily routine)    10. IRT Module(s) Addressed:  Module 2 - Assessment/Initial Goal Setting  Module 3 - Education about Psychosis  Module 4 - Relapse Prevention Planning  Safety Planning    11. Techniques utilized:   Rogers announced at beginning of session  Review of goal  Review of previous meeting  Present new material  Problem-solving practice  Help client choose a  "home practice option  Summarize progress made in current session    12. Mental Status Exam:    Alertness: alert  and slow to respond  Appearance: casually groomed  Behavior/Demeanor: cooperative, pleasant and guarded, with poor eye contact   Speech: slowed  Language: intact and no obvious problem. Preferred language identified as English.  Psychomotor: slowed  Mood: description consistent with euthymia  Affect: restricted; was congruent to mood; was congruent to content  Thought Process/Associations: remarkable for , difficult to follow, disorganized, loose associations and response delay  Thought Content:  Reports delusions, preoccupations and over-valued ideas;  Denies suicidal ideation and violent ideation  Perception:  Reports auditory hallucinations without commands [details in Interim History] and depersonalization;  Denies visual hallucinations  Insight: limited  Judgment: limited  Cognition: does  appear grossly intact; formal cognitive testing was not done  Suicidal ideation: denies SI, denies intent,  and denies plan  Homicidal Ideation: denies    13. Assessment/Progress Note:     Writer met with Jerel on this date for first IRT since client has been discharged from the hospital. Writer set agenda to check-in, discuss and assess symptoms, explore material in IRT modules, discuss ways in which Jerel can expand coping strategies and stress-management techniques for ongoing symptom management, and check-in regarding goals for ongoing recovery. During check-in, Jerel shared positively about being at the IRTS as well as being discharged from hospital. He shared positively about music group at [employer] and requested to share with writer the music that he was introduced to there. Tabled this for later in the session. Throughout the session, Jerel shared some somatic delusions including referencing \"connectors in neck ruptured and not online,\" among others, which is consistent with previous sessions however seemed to be " "decreased. Jerel reports continued AH. He shared his voices \"kick me over a lot\" and feels they are related to the government and ways in which he is not succeeding in facets of his life, so the voices put him down. He commented he hears the voices say the word, \"schizophren,\" to him and he feels it is the  saying that, which feels like a \"knee to my chest, taking the wind out of my sails.\" Writer offered support and validation. He reports the voices are everyday but he is not experiencing any more command AH either harmful to self or others in nature. Writer reflected on this improvement and change from previous sessions. Jerel shared with writer music from his music group. He feels this helps realign his body and helps with some of his negative somatic experiences. Writer offered for Jerel to bring music to session each time.     Overall, Jerel presented with much clearer thought process; less disorganization, loose associations and tangential thinking. He also made comments on more than one occasion about the medications helping with certain aspects of his experience. He confirmed he is taking Abilify and Zyprexa. Writer highlighted all of Jerel's progress and efforts that are promoting overall well-being and stabilization for him. Emphasized his strengths and resiliency.     Jerel requested to do both Tx Plan and Mirah forms next session as he had so much he wanted to catch up on this session. Will plan to complete next time.     14. Plan/Referrals:     Next appt scheduled in two weeks. Client and family aware they can reach out to writer directly and/or NAVIGATE team should concerns or needs arise prior to next scheduled appointment.     Billing for \"Interactive Complexity\"?    No      Tammi Connell, [facility]    MIQUELATE Individual Resiliency Trainer  Attestation:    I did not see this patient directly. This patient is discussed with me in individual clinical social work supervision, and I agree with the plan as " documented.     Ana Rose, [facility] Hospital[facility], MSW, LICSW, October 25, 2019

## 2019-10-25 ASSESSMENT — ANXIETY QUESTIONNAIRES: GAD7 TOTAL SCORE: 8

## 2019-10-29 NOTE — PROGRESS NOTES
NAVIGATE SEE Outgoing Telephone Call  For Supported Employment & Education    NAVIGATE Enrollee: Jerel Day (1995)     MRN: [id number]  Date of Call: 10/29/2019  Contacted: Nola Summers, , Mona    Discussed:   Writer PEPE to request meeting with Jazzy on 11/7 at [facility] clinic for treatment planning meeting.     Ana Michel

## 2019-10-29 NOTE — PROGRESS NOTES
NAVIGATE SEE Progress Note   For Supported Employment & Education    NAVIGATE Enrollee: Ralf Day (1995)     MRN: [id number]  Date:  10/23/19  Clinician: LILLIANA Supported Employment & , Ana Michel    1. Client Status Update:   Ralf Day is interested in employment (Other, explain: Transportation needs)    2. People present:   SEE/Writer  Client: Ralf Day    3. Length of Actual Contact: 30 minutes   Traveled? No    4. Location of contact:  Psychiatry Clinic, [place]    5. Brief description of session, contact, or client status (include: strategies, interventions, client reaction to contact, next steps, etc)    Writer arrived at the [facility] clinic and saw patient in [facility]. Ralf approached this writer and asked for assistance with his cab ride. He reports his phone is currently broken and is not sure how to schedule a ride home. This writer called his IRTS and the  provided instruction for Ralf to call for a return ride. Ralf called and this writer instructed him to come back up to the clinic if his ride hadn't arrived in 30 min. Ralf did return and this writer used work cell phone to call cab company who had not received his request. Writer waited with ralf until his ride came to bring him home.     6. Completion of mutually agreed upon client task from previous meeting:  Not Applicable    7. Orientation and Treatment Planning:  Pursuing current SEE goals    8. Assessment:      9. Placement:  Not Applicable    10. Follow Along Supports: (for clients who are working or attending school)   Not Applicable    11. Mutually agreed upon client task for next meeting:     NA    12. Next Meeting Scheduled for: 11/7    Ana TIJERINA Supported Employment &

## 2019-10-30 ENCOUNTER — TELEPHONE (OUTPATIENT)
Dept: PSYCHIATRY | Facility: CLINIC | Age: 24
End: 2019-10-30

## 2019-10-30 NOTE — TELEPHONE ENCOUNTER
NAVIGATE Outreach  A Part of the [facility] First Episode of Psychosis Program     Patient Name: Jerel Day  /Age:  1995 (24 year old)    Contact: Writer called Jerel regarding missed appt today at 1pm. He apologized and had forgotten about the appt, also no one reminded him at the IRTS. Offered to reschedule for this Friday at 1pm; Jerel opted to skip this week and just come next week; stated he is doing well. Confirmed next appts for next Wednesday at 4pm with Dr. Zheng, and next Thursday at 1pm. Writer offered to call Jerel earlier on Wednesday to remind him of Dr. Zheng appt.    Writer also called IR - [facility] - [place], [place] ([phone]) and spoke to Gabriela; informed her of upcoming appts next week as well so that staff can help remind Jerel and assist him with coordinating transportation. Per Gabriela, transportation has been arranged for the appts next week and she will remind him.    Tammi Connell, ARIANNE  NAVIGATE Individual Resiliency  & Family Clinician

## 2019-10-31 ENCOUNTER — TELEPHONE (OUTPATIENT)
Dept: PSYCHIATRY | Facility: CLINIC | Age: 24
End: 2019-10-31

## 2019-10-31 NOTE — TELEPHONE ENCOUNTER
NAVIGATE SEE Outgoing Telephone Call  For Supported Employment & Education    NAVIGATE Enrollee: Jerel Day (1995)     MRN: [id number]  Date of Call: 10/31/2019  Contacted: Nola    Discussed:   Writer left second vm for Nola to return call if she is able to attend meeting with Jerel on 11/7 at 1pm at [facility] rather than [facility] facility.     Ana Michel

## 2019-10-31 NOTE — TELEPHONE ENCOUNTER
NAVIGATE SEE Outgoing Telephone Call  For Supported Employment & Education    NAVIGATE Enrollee: Jerel Day (1995)     MRN: [id number]  Date of Call: 10/31/2019  Contacted: Jerel Jerel's parents    Discussed:   Writer called Jerel about a team meeting scheduled for next week Thursday at 1pm. This writer asked Jerel if he would be agreeable to inviting his parents to this meeting. Jerel said he would like them there so this writer called the Shay's home phone and informed them of the time, date and location of meeting if they can make it. Left call back info for questions.    Ana Michel

## 2019-10-31 NOTE — PROGRESS NOTES
"NAVIGATE Medication Management Progress Note  A Part of the [facility] First Episode of Psychosis Program    NAVIGATE Enrollee: Jerel Day (1995)     MRN: [id number]  Date:  10/02/19         Contributors to the Assessment     Chart Reviewed.   Interview completed with Jerel Day.         Chief Complaint       \"I am doing better\"           Interim History      Jerel Day is a 24 year old male who was last seen in clinic on 6/26/19 at which time pt was doing poorly and off of his meds.  History was provided by Jerel who was a fair historian.  Since the last visit:  The patient says he is doing well since his hospitalization and restarting medications.  He says the medication are helping.   however he still endorses frequent hallucinations, paranoia but reports a decreased in somatic delusions. Mood is fair, social interaction is low.  Anxiety is still moderate.      PSYCH ROS:  Clinician Rating Form in [facility]  1. Depressed Mood: Rating 1  0 Not reported    1 Very mild occasionally feels sad or  down ; of questionable clinical significance   2 Mild occasionally feels moderately depressed or often feels sad or  down    3 Moderate occasionally feels very depressed or often feels moderately depressed   4 Moderately severe often feels very depressed   5 Severe feels very depressed most of the time   6 Very severe constant extremely painful feelings of depression   U Unable to assess      2. Anxiety/Worry: Rating: 3  0 Not reported    1 Very mild occasionally feels a little anxious; of questionable clinical significance   2 Mild occasionally feels moderately anxious or often feels a little anxious or worried   3 Moderate occasionally feels very anxious or often feels moderately anxious   4 Moderately severe often feels very anxious or often feels moderately anxious   5 Severe feels very anxious or worried most of the time   6 Very severe patient is continually preoccupied with severe anxiety   U Unable to assess  "     3. Suicidal Ideation/Behavior: Ratin   0 Not reported    1 Very mild occasional thoughts of dying,  I d be better off dead  or  I wish I were dead    2 Mild frequents thoughts of dying or occasional thoughts of killing self, without plan or method   3 Moderate often thinks of suicide or has though of a specific method   4 Moderately severe has mentally rehearsed a specific method of suicide or has made a suicide attempt with questionable intent to die (e.r. takes aspirins and then tells family)   5 Severe has made preparations for a potentially lethal suicide attempt (e.g acquires a gun and bullets for an attempt)   6 Very severe has made a suicide attempt with an intent to die   U Unable to assess       4. Elevated/Expansive Mood: Ratin  0 Not at all    1 Very mild questionable; more cheerful than most people in his/her circumstances but of only possible clinical significance   2 Mild brief elevated/expansive mood but only somewhat out of proportion to the circumstances   3 Moderate brief/occasional elevation of mood which is clearly out of proportion to the circumstances   4 Moderately severe sustained/frequent elevation of mood which is clearly out of proportion to the circumstances   5 Severe mood is euphoric most of the time   6 Very severe sustained elevation;  everything is wonderful  almost all of the time   U Unable to assess      5. Hostility/Anger/Irritability/Aggressiveness: Ratin  0 Not at all    1 Very mild occasional irritability of doubtful clinical significance   2 Mild occasionally feels angry or mild or indirect expressions of anger, e.g. sarcasm, disrespect or hostile gestures   3 Moderate frequently feels angry, frequent irritability or occasional direct expression of anger, e.g. yelling at others   4 Moderately severe often feels very angry, often yells at others or occasionally threatens to harm others   5 Severe has acted on his anger by becoming physically abusive on one or  two occasions or makes frequent threats to harm others or is very angry most of the time   6 Very severe has been physically aggressive and/or required intervention to prevent assaultiveness on several occasions; or any serious assaultive act   U Unable to assess      6. Impulsive Behavior: Rating: 3  0 Not at all    1 Very mild one instance of impulsive behavior which is of doubtful clinical significance   2 Mild occasional impulsive acts, e.g. making phone calls at odd hours   3 Moderate occasional impulsive acts with some potential negative consequence, e.g. leaving work abruptly; changing plans without thinking   4 Moderately severe impulsive acts with definite negative consequences, e.g. overspending on non-essentials; repeated reckless sexual behavior   5 Severe impulsive acts with direct negative consequences, e.g. spends entire income on nonessentials without regard for basic needs   6 Very severe impulsive behavior which is potentially life threatening, e.g. jumps from dangerous height (without suicidal intent) or criminal behavior, e.g. impulsive robbery   U Unable to assess      7. Suspiciousness: Rating: 3  0 Not present    1 Very mild Seems on guard. Reluctant to respond to some  personal  questions. Reports being overly self-conscious in public   2 Mild Describes incidents in which others have harmed or wanted to harm him/her that sound plausible. Patient feels as if others are watching, laughing, or criticizing him/her in public, but this occurs only occasionally or rarely. Little or no preoccupation   3 Moderate Says others are talking about him/her maliciously, have negative intentions, or may harm him/her. Beyond the likelihood of plausibility, but not delusional. Incidents of suspected persecution occur occasionally (less than once per week) with some preoccupation   4 Moderately severe Same as 3, but incidents occur frequently such as more than once a week. Patient is moderately preoccupied with  ideas of persecution OR patient reports persecutory delusions expressed with much doubt (e.g. partial delusion)   5 Severe Delusional -- speaks of Mafia plots, the FBI, or others poisoning his/her food, persecution by supernatural forces   6 Extremely severe Same as 5, but the beliefs are bizarre or more preoccupying. Patient tends to disclose or act on persecutory delusions.   U Unable to assess      8. Unusual Thought Content: Ratin  0 Not present    1 Very mild Ideas of reference (people may stare or may laugh at him), ideas of persecution (people may mistreat him). Unusual beliefs in psychic esquivel, spirits, UFOs, or unrealistic beliefs in one's own abilities. Not strongly held. Some doubt   2 Mild Same as 1, but degree of reality distortion is more severe as indicated by highly unusual ideas or greater conviction. Content may be typical of delusions (even bizarre), but without full conviction. The delusion does not seem to have fully formed, but is considered as one possible explanation for an unusual experience   3 Moderate Delusion present but no preoccupation or functional impairment. May be an encapsulated delusion or a firmly endorsed absurd belief about past delusional circumstances   4 Moderately severe Full delusion(s) present with some preoccupation OR some areas of functioning disrupted by delusional thinking   5 Severe Full delusion(s) present with much preoccupation OR many areas of functioning are disrupted by delusional thinking   6 Extremely severe Full delusions present with almost   U Unable to assess      9. Hallucinations: Ratin  0 Not present    1 Very mild While resting or going to sleep, sees visions, smells odors, or hears voices, sounds or whispers in the absence of external stimulation, but no impairment in functioning   2 Mild While in a clear state of consciousness, hears a voice calling the subject s name, experiences non-verbal auditory hallucinations (e.g., sounds or  whispers), formless visual hallucinations, or has sensory experiences in the presence of a modality-relevant stimulus (e.g., visual illusions) infrequently (e.g., 1-2 times per week) and with no functional impairment   3 Moderate Occasional verbal, visual, gustatory, olfactory, or tactile hallucinations with no functional impairment OR non-verbal auditory hallucinations/visual illusions more than infrequently or with impairment   4 Moderately severe Experiences daily hallucinations OR some areas of functioning are disrupted by hallucinations   5 Severe Experiences verbal or visual hallucinations several times a day OR many areas of functioning are disrupted by these hallucinations   6 Extremely severe Persistent verbal or visual hallucinations throughout the day OR most areas of functioning are disrupted by these hallucinations   U Unable to assess      10. Conceptual Disorganization: Ratin  0 Not present    1 Very mild Peculiar use of words or rambling but speech is comprehensible   2 Mild Speech a bit hard to understand or make sense of due to tangentiality, circumstantiality, or sudden topic shifts   3 Moderate Speech difficult to understand due to tangentiality, circumstantiality, idiosyncratic speech, or topic shifts on many occasions OR 1-2 instances of incoherent phrases   4 Moderately severe Speech difficult to understand due to circumstantiality, tangentiality, neologisms, blocking, or topic shifts most of the time OR 3-5 instances of incoherent phrases   5 Severe Speech is incomprehensible due to severe impairments most of the time. Many PSRS items cannot be rated by self-report alone   6 Extremely severe Speech is incomprehensible throughout interview   U Unable to assess      11. Avolition/Apathy: Ratin  0 Not at all    1 Very mild questionable decrease in time spent in goal-directed activities   2 Mild spends less time in goal-directed activities than is appropriate for situation and age   3  Moderate initiates activities at times but does not follow through   4 Moderately severe rarely initiates activity but will passively engage with encouragement   5 Severe almost never initiates activities; requires assistance to accomplish basic activities   6 Very severe does not initiate or persist in any goal-directed activity even with outside assistance   U Unable to assess      12. Asociality/Low Social Drive: Ratin  0 Not at all    1 Very mild questionable   2 Mild slow to initiate social interactions but usually responds to overtures by others   3 Moderate rarely initiates social interactions; sometimes responds to overtures by others   4 Moderately severe does not initiate but sometimes responds to overtures by others; little social interaction outside close family members   5 Severe never initiates and rarely encourages conversations or activities; avoids being with others unless prodded, may have contacts with family   6 Very severe avoids being with others (even family members) whenever possible, extreme social isolation   U Unable to assess      13. Adherence: Days: not taking any meds at this time  The longest, continuous amount of time in days, since the last visit when the subject did not take medication     14. EPS Part I: Ratin  Rate Elbow Rigidity for all subjects  0 Normal   1 Slight stiffness and resistance   2 Moderate stiffness and resistance   3 Marked rigidity with difficulty in passive movement   4 Extreme stiffness and rigidity with almost a frozen joint   U Unable to assess           EPS Part 2: Signs of EPS: 0  Are there are other signs of EPS (eg diminished arm swing, postural instability, cogwheeling, tremor, akinesia) present based upon patient report or exam?  0 No   1 Yes     15. Akathesia: Ratin  0 No restlessness reported or observed   1 Mild restlessness observed; e.g., occasional jiggling of the foot occurs when subject is seated   2 Moderate restlessness observed;  e.g., on several occasions, jiggles foot, crosses and uncrosses legs or twists a part of the body   3 Restlessness is frequently observed; e.g., the foot or legs moving most of the time   4 Restlessness persistently observed; e.g., subject cannot sit still, may get up and walk   U Unable to assess     16. Dyskinetic Movement Ratings: Ratin  0 None   1 Minimal, may be extreme normal   2 Mild   3 Moderate   4 Severe   U Unable to assess     SIDE EFFECT ASSESSMENT:  Clinician Rating Form in [facility] - Side Effect Assessment  Address side effects reported by the patient and rate using this scale  0 Not present   1 Minimal, may be extreme normal   2 Mild   3 Moderate   4 Severe   U Unable to assess   P Present, but not related     Feeling dizzy or faint: 0  Blurred vision: 0  Dry mouth: 0   Too much saliva/droolin  Nausea: 0  Constipation: 0  Increased appetite: 0  Weight gain: 0  Weight loss: 0  Feeling tired/fatigue: 0  Daytime sedation: 2  Hypersomnia: 0  Insomnia: 0  Low libido: 0  Other problems with sex: 0  Breast enlargement or discharge: 0  Irregular Menstruation or amenorrhea: NA  Other (please list and rate): 0    RECENT SUBSTANCE USE:  Clinician Rating Form in [facility] - Substance Use Assessment  Alcohol Use Severity: Ratin  0 none   1 use without impairment: drinks but no immediate social or medical impairment   2 use with impairment: e.g. becomes grossly intoxicated; alcohol use or withdrawal compromises school, work or social functioning; alcohol use or withdrawal exacerbates symptoms (e.g. gets depressed when drinking)     Marijuana Use Severity: Ratin  0 none   1 occasional use without impairment: e.g. uses marijuana a few days a month and has no immediate social or medical impairment   2 frequent use without impairment: e.g. uses marijuana several or more days a week but has no immediate social or medical impairment   3 use with impairment: e.g. becomes grossly intoxicated; marijuana use  compromises school, work or social functioning; marijuana use exacerbates symptoms (e.g. gets paranoid when using)     Other Drug Use Severity: Ratin  0 none   1 occasional use without impairment: e.g. uses drug(s) a few days a month and has no immediate social or medical impairment   2 frequent use without impairment: e.g. uses drug(s) several or more days a week but has no immediate social or medical impairment   3 use with impairment: e.g. becomes grossly intoxicated; drug use compromises school, work or social functioning; drug use exacerbates symptoms (e.g. gets paranoid when using)         CURRENT SOCIAL HISTORY:  FINANCIAL SUPPORT- family or friend       CHILDREN- none       LIVING SITUATION- Currently staying with his parent's [facility] in [place], [place] but plans to move back to his apartment on campus    SOCIAL/ SPIRITUAL SUPPORT- unknown       FEELS SAFE AT HOME- Yes      MEDICAL ROS:  Reports none      Denies akathisia, unusual movements and wt gain   10 point ROS neg other than the symptoms noted above in the HPI. Patient does report multiple physical concern including back, neck, hip and foot pain, concerns about metabolism. These complaints have been evaluated by sports medicine and primary care with unremarkable findings. PT exercises have been recommended.      Of note, history is positive for multiple sports injuries (former ) including concussion x2 with LOC once; ankle and hip injuries; scoliosis. His disorganized communication is likely interfering with an adequate health assessment.         First Episode of Psychosis History      DUP (duration untreated psychosis):  Likely first experienced auditory hallucinations during the spring of 2016, possibly in the context of cannabis and LSD use. Did not seek treatment until behavior and presentation became significantly disorganized in 2017. Medication adherence has been poor over the course of this last year.   Route to initial care:  "ED for disorganized behavior, somatic concerns   Medication adherence overall:  Fair to poor  General frequency of visits:  Recommend weekly to biweekly  Participation in groups:  none  Cognitive Remediation:  none  Other treatment history: See pertinent background      Reviewed for completion of First Episode work-up:  Yes  First episode workup:  Not Done (if completed, see LABS for results)  MATRICS Consensus Cognitive Battery:  Not Done (if completed, see LABS for results)         Medical/Surgical History     Penicillins    Patient Active Problem List   Diagnosis     Schizoaffective disorder (H)     Psychosis (H)            Medications     Current Outpatient Medications   Medication Sig Dispense Refill     gabapentin (NEURONTIN) 300 MG capsule Take 1 capsule (300 mg) by mouth 2 times daily as needed (nerve pain or anxiety) 60 capsule 1     ARIPiprazole (ABILIFY) 10 MG tablet Take 1 tablet (10 mg) by mouth daily 30 tablet 0     OLANZapine (ZYPREXA) 20 MG tablet Take 1 tablet (20 mg) by mouth At Bedtime 30 tablet 0     Previous medication trials:  Zoloft- stopped because of ASE (?)  fluoxetine  Risperidone 1 mg, reported akathisia at 3mg dose  Haloperidol 5mg bid, reported dystonia relieved with benadryl   Divalproex 750mg  Quetiapine 300mg- discontinued 1/2/18  Olanzapine 10mg- discontinued 1/29/18, EPS?  Alprazolam 0.5-1mg PRN          Vitals     /77 (BP Location: Left arm, Patient Position: Sitting, Cuff Size: Adult Regular)   Pulse 66   Temp 98  F (36.7  C)   Ht 1.759 m (5' 9.25\")   Wt 76.3 kg (168 lb 3.2 oz)   BMI 24.66 kg/m        Weight prior to medication: 130s         Mental Status Exam     Alertness: alert and oriented  Appearance: adequately groomed  Behavior/Demeanor: cooperative, with good eye contact   Speech: spontaneous but mild delay  Language: intact  Psychomotor: mild fidgetyness  Mood: good  Affect: full range; was congruent to mood; was congruent to content  Thought " Process/Associations: occasional loose associations  Thought Content:  delusions, preoccupations,  paranoid ideation;  Denies obsessions , phobia,  SI  Perception:  Reports auditory hallucinations;  Denies visual hallucinations  Insight: poor  Judgment: limited  Cognition: does  appear grossly intact; formal cognitive testing was not done  Memory: grossly intack  Gait/station: normal  Fund of knowledge: normal       Labs and Data     RATING SCALES:  AIMS: done 3/5/18 with total score of 0     PHQ-9 SCORE 8/9/2019 10/8/2019 10/24/2019   PHQ-9 Total Score 18 12 9       ANTIPSYCHOTIC LABS ROUTINE    [glu, A1C, lipids (focus LDL), liver enzymes, WBC, ANEU, Hgb, plts]   q12 mo  Recent Labs   Lab Test 01/03/18  1054   GLC 73     Recent Labs   Lab Test 01/03/18  1054   CHOL 135   TRIG 93   LDL 70   HDL 46     Recent Labs   Lab Test 01/03/18  1054   AST 19   ALT 37   ALKPHOS 66     Recent Labs   Lab Test 01/03/18  1054   WBC 8.0   ANEU 5.5   HGB 13.9               Psychiatric Diagnoses     Schizoaffective disorder, bipolar type (F25.0)         Assessment     This patient is a 23 year old male with a hx of schizoaffective do, recently restarted on medication and commitment.  Pt psychotic symptoms are poorly controlled and significantly impacting functioning.       [place] PRESCRIPTION MONITORING PROGRAM [] was not checked today:  last ALPRAZOLAM 0.5 MG TABLET  dispensed 12/01/2017    PSYCHOTROPIC DRUG INTERACTIONS:   No major interactions.  Concomitant use of aripiprazole and lithium may increase risk for EPS     MANAGEMENT:  Monitoring for adverse effects, routine vitals, routine labs, using lowest therapeutic dose of [olanzapine and lithium] and patient is aware of risks         Plan     1) PSYCHOTROPIC MEDICATIONS:  Continue abilify 10mg daily  Continue seroquel 20mg daily.  Start gabapentin 300mg BID for anxiety.  2) THERAPY:  Continue IRT and SEE    3) REFERRALS:    No Referrals needed    4) RTC: 1 month    5)  CRISIS NUMBERS:   Provided routinely in AVS.    TREATMENT RISK STATEMENT:  The risks, benefits, alternatives and potential adverse effects have been discussed and are understood by the pt. The pt understands the risks of using street drugs or alcohol. There are no medical contraindications, the pt agrees to treatment with the ability to do so. The pt knows to call the clinic for any problems or to access emergency care if needed.  Medical and substance use concerns are documented above.  Psychotropic drug interaction check was done, including changes made today.    Time spent with patient  >25min    PROVIDER:  Vince Zheng M.D., Ph.D.     Dept. of Psychiatry   [facility]

## 2019-11-05 NOTE — PROGRESS NOTES
[facility]ILLIANA Clinician Contact & Progress Note  For Individual Resiliency Training (IRT)  A Part of the [facility] First Episode of Psychosis Program    NAVIGATE Enrollee: Jerel Day (1995)     MRN: [id number]  Date:  10/07/19  Diagnosis: Schizoaffective disorder, bipolar type       Clinician: LILLIANA Individual Resiliency Trainer, EDELMIRA Morgan     1. Type of contact: (majority of time spent)  IRT Session    2. People present:   Writer  Client: Jerel Day    3. Total number of persons who participated in contact: 2, including writer     4. Length of Actual Contact: Start Time: 12pm; End Time: 1pm   Traveled?    No     5. Location of contact:  [facility], [place]    6. Did the client complete the home practice option(s) from the previous session: completed    7. Motivational Teaching Strategies:  Connect info and skills with personal goals  Promote hope and positive expectations  Explore pros and cons of change  Re-frame experiences in positive light    8. Educational Teaching Strategies:  Review of written material/education  Relate information to client's experience  Ask questions to check comprehension  Break down information into small chunks  Adopt client's language     9. CBT Teaching Strategies:  Reinforcement and shaping (positive feedback for steps towards goals and gains in knowledge & skills)  Relapse prevention planning (review of stressors and early warning signs)  Coping skills training (review current coping skills and increase currently used skills)  Behavioral tailoring (fit taking medication into client's daily routine)    10. IRT Module(s) Addressed:  Module 2 - Assessment/Initial Goal Setting and BEH Treatment Plan  Module 3 - Education about Psychosis  Module 4 - Relapse Prevention Planning  Safety Planning    11. Techniques utilized:   [place] announced at beginning of session  Review of goal  Review of previous meeting  Present new material  Problem-solving practice  Help  "client choose a home practice option  Summarize progress made in current session    12. Mental Status Exam:    Alertness: alert  and slow to respond  Appearance: casually groomed  Behavior/Demeanor: cooperative, pleasant and guarded, with poor eye contact   Speech: slowed  Language: intact and no obvious problem. Preferred language identified as English.  Psychomotor: slowed  Mood: description consistent with euthymia  Affect: restricted; was congruent to mood; was congruent to content  Thought Process/Associations: remarkable for , difficult to follow, disorganized, loose associations and response delay  Thought Content:  Reports violent ideation, delusions, preoccupations and over-valued ideas;  Denies suicidal ideation  Perception:  Reports auditory hallucinations with commands [details in Interim History] and depersonalization;  Denies visual hallucinations  Insight: limited  Judgment: limited  Cognition: does  appear grossly intact; formal cognitive testing was not done  Suicidal ideation: denies SI, denies intent,  and denies plan  Homicidal Ideation: report command AH that are violent in nature; see below for detail    13. Assessment/Progress Note:     Writer met with Jerel on this date for IRT. Writer set agenda to check-in, discuss and assess symptoms, explore material in IRT modules, discuss ways in which Jerel can expand coping strategies and stress-management techniques for ongoing symptom management, and check-in regarding goals for ongoing recovery. During check-in, with regards to symptoms, Jerel described the voices as \"not too bad.\" He did report on one occasion one voice told him to hurt the person who first gave him marijuana. Engaged Jerel in safety assessment related to this command AH; Jerel does not know where this person is and does not have intent to act on this. He was able to contract for safety. Jerel denies any HI/VI aside from this command AH; he denies any SI or thoughts related to SH. Jerel " "reports medications are \"good\" and does not report any concerns related to sleep or appetite. He shared positively about guitar lessons and his birthday dinner with his family. He reports things at the [facility]RT[facility] are going well and he enjoys the meals and walking. Writer then engaged Jerel in review and updating of his BEH Treatment Plan; see separate encounter for full details. Main goals Jerel identified were wanting to cut down on cigarettes, get physically stronger, expand his social [place] and \"maybe\" get a job and/or finish school. Jerel also reflected he wants to \"see my environment as an opportunity, not a barrier.\" Writer highlighted again all of his progress and efforts that are promoting overall well-being and stabilization for him. Emphasized his strengths and resiliency. Overall, Jerel was open and engaged throughout the session. Symptom assessment, safety assessment, discussion and identification of coping strategies, and exploration of material in IRT modules was all in support of Jerel's self-identified goal(s) as identified in BEH Treatment Plan today. Progress toward goal completion seems fair.    Overall, Jerel presented with much clearer thought process; less disorganization, loose associations and tangential thinking. He confirmed he is taking Abilify and Zyprexa.     14. Plan/Referrals:     Next appt scheduled. Client and family aware they can reach out to writer directly and/or NAVIGATE team should concerns or needs arise prior to next scheduled appointment.     Billing for \"Interactive Complexity\"?    No      EDELMIRA Morgan    NAVIGATE Individual Resiliency Trainer    Attestation:    I did not see this patient directly. This patient is discussed with me in individual clinical social work supervision, and I agree with the plan as documented.     Ana Rose, LICSW, MSW, LICSW, November 6, 2019      "

## 2019-11-05 NOTE — PROGRESS NOTES
[facility] NAVIGATE Program Treatment Plan Summary  A Part of the [facility] First Episode of Psychosis Program     NAVIGATE Enrollee: Jerel Day  /Age:  1995 (23 year old)  MRN: [id number]    Date of Initial Service: 18  Date of INTIAL Treatment Plan: 18  Last Review/Update Date:  19  Today's Date: 10/7/19  Next 90-Day Review Due:  20    The following represents UPDATED and reviewed treatment plan.    1. DSM-V Diagnosis (include numeric code)  Schizoaffective, bipolar type, 295.70 (F25)    2. Current symptoms and circumstances that substantiate the diagnosis    Jerel has a history of psychosis (paranoia, delusions, odd beliefs per family/friends, auditory hallucinations, command auditory hallucinations, visual hallucinations, disorganized speech, disorganized behavior and negative symptoms (diminished emotional expression, avolition and anhedonia)), SI and SIB. He presents with current symptoms of psychosis (delusions, auditory hallucinations, command auditory hallucinations, disorganized speech and negative symptoms (diminished emotional expression and avolition)).     3. How symptoms and/or behaviors are affecting level of function    Jerel s systems are impacting functioning with respect to ADLs, IADLs, social relationships, familial relationships and employment.    4. Risk Assessment:  Suicide:  Assessed Level of Immediate Risk: High  Ideation: No  Plan:  No; command SI to jump off bridge.  Means: Yes; has access to bridge.  Intent: No    Homicide/Violence:  Assessed Level of Immediate Risk: Medium  Ideation: No  Plan: No  Means: No  Intent: No    5. Medications    Current Outpatient Medications   Medication     ARIPiprazole (ABILIFY) 10 MG tablet     gabapentin (NEURONTIN) 300 MG capsule     OLANZapine (ZYPREXA) 20 MG tablet     No current facility-administered medications for this visit.        6. Strengths     Medication adherence  Supportive friends, family, recovery environment  Bravery  & Valor     Curiosity    Humor & Playfulness   Kind & Generous    Love of learning       7. Barriers & Suicide Risk Factors     Command Hallucinations  Communication, limited to no communication with family/support network  Coping, limited to no coping strategies  High Premorbid IQ  Impulsive  Male  SIB  Single  Symptoms of psychosis, positive (delusions and/or hallucinations)  Symptoms of psychosis, negative (flat affect, avolition, anhedonia, alogia, and/or apathy)  Symptoms of psychosis, cognitive (memory, attention and concentration, and/or executive functioning difficulties)  Treatment Non-Adherence     8. Treatment Domains and Goals    Domain 1: Illness Management & Recovery  Identify and engage possible areas of improvement related to medication optimization, psychosis (paranoia, delusions, odd beliefs per family/friends, auditory hallucinations, command auditory hallucinations, disorganized speech, disorganized behavior and negative symptoms (diminished emotional expression, avolition and anhedonia)), depression (low mood nearly every day, anhedonia most of the time, low energy, difficulty concentrating, thinking or making decisions and suicidal ideation without plan, without intent), anxiety, SI, SIB and low self-esteem and ability to management illness.     Measurable Objectives Interventions Target Dates & Discharge Criteria   Medication Objectives    -Paricipate in a medication evaluation   -Take medications as prescribed and have reduced frequency and severity of symptoms  -Learn and implement strategies for overcoming barriers to taking medication  -Support system assists with overcoming barriers to taking medications   Medication Management  -Prescribe and monitor medications  -Monitor and treat side effects  -Psychoeducation  -Behavioral activation  -Initial and routine lab work    IRT/Psychotherapy  -Psychoeducation  -Motivation interviewing  -CBT  -Behavioral activation  -Mindfulness  -Family  involvement during portions of sessions    Family Therapy  -Psychoeducation  -Motivational interviewing  -Behavioral family therapy  -CBT  -Behavioral activation    Case Management  -Motivational interviewing  -Care coordination with school   Target date:   6 months from 10/07/19    Discharge criteria:  Marked and sustained symptom improvement     Gains Made:  -Paricipate in a medication evaluation   -adhering to medication recommendations  -taking medications as prescribed   Individual s Objectives    -Complete a safety plan with therapist and share with support system  -Define what recovery means to self  -Identify psychosocial areas of need  -Identify top 5 strengths and use those strengths when working toward goal achievement; simultaneously choose one area for improvement and identify two actionable steps toward improvement  -Create a goal plan consisting of one long-term goal, three short-term goals, and actionable steps toward short-term goal achievement  -Demonstrate understanding of psychosis (paranoia, delusions, auditory hallucinations and command auditory hallucinations), depression (low mood nearly every day, anhedonia most of the time, low energy and difficulty concentrating, thinking or making decisions), anxiety, SI and SIB in the context of self with respect to symptoms, causes, course, medications and the impact of stress  -Learn at least 2 coping strategies to successfully target current symptoms  -Demonstrate understanding for how substance use impacts symptoms, identify stage of change, and experiment with reduced use or abstinence from all illicit substances   -Learn strategies to build positive emotions and facilitate resiliency   -Develop and implement a relapse prevention plan including identification of warning signs, triggers, coping mechanisms, and how other persons can be supportive if symptoms increase or reemerge   -Process the psychotic episode by demonstrating understanding of how  the episode impacted self, identifying positive coping strategies and resiliency used during that time, challenging self-stigmatizing beliefs, and developing a positive attitude towards facing future life challenges  -Identify primary styles of thinking, and demonstrate understanding of and use cognitive restructuring to successfully deal with negative feelings  -Identify persistent symptoms that interfere with activities and/or enjoyment and successfully implement two coping strategies to reduce symptoms severity   Medication Management  -Prescribe and monitor medications  -Monitor and treat side effects  -Psychoeducation  -Initial and routine lab work    IRT/Psychotherapy  -Psychoeducation  -Motivation interviewing  -CBT  -Behavioral activation  -Mindfulness  -Family involvement during portions of sessions    Family Therapy  -Psychoeducation  -Motivational interviewing  -Behavioral family therapy  -CBT  -Behavioral activation    Case Management  -Motivational interviewing   Target date:   6 months from 10/07/19    Discharge criteria:  Marked and sustained symptom improvement     Jerel demonstrates understanding of mental illness     Jerel successfully implements strategies to cope with stressors and/or symptoms to mitigate risk for increase in symptom severity or relapse    Gains Made:  -Complete a safety plan with therapist and share with support system  -Define what recovery means to self  -Identify psychosocial areas of need  -Learn at least 2 coping strategies to successfully target current symptoms  -Identify persistent symptoms that interfere with activities and/or enjoyment and successfully implement two coping strategies to reduce symptoms severity     Support System Objectives    -Supports increase the safety of the home by removing firearms or other lethal weapons from the client's easy access   -Supports agree to provide supervision and monitor suicidal potential   -Supports, including family members,  terminate any hostile, critical responses to the client and increase their statements of praise, optimism, and affirmation   -Supports, including family members, verbalize realistic expectations and discipline methods   -Supports, including family members, verbally reinforce the client's active attempts to build self-esteem and rapport   -Supports verbalize increased understanding of an knowledge about the client's illness and treatment   -Identify psychosocial areas of need  -Verbalize understanding of the client's long-term and short-term goals  -Demonstrate understanding of psychosis (paranoia, delusions, auditory hallucinations and command auditory hallucinations), depression (low mood nearly every day, anhedonia most of the time, low energy and difficulty concentrating, thinking or making decisions), SI and SIB in the context of the client with respect to symptoms, causes, course, medications and the impact of stress  -Learn the client's signs of stress and possible coping skills  -Demonstrate understanding for how substance use impacts symptoms and how to support decrease in or abstinence from illicit substance use  -Learn skills that strengthen and support the client's positive behavior change  -Learn strategies to build positive emotions and facilitate resiliency including use of a resiliency story  -Develop and implement a relapse prevention plan including identification of warning signs, triggers, coping mechanisms, and how other persons can be supportive if symptoms increase or reemerge   -Learn and implement communication skills to enhance communication and respect among family members  -Learn and implement problem-solving and/or conflict resolution skills to manage familial, personal and interpersonal problems constructively   Medication Management  -Prescribe and monitor medications  -Monitor and treat side effects  -Psychoeducation  -Initial and routine lab  work    IRT/Psychotherapy  -Psychoeducation  -Motivation interviewing  -CBT  -Behavioral activation  -Family involvement during portions of sessions    Family Therapy  -Psychoeducation  -Motivational interviewing  -Behavioral family therapy  -CBT  -Behavioral activation    Case Management  -Motivational interviewing   Target date:   6 months from 10/07/19    Discharge criteria:  Support system demonstrates understanding of mental illness     Support system successfully implements strategies to assist Jerel cope with stressors and/or symptoms to mitigate risk for increase in symptom severity or relapse     Gains Made:  -Supports, including family members, verbalize realistic expectations and discipline methods   -Identify psychosocial areas of need  -Learn skills that strengthen and support the client's positive behavior change  -Learn and implement communication skills to enhance communication and respect among family members       Domain 2: Health & Basic Living Needs  Identify and engage possible areas of improvement related to basic needs being met and maintaining or improving overall health and well-being     Measurable Objectives Interventions Discharge Criteria   -Verbalize an accurate understanding of factors influencing eating, health, and weight  -Learn and implement at least healthy nutritional practices  -Learn budgeting strategies for finances and develop a personal budget  -Identify areas of improvement for ADLs and IADLs and implement at least two skills with improved outcomes    Per Jerel:  -cut down on cigarettes  -get stronger  -start yoga Medication Management  -Prescribe and monitor medications  -Monitor and treat side effects  -Psychoeducation  -Initial and routine lab work    IRT/Psychotherapy  -Psychoeducation  -Motivation interviewing  -CBT  -Behavioral activation  -Mindfulness  -Family involvement during portions of sessions    Family Therapy  -Psychoeducation  -Motivational  interviewing  -Behavioral family therapy  -CBT  -Behavioral activation    Supported Education & Employment  -Motivational interviewing  -Individualized placement and support   -Behavioral Activation  -Family involvement    Case Management  -Motivational interviewing   Target date:   6 months from 10/07/19    Discharge criteria:  Jerel, his supports and treatment team report no unmet health and basic living needs    Gains Made:  -Independently arrange transportation to/from appointments   -Identify areas of improvement for ADLs and IADLs and implement at least two skills with improved outcomes       Domain 3: Family & Other Supports  Identify and engage possible areas of improvement related to engaging family, friends and other supports     Measurable Objectives Interventions Discharge Criteria   -Identify support system  -Improve the quality of relationships by developing skills to better understand other people, communicate more effectively, manage disclosure, and understand social cues  -Learn and implement problem-solving and/or conflict resolution skills to manage personal and interpersonal problems constructively  -Identify and implement two approaches to how strengths and interests can be used to initiate social contacts and develop peer friendships  -Learn and implement calming and coping strategies to manage anxiety symptoms and focus attention usefully during moments of social anxiety    -Strengthen the social support network with friends by initiating social contact and participating in social activities with peers   -Increase participation in interpersonal or peer group activities   -Renew two old fun activities or develop two new fun activity     Per Jerel:  -find more friends   Medication Management  -Prescribe and monitor medications  -Monitor and treat side effects  -Psychoeducation  -Initial and routine lab work    IRT/Psychotherapy  -Psychoeducation  -Motivation interviewing  -CBT  -Behavioral  activation  -Family involvement during portions of sessions    Family Therapy  -Psychoeducation  -Motivational interviewing  -Behavioral family therapy  -CBT  -Behavioral activation    Supported Education & Employment  -Motivational interviewing  -Individualized placement and support   -Behavioral Activation  -Family involvement    Case Management  -Motivational interviewing   Target date:   6 months from 10/07/19    Discharge criteria:  Jerel and his support system report feeling equipped with the necessary skills to communicate and problem solve during times of disagreement    Conflict with supports and peers are resolved constructively and consistently over time; 6 months    Gains Made:  -Invite support system to be involved in treatment  -Participate in family therapy  -Learn and implement calming and coping strategies to manage anxiety symptoms and focus attention usefully during moments of social anxiety    -Increase participation in interpersonal or peer group activities   -Identified social clubs/groups at school that may be of interest to Jerel       Domain 4: Academic and Employment  Identify and engage possible areas of improvement relates to education and employment     Measurable Objectives Interventions Discharge Criteria   -Explore areas of interest for continued educational opportunities   -Explore areas of interest for employment purposes  -Stay current with schoolwork, completing assignments and interacting appropriately with peers and teachers   -Utilize accommodations, effective study and test-taking skills on a regular basis to improve academic performance  -Increase participate in school-related activities   -Obtain/maintain gainful employment     Per Jerel:  -maybe get a job  -maybe finish school Medication Management  -Prescribe and monitor medications  -Monitor and treat side effects  -Psychoeducation  -Initial and routine lab work    IRT/Psychotherapy  -Psychoeducation  -Motivation  interviewing  -CBT  -Behavioral activation  -Family involvement during portions of sessions    Family Therapy  -Psychoeducation  -Motivational interviewing  -Behavioral family therapy  -CBT  -Behavioral activation    Supported Education & Employment  -Motivational interviewing  -Individualized placement and support   -Behavioral Activation  -Family involvement    Case Management  -Motivational interviewing   Target date:   6 months from 10/07/19    Discharge criteria:  Work and school goals are achieved and maintained without follow along [facility]IGATE Supported Education and Employment supports for 6 months    Gains Made:  -Explore areas of interest for continued educational opportunities   -Explore areas of interest for employment purposes  -Obtain/maintain gainful employment        9. Frequency of sessions and expected duration of treatment:   1-4x per month Medication Management with Prescriber ongoing  6 months of weekly IRT/Individual Psychotherapy followed by 12-18 months of biweekly or monthly IRT  2-4x per month Supported Education and Employment Services for 6 months  2-4x per month Family Education and Support Services for 6 months    10. Participants in therapy plan:   Jerel Day  Support System: Mom and Dad    NAVIGATE Team:   -Director/Family Clinician: AGA Huerta LICARIANNE  -Family Clinician: EDELMIRA Morgan  -SEE: YOSEPH Raymundo  -Family : Sabra Villeda CFPS  -Prescriber: Dr. Vince Zheng    See scanned document for Acknowledgement of Current Treatment Plan    Regulatory Guidelines for Updating Treatment Plan  [place] Medical Assistance: Reviewed & signed at least every 90 days  Medicare:  Update per policy

## 2019-11-06 ENCOUNTER — OFFICE VISIT (OUTPATIENT)
Dept: PSYCHIATRY | Facility: CLINIC | Age: 24
End: 2019-11-06
Payer: COMMERCIAL

## 2019-11-06 VITALS
HEART RATE: 88 BPM | WEIGHT: 167 LBS | DIASTOLIC BLOOD PRESSURE: 74 MMHG | SYSTOLIC BLOOD PRESSURE: 115 MMHG | BODY MASS INDEX: 24.48 KG/M2

## 2019-11-06 DIAGNOSIS — F25.0 SCHIZOAFFECTIVE DISORDER, BIPOLAR TYPE (H): ICD-10-CM

## 2019-11-06 RX ORDER — BENZTROPINE MESYLATE 0.5 MG/1
0.5 TABLET ORAL 2 TIMES DAILY PRN
Qty: 60 TABLET | Refills: 0 | Status: SHIPPED | OUTPATIENT
Start: 2019-11-06 | End: 2019-12-04

## 2019-11-06 RX ORDER — ARIPIPRAZOLE 15 MG/1
15 TABLET ORAL DAILY
Qty: 30 TABLET | Refills: 0 | Status: SHIPPED | OUTPATIENT
Start: 2019-11-06 | End: 2019-12-04

## 2019-11-06 RX ORDER — OLANZAPINE 15 MG/1
15 TABLET ORAL AT BEDTIME
Qty: 30 TABLET | Refills: 0 | Status: SHIPPED | OUTPATIENT
Start: 2019-11-06 | End: 2019-12-04

## 2019-11-06 ASSESSMENT — PAIN SCALES - GENERAL: PAINLEVEL: MILD PAIN (3)

## 2019-11-06 NOTE — PROGRESS NOTES
"  NAVIGATE Medication Management Progress Note  A Part of the [facility] First Episode of Psychosis Program    NAVIGATE Enrollee: Jerel Day (1995)     MRN: [id number]  Date:  19         Contributors to the Assessment     Chart Reviewed.   Interview completed with Jerel Day.         Chief Complaint       \"I am doing better\"           Interim History      Jerel Day is a 24 year old male who was last seen in clinic on 19 at which time pt was doing poorly and off of his meds.  History was provided by Jerel who was a fair historian.  Since the last visit:  The patient reports some flare ups in spinal pain but is unable to be specific as to its nature beyon describing it as throbbing. He denies any recent panic attack. He also states that he feels that his joints are loose.  He says his paranoia has been well controlled, but does hear frequent, voices but says the content is benign at this time and not distressing .  He does not that he feels like he is losing control of body once/month.  Mood has been ok and he says he [facility] the [facility].  He does not feel very sedated and would like to cross titrate Zyprexa to Abilify to address this.     PSYCH ROS:  Clinician Rating Form in COMPASS  1. Depressed Mood: Rating 1  0 Not reported    1 Very mild occasionally feels sad or  down ; of questionable clinical significance   2 Mild occasionally feels moderately depressed or often feels sad or  down    3 Moderate occasionally feels very depressed or often feels moderately depressed   4 Moderately severe often feels very depressed   5 Severe feels very depressed most of the time   6 Very severe constant extremely painful feelings of depression   U Unable to assess      2. Anxiety/Worry: Ratin  0 Not reported    1 Very mild occasionally feels a little anxious; of questionable clinical significance   2 Mild occasionally feels moderately anxious or often feels a little anxious or worried   3 Moderate " occasionally feels very anxious or often feels moderately anxious   4 Moderately severe often feels very anxious or often feels moderately anxious   5 Severe feels very anxious or worried most of the time   6 Very severe patient is continually preoccupied with severe anxiety   U Unable to assess      3. Suicidal Ideation/Behavior: Ratin   0 Not reported    1 Very mild occasional thoughts of dying,  I d be better off dead  or  I wish I were dead    2 Mild frequents thoughts of dying or occasional thoughts of killing self, without plan or method   3 Moderate often thinks of suicide or has though of a specific method   4 Moderately severe has mentally rehearsed a specific method of suicide or has made a suicide attempt with questionable intent to die (e.r. takes aspirins and then tells family)   5 Severe has made preparations for a potentially lethal suicide attempt (e.g acquires a gun and bullets for an attempt)   6 Very severe has made a suicide attempt with an intent to die   U Unable to assess       4. Elevated/Expansive Mood: Ratin  0 Not at all    1 Very mild questionable; more cheerful than most people in his/her circumstances but of only possible clinical significance   2 Mild brief elevated/expansive mood but only somewhat out of proportion to the circumstances   3 Moderate brief/occasional elevation of mood which is clearly out of proportion to the circumstances   4 Moderately severe sustained/frequent elevation of mood which is clearly out of proportion to the circumstances   5 Severe mood is euphoric most of the time   6 Very severe sustained elevation;  everything is wonderful  almost all of the time   U Unable to assess      5. Hostility/Anger/Irritability/Aggressiveness: Ratin  0 Not at all    1 Very mild occasional irritability of doubtful clinical significance   2 Mild occasionally feels angry or mild or indirect expressions of anger, e.g. sarcasm, disrespect or hostile gestures   3  Moderate frequently feels angry, frequent irritability or occasional direct expression of anger, e.g. yelling at others   4 Moderately severe often feels very angry, often yells at others or occasionally threatens to harm others   5 Severe has acted on his anger by becoming physically abusive on one or two occasions or makes frequent threats to harm others or is very angry most of the time   6 Very severe has been physically aggressive and/or required intervention to prevent assaultiveness on several occasions; or any serious assaultive act   U Unable to assess      6. Impulsive Behavior: Ratin  0 Not at all    1 Very mild one instance of impulsive behavior which is of doubtful clinical significance   2 Mild occasional impulsive acts, e.g. making phone calls at odd hours   3 Moderate occasional impulsive acts with some potential negative consequence, e.g. leaving work abruptly; changing plans without thinking   4 Moderately severe impulsive acts with definite negative consequences, e.g. overspending on non-essentials; repeated reckless sexual behavior   5 Severe impulsive acts with direct negative consequences, e.g. spends entire income on nonessentials without regard for basic needs   6 Very severe impulsive behavior which is potentially life threatening, e.g. jumps from dangerous height (without suicidal intent) or criminal behavior, e.g. impulsive robbery   U Unable to assess      7. Suspiciousness: Ratin  0 Not present    1 Very mild Seems on guard. Reluctant to respond to some  personal  questions. Reports being overly self-conscious in public   2 Mild Describes incidents in which others have harmed or wanted to harm him/her that sound plausible. Patient feels as if others are watching, laughing, or criticizing him/her in public, but this occurs only occasionally or rarely. Little or no preoccupation   3 Moderate Says others are talking about him/her maliciously, have negative intentions, or may harm  him/her. Beyond the likelihood of plausibility, but not delusional. Incidents of suspected persecution occur occasionally (less than once per week) with some preoccupation   4 Moderately severe Same as 3, but incidents occur frequently such as more than once a week. Patient is moderately preoccupied with ideas of persecution OR patient reports persecutory delusions expressed with much doubt (e.g. partial delusion)   5 Severe Delusional -- speaks of Mafia plots, the FBI, or others poisoning his/her food, persecution by supernatural forces   6 Extremely severe Same as 5, but the beliefs are bizarre or more preoccupying. Patient tends to disclose or act on persecutory delusions.   U Unable to assess      8. Unusual Thought Content: Ratin  0 Not present    1 Very mild Ideas of reference (people may stare or may laugh at him), ideas of persecution (people may mistreat him). Unusual beliefs in psychic esquivel, spirits, UFOs, or unrealistic beliefs in one's own abilities. Not strongly held. Some doubt   2 Mild Same as 1, but degree of reality distortion is more severe as indicated by highly unusual ideas or greater conviction. Content may be typical of delusions (even bizarre), but without full conviction. The delusion does not seem to have fully formed, but is considered as one possible explanation for an unusual experience   3 Moderate Delusion present but no preoccupation or functional impairment. May be an encapsulated delusion or a firmly endorsed absurd belief about past delusional circumstances   4 Moderately severe Full delusion(s) present with some preoccupation OR some areas of functioning disrupted by delusional thinking   5 Severe Full delusion(s) present with much preoccupation OR many areas of functioning are disrupted by delusional thinking   6 Extremely severe Full delusions present with almost   U Unable to assess      9. Hallucinations: Ratin  0 Not present    1 Very mild While resting or going to  sleep, sees visions, smells odors, or hears voices, sounds or whispers in the absence of external stimulation, but no impairment in functioning   2 Mild While in a clear state of consciousness, hears a voice calling the subject s name, experiences non-verbal auditory hallucinations (e.g., sounds or whispers), formless visual hallucinations, or has sensory experiences in the presence of a modality-relevant stimulus (e.g., visual illusions) infrequently (e.g., 1-2 times per week) and with no functional impairment   3 Moderate Occasional verbal, visual, gustatory, olfactory, or tactile hallucinations with no functional impairment OR non-verbal auditory hallucinations/visual illusions more than infrequently or with impairment   4 Moderately severe Experiences daily hallucinations OR some areas of functioning are disrupted by hallucinations   5 Severe Experiences verbal or visual hallucinations several times a day OR many areas of functioning are disrupted by these hallucinations   6 Extremely severe Persistent verbal or visual hallucinations throughout the day OR most areas of functioning are disrupted by these hallucinations   U Unable to assess      10. Conceptual Disorganization: Ratin  0 Not present    1 Very mild Peculiar use of words or rambling but speech is comprehensible   2 Mild Speech a bit hard to understand or make sense of due to tangentiality, circumstantiality, or sudden topic shifts   3 Moderate Speech difficult to understand due to tangentiality, circumstantiality, idiosyncratic speech, or topic shifts on many occasions OR 1-2 instances of incoherent phrases   4 Moderately severe Speech difficult to understand due to circumstantiality, tangentiality, neologisms, blocking, or topic shifts most of the time OR 3-5 instances of incoherent phrases   5 Severe Speech is incomprehensible due to severe impairments most of the time. Many PSRS items cannot be rated by self-report alone   6 Extremely severe  Speech is incomprehensible throughout interview   U Unable to assess      11. Avolition/Apathy: Ratin  0 Not at all    1 Very mild questionable decrease in time spent in goal-directed activities   2 Mild spends less time in goal-directed activities than is appropriate for situation and age   3 Moderate initiates activities at times but does not follow through   4 Moderately severe rarely initiates activity but will passively engage with encouragement   5 Severe almost never initiates activities; requires assistance to accomplish basic activities   6 Very severe does not initiate or persist in any goal-directed activity even with outside assistance   U Unable to assess      12. Asociality/Low Social Drive: Ratin  0 Not at all    1 Very mild questionable   2 Mild slow to initiate social interactions but usually responds to overtures by others   3 Moderate rarely initiates social interactions; sometimes responds to overtures by others   4 Moderately severe does not initiate but sometimes responds to overtures by others; little social interaction outside close family members   5 Severe never initiates and rarely encourages conversations or activities; avoids being with others unless prodded, may have contacts with family   6 Very severe avoids being with others (even family members) whenever possible, extreme social isolation   U Unable to assess      13. Adherence: Days: not taking any meds at this time  The longest, continuous amount of time in days, since the last visit when the subject did not take medication     14. EPS Part I: Ratin  Rate Elbow Rigidity for all subjects  0 Normal   1 Slight stiffness and resistance   2 Moderate stiffness and resistance   3 Marked rigidity with difficulty in passive movement   4 Extreme stiffness and rigidity with almost a frozen joint   U Unable to assess           EPS Part 2: Signs of EPS: 0  Are there are other signs of EPS (eg diminished arm swing, postural  instability, cogwheeling, tremor, akinesia) present based upon patient report or exam?  0 No   1 Yes     15. Akathesia: Ratin  0 No restlessness reported or observed   1 Mild restlessness observed; e.g., occasional jiggling of the foot occurs when subject is seated   2 Moderate restlessness observed; e.g., on several occasions, jiggles foot, crosses and uncrosses legs or twists a part of the body   3 Restlessness is frequently observed; e.g., the foot or legs moving most of the time   4 Restlessness persistently observed; e.g., subject cannot sit still, may get up and walk   U Unable to assess     16. Dyskinetic Movement Ratings: Ratin  0 None   1 Minimal, may be extreme normal   2 Mild   3 Moderate   4 Severe   U Unable to assess     SIDE EFFECT ASSESSMENT:  Clinician Rating Form in COMPASS - Side Effect Assessment  Address side effects reported by the patient and rate using this scale  0 Not present   1 Minimal, may be extreme normal   2 Mild   3 Moderate   4 Severe   U Unable to assess   P Present, but not related     Feeling dizzy or faint: 0  Blurred vision: 0  Dry mouth: 0   Too much saliva/droolin  Nausea: 0  Constipation: 0  Increased appetite: 0  Weight gain: 0  Weight loss: 0  Feeling tired/fatigue: 0  Daytime sedation: 3  Hypersomnia: 0  Insomnia: 0  Low libido: 0  Other problems with sex: 0  Breast enlargement or discharge: 0  Irregular Menstruation or amenorrhea: NA  Other (please list and rate): 0    RECENT SUBSTANCE USE:  Clinician Rating Form in COMPASS - Substance Use Assessment  Alcohol Use Severity: Ratin  0 none   1 use without impairment: drinks but no immediate social or medical impairment   2 use with impairment: e.g. becomes grossly intoxicated; alcohol use or withdrawal compromises school, work or social functioning; alcohol use or withdrawal exacerbates symptoms (e.g. gets depressed when drinking)     Marijuana Use Severity: Ratin  0 none   1 occasional use without  impairment: e.g. uses marijuana a few days a month and has no immediate social or medical impairment   2 frequent use without impairment: e.g. uses marijuana several or more days a week but has no immediate social or medical impairment   3 use with impairment: e.g. becomes grossly intoxicated; marijuana use compromises school, work or social functioning; marijuana use exacerbates symptoms (e.g. gets paranoid when using)     Other Drug Use Severity: Ratin  0 none   1 occasional use without impairment: e.g. uses drug(s) a few days a month and has no immediate social or medical impairment   2 frequent use without impairment: e.g. uses drug(s) several or more days a week but has no immediate social or medical impairment   3 use with impairment: e.g. becomes grossly intoxicated; drug use compromises school, work or social functioning; drug use exacerbates symptoms (e.g. gets paranoid when using)         CURRENT SOCIAL HISTORY:  FINANCIAL SUPPORT- family or friend       CHILDREN- none       LIVING SITUATION- Currently staying with his parent's [facility] in [place], [place] but plans to move back to his apartment on campus    SOCIAL/ SPIRITUAL SUPPORT- unknown       FEELS SAFE AT HOME- Yes      MEDICAL ROS:  Reports none      Denies akathisia, unusual movements and wt gain   10 point ROS neg other than the symptoms noted above in the HPI. Patient does report multiple physical concern including back, neck, hip and foot pain, concerns about metabolism. These complaints have been evaluated by sports medicine and primary care with unremarkable findings. PT exercises have been recommended.      Of note, history is positive for multiple sports injuries (former ) including concussion x2 with LOC once; ankle and hip injuries; scoliosis. His disorganized communication is likely interfering with an adequate health assessment.         First Episode of Psychosis History      DUP (duration untreated psychosis):  Likely first  experienced auditory hallucinations during the spring of 2016, possibly in the context of cannabis and LSD use. Did not seek treatment until behavior and presentation became significantly disorganized in January 2017. Medication adherence has been poor over the course of this last year.   Route to initial care: ED for disorganized behavior, somatic concerns   Medication adherence overall:  Fair to poor  General frequency of visits:  Recommend weekly to biweekly  Participation in groups:  none  Cognitive Remediation:  none  Other treatment history: See pertinent background      Reviewed for completion of First Episode work-up:  Yes  First episode workup:  Not Done (if completed, see LABS for results)  MATRICS Consensus Cognitive Battery:  Not Done (if completed, see LABS for results)         Medical/Surgical History     Penicillins    Patient Active Problem List   Diagnosis     Schizoaffective disorder (H)     Psychosis (H)            Medications     Current Outpatient Medications   Medication Sig Dispense Refill     ARIPiprazole (ABILIFY) 15 MG tablet Take 1 tablet (15 mg) by mouth daily 30 tablet 0     benztropine (COGENTIN) 0.5 MG tablet Take 1 tablet (0.5 mg) by mouth 2 times daily as needed for agitation 60 tablet 0     gabapentin (NEURONTIN) 300 MG capsule Take 1 capsule (300 mg) by mouth 2 times daily as needed (nerve pain or anxiety) 60 capsule 1     OLANZapine (ZYPREXA) 15 MG tablet Take 1 tablet (15 mg) by mouth At Bedtime 30 tablet 0     Previous medication trials:  Zoloft- stopped because of ASE (?)  fluoxetine  Risperidone 1 mg, reported akathisia at 3mg dose  Haloperidol 5mg bid, reported dystonia relieved with benadryl   Divalproex 750mg  Quetiapine 300mg- discontinued 1/2/18  Olanzapine 10mg- discontinued 1/29/18, EPS?  Alprazolam 0.5-1mg PRN          Vitals     /74 (BP Location: Left arm, Patient Position: Chair, Cuff Size: Adult Regular)   Pulse 88   Wt 75.8 kg (167 lb)   BMI 24.48 kg/m         Weight prior to medication: 130s         Mental Status Exam     Alertness: alert and oriented  Appearance: adequately groomed  Behavior/Demeanor: cooperative, with good eye contact   Speech: spontaneous but mild delay  Language: intact  Psychomotor: mild fidgetyness  Mood: good  Affect: full range; was congruent to mood; was congruent to content  Thought Process/Associations: occasional loose associations  Thought Content:  delusions, preoccupations,  paranoid ideation;  Denies obsessions , phobia,  SI  Perception:  Reports auditory hallucinations;  Denies visual hallucinations  Insight: poor  Judgment: limited  Cognition: does  appear grossly intact; formal cognitive testing was not done  Memory: grossly intack  Gait/station: normal  Fund of knowledge: normal       Labs and Data     RATING SCALES:  AIMS: done 3/5/18 with total score of 0     PHQ-9 SCORE 11/29/2019 12/4/2019 12/4/2019   PHQ-9 Total Score 9 9 9       ANTIPSYCHOTIC LABS ROUTINE    [glu, A1C, lipids (focus LDL), liver enzymes, WBC, ANEU, Hgb, plts]   q12 mo  Recent Labs   Lab Test 01/03/18  1054   GLC 73     Recent Labs   Lab Test 01/03/18  1054   CHOL 135   TRIG 93   LDL 70   HDL 46     Recent Labs   Lab Test 01/03/18  1054   AST 19   ALT 37   ALKPHOS 66     Recent Labs   Lab Test 01/03/18  1054   WBC 8.0   ANEU 5.5   HGB 13.9               Psychiatric Diagnoses     Schizoaffective disorder, bipolar type (F25.0)         Assessment     This patient is a 23 year old male with a hx of schizoaffective do, recently hospitalized and not at [facility]. Pt psychotic symptoms are slowly improving though still significantly impacting functioning.     [place] PRESCRIPTION MONITORING PROGRAM [] was not checked today:  last ALPRAZOLAM 0.5 MG TABLET  dispensed 12/01/2017    PSYCHOTROPIC DRUG INTERACTIONS:   No major interactions.  Concomitant use of aripiprazole and lithium may increase risk for EPS     MANAGEMENT:  Monitoring for adverse effects, routine  vitals, routine labs, using lowest therapeutic dose of [olanzapine and lithium] and patient is aware of risks         Plan     1) PSYCHOTROPIC MEDICATIONS:  increase abilify 15mg daily  Decrease olanzapine to 15mg daily     Continue  gabapentin 300mg BID prn for anxiety.  Continue cogentin 0.5mg prn akithesia.  2) THERAPY:  Continue IRT and SEE    3) REFERRALS:    No Referrals needed    4) RTC: 1 month    5) CRISIS NUMBERS:   Provided routinely in AVS.    TREATMENT RISK STATEMENT:  The risks, benefits, alternatives and potential adverse effects have been discussed and are understood by the pt. The pt understands the risks of using street drugs or alcohol. There are no medical contraindications, the pt agrees to treatment with the ability to do so. The pt knows to call the clinic for any problems or to access emergency care if needed.  Medical and substance use concerns are documented above.  Psychotropic drug interaction check was done, including changes made today.    Time spent with patient  >25min    PROVIDER:  Vince Zheng M.D., Ph.D.     Dept. of Psychiatry   [facility]

## 2019-11-07 ENCOUNTER — OFFICE VISIT (OUTPATIENT)
Dept: PSYCHIATRY | Facility: CLINIC | Age: 24
End: 2019-11-07
Payer: COMMERCIAL

## 2019-11-07 ENCOUNTER — ALLIED HEALTH/NURSE VISIT (OUTPATIENT)
Dept: PSYCHIATRY | Facility: CLINIC | Age: 24
End: 2019-11-07
Payer: COMMERCIAL

## 2019-11-07 DIAGNOSIS — F25.0 SCHIZOAFFECTIVE DISORDER, BIPOLAR TYPE (H): Primary | ICD-10-CM

## 2019-11-07 ASSESSMENT — PATIENT HEALTH QUESTIONNAIRE - PHQ9: SUM OF ALL RESPONSES TO PHQ QUESTIONS 1-9: 11

## 2019-11-08 NOTE — PROGRESS NOTES
[facility] SEE Progress Note   For Supported Employment & Education    NAVIGATE Enrollee: Jerel Day (1995)     MRN: [id number]  Date:  11/07/19  Clinician: [facility] Supported Employment & , Ana Michel    1. Client Status Update:   Jerel Day is interested in employment (Client developed employement goals)    2. People present:   SEE/Writer  Client: Jerel Day  Significant Other/Family/Friend:  Mother and Father, [facility], Tammi Connell AM, [facility], Other: [employer]      3. Length of Actual Contact: 60 minutes   Traveled? No    4. Location of contact:  [facility], [place]    5. Brief description of session, contact, or client status (include: strategies, interventions, client reaction to contact, next steps, etc)    Jerel and his team met for a treatment planning meeting. Jerel reports he is doing well at the [facility] facility and has been practicing his guitar, making his appointments with [facility] and recently got a massage. He continues to have a goal of completing his degree and getting a job when he moves out of his [facility] facility. We discussed county services that Jerel might benefit from as well as the steps necessary for his goals to be completed. Writer and team encouraged Jerel to work with his  to obtain additional services in order to achieve his goals. For additional information, please see note from [facility] Connell [facility]    6. Completion of mutually agreed upon client task from previous meeting:  Not Applicable    7. Orientation and Treatment Planning:  Developing SEE treatment plan goals    8. Assessment:  Gathering SEE information/inventory regarding work and/or education history, skills, goals, and preferences with client    9. Placement:  Not Applicable    10. Follow Along Supports: (for clients who are working or attending school)   Not Applicable    11. Mutually agreed upon client task for next  meeting:     na    12. Next Meeting Scheduled for: next week    Ana [facility] Supported Employment &

## 2019-11-12 ENCOUNTER — OFFICE VISIT (OUTPATIENT)
Dept: PSYCHIATRY | Facility: CLINIC | Age: 24
End: 2019-11-12
Payer: COMMERCIAL

## 2019-11-12 DIAGNOSIS — F25.0 SCHIZOAFFECTIVE DISORDER, BIPOLAR TYPE (H): Primary | ICD-10-CM

## 2019-11-13 ASSESSMENT — ANXIETY QUESTIONNAIRES
6. BECOMING EASILY ANNOYED OR IRRITABLE: SEVERAL DAYS
3. WORRYING TOO MUCH ABOUT DIFFERENT THINGS: SEVERAL DAYS
2. NOT BEING ABLE TO STOP OR CONTROL WORRYING: SEVERAL DAYS
5. BEING SO RESTLESS THAT IT IS HARD TO SIT STILL: SEVERAL DAYS
GAD7 TOTAL SCORE: 7
1. FEELING NERVOUS, ANXIOUS, OR ON EDGE: SEVERAL DAYS
7. FEELING AFRAID AS IF SOMETHING AWFUL MIGHT HAPPEN: SEVERAL DAYS

## 2019-11-13 ASSESSMENT — PATIENT HEALTH QUESTIONNAIRE - PHQ9
SUM OF ALL RESPONSES TO PHQ QUESTIONS 1-9: 9
5. POOR APPETITE OR OVEREATING: SEVERAL DAYS

## 2019-11-14 ASSESSMENT — ANXIETY QUESTIONNAIRES: GAD7 TOTAL SCORE: 7

## 2019-11-14 NOTE — PROGRESS NOTES
LILLIANA Clinician Contact & Progress Note  For Individual Resiliency Training (IRT)  A Part of the [facility] First Episode of Psychosis Program    NAVIGATE Enrollee: Jerel Day (1995)     MRN: [id number]  Date:  10/23/19  Diagnosis: Schizoaffective disorder, bipolar type       Clinician: LILLIANA Individual Resiliency Trainer, EDELMIRA Morgan     1. Type of contact: (majority of time spent)  IRT Session    2. People present:   Writer  Client: Jerel Day    3. Total number of persons who participated in contact: 2, including writer     4. Length of Actual Contact: Start Time: 1pm; End Time: 2pm   Traveled?    No     5. Location of contact:  Psychiatry Clinic, [place]    6. Did the client complete the home practice option(s) from the previous session: completed    7. Motivational Teaching Strategies:  Connect info and skills with personal goals  Promote hope and positive expectations  Explore pros and cons of change  Re-frame experiences in positive light    8. Educational Teaching Strategies:  Review of written material/education  Relate information to client's experience  Ask questions to check comprehension  Break down information into small chunks  Adopt client's language     9. CBT Teaching Strategies:  Reinforcement and shaping (positive feedback for steps towards goals and gains in knowledge & skills)  Relapse prevention planning (review of stressors and early warning signs)  Coping skills training (review current coping skills and increase currently used skills)  Behavioral tailoring (fit taking medication into client's daily routine)    10. IRT Module(s) Addressed:  Module 2 - Assessment/Initial Goal Setting   Module 3 - Education about Psychosis - specifically Stress Vulnerability Model  Module 4 - Relapse Prevention Planning  Safety Planning    11. Techniques utilized:   Nancy announced at beginning of session  Review of goal  Review of previous meeting  Present new  "material  Problem-solving practice  Help client choose a home practice option  Summarize progress made in current session    12. Mental Status Exam:    Alertness: alert   Appearance: casually groomed  Behavior/Demeanor: cooperative, pleasant and guarded, with fair  eye contact   Speech: regular rate and rhythm  Language: intact and no obvious problem. Preferred language identified as English.  Psychomotor: slowed  Mood: description consistent with euthymia  Affect: restricted; was congruent to mood; was congruent to content  Thought Process/Associations: remarkable for , difficult to follow and loose associations  Thought Content:  Reports delusions, preoccupations and over-valued ideas;  Denies suicidal ideation and violent ideation  Perception:  Reports auditory hallucinations without commands [details in Interim History] and depersonalization;  Denies visual hallucinations  Insight: limited  Judgment: limited  Cognition: does  appear grossly intact; formal cognitive testing was not done  Suicidal ideation: denies SI, denies intent,  and denies plan  Homicidal Ideation: denies HI    13. Assessment/Progress Note:     Writer met with Jerel on this date for IRT. Writer set agenda to check-in, discuss and assess symptoms, explore material in IRT modules, discuss ways in which Jerel can expand coping strategies and stress-management techniques for ongoing symptom management, and check-in regarding goals for ongoing recovery. During check-in, with regards to symptoms, Jerel reports ongoing AH. He describes these as \"worse\" today and yesterday; shared the voices were \"guilting\" him about things in his past that make him feel bad about himself. He described an experience doing LSD with two friends in the past and comparing himself now to one of the guys and commenting that Jerel feels his \"treads have fallen out\" while the other shannan has moved on and is successful in life. Jerel denied any VH, denied SI, denied HI/VI, denied " "command AH. Jerel reported an incident with thoughts related to self-harm where he punched himself in the jaw, \"thinking of my short-comings.\" Jerel reports things at the IRTS are going well; he shared negatively that he feels he is smoking a lot of cigarettes, but shared positively about playing his instruments and being scheduled for an upcoming guitar lesson. Jerel used the majority of the session reflecting on his experience of symptoms over time, including the first time he remembers hearing voices. He described his anxiety as \"through the roof\" when this first happened. He reports this happened the summer before his sophomore year. He remembers then hearing voices during class. He then shared about his use of LSD during this time and ways in which he feels that contributed positively to his spiritual journey. In reflecting back on this time, Jerel described feeling \"the natural pattern of me walking was destroyed, I had to reorganize my whole walk.\" Writer offered support and validation throughout; thanked Jerel for sharing with writer and reflected that he hasn't shared about his experience of symptoms like this before. Writer also used some time to present the stress-vulnerability model to Jerel and wondered the impact both the stress of being at school, and potentially substances/LSD may have had on his experience of symptoms. Jerel was present for this, but did not offer reflections of how these may have contributed for him. Writer noticed a significant amount of self-critical thoughts and judgments in Jerel's sharing during today's session. Discussed together a goal of targeting some of these thoughts in future sessions.    Overall, Jerel was open and engaged throughout the session. Symptom assessment, safety assessment, discussion and identification of coping strategies, and exploration of material in IRT modules was all in support of Jerel's self-identified goal(s) as identified in most recent BEH Treatment Plan. " "Progress toward goal completion seems fair.    14. Plan/Referrals:     Next appt scheduled for next week. Client and family aware they can reach out to writer directly and/or NAVIGATE team should concerns or needs arise prior to next scheduled appointment.     Billing for \"Interactive Complexity\"?    No      EDELMIRA Morgan    NAVIGATE Individual Resiliency Trainer  Attestation:    I did not see this patient directly. This patient is discussed with me in individual clinical social work supervision, and I agree with the plan as documented.     Ana Rose, LICSW, MSW, LICSW, November 14, 2019      "

## 2019-11-14 NOTE — PROGRESS NOTES
LILLIANA Clinician Contact & Progress Note  For Individual Resiliency Training (IRT)  A Part of the [facility] First Episode of Psychosis Program    NAVIGATE Enrollee: Jerel Day (1995)     MRN: [id number]  Date:  11/12/19  Diagnosis: Schizoaffective disorder, bipolar type       Clinician: LILLIANA Individual Resiliency Trainer, EDELMIRA Morgan     1. Type of contact: (majority of time spent)  IRT Session    2. People present:   Writer  Client: Jerel Day    3. Total number of persons who participated in contact: 2, including writer     4. Length of Actual Contact: Start Time: 1pm; End Time: 2pm   Traveled?    No     5. Location of contact:  Psychiatry Clinic, [place]    6. Did the client complete the home practice option(s) from the previous session: completed    7. Motivational Teaching Strategies:  Connect info and skills with personal goals  Promote hope and positive expectations  Explore pros and cons of change  Re-frame experiences in positive light    8. Educational Teaching Strategies:  Review of written material/education  Relate information to client's experience  Ask questions to check comprehension  Break down information into small chunks  Adopt client's language     9. CBT Teaching Strategies:  Reinforcement and shaping (positive feedback for steps towards goals and gains in knowledge & skills)  Relapse prevention planning (review of stressors and early warning signs)  Coping skills training (review current coping skills and increase currently used skills)  Behavioral tailoring (fit taking medication into client's daily routine)    10. IRT Module(s) Addressed:  Module 2 - Assessment/Initial Goal Setting   Module 3 - Education about Psychosis  Safety Planning    11. Techniques utilized:   [place]man announced at beginning of session  Review of goal  Review of previous meeting  Present new material  Problem-solving practice  Help client choose a home practice option  Summarize progress made in  "current session    12. Mental Status Exam:    Alertness: alert   Appearance: casually groomed  Behavior/Demeanor: cooperative, pleasant and guarded, with fair  eye contact   Speech: regular rate and rhythm  Language: intact and no obvious problem. Preferred language identified as English.  Psychomotor: slowed  Mood: description consistent with euthymia  Affect: restricted; was congruent to mood; was congruent to content  Thought Process/Associations: remarkable for , difficult to follow and loose associations at times  Thought Content:  Reports delusions, preoccupations and over-valued ideas;  Denies suicidal ideation and violent ideation  Perception:  Reports auditory hallucinations without commands [details in Interim History] and depersonalization;  Denies visual hallucinations  Insight: limited  Judgment: limited  Cognition: does  appear grossly intact; formal cognitive testing was not done  Suicidal ideation: denies SI, denies intent,  and denies plan  Homicidal Ideation: denies HI    13. Assessment/Progress Note:     Writer met with Jerel on this date for IRT. Writer set agenda to check-in, discuss and assess symptoms, explore material in IRT modules, discuss ways in which Jerel can expand coping strategies and stress-management techniques for ongoing symptom management, and check-in regarding goals for ongoing recovery. During check-in, with regards to symptoms, Jerel reports ongoing AH. He describes them overall as \"good\" this past week but noticed they were more intense last night and this morning. He reports they were referential to someone Jerel used to know suggesting this shannan \"took my position\" somewhere along the way. He also reports a voice working to \"make sense of my collapse and factors of me not succeeding.\" Jerel, again, exhibited a great deal of critical thoughts towards himself; he again identified this as \"my biggest thing to work on.\" Writer encouraged reflection on times when Jerel has felt proud " "of himself; he referenced scoring 2 goals in a past hockey game and getting only 1 wrong on a bio exam. He shared positively about studying psychology in school stating, \"striving for understanding of the brain is important\" to him. Writer engaged Jerel in examination of self-critical thoughts. Jerel described feeling self-conscious that he will be returning to school older than most of the other students, he also shared it is hard withdrawing from class due to the hospital and having to return and retake classes. Writer utilized CBT framework; wrote self-critical statements on the board. Encouraged Jerel to apply compassion to himself and his circumstances; examined possibility of shifting blame from himself. Will need to continue this work in upcoming sessions; did not get to re-framing, just allowed Jerel space to share and offered other perspectives that incorporated self-compassion, care and understanding.     At the end of the session, Jerel commented twice, \"this was a really good meeting today.\" Writer reflected back a similar sentiment and shared observations that Jerel seems to be more clear and is processing a lot in session. Offered validation and encouragement for his efforts.    Overall, Jerel was open and engaged throughout the session. Symptom assessment, safety assessment, discussion and identification of coping strategies, and exploration of material in IRT modules was all in support of Jerel's self-identified goal(s) of returning to school, finishing school, maybe get a job, make some friends and do more yoga as identified in most recent BEH Treatment Plan. Progress toward goal completion seems fair.    14. Plan/Referrals:     Next appt scheduled for next week. Client and family aware they can reach out to writer directly and/or NAVIGATE team should concerns or needs arise prior to next scheduled appointment.     Billing for \"Interactive Complexity\"?    No      EDELMIRA Morgan    NAVIGATE Individual " Resiliency Trainer    Attestation:    I did not see this patient directly. This patient is discussed with me in individual clinical social work supervision, and I agree with the plan as documented.     Ana Rose [facility] Medical CenterARIANNE, MSW, [facility] Medical CenterSW, November 14, 2019

## 2019-11-15 ENCOUNTER — TELEPHONE (OUTPATIENT)
Dept: PSYCHIATRY | Facility: CLINIC | Age: 24
End: 2019-11-15

## 2019-11-15 ENCOUNTER — ALLIED HEALTH/NURSE VISIT (OUTPATIENT)
Dept: PSYCHIATRY | Facility: CLINIC | Age: 24
End: 2019-11-15
Payer: COMMERCIAL

## 2019-11-15 DIAGNOSIS — F25.0 SCHIZOAFFECTIVE DISORDER, BIPOLAR TYPE (H): Primary | ICD-10-CM

## 2019-11-15 NOTE — PROGRESS NOTES
umNAMELVA SEE Progress Note   For Supported Employment & Education    NAVIGATE Enrollee: Jerel Day (1995)     MRN: [id number]  Date:  11/15/19  Clinician: NAVIGATE Supported Employment & , Ana Michel    1. Client Status Update:   Jerel Day is interested in education (Client developed educational goals)    2. People present:   SEE/Writer  Client: Jerel Day  Other: IRTS lead staff Willow    3. Length of Actual Contact: 45 minutes   Traveled? Yes  Total Travel Time: 30 minutes    4. Location of contact:  Client's Home    5. Brief description of session, contact, or client status (include: strategies, interventions, client reaction to contact, next steps, etc)    Writer and Jerel Day and Willow Staton met for a follow up Supported Employment and Education (SEE) session. Jerel Day has been working on the goal of discharging from his IRTS, finding an apartment and taking a course at the [facility] [employer]. Today's agenda included: check in with Willow, goal setting.     We met at [facility] and discussed next steps for Jerel. Willow said she would follow up with Jerel Riojas's  to see if the SMRT assessment has been scheduled. If she is not able to get a hold of her, she offered to have their community liason schedule one for him at the [facility] due to discharge being scheduled for 12/18/19. Writer asked if she could join for a meeting and Willow said she would be able to call in.     Willow reports Jerel has done exceptionally well in groups and completing his house chores. She does have some concerns about cleanliness and is working with Jerel to create a weekly checklist that he can follow. Jerel and this writer looked at some online psychology classes that he might be able to take next fall. Jerel would also like a part time job when he moves into an apartment.    6. Completion of mutually agreed upon client task from previous  meeting:  Completed    7. Orientation and Treatment Planning:  Pursuing current SEE goals    8. Assessment:  Assessing client's need for follow-along supports    9. Placement:  Employment  (Discussion and Planning re: disclosure and role of SEE)    10. Follow Along Supports: (for clients who are working or attending school)   Not Applicable    11. Mutually agreed upon client task for next meeting:     Review online courses, research apartments, schedule SMRT with Nola    12. Next Meeting Scheduled for: nika TIJERINA Supported Employment &

## 2019-11-15 NOTE — TELEPHONE ENCOUNTER
NAVIGATE SEE Outgoing Telephone Call  For Supported Employment & Education    NAVIGATE Enrollee: Jerel Day (1995)     MRN: [id number]  Date of Call: 11/15/2019  Contacted: DEX Pa    Discussed:   Writer PEPE to set up meeting for Jerel with parents,  and IRT therpaist, SUBHA Morgan - offered current standing appts with Tammi as easiest way to all meet. Left contact info and also informed her I would be at the IRTS today and could touch base then.    Ana Michel

## 2019-11-20 ENCOUNTER — TELEPHONE (OUTPATIENT)
Dept: PSYCHIATRY | Facility: CLINIC | Age: 24
End: 2019-11-20

## 2019-11-20 DIAGNOSIS — F25.0 SCHIZOAFFECTIVE DISORDER, BIPOLAR TYPE (H): Primary | ICD-10-CM

## 2019-11-20 NOTE — TELEPHONE ENCOUNTER
NAVIGATE SEE Incoming Telephone Call  For Supported Employment & Education    NAVIGATE Enrollee: Ralf Day (1995)     MRN: [id number]  Incoming Call Received on: 11/20  Call Received from: Nola WEBER's response to incoming call:   Nola returned this writer's call and said that she would be available for a team meeting for ralf on 12/3 at 3pm or 12/19 at 1pm at the [facility] clinic.     Nola reports that she also made a referral for Ralf to have a SMRT assessment but has not heard if its been scheduled.     This writer will route to team members.  Writer left VM with Ralf's parents with these dates and Gabriela at the IRTS to see if they can join.         Ana Michel

## 2019-11-20 NOTE — TELEPHONE ENCOUNTER
NAVIGATE SEE Outgoing Telephone Call  For Supported Employment & Education    NAVIGATE Enrollee: Jerel Day (1995)     MRN: [id number]  Date of Call: 11/20/2019  Contacted: 11/20    Discussed:   Writer called and LVM for Nola Summers to schedule a team meeting for Jerel before he is discharged from his IRTS.  Romanr offered the 11/26 at 1 pm 12/3 at 3pm or 12/10 at 3 as our preferred times as [facility]rel will be at the [facility] for [facility].     Ana Michel

## 2019-11-21 ENCOUNTER — VIRTUAL VISIT (OUTPATIENT)
Dept: PSYCHIATRY | Facility: CLINIC | Age: 24
End: 2019-11-21
Payer: COMMERCIAL

## 2019-11-21 ENCOUNTER — TELEPHONE (OUTPATIENT)
Dept: PSYCHIATRY | Facility: CLINIC | Age: 24
End: 2019-11-21

## 2019-11-21 DIAGNOSIS — F25.0 SCHIZOAFFECTIVE DISORDER, BIPOLAR TYPE (H): Primary | ICD-10-CM

## 2019-11-21 NOTE — TELEPHONE ENCOUNTER
LILLIANA SEE Incoming Telephone Call  For Supported Employment & Education    NAVIGATE Enrollee: Jerel Day (1995)     MRN: [id number]  Incoming Call Received on: 11/21  Call Received from: Jerel WEBER's response to incoming call:   Jerel called this writer to inform team that he missed his medical taxi ride. Conferred with SHIRA Morgan and she will f/u with phone call at 1pm.    Ana Michel

## 2019-11-21 NOTE — TELEPHONE ENCOUNTER
NAVIGATE SEE Outgoing Telephone Call  For Supported Employment & Education    NAVIGATE Enrollee: Jerel Day (1995)     MRN: [id number]  Date of Call: 11/21/2019  Contacted: Nola Summers    Discussed:   Confirmed team meeting on 12/3 at 3pm at [facility].    Ana Michel

## 2019-11-22 ENCOUNTER — ALLIED HEALTH/NURSE VISIT (OUTPATIENT)
Dept: PSYCHIATRY | Facility: CLINIC | Age: 24
End: 2019-11-22
Payer: COMMERCIAL

## 2019-11-22 DIAGNOSIS — F25.0 SCHIZOAFFECTIVE DISORDER, BIPOLAR TYPE (H): Primary | ICD-10-CM

## 2019-11-26 ENCOUNTER — TELEPHONE (OUTPATIENT)
Dept: PSYCHIATRY | Facility: CLINIC | Age: 24
End: 2019-11-26

## 2019-11-26 ENCOUNTER — OFFICE VISIT (OUTPATIENT)
Dept: PSYCHIATRY | Facility: CLINIC | Age: 24
End: 2019-11-26
Payer: COMMERCIAL

## 2019-11-26 DIAGNOSIS — F25.0 SCHIZOAFFECTIVE DISORDER, BIPOLAR TYPE (H): Primary | ICD-10-CM

## 2019-11-26 NOTE — PROGRESS NOTES
NAVIGATE SEE Progress Note   For Supported Employment & Education    NAVIGATE Enrollee: Jerel Day (1995)     MRN: [id number]  Date:  11/22/19  Clinician: MIQUELATE Supported Employment & , Ana Michel    1. Client Status Update:   Jerel Day is interested in education (Client developed educational goals) and employment (Client developed employement goals)    2. People present:   SEE/Writer  Client: Jerel Day  Other:  - [facility] at [facility]    3. Length of Actual Contact: 60 minutes   Traveled? Yes  Total Travel Time: 45 minutes    4. Location of contact:  Client's Home    5. Brief description of session, contact, or client status (include: strategies, interventions, client reaction to contact, next steps, etc)    Writer and Jerel Day met at his [facility] for a follow up Supported Employment and Education (SEE) session. Jerel Day has been working on the goal of moving into his own apartment, getting SMRT'd. Today's agenda included: check in, goal setting.   Jerel reports that his week has gone well aside from missing a taxi ride to his therapy appointment. Jerel reports he does not have a missed call from the country regarding setting up a [place] assessment. This writer encouraged Jerel to keep an eye out for a phone call as his  reports that he will need to answer the phone and schedule it himself. Jerel and this writer reviewed his online courses and gave him the homework to find 1 or 2 classes in fall semester that he may be interested in taking. Jerel is currently on inactive status at school but can easily become active by completing a form and returning it to [employer] at the .   This writer met with Willow at the [place] and provided information about upcoming social event on Dec 5th.       6. Completion of mutually agreed upon client task from previous meeting:  Completed    7. Orientation and Treatment Planning:  Pursuing current SEE goals    8.  Assessment:  Assessing client's need for follow-along supports    9. Placement:  Not Applicable    10. Follow Along Supports: (for clients who are working or attending school)   Not Applicable    11. Mutually agreed upon client task for next meeting:     See above    12. Next Meeting Scheduled for: nika TIJERINA Supported Employment &

## 2019-11-29 ASSESSMENT — ANXIETY QUESTIONNAIRES
GAD7 TOTAL SCORE: 7
1. FEELING NERVOUS, ANXIOUS, OR ON EDGE: SEVERAL DAYS
2. NOT BEING ABLE TO STOP OR CONTROL WORRYING: SEVERAL DAYS
7. FEELING AFRAID AS IF SOMETHING AWFUL MIGHT HAPPEN: SEVERAL DAYS
3. WORRYING TOO MUCH ABOUT DIFFERENT THINGS: SEVERAL DAYS
5. BEING SO RESTLESS THAT IT IS HARD TO SIT STILL: SEVERAL DAYS
6. BECOMING EASILY ANNOYED OR IRRITABLE: SEVERAL DAYS

## 2019-11-29 ASSESSMENT — PATIENT HEALTH QUESTIONNAIRE - PHQ9
5. POOR APPETITE OR OVEREATING: SEVERAL DAYS
SUM OF ALL RESPONSES TO PHQ QUESTIONS 1-9: 9

## 2019-11-30 ASSESSMENT — ANXIETY QUESTIONNAIRES: GAD7 TOTAL SCORE: 7

## 2019-12-03 ENCOUNTER — OFFICE VISIT (OUTPATIENT)
Dept: PSYCHIATRY | Facility: CLINIC | Age: 24
End: 2019-12-03
Payer: COMMERCIAL

## 2019-12-03 DIAGNOSIS — F25.0 SCHIZOAFFECTIVE DISORDER, BIPOLAR TYPE (H): Primary | ICD-10-CM

## 2019-12-04 ENCOUNTER — OFFICE VISIT (OUTPATIENT)
Dept: PSYCHIATRY | Facility: CLINIC | Age: 24
End: 2019-12-04
Payer: COMMERCIAL

## 2019-12-04 ENCOUNTER — MEDICAL CORRESPONDENCE (OUTPATIENT)
Dept: HEALTH INFORMATION MANAGEMENT | Facility: CLINIC | Age: 24
End: 2019-12-04

## 2019-12-04 VITALS
DIASTOLIC BLOOD PRESSURE: 74 MMHG | HEIGHT: 69 IN | BODY MASS INDEX: 25.48 KG/M2 | SYSTOLIC BLOOD PRESSURE: 122 MMHG | WEIGHT: 172 LBS | HEART RATE: 84 BPM

## 2019-12-04 DIAGNOSIS — F25.0 SCHIZOAFFECTIVE DISORDER, BIPOLAR TYPE (H): ICD-10-CM

## 2019-12-04 DIAGNOSIS — M54.2 CERVICAL PAIN: ICD-10-CM

## 2019-12-04 RX ORDER — GABAPENTIN 300 MG/1
300 CAPSULE ORAL 2 TIMES DAILY PRN
Qty: 60 CAPSULE | Refills: 1 | Status: SHIPPED | OUTPATIENT
Start: 2019-12-04 | End: 2020-01-06

## 2019-12-04 RX ORDER — BENZTROPINE MESYLATE 0.5 MG/1
0.5 TABLET ORAL 2 TIMES DAILY PRN
Qty: 60 TABLET | Refills: 0 | Status: SHIPPED | OUTPATIENT
Start: 2019-12-04 | End: 2020-01-06

## 2019-12-04 RX ORDER — OLANZAPINE 15 MG/1
15 TABLET ORAL AT BEDTIME
Qty: 30 TABLET | Refills: 0 | Status: SHIPPED | OUTPATIENT
Start: 2019-12-04 | End: 2020-01-06

## 2019-12-04 RX ORDER — ARIPIPRAZOLE 15 MG/1
15 TABLET ORAL DAILY
Qty: 30 TABLET | Refills: 0 | Status: SHIPPED | OUTPATIENT
Start: 2019-12-04 | End: 2020-01-06

## 2019-12-04 ASSESSMENT — ANXIETY QUESTIONNAIRES
6. BECOMING EASILY ANNOYED OR IRRITABLE: SEVERAL DAYS
5. BEING SO RESTLESS THAT IT IS HARD TO SIT STILL: SEVERAL DAYS
1. FEELING NERVOUS, ANXIOUS, OR ON EDGE: SEVERAL DAYS
4. TROUBLE RELAXING: SEVERAL DAYS
2. NOT BEING ABLE TO STOP OR CONTROL WORRYING: SEVERAL DAYS
3. WORRYING TOO MUCH ABOUT DIFFERENT THINGS: SEVERAL DAYS
7. FEELING AFRAID AS IF SOMETHING AWFUL MIGHT HAPPEN: SEVERAL DAYS
5. BEING SO RESTLESS THAT IT IS HARD TO SIT STILL: SEVERAL DAYS
GAD7 TOTAL SCORE: 7
3. WORRYING TOO MUCH ABOUT DIFFERENT THINGS: SEVERAL DAYS
1. FEELING NERVOUS, ANXIOUS, OR ON EDGE: SEVERAL DAYS
2. NOT BEING ABLE TO STOP OR CONTROL WORRYING: SEVERAL DAYS
6. BECOMING EASILY ANNOYED OR IRRITABLE: SEVERAL DAYS
7. FEELING AFRAID AS IF SOMETHING AWFUL MIGHT HAPPEN: SEVERAL DAYS
GAD7 TOTAL SCORE: 7

## 2019-12-04 ASSESSMENT — PATIENT HEALTH QUESTIONNAIRE - PHQ9
5. POOR APPETITE OR OVEREATING: SEVERAL DAYS
SUM OF ALL RESPONSES TO PHQ QUESTIONS 1-9: 9
SUM OF ALL RESPONSES TO PHQ QUESTIONS 1-9: 9

## 2019-12-04 ASSESSMENT — MIFFLIN-ST. JEOR: SCORE: 1757.63

## 2019-12-04 ASSESSMENT — PAIN SCALES - GENERAL: PAINLEVEL: NO PAIN (0)

## 2019-12-05 ASSESSMENT — ANXIETY QUESTIONNAIRES
GAD7 TOTAL SCORE: 7
GAD7 TOTAL SCORE: 7

## 2019-12-06 NOTE — PROGRESS NOTES
"NAVIGATE Medication Management Progress Note  A Part of the [facility] First Episode of Psychosis Program    NAVIGATE Enrollee: Jerel Day (1995)     MRN: [id number]  Date:  19         Contributors to the Assessment     Chart Reviewed.   Interview completed with Jerel Day.         Chief Complaint       \"I am doing ok\"           Interim History      Jerel Day is a 24 year old male who was last seen in clinic on 19 at which time pt was doing well.  History was provided by Jerel who was a fair historian.  Since the last visit:  Says he is doing ok, he endorses occasional voices but is not bothered by them.  However he still has occasional somatic symptoms like neck soreness.  Has only a couple of weeks left at irts and is happy to move out to independent living.  Wants to go down on meds eventually but is ok staying at this level for now.  Plans on going back to school and would like to treat his ADHD, discussed stimulent and nonstimulant options/risks benefits and will readress when school starts.       PSYCH ROS:  Clinician Rating Form in [facility]  1. Depressed Mood: Rating 1  0 Not reported    1 Very mild occasionally feels sad or  down ; of questionable clinical significance   2 Mild occasionally feels moderately depressed or often feels sad or  down    3 Moderate occasionally feels very depressed or often feels moderately depressed   4 Moderately severe often feels very depressed   5 Severe feels very depressed most of the time   6 Very severe constant extremely painful feelings of depression   U Unable to assess      2. Anxiety/Worry: Ratin  0 Not reported    1 Very mild occasionally feels a little anxious; of questionable clinical significance   2 Mild occasionally feels moderately anxious or often feels a little anxious or worried   3 Moderate occasionally feels very anxious or often feels moderately anxious   4 Moderately severe often feels very anxious or often feels moderately anxious "   5 Severe feels very anxious or worried most of the time   6 Very severe patient is continually preoccupied with severe anxiety   U Unable to assess      3. Suicidal Ideation/Behavior: Ratin   0 Not reported    1 Very mild occasional thoughts of dying,  I d be better off dead  or  I wish I were dead    2 Mild frequents thoughts of dying or occasional thoughts of killing self, without plan or method   3 Moderate often thinks of suicide or has though of a specific method   4 Moderately severe has mentally rehearsed a specific method of suicide or has made a suicide attempt with questionable intent to die (e.r. takes aspirins and then tells family)   5 Severe has made preparations for a potentially lethal suicide attempt (e.g acquires a gun and bullets for an attempt)   6 Very severe has made a suicide attempt with an intent to die   U Unable to assess       4. Elevated/Expansive Mood: Ratin  0 Not at all    1 Very mild questionable; more cheerful than most people in his/her circumstances but of only possible clinical significance   2 Mild brief elevated/expansive mood but only somewhat out of proportion to the circumstances   3 Moderate brief/occasional elevation of mood which is clearly out of proportion to the circumstances   4 Moderately severe sustained/frequent elevation of mood which is clearly out of proportion to the circumstances   5 Severe mood is euphoric most of the time   6 Very severe sustained elevation;  everything is wonderful  almost all of the time   U Unable to assess      5. Hostility/Anger/Irritability/Aggressiveness: Ratin  0 Not at all    1 Very mild occasional irritability of doubtful clinical significance   2 Mild occasionally feels angry or mild or indirect expressions of anger, e.g. sarcasm, disrespect or hostile gestures   3 Moderate frequently feels angry, frequent irritability or occasional direct expression of anger, e.g. yelling at others   4 Moderately severe often feels  very angry, often yells at others or occasionally threatens to harm others   5 Severe has acted on his anger by becoming physically abusive on one or two occasions or makes frequent threats to harm others or is very angry most of the time   6 Very severe has been physically aggressive and/or required intervention to prevent assaultiveness on several occasions; or any serious assaultive act   U Unable to assess      6. Impulsive Behavior: Ratin  0 Not at all    1 Very mild one instance of impulsive behavior which is of doubtful clinical significance   2 Mild occasional impulsive acts, e.g. making phone calls at odd hours   3 Moderate occasional impulsive acts with some potential negative consequence, e.g. leaving work abruptly; changing plans without thinking   4 Moderately severe impulsive acts with definite negative consequences, e.g. overspending on non-essentials; repeated reckless sexual behavior   5 Severe impulsive acts with direct negative consequences, e.g. spends entire income on nonessentials without regard for basic needs   6 Very severe impulsive behavior which is potentially life threatening, e.g. jumps from dangerous height (without suicidal intent) or criminal behavior, e.g. impulsive robbery   U Unable to assess      7. Suspiciousness: Ratin  0 Not present    1 Very mild Seems on guard. Reluctant to respond to some  personal  questions. Reports being overly self-conscious in public   2 Mild Describes incidents in which others have harmed or wanted to harm him/her that sound plausible. Patient feels as if others are watching, laughing, or criticizing him/her in public, but this occurs only occasionally or rarely. Little or no preoccupation   3 Moderate Says others are talking about him/her maliciously, have negative intentions, or may harm him/her. Beyond the likelihood of plausibility, but not delusional. Incidents of suspected persecution occur occasionally (less than once per week) with  some preoccupation   4 Moderately severe Same as 3, but incidents occur frequently such as more than once a week. Patient is moderately preoccupied with ideas of persecution OR patient reports persecutory delusions expressed with much doubt (e.g. partial delusion)   5 Severe Delusional -- speaks of Mafia plots, the FBI, or others poisoning his/her food, persecution by supernatural forces   6 Extremely severe Same as 5, but the beliefs are bizarre or more preoccupying. Patient tends to disclose or act on persecutory delusions.   U Unable to assess      8. Unusual Thought Content: Ratin  0 Not present    1 Very mild Ideas of reference (people may stare or may laugh at him), ideas of persecution (people may mistreat him). Unusual beliefs in psychic esquivel, spirits, UFOs, or unrealistic beliefs in one's own abilities. Not strongly held. Some doubt   2 Mild Same as 1, but degree of reality distortion is more severe as indicated by highly unusual ideas or greater conviction. Content may be typical of delusions (even bizarre), but without full conviction. The delusion does not seem to have fully formed, but is considered as one possible explanation for an unusual experience   3 Moderate Delusion present but no preoccupation or functional impairment. May be an encapsulated delusion or a firmly endorsed absurd belief about past delusional circumstances   4 Moderately severe Full delusion(s) present with some preoccupation OR some areas of functioning disrupted by delusional thinking   5 Severe Full delusion(s) present with much preoccupation OR many areas of functioning are disrupted by delusional thinking   6 Extremely severe Full delusions present with almost   U Unable to assess      9. Hallucinations: Ratin  0 Not present    1 Very mild While resting or going to sleep, sees visions, smells odors, or hears voices, sounds or whispers in the absence of external stimulation, but no impairment in functioning   2 Mild  While in a clear state of consciousness, hears a voice calling the subject s name, experiences non-verbal auditory hallucinations (e.g., sounds or whispers), formless visual hallucinations, or has sensory experiences in the presence of a modality-relevant stimulus (e.g., visual illusions) infrequently (e.g., 1-2 times per week) and with no functional impairment   3 Moderate Occasional verbal, visual, gustatory, olfactory, or tactile hallucinations with no functional impairment OR non-verbal auditory hallucinations/visual illusions more than infrequently or with impairment   4 Moderately severe Experiences daily hallucinations OR some areas of functioning are disrupted by hallucinations   5 Severe Experiences verbal or visual hallucinations several times a day OR many areas of functioning are disrupted by these hallucinations   6 Extremely severe Persistent verbal or visual hallucinations throughout the day OR most areas of functioning are disrupted by these hallucinations   U Unable to assess      10. Conceptual Disorganization: Ratin  0 Not present    1 Very mild Peculiar use of words or rambling but speech is comprehensible   2 Mild Speech a bit hard to understand or make sense of due to tangentiality, circumstantiality, or sudden topic shifts   3 Moderate Speech difficult to understand due to tangentiality, circumstantiality, idiosyncratic speech, or topic shifts on many occasions OR 1-2 instances of incoherent phrases   4 Moderately severe Speech difficult to understand due to circumstantiality, tangentiality, neologisms, blocking, or topic shifts most of the time OR 3-5 instances of incoherent phrases   5 Severe Speech is incomprehensible due to severe impairments most of the time. Many PSRS items cannot be rated by self-report alone   6 Extremely severe Speech is incomprehensible throughout interview   U Unable to assess      11. Avolition/Apathy: Ratin  0 Not at all    1 Very mild questionable  decrease in time spent in goal-directed activities   2 Mild spends less time in goal-directed activities than is appropriate for situation and age   3 Moderate initiates activities at times but does not follow through   4 Moderately severe rarely initiates activity but will passively engage with encouragement   5 Severe almost never initiates activities; requires assistance to accomplish basic activities   6 Very severe does not initiate or persist in any goal-directed activity even with outside assistance   U Unable to assess      12. Asociality/Low Social Drive: Ratin  0 Not at all    1 Very mild questionable   2 Mild slow to initiate social interactions but usually responds to overtures by others   3 Moderate rarely initiates social interactions; sometimes responds to overtures by others   4 Moderately severe does not initiate but sometimes responds to overtures by others; little social interaction outside close family members   5 Severe never initiates and rarely encourages conversations or activities; avoids being with others unless prodded, may have contacts with family   6 Very severe avoids being with others (even family members) whenever possible, extreme social isolation   U Unable to assess      13. Adherence: Days: not taking any meds at this time  The longest, continuous amount of time in days, since the last visit when the subject did not take medication     14. EPS Part I: Ratin  Rate Elbow Rigidity for all subjects  0 Normal   1 Slight stiffness and resistance   2 Moderate stiffness and resistance   3 Marked rigidity with difficulty in passive movement   4 Extreme stiffness and rigidity with almost a frozen joint   U Unable to assess           EPS Part 2: Signs of EPS: 0  Are there are other signs of EPS (eg diminished arm swing, postural instability, cogwheeling, tremor, akinesia) present based upon patient report or exam?  0 No   1 Yes     15. Akathesia: Ratin  0 No restlessness  reported or observed   1 Mild restlessness observed; e.g., occasional jiggling of the foot occurs when subject is seated   2 Moderate restlessness observed; e.g., on several occasions, jiggles foot, crosses and uncrosses legs or twists a part of the body   3 Restlessness is frequently observed; e.g., the foot or legs moving most of the time   4 Restlessness persistently observed; e.g., subject cannot sit still, may get up and walk   U Unable to assess     16. Dyskinetic Movement Ratings: Ratin  0 None   1 Minimal, may be extreme normal   2 Mild   3 Moderate   4 Severe   U Unable to assess     SIDE EFFECT ASSESSMENT:  Clinician Rating Form in [facility] - Side Effect Assessment  Address side effects reported by the patient and rate using this scale  0 Not present   1 Minimal, may be extreme normal   2 Mild   3 Moderate   4 Severe   U Unable to assess   P Present, but not related     Feeling dizzy or faint: 0  Blurred vision: 0  Dry mouth: 0   Too much saliva/droolin  Nausea: 0  Constipation: 0  Increased appetite: 0  Weight gain: 0  Weight loss: 0  Feeling tired/fatigue: 0  Daytime sedation: 3  Hypersomnia: 0  Insomnia: 0  Low libido: 0  Other problems with sex: 0  Breast enlargement or discharge: 0  Irregular Menstruation or amenorrhea: NA  Other (please list and rate): 0    RECENT SUBSTANCE USE:  Clinician Rating Form in [facility] - Substance Use Assessment  Alcohol Use Severity: Ratin  0 none   1 use without impairment: drinks but no immediate social or medical impairment   2 use with impairment: e.g. becomes grossly intoxicated; alcohol use or withdrawal compromises school, work or social functioning; alcohol use or withdrawal exacerbates symptoms (e.g. gets depressed when drinking)     Marijuana Use Severity: Ratin  0 none   1 occasional use without impairment: e.g. uses marijuana a few days a month and has no immediate social or medical impairment   2 frequent use without impairment: e.g. uses  marijuana several or more days a week but has no immediate social or medical impairment   3 use with impairment: e.g. becomes grossly intoxicated; marijuana use compromises school, work or social functioning; marijuana use exacerbates symptoms (e.g. gets paranoid when using)     Other Drug Use Severity: Ratin  0 none   1 occasional use without impairment: e.g. uses drug(s) a few days a month and has no immediate social or medical impairment   2 frequent use without impairment: e.g. uses drug(s) several or more days a week but has no immediate social or medical impairment   3 use with impairment: e.g. becomes grossly intoxicated; drug use compromises school, work or social functioning; drug use exacerbates symptoms (e.g. gets paranoid when using)         CURRENT SOCIAL HISTORY:  FINANCIAL SUPPORT- family or friend       CHILDREN- none       LIVING SITUATION- Currently staying with his parent's [facility] in [place], [place] but plans to move back to his apartment on campus    SOCIAL/ SPIRITUAL SUPPORT- unknown       FEELS SAFE AT HOME- Yes      MEDICAL ROS:  Reports none      Denies akathisia, unusual movements and wt gain   10 point ROS neg other than the symptoms noted above in the HPI. Patient does report multiple physical concern including back, neck, hip and foot pain, concerns about metabolism. These complaints have been evaluated by sports medicine and primary care with unremarkable findings. PT exercises have been recommended.      Of note, history is positive for multiple sports injuries (former ) including concussion x2 with LOC once; ankle and hip injuries; scoliosis. His disorganized communication is likely interfering with an adequate health assessment.         First Episode of Psychosis History      DUP (duration untreated psychosis):  Likely first experienced auditory hallucinations during the spring of 2016, possibly in the context of cannabis and LSD use. Did not seek treatment until behavior  "and presentation became significantly disorganized in January 2017. Medication adherence has been poor over the course of this last year.   Route to initial care: ED for disorganized behavior, somatic concerns   Medication adherence overall:  Fair to poor  General frequency of visits:  Recommend weekly to biweekly  Participation in groups:  none  Cognitive Remediation:  none  Other treatment history: See pertinent background      Reviewed for completion of First Episode work-up:  Yes  First episode workup:  Not Done (if completed, see LABS for results)  MATRICS Consensus Cognitive Battery:  Not Done (if completed, see LABS for results)         Medical/Surgical History     Penicillins    Patient Active Problem List   Diagnosis     Schizoaffective disorder (H)     Psychosis (H)            Medications     Current Outpatient Medications   Medication Sig Dispense Refill     ARIPiprazole (ABILIFY) 15 MG tablet Take 1 tablet (15 mg) by mouth daily 30 tablet 0     benztropine (COGENTIN) 0.5 MG tablet Take 1 tablet (0.5 mg) by mouth 2 times daily as needed for agitation 60 tablet 0     gabapentin (NEURONTIN) 300 MG capsule Take 1 capsule (300 mg) by mouth 2 times daily as needed (nerve pain or anxiety) 60 capsule 1     OLANZapine (ZYPREXA) 15 MG tablet Take 1 tablet (15 mg) by mouth At Bedtime 30 tablet 0     Previous medication trials:  Zoloft- stopped because of ASE (?)  fluoxetine  Risperidone 1 mg, reported akathisia at 3mg dose  Haloperidol 5mg bid, reported dystonia relieved with benadryl   Divalproex 750mg  Quetiapine 300mg- discontinued 1/2/18  Olanzapine 10mg- discontinued 1/29/18, EPS?  Alprazolam 0.5-1mg PRN          Vitals     /74 (BP Location: Left arm, Patient Position: Sitting, Cuff Size: Adult Regular)   Pulse 84   Ht 1.74 m (5' 8.5\")   Wt 78 kg (172 lb)   BMI 25.77 kg/m        Weight prior to medication: 130s         Mental Status Exam     Alertness: alert and oriented  Appearance: well groomed, "   Behavior/Demeanor: cooperative, with good eye contact   Speech: spontaneous and fluid  Language: intact  Psychomotor: no abnormalities noted  Mood: good  Affect: full range; was congruent to mood; was congruent to content  Thought Process/Associations: occasional loose associations  Thought Content:  delusions, preoccupations,  Denies obsessions , phobia,  SI  Perception:  Reports auditory hallucinations;  Denies visual hallucinations  Insight: poor  Judgment: limited  Cognition: does  appear grossly intact; formal cognitive testing was not done  Memory: grossly intack  Gait/station: normal  Fund of knowledge: normal       Labs and Data     RATING SCALES:  AIMS: done 3/5/18 with total score of 0     PHQ-9 SCORE 12/11/2019 12/20/2019 12/27/2019   PHQ-9 Total Score 9 9 9       ANTIPSYCHOTIC LABS ROUTINE    [glu, A1C, lipids (focus LDL), liver enzymes, WBC, ANEU, Hgb, plts]   q12 mo  Recent Labs   Lab Test 01/03/18  1054   GLC 73     Recent Labs   Lab Test 01/03/18  1054   CHOL 135   TRIG 93   LDL 70   HDL 46     Recent Labs   Lab Test 01/03/18  1054   AST 19   ALT 37   ALKPHOS 66     Recent Labs   Lab Test 01/03/18  1054   WBC 8.0   ANEU 5.5   HGB 13.9               Psychiatric Diagnoses     Schizoaffective disorder, bipolar type (F25.0)         Assessment     This patient is a 23 year old male with a hx of schizoaffective do, recently discharge from IR. Pt psychotic symptoms are slowly improving though still significantly impacting functioning.     [place] PRESCRIPTION MONITORING PROGRAM [] was not checked today:  last ALPRAZOLAM 0.5 MG TABLET  dispensed 12/01/2017    PSYCHOTROPIC DRUG INTERACTIONS:   No major interactions.  Concomitant use of aripiprazole and lithium may increase risk for EPS     MANAGEMENT:  Monitoring for adverse effects, routine vitals, routine labs, using lowest therapeutic dose of [olanzapine and lithium] and patient is aware of risks         Plan     1) PSYCHOTROPIC  MEDICATIONS:  continue abilify 15mg daily  Continue olanzapine to 15mg daily   Continue  gabapentin 300mg BID prn for anxiety.  Continue cogentin 0.5mg prn akithesia.  2) THERAPY:  Continue IRT and SEE    3) REFERRALS:    No Referrals needed    4) RTC: 1 month    5) CRISIS NUMBERS:   Provided routinely in AVS.    TREATMENT RISK STATEMENT:  The risks, benefits, alternatives and potential adverse effects have been discussed and are understood by the pt. The pt understands the risks of using street drugs or alcohol. There are no medical contraindications, the pt agrees to treatment with the ability to do so. The pt knows to call the clinic for any problems or to access emergency care if needed.  Medical and substance use concerns are documented above.  Psychotropic drug interaction check was done, including changes made today.    Time spent with patient  ~25min    PROVIDER:  Vince Zheng M.D., Ph.D.     Dept. of Psychiatry   [facility]

## 2019-12-06 NOTE — PROGRESS NOTES
NAVIGATE Program Treatment Planning Meeting  A Part of the [facility] First Episode of Psychosis Program     Patient Name: Jerel Day  /Age:  1995 (24 year old)  Diagnosis(es):   Schizoaffective disorder, bipolar type  Treatment Plan Encounter Dated:  10/7/19; please see in chart review for details  Treatment Plan was Reviewed?  Yes  Treatment Plan was Updated?  No    1. Type of contact:   Treatment Planning Meeting / Family Therapy with Patient    2. People present:   Client: Yes  Significant Other/Family/Friend: Mother and Father  NAVIGATE Individual Resiliency Heil and Family Clinician - Tammi Connell AM, ARIANNE LAFLEURATE Supported : Ana Michel  Other: Jerel's  - Nola    3. Total number of persons who participated in contact: 6, including NAVIGATE team    4. Length of Actual Contact: 60 minutes, Record Minutes Travel Time: 0 minutes    5. Location of contact:  Psychiatry Clinic, [facility]    6. Techniques utilized:   [place] announced at beginning of session  Review of each team member's role and summarization of progress made  Review of goals  Review of associated strengths, barriers, objectives, and interventions  Review of frequency of appointments and anticipated length of treatment  Illicit client/family feedback    7. Assessment/Progress Note:     The above named individuals met for the purposes of a reviewing and completing a client-centered, strengths-based treatment plan utilizing a shared decision making model of care. Treatment recommendations have been outlined in the treatment plan encounter dated 10/7/19. Please see the treatment plan encounter in chart review for details. The client/family seemed open and engaged throughout the session. Utilized time together to reflect on the significant progress Jerel has made in the past months; emphasized all of the effort and hard work Jerel has consistently demonstrated that has contributed towards these gains and progress  toward his goals. Identified goal of discussing next steps for after Jerel is discharged from the IRTs. Jerel shared hope to get an apartment. Parents are in agreement with this plan, but shared hope that Jerel will be open to additional supports if he is wanting to live on his own. Writer and NAVIGATE SEE - YOSEPH Raymundo echoed this recommendation. Highlighted the importance of maximizing supports each step of the way in recovery. Nola shared about the process for [place] to determine eligibility for potential waivered services. Jerel at first was not open to this, but through continued conversation and encouragement, Jerel agreed to assessment. Nola will coordinate. Will plan to meet all together again in a couple of weeks before discharge from [facility]S on 12/18 to continue discussion of plan.    8. Plan/Referrals:     Nola will coordinate [place]. Writer will continue to meet with Jerel weekly for IRT; Jerel will meet with NAVIGATE SEE - YOSEPH Raymundo for SEE services. Family will continue to meet with NAVIGATE Director and Family Clinician - AGA Huerta LICSW for family education and support.     Will coordinate another meeting all together for early December. Client and family aware they can reach out to writer directly and/or NAVIGATE team should concerns or needs arise prior to next scheduled appointment.     Tammi Connell, [facility] NAVIGATE Program    Attestation:    I did not see this patient directly. This patient is discussed with me in individual clinical social work supervision, and I agree with the plan as documented.     SHIRA Camarena, AGA, SHIRA, December 10, 2019

## 2019-12-09 ENCOUNTER — ALLIED HEALTH/NURSE VISIT (OUTPATIENT)
Dept: PSYCHIATRY | Facility: CLINIC | Age: 24
End: 2019-12-09
Payer: COMMERCIAL

## 2019-12-09 DIAGNOSIS — F25.0 SCHIZOAFFECTIVE DISORDER, BIPOLAR TYPE (H): Primary | ICD-10-CM

## 2019-12-09 NOTE — Clinical Note
"Just FYI the meeting that Jerel and I had was with the [facility] Standards office for \"threatening, nonsensical emails\" as well as \"rude and threatening behavior\". Jerel somewhat got it but was defensive and was displaying a lot of word salad in our meeting. The Director seemed convinced that this was related to mental health, but Jerel himself said that his meds have helped - more fuel if he discontinues!"

## 2019-12-10 ENCOUNTER — OFFICE VISIT (OUTPATIENT)
Dept: PSYCHIATRY | Facility: CLINIC | Age: 24
End: 2019-12-10
Payer: COMMERCIAL

## 2019-12-10 DIAGNOSIS — F25.0 SCHIZOAFFECTIVE DISORDER, BIPOLAR TYPE (H): Primary | ICD-10-CM

## 2019-12-10 NOTE — PROGRESS NOTES
LILLIANA Clinician Contact & Progress Note  For Individual Resiliency Training (IRT)  A Part of the [facility] First Episode of Psychosis Program    NAVIGATE Enrollee: Jerel Day (1995)     MRN: [id number]  Date:  12/10/19  Diagnosis: Schizoaffective disorder, bipolar type       Clinician: LILLIANA Individual Resiliency Trainer, EDELMIRA Morgan     1. Type of contact: (majority of time spent)  IRT Session    2. People present:   Writer  Client: Jerel Day    3. Total number of persons who participated in contact: 2, including writer     4. Length of Actual Contact: Start Time: 3pm; End Time: 4pm   Traveled?    No     5. Location of contact:  Psychiatry Clinic, [place]    6. Did the client complete the home practice option(s) from the previous session: completed    7. Motivational Teaching Strategies:  Connect info and skills with personal goals  Promote hope and positive expectations  Explore pros and cons of change  Re-frame experiences in positive light    8. Educational Teaching Strategies:  Review of written material/education  Relate information to client's experience  Ask questions to check comprehension  Break down information into small chunks  Adopt client's language     9. CBT Teaching Strategies:  Reinforcement and shaping (positive feedback for steps towards goals and gains in knowledge & skills)  Relapse prevention planning (review of stressors and early warning signs)  Coping skills training (review current coping skills and increase currently used skills)  Behavioral tailoring (fit taking medication into client's daily routine)    10. IRT Module(s) Addressed:  Module 2 - Assessment/Initial Goal Setting   Module 3 - Education about Psychosis  Safety Planning    11. Techniques utilized:   [place] announced at beginning of session  Review of goal  Review of previous meeting  Present new material  Problem-solving practice  Help client choose a home practice option  Summarize progress made in  "current session    12. Mental Status Exam:    Alertness: alert   Appearance: casually groomed  Behavior/Demeanor: cooperative, pleasant and guarded, with fair  eye contact   Speech: regular rate and rhythm  Language: intact and no obvious problem. Preferred language identified as English.  Psychomotor: normal or unremarkable and slowed  Mood: description consistent with euthymia  Affect: restricted; was congruent to mood; was congruent to content  Thought Process/Associations: remarkable for  and difficult to follow at times; easier to follow than last two weeks  Thought Content:  Reports preoccupations and over-valued ideas;  Denies suicidal ideation, violent ideation and delusions  Perception:  Reports auditory hallucinations without commands [details in Interim History];  Denies visual hallucinations  Insight: limited  Judgment: limited  Cognition: does  appear grossly intact; formal cognitive testing was not done  Suicidal ideation: denies SI, denies intent,  and denies plan  Homicidal Ideation: denies HI    13. Assessment/Progress Note:     Writer met with Jerel on this date for IRT. Writer set agenda to check-in, discuss and assess symptoms, explore material in IRT modules, discuss ways in which Jerel can expand coping strategies and stress-management techniques for ongoing symptom management, and check-in regarding goals for ongoing recovery. During check-in, with regards to symptoms, Jerel reports ongoing AH, but notes the voices have been \"way down\" the past 2-3 weeks. He described hearing one of his old roommates at one time asking out a girl that Jerel used to like. With regards to frequency of voices, Jerel replied he thinks he hears voices everyday, but notes that he can tune in and out. Writer inquired if Jerel could try tracking voices this coming week; provided voice tracking diary from \"Treating Psychosis;\" encouraged Jerel to try 1x/day reflecting on voices and document the circumstances, what the voices " "said and his distress level. Jerel was very open and agreeable to this. Jerel shared overall he feels he is doing very well. Writer engaged Jerel in reflection on what is contributing to this. Jerel identified the following contributing factors:    -being at [facility]RTS in a \"low stress environment\" where his meals are cooked for him. Noted it is low stress comparatively with the hospital.  -having space from people who were more negative in his life.  -Thanksgiving and being able to have a good meal with his family.     Writer shared hope that writer and Jerel can continue to identify and reflect on factors so that through IRT we can be seeking establishing those factors in his day-to-day life once he leaves the IRTS (next week) and returns home. Overall, Jerel was open and engaged throughout the session. Symptom assessment, safety assessment, discussion and identification of coping strategies, and exploration of material in IRT modules was all in support of Jerel's self-identified goal(s) of returning to school, finishing school, maybe get a job, make some friends and do more yoga as identified in most recent BEH Treatment Plan. Progress toward goal completion seems fair.    14. Plan/Referrals:     Next appt scheduled for next week. Client and family aware they can reach out to writer directly and/or NAVIGATE team should concerns or needs arise prior to next scheduled appointment.     Billing for \"Interactive Complexity\"?    No      EDELMIRA Morgan    NAVIGATE Individual Resiliency Trainer  Attestation:    I did not see this patient directly. This patient is discussed with me in individual clinical social work supervision, and I agree with the plan as documented.     Ana Rose LICARIANNE, MSW, LICSW, December 13, 2019    "

## 2019-12-11 ASSESSMENT — ANXIETY QUESTIONNAIRES
6. BECOMING EASILY ANNOYED OR IRRITABLE: SEVERAL DAYS
GAD7 TOTAL SCORE: 7
1. FEELING NERVOUS, ANXIOUS, OR ON EDGE: SEVERAL DAYS
7. FEELING AFRAID AS IF SOMETHING AWFUL MIGHT HAPPEN: SEVERAL DAYS
5. BEING SO RESTLESS THAT IT IS HARD TO SIT STILL: SEVERAL DAYS
2. NOT BEING ABLE TO STOP OR CONTROL WORRYING: SEVERAL DAYS
3. WORRYING TOO MUCH ABOUT DIFFERENT THINGS: SEVERAL DAYS

## 2019-12-11 ASSESSMENT — PATIENT HEALTH QUESTIONNAIRE - PHQ9
SUM OF ALL RESPONSES TO PHQ QUESTIONS 1-9: 9
5. POOR APPETITE OR OVEREATING: SEVERAL DAYS

## 2019-12-11 NOTE — PROGRESS NOTES
"NAVIGATE SEE Progress Note   For Supported Employment & Education    NAVIGATE Enrollee: Jerel Day (1995)     MRN: [id number]  Date:  12/09/19  Clinician: NAVIGATE Supported Employment & , Ana Michel    1. Client Status Update:   Jerel Day is interested in education (Client completed a class, term or semester)    2. People present:   SEE/Writer  Client: Jerel Day  Other: , [facility]    3. Length of Actual Contact: 60 minutes   Traveled? Yes  Total Travel Time: 45 minutes    4. Location of contact:  School    5. Brief description of session, contact, or client status (include: strategies, interventions, client reaction to contact, next steps, etc)    Jerel requested that this writer meet with the office for community standards at the [facility], [place] [place]. Jerel had received a letter requiring him to meet with the Director to discuss an incident that happened last Spring. Jerel received a letter that his TA had reported inappropriate, threatening and nonsensical behavior and emails in his psychology of culture course.   We discussed the situation and Jerel somewhat understood that his behavior was not appropriate. Jerel's presentation was disorganized but reported that \"I am in a better place and am on new medication since then.\"   [facility] was able to provide Jerel with other ways of discussing grades he disagreed with and decided that Jerel would receive a non-official warning for his conduct. If a report were to be made again, he may face additional disciplinary action.     6. Completion of mutually agreed upon client task from previous meeting:  Partially Completed    7. Orientation and Treatment Planning:  Pursuing current SEE goals    8. Assessment:  Assessing client's need for follow-along supports    9. Placement:  Not Applicable    10. Follow Along Supports: (for clients who are working or attending school)   Education (Problem solving difficulties with " school and Providing social skills training for school)    11. Mutually agreed upon client task for next meeting:     na    12. Next Meeting Scheduled for: nika TIJERINA Supported Employment &

## 2019-12-12 ASSESSMENT — ANXIETY QUESTIONNAIRES: GAD7 TOTAL SCORE: 7

## 2019-12-13 NOTE — PROGRESS NOTES
LILLIANA Clinician Contact & Progress Note  For Individual Resiliency Training (IRT)  A Part of the [facility] First Episode of Psychosis Program    NAVIGATE Enrollee: Jerel Day (1995)     MRN: [id number]  Date:  11/21/19  Diagnosis: Schizoaffective disorder, bipolar type       Clinician: LILLIANA Individual Resiliency Trainer, EDELMIRA Morgan     1. Type of contact: (majority of time spent)  IRT Session    2. People present:   Writer  Client: Jerel Day    3. Total number of persons who participated in contact: 2, including writer     4. Length of Actual Contact: Start Time: 1pm; End Time: 1:30pm   Traveled?    No     5. Location of contact:  Phone    6. Did the client complete the home practice option(s) from the previous session: completed    7. Motivational Teaching Strategies:  Connect info and skills with personal goals  Promote hope and positive expectations  Explore pros and cons of change  Re-frame experiences in positive light    8. Educational Teaching Strategies:  Review of written material/education  Relate information to client's experience  Ask questions to check comprehension  Break down information into small chunks  Adopt client's language     9. CBT Teaching Strategies:  Reinforcement and shaping (positive feedback for steps towards goals and gains in knowledge & skills)  Relapse prevention planning (review of stressors and early warning signs)  Coping skills training (review current coping skills and increase currently used skills)  Behavioral tailoring (fit taking medication into client's daily routine)    10. IRT Module(s) Addressed:  Module 2 - Assessment/Initial Goal Setting   Module 3 - Education about Psychosis  Safety Planning    11. Techniques utilized:   [place] announced at beginning of session  Review of goal  Review of previous meeting  Present new material  Problem-solving practice  Help client choose a home practice option  Summarize progress made in current session    12.  "Mental Status Exam:    Alertness: alert   Appearance: unable to assess; telephone visit  Behavior/Demeanor: cooperative, pleasant and guarded, with unable to assess eye contact   Speech: regular rate and rhythm  Language: intact and no obvious problem. Preferred language identified as English.  Psychomotor: unable to assess; phone visit  Mood: description consistent with euthymia  Affect: restricted; was congruent to mood; was congruent to content  Thought Process/Associations: remarkable for , difficult to follow and loose associations at times  Thought Content:  Reports preoccupations and over-valued ideas;  Denies suicidal ideation and violent ideation  Perception:  Reports auditory hallucinations without commands [details in Interim History] and depersonalization;  Denies visual hallucinations  Insight: limited  Judgment: limited  Cognition: does  appear grossly intact; formal cognitive testing was not done  Suicidal ideation: denies SI, denies intent,  and denies plan  Homicidal Ideation: denies HI    13. Assessment/Progress Note:     Writer was scheduled to meet with Jerel on this date in clinic for IRT session. Writer received notification from [facility] - YOSEPH Raymundo that Jerel notified her there was a mix up with his ride and he won't be able to make it in. Writer called Jerel who wanted to do a phone check-in. Writer set agenda to check-in, discuss and assess symptoms, explore material in IRT modules, discuss ways in which Jerel can expand coping strategies and stress-management techniques for ongoing symptom management, and check-in regarding goals for ongoing recovery. Utilized beginning of session to process Jerel's frustration related to ride not coming and not being able to make it in to clinic. Writer offered validation and support. Strategized ways in which staff could assist Jerel with ride reminders. With regards to symptoms, Jerel reported overall \"pretty high\" mood. He shared he has been " "enjoying groups at the IRTS and also has been learning a lot there. He reports he has been reflecting on past relationships with people and has been feeling connected with certain individuals. He wonders what friendships will look like in the future. Reflected together on voices building on past conversations in session together; Jerel commented that it seems when he is feeling better the voices work with him, and when he is feeling worse the voices work against him. Writer normalized and validated this; emphasized self-awareness as a protective factor and offered hope for ongoing recovery and well-being. Reviewed positive activities that Jerel can continue to engage in while at the IRTS, emphasizing the importance of self-care. Writer reflected on all of the ways Jerel is making decisions that promote his overall well-being; highlighted his strengths and resiliency. Confirmed appt for Tuesday at 1pm. Overall, Jerel was open and engaged throughout the session. Symptom assessment, safety assessment, discussion and identification of coping strategies, and exploration of material in IRT modules was all in support of Jerel's self-identified goal(s) of returning to school, finishing school, maybe get a job, make some friends and do more yoga as identified in most recent BEH Treatment Plan. Progress toward goal completion seems fair.    14. Plan/Referrals:     Next appt scheduled for next week. Client and family aware they can reach out to writer directly and/or NAVIGATE team should concerns or needs arise prior to next scheduled appointment.     Billing for \"Interactive Complexity\"?    No      Tammi Connell ARIANNE    NAVIGATE Individual Resiliency Trainer  Attestation:    I did not see this patient directly. This patient is discussed with me in individual clinical social work supervision, and I agree with the plan as documented.     Ana Rose, LICSW, MSW, LICSW, December 13, 2019    "

## 2019-12-14 ENCOUNTER — MEDICAL CORRESPONDENCE (OUTPATIENT)
Dept: HEALTH INFORMATION MANAGEMENT | Facility: CLINIC | Age: 24
End: 2019-12-14

## 2019-12-17 ENCOUNTER — TELEPHONE (OUTPATIENT)
Dept: PSYCHIATRY | Facility: CLINIC | Age: 24
End: 2019-12-17

## 2019-12-19 ENCOUNTER — OFFICE VISIT (OUTPATIENT)
Dept: PSYCHIATRY | Facility: CLINIC | Age: 24
End: 2019-12-19
Payer: COMMERCIAL

## 2019-12-19 DIAGNOSIS — F25.0 SCHIZOAFFECTIVE DISORDER, BIPOLAR TYPE (H): Primary | ICD-10-CM

## 2019-12-19 NOTE — TELEPHONE ENCOUNTER
Completed form fax to [facility]. Copy sent to [facility] and purple folder

## 2019-12-20 ASSESSMENT — ANXIETY QUESTIONNAIRES
6. BECOMING EASILY ANNOYED OR IRRITABLE: SEVERAL DAYS
7. FEELING AFRAID AS IF SOMETHING AWFUL MIGHT HAPPEN: SEVERAL DAYS
5. BEING SO RESTLESS THAT IT IS HARD TO SIT STILL: SEVERAL DAYS
GAD7 TOTAL SCORE: 8
3. WORRYING TOO MUCH ABOUT DIFFERENT THINGS: SEVERAL DAYS
2. NOT BEING ABLE TO STOP OR CONTROL WORRYING: SEVERAL DAYS
1. FEELING NERVOUS, ANXIOUS, OR ON EDGE: MORE THAN HALF THE DAYS

## 2019-12-20 ASSESSMENT — PATIENT HEALTH QUESTIONNAIRE - PHQ9
5. POOR APPETITE OR OVEREATING: SEVERAL DAYS
SUM OF ALL RESPONSES TO PHQ QUESTIONS 1-9: 9

## 2019-12-20 NOTE — PROGRESS NOTES
LILLIANA Clinician Contact & Progress Note  For Individual Resiliency Training (IRT)  A Part of the [facility] First Episode of Psychosis Program    NAVIGATE Enrollee: Jerel Day (1995)     MRN: [id number]  Date:  12/19/19  Diagnosis: Schizoaffective disorder, bipolar type       Clinician: LILLIANA Individual Resiliency Trainer, EDELMIRA Morgan     1. Type of contact: (majority of time spent)  IRT Session    2. People present:   Writer  Client: Jerel Day    3. Total number of persons who participated in contact: 2, including writer     4. Length of Actual Contact: Start Time: 1pm; End Time: 2pm   Traveled?    No     5. Location of contact:  Psychiatry [facility], [place]    6. Did the client complete the home practice option(s) from the previous session: completed    7. Motivational Teaching Strategies:  Connect info and skills with personal goals  Promote hope and positive expectations  Explore pros and cons of change  Re-frame experiences in positive light    8. Educational Teaching Strategies:  Review of written material/education  Relate information to client's experience  Ask questions to check comprehension  Break down information into small chunks  Adopt client's language     9. CBT Teaching Strategies:  Reinforcement and shaping (positive feedback for steps towards goals and gains in knowledge & skills)  Relapse prevention planning (review of stressors and early warning signs)  Coping skills training (review current coping skills and increase currently used skills)  Behavioral tailoring (fit taking medication into client's daily routine)    10. IRT Module(s) Addressed:  Module 2 - Assessment/Initial Goal Setting   Module 3 - Education about Psychosis  Safety Planning    11. Techniques utilized:   [place] announced at beginning of session  Review of goal  Review of previous meeting  Present new material  Problem-solving practice  Help client choose a home practice option  Summarize progress made in  current session    12. Mental Status Exam:    Alertness: alert   Appearance: casually groomed  Behavior/Demeanor: cooperative, pleasant and guarded, with fair  eye contact   Speech: regular rate and rhythm  Language: intact and no obvious problem. Preferred language identified as English.  Psychomotor: normal or unremarkable  Mood: description consistent with euthymia  Affect: restricted and appropriate; was congruent to mood; was congruent to content  Thought Process/Associations: remarkable for  and difficult to follow at times; easier to follow week-to-week  Thought Content:  Reports preoccupations and over-valued ideas;  Denies suicidal ideation, violent ideation and delusions  Perception:  Reports auditory hallucinations without commands [details in Interim History];  Denies visual hallucinations  Insight: limited  Judgment: limited  Cognition: does  appear grossly intact; formal cognitive testing was not done  Suicidal ideation: denies SI, denies intent,  and denies plan  Homicidal Ideation: denies HI    13. Assessment/Progress Note:     Writer met with Jerel on this date for IRT. Writer set agenda to check-in, discuss and assess symptoms, explore material in IRT modules, discuss ways in which Jerel can expand coping strategies and stress-management techniques for ongoing symptom management, and check-in regarding goals for ongoing recovery. During check-in, with regards to symptoms, Jerel reports ongoing AH that have been worse the past couple of days. Jerel demonstrated increased insight and ability to describe what voices are saying and also share his perspective on voices. Jerel also completed his home practice and brought a list of what voices were saying at different times on a sheet of paper into session. Writer reflected positively on this and offered praise and encouragement for Jerel following through and completing this activity. Jerel shared positively about moving out of the IRTS and being at home. He  "reports spending time playing guitar and making music. Played a couple of songs for writer. Jerel denies any SI/SH and denies any HI/VI. Also denies any substance use. Writer checked in about meds; Jerel reports he is taking both of his medications; even brought out a little baggie with two pills in the event that he stays at his brothers again. Jerel had stayed at his brothers the night before and his brother drove him to appt. Jerel asked if brother could join session. Brother joined for last 15 minutes. Shared reflections on ways he has observed Jerel to be improved. Also shared hope that he finds a living situation that is good for Jerel; discussed together options of having roommates or not having roommates. Jerel also wanted to check in with brother about brother's boundaries for Jerel being at Chilo's house; Jerel shared hope that he isn't invading Chilo's space. Chilo shared that he doesn't feel that way. Overall, it was a positive interaction between Jerel and Chilo and Jerel demonstrated self-awareness and care/concern for others. Overall, Jerel presented with brighter affect. He was open and engaged throughout the session. Symptom assessment, safety assessment, discussion and identification of coping strategies, and exploration of material in IRT modules was all in support of Jerel's self-identified goal(s) of returning to school, finishing school, maybe get a job, make some friends and do more yoga as identified in most recent BEH Treatment Plan. Progress toward goal completion seems fair.    Scheduled Jerel with Dr. Yusuf.    14. Plan/Referrals:     Next appt scheduled for next week. Client and family aware they can reach out to writer directly and/or NAVIGATE team should concerns or needs arise prior to next scheduled appointment.     Billing for \"Interactive Complexity\"?    No      EDELMIRA Morgan    NAVIGATE Individual Resiliency Trainer    Attestation:    I did not see this patient directly. This patient is " discussed with me in individual clinical social work supervision, and I agree with the plan as documented.     Ana Rose, LICSW, MSW, LICSW, December 20, 2019

## 2019-12-21 ASSESSMENT — ANXIETY QUESTIONNAIRES: GAD7 TOTAL SCORE: 8
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
## 2019-12-27 ENCOUNTER — OFFICE VISIT (OUTPATIENT)
Dept: PSYCHIATRY | Facility: CLINIC | Age: 24
End: 2019-12-27
Payer: COMMERCIAL

## 2019-12-27 DIAGNOSIS — F25.0 SCHIZOAFFECTIVE DISORDER, BIPOLAR TYPE (H): Primary | ICD-10-CM

## 2019-12-27 ASSESSMENT — ANXIETY QUESTIONNAIRES
7. FEELING AFRAID AS IF SOMETHING AWFUL MIGHT HAPPEN: SEVERAL DAYS
2. NOT BEING ABLE TO STOP OR CONTROL WORRYING: SEVERAL DAYS
5. BEING SO RESTLESS THAT IT IS HARD TO SIT STILL: SEVERAL DAYS
GAD7 TOTAL SCORE: 9
6. BECOMING EASILY ANNOYED OR IRRITABLE: SEVERAL DAYS
1. FEELING NERVOUS, ANXIOUS, OR ON EDGE: MORE THAN HALF THE DAYS
3. WORRYING TOO MUCH ABOUT DIFFERENT THINGS: SEVERAL DAYS

## 2019-12-27 ASSESSMENT — PATIENT HEALTH QUESTIONNAIRE - PHQ9
SUM OF ALL RESPONSES TO PHQ QUESTIONS 1-9: 9
5. POOR APPETITE OR OVEREATING: MORE THAN HALF THE DAYS

## 2019-12-27 NOTE — Clinical Note
He is able to engage SO much more, sessions with him are so great! Anyway, just finding myself almost giddy as I write this note :) ready to sign, thanks!
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
## 2019-12-28 ASSESSMENT — ANXIETY QUESTIONNAIRES: GAD7 TOTAL SCORE: 9

## 2020-01-02 ENCOUNTER — TELEPHONE (OUTPATIENT)
Dept: PSYCHIATRY | Facility: CLINIC | Age: 25
End: 2020-01-02

## 2020-01-02 DIAGNOSIS — F25.0 SCHIZOAFFECTIVE DISORDER, BIPOLAR TYPE (H): ICD-10-CM

## 2020-01-02 DIAGNOSIS — M54.2 CERVICAL PAIN: ICD-10-CM

## 2020-01-02 NOTE — TELEPHONE ENCOUNTER
NAVIGATE SEE Outgoing Telephone Call  For Supported Employment & Education    NAVIGATE Enrollee: Jerel Day (1995)     MRN: [id number]  Date of Call: 1/2/2020  Contacted: Jerel  Call Length: 4 min    Discussed:   Informed Jerel that his IRT AGA Morgan had to go home sick for the day. Offered a SEE appt in replacement but Jerel would prefer to see his friend today and meet with this writer on Monday at 1:30 at his home in [place].    Ana Michel

## 2020-01-06 ENCOUNTER — ALLIED HEALTH/NURSE VISIT (OUTPATIENT)
Dept: PSYCHIATRY | Facility: CLINIC | Age: 25
End: 2020-01-06
Payer: COMMERCIAL

## 2020-01-06 DIAGNOSIS — F25.0 SCHIZOAFFECTIVE DISORDER, BIPOLAR TYPE (H): Primary | ICD-10-CM

## 2020-01-06 RX ORDER — BENZTROPINE MESYLATE 0.5 MG/1
0.5 TABLET ORAL 2 TIMES DAILY PRN
Qty: 60 TABLET | Refills: 0 | Status: ON HOLD | OUTPATIENT
Start: 2020-01-06 | End: 2020-02-24

## 2020-01-06 RX ORDER — OLANZAPINE 15 MG/1
15 TABLET ORAL AT BEDTIME
Qty: 30 TABLET | Refills: 0 | Status: ON HOLD | OUTPATIENT
Start: 2020-01-06 | End: 2020-02-24

## 2020-01-06 RX ORDER — GABAPENTIN 300 MG/1
300 CAPSULE ORAL 2 TIMES DAILY PRN
Qty: 60 CAPSULE | Refills: 0 | Status: ON HOLD | OUTPATIENT
Start: 2020-01-06 | End: 2020-02-24

## 2020-01-06 RX ORDER — ARIPIPRAZOLE 15 MG/1
15 TABLET ORAL DAILY
Qty: 30 TABLET | Refills: 0 | Status: ON HOLD | OUTPATIENT
Start: 2020-01-06 | End: 2020-02-24

## 2020-01-06 NOTE — PROGRESS NOTES
"NAVIGATE SEE Progress Note   For Supported Employment & Education    NAVIGATE Enrollee: Jerel Day (1995)     MRN: [id number]  Date:  1/06/20  Clinician: NAVIGATE Supported Employment & , Ana Michel    1. Client Status Update:   Jerel Day is interested in education (Other, explain: considering return to school)    2. People present:   SEE/Writer  Client: Jerel Day    3. Length of Actual Contact: 60 minutes   Traveled? Yes  Total Travel Time: 40 minutes    4. Location of contact:  Client's Home    5. Brief description of session, contact, or client status (include: strategies, interventions, client reaction to contact, next steps, etc)    Writer and Jerel Day met at his home for a follow up Supported Employment and Education (SEE) session. Jerel Dya has been working on the goal of obtaining his degree and exploring a job. Today's agenda included: check in, goal setting, searching for apartments.   Jerel reports he had a \"nice, quiet Luis with family and went to a show for new years.\" He says he has been feeling better but has some concerns about returning to college. We discussed the timeline for returning to school, the steps he needs to take as well as the pros and cons of waiting for the summer or fall. Jerel decided today that he would like to wait to return. He says \"right now I just don't think I'm well enough\". When asked what might signify that he is well enough, Jerel said he would like \"the voices to get better and not feel physical symptoms either\". Writer praised Jerel for his insight and ability to hold off on returning to college until he feels ready.   We discussed other goals and Jerel would like to find an apartment, get on social security and \"learn how to relax\". We searched online for affordable apartments and this writer emailed a list of 8 that we found that were acceptable to Jerel.   Writer offered an IRT session this week with AGA Morgan " but he declined and said he would wait until his appt on the 16th. He is also still waiting to hear from his  about his social security - this writer will follow up with her to see his status.       6. Completion of mutually agreed upon client task from previous meeting:  Not Applicable    7. Orientation and Treatment Planning:  Pursuing current SEE goals    8. Assessment:  Gathering SEE information/inventory regarding work and/or education history, skills, goals, and preferences with client    9. Placement:  Not Applicable    10. Follow Along Supports: (for clients who are working or attending school)   Not Applicable    11. Mutually agreed upon client task for next meeting:      continue searching for apartments, follow up with     12. Next Meeting Scheduled for: nika LAFLEURATE Supported Employment &

## 2020-01-06 NOTE — TELEPHONE ENCOUNTER
Mesha is calling to check on the status of these prescriptions getting filled.  Please f/up with her asap.  Thanks!

## 2020-01-06 NOTE — Clinical Note
MAMI declined an IRT session this week :/ I tried! He also made an official decision to wait to return to college until he gets better. He does want social security and to continue his meds.

## 2020-01-06 NOTE — TELEPHONE ENCOUNTER
Last seen: 12/4/19  RTC: 1 month   Cancel: none  No-show: none  Next appt: 1/8/2020    Incoming refill from Family via phone    Medication requested: Abilify 15 mg   Directions: Take 1 tablet by mouth daily   Qty: 30  Last refilled: 12/4/19    Medication requested: Benztropine 0.5 mg   Directions: Take 1 tablet by mouth 2 times daily   Qty: 60  Last refilled: 12/4/19    Medication requested: Gabapentin 300 mg   Directions: Take 1 capsule by mouth 2 times daily as needed   Qty: 60  Last refilled: 12/4/19     Medication requested: Zyprexa 15 mg   Directions: Take 1 tablet by mouthy at bedtime   Qty: 30  Last refilled: 12/4/19    Medication refill approved per refill protocol

## 2020-01-08 ENCOUNTER — OFFICE VISIT (OUTPATIENT)
Dept: PSYCHIATRY | Facility: CLINIC | Age: 25
End: 2020-01-08
Payer: COMMERCIAL

## 2020-01-08 VITALS
HEART RATE: 91 BPM | SYSTOLIC BLOOD PRESSURE: 109 MMHG | WEIGHT: 175.6 LBS | BODY MASS INDEX: 26.31 KG/M2 | DIASTOLIC BLOOD PRESSURE: 72 MMHG

## 2020-01-08 DIAGNOSIS — M54.2 CERVICAL PAIN: ICD-10-CM

## 2020-01-08 DIAGNOSIS — F25.0 SCHIZOAFFECTIVE DISORDER, BIPOLAR TYPE (H): Primary | ICD-10-CM

## 2020-01-08 ASSESSMENT — PATIENT HEALTH QUESTIONNAIRE - PHQ9
SUM OF ALL RESPONSES TO PHQ QUESTIONS 1-9: 11
5. POOR APPETITE OR OVEREATING: SEVERAL DAYS

## 2020-01-08 ASSESSMENT — ANXIETY QUESTIONNAIRES
7. FEELING AFRAID AS IF SOMETHING AWFUL MIGHT HAPPEN: SEVERAL DAYS
1. FEELING NERVOUS, ANXIOUS, OR ON EDGE: SEVERAL DAYS
2. NOT BEING ABLE TO STOP OR CONTROL WORRYING: SEVERAL DAYS
5. BEING SO RESTLESS THAT IT IS HARD TO SIT STILL: SEVERAL DAYS
6. BECOMING EASILY ANNOYED OR IRRITABLE: SEVERAL DAYS
3. WORRYING TOO MUCH ABOUT DIFFERENT THINGS: SEVERAL DAYS
GAD7 TOTAL SCORE: 7

## 2020-01-08 ASSESSMENT — PAIN SCALES - GENERAL: PAINLEVEL: MILD PAIN (3)

## 2020-01-09 ASSESSMENT — ANXIETY QUESTIONNAIRES: GAD7 TOTAL SCORE: 7

## 2020-01-16 ENCOUNTER — PATIENT OUTREACH (OUTPATIENT)
Dept: PSYCHIATRY | Facility: CLINIC | Age: 25
End: 2020-01-16

## 2020-01-16 ENCOUNTER — BEH TREATMENT PLAN (OUTPATIENT)
Dept: PSYCHIATRY | Facility: CLINIC | Age: 25
End: 2020-01-16

## 2020-01-16 ENCOUNTER — OFFICE VISIT (OUTPATIENT)
Dept: PSYCHIATRY | Facility: CLINIC | Age: 25
End: 2020-01-16
Payer: COMMERCIAL

## 2020-01-16 DIAGNOSIS — F25.0 SCHIZOAFFECTIVE DISORDER, BIPOLAR TYPE (H): Primary | ICD-10-CM

## 2020-01-16 NOTE — PROGRESS NOTES
[facility] NAVIGATE Program Treatment Plan Summary  A Part of the [facility] First Episode of Psychosis Program     NAVIGATE Enrollee: Jerel Day  /Age:  1995 (23 year old)  MRN: [id number]    Date of Initial Service: 18  Date of INTIAL Treatment Plan: 18  Last Review/Update Date:  10/7/19  Today's Date: 20  Next 90-Day Review Due:  20    The following represents UPDATED and reviewed treatment plan.    1. DSM-V Diagnosis (include numeric code)  Schizoaffective, bipolar type, 295.70 (F25)    2. Current symptoms and circumstances that substantiate the diagnosis    Jerel has a history of psychosis (paranoia, delusions, odd beliefs per family/friends, auditory hallucinations, command auditory hallucinations, visual hallucinations, disorganized speech, disorganized behavior and negative symptoms (diminished emotional expression, avolition and anhedonia)), SI and SIB. He presents with current symptoms of psychosis (delusions, auditory hallucinations, command auditory hallucinations, disorganized speech and negative symptoms (diminished emotional expression and avolition)).     3. How symptoms and/or behaviors are affecting level of function    Jerel s systems are impacting functioning with respect to ADLs, IADLs, social relationships, familial relationships and employment.    4. Risk Assessment:  Suicide:  Assessed Level of Immediate Risk: High  Ideation: No  Plan:  No; command SI to jump off bridge.  Means: Yes; has access to bridge.  Intent: No    Homicide/Violence:  Assessed Level of Immediate Risk: Medium  Ideation: No  Plan: No  Means: No  Intent: No    5. Medications    Current Outpatient Medications   Medication     ARIPiprazole (ABILIFY) 15 MG tablet     benztropine (COGENTIN) 0.5 MG tablet     gabapentin (NEURONTIN) 300 MG capsule     OLANZapine (ZYPREXA) 15 MG tablet     No current facility-administered medications for this visit.        6. Strengths     Medication adherence  Supportive  friends, family, recovery environment  Bravery & Valor     Curiosity    Humor & Playfulness   Kind & Generous    Love of learning       7. Barriers & Suicide Risk Factors     Command Hallucinations  Communication, limited to no communication with family/support network  Coping, limited to no coping strategies  High Premorbid IQ  Impulsive  Male  SIB  Single  Symptoms of psychosis, positive (delusions and/or hallucinations)  Symptoms of psychosis, negative (flat affect, avolition, anhedonia, alogia, and/or apathy)  Symptoms of psychosis, cognitive (memory, attention and concentration, and/or executive functioning difficulties)  Treatment Non-Adherence     8. Treatment Domains and Goals    Domain 1: Illness Management & Recovery  Identify and engage possible areas of improvement related to medication optimization, psychosis (paranoia, delusions, odd beliefs per family/friends, auditory hallucinations, command auditory hallucinations, disorganized speech, disorganized behavior and negative symptoms (diminished emotional expression, avolition and anhedonia)), depression (low mood nearly every day, anhedonia most of the time, low energy, difficulty concentrating, thinking or making decisions and suicidal ideation without plan, without intent), anxiety, SI, SIB and low self-esteem and ability to management illness.     Measurable Objectives Interventions Target Dates & Discharge Criteria   Medication Objectives    -Paricipate in a medication evaluation   -Take medications as prescribed and have reduced frequency and severity of symptoms  -Learn and implement strategies for overcoming barriers to taking medication  -Support system assists with overcoming barriers to taking medications   Medication Management  -Prescribe and monitor medications  -Monitor and treat side effects  -Psychoeducation  -Behavioral activation  -Initial and routine lab work    IRT/Psychotherapy  -Psychoeducation  -Motivation  interviewing  -CBT  -Behavioral activation  -Mindfulness  -Family involvement during portions of sessions    Family Therapy  -Psychoeducation  -Motivational interviewing  -Behavioral family therapy  -CBT  -Behavioral activation    Case Management  -Motivational interviewing  -Care coordination with school   Target date:   6 months from 1/16/20    Discharge criteria:  Marked and sustained symptom improvement     Gains Made:  -Paricipate in a medication evaluation   -adhering to medication recommendations  -taking medications as prescribed   Individual s Objectives    -Complete a safety plan with therapist and share with support system  -Define what recovery means to self  -Identify psychosocial areas of need  -Identify top 5 strengths and use those strengths when working toward goal achievement; simultaneously choose one area for improvement and identify two actionable steps toward improvement  -Create a goal plan consisting of one long-term goal, three short-term goals, and actionable steps toward short-term goal achievement  -Demonstrate understanding of psychosis (paranoia, delusions, auditory hallucinations and command auditory hallucinations), depression (low mood nearly every day, anhedonia most of the time, low energy and difficulty concentrating, thinking or making decisions), anxiety, SI and SIB in the context of self with respect to symptoms, causes, course, medications and the impact of stress  -Learn at least 2 coping strategies to successfully target current symptoms  -Demonstrate understanding for how substance use impacts symptoms, identify stage of change, and experiment with reduced use or abstinence from all illicit substances   -Learn strategies to build positive emotions and facilitate resiliency   -Develop and implement a relapse prevention plan including identification of warning signs, triggers, coping mechanisms, and how other persons can be supportive if symptoms increase or reemerge  "  -Process the psychotic episode by demonstrating understanding of how the episode impacted self, identifying positive coping strategies and resiliency used during that time, challenging self-stigmatizing beliefs, and developing a positive attitude towards facing future life challenges  -Identify primary styles of thinking, and demonstrate understanding of and use cognitive restructuring to successfully deal with negative feelings  -Identify persistent symptoms that interfere with activities and/or enjoyment and successfully implement two coping strategies to reduce symptoms severity     In Jerel's own words:  -\"keep processing my past\"  -\"decrease punching myself\"   Medication Management  -Prescribe and monitor medications  -Monitor and treat side effects  -Psychoeducation  -Initial and routine lab work    IRT/Psychotherapy  -Psychoeducation  -Motivation interviewing  -CBT  -Behavioral activation  -Mindfulness  -Family involvement during portions of sessions    Family Therapy  -Psychoeducation  -Motivational interviewing  -Behavioral family therapy  -CBT  -Behavioral activation    Case Management  -Motivational interviewing   Target date:   6 months from 1/16/20    Discharge criteria:  Marked and sustained symptom improvement     Jerel demonstrates understanding of mental illness     Jerel successfully implements strategies to cope with stressors and/or symptoms to mitigate risk for increase in symptom severity or relapse    Gains Made:  -Complete a safety plan with therapist and share with support system  -Define what recovery means to self  -Identify psychosocial areas of need  -Learn at least 2 coping strategies to successfully target current symptoms  -Process past trauma by demonstrating understanding of how the traumatic event impacted self, identifying positive coping strategies and resiliency used during that time, challenging self-stigmatizing beliefs, and developing a positive attitude towards facing future " life challenges  -Identify persistent symptoms that interfere with activities and/or enjoyment and successfully implement two coping strategies to reduce symptoms severity     Support System Objectives    -Supports increase the safety of the home by removing firearms or other lethal weapons from the client's easy access   -Supports agree to provide supervision and monitor suicidal potential   -Supports, including family members, terminate any hostile, critical responses to the client and increase their statements of praise, optimism, and affirmation   -Supports, including family members, verbalize realistic expectations and discipline methods   -Supports, including family members, verbally reinforce the client's active attempts to build self-esteem and rapport   -Supports verbalize increased understanding of an knowledge about the client's illness and treatment   -Identify psychosocial areas of need  -Verbalize understanding of the client's long-term and short-term goals  -Demonstrate understanding of psychosis (paranoia, delusions, auditory hallucinations and command auditory hallucinations), depression (low mood nearly every day, anhedonia most of the time, low energy and difficulty concentrating, thinking or making decisions), SI and SIB in the context of the client with respect to symptoms, causes, course, medications and the impact of stress  -Learn the client's signs of stress and possible coping skills  -Demonstrate understanding for how substance use impacts symptoms and how to support decrease in or abstinence from illicit substance use  -Learn skills that strengthen and support the client's positive behavior change  -Learn strategies to build positive emotions and facilitate resiliency including use of a resiliency story  -Develop and implement a relapse prevention plan including identification of warning signs, triggers, coping mechanisms, and how other persons can be supportive if symptoms increase or  "reemerge   -Learn and implement communication skills to enhance communication and respect among family members  -Learn and implement problem-solving and/or conflict resolution skills to manage familial, personal and interpersonal problems constructively   Medication Management  -Prescribe and monitor medications  -Monitor and treat side effects  -Psychoeducation  -Initial and routine lab work    IRT/Psychotherapy  -Psychoeducation  -Motivation interviewing  -CBT  -Behavioral activation  -Family involvement during portions of sessions    Family Therapy  -Psychoeducation  -Motivational interviewing  -Behavioral family therapy  -CBT  -Behavioral activation    Case Management  -Motivational interviewing   Target date:   6 months from 1/16/20    Discharge criteria:  Support system demonstrates understanding of mental illness     Support system successfully implements strategies to assist Jerel cope with stressors and/or symptoms to mitigate risk for increase in symptom severity or relapse     Gains Made:  -Supports, including family members, verbalize realistic expectations and discipline methods   -Identify psychosocial areas of need  -Learn skills that strengthen and support the client's positive behavior change  -Learn and implement communication skills to enhance communication and respect among family members       Domain 2: Health & Basic Living Needs  Identify and engage possible areas of improvement related to basic needs being met and maintaining or improving overall health and well-being     Measurable Objectives Interventions Discharge Criteria   -Verbalize an accurate understanding of factors influencing eating, health, and weight  -Learn and implement at least healthy nutritional practices  -Learn budgeting strategies for finances and develop a personal budget  -Identify areas of improvement for ADLs and IADLs and implement at least two skills with improved outcomes    In Jerel's own words:  -\"cut down on " "cigarettes\"  -\"get stronger\"  -\"start yoga\"  -\"find a place to live\"  -\"lose weight\" Medication Management  -Prescribe and monitor medications  -Monitor and treat side effects  -Psychoeducation  -Initial and routine lab work    IRT/Psychotherapy  -Psychoeducation  -Motivation interviewing  -CBT  -Behavioral activation  -Mindfulness  -Family involvement during portions of sessions    Family Therapy  -Psychoeducation  -Motivational interviewing  -Behavioral family therapy  -CBT  -Behavioral activation    Supported Education & Employment  -Motivational interviewing  -Individualized placement and support   -Behavioral Activation  -Family involvement    Case Management  -Motivational interviewing   Target date:   6 months from 1/16/20    Discharge criteria:  Jerel, his supports and treatment team report no unmet health and basic living needs    Gains Made:  -Independently arrange transportation to/from appointments   -Identify areas of improvement for ADLs and IADLs and implement at least two skills with improved outcomes       Domain 3: Family & Other Supports  Identify and engage possible areas of improvement related to engaging family, friends and other supports     Measurable Objectives Interventions Discharge Criteria   -Identify support system  -Improve the quality of relationships by developing skills to better understand other people, communicate more effectively, manage disclosure, and understand social cues  -Learn and implement problem-solving and/or conflict resolution skills to manage personal and interpersonal problems constructively  -Identify and implement two approaches to how strengths and interests can be used to initiate social contacts and develop peer friendships  -Learn and implement calming and coping strategies to manage anxiety symptoms and focus attention usefully during moments of social anxiety    -Strengthen the social support network with friends by initiating social contact and " "participating in social activities with peers   -Increase participation in interpersonal or peer group activities   -Renew two old fun activities or develop two new fun activity     In Jerel's own words:  -\"expand social connections\"  -\"find a band to join\"   Medication Management  -Prescribe and monitor medications  -Monitor and treat side effects  -Psychoeducation  -Initial and routine lab work    IRT/Psychotherapy  -Psychoeducation  -Motivation interviewing  -CBT  -Behavioral activation  -Family involvement during portions of sessions    Family Therapy  -Psychoeducation  -Motivational interviewing  -Behavioral family therapy  -CBT  -Behavioral activation    Supported Education & Employment  -Motivational interviewing  -Individualized placement and support   -Behavioral Activation  -Family involvement    Case Management  -Motivational interviewing   Target date:   6 months from 1/16/20    Discharge criteria:  Jerel and his support system report feeling equipped with the necessary skills to communicate and problem solve during times of disagreement    Conflict with supports and peers are resolved constructively and consistently over time; 6 months    Gains Made:  -Invite support system to be involved in treatment  -Participate in family therapy  -Participate in group therapy   -Learn and implement calming and coping strategies to manage anxiety symptoms and focus attention usefully during moments of social anxiety    -Increase participation in interpersonal or peer group activities   -Identified social clubs/groups at school that may be of interest to Jerel       Domain 4: Academic and Employment  Identify and engage possible areas of improvement relates to education and employment     Measurable Objectives Interventions Discharge Criteria   -Explore areas of interest for continued educational opportunities   -Explore areas of interest for employment purposes  -Stay current with schoolwork, completing assignments and " "interacting appropriately with peers and teachers   -Utilize accommodations, effective study and test-taking skills on a regular basis to improve academic performance  -Increase participate in school-related activities   -Obtain/maintain gainful employment     In Jerel's own words:  -\"maybe get a job\"  -\"maybe finish school\"  -\"go back to school in the summer or fall\" Medication Management  -Prescribe and monitor medications  -Monitor and treat side effects  -Psychoeducation  -Initial and routine lab work    IRT/Psychotherapy  -Psychoeducation  -Motivation interviewing  -CBT  -Behavioral activation  -Family involvement during portions of sessions    Family Therapy  -Psychoeducation  -Motivational interviewing  -Behavioral family therapy  -CBT  -Behavioral activation    Supported Education & Employment  -Motivational interviewing  -Individualized placement and support   -Behavioral Activation  -Family involvement    Case Management  -Motivational interviewing   Target date:   6 months from 1/16/20    Discharge criteria:  Work and school goals are achieved and maintained without follow along NAVIGATE Supported Education and Employment supports for 6 months    Gains Made:  -Explore areas of interest for continued educational opportunities   -Explore areas of interest for employment purposes  -Obtain/maintain gainful employment        9. Frequency of sessions and expected duration of treatment:   1-4x per month Medication Management with Prescriber ongoing  6 months of weekly IRT/Individual Psychotherapy followed by 12-18 months of biweekly or monthly IRT  2-4x per month Supported Education and Employment Services for 6 months  2-4x per month Family Education and Support Services for 6 months    10. Participants in therapy plan:   Jerel Day  Support System: Mom and Dad    NAVIGATE Team:   -Director/Family Clinician: AGA Huerta, LICSW  -Family Clinician: Tammi Connell LGSW  -SEE: Ana Michel, " CESP  -Family : Sabra Villeda CFPS  -Prescriber: Dr. Vince Zheng    See scanned document for Acknowledgement of Current Treatment Plan    Regulatory Guidelines for Updating Treatment Plan  [place] Medical Assistance: Reviewed & signed at least every 90 days  Medicare:  Update per policy

## 2020-01-16 NOTE — Clinical Note
MAMI - looks like Jerel was admitted last night. Talked to RN today and he is on a hold. He cut his leg (required stitches) and was hitting his head. I saw him yesterday - my note is here. He was able to contract for safety and did wake parents up to bring him to the hospital. I hope to connect with him later today if possible - he was sleeping when I called. Ana - ready to be signed, thanks!

## 2020-01-17 ENCOUNTER — TELEPHONE (OUTPATIENT)
Dept: BEHAVIORAL HEALTH | Facility: CLINIC | Age: 25
End: 2020-01-17

## 2020-01-17 ENCOUNTER — HOSPITAL ENCOUNTER (INPATIENT)
Facility: CLINIC | Age: 25
LOS: 40 days | Discharge: PSYCHIATRIC OR SUBSTANCE USE FACILITY | DRG: 885 | End: 2020-02-26
Attending: EMERGENCY MEDICINE | Admitting: PSYCHIATRY & NEUROLOGY
Payer: COMMERCIAL

## 2020-01-17 ENCOUNTER — APPOINTMENT (OUTPATIENT)
Dept: CT IMAGING | Facility: CLINIC | Age: 25
DRG: 885 | End: 2020-01-17
Attending: EMERGENCY MEDICINE
Payer: COMMERCIAL

## 2020-01-17 ENCOUNTER — PATIENT OUTREACH (OUTPATIENT)
Dept: PSYCHIATRY | Facility: CLINIC | Age: 25
End: 2020-01-17

## 2020-01-17 DIAGNOSIS — F99 INSOMNIA DUE TO OTHER MENTAL DISORDER: ICD-10-CM

## 2020-01-17 DIAGNOSIS — E55.9 VITAMIN D DEFICIENCY: ICD-10-CM
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
## 2020-01-17 DIAGNOSIS — F25.9 SCHIZOAFFECTIVE DISORDER, UNSPECIFIED TYPE (H): ICD-10-CM

## 2020-01-17 DIAGNOSIS — S81.812A LACERATION OF LEFT LOWER LEG, INITIAL ENCOUNTER: ICD-10-CM

## 2020-01-17 DIAGNOSIS — F25.0 SCHIZOAFFECTIVE DISORDER, BIPOLAR TYPE (H): ICD-10-CM

## 2020-01-17 DIAGNOSIS — F20.89 OTHER SCHIZOPHRENIA (H): Primary | ICD-10-CM

## 2020-01-17 DIAGNOSIS — S71.112A LACERATION OF LEFT THIGH, INITIAL ENCOUNTER: ICD-10-CM

## 2020-01-17 DIAGNOSIS — F23 ACUTE PSYCHOSIS (H): ICD-10-CM

## 2020-01-17 DIAGNOSIS — F51.05 INSOMNIA DUE TO OTHER MENTAL DISORDER: ICD-10-CM

## 2020-01-17 PROBLEM — R45.851 SUICIDAL IDEATION: Status: ACTIVE | Noted: 2020-01-17

## 2020-01-17 PROCEDURE — 99223 1ST HOSP IP/OBS HIGH 75: CPT | Mod: AI | Performed by: PSYCHIATRY & NEUROLOGY

## 2020-01-17 PROCEDURE — 12002 RPR S/N/AX/GEN/TRNK2.6-7.5CM: CPT | Mod: Z6 | Performed by: EMERGENCY MEDICINE

## 2020-01-17 PROCEDURE — 96372 THER/PROPH/DIAG INJ SC/IM: CPT | Mod: 59 | Performed by: EMERGENCY MEDICINE

## 2020-01-17 PROCEDURE — 0HQJXZZ REPAIR LEFT UPPER LEG SKIN, EXTERNAL APPROACH: ICD-10-PCS | Performed by: EMERGENCY MEDICINE

## 2020-01-17 PROCEDURE — 25000132 ZZH RX MED GY IP 250 OP 250 PS 637: Performed by: STUDENT IN AN ORGANIZED HEALTH CARE EDUCATION/TRAINING PROGRAM

## 2020-01-17 PROCEDURE — 99292 CRITICAL CARE ADDL 30 MIN: CPT | Performed by: EMERGENCY MEDICINE

## 2020-01-17 PROCEDURE — 70450 CT HEAD/BRAIN W/O DYE: CPT

## 2020-01-17 PROCEDURE — 25000128 H RX IP 250 OP 636: Performed by: EMERGENCY MEDICINE

## 2020-01-17 PROCEDURE — 99291 CRITICAL CARE FIRST HOUR: CPT | Mod: 25 | Performed by: EMERGENCY MEDICINE

## 2020-01-17 PROCEDURE — 12002 RPR S/N/AX/GEN/TRNK2.6-7.5CM: CPT | Performed by: EMERGENCY MEDICINE

## 2020-01-17 PROCEDURE — 90791 PSYCH DIAGNOSTIC EVALUATION: CPT

## 2020-01-17 PROCEDURE — 70486 CT MAXILLOFACIAL W/O DYE: CPT

## 2020-01-17 PROCEDURE — 12400001 ZZH R&B MH UMMC

## 2020-01-17 PROCEDURE — 99285 EMERGENCY DEPT VISIT HI MDM: CPT | Mod: 25 | Performed by: EMERGENCY MEDICINE

## 2020-01-17 RX ORDER — OLANZAPINE 10 MG/2ML
10 INJECTION, POWDER, FOR SOLUTION INTRAMUSCULAR ONCE
Status: COMPLETED | OUTPATIENT
Start: 2020-01-17 | End: 2020-01-17

## 2020-01-17 RX ORDER — OLANZAPINE 10 MG/1
10 TABLET ORAL
Status: DISCONTINUED | OUTPATIENT
Start: 2020-01-17 | End: 2020-02-04

## 2020-01-17 RX ORDER — OLANZAPINE 10 MG/2ML
10 INJECTION, POWDER, FOR SOLUTION INTRAMUSCULAR
Status: DISCONTINUED | OUTPATIENT
Start: 2020-01-17 | End: 2020-02-04

## 2020-01-17 RX ORDER — LIDOCAINE HYDROCHLORIDE AND EPINEPHRINE 10; 10 MG/ML; UG/ML
1 INJECTION, SOLUTION INFILTRATION; PERINEURAL ONCE
Status: DISCONTINUED | OUTPATIENT
Start: 2020-01-17 | End: 2020-01-17

## 2020-01-17 RX ORDER — OLANZAPINE 15 MG/1
15 TABLET ORAL AT BEDTIME
Status: DISCONTINUED | OUTPATIENT
Start: 2020-01-17 | End: 2020-01-21

## 2020-01-17 RX ORDER — VITAMIN B COMPLEX
25 TABLET ORAL DAILY
Status: DISCONTINUED | OUTPATIENT
Start: 2020-01-18 | End: 2020-02-26 | Stop reason: HOSPADM

## 2020-01-17 RX ORDER — BENZTROPINE MESYLATE 0.5 MG/1
0.5 TABLET ORAL 2 TIMES DAILY PRN
Status: DISCONTINUED | OUTPATIENT
Start: 2020-01-17 | End: 2020-02-26 | Stop reason: HOSPADM

## 2020-01-17 RX ORDER — HYDROXYZINE HYDROCHLORIDE 25 MG/1
25 TABLET, FILM COATED ORAL EVERY 4 HOURS PRN
Status: DISCONTINUED | OUTPATIENT
Start: 2020-01-17 | End: 2020-02-26 | Stop reason: HOSPADM

## 2020-01-17 RX ORDER — GABAPENTIN 300 MG/1
300 CAPSULE ORAL 2 TIMES DAILY PRN
Status: DISCONTINUED | OUTPATIENT
Start: 2020-01-17 | End: 2020-02-26 | Stop reason: HOSPADM

## 2020-01-17 RX ORDER — LIDOCAINE HYDROCHLORIDE AND EPINEPHRINE 10; 10 MG/ML; UG/ML
INJECTION, SOLUTION INFILTRATION; PERINEURAL
Status: DISCONTINUED
Start: 2020-01-17 | End: 2020-01-17 | Stop reason: HOSPADM

## 2020-01-17 RX ORDER — ACETAMINOPHEN 325 MG/1
650 TABLET ORAL EVERY 4 HOURS PRN
Status: DISCONTINUED | OUTPATIENT
Start: 2020-01-17 | End: 2020-02-26 | Stop reason: HOSPADM

## 2020-01-17 RX ORDER — ARIPIPRAZOLE 15 MG/1
15 TABLET ORAL DAILY
Status: DISCONTINUED | OUTPATIENT
Start: 2020-01-17 | End: 2020-01-22

## 2020-01-17 RX ORDER — ALUMINA, MAGNESIA, AND SIMETHICONE 2400; 2400; 240 MG/30ML; MG/30ML; MG/30ML
30 SUSPENSION ORAL EVERY 4 HOURS PRN
Status: DISCONTINUED | OUTPATIENT
Start: 2020-01-17 | End: 2020-02-26 | Stop reason: HOSPADM

## 2020-01-17 RX ADMIN — OLANZAPINE 10 MG: 10 INJECTION, POWDER, LYOPHILIZED, FOR SOLUTION INTRAMUSCULAR at 01:53

## 2020-01-17 RX ADMIN — ARIPIPRAZOLE 15 MG: 15 TABLET ORAL at 18:40

## 2020-01-17 RX ADMIN — ACETAMINOPHEN 650 MG: 325 TABLET, FILM COATED ORAL at 15:52

## 2020-01-17 ASSESSMENT — ENCOUNTER SYMPTOMS
SHORTNESS OF BREATH: 0
NECK STIFFNESS: 0
FEVER: 0
MYALGIAS: 0
BACK PAIN: 0
FACIAL SWELLING: 1
DIZZINESS: 0
TREMORS: 0
AGITATION: 1
RHINORRHEA: 0
ARTHRALGIAS: 0
SINUS PRESSURE: 0
SEIZURES: 0
SORE THROAT: 0
NAUSEA: 0
CHILLS: 0
DYSPHORIC MOOD: 1
NECK PAIN: 0
WOUND: 1
ABDOMINAL PAIN: 0
PALPITATIONS: 0
NERVOUS/ANXIOUS: 1
BRUISES/BLEEDS EASILY: 0
HALLUCINATIONS: 1
NUMBNESS: 0
LIGHT-HEADEDNESS: 0
HEADACHES: 1
WEAKNESS: 0
DIARRHEA: 0
EYE PAIN: 0
COUGH: 0
CHEST TIGHTNESS: 0
SINUS PAIN: 0

## 2020-01-17 ASSESSMENT — ACTIVITIES OF DAILY LIVING (ADL)
LAUNDRY: WITH SUPERVISION
SWALLOWING: 0-->SWALLOWS FOODS/LIQUIDS WITHOUT DIFFICULTY
TRANSFERRING: 0-->INDEPENDENT
RETIRED_EATING: 0-->INDEPENDENT
RETIRED_COMMUNICATION: 0-->UNDERSTANDS/COMMUNICATES WITHOUT DIFFICULTY
WHICH_OF_THE_ABOVE_FUNCTIONAL_RISKS_HAD_A_RECENT_ONSET_OR_CHANGE?: COGNITION
FALL_HISTORY_WITHIN_LAST_SIX_MONTHS: NO
COGNITION: 1 - ATTENTION OR MEMORY DEFICITS
AMBULATION: 0-->INDEPENDENT
ORAL_HYGIENE: INDEPENDENT
DRESS: 0-->INDEPENDENT
BATHING: 0-->INDEPENDENT
TOILETING: 0-->INDEPENDENT
DRESS: INDEPENDENT
HYGIENE/GROOMING: INDEPENDENT

## 2020-01-17 ASSESSMENT — MIFFLIN-ST. JEOR: SCORE: 1729.28

## 2020-01-17 NOTE — ED NOTES
"Limited exam performed, due to pt in restraints.  Pt has multiple contusions and abrasions to bilateral temporal areas and forehead.  Pt has notable swollen bumps on bilateral temporal  And forehead.  Pt c/o of pain to neck and head.  Pt also states he has lacerations to left upper thigh area, unable to visualize at this time due to pt in restraints.  Pt states \"I will lie to you to get this process over\"  \"I do not want to be here, You people cannot help me\"  Pt states he has been through this process before and nothing helps him.  Pt states \"I will get out of here and I will still be the same, your not going to help me\"  This writer informed pt he will have to cooperate with staff and not harm himself in order to be out of restraints.  Pt states he hears voices in his head that tell him his girlfriend is cheating on him and that he is not worthy.  Pt states he cuts and was hitting his head w/his fists, banging it on his desk and slamming it in a door, in order to stop the voices.  Pt states multiple times that his meds do not work and they never have.  "

## 2020-01-17 NOTE — ED NOTES
Within an hour after restraint an in person face to face assessment was completed at 0130, including an evaluation of the patient's immediate reaction to the intervention, behavioral assessment and review/assessment of history, drugs and medications, recent labs, etc., and behavioral condition.  The patient experienced: No adverse physical outcome from seclusion/restraint initiation.  The intervention of restraint or seclusion needs to continue.     Jim Lamb MD  01/17/20 020

## 2020-01-17 NOTE — ED NOTES
"Pt continually stating he \"doesn't give a fuck\" what we say when reminded of the conditions of him getting out of restraints. Frequent complaints about his head hurting and having trouble breathing, nurse notified of pt's concerns. Pt does not seem willing to cooperate at all, said he will lie to get out of the restraints so he can leave because he \"doesn't want our fucking help\" with his mental health. Writer reminded him of the conditions of getting out of restraints, pt still belligerent and uncooperative.   "

## 2020-01-17 NOTE — PROGRESS NOTES
LILLIANA Clinician Contact & Progress Note  For Individual Resiliency Training (IRT)  A Part of the [facility] First Episode of Psychosis Program    NAVIGATE Enrollee: Jerel Day (1995)     MRN: [id number]  Date:  1/16/20  Diagnosis: Schizoaffective disorder, bipolar type       Clinician: LILLIANA Individual Resiliency Trainer, EDELMIRA Morgan     1. Type of contact: (majority of time spent)  IRT Session    2. People present:   Writer  Client: Jerel Day    3. Total number of persons who participated in contact: 2, including writer     4. Length of Actual Contact: Start Time: 1pm; End Time: 2pm   Traveled?    No     5. Location of contact:  [facility], [place]    6. Did the client complete the home practice option(s) from the previous session: completed, just did not bring to session    7. Motivational Teaching Strategies:  Connect info and skills with personal goals  Promote hope and positive expectations  Explore pros and cons of change  Re-frame experiences in positive light    8. Educational Teaching Strategies:  Review of written material/education  Relate information to client's experience  Ask questions to check comprehension  Break down information into small chunks  Adopt client's language     9. CBT Teaching Strategies:  Reinforcement and shaping (positive feedback for steps towards goals and gains in knowledge & skills)  Relapse prevention planning (review of stressors and early warning signs)  Coping skills training (review current coping skills and increase currently used skills)  Behavioral tailoring (fit taking medication into client's daily routine)    10. IRT Module(s) Addressed:  Module 2 - Assessment/Initial Goal Setting   Module 3 - Education about Psychosis  Safety Planning    11. Techniques utilized:   [place] announced at beginning of session  Review of goal  Review of previous meeting  Present new material  Problem-solving practice  Help client choose a home practice  option  Summarize progress made in current session    12. Mental Status Exam:    Alertness: alert   Appearance: casually groomed  Behavior/Demeanor: cooperative, pleasant and guarded, with fair  eye contact   Speech: regular rate and rhythm  Language: intact and no obvious problem. Preferred language identified as English.  Psychomotor: normal or unremarkable  Mood: depressed  Affect: restricted and flat; was congruent to mood; was congruent to content  Thought Process/Associations: remarkable for  and difficult to follow; more difficult to follow than last visit  Thought Content:  Reports suicidal ideation without plan; without intent [details in Interim History], preoccupations and over-valued ideas;  Denies violent ideation  Perception:  Reports auditory hallucinations with commands [details in Interim History];  Denies visual hallucinations  Insight: limited  Judgment: limited  Cognition: does  appear grossly intact; formal cognitive testing was not done  Suicidal ideation: reports passive SI, denies intent,  and denies plan  Homicidal Ideation: denies HI    13. Assessment/Progress Note:     Writer met with Jerel on this date for IRT. Writer set agenda to check-in, discuss and assess symptoms, explore material in IRT modules, discuss ways in which Jerel can expand coping strategies and stress-management techniques for ongoing symptom management, and check-in regarding goals for ongoing recovery.     Jerel presented with flatter affect and his mood seemed more depressed compared to last visit; writer reflected this to Jerel and he replied he is frustrated that his brother was late in bringing him to this appointment. During the session he also shared he forgot to take his medications this morning; Jerel agreed to take them right when he gets home. Reminded Jerel at the end of the session as well.     With regards to symptoms, Jerel reports ongoing AH everyday. He reports they often are critiquing him, and speaking  "negatively about him, which is consistent with past reports. He also reports they tell him to do things including benign things like \"brush your teeth,\" but also tell him to hurt himself, which is consistent with past reports. Jerel reports he has punched himself in the face because of the voices since writer last saw Jerel. Jerel shared again that he does this when the voices \"get as bad as they get.\" Reflected on this together; reviewed and identified other coping strategies that Jerel can utilize if the voices are bad including playing guitar, making music, and trying to separate himself and gain some distance from what the voices are saying. Discussed the concept of cognitive defusion and encouraged Jerel to remember just because the voices are there, he does not need to do what the voices say. Jerel was open to discussing other strategies rather than punching himself when the voices are bad. During review and update of BEH Treatment Plan Jerel identified goal to stop punching himself and utilize other strategies. Jerel reports passive thoughts related to suicide but denied intent, plan or urges. Discussed safety plan together to include telling his parents or someone, utilizing crisis resources and/or getting to nearest hospital should imminent safety concerns arise, or should symptoms become unmanageable. Jerel was at first hesitant, but eventually was able to contract for safety. With regards to medication, Jerel reports he is still hoping to get off of his meds, but stated that he is taking them with the exception of forgetting this morning. Reminded Jerel to take them right when he returns home. Jerel shared positively about spending time with his brother and friends and also shared hope to get a place to live near [place].  Utilized the remainder of the time exploring voices. Jerel continues to believe they may be some other entity attempting to control him, but he is open when writer offers consideration that they may be " "a symptom. Considered which way of relating to his voices will be more beneficial in trying to achieve his goals of getting a job and making money. Wondered together ways he can relate to the voices so that they don't get in his way. Reviewed and updated his BEH Treatment Plan. See separate document for full details.     Overall, Jerel was engaged throughout the session, although presented with more disjointed thought process and flatter affect. Symptom assessment, safety assessment, discussion and identification of coping strategies, and exploration of material in IRT modules was all in support of Jerel's self-identified goal(s) of maybe get a job, make some friends, do more yoga, lose weight and decrease self-harm as identified in most recent BEH Treatment Plan today 1/16/20. Progress toward goal completion seems fair.    14. Plan/Referrals:     Next appt scheduled for next week.     Client and family aware they can reach out to writer directly and/or NAVIGATE team should concerns or needs arise prior to next scheduled appointment.     Billing for \"Interactive Complexity\"?    No      Tammi Connell ARIANNE    NAVIGATE Individual Resiliency Trainer  Attestation:    I did not see this patient directly. This patient is discussed with me in individual clinical social work supervision, and I agree with the plan as documented.     Ana Rose, LICSW, MSW, LICSW, January 22, 2020  "

## 2020-01-17 NOTE — PHARMACY-ADMISSION MEDICATION HISTORY
Admission medication history interview status for the 1/17/2020 admission is complete. See Epic admission navigator for allergy information, pharmacy, prior to admission medications and immunization status.     Medication history interview sources:  Fill history, Psychiatry visit AVS from 1/16/20, [place]     Changes made to PTA medication list (reason)  Added: none  Deleted: none  Changed: none    Additional medication history information (including reliability of information, actions taken by pharmacist):  -Patient unable to be interviewed as he was sleeping as nurses preferred he not be woken at this time. Unable to assess adherence or last medication doses.  -Verified medications via fill history. Aripiprazole and olanzapine last filled 1/6/20 and benztropine and gabapentin last filled 12/4/19.  -Per [place] :  Gabapentin 300 mg filled 12/4/19 for #60 (30 DS)      Prior to Admission medications    Medication Sig Last Dose Taking? Auth Provider   ARIPiprazole (ABILIFY) 15 MG tablet Take 1 tablet (15 mg) by mouth daily Unknown at Unknown time  Vince Zheng MD   benztropine (COGENTIN) 0.5 MG tablet Take 1 tablet (0.5 mg) by mouth 2 times daily as needed for agitation Unknown at Unknown time  Vince Zheng MD   gabapentin (NEURONTIN) 300 MG capsule Take 1 capsule (300 mg) by mouth 2 times daily as needed (nerve pain or anxiety) Unknown at Unknown time  Vince Zheng MD   OLANZapine (ZYPREXA) 15 MG tablet Take 1 tablet (15 mg) by mouth At Bedtime Unknown at Unknown time  Vince Zheng MD         Medication history completed by: Lauren Burton, Pharm.D.

## 2020-01-17 NOTE — ED NOTES
"Writer took patient back to room 5 for a chief complaint of head and knee laceration. When writer asked client what happened to his knee and head, client stated \"I cut my knee with a knife.\" Writer asked client if he was feeling suicidal and client stated \"Well yea.\" Writer told client that he will need to go back to the mental health area of the ED and that his knee and head injury will also be taken care of there. Client became agitated and said \"I don't want your help with my mental health. You gu and [facility] don't do anything for me. All I want is some stitches or an MRI.\" Writer told client that since these injuries are from self inflicting behaviors that he needs to go to our mental health area of the ED to get correct care. Client then proceeded to walk out towards the front door. Writer told client \"let's go talk in the back. I want to help you. Please let's go talk about what happened.\" Client then began to punch his head with his hand, writer grabbed client's arms to stop. Client started banging his head against the wall and psych tech helped take client down and code was called. Client fought against staff to keep arms down. Staff put pillow under client's head to protect his head. Client brought to room 14 and placed in restraints. 10mg zyprexa IM given to client. Client now resting in room on one to one.   "

## 2020-01-17 NOTE — ED PROVIDER NOTES
"  History     Chief Complaint   Patient presents with     Suicidal     Pt states SI \"yes, sort of\"     Self Injury     Pt self inflicting cuts tonight.   Pt states he has 2 cuts on his leg and has repeatedly hit his head \"About 30 times\" w/his fist      HPI  Jerel Day is a 24 year old male with schizoaffective disorder who presents with agitation, auditory hallucinations, suicidal ideation, and self injuring by cutting himself with a pocketknife and hitting his head and face against a door. He is agitated on initial presentation and very difficult to evaluate. He attempted to elope and required restraints and sedation. With observation and time he was able to provide some history although he is guarded. Declines to answer questions about the voices or his suicidal thoughts. He has lacerations on his thighs that are self inflicted. Also complains of headache and facial pain after hitting his head and face multiple times against a door. No loss of consciousness. No neck pain. No neurovascular symptoms. No foreign body sensation. States last tetanus immunization 2 years ago.    I have reviewed the Medications, Allergies, Past Medical and Surgical History, and Social History in the [employer] system.  Past Medical History:   Diagnosis Date     Anxiety      Arthritis      Depressive disorder      Gastroesophageal reflux disease      Head injury      Problem, psychiatric        Review of Systems   Constitutional: Negative for chills and fever.   HENT: Positive for facial swelling. Negative for ear discharge, ear pain, rhinorrhea, sinus pressure, sinus pain and sore throat.    Eyes: Negative for pain and visual disturbance.   Respiratory: Negative for cough, chest tightness and shortness of breath.    Cardiovascular: Negative for chest pain, palpitations and leg swelling.   Gastrointestinal: Negative for abdominal pain, diarrhea and nausea.   Musculoskeletal: Negative for arthralgias, back pain, gait problem, myalgias, neck " pain and neck stiffness.   Skin: Positive for wound. Negative for rash.   Allergic/Immunologic: Negative for immunocompromised state.   Neurological: Positive for headaches. Negative for dizziness, tremors, seizures, syncope, weakness, light-headedness and numbness.   Hematological: Does not bruise/bleed easily.   Psychiatric/Behavioral: Positive for agitation, dysphoric mood, hallucinations, self-injury and suicidal ideas. The patient is nervous/anxious.        Physical Exam   BP: 130/70  Resp: 18  SpO2: 96 %      Physical Exam  Vitals signs and nursing note reviewed.   Constitutional:       General: He is in acute distress.      Appearance: He is not ill-appearing or diaphoretic.   HENT:      Head: Normocephalic. Contusion present. No raccoon eyes or Guthrie's sign.      Jaw: Tenderness present. No trismus or malocclusion.      Comments: Contusions, bitemporal and scalp posterior.     Right Ear: Tympanic membrane, ear canal and external ear normal. No drainage. No hemotympanum.      Left Ear: Tympanic membrane, ear canal and external ear normal. No drainage. No hemotympanum.      Nose: Nose normal.      Mouth/Throat:      Mouth: Mucous membranes are moist. No oral lesions.      Pharynx: Oropharynx is clear.   Eyes:      General: No scleral icterus.     Pupils: Pupils are equal, round, and reactive to light.   Neck:      Musculoskeletal: Normal range of motion and neck supple. No muscular tenderness.   Cardiovascular:      Rate and Rhythm: Normal rate and regular rhythm.      Pulses: Normal pulses.      Heart sounds: Normal heart sounds.   Pulmonary:      Effort: Pulmonary effort is normal. No respiratory distress.      Breath sounds: Normal breath sounds.   Abdominal:      General: Bowel sounds are normal.      Palpations: Abdomen is soft.      Tenderness: There is no abdominal tenderness.   Musculoskeletal: Normal range of motion.         General: No swelling or tenderness.      Cervical back: Normal.       Thoracic back: Normal.   Skin:     General: Skin is warm.      Findings: No rash.      Comments: Superficial laceration right anterior thigh.  3.5 cm laceration on left anterior thigh-see below.   Neurological:      General: No focal deficit present.      Mental Status: He is alert and oriented to person, place, and time.      Cranial Nerves: Cranial nerves are intact.      Sensory: Sensation is intact.      Motor: Motor function is intact.      Coordination: Coordination is intact.      Deep Tendon Reflexes: Reflexes are normal and symmetric. Babinski sign absent on the right side.   Psychiatric:         Attention and Perception: Attention normal. He perceives auditory hallucinations. He does not perceive visual hallucinations.         Mood and Affect: Affect is labile.         Speech: Speech is tangential.         Behavior: Behavior is agitated.         Thought Content: Thought content is paranoid. Thought content includes suicidal ideation. Thought content does not include homicidal ideation.         Judgment: Judgment is inappropriate.         ED Course        Procedures             Critical Care time:  none   [facility] Procedure Note        Laceration Repair:    Performed by: Jim Grubbs MD  Authorized by: Jim Grubbs MD  Consent given by: Patient who states understanding of the procedure being performed after discussing the risks, benefits and alternatives.    Preparation: Patient was prepped and draped in usual sterile fashion.  Irrigation solution: saline    Body area:left thigh  Laceration length: 3.5cm  Contamination: The wound is not contaminated.  Foreign bodies:none  Tendon involvement: none  Anesthesia: Local  Local anesthetic: Lidocaine     1%, with epinephrine  Anesthetic total: 5ml    Debridement: none  Skin closure: Closed with 6 x 5.0 Ethilon  Technique: interrupted  Approximation: close  Approximation difficulty: simple    Patient tolerance: Patient tolerated the procedure well  with no immediate complications.      CT Facial Bones without Contrast   Final Result   IMPRESSION:   HEAD CT:   1.  No acute intracranial process.      FACIAL BONE CT:   1.  No facial bone or mandibular fracture.   2.  Soft tissue hematoma overlying the left zygomatic arch.         Head CT w/o contrast   Final Result   IMPRESSION:   HEAD CT:   1.  No acute intracranial process.      FACIAL BONE CT:   1.  No facial bone or mandibular fracture.   2.  Soft tissue hematoma overlying the left zygomatic arch.                  Labs Ordered and Resulted from Time of ED Arrival Up to the Time of Departure from the ED - No data to display         Assessments & Plan (with Medical Decision Making)   Emergency admission to psychiatry with acute psychotic decompensation and self injuring with suicidal ideation and auditory hallucinations. Initial aggressive behavior required sedation and restraints. No adverse effect of restraints or sedation noted. Contusions on scalp and face from self injury as well. See CT scan reports above.   I have reviewed the nursing notes.    I have reviewed the findings, diagnosis, plan and need for follow up with the patient.    New Prescriptions    No medications on file       Final diagnoses:   Schizoaffective disorder, unspecified type (H)   Acute psychosis (H)   Laceration of left lower leg, initial encounter   Self-inflicted injury       1/17/2020   [facility], [place], EMERGENCY DEPARTMENT     Jim Lamb MD  01/17/20 0614

## 2020-01-17 NOTE — PROGRESS NOTES
Patient reports 'Not really' when asked if he currently feels like hurting self.   Pt has sutured wound to leg which is dry and intact.   Pt. Given Tylenol for HA (~8).  Pt cooperative with search retired to bed shortly after.

## 2020-01-17 NOTE — TELEPHONE ENCOUNTER
S: Received call and clinical from DANA Ortiz  on pt currently at [facility] ED and is being recommended for IP MH placement    B: BIB his parents per his request to have his leg looked at since he had engaged in cutting. Once he arrived to ED and was informed he would be directed to the [facility] he demanded to leave. This resulted in a Code 21, S/R, and emergency medications. Pt currently remains in restraints.  Pt reports his mind feels like its stumbling; CAH to hurt self at times and then they take accountability for it; banging head at home; superficially cutting. Parents report 50% compliance with medications and 100% compliance with OP Appts. Pt reports using THC approximately 2 weeks ago and denies all other substance abuse.   Dx Hx of Schizoaffective disorder. MH Hx: previous IP admissions; hx of SIB via cutting & head banging; Attends the [facility] through the [employer].  Currently pt is under a stay of commitment that expires in February 2020. Utox ordered. Full MH and Medical evaluations are yet to be completed by MD since pt was placed in restraints prior to transferring to [facility]. Pt will still need to have his cuts looked at as well as MD to assess MH status & confirm medical clearance.     A: Pt will be placed on a 72 Hour Hold **Intake Needs Copy**  -called ED @ 0705 and requested copy be faxed to intake     R: ED will call intake once pt is out of restraints; had both MH and Medical evaluations completed by MD; confirmed to be medically cleared for IP MH admission review       0656: Received confirmation that pt is medically cleared    Pt placed on wait list until appropriate bed is available

## 2020-01-17 NOTE — PROGRESS NOTES
"Admission Note:    TRISTA Beltrán is a 24 yrs old  male, admitted to [facility] from [facility] on 72 hh. Reason for admission: head banging, cut his leg today, hallucinations/commanding voices. Pt is under commitment currently.     B. As per ED provider's note: Jerel Day is a 24 year old male with schizoaffective disorder who presents with agitation, auditory hallucinations, suicidal ideation, and self injuring by cutting himself with a pocketknife and hitting his head and face against a door. He is agitated on initial presentation and very difficult to evaluate. He attempted to elope and required restraints and sedation. With observation and time he was able to provide some history although he is guarded. Declines to answer questions about the voices or his suicidal thoughts. He has lacerations on his thighs that are self inflicted. Also complains of headache and facial pain after hitting his head and face multiple times against a door. No loss of consciousness. No neck pain. No neurovascular symptoms. No foreign body sensation. States last tetanus immunization 2 years ago.    A. Patient reported that he was wishing to be dead when he was hitting his head and face yesterday and today, but not now. He denied having suicidal ideations or urges to hurt/hit self now. Patient rated depression at 4/10; admitted he has been hearing voices, more yesterday, and less today; admitted he sees stars at times since he hit his head and face; denied anxiety anxiety or HI.   Patient admitted that he cut his left and right leg yesterday with a small pocket knife. He said he wiped the blood, hit his face and called his parents to bring him to the hospital. He admitted that he has been hitting his face and head on walls. He stated: I felt like I've been controlled by someone else\", \"Like someone else was calling the shots\". \"My brain did not feel like it's working properly\", \"I was hearing voices from my past\".   He said he has been taking " "medications as prescribed, however, he was unable to recall when he took Abilify and Zyprexa for the last time.     Patient reported that he has been seeing stars after he hit his head and face. He said he feels dizzy walking and having neck pain. Said: \"I feel my head is swollen\". Patient has periorbital blue/purple discoloration (consistent with hematoma).     Open areas: Patient has 5-6 cm cut on the left leg above the knee with 6 stitches on. He has a 2-3 cm cut on the right leg above the knee. Said it hurts when he touches the wounds. No redness or other signs of infections noted at this time.     Patient's thoughts were illogical at times, with poor insights about his mental illness. He said he does not need to take so mach medications, that he needs the doctor to be close to what he thinks. Said he does not think hospitalizations will help, that he needs to be home and benefit from the [facility] facility progress. Patient admitted that he has been loosing focus lately. He said he has been feeling trapped, like his mind in in a box, that he has been loosing his creativity due to medications. He said he does not believe that medications will help. 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 He said he need structure in his life, but when asked what structure means to him, he was unable to answer.   Patient reported that his mood has been up and down: that he was happy to hang out with his brother, but very unhappy to be alone at home.   He reported \"good\" memory, but was unable to recall when did he take his medications for the last time.   Patient was unable to state his goal for this hospitalization. He thinks he does not need to be here.     Patient appeared disheveled and unkept. He said he was thinking of going back to college this semester, said he is 3/4 from getting his " psychology degree at [employer]. He smokes 3-4 cigarettes per day. He admitted he has been smoking marijuana a week ago.     R. Patient is placed on SI and SIB precautions. He is monitored with SIO currently.   Patient was unable to recall if he had a flu vaccine this season, but said he is OK with taking one if needed.   Patient is requesting to have Vitamin D restarted.

## 2020-01-17 NOTE — PROGRESS NOTES
NAVIGATE Outreach  A Part of the [facility] First Episode of Psychosis Program     Patient Name: Jerel Day  /Age:  1995 (24 year old)    Contact: Writer called Jerel's CM-Nola back. Notified her that it appears Jerel has been hospitalized; also shared that his outpatient provider has changed from Dr. Zheng to Dr. Yusuf.     Plan: Invited Nola to call jenniferr back for care coordination purposes if she deems necessary.    Tammi Connell, EDELMIRA  NAVIGATE Individual Resiliency [place] [facility]

## 2020-01-17 NOTE — ED NOTES
"ED to Behavioral Floor Handoff    SITUATION  Jerel Day is a 24 year old male who speaks English and lives in a home with family members The patient arrived in the ED by private car from home with a complaint of Suicidal (Pt states SI \"yes, sort of\") and Self Injury (Pt self inflicting cuts tonight.   Pt states he has 2 cuts on his leg and has repeatedly hit his head \"About 30 times\" w/his fist )  .The patient's current symptoms started/worsened 1 month(s) ago and during this time the symptoms have increased.   In the ED, pt was diagnosed with   Final diagnoses:   Schizoaffective disorder, unspecified type (H)   Acute psychosis (H)   Laceration of left lower leg, initial encounter   Self-inflicted injury        Initial vitals were: BP: 130/70  Resp: 18  SpO2: 96 %   --------  Is the patient diabetic? No   If yes, last blood glucose? --     If yes, was this treated in the ED? --  --------  Is the patient inebriated (ETOH) No or Impaired on other substances? No  MSSA done? N/A  Last MSSA score: --    Were withdrawal symptoms treated? N/A  Does the patient have a seizure history? No. If yes, date of most recent seizure--  --------  Is the patient patient experiencing suicidal ideation? reports occasional suicidal thoughts representing feeling that life is not worth feeling    Homicidal ideation? denies current or recent homicidal ideation or behaviors.    Self-injurious behavior/urges? reports current or recent self injurious behavior or ideation including banging head w/fists and on objects, slamming head in door, cutting.  ------  Was pt aggressive in the ED Yes  Was a code called Yes  Is the pt now cooperative? Yes  -------  Meds given in ED:   Medications   lidocaine 1% with EPINEPHrine 1:100,000 injection 1 mL (has no administration in time range)   lidocaine 1% with EPINEPHrine 1:100,000 1 %-1:[id number] injection (has no administration in time range)   OLANZapine (zyPREXA) injection 10 mg (10 mg Intramuscular " Given 1/17/20 0153)      Family present during ED course? Yes  Family currently present? No    BACKGROUND  Does the patient have a cognitive impairment or developmental disability? No  Allergies:   Allergies   Allergen Reactions     Penicillins      Rash, Generalized   .   Social demographics are   Social History     Socioeconomic History     Marital status: Single     Spouse name: Not on file     Number of children: Not on file     Years of education: Not on file     Highest education level: Not on file   Occupational History     Not on file   Social Needs     Financial resource strain: Not on file     Food insecurity:     Worry: Not on file     Inability: Not on file     Transportation needs:     Medical: Not on file     Non-medical: Not on file   Tobacco Use     Smoking status: Current Every Day Smoker     Packs/day: 0.25     Years: 1.00     Pack years: 0.25     Types: Cigarettes     Smokeless tobacco: Never Used   Substance and Sexual Activity     Alcohol use: Yes     Comment: socially     Drug use: Yes     Types: Marijuana     Comment: last used yesterday     Sexual activity: Not on file   Lifestyle     Physical activity:     Days per week: Not on file     Minutes per session: Not on file     Stress: Not on file   Relationships     Social connections:     Talks on phone: Not on file     Gets together: Not on file     Attends Restorationist service: Not on file     Active member of club or organization: Not on file     Attends meetings of clubs or organizations: Not on file     Relationship status: Not on file     Intimate partner violence:     Fear of current or ex partner: Not on file     Emotionally abused: Not on file     Physically abused: Not on file     Forced sexual activity: Not on file   Other Topics Concern     Parent/sibling w/ CABG, MI or angioplasty before 65F 55M? Not Asked   Social History Narrative     Not on file        ASSESSMENT  Labs results Labs Ordered and Resulted from Time of ED Arrival Up to  the Time of Departure from the ED - No data to display   Imaging Studies: No results found for this or any previous visit (from the past 24 hour(s)).   Most recent vital signs /70   Resp 18   SpO2 96%    Abnormal labs/tests/findings requiring intervention:---   Pain control: fair  Nausea control: pt had none    RECOMMENDATION  Are any infection precautions needed (MRSA, VRE, etc.)? No If yes, what infection? --  ---  Does the patient have mobility issues? independently. If yes, what device does the pt use? ---  ---  Is patient on 72 hour hold or commitment? Yes If on 72 hour hold, have hold and rights been given to patient? Yes  Are admitting orders written if after 10 p.m. ?N/A  Tasks needing to be completed:---     Demetra Pennington RN   [phone] [facility]
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
## 2020-01-17 NOTE — PROGRESS NOTES
NAVIGATE Outreach  A Part of the [facility] First Episode of Psychosis Program     Patient Name: Jerel Day  /Age:  1995 (24 year old)    Contact: Per chart review writer noted Jerel has been admitted to [facility]. Writer called and was transferred to [facility]; writer LVM introducing self and role within Jerel's care team, shared he is on commitment that will be up soon (20), however his -Nola LVM for writer yesterday that she was planning to pursue an extension, and requested return call next week for care coordination purposes.     Plan: Will await return call. Writer remains available for support and care coordination purposes.    Tammi Connell, EDELMIRA LAFLEURATE Individual Resiliency [place] [facility]ician

## 2020-01-17 NOTE — ED NOTES
"Writer was walking towards entrance of ED for rotation in pt video monitoring when pt and RN were talking at the entrance.  Pt seemed upset that he was getting transferred to the mental health pod to be seen.  Pt was very agitated so writer stood by.  RN convinced pt to walk with her back towards the mental health pod when pt started punching himself in the head.  Pt then banged his head on consult room door.  Writer rushed to pt and went hands on with RN.  Security and behavioral emergency team staff held pt down.  Pt fought against staff and attempted to head bang on floor. Pt was screaming profanities at staff such as \"fuck you bitch.\" Security staff controlled pt's head until psych staff could take over.  Pt placed on backboard, put in 5pt restraints, and [place] searched.    "

## 2020-01-17 NOTE — ED NOTES
"Pt yelling \"help\" and crying. Complained of restraints being too tight. RN checked restraints and determined they were not too tight. Pt is no longer crying. Currently resting on cart.  "

## 2020-01-17 NOTE — PROGRESS NOTES
NAVIGATE Outreach  A Part of the [facility] First Episode of Psychosis Program     Patient Name: Jerel Day  /Age:  1995 (24 year old)    Contact: Writer received VM from Jerel's MONIQUE Vaca who reports she is planning to pursue an extension for Jerel's commitment and will be submitting the report within the next couple of days. Shared she has been trying to get in touch with Dr. Zheng regarding potential documentation; requested return call.     EDELMIRA Morgan  NAVIGATE Individual Resiliency [place] [facility]ician

## 2020-01-18 LAB
ALBUMIN SERPL-MCNC: 3.8 G/DL (ref 3.4–5)
ALP SERPL-CCNC: 66 U/L (ref 40–150)
ALT SERPL W P-5'-P-CCNC: 32 U/L (ref 0–70)
ANION GAP SERPL CALCULATED.3IONS-SCNC: 6 MMOL/L (ref 3–14)
AST SERPL W P-5'-P-CCNC: 19 U/L (ref 0–45)
BASOPHILS # BLD AUTO: 0 10E9/L (ref 0–0.2)
BASOPHILS NFR BLD AUTO: 0.4 %
BILIRUB SERPL-MCNC: 1.1 MG/DL (ref 0.2–1.3)
BUN SERPL-MCNC: 13 MG/DL (ref 7–30)
CALCIUM SERPL-MCNC: 8.3 MG/DL (ref 8.5–10.1)
CHLORIDE SERPL-SCNC: 107 MMOL/L (ref 94–109)
CHOLEST SERPL-MCNC: 172 MG/DL
CO2 SERPL-SCNC: 26 MMOL/L (ref 20–32)
CREAT SERPL-MCNC: 0.72 MG/DL (ref 0.66–1.25)
DIFFERENTIAL METHOD BLD: NORMAL
EOSINOPHIL # BLD AUTO: 0.5 10E9/L (ref 0–0.7)
EOSINOPHIL NFR BLD AUTO: 6.5 %
ERYTHROCYTE [DISTWIDTH] IN BLOOD BY AUTOMATED COUNT: 12.3 % (ref 10–15)
GFR SERPL CREATININE-BSD FRML MDRD: >90 ML/MIN/{1.73_M2}
GLUCOSE SERPL-MCNC: 90 MG/DL (ref 70–99)
HCT VFR BLD AUTO: 46.3 % (ref 40–53)
HDLC SERPL-MCNC: 48 MG/DL
HGB BLD-MCNC: 15.4 G/DL (ref 13.3–17.7)
IMM GRANULOCYTES # BLD: 0.1 10E9/L (ref 0–0.4)
IMM GRANULOCYTES NFR BLD: 0.6 %
LDLC SERPL CALC-MCNC: 97 MG/DL
LYMPHOCYTES # BLD AUTO: 2.6 10E9/L (ref 0.8–5.3)
LYMPHOCYTES NFR BLD AUTO: 32.1 %
MCH RBC QN AUTO: 30.9 PG (ref 26.5–33)
MCHC RBC AUTO-ENTMCNC: 33.3 G/DL (ref 31.5–36.5)
MCV RBC AUTO: 93 FL (ref 78–100)
MONOCYTES # BLD AUTO: 0.8 10E9/L (ref 0–1.3)
MONOCYTES NFR BLD AUTO: 9.9 %
NEUTROPHILS # BLD AUTO: 4 10E9/L (ref 1.6–8.3)
NEUTROPHILS NFR BLD AUTO: 50.5 %
NONHDLC SERPL-MCNC: 124 MG/DL
NRBC # BLD AUTO: 0 10*3/UL
NRBC BLD AUTO-RTO: 0 /100
PHOSPHATE SERPL-MCNC: 3 MG/DL (ref 2.5–4.5)
PLATELET # BLD AUTO: 225 10E9/L (ref 150–450)
POTASSIUM SERPL-SCNC: 3.9 MMOL/L (ref 3.4–5.3)
PROT SERPL-MCNC: 7.2 G/DL (ref 6.8–8.8)
RBC # BLD AUTO: 4.98 10E12/L (ref 4.4–5.9)
SODIUM SERPL-SCNC: 139 MMOL/L (ref 133–144)
TRIGL SERPL-MCNC: 137 MG/DL
TSH SERPL DL<=0.005 MIU/L-ACNC: 0.46 MU/L (ref 0.4–4)
WBC # BLD AUTO: 8 10E9/L (ref 4–11)

## 2020-01-18 PROCEDURE — 36415 COLL VENOUS BLD VENIPUNCTURE: CPT | Performed by: PSYCHIATRY & NEUROLOGY

## 2020-01-18 PROCEDURE — 84100 ASSAY OF PHOSPHORUS: CPT | Performed by: PSYCHIATRY & NEUROLOGY

## 2020-01-18 PROCEDURE — 80061 LIPID PANEL: CPT | Performed by: PSYCHIATRY & NEUROLOGY

## 2020-01-18 PROCEDURE — 12400001 ZZH R&B MH UMMC

## 2020-01-18 PROCEDURE — 80053 COMPREHEN METABOLIC PANEL: CPT | Performed by: PSYCHIATRY & NEUROLOGY

## 2020-01-18 PROCEDURE — 84443 ASSAY THYROID STIM HORMONE: CPT | Performed by: PSYCHIATRY & NEUROLOGY

## 2020-01-18 PROCEDURE — 25000132 ZZH RX MED GY IP 250 OP 250 PS 637: Performed by: STUDENT IN AN ORGANIZED HEALTH CARE EDUCATION/TRAINING PROGRAM

## 2020-01-18 PROCEDURE — 85025 COMPLETE CBC W/AUTO DIFF WBC: CPT | Performed by: PSYCHIATRY & NEUROLOGY

## 2020-01-18 RX ADMIN — ARIPIPRAZOLE 15 MG: 15 TABLET ORAL at 08:18

## 2020-01-18 RX ADMIN — OLANZAPINE 15 MG: 15 TABLET, FILM COATED ORAL at 19:58

## 2020-01-18 RX ADMIN — MELATONIN 25 MCG: at 08:18

## 2020-01-18 ASSESSMENT — ACTIVITIES OF DAILY LIVING (ADL)
ORAL_HYGIENE: INDEPENDENT
ORAL_HYGIENE: INDEPENDENT
HYGIENE/GROOMING: INDEPENDENT
DRESS: INDEPENDENT
LAUNDRY: WITH SUPERVISION
LAUNDRY: UNABLE TO COMPLETE
DRESS: SCRUBS (BEHAVIORAL HEALTH);INDEPENDENT
HYGIENE/GROOMING: INDEPENDENT;SHOWER

## 2020-01-18 NOTE — PROGRESS NOTES
"CLINICAL NUTRITION SERVICES - ASSESSMENT NOTE     Nutrition Prescription    RECOMMENDATIONS FOR MDs/PROVIDERS TO ORDER:  None today     Malnutrition Status:    Patient does not meet two of the established criteria necessary for diagnosing malnutrition    Recommendations already ordered by Registered Dietitian (RD):  Lactaid milk with lunch  AM snack: berry yogurt and pears    Future/Additional Recommendations:  Adjust snacks pending pt preference     REASON FOR ASSESSMENT  Jerel Day is a/an 24 year old male assessed by the dietitian for Admission Nutrition Risk Screen for reduced oral intake over the last month    NUTRITION HISTORY  Pt reports eating minimal amounts at home d/t lack of access to healthy foods, although throughout conversation appears he was eating some meals daily. Follows a regular diet but only eats meats free of nitrates and tries to eat organic foods which makes the availability more challenging as he is on a budget.     CURRENT NUTRITION ORDERS  Diet: Vegetarian   Intake/Tolerance: pt reports eating eggs and cheerios this morning at breakfast. Reports he currently has a good appetite.     LABS  Labs reviewed    MEDICATIONS  Medications reviewed    ANTHROPOMETRICS  Height: 172.7 cm (5' 8\")  Most Recent Weight: 76 kg (167 lb 8 oz)    IBW: 70 kg  BMI: Overweight BMI 25-29.9  Weight History: wt appears stable over the last 3 months  Wt Readings from Last 5 Encounters:   01/17/20 76 kg (167 lb 8 oz)   01/08/20 79.7 kg (175 lb 9.6 oz)   12/04/19 78 kg (172 lb)   11/06/19 75.8 kg (167 lb)   10/02/19 76.3 kg (168 lb 3.2 oz)     Dosing Weight: 76 kg - admit wt    ASSESSED NUTRITION NEEDS  Estimated Energy Needs: 8594-5668 kcals/day (25 - 30 kcals/kg)  Justification: Maintenance  Estimated Protein Needs: 61-76 grams protein/day (0.8 - 1 grams of pro/kg)  Justification: Maintenance  Estimated Fluid Needs: 1 mL/kcal  Justification: Per provider pending fluid status    PHYSICAL FINDINGS  See " malnutrition section below.    MALNUTRITION  % Intake: Decreased intake does not meet criteria  % Weight Loss: None noted  Subcutaneous Fat Loss: None observed  Muscle Loss: None observed  Fluid Accumulation/Edema: None noted  Malnutrition Diagnosis: Patient does not meet two of the established criteria necessary for diagnosing malnutrition    NUTRITION DIAGNOSIS  Predicted inadequate protein-energy intake related to variable appetite as evidenced by pt reliant on PO intakes to meet 100% of nutritional needs with potential for variation       INTERVENTIONS  Implementation  Discussed nutrition history and PO since admission. Discussed menu ordering and snacks available on the unit. Discussed general healthy eating and incorporating more fruits and vegetables into his diet as well as vegetarian sources of protein. Discussed shopping at discount grocery stores such as [employer] or [employer]. Encouraged adequate PO of food and fluids.    Goals  Patient to consume % of nutritionally adequate meal trays TID, or the equivalent with supplements/snacks.     Monitoring/Evaluation  Progress toward goals will be monitored and evaluated per protocol.

## 2020-01-18 NOTE — PLAN OF CARE
"  Problem: Sensory Perception Impairment (Psychotic Signs/Symptoms)  Goal: Decreased Frequency and Intensity of Sensory Symptoms (Psychotic Signs/Symptoms)  Outcome: No Change     Problem: Suicide Risk  Goal: Absence of Self-Harm  Outcome: Improving     Problem: Suicidal Behavior  Goal: Suicidal Behavior is Absent or Managed  Outcome: Improving    Patient reported that he was wishing to be dead when he was hitting his head and face yesterday and today, but not now. He denied having suicidal ideations or urges to hurt/hit self now. Patient rated depression at 4/10; admitted he has been hearing voices, more yesterday, and less today; admitted he sees stars at times since he hit his head and face; denied anxiety anxiety or HI.   Patient admitted that he cut his left and right leg yesterday with a small pocket knife. He said he wiped the blood, hit his face and called his parents to bring him to the hospital. He admitted that he has been hitting his face and head on walls. He stated: I felt like I've been controlled by someone else\", \"Like someone else was calling the shots\". \"My brain did not feel like it's working properly\", \"I was hearing voices from my past\".   He said he has been taking medications as prescribed, however, he was unable to recall when he took Abilify and Zyprexa for the last time.     Patient reported that he has been seeing stars after he hit his head and face. He said he feels dizzy walking and having neck pain. Said: \"I feel my head is swollen\". Patient has periorbital blue/purple discoloration (consistent with hematoma).     Open areas: Patient has 5-6 cm cut on the left leg above the knee with 6 stitches on. He has a 2-3 cm cut on the right leg above the knee. Said it hurts when he touches the wounds. No redness or other signs of infections noted at this time.     Patient's thoughts were illogical at times, with poor insights about his mental illness. He said he does not need to take so mach " "medications, that he needs the doctor to be close to what he thinks. Said he does not think hospitalizations will help, that he needs to be home and benefit from the [facility] facility progress. Patient admitted that he has been loosing focus lately. He said he has been feeling trapped, like his mind in in a box, that he has been loosing his creativity due to medications. He said he does not believe that medications will help. 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 He said he need structure in his life, but when asked what structure means to him, he was unable to answer.   Patient reported that his mood has been up and down: that he was happy to hang out with his brother, but very unhappy to be alone at home.   He reported \"good\" memory, but was unable to recall when did he take his medications for the last time.   Patient was unable to state his goal for this hospitalization. He thinks he does not need to be here.      "

## 2020-01-18 NOTE — PROGRESS NOTES
"Pt presented with calm affect, coherent thought process, and appropriate behavior. He reported feeling \"A lot better\" during community meeting and set goal to play guitar, which he accomplished. Pt discussed psychotic features and depressive symptoms at length with writer, describing recent self-harm to face as \"A reset, I had a moment where I had a heart above my head in a manic place and then suddenly it dropped and I punched myself to refocus.\" Pt was preoccupied with subject matter concerning meditation and \"spiritual states\" often redirecting conversation topic to these experiences. Pt also expressed negative self-view concerning his physical appearance and social skills. Pt stated \"I can't really relate to other people anymore.\" He denied current urges to self harm or presence of SI. Pt spent much of the shift engaging pleasantly with peers. He was receptive to meet with dietician about digestive difficulties.          01/18/20 1400   Behavioral Health   Hallucinations visual;auditory   Thinking poor concentration;distractable   Orientation person: oriented;place: oriented   Memory baseline memory   Insight poor   Judgement impaired   Eye Contact at examiner   Affect full range affect   Mood mood is calm   Physical Appearance/Attire disheveled;appears stated age   Hygiene neglected grooming - unclean body, hair, teeth   1. Wish to be Dead (Recent) No   Wish to be Dead Description (Recent) Pt currently denies   2. Non-Specific Active Suicidal Thoughts (Recent) No   Non-Specific Active Suicidal Thought Description (Recent)  Pt currently denies   Psycho Education   Type of Intervention 1:1 intervention   Response participates, initiates socially appropriate   Hours 0.5   Treatment Detail psychoeducation   Activities of Daily Living   Hygiene/Grooming independent   Oral Hygiene independent   Dress independent   Laundry with supervision   Room Organization independent     "

## 2020-01-18 NOTE — H&P
"History and Physical    Jerel Day MRN# [id number]   Age: 24 year old YOB: 1995     Date of Admission:  1/17/2020        Primary Outpatient Psychiatrist: Dr. Vince Zheng  Therapist: Reports having therapist, name unknown.  Family Members: Parents, León and Mesha    Chief Complaint:   \"Hearing bad thoughts\"    History of Present Illness:   History obtained from patient interview and chart review.  Patient interviewed on 1/17/2020 at approximately 1600.    Jerel Day is a 24-year-old male with schizoaffective disorder, bipolar type and cannabis dependence who presented on 01/17/2020 for medical treatment of leg and head injuries and was subsequently assessed for psychosis and suicidal ideation.     He was medically cleared for admission to inpatient psychiatric unit.    Per patient report: Jerel states that for the most part he is feeling good and that he was \"just hearing bad thoughts and wanted to take it out on something\". He states that his IRTS ended before the holidays and he has been home with his parents. The past two weeks have been bad for him. His mood has been low and he has been taking a lot of naps and feeling guilty about his relationships (missing friends). He denies changes in appetite, anhedonia, change in energy, psychomotor changes, and changes in concentration. He has been having a lot of suicide thoughts, with the last being three days ago. He states that he has discussed these with his outpatient therapist that he sees every 1-2 weeks. He does not currently have thoughts of wanting to hurt himself. He does have auditory hallucinations of voices telling him \"you lost\" and states that they currently are not too bad. He also reports visual hallucinations of \"splotches of stuff\" He does not have any paranoia. He reports medication adherence, stating that he has missed \"maybe 1-2 doses in the past couple of days\". He states that he felt like he \"was being controlled by Satan\" " "when he was punching himself.    When asked about medical problems, he states that he has neck and back pain related to a neck injury he sustained. He reports that he \"felt energy leak out\" when he hurt his neck and that the LSD he was using at the time was supporting his posterior. He was doing yoga poses when his roommate stepped over him and he suddenly developed a \"white hot\" pain on the lateral side of his neck.    Jerel states that he did not want to be admitted and that it is important to him that the care team discuss plans for discharge.     Per ED note by Dr. Lamb: Jerel Day is a 24 year old male with schizoaffective disorder who presents with agitation, auditory hallucinations, suicidal ideation, and self injuring by cutting himself with a pocketknife and hitting his head and face against a door. He is agitated on initial presentation and very difficult to evaluate. He attempted to elope and required restraints and sedation. With observation and time he was able to provide some history although he is guarded. Declines to answer questions about the voices or his suicidal thoughts. He has lacerations on his thighs that are self inflicted. Also complains of headache and facial pain after hitting his head and face multiple times against a door. No loss of consciousness. No neck pain. No neurovascular symptoms. No foreign body sensation. States last tetanus immunization 2 years ago.    Per DEC assessment: Jerel presented to the ED for assessment of self-inflicted leg laceration and head injury. He reported auditory hallucinations telling him to hurt himself and stated that he banged his head into the wall in an effort to make the voices stop or to kill himself. His parents are concerned that he has only been about 50% adherent with his medication regimen. He has been irritable, agitated, and not sleeping well. His parents report that he has chronic somatic delusions regarding neck and back pain.     The " "risks, benefits, alternatives and side effects have been discussed and are understood by the patient and other caregivers.  Psychiatric Review of Systems:   Depression:   Reports: depressed mood, suicidal ideation, increased sleep, guilt, (parents report irritability)  Denies: changes in appetite, impaired concentration  Mge:   Denies: sleeplessness, pressured speech  Psychosis:   Reports: visual hallucinations, auditory hallucinations  Denies:paranoia  Anxiety:   Reports: worries, panic attacks (while in restraints)      Medical Review of Systems:   The Review of Systems is negative other than noted in the HPI    Psychiatric History:     Prior diagnoses: Schizoaffective disorder, bipolar type    Hospitalizations: Reports 3-4 previous hospitalization.    Suicide attempts: States \"not exactly\"    Self-injurious behavior: Cutting and punching self.     Violence: Not elicited    ECT/TMS: None    Past medications: Risperdal Consta, Abilify Mantena, Zyprexa    Substance Use History:     Nicotine: 4 cigarettes per day    Alcohol: Drank on New Year's Carmen. States that he knows he is not supposed to.           Cannabis: Smokes marijuana for pain management. He states he last smoked 1 week ago and that he used to use more in early college. Describes himself as an \"extremely experienced user\". Smokes about 1 gram per week.    Others: Used LSD early in college.    Prior CD treatments: Denies    Past Medical History:     Past Medical History:   Diagnosis Date     Anxiety      Arthritis      Depressive disorder      Gastroesophageal reflux disease      Head injury      Problem, psychiatric      No past surgical history on file.  History of seizures or head trauma: Denies history of seizure. Reports history of concussions, which he feels have \"softened his \".        Allergies:      Allergies   Allergen Reactions     Penicillins      Rash, Generalized          Medications:     No current outpatient medications on file.        "    Social History:     Upbringing: Grew up in [place] and went to [employer].    Family/Relationships: Accompanied to ED by parents.    Living Situation: Lives with parents since discharging from [facility].     Education: Attending [employer] and studying psychology    Occupation: Student    Legal: Denies    Abuse/Trauma: Reports that hospitalizations have been traumatizing.    : No.    Hobbies: Making music, playing guitar, reading.    Family History:   No history of suicides.  No history of bipolar disorder.  Great-aunt with schizophrenia.  No history of chemical dependency.     No family history on file.    Labs:     Recent Results (from the past 24 hour(s))   CBC with platelets differential    Collection Time: 01/18/20  7:23 AM   Result Value Ref Range    WBC 8.0 4.0 - 11.0 10e9/L    RBC Count 4.98 4.4 - 5.9 10e12/L    Hemoglobin 15.4 13.3 - 17.7 g/dL    Hematocrit 46.3 40.0 - 53.0 %    MCV 93 78 - 100 fl    MCH 30.9 26.5 - 33.0 pg    MCHC 33.3 31.5 - 36.5 g/dL    RDW 12.3 10.0 - 15.0 %    Platelet Count 225 150 - 450 10e9/L    Diff Method Automated Method     % Neutrophils 50.5 %    % Lymphocytes 32.1 %    % Monocytes 9.9 %    % Eosinophils 6.5 %    % Basophils 0.4 %    % Immature Granulocytes 0.6 %    Nucleated RBCs 0 0 /100    Absolute Neutrophil 4.0 1.6 - 8.3 10e9/L    Absolute Lymphocytes 2.6 0.8 - 5.3 10e9/L    Absolute Monocytes 0.8 0.0 - 1.3 10e9/L    Absolute Eosinophils 0.5 0.0 - 0.7 10e9/L    Absolute Basophils 0.0 0.0 - 0.2 10e9/L    Abs Immature Granulocytes 0.1 0 - 0.4 10e9/L    Absolute Nucleated RBC 0.0    Comprehensive metabolic panel    Collection Time: 01/18/20  7:23 AM   Result Value Ref Range    Sodium 139 133 - 144 mmol/L    Potassium 3.9 3.4 - 5.3 mmol/L    Chloride 107 94 - 109 mmol/L    Carbon Dioxide 26 20 - 32 mmol/L    Anion Gap 6 3 - 14 mmol/L    Glucose 90 70 - 99 mg/dL    Urea Nitrogen 13 7 - 30 mg/dL    Creatinine 0.72 0.66 - 1.25 mg/dL    GFR Estimate  ">90 >60 mL/min/[1.73_m2]    GFR Estimate If Black >90 >60 mL/min/[1.73_m2]    Calcium 8.3 (L) 8.5 - 10.1 mg/dL    Bilirubin Total PENDING 0.2 - 1.3 mg/dL    Albumin PENDING 3.4 - 5.0 g/dL    Protein Total PENDING 6.8 - 8.8 g/dL    Alkaline Phosphatase PENDING 40 - 150 U/L    ALT PENDING 0 - 70 U/L    AST PENDING 0 - 45 U/L        Psychiatric Examination:     /78   Pulse 93   Temp 99.9  F (37.7  C) (Oral)   Resp 18   Ht 1.727 m (5' 8\")   Wt 76 kg (167 lb 8 oz)   SpO2 95%   BMI 25.47 kg/m      Appearance:  awake, alert, dressed in hospital scrubs and appeared as age stated. Bruises on face.  Attitude:  cooperative  Eye Contact:  looking around room  Mood:  depressed  Affect:  mood congruent and intensity is normal  Speech:  clear, coherent  Psychomotor Behavior:  no evidence of tardive dyskinesia, dystonia, or tics  Thought Process:  Mostly linear and logical.  Associations:  no loose associations  Thought Content:  Denies current SI. Reports AH and VH. Tells story about LSD and energy that is somewhat hard to follow.  Insight:  limited  Judgment:  poor  Oriented to:  time, person, and place  Attention Span and Concentration:  Adequate for conversation.  Recent and Remote Memory:  Seems intact.  Language:  english with appropriate syntax and vocabulary  Fund of Knowledge: Seems appropriate  Muscle Strength and Tone: Not assessed  Gait and Station: Not assessed    Physical Exam:     See ED assessment note by Dr. Lamb on 1/17/2020.    Assessment   Jerel Day is a 24-year-old male with history of schizoaffective disorder, bipolar type and cannabis dependence who presented to [facility] ED on 01/17/2020 for treatment of self-inflicted leg laceration and head injury and was subsequently admitted for psychosis and suicidal ideation. Predisposing factors include distant family history of schizophrenia and known history of schizoaffective disorder, bipolar type. Precipitating factors include stress regarding " "upcoming semester, recent discharge from IRTS, cannabis use, possible medication non-adherence, and interpersonal conflict with his parents (feels like he has no flexibility at home). Perpetuating factors include feeling like he has not found his \"footing\" and chronic neck and back pain . Protective factors include supportive family, access to healthcare, and stable housing. MSE was notable for depressed mood, reports of auditory and visual hallucinations, occasionally illogical comments and limited insight. Presentation is most consistent with previous diagnosis of schizoaffective disorder, bipolar type. Current episode is likely depressive.      Reason for inpatient hospitalization is psychosis and suicidal ideation. Disposition pending clinical stabilization, medication optimization, and formulation of safe discharge plan.      Plan   Admit to Unit 22 with Attending Physician Dr. Arpan Purcell  Legal Status: 72-h Hold signed on 1/17/2020 at 5:00 AM    Safety Assessment:   Behavioral Orders   Procedures     Code 1 - Restrict to Unit     Routine Programming     As clinically indicated     Self Injury Precaution     Single Room     Status 15     Every 15 minutes.     Status Individual Observation     Order Specific Question:   CONTINUOUS 24 hours / day     Answer:   5 feet     Order Specific Question:   Indications for SIO     Answer:   Self-injury risk     Suicide precautions     Patients on Suicide Precautions should have a Combination Diet ordered that includes a Diet selection(s) AND a Behavioral Tray selection for Safe Tray - with utensils, or Safe Tray - NO utensils       Pt has required locked seclusion or restraints in the past 24 hours to maintain safety, please refer to RN documentation for further details.    Precautions: self-injury and suicide    Principal psychiatric diagnosis:   # Schizoaffective disorder, bipolar type, current episode depressed  - Continue PTA aripiprazole 15 mg daily.  - Continue " "PTA olanzapine 15 mg daily.  - Continue PTA benztropine 0.5 mg BID PRN.  - Continue PTA gabapentin 300 mg BID PRN.    Secondary psychiatric diagnoses:   # Cannabis dependence    - Patient will be treated in therapeutic milieu with appropriate individual and group therapies.    Medical diagnoses:      #Leg laceration on left thigh: Jerel cut himself with a pocketknife. S/p laceration repair in ED.   - Continue to monitor.    #Scalp and face contusions: CT not concerning for acute intracranial process. Demonstrated \"soft tissue hematoma overlying the left zygomatic arch\". No fractures visible on CT. Jerel denies LOC and blurry vision.  - PRN acetaminophen 650 mg Q4H for pain management.     Disposition: Pending medication management and clinical stabilization.    -------------------------------------------------------  Azeb Katz MD  Psychiatry PGY-1    Attestation:   Jerel notes that he has been having some stressors in his life and is trying to take stock of his situation. He doesn't feel that he is really seeing benefits from Abilify anymore like he used to. With his injuries, he notes that body image has been an issue for him. He feels overweight, like his body doesn't look the way he wants. Ended up punching himself in the face a lot. He feels a lot of pressure from various places, and it's not always possible to figure out where it comes from. He would ideally like to be down to 1 antipsychotic, but realizes that this is something to address more with his outpatient provider and is fine with this being something done more over the long term.   The patient has been seen and evaluated by me, Georgi Iniguez. I have examined the patient today and reviewed the discharge plan with the resident. I agree with the final assessment and plan, as noted in the discharge summary. I have reviewed today's vital signs, medications, labs and imaging.  Total time discharge plannin minutes  Georgi Iniguez MD  "

## 2020-01-18 NOTE — PROGRESS NOTES
"Initial Psychosocial Assessment    I have reviewed the chart, met with the patient, and developed Care Plan.  Information for assessment was obtained from:     Chart Review and patient Interview    Presenting Problem: 72 hour hold Committed to [facility] August 26, 2019- Currently under a Provisional Discharge.  Pt is admitted to [facility] under the care of Arpan Purcell MD.  Pt presented to the ED with increasing psychotic symptoms and serious self injuries.  Pt went to the ED to get treatment for his injuries which he thought might need stitches.  Pt was distressed in the ED and became verbally aggressive and threatening when ED staff determined he was in a mental health episode, he did not want to be admitted, which resulted in a code 21 with restraints and IM medication.    History of Mental Illness and Chemical Health History  Pt was hospitalized at [facility] in 2019 and 2018 on unit 4A and per DEC twice in 2017 unsure of location.  Patient has a history of Schizoaffective Bipolar type. Patients current symptoms consist of visual and auditory hallucinations and self injurious behaviors. Patient reports using alcohol and marijuana occasionally and states that he has used LSD 6 times over the past 2 years.     Pt just completed IRTS Treatment at [facility]- 12/17/2019     Family Description(Constellation, family psychiatric hx)  Patient has 1 older sister and 1 younger brother. Patients maternal great aunt has schizophrenia, paternal side has anxiety and maternal side has anxiety and depression.     Significant Life Events (Trauma/Ilness/Death)  Patient states that he had a broken collar bone and a \"impinchment in my left hip\".      Living Situation  Parents just returned home from IRTS.  Parents are working on a plan for him to live independently..        Criminal hx and Legal Issues  Denies     Ethnic/Cultural Issues   The patient does not identify any ethnic or cultural issues that impact " treatment     Spiritual Orientation  Undesignated      Service History  None     Educational/Financial/Occupational  Some college.  Pt wants to go back to college.     Social functioning (organization, interests)   None     Current Health Care Providers  Medication Management: Dr. Vince Zheng [facility]  [phone]- has recently change psychiatrist, Pt can't remember the name  Address: [address] [address], [address] [place] [address]. [place] [address]  Phone:[phone]    Therapy   Provider: Tammi Connell  Address: [address] [address], [address] [place] [address]. [place] [address]  Phone:[phone]    Family Therapy  Provider: Ana Nicole  Address: [address] [address], [address] [place] [address]. [place] [address]  Phone:[phone]    [facility]   Nola Kristopher  Phone: [phone]  Fax: [phone]      Social Service Assessment/Plan:  Pt is in need of psychiatric evaluation and stabilization.  His medicine will be reviewed and adjusted if indicated.  Pt will participate in therapeutic milieu, attend group therapy and join in other unit activities.  CTC will coordinate care with Team, family and community resources to develop an appropriate discharge plan.

## 2020-01-18 NOTE — PROGRESS NOTES
01/17/20 2206   Patient Belongings   Did you bring any home meds/supplements to the hospital?  No   Patient Belongings locker;sent to security per site process   Patient Belongings Put in Hospital Secure Location (Security or Locker, etc.) cell phone/electronics;clothing;shoes;wallet   Belongings Search Yes   Clothing Search Yes   Second Staff Jose R     Locker #12  - coat  - 2 lighters  - misc. Paperwork  - boots  - pants  - shirt  - cell phone w/ black case  -   - black leather wallet  - cigarettes: blue [employer]  - loose change    Sent to security  Envelope #[id number]  - [employer] visa card (2544)  - EBT card (0021)  - [employer] insurance card  - [place] license    1/30/2020 Staff: Apirl AVALOS  Took out [place] license and [employer] insurance card, stored in locker  Sent to security:  New Envelope #[id number]  - Topline visa card (6893)  - EBT card (0410)          ..A               Admission:  I am responsible for any personal items that are not sent to the safe or pharmacy.  [facility] is not responsible for loss, theft or damage of any property in my possession.    Signature:  _________________________________ Date: _______  Time: _____                                              Staff Signature:  ____________________________ Date: ________  Time: _____      2nd Staff person, if patient is unable/unwilling to sign:    Signature: ________________________________ Date: ________  Time: _____     Discharge:  [facility] has returned all of my personal belongings:    Signature: _________________________________ Date: ________  Time: _____                                          Staff Signature:  ____________________________ Date: ________  Time: _____

## 2020-01-19 LAB
AMPHETAMINES UR QL SCN: NEGATIVE
BARBITURATES UR QL: NEGATIVE
BENZODIAZ UR QL: NEGATIVE
CANNABINOIDS UR QL SCN: POSITIVE
COCAINE UR QL: NEGATIVE
ETHANOL UR QL SCN: NEGATIVE
OPIATES UR QL SCN: NEGATIVE

## 2020-01-19 PROCEDURE — 12400001 ZZH R&B MH UMMC

## 2020-01-19 PROCEDURE — 25000132 ZZH RX MED GY IP 250 OP 250 PS 637: Performed by: STUDENT IN AN ORGANIZED HEALTH CARE EDUCATION/TRAINING PROGRAM

## 2020-01-19 PROCEDURE — 80307 DRUG TEST PRSMV CHEM ANLYZR: CPT | Performed by: PSYCHIATRY & NEUROLOGY

## 2020-01-19 PROCEDURE — H2032 ACTIVITY THERAPY, PER 15 MIN: HCPCS

## 2020-01-19 PROCEDURE — 80320 DRUG SCREEN QUANTALCOHOLS: CPT | Performed by: PSYCHIATRY & NEUROLOGY

## 2020-01-19 RX ADMIN — BENZTROPINE MESYLATE 0.5 MG: 0.5 TABLET ORAL at 20:33

## 2020-01-19 RX ADMIN — ARIPIPRAZOLE 15 MG: 15 TABLET ORAL at 09:17

## 2020-01-19 RX ADMIN — OLANZAPINE 15 MG: 15 TABLET, FILM COATED ORAL at 20:30

## 2020-01-19 RX ADMIN — MELATONIN 25 MCG: at 09:17

## 2020-01-19 ASSESSMENT — ACTIVITIES OF DAILY LIVING (ADL)
DRESS: SCRUBS (BEHAVIORAL HEALTH);INDEPENDENT
ORAL_HYGIENE: INDEPENDENT
LAUNDRY: UNABLE TO COMPLETE
HYGIENE/GROOMING: INDEPENDENT
ORAL_HYGIENE: INDEPENDENT
DRESS: INDEPENDENT
HYGIENE/GROOMING: INDEPENDENT

## 2020-01-19 ASSESSMENT — MIFFLIN-ST. JEOR: SCORE: 1728.82

## 2020-01-19 NOTE — PROGRESS NOTES
"Pt's on SIO 1:1 with staff, (SI - Suicidal Ideation) pt was up/visible in the milieu mostly in the OT room playing guitar/book/snack, pt was calm/affect full range/controlled behavior, pt denies of SI/SIB, pt stated that \" my goal for this evening to play guitar/eat dinner/bed early\" pt did take a shower/went to bed early, pt needs are offered/known.     01/18/20 2100   Behavioral Health   Thoughts/Cognition (WDL) ex   Hallucinations denies / not responding to hallucinations   Thinking poor concentration;distractable   Orientation person: oriented;place: oriented   Memory baseline memory   Insight poor   Judgement impaired   Eye Contact at examiner   Affect/Mood (WDL) ex   Affect full range affect   Mood mood is calm   ADL Assessment (WDL) WDL   Physical Appearance/Attire neat   Hygiene well groomed   Suicidality (WDL) WDL   Suicidality other (see comments)  (none)   1. Wish to be Dead (Recent) No   2. Non-Specific Active Suicidal Thoughts (Recent) No   Enviromental Risk Factors None   Self Injury other (see comment)  (none)   Elopement (WDL) WDL   Activity (WDL) WDL   Speech (WDL) WDL   Speech clear;coherent   Medication Sensitivity (WDL) WDL   Medication Sensitivity no stated side effects;no observed side effects   Psychomotor Gait (WDL) WDL   Psychomotor / Gait balanced;steady   Overt Agression (WDL) WDL   Substance Withdrawal   Substance Withdrawal None   Substance Withdrawal Interventions   Social and Therapeutic Interventions (Substance Withdrawal) encourage socialization with peers;encourage effective boundaries with peers;encourage participation in therapeutic groups and milieu activities   Psycho Education   Type of Intervention 1:1 intervention   Response participates with encouragement   Hours 0.5   Treatment Detail check-in   Safety   Suicidality Status 15   Violence Risk   Feels Like Hurting Others no   Daily Care   Activity up ad delio;activity encouraged;up in chair   Activities of Daily Living "   Hygiene/Grooming independent;shower   Oral Hygiene independent   Dress scrubs (behavioral health);independent   Laundry unable to complete   Room Organization independent   Activity   Activity Assistance Provided independent

## 2020-01-19 NOTE — PLAN OF CARE
"\"I'm better, I'm more calm\". Depression and anxiety have improved. States had racing thoughts this morning but \"they have slowed down and are normal now\". States voices have improved, \"they are spiritual  voices of an owl, an old friend and a god, they are polite\". Cut with sutures on left anterior thigh is healing, two cuts on right anterior thigh are healing. Patient states has been struggling with intermittent indigestion and diarrhea for the last month. Patient said would wait to talk to psych team about it.   "

## 2020-01-20 PROCEDURE — H2032 ACTIVITY THERAPY, PER 15 MIN: HCPCS

## 2020-01-20 PROCEDURE — G0177 OPPS/PHP; TRAIN & EDUC SERV: HCPCS

## 2020-01-20 PROCEDURE — 12400001 ZZH R&B MH UMMC

## 2020-01-20 PROCEDURE — 25000132 ZZH RX MED GY IP 250 OP 250 PS 637: Performed by: STUDENT IN AN ORGANIZED HEALTH CARE EDUCATION/TRAINING PROGRAM

## 2020-01-20 RX ADMIN — OLANZAPINE 15 MG: 15 TABLET, FILM COATED ORAL at 21:37

## 2020-01-20 RX ADMIN — HYDROXYZINE HYDROCHLORIDE 25 MG: 25 TABLET, FILM COATED ORAL at 22:32

## 2020-01-20 RX ADMIN — MELATONIN 25 MCG: at 09:41

## 2020-01-20 RX ADMIN — ARIPIPRAZOLE 15 MG: 15 TABLET ORAL at 09:41

## 2020-01-20 ASSESSMENT — ANXIETY QUESTIONNAIRES
5. BEING SO RESTLESS THAT IT IS HARD TO SIT STILL: SEVERAL DAYS
2. NOT BEING ABLE TO STOP OR CONTROL WORRYING: SEVERAL DAYS
3. WORRYING TOO MUCH ABOUT DIFFERENT THINGS: SEVERAL DAYS
1. FEELING NERVOUS, ANXIOUS, OR ON EDGE: SEVERAL DAYS
GAD7 TOTAL SCORE: 7
6. BECOMING EASILY ANNOYED OR IRRITABLE: SEVERAL DAYS
7. FEELING AFRAID AS IF SOMETHING AWFUL MIGHT HAPPEN: SEVERAL DAYS
4. TROUBLE RELAXING: SEVERAL DAYS

## 2020-01-20 ASSESSMENT — PATIENT HEALTH QUESTIONNAIRE - PHQ9: SUM OF ALL RESPONSES TO PHQ QUESTIONS 1-9: 11

## 2020-01-20 ASSESSMENT — ACTIVITIES OF DAILY LIVING (ADL)
DRESS: SCRUBS (BEHAVIORAL HEALTH);INDEPENDENT
LAUNDRY: UNABLE TO COMPLETE
DRESS: INDEPENDENT
ORAL_HYGIENE: INDEPENDENT
HYGIENE/GROOMING: SHOWER;INDEPENDENT
HYGIENE/GROOMING: INDEPENDENT
ORAL_HYGIENE: INDEPENDENT

## 2020-01-20 NOTE — PROGRESS NOTES
Romanr met with pt re: Personal Plan of Care and CONNOR's.     VM from Tammi Connell, therapist at [facility], romanr returned the call at [phone] and LVM requesting a call.  Romanr attempted to contact pt's , Nola Summers at [phone] and M requesting a return call.

## 2020-01-20 NOTE — PROGRESS NOTES
"Personal Plan of Care:    Reasons you are in the hospital:    1. \"I have stress in my body.\"  2. \" I was over apprehensive about college.\"  3. \"Self-destructive over my issues.\"  4. \"Damaged psyche.\"    Goals for discharge:    1. \"Manage stressors by identifying short-comings.\" Find coping strategies to manage symptoms/stress  2. \"Get better at planning larger life events.\"  3. \"Improve personal hygiene.\"  "

## 2020-01-20 NOTE — PROGRESS NOTES
"   01/19/20 2129   Behavioral Health   Hallucinations auditory  (Voices telling him that he is useless)   Thinking distractable   Orientation person: oriented;place: oriented;date: oriented;time: oriented   Memory baseline memory   Insight poor   Judgement impaired   Eye Contact at examiner   Affect blunted, flat   Mood depressed;hopeless   Physical Appearance/Attire disheveled   Hygiene neglected grooming - unclean body, hair, teeth   Suicidality other (see comments)  (He said no)   1. Wish to be Dead (Recent) No   Self Injury other (see comment)  (He said no)   Activities of Daily Living   Hygiene/Grooming independent   Oral Hygiene independent   Dress independent   Laundry unable to complete   Room Organization independent     Patient had a good shift. Patient went to therapy group and played the [employer]. Patient visited with his dad. Conversation seemed a bit strained. Jerel said that he has an up and down relationship with his father. Jerel loves playing the guitar. He says that he heard voices tonight (said that he could not distinguish who the voice belonged to). He said the voices told him he was useless. He said that he talks back at them (often silently) to make them stop and to keep from acting out. He said these voices start out as thoughts which turn into voices. He said that he tells the voices - \"You are bullying me.\" \"Leave me.\" He was calm and cooperative with staff. He loves talking to staff when they talk with him.   "

## 2020-01-20 NOTE — PROGRESS NOTES
"INITIAL O.T. ASSESSMENT:  Jerel has attended OT occasionally since admission.    He  attended 2 of 2 OT groups today. He participated in an OT relaxation yoga group this a.m. Participated in all suggested movements and the guided relaxation exercise with interest. Also  participated in OT clinic this afternoon and was able to initiate task, follow through with plan and ask for help as needed.      Was given and completed a written self assessment. Cited \"improper ratio between love and hate, with hate dominating\" as the reason for his current hospitalization. When asked what staff can do to be most helpful during hospitalization, pt responded \"help me get down to just one med.\"     Goals prioritized for hospitalization (selected from list): Healing, Forgiveness, Personal safety.    Goals prioritized for hospitalization (self-described): Learn how to get through this time.     OT staff explained the purpose of pt being involved in current treatment plan.      Plan: Encourage ongoing attendance as able to meet self-stated goals, implement positive coping skills, engage in graded opportunities for success, and provide assessment ongoing.       01/20/20 1500   General Information   Date Initially Attended OT 01/20/20   Clinical Impression   Affect Needs further assessment   Orientation Oriented to person, place and time   Appearance and ADLs Disheveled   Attention to Internal Stimuli Needs further assessment   Interaction Skills Interacts appropriately with staff;Interacts appropriately with peers;Needs further assessment   Ability to Communicate Needs Independent;Needs further assessment   Verbal Content Clear   Ability to Maintain Boundaries Needs further assessment   Participation Independently participates   Concentration Concentrates 20-30 minutes;Needs further assessment   Ability to Concentrate With structure;Preoccupied   Follows and Comprehends Directions Independently follows 1 step verbal directions   Memory " Needs further assessment   Organization Independently organizes simple tasks   Decision Making Independent   Planning and Problem Solving Occasionally needs assist/feedback;Impulsive   Ability to Apply and Learn Concepts Applies within group structure   Frustrations / Stress Tolerance Independently identifies sources of frustration/stress;Independently identifies skills    Level of Insight Identifies needs with structure/support   Self Esteem Takes risks with support and encouragement   Social Supports Needs further assessment

## 2020-01-20 NOTE — PROGRESS NOTES
01/19/20 2200   Art Therapy   Type of Intervention structured groups   Response Participated with encouragement   Hours 1   Treatment Detail    (Art Therapy) DBT wave of emotions/ material exploration   Goal- cope, express, regulate and reflect through Art Therapy directives     Outcome-Pt reported to be feeling pretty good. The emotion he worked with for the project was jealousy. He didn't do a wave but a colorful figure 8 that he described as going in and out of positive to negative feelings. He had a vacant stare. He did talk about what brought him to the hospital and how he was punching himself. Writer offered active listening and empathy for his facial injuries. He remembered writer from [facility] and recalled and asked writer about that writer had mentioned that writer's son played the guitar, as Jerel loves the guitar.

## 2020-01-20 NOTE — PROGRESS NOTES
"Pt visible in milieu but isolates himself.  Pt spent a lot of the day playing the guitar, loud.  Feels anxious, went to the yoga group which he said helped relax him.  Reports doing better.  Endorses AH, says he heard voices earlier in the day that  are \"positive and chattering\" but then he said they told him to vape.  Pt denies SI/SIB.  When asked about medication side effects pt said his thoughts are shorter and more rapid.  Pt ate meals, didn't shower.        01/20/20 1400   Behavioral Health   Hallucinations auditory   Thinking distractable   Orientation person: oriented;place: oriented   Memory baseline memory   Insight poor   Judgement impaired   Eye Contact at examiner   Affect full range affect   Mood anxious   Physical Appearance/Attire disheveled   Hygiene neglected grooming - unclean body, hair, teeth   Suicidality other (see comments)  (denies)   1. Wish to be Dead (Recent) No   2. Non-Specific Active Suicidal Thoughts (Recent) No   Self Injury other (see comment)  (denies)   Activity isolative   Speech clear;coherent   Medication Sensitivity other (see comment)  (feels like his thoughts are rapid and shorter)   Psychomotor / Gait balanced;steady   Psycho Education   Type of Intervention 1:1 intervention   Response participates, initiates socially appropriate   Hours 0.5   Treatment Detail   (check in)   Activities of Daily Living   Hygiene/Grooming independent   Oral Hygiene independent   Dress independent   Laundry unable to complete   Room Organization independent     "

## 2020-01-21 ENCOUNTER — PATIENT OUTREACH (OUTPATIENT)
Dept: PSYCHIATRY | Facility: CLINIC | Age: 25
End: 2020-01-21

## 2020-01-21 PROCEDURE — 99232 SBSQ HOSP IP/OBS MODERATE 35: CPT | Mod: GC | Performed by: PSYCHIATRY & NEUROLOGY

## 2020-01-21 PROCEDURE — 25000132 ZZH RX MED GY IP 250 OP 250 PS 637: Performed by: PSYCHIATRY & NEUROLOGY

## 2020-01-21 PROCEDURE — 25000132 ZZH RX MED GY IP 250 OP 250 PS 637: Performed by: STUDENT IN AN ORGANIZED HEALTH CARE EDUCATION/TRAINING PROGRAM

## 2020-01-21 PROCEDURE — 12400001 ZZH R&B MH UMMC

## 2020-01-21 PROCEDURE — G0177 OPPS/PHP; TRAIN & EDUC SERV: HCPCS

## 2020-01-21 RX ORDER — OLANZAPINE 10 MG/1
10 TABLET ORAL AT BEDTIME
Status: DISCONTINUED | OUTPATIENT
Start: 2020-01-21 | End: 2020-01-22

## 2020-01-21 RX ADMIN — MELATONIN 25 MCG: at 11:10

## 2020-01-21 RX ADMIN — ARIPIPRAZOLE 15 MG: 15 TABLET ORAL at 11:10

## 2020-01-21 RX ADMIN — OLANZAPINE 10 MG: 10 TABLET, FILM COATED ORAL at 20:44

## 2020-01-21 ASSESSMENT — ACTIVITIES OF DAILY LIVING (ADL)
HYGIENE/GROOMING: INDEPENDENT
ORAL_HYGIENE: INDEPENDENT
DRESS: INDEPENDENT
LAUNDRY: WITH SUPERVISION

## 2020-01-21 ASSESSMENT — MIFFLIN-ST. JEOR: SCORE: 1709.77

## 2020-01-21 ASSESSMENT — ANXIETY QUESTIONNAIRES: GAD7 TOTAL SCORE: 7

## 2020-01-21 NOTE — PROGRESS NOTES
"Pt presented with calm affect, coherent thought process, and appropriate behavior. He reported feeling \"A lot better\" and noted that he was \"in a depersonalized state\" during recent self-harm incident. Pt expressed insight into his symptoms and diagnosis, acknowledging the need for treatment. Pt remarked he was frustrated about the amount of psychotropic medications prescribed, however, was satisfied with agreement to lower dose of one antipsychotic. At times pt had difficulty articulating his speech and stated he believes this is due to medication side effects along with increased lethargy. He made several remarks related to \"deep meditation\" but no psychotic features were reported or observed. Pt attended group programming and socialized with select peers.          01/21/20 1400   Behavioral Health   Hallucinations denies / not responding to hallucinations  (Pt states not currently experiencing AH)   Thinking intact   Orientation person: oriented;place: oriented;date: oriented;time: oriented   Memory baseline memory   Insight insight appropriate to situation;insight appropriate to events   Judgement impaired   Eye Contact at examiner   Affect full range affect   Mood mood is calm   Physical Appearance/Attire appears stated age;attire appropriate to age and situation   Hygiene other (see comment)  (Adequate)   1. Wish to be Dead (Recent) No   Wish to be Dead Description (Recent) Pt denies   2. Non-Specific Active Suicidal Thoughts (Recent) No   Non-Specific Active Suicidal Thought Description (Recent) Pt denies   Psycho Education   Type of Intervention 1:1 intervention   Response participates, initiates socially appropriate   Hours 0.5   Treatment Detail Discussed care plan   Activities of Daily Living   Hygiene/Grooming independent   Oral Hygiene independent   Dress independent   Laundry with supervision   Room Organization independent     "

## 2020-01-21 NOTE — PLAN OF CARE
BEHAVIORAL TEAM DISCUSSION    Participants:   Dr. Arpan Purcell, Attending Psychiatrist  Rolan Yañez MD, PGY-2  Ashleigh Estrella, RN  Nicole Cordova, LPCC, LADC, CTC  Dhaval Bob, OMS-IV  Chanel Johnson, MS3  Servando Bird, OT  Progress: Initial  Anticipated length of stay: Unknown, pending stabilization and appropriate disposition plan.   Continued Stay Criteria/Rationale: 72 hour hold - psychosis  Medical/Physical: No acute issues - was cleared by ED for psychiatric admission  Precautions:   Behavioral Orders   Procedures     Code 1 - Restrict to Unit     Routine Programming     As clinically indicated     Self Injury Precaution     Single Room     Status 15     Every 15 minutes.     Status Individual Observation     Order Specific Question:   CONTINUOUS 24 hours / day     Answer:   Other     Order Specific Question:   Specify distance     Answer:   10 feet     Order Specific Question:   Indications for SIO     Answer:   Self-injury risk     Suicide precautions     Patients on Suicide Precautions should have a Combination Diet ordered that includes a Diet selection(s) AND a Behavioral Tray selection for Safe Tray - with utensils, or Safe Tray - NO utensils     Plan: The plan is to assess and patient for mental health and medication needs.  The patient will be prescribed medications to treat the identified symptoms.  Upon discharge the patient will be referred to services as appropriate based on the assessment. Received call from CM with intent to revoke PD.  Rationale for change in precautions or plan: No change at present-will continue to monitor and adjust as needed.

## 2020-01-21 NOTE — PROGRESS NOTES
Re: Revocation    Writer spoke with patient  Vilma with [facility] ([phone]) who states she is initiating revocation process. Our fax number provided.    Update: received intent to revoke notification - sent to scanning.    Nicole Cordova, KEITHC, [facility]  Clinical Treatment Coordinator

## 2020-01-21 NOTE — PROGRESS NOTES
01/20/20 1800   General Information   Art Directive other (see comments)   AT directive was to create two images using lines, shapes and colors with media of pts choice. Pts were encouraged to create an image of a current issue (depression, anxiety, anger etc) for the first drawing and a solution image, (calm, happiness etc) for the second drawing. Goals of directive: emotional expression, creating a visual narrative for change, identifying personal strengths and goals. Pt was a positive participant, focused on task for the full duration of group. Pt chose to focus on working with material (chalk pastels) and the process of using this medium. Pt said he enjoys using pastels and inquired about community resources for art classes in the [facility].  Pts mood was calm.

## 2020-01-21 NOTE — PROGRESS NOTES
"Jerel  attended 3 of 3 OT groups today. Pleasant & engaged in all sessions. Participated in a yoga & relaxation group this a.m. with interest & focus. Reported that he felt \"calmer & stronger\" afterwards. During an afternoon discussion group, he discussed his current focus on \"explaining in detail to [his] doctors how [he] feels about medication changes.\" Encouraged his peers to focus on their self-advocacy goals during their hospital stays as well.      01/21/20 1500   Occupational Therapy   Type of Intervention structured groups   Response Initiates, socially acceptable   Hours 3       "

## 2020-01-21 NOTE — PLAN OF CARE
"The patient specific goals include:   Patient will participate in unit programming  Patient will identify triggers and positive coping skills  Patient will take medications as prescribed by physician both for mental health and medical  Patient coached to work on coping packet    The patient identified the following reasons for hospitalization:  Self injurious behaviors   \"Frustration - but sharper than that\"  \"Waves of white - Fear of my voices\"    The patient identified the following goals for discharge:   Medication management - working with the team instead of the team deciding what medication to take.    "

## 2020-01-21 NOTE — PROGRESS NOTES
Ex Parte Order for Emergency Return to Facility -   Above order received on unit from [facility] - Order confirms Provisional Discharge is revoked.     Team notified for order.

## 2020-01-21 NOTE — PROGRESS NOTES
"NAVIGATE Outreach  A Part of the [facility] First Episode of Psychosis Program     Patient Name: Jerel Day  /Age:  1995 (24 year old)    Contact: Received return call from [facility]. She shared update they are planning to initiate med changes; will keep writer informed.     Writer then called and spoke to Jerel; he reported that he \"had to check myself in\"; writer offered praise, validation and encouragement. Vocalized that he should be proud of himself for waking up his parents and getting where he needed to go when he was in crisis. Jerel commented he had to \"swallow some pride\" to do that; writer again reframed this as a demonstration of strength and courage. Writer reflected that Jerel sounds brighter; Jerel reports he is feeling better and is he is enjoying yoga on the unit. Writer encouraged Jerel to continue engaging in positive activities while on the unit. Discussed hope that writer will be able to visit; after reviewing clinic schedule, writer may have time Friday morning. Will keep Jerel and team informed.     EDELMIRA MorganATE Individual Resiliency [place] [facility] Clinician    "

## 2020-01-21 NOTE — PROGRESS NOTES
NAVIGATE Outreach  A Part of the [facility] First Episode of Psychosis Program     Patient Name: Jerel Day  /Age:  1995 (24 year old)    Contact: Writer called and LVM for [facility] - CTC; provided writer's direct contact for return call.     Plan: Writer remains available for support and care coordination purposes.    Tammi Connell, EDELMIRA  NAVIGATE Individual Resiliency [place] [facility]

## 2020-01-21 NOTE — PROGRESS NOTES
NAVIGATE Outreach  A Part of the [facility] First Episode of Psychosis Program     Patient Name: Jerel Day  /Age:  1995 (24 year old)    Contact: Writer received call from [facility] who shared she plans to revoke Jerel's provisional discharge which means he will need to stay in the hospital involuntarily until a plan is made. Nola updated she is still waiting for SMRT referral to go through which can potentially initiate CADI services; Nola anticipates this will be maybe about another month. Writer inquired about a referral at some point for an [facility] worker; Nola agreed this may be useful down the road. Will continue to keep each other in the loop. Writer will update team.    Plan: Will route note to team. Writer will continue to remain in contact in support and care coordination purposes.     Tammi Connell, EDELMIRA  NAVIGATE Individual Resiliency [place] [facility] Clinician

## 2020-01-21 NOTE — PROGRESS NOTES
Pt was intermittently present in the milieu but remained mostly isolative. Pt was observed laying/reading in room, eating snack/dinner, meeting with visitors (parents), attending the OT art group, and taking a shower. Pt presented with a full range affect and remained calm throughout the evening. During staff check-in pt endorsed experiencing auditory hallucinations that could be distressing to him. However, pt stated his levels of anxiety and depression were lower than usual. Pt stated he had an urge to self-harm himself (punch himself in head) during the morning but had no urges throughout the evening. Pt then denied experiencing SI and HI.         01/20/20 1939   Behavioral Health   Hallucinations auditory   Thinking distractable;poor concentration   Orientation person: oriented;place: oriented   Memory   (unsubstantiated)   Insight insight appropriate to situation;insight appropriate to events   Judgement impaired   Eye Contact at examiner   Affect full range affect   Mood mood is calm   Physical Appearance/Attire attire appropriate to age and situation   Hygiene   (adequate, pt showered)   Suicidality   (pt denied thoughts and urges)   Self Injury   (pt denied thoughts and urges)   Elopement   (no apparent risk)   Activity isolative   Speech tangential;clear;coherent   Medication Sensitivity no stated side effects;no observed side effects   Psychomotor / Gait balanced;steady   Psycho Education   Type of Intervention 1:1 intervention   Response participates, initiates socially appropriate   Hours 0.5   Treatment Detail   (check-in)   Activities of Daily Living   Hygiene/Grooming shower;independent   Oral Hygiene independent   Dress scrubs (behavioral health);independent   Laundry   (pt did not do laundry)   Room Organization independent   Activity   Activity Assistance Provided independent

## 2020-01-22 PROCEDURE — 25000132 ZZH RX MED GY IP 250 OP 250 PS 637: Performed by: STUDENT IN AN ORGANIZED HEALTH CARE EDUCATION/TRAINING PROGRAM

## 2020-01-22 PROCEDURE — 12400001 ZZH R&B MH UMMC

## 2020-01-22 PROCEDURE — G0177 OPPS/PHP; TRAIN & EDUC SERV: HCPCS

## 2020-01-22 PROCEDURE — H2032 ACTIVITY THERAPY, PER 15 MIN: HCPCS

## 2020-01-22 PROCEDURE — 25000132 ZZH RX MED GY IP 250 OP 250 PS 637: Performed by: PSYCHIATRY & NEUROLOGY

## 2020-01-22 PROCEDURE — 99232 SBSQ HOSP IP/OBS MODERATE 35: CPT | Mod: GC | Performed by: PSYCHIATRY & NEUROLOGY

## 2020-01-22 RX ORDER — ARIPIPRAZOLE 10 MG/1
20 TABLET ORAL DAILY
Status: DISCONTINUED | OUTPATIENT
Start: 2020-01-23 | End: 2020-02-06

## 2020-01-22 RX ORDER — OLANZAPINE 5 MG/1
5 TABLET ORAL AT BEDTIME
Status: DISCONTINUED | OUTPATIENT
Start: 2020-01-22 | End: 2020-02-04

## 2020-01-22 RX ADMIN — MELATONIN 25 MCG: at 08:52

## 2020-01-22 RX ADMIN — GABAPENTIN 300 MG: 300 CAPSULE ORAL at 21:30

## 2020-01-22 RX ADMIN — HYDROXYZINE HYDROCHLORIDE 25 MG: 25 TABLET, FILM COATED ORAL at 11:56

## 2020-01-22 RX ADMIN — ARIPIPRAZOLE 15 MG: 15 TABLET ORAL at 08:52

## 2020-01-22 RX ADMIN — OLANZAPINE 5 MG: 5 TABLET, FILM COATED ORAL at 21:29

## 2020-01-22 ASSESSMENT — ACTIVITIES OF DAILY LIVING (ADL)
HYGIENE/GROOMING: INDEPENDENT
LAUNDRY: WITH SUPERVISION
ORAL_HYGIENE: INDEPENDENT
DRESS: INDEPENDENT

## 2020-01-22 NOTE — PROGRESS NOTES
"   01/21/20 2100   Behavioral Health   Hallucinations denies / not responding to hallucinations   Thinking distractable   Orientation person: oriented;place: oriented   Memory baseline memory   Insight insight appropriate to situation;insight appropriate to events   Judgement impaired   Eye Contact into space   Affect blunted, flat   Mood mood is calm   Physical Appearance/Attire appears stated age;attire appropriate to age and situation   Hygiene neglected grooming - unclean body, hair, teeth;body odor   Suicidality other (see comments)  (denies)   1. Wish to be Dead (Recent) No   Self Injury other (see comment)  (denies)   Elopement   (none stated observed)   Activity isolative;withdrawn   Speech coherent   Medication Sensitivity no stated side effects;no observed side effects   Psychomotor / Gait balanced;steady     Pt. Was isolative and withdrawn. He spent majority of the time in his room. He denied SI/SIB/HI. Patient endorsed auditory hallucinations and stated that the voices are \"kind of quiet and nicer.\"  He spent majority of the time reading in his room. His parents came to visit this evening and he stated that the visit went well. He did not attend community meeting this evening.  "

## 2020-01-22 NOTE — PROGRESS NOTES
"     ----------------------------------------------------------------------------------------------------------  [facility], [place]   Psychiatric Progress Note  Hospital Day #4      Interim History:   The patient's care was discussed with the treatment team and chart notes were reviewed.     Sleep: 6 hrs  Scheduled Medications: As prescribed  PRN Medications: Benztropine 1/19 with Zyprexa at 20:30                                     Hydroxyzine 1/20 at 22:32     Staff Report: Jerel  attended 3 of 3 OT groups today. Pleasant & engaged in all sessions. Participated in a yoga & relaxation group this a.m. with interest & focus. Reported that he felt \"calmer & stronger\" afterwards. During an afternoon discussion group, he discussed his current focus on \"explaining in detail to [his] doctors how [he] feels about medication changes.\" Encouraged his peers to focus on their self-advocacy goals during their hospital stays as well.     Patient Interview: Patient was interviewed in the conference room with the care team. When asked about the circumstances surrounding his admission, patient shares that he was feeling down on himself; he shares that when he left the [facility], \"I didn't know whether or not I wanted to go back to school this spring;\" he ultimately decided he did want to go back but had not found an apartment near campus which caused him significant stress. Jerel endorsed feeling pressure from his family to go back to school and finish his degree. He also reported that the stress leading up to the start of the semester aggravated the negative voices he hears and that this is the voice that tells him to harm himself. Jerel described his head banging PTA as a \"quiet cry for help,\" that ultimately was not answered by his parents as he had hoped. When discussing his medication regimen, the patient shared that he feels he is not usually heard by his care team saying, \"the hospital just does what it " "wants and I don't a chance to be cooperative.\" Jerel understands that he has a Clark as part of his current MI commitment but says he does not want to take injections because he dislikes needles. He ultimately agreed to continue Abilify 15mg if the Zyprexa was tapered; he shared concerns about his weight since being on anti-psychotic medication. When asked how he would describe his mental health, he shares that his mood symptoms are more concerning to him than the voices he hears and shares that he seems to have some control over the voices; Jerel describes being tearful most days during the last several years (since he was a sophomore in college) except a brief time in the summer of 2017 when he felt like himself.     The risks, benefits, alternatives and side effects of any medication changes have been discussed and are understood by the patient and other caregivers.     Review of systems:      ROS was negative unless noted above.  MEDICAL ROS (2,10):  A comprehensive review of systems was performed and is negative other than noted in the HPI.     Allergies:            Allergies   Allergen Reactions     Penicillins         Rash, Generalized         Psychiatric Examination:   BP (!) 142/90 (BP Location: Left arm)   Pulse 115   Temp 98  F (36.7  C) (Tympanic)   Resp 14   Ht 1.727 m (5' 8\")   Wt 74 kg (163 lb 3.2 oz)   SpO2 100%   BMI 24.81 kg/m    Weight is 163 lbs 3.2 oz  Body mass index is 24.81 kg/m .     Appearance:  awake, alert, dressed in hospital scrubs, appeared as age stated, well groomed and patient has b/l racoon eyes  Attitude:  guarded and slightly uncooperative  Eye Contact:  good  Mood:  \"better after yoga\"  Affect:  appropriate and in normal range, intensity is normal, guarded and reactive  Speech:  clear, coherent and normal prosody  Psychomotor Behavior:  no evidence of tardive dyskinesia, dystonia, or tics  Thought Process:  illogical and circumstantial  Associations:  no loose " "associations  Thought Content:  thoughts of self-harm, which are decreased, auditory hallucinations present and no visual hallucinations present  Insight:  limited  Judgment:  poor  Oriented to:  person and place  Attention Span and Concentration:  intact  Recent and Remote Memory:  intact  Language: Uses appropriate English syntax  Fund of Knowledge: appropriate  Muscle Strength and Tone: normal  Gait and Station: Normal      Labs:   Recent Results   No results found for this or any previous visit (from the past 24 hour(s)).         Assessment    Jerel Day is a 24-year-old male with history of schizoaffective disorder, bipolar type and cannabis dependence who presented to [facility] ED on 01/17/2020 for treatment of self-inflicted leg laceration and head injury and was subsequently admitted for psychosis and suicidal ideation. Predisposing factors include distant family history of schizophrenia and known history of schizoaffective disorder, bipolar type. Precipitating factors include stress regarding upcoming semester, recent discharge from IR, cannabis use, possible medication non-adherence, and interpersonal conflict with his parents (feels like he has no flexibility at home). Perpetuating factors include feeling like he has not found his \"footing\" and chronic neck and back pain . Protective factors include supportive family, access to healthcare, and stable housing. MSE was notable for depressed mood, reports of auditory and visual hallucinations, occasionally illogical comments and limited insight. Presentation is most consistent with previous diagnosis of schizoaffective disorder, bipolar type. Current episode is likely depressive.      Hospital course: Jerel Day was admitted to station 22 on a 72 hour hold. He is currently under MI commitment until February. PTA Abilify and Olanzapine were continued on admission. On 1/21/20    Patient expressed desire for one anti-psychotic if possible and agreed to take " "Abilify if weaned off Zyprexa, so we decreased his Olanzapine dose to 10 mg at bedtime.     Discontinued Medications (& Rationale): None     Medical course: leg laceration and cranial contusions continue to heal.     Plan   Principal psychiatric diagnosis:   # Schizoaffective disorder, bipolar type, current episode depressed  - Continue PTA aripiprazole 15 mg daily.  - Decrease PTA olanzapine to 10 mg qhs.  - Continue PTA benztropine 0.5 mg BID PRN.  - Continue PTA gabapentin 300 mg BID PRN.  - Patient is agreeable to undergo neuropsychiatric testing     Secondary psychiatric diagnoses:   # Cannabis dependence     - Patient will be treated in therapeutic milieu with appropriate individual and group therapies.     Medical diagnoses:       #Leg laceration on left thigh: Jerel cut himself with a pocketknife. S/p laceration repair in ED.   - Continue to monitor.     #Scalp and face contusions: CT not concerning for acute intracranial process. Demonstrated \"soft tissue hematoma overlying the left zygomatic arch\". No fractures visible on CT. Jerel denies LOC and blurry vision.  - PRN acetaminophen 650 mg Q4H for pain management.     Disposition: Pending medication management and clinical stabilization.     Legal Status: Committed       Safety Assessment:         Behavioral Orders   Procedures     Code 1 - Restrict to Unit     Routine Programming       As clinically indicated     Self Injury Precaution     Single Room     Status 15       Every 15 minutes.     Status Individual Observation       Order Specific Question:   CONTINUOUS 24 hours / day       Answer:   Other       Order Specific Question:   Specify distance       Answer:   10 feet       Order Specific Question:   Indications for SIO       Answer:   Self-injury risk     Suicide precautions       Patients on Suicide Precautions should have a Combination Diet ordered that includes a Diet selection(s) AND a Behavioral Tray selection for Safe Tray - with utensils, or " Safe Tray - NO utensils            Disposition: Pending clinical improvement and appropriate medication management.    Patient was seen and dicussed with the attending.       BOLIVAR Bowen-JEFFERY      I was present with the medical student who participated in the service and in the documentation of the note. I have verified the history and personally performed the physical exam and medical decision making. I agree with the assessment and plan of care as documented in the note .    Rolan Yañez MD   Psychiatry resident PGY-2     Psychiatry Attending Attestation:  This patient has been seen and evaluated by me, Arpan Purcell M.D.  The patient's condition and treatment plan were discussed with the resident, and care coordinated with the CTC and RN. I reviewed, edited and agree with the findings and plan in this note.     Arpan Purcell M.D.   of Psychiatry

## 2020-01-22 NOTE — PROGRESS NOTES
Isolative and withdrawn in am. Went to pm OT clinical. Not social with peers. Blunted affect. Depressed mood. Ate meals. No shower.        01/22/20 1000   Behavioral Health   Hallucinations denies / not responding to hallucinations   Thinking distractable;poor concentration   Orientation person: oriented;place: oriented   Memory baseline memory   Insight poor   Judgement impaired   Eye Contact at examiner;into space   Affect blunted, flat   Mood depressed   Physical Appearance/Attire attire appropriate to age and situation   Hygiene neglected grooming - unclean body, hair, teeth   1. Wish to be Dead (Recent) No   2. Non-Specific Active Suicidal Thoughts (Recent) No   Activity other (see comment);withdrawn  (Active particip in pm OT)   Speech clear;coherent   Psychomotor / Gait balanced;steady   Psycho Education   Type of Intervention structured groups   Response refuses   Activities of Daily Living   Hygiene/Grooming independent   Oral Hygiene independent   Dress independent   Laundry with supervision   Room Organization independent

## 2020-01-22 NOTE — PROGRESS NOTES
"Pt cooperative with this writer, and took all scheduled medications without issue. No side effects reported or observed. Pt reports \"feeling tired.\" However, notes that his black eyes are healing well, and are not painful to him.       "

## 2020-01-22 NOTE — PROGRESS NOTES
Re: Provisional discharge    Writer received court order indicating provisional discharge has been revoked effective 1/21/20.    Nicole Cordova LPCC, [facility]  Clinical Treatment Coordinator

## 2020-01-22 NOTE — PLAN OF CARE
Problem: OT General Care Plan  Goal: OT Goal 1    Pt attended 1 out of 3 OT groups offered. Pt actively participated in occupational therapy clinic. Pt was able to ask for assistance as needed, and independently initiated a familiar, self-directed, creative expression task. Pt demonstrated good focus, planning, and attention to detail. Appropriately requested assistance and feedback in problem solving how to fix mistakes on his task. Social with peers and writer throughout. Calm, pleasant, and cooperative.

## 2020-01-23 PROCEDURE — 25000132 ZZH RX MED GY IP 250 OP 250 PS 637: Performed by: STUDENT IN AN ORGANIZED HEALTH CARE EDUCATION/TRAINING PROGRAM

## 2020-01-23 PROCEDURE — 25000132 ZZH RX MED GY IP 250 OP 250 PS 637: Performed by: PSYCHIATRY & NEUROLOGY

## 2020-01-23 PROCEDURE — 99232 SBSQ HOSP IP/OBS MODERATE 35: CPT | Mod: GC | Performed by: PSYCHIATRY & NEUROLOGY

## 2020-01-23 PROCEDURE — G0177 OPPS/PHP; TRAIN & EDUC SERV: HCPCS

## 2020-01-23 PROCEDURE — 12400001 ZZH R&B MH UMMC

## 2020-01-23 RX ADMIN — ARIPIPRAZOLE 20 MG: 10 TABLET ORAL at 09:36

## 2020-01-23 RX ADMIN — OLANZAPINE 5 MG: 5 TABLET, FILM COATED ORAL at 21:24

## 2020-01-23 RX ADMIN — MELATONIN 25 MCG: at 09:36

## 2020-01-23 RX ADMIN — NICOTINE POLACRILEX 4 MG: 2 GUM, CHEWING ORAL at 18:57

## 2020-01-23 ASSESSMENT — MIFFLIN-ST. JEOR: SCORE: 1728.82

## 2020-01-23 ASSESSMENT — ACTIVITIES OF DAILY LIVING (ADL)
ORAL_HYGIENE: INDEPENDENT
HYGIENE/GROOMING: INDEPENDENT
LAUNDRY: WITH SUPERVISION
DRESS: INDEPENDENT

## 2020-01-23 NOTE — PROGRESS NOTES
"01/22/20 2200    Art Therapy   Type of Intervention structured groups   Response Participated with encouragement   Hours 1   Treatment Detail    (Art Therapy)  affirmation cards   Goal- cope, express, regulate and reflect through Art Therapy directives     Outcome-Pt was engaged in art making and the large group setting. He made a piece about loving himself he said \" because before I came here I wasn't doing that.\" , referring to his injuries. He also used some metallic markers to make a second piece where he was experimenting with shape and line. He tried to be social and talk to a peer about the peer's art work and books that he liked etc. He was pleasant and cooperative.     "

## 2020-01-23 NOTE — PROGRESS NOTES
"Patient more active and visible today. Appetite good. Patient is attending groups. When approached patient is fairly pleasant. Thinking appears disorganized. Paranoid features. \"I was pretty relaxed during yoga, almost too relaxed like I was allowing everyone to get in my head.\" Typically keeps to self. Odd.         01/23/20 1438   Behavioral Health   Hallucinations denies / not responding to hallucinations   Thinking distractable;delusional;poor concentration;paranoid   Orientation situation, disoriented;person: oriented;place: oriented   Insight poor   Judgement impaired   Affect incongruent;irritable   Mood depressed;anxious;irritable   Physical Appearance/Attire untidy;disheveled   Hygiene   (Poor.)   1. Wish to be Dead (Recent) No   Activity restless   Speech flight of ideas;rambling     "

## 2020-01-23 NOTE — PROVIDER NOTIFICATION
Pt denied having thought of suicide and self harm; calm and pleasant on approach; encouraged to report safety concern to staff for further intervention and agreed to do that. Reported improvement in hearing voices and the voices are not demeaning as before admission. Will continue to monitor and assess.      01/23/20 3109   Behavioral Health   Suicidality (WDL) WDL   Suicidality other (see comments)  (Denied having thought of suicide )   1. Wish to be Dead (Recent) No   Wish to be Dead Description (Recent) Pt denied    2. Non-Specific Active Suicidal Thoughts (Recent) No   Non-Specific Active Suicidal Thought Description (Recent) Pt denied    3. Active Sucidal Ideation with any Methods (Not Plan) Without Intent to Act (Recent) No   Active Suicidal Ideation with any Methods (Not Plan) Description (Recent) Pt denied    4. Active Suicidal Ideation with Some Intent to Act, Without Specific Plan (Recent) No   5. Active Suicidal Ideation with Specific Plan and Intent (Recent) No   Active Suicidal Ideation with Specific Plan and Intent Description (Recent) denied having a plan and intent to act   Change in Protective Factors? No   Enviromental Risk Factors None   Self Injury other (see comment)  (pt denied having SIB thought )   Elopement (WDL) WDL   Activity (WDL) WDL

## 2020-01-23 NOTE — PROGRESS NOTES
"Jerel  attended 2 of 2 OT groups today. Pleasant, engaged, interactive. Alert & active mind. He participated in a therapeutic moving yoga group this a.m., engaging all suggested movements & stretches. Discussed his interest in \"lengthening [his] spine\" and supporting himself \"through better posture.\" Also  participated in OT clinic, where he initiated task (free-hand painting), followed through with plan, and asked for support with supplies as needed.      01/23/20 1329   Occupational Therapy   Type of Intervention structured groups   Response Initiates, socially acceptable   Hours 2           "

## 2020-01-23 NOTE — PLAN OF CARE
48 hr Nursing Assessment     Problem: Sensory Perception Impairment (Psychotic Signs/Symptoms)  Goal: Decreased Frequency and Intensity of Sensory Symptoms (Psychotic Signs/Symptoms)  Outcome: Improving    Pt is out and visible in the milieu; calm and pleasant on approach; reported hearing voices intermittently, especially when he sites  in quite area; he stated that the voices are now a kind of uplifting, it was demeaning in nature before admission. Pt's stated goal this shift is finishing his book and out watching TV with peers. Denied SI/SIB, HI and Visual hallucination. Contracted for safety post SIO discontinuation. Writer seen improvement in a blackout around his bilateral eyes;  Wound on his left leg appeared well approximated, no sign of infection noted; pt planned to use coping skills of meditation and resting to manage voices. Encouraged to report change in conduction or increasing in voices and pt agreed to report to staff. Will continue to monitor.
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
## 2020-01-24 PROCEDURE — H2032 ACTIVITY THERAPY, PER 15 MIN: HCPCS

## 2020-01-24 PROCEDURE — 25000132 ZZH RX MED GY IP 250 OP 250 PS 637: Performed by: STUDENT IN AN ORGANIZED HEALTH CARE EDUCATION/TRAINING PROGRAM

## 2020-01-24 PROCEDURE — 25000132 ZZH RX MED GY IP 250 OP 250 PS 637: Performed by: PSYCHIATRY & NEUROLOGY

## 2020-01-24 PROCEDURE — 12400001 ZZH R&B MH UMMC

## 2020-01-24 PROCEDURE — 99232 SBSQ HOSP IP/OBS MODERATE 35: CPT | Mod: GC | Performed by: PSYCHIATRY & NEUROLOGY

## 2020-01-24 PROCEDURE — G0177 OPPS/PHP; TRAIN & EDUC SERV: HCPCS

## 2020-01-24 RX ADMIN — MELATONIN 25 MCG: at 09:02

## 2020-01-24 RX ADMIN — ARIPIPRAZOLE 20 MG: 10 TABLET ORAL at 09:02

## 2020-01-24 RX ADMIN — NICOTINE POLACRILEX 4 MG: 2 GUM, CHEWING ORAL at 18:29

## 2020-01-24 RX ADMIN — OLANZAPINE 5 MG: 5 TABLET, FILM COATED ORAL at 21:03

## 2020-01-24 ASSESSMENT — ACTIVITIES OF DAILY LIVING (ADL)
DRESS: INDEPENDENT
HYGIENE/GROOMING: INDEPENDENT
LAUNDRY: WITH SUPERVISION
DRESS: INDEPENDENT
LAUNDRY: WITH SUPERVISION
ORAL_HYGIENE: INDEPENDENT
HYGIENE/GROOMING: INDEPENDENT
ORAL_HYGIENE: INDEPENDENT

## 2020-01-24 NOTE — PROGRESS NOTES
Sutures remain intact left anterior thigh-laceration well healed with good approximation-no redness, drainage, or swelling-sutures placed 7 days ago-will contact MD re evaluation for removal

## 2020-01-24 NOTE — PROGRESS NOTES
01/23/20 4900   Group Therapy Session   Group Attendance attended group session   Total Time (minutes) 45   Group Type psychotherapeutic   Patient Participation/Contribution cooperative with task;discussed personal experience with topic;listened actively   Group therapy goal: make worry stones from [employer] while processing current worries and discussing coping strategies.  Jerel presented as anxious. He reported currently hearing voices and, therefore came to group in hopes to distract himself. He actively engaged in making 2 worry stones and discussing ways they can be helpful to him as a distraction.

## 2020-01-24 NOTE — PROGRESS NOTES
"Pt presented with depressed affect, coherent thought process, and appropriate behavior. He reported feeling \"Pretty down\" and expressed his mood was impacted by existential thoughts concerning life purpose, loss of relationships, and hospitalization. Pt stated he was anxious about remaining positive/occupied over the weekend given lack of connection to care team and desire to discharge. Pt continues to endorse  stating, \"I am hearing the voices of my past roommate\" who he described as \"having a close relationship that ended too soon.\" He went on to share he sometimes feels as if this roommate has taken over his thoughts and body. Pt was receptive to given support and identified group participation as effective coping skill. He agreed to communicate if symptoms worsened and declined offered PRN. Pt set goal to play [employer], which he accomplished. No safety concerns reported or observed.          01/24/20 1400   Behavioral Health   Hallucinations auditory  (Pt reports hearing voices)   Thinking poor concentration   Orientation person: oriented;place: oriented;date: oriented;time: oriented   Memory baseline memory   Insight poor   Judgement impaired   Eye Contact at examiner   Affect full range affect   Mood mood is calm;depressed   Physical Appearance/Attire appears stated age;disheveled   Hygiene   (Adequate)   1. Wish to be Dead (Recent) No   Wish to be Dead Description (Recent) Pt denies   2. Non-Specific Active Suicidal Thoughts (Recent) No   Non-Specific Active Suicidal Thought Description (Recent) Pt denies   Psycho Education   Type of Intervention 1:1 intervention   Response participates, initiates socially appropriate   Hours 0.5   Treatment Detail AH coping skills   Activities of Daily Living   Hygiene/Grooming independent   Oral Hygiene independent   Dress independent   Laundry with supervision   Room Organization independent     "
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
## 2020-01-24 NOTE — PROGRESS NOTES
Order to remove sutures to left anterior thigh. 6 sutures removed without difficulty. Edges of wound are well approximated. Laceration is fully closed. No s/s of infection. Patient calm and cooperative throughout. Tolerated without difficulty.

## 2020-01-24 NOTE — PROGRESS NOTES
Re: Family Meeting    Scheduled Monday 1/27/20 with parents. (Mesha and León)    Nicole Cordova, [facility], [facility] [place]  Clinical Treatment Coordinator

## 2020-01-24 NOTE — PROGRESS NOTES
"Jerel  attended 2 of 2 OT groups today. Pleasant, agreeable, and with apparent interest in all activities & discussion topics presented during groups. Worked on a free-form painting during OT clinic & discussed the importance of yoga, physical activity, and mindfulness practices for his \"healing journey.\"      01/24/20 1600   Occupational Therapy   Type of Intervention structured groups   Response Initiates, socially acceptable   Hours 2       "

## 2020-01-25 PROCEDURE — 12400001 ZZH R&B MH UMMC

## 2020-01-25 PROCEDURE — H2032 ACTIVITY THERAPY, PER 15 MIN: HCPCS

## 2020-01-25 PROCEDURE — 25000132 ZZH RX MED GY IP 250 OP 250 PS 637: Performed by: STUDENT IN AN ORGANIZED HEALTH CARE EDUCATION/TRAINING PROGRAM

## 2020-01-25 PROCEDURE — 25000132 ZZH RX MED GY IP 250 OP 250 PS 637: Performed by: PSYCHIATRY & NEUROLOGY

## 2020-01-25 RX ADMIN — OLANZAPINE 5 MG: 5 TABLET, FILM COATED ORAL at 21:27

## 2020-01-25 RX ADMIN — ARIPIPRAZOLE 20 MG: 10 TABLET ORAL at 13:08

## 2020-01-25 RX ADMIN — MELATONIN 25 MCG: at 13:08

## 2020-01-25 RX ADMIN — GABAPENTIN 300 MG: 300 CAPSULE ORAL at 15:37

## 2020-01-25 ASSESSMENT — ACTIVITIES OF DAILY LIVING (ADL)
DRESS: INDEPENDENT
LAUNDRY: WITH SUPERVISION
HYGIENE/GROOMING: INDEPENDENT
ORAL_HYGIENE: INDEPENDENT
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
## 2020-01-25 NOTE — PLAN OF CARE
48 hr Nursing Assessment     Problem: Adult Behavioral Health Plan of Care  Goal: Develops/Participates in Therapeutic [place] to Support Successful Transition  Outcome: Improving    Pt started the shift lying in his bed reading book; intermittently noted resting in his bed; out for community meeting and attended OT group; watched after dinner in the lounge; reported decreasing in voices today than yesterday; he stated that the voices are more directed and focused on positive future planning about my life little more; reported having chronic generalized pain on his back around his spine from yoga injury in the past and bad posture; received  mg gabapentin PO at 1537; reported effective; stated feeling little depressed for not seeing his attending doctor today and expected to see the doctor on Monday. Rated depression 4 and anxiety 3 on scale of 10, being sever.  Denied SI/SIB, HI, and Visual hallucination. No medication side effect noted/ reported this shift.  Appetite is good; out for dinner and snack. Encouraged to report change in conduction to staff and verbalized understanding.

## 2020-01-25 NOTE — PROGRESS NOTES
Patient mostly withdrawn to room today. Reading for activity. Appetite good. Patient states he is hearing voices from a video game that he plays. Pleasant and approachable. Keeps to self. Offers no complaints.         01/25/20 1424   Behavioral Health   Hallucinations auditory   Thinking distractable   Orientation situation, disoriented;person: oriented;place: oriented;date: oriented   Memory baseline memory   Insight   (Improving.)   Judgement impaired   Affect   (Brighter.)   Mood depressed;irritable   Physical Appearance/Attire disheveled;attire appropriate to age and situation   Hygiene neglected grooming - unclean body, hair, teeth   1. Wish to be Dead (Recent) No   Activity withdrawn   Speech flight of ideas

## 2020-01-26 PROCEDURE — 25000132 ZZH RX MED GY IP 250 OP 250 PS 637: Performed by: PSYCHIATRY & NEUROLOGY

## 2020-01-26 PROCEDURE — 12400001 ZZH R&B MH UMMC

## 2020-01-26 PROCEDURE — 25000132 ZZH RX MED GY IP 250 OP 250 PS 637: Performed by: STUDENT IN AN ORGANIZED HEALTH CARE EDUCATION/TRAINING PROGRAM

## 2020-01-26 RX ADMIN — OLANZAPINE 5 MG: 5 TABLET, FILM COATED ORAL at 21:37

## 2020-01-26 RX ADMIN — ARIPIPRAZOLE 20 MG: 10 TABLET ORAL at 08:50

## 2020-01-26 RX ADMIN — MELATONIN 25 MCG: at 08:50

## 2020-01-26 ASSESSMENT — ACTIVITIES OF DAILY LIVING (ADL)
ORAL_HYGIENE: INDEPENDENT
ORAL_HYGIENE: INDEPENDENT
LAUNDRY: WITH SUPERVISION
HYGIENE/GROOMING: INDEPENDENT
DRESS: INDEPENDENT
DRESS: SCRUBS (BEHAVIORAL HEALTH);INDEPENDENT
HYGIENE/GROOMING: INDEPENDENT

## 2020-01-26 ASSESSMENT — MIFFLIN-ST. JEOR: SCORE: 1743.34

## 2020-01-26 NOTE — PROGRESS NOTES
"Pt was isolative and withdrawn to their room. Pt spent time reading and resting in bed. Pt did not attend groups today. Pt reports feeling \"excitable\", as they hope to discharge this week this week, although aware if may happen the following week. Pt reports hallucinaitons/ flashbacks to negative thoughts about roommates. Pt was calm and cooperative, denies SI/SIB. Pt stated they are experiencing rigidity in their neck, thinks it may be attributed to medication.         01/26/20 1342   Behavioral Health   Hallucinations auditory   Thinking distractable;poor concentration   Orientation person: oriented;date: oriented;place: oriented   Memory baseline memory   Insight poor   Judgement impaired   Eye Contact at examiner   Affect other (see comments)  (denies)   Mood mood is calm   Physical Appearance/Attire attire appropriate to age and situation   Hygiene neglected grooming - unclean body, hair, teeth   Suicidality other (see comments)  (denies)   1. Wish to be Dead (Recent) No   2. Non-Specific Active Suicidal Thoughts (Recent) No   Activity isolative;withdrawn   Speech clear;coherent   Medication Sensitivity no stated side effects;no observed side effects   Psychomotor / Gait balanced;steady   Activities of Daily Living   Hygiene/Grooming independent   Oral Hygiene independent   Dress independent   Laundry with supervision   Room Organization independent   Activity   Activity Assistance Provided independent     "

## 2020-01-26 NOTE — PROGRESS NOTES
" 01/25/20 2200   Art Therapy   Type of Intervention structured groups   Response participates with encouragement    Hours 1   Treatment Detail   (Art Therapy- Intuitive Painting to music)   Goal- cope, express, regulate and reflect through Art Therapy directives    Outcome- Pt was pleasant, cooperative and engaged.He enjoyed socializing particularly with one talkative male peer.  He was feeling pretty good/ content he reported. He did some geometric designs and said he was working with the concept of \" ordered chaos\" in art.  He made a thoughtful piece with a thoughtful explanation of the image and his process.  "

## 2020-01-26 NOTE — PROGRESS NOTES
"Pt reports he is having auditory hallucinations of \"voices telling me I am [place] from [place]. He said they were not telling him to hurt himself or anyone else.   "

## 2020-01-27 PROCEDURE — 25000132 ZZH RX MED GY IP 250 OP 250 PS 637: Performed by: PSYCHIATRY & NEUROLOGY

## 2020-01-27 PROCEDURE — G0177 OPPS/PHP; TRAIN & EDUC SERV: HCPCS

## 2020-01-27 PROCEDURE — H2032 ACTIVITY THERAPY, PER 15 MIN: HCPCS

## 2020-01-27 PROCEDURE — 99232 SBSQ HOSP IP/OBS MODERATE 35: CPT | Mod: GC | Performed by: PSYCHIATRY & NEUROLOGY

## 2020-01-27 PROCEDURE — 12400001 ZZH R&B MH UMMC

## 2020-01-27 PROCEDURE — 25000132 ZZH RX MED GY IP 250 OP 250 PS 637: Performed by: STUDENT IN AN ORGANIZED HEALTH CARE EDUCATION/TRAINING PROGRAM

## 2020-01-27 RX ADMIN — OLANZAPINE 5 MG: 5 TABLET, FILM COATED ORAL at 21:24

## 2020-01-27 RX ADMIN — MELATONIN 25 MCG: at 09:16

## 2020-01-27 RX ADMIN — ARIPIPRAZOLE 20 MG: 10 TABLET ORAL at 09:16

## 2020-01-27 ASSESSMENT — ACTIVITIES OF DAILY LIVING (ADL)
LAUNDRY: WITH SUPERVISION
HYGIENE/GROOMING: INDEPENDENT
DRESS: SCRUBS (BEHAVIORAL HEALTH)
ORAL_HYGIENE: INDEPENDENT
DRESS: INDEPENDENT
HYGIENE/GROOMING: INDEPENDENT
ORAL_HYGIENE: INDEPENDENT

## 2020-01-27 NOTE — PROGRESS NOTES
"Pt was intermittently visible in milieu but remained mostly isolative and socially withdrawn. Pt was observed reading, eating snack/dinner, attending the community meeting, and laying/sleeping in room. Pt presented with a mostly full range affect and remained calm throughout the shift but appeared to have a depressed mood. This  did not observed the patient to be responding to any internal stimuli, however pt indicated he was experiencing auditory hallucinations. In addition, pt stated he was \"down.\" The pt spoke in a mostly clear and coherent manner but the longer this  talked with the pt the more tangential he became.  Pt then denied experiencing SI, HI, or SIB.     01/26/20 1928   Behavioral Health   Hallucinations   (unsubstantiated, did not appear responding)   Thinking distractable;poor concentration   Orientation person: oriented;place: oriented   Memory   (appears baseline)   Insight insight appropriate to situation;insight appropriate to events   Judgement   (adequate)   Eye Contact at examiner   Affect full range affect   Mood mood is calm   Physical Appearance/Attire attire appropriate to age and situation   Hygiene neglected grooming - unclean body, hair, teeth   Suicidality   (pt did not report thoughts or urges)   Self Injury   (pt did not report thought or urges)   Elopement   (no apparent risk)   Activity   (pt intermittently visible in milieu)   Speech clear;coherent   Medication Sensitivity no stated side effects;no observed side effects   Psychomotor / Gait balanced;steady   Psycho Education   Type of Intervention structured groups   Response participates, initiates socially appropriate   Hours 0.5   Treatment Detail   (community meeting)   Activities of Daily Living   Hygiene/Grooming independent   Oral Hygiene independent   Dress scrubs (behavioral health);independent   Laundry   (pt did not do laundry)   Room Organization independent   Activity   Activity Assistance Provided " independent

## 2020-01-27 NOTE — PROGRESS NOTES
"Pt. Overall had good shift. Pt. participated in family meeting this morning and reported that it \"went fine\". Pt. participated in morning and afternoon groups. In morning group, pt. was open and insightful with peers. Pt. was active in milieu, interacting appropriately with peers. Pt. reported some auditory hallucinations but reported that they were manageable and he was able to easily \"tune them out\". At end of shift, pt. received paperwork regarding legal issues. Pt. reported feeling upset and hopeless regarding the possibility of an extended commitment. Pt. denied any SI/SIB/HI.        01/27/20 1431   Behavioral Health   Hallucinations auditory   Thinking distractable;intact   Orientation person: oriented;place: oriented;date: oriented;time: oriented   Memory baseline memory   Insight insight appropriate to situation;insight appropriate to events   Judgement intact   Eye Contact at examiner   Affect full range affect   Mood mood is calm   Physical Appearance/Attire appears stated age;attire appropriate to age and situation   Hygiene neglected grooming - unclean body, hair, teeth   Suicidality   (pt. denied)   1. Wish to be Dead (Recent) No   2. Non-Specific Active Suicidal Thoughts (Recent) No   Self Injury other (see comment)  (pt. denied)   Elopement   (none observed)   Activity other (see comment)  (pt. was active in milieu )   Speech clear;coherent   Medication Sensitivity no stated side effects;no observed side effects   Psychomotor / Gait balanced;steady   Activities of Daily Living   Hygiene/Grooming independent   Oral Hygiene independent   Dress independent   Laundry with supervision   Room Organization independent     "

## 2020-01-27 NOTE — PROGRESS NOTES
"NAVIGATE Medication Management Progress Note NEW MD  A Part of the [facility] First Episode of Psychosis Program    NAVIGATE Enrollee: Jerel Day (1995)     MRN: [id number]  Date:  1/08/20         Contributors to the Assessment     Chart Reviewed.   Interview completed with Jerel Day.  Collateral information obtained from records. Navigate DA intake 1/18/18         Chief Complaint     \" I am doing OK, really\"         Interim History      Jerel Day is a 24 year old male who was last seen in MD clinic on 10/2/19 at which time \" Says he is doing ok, he endorses occasional voices but is not bothered by them. However he still has occasional somatic symptoms like neck soreness.  Has only a couple of weeks left at [facility] and is happy to move out to independent living.  Wants to go down on meds eventually but is ok staying at this level for now.  Plans on going back to school and would like to treat his ADHD, discussed stimulent and nonstimulant options/risks benefits and will readress when school starts.  No medication changes from Olanz. 15 ; Abilify 15 and Nery 300mg bid  The patient reports  adequate treatment adherence.  History was provided by pt who was a fair historian.  Since the last visit:  Luis with family went well. Contact with SEE therapist regards work/school he says \"right now I just don't think I'm well enough\". When asked what might signify that he is well enough, Jerel said he would like \"the voices to get better and not feel physical symptoms either\". Contact with IRT therapist observed   \"Jerel continued to process his experience of that night and sometimes it was difficult for writer to follow. Jerel's thought process was significant for ongoing potential somatic delusions and somatic explanation of symptoms; writer observed Jerel to continue to speak in metaphors/illustrations that are at times difficult for writer to follow. That being said, overall, week-to-week Jerel continues to improve, " "is able to engage more in session together and presents with brighter affect.\"    During this visit Jerel gave an overview of his cannabis and hallucinogenic drug use. He downplays the number of times LSD (only 4-5) used and minimizes its relationship to his psychosis experiences.  He offers no great awareness derived. His justification for use of it to relax neck and back muscle pain even though it is not effective is not clear. He recounted the number of hospitalizations 5 since the start. He doesn't like taking the meds and when the critical voices subside after several days on them he backs off.       PSYCH ROS:  Clinician Rating Form in [facility]  1. Depressed Mood: Rating 2-3  0 Not reported    1 Very mild occasionally feels sad or  down ; of questionable clinical significance   2 Mild occasionally feels moderately depressed or often feels sad or  down    3 Moderate occasionally feels very depressed or often feels moderately depressed   4 Moderately severe often feels very depressed   5 Severe feels very depressed most of the time   6 Very severe constant extremely painful feelings of depression   U Unable to assess      2. Anxiety/Worry: Ratin  0 Not reported    1 Very mild occasionally feels a little anxious; of questionable clinical significance   2 Mild occasionally feels moderately anxious or often feels a little anxious or worried   3 Moderate occasionally feels very anxious or often feels moderately anxious   4 Moderately severe often feels very anxious or often feels moderately anxious   5 Severe feels very anxious or worried most of the time   6 Very severe patient is continually preoccupied with severe anxiety   U Unable to assess      3. Suicidal Ideation/Behavior: Ratin   0 Not reported    1 Very mild occasional thoughts of dying,  I d be better off dead  or  I wish I were dead    2 Mild frequents thoughts of dying or occasional thoughts of killing self, without plan or method   3 Moderate " often thinks of suicide or has though of a specific method   4 Moderately severe has mentally rehearsed a specific method of suicide or has made a suicide attempt with questionable intent to die (e.r. takes aspirins and then tells family)   5 Severe has made preparations for a potentially lethal suicide attempt (e.g acquires a gun and bullets for an attempt)   6 Very severe has made a suicide attempt with an intent to die   U Unable to assess       4. Elevated/Expansive Mood: Ratin  0 Not at all    1 Very mild questionable; more cheerful than most people in his/her circumstances but of only possible clinical significance   2 Mild brief elevated/expansive mood but only somewhat out of proportion to the circumstances   3 Moderate brief/occasional elevation of mood which is clearly out of proportion to the circumstances   4 Moderately severe sustained/frequent elevation of mood which is clearly out of proportion to the circumstances   5 Severe mood is euphoric most of the time   6 Very severe sustained elevation;  everything is wonderful  almost all of the time   U Unable to assess      5. Hostility/Anger/Irritability/Aggressiveness: Ratin-3  0 Not at all    1 Very mild occasional irritability of doubtful clinical significance   2 Mild occasionally feels angry or mild or indirect expressions of anger, e.g. sarcasm, disrespect or hostile gestures   3 Moderate frequently feels angry, frequent irritability or occasional direct expression of anger, e.g. yelling at others   4 Moderately severe often feels very angry, often yells at others or occasionally threatens to harm others   5 Severe has acted on his anger by becoming physically abusive on one or two occasions or makes frequent threats to harm others or is very angry most of the time   6 Very severe has been physically aggressive and/or required intervention to prevent assaultiveness on several occasions; or any serious assaultive act   U Unable to assess       6. Impulsive Behavior: Ratin  0 Not at all    1 Very mild one instance of impulsive behavior which is of doubtful clinical significance   2 Mild occasional impulsive acts, e.g. making phone calls at odd hours   3 Moderate occasional impulsive acts with some potential negative consequence, e.g. leaving work abruptly; changing plans without thinking   4 Moderately severe impulsive acts with definite negative consequences, e.g. overspending on non-essentials; repeated reckless sexual behavior   5 Severe impulsive acts with direct negative consequences, e.g. spends entire income on nonessentials without regard for basic needs   6 Very severe impulsive behavior which is potentially life threatening, e.g. jumps from dangerous height (without suicidal intent) or criminal behavior, e.g. impulsive robbery   U Unable to assess      7. Suspiciousness: Ratin  0 Not present    1 Very mild Seems on guard. Reluctant to respond to some  personal  questions. Reports being overly self-conscious in public   2 Mild Describes incidents in which others have harmed or wanted to harm him/her that sound plausible. Patient feels as if others are watching, laughing, or criticizing him/her in public, but this occurs only occasionally or rarely. Little or no preoccupation   3 Moderate Says others are talking about him/her maliciously, have negative intentions, or may harm him/her. Beyond the likelihood of plausibility, but not delusional. Incidents of suspected persecution occur occasionally (less than once per week) with some preoccupation   4 Moderately severe Same as 3, but incidents occur frequently such as more than once a week. Patient is moderately preoccupied with ideas of persecution OR patient reports persecutory delusions expressed with much doubt (e.g. partial delusion)   5 Severe Delusional -- speaks of [place] plots, the FBI, or others poisoning his/her food, persecution by supernatural forces   6 Extremely severe Same  as 5, but the beliefs are bizarre or more preoccupying. Patient tends to disclose or act on persecutory delusions.   U Unable to assess      8. Unusual Thought Content: Ratin  0 Not present    1 Very mild Ideas of reference (people may stare or may laugh at him), ideas of persecution (people may mistreat him). Unusual beliefs in psychic esquivel, spirits, UFOs, or unrealistic beliefs in one's own abilities. Not strongly held. Some doubt   2 Mild Same as 1, but degree of reality distortion is more severe as indicated by highly unusual ideas or greater conviction. Content may be typical of delusions (even bizarre), but without full conviction. The delusion does not seem to have fully formed, but is considered as one possible explanation for an unusual experience   3 Moderate Delusion present but no preoccupation or functional impairment. May be an encapsulated delusion or a firmly endorsed absurd belief about past delusional circumstances   4 Moderately severe Full delusion(s) present with some preoccupation OR some areas of functioning disrupted by delusional thinking   5 Severe Full delusion(s) present with much preoccupation OR many areas of functioning are disrupted by delusional thinking   6 Extremely severe Full delusions present with almost   U Unable to assess      9. Hallucinations: Ratin  0 Not present    1 Very mild While resting or going to sleep, sees visions, smells odors, or hears voices, sounds or whispers in the absence of external stimulation, but no impairment in functioning   2 Mild While in a clear state of consciousness, hears a voice calling the subject s name, experiences non-verbal auditory hallucinations (e.g., sounds or whispers), formless visual hallucinations, or has sensory experiences in the presence of a modality-relevant stimulus (e.g., visual illusions) infrequently (e.g., 1-2 times per week) and with no functional impairment   3 Moderate Occasional verbal, visual, gustatory,  olfactory, or tactile hallucinations with no functional impairment OR non-verbal auditory hallucinations/visual illusions more than infrequently or with impairment   4 Moderately severe Experiences daily hallucinations OR some areas of functioning are disrupted by hallucinations   5 Severe Experiences verbal or visual hallucinations several times a day OR many areas of functioning are disrupted by these hallucinations   6 Extremely severe Persistent verbal or visual hallucinations throughout the day OR most areas of functioning are disrupted by these hallucinations   U Unable to assess      10. Conceptual Disorganization: Ratin  0 Not present    1 Very mild Peculiar use of words or rambling but speech is comprehensible   2 Mild Speech a bit hard to understand or make sense of due to tangentiality, circumstantiality, or sudden topic shifts   3 Moderate Speech difficult to understand due to tangentiality, circumstantiality, idiosyncratic speech, or topic shifts on many occasions OR 1-2 instances of incoherent phrases   4 Moderately severe Speech difficult to understand due to circumstantiality, tangentiality, neologisms, blocking, or topic shifts most of the time OR 3-5 instances of incoherent phrases   5 Severe Speech is incomprehensible due to severe impairments most of the time. Many PSRS items cannot be rated by self-report alone   6 Extremely severe Speech is incomprehensible throughout interview   U Unable to assess      11. Avolition/Apathy: Ratin  0 Not at all    1 Very mild questionable decrease in time spent in goal-directed activities   2 Mild spends less time in goal-directed activities than is appropriate for situation and age   3 Moderate initiates activities at times but does not follow through   4 Moderately severe rarely initiates activity but will passively engage with encouragement   5 Severe almost never initiates activities; requires assistance to accomplish basic activities   6 Very  severe does not initiate or persist in any goal-directed activity even with outside assistance   U Unable to assess      12. Asociality/Low Social Drive: Ratin  0 Not at all    1 Very mild questionable   2 Mild slow to initiate social interactions but usually responds to overtures by others   3 Moderate rarely initiates social interactions; sometimes responds to overtures by others   4 Moderately severe does not initiate but sometimes responds to overtures by others; little social interaction outside close family members   5 Severe never initiates and rarely encourages conversations or activities; avoids being with others unless prodded, may have contacts with family   6 Very severe avoids being with others (even family members) whenever possible, extreme social isolation   U Unable to assess      13. Adherence: Days: 1  The longest, continuous amount of time in days, since the last visit when the subject did not take medication     14. EPS Part I: Ratin  Rate Elbow Rigidity for all subjects  0 Normal   1 Slight stiffness and resistance   2 Moderate stiffness and resistance   3 Marked rigidity with difficulty in passive movement   4 Extreme stiffness and rigidity with almost a frozen joint   U Unable to assess           EPS Part 2: Signs of EPS: 1  Are there are other signs of EPS (eg diminished arm swing, postural instability, cogwheeling, tremor, akinesia) present based upon patient report or exam?  0 No   1 Yes     15. Akathesia: Ratin  0 No restlessness reported or observed   1 Mild restlessness observed; e.g., occasional jiggling of the foot occurs when subject is seated   2 Moderate restlessness observed; e.g., on several occasions, jiggles foot, crosses and uncrosses legs or twists a part of the body   3 Restlessness is frequently observed; e.g., the foot or legs moving most of the time   4 Restlessness persistently observed; e.g., subject cannot sit still, may get up and walk   U Unable to assess      16. Dyskinetic Movement Ratings: Ratin  0 None   1 Minimal, may be extreme normal   2 Mild   3 Moderate   4 Severe   U Unable to assess     SIDE EFFECT ASSESSMENT:  Clinician Rating Form in [facility] - Side Effect Assessment  Address side effects reported by the patient and rate using this scale  0 Not present   1 Minimal, may be extreme normal   2 Mild   3 Moderate   4 Severe   U Unable to assess   P Present, but not related     Feeling dizzy or faint: 0  Blurred vision: 1  Dry mouth: 1  Too much saliva/droolin  Nausea:  1  Constipation: 1  Increased appetite: 0  Weight gain: 1  Weight loss: 0  Feeling tired/fatigue: 1  Daytime sedation: 1  Hypersomnia: 1  Insomnia: 1  Low libido: 0  Other problems with sex: 1  Breast enlargement or discharge:  0  Irregular Menstruation or amenorrhea: 0  Other (please list and rate): 0    RECENT SUBSTANCE USE:  Clinician Rating Form in [facility] - Substance Use Assessment  Alcohol Use Severity: Ratin  0 none   1 use without impairment: drinks but no immediate social or medical impairment   2 use with impairment: e.g. becomes grossly intoxicated; alcohol use or withdrawal compromises school, work or social functioning; alcohol use or withdrawal exacerbates symptoms (e.g. gets depressed when drinking)     Marijuana Use Severity: Ratin  0 none   1 occasional use without impairment: e.g. uses marijuana a few days a month and has no immediate social or medical impairment   2 frequent use without impairment: e.g. uses marijuana several or more days a week but has no immediate social or medical impairment   3 use with impairment: e.g. becomes grossly intoxicated; marijuana use compromises school, work or social functioning; marijuana use exacerbates symptoms (e.g. gets paranoid when using)     Other Drug Use Severity: Ratin  0 none   1 occasional use without impairment: e.g. uses drug(s) a few days a month and has no immediate social or medical impairment   2 frequent  use without impairment: e.g. uses drug(s) several or more days a week but has no immediate social or medical impairment   3 use with impairment: e.g. becomes grossly intoxicated; drug use compromises school, work or social functioning; drug use exacerbates symptoms (e.g. gets paranoid when using)     RECENT SOCIAL HISTORY:      MEDICAL ROS:           First Episode of Psychosis History      DUP (duration untreated psychosis):  12 m  Route to initial care: IP  Medication adherence overall:  See above, Clinician Rating Form in [facility] Item 13  General frequency of visits:  2xmo  Participation in groups:  No  Cognitive Remediation:  No  Other treatment history:       Reviewed for completion of First Episode work-up:  Yes  First episode workup:  Done (if completed, see LABS for results)  MATRICS Consensus Cognitive Battery:  Not Done (if completed, see LABS for results)         Medical/Surgical History     Penicillins    Patient Active Problem List   Diagnosis     Schizoaffective disorder (H)     Psychosis (H)     Suicidal ideation            Medications     No current outpatient medications on file.             Vitals     /72   Pulse 91   Wt 79.7 kg (175 lb 9.6 oz)   BMI 26.31 kg/m        Weight prior to medication: 140         Mental Status Exam     Alertness: alert  and oriented  Appearance: adequately groomed  Behavior/Demeanor: pleasant, overconfident with good  eye contact   Speech: regular rate and rhythm  Language: intact  Psychomotor: normal or unremarkable  Mood: mildly elevated  Affect: full range; was congruent to mood; was congruent to content  Thought Process/Associations: unremarkable and overinclusive   Thought Content:  Reports  and over-valued ideas;  Denies suicidal and violent ideation  Perception:  Reports auditory hallucinations;  Denies visual hallucinations  Insight: limited  Judgment: limited  Cognition: does  appear grossly intact; formal cognitive testing was not done         Labs and  Data     RATING SCALES:  PHQ9 and JUNAID-7    PHQ9 TODAY = 11/ =7  PHQ-9 SCORE 12/27/2019 1/8/2020 1/20/2020   PHQ-9 Total Score 9 11 11       ANTIPSYCHOTIC LABS ROUTINE    [glu, A1C, lipids (focus LDL), liver enzymes, WBC, ANEU, Hgb, plts]   q12 mo  Recent Labs   Lab Test 01/18/20  0723 01/03/18  1054   GLC 90 73     Recent Labs   Lab Test 01/18/20  0723 01/03/18  1054   CHOL 172 135   TRIG 137 93   LDL 97 70   HDL 48 46     Recent Labs   Lab Test 01/18/20  0723 01/03/18  1054   AST 19 19   ALT 32 37   ALKPHOS 66 66     Recent Labs   Lab Test 01/18/20  0723 01/03/18  1054   WBC 8.0 8.0   ANEU 4.0 5.5   HGB 15.4 13.9    241            Psychiatric Diagnoses     Schizoaffective disorder , bipolar type         Assessment     TODAY despite his chaotic history of hospital relapses and remissions on record he appeared very casual and in control at this first visit.  He voiced his dislike for medications at the current dosages which are producing side effects as outlined.                [place] PRESCRIPTION MONITORING PROGRAM [] was not checked today: will be checked next visit.    PSYCHOTROPIC DRUG INTERACTIONS:   None.  MANAGEMENT:  Monitoring for adverse effects, using lowest therapeutic dose of [SGA] and patient is aware of risks         Plan     1) PSYCHOTROPIC MEDICATIONS:  - olanzapine 15mg  - abilify 15mg  - elisa 300mg bid (neck pain)  - cogentin 0.5 bid    2) THERAPY:  Continue    3) NEXT DUE:    Labs- N/A  Rating Scales- N/A    4) REFERRALS:    No Referrals needed    5) RTC: 4 wks    6) CRISIS NUMBERS:   Provided routinely in AVS.      TREATMENT RISK STATEMENT:  The risks, benefits, alternatives and potential adverse effects have been discussed and are understood by the pt. The pt understands the risks of using street drugs or alcohol. There are no medical contraindications, the pt agrees to treatment with the ability to do so. The pt knows to call the clinic for any problems or to access emergency care if  needed.  Medical and substance use concerns are documented above.  Psychotropic drug interaction check was done, including changes made today.      PROVIDER:  Shai Yusuf MD    I, Shai Yusuf MD spent a total of  60 minutes face to face with patient during today's office visit.  Over 50% of this time was spent counseling the patient, and/or coordinating care regarding assumption of care and addressing substance use active...
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
## 2020-01-27 NOTE — PROGRESS NOTES
Jerel  attended 3 of 3 OT groups today. This a.m. he participated in a discussion group on human behavior (Deisy's Choice Theory). Engaged thoughtfully in a group discussion on coping tactics & creative strategies for meeting personal needs. Self-reflective, forthcoming, and pleasant. Speech somewhat loose & tangential. Also participated in OT clinic this afternoon, where he initiated a chosen project, followed through with plan, and asked for support with supplies as needed.     01/27/20 1431   Occupational Therapy   Type of Intervention structured groups   Response Initiates, socially acceptable   Hours 3

## 2020-01-27 NOTE — PLAN OF CARE
Problem: Adult Behavioral Health Plan of Care  Goal: Patient-Specific Goal (Individualization)  Flowsheets (Taken 1/27/2020 1323)  Patient Personal Strengths: community support; expressive of needs; family/social support; interests/hobbies; stable living environment  Patient Vulnerabilities: History of not being med compliant, lacks insight to illness, strained relationship with parents, ongoing SIB  Note:

## 2020-01-28 ENCOUNTER — PATIENT OUTREACH (OUTPATIENT)
Dept: PSYCHIATRY | Facility: CLINIC | Age: 25
End: 2020-01-28

## 2020-01-28 PROCEDURE — H2032 ACTIVITY THERAPY, PER 15 MIN: HCPCS

## 2020-01-28 PROCEDURE — G0177 OPPS/PHP; TRAIN & EDUC SERV: HCPCS

## 2020-01-28 PROCEDURE — 12400001 ZZH R&B MH UMMC

## 2020-01-28 PROCEDURE — 25000132 ZZH RX MED GY IP 250 OP 250 PS 637: Performed by: STUDENT IN AN ORGANIZED HEALTH CARE EDUCATION/TRAINING PROGRAM

## 2020-01-28 PROCEDURE — 25000132 ZZH RX MED GY IP 250 OP 250 PS 637: Performed by: PSYCHIATRY & NEUROLOGY

## 2020-01-28 PROCEDURE — 99232 SBSQ HOSP IP/OBS MODERATE 35: CPT | Mod: GC | Performed by: PSYCHIATRY & NEUROLOGY

## 2020-01-28 RX ADMIN — ARIPIPRAZOLE 20 MG: 10 TABLET ORAL at 08:48

## 2020-01-28 RX ADMIN — MELATONIN 25 MCG: at 08:48

## 2020-01-28 RX ADMIN — OLANZAPINE 5 MG: 5 TABLET, FILM COATED ORAL at 21:53

## 2020-01-28 RX ADMIN — NICOTINE POLACRILEX 2 MG: 2 GUM, CHEWING ORAL at 21:52

## 2020-01-28 ASSESSMENT — MIFFLIN-ST. JEOR: SCORE: 1737.89
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
## 2020-01-28 NOTE — PROGRESS NOTES
CLINICAL NUTRITION SERVICES - REASSESSMENT NOTE     Nutrition Prescription  Malnutrition Status:    Patient does not meet two of the established criteria necessary for diagnosing malnutrition    Recommendations already ordered by Registered Dietitian (RD):  Continue snacks as ordered      EVALUATION OF THE PROGRESS TOWARD GOALS   Diet: Vegetarian  AM snack: berry yogurt and pears  Lactose free milk with lunch     Intake: Pt reports that his appetite has been good. He is eating ~75% of meals and some snacks that his parents brought in.        NEW FINDINGS   Weight: 76.8 kg on 1/28, wt stable from admission wt of 76 kg on 1/17.     Labs: Reviewed    Meds: Vit D3    MALNUTRITION  % Intake: No decreased intake noted  % Weight Loss: None noted  Subcutaneous Fat Loss: None observed  Muscle Loss: None observed  Fluid Accumulation/Edema: None noted  Malnutrition Diagnosis: Patient does not meet two of the established criteria necessary for diagnosing malnutrition    Previous Goals   Patient to consume % of nutritionally adequate meal trays TID, or the equivalent with supplements/snacks.  Evaluation: Not met    Previous Nutrition Diagnosis  Predicted inadequate protein-energy intake related to variable appetite as evidenced by pt reliant on PO intakes to meet 100% of nutritional needs with potential for variation     Evaluation: Resolved    CURRENT NUTRITION DIAGNOSIS  No nutrition diagnosis at this time     INTERVENTIONS  Implementation  Nutrition education for nutrition relationship to health/disease: Discussed current PO/appetite. Pt with no nutrition questions or concerns at this time.     Follow Up/ Monitoring  No nutrition follow-up warranted at this time. RD to sign off. Please consult if further needs arise.     Sharee Lentz RD, LD  Unit pager: [phone]

## 2020-01-28 NOTE — PROGRESS NOTES
Patient slightly more visible in milieu. Social with select peers. Appetite good. Group attendance improved. Reading for activity. Patient states voices less frequent overall.          01/28/20 1452   Behavioral Health   Hallucinations   (Patient states voices are less frequent.)   Thinking   (Improved.)   Memory baseline memory   Insight   (Improving.)   Judgement impaired   Eye Contact at examiner   Affect   (Brighter.)   Physical Appearance/Attire untidy;disheveled   Hygiene neglected grooming - unclean body, hair, teeth   1. Wish to be Dead (Recent) No   Activity restless   Speech coherent;clear

## 2020-01-28 NOTE — PHARMACY
Consulted by Dhaval Bob to run a test claim for Vraylar and Clozapine. Medications are both covered at $0.00 copay through primary and secondary insurance plan with need for extra authorization.    Feel free to contact me with any other test claims, prior authorizations, or insurance questions regarding outpatient medications.     Thanks!        Sara Hdz CPhT  [facility] Liaison  Pronouns: She/Her/Hers    [facility]  [facility]  [address] [place] [address]   [email or web address]  [email or web address]   Phone: [phone]  Pager: [phone]  Fax: [phone]   Orthopedics   Geetha Colon 69 y o  female MRN: [id number]  Unit/Bed#: CW3 343-01      Subjective:  69 y  o female post operative day 4 AMADOU and conversion to left total hip arthroplasty   Pain improving    Labs:    0  Lab Value Date/Time   HCT 30 5 (L) 11/29/2017 0437   HCT 26 3 (L) 11/28/2017 0511   HCT 38 1 11/06/2017 0638   HCT 36 8 12/16/2014 0727   HCT 28 8 (L) 10/30/2014 0626   HCT 29 2 (L) 10/27/2014 0648   HGB 10 0 (L) 11/29/2017 0437   HGB 8 2 (L) 11/28/2017 0511   HGB 12 0 11/06/2017 0638   HGB 11 9 12/16/2014 0727   HGB 8 8 (L) 10/30/2014 0626   HGB 9 0 (L) 10/27/2014 0648   INR 0 95 11/09/2017 1217   INR 0 96 10/13/2014 0608   WBC 10 85 (H) 11/29/2017 0437   WBC 8 42 11/28/2017 0511   WBC 6 58 11/06/2017 0638   WBC 5 60 12/16/2014 0727   WBC 5 58 10/30/2014 0626   WBC 6 95 10/27/2014 0648       Meds:    Current Facility-Administered Medications:     acetaminophen (TYLENOL) tablet 650 mg, 650 mg, Oral, Q6H PRN, Luis Jessica PA-C, 650 mg at 11/30/17 1249    calcium carbonate (TUMS) chewable tablet 1,000 mg, 1,000 mg, Oral, Daily PRN, Luis Jessica PA-C    diazepam (VALIUM) tablet 5 mg, 5 mg, Oral, HS PRN, Luis Jessica PA-C, 5 mg at 11/28/17 2315    enoxaparin (LOVENOX) subcutaneous injection 40 mg, 40 mg, Subcutaneous, Daily, Luis Jessica PA-C, 40 mg at 11/30/17 0804    gabapentin (NEURONTIN) capsule 100 mg, 100 mg, Oral, TID, Luis Jessica PA-C, 100 mg at 11/30/17 2132    lactated ringers infusion, 20 mL/hr, Intravenous, Continuous, Alfredo Vargas MD, Stopped at 11/28/17 1809    lactated ringers infusion, 50 mL/hr, Intravenous, Continuous, Judith Guevara CRNA, Stopped at 11/27/17 1740    lactated ringers infusion, 1 5 mL/kg/hr, Intravenous, Continuous, Luis Jessica PA-C, Stopped at 11/28/17 4004    levothyroxine tablet 25 mcg, 25 mcg, Oral, Early Morning, Luis Jessica PA-C, 25 mcg at 11/30/17 0542    morphine injection 2 mg, 2 mg, Intravenous, Q3H PRN, Romulo Hutchinsonon, PA-C, 2 mg at 11/28/17 0857    nortriptyline (PAMELOR) capsule 35 mg, 35 mg, Oral, HS, Wilburt Evangelista, PA-C, 35 mg at 11/30/17 2132    ondansetron (ZOFRAN) injection 4 mg, 4 mg, Intravenous, Q6H PRN, Romulo Naidu, PA-C    oxyCODONE (ROXICODONE) immediate release tablet 10 mg, 10 mg, Oral, Q4H PRN, Rigobertot Evangelista, PA-C, 10 mg at 12/01/17 0341    oxyCODONE (ROXICODONE) IR tablet 5 mg, 5 mg, Oral, Q4H PRN, Wiljennat Evangelista, PA-C, 5 mg at 11/29/17 2053    pantoprazole (PROTONIX) EC tablet 40 mg, 40 mg, Oral, Early Morning, Rigobertot Evangelista, PA-C, 40 mg at 11/30/17 0542    traMADol (ULTRAM) tablet 50 mg, 50 mg, Oral, Q6H [facility], Romulo Hutchinsonon, PA-C, 50 mg at 11/30/17 2331    Blood Culture:   No results found for: BLOODCX    Wound Culture:   No results found for: WOUNDCULT    Ins and Outs:  I/O last 24 hours: In: 26 [P O :440]  Out: 550 [Urine:550]          Physical Exam:  Vitals:    12/01/17 0000   BP:    Pulse:    Resp:    Temp:    SpO2: 93%     Left lower extremity:  · Dressings C/D/I  · Sensation intact s/s/sp/dp/t  · +ankle df/pf, ehl/fhl, + knee flex/ext    _*_*_*_*_*_*_*_*_*_*_*_*_*_*_*_*_*_*_*_*_*_*_*_*_*_*_*_*_*_*_*_*_*_*_*_*_*_*_*_*_*    Assessment: 69 y  o female post operative day 4 left total hip arthroplasty with controlled pain       Plan:  · Weight Bearing as tolerated LLE  · Up and out of bed  · Total hip precautions posterior   · DVT prophylaxis: lovenox  · Analgesics  · PT/OT  · JATIN  · D/C planning    Monse Hobson MD

## 2020-01-28 NOTE — PROGRESS NOTES
01/27/20 1800   General Information   Art Directive other (see comments)   AT directive was a self-compassion exercise with a choice of creating a wish for their younger selves or a wish for themselves in the present. Goals of directive: exploring theme of self-compassion/self-care, emotional expression.  Pt was a positive participant, focused on task for the full duration of group. Pt finished drawing and briefly shared with group. Pt created more of a drawing of his choice, rather then working on the suggested directive. Pt doretha the interior view of a tree with various layers. Pt shared that his last name means birch wood. Pt referred to the tree image as a self symbol of sorts.  Pts mood was calm.
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
## 2020-01-28 NOTE — PROGRESS NOTES
Group Psychotherapy     Topic: General Stabilization Discussion     Hours: 1.0     Response: Patient was active in group, provided positive and appropriate responses. Patient presented with more insight to illness than previous encounters. Patient voiced frustration with medication and perceived lack of discharge plan.     Facilitator: Nicole Cordova LPCC, LADC

## 2020-01-28 NOTE — PROGRESS NOTES
NAVIGATE Outreach  A Part of the [employer] First Episode of Psychosis Program     Patient Name: Jerel Day  /Age:  1995 (24 year old)    Contact: Writer called and LVM for [facility] - requested return call for update and care coordination purposes.    EDELMIRA Morgan  NAVIGATE Individual Resiliency  & Family Clinician

## 2020-01-29 ENCOUNTER — PATIENT OUTREACH (OUTPATIENT)
Dept: PSYCHIATRY | Facility: CLINIC | Age: 25
End: 2020-01-29

## 2020-01-29 PROCEDURE — 25000132 ZZH RX MED GY IP 250 OP 250 PS 637: Performed by: STUDENT IN AN ORGANIZED HEALTH CARE EDUCATION/TRAINING PROGRAM

## 2020-01-29 PROCEDURE — 25000132 ZZH RX MED GY IP 250 OP 250 PS 637: Performed by: PSYCHIATRY & NEUROLOGY

## 2020-01-29 PROCEDURE — G0177 OPPS/PHP; TRAIN & EDUC SERV: HCPCS

## 2020-01-29 PROCEDURE — 12400001 ZZH R&B MH UMMC

## 2020-01-29 PROCEDURE — H2032 ACTIVITY THERAPY, PER 15 MIN: HCPCS

## 2020-01-29 RX ADMIN — ARIPIPRAZOLE 20 MG: 10 TABLET ORAL at 09:41

## 2020-01-29 RX ADMIN — MELATONIN 25 MCG: at 09:40

## 2020-01-29 RX ADMIN — OLANZAPINE 5 MG: 5 TABLET, FILM COATED ORAL at 21:41

## 2020-01-29 ASSESSMENT — ACTIVITIES OF DAILY LIVING (ADL)
LAUNDRY: WITH SUPERVISION
HYGIENE/GROOMING: INDEPENDENT
ORAL_HYGIENE: INDEPENDENT
DRESS: INDEPENDENT
DRESS: SCRUBS (BEHAVIORAL HEALTH);INDEPENDENT
HYGIENE/GROOMING: INDEPENDENT
ORAL_HYGIENE: INDEPENDENT
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
## 2020-01-29 NOTE — PROGRESS NOTES
01/28/20 1900   Behavioral Health   Hallucinations denies / not responding to hallucinations   Thinking delusional   Orientation person: oriented;place: oriented;date: oriented;time: oriented   Insight admits / accepts   Eye Contact into space;at examiner   Affect blunted, flat   Physical Appearance/Attire attire appropriate to age and situation   1. Wish to be Dead (Recent) No   2. Non-Specific Active Suicidal Thoughts (Recent) No   3. Active Sucidal Ideation with any Methods (Not Plan) Without Intent to Act (Recent) No   4. Active Suicidal Ideation with Some Intent to Act, Without Specific Plan (Recent) No   5. Active Suicidal Ideation with Specific Plan and Intent (Recent) No   Activity restless   Speech clear;coherent   Music Therapy   Type of Participation Music therapy group   Response Participates independently   Hours 1     Pt reports having a good day, pt had visitors and was looking forward to the company. Pt described delusions that were hard to follow, this included feeling a sensation around spinal area. Pt reports this sensation is not new and he is use to having the feeling. Pt was observed pacing around the hallway. Pt was calm and cooperative.

## 2020-01-29 NOTE — PLAN OF CARE
Problem: OT General Care Plan  Goal: OT Goal 1    Pt attended 1 out of 2 OT groups offered. Pt actively participated in occupational therapy clinic. Pt was able to ask for assistance as needed, and independently initiated a familiar creative expression task. Pt demonstrated good focus, planning, and attention to detail. Social with peers and writer throughout. He shared that he was considering transferring to a different college, and asked to discuss pros and cons of this decision. Calm, pleasant, cooperative, and engaged throughout group.

## 2020-01-29 NOTE — PLAN OF CARE
"Jerel this AM appeared tense and anxious-requested hydroxyzine PRN for anxiety due to racing thoughts-did  decline prior to swallowing stating he wanted to see if Abilify would help-Later in 1:1 reported resolution of anxiety with Abilify-when asked about auditory hallucinations stated, \"Well, what happens is I start to think about how I'm falling behind my peers and then my mind starts racing and I get very anxious.\"-Jerel denies auditory hallucinations-very open in discussing expectations he holds for himself and his future-Discussed actions which help him feel better-spoke of feeling better when he is not focused on himself, but thinking of others and little things he does to reach out, e.g. offer someone a cigarette at a party-spoke of wanting to improve his ability to be supportive of others in constructive ways-has completed over 90 credits towards psychology degree at [employer]-Jerel appeared to be enjoying playing music and socializing with peers-pleasant in interactions-much more relaxed late AM and afternoon  "

## 2020-01-29 NOTE — PROGRESS NOTES
Re: Request to Amend Clark    A request to amend Clark order to remove the following authorized medications - Zyprexa (Olanzapine) and Risperdal (Risperidone) with the request to add the following medications: Vraylar (Cariprazine) and Clozapine was faxed to [facility]. Copy of request labeled and sent down to scanning.    Nicole Cordova, [facility], [facility]  Clinical Treatment Coordinator

## 2020-01-29 NOTE — PROGRESS NOTES
NAVIGATE Family Peer Support  A Part of the [facility] First Episode of Psychosis Program     Patient Name: Jerel Day  /Age:  1995 (24 year old)  Date of Encounter: 20  Length of Contact: 5 minutes    This writer reached out to offer resources and support to patient's mother, Mesha.     Mesha appreciated the call and said that parents were able to meet with inpatient team on Monday (20). Dr. Vince Zheng was at the meeting and it helpful.  Mesha will reach out to schedule a meeting with both parents in the near future.    CONSTANTINO FletcherATE Family Peer    [Billing Code 48380 for No Billable Service as family peer support is a nonbillable service]

## 2020-01-29 NOTE — PROGRESS NOTES
Participated in Music Therapy group with focus on mood elevation, validation and decreasing anxiety and improved group cohesiveness. Engaged and cooperative in music listening interventions.   Showed progress in session goals.  Likes to make experimental music.

## 2020-01-30 ENCOUNTER — PATIENT OUTREACH (OUTPATIENT)
Dept: PSYCHIATRY | Facility: CLINIC | Age: 25
End: 2020-01-30

## 2020-01-30 PROCEDURE — 99232 SBSQ HOSP IP/OBS MODERATE 35: CPT | Mod: GC | Performed by: PSYCHIATRY & NEUROLOGY

## 2020-01-30 PROCEDURE — 25000132 ZZH RX MED GY IP 250 OP 250 PS 637: Performed by: PSYCHIATRY & NEUROLOGY

## 2020-01-30 PROCEDURE — H2032 ACTIVITY THERAPY, PER 15 MIN: HCPCS

## 2020-01-30 PROCEDURE — 12400001 ZZH R&B MH UMMC

## 2020-01-30 PROCEDURE — 25000132 ZZH RX MED GY IP 250 OP 250 PS 637: Performed by: STUDENT IN AN ORGANIZED HEALTH CARE EDUCATION/TRAINING PROGRAM

## 2020-01-30 PROCEDURE — 90853 GROUP PSYCHOTHERAPY: CPT

## 2020-01-30 RX ADMIN — MELATONIN 25 MCG: at 09:00

## 2020-01-30 RX ADMIN — OLANZAPINE 5 MG: 5 TABLET, FILM COATED ORAL at 22:07

## 2020-01-30 RX ADMIN — GABAPENTIN 300 MG: 300 CAPSULE ORAL at 23:28

## 2020-01-30 RX ADMIN — ARIPIPRAZOLE 20 MG: 10 TABLET ORAL at 09:00

## 2020-01-30 ASSESSMENT — ACTIVITIES OF DAILY LIVING (ADL)
ORAL_HYGIENE: INDEPENDENT
ORAL_HYGIENE: INDEPENDENT
DRESS: INDEPENDENT
DRESS: SCRUBS (BEHAVIORAL HEALTH)
HYGIENE/GROOMING: INDEPENDENT
LAUNDRY: UNABLE TO COMPLETE
LAUNDRY: UNABLE TO COMPLETE
HYGIENE/GROOMING: INDEPENDENT

## 2020-01-30 NOTE — PROGRESS NOTES
01/30/20 1800   Art Therapy   Type of Intervention structured groups   Response participates with encouragement   Hours 2   Treatment Detail    (Art Therapy-feelings, social skills and coping   Objective- Patients use art media, the creative process & resulting artwork to:explore feelings,reconcile emotions, gain self-awareness/ self esteem, manage behaviors, develop social skills, improve reality orientation, & reduce anxiety /depression.     Outcome- Pt was quietly engaged in most groups today. He worked on a painting and recommended some calming music.

## 2020-01-30 NOTE — PROGRESS NOTES
Pt was intermittently present and somewhat social in the milieu. Pt was observed playing the [employer], reading, attending the OT group, eating dinner, meeting with visitors, and watching TV. Pt presented with a full range affect and remained calm throughout the shift. Pt spoke mostly clear and coherently but some of his speech patterns could be described as tangential. Pt endorsed experiencing auditory hallucinations and some anxiety. Pt then denied experiencing SI, HI, or SIB. Pt cited tremors in hand as possible medication side effect.        01/29/20 1945   Behavioral Health   Hallucinations auditory   Thinking distractable;poor concentration   Orientation person: oriented;place: oriented   Memory   (unsubstantiated)   Insight insight appropriate to situation;insight appropriate to events   Judgement impaired   Eye Contact at examiner   Affect full range affect   Mood anxious;mood is calm   Physical Appearance/Attire attire appropriate to age and situation   Hygiene   (adequate)   Suicidality   (pt denied thoughts and urges)   Self Injury   (pt denied thoughts and urges)   Elopement   (no apparent risk)   Activity   (pt intermittently present in milieu)   Speech tangential;clear;coherent   Medication Sensitivity no stated side effects;no observed side effects   Psychomotor / Gait balanced;steady   Psycho Education   Type of Intervention 1:1 intervention   Response participates, initiates socially appropriate   Hours 0.5   Treatment Detail   (check-in)   Activities of Daily Living   Hygiene/Grooming independent   Oral Hygiene independent   Dress scrubs (behavioral health);independent   Laundry   (pt did not do laundry)   Room Organization independent   Activity   Activity Assistance Provided independent

## 2020-01-30 NOTE — PROGRESS NOTES
"     ----------------------------------------------------------------------------------------------------------  [facility], [place]   Psychiatric Progress Note  Hospital Day #13      Interim History:   The patient's care was discussed with the treatment team and chart notes were reviewed.     Sleep: 7 hrs  Scheduled Medications: As prescribed  PRN Medications: Asked for Hydroxyzine 25mg at 09:48 but then declined.     Staff Report: Pt was intermittently present and somewhat social in the milieu. Pt was observed playing the guitar, reading, attending the OT group, eating dinner, meeting with visitors, and watching TV. Pt presented with a full range affect and remained calm throughout the shift. Pt spoke mostly clear and coherently but some of his speech patterns could be described as tangential. Pt endorsed experiencing auditory hallucinations and some anxiety. Pt then denied experiencing SI, HI, or SIB. Pt cited tremors in hand as possible medication side effect.     Patient Interview: Jerel was found in his room and interviewed with the care team in the conference room. He reports that he had a \"rough night\" last night. When asked why Jerel shares that some of the other patients made negative comments about his guitar playing yesterday evening and that he felt badly about this. He reports that this has caused him to have some auditory hallucinations (people from old relationships telling him that he is worthless) and thoughts of self-harm; he also reports that he again had a \"flashback\" about a girl he knew in middle school and one of his friends having sex. Jerel initially denies trying any coping skills to deal with these negative thoughts and emotions but eventually shares that he has been using deep breathing techniques. He denies talking to any staff about these feelings and is not optimistic that it would help. Jerel reported to a staff member that he was concerned about a tremor in " "his hands that he thought might be a side effect of his medication. No tremor was noted on exam. He further shares that he has been having \"tremors in his spine\"and that he tries to \"symmetrize\" himself when he feels that way saying, \"I'll look in the mirror and I look in my left eye and that's you, then I look in my right eye and that's me.\" When asked about other physical symptoms, he shared that he has been having nausea and GERD today; it has been happening every few days for the last few months and is worse in the morning and when he overeats. He was encouraged to try the PRN Mylanta to improve these symptoms. Jerel then asked about his discharge planning in relation to his symptoms and diagnosis in a very round about manner. The team made an attempt to explain the reasoning behind why he may have schizophrenia and not schizoaffective disorder and that both disease are very difficult to treat. This was distressing to Jerel and he asked \"Do I need to have all my thoughts creased and in an envelope?\" Eventually became angry and left saying, \"so I have two bad days and I have schizophrenia now?\"    The risks, benefits, alternatives and side effects of any medication changes have been discussed and are understood by the patient and other caregivers.     Review of systems:      ROS was negative unless noted above. He shares that he has had nausea and reflux for the last several months.     Allergies:            Allergies   Allergen Reactions     Penicillins         Rash, Generalized         Psychiatric Examination:   BP (!) 142/90 (BP Location: Left arm)   Pulse 115   Temp 98  F (36.7  C) (Tympanic)   Resp 14   Ht 1.727 m (5' 8\")   Wt 74 kg (163 lb 3.2 oz)   SpO2 100%   BMI 24.81 kg/m    Weight is 163 lbs 3.2 oz  Body mass index is 24.81 kg/m .     Appearance:  awake, alert, dressed in hospital scrubs, appeared stated age, well groomed  Attitude: somewhat cooperative  Eye Contact: worse today, mostly looked at " "the table  Mood: \"feeling badly\"  Affect: appropriate and in normal range, reactive  Speech:  clear, coherent and normal prosody  Psychomotor Behavior: no evidence of tardive dyskinesia, dystonia, or tics; to tremor observed  Thought Process: mostly goal-oriented but tangential at times  Associations: some loose associations  Thought Content: somatic delusions about origin of disease   Insight: limited, unable to see that his thoughts are disorganized  Judgment: limited, unable to share a plan for successfully dealing with thoughts of self-harm  Oriented to: seems oriented to situation  Attention Span and Concentration: Adequate for conversation  Recent and Remote Memory: Seems intact  Language: Speaks English with mostly appropriate syntax and vocabulary, neologisms present  Fund of Knowledge: Seems appropriate  Muscle Strength and Tone: Appears grossly normal  Gait and Station: Normal      Labs:   Recent Results   No results found for this or any previous visit (from the past 24 hour(s)).         Assessment    Jerel Day is a 24-year-old male with history of schizoaffective disorder, bipolar type and cannabis dependence who presented to [facility] ED on 01/17/2020 for treatment of self-inflicted leg laceration and head injury and was subsequently admitted for psychosis and suicidal ideation. Predisposing factors include distant family history of schizophrenia and known history of schizoaffective disorder, bipolar type. Precipitating factors include stress regarding upcoming semester, recent discharge from [facility], cannabis use, possible medication non-adherence, and interpersonal conflict with his parents (feels like he has no flexibility at home). Perpetuating factors include feeling like he has not found his \"footing\" and chronic neck and back pain . Protective factors include supportive family, access to healthcare, and stable housing. MSE was notable for depressed mood, reports of auditory and visual hallucinations, " "occasionally illogical comments and limited insight. Presentation is most consistent with previous diagnosis of schizoaffective disorder, bipolar type. Current episode is likely mixed based on the aggression and irritability seen on the unit.     Hospital course: Jerel Day was admitted to station 22 on a 72 hour hold. He is currently under MI commitment until February. PTA Abilify and oanzapine were continued on admission. On 1/21/20, Jerel expressed that he wanted to be on only one antipsychotic and agreed to take Abilify if weaned off Zyprexa, so we decreased his oanzapine dose to 10 mg at bedtime. Olanzapine was decreased to 5 mg and Abilify was increased to 20 mg on 1/22/20.      Discontinued Medications (& Rationale): None     Medical course: Jerel Day was medically cleared for admission to the inpatient psychiatric unit. His leg and face injuries were monitored.      Plan   Principal psychiatric diagnosis:   # Schizoaffective disorder, bipolar type, current episode mixed  - Continue aripiprazole 20 mg po daily.  - Continue olanzapine 5 mg qhs.  - Continue PTA benztropine 0.5 mg BID PRN.  - Continue PTA gabapentin 300 mg BID PRN.  - Neuropsychological testing to be completed in outpatient setting.  - Both cariprazine and clozapine are fully covered by Jerel's insurance.  - Request to amend Clark order to include clozapine and cariprazine and remove olanzapine and risperidone has been placed.     Secondary psychiatric diagnoses:   # Cannabis dependence     - Patient will be treated in therapeutic milieu with appropriate individual and group therapies.     Medical diagnoses:       #Leg laceration on left thigh: Jerel cut himself with a pocketknife. S/p laceration repair in ED.   - Continue to monitor; healing well.  - Stitches removed on 1/24.      #Scalp and face contusions: CT not concerning for acute intracranial process. Demonstrated \"soft tissue hematoma overlying the left zygomatic arch\". No fractures " visible on CT. Jerel denies LOC and blurry vision.  - PRN acetaminophen 650 mg Q4H for pain management.     Disposition: Pending medication management and clinical stabilization.     Legal Status: Committed         Safety Assessment:   Behavioral Orders   Procedures     Code 1 - Restrict to Unit     Routine Programming     As clinically indicated     Self Injury Precaution     Single Room     Status 15     Every 15 minutes.     Suicide precautions     Patients on Suicide Precautions should have a Combination Diet ordered that includes a Diet selection(s) AND a Behavioral Tray selection for Safe Tray - with utensils, or Safe Tray - NO utensils         Disposition: Pending clinical improvement and appropriate medication management.    Dhaval Bob, OMS-IV    I was present with the medical student who participated in the service and in the documentation of the note. I have verified the history and personally performed the physical exam and medical decision making. I agree with the assessment and plan of care as documented in the note. Azeb Katz MD, Psychiatry PGY-1    Attestation:  This patient has been seen and evaluated by me, Vince Zheng MD.  I have discussed this patient with the house staff team including the resident and medical student and I agree with the findings and plan in this note.    I have reviewed today's vital signs, medications, labs and imaging. Vince Zheng MD , PhD.

## 2020-01-30 NOTE — PROGRESS NOTES
NAVIGATE Outreach  A Part of the [facility] First Episode of Psychosis Program     Patient Name: Jerel Day  /Age:  1995 (24 year old)    Contact: Writer received VM from Jerel's CM-Nola Summers. Informed writer that court is set for Monday regarding extension of commitment. Nola shared the 's Office will be sending a fax requesting records from our clinic; Nola also inquired about new psychiatrist's name who is treating Jerel.     Writer returned call to Nola and [facility]; suggested request for records be sent to our clinic directly attn either  or BYRON Priest. If request goes through HIM it is unlikely they will receive records by Monday. Also informed her new provider is Dr. Shai Yusuf.     Plan: Will await request for records. Will route note to team for update.    Tammi Connell, EDELMIRA  NAVIGATE [facility] [place]

## 2020-01-30 NOTE — PROGRESS NOTES
01/29/20 1800   Art Therapy   Type of Intervention structured groups   Response participates with encouragement   Hours 1   Treatment Detail    (Art Therapy- hope and optimism)collaborative project   Objective- Patients use art media, the creative process & resulting artwork to:explore feelings,reconcile emotions, gain self-awareness/ self esteem, manage behaviors, develop social skills, improve reality orientation, & reduce anxiety /depression.     Outcome- Pt did a art piece about hope and optimism on his own. He had a quiet focused approach. He did a drawing about taking steps to climb a mountain as a metaphor for hope/ optimism. He reported feeling tired and anxious. He worked quietly and left group before the group shared art with one another.

## 2020-01-30 NOTE — PROGRESS NOTES
01/30/20 1250   Behavioral Health   Hallucinations denies / not responding to hallucinations  (denies today - none observed)   Thinking distractable;poor concentration   Orientation person: oriented;place: oriented;date: oriented;time: oriented   Memory other (see comment)  (difficult to assess)   Insight insight appropriate to situation   Judgement impaired   Eye Contact at examiner;into space   Affect blunted, flat;tense   Mood anxious;mood is calm   Physical Appearance/Attire disheveled;untidy   Hygiene neglected grooming - unclean body, hair, teeth   Suicidality other (see comments)  (denies)   1. Wish to be Dead (Recent) No   Wish to be Dead Description (Recent) denies   2. Non-Specific Active Suicidal Thoughts (Recent) No   Non-Specific Active Suicidal Thought Description (Recent) denies   Psycho Education   Type of Intervention 1:1 intervention   Response participates, initiates socially appropriate   Hours 0.5   Treatment Detail check-in   Activities of Daily Living   Hygiene/Grooming independent   Oral Hygiene independent   Dress scrubs (behavioral health)   Laundry unable to complete   Room Organization independent     Pt. Has been out in the milieu, mostly sitting in the lounge watching TV and reading his book. Pt. Endorses elevated anxiety and somatic symptoms (aches and stomach discomfort) - nurse aware and provided medication. Pt. Denies SI/SIB and denies AH/VH - none observed. Pt. States he did not sleep well last night.

## 2020-01-31 LAB — TROPONIN I SERPL-MCNC: <0.015 UG/L (ref 0–0.04)

## 2020-01-31 PROCEDURE — 84484 ASSAY OF TROPONIN QUANT: CPT | Performed by: PHYSICIAN ASSISTANT

## 2020-01-31 PROCEDURE — 36415 COLL VENOUS BLD VENIPUNCTURE: CPT | Performed by: PHYSICIAN ASSISTANT

## 2020-01-31 PROCEDURE — 25000132 ZZH RX MED GY IP 250 OP 250 PS 637: Performed by: PSYCHIATRY & NEUROLOGY

## 2020-01-31 PROCEDURE — 93005 ELECTROCARDIOGRAM TRACING: CPT

## 2020-01-31 PROCEDURE — 25000132 ZZH RX MED GY IP 250 OP 250 PS 637: Performed by: STUDENT IN AN ORGANIZED HEALTH CARE EDUCATION/TRAINING PROGRAM

## 2020-01-31 PROCEDURE — H2032 ACTIVITY THERAPY, PER 15 MIN: HCPCS

## 2020-01-31 PROCEDURE — 99232 SBSQ HOSP IP/OBS MODERATE 35: CPT | Mod: GC | Performed by: PSYCHIATRY & NEUROLOGY

## 2020-01-31 PROCEDURE — 12400001 ZZH R&B MH UMMC

## 2020-01-31 PROCEDURE — 93010 ELECTROCARDIOGRAM REPORT: CPT | Performed by: INTERNAL MEDICINE

## 2020-01-31 RX ADMIN — MELATONIN 25 MCG: at 08:36

## 2020-01-31 RX ADMIN — ARIPIPRAZOLE 20 MG: 10 TABLET ORAL at 08:36

## 2020-01-31 RX ADMIN — OLANZAPINE 5 MG: 5 TABLET, FILM COATED ORAL at 20:11

## 2020-01-31 NOTE — PROGRESS NOTES
"     ----------------------------------------------------------------------------------------------------------  [facility], [place]   Psychiatric Progress Note  Hospital Day #14      Interim History:   The patient's care was discussed with the treatment team and chart notes were reviewed.     Sleep: 6.75 hrs  Scheduled Medications: Taken as prescribed  PRN Medications: Gabapentin 300mg at 23:28     Staff Report: Jerel had a calm and engaged shift this evening. He attended all groups and was visible and social in the lounge. Jerel denied anxiety, depression, SI, SIB, HI, and hallucinations. He mentioned that his diagnosis changed today from Schizoaffective to Schizophrenia and he said the doctors weren't very clear why they were making this change. He said the doctor's mentioned that the new diagnosis, Schizophrenia, is more aligned with his thoughts. He said that he is just a creative thinker and they take that to be psychotic. Jerel also mentioned that his Clark order will soon have a new medication as the doctors are planning on making a switch. He didn't seem too happy with that and said that if he doesn't like the medication, he won't take it, even if it is Clark'd. He received a visitor tonight and reported that the visit went well. There were no behavioral issues this evening.    Patient Interview: Jerel was found in his room and interviewed with the care team in the conference room. He reports that he is really low today and says that it's worse than how he felt yesterday. Jerel then asked about his diagnosis and the team took some time to talk to him about his disease and offered to go through the DSM criteria for schizophrenia and schizoaffective disorder but he declined. Jerel shares that he took gabapentin last night to help with his knee pain but that he did not think it helped much. The team explained that it may be helpful to schedule doses instead but he declined saying, \"I " "think it will make me too sleepy.\" He also reports some sharp, reproducible, chest pain that \"feels like my heart was punctured by a jagged part of my spine.\" He further reports that he feels that \"it feels like my head is in my lap or like my circulation has been flipped over.\" When asked to clarify, he shares that he used to have some conscious control over these symptoms but does not seem to any more and that this makes him \"rigid and tense.\" He denies having any more questions saying, \"I'm trying to rack my brain but it's too smooth to come up with more questions,\" and the team reassured him that there is always someone to ask if he has a question later on in the day.    The risks, benefits, alternatives and side effects of any medication changes have been discussed and are understood by the patient and other caregivers.     Review of systems:      ROS was negative unless noted above. He shares that he has had nausea and reflux for the last several months.     Allergies:            Allergies   Allergen Reactions     Penicillins         Rash, Generalized         Psychiatric Examination:   BP (!) 142/90 (BP Location: Left arm)   Pulse 115   Temp 98  F (36.7  C) (Tympanic)   Resp 14   Ht 1.727 m (5' 8\")   Wt 74 kg (163 lb 3.2 oz)   SpO2 100%   BMI 24.81 kg/m    Weight is 163 lbs 3.2 oz  Body mass index is 24.81 kg/m .     Appearance:  awake, alert, dressed in hospital scrubs, appeared stated age, well groomed  Attitude: somewhat cooperative  Eye Contact: worse again today, mostly looked at the table  Mood: \"pretty low\"  Affect: appropriate and in normal range, reactive  Speech:  clear, coherent and normal prosody  Psychomotor Behavior: no evidence of tardive dyskinesia, dystonia, or tics  Thought Process: mostly goal-oriented but tangential at times  Associations: loose associations  Thought Content: somatic delusions about origin of disease  Insight: limited, unable to see that his thoughts are " "disorganized  Judgment: limited, unable to share a plan for successfully dealing with thoughts of self-harm  Oriented to: seems oriented to situation  Attention Span and Concentration: Adequate for conversation  Recent and Remote Memory: Seems intact  Language: Speaks English with mostly appropriate syntax and vocabulary, no neologisms today  Fund of Knowledge: Seems appropriate  Muscle Strength and Tone: Appears grossly normal  Gait and Station: Normal      Labs:   Recent Results   No results found for this or any previous visit (from the past 24 hour(s)).         Assessment    Jerel Day is a 24-year-old male with history of schizoaffective disorder, bipolar type and cannabis dependence who presented to [facility] ED on 01/17/2020 for treatment of self-inflicted leg laceration and head injury and was subsequently admitted for psychosis and suicidal ideation. Predisposing factors include distant family history of schizophrenia and known history of schizoaffective disorder, bipolar type. Precipitating factors include stress regarding upcoming semester, recent discharge from [facility], cannabis use, possible medication non-adherence, and interpersonal conflict with his parents (feels like he has no flexibility at home). Perpetuating factors include feeling like he has not found his \"footing\" and chronic neck and back pain . Protective factors include supportive family, access to healthcare, and stable housing. MSE on admission was notable for depressed mood, reports of auditory and visual hallucinations, occasionally illogical comments and limited insight. Given the degree of cognitive impairment and thought symptoms, his presentation is appearing more consistent with schizophrenia paired with a mood disorder rather than schizoaffective disorder.    Hospital course: Jerel Day was admitted to station 22 on a 72 hour hold. He is currently under MI commitment until February. PTA Abilify and oanzapine were continued on " "admission. On 1/21/20, Jerel expressed that he wanted to be on only one antipsychotic and agreed to take Abilify if weaned off Zyprexa, so we decreased his oanzapine dose to 10 mg at bedtime. Olanzapine was decreased to 5 mg and Abilify was increased to 20 mg on 1/22/20.      Discontinued Medications (& Rationale): None     Medical course: Jerel Day was medically cleared for admission to the inpatient psychiatric unit. His leg and face injuries were monitored.      Plan   Principal psychiatric diagnosis:   # Schizophrenia  - Continue aripiprazole 20 mg po daily.  - Continue olanzapine 5 mg qhs.  - Continue PTA benztropine 0.5 mg BID PRN.  - Continue PTA gabapentin 300 mg BID PRN.  - Neuropsychological testing to be completed in outpatient setting.  - Both cariprazine and clozapine are fully covered by Jerel's insurance.  - Request to amend [facility] order to include clozapine and cariprazine and remove olanzapine and risperidone has been placed.     Secondary psychiatric diagnoses:   # Cannabis dependence     - Patient will be treated in therapeutic milieu with appropriate individual and group therapies.     Medical diagnoses:       #Leg laceration on left thigh: Jerel cut himself with a pocketknife. S/p laceration repair in ED.   - Continue to monitor; healing well.  - Stitches removed on 1/24.      #Scalp and face contusions: CT not concerning for acute intracranial process. Demonstrated \"soft tissue hematoma overlying the left zygomatic arch\". No fractures visible on CT. Jerel denies LOC and blurry vision.  - PRN acetaminophen 650 mg Q4H for pain management.     Disposition: Pending medication management and clinical stabilization.     Legal Status: Committed         Safety Assessment:   Behavioral Orders   Procedures     Code 1 - Restrict to Unit     Routine Programming     As clinically indicated     Self Injury Precaution     Single Room     Status 15     Every 15 minutes.     Suicide precautions     Patients on " Suicide Precautions should have a Combination Diet ordered that includes a Diet selection(s) AND a Behavioral Tray selection for Safe Tray - with utensils, or Safe Tray - NO utensils         Disposition: Pending clinical improvement and appropriate medication management.    Dhaval Bob, OMS-IV    I was present with the medical student who participated in the service and in the documentation of the note. I have verified the history and personally performed the physical exam and medical decision making. I agree with the assessment and plan of care as documented in the note. Azeb Katz MD, Psychiatry PGY-1    Attestation:  This patient has been seen and evaluated by me, Vince Zheng MD.  I have discussed this patient with the house staff team including the resident and medical student and I agree with the findings and plan in this note.    I have reviewed today's vital signs, medications, labs and imaging. Vince Zheng MD , PhD.

## 2020-01-31 NOTE — PROGRESS NOTES
"   01/30/20 0296   Group Therapy Session   Group Attendance attended group session   Total Time (minutes) 65   Group Type psychotherapeutic   Patient Participation/Contribution cooperative with task;discussed personal experience with topic;listened actively   CBT activity was utilized to help individuals reduce worry. Individuals were asked to identify a worry, consider worse consequence, and successful coping strategies used in the past.   Jerel reports feeling \"pleasant.\" Affect congruent. He engaged in the activity and processed with the group.    After group he shared with this writer that his diagnosis was changed to schizophrenia and he is concerned how that will affect him when he goes to court on Monday. He stated that he didn't understand the reason for the change. This writer encouraged him to bring his questions back to his team in order to better understand the differences in diagnoses and the reason for change. This writer also explained that a diagnosis is based on a group of symptoms and that a more accurate diagnosis helps his providers know the best way to help him.  "

## 2020-01-31 NOTE — PROGRESS NOTES
"   01/31/20 1054   Behavioral Health   Hallucinations denies / not responding to hallucinations   Thinking distractable   Orientation person: oriented;place: oriented;date: oriented;time: oriented   Insight insight appropriate to situation   Judgement impaired   Eye Contact at examiner   Affect blunted, flat   Mood mood is calm   Physical Appearance/Attire disheveled   Hygiene other (see comment)  (did not shower this shift )   Suicidality other (see comments)  (Denies it, but states he had some negative thoughts earlier )   1. Wish to be Dead (Recent) No   2. Non-Specific Active Suicidal Thoughts (Recent) No   Self Injury other (see comment)  (None observed or reported )   Elopement   (none observed or reported )   Activity   (visible )   Speech clear   Medication Sensitivity no stated side effects   Psychomotor / Gait balanced     Pt has been awake and visible on the unit. Affect has been neutral to down. Calm and controlled behavior. Pt reported his goal is to, \"relax to help my anxiety.\" He admits he has racy thoughts, and said he is whiling to work on managing this. Observed to attend groups, social with peers, and watched tv. Stated, \"I had a negative thought, but was able to quickly distract myself.\" Denies visual hallucination. Contracts for safety.   "

## 2020-01-31 NOTE — PROGRESS NOTES
Pt was a leader in the group process of co-creating an alternative feelings chart.  Group members danced and moved a feeling (often a complex or mixed feeling,) then explored it deeper in movement, until a descriptive feeling word (sometimes real and sometimes created) emerged.      At one point, one group member became the  conductor  while the rest of the group including this patient, became the  orchestra with the body as our instrument of expression.  Pt's were able to create a movement orchestra with collective creative expression.     Pt demonstrated insight and enjoyed the collaborative movement.       01/29/20 1130   Dance Movement Therapy   Type of Intervention structured groups   Response participates, initiates socially appropriate   Hours 1

## 2020-01-31 NOTE — PROGRESS NOTES
Jerel had a calm and engaged shift this evening. He attended all groups and was [facility] and [facility]. Jerel denied anxiety, depression, SI, SIB, HI, and hallucinations. He mentioned that his diagnosis changed today from Schizoaffective to Schizophrenia and he said the doctors weren't very clear why they were making this change. He said the doctor's mentioned that the new diagnosis, Schizophrenia, is more aligned with his thoughts. He said that he is just a creative thinker and they take that to be psychotic. Jerel also mentioned that his [facility] order will soon have a new medication as the doctors are planning on making a switch. He didn't seem too happy with that and said that if he doesn't like the medication, he won't take it, even if it is [facility]'d. He received a visitor tonight and reported that the visit went well. There were no behavioral issues this evening.      01/30/20 2048   Behavioral Health   Hallucinations denies / not responding to hallucinations   Thinking distractable   Orientation person: oriented;place: oriented;time: oriented;date: oriented   Memory baseline memory   Insight insight appropriate to situation   Judgement impaired   Eye Contact at examiner   Affect full range affect   Mood mood is calm   Physical Appearance/Attire attire appropriate to age and situation   Hygiene well groomed   Suicidality other (see comments)  (Denies)   1. Wish to be Dead (Recent) No   2. Non-Specific Active Suicidal Thoughts (Recent) No   Self Injury other (see comment)  (Denies)   Elopement   (None observed)   Activity restless;other (see comment)  (Visible in groups and the milieu)   Speech clear;coherent   Medication Sensitivity no stated side effects;no observed side effects   Psychomotor / Gait balanced;steady   Activities of Daily Living   Hygiene/Grooming independent   Oral Hygiene independent   Dress independent   Laundry unable to complete   Room Organization independent

## 2020-02-01 PROCEDURE — 25000132 ZZH RX MED GY IP 250 OP 250 PS 637: Performed by: STUDENT IN AN ORGANIZED HEALTH CARE EDUCATION/TRAINING PROGRAM

## 2020-02-01 PROCEDURE — 25000132 ZZH RX MED GY IP 250 OP 250 PS 637: Performed by: PSYCHIATRY & NEUROLOGY

## 2020-02-01 PROCEDURE — 12400001 ZZH R&B MH UMMC

## 2020-02-01 RX ADMIN — NICOTINE POLACRILEX 4 MG: 2 GUM, CHEWING ORAL at 16:44

## 2020-02-01 RX ADMIN — OLANZAPINE 5 MG: 5 TABLET, FILM COATED ORAL at 20:17

## 2020-02-01 RX ADMIN — MELATONIN 25 MCG: at 08:50

## 2020-02-01 RX ADMIN — ACETAMINOPHEN 650 MG: 325 TABLET, FILM COATED ORAL at 16:44

## 2020-02-01 RX ADMIN — HYDROXYZINE HYDROCHLORIDE 25 MG: 25 TABLET, FILM COATED ORAL at 17:24

## 2020-02-01 RX ADMIN — ACETAMINOPHEN 350 MG: 325 TABLET, FILM COATED ORAL at 21:00

## 2020-02-01 RX ADMIN — ARIPIPRAZOLE 20 MG: 10 TABLET ORAL at 08:51

## 2020-02-01 ASSESSMENT — ACTIVITIES OF DAILY LIVING (ADL)
DRESS: INDEPENDENT
HYGIENE/GROOMING: INDEPENDENT
LAUNDRY: UNABLE TO COMPLETE
ORAL_HYGIENE: INDEPENDENT
HYGIENE/GROOMING: INDEPENDENT
DRESS: INDEPENDENT
ORAL_HYGIENE: INDEPENDENT

## 2020-02-01 NOTE — CONSULTS
Brief medicine consult note:  - Medicine consulted for CP in this 25 yo male w/ no known cardiac risk factors reportedly quite somatic. Subsequent EKG with T wave inversion in leads I and AVL but not in other lateral leads. EKG otherwise unremarkable. Trop neg. CP reproducible on exam. Pain therefore most likely MSK in origin. Recommend reassurance not cardiac in etiology. Can use prn OTC analgesics and/or Lidocaine patch. No further medical recommendations. Medicine signing off. Please feel free to call with questions.     Herb Markham PA-C  Internal Medicine Hospitalist   [facility]  [phone]

## 2020-02-01 NOTE — PROGRESS NOTES
" Pt presented with calm affect, coherent thought process, and appropriate behavior. At shift start he reported feeling \"Not the best\" and expressed he was experiencing auditory hallucinations with \"Space content.\" Pt also noted he was feeling \"Spacey\" and the lack of sunlight was affecting his SAD. Following morning medication pt remarked his mood had improved and AH had diminished. Pt was excited that friends and family visited during the afternoon and noted a long term goal was to continue growing relationship with his brother. Pt denied presence of safety concerns and spent majority of shift reading independently and watching movies.          02/01/20 1400   Behavioral Health   Hallucinations auditory   Thinking poor concentration;distractable   Orientation person: oriented;place: oriented;date: oriented;time: oriented   Memory baseline memory   Insight poor   Judgement impaired   Eye Contact at examiner   Affect full range affect   Mood mood is calm   Physical Appearance/Attire appears stated age;disheveled   Hygiene neglected grooming - unclean body, hair, teeth   1. Wish to be Dead (Recent) No   Wish to be Dead Description (Recent) Pt denies   2. Non-Specific Active Suicidal Thoughts (Recent) No   Non-Specific Active Suicidal Thought Description (Recent) Pt denies   Psycho Education   Type of Intervention 1:1 intervention   Response participates, initiates socially appropriate   Hours 0.5   Treatment Detail Check-in   Activities of Daily Living   Hygiene/Grooming independent   Oral Hygiene independent   Dress independent   Laundry unable to complete   Room Organization independent     "

## 2020-02-01 NOTE — PROGRESS NOTES
01/31/20 1800   Art Therapy   Type of Intervention structured groups   Response participates with encouragement    Hours 1.5   Treatment Detail    (Art Therapy-feelings and coping   Objective- Patients use art media, the creative process & resulting artwork to:explore feelings,reconcile emotions, gain self-awareness/ self esteem, manage behaviors, develop social skills, improve reality orientation, & reduce anxiety /depression.     Outcome- Pt was quietly engaged. He had a flat, blunted affect today.He said he was feeling frustrated and anxious and that some back pain was making him angry.  He engaged in the project and wanted to work on a canvas in the second hour of free choice, but was unable to focus and left the group and did not start the canvas.

## 2020-02-01 NOTE — PROGRESS NOTES
"Observed to have slept  well w/ regular non-labored respirations or noted distress  till just before 0500 at which time pt. came to nurses station reporting:  \" I can't sleep, I haven't slept all night.  My back hurts, I just want to get out of this hospital, my chest still hurts\".  When inquired re: his chest pain, pt. Described the pain as \"sharp and stabbing\" in his substernal area.  When attempted to provide realistic reassurance that although his EKG showed some  Abnormality(as pt had described)  , that his blood work was negative for cardiac involvement.  Pt. responded, \"so your're saying one size fits all?\" Color good, no s/s's of SOB.  V/s's WNL.  Encouraged and offered various prns for his discomfort and observed anxiety.  \" I don't want any medication, I'm not going to take anymore medication\". Attempted Tipps strategies, comfort measures conducive to sleep but quite help rejecting.  Informed pt. That this staff would be calling the  re his continuing c/o chest pain.  Pt. responded , \"we can just call them in the morning\", and then returned to his room and to bed.   Call placed to Hospitalist Dr. Jo w/ case review.  No new orders other than continue to monitor,  observe and recall if pt. Became symptomatic for chest pain or if complaints worsened.  Monitored closely - Observed sleeping again within the hour w/ regular non-labored respirations and no further complaints.  Continuing to monitor closely.        "

## 2020-02-01 NOTE — PLAN OF CARE
Patient c/o chest pain at change of shift, but initially refused lab draw.   Pt eventually acquiesced and later report feeling better.    Patient spent majority of shift lying in room; but, he did come out for dinner and visit.   Patient disheveled but reports feeling 'ok'

## 2020-02-02 PROCEDURE — 25000132 ZZH RX MED GY IP 250 OP 250 PS 637: Performed by: STUDENT IN AN ORGANIZED HEALTH CARE EDUCATION/TRAINING PROGRAM

## 2020-02-02 PROCEDURE — 25000132 ZZH RX MED GY IP 250 OP 250 PS 637: Performed by: PSYCHIATRY & NEUROLOGY

## 2020-02-02 PROCEDURE — 12400001 ZZH R&B MH UMMC

## 2020-02-02 RX ADMIN — ARIPIPRAZOLE 20 MG: 10 TABLET ORAL at 08:37

## 2020-02-02 RX ADMIN — NICOTINE POLACRILEX 2 MG: 2 GUM, CHEWING ORAL at 17:53

## 2020-02-02 RX ADMIN — MELATONIN 25 MCG: at 08:37

## 2020-02-02 RX ADMIN — OLANZAPINE 5 MG: 5 TABLET, FILM COATED ORAL at 20:21

## 2020-02-02 RX ADMIN — HYDROXYZINE HYDROCHLORIDE 25 MG: 25 TABLET, FILM COATED ORAL at 15:59

## 2020-02-02 ASSESSMENT — MIFFLIN-ST. JEOR: SCORE: 1744.24

## 2020-02-02 ASSESSMENT — ACTIVITIES OF DAILY LIVING (ADL)
ORAL_HYGIENE: INDEPENDENT
HYGIENE/GROOMING: INDEPENDENT
HYGIENE/GROOMING: INDEPENDENT
ORAL_HYGIENE: INDEPENDENT
LAUNDRY: UNABLE TO COMPLETE
DRESS: SCRUBS (BEHAVIORAL HEALTH);INDEPENDENT
DRESS: INDEPENDENT

## 2020-02-02 NOTE — PROGRESS NOTES
"Pt presented with calm affect, coherent thought process, and appropriate behavior. He reported feeling \"A lot better\" and described feeling centered mentally and physically. Pt continues to relay somatic feelings associated with mood but denied any tactical hallucinations. Pt endorsed intermittent voices, but noted they were diminished in comparison to last evening. He shared he had been crying privately, and perceived this to be a healthy release surrounding \"letting go of bad relationships.\" He expressed anxiety concerning upcoming court date and requested writer help him create a list of reminders for self-advocacy. Pt was excited about his parents visiting and spent much of the shift walking and socializing pleasantly with peers. He denied safety concerns.          02/02/20 1300   Behavioral Health   Hallucinations auditory   Thinking distractable   Orientation person: oriented;place: oriented;date: oriented;time: oriented   Memory baseline memory   Insight poor   Judgement impaired   Eye Contact at examiner   Affect full range affect   Mood mood is calm   Physical Appearance/Attire appears stated age;attire appropriate to age and situation   Hygiene neglected grooming - unclean body, hair, teeth   1. Wish to be Dead (Recent) No   Wish to be Dead Description (Recent) Pt denies   2. Non-Specific Active Suicidal Thoughts (Recent) No   Non-Specific Active Suicidal Thought Description (Recent) Pt denies   Psycho Education   Type of Intervention 1:1 intervention   Response participates, initiates socially appropriate   Hours 0.5   Treatment Detail Check-in   Activities of Daily Living   Hygiene/Grooming independent   Oral Hygiene independent   Dress independent   Laundry unable to complete   Room Organization independent     "

## 2020-02-02 NOTE — PROGRESS NOTES
"Pt withdrawn most of shift. Pt complained of chest pain. Pt had delusional beliefs of what may be causing chest pain, but agreed that a probably cause may be stress/anxiety. The patient also stated that their heard may be hearting for heart ache or that they had done something to mess it up. When asked about what was causing them anxiety the patient talked about auditory hallucinations.     The patient stated that the felt that there was another person living inside them and that there was a garcia going on for their soul and that the voice of the other person had told them, \"I won\". When asked if they believed this was real, the patient stated that they were not sure. The patient also talked about having others souls or energies living in between their vertebrae which they believe is causing them back pain. The patient talked about stretching their legs because they believed this could drain some of this bad energy from their head a chest. The patient also talked about having scoliosis in the past and that they were very concerned about having a balanced body. The patient stated they were hearing the voice of their acupuncturist telling them that they had messed something up and that that's why their back was feeling twisted. The patient believes that their hallucinations may be induced by using marijuana or acid. They state that these drugs made them a little more creative, but that it had decreased their common sense.     The patient stated that they were unable to sleep last night. They talked about medication side effects of their arms feeling stiff and having tremors.        02/01/20 2100   Behavioral Health   Hallucinations auditory;appears responding   Thinking delusional;paranoid   Orientation person: oriented;place: oriented;date: oriented;time: oriented   Memory baseline memory   Insight poor   Judgement impaired   Eye Contact at examiner   Affect full range affect   Mood anxious   Physical " Appearance/Attire attire appropriate to age and situation   Hygiene neglected grooming - unclean body, hair, teeth   Suicidality other (see comments)  (denies )   1. Wish to be Dead (Recent) No   2. Non-Specific Active Suicidal Thoughts (Recent) No   Self Injury other (see comment)  (denies )   Activity withdrawn   Speech clear;coherent   Medication Sensitivity no stated side effects;no observed side effects   Psychomotor / Gait balanced;steady   Psycho Education   Type of Intervention 1:1 intervention   Response participates, initiates socially appropriate   Hours 0.5   Treatment Detail check in    Activities of Daily Living   Hygiene/Grooming independent   Oral Hygiene independent   Dress independent   Room Organization independent   Activity   Activity Assistance Provided independent

## 2020-02-03 LAB — INTERPRETATION ECG - MUSE: NORMAL

## 2020-02-03 PROCEDURE — 25000132 ZZH RX MED GY IP 250 OP 250 PS 637: Performed by: STUDENT IN AN ORGANIZED HEALTH CARE EDUCATION/TRAINING PROGRAM

## 2020-02-03 PROCEDURE — 25000132 ZZH RX MED GY IP 250 OP 250 PS 637: Performed by: PSYCHIATRY & NEUROLOGY

## 2020-02-03 PROCEDURE — G0177 OPPS/PHP; TRAIN & EDUC SERV: HCPCS

## 2020-02-03 PROCEDURE — 12400001 ZZH R&B MH UMMC

## 2020-02-03 PROCEDURE — 99232 SBSQ HOSP IP/OBS MODERATE 35: CPT | Mod: GC | Performed by: PSYCHIATRY & NEUROLOGY

## 2020-02-03 PROCEDURE — H2032 ACTIVITY THERAPY, PER 15 MIN: HCPCS

## 2020-02-03 RX ADMIN — ACETAMINOPHEN 650 MG: 325 TABLET, FILM COATED ORAL at 23:57

## 2020-02-03 RX ADMIN — MELATONIN 25 MCG: at 09:17

## 2020-02-03 RX ADMIN — ARIPIPRAZOLE 20 MG: 10 TABLET ORAL at 09:17

## 2020-02-03 RX ADMIN — OLANZAPINE 5 MG: 5 TABLET, FILM COATED ORAL at 22:15

## 2020-02-03 ASSESSMENT — ACTIVITIES OF DAILY LIVING (ADL)
HYGIENE/GROOMING: INDEPENDENT
LAUNDRY: UNABLE TO COMPLETE
ORAL_HYGIENE: INDEPENDENT
HYGIENE/GROOMING: INDEPENDENT
ORAL_HYGIENE: INDEPENDENT
DRESS: SCRUBS (BEHAVIORAL HEALTH);INDEPENDENT
DRESS: SCRUBS (BEHAVIORAL HEALTH);INDEPENDENT

## 2020-02-03 NOTE — PROGRESS NOTES
"     ----------------------------------------------------------------------------------------------------------  [facility], [place]   Psychiatric Progress Note  Hospital Day #17      Interim History:   The patient's care was discussed with the treatment team and chart notes were reviewed.     Sleep: 6 hours on 2/1, 6.5 hours on 2/2, and 5.75 hours on 2/3  Scheduled Medications: Taken as prescribed  PRN Medications: acetaminophen 650 mg on 2/1 at 16:44 and 21:00    Hydroxyzine 25 mg on 2/1 at 17:24 and on 2/2 at 15:59    nicotine      Staff Report: Pt was observed present in the milieu watching TV, playing boardgames, eating meals and socializing with peers, did some exercises in his room. Pt stated that he had a good day with no depression and anxiety. Pt reported hearing some voices but they were very manageable. But as Pt was talking more, he told this writer that he had urges to punch himself earlier today because of something his friend did behind his back in the past. Pt said the thoughts just occurred to him and he was able to keep in under control and made those urges go away. Pt also said that he didn't need to tell this writer about all those but he was happy about the fact that he was getting better at handling negative emotions. Pt reported feeling hopeful about his situation and his next step, looking forward to meeting his doctors tomorrow to talk about it. Pt noticed that he had been having bloated stomach and suspected that came from his medication. Pt denied SI, SIB and HI.    Patient Interview: Jerel was found in group and interviewed in the conference room with the care team. When asked how his mood is today, Jerel replies that he feels, \"like the weather; cool purnima at first but then softened,\" which he further shares is better than he felt last week. He also reports that he is nervous about court and does not want to go to his hearing today. Jerel shares that he was in " "contact with his  over the weekend and that he asked Jerel if he was amenable to accepting an extension of commitment to 12 months; this gave Jerel the impression that the decision was already made and that nothing he could say would help. He is hoping to get clarity on what he needs to do to be discharged so he can \"relocate his energy\" and \"get the wrinkles to smooth out in my brain.\" Jerel again expresses that he does not think an IRTS would be helpful for him upon discharge. He reports some AH and urge to hit himself in the head over the weekend and says, \"I don't know if I will hurt myself but if I were to hurt myself every night I would need my life to do a U-turn.\" Jerel shares that he wants to \"understand the foundation of self-injury\" and says he understands it may take him as while not to feel that urge any more. He reports that he is still having chest pains but \"self-massage\" helped; he also reported some stomach pain that may be constipation or gas so the team discussed adding metamucil. Jerel shared that he thinks marijuana makes his voices worse and that it is is a \"green devil and a beautiful woman\" that creates \"kind of a gassy bubble\" in him; he is unsure whether it is helpful or hurtful for him but wants more information on it and the team agrees to provide him with some scientific information about marijuana and mental health.    The risks, benefits, alternatives and side effects of any medication changes have been discussed and are understood by the patient and other caregivers.     Review of systems:      ROS was negative unless noted above. He shares that he has had nausea and reflux for the last several months.     Allergies:            Allergies   Allergen Reactions     Penicillins         Rash, Generalized         Psychiatric Examination:   BP (!) 142/90 (BP Location: Left arm)   Pulse 115   Temp 98  F (36.7  C) (Tympanic)   Resp 14   Ht 1.727 m (5' 8\")   Wt 74 kg (163 lb 3.2 oz)   " "SpO2 100%   BMI 24.81 kg/m    Weight is 163 lbs 3.2 oz  Body mass index is 24.81 kg/m .     Appearance:  awake, alert, dressed in hospital scrubs, appeared stated age, well groomed  Attitude: somewhat cooperative  Eye Contact: improved but stares at the wall while talking again today  Mood: \"cool purnima and then softened\"  Affect: Euthymic, intensity blunted  Speech:  clear, coherent and normal prosody  Psychomotor Behavior: no evidence of tardive dyskinesia, dystonia, or tics  Thought Process: mostly goal-oriented but tangential at times  Associations: loose associations  Thought Content: somatic delusions about origin of disease  Insight: limited, unable to see that his thoughts are disorganized  Judgment: limited, unable to share a plan for successfully dealing with thoughts of self-harm  Oriented to: seems oriented to situation  Attention Span and Concentration: Adequate for conversation  Recent and Remote Memory: Seems intact  Language: Speaks English with mostly appropriate syntax and vocabulary, neologisms present   Fund of Knowledge: Seems appropriate  Muscle Strength and Tone: Appears grossly normal  Gait and Station: Normal      Labs:   Recent Results   No results found for this or any previous visit (from the past 24 hour(s)).         Assessment    Jerel Day is a 24-year-old male with history of schizoaffective disorder, bipolar type and cannabis dependence who presented to [facility] ED on 01/17/2020 for treatment of self-inflicted leg laceration and head injury and was subsequently admitted for psychosis and suicidal ideation. Predisposing factors include distant family history of schizophrenia and known history of schizoaffective disorder, bipolar type. Precipitating factors include stress regarding upcoming semester, recent discharge from IR, cannabis use, possible medication non-adherence, and interpersonal conflict with his parents (feels like he has no flexibility at home). Perpetuating factors " "include feeling like he has not found his \"footing\" and chronic neck and back pain . Protective factors include supportive family, access to healthcare, and stable housing. MSE on admission was notable for depressed mood, reports of auditory and visual hallucinations, occasionally illogical comments and limited insight. Given the degree of cognitive impairment and thought symptoms, his presentation is appearing more consistent with schizophrenia paired with a mood disorder rather than schizoaffective disorder.    Hospital course: Jerel Day was admitted to station 22 on a 72 hour hold. He is currently under MI commitment until February. PTA Abilify and oanzapine were continued on admission. On 1/21/20, Jerel expressed that he wanted to be on only one antipsychotic and agreed to take Abilify if weaned off Zyprexa, so we decreased his oanzapine dose to 10 mg at bedtime. Olanzapine was decreased to 5 mg and Abilify was increased to 20 mg on 1/22/20.      Discontinued Medications (& Rationale): None     Medical course: Jerel Day was medically cleared for admission to the inpatient psychiatric unit. His leg and face injuries were monitored. On 1/31, he reported chest pain. EKG, internal medicine consult, and troponin were obtained, resulting in decreased concerned for cardiac etiology.     Plan   Principal psychiatric diagnosis:   # Schizophrenia  - Continue aripiprazole 20 mg po daily.  - Continue olanzapine 5 mg qhs.  - Continue PTA benztropine 0.5 mg BID PRN.  - Continue PTA gabapentin 300 mg BID PRN.  - Neuropsychological testing to be completed in outpatient setting.  - Both cariprazine and clozapine are fully covered by Jerel's insurance.  - Request to amend Clark order to include clozapine and cariprazine and remove olanzapine and risperidone has been placed.  - Court today at 1:00 for extending commitment.     Secondary psychiatric diagnoses:   # Cannabis dependence     - Patient will be treated in " "therapeutic milieu with appropriate individual and group therapies.     Medical diagnoses:       #Leg laceration on left thigh: Jerel cut himself with a pocketknife. S/p laceration repair in ED.   - Continue to monitor; healing well.  - Stitches removed on 1/24.      #Scalp and face contusions: CT not concerning for acute intracranial process. Demonstrated \"soft tissue hematoma overlying the left zygomatic arch\". No fractures visible on CT. Jerel denies LOC and blurry vision.  - PRN acetaminophen 650 mg Q4H for pain management.    #Chest pain: On 1/31, he reported reproducible chest pain. EKG, internal medicine consult, and troponin were obtained, resulting in decreased concerned for cardiac etiology. Likely musculoskeletal in nature, however could also consider stress/anxiety as a cause.   - Continue to monitor.     Disposition: Pending medication management and clinical stabilization.     Legal Status: Committed       Safety Assessment:   Behavioral Orders   Procedures     Code 1 - Restrict to Unit     Routine Programming     As clinically indicated     Self Injury Precaution     Single Room     Status 15     Every 15 minutes.     Suicide precautions     Patients on Suicide Precautions should have a Combination Diet ordered that includes a Diet selection(s) AND a Behavioral Tray selection for Safe Tray - with utensils, or Safe Tray - NO utensils         Disposition: Pending clinical improvement and appropriate medication management.    Dhaval Bob, OMS-IV    I was present with the medical student who participated in the service and in the documentation of the note. I have verified the history and personally performed the physical exam and medical decision making. I agree with the assessment and plan of care as documented in the note. Azeb Katz MD, Psychiatry PGY-1    Attestation:  This patient has been seen and evaluated by me, Vince Zheng MD.  I have discussed this patient with the house staff team including " the resident and medical student and I agree with the findings and plan in this note.    I have reviewed today's vital signs, medications, labs and imaging. Vince Zheng MD , PhD.

## 2020-02-03 NOTE — PROGRESS NOTES
"Pt was in  milieu.  Attended groups.  \"I'm feeling better; better than I have in a while.\"  Pt is going to court this afternoon.  \"I feel pretty positive about court also.  I'm open to their suggestions of what to do + where to go.\"  Pt denies SI, SIB, dep 2 of 10, anx 3 of 10.  "

## 2020-02-03 NOTE — PROGRESS NOTES
Pt was observed present in the milieu watching TV, playing boardgames, eating meals and socializing with peers, did some exercises in his room. Pt stated that he had a good day with no depression and anxiety. Pt reported hearing some voices but they were very manageable. But as Pt was talking more, he told this writer that he had urges to punch himself earlier today because of something his friend did behind his back in the past. Pt said the thoughts just occurred to him and he was able to keep in under control and made those urges go away. Pt also said that he didn't need to tell this writer about all those but he was happy about the fact that he was getting better at handling negative emotions. Pt reported feeling hopeful about his situation and his next step, looking forward to meeting his doctors tomorrow to talk about it. Pt noticed that he had been having bloated stomach and suspected that came from his medication. Pt denied SI, SIB and HI.           02/02/20 2200   Behavioral Health   Hallucinations auditory   Thinking distractable;paranoid;poor concentration   Orientation person: oriented;place: oriented   Memory confabulation   Insight poor   Judgement impaired   Eye Contact at examiner   Affect full range affect   Mood mood is calm   Physical Appearance/Attire attire appropriate to age and situation   Hygiene neglected grooming - unclean body, hair, teeth   1. Wish to be Dead (Recent) No   2. Non-Specific Active Suicidal Thoughts (Recent) No   Self Injury   (denies)   Elopement   (none stated or observed)   Activity   (appropriate)   Speech clear;flight of ideas;tangential   Medication Sensitivity   (bloated stomach)   Psychomotor / Gait balanced;steady   Psycho Education   Type of Intervention 1:1 intervention   Response participates, initiates socially appropriate   Hours 0.5   Treatment Detail check in   Activities of Daily Living   Hygiene/Grooming independent   Oral Hygiene independent   Dress scrubs  (behavioral health);independent   Room Organization independent

## 2020-02-03 NOTE — PROGRESS NOTES
"Jerel  attended 2 of 3 OT groups today. Talkative with long, loose commentaries on abstract & somatic subject matters. Pleasant and engaged in conversation with peers. Participated in a group art project  (-[place] collage), as well a reflective discussion group on life wisdom.  Reports ongoing frustration with the mental health care system, though states he's learned generally to \"go along with it.\"      02/03/20 1500   Occupational Therapy   Type of Intervention structured groups   Response Initiates, socially acceptable   Hours 2       "

## 2020-02-04 ENCOUNTER — PATIENT OUTREACH (OUTPATIENT)
Dept: PSYCHIATRY | Facility: CLINIC | Age: 25
End: 2020-02-04

## 2020-02-04 PROCEDURE — 12400001 ZZH R&B MH UMMC

## 2020-02-04 PROCEDURE — 25000132 ZZH RX MED GY IP 250 OP 250 PS 637: Performed by: STUDENT IN AN ORGANIZED HEALTH CARE EDUCATION/TRAINING PROGRAM

## 2020-02-04 PROCEDURE — G0177 OPPS/PHP; TRAIN & EDUC SERV: HCPCS

## 2020-02-04 PROCEDURE — 25000132 ZZH RX MED GY IP 250 OP 250 PS 637: Performed by: PSYCHIATRY & NEUROLOGY

## 2020-02-04 PROCEDURE — 99232 SBSQ HOSP IP/OBS MODERATE 35: CPT | Mod: GC | Performed by: PSYCHIATRY & NEUROLOGY

## 2020-02-04 RX ORDER — HALOPERIDOL 5 MG/1
5 TABLET ORAL EVERY 6 HOURS PRN
Status: DISCONTINUED | OUTPATIENT
Start: 2020-02-04 | End: 2020-02-26 | Stop reason: HOSPADM

## 2020-02-04 RX ORDER — DIPHENHYDRAMINE HYDROCHLORIDE 50 MG/ML
50 INJECTION INTRAMUSCULAR; INTRAVENOUS EVERY 6 HOURS PRN
Status: DISCONTINUED | OUTPATIENT
Start: 2020-02-04 | End: 2020-02-26 | Stop reason: HOSPADM

## 2020-02-04 RX ORDER — HALOPERIDOL 5 MG/ML
5 INJECTION INTRAMUSCULAR EVERY 6 HOURS PRN
Status: DISCONTINUED | OUTPATIENT
Start: 2020-02-04 | End: 2020-02-26 | Stop reason: HOSPADM

## 2020-02-04 RX ORDER — DIPHENHYDRAMINE HYDROCHLORIDE 50 MG/ML
50 INJECTION INTRAMUSCULAR; INTRAVENOUS EVERY 6 HOURS PRN
Status: DISCONTINUED | OUTPATIENT
Start: 2020-02-04 | End: 2020-02-04

## 2020-02-04 RX ADMIN — ARIPIPRAZOLE 20 MG: 10 TABLET ORAL at 09:04

## 2020-02-04 RX ADMIN — MELATONIN 25 MCG: at 09:04

## 2020-02-04 ASSESSMENT — ACTIVITIES OF DAILY LIVING (ADL)
LAUNDRY: WITH SUPERVISION
HYGIENE/GROOMING: INDEPENDENT
HYGIENE/GROOMING: INDEPENDENT
ORAL_HYGIENE: INDEPENDENT
ORAL_HYGIENE: INDEPENDENT
DRESS: INDEPENDENT
DRESS: INDEPENDENT

## 2020-02-04 NOTE — PROGRESS NOTES
02/03/20 2200   General Information   Art Directive other (see comments)   AT directive was to create an image of one's heart as a container. Goals of directive: emotional expression, creating a personal self narrative, identifying positive strengths, goals and support systems, coping through art. Pt was a positive participant, focused on task and media exploration for the full duration of group. Pt briefly verbally processed at the end of group.  Pts mood was calm.

## 2020-02-04 NOTE — PROGRESS NOTES
Re: Civil Commitment    Writer received fax from [facility] for continued commitment. Order Authorizing Use of Neuroleptic medication including: Abilify, Zyprexa, Haldol and Risperdal.    Writer contacted country  office to inquire about request to amend Clark order was informed that it was filed yesterday and no decision has been made at this time.    Nicole Cordova, [facility], [facility]  Clinical Treatment Coordinator

## 2020-02-04 NOTE — PROGRESS NOTES
Re: Discharge planning    Writer met with patient to sign CONNOR (located in chart), and then made the following referrals:        -[facility]    Phone: [phone]) (Fax: [phone])    -[facility]    Phone: ([phone]) (Fax: [phone])    -[facility]    Phone: ([phone]) (Fax: [phone])    -[facility]   Phone: [phone]) (Fax: [phone])    -[facility]   Phone: ([phone])  (Fax: [phone])        LUIS MANUEL Rubio, [facility]  Clinical Treatment Coordinator

## 2020-02-04 NOTE — PROGRESS NOTES
Jerel  attended 2 of 2 OT groups today. Pleasant & interactive per usual. 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 Worked on several free-hand paintings and casually discussed his views on the importance of physical wellbeing for emotional and psychological health.      02/04/20 1200   Occupational Therapy   Type of Intervention structured groups   Response Initiates, socially acceptable   Hours 2

## 2020-02-04 NOTE — PROGRESS NOTES
NAVIGATE Outreach  A Part of the [facility] First Episode of Psychosis Program     Patient Name: Jerel Day  /Age:  1995 (24 year old)    Contact: Received VM from [place] providing update regarding court tomorrow; Jerel was agreeable to a 9-month extension. He also appears open to exploring supporting housing options; [facility] plans to start checking on wait lists and openings. Plan for meeting all together again once Jerel is discharged from hospital.    Plan: Writer will route note to team for update purposes. Writer remains available for support and care coordination purposes.     Tammi Connell, EDELMIRA  NAVIGATE Individual Resiliency  & Family Clinician

## 2020-02-04 NOTE — PROGRESS NOTES
Re: Amended Clark    Received amended Clark-    Medications added: Vraylar and Clozaril  Medications removed: Zyprexa and Risperdal    Nicole Tasha, LPCC, LADC  Clinical Treatment Coordinator

## 2020-02-04 NOTE — PLAN OF CARE
"\"I'm better, I'm not self harming, I'm more energetic. I still have some heart pain and some pain in my legs. Is able to focus and concentrate better. Depression and anxiety have improved. Voices have improved. Denies suicidal thoughts. Attends half the groups, is social with peers. Was playing the key board this morning. Is resting in bed this afternoon.   "

## 2020-02-04 NOTE — PROGRESS NOTES
Pt was present and social in the milieu throughout the shift. Pt was observed coming back from court, pacing in the hallway, socializing with peers, playing the guitar/keyboard, attending the OT group, and eating dinner. Pt presented with a full range affect and remained calm throughout the shift. Pt endorsed experiencing some auditory hallucinations then denied experiencing any other mental health symptom including SI, HI, or SIB.        02/03/20 2155   Behavioral Health   Hallucinations auditory   Thinking distractable   Orientation person: oriented;place: oriented;date: oriented   Memory   (unsubstantiated)   Insight   (adequate)   Judgement impaired   Eye Contact at examiner   Affect full range affect   Mood mood is calm   Physical Appearance/Attire attire appropriate to age and situation   Hygiene   (adequate)   Suicidality   (pt denied thoughts and urges)   Self Injury   (pt denied thoughts and urges)   Elopement   (no apparent risk)   Activity   (pt present and social in milieu)   Speech tangential;clear;coherent   Medication Sensitivity no stated side effects;no observed side effects   Psychomotor / Gait paces;balanced;steady   Psycho Education   Type of Intervention 1:1 intervention   Response participates, initiates socially appropriate   Hours 0.5   Treatment Detail   (check-in)   Activities of Daily Living   Hygiene/Grooming independent   Oral Hygiene independent   Dress scrubs (behavioral health);independent   Laundry   (pt did not do laundry)   Room Organization independent   Activity   Activity Assistance Provided independent

## 2020-02-05 PROCEDURE — 12400001 ZZH R&B MH UMMC

## 2020-02-05 PROCEDURE — G0177 OPPS/PHP; TRAIN & EDUC SERV: HCPCS

## 2020-02-05 PROCEDURE — 25000132 ZZH RX MED GY IP 250 OP 250 PS 637: Performed by: STUDENT IN AN ORGANIZED HEALTH CARE EDUCATION/TRAINING PROGRAM

## 2020-02-05 PROCEDURE — 25000132 ZZH RX MED GY IP 250 OP 250 PS 637: Performed by: PSYCHIATRY & NEUROLOGY

## 2020-02-05 PROCEDURE — 99232 SBSQ HOSP IP/OBS MODERATE 35: CPT | Mod: GC | Performed by: PSYCHIATRY & NEUROLOGY

## 2020-02-05 RX ORDER — ARIPIPRAZOLE 15 MG/1
15 TABLET ORAL DAILY
Status: CANCELLED | OUTPATIENT
Start: 2020-02-06

## 2020-02-05 RX ADMIN — CARIPRAZINE 1.5 MG: 1.5 CAPSULE, GELATIN COATED ORAL at 08:45

## 2020-02-05 RX ADMIN — MELATONIN 25 MCG: at 08:45

## 2020-02-05 RX ADMIN — ARIPIPRAZOLE 20 MG: 10 TABLET ORAL at 08:45

## 2020-02-05 ASSESSMENT — ACTIVITIES OF DAILY LIVING (ADL)
HYGIENE/GROOMING: INDEPENDENT
DRESS: INDEPENDENT
DRESS: SCRUBS (BEHAVIORAL HEALTH);INDEPENDENT
LAUNDRY: UNABLE TO COMPLETE
ORAL_HYGIENE: INDEPENDENT
ORAL_HYGIENE: INDEPENDENT
LAUNDRY: WITH SUPERVISION
HYGIENE/GROOMING: INDEPENDENT

## 2020-02-05 NOTE — PROGRESS NOTES
"     ----------------------------------------------------------------------------------------------------------  [facility], [place]   Psychiatric Progress Note  Hospital Day #19      Interim History:   The patient's care was discussed with the treatment team and chart notes were reviewed.     Sleep: 5.75 hours  Scheduled Medications: Taken as prescribed  PRN Medications: None     Staff Report: \"I'm better, I'm not self harming, I'm more energetic. I still have some heart pain and some pain in my legs. Is able to focus and concentrate better. Depression and anxiety have improved. Voices have improved. Denies suicidal thoughts. Attends half the groups, is social with peers. Was playing the key board this morning. Is resting in bed this afternoon.     Patient Interview: Jerel was found in group and interviewed in his room with the care team. When asked about his mood he replies, \"I'm just exhausted.\" He reports that he is feeling very tired as he did not sleep well last night. Jerel shares that he did have some improvement in the intensity of the voices during the evening but then they got worse starting at around 9PM and lasting until around 3AM and that he did not sleep during this time. He further shares that \"back massages helped me to sleep.\" He reports they are at much less intensity now and that he was able to take his medications this morning. The team reminded him that he has PRNs for sleep. Jerel shares that he has not noticed any side effects from the cariprazine this morning.     When asked if there is anything else he would like to discuss, he shares that he is having \"somatosensory tics\" in his left foot that feel as though \"someone is lifting up the fascia in my lymph and lymph notes. He denies any pain but says it is uncomfortable saying, \"it feels like there is a mass in my blood.\" Jerel reports that he has had this sensation previously (off and on for the last few years) and " "that he does not know of anything that really helps. The team reminded him that he has icy hot available if that helps and encouraged him to try yoga. Jerel was amenable to signing an CONNOR for his insurance company and denies further questions or concerns for the team.    The risks, benefits, alternatives and side effects of any medication changes have been discussed and are understood by the patient and other caregivers.     Review of systems:      ROS was negative unless noted above. He shares that he has had nausea and reflux for the last several months.     Allergies:            Allergies   Allergen Reactions     Penicillins         Rash, Generalized         Psychiatric Examination:   BP (!) 142/90 (BP Location: Left arm)   Pulse 115   Temp 98  F (36.7  C) (Tympanic)   Resp 14   Ht 1.727 m (5' 8\")   Wt 74 kg (163 lb 3.2 oz)   SpO2 100%   BMI 24.81 kg/m    Weight is 163 lbs 3.2 oz  Body mass index is 24.81 kg/m .     Appearance:  fatigued, dressed in hospital scrubs, appeared stated age, well groomed, found resting in bed  Attitude: cooperative  Eye Contact: worse today, mostly had eyes closed  Mood: \"exhausted\"  Affect: Euthymic, intensity blunted  Speech:  clear, coherent and normal prosody  Psychomotor Behavior: no evidence of tardive dyskinesia, dystonia, or tics  Thought Process: mostly goal-oriented but tangential at times  Associations: mild loose associations  Thought Content: somatic delusions about origin of disease  Insight: limited  Judgment: limited  Oriented to: seems oriented to situation  Attention Span and Concentration: Adequate for conversation  Recent and Remote Memory: Seems intact  Language: Speaks English with mostly appropriate syntax and vocabulary  Fund of Knowledge: Seems appropriate  Muscle Strength and Tone: Appears grossly normal  Gait and Station: Did not assess today      Labs:   Recent Results   No results found for this or any previous visit (from the past 24 hour(s)). " "        Assessment    Jerel Day is a 24-year-old male with history of schizoaffective disorder, bipolar type and cannabis dependence who presented to [facility] ED on 01/17/2020 for treatment of self-inflicted leg laceration and head injury and was subsequently admitted for psychosis and suicidal ideation. Predisposing factors include distant family history of schizophrenia and known history of schizoaffective disorder, bipolar type. Precipitating factors include stress regarding upcoming semester, recent discharge from IR, cannabis use, possible medication non-adherence, and interpersonal conflict with his parents (feels like he has no flexibility at home). Perpetuating factors include feeling like he has not found his \"footing\" and chronic neck and back pain . Protective factors include supportive family, access to healthcare, and stable housing. MSE on admission was notable for depressed mood, reports of auditory and visual hallucinations, occasionally illogical comments and limited insight. Given the degree of cognitive impairment and thought symptoms, his presentation is appearing more consistent with schizophrenia paired with a mood disorder rather than schizoaffective disorder.    Hospital course: Jerel Day was admitted to station 22 on a 72 hour hold. He is currently under MI commitment until February. PTA Abilify and oanzapine were continued on admission. On 1/21/20, Jerel expressed that he wanted to be on only one antipsychotic and agreed to take Abilify if weaned off Zyprexa, so we decreased his oanzapine dose to 10 mg at bedtime. Olanzapine was decreased to 5 mg and Abilify was increased to 20 mg on 1/22/20. Clark amended on 2/4/20 to include cariprazine and clozapine rather than Zyprexa and Risperdal. Jerel's evening Zyprexa was discontinued on 2/4/20 and 1.5mg cariprazine was started on 2/5/20. The PRN for agitation was changed from Zyprexa to Haldol 5mg PO or IM with Benadryl. His Abilify was " "decreased to 15mg and the cariprazine was increased to 3mg on 2/6/20 .     Discontinued Medications (& Rationale):   Olanzapine - patient preference, weight gain + sedation; removed from [facility]  Abilify- not effective for reducing AH and psychosis symptoms adequately. cross-titrated to cariprazine     Medical course: Jerel Day was medically cleared for admission to the inpatient psychiatric unit. His leg and face injuries were monitored. On 1/31, he reported chest pain. EKG, internal medicine consult, and troponin were obtained, resulting in decreased concerned for cardiac etiology.     Plan   Today's changes:  - decrease Abilify to 15mg  - increase cariprazine to 3mg     Principal psychiatric diagnosis:   # Schizophrenia  - Increase cariprazine to 3mg po daily.  - Decrease aripiprazole to 15mg po daily.  - olanzapine-discontinued   - Continue PTA benztropine 0.5 mg BID PRN.  - Continue PTA gabapentin 300 mg BID PRN.  - Haldol 5mg PO or Haldol 5mg IM (given with benadryl 50mg) PRN agitation   - Neuropsychological testing to be completed in outpatient setting.  - Court decided to extend commitment for 9 months (expires November 26th, 2020).  - Made IRTS referrals.     Secondary psychiatric diagnoses:   # Cannabis dependence     - Patient will be treated in therapeutic milieu with appropriate individual and group therapies.     Medical diagnoses:       #Leg laceration on left thigh: Jerel cut himself with a pocketknife. S/p laceration repair in ED.   - Continue to monitor; healing well.  - Stitches removed on 1/24.      #Scalp and face contusions: CT not concerning for acute intracranial process. Demonstrated \"soft tissue hematoma overlying the left zygomatic arch\". No fractures visible on CT. Jerel denies LOC and blurry vision.  - PRN acetaminophen 650 mg Q4H for pain management.    #Chest pain: On 1/31, he reported reproducible chest pain. EKG, internal medicine consult, and troponin were obtained, resulting in " decreased concerned for cardiac etiology. Likely musculoskeletal in nature, however could also consider stress/anxiety as a cause.   - Continue to monitor.     Legal Status: Committed  With [facility] (cariprazine, aripiprazole, clozapine, haldol).      Safety Assessment:   Behavioral Orders   Procedures     Code 1 - Restrict to Unit     Routine Programming     As clinically indicated     Self Injury Precaution     Single Room     Status 15     Every 15 minutes.     Suicide precautions     Patients on Suicide Precautions should have a Combination Diet ordered that includes a Diet selection(s) AND a Behavioral Tray selection for Safe Tray - with utensils, or Safe Tray - NO utensils         Disposition: Pending clinical improvement and appropriate medication management.    Dhaval Bob, OMS-IV    I was present with the medical student who participated in the service and in the documentation of the note. I have verified the history and personally performed the physical exam and medical decision making. I agree with the assessment and plan of care as documented in the note. - Queta Wayne    This patient was seen and discussed with the attending physician.    Queta Wayne MD   PGY-1 Psychiatry    Attestation:  This patient has been seen and evaluated by me, Vince Zheng MD.  I have discussed this patient with the house staff team including the resident and medical student and I agree with the findings and plan in this note.    I have reviewed today's vital signs, medications, labs and imaging. Vince Zheng MD , PhD.

## 2020-02-05 NOTE — PROGRESS NOTES
Pt was in the milieu this evening watching a movie with peers and positively interacting with staff. Pt presented with a full range affect and his mood was depressed/calm most of the night. Pt said his day was going well. Pt reported feeling depressed today and was encouraged by writer to use some coping skills. Pt also reported auditory hallucinations. Pt said they were getting better this evening. Pt denies SI, SIB, and HI. Pt contracts for safety.        02/04/20 8418   Behavioral Health   Hallucinations auditory   Thinking distractable   Orientation person: oriented;place: oriented;date: oriented;time: oriented   Memory baseline memory   Insight poor   Eye Contact at examiner   Affect full range affect   Mood depressed   Physical Appearance/Attire attire appropriate to age and situation   Hygiene neglected grooming - unclean body, hair, teeth   1. Wish to be Dead (Recent) No   2. Non-Specific Active Suicidal Thoughts (Recent) No   Speech clear;coherent   Psychomotor / Gait balanced;steady   Psycho Education   Type of Intervention 1:1 intervention   Response participates with encouragement   Hours 0.5   Treatment Detail Check-In   Activities of Daily Living   Hygiene/Grooming independent   Oral Hygiene independent   Dress independent   Laundry with supervision   Room Organization independent

## 2020-02-05 NOTE — PROGRESS NOTES
Active particip in groups. Social with peers and staff. Full range affect, calm. Ate meals. Untidy.       02/05/20 1000   Behavioral Health   Hallucinations auditory   Thinking distractable;poor concentration   Orientation person: oriented;place: oriented;date: oriented;time: oriented   Memory baseline memory   Insight poor   Judgement impaired   Eye Contact at examiner   Affect full range affect   Mood depressed   Physical Appearance/Attire attire appropriate to age and situation;untidy   Hygiene neglected grooming - unclean body, hair, teeth   1. Wish to be Dead (Recent) No   2. Non-Specific Active Suicidal Thoughts (Recent) No   Activity isolative   Speech clear;coherent   Psychomotor / Gait balanced;steady   Activities of Daily Living   Hygiene/Grooming independent   Oral Hygiene independent   Dress independent   Laundry with supervision   Room Organization independent

## 2020-02-06 ENCOUNTER — PATIENT OUTREACH (OUTPATIENT)
Dept: PSYCHIATRY | Facility: CLINIC | Age: 25
End: 2020-02-06

## 2020-02-06 PROCEDURE — 99232 SBSQ HOSP IP/OBS MODERATE 35: CPT | Mod: GC | Performed by: PSYCHIATRY & NEUROLOGY

## 2020-02-06 PROCEDURE — 25000132 ZZH RX MED GY IP 250 OP 250 PS 637: Performed by: STUDENT IN AN ORGANIZED HEALTH CARE EDUCATION/TRAINING PROGRAM

## 2020-02-06 PROCEDURE — 12400001 ZZH R&B MH UMMC

## 2020-02-06 PROCEDURE — G0177 OPPS/PHP; TRAIN & EDUC SERV: HCPCS

## 2020-02-06 RX ORDER — ARIPIPRAZOLE 15 MG/1
15 TABLET ORAL DAILY
Status: DISCONTINUED | OUTPATIENT
Start: 2020-02-06 | End: 2020-02-06

## 2020-02-06 RX ORDER — ARIPIPRAZOLE 10 MG/1
20 TABLET ORAL DAILY
Status: DISCONTINUED | OUTPATIENT
Start: 2020-02-06 | End: 2020-02-06

## 2020-02-06 RX ORDER — ARIPIPRAZOLE 10 MG/1
10 TABLET ORAL DAILY
Status: DISCONTINUED | OUTPATIENT
Start: 2020-02-07 | End: 2020-02-07

## 2020-02-06 RX ADMIN — MELATONIN 25 MCG: at 09:27

## 2020-02-06 RX ADMIN — ARIPIPRAZOLE 15 MG: 15 TABLET ORAL at 09:30

## 2020-02-06 RX ADMIN — CARIPRAZINE 3 MG: 1.5 CAPSULE, GELATIN COATED ORAL at 09:27

## 2020-02-06 ASSESSMENT — ACTIVITIES OF DAILY LIVING (ADL)
HYGIENE/GROOMING: INDEPENDENT
DRESS: INDEPENDENT
ORAL_HYGIENE: INDEPENDENT
LAUNDRY: WITH SUPERVISION

## 2020-02-06 ASSESSMENT — MIFFLIN-ST. JEOR: SCORE: 1745.6

## 2020-02-06 NOTE — PROGRESS NOTES
"Jerel  attended 2 of 2 OT groups this morning. Thoughtful, interactive, generally bright. Enthusiastically reported \"liking [his] new medication.\" He participated in a Psychoeducation group on confirmation bias and personal beliefs. Discussed his ongoing interest in strengthening and alignment-based physical activities as support for his mental health.      02/06/20 1200   Occupational Therapy   Type of Intervention structured groups   Response Initiates, socially acceptable   Hours 2       "

## 2020-02-06 NOTE — PROGRESS NOTES
"     ----------------------------------------------------------------------------------------------------------  [facility], [place]   Psychiatric Progress Note  Hospital Day #20      Interim History:   The patient's care was discussed with the treatment team and chart notes were reviewed.     Sleep: 5.75 hours  Scheduled Medications: Taken as prescribed  PRN Medications: None     Staff Report: Pt had a quiet evening.  Spent most of the evening by himself.  Visited with parents.     Patient Interview: Jerel was found in group and interviewed in his room with the care team. He reports that he is still pretty low today. Jerel reports getting restful sleep despite having voices most of the evening. He further shares that he is having \"a little bit more voices in the morning\" than he was last night but that they are not causing thoughts of self-harm despite being fairly negative. He shares that they have generally been \"philosophical thoughts\" about his \"neurons being replaced daily\" and the \"identity crisis\" that this would cause.     When asked about physical symptoms Jerel shares that he had \"movement out of the usual pattern,\" and that it was \"nice.\" Jerel further shares that \"sometimes my body chooses lettering over numeration.\"     He then inquires about discharge planning and says he would like to go home to his parents house for a bit before going to the IRTS; he has not asked his parents about this but thinks they would be okay with it for a little bit. Jerel acknowledges that his relationship with his parents is difficult right now and that the \"relationships have been fractured.\" He also expresses a desire to improve the relationship saying, \"they took all that time to raise me and I can't end that with negativity.\" He agrees to call them tonight to ask about discharge and denies further questions or concerns for the team.    The risks, benefits, alternatives and side effects of any " "medication changes have been discussed and are understood by the patient and other caregivers.     Review of systems:      ROS was negative unless noted above. He shares that he has had nausea and reflux for the last several months.     Allergies:            Allergies   Allergen Reactions     Penicillins         Rash, Generalized         Psychiatric Examination:   BP (!) 142/90 (BP Location: Left arm)   Pulse 115   Temp 98  F (36.7  C) (Tympanic)   Resp 14   Ht 1.727 m (5' 8\")   Wt 74 kg (163 lb 3.2 oz)   SpO2 100%   BMI 24.81 kg/m    Weight is 163 lbs 3.2 oz  Body mass index is 24.81 kg/m .     Appearance:  fatigued, dressed in hospital scrubs, appeared stated age, somewhat groomed  Attitude: cooperative  Eye Contact: improved but looked at the table during much of the interview  Mood: \"low again today\"  Affect:  intensity blunted, constricted range, nonreactive with occasional smile  Speech:  clear, coherent and normal prosody  Psychomotor Behavior: no evidence of tardive dyskinesia, dystonia, or tics  Thought Process: mostly goal-oriented but tangential at times  Associations: mild loose associations  Thought Content: somatic delusions about origin of disease, some auditory hallucinations that he says are not not causing him and thoughts of self-harm but are very negative.  Insight: limited, difficulty understanding the extent of his disease  Judgment: limited, unable to share a plan for dealing with thoughts of self-harm  Oriented to: seems oriented to situation  Attention Span and Concentration: Adequate for conversation  Recent and Remote Memory: Seems intact  Language: Speaks English with mostly appropriate syntax and vocabulary, neologisms  Fund of Knowledge: Seems appropriate  Muscle Strength and Tone: Appears grossly normal  Gait and Station: Normal      Labs:   Recent Results   No results found for this or any previous visit (from the past 24 hour(s)).         Assessment    Jerel Rogergrace is a " "24-year-old male with history of schizoaffective disorder, bipolar type and cannabis dependence who presented to [facility] ED on 01/17/2020 for treatment of self-inflicted leg laceration and head injury and was subsequently admitted for psychosis and suicidal ideation. Predisposing factors include distant family history of schizophrenia and known history of schizoaffective disorder, bipolar type. Precipitating factors include stress regarding upcoming semester, recent discharge from IR, cannabis use, possible medication non-adherence, and interpersonal conflict with his parents (feels like he has no flexibility at home). Perpetuating factors include feeling like he has not found his \"footing\" and chronic neck and back pain . Protective factors include supportive family, access to healthcare, and stable housing. MSE on admission was notable for depressed mood, reports of auditory and visual hallucinations, occasionally illogical comments and limited insight. Given the degree of cognitive impairment and thought symptoms, his presentation is appearing more consistent with schizophrenia paired with a mood disorder rather than schizoaffective disorder.    Hospital course: Jerel Day was admitted to station 22 on a 72 hour hold. He is currently under MI commitment until February. PTA Abilify and oanzapine were continued on admission. On 1/21/20, Jerel expressed that he wanted to be on only one antipsychotic and agreed to take Abilify if weaned off Zyprexa, so we decreased his oanzapine dose to 10 mg at bedtime. Olanzapine was decreased to 5 mg and Abilify was increased to 20 mg on 1/22/20. Clark amended on 2/4/20 to include cariprazine and clozapine rather than Zyprexa and Risperdal. Jerel's evening Zyprexa was discontinued on 2/4/20 and 1.5mg cariprazine was started on 2/5/20. The PRN for agitation was changed from Zyprexa to Haldol 5mg PO or IM with Benadryl. His Abilify was decreased to 15mg and the cariprazine was " "increased to 3mg on 2/6/20. Abilify was decreased to 10mg and cariprazine was increased to 4.5mg on 2/7/20.     Discontinued Medications (& Rationale):   Olanzapine - patient preference, weight gain + sedation; removed from [facility]  Abilify- not effective for reducing AH and psychosis symptoms adequately. cross-titrated to cariprazine     Medical course: Jerel Day was medically cleared for admission to the inpatient psychiatric unit. His leg and face injuries were monitored. On 1/31, he reported chest pain. EKG, internal medicine consult, and troponin were obtained, resulting in decreased concerned for cardiac etiology.     Plan   Today's changes:  - decrease Abilify to 10mg  - increase cariprazine to 4.5mg     Principal psychiatric diagnosis:   # Schizophrenia  - Decrease Abilify from 15mg to 10mg daily  - Increase cariprazine from 3mg to 4.5mg daily  - Continue PTA benztropine 0.5 mg BID PRN.  - Continue PTA gabapentin 300 mg BID PRN.  - Haldol 5mg PO or Haldol 5mg IM (given with benadryl 50mg) PRN agitation     - Neuropsychological testing to be completed in outpatient setting.    - Made IRTS referrals.    - Jerel gave the team permission to talk to his parents tomorrow about discharge planning.     Secondary psychiatric diagnoses:   # Cannabis dependence     - Patient will be treated in therapeutic milieu with appropriate individual and group therapies.     Medical diagnoses:       #Leg laceration on left thigh: Jerel cut himself with a pocketknife. S/p laceration repair in ED.   - Continue to monitor; healing well.  - Stitches removed on 1/24.      #Scalp and face contusions: CT not concerning for acute intracranial process. Demonstrated \"soft tissue hematoma overlying the left zygomatic arch\". No fractures visible on CT. Jerel denies LOC and blurry vision.  - PRN acetaminophen 650 mg Q4H for pain management.    #Chest pain: On 1/31, he reported reproducible chest pain. EKG, internal medicine consult, and " troponin were obtained, resulting in decreased concerned for cardiac etiology. Likely musculoskeletal in nature, however could also consider stress/anxiety as a cause.   - Continue to monitor.     Legal Status: Committed  With [facility] (cariprazine, aripiprazole, clozapine, haldol).      Safety Assessment:   Behavioral Orders   Procedures     Code 1 - Restrict to Unit     Routine Programming     As clinically indicated     Self Injury Precaution     Single Room     Status 15     Every 15 minutes.     Suicide precautions     Patients on Suicide Precautions should have a Combination Diet ordered that includes a Diet selection(s) AND a Behavioral Tray selection for Safe Tray - with utensils, or Safe Tray - NO utensils         Disposition: Pending clinical improvement and appropriate medication management.    Dhaval Bob, OMS-IV  I was present with the medical student who participated in the service and in the documentation of the note. I have verified the history and personally performed the physical exam and medical decision making. I agree with the assessment and plan of care as documented in the note. - Queta Wayne    This patient was seen and discussed with the attending physician.    Queta Wayne MD   PGY-1 Psychiatry    Attestation:  This patient has been seen and evaluated by me, Vince Zheng MD.  I have discussed this patient with the house staff team including the resident and medical student and I agree with the findings and plan in this note.    I have reviewed today's vital signs, medications, labs and imaging. Vince Zheng MD , PhD.

## 2020-02-06 NOTE — PROGRESS NOTES
"   02/06/20 1254   Behavioral Health   Hallucinations auditory   Thinking delusional;poor concentration   Orientation person: oriented;place: oriented;date: oriented   Insight admits / accepts   Judgement impaired   Eye Contact at examiner   Affect other (see comments)  (Neutral)   Mood mood is calm   Physical Appearance/Attire attire appropriate to age and situation   Hygiene well groomed   Suicidality other (see comments)  (Denies)   1. Wish to be Dead (Recent) No   2. Non-Specific Active Suicidal Thoughts (Recent) No   Self Injury other (see comment)  (None observed or reported )   Elopement   (None observed or reported )     Pt continues to be awake and visible on the unit. Social with peers, paces in the halls, and plays the guitar or piano to pass the time. Affect is neutral. Pleasant and polite upon approach. Pt reports he feels better today, and thinks its because of the medication change. Pt states, \"I feel more clear, and my thoughts I can control a bit better.\" Writer encouraged pt to let staff know of any side affects that can occur. Pt endorses hearing voices today, but stated, \"they are much more controlled.\" His goal is to relax today, and keep himself busy. Denies thoughts to hurt himself or others. Contracts for safety.   "

## 2020-02-06 NOTE — PROGRESS NOTES
"Pt had a quiet evening.  Spent most of the evening by himself.  Visited with parents.  Pt denies anx, dep, SI, SIB.  Is waiting for a bed at an [facility] to open.  \"I prefer to go to [facility] but I'm willing to anyway in [place] or the area.  I don't want to go to far out of the [facility].  "

## 2020-02-06 NOTE — PLAN OF CARE
BEHAVIORAL TEAM DISCUSSION    Participants:   Dr. Vince Zheng, Attending Psychiatrist  Queta Wayne MD, PGY-1  Jody Reese, RN  Nicole Cordova, [facility], LADC, CTC  Dhaval Bob, OMS-IV  Chanel Johnson, MS3  Servando Bird, OT  Progress: Patient has shown some improvement with medication. Received amended Clark Order   Anticipated length of stay: Unknown, pending stabilization and appropriate disposition plan.   Continued Stay Criteria/Rationale: Patient will discharge to IRTS facility.   Medical/Physical: No acute issues - was cleared by ED for psychiatric admission  Precautions:   Behavioral Orders   Procedures    Code 1 - Restrict to Unit    Routine Programming     As clinically indicated    Self Injury Precaution    Single Room    Status 15     Every 15 minutes.    Suicide precautions     Patients on Suicide Precautions should have a Combination Diet ordered that includes a Diet selection(s) AND a Behavioral Tray selection for Safe Tray - with utensils, or Safe Tray - NO utensils     Plan: Patient PD was revoked. IRTS referrals made.  Rationale for change in precautions or plan: No change at present-will continue to monitor and adjust as needed.

## 2020-02-07 PROCEDURE — G0177 OPPS/PHP; TRAIN & EDUC SERV: HCPCS

## 2020-02-07 PROCEDURE — 12400001 ZZH R&B MH UMMC

## 2020-02-07 PROCEDURE — 25000132 ZZH RX MED GY IP 250 OP 250 PS 637: Performed by: STUDENT IN AN ORGANIZED HEALTH CARE EDUCATION/TRAINING PROGRAM

## 2020-02-07 PROCEDURE — 99232 SBSQ HOSP IP/OBS MODERATE 35: CPT | Mod: GC | Performed by: PSYCHIATRY & NEUROLOGY

## 2020-02-07 RX ORDER — LANOLIN ALCOHOL/MO/W.PET/CERES
3 CREAM (GRAM) TOPICAL
Status: DISCONTINUED | OUTPATIENT
Start: 2020-02-07 | End: 2020-02-19

## 2020-02-07 RX ADMIN — MELATONIN TAB 3 MG 3 MG: 3 TAB at 22:23

## 2020-02-07 RX ADMIN — MELATONIN 25 MCG: at 09:58

## 2020-02-07 RX ADMIN — ARIPIPRAZOLE 10 MG: 10 TABLET ORAL at 09:56

## 2020-02-07 RX ADMIN — CARIPRAZINE 4.5 MG: 1.5 CAPSULE, GELATIN COATED ORAL at 09:56

## 2020-02-07 ASSESSMENT — ACTIVITIES OF DAILY LIVING (ADL)
LAUNDRY: WITH SUPERVISION
LAUNDRY: WITH SUPERVISION
HYGIENE/GROOMING: INDEPENDENT
ORAL_HYGIENE: INDEPENDENT
HYGIENE/GROOMING: INDEPENDENT
DRESS: INDEPENDENT
ORAL_HYGIENE: INDEPENDENT
DRESS: INDEPENDENT

## 2020-02-07 NOTE — PLAN OF CARE
"48 hrs Nursing Assessment     Problem: Sensory Perception Impairment (Psychotic Signs/Symptoms)  Goal: Decreased Frequency and Intensity of Sensory Symptoms (Psychotic Signs/Symptoms)  Outcome: Declining   This writer met the pt in his room lying in bed awoke; appeared anxious but pleasant; agreed to check in with the writer; reported an increase in auditory hallucination, which he described as \"negative energy\" he stated,\" They are  my brain to someone's domain; they compare me with my friends and make me feel down; whenever I think about building a friendship and relationship outside after discharge, it gives me anxiety and negative thought\" pt appeared decompensating, but he stated that medications are working for him and wanting to stay on the same medication  that he is currently on. Rated both depression and anxiety 5/10, 10 being sever. Reported feeling tired and  planning to rest for the reminder of the shift but out after dinner and noted playing gitter; out for dinner and  snack ; appetite is good  ; denied SI/SIB and visual hallucination.           "

## 2020-02-07 NOTE — PROGRESS NOTES
Pt was calm and social all shift.  Pt enjoys talking with staff and pts.  Pt attends and participates in OT.  Pt spends their time watching TV and yoga.  Pt ate dinner and snacks.  Pt denies SI and SIB.         02/06/20 2226   Behavioral Health   Hallucinations auditory   Thinking delusional;poor concentration   Orientation person: oriented;place: oriented;date: oriented;time: oriented   Memory baseline memory   Insight admits / accepts   Judgement impaired   Eye Contact at examiner   Affect full range affect   Mood mood is calm   Physical Appearance/Attire attire appropriate to age and situation   Hygiene well groomed   Suicidality other (see comments)  (Pt denies)   1. Wish to be Dead (Recent) No   2. Non-Specific Active Suicidal Thoughts (Recent) No   Self Injury other (see comment)  (Pt denies)   Speech clear;coherent   Psychomotor / Gait balanced;steady   Psycho Education   Type of Intervention 1:1 intervention   Response participates, initiates socially appropriate   Hours 0.5   Treatment Detail Check-in   Activities of Daily Living   Hygiene/Grooming independent   Oral Hygiene independent   Dress independent   Laundry with supervision   Room Organization independent

## 2020-02-07 NOTE — PROGRESS NOTES
02/06/20 1710   Group Therapy Session   Group Attendance other (see comments)  (No charge)   Total Time (minutes) 5   Group Type psychotherapeutic   Jerel showed up for group during the last 5 minutes.  He presented as smiling though disheveled as though he'd just awakened.  He sat next to this writer, closed his eyes and made some arm movements that reminded this writer of Gio Chi. He presented as pleasant.

## 2020-02-07 NOTE — PROGRESS NOTES
"Jerel  attended 3 of 3 OT groups today.   He participated in a movement & mindfulness group this a.m., engaging in all suggested movements & a guided relaxation practice to support balance for the nervous system.   Also participated in a structured task project that required concentration, problem solving, and cooperating with peers (making homemade pizza).   Preoccupied this date, expressing concerns about his mind and body being \"controlled by outside forces.\" Otherwise pleasant, agreeable, and interested in participating in all types of therapeutic activity.      02/07/20 1500   Occupational Therapy   Type of Intervention structured groups   Response Initiates, socially acceptable   Hours 3           "
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
## 2020-02-07 NOTE — PROGRESS NOTES
"NAVIGATE Outreach  A Part of the [facility] First Episode of Psychosis Program     Patient Name: Jerel Day  /Age:  1995 (24 year old)    Contact: Writer called Jerel on the unit to check-in. He shared he is feeling \"a little stuck\" but overall okay. He informed writer they changed his diagnosis to schizophrenia and he is on new medication now. He reports he feels \"good\" on new medication. Shared they are looking into an IRTS, but he is hopeful he may be able to discharge to [facility] in the interim before a placement has been facilitated. Writer validated Jerel's hope to discharge, but also encouraged him to be patient and trust the team in keeping him, especially in light of a med change. Discussed plan to check in next week.    Plan: Writer continues to remain available for support and care coordination purposes.     Tammi Connell, [facility]  NAVIGATE Individual Resiliency [place] [facility]    "

## 2020-02-07 NOTE — PROGRESS NOTES
"Pt presented with anxious affect and racing thought process. He reported his mood as \"Not so great\" and expressed feelings of distress, depersonalization, and sadness which correlated with hallucinations and delusional content. Pt expressed he felt his body and physical environment were being influenced by \"frenemies from the past\" and shared belief that his spinal cord and neurology were \"misaligned.\" During afternoon writer discovered pt in his room sitting on window ledge in his underwear. Pt appeared disoriented and diaphoretic exclaiming he had been attempting to calm with yoga unsuccessfully. He spoke with treatment team and reiterated frustrations about approach to care; pt perceives he is in need of body work for healing and remains resistant to suggested medications/alternative coping techniques. He was responsive to continued reassurance from writer and encouragement to engage in groups and mindful distraction. Pt reported psychotic symptoms \"Make me want to die\" but denied current safety concerns, plan, or intent.      02/07/20 1300   Behavioral Health   Hallucinations auditory;olfactory;tactile   Thinking delusional;poor concentration   Orientation person: oriented;place: oriented;date: oriented;time: oriented   Memory baseline memory   Insight poor   Judgement impaired   Eye Contact at examiner   Affect full range affect   Mood anxious;depressed   Physical Appearance/Attire attire appropriate to age and situation;appears stated age   Hygiene well groomed   1. Wish to be Dead (Recent) No   Wish to be Dead Description (Recent) Pt denies   2. Non-Specific Active Suicidal Thoughts (Recent) No   Non-Specific Active Suicidal Thought Description (Recent) Pt denies   Psycho Education   Type of Intervention 1:1 intervention   Response participates, initiates socially appropriate   Hours 0.5   Treatment Detail Anxiety coping skills   Activities of Daily Living   Hygiene/Grooming independent   Oral Hygiene " independent   Dress independent   Laundry with supervision   Room Organization independent

## 2020-02-07 NOTE — PLAN OF CARE
02/05/20 2000   Occupational Therapy   Type of Intervention structured groups   Response Initiates, socially acceptable   Hours 1     Pt attended a structured OT group with a focus on self-awareness and socialization. Pt appeared comfortable sharing positive personal information, and was respectful in listening and responding to peers. Pt was able to identify positive personal strengths, and was supportive to peers throughout the activity.

## 2020-02-08 PROCEDURE — 12400001 ZZH R&B MH UMMC

## 2020-02-08 PROCEDURE — 25000132 ZZH RX MED GY IP 250 OP 250 PS 637: Performed by: STUDENT IN AN ORGANIZED HEALTH CARE EDUCATION/TRAINING PROGRAM

## 2020-02-08 RX ADMIN — CARIPRAZINE 4.5 MG: 1.5 CAPSULE, GELATIN COATED ORAL at 09:16

## 2020-02-08 RX ADMIN — MELATONIN 25 MCG: at 09:16

## 2020-02-08 ASSESSMENT — ACTIVITIES OF DAILY LIVING (ADL)
DRESS: INDEPENDENT;STREET CLOTHES
LAUNDRY: WITH SUPERVISION
DRESS: INDEPENDENT
HYGIENE/GROOMING: INDEPENDENT
ORAL_HYGIENE: INDEPENDENT
ORAL_HYGIENE: INDEPENDENT
HYGIENE/GROOMING: INDEPENDENT
LAUNDRY: WITH SUPERVISION

## 2020-02-08 NOTE — PROGRESS NOTES
Social in the milieu. Active particip in group. Full range affect, calm mood. Ate meals       02/08/20 0900   Behavioral Health   Thinking poor concentration   Orientation person: oriented;place: oriented;date: oriented;time: oriented   Memory baseline memory   Insight poor   Judgement impaired   Eye Contact at examiner   Affect full range affect   Mood mood is calm   Physical Appearance/Attire attire appropriate to age and situation   Hygiene well groomed   1. Wish to be Dead (Recent) No   2. Non-Specific Active Suicidal Thoughts (Recent) No   Activity other (see comment)  ([place] in the milieu. Active particip in grps)   Speech clear;coherent   Psychomotor / Gait balanced;steady   Activities of Daily Living   Hygiene/Grooming independent   Oral Hygiene independent   Dress independent;street clothes   Laundry with supervision   Room Organization independent

## 2020-02-08 NOTE — PLAN OF CARE
"Jerel ate breakfast this am, had vitals done then went back to bed.  When approached to ask to come to med room for meds he said, \"Could you bring them to me.\".  At first he was told no, then he explained that his back was hurting quite a bit right then and he was on his back on his bed with his knees up.  Meds were brought to him.  He did not want anything for pain at this time including hot pack or cold pack.  After an hour he came out and is socializing with female peer who is taking an interest in him.  Female peer said, \"I hope that I am making your day because that is what I am trying to do.\".  He said, \"Yes, your are making my day.\".  Then she said, \"really!!\", quite energetically.  His boundaries with this peer were good, he played to electric piano key board.  The look on his face today is more relaxed,  more lit up then yesterday when he looked glum and cloudy at times.  He knew his medications, the changes planned to increase gradually the antipsychotic and come off the other.   "

## 2020-02-09 PROCEDURE — 25000132 ZZH RX MED GY IP 250 OP 250 PS 637: Performed by: STUDENT IN AN ORGANIZED HEALTH CARE EDUCATION/TRAINING PROGRAM

## 2020-02-09 PROCEDURE — 12400001 ZZH R&B MH UMMC

## 2020-02-09 RX ADMIN — MELATONIN 25 MCG: at 10:22

## 2020-02-09 RX ADMIN — CARIPRAZINE 6 MG: 1.5 CAPSULE, GELATIN COATED ORAL at 10:12

## 2020-02-09 RX ADMIN — MELATONIN TAB 3 MG 3 MG: 3 TAB at 21:05

## 2020-02-09 ASSESSMENT — ACTIVITIES OF DAILY LIVING (ADL)
LAUNDRY: WITH SUPERVISION
LAUNDRY: WITH SUPERVISION
ORAL_HYGIENE: INDEPENDENT
DRESS: STREET CLOTHES
DRESS: INDEPENDENT
HYGIENE/GROOMING: INDEPENDENT
HYGIENE/GROOMING: INDEPENDENT
ORAL_HYGIENE: INDEPENDENT

## 2020-02-09 ASSESSMENT — MIFFLIN-ST. JEOR: SCORE: 1745.15

## 2020-02-09 NOTE — PROGRESS NOTES
Pt was withdrawn during the shift.  Pt enjoys playing the guitar loudly.  When asked to play quieter pt becomes agitated.  Pt spends majority of time in their room resting.  Pt ate dinner and snacks.  Pt attended OT.  Pt denies SI and SIB.         02/08/20 2142   Behavioral Health   Hallucinations auditory   Thinking distractable;poor concentration   Orientation person: oriented;place: oriented;date: oriented;time: oriented   Memory baseline memory   Insight poor   Judgement impaired   Eye Contact at examiner   Affect blunted, flat   Mood mood is calm;irritable   Physical Appearance/Attire attire appropriate to age and situation   Hygiene other (see comment)  (Adequate)   Suicidality other (see comments)  (Pt denies)   1. Wish to be Dead (Recent) No   2. Non-Specific Active Suicidal Thoughts (Recent) No   3. Active Sucidal Ideation with any Methods (Not Plan) Without Intent to Act (Recent) No   Self Injury other (see comment)  (Pt denies)   Activity withdrawn   Speech clear;coherent   Psychomotor / Gait balanced;steady   Psycho Education   Type of Intervention 1:1 intervention   Response participates with encouragement   Hours 0.5   Treatment Detail Check-in   Activities of Daily Living   Hygiene/Grooming independent   Oral Hygiene independent   Dress independent   Laundry with supervision   Room Organization independent

## 2020-02-09 NOTE — PLAN OF CARE
"Jerel got up for breakfast and sat with female peer that he has been friendly with for a few days now.  He allowed vital signs to be taken and waited patiently for 0800 meds (delayed while pharm. Getting new higher dose to unit).  He socialized in the unge for a while, sitting next to the same female peer.  They appear to enjoy each other's company.  He then went to his room to lie down.  He was lying on his right side when meds brought to him.  Yesterday he had back pain and was lying on back for that reason.  He said his back was ok today.  He takes his medications one pill out of the cup at a time and puts into his mouth and swallows without water.  After he drinks some water.  He denied any side effects with Vrylar.  He said, \"I think that that medication is helping.\".  He stated that his thinking was clear, intact and working fast enough for him.  He said his anxiety was low right now and that he judges anxiety by if his stomach is upset or not.   He said that his mood was , \"neutral, not depressed.\".  He responded that \"No.\", he did not have ideas of self harm or harming anyone else.  He showed patience and his facial expression was relaxed.  When he is troubled in last few days his face readily expresses worry and irritation.  None of that present today.  When discussing discharge planning he said that he would like to go to an irts but knows he will probably have to discharge earlier and go to his parents' home.  \"I know I will take my medications. I can handle being at home until irts ready.\".  He stated, when asked, that he does use marijuana at times but not often and he thinks that he could refrain from it after discharge while awaiting irts.  When looking and listening to him, it seem that he could bge neutral and not depressed in mood although it would be at his word because he looks sad but does not talk sad.  He looks more anxious then he states even in his room while talking to staff but he " declares there is little anxiety.  He looks distracted like it is hard to concentrate but he stated that Vrylar is helping a lot, his thinking is better and clear. He looks sincere (eye contact, facial expression congruent) while accepting the care plan and elaborating on some particulars to keep his focus on forward movement and irts. He did not speak of a job goal or of any philosophical beliefs during conversation.  He was 50% looking at speaker while talking and 50% looking away.  He did not want an egg crate mattress for his bed.

## 2020-02-10 PROCEDURE — 99232 SBSQ HOSP IP/OBS MODERATE 35: CPT | Mod: GC | Performed by: PSYCHIATRY & NEUROLOGY

## 2020-02-10 PROCEDURE — 25000132 ZZH RX MED GY IP 250 OP 250 PS 637: Performed by: STUDENT IN AN ORGANIZED HEALTH CARE EDUCATION/TRAINING PROGRAM

## 2020-02-10 PROCEDURE — 12400001 ZZH R&B MH UMMC

## 2020-02-10 PROCEDURE — G0177 OPPS/PHP; TRAIN & EDUC SERV: HCPCS

## 2020-02-10 RX ADMIN — MELATONIN 25 MCG: at 08:04

## 2020-02-10 RX ADMIN — CARIPRAZINE 6 MG: 1.5 CAPSULE, GELATIN COATED ORAL at 08:04

## 2020-02-10 RX ADMIN — BENZTROPINE MESYLATE 0.5 MG: 0.5 TABLET ORAL at 21:04

## 2020-02-10 ASSESSMENT — ACTIVITIES OF DAILY LIVING (ADL)
HYGIENE/GROOMING: SHOWER;INDEPENDENT
ORAL_HYGIENE: INDEPENDENT
HYGIENE/GROOMING: INDEPENDENT
DRESS: SCRUBS (BEHAVIORAL HEALTH);INDEPENDENT
LAUNDRY: WITH SUPERVISION
DRESS: INDEPENDENT
ORAL_HYGIENE: INDEPENDENT

## 2020-02-10 NOTE — PROGRESS NOTES
"  ----------------------------------------------------------------------------------------------------------  [facility], [place]   Psychiatric Progress Note  Hospital Day #24      Interim History:   The patient's care was discussed with the treatment team and chart notes were reviewed.     Sleep: 7 hours  Scheduled Medications: Taken as prescribed  PRN Medications: None                  Staff Report: participating in groups. Reporting increased AH, anxiety. Resting in room, playing [employer] [employer] in milieu. Stated that Vrylar is helping a lot, his thinking is better and clear.      Patient Interview: Jerel is interviewed in his room. He reports he is feeling tired and wasn't able to sleep last night. He states that if he had a roommate, he would not be able to stay in the hospital. He reports no side effects from cariprazine. He reports a few bothersome symptoms, including \"stomach in knots,\" and \"an odor like strong toothpaste in my nostrils or throat.\" He shares that he is feeling very tired of being in the hospital and would like to go home. He states he thinks he is ready to go home. He asks the care team to contact his parents regarding whether he can wait for an IRTs bed at home. He brightens when discussing his artwork in his room.     Father León called at patient's request:  León states Jerel could live with him for a limited amount of time (7-10 days) while he waits for an IRTS. Main concern is lack of medication adherence at home. Also notes Jerel seems to be sleepier during the day- wonders if Vraylar at 8am could be causing drowsiness.     The risks, benefits, alternatives and side effects of any medication changes have been discussed and are understood by the patient and other caregivers.     Review of systems:      ROS was negative unless noted above. He shares that he has had nausea and reflux for the last several months.     Allergies:                Allergies   Allergen " "Reactions     Penicillins         Rash, Generalized         Psychiatric Examination:   BP (!) 142/90 (BP Location: Left arm)   Pulse 115   Temp 98  F (36.7  C) (Tympanic)   Resp 14   Ht 1.727 m (5' 8\")   Wt 74 kg (163 lb 3.2 oz)   SpO2 100%   BMI 24.81 kg/m    Weight is 163 lbs 3.2 oz  Body mass index is 24.81 kg/m .     Appearance:  fatigued, dressed in hospital scrubs, appeared stated age, well groomed  Attitude: cooperative  Eye Contact: fair  Mood: \"okay\"  Affect: depressed, intensity blunted, constricted range, nonreactive with occasional smile  Speech:  clear, coherent and normal prosody  Psychomotor Behavior: no evidence of tardive dyskinesia, dystonia, or tics  Thought Process: mostly goal-oriented but tangential at times  Associations: mild loose associations  Thought Content: somatic delusions about origin of disease, possible olfactory hallucinations. Denies SI/HI/paranoia.  Insight: limited, difficulty understanding the extent of his disease  Judgment: limited- unable to name risks and benefits to going home vs. IRTS  Oriented to: seems oriented to situation  Attention Span and Concentration: Adequate for conversation  Recent and Remote Memory: Seems intact  Language: Speaks English with mostly appropriate syntax and vocabulary,frequent neologisms  Fund of Knowledge: Seems appropriate  Muscle Strength and Tone: Appears grossly normal  Gait and Station: Normal      Labs:   Recent Results   No results found for this or any previous visit (from the past 24 hour(s)).          Assessment    Jerel Day is a 24-year-old male with history of schizoaffective disorder, bipolar type and cannabis dependence who presented to [facility] ED on 01/17/2020 for treatment of self-inflicted leg laceration and head injury and was subsequently admitted for psychosis and suicidal ideation. Predisposing factors include distant family history of schizophrenia. Precipitating factors include stress regarding upcoming semester, " "recent discharge from IRTS, cannabis use, possible medication non-adherence, and interpersonal conflict with his parents (feels like he has no flexibility at home). Perpetuating factors include feeling like he has not found his \"footing\" and chronic neck and back pain . Protective factors include supportive family, access to healthcare, and stable housing. MSE on admission was notable for depressed mood, reports of auditory and visual hallucinations, occasionally illogical comments and limited insight. Given the degree of cognitive impairment and thought symptoms, in the absence of mood symptoms, his presentation is appearing more consistent with schizophrenia rather than schizoaffective disorder.     Hospital course: Jerel Day was admitted to station 22 on a 72 hour hold. He is currently under MI commitment until February. PTA Abilify and oanzapine were continued on admission. On 1/21/20, Jerel expressed that he wanted to be on only one antipsychotic and agreed to take Abilify if weaned off Zyprexa, so we decreased his oanzapine dose to 10 mg at bedtime. Olanzapine was decreased to 5 mg and Abilify was increased to 20 mg on 1/22/20. Clark amended on 2/4/20 to include cariprazine and clozapine rather than Zyprexa and Risperdal. Jerel's evening Zyprexa was discontinued on 2/4/20 and 1.5mg cariprazine was started on 2/5/20. The PRN for agitation was changed from Zyprexa to Haldol 5mg PO or IM with Benadryl. His Abilify was decreased to 15mg and the cariprazine was increased to 3mg on 2/6/20. Abilify was decreased to 10mg and cariprazine was increased to 4.5mg on 2/7/20 and to 6mg on 2/9. Abilify discontinued on 2/7/20.     Discontinued Medications (& Rationale):   Olanzapine - patient preference, weight gain + sedation; removed from [facility]  Abilify- not effective for reducing AH and psychosis symptoms adequately. cross-titrated to cariprazine     Medical course: Jerel Day was medically cleared for admission to " "the inpatient psychiatric unit. His leg and face injuries were monitored. On 1/31, he reported chest pain. EKG, internal medicine consult, and troponin were obtained, resulting in decreased concerned for cardiac etiology.     Plan   Today's changes:  - none     Principal psychiatric diagnosis:   # Schizophrenia  - Continue cariprazine 6mg daily    PRNs  - Continue PTA benztropine 0.5 mg BID PRN.  - Continue PTA gabapentin 300 mg BID PRN.  - Haldol 5mg PO or Haldol 5mg IM (given with benadryl 50mg) PRN agitation      - Neuropsychological testing to be completed in outpatient setting.     - Made IRTS referrals.     - Contact parents when Jerel allows/signs CONNOR.      Secondary psychiatric diagnoses:   # Cannabis dependence     - Patient will be treated in therapeutic milieu with appropriate individual and group therapies.     Medical diagnoses:       #Leg laceration on left thigh: Jerel cut himself with a pocketknife. S/p laceration repair in ED.   - Continue to monitor; healing well.  - Stitches removed on 1/24.      #Scalp and face contusions: CT not concerning for acute intracranial process. Demonstrated \"soft tissue hematoma overlying the left zygomatic arch\". No fractures visible on CT. Jerel denies LOC and blurry vision.  - PRN acetaminophen 650 mg Q4H for pain management.     #Chest pain: On 1/31, he reported reproducible chest pain. EKG, internal medicine consult, and troponin were obtained, resulting in decreased concerned for cardiac etiology. Likely musculoskeletal in nature, however could also consider stress/anxiety as a cause.   - Continue to monitor.     Legal Status: Committed wth [facility] (cariprazine, aripiprazole, clozapine, haldol).     Contacts: León (father) [phone], Mesha [phone]  This patient was seen independently and discussed with the attending physician.    Queta Wayne MD   PGY-1 Psychiatry      Attestation:  This patient has been seen and evaluated by me, Nadya Ruiz.  I " have discussed this patient with the psychiatry resident and I agree with the findings and plan in this note.    I have reviewed today's vital signs, medications, labs and imaging.   Nadya Ruiz MD.

## 2020-02-10 NOTE — PROGRESS NOTES
"Jerel  attended 2 of 2 OT groups this a.m. Reported fatigue and endorsed low mood. He attended a creative reflection group on the topic of personal resources. Identified a number of internal and external resources available to him (e.g. \"kindness, patience, musical ability\"), and creative a visual depiction of these supports. Set an intention for the week of \"making things simple so that I can discharge on time and move on with life.\"      02/10/20 1100   Occupational Therapy   Type of Intervention structured groups   Response Initiates, socially acceptable   Hours 2         "

## 2020-02-10 NOTE — PROGRESS NOTES
Pt had a quiet shift.  Pt spent their time resting in their room.  Pt out of room minimally.  Pt minimally social during shift.  Pt ate dinner and snacks.  Pt denies SI, SIB, and hallucinations.         02/09/20 2138   Behavioral Health   Hallucinations denies / not responding to hallucinations   Thinking intact   Orientation person: oriented;place: oriented;date: oriented;time: oriented   Memory baseline memory   Insight admits / accepts   Judgement intact   Eye Contact at examiner   Affect blunted, flat   Mood mood is calm   Physical Appearance/Attire disheveled   Hygiene other (see comment)  (Adequate)   Suicidality other (see comments)  (Pt denies)   1. Wish to be Dead (Recent) No   2. Non-Specific Active Suicidal Thoughts (Recent) No   Activity isolative;withdrawn   Speech clear;coherent   Psychomotor / Gait balanced;steady   Psycho Education   Type of Intervention 1:1 intervention   Response participates with encouragement   Hours 0.5   Treatment Detail Check-in   Activities of Daily Living   Hygiene/Grooming independent   Oral Hygiene independent   Dress independent   Laundry with supervision   Room Organization independent

## 2020-02-10 NOTE — PLAN OF CARE
Jerel stayed in room much of the day explaining that he did not sleep well last night.  And catching up today.  Showered, washed hair.  Appears to enjoy talking with female peer.  Boundaries good

## 2020-02-11 PROCEDURE — 12400001 ZZH R&B MH UMMC

## 2020-02-11 PROCEDURE — 99232 SBSQ HOSP IP/OBS MODERATE 35: CPT | Mod: GC | Performed by: PSYCHIATRY & NEUROLOGY

## 2020-02-11 PROCEDURE — 25000132 ZZH RX MED GY IP 250 OP 250 PS 637: Performed by: STUDENT IN AN ORGANIZED HEALTH CARE EDUCATION/TRAINING PROGRAM

## 2020-02-11 PROCEDURE — G0177 OPPS/PHP; TRAIN & EDUC SERV: HCPCS

## 2020-02-11 PROCEDURE — H2032 ACTIVITY THERAPY, PER 15 MIN: HCPCS

## 2020-02-11 RX ADMIN — Medication 1 CAPSULE: at 19:33

## 2020-02-11 RX ADMIN — MELATONIN 25 MCG: at 10:06

## 2020-02-11 RX ADMIN — CARIPRAZINE 6 MG: 1.5 CAPSULE, GELATIN COATED ORAL at 19:33

## 2020-02-11 RX ADMIN — MELATONIN TAB 3 MG 3 MG: 3 TAB at 19:33

## 2020-02-11 ASSESSMENT — MIFFLIN-ST. JEOR: SCORE: 1743.34

## 2020-02-11 ASSESSMENT — ACTIVITIES OF DAILY LIVING (ADL)
ORAL_HYGIENE: INDEPENDENT
DRESS: SCRUBS (BEHAVIORAL HEALTH)
LAUNDRY: WITH SUPERVISION
HYGIENE/GROOMING: INDEPENDENT

## 2020-02-11 NOTE — PROGRESS NOTES
Pt was present in the milieu but was mostly socially withdrawn, with the exception of socializing intermittently with one peer. Pt was observed meeting with visitors, watching TV, sleeping in room, and eating snack/dinner. Pt presented with a blunt/flat affect and had a notably depressed mood. Pt endorsed experiencing auditory hallucinations but denied experiencing any other mental health symptoms including SI, HI, or SIB. During the check-in it was difficult to understand the pt, as he spoke in a somewhat tangential manner but he did indicate he could be experiencing uncontrollable muscle movements as a potential medication side effect. This was reported to the RN.         02/10/20 2038   Behavioral Health   Hallucinations auditory   Thinking distractable;delusional;poor concentration   Orientation person: oriented;place: oriented   Memory   (unsubstantiated)   Insight poor   Judgement impaired   Eye Contact at examiner   Affect blunted, flat   Mood depressed;mood is calm   Physical Appearance/Attire attire appropriate to age and situation   Hygiene neglected grooming - unclean body, hair, teeth   Suicidality   (pt denied thoughts and urges)   Self Injury   (pt denied thoughts and urges)   Elopement   (no apparent risk)   Activity withdrawn   Speech tangential;clear;coherent   Medication Sensitivity no observed side effects  (pt reported uncontrollable muscle movements )   Psychomotor / Gait balanced;steady   Psycho Education   Type of Intervention 1:1 intervention   Response participates, initiates socially appropriate   Hours 0.5   Treatment Detail   (check-in)   Activities of Daily Living   Hygiene/Grooming independent   Oral Hygiene independent   Dress scrubs (behavioral health);independent   Laundry   (pt did not do laundry)   Room Organization independent   Activity   Activity Assistance Provided independent

## 2020-02-11 NOTE — PROGRESS NOTES
KIANA from [place] Clinical director at [facility] re:  IRTS placement for pt and setting up an interview, writer attempted to return call at [phone] ext [phone] and LVM requesting coordinating an interview for pt.

## 2020-02-11 NOTE — PROGRESS NOTES
"    ----------------------------------------------------------------------------------------------------------  [facility], [place]   Psychiatric Progress Note  Hospital Day #25     Interim History:   The patient's care was discussed with the treatment team and chart notes were reviewed.    Sleep: 5.75 hours (02/11/20 0600)  Scheduled Medications: Taking as prescribed, except no melatonin 2/10/20 evening.  PRN medications: 0.5 mg benztropine 2/10/20 at 9:04pm for \"back twitch.\"    Staff Report: Slept most of the day due to poor sleep the previous night. Talking with female peer with good boundaries. Visitors present during evening. Notably depressed and somewhat tangential. Attended OT twice.     Patient Interview: Jerel is interviewed in the conference room. He reports not sleeping very much last night. He states that his sleep problems have been going on for a week, before cariprazine was started. He cannot tell if taking cariprazine in the morning makes him more sleepy during the day. He is not interested in trazodone to help with sleep. He reports taking a Cogentin last night for a back twitch, which \"put him to sleep okay.\" He reports that his parents visited last night and they watched the movie Dune from the 1980s, and he states that Dune is his favorite book. He is interested in an [place] and asked if locations would being looked at outside of [place], as he would not like his parents visiting all the time. He states their frequent visits can feel \"claustrophobic.\"  Based off a conversation yesterday, he says he thought more about his spirit animal and that it would be an otter because of the energy level food [employer], having previously decided his spirit animal was a crayfish.     The risks, benefits, alternatives and side effects of any medication changes have been discussed and are understood by the patient and other caregivers.    Review of systems:     ROS was negative unless " "noted above.          Allergies:     Allergies   Allergen Reactions     Penicillins      Rash, Generalized            Psychiatric Examination:   /76 (BP Location: Left arm)   Pulse 75   Temp 98.6  F (37  C) (Tympanic)   Resp 14   Ht 1.727 m (5' 8\")   Wt 77.4 kg (170 lb 9.6 oz)   SpO2 97%   BMI 25.94 kg/m    Weight is 170 lbs 9.6 oz  Body mass index is 25.94 kg/m .    Appearance: dressed in hospital scrubs and sweatshirt, appears groomed, appears stated age, somewhat fatigued  Attitude: cooperative  Eye contact: fair  Mood: \"okay\"  Affect:  Mood-congruent, blunted intensity, constricted variability, restricted range. Smiles easily when discussing Dune and spirit animals.   Speech: normal volume, rate, clear, normal prosody, no dysarthria  Language: Fluent in English, no word-finding difficulty, no neologisms, sophisticated vocabulary   Psychomotor Behavior:  No evidence of tardive dyskinesia, dystonia, akathisia, or abnormal movments  Thought Process: mostly linear and goal oriented   Thought Content:  No SI/HI/paranoia/AH/VH.  Insight:  limited  Judgment:  limited  Oriented to:  Person, time, place, situation  Attention Span and Concentration:  intact to interview  Recent and Remote Memory:  intact per interview   Muscle Strength and Tone: appears grossly normal  Gait and Station: normal      Labs, Imaging, other studies:   No results found for this or any previous visit (from the past 24 hour(s)).       Assessment    Diagnostic Impression:   Jerel Day is a 24-year-old male with history of schizoaffective disorder, bipolar type and cannabis dependence who presented to [facility] ED on 01/17/2020 for treatment of self-inflicted leg laceration and head injury and was subsequently admitted for psychosis and suicidal ideation. Predisposing factors include distant family history of schizophrenia. Precipitating factors include stress regarding upcoming semester, recent discharge from IR, cannabis use, " "possible medication non-adherence, and interpersonal conflict with his parents (feels like he has no flexibility at home). Perpetuating factors include feeling like he has not found his \"footing\" and chronic neck and back pain. Protective factors include supportive family, access to healthcare, and stable housing. MSE on admission was notable for depressed mood, reports of auditory and visual hallucinations, occasionally illogical comments and limited insight. Given the degree of cognitive impairment and thought symptoms, in the absence of mood symptoms, his presentation is appearing more consistent with schizophrenia rather than schizoaffective disorder.    Principal Diagnosis:  #  Schizophrenia    Secondary psychiatric diagnoses of concern this admission:   # Cannabis dependence    Hospital course:   Jerel Day was admitted to station 22 on a 72 hour hold. He is currently under MI commitment until February. PTA Abilify and oanzapine were continued on admission. On 1/21/20, Jerel expressed that he wanted to be on only one antipsychotic and agreed to take Abilify if weaned off Zyprexa, so we decreased his oanzapine dose to 10 mg at bedtime. Olanzapine was decreased to 5 mg and Abilify was increased to 20 mg on 1/22/20. Clark amended on 2/4/20 to include cariprazine and clozapine rather than Zyprexa and Risperdal. Jerel's evening Zyprexa was discontinued on 2/4/20 and 1.5mg cariprazine was started on 2/5/20. The PRN for agitation was changed from Zyprexa to Haldol 5mg PO or IM with Benadryl. His Abilify was decreased to 15mg and the cariprazine was increased to 3mg on 2/6/20. Abilify was decreased to 10mg and cariprazine was increased to 4.5mg on 2/7/20 and to 6mg on 2/9. Abilify discontinued on 2/7/20. On 2/11/20, cariprazine dosing changed to once per PM from once per AM due to daytime sleepiness.    Discontinued Medications & Rationale:  Olanzapine - patient preference, weight gain + sedation; removed from " "Clark  Abilify- not effective for reducing AH and psychosis symptoms adequately. cross-titrated to cariprazine    Medical course:  Jerel Day was medically cleared for admission to the inpatient psychiatric unit. His leg and face injuries were monitored. On 1/31, he reported chest pain. EKG, internal medicine consult, and troponin were obtained, resulting in decreased concerned for cardiac etiology.    Plan     Today's changes:  - Switch cariprazine 6mg dosing from once per AM to once per PM due to daytime sleepiness    Principal psychiatric diagnosis:   # Schizophrenia  - Continue cariprazine 6mg daily     PRNs  - Continue PTA benztropine 0.5 mg BID PRN.  - Continue PTA gabapentin 300 mg BID PRN.  - Haldol 5mg PO or Haldol 5mg IM (given with benadryl 50mg) PRN agitation      - Neuropsychological testing to be completed in outpatient setting.     - Made IRTS referrals.     - Contact parents as Jerel allows   -Patient will be treated in therapeutic milieu with appropriate individual and group therapies as described.    Secondary psychiatric diagnoses:   # Cannabis dependence      Medical conditions:   #Leg laceration on left thigh: Jreel cut himself with a pocketknife. S/p laceration repair in ED.   - Continue to monitor; healing well.  - Stitches removed on 1/24.      #Scalp and face contusions: CT not concerning for acute intracranial process. Demonstrated \"soft tissue hematoma overlying the left zygomatic arch\". No fractures visible on CT. Jerel denies LOC and blurry vision.  - PRN acetaminophen 650 mg Q4H for pain management.     #Chest pain: On 1/31, he reported reproducible chest pain. EKG, internal medicine consult, and troponin were obtained, resulting in decreased concerned for cardiac etiology. Likely musculoskeletal in nature, however could also consider stress/anxiety as a cause.   - Continue to monitor.    Legal Status:   Committed wth [facility] (cariprazine, aripiprazole, clozapine, haldol).     Contacts: " León (father) [phone], Mesha [phone]    Safety Assessment:   Behavioral Orders   Procedures     Code 1 - Restrict to Unit     Routine Programming     As clinically indicated     Self Injury Precaution     Single Room     Status 15     Every 15 minutes.     Suicide precautions     Patients on Suicide Precautions should have a Combination Diet ordered that includes a Diet selection(s) AND a Behavioral Tray selection for Safe Tray - with utensils, or Safe Tray - NO utensils         Disposition: Pending medication optimization and IRTS placement.    Arminda Roberts, MS3  [facility] Psychiatry Service    ------------------------------------------------------------------    I was present with the medical student who participated in the service and in the documentation of the note. I have verified the history and personally performed the physical exam and medical decision making. I agree with the assessment and plan of care as documented in the note. - Queta Wayne    This patient was seen and discussed with the attending physician.    Queta Wayne MD   PGY-1 Psychiatry      Attestation:  This patient has been seen and evaluated by me, Vince Zheng MD.  I have discussed this patient with the house staff team including the resident and medical student and I agree with the findings and plan in this note.    I have reviewed today's vital signs, medications, labs and imaging. Vince Zheng MD , PhD.

## 2020-02-11 NOTE — PROGRESS NOTES
More isolative to his room today. Did go to am OT group. Presents distracted. Possibly responding.  Sleeping after lunch. Ate meals. No shower.       02/11/20 1400   Behavioral Health   Hallucinations appears responding   Thinking distractable;poor concentration   Orientation person: oriented;place: oriented;date: oriented;time: oriented   Insight poor   Judgement impaired   Eye Contact at examiner   Affect blunted, flat   Mood mood is calm   Physical Appearance/Attire attire appropriate to age and situation   Hygiene neglected grooming - unclean body, hair, teeth   Activity isolative;withdrawn   Speech clear   Psychomotor / Gait balanced;steady   Psycho Education   Type of Intervention structured groups   Response other (see comment)  (Sleeping)   Activities of Daily Living   Hygiene/Grooming independent   Oral Hygiene independent   Dress scrubs (behavioral health)   Laundry with supervision   Room Organization independent

## 2020-02-11 NOTE — PLAN OF CARE
Pt actively participated in occupational therapy clinic. Pt was able to ask for assistance as needed, and independently initiate a novel, creative expression task without difficulty. Pt demonstrated good focus and planning during task completion. Pt appeared comfortable interacting with peers and writer, however generally kept to herself and appeared focused on his selected task. He was in a calm/pleasant mood with a congruent affect. No apparent hygiene concerns.

## 2020-02-12 PROCEDURE — 25000132 ZZH RX MED GY IP 250 OP 250 PS 637: Performed by: STUDENT IN AN ORGANIZED HEALTH CARE EDUCATION/TRAINING PROGRAM

## 2020-02-12 PROCEDURE — 99232 SBSQ HOSP IP/OBS MODERATE 35: CPT | Mod: GC | Performed by: PSYCHIATRY & NEUROLOGY

## 2020-02-12 PROCEDURE — 12400001 ZZH R&B MH UMMC

## 2020-02-12 PROCEDURE — H2032 ACTIVITY THERAPY, PER 15 MIN: HCPCS

## 2020-02-12 RX ADMIN — MELATONIN TAB 3 MG 3 MG: 3 TAB at 21:33

## 2020-02-12 RX ADMIN — CARIPRAZINE 6 MG: 1.5 CAPSULE, GELATIN COATED ORAL at 21:32

## 2020-02-12 RX ADMIN — MELATONIN 25 MCG: at 09:41

## 2020-02-12 ASSESSMENT — ACTIVITIES OF DAILY LIVING (ADL)
HYGIENE/GROOMING: INDEPENDENT
ORAL_HYGIENE: INDEPENDENT
LAUNDRY: WITH SUPERVISION
DRESS: INDEPENDENT
ORAL_HYGIENE: INDEPENDENT
DRESS: STREET CLOTHES
HYGIENE/GROOMING: INDEPENDENT

## 2020-02-12 NOTE — PROGRESS NOTES
"  ----------------------------------------------------------------------------------------------------------  [facility], [place]   Psychiatric Progress Note  Hospital Day #26      Interim History:   The patient's care was discussed with the treatment team and chart notes were reviewed.     Sleep: 6.5hours  Scheduled Medications: Taking as prescribed- nursing noted some difficulty swallowing pills  PRN medications: metamucil only     Staff Report: participating in groups, reading in room, pacing halls and spending time with a peer.      Patient Interview: Jerel states he is feeling tired today. He states his eyes were shut for 6 hours last night, and he was not sleeping during this time. He states he is having auditory hallucinations. He states the \"voices are not pleasant and not unpleasant.\" He is not sure if the voices are better or worse compared to earlier this hospitalization. He learns that he has an IRTS interview coming up. He states he had an episode last night that he wonders if it was a \"seizure, where my cervical spine was trying to align itself, my back was stiff, and then I got short of breath, and had spikes in my brain.\"     Jerel also notes he has difficulty swallowing 4 pills for his Vraylar dose.     The risks, benefits, alternatives and side effects of any medication changes have been discussed and are understood by the patient and other caregivers.     Review of systems:      ROS was negative unless noted above.          Allergies:            Allergies   Allergen Reactions     Penicillins         Rash, Generalized              Psychiatric Examination:   /76 (BP Location: Left arm)   Pulse 75   Temp 98.6  F (37  C) (Tympanic)   Resp 14   Ht 1.727 m (5' 8\")   Wt 77.4 kg (170 lb 9.6 oz)   SpO2 97%   BMI 25.94 kg/m    Weight is 170 lbs 9.6 oz  Body mass index is 25.94 kg/m .     Appearance: dressed in hospital scrubs and sweatshirt, appears groomed, appears " "stated age, somewhat fatigued  Attitude: cooperative  Eye contact: fair  Mood: \"okay\"  Affect:  Mood-congruent, blunted intensity, constricted variability, restricted range. Smiles easily when discussing Dune and spirit animals.   Speech: normal volume, slowed rate, clear, normal prosody, delayed/moderate latency  Language: Fluent in English, no word-finding difficulty, no neologisms, sophisticated vocabulary   Psychomotor Behavior:  No evidence of tardive dyskinesia, dystonia, akathisia, or abnormal movments  Thought Process: mostly linear and goal oriented   Thought Content: Endorses AH. No SI/HI/paranoia.  Insight:  limited  Judgment:  limited  Oriented to:  Person, time, place, situation  Attention Span and Concentration:  intact to interview  Recent and Remote Memory:  intact per interview   Muscle Strength and Tone: appears grossly normal  Gait and Station: normal       Labs, Imaging, other studies:   Recent Results   No results found for this or any previous visit (from the past 24 hour(s)).         Assessment    Diagnostic Impression:   Jerel Day is a 24-year-old male with history of schizoaffective disorder, bipolar type and cannabis dependence who presented to [facility] ED on 01/17/2020 for treatment of self-inflicted leg laceration and head injury and was subsequently admitted for psychosis and suicidal ideation. Predisposing factors include distant family history of schizophrenia. Precipitating factors include stress regarding upcoming semester, recent discharge from [facility], cannabis use, possible medication non-adherence, and interpersonal conflict with his parents (feels like he has no flexibility at home). Perpetuating factors include feeling like he has not found his \"footing\" and chronic neck and back pain. Protective factors include supportive family, access to healthcare, and stable housing. MSE on admission was notable for depressed mood, reports of auditory and visual hallucinations, occasionally " illogical comments and limited insight. Given the degree of cognitive impairment and thought symptoms, in the absence of mood symptoms, his presentation is appearing more consistent with schizophrenia rather than schizoaffective disorder. At this point it is unclear if patient is improving with cariprazine 6mg; team is considering initiating clozapine.     Principal Diagnosis:  #  Schizophrenia     Secondary psychiatric diagnoses of concern this admission:   # Cannabis dependence     Hospital course:   Jerel Day was admitted to station 22 on a 72 hour hold. He is currently under MI commitment until February. PTA Abilify and oanzapine were continued on admission. On 1/21/20, Jerel expressed that he wanted to be on only one antipsychotic and agreed to take Abilify if weaned off Zyprexa, so we decreased his oanzapine dose to 10 mg at bedtime. Olanzapine was decreased to 5 mg and Abilify was increased to 20 mg on 1/22/20. Clark amended on 2/4/20 to include cariprazine and clozapine rather than Zyprexa and Risperdal. Jerel's evening Zyprexa was discontinued on 2/4/20 and 1.5mg cariprazine was started on 2/5/20. The PRN for agitation was changed from Zyprexa to Haldol 5mg PO or IM with Benadryl. His Abilify was decreased to 15mg and the cariprazine was increased to 3mg on 2/6/20. Abilify was decreased to 10mg and cariprazine was increased to 4.5mg on 2/7/20 and to 6mg on 2/9. Abilify discontinued on 2/7/20. On 2/11/20, cariprazine dosing changed to once per PM from once per AM due to daytime sleepiness.     Discontinued Medications & Rationale:  Olanzapine - patient preference, weight gain + sedation; removed from Clark  Abilify- not effective for reducing AH and psychosis symptoms adequately. cross-titrated to cariprazine     Medical course:  Jerel Day was medically cleared for admission to the inpatient psychiatric unit. His leg and face injuries were monitored. On 1/31, he reported chest pain. EKG, internal  "medicine consult, and troponin were obtained, resulting in decreased concerned for cardiac etiology.     Plan      Today's changes:  - none     Principal psychiatric diagnosis:   # Schizophrenia  - Continue cariprazine 6mg at night   - pharmacy contacted; hospital only carries 1.5mg pill form     PRNs  - Continue PTA benztropine 0.5 mg BID PRN.  - Continue PTA gabapentin 300 mg BID PRN.  - Haldol 5mg PO or Haldol 5mg IM (given with benadryl 50mg) PRN agitation      - Neuropsychological testing to be completed in outpatient setting.     - Contact parents as Jerel allows   -Patient will be treated in therapeutic milieu with appropriate individual and group therapies as described.     Secondary psychiatric diagnoses:   # Cannabis dependence        Medical conditions:   #Leg laceration on left thigh: Jerel cut himself with a pocketknife. S/p laceration repair in ED.   - Continue to monitor; healing well.  - Stitches removed on 1/24.      #Scalp and face contusions: CT not concerning for acute intracranial process. Demonstrated \"soft tissue hematoma overlying the left zygomatic arch\". No fractures visible on CT. Jerel denies LOC and blurry vision.  - PRN acetaminophen 650 mg Q4H for pain management.     #Chest pain: On 1/31, he reported reproducible chest pain. EKG, internal medicine consult, and troponin were obtained, resulting in decreased concerned for cardiac etiology. Likely musculoskeletal in nature, however could also consider stress/anxiety as a cause.   - Continue to monitor.     Legal Status:   Committed wth [facility] (cariprazine, aripiprazole, clozapine, haldol).     Dispo: IRTS facility.      Contacts: León (father) [phone], Mesha [phone]     This patient was seen and discussed with the attending physician.    Queta Wayne MD   PGY-1 Psychiatry    Attestation:  This patient has been seen and evaluated by me, Vince Zheng MD.  I have discussed this patient with the house staff team including " the resident and medical student and I agree with the findings and plan in this note.    I have reviewed today's vital signs, medications, labs and imaging. Vince Zheng MD , PhD.

## 2020-02-12 NOTE — PROGRESS NOTES
Participated in Music Therapy group with focus on mood elevation, validation and decreasing anxiety and improved group cohesiveness. Engaged and cooperative in music listening interventions.   Showed progress in session goals.     Jerel shared an original song with group for his song choice.  The song was electronic and very repetitive, and he was very pleased with it and appeared lighthearted and easy-going in group.

## 2020-02-12 NOTE — PROVIDER NOTIFICATION
Pt out of room more , in [facility] , states he did some exercising which made him feel better . Med compliant has difficulty swallowing pills .Pt hoping to transfer to an IRTZ soon

## 2020-02-12 NOTE — PROGRESS NOTES
Isolative to room most of shift. Out for meals. No groups. No shower.       02/12/20 1000   Behavioral Health   Hallucinations appears responding   Thinking distractable;poor concentration   Orientation person: oriented;place: oriented   Insight poor   Judgement impaired   Eye Contact at examiner   Affect blunted, flat   Mood mood is calm   Physical Appearance/Attire attire appropriate to age and situation   Hygiene neglected grooming - unclean body, hair, teeth   Activity isolative;withdrawn;refusal   Speech clear   Psychomotor / Gait balanced;steady   Psycho Education   Type of Intervention structured groups   Response refuses   Activities of Daily Living   Hygiene/Grooming independent   Oral Hygiene independent   Dress independent   Laundry with supervision

## 2020-02-13 PROCEDURE — 25000132 ZZH RX MED GY IP 250 OP 250 PS 637: Performed by: STUDENT IN AN ORGANIZED HEALTH CARE EDUCATION/TRAINING PROGRAM

## 2020-02-13 PROCEDURE — 90853 GROUP PSYCHOTHERAPY: CPT

## 2020-02-13 PROCEDURE — 12400001 ZZH R&B MH UMMC

## 2020-02-13 PROCEDURE — G0177 OPPS/PHP; TRAIN & EDUC SERV: HCPCS

## 2020-02-13 PROCEDURE — 99232 SBSQ HOSP IP/OBS MODERATE 35: CPT | Mod: GC | Performed by: PSYCHIATRY & NEUROLOGY

## 2020-02-13 RX ORDER — TRAZODONE HYDROCHLORIDE 50 MG/1
50 TABLET, FILM COATED ORAL AT BEDTIME
Status: DISCONTINUED | OUTPATIENT
Start: 2020-02-13 | End: 2020-02-14

## 2020-02-13 RX ADMIN — MELATONIN 25 MCG: at 09:45

## 2020-02-13 RX ADMIN — MELATONIN TAB 3 MG 3 MG: 3 TAB at 20:45

## 2020-02-13 RX ADMIN — TRAZODONE HYDROCHLORIDE 50 MG: 50 TABLET ORAL at 21:07

## 2020-02-13 RX ADMIN — CARIPRAZINE 6 MG: 1.5 CAPSULE, GELATIN COATED ORAL at 20:45

## 2020-02-13 ASSESSMENT — MIFFLIN-ST. JEOR: SCORE: 1737.44

## 2020-02-13 NOTE — PROGRESS NOTES
CTC checked in with pt, pt requested an update about IRTS placement.  Writer explained that referrals were made and being reviewed but that writer did not have an additional update at this time.     Writer attempted to contact Shelton at [facility] at [phone] ext [phone] re: earlier call re: interview and LVM.

## 2020-02-13 NOTE — PROGRESS NOTES
02/12/20 1800     Art Therapy   Type of Intervention structured groups   Response participates with encouragement   Hours 1   Treatment Detail    Art Therapy- heart mandalas/ watercolor/ pen   Objective- Patients use art media, the creative process & resulting artwork to:explore feelings,reconcile emotions, gain self-awareness/ self esteem, manage behaviors, develop social skills, improve reality orientation, & reduce anxiety /depression.     Outcome- pt affect was brighter when he talked about his dogs and he selected music for group. Attention span is ok, but he did not make it through the entire group. He left so writer didn't see the content of his art.

## 2020-02-13 NOTE — PROGRESS NOTES
"Jerel  attended 3 of 3 OT groups today. He participated in OT clinic this a.m., where he initiated a chosen project (free-hand paintings), followed through with plan, and asked for support with supplies as needed. Quiet and focused on task. Also attended an OT relaxation yoga group this afternoon. Participated in all suggested movements and the guided relaxation exercise at the end. Gave a specific and unique description of his bodily sensations: \"I feel both soggy & prickly.\"  No major changes noted. Continues to be a thoughtful, pleasant, and active group participant.      02/13/20 1400   Occupational Therapy   Type of Intervention structured groups   Response Initiates, socially acceptable   Hours 3           "

## 2020-02-13 NOTE — PROGRESS NOTES
Pt was isolative and withdrawn, spent most of the shift in his room and only came out for dinner. Pt presented with blunted affect and depressed/calm mood, minimal speech. This writer did not have the chance to check in with Pt due to ongoing SIO and Pt went to bed early. No signs of hallucination, SI, SIB and HI were observed.          02/12/20 2100   Behavioral Health   Hallucinations appears responding   Thinking distractable;poor concentration   Orientation person: oriented;date: oriented;place: oriented   Memory baseline memory   Insight poor   Judgement impaired   Affect blunted, flat   Mood depressed;mood is calm   Physical Appearance/Attire attire appropriate to age and situation   Hygiene neglected grooming - unclean body, hair, teeth   Activity withdrawn;isolative   Speech clear   Psychomotor / Gait balanced;steady   Psycho Education   Type of Intervention 1:1 intervention   Response observes from a distance   Hours 0.5   Treatment Detail check in   Activities of Daily Living   Hygiene/Grooming independent   Oral Hygiene independent   Dress street clothes   Room Organization independent   Activity   Activity Assistance Provided independent

## 2020-02-13 NOTE — PROGRESS NOTES
Pt participated in group themes of gentle fluid social connections and group problem-solving.  Pt was an integral part of supportive processes. Pt was helpful setting up the space for others and shared about his next steps, saying he had to negotiate being about to go home in the interim between the hospital and his treatment program.  Pt was pleasant, organized and creatively expressive.  He demonstrated insight and offered support to peers.       02/12/20 1130   Dance Movement Therapy   Type of Intervention structured groups   Response participates, initiates socially appropriate   Hours 1

## 2020-02-13 NOTE — PROGRESS NOTES
"  ----------------------------------------------------------------------------------------------------------  [facility], [place]   Psychiatric Progress Note  Hospital Day #27      Interim History:   The patient's care was discussed with the treatment team and chart notes were reviewed.     Sleep: 7 hours  Scheduled Medications: Taking as prescribed.  PRN medications: None.     Staff Report: Didn't stay through entirety of group session. Neglected grooming noted. Isolation, blunted affect, depressed/calm mood, and minimal speech noted.     Patient Interview: Jerel is interview in the conference room. He states he still did not get any \"actual\" sleep, however did get one hour of rest. He wonders if this is a side effect of his medication. He thinks it also might be related to the discomforts of the hospital, such as the bed, people checking in, or the dry air. He also thinks it is associated with the voices he hears at night. The voices sound like a demon, and tell him things that he \"ruminates\" on, such as that his crush is having sex with another person. He states that the voices changed nature a couple weeks ago, and that they are nightmarish. He wonders if it is due to the proximity to people that he is not comfortable with. He also states the voices might be worse because he is not \"self-medicating\" with marijuana. When the team discusses whether cariprazine is the right medication, Jerel states he thinks he is experiencing worse AH on cariprazine  \"due to himself, not the medication.\" He is not interested in changing medication today.     Due to the decreased sleep recently, he feels his muscles are \"looser\" in a bad way. He may be interested in trying trazodone or increased melatonin for sleep. His back still bothers him, and it gets worse as the day goes on. He states his mood is pretty bad. He states he would like to wait before starting clozapine because when he started Zyprexa it " "\"shot me dead.\"       The risks, benefits, alternatives and side effects of any medication changes have been discussed and are understood by the patient and other caregivers.     Review of systems:      ROS was negative unless noted above.          Allergies:            Allergies   Allergen Reactions     Penicillins         Rash, Generalized              Psychiatric Examination:   /83 (BP Location: Left arm)   Pulse 82   Temp 97.6  F (Tympanic)   Resp 12   Ht 1.727 m (5' 8\")   SpO2 100%  Weight is 169 lbs 4.8 oz  Body mass index is 25.74 kg/m .     Appearance: dressed in home clothes, appears somewhat groomed, stated age, fatigued  Attitude: cooperative  Eye contact: fair  Mood: \"pretty bad\"  Affect:  Mood-congruent, blunted intensity, constricted variability, restricted range. Laughs appropriately when noting his [facility] bed in contrast to his hospital mattress.  Speech: normal volume, slowed rate, clear, normal prosody, delayed/moderate latency  Language: Fluent in English, no word-finding difficulty, no neologisms, sophisticated vocabulary   Psychomotor Behavior:  No evidence of tardive dyskinesia, dystonia, akathisia, or abnormal movments  Thought Process: mostly linear and goal oriented   Thought Content: Endorses AH. No SI/HI/paranoia.  Insight:  limited  Judgment:  limited - cannot determine medication effects  Oriented to:  Person, time, place, situation  Attention Span and Concentration:  intact to interview  Recent and Remote Memory:  intact per interview   Muscle Strength and Tone: appears grossly normal  Gait and Station: normal       Labs, Imaging, other studies:   Recent Results   No results found for this or any previous visit (from the past 24 hour(s)).         Assessment    Diagnostic Impression:   Jerel Day is a 24-year-old male with history of schizoaffective disorder, bipolar type and cannabis dependence who presented to [facility] ED on 01/17/2020 for treatment of self-inflicted leg " "laceration and head injury and was subsequently admitted for psychosis and suicidal ideation. Predisposing factors include distant family history of schizophrenia. Precipitating factors include stress regarding upcoming semester, recent discharge from IR, cannabis use, possible medication non-adherence, and interpersonal conflict with his parents (feels like he has no flexibility at home). Perpetuating factors include feeling like he has not found his \"footing\" and chronic neck and back pain. Protective factors include supportive family, access to healthcare, and stable housing. MSE on admission was notable for depressed mood, reports of auditory and visual hallucinations, occasionally illogical comments and limited insight. Given the degree of cognitive impairment and thought symptoms, in the absence of mood symptoms, his presentation is appearing more consistent with schizophrenia rather than schizoaffective disorder. At this point it is unclear if patient is improving with cariprazine 6mg; team is considering initiating clozapine.     Principal Diagnosis:  #  Schizophrenia     Secondary psychiatric diagnoses of concern this admission:   # Cannabis dependence     Hospital course:   Jerel Day was admitted to station 22 on a 72 hour hold. He is currently under MI commitment until February. PTA Abilify and oanzapine were continued on admission. On 1/21/20, Jerel expressed that he wanted to be on only one antipsychotic and agreed to take Abilify if weaned off Zyprexa, so we decreased his oanzapine dose to 10 mg at bedtime. Olanzapine was decreased to 5 mg and Abilify was increased to 20 mg on 1/22/20. [facility] amended on 2/4/20 to include cariprazine and clozapine rather than Zyprexa and Risperdal. Jerel's evening Zyprexa was discontinued on 2/4/20 and 1.5mg cariprazine was started on 2/5/20. The PRN for agitation was changed from Zyprexa to Haldol 5mg PO or IM with Benadryl. His Abilify was decreased to 15mg and " "the cariprazine was increased to 3mg on 2/6/20. Abilify was decreased to 10mg and cariprazine was increased to 4.5mg on 2/7/20 and to 6mg on 2/9. Abilify discontinued on 2/7/20. On 2/11/20, cariprazine dosing changed to once per PM from once per AM due to daytime sleepiness. Trazodone 50 mg at bedtime added on 2/13/20 for multiple days of poor sleep.     Discontinued Medications & Rationale:  Olanzapine - patient preference, weight gain + sedation; removed from [facility]  Abilify- not effective for reducing AH and psychosis symptoms adequately. cross-titrated to cariprazine     Medical course:  Jerel Day was medically cleared for admission to the inpatient psychiatric unit. His leg and face injuries were monitored. On 1/31, he reported chest pain. EKG, internal medicine consult, and troponin were obtained, resulting in decreased concerned for cardiac etiology.     Plan      Today's changes:  - Start trazodone 50 mg at bedtime for poor sleep.     Principal psychiatric diagnosis:   # Schizophrenia  - Continue cariprazine 6mg at night    - Speak with staff about possibility of getting him outside     PRNs  - Continue PTA benztropine 0.5 mg BID PRN.  - Continue PTA gabapentin 300 mg BID PRN.  - Haldol 5mg PO or Haldol 5mg IM (given with benadryl 50mg) PRN agitation      - Neuropsychological testing to be completed in outpatient setting.     - Contact parents as Jerel allows     -Patient will be treated in therapeutic milieu with appropriate individual and group therapies as described.     Secondary psychiatric diagnoses:   # Cannabis dependence        Medical conditions:   #Leg laceration on left thigh: Jerel cut himself with a pocketknife. S/p laceration repair in ED.   - Continue to monitor; healing well.  - Stitches removed on 1/24.      #Scalp and face contusions: CT not concerning for acute intracranial process. Demonstrated \"soft tissue hematoma overlying the left zygomatic arch\". No fractures visible on CT. Jerel " denies LOC and blurry vision.  - PRN acetaminophen 650 mg Q4H for pain management.     #Chest pain: On 1/31, he reported reproducible chest pain. EKG, internal medicine consult, and troponin were obtained, resulting in decreased concerned for cardiac etiology. Likely musculoskeletal in nature, however could also consider stress/anxiety as a cause.   - Continue to monitor.     Legal Status:   Committed wth [facility] (cariprazine, aripiprazole, clozapine, haldol).     Dispo: IRTS facility.      Contacts: León (father) [phone], Mesha [phone]         This patient was seen and discussed with the attending physician.    Arminda Roberts, MS3  [facility] Psychiatry Service    I was present with the medical student who participated in the service and in the documentation of the note. I have verified the history and personally performed the physical exam and medical decision making. I agree with the assessment and plan of care as documented in the note. - Queta Wayne    This patient was seen and discussed with the attending physician.    Queta Wayne MD   PGY-1 Psychiatry    Attestation:  This patient has been seen and evaluated by me, Vince Zheng MD.  I have discussed this patient with the house staff team including the resident and medical student and I agree with the findings and plan in this note.    I have reviewed today's vital signs, medications, labs and imaging. Vince Zheng MD , PhD.

## 2020-02-13 NOTE — PROGRESS NOTES
Patient visible, active and social with select peers. Attending all groups today. Generally pleasant and approachable. Appetite good. Offers no complaints. Thoughts appear more linear and connected.          02/13/20 1435   Behavioral Health   Hallucinations denies / not responding to hallucinations   Thinking   (Much improved. )   Orientation person: oriented;place: oriented;date: oriented;time: oriented   Memory baseline memory   Insight   (Improving.)   Judgement impaired   Eye Contact at examiner   Affect   (Brighter at times, especially when conversing about sports.)   Mood mood is calm  (Mood even.)   Physical Appearance/Attire attire appropriate to age and situation   Hygiene   (Pateint showered today.)   1. Wish to be Dead (Recent) No   Activity other (see comment)   Speech clear;coherent

## 2020-02-14 PROCEDURE — 12400001 ZZH R&B MH UMMC

## 2020-02-14 PROCEDURE — G0177 OPPS/PHP; TRAIN & EDUC SERV: HCPCS

## 2020-02-14 PROCEDURE — 99232 SBSQ HOSP IP/OBS MODERATE 35: CPT | Mod: GC | Performed by: PSYCHIATRY & NEUROLOGY

## 2020-02-14 PROCEDURE — 25000132 ZZH RX MED GY IP 250 OP 250 PS 637: Performed by: STUDENT IN AN ORGANIZED HEALTH CARE EDUCATION/TRAINING PROGRAM

## 2020-02-14 RX ORDER — TRAZODONE HYDROCHLORIDE 50 MG/1
50-100 TABLET, FILM COATED ORAL AT BEDTIME
Status: DISCONTINUED | OUTPATIENT
Start: 2020-02-14 | End: 2020-02-20

## 2020-02-14 RX ADMIN — CARIPRAZINE 6 MG: 1.5 CAPSULE, GELATIN COATED ORAL at 20:08

## 2020-02-14 RX ADMIN — TRAZODONE HYDROCHLORIDE 50 MG: 50 TABLET ORAL at 21:13

## 2020-02-14 RX ADMIN — MELATONIN 25 MCG: at 09:20

## 2020-02-14 RX ADMIN — MELATONIN TAB 3 MG 3 MG: 3 TAB at 20:08

## 2020-02-14 NOTE — PROGRESS NOTES
"   02/13/20 1225   Group Therapy Session   Group Attendance attended group session   Total Time (minutes) 45   Group Type psychotherapeutic   Patient Participation/Contribution cooperative with task;discussed personal experience with topic;listened actively   Group goal:  Identify and discuss effective personal boundaries-types, how to set boundaries, difficulties  Jerel reports feeling \"hectic.\" He presents as pleasant though quiet. Appearance disheveled. He participated in the activity and minimally shared with the group. He actively listened to others during discussion.  "

## 2020-02-14 NOTE — PROGRESS NOTES
"    ----------------------------------------------------------------------------------------------------------  [facility], [place]   Psychiatric Progress Note  Hospital Day #28     Interim History:   The patient's care was discussed with the treatment team and chart notes were reviewed.    Sleep: 6.5 hours (02/14/20 0600)  Scheduled Medications: Taking as prescribed.   PRN medications: None.    Staff Report: Jerel was noted to be disheveled, pleasant, quiet, and described himself as \"soggy and prickly\" at one point during group. He attended 3/3 OT. He participated in activities and was social with select peers.    Patient Interview: Jerel is interview in the conference room. He states his mood is bad. He tried trazodone to help with sleep last night and reports it made him sedated and relaxed but he never fell asleep. He states he hears nightmare voices at night, like from the [employer]. He states he is now experiencing tactile and \"tic\" hallucinations, like a pulsing through his neck, hand, butt, and sacrum. He states they are sometimes bad and sometimes good. He states his back sometimes explodes in pain. When asked about the beginning of this admission, he states he didn't want to be hospitalized and that starting a medication at a high dose during the stay was difficult for him. He states in the past he felt dependency on drugs, like he were being pulled away, such as on olanzapine and Abilify. He states he hopes his routine after discharge includes rock music, rest, walks, and baths. He reports playing music here for about 40-60 minutes yesterday. He states he has liked the art/music groups and that he has been able to concentrate on tasks during those groups. He states feeling joe, but not high functioning.     When discussing clozapine, he reports that he is worried by blood draws, that they make him feel light headed thinking about them and during them, and that the " "needle angles matter for comfort. He is interested in having Prop[employer] water and the egg carton mattress ordered. He states he would like to go outside this weekend if able.    Afternoon update: Jerel stops treatment team to discuss desire to \"maybe change my medication.\" He states he is feeling terrible and hearing voices nonstop. He wonders what other medication options are available. He states he will not take Haldol because he had a bad experience with it in the past. He is reluctant to try clozapine because he says, \"if you need to get a blood draw, that must mean that it has a bad side effect, even if it is very rare, I don't know if I want that in my body.\" After discussing options and treatment team recommendation to initiate clozapine, Jerel decides to continue with his current medication.     The risks, benefits, alternatives and side effects of any medication changes have been discussed and are understood by the patient and other caregivers.    Review of systems:     ROS was negative unless noted above.          Allergies:     Allergies   Allergen Reactions     Penicillins      Rash, Generalized            Psychiatric Examination:   /72 (BP Location: Left arm)   Pulse 99   Temp 97.5  F (36.4  C) (Oral)   Resp 12   Ht 1.727 m (5' 8\")   Wt 76.8 kg (169 lb 4.8 oz)   SpO2 96%   BMI 25.74 kg/m    Weight is 169 lbs 4.8 oz  Body mass index is 25.74 kg/m .    Appearance: Dressed in home pants and scrub sweatshirt, somewhat groomed, appears stated age  Attitude:  cooperative  Eye Contact:  fair  Mood:  \"bad\"  Affect:  Mood-congruent, blunted intensity, constricted variability, restricted range.  Speech: Slowed rate, clear, normal prosody, delayed/moderate latency  Language: Fluent in English, no word-finding difficulty, no dysarthria, no neologisms, sophisticated vocabulary.  Psychomotor Behavior:  No evidence of tardive dyskinesia, dystonia, akathisia, or abnormal movments.   Thought Process:  Mostly " "linear and goal oriented  Thought Content:  Endorses AH and tactile hallucinations. No SI/HI/paranoia/VH.  Insight:  limited  Judgment:  Limited - cannot determine medication effects  Oriented to:  Person, time, place, situation.   Attention Span and Concentration:  intact to interview.   Recent and Remote Memory:  intact per interview.   Muscle Strength and Tone: normal strength and no rigidity or increased tone.   Gait and Station: normal      Labs, Imaging, other studies:   No results found for this or any previous visit (from the past 24 hour(s)).       Assessment    Diagnostic Impression:   Jerel Day is a 24-year-old male with history of schizoaffective disorder, bipolar type and cannabis dependence who presented to [facility] [place] ED on 01/17/2020 for treatment of self-inflicted leg laceration and head injury and was subsequently admitted for psychosis and suicidal ideation. Predisposing factors include distant family history of schizophrenia. Precipitating factors include stress regarding upcoming semester, recent discharge from [facility], cannabis use, possible medication non-adherence, and interpersonal conflict with his parents (feels like he has no flexibility at home). Perpetuating factors include feeling like he has not found his \"footing\" and chronic neck and back pain. Protective factors include supportive family, access to healthcare, and stable housing. MSE on admission was notable for depressed mood, reports of auditory and visual hallucinations, occasionally illogical comments and limited insight. Given the degree of cognitive impairment and thought symptoms, in the absence of mood symptoms, his presentation is appearing more consistent with schizophrenia rather than schizoaffective disorder. At this point it is unclear if patient is improving with cariprazine 6mg; team is considering initiating clozapine and patient has expressed worry about associated blood draws.     Principal " Diagnosis:  #  Schizophrenia     Secondary psychiatric diagnoses of concern this admission:   # Cannabis dependence     Hospital course:   Jerel Day was admitted to station 22 on a 72 hour hold. He is currently under MI commitment until February. PTA Abilify and oanzapine were continued on admission. On 1/21/20, Jerel expressed that he wanted to be on only one antipsychotic and agreed to take Abilify if weaned off Zyprexa, so we decreased his oanzapine dose to 10 mg at bedtime. Olanzapine was decreased to 5 mg and Abilify was increased to 20 mg on 1/22/20. [facility] amended on 2/4/20 to include cariprazine and clozapine rather than Zyprexa and Risperdal. Jerel's evening Zyprexa was discontinued on 2/4/20 and 1.5mg cariprazine was started on 2/5/20. The PRN for agitation was changed from Zyprexa to Haldol 5mg PO or IM with Benadryl. His Abilify was decreased to 15mg and the cariprazine was increased to 3mg on 2/6/20. Abilify was decreased to 10mg and cariprazine was increased to 4.5mg on 2/7/20 and to 6mg on 2/9. Abilify discontinued on 2/7/20. On 2/11/20, cariprazine dosing changed to once per PM from once per AM due to daytime sleepiness. Trazodone 50 mg at bedtime added on 2/13/20 for multiple days of poor sleep.     Discontinued Medications & Rationale:  Olanzapine - patient preference, weight gain + sedation; removed from [facility]  Abilify- not effective for reducing AH and psychosis symptoms adequately. cross-titrated to cariprazine     Medical course:  Jerel Day was medically cleared for admission to the inpatient psychiatric unit. His leg and face injuries were monitored. On 1/31, he reported chest pain. EKG, internal medicine consult, and troponin were obtained, resulting in decreased concerned for cardiac etiology.    Plan     Today's changes:  - none    Principal psychiatric diagnosis:   # Schizophrenia  - Continue cariprazine 6mg at night     - Nurse manager contacted about extra staff to bring Jerel  "outside to the courtyard on Monday 2/17     PRNs  - Continue PTA benztropine 0.5 mg BID PRN.  - Continue PTA gabapentin 300 mg BID PRN.  - Haldol 5mg PO or Haldol 5mg IM (given with benadryl 50mg) PRN agitation      - Neuropsychological testing to be completed in outpatient setting.     - Contact parents as Jerel allows      -Patient will be treated in therapeutic milieu with appropriate individual and group therapies as described.     Secondary psychiatric diagnoses:   # Cannabis dependence     Medical conditions:   #Leg laceration on left thigh: Jerel cut himself with a pocketknife. S/p laceration repair in ED.   - Continue to monitor; healing well.  - Stitches removed on 1/24.      #Scalp and face contusions: CT not concerning for acute intracranial process. Demonstrated \"soft tissue hematoma overlying the left zygomatic arch\". No fractures visible on CT. Jerel denies LOC and blurry vision.  - PRN acetaminophen 650 mg Q4H for pain management.     #Chest pain: On 1/31, he reported reproducible chest pain. EKG, internal medicine consult, and troponin were obtained, resulting in decreased concerned for cardiac etiology. Likely musculoskeletal in nature, however could also consider stress/anxiety as a cause.   - Continue to monitor.     Legal Status:   Committed wth [facility] (cariprazine, aripiprazole, clozapine, haldol).      Dispo: IRTS facility.      Contacts: León (father) [phone], Mesha [phone]    Safety Assessment:   Behavioral Orders   Procedures     Code 1 - Restrict to Unit     Code 2     Routine Programming     As clinically indicated     Self Injury Precaution     Single Room     Status 15     Every 15 minutes.     Suicide precautions     Patients on Suicide Precautions should have a Combination Diet ordered that includes a Diet selection(s) AND a Behavioral Tray selection for Safe Tray - with utensils, or Safe Tray - NO utensils         This patient was seen and discussed with the attending " physician.    Arminda Roberts, MS3  [facility] Psychiatry Service    ------------------------------------------------------------------    I was present with the medical student who participated in the service and in the documentation of the note. I have verified the history and personally performed the physical exam and medical decision making. I agree with the assessment and plan of care as documented in the note. - Queta Wayne    This patient was seen and discussed with the attending physician.    Queta Wayne MD   PGY-1 Psychiatry    Attestation:  This patient has been seen and evaluated by me, Vince Zheng MD.  I have discussed this patient with the house staff team including the resident and medical student and I agree with the findings and plan in this note.    I have reviewed today's vital signs, medications, labs and imaging. Vince Zheng MD , PhD.

## 2020-02-14 NOTE — PLAN OF CARE
Pt is out and visible in the milieu most of this shift; played guitar; attended group; denied wishing to be dead and SIB ideation; reported hearing voices but it is  minimal;the voices are telling me mix of  good and bad contents but less bothersome;calm and pleasant on approach and cooperative with routine nursing care; came to the med room window and received his HS medication; No medication side effect is noted or reported this shift; had a visit this evening and the visit appeared went well.

## 2020-02-14 NOTE — PROGRESS NOTES
"Jerel  attended 3 of 3 OT groups today.   Reported during OT clinic this morning that he feels he reached his \"peak benefit\" of this hospitalization two weeks ago & that he has \"felt frustrated & stuck\" ever since.   Participated in OT clinic with initiative & sustained focus on his chosen art project for >45 minutes. Also participated in all suggested movements & self-massage techniques during a therapeutic yoga group this afternoon. Continues to be an active, thoughtful group participant in all structured sessions.      02/14/20 1500   Occupational Therapy   Type of Intervention structured groups   Response Initiates, socially acceptable   Hours 3       "

## 2020-02-14 NOTE — PROGRESS NOTES
Pt visible in the milieu in at times, attended groups and participated. Pt shared his music with peers. Pt stated resting helped their mood even though they don't think they were able to nap. Pt was calm and cooperative, hopes to go outside soon.        02/14/20 1530   Behavioral Health   Hallucinations appears responding   Thinking distractable;poor concentration   Orientation person: oriented;place: oriented;date: oriented   Memory baseline memory   Insight poor   Judgement impaired   Eye Contact at examiner   Affect blunted, flat   Mood mood is calm   Physical Appearance/Attire attire appropriate to age and situation   Hygiene other (see comment)  (adequate )   Suicidality other (see comments)  (denies)   1. Wish to be Dead (Recent) No   2. Non-Specific Active Suicidal Thoughts (Recent) No   Self Injury other (see comment)  (denies)   Activity withdrawn   Speech coherent;clear   Medication Sensitivity no stated side effects;no observed side effects   Psychomotor / Gait balanced;steady   Activities of Daily Living   Hygiene/Grooming independent   Oral Hygiene independent   Dress independent   Laundry with supervision   Room Organization independent   Activity   Activity Assistance Provided independent

## 2020-02-14 NOTE — PROGRESS NOTES
This writer received a voice message from clinical director (Fito) at [facility] stating they will have an opening in about a week and inquiring if patient was still hospitalized.  This writer checked with attending psychiatrist regarding how long he thinks patient will be hospitalized and was informed patient will still be hospitalized for at least another week.  This writer left a voice message for Fito at [facility] informing him patient will still be hospitalized for at least another week and to keep him on the list for admittance.  Fito @ [facility] # [phone]

## 2020-02-15 PROCEDURE — 25000132 ZZH RX MED GY IP 250 OP 250 PS 637: Performed by: STUDENT IN AN ORGANIZED HEALTH CARE EDUCATION/TRAINING PROGRAM

## 2020-02-15 PROCEDURE — 12400001 ZZH R&B MH UMMC

## 2020-02-15 RX ADMIN — CARIPRAZINE 6 MG: 1.5 CAPSULE, GELATIN COATED ORAL at 20:03

## 2020-02-15 RX ADMIN — MELATONIN TAB 3 MG 3 MG: 3 TAB at 20:03

## 2020-02-15 RX ADMIN — MELATONIN 25 MCG: at 08:43

## 2020-02-15 NOTE — PROGRESS NOTES
Typical day. Quiet and withdrawn. Napping good part of the morning. Appetite good. Keeps to self. Out in milieu watching tv intermittently with peers. Mostly pleasant and approachable.          02/15/20 1154   Behavioral Health   Hallucinations denies / not responding to hallucinations   Thinking distractable   Orientation person: oriented;place: oriented;date: oriented;time: oriented   Insight poor   Judgement impaired   Eye Contact at examiner   Affect blunted, flat   Mood mood is calm   1. Wish to be Dead (Recent) No   Activity withdrawn;isolative   Speech clear;coherent

## 2020-02-15 NOTE — PROVIDER NOTIFICATION
Patient appeared calm thru shift and seemed to enjoy visitors.  Pt reported that he likes new med, (ie. Vraylor).   Pt hopes for placement soon... reports poor sleep.

## 2020-02-16 PROCEDURE — 25000132 ZZH RX MED GY IP 250 OP 250 PS 637: Performed by: STUDENT IN AN ORGANIZED HEALTH CARE EDUCATION/TRAINING PROGRAM

## 2020-02-16 PROCEDURE — 12400001 ZZH R&B MH UMMC

## 2020-02-16 RX ADMIN — MELATONIN 25 MCG: at 08:45

## 2020-02-16 RX ADMIN — MELATONIN TAB 3 MG 3 MG: 3 TAB at 20:20

## 2020-02-16 RX ADMIN — NICOTINE POLACRILEX 4 MG: 2 GUM, CHEWING ORAL at 19:07

## 2020-02-16 RX ADMIN — CARIPRAZINE 6 MG: 1.5 CAPSULE, GELATIN COATED ORAL at 20:20

## 2020-02-16 ASSESSMENT — ACTIVITIES OF DAILY LIVING (ADL)
HYGIENE/GROOMING: INDEPENDENT
LAUNDRY: UNABLE TO COMPLETE
ORAL_HYGIENE: INDEPENDENT
DRESS: INDEPENDENT

## 2020-02-16 ASSESSMENT — MIFFLIN-ST. JEOR: SCORE: 1736.08

## 2020-02-16 NOTE — PROGRESS NOTES
Pt  Indicated feeling good, denies visual hallucination  Pt indicated responding to auditory hallucination,  Pt stated voices telling him to use the coping skills of reading a book for distractions  Pt indicated good appetite, ate breakfast and lunch  Pt plan of discharge include going to an IRTS program  Pt denies pain, appear calm and relax  Pt talked on the phone , pace the [address] and was visible in the milieu       02/16/20 1109   Behavioral Health   Hallucinations denies / not responding to hallucinations   Thinking distractable;poor concentration   Orientation person: oriented;place: oriented;date: oriented;time: oriented   Memory baseline memory   Insight admits / accepts;poor   Judgement impaired   Eye Contact cultural specific;at examiner   Affect blunted, flat   Mood mood is calm   Suicidality   (Pt denies SI thoughts)   1. Wish to be Dead (Recent) No   2. Non-Specific Active Suicidal Thoughts (Recent) No   Elopement   (No attempt observed)   Activity isolative;withdrawn   Speech clear;coherent   Medication Sensitivity no stated side effects;no observed side effects   Psychomotor / Gait balanced;steady   Psycho Education   Type of Intervention 1:1 intervention   Response participates with encouragement   Hours 0.5   Activities of Daily Living   Hygiene/Grooming independent   Oral Hygiene independent   Dress independent   Laundry unable to complete

## 2020-02-16 NOTE — PROGRESS NOTES
"Pt observed wakeful on 0645 rounds this AM.  Described his quality of sleep as poor yet questioned why the need to take medication \"just because I can't sleep\".  Requested that pt. Please notify staff when wakeful during the night so that sleep options might be explored such as Tipps strategies, warm fluids, comfort measures.  Pleasantly denied any interest while thanking this staff appropriately.  Observed to have slept for approx 6.25 hrs overnight.  Safe, therapeutic environment maintained.    "

## 2020-02-16 NOTE — PLAN OF CARE
Patient spent much of shift in room, but did come out on occasion to eat and watch TV.   Pt appears calm and is pleasant on approach. Pt retired early again.

## 2020-02-17 PROCEDURE — 25000132 ZZH RX MED GY IP 250 OP 250 PS 637: Performed by: STUDENT IN AN ORGANIZED HEALTH CARE EDUCATION/TRAINING PROGRAM

## 2020-02-17 PROCEDURE — 12400001 ZZH R&B MH UMMC

## 2020-02-17 PROCEDURE — G0177 OPPS/PHP; TRAIN & EDUC SERV: HCPCS

## 2020-02-17 PROCEDURE — 99231 SBSQ HOSP IP/OBS SF/LOW 25: CPT | Mod: GC | Performed by: PSYCHIATRY & NEUROLOGY

## 2020-02-17 RX ADMIN — HYDROXYZINE HYDROCHLORIDE 25 MG: 25 TABLET, FILM COATED ORAL at 13:14

## 2020-02-17 RX ADMIN — MELATONIN 25 MCG: at 10:11

## 2020-02-17 RX ADMIN — MELATONIN TAB 3 MG 3 MG: 3 TAB at 20:49

## 2020-02-17 RX ADMIN — ACETAMINOPHEN 650 MG: 325 TABLET, FILM COATED ORAL at 01:34

## 2020-02-17 RX ADMIN — CARIPRAZINE 6 MG: 1.5 CAPSULE, GELATIN COATED ORAL at 13:12

## 2020-02-17 RX ADMIN — Medication: at 02:37

## 2020-02-17 RX ADMIN — TRAZODONE HYDROCHLORIDE 50 MG: 50 TABLET ORAL at 20:49

## 2020-02-17 ASSESSMENT — ACTIVITIES OF DAILY LIVING (ADL)
HYGIENE/GROOMING: INDEPENDENT
ORAL_HYGIENE: INDEPENDENT
ORAL_HYGIENE: INDEPENDENT
DRESS: INDEPENDENT
LAUNDRY: WITH SUPERVISION
HYGIENE/GROOMING: INDEPENDENT
DRESS: INDEPENDENT

## 2020-02-17 NOTE — PROGRESS NOTES
Jerel  attended 2 of 3 OT groups today.  Endorsed ongoing sleep difficulties and restlessness about the length of this hospitalization. Reported strong thankfulness for the chance to go outside to the courtyard today. Shared and discussed several musical selections with the group. Agreeable & pleasant per usual. Demeanor quite subdued.     02/17/20 1400   Occupational Therapy   Type of Intervention structured groups   Response Initiates, socially acceptable   Hours 2

## 2020-02-17 NOTE — PROGRESS NOTES
KIANA from Fito Taylor at [facility] IRTS re: update on readiness to discharge and whether pt was open to IRTS referral.  Writer PEPE at [phone] ext [phone] with brief update.   KIANA from Fito to coordinate phone interview for 2/18/2020 at 2:00pm, writer contacted him and LVM to confirm phone interview.    Writer met with pt and informed him of phone interview on 2/18/2020 at 2:00pm.

## 2020-02-17 NOTE — PROGRESS NOTES
Freq visible in milieu. No groups. Pt presents blunted until engaged, then affect brightens. Social at times. Had a visitor. No groups. Ate meals.       02/17/20 1200   Behavioral Health   Hallucinations denies / not responding to hallucinations   Thinking distractable   Orientation person: oriented;place: oriented;date: oriented;time: oriented   Memory baseline memory   Insight admits / accepts   Judgement impaired   Eye Contact at examiner   Affect blunted, flat;full range affect   Mood mood is calm   Physical Appearance/Attire attire appropriate to age and situation   Hygiene other (see comment)  (adequate)   1. Wish to be Dead (Recent) No   2. Non-Specific Active Suicidal Thoughts (Recent) No   Activity other (see comment)  (Freq visible in mulieu, no groups.)   Speech clear;coherent   Psychomotor / Gait balanced;steady   Activities of Daily Living   Hygiene/Grooming independent   Oral Hygiene independent   Dress independent   Laundry with supervision   Room Organization independent

## 2020-02-17 NOTE — PROGRESS NOTES
"    ----------------------------------------------------------------------------------------------------------  [facility], [place]   Psychiatric Progress Note  Hospital Day #31     Interim History:   The patient's care was discussed with the treatment team and chart notes were reviewed.    Sleep: 5.5 hours (02/17/20 0600)  Scheduled Medications: Taking as prescribed except no trazodone taken Saturday (2/15) or  Sunday (2/16) night.  PRN medications: Tylenol 650 mg Monday 1:34am, methol gel Monday 2:37 am, nicorette gum Sunday evening.    Staff Report: Patient reported as not having interest in other sleep options to aid sleep. Patient reported as napping during the mornings. Patient had visitors Friday night.  Staff is taking Jerel outside to [facility] today.     Patient Interview: Jerel is interviewed in the patient room. He states he is doing \"alright,\" not that good and not that bad. He states the weekend was long. He states he still hears voices at night and not as much during the day. They walk him through his memories, which is a mixture of good and bad. He reports and shows his notebook, where he says the voices instructed him to write \"I am not Prince Mujica. I am Jerel Shay.\" There are also the names of chemical elements and geometrical shapes in the notebook. He states he does not want to stay admitted for any longer. He is interested in going to his parents' house before IRTS. He states the team may contact his parents today.    The risks, benefits, alternatives and side effects of any medication changes have been discussed and are understood by the patient and other caregivers.    Interim Update: Team spoke with Jerel's father, León, who also notes fewer depressive and psychotic symptoms and increased sleepiness. Parents are still in agreement to have Jerel discharge to their home before an IRTS if a concrete plan/expected timing are known.    Review of systems:     ROS was " "negative unless noted above.          Allergies:     Allergies   Allergen Reactions     Penicillins      Rash, Generalized            Psychiatric Examination:   BP (!) 141/88   Pulse 85   Temp 98.8  F (37.1  C)   Resp 16   Ht 1.727 m (5' 8\")   Wt 76.7 kg (169 lb)   SpO2 97%   BMI 25.70 kg/m    Weight is 169 lbs 0 oz  Body mass index is 25.7 kg/m .    Appearance: Dressed in home pants and scrub sweatshirt, somewhat groomed, appears stated age, medium-length blonde hair   Attitude:  cooperative  Eye Contact:  fair  Mood:  \"alright\"  Affect:  Mood-congruent, blunted intensity, constricted variability, restricted range  Speech: clear, normal prosody, delayed/moderate latency  Language: Fluent in English, no word-finding difficulty, no dysarthria, no neologisms, sophisticated vocabulary  Psychomotor Behavior:  No evidence of tardive dyskinesia, dystonia, akathisia, or abnormal movments.   Thought Process: Linear and goal oriented  Thought Content:  Endorses AH. No SI/HI/paranoia/VH  Insight:  limited  Judgment:  limited  Oriented to:  Person, time, place, situation.   Attention Span and Concentration:  intact to interview.   Recent and Remote Memory:  intact per interview.   Muscle Strength and Tone: normal strength and no rigidity or increased tone.   Gait and Station: normal      Labs, Imaging, other studies:   No results found for this or any previous visit (from the past 24 hour(s)).       Assessment    Diagnostic Impression:   Jerel Day is a 24-year-old male with history of schizoaffective disorder, bipolar type and cannabis dependence who presented to [facility] ED on 01/17/2020 for treatment of self-inflicted leg laceration and head injury and was subsequently admitted for psychosis and suicidal ideation. Predisposing factors include distant family history of schizophrenia. Precipitating factors include stress regarding upcoming semester, recent discharge from [facility], cannabis use, possible medication " "non-adherence, and interpersonal conflict with his parents (feels like he has no flexibility at home). Perpetuating factors include feeling like he has not found his \"footing\" and chronic neck and back pain. Protective factors include supportive family, access to healthcare, and stable housing. MSE on admission was notable for depressed mood, reports of auditory and visual hallucinations, occasionally illogical comments and limited insight. Given the degree of cognitive impairment and thought symptoms, in the absence of mood symptoms, his presentation is appearing more consistent with schizophrenia rather than schizoaffective disorder. At this point it is unclear if patient is improving with cariprazine 6mg however it may take 4-6 weeks to see full effect. Team has considered initiating clozapine, and patient has expressed worry about associated blood draws. As concerns for patient safety are minimal, team will begin looking into discharge to IRTS, possibly to parents' house first.     Principal Diagnosis:  #  Schizophrenia     Secondary psychiatric diagnoses of concern this admission:   # Cannabis dependence     Hospital course:   Jerel Day was admitted to station 22 on a 72 hour hold. He is currently under MI commitment until February. PTA Abilify and oanzapine were continued on admission. On 1/21/20, Jerel expressed that he wanted to be on only one antipsychotic and agreed to take Abilify if weaned off Zyprexa, so we decreased his oanzapine dose to 10 mg at bedtime. Olanzapine was decreased to 5 mg and Abilify was increased to 20 mg on 1/22/20. Clark amended on 2/4/20 to include cariprazine and clozapine rather than Zyprexa and Risperdal. Jerel's evening Zyprexa was discontinued on 2/4/20 and 1.5mg cariprazine was started on 2/5/20. The PRN for agitation was changed from Zyprexa to Haldol 5mg PO or IM with Benadryl. His Abilify was decreased to 15mg and the cariprazine was increased to 3mg on 2/6/20. Abilify " was decreased to 10mg and cariprazine was increased to 4.5mg on 2/7/20 and to 6mg on 2/9. Abilify discontinued on 2/7/20. On 2/11/20, cariprazine dosing changed to once per PM from once per AM due to daytime sleepiness. Trazodone 50 mg at bedtime added on 2/13/20 for multiple days of poor sleep. On 2/17/20, cariprazine schedule changed from nightly to morning dosing per patient preference.      Discontinued Medications & Rationale:  Olanzapine - patient preference, weight gain + sedation; removed from Clark  Abilify- not effective for reducing AH and psychosis symptoms adequately. cross-titrated to cariprazine     Medical course:  Jerel Day was medically cleared for admission to the inpatient psychiatric unit. His leg and face injuries were monitored. On 1/31, he reported chest pain. EKG, internal medicine consult, and troponin were obtained, resulting in decreased concerned for cardiac etiology.    Plan     Today's changes:  - change cariprazine 6mg dose to noon (from night) and plan to dose in the morning on 2/18  - contact parents regarding discharge planning    Principal psychiatric diagnosis:   # Schizophrenia  - Continue cariprazine 6mg at noon today, in the morning 2/18 onwards     - Nurse manager contacted about bringing Jerel outside to the [facility] on Monday 2/17     PRNs  - Continue PTA benztropine 0.5 mg BID PRN.  - Continue PTA gabapentin 300 mg BID PRN.  - Haldol 5mg PO or Haldol 5mg IM (given with benadryl 50mg) PRN agitation      - Neuropsychological testing to be completed in outpatient setting.     - Contact parents as Jerel allows      -Patient will be treated in therapeutic milieu with appropriate individual and group therapies as described.     Secondary psychiatric diagnoses:   # Cannabis dependence     Medical conditions:   #Leg laceration on left thigh: Jerel cut himself with a pocketknife. S/p laceration repair in ED.   - Continue to monitor; healing well.  - Stitches removed on 1/24.  "     #Scalp and face contusions: CT not concerning for acute intracranial process. Demonstrated \"soft tissue hematoma overlying the left zygomatic arch\". No fractures visible on CT. Jerel denies LOC and blurry vision.  - PRN acetaminophen 650 mg Q4H for pain management.     #Chest pain: On 1/31, he reported reproducible chest pain. EKG, internal medicine consult, and troponin were obtained, resulting in decreased concerned for cardiac etiology. Likely musculoskeletal in nature, however could also consider stress/anxiety as a cause.   - Continue to monitor.     Legal Status:   Committed wth [facility] (cariprazine, aripiprazole, clozapine, haldol).      Dispo: IRTS facility, possibly to parents' home first.     Contacts: León (father) [phone], Mesha [phone]    Safety Assessment:   Behavioral Orders   Procedures     Code 1 - Restrict to Unit     Code 2     Routine Programming     As clinically indicated     Self Injury Precaution     Single Room     Status 15     Every 15 minutes.     Suicide precautions     Patients on Suicide Precautions should have a Combination Diet ordered that includes a Diet selection(s) AND a Behavioral Tray selection for Safe Tray - with utensils, or Safe Tray - NO utensils         Arminda Roberts, MS3  [facility] Psychiatry Service    ------------------------------------------------------------------    I was present with the medical student who participated in the service and in the documentation of the note. I have verified the history and personally performed the physical exam and medical decision making. I agree with the assessment and plan of care as documented in the note. - Queta Wayne    This patient was seen and discussed with the attending physician.    Queta Wayne MD   PGY-1 Psychiatry    Attestation:  This patient has been seen and evaluated by me, Nadya Ruiz.  I have discussed this patient with the psychiatry resident and I " agree with the findings and plan in this note.    I have reviewed today's vital signs, medications, labs and imaging.   Nadya Ruiz MD.

## 2020-02-18 PROCEDURE — 12400001 ZZH R&B MH UMMC

## 2020-02-18 PROCEDURE — 25000132 ZZH RX MED GY IP 250 OP 250 PS 637: Performed by: STUDENT IN AN ORGANIZED HEALTH CARE EDUCATION/TRAINING PROGRAM

## 2020-02-18 PROCEDURE — G0177 OPPS/PHP; TRAIN & EDUC SERV: HCPCS

## 2020-02-18 PROCEDURE — 99232 SBSQ HOSP IP/OBS MODERATE 35: CPT | Mod: GC | Performed by: PSYCHIATRY & NEUROLOGY

## 2020-02-18 RX ADMIN — MELATONIN 25 MCG: at 08:23

## 2020-02-18 RX ADMIN — HYDROXYZINE HYDROCHLORIDE 25 MG: 25 TABLET, FILM COATED ORAL at 14:28

## 2020-02-18 RX ADMIN — CARIPRAZINE 6 MG: 1.5 CAPSULE, GELATIN COATED ORAL at 08:45

## 2020-02-18 RX ADMIN — MELATONIN TAB 3 MG 3 MG: 3 TAB at 22:26

## 2020-02-18 ASSESSMENT — ACTIVITIES OF DAILY LIVING (ADL)
HYGIENE/GROOMING: INDEPENDENT
ORAL_HYGIENE: INDEPENDENT
LAUNDRY: WITH SUPERVISION
DRESS: INDEPENDENT

## 2020-02-18 ASSESSMENT — MIFFLIN-ST. JEOR: SCORE: 1740.61

## 2020-02-18 NOTE — PROGRESS NOTES
"    ----------------------------------------------------------------------------------------------------------  [facility], [place]   Psychiatric Progress Note  Hospital Day #32     Interim History:   The patient's care was discussed with the treatment team and chart notes were reviewed.    Sleep: 6.25 hours (02/18/20 0600)  Scheduled Medications: Taking as prescribed.  PRN medications: Hydroxyzine 25 mg 2/17 afternoon.    Staff Report: Patient reported as stating the voices are worse than on arrival. Patient was able to go to the courtyard with staff and was thankful. Patient noted to report sleep difficulties. Patient has interview with IRLAVERNE today at 2pm.    Patient Interview: Jerel is interviewed in the conference room. He states he is doing really well with increased energy and feeling more talkative. He states his mood is \"okay, fine.\" He states his thoughts are \"smooth\" because he is \"well versed in the language around me.\" When asked about if he is better or worse than when he arrived, he states some things are better, such as his thoughts, and some are worse, such as sleep. He states he has moved \"sideways, like a crab, not a crayfish.\" He states his \"strength level is higher in his  and with decreased tremors.\"  Jerel states he has \"more potential energy, less kinetic energy.\" He states that the energy was \"higher up\" while pointing at his chest and says \"not in the legs or prostate\" where it used to be and was bad. He states he has been thinking a lot about \"pi\" and the \"Lord of the Rings muñoz ratio.\" He speaks about working at [employer] and the difference in difficulty of yellow and light blue packets. He reports that the voices have been \"nice and fair\" recently. He states he was not really asleep last night. He says he feels like he's in the \"wrong gear, can't shift down\" or that the \"brake pads are worn out.\"     Team discusses recommendation for clozapine given patient's " "ongoing and worsening symptoms. Jerel states he is wary of making too many changes too quickly. He also says that the blood draws \"are a dealbreaker.\"     The risks, benefits, alternatives and side effects of any medication changes have been discussed and are understood by the patient and other caregivers.      Review of systems:     ROS was negative unless noted above.          Allergies:     Allergies   Allergen Reactions     Penicillins      Rash, Generalized            Psychiatric Examination:   /86 (BP Location: Left arm)   Pulse 96   Temp 97.7  F (36.5  C) (Oral)   Resp 16   Ht 1.727 m (5' 8\")   Wt 77.1 kg (170 lb)   SpO2 99%   BMI 25.85 kg/m    Weight is 170 lbs 0 oz  Body mass index is 25.85 kg/m .    Appearance: Dressed in home shorts and shirt and scrub sweatshirt, somewhat groomed, appears stated age, medium-length blonde hair, slightly malodorous  Attitude:  cooperative  Eye Contact:  fair  Mood:  \"okay\"  Affect:  Mood-congruent, blunted intensity, constricted variability, restricted range, reactive  Speech: clear, normal prosody  Language: Fluent in English, no word-finding difficulty, no dysarthria, few neologisms, sophisticated vocabulary. Abundance.   Psychomotor Behavior:  No evidence of tardive dyskinesia, dystonia, akathisia, or abnormal movments.   Thought Process: tangential, nonlinear, disorganized  Thought Content:  Endorses AH. No SI/HI/paranoia/VH  Insight:  limited  Judgment:  limited  Oriented to:  Person, time, place, situation.   Attention Span and Concentration:  intact to interview.   Recent and Remote Memory:  intact per interview.   Muscle Strength and Tone: normal strength and no rigidity or increased tone.   Gait and Station: normal      Labs, Imaging, other studies:   No results found for this or any previous visit (from the past 24 hour(s)).       Assessment    Diagnostic Impression:   Jerel Day is a 24-year-old male with history of schizoaffective disorder, " "bipolar type and cannabis dependence who presented to [facility] ED on 01/17/2020 for treatment of self-inflicted leg laceration and head injury and was subsequently admitted for psychosis and suicidal ideation. Predisposing factors include distant family history of schizophrenia. Precipitating factors include stress regarding upcoming semester, recent discharge from [facility], cannabis use, possible medication non-adherence, and interpersonal conflict with his parents (feels like he has no flexibility at home). Perpetuating factors include feeling like he has not found his \"footing\" and chronic neck and back pain. Protective factors include supportive family, access to healthcare, and stable housing. MSE on admission was notable for depressed mood, reports of auditory and visual hallucinations, occasionally illogical comments and limited insight. Given the degree of cognitive impairment and thought symptoms, in the absence of mood symptoms, his presentation is appearing more consistent with schizophrenia rather than schizoaffective disorder. At this point it is unclear if patient is improving with cariprazine 6mg however it may take 4-6 weeks to see full effect. Team has considered initiating clozapine, and patient has expressed worry about associated blood draws. With thought content now showing worsening signs of psychosis, team is considering initiating clozapine. Concern for patient safety is minimal, and the team has begun looking into discharge to [facility], possibly to parents' house first.     Principal Diagnosis:  #  Schizophrenia     Secondary psychiatric diagnoses of concern this admission:   # Cannabis dependence     Hospital course:   Jerel Day was admitted to station 22 on a 72 hour hold. He is currently under MI commitment until February. PTA Abilify and oanzapine were continued on admission. On 1/21/20, Jerel expressed that he wanted to be on only one antipsychotic and agreed to take Abilify if weaned off " Zyprexa, so we decreased his oanzapine dose to 10 mg at bedtime. Olanzapine was decreased to 5 mg and Abilify was increased to 20 mg on 1/22/20. Clark amended on 2/4/20 to include cariprazine and clozapine rather than Zyprexa and Risperdal. Jerel's evening Zyprexa was discontinued on 2/4/20 and 1.5mg cariprazine was started on 2/5/20. The PRN for agitation was changed from Zyprexa to Haldol 5mg PO or IM with Benadryl. His Abilify was decreased to 15mg and the cariprazine was increased to 3mg on 2/6/20. Abilify was decreased to 10mg and cariprazine was increased to 4.5mg on 2/7/20 and to 6mg on 2/9. Abilify discontinued on 2/7/20. On 2/11/20, cariprazine dosing changed to once per PM from once per AM due to daytime sleepiness. Trazodone 50 mg at bedtime added on 2/13/20 for multiple days of poor sleep. On 2/17/20, cariprazine schedule changed from nightly to morning dosing per patient preference.      Discontinued Medications & Rationale:  Olanzapine - patient preference, weight gain + sedation; removed from [facility]  Abilify- not effective for reducing AH and psychosis symptoms adequately. cross-titrated to cariprazine     Medical course:  Jerel Day was medically cleared for admission to the inpatient psychiatric unit. His leg and face injuries were monitored. On 1/31, he reported chest pain. EKG, internal medicine consult, and troponin were obtained, resulting in decreased concerned for cardiac etiology.    Plan     Today's changes:  - change cariprazine 6mg dose to AM from noon (finish transition from PM dosing)    Principal psychiatric diagnosis:   # Schizophrenia  - Continue cariprazine 6mg qAM       PRNs  - Continue PTA benztropine 0.5 mg BID PRN.  - Continue PTA gabapentin 300 mg BID PRN.  - Haldol 5mg PO or Haldol 5mg IM (given with benadryl 50mg) PRN agitation      - Neuropsychological testing to be completed in outpatient setting.     - Contact parents as Jerel allows      -Patient will be treated in  "therapeutic milieu with appropriate individual and group therapies as described.     Secondary psychiatric diagnoses:   # Cannabis dependence     Medical conditions:   #Leg laceration on left thigh: Jerel cut himself with a pocketknife. S/p laceration repair in ED.   - Continue to monitor; healing well.  - Stitches removed on 1/24.      #Scalp and face contusions: CT not concerning for acute intracranial process. Demonstrated \"soft tissue hematoma overlying the left zygomatic arch\". No fractures visible on CT. Jerel denies LOC and blurry vision.  - PRN acetaminophen 650 mg Q4H for pain management.     #Chest pain: On 1/31, he reported reproducible chest pain. EKG, internal medicine consult, and troponin were obtained, resulting in decreased concerned for cardiac etiology. Likely musculoskeletal in nature, however could also consider stress/anxiety as a cause.   - Continue to monitor.     Legal Status:   Committed wth [facility] (cariprazine, aripiprazole, clozapine, haldol).      Dispo: IRTS facility for 90 days. After IRTS facility, Jerel will be best served by staying at a group home or assisted living.      Contacts: León (father) [phone], Mesha [phone]    Safety Assessment:   Behavioral Orders   Procedures     Code 1 - Restrict to Unit     Routine Programming     As clinically indicated     Self Injury Precaution     Single Room     Status 15     Every 15 minutes.     Suicide precautions     Patients on Suicide Precautions should have a Combination Diet ordered that includes a Diet selection(s) AND a Behavioral Tray selection for Safe Tray - with utensils, or Safe Tray - NO utensils         Arminda Roberts, MS3  [facility] Psychiatry Service    ------------------------------------------------------------------    I was present with the medical student who participated in the service and in the documentation of the note. I have verified the history and personally performed the " physical exam and medical decision making. I agree with the assessment and plan of care as documented in the note. - Queta Wayne    This patient was seen and discussed with the attending physician.    Queta Wayne MD   PGY-1 Psychiatry      Attestation:  This patient has been seen and evaluated by me, Nadya Ruiz.  I have discussed this patient with the psychiatry resident and I agree with the findings and plan in this note.    I have reviewed today's vital signs, medications, labs and imaging.   Nadya Ruiz MD.

## 2020-02-18 NOTE — PLAN OF CARE
BEHAVIORAL TEAM DISCUSSION    Participants:   Dr. Nadya Ruiz, Attending Psychiatrist  Queta Wayne MD, PGY-1  Ashleigh Estrella, FRANNIE Cordova, LPCC, LADC, CTC  Arminda Roberts, MS3  Radha Jese, MS3  Servando Bird, OT  Progress: Patient endorses ongoing sleep difficulties and restlessness. Still experiencing AH.  Anticipated length of stay: Unknown, pending stabilization and appropriate disposition plan.   Continued Stay Criteria/Rationale: Patient requires further stabilization.  Medical/Physical: No acute issues - was cleared by ED for psychiatric admission  Precautions:   Behavioral Orders   Procedures    Code 1 - Restrict to Unit    Routine Programming     As clinically indicated    Self Injury Precaution    Single Room    Status 15     Every 15 minutes.    Suicide precautions     Patients on Suicide Precautions should have a Combination Diet ordered that includes a Diet selection(s) AND a Behavioral Tray selection for Safe Tray - with utensils, or Safe Tray - NO utensils     Plan: Patient has been referred to several IRTS facilities - he has an interview today at [facility]. Team is recommending patient start Clozapine.  Rationale for change in precautions or plan: No change at present-will continue to monitor and adjust as needed.

## 2020-02-18 NOTE — PROGRESS NOTES
Re: Dispo planning    Writer left VM message for patient  to discuss long term options for patient - group home or assisted living options following IRTS. Contact info: Noal Summers at [phone].    Nicole Cordova LPCC, [facility]  Clinical Treatment Coordinator

## 2020-02-18 NOTE — PROGRESS NOTES
Jerel  attended 3 of 3 OT groups today. Appeared preoccupied. Comments were loose, abstract, and difficult to comprehend. Pleasant and somatic, per usual.   This morning he participated in a structured task project that required concentration, problem solving, and cooperating with peers (making homemade waffles). He also participated in a movement & mindfulness group, practicing all suggested movements & stretches to support balance for the nervous system.      02/18/20 1400   Occupational Therapy   Type of Intervention structured groups   Response Initiates, socially acceptable   Hours 3

## 2020-02-18 NOTE — PROGRESS NOTES
Pt presents distractible at times. More social and active particip in groups. Blunted affect and staring off into space at times. Affect brightens when socializing. Ate meals.       02/18/20 1100   Behavioral Health   Thinking distractable   Orientation person: oriented;place: oriented;date: oriented;time: oriented   Memory baseline memory   Insight admits / accepts   Judgement impaired   Eye Contact at examiner   Affect blunted, flat   Mood mood is calm   Physical Appearance/Attire attire appropriate to age and situation;untidy   Hygiene neglected grooming - unclean body, hair, teeth   1. Wish to be Dead (Recent) No   2. Non-Specific Active Suicidal Thoughts (Recent) No   Activity other (see comment)  (Active particip in am groups)   Speech clear;coherent   Psychomotor / Gait balanced;steady   Activities of Daily Living   Hygiene/Grooming independent   Oral Hygiene independent   Dress independent   Laundry with supervision   Room Organization independent

## 2020-02-18 NOTE — PROGRESS NOTES
Pt was observed withdrawn to their room for most of the evening resting in their bed. During check-in with writer pt stated that they feel their medication is making them tired though they feel they struggle to actually sleep. Pt states that they rate their depression a four on a scale of one to ten attributing their rating to the lack of sleep they've experienced. Pt states that they believe their medication is contributing to their auditory hallucinations returning. They went on to say that the voices are worse than when they first came to the hospital. Stating that the voices say both good and bad things to them. Pt denies SI and SIB.     02/17/20 2040   Behavioral Health   Hallucinations auditory   Thinking distractable   Orientation person: oriented;place: oriented;date: oriented;time: oriented   Memory baseline memory   Insight admits / accepts   Judgement impaired   Eye Contact at examiner   Affect blunted, flat   Mood depressed   Physical Appearance/Attire attire appropriate to age and situation   Hygiene   (adequate)   Suicidality   (Pt denies)   1. Wish to be Dead (Recent) No   2. Non-Specific Active Suicidal Thoughts (Recent) No   Self Injury   (Pt denies)   Elopement   (None stated or observed)   Activity withdrawn   Speech clear;coherent   Medication Sensitivity sedation   Psychomotor / Gait balanced;steady   Psycho Education   Type of Intervention 1:1 intervention   Response participates, initiates socially appropriate   Hours 0.5   Treatment Detail Check-in   Activities of Daily Living   Hygiene/Grooming independent   Oral Hygiene independent   Dress independent   Room Organization independent

## 2020-02-19 LAB
BASOPHILS # BLD AUTO: 0 10E9/L (ref 0–0.2)
BASOPHILS NFR BLD AUTO: 0.4 %
DIFFERENTIAL METHOD BLD: ABNORMAL
EOSINOPHIL # BLD AUTO: 0.2 10E9/L (ref 0–0.7)
EOSINOPHIL NFR BLD AUTO: 1.4 %
ERYTHROCYTE [DISTWIDTH] IN BLOOD BY AUTOMATED COUNT: 12.2 % (ref 10–15)
HCT VFR BLD AUTO: 48.3 % (ref 40–53)
HGB BLD-MCNC: 16.5 G/DL (ref 13.3–17.7)
IMM GRANULOCYTES # BLD: 0.1 10E9/L (ref 0–0.4)
IMM GRANULOCYTES NFR BLD: 0.9 %
LYMPHOCYTES # BLD AUTO: 2.4 10E9/L (ref 0.8–5.3)
LYMPHOCYTES NFR BLD AUTO: 21 %
MCH RBC QN AUTO: 30.7 PG (ref 26.5–33)
MCHC RBC AUTO-ENTMCNC: 34.2 G/DL (ref 31.5–36.5)
MCV RBC AUTO: 90 FL (ref 78–100)
MONOCYTES # BLD AUTO: 0.8 10E9/L (ref 0–1.3)
MONOCYTES NFR BLD AUTO: 6.6 %
NEUTROPHILS # BLD AUTO: 7.9 10E9/L (ref 1.6–8.3)
NEUTROPHILS NFR BLD AUTO: 69.7 %
NRBC # BLD AUTO: 0 10*3/UL
NRBC BLD AUTO-RTO: 0 /100
PLATELET # BLD AUTO: 246 10E9/L (ref 150–450)
RBC # BLD AUTO: 5.38 10E12/L (ref 4.4–5.9)
WBC # BLD AUTO: 11.4 10E9/L (ref 4–11)

## 2020-02-19 PROCEDURE — 25000132 ZZH RX MED GY IP 250 OP 250 PS 637: Performed by: STUDENT IN AN ORGANIZED HEALTH CARE EDUCATION/TRAINING PROGRAM

## 2020-02-19 PROCEDURE — 99232 SBSQ HOSP IP/OBS MODERATE 35: CPT | Mod: GC | Performed by: PSYCHIATRY & NEUROLOGY

## 2020-02-19 PROCEDURE — H2032 ACTIVITY THERAPY, PER 15 MIN: HCPCS

## 2020-02-19 PROCEDURE — 85025 COMPLETE CBC W/AUTO DIFF WBC: CPT | Performed by: STUDENT IN AN ORGANIZED HEALTH CARE EDUCATION/TRAINING PROGRAM

## 2020-02-19 PROCEDURE — 12400001 ZZH R&B MH UMMC

## 2020-02-19 PROCEDURE — 36416 COLLJ CAPILLARY BLOOD SPEC: CPT | Performed by: STUDENT IN AN ORGANIZED HEALTH CARE EDUCATION/TRAINING PROGRAM

## 2020-02-19 RX ORDER — CLOZAPINE 25 MG/1
25 TABLET ORAL AT BEDTIME
Status: DISCONTINUED | OUTPATIENT
Start: 2020-02-19 | End: 2020-02-20

## 2020-02-19 RX ORDER — LANOLIN ALCOHOL/MO/W.PET/CERES
3-6 CREAM (GRAM) TOPICAL
Status: DISCONTINUED | OUTPATIENT
Start: 2020-02-19 | End: 2020-02-26 | Stop reason: HOSPADM

## 2020-02-19 RX ADMIN — HYDROXYZINE HYDROCHLORIDE 25 MG: 25 TABLET, FILM COATED ORAL at 18:02

## 2020-02-19 RX ADMIN — CARIPRAZINE 4.5 MG: 1.5 CAPSULE, GELATIN COATED ORAL at 10:05

## 2020-02-19 RX ADMIN — CLOZAPINE 25 MG: 25 TABLET ORAL at 21:18

## 2020-02-19 RX ADMIN — MELATONIN TAB 3 MG 6 MG: 3 TAB at 21:18

## 2020-02-19 ASSESSMENT — ACTIVITIES OF DAILY LIVING (ADL)
LAUNDRY: WITH SUPERVISION
LAUNDRY: WITH SUPERVISION
ORAL_HYGIENE: INDEPENDENT
DRESS: INDEPENDENT
DRESS: INDEPENDENT
ORAL_HYGIENE: INDEPENDENT
HYGIENE/GROOMING: INDEPENDENT
HYGIENE/GROOMING: INDEPENDENT

## 2020-02-19 NOTE — PHARMACY
Pharmacy Clozapine Initial Note    Date of Service: 2020  Patient's : 1995  24 year old, male    Recent ANC Value(s):  Recent Labs   Lab Test 20  1001 20  0723 18  1054   ANEU 7.9 4.0 5.5       Is the patient enrolled in the clozapine REMS program? Yes  Ordering prescriber: Dr Wayne under Dr. Ruiz   Is this provider certified in the clozapine REMS program? Yes  Is the ANC within recommended limits? Yes  Does the patient have any signs or symptoms of infection, including fever or sore throat? No    Plan:  1. Initiate clozapine therapy at 25 mg PO QHS.  2. A WBC with differential will be ordered at least weekly.  ANC values will be entered into the REMS program.  3. Signs/symptoms of infection will be monitored daily.    Jennifer Soto, Pharm, BCPP  Phone: *[phone]

## 2020-02-19 NOTE — PROGRESS NOTES
Patient was isolative and withdrawn the first half of the shift. He was later observed socializing with staff and peers. He paced the halls and took a shower this evening. Patient states ppetite good, sleep is good, Keeps to self, Mostly pleasant and approachable. Patient denied SI/SIB/HI and other mental health symptoms. Patient reports feeling more tired and thinks that this is a possible side affect from medications.
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
## 2020-02-19 NOTE — PROGRESS NOTES
Pt slept 2 hours during the night. Spent time in his room, in the nazario pacing, and looking out the windows in the lounge. Offered prn sleep medication multiple times but declined. Wanted to take more PRN melatonin but a repeat dose was not available. Flat, calm, quiet while awake.

## 2020-02-19 NOTE — PROGRESS NOTES
Distractible, staring into space at times in the milieu. Blunted affect, brightens when approached in conversation and social. Slept after lunch. Ate meals.No shower.       02/19/20 1400   Behavioral Health   Hallucinations appears responding;other (see comment)  (Staring into space at times)   Thinking distractable;other (see comment)   Orientation person: oriented;place: oriented;date: oriented;time: oriented   Memory baseline memory   Insight admits / accepts   Judgement impaired   Eye Contact at examiner;staring;into space   Affect blunted, flat   Mood mood is calm   Physical Appearance/Attire attire appropriate to age and situation   Hygiene other (see comment)  (adequate)   1. Wish to be Dead (Recent) No   2. Non-Specific Active Suicidal Thoughts (Recent) No   Activity other (see comment)  (Social in am, withdrawn in pm)   Speech clear;coherent   Psychomotor / Gait balanced;steady   Activities of Daily Living   Hygiene/Grooming independent   Oral Hygiene independent   Dress independent   Laundry with supervision   Room Organization independent

## 2020-02-20 ENCOUNTER — PATIENT OUTREACH (OUTPATIENT)
Dept: PSYCHIATRY | Facility: CLINIC | Age: 25
End: 2020-02-20

## 2020-02-20 PROCEDURE — G0177 OPPS/PHP; TRAIN & EDUC SERV: HCPCS

## 2020-02-20 PROCEDURE — 25000132 ZZH RX MED GY IP 250 OP 250 PS 637: Performed by: STUDENT IN AN ORGANIZED HEALTH CARE EDUCATION/TRAINING PROGRAM

## 2020-02-20 PROCEDURE — 99232 SBSQ HOSP IP/OBS MODERATE 35: CPT | Mod: GC | Performed by: PSYCHIATRY & NEUROLOGY

## 2020-02-20 PROCEDURE — 90853 GROUP PSYCHOTHERAPY: CPT

## 2020-02-20 PROCEDURE — 12400001 ZZH R&B MH UMMC

## 2020-02-20 RX ORDER — CLOZAPINE 25 MG/1
50 TABLET ORAL AT BEDTIME
Status: DISCONTINUED | OUTPATIENT
Start: 2020-02-20 | End: 2020-02-21

## 2020-02-20 RX ORDER — POLYETHYLENE GLYCOL 3350 17 G/17G
17 POWDER, FOR SOLUTION ORAL DAILY PRN
Status: DISCONTINUED | OUTPATIENT
Start: 2020-02-20 | End: 2020-02-26 | Stop reason: HOSPADM

## 2020-02-20 RX ORDER — TRAZODONE HYDROCHLORIDE 50 MG/1
50-100 TABLET, FILM COATED ORAL
Status: DISCONTINUED | OUTPATIENT
Start: 2020-02-20 | End: 2020-02-26 | Stop reason: HOSPADM

## 2020-02-20 RX ORDER — SENNOSIDES 8.6 MG
8.6 TABLET ORAL DAILY PRN
Status: DISCONTINUED | OUTPATIENT
Start: 2020-02-20 | End: 2020-02-26 | Stop reason: HOSPADM

## 2020-02-20 RX ADMIN — MELATONIN TAB 3 MG 6 MG: 3 TAB at 21:49

## 2020-02-20 RX ADMIN — MELATONIN 25 MCG: at 08:56

## 2020-02-20 RX ADMIN — HYDROXYZINE HYDROCHLORIDE 25 MG: 25 TABLET, FILM COATED ORAL at 11:06

## 2020-02-20 RX ADMIN — CLOZAPINE 50 MG: 25 TABLET ORAL at 20:26

## 2020-02-20 ASSESSMENT — ACTIVITIES OF DAILY LIVING (ADL)
HYGIENE/GROOMING: INDEPENDENT
DRESS: INDEPENDENT
ORAL_HYGIENE: INDEPENDENT
HYGIENE/GROOMING: INDEPENDENT

## 2020-02-20 ASSESSMENT — MIFFLIN-ST. JEOR: SCORE: 1726.55

## 2020-02-20 NOTE — PROGRESS NOTES
NAVIGATE Outreach  A Part of the [facility] First Episode of Psychosis Program     Patient Name: Jerel Day  /Age:  1995 (24 year old)    Contact: Writer called the unit and spoke to Jerel to check-in. Jerel expressed feeling kind of stuck on the unit but reports feeling good during time of phone call. He shared about recent med change to clozapine yesterday, and is wondering if that is contributing to him feeling good. He shared positively about art and yoga group on the unit. Writer verbalized support to Jerel. Offered to come and visit next week if writer's schedule permits. Jerel was open to this but shared he hopes he won't be on the unit next week. Writer will plan to check-in either  or Monday once Jerel is more aware of discharge timeframe.     Tammi Connell, ARIANNE LAFLEURATE Individual [facility] [place] [facility]ician

## 2020-02-20 NOTE — PROGRESS NOTES
"02/19/20 1800     Art Therapy   Type of Intervention structured groups   Response Participated with encouragement/ redirection   Hours 1   Treatment Detail    Art Therapy- shameka sensory exploration    Objective- Patients use art media, the creative process & resulting artwork to:explore feelings,reconcile emotions, gain self-awareness/ self esteem, manage behaviors, develop social skills, improve reality orientation, & reduce anxiety /depression.     Outcome- pt was tense. He reported feeling \"empty.\"  Writer asked what empty was in an image and he said \" a lizard.\" He made a shameka lizard. Writer was trying to engage him in conversation about making a living environment for his lizard and he was staring very intently and completely ignoring writer. It really appeared he didn't even hear writer  Multiple prompts and that he was responding to internal stimulation, He left before group ended.        "

## 2020-02-20 NOTE — PROGRESS NOTES
Isolative, withdrawn . In his room most of shift. Presented blunted, irritable.. Staring off into space much more freq. Unable to particip in group appropriately due to distraction and poor concentration. Not social. Ate meals. No shower.       02/20/20 1100   Behavioral Health   Hallucinations appears responding   Thinking distractable;poor concentration   Orientation person: oriented;place: oriented   Insight poor   Judgement impaired   Eye Contact staring;into space   Affect blunted, flat   Mood mood is calm   Physical Appearance/Attire attire appropriate to age and situation;untidy   Hygiene well groomed;neglected grooming - unclean body, hair, teeth   Speech clear   Psychomotor / Gait balanced;steady   Activities of Daily Living   Hygiene/Grooming independent

## 2020-02-20 NOTE — PROGRESS NOTES
"Jerel  attended 3 of 3 OT groups today. Blunted affect, appears less animated & less bright. Comments reflect a low mood & deep inward focus. Reports feeling that \"someone outside of [himself] is controlling [his] thoughts & feelings.\"   He participated in an OT reflection group on emotions and their importance for our quality of life. Also participated in OT clinic this a.m., where he initiated a chosen project (free-hand acrylic paintings), followed through with plan, and asked for support with supplies as needed. This afternoon he participated in a movement & mindfulness group, engaging in all suggested movements & a guided relaxation practice to support balance for the nervous system.      02/20/20 1400   Occupational Therapy   Type of Intervention structured groups   Response Initiates, socially acceptable   Hours 3           "
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
## 2020-02-20 NOTE — PLAN OF CARE
"Pt is visible on the unit intermittently; isolated to self and withdrawn; reported attending art therapy briefly. Stated \"I had a rough day today and  the voices are not good\" rated depression 8/10, 10 being sever. Reported having anxiety earlier in the shift and received 25 mg hydroxyzine at 1802 and was effective; reported feeling \"down\" and he attributed this feelings to depression and \"external factor\"( hearing voices); he started on Clozaril 25 mg today at HS; declined scheduled trazodone but took 6 mg melatonin that was scheduled. Made poor eye contact, looking away from the writer. Mood depressed; blunted affect; took shower; out for dinner and snack; appetite is good.              "

## 2020-02-21 PROCEDURE — 25000132 ZZH RX MED GY IP 250 OP 250 PS 637: Performed by: STUDENT IN AN ORGANIZED HEALTH CARE EDUCATION/TRAINING PROGRAM

## 2020-02-21 PROCEDURE — G0177 OPPS/PHP; TRAIN & EDUC SERV: HCPCS

## 2020-02-21 PROCEDURE — 12400001 ZZH R&B MH UMMC

## 2020-02-21 PROCEDURE — 99232 SBSQ HOSP IP/OBS MODERATE 35: CPT | Mod: GC | Performed by: PSYCHIATRY & NEUROLOGY

## 2020-02-21 RX ORDER — LORAZEPAM 2 MG/1
2 TABLET ORAL ONCE
Status: COMPLETED | OUTPATIENT
Start: 2020-02-21 | End: 2020-02-21

## 2020-02-21 RX ORDER — ARIPIPRAZOLE 5 MG/1
5-10 TABLET ORAL 2 TIMES DAILY PRN
Status: DISCONTINUED | OUTPATIENT
Start: 2020-02-21 | End: 2020-02-26 | Stop reason: HOSPADM

## 2020-02-21 RX ORDER — CLOZAPINE 25 MG/1
75 TABLET ORAL AT BEDTIME
Status: COMPLETED | OUTPATIENT
Start: 2020-02-21 | End: 2020-02-21

## 2020-02-21 RX ORDER — CLOZAPINE 100 MG/1
100 TABLET ORAL AT BEDTIME
Status: COMPLETED | OUTPATIENT
Start: 2020-02-22 | End: 2020-02-22

## 2020-02-21 RX ADMIN — HYDROXYZINE HYDROCHLORIDE 25 MG: 25 TABLET, FILM COATED ORAL at 10:06

## 2020-02-21 RX ADMIN — LORAZEPAM 2 MG: 2 TABLET ORAL at 10:58

## 2020-02-21 RX ADMIN — CLOZAPINE 75 MG: 25 TABLET ORAL at 21:49

## 2020-02-21 RX ADMIN — MELATONIN 25 MCG: at 09:43

## 2020-02-21 RX ADMIN — MELATONIN TAB 3 MG 6 MG: 3 TAB at 21:48

## 2020-02-21 ASSESSMENT — ACTIVITIES OF DAILY LIVING (ADL)
HYGIENE/GROOMING: INDEPENDENT
HYGIENE/GROOMING: INDEPENDENT
DRESS: INDEPENDENT
LAUNDRY: WITH SUPERVISION
DRESS: INDEPENDENT
ORAL_HYGIENE: INDEPENDENT
ORAL_HYGIENE: INDEPENDENT

## 2020-02-21 NOTE — PROGRESS NOTES
02/20/20 1710   Group Therapy Session   Group Attendance attended group session   Total Time (minutes) 45   Group Type psychotherapeutic   Patient Participation/Contribution cooperative with task;discussed personal experience with topic;listened actively   Group goal:  Identify difficult emotions and discuss constructive coping strategies for each.    Jerel presents as quiet, distracted, with flat affect. He minimally participated with the discussion part of the group. He reports success with his sleeping medication and is looking forward to getting good sleep this evening.

## 2020-02-21 NOTE — PROGRESS NOTES
"Pt presented with anxious affect, coherent thought process, and appropriate behavior. He was notably restless at shift start and reported feeling \"On edge\" in regards to noise level on unit. He expressed concern about meeting with treatment team and believes continued hospitalization is deteriorating his mental health. He endorsed AH which caused further agitation and unrest. Pt was receptive to take PRN Ativan with encouragement. He appeared relieved when CM reported his preliminary acceptance into residential treatment next week. Pt noted he felt calmer and less internally preoccupied in afternoon and was observed socializing with peers pleasantly in group. Pt denied presence of SI/SIB or other safety concerns.          02/21/20 1500   Behavioral Health   Hallucinations auditory;tactile   Thinking distractable   Orientation person: oriented;place: oriented;date: oriented;time: oriented   Memory baseline memory   Insight poor   Judgement impaired   Eye Contact at examiner   Affect full range affect   Mood mood is calm;irritable   Physical Appearance/Attire appears stated age;attire appropriate to age and situation   Hygiene neglected grooming - unclean body, hair, teeth   1. Wish to be Dead (Recent) No   Wish to be Dead Description (Recent) Pt denies   2. Non-Specific Active Suicidal Thoughts (Recent) No   Non-Specific Active Suicidal Thought Description (Recent) Pt denies   Psycho Education   Type of Intervention 1:1 intervention   Response participates, initiates socially appropriate   Hours 0.5   Treatment Detail Anxiety management   Activities of Daily Living   Hygiene/Grooming independent   Oral Hygiene independent   Dress independent   Laundry with supervision   Room Organization independent     "

## 2020-02-21 NOTE — PROGRESS NOTES
Pt was observed present in the milieu throughout the evening watching TV, and attending an OT group. Throughout the evening pt presented as distracted, taking an extended amount of time to provide answers when asked questions by staff. At times while talking to staff pt was observed to stare in silence for an extended period before responding. Pt reports that they have struggled to get sleep the past week and are looking forward to hopefully getting sleep tonight. While in the milieu with peers pt was not observed to socialize much with them.       02/20/20 2048   Behavioral Health   Hallucinations auditory   Thinking distractable   Orientation person: oriented;place: oriented   Memory baseline memory   Insight poor   Judgement impaired   Eye Contact into space;at examiner;staring   Affect blunted, flat   Mood mood is calm   Physical Appearance/Attire attire appropriate to age and situation   Hygiene neglected grooming - unclean body, hair, teeth   1. Wish to be Dead (Recent) No   2. Non-Specific Active Suicidal Thoughts (Recent) No   Self Injury   (None stated or observed)   Elopement   (None stated or observed)   Activity withdrawn   Speech clear;coherent   Medication Sensitivity no stated side effects   Psychomotor / Gait balanced;steady   Psycho Education   Type of Intervention 1:1 intervention   Response participates with encouragement   Hours 0.5   Treatment Detail Check-in   Activities of Daily Living   Hygiene/Grooming independent   Oral Hygiene independent   Dress independent   Room Organization independent

## 2020-02-21 NOTE — PLAN OF CARE
Problem: OT General Care Plan  Goal: OT Goal 1    Pt attended 3 out of 3 OT groups offered. Pt actively participated in a structured occupational therapy group involving a cognitive task to facilitate social and leisure enagement. Pt was able to follow 3 step directions of the novel task. Pt demonstrated good sequencing and planning ahead, and concentrated for the full duration of group. He was easily able to learn the new task, and used a particularly strategic task approach. Pt actively participated in occupational therapy clinic. Pt was able to ask for assistance as needed, and independently initiated a familiar creative expression task. Pt demonstrated good focus, planning, and attention to detail. Appropriately requested feedback throughout, and reflected on the process of each of his finished projects. He spent a majority of group quietly focused on his task, though had intermittent moments of staring into space and appearing internally preoccupied, but refocused on his task independently. Calm, pleasant, and cooperative throughout groups.

## 2020-02-21 NOTE — PROGRESS NOTES
"    ----------------------------------------------------------------------------------------------------------  [facility], [place]   Psychiatric Progress Note  Hospital Day #35     Interim History:   The patient's care was discussed with the treatment team and chart notes were reviewed.    Sleep: 7 hours (02/21/20 0600)  Scheduled Medications: Taking as prescribed, except 2/20 cariprazine was cancelled.  PRN medications: Hydroxyzine 25 mg 2/20 1106.    Staff Report: Patient reported as distracted, taking extra time to answer questions, not much socializing. Patient reported as looking forward to sleep. Patient attended 3/3 OT.    Patient Interview: The patient is interviewed in the conference room. The patient reports sleeping well, that \"bacteria was taking over to get the sleep to happen\" and that he is \"a little groggy this morning but not too bad.\" He states his morning thoughts are not so good, \"not associated with a higher order of energy\" and his evening thoughts are fine, that is is \"relaxing when [thoughts] are in tune.\" His thoughts are \"lower amplitude.\" He states he \"can't get any respite.\" He states the unit is \"noisy, dirty, busy, loud\" and the new chairs are \"horrible.\" He states he doesn't need any more meds and that he can't stay here any longer. He states they can increase med doses at an IRTS but not here. He states he is becoming a mixture of all the other people, that other people's bacteria enters his body, takes the plastic in his lungs from vaping, and slices his body with it. He states the clozapine put him to sleep and that's all he needed. He states the voices are just his thoughts and processing and that meds aren't going to fix them. He states the \"environment\" of the unit is making him worse and he feels cuts in his throat. He states \"now I'm talking like a goddamn meth head.\" He states that people here grab his thoughts and chew them. He states he would " "like his medications discussed further. He states he sees bacteria and \"phages\" on the ceiling. He states \"I probably need to clean out my eyes.\" He grabs his glasses and throws them at the wall. He states \"I said I would kill myself if I ever had another commitment.\" He tears off his wristband. He states he cannot use lotion here because \"then the other people would stick to me.\"       The risks, benefits, alternatives and side effects of any medication changes have been discussed and are understood by the patient and other caregivers.    Review of systems:     ROS was negative unless noted above.          Allergies:     Allergies   Allergen Reactions     Penicillins      Rash, Generalized            Psychiatric Examination:   BP (!) 141/85 (BP Location: Left arm)   Pulse 104   Temp 97.9  F (36.6  C) (Tympanic)   Resp 12   Ht 1.727 m (5' 8\")   Wt 75.7 kg (166 lb 14.4 oz)   SpO2 99%   BMI 25.38 kg/m    Weight is 166 lbs 14.4 oz  Body mass index is 25.38 kg/m .    Appearance: Dressed in home shorts, shirt, sweatshirt, glasses. Somewhat groomed. Appears stated age. Medium length blonde hair.  Behavior: Hand and arm shook while agitated. Scratched aggressively at legs. Threw glasses at wall so glasses broke. Ripped off patient bracelet.   Attitude: Uncooperative, hostile, suspicious  Eye Contact:  Fair  Mood:  agitated  Affect:  Mood-congruent, at times blunted, at times heightened, mobile, restricted range, reactive  Speech: clear, normal prosody, normal to loud volume  Language: Fluent in English, no word-finding difficulty, no dysarthria, neologisms (\"phages\").   Psychomotor Behavior:  No evidence of tardive dyskinesia, dystonia, akathisia, or abnormal movments.   Thought Process:  Disorganized, illogical, loose associations   Thought Content:  AH endorsed, passive SI. No HI. Possible VH or delusions (sees bacteria on the ceiling)  Insight:  poor  Judgment:  poor  Oriented to:  Person, time, place, situation. " "  Attention Span and Concentration:  intact to interview.   Recent and Remote Memory:  intact per interview.   Muscle Strength and Tone: normal strength and no rigidity or increased tone.   Gait and Station: normal      Labs, Imaging, other studies:   No results found for this or any previous visit (from the past 24 hour(s)).       Assessment    Diagnostic Impression:   Jerel Day is a 24-year-old male with history of schizoaffective disorder, bipolar type and cannabis dependence who presented to [facility] ED on 01/17/2020 for treatment of self-inflicted leg laceration and head injury and was subsequently admitted for psychosis and suicidal ideation. Predisposing factors include distant family history of schizophrenia. Precipitating factors include stress regarding upcoming semester, recent discharge from IR, cannabis use, possible medication non-adherence, and interpersonal conflict with his parents (feels like he has no flexibility at home). Perpetuating factors include feeling like he has not found his \"footing\" and chronic neck and back pain. Protective factors include supportive family, access to healthcare, and stable housing. MSE on admission was notable for depressed mood, reports of auditory and visual hallucinations, occasionally illogical comments and limited insight. Given the degree of cognitive impairment and thought symptoms, in the absence of mood symptoms, his presentation is appearing more consistent with schizophrenia rather than schizoaffective disorder. It is unclear if patient improved with cariprazine 6mg however it can take 4-6 weeks to see full effect. Team has discussed initiating clozapine with the patient, and the patient has expressed worry about associated blood draws and trying too many medications. With thought content showing worsening signs of psychosis (more negative AH and more loose associations) and decreasing sleep quantity and quality, team decided to initiate clozapine 2/19.  " Tentative placement at [facility]. Patient will possibly discharge to parents' house first.     Principal Diagnosis:  #  Schizophrenia     Secondary psychiatric diagnoses of concern this admission:   # Cannabis dependence     Hospital course:   Jerel Day was admitted to station 22 on a 72 hour hold. He is currently under MI commitment. PTA Abilify and olanzapine were continued on admission. On 1/21/20, Jerel expressed that he wanted to be on only one antipsychotic and agreed to take Abilify if weaned off Zyprexa, so we decreased his oanzapine dose to 10 mg at bedtime. Olanzapine was decreased to 5 mg and Abilify was increased to 20 mg on 1/22/20. Clark amended on 2/4/20 to include cariprazine and clozapine rather than Zyprexa and Risperdal. Jerel's evening Zyprexa was discontinued on 2/4/20 and 1.5mg cariprazine was started on 2/5/20. The PRN for agitation was changed from Zyprexa to Haldol 5mg PO or IM with Benadryl. His Abilify was decreased to 15mg and the cariprazine was increased to 3mg on 2/6/20. Abilify was decreased to 10mg and cariprazine was increased to 4.5mg on 2/7/20 and to 6mg on 2/9. Abilify discontinued on 2/7/20. On 2/11/20, cariprazine dosing changed to once per PM from once per AM due to daytime sleepiness. Trazodone 50 mg at bedtime added on 2/13/20 for multiple days of poor sleep. On 2/17/20, cariprazine schedule changed from nightly to morning dosing per patient preference. On 2/19/20, clozapine initiated at 25 mg, to increase by 25 mg daily until at optimal dose. Cariprazine to be discontinued on 2/20/20 due to ineffectiveness and worsening sleep, persucatory auditory hallucinations, and loose associations while taking it. Patient was agitated on 2/21 following cariprazine discontinuation and frustration over inability to discharge and increasing medication dose. He received 2mg lorazepam with good response.     Discontinued Medications & Rationale:  Olanzapine - patient  "preference, weight gain + sedation; removed from Clark  Abilify- not effective for reducing AH and psychosis symptoms adequately. cross-titrated to cariprazine   Cariprazine- not effective for reducing AH and psychotic symptoms     Medical course:  Jerel Day was medically cleared for admission to the inpatient psychiatric unit. His leg and face injuries were monitored. On 1/31, he reported chest pain. EKG, internal medicine consult, and troponin were obtained, resulting in decreased concerned for cardiac etiology.    Plan     Today's changes:  - increase clozapine to 75 mg, 100mg Sat night, and 125mg Sun night  -one time 2mg Ativan PO for agitation given 2/21    Principal psychiatric diagnosis:   # Schizophrenia  - Clozapine 75mg tonight, increase by 25mg daily until symptom control  - EKG 1/31/20 with QTc 375  - Lipid profile 1/18/20 wnl  - weight 169 lbs   - CBC with diff today (2/19/20) WBC 11.4 (elevated), ANC 7.9               - Patient enrolled in clozapine REMS                     PRNs  - Continue PTA benztropine 0.5 mg BID PRN.  - Continue PTA gabapentin 300 mg BID PRN.  - continue melatonin 3mg up to two times at night   - Haldol 5mg PO or Haldol 5mg IM (given with benadryl 50mg) PRN agitation   - Abilify 5-10 mg PRN agitation     - Neuropsychological testing to be completed in outpatient setting.     - Contact parents as Jerel allows/family mtg      -Patient will be treated in therapeutic milieu with appropriate individual and group therapies as described.     Secondary psychiatric diagnoses:   # Cannabis dependence     Medical conditions:   #Leg laceration on left thigh: Jerel cut himself with a pocketknife. S/p laceration repair in ED.   - Continue to monitor; healing well.  - Stitches removed on 1/24.      #Scalp and face contusions: CT not concerning for acute intracranial process. Demonstrated \"soft tissue hematoma overlying the left zygomatic arch\". No fractures visible on CT. Jerel denies LOC and " blurry vision.  - PRN acetaminophen 650 mg Q4H for pain management.     #Chest pain: On 1/31, he reported reproducible chest pain. EKG, internal medicine consult, and troponin were obtained, resulting in decreased concerned for cardiac etiology. Likely musculoskeletal in nature, however could also consider stress/anxiety as a cause.   - Continue to monitor.     Legal Status:   Committed wth Eduardo (cariprazine, aripiprazole, clozapine, haldol). Commitment was renewed 2.3.2020.     Dispo: IRTS facility for 90 days, tentatively in [place] [facility], possibly to parents' home first. After IRTS facility, Jerel will be best served by staying at a group home or assisted living.      Contacts: León (father) [phone], Mesha [phone]    Safety Assessment:   Behavioral Orders   Procedures     Code 1 - Restrict to Unit     Routine Programming     As clinically indicated     Self Injury Precaution     Status 15     Every 15 minutes.     Suicide precautions     Patients on Suicide Precautions should have a Combination Diet ordered that includes a Diet selection(s) AND a Behavioral Tray selection for Safe Tray - with utensils, or Safe Tray - NO utensils         ------------------------------------------------------------------  Arminda Roberts, MS3  [facility] Psychiatry Service      I was present with the medical student who participated in the service and in the documentation of the note. I have verified the history and personally performed the physical exam and medical decision making. I agree with the assessment and plan of care as documented in the note. - Queta Wayne    This patient was seen and discussed with the attending physician.    Queta Wayne MD   PGY-1 Psychiatry    Attestation:  This patient has been seen and evaluated by me, Vince Zheng MD.  I have discussed this patient with the house staff team including the resident and medical student and I agree with the findings  and plan in this note.    I have reviewed today's vital signs, medications, labs and imaging. Vince Zheng MD , PhD.

## 2020-02-22 PROCEDURE — 25000132 ZZH RX MED GY IP 250 OP 250 PS 637: Performed by: STUDENT IN AN ORGANIZED HEALTH CARE EDUCATION/TRAINING PROGRAM

## 2020-02-22 PROCEDURE — H2032 ACTIVITY THERAPY, PER 15 MIN: HCPCS

## 2020-02-22 PROCEDURE — 12400001 ZZH R&B MH UMMC

## 2020-02-22 RX ADMIN — NICOTINE POLACRILEX 4 MG: 2 GUM, CHEWING ORAL at 12:42

## 2020-02-22 RX ADMIN — GABAPENTIN 300 MG: 300 CAPSULE ORAL at 18:02

## 2020-02-22 RX ADMIN — MELATONIN 25 MCG: at 08:43

## 2020-02-22 RX ADMIN — CLOZAPINE 100 MG: 100 TABLET ORAL at 21:13

## 2020-02-22 ASSESSMENT — ACTIVITIES OF DAILY LIVING (ADL)
LAUNDRY: WITH SUPERVISION
DRESS: INDEPENDENT
ORAL_HYGIENE: INDEPENDENT
HYGIENE/GROOMING: INDEPENDENT

## 2020-02-22 NOTE — PLAN OF CARE
"Pt is active and visible on the unit; spent the shift pacing in the hallway wile listening to a headphone, watching TV in the lounge and at times resting in his room; attended group this shift; calm and pleasant on approach; blunted affect; endorsed hearing voices but stated \"barely minimal\" denied SI/SIB and HI; anxiety and depression is \"minimal\" ; out for snack and dinner; appetite is good; adequately groomed and clean. Clozaril dose increased to 75 mg.   "

## 2020-02-22 NOTE — PROGRESS NOTES
02/22/20 1400   Behavioral Health   Hallucinations denies / not responding to hallucinations   Thinking confused;poor concentration   Orientation person: oriented;place: oriented;date: oriented;time: oriented   Memory confabulation   Insight insight appropriate to situation   Judgement impaired   Eye Contact at examiner   Affect blunted, flat   Mood mood is calm   Physical Appearance/Attire disheveled   Hygiene neglected grooming - unclean body, hair, teeth   Suicidality other (see comments)  (denies)   1. Wish to be Dead (Recent) No   2. Non-Specific Active Suicidal Thoughts (Recent) No   3. Active Sucidal Ideation with any Methods (Not Plan) Without Intent to Act (Recent) No   4. Active Suicidal Ideation with Some Intent to Act, Without Specific Plan (Recent) No   5. Active Suicidal Ideation with Specific Plan and Intent (Recent) No   Psycho Education   Type of Intervention 1:1 intervention   Response participates, initiates socially appropriate   Hours 0.5   Treatment Detail check in with pt     Pt displayed some delusional thinking while out in milieu with other patients and with writer; although appropriate, sentence structures followed no logical pattern at times. No redirection needed, pt had a good shift.

## 2020-02-23 PROCEDURE — 12400001 ZZH R&B MH UMMC

## 2020-02-23 PROCEDURE — 25000132 ZZH RX MED GY IP 250 OP 250 PS 637: Performed by: STUDENT IN AN ORGANIZED HEALTH CARE EDUCATION/TRAINING PROGRAM

## 2020-02-23 PROCEDURE — 90853 GROUP PSYCHOTHERAPY: CPT

## 2020-02-23 RX ADMIN — CLOZAPINE 125 MG: 100 TABLET ORAL at 21:33

## 2020-02-23 RX ADMIN — MELATONIN TAB 3 MG 6 MG: 3 TAB at 21:34

## 2020-02-23 RX ADMIN — MELATONIN 25 MCG: at 08:33

## 2020-02-23 RX ADMIN — HYDROXYZINE HYDROCHLORIDE 25 MG: 25 TABLET, FILM COATED ORAL at 20:08

## 2020-02-23 ASSESSMENT — ACTIVITIES OF DAILY LIVING (ADL)
LAUNDRY: WITH SUPERVISION
ORAL_HYGIENE: INDEPENDENT
HYGIENE/GROOMING: INDEPENDENT
DRESS: STREET CLOTHES
ORAL_HYGIENE: INDEPENDENT
HYGIENE/GROOMING: INDEPENDENT
LAUNDRY: WITH SUPERVISION
DRESS: INDEPENDENT

## 2020-02-23 ASSESSMENT — MIFFLIN-ST. JEOR: SCORE: 1740.16

## 2020-02-23 NOTE — PROGRESS NOTES
"Pt was present in the milieu for some of the shift, participated in group, but rarely engaged with peers. Pt presents with a blunted and flat affect and is cooperative and calm upon approach. Pt reported experiencing increased depressive sxs earlier in the shift, but stated they improved as the night went on. During check in, pt presented with disorganized and delusional speech and thought, making illogical connections between topics and events. Pt made minimal eye contact with writer and exhibited very delayed response times. Pt reports currently experiencing auditory hallucinations, and states that the voices are saying \"I can't help you\". Pt denies SI/SIB.          02/22/20 2100   Behavioral Health   Hallucinations auditory   Thinking confused;distractable;delusional;poor concentration   Orientation person: oriented;place: oriented   Memory confabulation   Insight insight appropriate to situation;insight appropriate to events   Judgement impaired   Eye Contact into space;at examiner   Affect blunted, flat   Mood mood is calm   Physical Appearance/Attire disheveled   Hygiene neglected grooming - unclean body, hair, teeth   1. Wish to be Dead (Recent) No   2. Non-Specific Active Suicidal Thoughts (Recent) No   3. Active Sucidal Ideation with any Methods (Not Plan) Without Intent to Act (Recent) No   Psycho Education   Type of Intervention 1:1 intervention   Response participates, initiates socially appropriate   Hours 0.5   Treatment Detail Check in   Activities of Daily Living   Hygiene/Grooming independent   Oral Hygiene independent   Dress independent   Laundry with supervision   Room Organization independent     "

## 2020-02-23 NOTE — PROGRESS NOTES
02/22/20 2200   Art Therapy   Type of Intervention structured groups   Response participates with encouragement   Hours 1   Treatment Detail    (Art Therapy)- gretchen   Objective- Patients use art media, the creative process & resulting artwork to:explore feelings,reconcile emotions, gain self-awareness/ self esteem, manage behaviors, develop social skills, improve reality orientation, & reduce anxiety /depression.     Outcome- Pt did a thoughtful mandala. He was focused for most of group, but when he feels done with his art, he just leaves group without explanation before group ends. He asked some tangential questions about forces and energy that were under the bowls or plates group was using to trace circles for mandalas, otherwise, fairly quiet and focused.

## 2020-02-23 NOTE — PROGRESS NOTES
02/23/20 1519   Significant Event   Significant Event Other (see comments)  (Shift Summary)   Patient had a quiet shift.    Patient did not require seclusion/restraints to manage behavior.    Jerel Shay did not participate in groups and was not visible in the milieu.    Visitors during this shift included none.     Other information about this shift: Pt. was cooperative and appropriate. He paced a lot in the hallways. He had a quiet shift.

## 2020-02-23 NOTE — PLAN OF CARE
"Jerel was awakened for breakfast , he went to dining room and took Vit D offered him.  He looked tired but relaxed and went back to room.  Later he came out, looking groomed, hair combed and sat in the lounge with peers.  During this time the tv was on the news which caused several eruptions in differences in opinion about candidates, racial names, government spending and current affairs.  Jerel yelled something, one word, very loudly in great distress over the agitation in atmosphere.  The tv was turned off, all patients were asked to go to their rooms for a de-escalation time.  Jerel went to his room, lay down on the mattress on the floor and one hour later still in room.  \"I don't want any medication.\".  He came out 45 minutes later, hair all standing up and messy, unfocused look, slouch in walking where earlier he had been walking with confident, straight posture.  His parents visited,  he was non-smiling, even glum.  The parents also looked tense and glum as they came and as they left.  Jerel said about the lounge incident, \"They are always loud and doing things like that.\".   "

## 2020-02-24 ENCOUNTER — TELEPHONE (OUTPATIENT)
Dept: PHARMACY | Facility: CLINIC | Age: 25
End: 2020-02-24

## 2020-02-24 PROCEDURE — 99232 SBSQ HOSP IP/OBS MODERATE 35: CPT | Mod: GC | Performed by: PSYCHIATRY & NEUROLOGY

## 2020-02-24 PROCEDURE — 12400001 ZZH R&B MH UMMC

## 2020-02-24 PROCEDURE — 25000132 ZZH RX MED GY IP 250 OP 250 PS 637: Performed by: STUDENT IN AN ORGANIZED HEALTH CARE EDUCATION/TRAINING PROGRAM

## 2020-02-24 PROCEDURE — G0177 OPPS/PHP; TRAIN & EDUC SERV: HCPCS

## 2020-02-24 RX ORDER — OLANZAPINE 15 MG/1
15 TABLET ORAL AT BEDTIME
Qty: 30 TABLET | Refills: 0 | COMMUNITY
Start: 2020-02-24 | End: 2020-02-26

## 2020-02-24 RX ORDER — CLOZAPINE 50 MG/1
150 TABLET ORAL DAILY
Qty: 27 TABLET | Refills: 2 | Status: SHIPPED | OUTPATIENT
Start: 2020-02-24 | End: 2020-03-23

## 2020-02-24 RX ORDER — LANOLIN ALCOHOL/MO/W.PET/CERES
3 CREAM (GRAM) TOPICAL
Qty: 30 TABLET | Refills: 0 | Status: SHIPPED | OUTPATIENT
Start: 2020-02-24 | End: 2020-03-23

## 2020-02-24 RX ORDER — VITAMIN B COMPLEX
25 TABLET ORAL DAILY
Qty: 30 TABLET | Refills: 0 | Status: SHIPPED | OUTPATIENT
Start: 2020-02-25 | End: 2020-03-23

## 2020-02-24 RX ADMIN — GABAPENTIN 300 MG: 300 CAPSULE ORAL at 22:12

## 2020-02-24 RX ADMIN — MELATONIN TAB 3 MG 3 MG: 3 TAB at 22:12

## 2020-02-24 RX ADMIN — MELATONIN 25 MCG: at 08:59

## 2020-02-24 RX ADMIN — CLOZAPINE 125 MG: 100 TABLET ORAL at 22:11

## 2020-02-24 ASSESSMENT — ACTIVITIES OF DAILY LIVING (ADL)
ORAL_HYGIENE: INDEPENDENT
DRESS: INDEPENDENT
LAUNDRY: WITH SUPERVISION
DRESS: INDEPENDENT
HYGIENE/GROOMING: INDEPENDENT
HYGIENE/GROOMING: INDEPENDENT
ORAL_HYGIENE: INDEPENDENT
LAUNDRY: WITH SUPERVISION

## 2020-02-24 NOTE — PROGRESS NOTES
"Pt presented with calm affect, coherent thought process, and appropriate behavior. He reported feeling \"Really good\" and expressed relief that he would discharge to outpatient programming this week. Pt noted he was feeling \"A little different\" since medication changes and described this as a positive shift in perception and mood. Pt conveyed he continues to struggle with self-confidence and assertiveness, both which he wants to focus on during IRTS stay. He stated a long term goal is to seek employment and build a healthy support network outside of his brother's social [place]. Pt denied current AH, anxiety, depression, or safety concerns. He continues to endorse sensory and tactile sensations related to body alignment and influence of others on his thoughts and actions. Pt spent shift attending groups and socializing pleasantly with peers.        02/24/20 1300   Behavioral Health   Hallucinations denies / not responding to hallucinations  (Denies currently)   Thinking poor concentration   Orientation person: oriented;place: oriented;date: oriented;time: oriented   Memory baseline memory   Insight admits / accepts   Judgement impaired   Eye Contact at examiner   Affect full range affect   Mood mood is calm   Physical Appearance/Attire disheveled   Hygiene neglected grooming - unclean body, hair, teeth   1. Wish to be Dead (Recent) No   Wish to be Dead Description (Recent) Pt denies   2. Non-Specific Active Suicidal Thoughts (Recent) No   Non-Specific Active Suicidal Thought Description (Recent) Pt denies   Psycho Education   Type of Intervention 1:1 intervention   Response participates, initiates socially appropriate   Hours 0.5   Treatment Detail Check-in   Activities of Daily Living   Hygiene/Grooming independent   Oral Hygiene independent   Dress independent   Laundry with supervision   Room Organization independent     "

## 2020-02-24 NOTE — DISCHARGE INSTRUCTIONS
Behavioral Discharge Planning and Instructions      Summary:  You were admitted on 1/17/2020 due to disorganized thinking/behaviors, psychotic symptomology and self injurious behaviors.  You were treated by Dr. Vince Zheng MD and discharged on 2/26/2020 from [facility] to [facility]     Today you will be Provisionally Discharged from this hospital. You have been court ordered for commitment for treatment and will be discharged from the hospital today. You have agreed to the terms of a Provisional Discharge  (please refer to your copy of the agreement) The minimal period of this Provisional Discharge Agreement being active is until your commitment order is terminated by the courts.     Principal Diagnosis:   Schizophrenia    Health Care Follow-up Appointments:   Psychiatry  Date/Time: Tuesday, 3/10/20 at 1pm    Provider: Dr. Yusuf  Address: [facility] [address] [facility], [address] [address]   Phone: [phone] Fax: [phone]    Therapy  Date/Time: Monday, 3/9/20 at 2pm   Provider: Tammi Connell  Address[facility] [address] [place] [address], [address] [address]   Phone: [phone]  Fax: [phone]    Clozapine Monitoring:   While on the unit you were started on Clozapine - With this medication you will need weekly lab draws in order for your medication to be filled by a pharmacy. This medication is only distributed weekly initially. You have been referred to [facility] for ongoing maintenance of your Clozapine medication. They have been sent your information and will be contacting you. [facility] will establish with you someone to come to your home to do the lab draw and will have your medication sent to you at home. If you have questions or have not received a call - contact the program at phone: [phone]    [facility]  ([facility]) - [facility] 2nd Floor [address]  [address] - 2nd Floor - [address] [place] [address] phone: [phone] fax: [phone]    Attend all scheduled appointments with your outpatient providers. Call at least 24  hours in advance if you need to reschedule an appointment to ensure continued access to your outpatient providers.   Major Treatments, Procedures and Findings:  You were provided with: a psychiatric assessment, assessed for medical stability, medication evaluation and/or management, group therapy and milieu management.    Symptoms to Report: Feeling more aggressive, increased confusion, losing more sleep, mood getting worse or thoughts of suicide.    Early warning signs can include: Increased depression or anxiety sleep disturbances increased thoughts or behaviors of suicide or self-harm  increased unusual thinking, such as paranoia or hearing voices.    Safety and Wellness:  Take all medicines as directed.  Make no changes unless your doctor suggests them. Follow treatment recommendations.  Refrain from alcohol and non-prescribed drugs.  If there is a concern for safety, call 911.    Resources:   Crisis Intervention: 949.501.4082 or 743-743-8707 (TTY: 208.663.2080).  Call anytime for help.  National [place] on Mental Illness (www.[place].johnson.org): 932.185.3589 or 195-662-3720.  Alcoholics Anonymous (www.alcoholics-anonymous.org): Check your phone book for your local chapter.  Suicide Awareness Voices of Education (SAVE) (www.save.org): 791-008-PAAE (5683)  National Suicide Prevention Line (www.mentalhealthmn.org): 104-996-DNUH (9750)  Mental Health Consumer/Survivor Network of [place] (www.mhcsn.net): 379.814.7713 or 923-741-7692  Mental Health Association of [place] (www.mentalhealth.org): 406.469.1904 or 756-976-8348  Self- Management and Recovery Training., SMART-- Toll free: 212.270.9524  www.[employer]  [facility] Crisis (COPE) Response - Adult [phone]  [facility] Mental Health Crisis Team - Child:  [phone]    The treatment team has appreciated the opportunity to work with you.  If you have any questions or concerns our unit number is [phone]. If you would like to obtain any specific documentation regarding your hospitalization after your discharge, contact [facility] of Information/Medical Records:  [phone].

## 2020-02-24 NOTE — PROGRESS NOTES
Pt visible in the milieu most of the shift, attended evening group, spent some time playing [employer], and social with peers and staff. Pt reports his concentration has improved since starting clozapine, stating 'my symptoms are better with it, it seems to absorb the skewed background noise, gives me mental clarity'. Pt states his mood was depressed earlier in the shift, and he was being 'buried by noise' and the 'telecommunications are overwhelming at times'. Pt is pleasant and polite on approach, calm and appropriate in the milieu this evening shift.       02/23/20 2156   Behavioral Health   Hallucinations auditory   Thinking poor concentration;other (see comment)  (pt reports improving)   Orientation person: oriented;place: oriented;date: oriented;time: oriented   Memory baseline memory   Insight admits / accepts;poor   Judgement impaired   Eye Contact at examiner   Affect blunted, flat   Mood mood is calm   Physical Appearance/Attire disheveled   Hygiene neglected grooming - unclean body, hair, teeth   Suicidality other (see comments)  (denies)   1. Wish to be Dead (Recent) No   2. Non-Specific Active Suicidal Thoughts (Recent) No   Self Injury other (see comment)  (denies)   Elopement   (none)   Activity other (see comment)  (social)   Speech clear;coherent   Psychomotor / Gait balanced;steady   Psycho Education   Type of Intervention 1:1 intervention   Response participates, initiates socially appropriate   Hours 0.5   Treatment Detail check in   Activities of Daily Living   Hygiene/Grooming independent   Oral Hygiene independent   Dress independent   Laundry with supervision   Room Organization independent

## 2020-02-24 NOTE — PROGRESS NOTES
"    ----------------------------------------------------------------------------------------------------------  [facility], [place]   Psychiatric Progress Note  Hospital Day #38     Interim History:   The patient's care was discussed with the treatment team and chart notes were reviewed.    Sleep: 7 hours (02/24/20 0600), 7 hours 2/23, 7 hours 2/22  Scheduled Medications: Taking as prescribed, except no melatonin 2/22.  PRN medications: Hydroxyzine 25 mg 2/21 AM and 2/23 PM, gabapentin 300 mg 2/22, nicorette gum 2/22.    Staff Report: Patient reported as having some delusional thinking, AH, and tangential questions. Attended art therapy. Did not attend all group sessions over the weekend. Reported as stating he has \"mental clarity\" on clozapine. Overall calm, guarded, blunted, non-agitated.    Patient Interview: Jerel is interviewed in the conference room. He states it was a \"slow weekend.\" He reports attending art therapy, writing, and reading Joe's Dilemma - he reviews as \"good but hard to make sure it's true\". He is not sure if he should trust the facts included in the book as non-biased. He states the book reminds him of A Short History of Nearly Everything. He states he has had weight gain on the clozapine so far. He states he has not had any constipation on clozapine. He reports a little dry mouth, but thinks it may be related to the dryness of the hospital. He states he hasn't used any lotion and will wait for discharge.     He states that at the end of his \"manic phase\" his thoughts were strong even without sleep, and that without Vraylar his thoughts have been \"slower and heavier\" but \"better in a way.\" He states he still feels like himself but that when he thinks about his future self it's been \"wrecked\" which is distressing to him. In the past, he states he had compared himself to other on social media but no longer does so. He states his thinks he can move past this but " "that it will be \"interesting to see whether I get smarter or dumber in the next few years.\" He reports sleeping well last night on a double dose of melatonin and no trazodone, but that he had a bad dream that he can't remember the content of. He states that real life seems like it can be derived or integrated, and that dreams are like real life switched into \"radian\" mode. He states he thinks of a lot of \"what if\" scenarios but he's bad at doing so. When asked what he thinks he is good at, he states he's good at \"getting other people to do things for me,\" reading, comprehension, and is not sure if he's good at creativity.     He states the dose of clozapine is good right now and that he does not want too much. He states the voices are not too bad right now, that they give him conflicted info, and that it's the same shannan as before but not fighting anymore. He asks about going to [place] or to his parent's house. He states that Kalie is his friend but he might not be hers. He states he was also worried about going to the same [facility] where she is. He states he feels a throbbing effect in his muscles and nerves. He states he feels more like his high school body than his college body. He reports that the scratch on his left thigh is from when he was upset last Friday.    The risks, benefits, alternatives and side effects of any medication changes have been discussed and are understood by the patient and other caregivers.    Review of systems:     ROS was negative unless noted above.          Allergies:     Allergies   Allergen Reactions     Penicillins      Rash, Generalized            Psychiatric Examination:   /87 (BP Location: Left arm)   Pulse 116   Temp 97.5  F (36.4  C) (Oral)   Resp 12   Ht 1.727 m (5' 8\")   Wt 77.1 kg (169 lb 14.4 oz)   SpO2 97%   BMI 25.83 kg/m    Weight is 169 lbs 14.4 oz  Body mass index is 25.83 kg/m .    Appearance: Dressed in home shorts, shirt, sweatshirt. Moderately " "groomed. Appears stated age. Medium length blonde hair.  Behavior: some picking at headphones when discussing increasing medications  Attitude: Cooperative  Eye Contact:  Fair  Mood:  okay  Affect:  Mood-congruent, mobile, restricted range, reactive  Speech: clear, normal prosody, normal volume  Language: Fluent in English, no word-finding difficulty, no dysarthria, no neologisms.   Psychomotor Behavior:  No evidence of tardive dyskinesia, dystonia, akathisia, or abnormal movments.   Thought Process:  Moderately logical, moderately linear, goal-oriented. At times difficult to follow  Thought Content:  AH endorsed. No HI/VH/SI/delusions.   Insight: limited  Judgment: limited  Oriented to:  Person, time, place, situation.   Attention Span and Concentration:  intact to interview.   Recent and Remote Memory:  intact per interview.   Muscle Strength and Tone: normal strength and no rigidity or increased tone.   Gait and Station: normal      Labs, Imaging, other studies:   No results found for this or any previous visit (from the past 24 hour(s)).     Assessment    Diagnostic Impression:   Jerel Day is a 24-year-old male with history of schizoaffective disorder, bipolar type and cannabis dependence who [facility]17[facility]2020 for treatment of self-inflicted leg laceration and head injury and was subsequently admitted for psychosis and suicidal ideation. Predisposing factors include distant family history of schizophrenia. Precipitating factors include stress regarding upcoming semester, recent discharge from [facility], cannabis use, possible medication non-adherence, and interpersonal conflict with his parents (feels like he has no flexibility at home). Perpetuating factors include feeling like he has not found his \"footing\" and chronic neck and back pain. Protective factors include supportive family, access to healthcare, and stable housing. MSE on admission was notable for depressed mood, reports of auditory " and visual hallucinations, occasionally illogical comments and limited insight. Given the degree of cognitive impairment and thought symptoms, in the absence of mood symptoms, his presentation is appearing more consistent with schizophrenia rather than schizoaffective disorder. It is unclear if patient improved with cariprazine 6mg however it can take 4-6 weeks to see full effect. Team has discussed initiating clozapine with the patient, and the patient has expressed worry about associated blood draws and trying too many medications. With thought content showing worsening signs of psychosis (more negative AH and more loose associations) and decreasing sleep quantity and quality, team decided to initiate clozapine 2/19.  Clozapine likely a good therapeutic choice for Jerel as he shows signs of clearer thought content, less intrusive AH, and greatly improved sleep at 125 mg of clozapine. Tentative placement at a [facility] on 2/26.      Principal Diagnosis:  #  Schizophrenia     Secondary psychiatric diagnoses of concern this admission:   # Cannabis dependence     Hospital course:   Jerel Day was admitted to station 22 on a 72 hour hold. He is currently under MI commitment. PTA Abilify and olanzapine were continued on admission. On 1/21/20, Jerel expressed that he wanted to be on only one antipsychotic and agreed to take Abilify if weaned off Zyprexa, so we decreased his oanzapine dose to 10 mg at bedtime. Olanzapine was decreased to 5 mg and Abilify was increased to 20 mg on 1/22/20. Clark amended on 2/4/20 to include cariprazine and clozapine rather than Zyprexa and Risperdal. Jerel's evening Zyprexa was discontinued on 2/4/20 and 1.5mg cariprazine was started on 2/5/20. The PRN for agitation was changed from Zyprexa to Haldol 5mg PO or IM with Benadryl. His Abilify was decreased to 15mg and the cariprazine was increased to 3mg on 2/6/20. Abilify was decreased to 10mg and cariprazine was increased to 4.5mg on  2/7/20 and to 6mg on 2/9. Abilify discontinued on 2/7/20. On 2/11/20, cariprazine dosing changed to once per PM from once per AM due to daytime sleepiness. Trazodone 50 mg at bedtime added on 2/13/20 for multiple days of poor sleep. On 2/17/20, cariprazine schedule changed from nightly to morning dosing per patient preference. On 2/19/20, clozapine initiated at 25 mg, to increase by 25 mg daily until at optimal dose. Cariprazine to be discontinued on 2/20/20 due to ineffectiveness and worsening sleep, persucatory auditory hallucinations, and loose associations while taking it. Patient was agitated on 2/21 following cariprazine discontinuation and frustration over inability to discharge and increasing medication dose. He received 2mg lorazepam with good response. On Monday, 2/24, patient showed noticeably improved thought content with minimal tangents and less intrusive AH, less agitation, and improved sleep quantity and quality at dose of 125 mg clozapine.     Discontinued Medications & Rationale:  Olanzapine - patient preference, weight gain + sedation; removed from [facility]  Abilify- not effective for reducing AH and psychosis symptoms adequately. cross-titrated to cariprazine   Cariprazine- not effective for reducing AH and psychotic symptoms     Medical course:  Jerel Day was medically cleared for admission to the inpatient psychiatric unit. His leg and face injuries were monitored. On 1/31, he reported chest pain. EKG, internal medicine consult, and troponin were obtained, resulting in decreased concerned for cardiac etiology.    Plan     Today's changes:  - no change, patient preference to maintain at 125 mg clozapine today rather than increase to 150 mg    Principal psychiatric diagnosis:   # Schizophrenia  - Clozapine 125 mg tonight, plan to increase by 25mg tomorrow until symptoms controlled   - EKG 1/31/20 with QTc 375  - Lipid profile 1/18/20 wnl  - weight 169 lbs   - CBC with diff today (2/19/20) WBC  "11.4 (elevated), ANC 7.9               - Patient enrolled in clozapine REMS     PRNs  - Continue PTA benztropine 0.5 mg BID PRN.  - Continue PTA gabapentin 300 mg BID PRN.  - continue melatonin 3mg up to two times at night   - Haldol 5mg PO or Haldol 5mg IM (given with benadryl 50mg) PRN agitation   - Abilify 5-10 mg PRN agitation     - Neuropsychological testing to be completed in outpatient setting.     - Contact parents as Jerel allows/family mtg      -Patient will be treated in therapeutic milieu with appropriate individual and group therapies as described.     Secondary psychiatric diagnoses:   # Cannabis dependence     Medical conditions:   #Leg laceration on left thigh: Jerel cut himself with a pocketknife. S/p laceration repair in ED.   - Continue to monitor; healing well.  - Stitches removed on 1/24.      #Scalp and face contusions: CT not concerning for acute intracranial process. Demonstrated \"soft tissue hematoma overlying the left zygomatic arch\". No fractures visible on CT. Jerel denies LOC and blurry vision.  - PRN acetaminophen 650 mg Q4H for pain management.     #Chest pain: On 1/31, he reported reproducible chest pain. EKG, internal medicine consult, and troponin were obtained, resulting in decreased concerned for cardiac etiology. Likely musculoskeletal in nature, however could also consider stress/anxiety as a cause.   - Continue to monitor.     Legal Status:   Committed wth [facility] (cariprazine, aripiprazole, clozapine, haldol). Commitment was renewed 2.3.2020.     Dispo: Tentative discharge to IRTS facility in [place] on 2/26 for 90 days. After IRTS facility, Jerel will be best served by staying at a group home or assisted living.      Contacts: León (father) [phone], Mesha [phone]    Safety Assessment:   Behavioral Orders   Procedures     Code 1 - Restrict to Unit     Routine Programming     As clinically indicated     Self Injury Precaution     Status 15     Every 15 minutes.     " Suicide precautions     Patients on Suicide Precautions should have a Combination Diet ordered that includes a Diet selection(s) AND a Behavioral Tray selection for Safe Tray - with utensils, or Safe Tray - NO utensils         ------------------------------------------------------------------  Arminda Roberts, MS3  [facility] Psychiatry Service    I was present with the medical student who participated in the service and in the documentation of the note. I have verified the history and personally performed the physical exam and medical decision making. I agree with the assessment and plan of care as documented in the note. - Queta Wayne    This patient was seen and discussed with the attending physician.    Queta Wayne MD   PGY-1 Psychiatry      Attestation:  This patient has been seen and evaluated by me, Vince Zheng MD.  I have discussed this patient with the house staff team including the resident and medical student and I agree with the findings and plan in this note.    I have reviewed today's vital signs, medications, labs and imaging. Vince Zheng MD , PhD.

## 2020-02-24 NOTE — PROGRESS NOTES
Re: Discharge planning    Patient will be provisionally discharged to [facility] on Wednesday. Patient requires 30 days supply of medication.     Nicole Cordova, KEITHC, [facility]  Clinical Treatment Coordinator

## 2020-02-24 NOTE — TELEPHONE ENCOUNTER
Jerel Day was referred to [facility] for outpatient clozapine monitoring.   Current Dose:125mg QHS  Laboratory Monitoring:weekly home draws. Clozapine started 2/19/20 during inpatient hospitalization. He will be eligible to move to Q2 week monitoring 8/19/20.     Recent Labs   Lab Test 02/19/20  1001 01/18/20  0723 01/03/18  1054   WBC 11.4* 8.0 8.0   ANEU 7.9 4.0 5.5   HGB 16.5 15.4 13.9    225 241       Pt is currently hospitalized at [facility] with discharge planned for 2/26/20. Received message below from inpatient CTC:   Hi there,   The above the patient was started on Clozapine on station 22. He is followed by Dr. Yusuf in [place]. Patient is discharging on Wednesday 2/26/20 to [facility] in [place]. Address is: [address] [place] [address]. Blood draws should be done at the [facility] locatio.  Phone: [phone].  Please reply with any directions for Dr. Wayne to enter orders as she is cc'd to this message.  I can be reached directly at [phone]  Thank you,   Nicole Cordova  Clinical Treatment Coordinator    Writer sent message to Dr. Wayne with with instructions to order 9 day supply of clozapine +2 refills with discharge meds and CBC with diff lab order (3 occurrences). Pt's next appt with Dr. Yusuf 3/10/20.    Will arrange home draws and med mailing with IRTS program after pt discharges.     Azeb Mclean, PharmD, BCPP  Medication Therapy Management Pharmacist  [facility]  [phone]

## 2020-02-24 NOTE — PLAN OF CARE
"Problem: OT General Care Plan  Goal: OT Goal 1    Pt attended 1 out of 3 OT groups offered. Pt actively participated in occupational therapy clinic. Pt was able to ask for assistance as needed, and independently initiated a familiar, self-directed, creative expression task. Pt demonstrated good focus, planning, and attention to detail. Social with peers and writer throughout. 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 He was also more animated in conversation, and his affect appeared to brighten in interactions.     "

## 2020-02-25 ENCOUNTER — PATIENT OUTREACH (OUTPATIENT)
Dept: PSYCHIATRY | Facility: CLINIC | Age: 25
End: 2020-02-25

## 2020-02-25 PROCEDURE — 93005 ELECTROCARDIOGRAM TRACING: CPT

## 2020-02-25 PROCEDURE — 25000132 ZZH RX MED GY IP 250 OP 250 PS 637: Performed by: STUDENT IN AN ORGANIZED HEALTH CARE EDUCATION/TRAINING PROGRAM

## 2020-02-25 PROCEDURE — 99232 SBSQ HOSP IP/OBS MODERATE 35: CPT | Mod: GC | Performed by: PSYCHIATRY & NEUROLOGY

## 2020-02-25 PROCEDURE — 93010 ELECTROCARDIOGRAM REPORT: CPT | Performed by: INTERNAL MEDICINE

## 2020-02-25 PROCEDURE — 12400001 ZZH R&B MH UMMC

## 2020-02-25 PROCEDURE — G0177 OPPS/PHP; TRAIN & EDUC SERV: HCPCS

## 2020-02-25 PROCEDURE — H2032 ACTIVITY THERAPY, PER 15 MIN: HCPCS

## 2020-02-25 RX ADMIN — MELATONIN TAB 3 MG 6 MG: 3 TAB at 21:38

## 2020-02-25 RX ADMIN — CLOZAPINE 150 MG: 100 TABLET ORAL at 21:38

## 2020-02-25 ASSESSMENT — MIFFLIN-ST. JEOR: SCORE: 1740.61

## 2020-02-25 NOTE — PROGRESS NOTES
"    ----------------------------------------------------------------------------------------------------------  [facility], [place]   Psychiatric Progress Note  Hospital Day #39     Interim History:   The patient's care was discussed with the treatment team and chart notes were reviewed.    Sleep: 6.75 hours (02/25/20 0600)  Scheduled Medications: Taking as prescribed.  PRN medications: Gabapentin 300 mg 2/24 PM.    Staff Report: Patient reported as feeling \"really good\" and having relief about discharge. Patient reported as \"calm, coherent, and appropriate\" in group.      Patient Interview: The patient is interviewed in the conference room. He states the voices are \"mitigated\" and that he didn't have too many problems with them yesterday. He states he feels good but \"things tacked on haven't fallen off yet\" such as the \"bacteria or fungus.\" He feels that his immune system is trying to kick other patients down, \"I'm part of the problem, too, probably, because my immune system tries to overtake theirs.\" He states he slept fine and feels rested in the morning. When asked about leaving tomorrow, he states it will be nice to get out and that he is excited. He asks if he may leave today if possible. He asks how he will be getting to the [facility]. He states he is looking forward to fresh air, guitar, and possibly buying a new amp. He states he is still reading [employer] and that he was recently reading about farming, GMOs, and an organic group in [place] that got bought out by a [employer]. He states it is sad the small group got out-bid. He states he is vegetarian, not vegan, but does eat fish and sometimes chicken. He thinks he will eat meat in the I[facility]S factility. He states his reason for being vegetarian is that \"health-wise it slides down smoother\" and not a driving force from outside. He states he \"can't use an upside down jackhammer on the ceiling where the food is coming from, like " "[employer].\"     He states he thinks he has noticed weight gain, increased heart rate, and automatic thinking on clozapine. He states he \"doesn't have to go over details\" of his thoughts as much on clozapine. He states he would like to keep the clozapine at current dose, and not increase to 150 mg. He states down the road that he would consider increasing the clozapine outpatient if it is not working well enough.    He states he hasn't had back pain recently which he attributes to group therapy yoga. He mentions a comment he made while making pie/waffles in group about pi and math, and that he tries to \"serve wisdom on a tray,\" that the comment was not quite a one liner, and he smiles at this.    He analogizes himself to a fishing line that has a tangle. He states it is like he unwinds part of the line then rewinds like a spider web or like a coil. He states that he can pry open some parts but not others.     He rubs at his wrist and states he is feeling a bone on his arm that he hasn't felt before. He states if he pictures his hand in a better setting it can make him feel better.    The risks, benefits, alternatives and side effects of any medication changes have been discussed and are understood by the patient and other caregivers.    Review of systems:     ROS was negative unless noted above.          Allergies:     Allergies   Allergen Reactions     Penicillins      Rash, Generalized            Psychiatric Examination:   /82 (BP Location: Left arm)   Pulse 113   Temp 98.5  F (36.9  C) (Tympanic)   Resp 14   Ht 1.727 m (5' 8\")   Wt 77.1 kg (170 lb)   SpO2 97%   BMI 25.85 kg/m    Weight is 170 lbs 0 oz  Body mass index is 25.85 kg/m .    Appearance: Dressed in home pants, shirt. Wearing glasses on top of head. Moderately groomed with medium length blonde hair.  Behavior: Rubbing at right wrist.  Attitude:  Cooperative  Eye Contact:  Moderate   Mood:  \"better\"  Affect:  Mood-congruent, mobile, restricted " "range, reactive  Speech: clear, normal prosody, normal volume  Language: Fluent in English, no word-finding difficulty, no dysarthria, no neologisms. Sophisticated vocabulary and frequent metaphors/analogies.   Psychomotor Behavior:  No evidence of tardive dyskinesia, dystonia, akathisia, or abnormal movments.   Thought Process:  Moderately logical, moderately linear, goal-oriented. At times difficult to follow  Thought Content:  AH endorsed. No HI/VH/SI/delusions.   Insight:  limited  Judgment:  limited  Oriented to:  Person, time, place, situation.   Attention Span and Concentration:  intact to interview.   Recent and Remote Memory:  intact per interview.   Muscle Strength and Tone: normal strength and no rigidity or increased tone.   Gait and Station: normal      Labs, Imaging, other studies:     Recent Results (from the past 24 hour(s))   EKG 12-lead, complete    Collection Time: 02/25/20 11:16 AM   Result Value Ref Range    Interpretation ECG Click View Image link to view waveform and result           Assessment    Diagnostic Impression:   Jerel Day is a 24-year-old male with history of schizoaffective disorder, bipolar type and cannabis dependence who presented to [facility] ED on 01/17/2020 for treatment of self-inflicted leg laceration and head injury and was subsequently admitted for psychosis and suicidal ideation. Predisposing factors include distant family history of schizophrenia. Precipitating factors include stress regarding upcoming semester, recent discharge from [facility], cannabis use, possible medication non-adherence, and interpersonal conflict with his parents (feels like he has no flexibility at home). Perpetuating factors include feeling like he has not found his \"footing\" and chronic neck and back pain. Protective factors include supportive family, access to healthcare, and stable housing. MSE on admission was notable for depressed mood, reports of auditory and visual hallucinations, occasionally " illogical comments and limited insight. Given the degree of cognitive impairment and thought symptoms, in the absence of mood symptoms, his presentation is appearing more consistent with schizophrenia rather than schizoaffective disorder. It is unclear if patient improved with cariprazine 6mg however it can take 4-6 weeks to see full effect. Team has discussed initiating clozapine with the patient, and the patient has expressed worry about associated blood draws and trying too many medications. With thought content showing worsening signs of psychosis (more negative AH and more loose associations) and decreasing sleep quantity and quality, team decided to initiate clozapine 2/19.  Clozapine likely a good therapeutic choice for Jerel as he shows signs of clearer thought content, less intrusive AH, and greatly improved sleep at 125 mg of clozapine. Confirmed placement at [facility] on 2/26.      Principal Diagnosis:  #  Schizophrenia     Secondary psychiatric diagnoses of concern this admission:   # Cannabis dependence     Hospital course:   Jerel Day was admitted to station 22 on a 72 hour hold. He is currently under MI commitment. PTA Abilify and olanzapine were continued on admission. On 1/21/20, Jerel expressed that he wanted to be on only one antipsychotic and agreed to take Abilify if weaned off Zyprexa, so we decreased his oanzapine dose to 10 mg at bedtime. Olanzapine was decreased to 5 mg and Abilify was increased to 20 mg on 1/22/20. [facility] amended on 2/4/20 to include cariprazine and clozapine rather than Zyprexa and Risperdal. Jerel's evening Zyprexa was discontinued on 2/4/20 and 1.5mg cariprazine was started on 2/5/20. The PRN for agitation was changed from Zyprexa to Haldol 5mg PO or IM with Benadryl. His Abilify was decreased to 15mg and the cariprazine was increased to 3mg on 2/6/20. Abilify was decreased to 10mg and cariprazine was increased to 4.5mg on 2/7/20 and to 6mg on  2/9. Abilify discontinued on 2/7/20. On 2/11/20, cariprazine dosing changed to once per PM from once per AM due to daytime sleepiness. Trazodone 50 mg at bedtime added on 2/13/20 for multiple days of poor sleep. On 2/17/20, cariprazine schedule changed from nightly to morning dosing per patient preference. On 2/19/20, clozapine initiated at 25 mg, to increase by 25 mg daily until at optimal dose. Cariprazine to be discontinued on 2/20/20 due to ineffectiveness and worsening sleep, persucatory auditory hallucinations, and loose associations while taking it. Patient was agitated on 2/21 following cariprazine discontinuation and frustration over inability to discharge and increasing medication dose. He received 2mg lorazepam with good response. On Monday, 2/24, patient showed noticeably improved thought content with minimal tangents and less intrusive AH, less agitation, and improved sleep quantity and quality at dose of 125 mg clozapine. Patient having some tachycardia and mild increased temperature (99.1) on 2/25 so must rule out clozapine myocarditis. EKG showed no changes since his last on 1/31/2020.     Discontinued Medications & Rationale:  Olanzapine - patient preference, weight gain + sedation; removed from [facility]  Abilify- not effective for reducing AH and psychosis symptoms adequately. cross-titrated to cariprazine   Cariprazine- not effective for reducing AH and psychotic symptoms     Medical course:  Jerel Day was medically cleared for admission to the inpatient psychiatric unit. His leg and face injuries were monitored. On 1/31, he reported chest pain. EKG, internal medicine consult, and troponin were obtained, resulting in decreased concerned for cardiac etiology.    Plan     Today's changes:  - Increase clozapine to 150mg-  patient may decline and request 125 instead    - obtain EKG to monitor clozapine treatment  - EKG     Principal psychiatric diagnosis:   # Schizophrenia  - Clozapine 150mg-  "patient may decline and request 125 instead    - EKG 1/31/20 with QTc 375  - Lipid profile 1/18/20 wnl  - weight 169 lbs   - CBC with diff 2/19/20, WBC 11.4 (elevated), ANC 7.9  - next CBC Weds 2/26/2020               - Patient enrolled in clozapine REMS and [facility] clozapine system   - contact Dr. Yusuf regarding increasing clozapine dose outpatient     PRNs  - Continue PTA benztropine 0.5 mg BID PRN.  - Continue PTA gabapentin 300 mg BID PRN.  - continue melatonin 3mg up to two times at night   - Haldol 5mg PO or Haldol 5mg IM (given with benadryl 50mg) PRN agitation   - Abilify 5-10 mg PRN agitation     - Neuropsychological testing to be completed in outpatient setting.     - Contact parents as Jerel allows/family mtg      -Patient will be treated in therapeutic milieu with appropriate individual and group therapies as described.     Secondary psychiatric diagnoses:   # Cannabis dependence     Medical conditions:   #Leg laceration on left thigh: Jerel cut himself with a pocketknife. S/p laceration repair in ED.   - Continue to monitor; healing well.  - Stitches removed on 1/24.      #Scalp and face contusions: CT not concerning for acute intracranial process. Demonstrated \"soft tissue hematoma overlying the left zygomatic arch\". No fractures visible on CT. Jerel denies LOC and blurry vision.  - PRN acetaminophen 650 mg Q4H for pain management.     #Chest pain: On 1/31, he reported reproducible chest pain. EKG, internal medicine consult, and troponin were obtained, resulting in decreased concerned for cardiac etiology. Likely musculoskeletal in nature, however could also consider stress/anxiety as a cause.   - Continue to monitor.     Legal Status:   Committed wth [facility] (cariprazine, aripiprazole, clozapine, haldol). Commitment was renewed 2.3.2020.     Dispo: Discharge to I[facility]S facility in [facility], on 2/26 for 90 days. After IRTS facility, Jerel will be best served by staying at a group home or " assisted living.      Contacts: León (father) [phone], Mesha [phone]    Safety Assessment:   Behavioral Orders   Procedures     Code 1 - Restrict to Unit     Routine Programming     As clinically indicated     Self Injury Precaution     Status 15     Every 15 minutes.     Suicide precautions     Patients on Suicide Precautions should have a Combination Diet ordered that includes a Diet selection(s) AND a Behavioral Tray selection for Safe Tray - with utensils, or Safe Tray - NO utensils           ------------------------------------------------------------------    Arminda Roberts, MS3  [facility] Psychiatry Service  I was present with the medical student who participated in the service and in the documentation of the note. I have verified the history and personally performed the physical exam and medical decision making. I agree with the assessment and plan of care as documented in the note. - Queta Wayne    This patient was seen and discussed with the attending physician.    Queta Wayne MD   PGY-1 Psychiatry    Attestation:  This patient has been seen and evaluated by me, Vince Zheng MD.  I have discussed this patient with the house staff team including the resident and medical student and I agree with the findings and plan in this note.    I have reviewed today's vital signs, medications, labs and imaging. Vince Zheng MD , PhD.

## 2020-02-25 NOTE — PLAN OF CARE
BEHAVIORAL TEAM DISCUSSION    Participants:   Dr. Vince Zheng, Attending Psychiatrist  Queta Wayne MD, PGY-1  Jody Reese, FRANNIE Cordova, LPCC, LADC, CTC  Arminda Roberts, MS3  Radha Sawant, MS3  Servando Bird, OT  Progress: Patient symptoms have improved with Clozapine.  Anticipated length of stay: 2/26/20  Continued Stay Criteria/Rationale: Patient requires door to door transfer to next level of care - IRTS.  Medical/Physical: No acute issues - was cleared by ED for psychiatric admission  Precautions:   Behavioral Orders   Procedures    Code 1 - Restrict to Unit    Routine Programming     As clinically indicated    Self Injury Precaution    Status 15     Every 15 minutes.    Suicide precautions     Patients on Suicide Precautions should have a Combination Diet ordered that includes a Diet selection(s) AND a Behavioral Tray selection for Safe Tray - with utensils, or Safe Tray - NO utensils     Plan: Patient will discharge to [facility] on 2/26/20 and continue with outpatient providers.   Rationale for change in precautions or plan: No change at present-will continue to monitor and adjust as needed.

## 2020-02-25 NOTE — DISCHARGE SUMMARY
"    Psychiatric Discharge Summary    Hospital Day #40    Jerel Day MRN# [id number]   Age: 24 year old YOB: 1995     Date of Admission:  1/17/2020  Date of Discharge:  2/26/2020  Admitting Physician:  Arpan Purcell MD  Discharge Physician:  Vince Zheng MD         Event Leading to Hospitalization:     Jerel Day is a 24-year-old male with schizoaffective disorder, bipolar type and cannabis dependence who presented on 01/17/2020 for medical treatment of leg and head injuries and was subsequently assessed for psychosis and suicidal ideation.      Per patient report: Jerel states that for the most part he is feeling good and that he was \"just hearing bad thoughts and wanted to take it out on something\". He states that his IRTS ended before the holidays and he has been home with his parents. The past two weeks have been bad for him. His mood has been low and he has been taking a lot of naps and feeling guilty about his relationships (missing friends). He denies changes in appetite, anhedonia, change in energy, psychomotor changes, and changes in concentration. He has been having a lot of suicide thoughts, with the last being three days ago. He states that he has discussed these with his outpatient therapist that he sees every 1-2 weeks. He does not currently have thoughts of wanting to hurt himself. He does have auditory hallucinations of voices telling him \"you lost\" and states that they currently are not too bad. He also reports visual hallucinations of \"splotches of stuff\" He does not have any paranoia. He reports medication adherence, stating that he has missed \"maybe 1-2 doses in the past couple of days\". He states that he felt like he \"was being controlled by Satan\" when he was punching himself.     When asked about medical problems, he states that he has neck and back pain related to a neck injury he sustained. He reports that he \"felt energy leak out\" when he hurt his neck and that the " "LSD he was using at the time was supporting his posterior. He was doing yoga poses when his roommate stepped over him and he suddenly developed a \"white hot\" pain on the lateral side of his neck.     Jerel states that he did not want to be admitted and that it is important to him that the care team discuss plans for discharge.      Per ED note by Dr. Lamb: Jerel Day is a 24 year old male with schizoaffective disorder who presents with agitation, auditory hallucinations, suicidal ideation, and self injuring by cutting himself with a pocketknife and hitting his head and face against a door. He is agitated on initial presentation and very difficult to evaluate. He attempted to elope and required restraints and sedation. With observation and time he was able to provide some history although he is guarded. Declines to answer questions about the voices or his suicidal thoughts. He has lacerations on his thighs that are self inflicted. Also complains of headache and facial pain after hitting his head and face multiple times against a door. No loss of consciousness. No neck pain. No neurovascular symptoms. No foreign body sensation. States last tetanus immunization 2 years ago.         Diagnoses:   Schizophrenia          Consults:     Internal Medicine 1/31/2020: Medicine consulted for chest pain. Patient has  no known cardiac risk factors. EKG was done and showed T wave inversion in leads I and AVL but not in other lateral leads. EKG otherwise unremarkable. Trop neg. CP reproducible on exam. Pain therefore most likely MSK in origin. Recommend reassurance and chest pain not cardiac in etiology.         Hospital Course:   Diagnostic Impression:   Jerel Day is a 24-year-old male with past documented history of schizoaffective disorder, bipolar type and cannabis dependence who presented to [facility] ED on 01/17/2020 for treatment of self-inflicted leg laceration and head injury and was subsequently admitted for psychosis " "and suicidal ideation. Predisposing factors include distant family history of schizophrenia. Precipitating factors include stress regarding upcoming semester, recent discharge from IR, cannabis use, possible medication non-adherence, and interpersonal conflict with his parents (parents are supportive but he feels like he has no flexibility at home). Perpetuating factors include feeling like he has not found his \"footing\" and chronic neck and back pain. Protective factors include supportive family, access to healthcare, and stable housing. MSE on admission was notable for depressed mood, reports of auditory and visual hallucinations, occasionally illogical comments and limited insight. Given the degree of cognitive impairment and thought symptoms, in the absence of mood symptoms, his presentation is appearing more consistent with schizophrenia rather than schizoaffective disorder.         Principal Diagnosis:  #  Schizophrenia     Secondary psychiatric diagnoses of concern this admission:   # Cannabis dependence     Hospital course:   Jerel Day was admitted to station 22 on a 72 hour hold. He is currently under MI commitment (renewed 2/3/2020 with 9-wilfredo duration). PTA Abilify 15mg and olanzapine 15mg  were continued on admission. Summary of Jerel's antipsychotic medication during this hospitalization: First, patient weaned off olanzapine and increased Abilify to 20. This was not effective for his symptoms, and Clark was ammended to include cariprazine and clozapine. Patient then did a cross titration of Abilify to cariprazine. Patient's symptoms worsened on cariprazine 6mg, so finally, clozapine was initiated. Patient reached a clozapine dose of 150mg with improvement in symptoms by time of discharge. (More details on dosing below).    On 1/21/20, Jerel expressed that he wanted to be on only one antipsychotic and agreed to take Abilify if weaned off Zyprexa, so we decreased his olanzapine dose to 10 mg at " bedtime. Olanzapine was decreased to 5 mg and Abilify was increased to 20 mg on 1/22/20. Clark amended on 2/4/20 to include cariprazine and clozapine rather than Zyprexa and Risperdal.     Ralf's evening Zyprexa was discontinued on 2/4/20 and 1.5mg cariprazine was started on 2/5/20. The PRN for agitation was changed from Zyprexa to Haldol 5mg PO or IM with Benadryl; however he did not require PRN antipsychotic for agitation during this stay. His Abilify was decreased to 15mg and the cariprazine was increased to 3mg on 2/6/20. Abilify was decreased to 10mg and cariprazine was increased to 4.5mg on 2/7/20 and to 6mg on 2/9. Abilify discontinued on 2/7/20. On 2/11/20, cariprazine dosing changed to once per PM from once per AM due to daytime sleepiness. Trazodone 50 mg at bedtime added on 2/13/20 for multiple days of poor sleep, with inadequate effect. On 2/17/20, cariprazine schedule changed from nightly to morning dosing per patient preference.     Patient's symptoms of internal preoccupation, disorganized thought, and irritability worsened while taking cariprazine 6mg. Since it can take 4-6 weeks to see full effect, team discussed continuing cariprazine, however ralf's symptoms continued to worsen. Team discussed initiating clozapine with the patient, and the patient expressed worry about associated blood draws and trying too many medications. With thought content showing worsening signs of psychosis (more negative AH and more loose associations) and decreasing sleep quantity and quality, team decided to initiate clozapine 2/19.      On 2/19/20, clozapine initiated at 25 mg, to increase by 25 mg daily. Cariprazine discontinued on 2/20/20 due to ineffectiveness and worsening sleep, persucatory auditory hallucinations, and loose associations while taking it. Patient was agitated on 2/21 following cariprazine discontinuation and frustration over inability to discharge and increasing medication dose. He received 2mg  lorazepam with good response. On Monday, 2/24, patient showed noticeably improved thought content with minimal tangents and less intrusive AH, less agitation, and improved sleep quantity and quality at dose of 125 mg clozapine. Patient had some tachycardia and mild increased temperature (99.1) on 2/25 so EKG was ordered to assess for clozapine myocarditis. EKG showed no changes since his last EKG on 1/31/2020.    Clozapine likely a good therapeutic choice for Jerel as he shows signs of clearer thought content, less intrusive AH, and greatly improved sleep at 125 mg of clozapine. Patient [facility] at [facility] on 2/26.     Meds at discharge: (smart list below includes olanzapine 15, which is incorrect)   Clozapine 150mg nightly  Melatonin 3mg for sleep  Vitamin D supplementation    Discontinued Medications & Rationale:  Olanzapine - patient preference, weight gain + sedation; removed from Clark  AbiVigme- not effective for reducing AH and psychosis symptoms adequately. cross-titrated to cariprazine   Cariprazine- not effective for reducing AH and psychotic symptoms     Medical course:  Jerel Day was medically cleared for admission to the inpatient psychiatric unit. His leg and face injuries were monitored. On 1/31, he reported chest pain. EKG, internal medicine consult, and troponin were obtained, resulting in decreased concerned for cardiac etiology.    Risk Assessment:  Jerel Day has notable risk factors for self-harm, including primary psychotic disorder, history of self-harm and suicidal ideation, age, sex. However, risk is mitigated by no current SI, good response to treatment, close relationship with family, discharge to IRTS facility, hope for future (plans to find a job and return to school) .Additional steps taken to minimize risk include safety planning. Therefore, based on all available evidence including the factors cited above, Jerel Day does not appear to be at  "imminent risk for self-harm, and is appropriate for outpatient level of care.     This document serves as a transfer of care to Jerel Day's outpatient providers.         Discharge Medications:     Current Discharge Medication List      START taking these medications    Details   cloZAPine (CLOZARIL) 50 MG tablet Take 3 tablets (150 mg) by mouth daily  Qty: 27 tablet, Refills: 2    Associated Diagnoses: Other schizophrenia (H)      Vitamin D3 (CHOLECALCIFEROL) 25 mcg (1000 units) tablet Take 1 tablet (25 mcg) by mouth daily  Qty: 30 tablet, Refills: 0    Associated Diagnoses: Vitamin D deficiency         CONTINUE these medications which have CHANGED    Details   OLANZapine (ZYPREXA) 15 MG tablet Take 1 tablet (15 mg) by mouth At Bedtime  Qty: 30 tablet, Refills: 0    Associated Diagnoses: Schizoaffective disorder, bipolar type (H)         CONTINUE these medications which have NOT CHANGED    Details   melatonin 3 MG tablet Take 1 tablet (3 mg) by mouth nightly as needed for sleep  Qty: 30 tablet, Refills: 0    Associated Diagnoses: Insomnia due to other mental disorder         STOP taking these medications       ARIPiprazole (ABILIFY) 15 MG tablet Comments:   Reason for Stopping:         benztropine (COGENTIN) 0.5 MG tablet Comments:   Reason for Stopping:         gabapentin (NEURONTIN) 300 MG capsule Comments:   Reason for Stopping:                      Psychiatric Examination:   Appearance: Dressed in home pants, shirt. Wearing glasses on top of head. Moderately groomed with medium length blonde hair.  Behavior: calm and cooperative   Attitude:  Cooperative  Eye Contact:  Moderate   Mood:  \"better\" \"excited\"  Affect:  Mood-congruent, mobile, restricted range, reactive  Speech: clear, normal prosody, normal volume  Language: Fluent in English, no word-finding difficulty, no dysarthria, no neologisms. Sophisticated vocabulary and frequent metaphors/analogies.   Psychomotor Behavior:  No evidence of tardive " dyskinesia, dystonia, akathisia, or abnormal movments.   Thought Process:  Moderately logical, moderately linear, goal-oriented. At times difficult to follow  Thought Content:  Non distressing AH endorsed. No HI/VH/SI/delusions.   Insight:  limited  Judgment:  limited  Oriented to:  Person, time, place, situation.   Attention Span and Concentration:  intact to interview.   Recent and Remote Memory:  intact per interview.   Muscle Strength and Tone: normal strength and no rigidity or increased tone.   Gait and Station: normal         Discharge Plan:   Health Care Follow-up Appointments:   Psychiatry  Date/Time: Tuesday, 3/10/20 at 1pm    Provider: Dr. Yusuf  Address: [facility] [address] [facility], [address] [address]   Phone: [phone] Fax: [phone]    Therapy  Date/Time: Monday, 3/9/20 at 2pm   Provider: Tammi Connell  Address: [address], [address] [address]   Phone: [phone]  Fax: [phone]    Clozapine Monitoring:   While on the unit you were started on Clozapine - With this medication you will need weekly lab draws in order for your medication to be filled by a pharmacy. This medication is only distributed weekly initially. You have been referred to [facility] for ongoing maintenance of your Clozapine medication. They have been sent your information and will be contacting you. [facility] will establish with you someone to come to your home to do the lab draw and will have your medication sent to you at home. If you have questions or have not received a call - contact the program at phone: [phone]    [facility]  ([facility]) - [facility] 2nd Floor [address] . - 2nd Floor - [address] [place] [address]. [place] [address] phone: [phone] fax: [phone]      Patient was seen and discussed with attending physician.   Queta Wayne MD  PGY-1  Psychiatry       Attestation:   The patient has been seen and evaluated by me,  Vince Zheng MD. I have examined the patient today and reviewed the discharge plan with the medical student. I agree with the final assessment and plan, as noted in the discharge summary. I have reviewed today's vital signs, medications, labs and imaging.  Total time discharge plannin minutes  Vince Zheng MD ,Ph.D.              Appendix A: All Labs This Admission:     Results for orders placed or performed during the hospital encounter of 20   Head CT w/o contrast     Status: None    Narrative    EXAM: CT HEAD W/O CONTRAST, CT FACIAL BONES WITHOUT CONTRAST  LOCATION: [facility]  DATE/TIME: 2020 5:02 AM    INDICATION: Head trauma, minor, GCS>=13, low clinical risk, initial exam  COMPARISON: 08/15/2019, 2019  TECHNIQUE:   HEAD CT: Routine without IV contrast. Multiplanar reformats. Dose reduction techniques were used.  FACIAL BONE CT: Routine without IV contrast. Multiplanar reformats. Dose reduction techniques were used.    FINDINGS:  HEAD CT:   INTRACRANIAL CONTENTS: No intracranial hemorrhage, extraaxial collection, or mass effect.  No CT evidence of acute infarct. Normal parenchymal density for age. The ventricles and sulci are normal for age.     OSSEOUS STRUCTURES/SOFT TISSUES: No significant abnormality.     FACIAL BONE CT:  OSSEOUS STRUCTURES/SOFT TISSUES: Soft tissue hematoma overlying the left zygomatic arch. No facial bone fracture or malalignment. No evidence for dental trauma or periapical abscess.    ORBITAL CONTENTS: No acute abnormality.    SINUSES: No paranasal sinus mucosal disease.      Impression    IMPRESSION:  HEAD CT:  1.  No acute intracranial process.    FACIAL BONE CT:  1.  No facial bone or mandibular fracture.  2.  Soft tissue hematoma overlying the left zygomatic arch.     CT Facial Bones without Contrast     Status: None    Narrative    EXAM: CT HEAD W/O CONTRAST, CT  FACIAL BONES WITHOUT CONTRAST  LOCATION: [facility]  DATE/TIME: 1/17/2020 5:02 AM    INDICATION: Head trauma, minor, GCS>=13, low clinical risk, initial exam  COMPARISON: 08/15/2019, 08/16/2019  TECHNIQUE:   HEAD CT: Routine without IV contrast. Multiplanar reformats. Dose reduction techniques were used.  FACIAL BONE CT: Routine without IV contrast. Multiplanar reformats. Dose reduction techniques were used.    FINDINGS:  HEAD CT:   INTRACRANIAL CONTENTS: No intracranial hemorrhage, extraaxial collection, or mass effect.  No CT evidence of acute infarct. Normal parenchymal density for age. The ventricles and sulci are normal for age.     OSSEOUS STRUCTURES/SOFT TISSUES: No significant abnormality.     FACIAL BONE CT:  OSSEOUS STRUCTURES/SOFT TISSUES: Soft tissue hematoma overlying the left zygomatic arch. No facial bone fracture or malalignment. No evidence for dental trauma or periapical abscess.    ORBITAL CONTENTS: No acute abnormality.    SINUSES: No paranasal sinus mucosal disease.      Impression    IMPRESSION:  HEAD CT:  1.  No acute intracranial process.    FACIAL BONE CT:  1.  No facial bone or mandibular fracture.  2.  Soft tissue hematoma overlying the left zygomatic arch.     Drug abuse screen 6 urine (tox)     Status: Abnormal   Result Value Ref Range    Amphetamine Qual Urine Negative NEG^Negative    Barbiturates Qual Urine Negative NEG^Negative    Benzodiazepine Qual Urine Negative NEG^Negative    Cannabinoids Qual Urine Positive (A) NEG^Negative    Cocaine Qual Urine Negative NEG^Negative    Ethanol Qual Urine Negative NEG^Negative    Opiates Qualitative Urine Negative NEG^Negative   CBC with platelets differential     Status: None   Result Value Ref Range    WBC 8.0 4.0 - 11.0 10e9/L    RBC Count 4.98 4.4 - 5.9 10e12/L    Hemoglobin 15.4 13.3 - 17.7 g/dL    Hematocrit 46.3 40.0 - 53.0 %    MCV 93 78 - 100 fl    MCH 30.9 26.5 - 33.0 pg    MCHC 33.3 31.5 - 36.5 g/dL    RDW 12.3 10.0 -  15.0 %    Platelet Count 225 150 - 450 10e9/L    Diff Method Automated Method     % Neutrophils 50.5 %    % Lymphocytes 32.1 %    % Monocytes 9.9 %    % Eosinophils 6.5 %    % Basophils 0.4 %    % Immature Granulocytes 0.6 %    Nucleated RBCs 0 0 /100    Absolute Neutrophil 4.0 1.6 - 8.3 10e9/L    Absolute Lymphocytes 2.6 0.8 - 5.3 10e9/L    Absolute Monocytes 0.8 0.0 - 1.3 10e9/L    Absolute Eosinophils 0.5 0.0 - 0.7 10e9/L    Absolute Basophils 0.0 0.0 - 0.2 10e9/L    Abs Immature Granulocytes 0.1 0 - 0.4 10e9/L    Absolute Nucleated RBC 0.0    Phosphorus     Status: None   Result Value Ref Range    Phosphorus 3.0 2.5 - 4.5 mg/dL   Comprehensive metabolic panel     Status: Abnormal   Result Value Ref Range    Sodium 139 133 - 144 mmol/L    Potassium 3.9 3.4 - 5.3 mmol/L    Chloride 107 94 - 109 mmol/L    Carbon Dioxide 26 20 - 32 mmol/L    Anion Gap 6 3 - 14 mmol/L    Glucose 90 70 - 99 mg/dL    Urea Nitrogen 13 7 - 30 mg/dL    Creatinine 0.72 0.66 - 1.25 mg/dL    GFR Estimate >90 >60 mL/min/[1.73_m2]    GFR Estimate If Black >90 >60 mL/min/[1.73_m2]    Calcium 8.3 (L) 8.5 - 10.1 mg/dL    Bilirubin Total 1.1 0.2 - 1.3 mg/dL    Albumin 3.8 3.4 - 5.0 g/dL    Protein Total 7.2 6.8 - 8.8 g/dL    Alkaline Phosphatase 66 40 - 150 U/L    ALT 32 0 - 70 U/L    AST 19 0 - 45 U/L   Lipid panel     Status: None   Result Value Ref Range    Cholesterol 172 <200 mg/dL    Triglycerides 137 <150 mg/dL    HDL Cholesterol 48 >39 mg/dL    LDL Cholesterol Calculated 97 <100 mg/dL    Non HDL Cholesterol 124 <130 mg/dL   TSH with free T4 reflex and/or T3 as indicated     Status: None   Result Value Ref Range    TSH 0.46 0.40 - 4.00 mU/L   Troponin I     Status: None   Result Value Ref Range    Troponin I ES <0.015 0.000 - 0.045 ug/L   CBC with platelets differential     Status: Abnormal   Result Value Ref Range    WBC 11.4 (H) 4.0 - 11.0 10e9/L    RBC Count 5.38 4.4 - 5.9 10e12/L    Hemoglobin 16.5 13.3 - 17.7 g/dL    Hematocrit 48.3  40.0 - 53.0 %    MCV 90 78 - 100 fl    MCH 30.7 26.5 - 33.0 pg    MCHC 34.2 31.5 - 36.5 g/dL    RDW 12.2 10.0 - 15.0 %    Platelet Count 246 150 - 450 10e9/L    Diff Method Automated Method     % Neutrophils 69.7 %    % Lymphocytes 21.0 %    % Monocytes 6.6 %    % Eosinophils 1.4 %    % Basophils 0.4 %    % Immature Granulocytes 0.9 %    Nucleated RBCs 0 0 /100    Absolute Neutrophil 7.9 1.6 - 8.3 10e9/L    Absolute Lymphocytes 2.4 0.8 - 5.3 10e9/L    Absolute Monocytes 0.8 0.0 - 1.3 10e9/L    Absolute Eosinophils 0.2 0.0 - 0.7 10e9/L    Absolute Basophils 0.0 0.0 - 0.2 10e9/L    Abs Immature Granulocytes 0.1 0 - 0.4 10e9/L    Absolute Nucleated RBC 0.0    EKG 12-lead, complete     Status: None   Result Value Ref Range    Interpretation ECG Click View Image link to view waveform and result    EKG 12-lead, complete     Status: None (Preliminary result)   Result Value Ref Range    Interpretation ECG Click View Image link to view waveform and result    Internal Medicine Adult IP Consult for [facility] in [facility]: Patient to be seen: ASAP within 4 hrs; Call back #: [phone]; Sharp, reproducible chest pain, new findings on EKG (T-wave inversion in aVL); Consultant may enter orders: Yes...     Status: None ()    Shelton Brandt PA-C     1/31/2020  6:49 PM  Brief medicine consult note:  - Medicine consulted for CP in this 23 yo male w/ no known   cardiac risk factors reportedly quite somatic. Subsequent EKG   with T wave inversion in leads I and AVL but not in other lateral   leads. EKG otherwise unremarkable. Trop neg. CP reproducible on   exam. Pain therefore most likely MSK in origin. Recommend   reassurance not cardiac in etiology. Can use prn OTC analgesics   and/or Lidocaine patch. No further medical recommendations.   Medicine signing off. Please feel free to call with questions.     Herb Markham PA-C  Internal Medicine Hospitalist   [facility]  [phone]

## 2020-02-25 NOTE — PLAN OF CARE
Jerel more relaxed since learning he will be discharged Wednesday-Prior to finding out discharge plan in place c/o difficulty being in the hospital because other people's personalities were rubbing off on him and getting all mixed up inside of him-spent significant amt of time in room sleeping this evening-OOB for short periods to watch TV

## 2020-02-25 NOTE — PROGRESS NOTES
NAVIGATE Outreach  A Part of the [facility] First Episode of Psychosis Program     Patient Name: Jerel Day  /Age:  1995 (24 year old)    Contact: Writer attempted to call unit today to check-in with Jerel. Was not able to get through. See that Jerel is scheduled for IRT with writer on 3/9/20 with plan to discharge this week to IRTS.     Plan: Next session scheduled for 3/9/20. Writer remains available for support and care coordination purposes.    EDELMIRA MorganATE Individual Resiliency  & Family Clinician

## 2020-02-25 NOTE — PLAN OF CARE
"Problem: OT General Care Plan  Goal: OT Goal 1    Pt attended 2 out of 3 OT groups offered. Pt actively participated in occupational therapy clinic. Pt was able to ask for assistance as needed, and independently initiated a familiar creative expression task. Pt demonstrated good focus, planning, and attention to detail. Social with peers and writer throughout. Playfully joked about \"hibernating\" in the hospital this winter, and future-oriented in discussing his goal to go fishing more often this upcoming summer. Pt actively participated in a structured occupational therapy group involving a cognitive task to facilitate social and leisure enagement. Pt was able to follow 3 step directions of the familiar task, and politely explained task parameters to peers. Pt demonstrated good sequencing and planning ahead, and concentrated for the full duration of group. Very strategic in his task approach. Calm, pleasant, cooperative, and engaged. Affect appeared to brighten in interactions.    "

## 2020-02-25 NOTE — PROGRESS NOTES
Patient more visible today and is active and social with peers. Appetite good. Patient attended all groups. Anticipating discharge tomorrow.          02/25/20 1400   Behavioral Health   Hallucinations auditory   Thinking delusional;distractable   Orientation situation, disoriented;person: oriented;place: oriented;date: oriented   Memory baseline memory   Insight denial of illness;poor   Judgement impaired   Affect   (Much brighter today.)   Mood mood is calm  (Mood even.)   Physical Appearance/Attire untidy;disheveled   Hygiene neglected grooming - unclean body, hair, teeth   1. Wish to be Dead (Recent) No   Activity other (see comment)   Speech clear;flight of ideas;pressured

## 2020-02-26 VITALS
HEART RATE: 105 BPM | OXYGEN SATURATION: 97 % | HEIGHT: 68 IN | DIASTOLIC BLOOD PRESSURE: 85 MMHG | RESPIRATION RATE: 16 BRPM | WEIGHT: 170 LBS | SYSTOLIC BLOOD PRESSURE: 152 MMHG | BODY MASS INDEX: 25.76 KG/M2 | TEMPERATURE: 97.6 F

## 2020-02-26 LAB
BASOPHILS # BLD AUTO: 0 10E9/L (ref 0–0.2)
BASOPHILS NFR BLD AUTO: 0.5 %
CAPILLARY BLOOD COLLECTION: NORMAL
DIFFERENTIAL METHOD BLD: NORMAL
EOSINOPHIL # BLD AUTO: 0.4 10E9/L (ref 0–0.7)
EOSINOPHIL NFR BLD AUTO: 4.8 %
IMM GRANULOCYTES # BLD: 0.1 10E9/L (ref 0–0.4)
IMM GRANULOCYTES NFR BLD: 1.2 %
INTERPRETATION ECG - MUSE: NORMAL
LYMPHOCYTES # BLD AUTO: 2.3 10E9/L (ref 0.8–5.3)
LYMPHOCYTES NFR BLD AUTO: 29.5 %
MONOCYTES # BLD AUTO: 0.6 10E9/L (ref 0–1.3)
MONOCYTES NFR BLD AUTO: 7.3 %
NEUTROPHILS # BLD AUTO: 4.3 10E9/L (ref 1.6–8.3)
NEUTROPHILS NFR BLD AUTO: 56.7 %
NRBC # BLD AUTO: 0 10*3/UL
NRBC BLD AUTO-RTO: 0 /100
WBC # BLD AUTO: 7.6 10E9/L (ref 4–11)

## 2020-02-26 PROCEDURE — 85004 AUTOMATED DIFF WBC COUNT: CPT | Performed by: PSYCHIATRY & NEUROLOGY

## 2020-02-26 PROCEDURE — 36415 COLL VENOUS BLD VENIPUNCTURE: CPT | Performed by: PSYCHIATRY & NEUROLOGY

## 2020-02-26 PROCEDURE — 99238 HOSP IP/OBS DSCHRG MGMT 30/<: CPT | Mod: GC | Performed by: PSYCHIATRY & NEUROLOGY

## 2020-02-26 PROCEDURE — 85048 AUTOMATED LEUKOCYTE COUNT: CPT | Performed by: PSYCHIATRY & NEUROLOGY

## 2020-02-26 NOTE — PROGRESS NOTES
Participated in Music Therapy group with focus on mood elevation, validation and decreasing anxiety and improved group cohesiveness. Engaged and cooperative in music listening interventions.   Showed progress in session goals.     Appeared more disheveled today than in previous sessions.  Gravitates towards music that is devoid of most typical musical components (kenia, steady beat, etc.)    Social with peers; appears to have good rapport with peers on unit/mutually supportive/encouraging.    Anticipating discharge tomorrow.

## 2020-02-26 NOTE — PLAN OF CARE
Jerel discharged 1025 care of parents for transport to [facility]-Jerel continues delusional statements and freq appears preoccupied, but pleasant in interactions and appears more relaxed-Jerel reviewed medications and outpt tx plans and verbalized understanding-has 30 day supply of medications in his possession-

## 2020-02-26 NOTE — PROGRESS NOTES
02/25/20 2100   Behavioral Health   Hallucinations denies / not responding to hallucinations   Thinking distractable;intact   Orientation person: oriented;place: oriented   Memory baseline memory   Insight insight appropriate to situation;insight appropriate to events   Judgement impaired   Eye Contact at examiner   Affect full range affect   Mood mood is calm   Physical Appearance/Attire appears stated age;attire appropriate to age and situation   Hygiene neglected grooming - unclean body, hair, teeth;body odor   Suicidality other (see comments)  (denies)   1. Wish to be Dead (Recent) No   Self Injury   (denies)   Elopement   (none stated or observed)   Activity other (see comment)  (active and visible)   Speech clear;coherent   Medication Sensitivity no stated side effects;no observed side effects   Psychomotor / Gait balanced;steady     Patient presented with full range affect. He socialized and played games with peers. He was observed reading in his room. He reported feeling excited about his discharge plan. He denied SI/SIB/HI and other Mental Health symptoms. He also denied adverse side affects from medication side effects

## 2020-02-26 NOTE — PROGRESS NOTES
Re: Provisional Discharge     Writer went over PD with patient, he verbalized an understanding. PD and Change of Status faxed to [facility].     Nicole Cordova, LPCC, [facility]  Clinical Treatment Coordinator

## 2020-02-27 ENCOUNTER — TELEPHONE (OUTPATIENT)
Dept: PHARMACY | Facility: CLINIC | Age: 25
End: 2020-02-27

## 2020-02-27 NOTE — TELEPHONE ENCOUNTER
Jerel Shay was referred to [facility] for outpatient clozapine monitoring.   Current Dose:150mg QHS  Laboratory Monitoring:weekly home draws. Clozapine started 2/19/20 during inpatient hospitalization. He will be eligible to move to Q2 week monitoring 8/19/20.     Recent Labs   Lab Test 02/26/20  0750 02/19/20  1001 01/18/20  0723 01/03/18  1054   WBC 7.6 11.4* 8.0 8.0   ANEU 4.3 7.9 4.0 5.5   HGB  --  16.5 15.4 13.9   PLT  --  246 225 241       Pt was discharged from [facility] 2/26/20 to [facility] with a 9 day supply of clozapine. Spoke with Tremaine at [facility] to arrange outpatient clozapine monitoring. Tremaine expressed understanding with Q1week home draws and medication mailout. First home draw will be 3/2 or 3/3 and weekly thereafter. Message sent to mobile phlebotomy to confirm draw date.     Clozapine can be mailed to:   [facility] staff  C/o Jerel Shay  [address] [place] [address]    [facility] staff:  Phone: [phone]     Pt's first outpatient appt with Dr. Yusuf is 3/10/20. Will need orders from Dr. Yusuf for clozapine Rx and CBC with diff following this appointment.       Azeb Mclean, PharmD, BCPP  Medication Therapy Management Pharmacist  [facility]  [phone]

## 2020-03-02 DIAGNOSIS — F20.89 OTHER SCHIZOPHRENIA (H): ICD-10-CM

## 2020-03-02 LAB
BASOPHILS # BLD AUTO: 0 10E9/L (ref 0–0.2)
BASOPHILS NFR BLD AUTO: 0.4 %
DIFFERENTIAL METHOD BLD: NORMAL
EOSINOPHIL # BLD AUTO: 0.4 10E9/L (ref 0–0.7)
EOSINOPHIL NFR BLD AUTO: 4.9 %
ERYTHROCYTE [DISTWIDTH] IN BLOOD BY AUTOMATED COUNT: 12.4 % (ref 10–15)
HCT VFR BLD AUTO: 43.7 % (ref 40–53)
HGB BLD-MCNC: 14.5 G/DL (ref 13.3–17.7)
LYMPHOCYTES # BLD AUTO: 1.8 10E9/L (ref 0.8–5.3)
LYMPHOCYTES NFR BLD AUTO: 22.5 %
MCH RBC QN AUTO: 30.5 PG (ref 26.5–33)
MCHC RBC AUTO-ENTMCNC: 33.2 G/DL (ref 31.5–36.5)
MCV RBC AUTO: 92 FL (ref 78–100)
MONOCYTES # BLD AUTO: 0.8 10E9/L (ref 0–1.3)
MONOCYTES NFR BLD AUTO: 10.4 %
NEUTROPHILS # BLD AUTO: 4.9 10E9/L (ref 1.6–8.3)
NEUTROPHILS NFR BLD AUTO: 61.8 %
PLATELET # BLD AUTO: 260 10E9/L (ref 150–450)
RBC # BLD AUTO: 4.76 10E12/L (ref 4.4–5.9)
WBC # BLD AUTO: 8 10E9/L (ref 4–11)

## 2020-03-02 PROCEDURE — 85025 COMPLETE CBC W/AUTO DIFF WBC: CPT | Performed by: STUDENT IN AN ORGANIZED HEALTH CARE EDUCATION/TRAINING PROGRAM

## 2020-03-02 PROCEDURE — 36415 COLL VENOUS BLD VENIPUNCTURE: CPT | Performed by: STUDENT IN AN ORGANIZED HEALTH CARE EDUCATION/TRAINING PROGRAM

## 2020-03-03 ENCOUNTER — TELEPHONE (OUTPATIENT)
Dept: PHARMACY | Facility: CLINIC | Age: 25
End: 2020-03-03

## 2020-03-03 NOTE — TELEPHONE ENCOUNTER
Jerel Shay was referred to [facility] for outpatient clozapine monitoring.   Current Dose: 150mg QHS  Laboratory Monitoring: weekly home draws. Clozapine started 2/19/20 during inpatient hospitalization. He will be eligible to move to Q2 week monitoring 8/19/20.     Recent Labs   Lab Test 03/02/20  1215 02/26/20  0750 02/19/20  1001 01/18/20  0723 01/03/18  1054   WBC 8.0 7.6 11.4* 8.0 8.0   ANEU 4.9 4.3 7.9 4.0 5.5   HGB 14.5  --  16.5 15.4 13.9     --  246 225 241       Pt was discharged from [facility] 2/26/20 to [email or web address] with a 9 day supply of clozapine. ANC WNL. 9 day supply will be mailed on 3/4/20. Next draw due 3/9/20. Pt's first outpatient appt with Dr. Yusuf is 3/10/20. Will need orders from Dr. Yusuf for clozapine Rx and CBC with diff following this appointment.     Clozapine can be mailed to:   [email or web address] staff  C/o Jerel Shay  [address] [place] [address]    [email or web address] staff:  Phone: [phone]    Tita Salamanca, PharmD  Medication Therapy Management Pharmacist  [facility]  Phone: [phone]

## 2020-03-04 ENCOUNTER — PATIENT OUTREACH (OUTPATIENT)
Dept: PSYCHIATRY | Facility: CLINIC | Age: 25
End: 2020-03-04

## 2020-03-04 NOTE — PROGRESS NOTES
NAVIGATE Outreach  A Part of the [facility] First Episode of Psychosis Program     Patient Name: Jerel Day  /Age:  1995 (24 year old)    Contact: Writer attempted to call Jerel on this date to change 30 min med visit next Tuesday from 1pm to a time provider is available. Jerel did not answer and no VM set up.    Writer called Mom-Mesha; rescheduled appointment to 11am on Tuesday with Dr. Yusuf. Also confirmed with Mom IRT appt with writer Monday at 2pm for Jerel. She will communicate this to Jerel and IRTS staff for coordination of rides.    Writer will plan to get CONNOR for IRTS [facility] on Monday.     Plan: IRT scheduled for Monday 2pm. Med visit with Dr. Yusuf scheduled for 11am Tuesday.    EDELMIRA Morgan  NAVIGATE Individual [facility]

## 2020-03-09 ENCOUNTER — VIRTUAL VISIT (OUTPATIENT)
Dept: PSYCHIATRY | Facility: CLINIC | Age: 25
End: 2020-03-09
Payer: COMMERCIAL

## 2020-03-09 DIAGNOSIS — F20.89 OTHER SCHIZOPHRENIA (H): ICD-10-CM

## 2020-03-09 DIAGNOSIS — F20.9 SCHIZOPHRENIA, UNSPECIFIED TYPE (H): Primary | ICD-10-CM

## 2020-03-09 LAB
BASOPHILS # BLD AUTO: 0.1 10E9/L (ref 0–0.2)
BASOPHILS NFR BLD AUTO: 0.7 %
DIFFERENTIAL METHOD BLD: NORMAL
EOSINOPHIL # BLD AUTO: 0.4 10E9/L (ref 0–0.7)
EOSINOPHIL NFR BLD AUTO: 5.6 %
ERYTHROCYTE [DISTWIDTH] IN BLOOD BY AUTOMATED COUNT: 12.7 % (ref 10–15)
HCT VFR BLD AUTO: 43.7 % (ref 40–53)
HGB BLD-MCNC: 14.6 G/DL (ref 13.3–17.7)
LYMPHOCYTES # BLD AUTO: 1.9 10E9/L (ref 0.8–5.3)
LYMPHOCYTES NFR BLD AUTO: 25.3 %
MCH RBC QN AUTO: 30 PG (ref 26.5–33)
MCHC RBC AUTO-ENTMCNC: 33.4 G/DL (ref 31.5–36.5)
MCV RBC AUTO: 90 FL (ref 78–100)
MONOCYTES # BLD AUTO: 0.5 10E9/L (ref 0–1.3)
MONOCYTES NFR BLD AUTO: 7.2 %
NEUTROPHILS # BLD AUTO: 4.6 10E9/L (ref 1.6–8.3)
NEUTROPHILS NFR BLD AUTO: 61.2 %
PLATELET # BLD AUTO: 284 10E9/L (ref 150–450)
RBC # BLD AUTO: 4.86 10E12/L (ref 4.4–5.9)
WBC # BLD AUTO: 7.5 10E9/L (ref 4–11)

## 2020-03-09 PROCEDURE — 85025 COMPLETE CBC W/AUTO DIFF WBC: CPT | Performed by: STUDENT IN AN ORGANIZED HEALTH CARE EDUCATION/TRAINING PROGRAM

## 2020-03-09 PROCEDURE — 36415 COLL VENOUS BLD VENIPUNCTURE: CPT | Performed by: STUDENT IN AN ORGANIZED HEALTH CARE EDUCATION/TRAINING PROGRAM

## 2020-03-09 NOTE — PROGRESS NOTES
"NAVIGATE Outreach  A Part of the [facility] First Episode of Psychosis Program     Patient Name: Jerel Day  /Age:  1995 (24 year old)    Contact:   Writer was scheduled with Jerel for IRT today at 2pm. Around 2:15pm writer called client to check-in as he hadn't arrived. Jerel called writer back and apologized that he forgot he had an appt today. Writer and Jerel checked in briefly on the phone; he reports he is doing well and stated, \"the medication is helping.\" Jerel was pleasant and sounded clear on the phone. He reported feeling like he has \"more room to breathe\" being out of the hospital. Writer stayed on the phone with Jerel as he coordinated transportation for upcoming appointments with staff at the IRTS for 11am tomorrow (Dr. Yusuf) and 1pm next Wednesday (IRT with writer).     Plan: Jerel is scheduled for the following upcoming appts: med visit tomorrow at 11am and IRT scheduled for next Wednesday at 1pm.     Encouraged Jerel to reach out to writer should needs or concerns arise prior to next scheduled appt.     Tammi Connell, [facility]  NAVIGATE Individual [facility]    This is a non-billable encounter as it was solely for the purposes of outreach and/or care coordination.     "

## 2020-03-10 ENCOUNTER — TELEPHONE (OUTPATIENT)
Dept: PHARMACY | Facility: CLINIC | Age: 25
End: 2020-03-10

## 2020-03-10 ENCOUNTER — OFFICE VISIT (OUTPATIENT)
Dept: PSYCHIATRY | Facility: CLINIC | Age: 25
End: 2020-03-10
Payer: COMMERCIAL

## 2020-03-10 VITALS
SYSTOLIC BLOOD PRESSURE: 127 MMHG | HEART RATE: 80 BPM | BODY MASS INDEX: 27.34 KG/M2 | WEIGHT: 179.8 LBS | DIASTOLIC BLOOD PRESSURE: 84 MMHG

## 2020-03-10 DIAGNOSIS — F25.0 SCHIZOAFFECTIVE DISORDER, BIPOLAR TYPE (H): Primary | ICD-10-CM

## 2020-03-10 ASSESSMENT — ANXIETY QUESTIONNAIRES
7. FEELING AFRAID AS IF SOMETHING AWFUL MIGHT HAPPEN: SEVERAL DAYS
6. BECOMING EASILY ANNOYED OR IRRITABLE: SEVERAL DAYS
GAD7 TOTAL SCORE: 7
1. FEELING NERVOUS, ANXIOUS, OR ON EDGE: SEVERAL DAYS
2. NOT BEING ABLE TO STOP OR CONTROL WORRYING: SEVERAL DAYS
5. BEING SO RESTLESS THAT IT IS HARD TO SIT STILL: SEVERAL DAYS
4. TROUBLE RELAXING: SEVERAL DAYS
3. WORRYING TOO MUCH ABOUT DIFFERENT THINGS: SEVERAL DAYS

## 2020-03-10 ASSESSMENT — PATIENT HEALTH QUESTIONNAIRE - PHQ9: SUM OF ALL RESPONSES TO PHQ QUESTIONS 1-9: 9

## 2020-03-10 ASSESSMENT — PAIN SCALES - GENERAL: PAINLEVEL: NO PAIN (0)

## 2020-03-10 NOTE — PROGRESS NOTES
NAVIGATE Medication Management Progress Note  A Part of the [facility] First Episode of Psychosis Program    NAVIGATE Enrollee: Jerel Day (1995)     MRN: [id number]  Date:  3/10/20         Contributors to the Assessment     Chart Reviewed.   Interview completed with Jerel Day.  Collateral information obtained from [facility]  1/17-2/26/20.         Chief Complaint      Really good on this new med, like a cloud had lifted         Interim History      Jerel Day is a 24 year old male who was last seen in [facility] on 1/08/20 at which time He had reported to be in good spirits and w/o symptoms of depression or psychosis. The patient reports adequate treatment adherence.  History was provided by self who was a limited and vague historian.  Since the last visit:  ED note by Dr. Lamb: Jerel Day is a 24 year old male with schizoaffective disorder who presents with agitation, auditory hallucinations, suicidal ideation, and self injuring by cutting himself with a pocketknife and hitting his head and face against a door. He is agitated on initial presentation and very difficult to evaluate. He attempted to elope and required restraints and sedation.     Jerel Day was admitted to station [facility] on a 72 hour hold. He is currently under MI commitment (renewed 2/3/2020 with 9-wilfredo duration). PTA Abilify 15mg and olanzapine 15mg  were continued on admission. Summary of Jerel's antipsychotic medication during this hospitalization: First, patient weaned off olanzapine and increased Abilify to 20. This was not effective for his symptoms, and Clark was ammended to include cariprazine and clozapine. Patient then did a cross titration of Abilify to cariprazine. Patient's symptoms worsened on cariprazine 6mg, so finally, clozapine was initiated. Patient reached a clozapine dose of 150mg with improvement in symptoms by time of discharge. Clozapine likely a good therapeutic choice for Jerel as he shows signs of clearer thought  content, less intrusive AH, and greatly improved sleep at 125 mg of clozapine. Patient received placement at [facility] on 2/26.  Recent Symptoms:  Denies any a depression , anxiety, psychotic or cognitive problems    Recent Substance Use:  Cannabis- prior to hosp.     Recent Social History  Unemployed, non student, civil commitment and to be admitted to [facility] RT.    Medical ROS:  No longer reports back and neck pain         First Episode of Psychosis History      DUP (duration untreated psychosis):  12mo  Route to initial care: IP 1/27/2017  Post LSD ingestion  Medication adherence overall:  fair  See above, Clinician Rating Form in [facility] Item 13  General frequency of visits:  N/A    Reviewed for completion of First Episode work-up:  Yes  First episode workup:  Done (if completed, see LABS for results)  MATRICS Consensus Cognitive Battery:  Not Done (if completed, see LABS for results)         Medical/Surgical History     Penicillins    Patient Active Problem List   Diagnosis     Schizoaffective disorder (H)     Psychosis (H)     Suicidal ideation            Medications     Current Outpatient Medications   Medication Sig Dispense Refill     cloZAPine (CLOZARIL) 50 MG tablet Take 3 tablets (150 mg) by mouth daily 27 tablet 2     melatonin 3 MG tablet Take 1 tablet (3 mg) by mouth nightly as needed for sleep 30 tablet 0     Vitamin D3 (CHOLECALCIFEROL) 25 mcg (1000 units) tablet Take 1 tablet (25 mcg) by mouth daily 30 tablet 0             Vitals     There were no vitals taken for this visit.      Weight prior to medication: U         Mental Status Exam     Alertness: alert  and oriented  Appearance: adequately groomed  Behavior/Demeanor: cooperative, pleasant and calm, with fair  eye contact   Speech: regular rate and rhythm  Language: intact  Psychomotor: normal or unremarkable  Mood: description consistent with euthymia  Affect: full range; was congruent to mood; was congruent to  content  Thought Process/Associations: unremarkable  Thought Content:  Reports none;  Denies suicidal and violent ideation  Perception:  Reports none;  Denies auditory hallucinations, visual hallucinations and tactile hallucinations (0)  Insight: fair  Judgment: fair  Cognition: does  appear grossly intact; formal cognitive testing not done         Labs and Data     RATING SCALES:  PHQ9   JUNAID 7    PHQ9 TODAY = 0/0  PHQ-9 SCORE 12/27/2019 1/8/2020 1/20/2020   PHQ-9 Total Score 9 11 11       ANTIPSYCHOTIC LABS ROUTINE    [glu, A1C, lipids (focus LDL), liver enzymes, WBC, ANEU, Hgb, plts]   q12 mo  Recent Labs   Lab Test 01/18/20  0723 01/03/18  1054   GLC 90 73     Recent Labs   Lab Test 01/18/20  0723 01/03/18  1054   CHOL 172 135   TRIG 137 93   LDL 97 70   HDL 48 46     Recent Labs   Lab Test 01/18/20  0723 01/03/18  1054   AST 19 19   ALT 32 37   ALKPHOS 66 66     Recent Labs   Lab Test 03/09/20  1411 03/02/20  1215   WBC 7.5 8.0   ANEU 4.6 4.9   HGB 14.6 14.5    260            Psychiatric Diagnoses     Schizoaffective Disorder   Cannabis Use Disorder  Poly substance use nos.         Assessment     TODAY since early January which time patient a list full compliance with meds his psychosis abruptly accelerated leading to hospitalization and after he demonstrated failure of multiple first and second generation antipsychotics he was placed on Clozaril.  He appeared to get a salient response ensured short order.  Today he is able to describe that remarkable lifting of his symptoms of psychosis as he adapted to the medication.  He is currently reporting no side effects of concern.                PSYCHOTROPIC DRUG INTERACTIONS:   None  MANAGEMENT:  Monitoring for adverse effects, using lowest therapeutic dose of [Clozapine] and patient is aware of risks and the requirements for weekly monitoring of blood ANC for safety.         Plan     1) PSYCHOTROPIC MEDICATIONS:  -Clozapine 150 mg daily    2) THERAPY:   Continue    3) NEXT DUE:    Labs- N/A  Rating Scales- N/A    4) REFERRALS:    No Referrals needed    5) RTC: 4 wks    6) CRISIS NUMBERS:   Provided routinely in AVS.  Especially emphasized:  none    TREATMENT RISK STATEMENT:  The risks, benefits, alternatives and potential adverse effects have been discussed and are understood by the pt. The pt understands the risks of using street drugs or alcohol. There are no medical contraindications, the pt agrees to treatment with the ability to do so. The pt knows to call the clinic for any problems or to access emergency care if needed.  Medical and substance use concerns are documented above.  Psychotropic drug interaction check was done, including changes made today.      PROVIDER:  Shai Yusuf MD

## 2020-03-10 NOTE — TELEPHONE ENCOUNTER
Jerel Shay was referred to [facility] for outpatient clozapine monitoring.   Current Dose: 150mg QHS  Laboratory Monitoring: weekly home draws. Clozapine started 2/19/20 during inpatient hospitalization. He will be eligible to move to Q2 week monitoring 8/19/20.     Recent Labs   Lab Test 03/09/20  1411 03/02/20  1215 02/26/20  0750 02/19/20  1001 01/18/20  0723   WBC 7.5 8.0 7.6 11.4* 8.0   ANEU 4.6 4.9 4.3 7.9 4.0   HGB 14.6 14.5  --  16.5 15.4    260  --  246 225       ANC WNL.  9 day supply will be mailed. Next draw due 3/16/20. Pt's first outpatient appt with Dr. Yusuf is 3/10/20. Will need orders from Dr. Yusuf for clozapine Rx and CBC with diff following this appointment.     Clozapine can be mailed to:   [facility] staff  C/o Jerel Shay  [address] [place] [address]    [facility] staff:  Phone: [phone]    Tita Salamanca, PharmD  Medication Therapy Management Pharmacist  [facility]  Phone: [phone]

## 2020-03-11 ASSESSMENT — ANXIETY QUESTIONNAIRES: GAD7 TOTAL SCORE: 7

## 2020-03-16 ENCOUNTER — TELEPHONE (OUTPATIENT)
Dept: PHARMACY | Facility: CLINIC | Age: 25
End: 2020-03-16

## 2020-03-16 DIAGNOSIS — F25.0 SCHIZOAFFECTIVE DISORDER, BIPOLAR TYPE (H): Primary | ICD-10-CM

## 2020-03-16 DIAGNOSIS — F25.0 SCHIZOAFFECTIVE DISORDER, BIPOLAR TYPE (H): ICD-10-CM

## 2020-03-16 LAB
BASOPHILS # BLD AUTO: 0.2 10E9/L (ref 0–0.2)
BASOPHILS NFR BLD AUTO: 2.6 %
DIFFERENTIAL METHOD BLD: NORMAL
EOSINOPHIL # BLD AUTO: 0.5 10E9/L (ref 0–0.7)
EOSINOPHIL NFR BLD AUTO: 6.3 %
ERYTHROCYTE [DISTWIDTH] IN BLOOD BY AUTOMATED COUNT: 13 % (ref 10–15)
HCT VFR BLD AUTO: 46 % (ref 40–53)
HGB BLD-MCNC: 15.5 G/DL (ref 13.3–17.7)
LYMPHOCYTES # BLD AUTO: 2.1 10E9/L (ref 0.8–5.3)
LYMPHOCYTES NFR BLD AUTO: 28.3 %
MCH RBC QN AUTO: 30 PG (ref 26.5–33)
MCHC RBC AUTO-ENTMCNC: 33.7 G/DL (ref 31.5–36.5)
MCV RBC AUTO: 89 FL (ref 78–100)
MONOCYTES # BLD AUTO: 0.7 10E9/L (ref 0–1.3)
MONOCYTES NFR BLD AUTO: 8.8 %
NEUTROPHILS # BLD AUTO: 4 10E9/L (ref 1.6–8.3)
NEUTROPHILS NFR BLD AUTO: 54 %
PLATELET # BLD AUTO: 299 10E9/L (ref 150–450)
RBC # BLD AUTO: 5.17 10E12/L (ref 4.4–5.9)
WBC # BLD AUTO: 7.4 10E9/L (ref 4–11)

## 2020-03-16 PROCEDURE — 85025 COMPLETE CBC W/AUTO DIFF WBC: CPT | Performed by: PSYCHIATRY & NEUROLOGY

## 2020-03-16 PROCEDURE — 36415 COLL VENOUS BLD VENIPUNCTURE: CPT | Performed by: PSYCHIATRY & NEUROLOGY

## 2020-03-16 NOTE — TELEPHONE ENCOUNTER
Jerel Day was referred to [facility] for outpatient clozapine monitoring.   Current Dose: 150mg QHS  Laboratory Monitoring: weekly home draws. Clozapine started 2/19/20 during inpatient hospitalization. He will be eligible to move to Q2 week monitoring 8/19/20.     Recent Labs   Lab Test 03/16/20  1230 03/09/20  1411 03/02/20  1215 02/26/20  0750 02/19/20  1001   WBC 7.4 7.5 8.0 7.6 11.4*   ANEU 4.0 4.6 4.9 4.3 7.9   HGB 15.5 14.6 14.5  --  16.5    284 260  --  246       ANC WNL.  9 day supply will be mailed upon refill authorization. Next draw due 3/23/20. Spoke with Dr. Yusuf who reported he will send clozapine Rx's to [facility] this afternoon.  Verbal ok to enter standing, stat CBCD.    Clozapine can be mailed to:   [facility] staff  C/o Jerel Day  [address] [place] [address]    [facility] staff:  Phone: [phone]    Tita Salamanca, PharmD  Medication Therapy Management Pharmacist  [facility]  Phone: [phone]

## 2020-03-18 ENCOUNTER — VIRTUAL VISIT (OUTPATIENT)
Dept: PSYCHIATRY | Facility: CLINIC | Age: 25
End: 2020-03-18
Payer: COMMERCIAL

## 2020-03-18 DIAGNOSIS — F25.0 SCHIZOAFFECTIVE DISORDER, BIPOLAR TYPE (H): Primary | ICD-10-CM

## 2020-03-18 DIAGNOSIS — F20.9 SCHIZOPHRENIA, UNSPECIFIED TYPE (H): Primary | ICD-10-CM

## 2020-03-18 NOTE — Clinical Note
MAMI Beltrán sounds so much better. He is connected his improvement in symptoms to medication. So good to connect with him.    Dr. Yusuf - he is wetting the bed apparently? This has happened about three times. Not sure if that could be med related? Wanted to pass along.    Ana Michel - he was open to you reaching out. Identifies goals of wanting to find a band and potentially explore job opportunities down the road.     MAMI Rose only. Ready to be signed, thanks!

## 2020-03-18 NOTE — PROGRESS NOTES
"NAVIGATE IRT Telephone Call    NAVIGATE Enrollee: Jerel Day (1995)     MRN: [id number]    Call date: 3/18/20  Call made to: Jerel Day  Diagnosis: Schizophrenia, unspecified type F20.9  Length of call: 26 minutes  Start time: 1:00pm End time: 1:26pm    Assessment/Progress Note:  Writer and client completed phone call visit in lieu of meeting in person for today's scheduled visit to minimize risks related to  COVID-19 as recommended by the organization.     Writer set agenda to check-in, discuss and assess symptoms, explore material in IRT modules, discuss ways in which Jerel can expand coping strategies and stress-management techniques for ongoing symptom management, and check-in regarding goals for ongoing recovery. During check-in, Jerel reported overall he feels he is doing well. He likes this IRTS better than the last one and has been spending his time playing video games and making new music.     With regards to symptoms, Jerel reports the AH are \"not too bad recently.\" He elaborated that since the 3rd or 4th week in the hospital the voices have been \"pretty much good\" and he attributes this to his new medication. Jerel reports he doesn't notice the voices as much, they are not criticizing him as much, he feels he is more in the present moment, and is feeling more self-love. Jerel denies any command type hallucinations to harm himself or others as well. Reflected on this as such a significant improvement and celebrated this together. Jerel agreed this is an improvement and spoke positively of it. Jerel denies any VH. Reports overall good mood. He reports he has been sleeping, but shared that he has been wetting the bed and thinks this has happened on about 3 occasions. He reports he doesn't think he shared this with Dr. Yusuf and he hasn't let a staff member at the IRTS know. Discussed process of cleaning self, clothes and sheets after this and encouraged Jerel to continue to monitor and let staff know if " "this increases or continues. Jerel attributes this to a decrease in nicotine. Per his report, he is still vaping. Jerel denies SI/SH and denies HI/VI. Jerel denies any substance use, other than vaping.     Jerel identifies only stressors to include finances and wishing he had access to more money. Reflected on how his trajectory has shifted since he started college where he felt he had a \"good outcome for a job\" at that time and now he feels he is in a harder position to be able to get a good paying job. Writer provided support and understanding. Validated how difficult adjusting to unexpected life changes can be and also emphasized Jerel's strengths and potential future opportunities in still finding a job that works for him, even if it is a different type of work than he originally had in mind. Jerel was open and seemed receptive to this.    Reviewed goals to include continue building self-love and acceptance, potentially finding a job in the future, and finding a band. Writer shared plan to update [facility] - YOSEPH Raymundo and wondered if Jerel and Ana could also connect on these goals.     Scheduled next session for 4/2 at 11am. Encouraged Jerel to reach out should any needs or concerns arise in the meantime.     Overall Jerel seemed engaged in conversation and sounded brighter. He did express interest in continuing to meet for individual therapy and psychoeducation. As of today's appt Jerel's insight appears limited, but improved. He seems he would benefit from continued clinical intervention aimed at assisting him with overall symptom management and recovery.    Plan:  Will route note to team for update purposes, specifically to [facility] Raymundo for potential outreach to Jerel to continue goal conversations.     Client and family aware they can reach out to writer directly and/or NAVIGATE team should concerns or needs arise prior to next scheduled appointment.     Tammi Connell, BRIANSW "   [facility]     This patient visit was converted to a telephone call to minimize exposure to COVID-19.    Attestation:    I did not see this patient directly. This patient is discussed with me in individual clinical social work supervision, and I agree with the plan as documented.     Ana Rose, [facility], MSW, LICSW, March 18, 2020

## 2020-03-18 NOTE — PROGRESS NOTES
"Jerel Day is a 24 year old male who is being evaluated via a billable telephone visit.      The patient has been notified of following:     \"This telephone visit will be conducted via a call between you and your physician/provider. We have found that certain health care needs can be provided without the need for a physical exam.  This service lets us provide the care you need with a short phone conversation.  If a prescription is necessary we can send it directly to your pharmacy.  If lab work is needed we can place an order for that and you can then stop by our lab to have the test done at a later time.    If during the course of the call the physician/provider feels a telephone visit is not appropriate, you will not be charged for this service.\"       Jerel Day complains of    Chief Complaint   Patient presents with     Follow Up       I have reviewed and updated the patient's Past Medical History, Social History, Family History and Medication List.    ALLERGIES  Penicillins    Naline Arriaga, CMA    Additional provider notes: Pt remains in IRTS under supervised Clozapine on weekly monitoring.  He estimates LOS 90d   Report mild orthostatic dizzness monitored sit/stand; tolerable a this time  ANC =4000      Assessment/Plan:  Schizoaffective Disorder, stable on early Clozapine Rx    Plan : continue Clz 150mg /d   weekly ANC counts  F/U telephonic check 3 weeks    I have reviewed the note as documented above.  This accurately captures the substance of my conversation with the patient.  [facility]      Phone call contact time  Call Started at 1100  Call Ended at 1125     Shai Carranza MD  "

## 2020-03-23 ENCOUNTER — TELEPHONE (OUTPATIENT)
Dept: PHARMACY | Facility: CLINIC | Age: 25
End: 2020-03-23

## 2020-03-23 DIAGNOSIS — F99 INSOMNIA DUE TO OTHER MENTAL DISORDER: ICD-10-CM

## 2020-03-23 DIAGNOSIS — F20.89 OTHER SCHIZOPHRENIA (H): ICD-10-CM

## 2020-03-23 DIAGNOSIS — F25.0 SCHIZOAFFECTIVE DISORDER, BIPOLAR TYPE (H): ICD-10-CM

## 2020-03-23 DIAGNOSIS — F51.05 INSOMNIA DUE TO OTHER MENTAL DISORDER: ICD-10-CM

## 2020-03-23 DIAGNOSIS — E55.9 VITAMIN D DEFICIENCY: ICD-10-CM

## 2020-03-23 LAB
BASOPHILS # BLD AUTO: 0.1 10E9/L (ref 0–0.2)
BASOPHILS NFR BLD AUTO: 1.2 %
DIFFERENTIAL METHOD BLD: NORMAL
EOSINOPHIL # BLD AUTO: 0.7 10E9/L (ref 0–0.7)
EOSINOPHIL NFR BLD AUTO: 8 %
ERYTHROCYTE [DISTWIDTH] IN BLOOD BY AUTOMATED COUNT: 12.9 % (ref 10–15)
HCT VFR BLD AUTO: 45.4 % (ref 40–53)
HGB BLD-MCNC: 15.2 G/DL (ref 13.3–17.7)
LYMPHOCYTES # BLD AUTO: 2.4 10E9/L (ref 0.8–5.3)
LYMPHOCYTES NFR BLD AUTO: 28.1 %
MCH RBC QN AUTO: 29.9 PG (ref 26.5–33)
MCHC RBC AUTO-ENTMCNC: 33.5 G/DL (ref 31.5–36.5)
MCV RBC AUTO: 89 FL (ref 78–100)
MONOCYTES # BLD AUTO: 0.7 10E9/L (ref 0–1.3)
MONOCYTES NFR BLD AUTO: 8.3 %
NEUTROPHILS # BLD AUTO: 4.6 10E9/L (ref 1.6–8.3)
NEUTROPHILS NFR BLD AUTO: 54.4 %
PLATELET # BLD AUTO: 242 10E9/L (ref 150–450)
RBC # BLD AUTO: 5.08 10E12/L (ref 4.4–5.9)
WBC # BLD AUTO: 8.4 10E9/L (ref 4–11)

## 2020-03-23 PROCEDURE — 85025 COMPLETE CBC W/AUTO DIFF WBC: CPT | Performed by: PSYCHIATRY & NEUROLOGY

## 2020-03-23 PROCEDURE — 36415 COLL VENOUS BLD VENIPUNCTURE: CPT | Performed by: PSYCHIATRY & NEUROLOGY

## 2020-03-23 RX ORDER — CLOZAPINE 50 MG/1
150 TABLET ORAL DAILY
Qty: 27 TABLET | Refills: 2 | Status: SHIPPED | OUTPATIENT
Start: 2020-03-23 | End: 2020-04-15

## 2020-03-23 RX ORDER — LANOLIN ALCOHOL/MO/W.PET/CERES
3 CREAM (GRAM) TOPICAL
Qty: 30 TABLET | Refills: 0 | Status: SHIPPED | OUTPATIENT
Start: 2020-03-23 | End: 2020-06-08

## 2020-03-23 RX ORDER — VITAMIN B COMPLEX
25 TABLET ORAL DAILY
Qty: 30 TABLET | Refills: 0 | Status: SHIPPED | OUTPATIENT
Start: 2020-03-23 | End: 2020-06-02

## 2020-03-23 RX ORDER — CLOZAPINE 50 MG/1
150 TABLET ORAL DAILY
Qty: 27 TABLET | Refills: 2 | Status: CANCELLED | OUTPATIENT
Start: 2020-03-23

## 2020-03-23 RX ORDER — CLOZAPINE 50 MG/1
150 TABLET ORAL DAILY
Qty: 27 TABLET | Refills: 2 | Status: SHIPPED | OUTPATIENT
Start: 2020-03-23 | End: 2020-03-23

## 2020-03-23 NOTE — TELEPHONE ENCOUNTER
Last seen: 3/18/20  RTC: 3 weeks  Cancel: none  No-show: none  Next appt: 4/1/20    Incoming refill from Pharmacy via     Medication requested: Vitamin D   Directions: Take 1 tablet by mouth daily   Qty: 30  Last refilled: 2/25/20    Medication requested: Clozapine 50 mg   Directions: Take 3 tablets by mouth daily   Qty: 27  Last refilled: 2/24/20 with two refills     Medication requested: Melatonin 3 mg   Directions: Take 1 tablet by mouth nightly as needed for sleep   Qty: 30  Last refilled: 2/24/20    Medication refill approved per refill protocol

## 2020-03-23 NOTE — TELEPHONE ENCOUNTER
Jerel Shay was referred to [facility] for outpatient clozapine monitoring.   Current Dose: 150mg QHS  Laboratory Monitoring: weekly home draws. Clozapine started 2/19/20 during inpatient hospitalization. He will be eligible to move to Q2 week monitoring 8/19/20.     Recent Labs   Lab Test 03/23/20  1020 03/16/20  1230 03/09/20  1411 03/02/20  1215   WBC 8.4 7.4 7.5 8.0   ANEU 4.6 4.0 4.6 4.9   HGB 15.2 15.5 14.6 14.5    [phone]       ANC WNL.  9 day supply will be mailed upon refill authorization. Last dispensed 9 day supply on 3/12 and have not yet received further prescription.  Refill request sent and will send message directly to provider.  Next draw due 3/30/20.    Clozapine can be mailed to:   Pricing Assistant staff  C/o Jerel Day  [address] [place] [address]    Pricing Assistant staff:  Phone: [phone]    Tita Salamanca, PharmD  Medication Therapy Management Pharmacist  [facility]  Phone: [phone]

## 2020-03-27 ENCOUNTER — PATIENT OUTREACH (OUTPATIENT)
Dept: PSYCHIATRY | Facility: CLINIC | Age: 25
End: 2020-03-27

## 2020-03-30 ENCOUNTER — TELEPHONE (OUTPATIENT)
Dept: PHARMACY | Facility: CLINIC | Age: 25
End: 2020-03-30

## 2020-03-30 DIAGNOSIS — F25.0 SCHIZOAFFECTIVE DISORDER, BIPOLAR TYPE (H): ICD-10-CM

## 2020-03-30 LAB
DIFFERENTIAL METHOD BLD: NORMAL
EOSINOPHIL # BLD AUTO: 0.5 10E9/L (ref 0–0.7)
EOSINOPHIL NFR BLD AUTO: 6 %
ERYTHROCYTE [DISTWIDTH] IN BLOOD BY AUTOMATED COUNT: 13 % (ref 10–15)
HCT VFR BLD AUTO: 45.3 % (ref 40–53)
HGB BLD-MCNC: 15.3 G/DL (ref 13.3–17.7)
LYMPHOCYTES # BLD AUTO: 3 10E9/L (ref 0.8–5.3)
LYMPHOCYTES NFR BLD AUTO: 35 %
MCH RBC QN AUTO: 30.2 PG (ref 26.5–33)
MCHC RBC AUTO-ENTMCNC: 33.8 G/DL (ref 31.5–36.5)
MCV RBC AUTO: 89 FL (ref 78–100)
MONOCYTES # BLD AUTO: 0.3 10E9/L (ref 0–1.3)
MONOCYTES NFR BLD AUTO: 4 %
NEUTROPHILS # BLD AUTO: 4.9 10E9/L (ref 1.6–8.3)
NEUTROPHILS NFR BLD AUTO: 55 %
PLATELET # BLD AUTO: 230 10E9/L (ref 150–450)
RBC # BLD AUTO: 5.07 10E12/L (ref 4.4–5.9)
WBC # BLD AUTO: 8.7 10E9/L (ref 4–11)

## 2020-03-30 PROCEDURE — 36415 COLL VENOUS BLD VENIPUNCTURE: CPT | Performed by: PSYCHIATRY & NEUROLOGY

## 2020-03-30 PROCEDURE — 85025 COMPLETE CBC W/AUTO DIFF WBC: CPT | Performed by: PSYCHIATRY & NEUROLOGY

## 2020-03-30 NOTE — PROGRESS NOTES
NAVIGATE Family Peer Support  A Part of the [facility] First Episode of Psychosis Program     Patient Name: Jerel Day  /Age:  1995 (24 year old)  Date of Encounter: 3/27/20  Length of Contact: 25 minutes    This writer reached out to patient's mother, Mesha, to offer family peer support.    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 Self-care and stress reduction strategies were discussed.  Mesha stated that this is a stressful time for Jerel's older brother who is currently a senior at the [employer] and that Jerel is spending less time with his brother right now.    Jerel is doing well at the IR in [place] and doesn't seem to be stressed over the COVID-19 situation. Jerel doesn't seem to mind the required blood draws for clozapine which seems to be controlling his symptoms.     This writer offering ongoing family peers support.    CONSTANTINO FletcherATE Family Peer    [Billing Code 30101 for No Billable Service as family peer support is a nonbillable service]

## 2020-03-30 NOTE — TELEPHONE ENCOUNTER
Jerel Shay was referred to [facility] for outpatient clozapine monitoring.   Current Dose: 150mg QHS  Laboratory Monitoring: weekly home draws. Clozapine started 2/19/20 during inpatient hospitalization. He will be eligible to move to Q2 week monitoring 8/19/20.     Recent Labs   Lab Test 03/30/20  1030 03/23/20  1020 03/16/20  1230 03/09/20  1411   WBC 8.7 8.4 7.4 7.5   ANEU 4.9 4.6 4.0 4.6   HGB 15.3 15.2 15.5 14.6    [phone]       ANC WNL.  9 day supply will be mailed. Next draw due 4/6/20.    Clozapine can be mailed to:   [facility] staff  C/o Jerel Day  [address] [place] [address]    [facility] staff:  Phone: [phone]    Tita Salamanca, PharmD  Medication Therapy Management Pharmacist  [facility]  Phone: [phone]

## 2020-04-01 ENCOUNTER — VIRTUAL VISIT (OUTPATIENT)
Dept: PSYCHIATRY | Facility: CLINIC | Age: 25
End: 2020-04-01
Payer: COMMERCIAL

## 2020-04-01 DIAGNOSIS — N39.44 NOCTURNAL ENURESIS: Primary | ICD-10-CM

## 2020-04-01 NOTE — PROGRESS NOTES
"Jerel Day is a 24 year old male who is being evaluated via a billable telephone visit.      The patient has been notified of following:     \"This telephone visit will be conducted via a call between you and your physician/provider. We have found that certain health care needs can be provided without the need for a physical exam.  This service lets us provide the care you need with a short phone conversation.  If a prescription is necessary we can send it directly to your pharmacy.  If lab work is needed we can place an order for that and you can then stop by our lab to have the test done at a later time.    If during the course of the call the physician/provider feels a telephone visit is not appropriate, you will not be charged for this service.\"     Patient has given verbal consent for Telephone visit?  Yes    Jerel Day complains of    Schizoaffective disorder, bipolar type (F25.0)     I have reviewed and updated the patient's Past Medical History, Social History, Family History and Medication List.    Wes Arriaga, CMA  ALLERGIES  Penicillins    Subjective:     Jerel reports he is doing \"pretty good\" on the Clozapine while in residence in IR  Concentration and attention span improved. Less \"daydreaming\" and only seeing a \"few slips  (flashes) of light \" very infrequently.  Brief episode of voices a few days ago more like \"an argument with myself in my head\".  Has continuous bedwetting at night only in small amts.  no daytime incontinence.  Has had back and neck pain historically. Now notes what feels like \"pockets of stuff around my pecks\" Pectoral muscles  Not painful, inflammed or solid lumps but in the myofascia....    Objective  Psych: Alert and oriented times 3; coherent speech, normal   rate and volume, able to articulate logical thoughts, able   to abstract reason, no tangential thoughts,  Modest verbal illusions=no hallucinations  Or mild somatic delusions referable to his chest area. His " affect is euthymic    Assess  Schizoaffective Disorder  Enuresis (clozaril SE)    Plan  Consider dose increase to 175mg if distress thoughts noted persist  Sudafed 30mg bid for enuresis  revisits in 2 weeks     Phone call duration: 20 minutes    Shai Yusuf MD

## 2020-04-02 ENCOUNTER — VIRTUAL VISIT (OUTPATIENT)
Dept: PSYCHIATRY | Facility: CLINIC | Age: 25
End: 2020-04-02
Payer: COMMERCIAL

## 2020-04-02 DIAGNOSIS — F20.9 SCHIZOPHRENIA, UNSPECIFIED TYPE (H): Primary | ICD-10-CM

## 2020-04-02 NOTE — PROGRESS NOTES
NAVIGATE Clinician Contact & Progress Note  For Individual Resiliency Training (IRT)  A Part of the [facility] First Episode of Psychosis Program    NAVIGATE Enrollee: Jerel Day (1995)     MRN: [id number]  Date:  4/02/20  Diagnosis: Schizophrenia, unspecified type F20.9  Clinician: LILLIANA Individual Resiliency Trainer, EDELMIRA Morgan     1. Type of contact: (majority of time spent)  IRT Session via telehealth  Mode of communication: telephone. Patient consented verbally to this mode of therapy today.  Reason for telehealth: COVID-19. This patient visit was converted to a telehealth visit to minimize exposure to COVID-19. Video visit was offered, but client does not have access to video computer.     2. People present:   Writer  Client: Yes     3. Length of Actual Contact: Start Time: 11am; End Time: 11:55am - Total: 55 minutes     4. Location of contact:  Originating Location (patient location): IRTS facility, located in [place], [place]  Distant Location (provider location): Home office, located in [place], [place], using appropriate privacy considerations and procedures    5. Did the client complete the home practice option(s) from the previous session: Partially Completed    6. Motivational Teaching Strategies:  Connect info and skills with personal goals  Promote hope and positive expectations  Explore pros and cons of change  Re-frame experiences in positive light    7. Educational Teaching Strategies:  Review of written material/education  Relate information to client's experience  Ask questions to check comprehension  Break down information into small chunks  Adopt client's language     8. CBT Teaching Strategies:  Reinforcement and shaping (positive feedback for steps towards goals and gains in knowledge & skills)  Relapse prevention planning (review of stressors and early warning signs)  Coping skills training (review current coping skills and increase currently used skills)  Behavioral  tailoring (fit taking medication into client's daily routine)    9. IRT Module(s) Addressed:  Module 2 - Assessment/Initial Goal Setting  Module 3 - Education about Psychosis  Module 4 - Relapse Prevention Planning    10. Techniques utilized:   [place] announced at beginning of session  Review of goal  Review of previous meeting  Present new material  Problem-solving practice  Help client choose a home practice option  Summarize progress made in current session    11. Measures:    Mental Status Exam  Alertness: alert  and oriented  Behavior/Demeanor: cooperative and pleasant  Speech: regular rate and rhythm  Language: intact and no obvious problem.   Mood: description consistent with euthymia  Thought Process/Associations: unremarkable   Thought Content:  Reports none;  Denies suicidal ideation, violent ideation and delusions  Perception:  Reports auditory hallucinations without commands [details in Interim History];  Denies visual hallucinations, depersonalization and derealization  Insight: limited  Judgment: limited  Cognition: does  appear grossly intact; formal cognitive testing was not done    [place] Protocol Risk Identification  1) Have you wished you were dead or wished you could go to sleep and not wake up? No  2) Have you actually had any thoughts about killing yourself? No  If YES to 2, answer questions 3, 4, 5, 6  If NO to 2, go directly to question 6  3) Have you thought about how you might do this? N/A  4) Have you had any intension of acting on these thoughts of killing yourself, as opposed to you have the thoughts but you definitely would not act on them? N/A  5) Have you started to work out or worked out the details of how to kill yourself? Do you intent to carry out this plan? N/A  Always Ask Question 6  6) Have you done anything, started to do anything, or prepared to do anything to end your life? No  Examples: collected pills, obtained a gun, gave away valuables, wrote a will or suicide note,  held a gun but changed your mind, cut yourself, tried to hang yourself, etc.    PHQ-9  Over the last 2 weeks, how often have you been bothered by any the following problems?    1. Little interest or pleasure in doing things: 1 - Several days  2. Feeling down, depressed, or hopeless: 1 - Several days  3. Trouble falling or staying asleep, or sleeping too much: 0 - Not at all  4. Feeling tired or having little energy: 1 - Several days  5. Poor appetite or overeatin - Several days  6. Feeling bad about yourself - or that you are a failure or have let yourself or your family down: 0 - Not at all  7. Trouble concentrating on things, such as reading the newspaper or watching television: 0 - Not at all  8. Moving or speaking so slowly that other people could have noticed. Or the opposite-being fidgety or restless that you have been moving around a lot more than usual: 1 - Several days; moving slowly  9. Thoughts that you would be better off dead, or of hurting yourself in some way: 0 - Not at all    If you checked off any problems, how difficulty have these problems made it for you to do your work, take care of things at home, or get along with other people? Somewhat difficult    JUNAID-7  Over the last 2 weeks, how often have you been bothered by the following problems?    1. Feeling nervous, anxious or on edge: 1 - Several days  2. Not being able to stop or control worryin - Not at all  3. Worrying too much about different things: 0 - Not at all  4. Trouble relaxin - Not at all  5. Being so restless that it is hard to sit still: 0 - Not at all  6. Becoming easily annoyed or irritable: 0 - Not at all  7. Feeling afraid as if something awful might happen: 0 - Not at all    12. Assessment/Progress Note:     Met with Jerel gray IRT on this date via telephone. Video visit was offered, but client does not have access to video computer or device at this time. Writer set agenda to check-in, discuss and assess symptoms,  "explore material in IRT modules, discuss ways in which Jerel can expand coping strategies and stress-management techniques for ongoing symptom management, and check-in regarding goals for ongoing recovery.    During check-in, Jerel reports overall things are going \"pretty good\" and he described taking things \"one step at a time.\" Writer assessed symptoms; Jerel reports continued AH, but describes them as \"minimal\" and \"nothing distinguishable.\" Reflected on this together as a significant change and improvement; Jerel commented the decrease in AH, \"took a weight off my shoulders.\" Jerel reports no other symptoms.    Reflected on components of recovery utilizing a Stress-Vulnerability lens. Checked in about medication: Jerel reports the medication makes him feel like there is more for him to be thankful for. Went on to describe feeling thankful for \"family support... all the stuff my parents did to raise me.\" Jerel reports some side effects with ongoing wetting the bed a couple of times/week, and reports he is experiencing slight tremors and potentially some increased rigidity. Jerel identifies no current stressors. Shared positively that he has cut down on smoking, and feels this has helped his mood. Jerel also described noticeable changes in recovery including feeling that he is connected to who he is seeing when he looks in the mirror. He also reports significantly decreased somatic complaints and discomfort. Engaged Jerel in discussion of goals. Jerel commented he plans to be at the IRT for about three months, likely until the end of May. He shared positively about being there; reports he is spending his time playing guitar, video games and going out for walks 2x/day with his roommate. He has also been playing [employer] again online and has been reading, finished a book last night.     He commented positively on his relationship with his roommate; reflected together on how living with others is potentially a good thing for Jerel. " "Jerel identified hope to continue develop positive relationships as a goal. Engaged Jerel in consideration of what he feels have been barriers to this in the past; Jerel identified him being quiet and not having that high of chinyere. He shared hope to work on this, especially with his goal of returning to school in the fall. Writer shared hope to develop a well-being plan together considering Jerel's significant progress in overall symptom management and recovery, which Jerel was open to. Will plan for upcoming sessions.     Next session scheduled in two weeks, per Jerel's request. At this time visit in scheduled for in-person. Writer will reach out to Jerel should this need to change pending organization recommendations related to COVID-19 and minimizing risk of exposure.     Overall, Jerel was open and engaged throughout the session. Symptom assessment, safety assessment, discussion and identification of coping strategies, and exploration of material in IRT modules was all in support of Jerel's self-identified goal(s), as identified in most recent BEH Treatment Plan. Progress toward goal completion seems fair.    13. Plan/Referrals:     -Next IRT scheduled in two weeks at 11am.   -At this time visit in scheduled for in-person. Writer will reach out to Jerel should this need to change pending organization recommendations related to COVID-19 and minimizing risk of exposure.     Client and family aware they can reach out to writer directly and/or NAVIGATE team should concerns or needs arise prior to next scheduled appointment.     Billing for \"Interactive Complexity\"?    No      Tammi Connell ARIANNE    NAVIGATE Individual Resiliency Trainer      Attestation:    I did not see this patient directly. This patient is discussed with me in individual clinical social work supervision, and I agree with the plan as documented.     Ana Rose, LICSW, MSW, LICSW, April 13, 2020    "

## 2020-04-02 NOTE — Clinical Note
Visit with Jerel today. SO much improvement. Minimal, if any AH. No somatic complaints. Commented the medication has made him feel more thankful and went on to describe feeling thankful for his family and how he was raised. Amazing to hear such a different tune from him - wanted to pass along.     Shai, he is still wetting the bed a couple of times/week. Also reports potentially slight tremor and some rigidity.    Ana - Ready to be signed, thanks!

## 2020-04-03 RX ORDER — PSEUDOEPHEDRINE HCL 30 MG
30 TABLET ORAL 2 TIMES DAILY
Qty: 60 TABLET | Refills: 1 | Status: SHIPPED | OUTPATIENT
Start: 2020-04-03 | End: 2020-06-08

## 2020-04-06 DIAGNOSIS — F25.0 SCHIZOAFFECTIVE DISORDER, BIPOLAR TYPE (H): ICD-10-CM

## 2020-04-06 LAB
BASOPHILS # BLD AUTO: 0.1 10E9/L (ref 0–0.2)
BASOPHILS NFR BLD AUTO: 1.4 %
CAPILLARY BLOOD COLLECTION: NORMAL
DIFFERENTIAL METHOD BLD: NORMAL
EOSINOPHIL # BLD AUTO: 0.5 10E9/L (ref 0–0.7)
EOSINOPHIL NFR BLD AUTO: 7.7 %
ERYTHROCYTE [DISTWIDTH] IN BLOOD BY AUTOMATED COUNT: 11.8 % (ref 10–15)
HCT VFR BLD AUTO: 46 % (ref 40–53)
HGB BLD-MCNC: 16 G/DL (ref 13.3–17.7)
IMM GRANULOCYTES # BLD: 0.2 10E9/L (ref 0–0.4)
IMM GRANULOCYTES NFR BLD: 2.6 %
LYMPHOCYTES # BLD AUTO: 2.5 10E9/L (ref 0.8–5.3)
LYMPHOCYTES NFR BLD AUTO: 36.8 %
MCH RBC QN AUTO: 30.8 PG (ref 26.5–33)
MCHC RBC AUTO-ENTMCNC: 34.8 G/DL (ref 31.5–36.5)
MCV RBC AUTO: 89 FL (ref 78–100)
MONOCYTES # BLD AUTO: 0.5 10E9/L (ref 0–1.3)
MONOCYTES NFR BLD AUTO: 6.8 %
NEUTROPHILS # BLD AUTO: 3 10E9/L (ref 1.6–8.3)
NEUTROPHILS NFR BLD AUTO: 44.7 %
NRBC # BLD AUTO: 0 10*3/UL
NRBC BLD AUTO-RTO: 0 /100
PLATELET # BLD AUTO: 233 10E9/L (ref 150–450)
RBC # BLD AUTO: 5.19 10E12/L (ref 4.4–5.9)
WBC # BLD AUTO: 6.7 10E9/L (ref 4–11)

## 2020-04-06 PROCEDURE — 36416 COLLJ CAPILLARY BLOOD SPEC: CPT | Performed by: PSYCHIATRY & NEUROLOGY

## 2020-04-06 PROCEDURE — 85025 COMPLETE CBC W/AUTO DIFF WBC: CPT | Performed by: PSYCHIATRY & NEUROLOGY

## 2020-04-07 ENCOUNTER — TELEPHONE (OUTPATIENT)
Dept: PHARMACY | Facility: CLINIC | Age: 25
End: 2020-04-07

## 2020-04-07 NOTE — TELEPHONE ENCOUNTER
Jerel Shay was referred to [facility] for outpatient clozapine monitoring.   Current Dose: 150mg QHS  Laboratory Monitoring: weekly home draws. Clozapine started 2/19/20 during inpatient hospitalization. He will be eligible to move to Q2 week monitoring 8/19/20.     Recent Labs   Lab Test 04/06/20  1150 03/30/20  1030 03/23/20  1020 03/16/20  1230   WBC 6.7 8.7 8.4 7.4   ANEU 3.0 4.9 4.6 4.0   HGB 16.0 15.3 15.2 15.5    [phone]       ANC WNL.  9 day supply will be mailed. Next draw due 4/13/20.    Clozapine can be mailed to:   [facility] staff  C/o Jerel Day  [address] [place] [address]    [facility] staff:  Phone: [phone]    Tita Salamanca, PharmD  Medication Therapy Management Pharmacist  [facility]  Phone: [phone]

## 2020-04-09 ENCOUNTER — TELEPHONE (OUTPATIENT)
Dept: PSYCHIATRY | Facility: CLINIC | Age: 25
End: 2020-04-09

## 2020-04-09 NOTE — TELEPHONE ENCOUNTER
NAVIGATE SEE Outgoing Telephone Call  For Supported Employment & Education    NAVIGATE Enrollee: Jerel Shay (1995)     MRN: [id number]  Date of Call: 4/9/2020  Contacted: Jerel  Call Length: 1 min    Discussed:   Left message for Jerel to schedule a SEE check in meeting for next week    Ana Michel

## 2020-04-13 DIAGNOSIS — F25.0 SCHIZOAFFECTIVE DISORDER, BIPOLAR TYPE (H): ICD-10-CM

## 2020-04-13 LAB
BASOPHILS # BLD AUTO: 0.1 10E9/L (ref 0–0.2)
BASOPHILS NFR BLD AUTO: 1.1 %
CAPILLARY BLOOD COLLECTION: NORMAL
DIFFERENTIAL METHOD BLD: ABNORMAL
EOSINOPHIL # BLD AUTO: 0.5 10E9/L (ref 0–0.7)
EOSINOPHIL NFR BLD AUTO: 6.7 %
ERYTHROCYTE [DISTWIDTH] IN BLOOD BY AUTOMATED COUNT: 11.9 % (ref 10–15)
HCT VFR BLD AUTO: 52.6 % (ref 40–53)
HGB BLD-MCNC: 18 G/DL (ref 13.3–17.7)
IMM GRANULOCYTES # BLD: 0.1 10E9/L (ref 0–0.4)
IMM GRANULOCYTES NFR BLD: 1.1 %
LYMPHOCYTES # BLD AUTO: 2.7 10E9/L (ref 0.8–5.3)
LYMPHOCYTES NFR BLD AUTO: 35.4 %
MCH RBC QN AUTO: 30.4 PG (ref 26.5–33)
MCHC RBC AUTO-ENTMCNC: 34.2 G/DL (ref 31.5–36.5)
MCV RBC AUTO: 89 FL (ref 78–100)
MONOCYTES # BLD AUTO: 0.7 10E9/L (ref 0–1.3)
MONOCYTES NFR BLD AUTO: 9.3 %
NEUTROPHILS # BLD AUTO: 3.5 10E9/L (ref 1.6–8.3)
NEUTROPHILS NFR BLD AUTO: 46.4 %
NRBC # BLD AUTO: 0 10*3/UL
NRBC BLD AUTO-RTO: 0 /100
PLATELET # BLD AUTO: 217 10E9/L (ref 150–450)
RBC # BLD AUTO: 5.93 10E12/L (ref 4.4–5.9)
WBC # BLD AUTO: 7.6 10E9/L (ref 4–11)

## 2020-04-13 PROCEDURE — 36416 COLLJ CAPILLARY BLOOD SPEC: CPT | Performed by: PSYCHIATRY & NEUROLOGY

## 2020-04-13 PROCEDURE — 85025 COMPLETE CBC W/AUTO DIFF WBC: CPT | Performed by: PSYCHIATRY & NEUROLOGY

## 2020-04-14 ENCOUNTER — TELEPHONE (OUTPATIENT)
Dept: PHARMACY | Facility: CLINIC | Age: 25
End: 2020-04-14

## 2020-04-15 DIAGNOSIS — F20.89 OTHER SCHIZOPHRENIA (H): ICD-10-CM

## 2020-04-15 RX ORDER — CLOZAPINE 50 MG/1
150 TABLET ORAL DAILY
Qty: 27 TABLET | Refills: 2 | Status: SHIPPED | OUTPATIENT
Start: 2020-04-15 | End: 2020-04-23

## 2020-04-15 NOTE — TELEPHONE ENCOUNTER
Last seen: 4/1/20  RTC: 2 weeks  Cancel: none  No-show: none  Next appt: none    Incoming refill from Staff via pohne    Medication requested: Clozapine 50 mg   Directions: Take 3 tablets by mouth daily   Qty: 27  Last refilled: 3/23/20 with 2 refills     Medication refill approved per refill protocol

## 2020-04-16 ENCOUNTER — BEH TREATMENT PLAN (OUTPATIENT)
Dept: PSYCHIATRY | Facility: CLINIC | Age: 25
End: 2020-04-16

## 2020-04-16 ENCOUNTER — VIRTUAL VISIT (OUTPATIENT)
Dept: PSYCHIATRY | Facility: CLINIC | Age: 25
End: 2020-04-16
Payer: COMMERCIAL

## 2020-04-16 DIAGNOSIS — F20.9 SCHIZOPHRENIA, UNSPECIFIED TYPE (H): Primary | ICD-10-CM

## 2020-04-16 NOTE — PROGRESS NOTES
NAVIGSTEPHEN Clinician Contact & Progress Note  For Individual Resiliency Training (IRT)  A Part of the [facility] First Episode of Psychosis Program    NAVIGATE Enrollee: Jerel Day (1995)     MRN: [id number]  Date:  4/16/20  Diagnosis: Schizophrenia, unspecified type F20.9  Clinician: LILLIANA Individual Resiliency Trainer, EDELMIRA Morgan     1. Type of contact: (majority of time spent)  IRT Session via telehealth  Mode of communication: telephone. Video visit was offered, but client does not have access to video computer.  Patient consented verbally to this mode of therapy today.  Reason for telehealth: COVID-19. This patient visit was converted to a telehealth visit to minimize exposure to COVID-19.     2. People present:   Writer  Client: Yes     3. Length of Actual Contact: Start Time: 11am; End Time: 11:40am     4. Location of contact:  Originating Location (patient location): IRTS facility, located in [place], [place]  Distant Location (provider location): Home office, located in [place], [place], using appropriate privacy considerations and procedures    5. Did the client complete the home practice option(s) from the previous session: Partially Completed    6. Motivational Teaching Strategies:  Connect info and skills with personal goals  Promote hope and positive expectations  Explore pros and cons of change  Re-frame experiences in positive light    7. Educational Teaching Strategies:  Review of written material/education  Relate information to client's experience  Ask questions to check comprehension  Break down information into small chunks  Adopt client's language     8. CBT Teaching Strategies:  Reinforcement and shaping (positive feedback for steps towards goals and gains in knowledge & skills)  Relapse prevention planning (review of stressors and early warning signs)  Coping skills training (review current coping skills and increase currently used skills)  Behavioral tailoring (fit taking  medication into client's daily routine)    9. IRT Module(s) Addressed:  Module 2 - Assessment/Initial Goal Setting  Module 3 - Education about Psychosis  Module 4 - Relapse Prevention Planning    10. Techniques utilized:   [place] announced at beginning of session  Review of goal  Review of previous meeting  Present new material  Problem-solving practice  Help client choose a home practice option  Summarize progress made in current session    11. Measures:    Mental Status Exam  Alertness: alert  and oriented  Behavior/Demeanor: cooperative and pleasant  Speech: regular rate and rhythm  Language: intact and no obvious problem.   Mood: description consistent with euthymia  Thought Process/Associations: unremarkable   Thought Content:  Reports none;  Denies suicidal ideation, violent ideation and delusions  Perception:  Reports auditory hallucinations without commands [details in Interim History];  Denies visual hallucinations, depersonalization and derealization  Insight: limited  Judgment: limited  Cognition: does  appear grossly intact; formal cognitive testing was not done    Homer Protocol Risk Identification  1) Have you wished you were dead or wished you could go to sleep and not wake up? No  2) Have you actually had any thoughts about killing yourself? No  If YES to 2, answer questions 3, 4, 5, 6  If NO to 2, go directly to question 6  3) Have you thought about how you might do this? N/A  4) Have you had any intension of acting on these thoughts of killing yourself, as opposed to you have the thoughts but you definitely would not act on them? N/A  5) Have you started to work out or worked out the details of how to kill yourself? Do you intent to carry out this plan? N/A  Always Ask Question 6  6) Have you done anything, started to do anything, or prepared to do anything to end your life? No  Examples: collected pills, obtained a gun, gave away valuables, wrote a will or suicide note, held a gun but changed  your mind, cut yourself, tried to hang yourself, etc.    PHQ-9  Over the last 2 weeks, how often have you been bothered by any the following problems?    1. Little interest or pleasure in doing things: 1 - Several days  2. Feeling down, depressed, or hopeless: 1 - Several days  3. Trouble falling or staying asleep, or sleeping too much: 2 - More than half the days; sleeping in more  4. Feeling tired or having little energy: 1 - Several days  5. Poor appetite or overeatin - Several days  6. Feeling bad about yourself - or that you are a failure or have let yourself or your family down: 2 - More than half the days  7. Trouble concentrating on things, such as reading the newspaper or watching television: 1 - Several days  8. Moving or speaking so slowly that other people could have noticed. Or the opposite-being fidgety or restless that you have been moving around a lot more than usual: 1 - Several days; moving slowly  9. Thoughts that you would be better off dead, or of hurting yourself in some way: 0 - Not at all    If you checked off any problems, how difficulty have these problems made it for you to do your work, take care of things at home, or get along with other people? Somewhat difficult    JUNAID-7  Over the last 2 weeks, how often have you been bothered by the following problems?    1. Feeling nervous, anxious or on edge: 1 - Several days  2. Not being able to stop or control worryin - Not at all  3. Worrying too much about different things: 1 - Several days  4. Trouble relaxin - Not at all  5. Being so restless that it is hard to sit still: 2 - More than half the days  6. Becoming easily annoyed or irritable: 1 - Several days  7. Feeling afraid as if something awful might happen: 0 - Not at all    12. Assessment/Progress Note:     Met with Jerel gray IRT on this date via telephone. Video visit was offered, but client does not have access to video computer or device at this time. Writer set agenda to  check-in, discuss and assess symptoms, explore material in IRT modules, discuss ways in which Jerel can expand coping strategies and stress-management techniques for ongoing symptom management, and check-in regarding goals for ongoing recovery.    During check-in, Jerel shared positively about his last two weeks. He spends his time playing computer games, playing guitar, playing chess and spending time with his roommate. He commented that he and his roommate may try to live together after they are finished with their time at the IRTS. Jerel reports his mood is usually good and commented that the medication helps. Jerel reports an increase in AH this past week. He reports voices are most distressing when they are a familiar voice to him commenting negatively on his life, saying things like Jerel will never fall in love or teasing him because he doesn't have a job. Jerel denies VH. Reflected on the content of the voices together and Jerel shared that he has been checking other people's LinkedIn profiles more recently and as a result he feels negatively about himself and that he hasn't graduated/doesn't have a job. Agreed on home practice to not go on LinkedIn this week and see if that impacts mood and/or voices. Writer emphasized the important work that Jerel is doing in taking care of himself and emphasized all of the progress he has made. Jerel was open and receptive to this. Reviewed and updated Jerel's BEH Treatment Plan. Jerel did make comments about some somatic experiences including feeling misaligned, his neck muscles feeling not supportive and feeling that his stomach muscles may not be fully attached and he is hoping to work on this through specific breathing exercises, when discussing goals related to health and basic living needs. He is also hoping to check-in with his -Nola about a housing voucher for when his time is completed at the IRTS (he reports at end of May). Writer will update the team and  "assist with support and care coordination in this process. Scheduled return visit with Dr. Yusuf for next Tuesday at 2pm and scheduled visit with writer Wednesday at 2pm.     Overall, Jerel was open and engaged throughout the session. Symptom assessment, safety assessment, discussion and identification of coping strategies, and exploration of material in IRT modules was all in support of Jerel's self-identified goal(s), as identified in most recent BEH Treatment Plan. Progress toward goal completion seems fair.    13. Plan/Referrals:      Scheduled return visit with Dr. Yusuf for next Tuesday at 2pm and scheduled visit with writer Wednesday at 2pm.     Will route note to team for update purposes.    Client and family aware they can reach out to writer directly and/or NAVIGATE team should concerns or needs arise prior to next scheduled appointment.     Billing for \"Interactive Complexity\"?    No      EDELMIRA Morgan    NAVIGATE Individual Resiliency Trainer        Attestation:    I did not see this patient directly. This patient is discussed with me in individual clinical social work supervision, and I agree with the plan as documented.     Ana Rose, LICSW, MSW, LICSW, April 21, 2020    "

## 2020-04-16 NOTE — Clinical Note
MAMI to Milton, Brittani and Shai - my visit with Jerel today. Still seems to be doing well. AH were increased this week. He also was making comments about somatic delusions again, but not like it has been in the past.     Shai - I scheduled a return visit with you for next week.    Marilu - have you been able to get in touch with Nola? I am still unclear on his timeline but he mentioned wanting to check in with her re: a housing voucher. He is hoping to live with his roommate from the [facility] after - he thinks he is there until May.     I see him again next Wednesday.    Thanks!  Tammi Rose - Ready to be signed, thanks!

## 2020-04-17 ENCOUNTER — TELEPHONE (OUTPATIENT)
Dept: PSYCHIATRY | Facility: CLINIC | Age: 25
End: 2020-04-17

## 2020-04-20 ENCOUNTER — TELEPHONE (OUTPATIENT)
Dept: PHARMACY | Facility: CLINIC | Age: 25
End: 2020-04-20

## 2020-04-20 DIAGNOSIS — F25.0 SCHIZOAFFECTIVE DISORDER, BIPOLAR TYPE (H): ICD-10-CM

## 2020-04-20 LAB
BASOPHILS # BLD AUTO: 0.1 10E9/L (ref 0–0.2)
BASOPHILS NFR BLD AUTO: 0.7 %
CAPILLARY BLOOD COLLECTION: NORMAL
DIFFERENTIAL METHOD BLD: NORMAL
EOSINOPHIL # BLD AUTO: 0.7 10E9/L (ref 0–0.7)
EOSINOPHIL NFR BLD AUTO: 8.4 %
ERYTHROCYTE [DISTWIDTH] IN BLOOD BY AUTOMATED COUNT: 12.6 % (ref 10–15)
HCT VFR BLD AUTO: 44.1 % (ref 40–53)
HGB BLD-MCNC: 15.2 G/DL (ref 13.3–17.7)
LYMPHOCYTES # BLD AUTO: 2.8 10E9/L (ref 0.8–5.3)
LYMPHOCYTES NFR BLD AUTO: 31.4 %
MCH RBC QN AUTO: 30.8 PG (ref 26.5–33)
MCHC RBC AUTO-ENTMCNC: 34.5 G/DL (ref 31.5–36.5)
MCV RBC AUTO: 89 FL (ref 78–100)
MONOCYTES # BLD AUTO: 0.8 10E9/L (ref 0–1.3)
MONOCYTES NFR BLD AUTO: 9.3 %
NEUTROPHILS # BLD AUTO: 4.4 10E9/L (ref 1.6–8.3)
NEUTROPHILS NFR BLD AUTO: 50.2 %
PLATELET # BLD AUTO: 277 10E9/L (ref 150–450)
RBC # BLD AUTO: 4.94 10E12/L (ref 4.4–5.9)
WBC # BLD AUTO: 8.8 10E9/L (ref 4–11)

## 2020-04-20 PROCEDURE — 36416 COLLJ CAPILLARY BLOOD SPEC: CPT | Performed by: PSYCHIATRY & NEUROLOGY

## 2020-04-20 PROCEDURE — 85025 COMPLETE CBC W/AUTO DIFF WBC: CPT | Performed by: PSYCHIATRY & NEUROLOGY

## 2020-04-20 NOTE — TELEPHONE ENCOUNTER
Jerel Shay was referred to [facility] for outpatient clozapine monitoring.   Current Dose: 150mg QHS  Laboratory Monitoring: weekly home draws. Clozapine started 2/19/20 during inpatient hospitalization. He will be eligible to move to Q2 week monitoring 8/19/20.     Recent Labs   Lab Test 04/20/20  1105 04/13/20  1150 04/06/20  1150 03/30/20  1030   WBC 8.8 7.6 6.7 8.7   ANEU 4.4 3.5 3.0 4.9   HGB 15.2 18.0* 16.0 15.3    217 233 230       ANC WNL.  Clozapine will be mailed to patient. Next draw due 4/27/20.    Clozapine can be mailed to:   [facility] staff  C/o Jerel Stockelvis  [address] [place] [address]    [facility] staff:  Phone: [phone]    Azeb Mclean, PharmD, BCPP  Medication Therapy Management Pharmacist  [facility]  [phone]

## 2020-04-21 ENCOUNTER — VIRTUAL VISIT (OUTPATIENT)
Dept: PSYCHIATRY | Facility: CLINIC | Age: 25
End: 2020-04-21
Payer: COMMERCIAL

## 2020-04-21 DIAGNOSIS — F20.89 OTHER SCHIZOPHRENIA (H): ICD-10-CM

## 2020-04-21 NOTE — PROGRESS NOTES
Called to go through meds and allergies but goes right to VM that is not set up. Unable to LVM. KK

## 2020-04-21 NOTE — PROGRESS NOTES
"NAVIGATE SEE Progress Note   For Supported Employment & Education    NAVIGATE Enrollee: Jerel Day (1995)     MRN: [id number]  Date:  4/16/20  Clinician: NAVIGATE Supported Employment & , Ana Michel    1. Client Status Update:   Jerel Day is interested in education (Client developed educational goals)    2. People present:   SEE/Writer  Client: Jerel Day    3. Length of Actual Contact: 35 minutes   Traveled? No    4. Location of contact:  Telephone    5. Brief description of session, contact, or client status (include: strategies, interventions, client reaction to contact, next steps, etc)    Writer and Jerel Day met via telephone for a follow up Supported Employment and Education (SEE) session. Jerel Day has been working on the goal of exploring careers and education options, working on creating daily task list and exploring leisure activities. Today's agenda included: check in, goal setting.  -Doing well at [employer] in [place], has roommate who he likes and is interested in living with this person after he moves out of the [employer]. Jerel was discussing potentially returning to school or finding a really part time job, though he says that now \"probably isn't a good time because of all of the corona virus stuff, I wouldn't want to be exposed to that\". Jerel has not spoken with his , Nola in a while but would like her assistance completing an online \"form for the state, I can't remember the name of it right now but something about my bank account not being approved\". Writer probed for more questions but is unsure which form Jerel was trying to fill out. Writer will follow up with , Nola Summers to see where we are in the process and if she can assist him.   This patient visit was converted to a telephone call to minimize exposure to COVID-19.      6. Completion of mutually agreed upon client task from previous meeting:  Not Applicable    7. Orientation " and Treatment Planning:  Pursuing current SEE goals    8. Assessment:  Assessing client's need for follow-along supports    9. Placement:  Not Applicable    10. Follow Along Supports: (for clients who are working or attending school)   Not Applicable    11. Mutually agreed upon client task for next meeting:     See above    12. Next Meeting Scheduled for: two weeks    Ana TIJERINA Supported Employment &

## 2020-04-21 NOTE — PROGRESS NOTES
"Contact: Writer attempted to call Jerel twice for Tuesday session. Both times rang once and went right to message stating voicemail is not set-up.   Will attempt to reach out to Jerel to reschedule.  Notify parents of lack of contact.  Same problem as EBONY Connell today on med reordering review;  Last time I called the residence # and they got him to phone.     #Schizoaffective disorder, bipolar type (F25.0) , early relapse ? Med dose inadequate or not ingested.     Called father, León who was in the middle of work conferences calls on Tues. 4/21 and unable to reconnect.   We were finally able to talk with dad today.  He says Jerel has been unreachable to him by personal or facility phone. He is concerned that 1) he may not be taking his medication (CLZ) as he has been more impulsive 1) seeking out the use of siblings car 2) cased a $1200 stimulus check that parents had filed on his behalf mailed to his residence not to home. Spending spree again is likely with the $$$. It happened last summer with elopement by car  to [employer]. to buy cannabis during a recurrent psychosis episode.    Spent time updating father on the medication mgmt. Specifics on Clozaril and required REMS process. Assured him that wbc counts are normal.  PLAN:  1.  based upon this repeat of past behavioral change an increase in dose from 150mg to 200mg (single tab rather than 3x50mg) now.  2.  will need to be monitored for adherence   by the facility staff. Monitor for at least 30 min. after swallowing to avoid discarding it.  3. Jerel has Android phone so instructed father to help him download [employer] Touchpoint so we can have video eval. And avoid the roll over  (last phone visit was short and \"doing just fine\" reply most likely misleading)  4. Notified EBONY Connell  of same and encourage Father to respond to outreach from family counsBakari Nicole  to address consequences of possible relapse and help with limit setting.  5. Natalie LangleyD of [place] " notified of above order change to be implemented ASAP.  6. Will try and set up for a Video conf. Call on 4/28    Shai Yusuf MD

## 2020-04-22 ENCOUNTER — VIRTUAL VISIT (OUTPATIENT)
Dept: PSYCHIATRY | Facility: CLINIC | Age: 25
End: 2020-04-22
Payer: COMMERCIAL

## 2020-04-22 DIAGNOSIS — F20.9 SCHIZOPHRENIA, UNSPECIFIED TYPE (H): Primary | ICD-10-CM

## 2020-04-22 NOTE — Clinical Note
Hi! I couldn't reach Jerel today and tried 15 minutes apart - both times went to [employer] that isn't set up. If you talk to him this week (I see you're scheduled) could you help coordinate him getting another visit with me scheduled for next week? Thank you so much!

## 2020-04-22 NOTE — PROGRESS NOTES
NAVIGATE Outreach  A Part of the [facility] First Episode of Psychosis Program     Patient Name: Jeerl Day  /Age:  1995 (24 year old)    Contact: Writer attempted to call Jerel twice for session. Both times rang once and went right to message stating voicemail is not set-up. Will attempt to reach out to Jerel to reschedule.    Plan: Will route note to NAVIGATE SEE - YOSEPH Raymundo and request assistance in coordinating next visit with writer in the event she connects with him this week. If this does not work, writer will reach out next week to Jerel to reschedule.     Tammi Connell, EDELMIRA LAFLEURATE Individual Resiliency  & Family Clinician    This is a non-billable encounter as it was solely for the purposes of outreach and/or care coordination.

## 2020-04-23 RX ORDER — CLOZAPINE 200 MG/1
200 TABLET ORAL DAILY
Qty: 7 TABLET | Refills: 3 | Status: SHIPPED | OUTPATIENT
Start: 2020-04-23 | End: 2020-05-13

## 2020-04-23 NOTE — TELEPHONE ENCOUNTER
Noted that clozapine was increased to 200mg at bedtime and new Rx was sent to retail pharmacy. Retail will mail 5 day supply of clozapine 200mg today. Next lab draw 4/27.

## 2020-04-24 ENCOUNTER — TELEPHONE (OUTPATIENT)
Dept: PSYCHIATRY | Facility: CLINIC | Age: 25
End: 2020-04-24

## 2020-04-24 NOTE — TELEPHONE ENCOUNTER
Left message for patient's group home to call and make an appt with Dr. Yusuf.     [facility]  Phone: [phone]

## 2020-04-27 ENCOUNTER — PATIENT OUTREACH (OUTPATIENT)
Dept: PSYCHIATRY | Facility: CLINIC | Age: 25
End: 2020-04-27

## 2020-04-27 ENCOUNTER — TELEPHONE (OUTPATIENT)
Dept: PHARMACY | Facility: CLINIC | Age: 25
End: 2020-04-27

## 2020-04-27 DIAGNOSIS — F25.0 SCHIZOAFFECTIVE DISORDER, BIPOLAR TYPE (H): ICD-10-CM

## 2020-04-27 LAB
BASOPHILS # BLD AUTO: 0 10E9/L (ref 0–0.2)
BASOPHILS NFR BLD AUTO: 0.2 %
CAPILLARY BLOOD COLLECTION: NORMAL
DIFFERENTIAL METHOD BLD: NORMAL
EOSINOPHIL # BLD AUTO: 0.5 10E9/L (ref 0–0.7)
EOSINOPHIL NFR BLD AUTO: 5.2 %
ERYTHROCYTE [DISTWIDTH] IN BLOOD BY AUTOMATED COUNT: 12.5 % (ref 10–15)
HCT VFR BLD AUTO: 44.6 % (ref 40–53)
HGB BLD-MCNC: 15.2 G/DL (ref 13.3–17.7)
LYMPHOCYTES # BLD AUTO: 2.6 10E9/L (ref 0.8–5.3)
LYMPHOCYTES NFR BLD AUTO: 26.3 %
MCH RBC QN AUTO: 30.4 PG (ref 26.5–33)
MCHC RBC AUTO-ENTMCNC: 34.1 G/DL (ref 31.5–36.5)
MCV RBC AUTO: 89 FL (ref 78–100)
MONOCYTES # BLD AUTO: 1.1 10E9/L (ref 0–1.3)
MONOCYTES NFR BLD AUTO: 10.9 %
NEUTROPHILS # BLD AUTO: 5.6 10E9/L (ref 1.6–8.3)
NEUTROPHILS NFR BLD AUTO: 57.4 %
PLATELET # BLD AUTO: 272 10E9/L (ref 150–450)
RBC # BLD AUTO: 5 10E12/L (ref 4.4–5.9)
WBC # BLD AUTO: 9.7 10E9/L (ref 4–11)

## 2020-04-27 PROCEDURE — 36416 COLLJ CAPILLARY BLOOD SPEC: CPT | Performed by: PSYCHIATRY & NEUROLOGY

## 2020-04-27 PROCEDURE — 85025 COMPLETE CBC W/AUTO DIFF WBC: CPT | Performed by: PSYCHIATRY & NEUROLOGY

## 2020-04-27 NOTE — PROGRESS NOTES
NAVIGATE Outreach  A Part of the [facility] First Episode of Psychosis Program     Patient Name: Jerel Day  /Age:  1995 (24 year old)    Contacted Jerel's mom, Mesha in the context of team member's not being able to reach Jerel.     Mesha called [employer] last week and again this morning - [employer] [place] at Phone: [phone] mailbox was full and rolled over to another number for Jeevan. Mom LVM. Per mom, last Monday Jerel saw his brother but mom has not been able to reach him since. Phone goes straight to voicemail with no voicemail set-up. Uncertain if his phone is working. Brother said he seemed to be doing well during their visit. Dad, León dropped off medication Tuesday or Wednesday of last week. Resident answered the door and said Jerel was not available. Typically family would have heard from him by now by call or text.     Mom plans to contact [facility] again, reach out to MONIQUE/Nola and Jerel's brother, Chilo to see if he could drop by the I[facility]S. Mom will keep the team updated with any progress made.     Ana Rose, [facility]  NAVIGATE Director & Family Clinician

## 2020-04-27 NOTE — TELEPHONE ENCOUNTER
Jerel Shay was referred to [facility] for outpatient clozapine monitoring.   Current Dose: 200mg QHS  Laboratory Monitoring: weekly home draws. Clozapine started 2/19/20 during inpatient hospitalization. He will be eligible to move to Q2 week monitoring 8/19/20.     Recent Labs   Lab Test 04/27/20  1120 04/20/20  1105 04/13/20  1150 04/06/20  1150   WBC 9.7 8.8 7.6 6.7   ANEU 5.6 4.4 3.5 3.0   HGB 15.2 15.2 18.0* 16.0    277 217 233       ANC WNL.  Clozapine will be mailed to patient. Next draw due 5/4/20.    Clozapine can be mailed to:   [facility] staff  C/o Jerel Jonesrenetta  [address] [place] [address]    [facility] staff:  Phone: [phone]    Tita Salamanca, PharmD  Medication Therapy Management Pharmacist  [facility]  Phone: [phone]

## 2020-04-27 NOTE — PROGRESS NOTES
NAVIGATE Outreach  A Part of the [facility] First Episode of Psychosis Program     Patient Name: Jerel Day  /Age:  1995 (24 year old)    Contact: Writer attempted to call [email or web address] [place] [phone]; number is not in use. Unable to get through. Attempted to call Donald Glaser - [facility]  - [phone] as listed on website; no answer and name attached to VM was Jermaine Bae - . Writer PEPE inquiring about working number for [email or web address] Damian Keith. Provided writer's direct contact information for return call.     Plan: Will route note to team for update purposes.     EDELMIRA Morgan  NAVIGATE Individual Resiliency  & Family Clinician

## 2020-04-28 ENCOUNTER — TELEPHONE (OUTPATIENT)
Dept: PSYCHIATRY | Facility: CLINIC | Age: 25
End: 2020-04-28
Payer: COMMERCIAL

## 2020-04-29 ENCOUNTER — PATIENT OUTREACH (OUTPATIENT)
Dept: PSYCHIATRY | Facility: CLINIC | Age: 25
End: 2020-04-29

## 2020-04-29 ENCOUNTER — VIRTUAL VISIT (OUTPATIENT)
Dept: PSYCHIATRY | Facility: CLINIC | Age: 25
End: 2020-04-29
Payer: COMMERCIAL

## 2020-04-29 DIAGNOSIS — F20.9 SCHIZOPHRENIA, UNSPECIFIED TYPE (H): Primary | ICD-10-CM
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
## 2020-05-01 ENCOUNTER — PATIENT OUTREACH (OUTPATIENT)
Dept: PSYCHIATRY | Facility: CLINIC | Age: 25
End: 2020-05-01

## 2020-05-01 NOTE — TELEPHONE ENCOUNTER
NAVIGATE Outreach  A Part of the [facility] First Episode of Psychosis Program     Patient Name: Jerel Day  /Age:  1995 (24 year old)    Contact: Writer called and LVM for Jerel's  - Nola Summers [phone]; requested return call to discuss housing options and progress with regards to case management goals.     Plan: Will await return call.     EDELMIRA Morgan  NAVIGATE Individual Resiliency [place] [facility] Clinician     Reason for Disposition   Patient wants to be seen   Fever present > 3 days (72 hours)   [1] Fever returns after gone for over 24 hours AND [2] symptoms worse or not improved    Answer Assessment - Initial Assessment Questions  1. TEMPERATURE: \"What is the most recent temperature? \"  \"How was it measured? \"       101.3, has gone down 99, intermittent   2. ONSET: \"When did the fever start? \"   wednesday  3. SYMPTOMS: \"Do you have any other symptoms besides the fever? \"  (e.g., colds, headache, sore throat, earache, cough, rash, diarrhea, vomiting, abdominal pain)      Headache, dry cough  4. CAUSE: If there are no symptoms, ask: \"What do you think is causing the fever? \"      ? 5. CONTACTS: \"Does anyone else in the family have an infection? \"      ? 6. TREATMENT: \"What have you done so far to treat this fever? \" (e.g., medications)     tylenol  7. IMMUNOCOMPROMISE: \"Do you have of the following: diabetes, HIV positive, splenectomy, cancer chemotherapy, chronic steroid treatment, transplant patient, etc.\"      no  8. PREGNANCY: \"Is there any chance you are pregnant? \" \"When was your last menstrual period? \"      no  9. TRAVEL: \"Have you traveled out of the country in the last month? \" (e.g., travel history, exposures)      no    Protocols used: FEVER-ADULT-OH, COUGH - ACUTE NON-PRODUCTIVE-ADULT-AH    Pt with several days of fever, fatigue, body aches, headache, dry cough    Call routed to this RN from [facility] for nurse triage. See pt triage notes above. Per protocol pt to be seen in office within 24 hours, pt plans to comply. Transferred back to [facility] at this time. Please do not respond to the triage nurse through this encounter. Any subsequent communication needs to be directly with the patient.

## 2020-05-04 ENCOUNTER — VIRTUAL VISIT (OUTPATIENT)
Dept: PSYCHIATRY | Facility: CLINIC | Age: 25
End: 2020-05-04
Payer: COMMERCIAL

## 2020-05-04 DIAGNOSIS — F20.9 SCHIZOPHRENIA, UNSPECIFIED TYPE (H): Primary | ICD-10-CM

## 2020-05-04 DIAGNOSIS — F25.0 SCHIZOAFFECTIVE DISORDER, BIPOLAR TYPE (H): ICD-10-CM

## 2020-05-04 LAB
BASOPHILS # BLD AUTO: 0.1 10E9/L (ref 0–0.2)
BASOPHILS NFR BLD AUTO: 1 %
CAPILLARY BLOOD COLLECTION: NORMAL
DIFFERENTIAL METHOD BLD: ABNORMAL
EOSINOPHIL # BLD AUTO: 1 10E9/L (ref 0–0.7)
EOSINOPHIL NFR BLD AUTO: 12.3 %
ERYTHROCYTE [DISTWIDTH] IN BLOOD BY AUTOMATED COUNT: 12.2 % (ref 10–15)
HCT VFR BLD AUTO: 45.4 % (ref 40–53)
HGB BLD-MCNC: 15.2 G/DL (ref 13.3–17.7)
IMM GRANULOCYTES # BLD: 0.2 10E9/L (ref 0–0.4)
IMM GRANULOCYTES NFR BLD: 2.4 %
LYMPHOCYTES # BLD AUTO: 2.3 10E9/L (ref 0.8–5.3)
LYMPHOCYTES NFR BLD AUTO: 27.6 %
MCH RBC QN AUTO: 30.3 PG (ref 26.5–33)
MCHC RBC AUTO-ENTMCNC: 33.5 G/DL (ref 31.5–36.5)
MCV RBC AUTO: 90 FL (ref 78–100)
MONOCYTES # BLD AUTO: 0.5 10E9/L (ref 0–1.3)
MONOCYTES NFR BLD AUTO: 6.6 %
NEUTROPHILS # BLD AUTO: 4.1 10E9/L (ref 1.6–8.3)
NEUTROPHILS NFR BLD AUTO: 50.1 %
NRBC # BLD AUTO: 0 10*3/UL
NRBC BLD AUTO-RTO: 0 /100
PLATELET # BLD AUTO: 267 10E9/L (ref 150–450)
RBC # BLD AUTO: 5.02 10E12/L (ref 4.4–5.9)
WBC # BLD AUTO: 8.2 10E9/L (ref 4–11)

## 2020-05-04 PROCEDURE — 36416 COLLJ CAPILLARY BLOOD SPEC: CPT | Performed by: PSYCHIATRY & NEUROLOGY

## 2020-05-04 PROCEDURE — 85025 COMPLETE CBC W/AUTO DIFF WBC: CPT | Performed by: PSYCHIATRY & NEUROLOGY

## 2020-05-04 NOTE — PROGRESS NOTES
NAVIGSTEPHEN Clinician Contact & Progress Note  For Individual Resiliency Training (IRT)  A Part of the [facility] First Episode of Psychosis Program    NAVIGATE Enrollee: Jerel Day (1995)     MRN: [id number]  Date:  5/04/20  Diagnosis: Schizophrenia, unspecified type F20.9  Clinician: LILLIANA Individual Resiliency Trainer, EDELMIRA Morgan     1. Type of contact: (majority of time spent)  IRT Session via telehealth  Mode of communication: telephone. Video visit was offered, but client does not have access to video computer.  Patient consented verbally to this mode of therapy today.  Reason for telehealth: COVID-19. This patient visit was converted to a telehealth visit to minimize exposure to COVID-19.     2. People present:   Writer  Client: Yes     3. Length of Actual Contact: Start Time: 4pm; End Time: 4:24pm     4. Location of contact:  Originating Location (patient location): [facility] facility, located in [place], [place]  Distant Location (provider location): Home office, located in [place], [place], using appropriate privacy considerations and procedures    5. Did the client complete the home practice option(s) from the previous session: Partially Completed    6. Motivational Teaching Strategies:  Connect info and skills with personal goals  Promote hope and positive expectations  Explore pros and cons of change  Re-frame experiences in positive light    7. Educational Teaching Strategies:  Review of written material/education  Relate information to client's experience  Ask questions to check comprehension  Break down information into small chunks  Adopt client's language     8. CBT Teaching Strategies:  Reinforcement and shaping (positive feedback for steps towards goals and gains in knowledge & skills)  Relapse prevention planning (review of stressors and early warning signs)  Coping skills training (review current coping skills and increase currently used skills)  Behavioral tailoring (fit taking  medication into client's daily routine)    9. IRT Module(s) Addressed:  Module 2 - Assessment/Initial Goal Setting  Module 3 - Education about Psychosis  Module 4 - Relapse Prevention Planning    10. Techniques utilized:   [place] announced at beginning of session  Review of goal  Review of previous meeting  Present new material  Problem-solving practice  Help client choose a home practice option  Summarize progress made in current session    11. Measures:    Mental Status Exam  Alertness: alert  and oriented  Behavior/Demeanor: cooperative and pleasant  Speech: regular rate and rhythm  Language: intact and no obvious problem.   Mood: description consistent with euthymia  Thought Process/Associations: unremarkable   Thought Content:  Reports none;  Denies suicidal ideation, violent ideation and delusions  Perception:  Reports auditory hallucinations without commands [details in Interim History];  Denies visual hallucinations, depersonalization and derealization  Insight: limited  Judgment: limited  Cognition: does  appear grossly intact; formal cognitive testing was not done    [place] Protocol Risk Identification  1) Have you wished you were dead or wished you could go to sleep and not wake up? No  2) Have you actually had any thoughts about killing yourself? No  If YES to 2, answer questions 3, 4, 5, 6  If NO to 2, go directly to question 6  3) Have you thought about how you might do this? N/A  4) Have you had any intension of acting on these thoughts of killing yourself, as opposed to you have the thoughts but you definitely would not act on them? N/A  5) Have you started to work out or worked out the details of how to kill yourself? Do you intent to carry out this plan? N/A  Always Ask Question 6  6) Have you done anything, started to do anything, or prepared to do anything to end your life? No  Examples: collected pills, obtained a gun, gave away valuables, wrote a will or suicide note, held a gun but changed  "your mind, cut yourself, tried to hang yourself, etc.    Jerel opted to not complete the PHQ-9 and JUNAID-7. Mood assessed verbally during phone call, not through formal measure.    12. Assessment/Progress Note:     Met with Jerel for IRT on this date via telephone. Video visit was offered, but client does not have access to video computer or device at this time. Writer set agenda to check-in, discuss and assess symptoms, explore material in IRT modules, discuss ways in which Jerel can expand coping strategies and stress-management techniques for ongoing symptom management, and check-in regarding goals for ongoing recovery.    Jerel reports overall he is doing well. He still does not have a phone, but reports Dr. Yusuf can call him on his roommates phone this week. Writer shared plan to pass along to Dr. Yusuf. With regards to symptoms, Jerel reports the voices are \"not too bad.\" He shared that because he has been busy during the day, he doesn't notice the voices as much. He denies any command hallucinations; denies SI/SH and denies HI/VI. Reports he has been sleeping each night. Described managing well with different people that are difficult to get along with in his facility; he reports he is keeping his space from people and described the environment as \"a good place to practice living skills and dealing with other people.\" Writer commented positively on all of the progress Jerel has made and offered praise and validation for the ways in which he is handling distressing situations skillfully. Jerel then had to go because his roommate needed his phone. Scheduled next session for next week. Overall, Jerel was open and engaged throughout the session. Symptom assessment, safety assessment, discussion and identification of coping strategies, and exploration of material in IRT modules was all in support of Jerel's self-identified goal(s), as identified in most recent BEH Treatment Plan. Progress toward goal completion seems " "fair.    13. Plan/Referrals:     Next IRT scheduled next Thursday at 11am.     Will route note to team for update purposes.    Client and family aware they can reach out to writer directly and/or NAVIGATE team should concerns or needs arise prior to next scheduled appointment.     Billing for \"Interactive Complexity\"?    No      EDELMIRA Morgan    NAVIGATE Individual Resiliency Trainer        Attestation:    I did not see this patient directly. This patient is discussed with me in individual clinical social work supervision, and I agree with the plan as documented.     Ana Rose, LICSW, MSW, LICSW, May 11, 2020    "

## 2020-05-05 ENCOUNTER — TELEPHONE (OUTPATIENT)
Dept: PHARMACY | Facility: CLINIC | Age: 25
End: 2020-05-05

## 2020-05-05 NOTE — TELEPHONE ENCOUNTER
Jerel Shay was referred to [facility] for outpatient clozapine monitoring.   Current Dose: 200mg QHS  Laboratory Monitoring: weekly home draws. Clozapine started 2/19/20 during inpatient hospitalization. He will be eligible to move to Q2 week monitoring 8/19/20.     Recent Labs   Lab Test 05/04/20  1145 04/27/20  1120 04/20/20  1105 04/13/20  1150   WBC 8.2 9.7 8.8 7.6   ANEU 4.1 5.6 4.4 3.5   HGB 15.2 15.2 15.2 18.0*    [phone]       ANC WNL.  Clozapine will be mailed to patient. Next draw due 5/11/20.    Clozapine can be mailed to:   [facility] staff  C/o Jerel Jonesrenetta  [address] [place] [id number]    [facility] staff:  Phone: [phone]    Tita Salamanca, PharmD  Medication Therapy Management Pharmacist  [facility]  Phone: [phone]

## 2020-05-08 ENCOUNTER — PATIENT OUTREACH (OUTPATIENT)
Dept: PSYCHIATRY | Facility: CLINIC | Age: 25
End: 2020-05-08

## 2020-05-08 NOTE — TELEPHONE ENCOUNTER
NAVIGATE Outreach  A Part of the [facility] First Episode of Psychosis Program     Patient Name: Jerel Day  /Age:  1995 (24 year old)    Contact: Writer called and LVM for TAVARES Smith - Director with [facility]. Requested return call to address questions related to Jerel's -Nola and her availability/accessibility during this time as both Jerel and writer/MIQUELATE team have been unable to get in touch with her.    Plan: Will await return call.     Tammi Connell, EDELMIRA  NAVIGATE Individual Resiliency [place] [facility] Clinician

## 2020-05-11 DIAGNOSIS — F25.0 SCHIZOAFFECTIVE DISORDER, BIPOLAR TYPE (H): ICD-10-CM

## 2020-05-11 LAB
BASOPHILS # BLD AUTO: 0 10E9/L (ref 0–0.2)
BASOPHILS NFR BLD AUTO: 0.5 %
DIFFERENTIAL METHOD BLD: ABNORMAL
EOSINOPHIL # BLD AUTO: 0.8 10E9/L (ref 0–0.7)
EOSINOPHIL NFR BLD AUTO: 10 %
ERYTHROCYTE [DISTWIDTH] IN BLOOD BY AUTOMATED COUNT: 11.9 % (ref 10–15)
HCT VFR BLD AUTO: 45.7 % (ref 40–53)
HGB BLD-MCNC: 15.9 G/DL (ref 13.3–17.7)
IMM GRANULOCYTES # BLD: 0.1 10E9/L (ref 0–0.4)
IMM GRANULOCYTES NFR BLD: 0.9 %
LYMPHOCYTES # BLD AUTO: 2.6 10E9/L (ref 0.8–5.3)
LYMPHOCYTES NFR BLD AUTO: 32.6 %
MCH RBC QN AUTO: 30.9 PG (ref 26.5–33)
MCHC RBC AUTO-ENTMCNC: 34.8 G/DL (ref 31.5–36.5)
MCV RBC AUTO: 89 FL (ref 78–100)
MONOCYTES # BLD AUTO: 0.5 10E9/L (ref 0–1.3)
MONOCYTES NFR BLD AUTO: 5.9 %
NEUTROPHILS # BLD AUTO: 4.1 10E9/L (ref 1.6–8.3)
NEUTROPHILS NFR BLD AUTO: 50.1 %
NRBC # BLD AUTO: 0 10*3/UL
NRBC BLD AUTO-RTO: 0 /100
PLATELET # BLD AUTO: 252 10E9/L (ref 150–450)
RBC # BLD AUTO: 5.15 10E12/L (ref 4.4–5.9)
WBC # BLD AUTO: 8 10E9/L (ref 4–11)

## 2020-05-11 PROCEDURE — 36415 COLL VENOUS BLD VENIPUNCTURE: CPT | Performed by: PSYCHIATRY & NEUROLOGY

## 2020-05-11 PROCEDURE — 85025 COMPLETE CBC W/AUTO DIFF WBC: CPT | Performed by: PSYCHIATRY & NEUROLOGY

## 2020-05-12 DIAGNOSIS — F20.89 OTHER SCHIZOPHRENIA (H): ICD-10-CM

## 2020-05-13 ENCOUNTER — TELEPHONE (OUTPATIENT)
Dept: PHARMACY | Facility: CLINIC | Age: 25
End: 2020-05-13

## 2020-05-13 RX ORDER — CLOZAPINE 200 MG/1
200 TABLET ORAL DAILY
Qty: 9 TABLET | Refills: 3 | Status: SHIPPED | OUTPATIENT
Start: 2020-05-13 | End: 2020-06-02

## 2020-05-13 NOTE — TELEPHONE ENCOUNTER
Jerel Shay was referred to [facility] for outpatient clozapine monitoring.   Current Dose: 200mg QHS  Laboratory Monitoring: weekly home draws. Clozapine started 2/19/20 during inpatient hospitalization. He will be eligible to move to Q2 week monitoring 8/19/20.     Recent Labs   Lab Test 05/11/20  1210 05/04/20  1145 04/27/20  1120 04/20/20  1105   WBC 8.0 8.2 9.7 8.8   ANEU 4.1 4.1 5.6 4.4   HGB 15.9 15.2 15.2 15.2    267 272 277       ANC WNL.  Clozapine will be mailed to patient. Next draw due 5/18/20.    Clozapine can be mailed to:   [facility] staff  C/o Jerel Jonesrenetta  [address] [place] [id number]    [facility] staff:  Phone: [phone]    Tita Salamanca, PharmD  Medication Therapy Management Pharmacist  [facility]  Phone: [phone]

## 2020-05-14 ENCOUNTER — VIRTUAL VISIT (OUTPATIENT)
Dept: PSYCHIATRY | Facility: CLINIC | Age: 25
End: 2020-05-14
Payer: COMMERCIAL

## 2020-05-14 ENCOUNTER — PATIENT OUTREACH (OUTPATIENT)
Dept: PSYCHIATRY | Facility: CLINIC | Age: 25
End: 2020-05-14

## 2020-05-14 DIAGNOSIS — F20.9 SCHIZOPHRENIA, UNSPECIFIED TYPE (H): Primary | ICD-10-CM

## 2020-05-14 NOTE — Clinical Note
FYI - my phone call with Jerel today. I will send a separate inbasket with everyone on it with update and discussion of next steps.

## 2020-05-14 NOTE — PROGRESS NOTES
NAVIGATE Clinician Contact & Progress Note  For Individual Resiliency Training (IRT)  A Part of the [facility] First Episode of Psychosis Program    NAVIGATE Enrollee: Jerel Day (1995)     MRN: [id number]  Date:  5/14/20  Diagnosis: Schizophrenia, unspecified type F20.9  Clinician: LILLIANA Individual Resiliency Trainer, EDELMIRA Morgan     1. Type of contact: (majority of time spent)  IRT Session via telehealth  Mode of communication: telephone. Video visit was offered, but client does not have access to video computer. Jerel had to use his roommates phone as he does not have a phone right now. Patient consented verbally to this mode of therapy today.  Reason for telehealth: COVID-19. This patient visit was converted to a telehealth visit to minimize exposure to COVID-19.     2. People present:   Writer  Client: Yes     3. Length of Actual Contact: Start Time: 11am; End Time: 11:28am; Jerel's roommate needed his phone back     4. Location of contact:  Originating Location (patient location): IRTS facility, located in [place], [place]  Distant Location (provider location): Home office, located in [place], [place], using appropriate privacy considerations and procedures    5. Did the client complete the home practice option(s) from the previous session: Partially Completed    6. Motivational Teaching Strategies:  Connect info and skills with personal goals  Promote hope and positive expectations  Explore pros and cons of change  Re-frame experiences in positive light    7. Educational Teaching Strategies:  Review of written material/education  Relate information to client's experience  Ask questions to check comprehension  Break down information into small chunks  Adopt client's language     8. CBT Teaching Strategies:  Reinforcement and shaping (positive feedback for steps towards goals and gains in knowledge & skills)  Relapse prevention planning (review of stressors and early warning signs)  Coping skills  training (review current coping skills and increase currently used skills)  Behavioral tailoring (fit taking medication into client's daily routine)    9. IRT Module(s) Addressed:  Module 2 - Assessment/Initial Goal Setting  Module 3 - Education about Psychosis  Module 4 - Relapse Prevention Planning    10. Techniques utilized:   [place] announced at beginning of session  Review of goal  Review of previous meeting  Present new material  Problem-solving practice  Help client choose a home practice option  Summarize progress made in current session    11. Measures:    Mental Status Exam  Alertness: alert  and oriented  Behavior/Demeanor: cooperative and pleasant  Speech: regular rate and rhythm  Language: intact and no obvious problem.   Mood: description consistent with euthymia  Thought Process/Associations: unremarkable   Thought Content:  Reports none;  Denies suicidal ideation, violent ideation and delusions  Perception:  Reports auditory hallucinations without commands [details in Interim History];  Denies visual hallucinations, depersonalization and derealization  Insight: limited  Judgment: limited  Cognition: does  appear grossly intact; formal cognitive testing was not done    Kimberly Protocol Risk Identification  1) Have you wished you were dead or wished you could go to sleep and not wake up? No  2) Have you actually had any thoughts about killing yourself? No  If YES to 2, answer questions 3, 4, 5, 6  If NO to 2, go directly to question 6  3) Have you thought about how you might do this? N/A  4) Have you had any intension of acting on these thoughts of killing yourself, as opposed to you have the thoughts but you definitely would not act on them? N/A  5) Have you started to work out or worked out the details of how to kill yourself? Do you intent to carry out this plan? N/A  Always Ask Question 6  6) Have you done anything, started to do anything, or prepared to do anything to end your life? No  Examples:  "collected pills, obtained a gun, gave away valuables, wrote a will or suicide note, held a gun but changed your mind, cut yourself, tried to hang yourself, etc.    Jerel opted to not complete the PHQ-9 and JUNAID-7. Mood assessed verbally during phone call, not through formal measurements.    12. Assessment/Progress Note:     Met with Jerel for IRT on this date via telephone. Video visit was offered, but client does not have access to video computer or device at this time. Jerel used his roommates phone as Jerel does not have a phone right now; for this reason visit was shorter because his roommate needed his phone back. Writer set agenda to check-in, discuss and assess symptoms, explore material in IRT modules, discuss ways in which Jerel can expand coping strategies and stress-management techniques for ongoing symptom management, and check-in regarding goals for ongoing recovery.    Jerel reports overall he is doing well. Reports AH have been \"a little up\" recently but overall Jerel describes them as \"not that bad.\" Jerel denies HI/VI. Jerel reports some suicidal thoughts a couple of days ago; denies current suicidal ideation, denies intent to act on past thoughts. Jerel shared positively that he talked with Alon - a staff member at the TS when he was feeling this way and that was helpful. Offered praise and validation to Jerel for utilizing support and effectively coping. Jerel shared positively that he is playing chess and other games, and getting out to the park, gas station or [employer] regularly. Dialogued about Jerel's hopes and plans when his time at the IRTS is finished, which is in approximately two weeks, according to Jerel. He is hoping to get an apartment; he reports he and staff at the IRTS (specifically Alon) have left multiple voicemails for -Nola without getting a return call. Writer shared that  has also been attempting to get in touch with Nola as well. Jerel shared that his parents want him to " "go to a group home, but he is not open to this. Writer validated this, and also offered recommendation that Jerel maximize supportive services to promote continued recovery. Reflected on all of the progress Jerel has made and shared hope for continued healing and well-being. Will continue to process together. Jerel then had to end visit early as his roommate needed his phone back; Jerel hopes his new phone will be arriving tomorrow. Writer and Jerel scheduled visit for next Thursday and writer will try to call Jerel's phone for this next visit.     Overall, Jerel was open and engaged throughout the session. Symptom assessment, safety assessment, discussion and identification of coping strategies, and exploration of material in IRT modules was all in support of Jerel's self-identified goal(s), as identified in most recent BEH Treatment Plan. Progress toward goal completion seems fair.    13. Plan/Referrals:     Next IRT scheduled next Thursday at 3pm.     Will route note to team for update purposes.    Client and family aware they can reach out to writer directly and/or NAVIGATE team should concerns or needs arise prior to next scheduled appointment.     Billing for \"Interactive Complexity\"?    No      Tammi Connell ARIANNE    NAVIGATE Individual Resiliency Trainer        Attestation:    I did not see this patient directly. This patient is discussed with me in individual clinical social work supervision, and I agree with the plan as documented.     Ana Rose, [facility] HospitalSW, MSW, LICSW, May 14, 2020      "

## 2020-05-14 NOTE — TELEPHONE ENCOUNTER
NAVIGATE Outreach  A Part of the [facility] First Episode of Psychosis Program     Patient Name: Jerel Day  /Age:  1995 (24 year old)    Contact: Romanr received VM from Nola returning 's previous calls. Romanr called Nola back on this date and LVM. Requested return call with updates regarding Nola and Jerel's work together, specifically exploring next steps for housing once his time at the IRTS has come to an end. Referenced past meeting and conversations around Nola initiating referrals for assessments to explore ongoing supportive services and potentially supportive housing (i.e. MNChoices, CADI, SMRT process etc).    Plan: Will route note to team for update purposes. Will await return call.     EDELMIRA Morgan  NAVIGATE Individual Resiliency [place] & Family Clinician

## 2020-05-15 ENCOUNTER — PATIENT OUTREACH (OUTPATIENT)
Dept: PSYCHIATRY | Facility: CLINIC | Age: 25
End: 2020-05-15

## 2020-05-15 NOTE — TELEPHONE ENCOUNTER
"NAVIGATE Outreach  A Part of the [facility] First Episode of Psychosis Program     Patient Name: Jerel Day  /Age:  1995 (24 year old)    Contact:   Writer received return call from Nola Beltrán's CM with the following report:    -Jerel verbally revoked CONNOR for Nola to speak with writer about three months ago but in speaking to him yesterday he verbally gave permission for Nola to call writer.    -Nola had begun the process to get SMRT referral and potential CADI services as discussed at meeting all together back in 2019, however Jerel then declined stating he didn't want to be \"certified disabled\" upon filling out the paperwork that the [facility] had sent for this process. Nola has requested to Jerel to re-consider, and will continue to do so.    -Jerel's last day at the [facility] is . Per Nola, at this point Jerel's options would be a Board and Independence or return to his parents. She has a call with Jerel and his parents today at 11:30am.    -Nola has made a referral to [facility] housing support program for additional support accessing vouchers, subsidies and housing options. Nola is also working on applying for a specific housing voucher with Jerel that requires someone currently be in an IRTS, or recently have been at an IR, so this could serve as potential incentive for Jerel to agree to an extended stay at [facility] for now; however Jerel has not been open to this when Nola has brought it up previously.    - brought up referral for [facility] worker as a potential helpful service if Jerel is agreeable; Nola plans to discuss with Jerel and parents today.    -Per Nola's conversations with Jerel he is not agreeable to exploring group home placement at this time. This process would also require assessment for waivered services, so even if he became agreeable, placement would not be immediate.    -Jerel's commitment is up 2020.    Requested that Nola follow-up with  next week with updates from " conversation between Jerel Vaca and parents today.     Plan: Will route note to team for update purposes. Next telephone session scheduled with Jerel for next week.     EDELMIRA Morgan  NAVIGATE Individual Resiliency [place] & Family Clinician

## 2020-05-18 ENCOUNTER — TELEPHONE (OUTPATIENT)
Dept: PHARMACY | Facility: CLINIC | Age: 25
End: 2020-05-18

## 2020-05-18 DIAGNOSIS — F25.0 SCHIZOAFFECTIVE DISORDER, BIPOLAR TYPE (H): ICD-10-CM

## 2020-05-18 LAB
BASOPHILS # BLD AUTO: 0 10E9/L (ref 0–0.2)
BASOPHILS NFR BLD AUTO: 0.5 %
CAPILLARY BLOOD COLLECTION: NORMAL
DIFFERENTIAL METHOD BLD: ABNORMAL
EOSINOPHIL # BLD AUTO: 1 10E9/L (ref 0–0.7)
EOSINOPHIL NFR BLD AUTO: 13 %
ERYTHROCYTE [DISTWIDTH] IN BLOOD BY AUTOMATED COUNT: 12.7 % (ref 10–15)
HCT VFR BLD AUTO: 44 % (ref 40–53)
HGB BLD-MCNC: 14.6 G/DL (ref 13.3–17.7)
LYMPHOCYTES # BLD AUTO: 2.5 10E9/L (ref 0.8–5.3)
LYMPHOCYTES NFR BLD AUTO: 32.8 %
MCH RBC QN AUTO: 30.1 PG (ref 26.5–33)
MCHC RBC AUTO-ENTMCNC: 33.2 G/DL (ref 31.5–36.5)
MCV RBC AUTO: 91 FL (ref 78–100)
MONOCYTES # BLD AUTO: 0.7 10E9/L (ref 0–1.3)
MONOCYTES NFR BLD AUTO: 8.7 %
NEUTROPHILS # BLD AUTO: 3.4 10E9/L (ref 1.6–8.3)
NEUTROPHILS NFR BLD AUTO: 45 %
PLATELET # BLD AUTO: 250 10E9/L (ref 150–450)
RBC # BLD AUTO: 4.85 10E12/L (ref 4.4–5.9)
WBC # BLD AUTO: 7.5 10E9/L (ref 4–11)

## 2020-05-18 PROCEDURE — 36416 COLLJ CAPILLARY BLOOD SPEC: CPT | Performed by: PSYCHIATRY & NEUROLOGY

## 2020-05-18 PROCEDURE — 85025 COMPLETE CBC W/AUTO DIFF WBC: CPT | Performed by: PSYCHIATRY & NEUROLOGY

## 2020-05-18 NOTE — TELEPHONE ENCOUNTER
Jerel Shay was referred to [facility] for outpatient clozapine monitoring.   Current Dose: 200mg QHS  Laboratory Monitoring: weekly home draws. Clozapine started 2/19/20 during inpatient hospitalization. He will be eligible to move to Q2 week monitoring 8/19/20.     Recent Labs   Lab Test 05/18/20  1125 05/11/20  1210 05/04/20  1145 04/27/20  1120   WBC 7.5 8.0 8.2 9.7   ANEU 3.4 4.1 4.1 5.6   HGB 14.6 15.9 15.2 15.2    252 267 272       ANC WNL.  Clozapine will be mailed to patient. Next draw due 5/25/20, however pt will be drawn 5/22 due to Memorial Day.    Clozapine can be mailed to:   [facility] staff  C/o Jerel [address]renetta  [address] [place] [address]    [facility] staff:  Phone: [phone]    Azeb Mclean, PharmD, BCPP  Medication Therapy Management Pharmacist  [facility]  [phone]
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
## 2020-05-21 ENCOUNTER — VIRTUAL VISIT (OUTPATIENT)
Dept: PSYCHIATRY | Facility: CLINIC | Age: 25
End: 2020-05-21
Payer: COMMERCIAL

## 2020-05-21 ENCOUNTER — PATIENT OUTREACH (OUTPATIENT)
Dept: PSYCHIATRY | Facility: CLINIC | Age: 25
End: 2020-05-21

## 2020-05-21 DIAGNOSIS — F20.9 SCHIZOPHRENIA, UNSPECIFIED TYPE (H): Primary | ICD-10-CM

## 2020-05-21 NOTE — Clinical Note
SHAHNAZI session with Jerel today. A bit difficult to get in touch with him, but finally did through IRTS resident phone. Scheduled with him again next week - not sure if he'll be at the IRTS so we will see. No immediate needs/concerns form my visit today.

## 2020-05-21 NOTE — PROGRESS NOTES
NAVIGATE Clinician Contact & Progress Note  For Individual Resiliency Training (IRT)  A Part of the [facility] First Episode of Psychosis Program    NAVIGATE Enrollee: Jerel Day (1995)     MRN: [id number]  Date:  5/21/20  Diagnosis: Schizophrenia, unspecified type F20.9  Clinician: LILLIANA Individual Resiliency Trainer, EDELMIRA Morgan     1. Type of contact: (majority of time spent)  IRT Session via telehealth  Mode of communication: telephone. Video visit was offered, but client does not have access to video computer.  Patient consented verbally to this mode of therapy today.  Reason for telehealth: COVID-19. This patient visit was converted to a telehealth visit to minimize exposure to COVID-19.     2. People present:   Writer  Client: Yes     3. Length of Actual Contact: Start Time: 3:10pm (could not reach Jerel at first); End Time: 3:37pm     4. Location of contact:  Originating Location (patient location): IRTS facility, located in [place], [place]  Distant Location (provider location): Home office, located in [place], [place], using appropriate privacy considerations and procedures    5. Did the client complete the home practice option(s) from the previous session: Partially Completed    6. Motivational Teaching Strategies:  Connect info and skills with personal goals  Promote hope and positive expectations  Explore pros and cons of change  Re-frame experiences in positive light    7. Educational Teaching Strategies:  Review of written material/education  Relate information to client's experience  Ask questions to check comprehension  Break down information into small chunks  Adopt client's language     8. CBT Teaching Strategies:  Reinforcement and shaping (positive feedback for steps towards goals and gains in knowledge & skills)  Relapse prevention planning (review of stressors and early warning signs)  Coping skills training (review current coping skills and increase currently used  skills)  Behavioral tailoring (fit taking medication into client's daily routine)    9. IRT Module(s) Addressed:  Module 2 - Assessment/Initial Goal Setting  Module 3 - Education about Psychosis  Module 4 - Relapse Prevention Planning    10. Techniques utilized:   [place] announced at beginning of session  Review of goal  Review of previous meeting  Present new material  Problem-solving practice  Help client choose a home practice option  Summarize progress made in current session    11. Measures:    Mental Status Exam  Alertness: alert  and oriented  Behavior/Demeanor: cooperative and pleasant  Speech: regular rate and rhythm  Language: intact and no obvious problem.   Mood: description consistent with euthymia  Thought Process/Associations: unremarkable   Thought Content:  Reports none;  Denies suicidal ideation, violent ideation and delusions  Perception:  Reports auditory hallucinations without commands [details in Interim History];  Denies visual hallucinations, depersonalization and derealization  Insight: limited  Judgment: limited  Cognition: does  appear grossly intact; formal cognitive testing was not done    Jerel opted to not complete the [place] risk, PHQ-9 and JUNAID-7. Mood and safety assessed verbally during phone call, not through formal measure.    12. Assessment/Progress Note:     Writer attempted to reach Jerel both on his phone and his roommate's phone with no success. Called the IRTS and was given the resident phone number [phone] and was able to connect with Jerel. Video visit was offered, but client does not have access to video computer or device at this time. Writer set agenda to check-in, discuss and assess symptoms, explore material in IRT modules, discuss ways in which Jerel can expand coping strategies and stress-management techniques for ongoing symptom management, and check-in regarding goals for ongoing recovery. Jerel updated writer that he has spoken with Nola now a couple of times  "and she is working to make a referral for an [facility] worker and is also assisting with potential housing vouchers. Jerel wasn't sure of his exact discharge date or plan but confirmed he had meeting with Nola and parents. He is wondering if he may be able to go to his grandma's if he doesn't have a place by the time he is discharged.     Assessed symptoms, Jerel reports ongoing AH, but feels they are manageable. He reports most of the the time they are kind, and described them as \"sifting through old relationships\" when thye are commenting to him. He denies command AH. Jerel denies current SI/SH, denies current HI/VI. Jerel reports he has been going to the park and watching Adventuretime with other residents at the IRTS. Describes his mood as \"okay.\" Jerel shared positively about chess with writer and seems to be enjoying this as a pastime.     Dialogued about self-compassion, as the voices can be critical in nature. Discussed the process of \"catching it,\" \"checking it,\" and \"changing it\" in the direction of \"compassion and self-care\" when Jerel notices critical voices or thoughts towards himself. Jerel was open and receptive to this. Will continue to practice in session together.     Scheduled next visit as telephone visit for next week. Unsure if Jerel will be at the IRTS, but writer will try Jerel first, then roommate, then the IRTS, per Jerel's request.    Overall, Jerel was open and engaged throughout the session. Symptom assessment, safety assessment, discussion and identification of coping strategies, and exploration of material in IRT modules was all in support of Jerel's self-identified goal(s), as identified in most recent BEH Treatment Plan. Progress toward goal completion seems fair.    13. Plan/Referrals:     Scheduled next visit as telephone visit for next week Thursday at 3pm. Unsure if Jerel will be at the IRTS, but writer will try Jerel first, then roommate, then the IRTS, per Jerel's request.    Will route note to " "team for update purposes.    Client and family aware they can reach out to writer directly and/or NAVIGATE team should concerns or needs arise prior to next scheduled appointment.     Billing for \"Interactive Complexity\"?    No      EDELMIRA Morgan    NAVIGATE Individual Resiliency Trainer    Attestation:    I did not see this patient directly. This patient is discussed with me in individual clinical social work supervision, and I agree with the plan as documented.     Ana Rose, LICSW, MSW, LICSW, May 21, 2020      "

## 2020-05-21 NOTE — TELEPHONE ENCOUNTER
NAVIGATE Outreach  A Part of the [facility] First Episode of Psychosis Program     Patient Name: Jerel Day  /Age:  1995 (24 year old)    Contact: Received VM from Jerel's CM Noal who reported Jerel is agreeable to [facility] referral and Nola will need an updated DA for this process. Nola stated Jerel is not open to assessment process for waivered services at this time. Nola also requested return call to provide other updates; notified writer she is out of the office tomorrow and will not be there Monday as this is a holiday.    Writer called back and LVM for Nola - notified Nola writer is mostly available until next session at 2pm and requested return call.     Plan: Will await return call.     EDELMIRA Morgan Individual Resiliency  & Family Clinician      ----------    Received call back from Nola who requested most recent discharge summary from hospital be faxed to [phone] for [facility] referral. She reports Jerel's discharge date from the IRTS is  and she anticipates he will likely return to his parents home. She confirmed she has made referral for additional [facility] housing support. Nola also informed writer she will be transitioning to a new position within her organization, therefore Jerel will be assigned a new  by the end of . Writer thanked Nola for the update and will pass along to the team.     Writer sent request to BYRON Priest who was in clinic and faxed discharge summary to number as requested above.     Will route note to team for update purposes.     LILLIANA Individual Resiliency [place] and Family Clinician - Tammi Connell AM, LGSW

## 2020-05-22 ENCOUNTER — TELEPHONE (OUTPATIENT)
Dept: PHARMACY | Facility: CLINIC | Age: 25
End: 2020-05-22

## 2020-05-22 DIAGNOSIS — F25.0 SCHIZOAFFECTIVE DISORDER, BIPOLAR TYPE (H): ICD-10-CM

## 2020-05-22 LAB
BASOPHILS # BLD AUTO: 0.1 10E9/L (ref 0–0.2)
BASOPHILS NFR BLD AUTO: 1.5 %
CAPILLARY BLOOD COLLECTION: NORMAL
DIFFERENTIAL METHOD BLD: ABNORMAL
EOSINOPHIL # BLD AUTO: 0.9 10E9/L (ref 0–0.7)
EOSINOPHIL NFR BLD AUTO: 11 %
ERYTHROCYTE [DISTWIDTH] IN BLOOD BY AUTOMATED COUNT: 12.6 % (ref 10–15)
HCT VFR BLD AUTO: 45.4 % (ref 40–53)
HGB BLD-MCNC: 15.5 G/DL (ref 13.3–17.7)
LYMPHOCYTES # BLD AUTO: 2.9 10E9/L (ref 0.8–5.3)
LYMPHOCYTES NFR BLD AUTO: 36.2 %
MCH RBC QN AUTO: 30.5 PG (ref 26.5–33)
MCHC RBC AUTO-ENTMCNC: 34.1 G/DL (ref 31.5–36.5)
MCV RBC AUTO: 89 FL (ref 78–100)
MONOCYTES # BLD AUTO: 0.6 10E9/L (ref 0–1.3)
MONOCYTES NFR BLD AUTO: 8 %
NEUTROPHILS # BLD AUTO: 3.4 10E9/L (ref 1.6–8.3)
NEUTROPHILS NFR BLD AUTO: 43.3 %
PLATELET # BLD AUTO: 251 10E9/L (ref 150–450)
RBC # BLD AUTO: 5.09 10E12/L (ref 4.4–5.9)
WBC # BLD AUTO: 7.9 10E9/L (ref 4–11)

## 2020-05-22 PROCEDURE — 85025 COMPLETE CBC W/AUTO DIFF WBC: CPT | Performed by: PSYCHIATRY & NEUROLOGY

## 2020-05-22 PROCEDURE — 36416 COLLJ CAPILLARY BLOOD SPEC: CPT | Performed by: PSYCHIATRY & NEUROLOGY

## 2020-05-22 NOTE — TELEPHONE ENCOUNTER
Jerel Shay was referred to [facility] for outpatient clozapine monitoring.   Current Dose: 200mg QHS  Laboratory Monitoring: weekly home draws. Clozapine started 2/19/20 during inpatient hospitalization. He will be eligible to move to Q2 week monitoring 8/19/20.     Recent Labs   Lab Test 05/22/20  1115 05/18/20  1125 05/11/20  1210 05/04/20  1145   WBC 7.9 7.5 8.0 8.2   ANEU 3.4 3.4 4.1 4.1   HGB 15.5 14.6 15.9 15.2    250 252 267       ANC WNL.  Clozapine will be mailed to patient on 5/26 due to being too soon to process through insurance. Pt should have enough medication supply. Pt was drawn early due to Memorial Day holiday. Next draw due 6/1/20.    Clozapine can be mailed to:   [facility] staff  C/o Jerel Shay  [address] [place] [address]    [facility] staff:  Phone: [phone]    Tita Salamanca, PharmD  Medication Therapy Management Pharmacist  [facility]  Phone: [phone]

## 2020-05-25 NOTE — RESULT ENCOUNTER NOTE
Reviewed serial CBC/diff as a part of Clozaril REMS rx  ANC has reduced modestly from 5.6 down to 3.4 in past two weeks  Flagged for minimal/relative elevation of abs. Eosinophils    ANC remains well above threshold of 1.5  Continue to monitor and assess

## 2020-05-28 ENCOUNTER — VIRTUAL VISIT (OUTPATIENT)
Dept: PSYCHIATRY | Facility: CLINIC | Age: 25
End: 2020-05-28
Payer: COMMERCIAL

## 2020-05-28 DIAGNOSIS — F20.9 SCHIZOPHRENIA, UNSPECIFIED TYPE (H): Primary | ICD-10-CM

## 2020-05-28 NOTE — Clinical Note
MAMI update from Jerel yesterday. He seemed discouraged and down which makes sense - details in my note. He is still at the IRTS. Shai - he has his phone back and I scheduled him with you for Tuesday and put his phone number in appt notes. Milton - Ready to be signed, thanks!
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
## 2020-05-31 DIAGNOSIS — F19.10 SUBSTANCE ABUSE (H): ICD-10-CM

## 2020-05-31 DIAGNOSIS — F25.0 SCHIZOAFFECTIVE DISORDER, BIPOLAR TYPE (H): Primary | ICD-10-CM

## 2020-05-31 NOTE — PROGRESS NOTES
"Reviewed status of pt. who will be seen 6/2 tmed. Reports from  IRT visit 5/28 Indicate   1) psychotic, disorganized thinking and aggressive events plus 2) refusal to participate in   step down from IRTS backed with 3) talk of leaving 2/2  \"meth users\" around him.   This Suggests Run Risk  (which has happened before Father says).    Have ordered updated serum labs (overdue) plus UA/UDS to verify abstinence with this weeks REMS  Blood draw..  "

## 2020-06-01 NOTE — TELEPHONE ENCOUNTER
Message sent to mobile phlebotomy regarding add on labs for this week's CBC with diff. Pt likely will be drawn 6/2.

## 2020-06-02 ENCOUNTER — VIRTUAL VISIT (OUTPATIENT)
Dept: PSYCHIATRY | Facility: CLINIC | Age: 25
End: 2020-06-02
Payer: COMMERCIAL

## 2020-06-02 DIAGNOSIS — F25.0 SCHIZOAFFECTIVE DISORDER, BIPOLAR TYPE (H): ICD-10-CM

## 2020-06-02 DIAGNOSIS — F19.10 SUBSTANCE ABUSE (H): ICD-10-CM

## 2020-06-02 DIAGNOSIS — F99 INSOMNIA DUE TO OTHER MENTAL DISORDER: ICD-10-CM

## 2020-06-02 DIAGNOSIS — F51.05 INSOMNIA DUE TO OTHER MENTAL DISORDER: ICD-10-CM

## 2020-06-02 DIAGNOSIS — E55.9 VITAMIN D DEFICIENCY: ICD-10-CM

## 2020-06-02 DIAGNOSIS — F20.89 OTHER SCHIZOPHRENIA (H): ICD-10-CM

## 2020-06-02 LAB
ALBUMIN SERPL-MCNC: 4.1 G/DL (ref 3.4–5)
ALBUMIN UR-MCNC: NEGATIVE MG/DL
ALP SERPL-CCNC: 79 U/L (ref 40–150)
ALT SERPL W P-5'-P-CCNC: 27 U/L (ref 0–70)
AMPHETAMINES UR QL SCN: NEGATIVE
ANION GAP SERPL CALCULATED.3IONS-SCNC: 9 MMOL/L (ref 3–14)
APPEARANCE UR: CLEAR
AST SERPL W P-5'-P-CCNC: 39 U/L (ref 0–45)
BARBITURATES UR QL: NEGATIVE
BASOPHILS # BLD AUTO: 0.1 10E9/L (ref 0–0.2)
BASOPHILS NFR BLD AUTO: 0.7 %
BENZODIAZ UR QL: NEGATIVE
BILIRUB SERPL-MCNC: 0.8 MG/DL (ref 0.2–1.3)
BILIRUB UR QL STRIP: ABNORMAL
BUN SERPL-MCNC: 8 MG/DL (ref 7–30)
CALCIUM SERPL-MCNC: 9.5 MG/DL (ref 8.5–10.1)
CANNABINOIDS UR QL SCN: POSITIVE
CHLORIDE SERPL-SCNC: 105 MMOL/L (ref 94–109)
CO2 SERPL-SCNC: 21 MMOL/L (ref 20–32)
COCAINE UR QL: NEGATIVE
COLOR UR AUTO: YELLOW
CREAT SERPL-MCNC: 0.66 MG/DL (ref 0.66–1.25)
DIFFERENTIAL METHOD BLD: NORMAL
EOSINOPHIL # BLD AUTO: 0.3 10E9/L (ref 0–0.7)
EOSINOPHIL NFR BLD AUTO: 3.5 %
ERYTHROCYTE [DISTWIDTH] IN BLOOD BY AUTOMATED COUNT: 12 % (ref 10–15)
ETHANOL UR QL SCN: NEGATIVE
GFR SERPL CREATININE-BSD FRML MDRD: >90 ML/MIN/{1.73_M2}
GLUCOSE SERPL-MCNC: 70 MG/DL (ref 70–99)
GLUCOSE UR STRIP-MCNC: NEGATIVE MG/DL
HBA1C MFR BLD: 5.1 % (ref 0–5.6)
HCT VFR BLD AUTO: 44.9 % (ref 40–53)
HGB BLD-MCNC: 15.4 G/DL (ref 13.3–17.7)
HGB UR QL STRIP: NEGATIVE
IMM GRANULOCYTES # BLD: 0.1 10E9/L (ref 0–0.4)
IMM GRANULOCYTES NFR BLD: 1.2 %
KETONES UR STRIP-MCNC: >=80 MG/DL
LEUKOCYTE ESTERASE UR QL STRIP: NEGATIVE
LYMPHOCYTES # BLD AUTO: 2.2 10E9/L (ref 0.8–5.3)
LYMPHOCYTES NFR BLD AUTO: 26.7 %
MCH RBC QN AUTO: 30.7 PG (ref 26.5–33)
MCHC RBC AUTO-ENTMCNC: 34.3 G/DL (ref 31.5–36.5)
MCV RBC AUTO: 89 FL (ref 78–100)
MONOCYTES # BLD AUTO: 0.7 10E9/L (ref 0–1.3)
MONOCYTES NFR BLD AUTO: 8.2 %
NEUTROPHILS # BLD AUTO: 4.8 10E9/L (ref 1.6–8.3)
NEUTROPHILS NFR BLD AUTO: 59.7 %
NITRATE UR QL: NEGATIVE
NRBC # BLD AUTO: 0 10*3/UL
NRBC BLD AUTO-RTO: 0 /100
OPIATES UR QL SCN: NEGATIVE
PCP UR QL SCN: NEGATIVE
PH UR STRIP: 6 PH (ref 5–7)
PLATELET # BLD AUTO: 260 10E9/L (ref 150–450)
POTASSIUM SERPL-SCNC: 4.8 MMOL/L (ref 3.4–5.3)
PROT SERPL-MCNC: 7.7 G/DL (ref 6.8–8.8)
RBC # BLD AUTO: 5.02 10E12/L (ref 4.4–5.9)
SODIUM SERPL-SCNC: 135 MMOL/L (ref 133–144)
SOURCE: ABNORMAL
SP GR UR STRIP: 1.02 (ref 1–1.03)
TSH SERPL DL<=0.005 MIU/L-ACNC: 0.42 MU/L (ref 0.4–4)
UROBILINOGEN UR STRIP-ACNC: 0.2 EU/DL (ref 0.2–1)
WBC # BLD AUTO: 8 10E9/L (ref 4–11)

## 2020-06-02 PROCEDURE — 36415 COLL VENOUS BLD VENIPUNCTURE: CPT | Performed by: PSYCHIATRY & NEUROLOGY

## 2020-06-02 PROCEDURE — 80050 GENERAL HEALTH PANEL: CPT | Performed by: PSYCHIATRY & NEUROLOGY

## 2020-06-02 PROCEDURE — 80320 DRUG SCREEN QUANTALCOHOLS: CPT | Performed by: PSYCHIATRY & NEUROLOGY

## 2020-06-02 PROCEDURE — 83036 HEMOGLOBIN GLYCOSYLATED A1C: CPT | Performed by: PSYCHIATRY & NEUROLOGY

## 2020-06-02 PROCEDURE — 81003 URINALYSIS AUTO W/O SCOPE: CPT | Mod: 59 | Performed by: PSYCHIATRY & NEUROLOGY

## 2020-06-02 PROCEDURE — 80307 DRUG TEST PRSMV CHEM ANLYZR: CPT | Performed by: PSYCHIATRY & NEUROLOGY

## 2020-06-02 RX ORDER — VITAMIN B COMPLEX
25 TABLET ORAL DAILY
Qty: 100 TABLET | Refills: 0 | Status: SHIPPED | OUTPATIENT
Start: 2020-06-02 | End: 2020-09-10

## 2020-06-02 RX ORDER — CLOZAPINE 50 MG/1
150 TABLET ORAL EVERY MORNING
Qty: 90 TABLET | Refills: 1 | Status: SHIPPED | OUTPATIENT
Start: 2020-06-02 | End: 2020-08-18

## 2020-06-02 ASSESSMENT — PATIENT HEALTH QUESTIONNAIRE - PHQ9: SUM OF ALL RESPONSES TO PHQ QUESTIONS 1-9: 10

## 2020-06-02 NOTE — PROGRESS NOTES
Additional provider notes:   Rinku Beltrán is a 24-year-old male who is finally who is being evaluated via a billable telephone visit.  He has been out of contact due to lack of cell phone access while residing in [facility] which was not able to facilitate said contact since 4/24/20.     Based upon concerns of his parents who have a strong recollection of previous treatment noncompliance the decision to raise his Clozaril from 150mg to 200 mg (which had already been planned) was implemented.  There was no subsequent telephonic access to inform the patient of the recommended change.  His blood counts were perfectly within range.    Patient now states that he was quite upset at having the dosage increased without his knowledge.  Apparently took matters into his own hands and with the consent of his dispensing nurse who cut the 200 mg pills into 3/4 tablet he remained on approximately 150 mg daily.  He is very clear that he does not want to be on the medication whatsoever.  According to the family this has been a previous pattern along with a recurrence of cannabis use when he has been outside of the treatment setting.                    Patient Active Problem List   Diagnosis     Schizoaffective disorder (H)     Psychosis (H)     Suicidal ideation     Polysubstance abuse (H)     No past surgical history on file.    Social History     Tobacco Use     Smoking status: Current Every Day Smoker     Packs/day: 0.25     Years: 1.00     Pack years: 0.25     Types: Cigarettes     Smokeless tobacco: Never Used   Substance Use Topics     Alcohol use: Yes     Comment: socially     No family history on file.      Current Outpatient Medications   Medication Sig Dispense Refill     cloZAPine (CLOZARIL) 50 MG tablet Take 3 tablets (150 mg) by mouth every morning 90 tablet 1     melatonin 3 MG tablet Take 1 tablet (3 mg) by mouth nightly as needed for sleep 30 tablet 0     Vitamin D3 (CHOLECALCIFEROL) 25 mcg (1000 units) tablet Take  1 tablet (25 mcg) by mouth daily for 100 doses 100 tablet 0       Reviewed and updated as needed this visit by Provider         Review of Systems   Constitutional, HEENT, cardiovascular, pulmonary, gi and gu systems are negative, except as otherwise noted.       Objective   Reported vitals:  There were no vitals taken for this visit.   healthy and no distress  PSYCH: Alert and oriented times 3; coherent speech, normal   rate and volume, able to articulate logical thoughts, able   to abstract reason, no tangential thoughts, no hallucinations   or delusions  His affect is normal and     RESP: No cough, no audible wheezing, able to talk in full sentences  Remainder of exam unable to be completed due to telephone visits    Diagnostic Test Results:  Labs reviewed in Epic  None: strongly recommend a urine drug screen and additional metabolic labs        Assessment/Plan:  1. Vitamin D deficiency    - Vitamin D3 (CHOLECALCIFEROL) 25 mcg (1000 units) tablet; Take 1 tablet (25 mcg) by mouth daily for 100 doses  Dispense: 100 tablet; Refill: 0    2. Other schizophrenia (H)    Clozaril return to 150 mg as per patient insistence-    - cloZAPine (CLOZARIL) 50 MG tablet; Take 3 tablets (150 mg) by mouth every morning  Dispense: 90 tablet; Refill: 1    3. Insomnia due to other mental disorder    - melatonin 3 MG tablet; Take 1 tablet (3 mg) by mouth nightly as needed for sleep  Dispense: 30 tablet; Refill: 0    4. Nocturia    -Declined pseudoephedrine    Follow-up in 2 weeks.      Phone call duration: 30   (really 55 minutes 25 non billed)    Shai Yusuf MD

## 2020-06-02 NOTE — PROGRESS NOTES
"Jerel Day is a 24 year old male who is being evaluated via a billable telephone visit.      The patient has been notified of following:     \"This telephone visit will be conducted via a call between you and your physician/provider. We have found that certain health care needs can be provided without the need for a physical exam.  This service lets us provide the care you need with a short phone conversation.  If a prescription is necessary we can send it directly to your pharmacy.  If lab work is needed we can place an order for that and you can then stop by our lab to have the test done at a later time.    Telephone visits are billed at different rates depending on your insurance coverage. During this emergency period, for some insurers they may be billed the same as an in-person visit.  Please reach out to your insurance provider with any questions.    If during the course of the call the physician/provider feels a telephone visit is not appropriate, you will not be charged for this service.\"    Patient has given verbal consent for Telephone visit?  Yes    What phone number would you like to be contacted at? [phone]    How would you like to obtain your AVS? Mail a copy    Phone call duration: 30 minutes    Shai Yusuf MD    "

## 2020-06-02 NOTE — TELEPHONE ENCOUNTER
Per Ana at [facility], pt's blood needs to be sent out to the hospital for verification.  She was able to report preliminary ANC value of 4.76.  Retail pharmacy notified and medication will be mailed today.  Will update note with final lab values tomorrow.

## 2020-06-02 NOTE — TELEPHONE ENCOUNTER
New prescription for dose reduction received (150mg daily), but not processed in time to be mailed today.  Will mail 6/3.

## 2020-06-02 NOTE — PATIENT INSTRUCTIONS
Reduce Clozaril to 150mg   Take in morning with or ( without food if tolerate)  No pill splitting

## 2020-06-03 ENCOUNTER — TELEPHONE (OUTPATIENT)
Dept: PHARMACY | Facility: CLINIC | Age: 25
End: 2020-06-03

## 2020-06-03 NOTE — TELEPHONE ENCOUNTER
Jerel Shay was referred to [facility] for outpatient clozapine monitoring.   Current Dose: 150mg QHS  Laboratory Monitoring: weekly home draws. Clozapine started 2/19/20 during inpatient hospitalization. He will be eligible to move to Q2 week monitoring 8/19/20.     Recent Labs   Lab Test 06/02/20  1230 05/22/20  1115 05/18/20  1125 05/11/20  1210   WBC 8.0 7.9 7.5 8.0   ANEU 4.8 3.4 3.4 4.1   HGB 15.4 15.5 14.6 15.9    251 250 252       ANC WNL.  Clozapine will be mailed to patient today. Next draw due 6/8/20.    Clozapine can be mailed to:   [facility] staff  C/o Jerel Stockelvis  [address] [place] [address]    [employer] staff:  Phone: [phone]    Azeb Mclean, PharmD, BCPP  Medication Therapy Management Pharmacist  [facility]  [phone]

## 2020-06-04 ENCOUNTER — VIRTUAL VISIT (OUTPATIENT)
Dept: PSYCHIATRY | Facility: CLINIC | Age: 25
End: 2020-06-04
Payer: COMMERCIAL

## 2020-06-04 DIAGNOSIS — F20.9 SCHIZOPHRENIA, UNSPECIFIED TYPE (H): Primary | ICD-10-CM

## 2020-06-04 NOTE — PROGRESS NOTES
NAVIGATE Outreach  A Part of the [facility] First Episode of Psychosis Program     Patient Name: Jerel Day  /Age:  1995 (24 year old)    Contact: Writer called Jerel at the scheduled appointment time. Jerel shared positively that he was currently touring a potential apartment at the moment with one of his friends; requested that appointment be rescheduled. Writer rescheduled to tomorrow at 11am.     Tammi Connell, EDELMIRA  NAVIGATE Individual Resiliency  & Family Clinician    This is a non-billable encounter as it was solely for the purposes of outreach and/or care coordination.

## 2020-06-05 ENCOUNTER — VIRTUAL VISIT (OUTPATIENT)
Dept: PSYCHIATRY | Facility: CLINIC | Age: 25
End: 2020-06-05
Payer: COMMERCIAL

## 2020-06-05 DIAGNOSIS — F20.9 SCHIZOPHRENIA, UNSPECIFIED TYPE (H): Primary | ICD-10-CM

## 2020-06-05 NOTE — PROGRESS NOTES
NAVIGATE Outreach  A Part of the [facility] First Episode of Psychosis Program     Patient Name: Jerel Day  /Age:  1995 (24 year old)    Contact: Called Jerel at time of session and his parents were coming to pick him up to go back to submit his application for the apartment he toured yesterday. Checked in briefly with Jerel; he reports he is doing well and is hopeful this apartment will work out. He sounded upbeat on the phone. Jerel expressed no concerns. Confirmed next scheduled phone session in one week at 1pm. Encouraged Jerel to reach out ahead of time should any needs or concerns arise.     Plan: Next telephone session scheduled for 1pm next Friday. Client and family aware they can reach out to writer directly and/or NAVIGATE team should concerns or needs arise prior to next scheduled appointment.     Tammi Connell, EDELMIRA  NAVIGATE Individual Resiliency  & Family Clinician    This is a non-billable encounter as it ended up being solely for the purposes of outreach and/or care coordination.

## 2020-06-08 DIAGNOSIS — F25.0 SCHIZOAFFECTIVE DISORDER, BIPOLAR TYPE (H): ICD-10-CM

## 2020-06-08 RX ORDER — LANOLIN ALCOHOL/MO/W.PET/CERES
3 CREAM (GRAM) TOPICAL
Qty: 30 TABLET | Refills: 0 | Status: SHIPPED | OUTPATIENT
Start: 2020-06-08 | End: 2020-08-17

## 2020-06-09 ENCOUNTER — TELEPHONE (OUTPATIENT)
Dept: PHARMACY | Facility: CLINIC | Age: 25
End: 2020-06-09

## 2020-06-09 DIAGNOSIS — F25.0 SCHIZOAFFECTIVE DISORDER, BIPOLAR TYPE (H): ICD-10-CM

## 2020-06-09 LAB
BASOPHILS # BLD AUTO: 0.1 10E9/L (ref 0–0.2)
BASOPHILS NFR BLD AUTO: 1 %
DIFFERENTIAL METHOD BLD: NORMAL
EOSINOPHIL # BLD AUTO: 0.4 10E9/L (ref 0–0.7)
EOSINOPHIL NFR BLD AUTO: 6.4 %
ERYTHROCYTE [DISTWIDTH] IN BLOOD BY AUTOMATED COUNT: 12.7 % (ref 10–15)
HCT VFR BLD AUTO: 43 % (ref 40–53)
HGB BLD-MCNC: 14.7 G/DL (ref 13.3–17.7)
LYMPHOCYTES # BLD AUTO: 1.4 10E9/L (ref 0.8–5.3)
LYMPHOCYTES NFR BLD AUTO: 20.7 %
MCH RBC QN AUTO: 30.9 PG (ref 26.5–33)
MCHC RBC AUTO-ENTMCNC: 34.2 G/DL (ref 31.5–36.5)
MCV RBC AUTO: 90 FL (ref 78–100)
MONOCYTES # BLD AUTO: 0.6 10E9/L (ref 0–1.3)
MONOCYTES NFR BLD AUTO: 9.4 %
NEUTROPHILS # BLD AUTO: 4.2 10E9/L (ref 1.6–8.3)
NEUTROPHILS NFR BLD AUTO: 62.5 %
PLATELET # BLD AUTO: 263 10E9/L (ref 150–450)
RBC # BLD AUTO: 4.76 10E12/L (ref 4.4–5.9)
WBC # BLD AUTO: 6.7 10E9/L (ref 4–11)

## 2020-06-09 PROCEDURE — 36415 COLL VENOUS BLD VENIPUNCTURE: CPT | Performed by: PSYCHIATRY & NEUROLOGY

## 2020-06-09 PROCEDURE — 85025 COMPLETE CBC W/AUTO DIFF WBC: CPT | Performed by: PSYCHIATRY & NEUROLOGY

## 2020-06-09 NOTE — TELEPHONE ENCOUNTER
Jerel Shay was referred to [facility] for outpatient clozapine monitoring.   Current Dose: 150mg QHS  Laboratory Monitoring: weekly home draws. Clozapine started 2/19/20 during inpatient hospitalization. He will be eligible to move to Q2 week monitoring 8/19/20.     Recent Labs   Lab Test 06/09/20  1215 06/02/20  1230 05/22/20  1115 05/18/20  1125   WBC 6.7 8.0 7.9 7.5   ANEU 4.2 4.8 3.4 3.4   HGB 14.7 15.4 15.5 14.6    260 251 250       ANC WNL.  Clozapine will be mailed to patient today. Next draw due 6/15/20.    Clozapine can be mailed to:   [facility] staff  C/o Jerel Jonesrenetta  [address] [place] [address]    [facility] staff:  Phone: [phone]    Tita Salamanca, PharmD  Medication Therapy Management Pharmacist  [facility]  Phone: [phone]

## 2020-06-12 ENCOUNTER — VIRTUAL VISIT (OUTPATIENT)
Dept: PSYCHIATRY | Facility: CLINIC | Age: 25
End: 2020-06-12
Payer: COMMERCIAL

## 2020-06-12 DIAGNOSIS — F20.9 SCHIZOPHRENIA, UNSPECIFIED TYPE (H): Primary | ICD-10-CM

## 2020-06-12 NOTE — PROGRESS NOTES
NAVIGATE Outreach  A Part of the [facility] First Episode of Psychosis Program     Patient Name: Jerel Day  /Age:  1995 (24 year old)    Contact: Writer called and spoke to Jerel; he shared that he was napping and requested to reschedule. Reminded Jerel that writer is out of clinic next week and that next session is scheduled for  at 3pm. Attempted to check-in further; Jerel reports he is doing well overall and he is still at the [facility]S, but he was not interested in a longer conversation. Jerel confirmed next appointment.    Plan: Will route note to team and inquire if NAVIGATE SEE - YOSEPH Raymundo could reach out next week. Next IRT scheduled for  at 3pm.     EDELMIRA MorganATE Individual Resiliency [place] & Family Clinician    This is a non-billable encounter as it ended up being solely for the purposes of outreach and/or care coordination.

## 2020-06-12 NOTE — Clinical Note
MAMI - he was napping and even though I tried he didn't want to do our visit today. He said he is doing well though - still at the IRTS. Any chance you could reach out next week when I'm gone? Just to check-in - his phone is now working. Thanks Ana!

## 2020-06-15 ENCOUNTER — TELEPHONE (OUTPATIENT)
Dept: PHARMACY | Facility: CLINIC | Age: 25
End: 2020-06-15

## 2020-06-15 DIAGNOSIS — F25.0 SCHIZOAFFECTIVE DISORDER, BIPOLAR TYPE (H): ICD-10-CM

## 2020-06-15 LAB
BASOPHILS # BLD AUTO: 0 10E9/L (ref 0–0.2)
BASOPHILS NFR BLD AUTO: 0.7 %
DIFFERENTIAL METHOD BLD: NORMAL
EOSINOPHIL # BLD AUTO: 0.3 10E9/L (ref 0–0.7)
EOSINOPHIL NFR BLD AUTO: 5 %
ERYTHROCYTE [DISTWIDTH] IN BLOOD BY AUTOMATED COUNT: 12.8 % (ref 10–15)
HCT VFR BLD AUTO: 45.4 % (ref 40–53)
HGB BLD-MCNC: 15.5 G/DL (ref 13.3–17.7)
LYMPHOCYTES # BLD AUTO: 1.6 10E9/L (ref 0.8–5.3)
LYMPHOCYTES NFR BLD AUTO: 27.2 %
MCH RBC QN AUTO: 30.8 PG (ref 26.5–33)
MCHC RBC AUTO-ENTMCNC: 34.1 G/DL (ref 31.5–36.5)
MCV RBC AUTO: 90 FL (ref 78–100)
MONOCYTES # BLD AUTO: 0.4 10E9/L (ref 0–1.3)
MONOCYTES NFR BLD AUTO: 7.1 %
NEUTROPHILS # BLD AUTO: 3.6 10E9/L (ref 1.6–8.3)
NEUTROPHILS NFR BLD AUTO: 60 %
PLATELET # BLD AUTO: 275 10E9/L (ref 150–450)
RBC # BLD AUTO: 5.04 10E12/L (ref 4.4–5.9)
WBC # BLD AUTO: 6 10E9/L (ref 4–11)

## 2020-06-15 PROCEDURE — 85025 COMPLETE CBC W/AUTO DIFF WBC: CPT | Performed by: PSYCHIATRY & NEUROLOGY

## 2020-06-15 PROCEDURE — 36415 COLL VENOUS BLD VENIPUNCTURE: CPT | Performed by: PSYCHIATRY & NEUROLOGY

## 2020-06-15 NOTE — TELEPHONE ENCOUNTER
Jerel Shay was referred to [facility] for outpatient clozapine monitoring.   Current Dose: 150mg QHS  Laboratory Monitoring: weekly home draws. Clozapine started 2/19/20 during inpatient hospitalization. He will be eligible to move to Q2 week monitoring 8/19/20.     Recent Labs   Lab Test 06/15/20  1230 06/09/20  1215 06/02/20  1230 05/22/20  1115   WBC 6.0 6.7 8.0 7.9   ANEU 3.6 4.2 4.8 3.4   HGB 15.5 14.7 15.4 15.5    263 260 251       ANC WNL.  Clozapine will be mailed to patient. Next draw due 6/22/20.    Clozapine can be mailed to:   [email or web address] staff  C/o Jerel Stockelvis  [address] [place] [address]    [email or web address] staff:  Phone: [phone]    Azeb Mclean, PharmD, BCPP  Medication Therapy Management Pharmacist  [facility]  [phone]

## 2020-06-18 ENCOUNTER — PATIENT OUTREACH (OUTPATIENT)
Dept: PSYCHIATRY | Facility: CLINIC | Age: 25
End: 2020-06-18

## 2020-06-19 ENCOUNTER — TELEPHONE (OUTPATIENT)
Dept: PSYCHIATRY | Facility: CLINIC | Age: 25
End: 2020-06-19

## 2020-06-19 NOTE — TELEPHONE ENCOUNTER
What is the concern that needs to be addressed by a nurse? Patient was just discharged form IRTS facility and living on his own. Mom would like to know how to set up the patients meds and when he needs to have blood draws just so she can stay on top of this. Jerel is currently living in an apartment on his own. He will not allow mom to talk to him about his medication so she is reaching out here.     May a detailed message be left on voicemail? yes    Date of last office visit:     Message routed to: Brittani Estrella RN

## 2020-06-19 NOTE — TELEPHONE ENCOUNTER
NAVIGATE SEE Outgoing Telephone Call  For Supported Employment & Education    NAVIGATE Enrollee: Jerel Day (1995)     MRN: [id number]  Date of Call: 6/19/2020  Contacted: Jerel  Call Length: 1    Discussed:   Left message for Jerel to call this writer back to check in per [facility] on [facility].     Ana Michel

## 2020-06-19 NOTE — TELEPHONE ENCOUNTER
Writer notified of pt's change in address as listed below.     Writer updated pt's address with mobile phlebotomy and retail pharmacy. Will have mobile phlebotomy draw pt at his new address:   [address]  [place] [address]    Retail pharmacy will mail medication to pt's parents home:   [address] [place] [address] [place] [address]    Azeb Mclean, PharmD, BCPP  Medication Therapy Management Pharmacist  [facility]  [phone]

## 2020-06-19 NOTE — TELEPHONE ENCOUNTER
-Writer returned call to Georgina.  She reports that patient was discharged from the [facility]S and is now living at [address]  [place] [address]      -She was unsure how this will effect his blood draws and getting his Clozapine.  She stated that at times they were getting his medication mailed to them and then they would take it to the IRTS.  She thinks at this time, this is the best solution as she is concerned that he will not remember to get his meds out of the mail.      -Writer agreed to follow up with Azeb Mclean PharmD to make sure she is aware that patient has moved.

## 2020-06-22 ENCOUNTER — TELEPHONE (OUTPATIENT)
Dept: PSYCHIATRY | Facility: CLINIC | Age: 25
End: 2020-06-22

## 2020-06-22 ENCOUNTER — TELEPHONE (OUTPATIENT)
Dept: PHARMACY | Facility: CLINIC | Age: 25
End: 2020-06-22

## 2020-06-22 DIAGNOSIS — F25.0 SCHIZOAFFECTIVE DISORDER, BIPOLAR TYPE (H): ICD-10-CM

## 2020-06-22 LAB
BASOPHILS # BLD AUTO: 0 10E9/L (ref 0–0.2)
BASOPHILS NFR BLD AUTO: 0.4 %
DIFFERENTIAL METHOD BLD: NORMAL
EOSINOPHIL # BLD AUTO: 0.2 10E9/L (ref 0–0.7)
EOSINOPHIL NFR BLD AUTO: 1.8 %
ERYTHROCYTE [DISTWIDTH] IN BLOOD BY AUTOMATED COUNT: 12.6 % (ref 10–15)
HCT VFR BLD AUTO: 44.4 % (ref 40–53)
HGB BLD-MCNC: 15.5 G/DL (ref 13.3–17.7)
LYMPHOCYTES # BLD AUTO: 1.9 10E9/L (ref 0.8–5.3)
LYMPHOCYTES NFR BLD AUTO: 19.9 %
MCH RBC QN AUTO: 31.1 PG (ref 26.5–33)
MCHC RBC AUTO-ENTMCNC: 34.9 G/DL (ref 31.5–36.5)
MCV RBC AUTO: 89 FL (ref 78–100)
MONOCYTES # BLD AUTO: 0.9 10E9/L (ref 0–1.3)
MONOCYTES NFR BLD AUTO: 9.6 %
NEUTROPHILS # BLD AUTO: 6.4 10E9/L (ref 1.6–8.3)
NEUTROPHILS NFR BLD AUTO: 68.3 %
PLATELET # BLD AUTO: 283 10E9/L (ref 150–450)
RBC # BLD AUTO: 4.98 10E12/L (ref 4.4–5.9)
WBC # BLD AUTO: 9.3 10E9/L (ref 4–11)

## 2020-06-22 PROCEDURE — 85025 COMPLETE CBC W/AUTO DIFF WBC: CPT | Performed by: INTERNAL MEDICINE

## 2020-06-22 PROCEDURE — 36415 COLL VENOUS BLD VENIPUNCTURE: CPT | Performed by: INTERNAL MEDICINE

## 2020-06-22 NOTE — TELEPHONE ENCOUNTER
Writer received phone call from mobile phlebotomist who visits pt weekly for clozapine monitoring lab draws.     Mobile phlebotomist visited pt today and expressed concern for psychosis symptoms. Mobile phlebotomist reported patient made several comments about radio waves from [place] monitoring his apartment. She also reports he seems to be responding to internal stimuli. She was concerned about him being alone in his home without supervision.     Forwarded to RN team as it appears pt's mom has made call to RN team as well to discuss pt's symptoms.     Azeb Mclean, PharmD, BCPP  Medication Therapy Management Pharmacist  [facility]

## 2020-06-22 NOTE — TELEPHONE ENCOUNTER
What is the concern that needs to be addressed by a nurse? Patient mom would a call back from Gaurav to inform her how patient is doing.    May a detailed message be left on voicemail? yes    Date of last office visit:06/12/2020     Message routed to: [facility]

## 2020-06-22 NOTE — TELEPHONE ENCOUNTER
"- Writer returned call to Mesha.  She reported that patient has noted that patient has started making more strange comments.  They are concerned that he is not taking his medication.  Per Mesha, patient's brother went over there to check on him this weekend and noted that apartment was a mess and tried to help patient clean it up.  Mesha saw patient yesterday and reported that he was \"spacey\" and again making some strange comments.  They have been encouraging him to take his medications, but he has been resistant to this.  Mesha and writer did discuss that patient is still under Commitment til 11/2020      -Mesha has reached out to [facility] patient's  to see where she is at with other resources for patient and has not heard back.        -Writer will call  [facility] today to check in and follow up with SUSAN Dean tomorrow when she is back in clinic.   "

## 2020-06-22 NOTE — TELEPHONE ENCOUNTER
Jerel Day was referred to [facility] for outpatient clozapine monitoring.   Current Dose: 150mg QHS  Laboratory Monitoring: weekly home draws. Clozapine started 2/19/20 during inpatient hospitalization. He will be eligible to move to Q2 week monitoring 8/19/20.     Recent Labs   Lab Test 06/22/20  1030 06/15/20  1230 06/09/20  1215 06/02/20  1230   WBC 9.3 6.0 6.7 8.0   ANEU 6.4 3.6 4.2 4.8   HGB 15.5 15.5 14.7 15.4    275 263 260       ANC WNL.  Clozapine will be mailed to patient. Next draw due 6/29/20.    Patient has moved as of 6/19/20.   Home draws will take place at pt's apartment:   [address]  [place] [address]     Retail pharmacy will mail medication to pt's parents home:   [address] [place] [address]    Azeb Mclean, PharmD, BCPP  Medication Therapy Management Pharmacist  [facility]  [phone]

## 2020-06-23 ENCOUNTER — PATIENT OUTREACH (OUTPATIENT)
Dept: PSYCHIATRY | Facility: CLINIC | Age: 25
End: 2020-06-23

## 2020-06-23 NOTE — TELEPHONE ENCOUNTER
NAVIGATE Outreach  A Part of the [facility] First Episode of Psychosis Program     Patient Name: Jerel Day  /Age:  1995 (24 year old)    Contact: Writer attempted to get a hold of Jerel on two occasions on this date. Both times phone rang twice, went to [employer], which is not set-up. Wanting to check-in regarding phone call BYRON Priest had with Mom and report from Azeb Mclean regarding potential increase in psychosis symptoms. Writer is scheduled with Jerel this Thursday at 3pm, will try him again then.     Plan: Will route note to team for update purposes. Team is also awaiting call back from Noy from BYRON Priest outreach to her yesterday.    EDELMIRA Morgan  NAVIGATE Individual Resiliency [place] & Family Clinician

## 2020-06-24 NOTE — TELEPHONE ENCOUNTER
-Writer called Mesha and let her know that Dillon Cadena tried to reach out to patient and was unable to get a hold of him.      Mesha gave the following update:       -Mesha reports that yesterday they went over there and found that patient had taken a bunch of tea bags out of the bag, but didn't make tea.  She also noted that he had taken out some bouin cubes, but did not use them.     -Mesha continues to report noticing disorganization from patient.  States that he could not find his wallet or his keys yesterday.         -Mesha does not think that he is taking his medications, states they got a new bottle of clozapine yesterday, and compared it to his old one.  She does not believe he has taken any of it.      -Discussed that maybe Jerel living on his own is not a good idea.  Mesha agreed with this, but did not think he would go for living with anyone else.

## 2020-06-25 ENCOUNTER — PATIENT OUTREACH (OUTPATIENT)
Dept: PSYCHIATRY | Facility: CLINIC | Age: 25
End: 2020-06-25

## 2020-06-25 NOTE — TELEPHONE ENCOUNTER
LILLIANA Family Peer Support  A Part of the [facility] First Episode of Psychosis Program     Patient Name: Jerel Day  /Age:  1995 (24 year old)  Date of Encounter: 20  Length of Contact: 42 minutes    This writer reached out to offer resources and support to patient's mother, Mesha.     Mesha stated that Jerel is now living in an apartment [address] near [address] in [facility].  Jerel's  is working on obtaining an [facility] worker for Jerel who can go to his apartment twice a week.  Parents are concerned about Jerel becoming isolated.    This writer shared suggestions for relapse prevention which included: staggering Jerel's NAVIGATE team and other appointments so that he has contact with someone every day, scheduled times of the week for family meals, calls and texts, a calendar of daily activities so he has something to look forward to and can keep track of appointments, and information on [facility] for drop-in community support and meals.    This writer will continue to offer ongoing family peer support services to parents.    CONSTANTINO FletcherATE Family Peer    [Billing Code 38957 for No Billable Service as family peer support is a nonbillable service]

## 2020-06-25 NOTE — TELEPHONE ENCOUNTER
NAVIGATE Outreach  A Part of the [facility] First Episode of Psychosis Program     Patient Name: Jerel Day  /Age:  1995 (24 year old)    Contact: Writer called Jerel at time of scheduled appt. Rang twice, no answer and VM not set up. Called back again 15 minutes later, rang twice, no answer and VM not set up.     Plan: Will route note to team for update purposes and to determine appropriate next steps.     EDELMIRA Morgan  NAVIGATE Individual Resiliency [place] & Family Clinician

## 2020-06-29 ENCOUNTER — TELEPHONE (OUTPATIENT)
Dept: PHARMACY | Facility: CLINIC | Age: 25
End: 2020-06-29

## 2020-06-29 DIAGNOSIS — F25.0 SCHIZOAFFECTIVE DISORDER, BIPOLAR TYPE (H): ICD-10-CM

## 2020-06-29 LAB
BASOPHILS # BLD AUTO: 0 10E9/L (ref 0–0.2)
BASOPHILS NFR BLD AUTO: 0.5 %
DIFFERENTIAL METHOD BLD: NORMAL
EOSINOPHIL # BLD AUTO: 0.3 10E9/L (ref 0–0.7)
EOSINOPHIL NFR BLD AUTO: 2.9 %
ERYTHROCYTE [DISTWIDTH] IN BLOOD BY AUTOMATED COUNT: 12.8 % (ref 10–15)
HCT VFR BLD AUTO: 44.4 % (ref 40–53)
HGB BLD-MCNC: 15.5 G/DL (ref 13.3–17.7)
LYMPHOCYTES # BLD AUTO: 2.5 10E9/L (ref 0.8–5.3)
LYMPHOCYTES NFR BLD AUTO: 28.3 %
MCH RBC QN AUTO: 30.9 PG (ref 26.5–33)
MCHC RBC AUTO-ENTMCNC: 34.9 G/DL (ref 31.5–36.5)
MCV RBC AUTO: 88 FL (ref 78–100)
MONOCYTES # BLD AUTO: 0.8 10E9/L (ref 0–1.3)
MONOCYTES NFR BLD AUTO: 8.6 %
NEUTROPHILS # BLD AUTO: 5.3 10E9/L (ref 1.6–8.3)
NEUTROPHILS NFR BLD AUTO: 59.7 %
PLATELET # BLD AUTO: NORMAL 10E9/L (ref 150–450)
RBC # BLD AUTO: 5.02 10E12/L (ref 4.4–5.9)
WBC # BLD AUTO: 8.9 10E9/L (ref 4–11)

## 2020-06-29 PROCEDURE — 85025 COMPLETE CBC W/AUTO DIFF WBC: CPT | Performed by: INTERNAL MEDICINE

## 2020-06-29 PROCEDURE — 36415 COLL VENOUS BLD VENIPUNCTURE: CPT | Performed by: INTERNAL MEDICINE

## 2020-06-29 NOTE — TELEPHONE ENCOUNTER
Jerel Day was referred to [facility] for outpatient clozapine monitoring.   Current Dose: 150mg QHS  Laboratory Monitoring: weekly home draws. Clozapine started 2/19/20 during inpatient hospitalization. He will be eligible to move to Q2 week monitoring 8/19/20.     Recent Labs   Lab Test 06/29/20  1130 06/22/20  1030 06/15/20  1230 06/09/20  1215   WBC 8.9 9.3 6.0 6.7   ANEU 5.3 6.4 3.6 4.2   HGB 15.5 15.5 15.5 14.7   PLT Platelets clumped, platelet count unavailable 283 275 263       ANC WNL.  Clozapine will be mailed to patient. Next draw due 7/6/20.    Patient has moved as of 6/19/20.   Home draws will take place at pt's apartment:   [address]  [place] [address]     Retail pharmacy will mail medication to pt's parents home:   [address] [place] [address]    Azeb Mclean, PharmD, BCPP  Medication Therapy Management Pharmacist  [facility]  [phone]

## 2020-06-30 ENCOUNTER — TELEPHONE (OUTPATIENT)
Dept: PSYCHIATRY | Facility: CLINIC | Age: 25
End: 2020-06-30

## 2020-06-30 NOTE — TELEPHONE ENCOUNTER
-Writer returned call to Mesha.  Mesha reports that she last saw Jerel on Saturday.  She states that he seemed more tired, but did not seem as psychotic to her.  She states older brother also saw him last week and reported that he seemed to be doing better.      -Mesha reports that she had spoke with him and he was out walking.  States that he was out walking and seemed to be lost, but was able to find his way back to his apartment.  Mesha stated that she has been out walking in the neighborhood and understands how he could have gotten lost.      -Reviewed Crisis resources with Mesha.  Made sure she had the [facility] number, and encouraged her to use it if needed.  Also let her know if they were concerned about safety and it was an immediate situation that calling 911 is also an option.              -Mesha gave writer the new CM info:Travis [phone].  Per Mesha last time she spoke with Travis she was informed that [facility] services were at least a month out.  Mesha was very frustrated by this.        -Writer will follow up with new CM about concerns that patient is not taking prescribed medication.

## 2020-06-30 NOTE — TELEPHONE ENCOUNTER
-Writer called and spoke with Travis; the new CM.  She reports that she has been unable to get a hold patient as well, and has mainly been talking with mom.  Reports that she has set up a meeting with parents and patient on 7/16/20.  She will continue to try to get a hold of patient, but did not feel hopeful.      -Writer expressed concern that patient is not taking his medication, and per provider here this is evident via blood draws.  Travis did confirm that patient is still on a provisional discharge, she will continue to try to get a hold of him, and hopes that he will actually be at the meeting on 7/16/20.

## 2020-06-30 NOTE — TELEPHONE ENCOUNTER
What is the concern that needs to be addressed by a nurse? Mesha would like to follow up with the call from last week. She is wondering if you got a hold of the patient and the outcome of this as well as if the patient had his labs drawn yesterday. Please give her a call back when you are able. Thank you    May a detailed message be left on voicemail? yes    Date of last office visit:     Message routed to: Brittani Estrella RN

## 2020-07-02 ENCOUNTER — PATIENT OUTREACH (OUTPATIENT)
Dept: PSYCHIATRY | Facility: CLINIC | Age: 25
End: 2020-07-02

## 2020-07-02 NOTE — PROGRESS NOTES
NAVIGATE Outreach  A Part of the [facility] First Episode of Psychosis Program     Patient Name: Jerel Day  /Age:  1995 (24 year old)    Contact: Writer called Jerel twice on this date for scheduled telephone session; no answer and VM still is not set up.     Plan: Will route to team for update purposes.     EDELMIRA Morgan  NAVIGATE Individual Resiliency [place] & Family Clinician

## 2020-07-06 ENCOUNTER — TELEPHONE (OUTPATIENT)
Dept: PSYCHIATRY | Facility: CLINIC | Age: 25
End: 2020-07-06

## 2020-07-06 ENCOUNTER — TELEPHONE (OUTPATIENT)
Dept: PHARMACY | Facility: CLINIC | Age: 25
End: 2020-07-06

## 2020-07-06 ENCOUNTER — PATIENT OUTREACH (OUTPATIENT)
Dept: PSYCHIATRY | Facility: CLINIC | Age: 25
End: 2020-07-06

## 2020-07-06 DIAGNOSIS — F20.9 SCHIZOPHRENIA, UNSPECIFIED TYPE (H): Primary | ICD-10-CM

## 2020-07-06 DIAGNOSIS — F25.0 SCHIZOAFFECTIVE DISORDER, BIPOLAR TYPE (H): ICD-10-CM

## 2020-07-06 DIAGNOSIS — F20.89 OTHER SCHIZOPHRENIA (H): ICD-10-CM

## 2020-07-06 LAB
BASOPHILS # BLD AUTO: 0.1 10E9/L (ref 0–0.2)
BASOPHILS NFR BLD AUTO: 0.6 %
DIFFERENTIAL METHOD BLD: NORMAL
EOSINOPHIL # BLD AUTO: 0.5 10E9/L (ref 0–0.7)
EOSINOPHIL NFR BLD AUTO: 5.9 %
ERYTHROCYTE [DISTWIDTH] IN BLOOD BY AUTOMATED COUNT: 12.7 % (ref 10–15)
HCT VFR BLD AUTO: 45.4 % (ref 40–53)
HGB BLD-MCNC: 15.9 G/DL (ref 13.3–17.7)
LYMPHOCYTES # BLD AUTO: 2.5 10E9/L (ref 0.8–5.3)
LYMPHOCYTES NFR BLD AUTO: 30.9 %
MCH RBC QN AUTO: 31.5 PG (ref 26.5–33)
MCHC RBC AUTO-ENTMCNC: 35 G/DL (ref 31.5–36.5)
MCV RBC AUTO: 90 FL (ref 78–100)
MONOCYTES # BLD AUTO: 0.7 10E9/L (ref 0–1.3)
MONOCYTES NFR BLD AUTO: 8.6 %
NEUTROPHILS # BLD AUTO: 4.3 10E9/L (ref 1.6–8.3)
NEUTROPHILS NFR BLD AUTO: 54 %
PLATELET # BLD AUTO: 232 10E9/L (ref 150–450)
RBC # BLD AUTO: 5.04 10E12/L (ref 4.4–5.9)
WBC # BLD AUTO: 8 10E9/L (ref 4–11)

## 2020-07-06 PROCEDURE — 85025 COMPLETE CBC W/AUTO DIFF WBC: CPT | Performed by: INTERNAL MEDICINE

## 2020-07-06 PROCEDURE — 36415 COLL VENOUS BLD VENIPUNCTURE: CPT | Performed by: INTERNAL MEDICINE

## 2020-07-06 NOTE — TELEPHONE ENCOUNTER
"NAVIGATE SEE Outgoing Telephone Call  For Supported Employment & Education    NAVIGATE Enrollee: Jerel Day (1995)     MRN: [id number]  Date of Call: 7/6/2020  Contacted: Jerel  Call Length: 5 min    Discussed:   Writer texted Jerel as he no showed several navigate appts and had not been answering his phone. Writer texted Jerel asking about his apartment and how he has been doing.     Jerel responded that his apartment has been good so far, though he reports an increase in body aches and pains (\"my pain is really glaring up\") and confirmed that he now has a bed and that he doesn't believe that is causing his pain. He says he likes his neighborhood but that \"he is getting pretty lost\" and said \"I'm trying to find my wallet but I can't get settled\" and that \"I'm trying to get a back massage\". Writer recommended that Jerel reach out to Navigate to reschedule his appts.    Ana Michel    "

## 2020-07-06 NOTE — TELEPHONE ENCOUNTER
Jerel Day was referred to [facility] for outpatient clozapine monitoring.   Current Dose: 150mg QHS  Laboratory Monitoring: weekly home draws. Clozapine started 2/19/20 during inpatient hospitalization. He will be eligible to move to Q2 week monitoring 8/19/20.     Recent Labs   Lab Test 07/06/20  1025 06/29/20  1130 06/22/20  1030 06/15/20  1230   WBC 8.0 8.9 9.3 6.0   ANEU 4.3 5.3 6.4 3.6   HGB 15.9 15.5 15.5 15.5    Platelets clumped, platelet count unavailable 283 275       ANC WNL.  Clozapine will be mailed to patient. Next draw due 7/13/20.    Patient has moved as of 6/19/20.   Home draws will take place at pt's apartment:   [address]  [place] [address]     Retail pharmacy will mail medication to pt's parents home:   [address] [place] [address]    Azeb Mclean, PharmD, BCPP  Medication Therapy Management Pharmacist  [facility]  [phone]

## 2020-07-06 NOTE — PROGRESS NOTES
LILLIANA Family Peer Support  A Part of the [facility] First Episode of Psychosis Program     Patient Name: Jerel Day  /Age:  1995 (24 year old)  Date of Encounter: 2020  Length of Contact: 40 minutes    This writer reached out to patient's mother, Mesha, regarding Jerel's missed appointments with the NAVIGATE team.     Mesha was aware, stated that Jerel is clearly experiencing symptoms of psychosis, and parents have discussed the possibility of hospitalization.  [place] and was very disorganized.  Parents brought his medication with them, although he didn't take it.    Parents are concerned for his safety in the community (losing his keys, phone, wallet, getting lost while walking around the neighborhood).  Parents plan to see Jerel this evening.  Strategies for working with the COPE team were discussed. Parents recognize that Jerel needs to be in a more supportive living situation for medication monitoring and adherence in the future.      This writer offered empathy and ongoing family peer support. Mesha stated how grateful parents are for the support of the NAVIGATE team.     CONSTANTINO FletcherATE Family Peer    [Billing Code 81589 for No Billable Service as family peer support is a nonbillable service]

## 2020-07-07 ENCOUNTER — VIRTUAL VISIT (OUTPATIENT)
Dept: PSYCHIATRY | Facility: CLINIC | Age: 25
End: 2020-07-07
Payer: COMMERCIAL

## 2020-07-07 DIAGNOSIS — F20.9 SCHIZOPHRENIA, UNSPECIFIED TYPE (H): Primary | ICD-10-CM

## 2020-07-07 NOTE — Clinical Note
"Hi team - Jerel just called me - he said his family visit went well last night though was talking a lot about his brother and how Jerel \"compares\" to Chilo. He had a much higher level of disorganization and word salad in our conversation but was calm and thanked me for listening to him. He has been inconsistent with his meds and cutting them in half or quartering them. Encouraged scheduling prescriber appt and also reminded him of IRT appt on Thur. He has been confused with his phone and says \"they are pulling him in different directions and tracking him with the phone\" so he is nervous to answer calls. He also reported hearing Nola last night talking about him. Happy to provide more details but wanted to give an update. "

## 2020-07-09 ENCOUNTER — PATIENT OUTREACH (OUTPATIENT)
Dept: PSYCHIATRY | Facility: CLINIC | Age: 25
End: 2020-07-09

## 2020-07-09 ENCOUNTER — VIRTUAL VISIT (OUTPATIENT)
Dept: PSYCHIATRY | Facility: CLINIC | Age: 25
End: 2020-07-09
Payer: COMMERCIAL

## 2020-07-09 ENCOUNTER — TELEPHONE (OUTPATIENT)
Dept: PSYCHIATRY | Facility: CLINIC | Age: 25
End: 2020-07-09

## 2020-07-09 DIAGNOSIS — F20.9 SCHIZOPHRENIA, UNSPECIFIED TYPE (H): Primary | ICD-10-CM

## 2020-07-09 NOTE — TELEPHONE ENCOUNTER
"NAVIGATE SEE Incoming Telephone Call  For Supported Employment & Education    NAVIGATE Enrollee: Jerel Day (1995)     MRN: [id number]  Incoming texts Received on: 7/9/20  Texts Received from: Jerel WEBER's response to incoming call:   Jerel texted this writer at 9:30a regarding thoughts surrounding the \"titanic crashing into [place] Matias\" and that he is hearing AH that people are making fun of him and that \"there is a tech big bug here, like sum kind of bagged inscription\".     This writer empathized how difficult that must be and asked if there was anything he could do to distract himself and he said \"maybe learn a bit of Citizen of the [place]\" but then went on to say \"I think someone's in trouble and its making me uneasy\". Writer validated his feelings of unease and reminded him of his IRT appointment today with SHIRA Morgan at 3pm and encouraged him to try and distract himself until the appointment.     Jerel said he was thankful for my help and that the voices were just \"ham méndez\".     Ana Michel    "

## 2020-07-09 NOTE — TELEPHONE ENCOUNTER
----- Message from EDELMIRA Morgan sent at 7/9/2020  3:45 PM CDT -----  Regarding: Update and requests for follow-up  Hi all -     I just talked to Jerel for the first time in weeks. He is so disorganized, word salad, neologisms, it was hard for me to follow - I assessed for safety and do not have immediate concerns. I scheduled him with Dr. Madelin Ibanez at 4pm.    But I do have less immediate concerns that people have already identified including that he obviously is not taking medication, and I wonder the state of his apartment safety-wise. It looks like parents are going there tonight from Sabra's note - I don't have time today (I have a session now until 5pm) and my tomorrow is booked 8-3 and it would be helpful if parents could be reached before tomorrow at 3pm.    Brittani - could you by chance reach out to parents and reiterate our concerns and emphasize the need to have him seen by the  ASAP (can parents help coordinate this sooner) and emphasize with parents intervening tonight if there are immediate concerns with the state of his apartment? Also notify them of appt Tuesday at 4pm with Dr. Yusuf - Jerel said that was okay if they were made aware of it.    Ana Rose - any chance you can follow-up with parents when you're back and echo this?    Thanks all,    Tammi

## 2020-07-09 NOTE — PROGRESS NOTES
NAVIGSTEPHEN Clinician Contact & Progress Note  For Individual Resiliency Training (IRT)  A Part of the [facility] First Episode of Psychosis Program    NAVIGATE Enrollee: Jerel Day (1995)     MRN: [id number]  Date:  7/09/20  Diagnosis: Schizophrenia, unspecified type F20.9  Clinician: LILLIANA Individual Resiliency Trainer, EDELMIRA Morgan     1. Type of contact: (majority of time spent)  IRT Session via telehealth  Mode of communication: telephone. Video visit was offered, but client declined. Patient consented verbally to this mode of therapy today.  Reason for telehealth: COVID-19. This patient visit was converted to a telehealth visit to minimize exposure to COVID-19.     2. People present:   Writer  Client: Yes     3. Length of Actual Contact: Start Time: 3:06pm; End Time: 3:38pm     4. Location of contact:  Originating Location (patient location): apartment in [place], [place]  Distant Location (provider location): Home office, located in [place], [place], using appropriate privacy considerations and procedures    5. Did the client complete the home practice option(s) from the previous session: Partially Completed    6. Motivational Teaching Strategies:  Connect info and skills with personal goals  Promote hope and positive expectations  Explore pros and cons of change  Re-frame experiences in positive light    7. Educational Teaching Strategies:  Review of written material/education  Relate information to client's experience  Ask questions to check comprehension  Break down information into small chunks  Adopt client's language     8. CBT Teaching Strategies:  Reinforcement and shaping (positive feedback for steps towards goals and gains in knowledge & skills)  Relapse prevention planning (review of stressors and early warning signs)  Coping skills training (review current coping skills and increase currently used skills)  Behavioral tailoring (fit taking medication into client's daily routine)    9. IRT  Module(s) Addressed:  Module 2 - Assessment/Initial Goal Setting  Module 3 - Education about Psychosis  Module 4 - Relapse Prevention Planning    10. Techniques utilized:   [employer] [place] announced at beginning of session  Review of goal  Review of previous meeting  Present new material  Problem-solving practice  Help client choose a home practice option  Summarize progress made in current session    11. Measures:    Mental Status Exam  Alertness: alert   Behavior/Demeanor: cooperative and pleasant  Speech: regular rate and rhythm  Language: intact and no obvious problem.   Mood: description consistent with euthymia  Thought Process/Associations: tangential, difficult to follow, disorganized, loose associations, neologisms/ word salad and flight of ideas; significantly increased  Thought Content:  Reports delusions, preoccupations and over-valued ideas;  Denies suicidal ideation and violent ideation  Perception:  Reports auditory hallucinations;  Denies visual hallucinations, depersonalization and derealization  Insight: limited  Judgment: limited  Cognition: does  appear grossly intact; formal cognitive testing was not done    Writer was not able to complete the Elberfeld risk, PHQ-9 and JUNAID-7 with Jerel due to his disorganization and inability to meaningfully respond. Mood and safety assessed verbally during phone call, not through formal measure.    12. Assessment/Progress Note:     Writer was able to reach Jerel on his cell phone as he got a new phone. Called Jerel for IRT session. This is writer's first successful contact and telephone session with Jerel since June 12th.     It was difficult to follow Jerel throughout the appointment. Jerel's thought content and speech was significant for word salad, neologisms, disorganization, loose associations and flight of ideas. This has significantly increased since last contact when Jerel was at the IRTS.     What writer was able to glean from the conversation is that Jerel is living on  "his own in an apartment in [facility]. His parents visit \"more than I'd like\" and his brother comes over as well. Jerel has not yet met his new CM or his ARM worker.       With regards to medication, Jerel reports he has \"missed an unspecified number of dosages\" and stated he is taking it \"in bits and bytes, bytes with a capital Y instead of a lowercase all other letters.\" Writer emphasized the importance of medication adherence for his well-being and also considering Jerel is on a commitment with a Clark. Jerel gave writer verbal permission to reach out to his new . He also requested that the CM text Jerel to arrange a first meeting.     Writer assessed safety; Jerel denied current SI/SH and denied current HI/VI. Reports the voices are there but \"not bad, I think it's a decompressed computer.\"    Jerel reports he is walking, reading and studying \"a lot of different angles\" with his spare time.     Writer scheduled Jerel session with Dr. Yusuf for Tuesday at 4pm. Confirmed next session with writer in two weeks at 3pm.     Overall, Jerel was engaged throughout the session. Symptom assessment and safety assessment was in support of Jerel's self-identified goal(s), as identified in most recent BEH Treatment Plan. Progress toward goal completion seems poor.    13. Plan/Referrals:     -Writer is aware from contact between Family  - Sabra Villed[facility] and family on this date that they plan to visit him this evening. Sent request to BYRON Priest to connect with parents, reiterate team and writer's concerns with him managing independently and utilize crisis resources/bring him to the hospital if living conditions are unsafe     -Scheduled session with Dr. Yusuf Tuesday at 4pm    -Next session with writer scheduled in two weeks at 3pm    -Will reach out to CM tomorrow to emphasize need for services and support and concerns with adherence to terms of the commitment and Clark order    -Will " "route note to team for update and care coordination purposes    Client and family aware they can reach out to writer directly and/or NAVIGATE team should concerns or needs arise prior to next scheduled appointment.     Billing for \"Interactive Complexity\"?    No      Tammi Connell ARIANNE    NAVIGATE Individual Resiliency Trainer    Attestation:    I did not see this patient directly. This patient is discussed with me in individual clinical social work supervision, and I agree with the plan as documented.     Ana Rose, LICSW, MSW, LICSW, July 13, 2020      "

## 2020-07-09 NOTE — PROGRESS NOTES
NAVIGATE Family Peer Support  A Part of the [facility] First Episode of Psychosis Program     Patient Name: Jerel Day  /Age:  1995 (24 year old)  Date of Encounter: 20  Length of Contact: 7 minutes    This writer reached out to patient's mother, Mesha, to let parents know that HILLARY is not currently making community visits due to COVID.  HILLARY is working on a protocol for the near future.    Mesha stated that Jerel seemed slightly less symptomatic on Monday (20).  Jerel is receptive to allowing parents to  help him organize his apartment. Parents plan to see Jerel collins and will provide him with a clock and wall calendar in an effort to help him stay on track with appointments. This writer suggested including family visits and outings on the calendar so Jerel knows has something to look forward to on a weekly basis.       CONSTANTINO FletcherATE Family Peer    [Billing Code 49307 for No Billable Service as family peer support is a nonbillable service]

## 2020-07-09 NOTE — Clinical Note
Harris Gibbons, Ana Michel, Brittani Huerta and Sabra BOLAÑOS note from my meeting with Jerel. I will call CM today. Significantly disorganized, word salad, neologisms I wonder how his living conditions are. No imminent safety concerns from my assessment. I am wondering if we consider a recommendation for ACT team? Will put on my list to discuss Monday.     Ana - Ready to be signed, thanks!

## 2020-07-09 NOTE — TELEPHONE ENCOUNTER
-Writer called and spoke with Mesha.  Discussed concerns raised by Tammi.  She confirmed that they are seeing Jerel collins and can intervene in the moment if needed.  She will also try to help coordinate getting MONIQUE Robles to see him sooner.

## 2020-07-10 ENCOUNTER — PATIENT OUTREACH (OUTPATIENT)
Dept: PSYCHIATRY | Facility: CLINIC | Age: 25
End: 2020-07-10

## 2020-07-10 NOTE — PROGRESS NOTES
NAVIGATE Outreach  A Part of the [facility] First Episode of Psychosis Program     Patient Name: Jerel Day  /Age:  1995 (24 year old)    Contact: Received call back from Travis Salgado CM. Provided update of visit yesterday with Jerel including no immediate safety concerns, but concerns regarding how he is managing independent living given his demonstrated increased disorganization in thinking during yesterday's visit, as well as his report that he is not taking his medication as prescribed. Wondering what implications are of this with Clark order and also what additional supports can be provided.     Travis shared she has been trying to get a hold of Jerel for weeks without any success. She has been coordinating regularly with his Mom, per her report. She reports they are working to set him up with an [facility] worker. She will consult with her team during team meeting next week to explore other potential options and will f/u with writer/NAVIGATE team. Notified her writer will be out for a conference next week but directed her to call clinic at [phone] and ask to speak to BYRON Priest or another Navigate team member.     Plan: Will wait to hear back from Jerel's MONIQUE regarding other potential supportive options and/or implications of not taking medications as prescribed given he is on a Clark order.     EDELMIRA Morgan  NAVIGSTEPHEN Individual Resiliency  & Family Clinician

## 2020-07-10 NOTE — TELEPHONE ENCOUNTER
Jerel has his cell number rolling over to mothers and is unreachable even though he left me a VM from it Wed. With apology!?  Told mother I favor her taking him to [facility] for admit. Apparently he has self requested hosp 2/3 times in the past but that affords me now confidence. Since I have not seen or talked to him, I don't think a hold/transport order is valid? But just in case it is can you email me a document to sign. I think we (team) have enough collective evidence to sign it. Call me if so   [phone]

## 2020-07-10 NOTE — PROGRESS NOTES
NAVIGATE Outreach  A Part of the [facility] First Episode of Psychosis Program     Patient Name: Jerel Day  /Age:  1995 (24 year old)    Contact: Writer PEPE for Jerel's new Kezia ([phone]). Introduced self and role as Jerel's individual therapist. Shared that writer had session with Jerel yesterday and expressed concerns regarding his ability to manage his independent living situation; requested return call.    Plan: Will route to team for update purposes.     EDELMIRA Morgan  NAVIGATE Individual Resiliency  & Family Clinician

## 2020-07-10 NOTE — TELEPHONE ENCOUNTER
Now  scratch I meant NO confidence based upon his self reporting to 2 of 3 hospitalizations past.  Admit is best course of action.

## 2020-07-13 ENCOUNTER — TELEPHONE (OUTPATIENT)
Dept: PSYCHIATRY | Facility: CLINIC | Age: 25
End: 2020-07-13

## 2020-07-13 ENCOUNTER — PATIENT OUTREACH (OUTPATIENT)
Dept: PSYCHIATRY | Facility: CLINIC | Age: 25
End: 2020-07-13

## 2020-07-13 NOTE — TELEPHONE ENCOUNTER
Patient's mother called to notify clinic that Jerel is currently inpatient.  Per patient's mother, asked that we cancel this week's appointments, place a hold on medication refills, and cancel in home lab draw for today.      Called the mobile phlebotomistMichelle, and left a voicemail to cancel in home blood draw today.    Spoke with  Pharmacy, asked them to hold refills of Clozaril.

## 2020-07-13 NOTE — PROGRESS NOTES
NAVIGATE Outreach  A Part of the [facility] First Episode of Psychosis Program     Patient Name: Jerel Day  /Age:  1995 (24 year old)    Contact: Writer called and LVM for Jerel's CM-Travis. Notified her Jerel has been hospitalized. Requested return call to discuss Navigate team's recommendations including referral for ACT team upon discharge, and potential benefit of group home placement as it seems a higher level of care and support is indicated to promote sustained stabilization and recovery. Reminded her writer is out the rest of this week for a conference, but invited Travis to call clinic and ask to speak to BYRON Priest with any questions otherwise will plan to connect next week.    Tammi Connell, EDELMIRA  NAVIGATE Individual Resiliency  & Family Clinician

## 2020-07-13 NOTE — TELEPHONE ENCOUNTER
I missed that he was on a Clark Order. Not sure of the legal chain of action....does the doc. Notify the court or is the [facility] you are talking to sufficient?  HO would be preferable at baseline then clozapine as secondary med. Once he gets fully stabilized and demonstrates oral med compliance then could phase HO out (if need be)

## 2020-07-15 ENCOUNTER — PATIENT OUTREACH (OUTPATIENT)
Dept: PSYCHIATRY | Facility: CLINIC | Age: 25
End: 2020-07-15

## 2020-07-17 ENCOUNTER — TRANSFERRED RECORDS (OUTPATIENT)
Dept: HEALTH INFORMATION MANAGEMENT | Facility: CLINIC | Age: 25
End: 2020-07-17

## 2020-07-17 NOTE — TELEPHONE ENCOUNTER
Pt is currently at [facility] awaiting psychiatry bed in [facility].  Clozapine refill was mailed on 7/13, so he will have at least an extra week of medication upon discharge.

## 2020-07-20 ENCOUNTER — PATIENT OUTREACH (OUTPATIENT)
Dept: PSYCHIATRY | Facility: CLINIC | Age: 25
End: 2020-07-20

## 2020-07-20 NOTE — PROGRESS NOTES
NAVIGATE Outreach  A Part of the [facility] First Episode of Psychosis Program     Patient Name: Jerel Day  /Age:  1995 (24 year old)    Contact: Writer called [facility] where records indicate Jerel was discharged to from medical unit at [facility]. Spoke to RN who stated they don't have a release but will call back if/when they obtain one. Writer provided clinic contact information.    Plan: Will remain available for support and care coordination purposes.    Tammi Connell, ARIANNE  NAVIGATE Individual Resiliency  & Family Clinician

## 2020-07-22 ENCOUNTER — PATIENT OUTREACH (OUTPATIENT)
Dept: PSYCHIATRY | Facility: CLINIC | Age: 25
End: 2020-07-22

## 2020-07-22 NOTE — PROGRESS NOTES
NAVIGATE Outreach  A Part of the [facility] First Episode of Psychosis Program     Patient Name: Jerel Day  /Age:  1995 (24 year old)    Contact: Writer called and LVM for Jerelmarybeth Mast. Requested return call for update and care coordination purposes.    Plan: Will await return call.    EDELMIRA Morgan  NAVIGATE Individual Resiliency  & Family Clinician

## 2020-07-23 ENCOUNTER — PATIENT OUTREACH (OUTPATIENT)
Dept: PSYCHIATRY | Facility: CLINIC | Age: 25
End: 2020-07-23

## 2020-07-23 ENCOUNTER — TELEPHONE (OUTPATIENT)
Dept: PSYCHIATRY | Facility: CLINIC | Age: 25
End: 2020-07-23

## 2020-07-23 DIAGNOSIS — F20.9 SCHIZOPHRENIA, UNSPECIFIED TYPE (H): Primary | ICD-10-CM

## 2020-07-23 NOTE — TELEPHONE ENCOUNTER
What is the concern that needs to be addressed by a nurse? Patient is calling to talk to Gaurav and would like a call back.    May a detailed message be left on voicemail?     Date of last office visit:     Message routed to: EDEN NELSON

## 2020-07-23 NOTE — PROGRESS NOTES
NAVIGATE Outreach  A Part of the [facility] First Episode of Psychosis Program     Patient Name: Jerel Day  /Age:  1995 (24 year old)    Contact: Writer called [facility] ([phone]); RN stated there is no CONNOR signed yet. Writer asked to talk to Jerel; RN said this was not a good time. Writer requested return call from  on the unit; provided main clinic number.     Plan: Will remain available for support and care coordination purposes.     Tammi Connell, [facility]  NAVIGATE Individual Resiliency  & Family Clinician

## 2020-07-23 NOTE — TELEPHONE ENCOUNTER
"NAVIGATE Outreach  A Part of the [facility] First Episode of Psychosis Program     Patient Name: Jerel Day  /Age:  1995 (24 year old)    Contact: Writer called Jerel back on [facility] ([phone]); spent time giving Jerel space to process and share about being in the hospital. It was difficult to follow Jerel's train of thought. He exhibited neologisms, loose associations, flight of ideas and tangential thinking. When writer asked Jerel what contributed to him going to the hospital he commented, \"through my body there were a lot of asymptotes and dark lines, I have been able to find reflexes behind those and not getting taped flat like I was for a while.\" He commented at one point that he is, \"still trying to use the Kazakh letters, some of that stuff that I'm gaining on. My formulaic approach is becoming a little more lingual; trying to bring something to the table, like the furniture I was lacking all through college, I'm starting to understand more about furniture and recipes. I still feel like I should be an .\"     Writer asked if he has had contact with Travis, his new , he said he thinks she visited at [facility]. Jerel gave verbal permission again for writer to be in communication with Travis.    Writer voiced care and concern for Jerel; commended him on getting himself to the hospital. Reflected on difficulty with Navigate services recently in not being able to get in contact with Jerel for consistent sessions; stated goal to identify resources and services that can best support him in the community post-hospital. Unsure how much of this Jerel understood but will continue to deliver this messaging and work with his CM to coordinate a discharge plan.     Plan: Will route note to team for update purposes. Writer remains available for support and care coordination purposes.     Tammi Connell, [facility]  NAVIGATE Individual Resiliency [place] & Family Clinician    "

## 2020-07-27 ENCOUNTER — PATIENT OUTREACH (OUTPATIENT)
Dept: PSYCHIATRY | Facility: CLINIC | Age: 25
End: 2020-07-27

## 2020-07-27 NOTE — PROGRESS NOTES
[facility] NAVIGATE Program Treatment Plan Summary  A Part of the [facility] First Episode of Psychosis Program     NAVIGATE Enrollee: Jerel Day  /Age:  1995 (23 year old)  MRN: [id number]    Date of Initial Service: 18  Date of INTIAL Treatment Plan: 18  Last Review/Update Date:  20  Today's Date: 20  Next 90-Day Review Due:  20    The following represents UPDATED and reviewed treatment plan.    1. DSM-V Diagnosis (include numeric code)  Schizoaffective, bipolar type, 295.70 (F25)    2. Current symptoms and circumstances that substantiate the diagnosis    Jerel has a history of psychosis (paranoia, delusions, odd beliefs per family/friends, auditory hallucinations, command auditory hallucinations, visual hallucinations, disorganized speech, disorganized behavior and negative symptoms (diminished emotional expression, avolition and anhedonia)), SI and SIB. He presents with current symptoms of psychosis (delusions, auditory hallucinations, command auditory hallucinations, disorganized speech and negative symptoms (diminished emotional expression and avolition)).     3. How symptoms and/or behaviors are affecting level of function    Jerel s systems are impacting functioning with respect to ADLs, IADLs, social relationships, familial relationships and employment.    4. Risk Assessment:  Suicide:  Assessed Level of Immediate Risk: High  Ideation: No  Plan:  No; command SI to jump off bridge.  Means: Yes; has access to bridge.  Intent: No    Homicide/Violence:  Assessed Level of Immediate Risk: Medium  Ideation: No  Plan: No  Means: No  Intent: No    5. Medications    Current Outpatient Medications   Medication     cloZAPine (CLOZARIL) 50 MG tablet     melatonin 3 MG tablet     Vitamin D3 (CHOLECALCIFEROL) 25 mcg (1000 units) tablet     No current facility-administered medications for this visit.        6. Strengths     Medication adherence  Supportive friends, family, recovery  environment  Bravery & Valor     Curiosity    Humor & Playfulness   Kind & Generous    Love of learning       7. Barriers & Suicide Risk Factors     Command Hallucinations  Communication, limited to no communication with family/support network  Coping, limited to no coping strategies  High Premorbid IQ  Impulsive  Male  SIB  Single  Symptoms of psychosis, positive (delusions and/or hallucinations)  Symptoms of psychosis, negative (flat affect, avolition, anhedonia, alogia, and/or apathy)  Symptoms of psychosis, cognitive (memory, attention and concentration, and/or executive functioning difficulties)  Treatment Non-Adherence     8. Treatment Domains and Goals    Domain 1: Illness Management & Recovery  Identify and engage possible areas of improvement related to medication optimization, psychosis (paranoia, delusions, odd beliefs per family/friends, auditory hallucinations, command auditory hallucinations, disorganized speech, disorganized behavior and negative symptoms (diminished emotional expression, avolition and anhedonia)), depression (low mood nearly every day, anhedonia most of the time, low energy, difficulty concentrating, thinking or making decisions and suicidal ideation without plan, without intent), anxiety, SI, SIB and low self-esteem and ability to management illness.     Measurable Objectives Interventions Target Dates & Discharge Criteria   Medication Objectives    -Paricipate in a medication evaluation   -Take medications as prescribed and have reduced frequency and severity of symptoms  -Learn and implement strategies for overcoming barriers to taking medication  -Support system assists with overcoming barriers to taking medications   Medication Management  -Prescribe and monitor medications  -Monitor and treat side effects  -Psychoeducation  -Behavioral activation  -Initial and routine lab work    IRT/Psychotherapy  -Psychoeducation  -Motivation interviewing  -CBT  -Behavioral  activation  -Mindfulness  -Family involvement during portions of sessions    Family Therapy  -Psychoeducation  -Motivational interviewing  -Behavioral family therapy  -CBT  -Behavioral activation    Case Management  -Motivational interviewing  -Care coordination with school   Target date:   6 months from 4/16/20    Discharge criteria:  Marked and sustained symptom improvement     Gains Made:  -Paricipate in a medication evaluation   -adhering to medication recommendations  -taking medications as prescribed   Individual s Objectives    -Complete a safety plan with therapist and share with support system  -Define what recovery means to self  -Identify psychosocial areas of need  -Identify top 5 strengths and use those strengths when working toward goal achievement; simultaneously choose one area for improvement and identify two actionable steps toward improvement  -Create a goal plan consisting of one long-term goal, three short-term goals, and actionable steps toward short-term goal achievement  -Demonstrate understanding of psychosis (paranoia, delusions, auditory hallucinations and command auditory hallucinations), depression (low mood nearly every day, anhedonia most of the time, low energy and difficulty concentrating, thinking or making decisions), anxiety, SI and SIB in the context of self with respect to symptoms, causes, course, medications and the impact of stress  -Learn at least 2 coping strategies to successfully target current symptoms  -Demonstrate understanding for how substance use impacts symptoms, identify stage of change, and experiment with reduced use or abstinence from all illicit substances   -Learn strategies to build positive emotions and facilitate resiliency   -Develop and implement a relapse prevention plan including identification of warning signs, triggers, coping mechanisms, and how other persons can be supportive if symptoms increase or reemerge   -Process the psychotic episode by  demonstrating understanding of how the episode impacted self, identifying positive coping strategies and resiliency used during that time, challenging self-stigmatizing beliefs, and developing a positive attitude towards facing future life challenges  -Identify primary styles of thinking, and demonstrate understanding of and use cognitive restructuring to successfully deal with negative feelings  -Identify persistent symptoms that interfere with activities and/or enjoyment and successfully implement two coping strategies to reduce symptoms severity     In Jerel's own words:  -increase self-confidence (as of 4/16/20)  -stick with medications for now (as of 4/16/20)  -challenge negative self-beliefs (as of 4/16/20)   Medication Management  -Prescribe and monitor medications  -Monitor and treat side effects  -Psychoeducation  -Initial and routine lab work    IRT/Psychotherapy  -Psychoeducation  -Motivation interviewing  -CBT  -Behavioral activation  -Mindfulness  -Family involvement during portions of sessions    Family Therapy  -Psychoeducation  -Motivational interviewing  -Behavioral family therapy  -CBT  -Behavioral activation    Case Management  -Motivational interviewing   Target date:   6 months from 4/16/20    Discharge criteria:  Marked and sustained symptom improvement     Jerel demonstrates understanding of mental illness     Jerel successfully implements strategies to cope with stressors and/or symptoms to mitigate risk for increase in symptom severity or relapse    Gains Made:  -Complete a safety plan with therapist and share with support system  -Define what recovery means to self  -Identify psychosocial areas of need  -Learn at least 2 coping strategies to successfully target current symptoms  -Process past trauma by demonstrating understanding of how the traumatic event impacted self, identifying positive coping strategies and resiliency used during that time, challenging self-stigmatizing beliefs, and  developing a positive attitude towards facing future life challenges  -Identify persistent symptoms that interfere with activities and/or enjoyment and successfully implement two coping strategies to reduce symptoms severity     Support System Objectives    -Supports increase the safety of the home by removing firearms or other lethal weapons from the client's easy access   -Supports agree to provide supervision and monitor suicidal potential   -Supports, including family members, terminate any hostile, critical responses to the client and increase their statements of praise, optimism, and affirmation   -Supports, including family members, verbalize realistic expectations and discipline methods   -Supports, including family members, verbally reinforce the client's active attempts to build self-esteem and rapport   -Supports verbalize increased understanding of an knowledge about the client's illness and treatment   -Identify psychosocial areas of need  -Verbalize understanding of the client's long-term and short-term goals  -Demonstrate understanding of psychosis (paranoia, delusions, auditory hallucinations and command auditory hallucinations), depression (low mood nearly every day, anhedonia most of the time, low energy and difficulty concentrating, thinking or making decisions), SI and SIB in the context of the client with respect to symptoms, causes, course, medications and the impact of stress  -Learn the client's signs of stress and possible coping skills  -Demonstrate understanding for how substance use impacts symptoms and how to support decrease in or abstinence from illicit substance use  -Learn skills that strengthen and support the client's positive behavior change  -Learn strategies to build positive emotions and facilitate resiliency including use of a resiliency story  -Develop and implement a relapse prevention plan including identification of warning signs, triggers, coping mechanisms, and how other  persons can be supportive if symptoms increase or reemerge   -Learn and implement communication skills to enhance communication and respect among family members  -Learn and implement problem-solving and/or conflict resolution skills to manage familial, personal and interpersonal problems constructively   Medication Management  -Prescribe and monitor medications  -Monitor and treat side effects  -Psychoeducation  -Initial and routine lab work    IRT/Psychotherapy  -Psychoeducation  -Motivation interviewing  -CBT  -Behavioral activation  -Family involvement during portions of sessions    Family Therapy  -Psychoeducation  -Motivational interviewing  -Behavioral family therapy  -CBT  -Behavioral activation    Case Management  -Motivational interviewing   Target date:   6 months from 4/16/20    Discharge criteria:  Support system demonstrates understanding of mental illness     Support system successfully implements strategies to assist Jerel cope with stressors and/or symptoms to mitigate risk for increase in symptom severity or relapse     Gains Made:  -Supports, including family members, verbalize realistic expectations and discipline methods   -Identify psychosocial areas of need  -Learn skills that strengthen and support the client's positive behavior change  -Learn and implement communication skills to enhance communication and respect among family members       Domain 2: Health & Basic Living Needs  Identify and engage possible areas of improvement related to basic needs being met and maintaining or improving overall health and well-being     Measurable Objectives Interventions Discharge Criteria   -Verbalize an accurate understanding of factors influencing eating, health, and weight  -Learn and implement at least healthy nutritional practices  -Learn budgeting strategies for finances and develop a personal budget  -Identify areas of improvement for ADLs and IADLs and implement at least two skills with improved  outcomes    In Jerel's own words:  -find a place to live (as of 4/16/20-)  -lost weight (as of 4/16/20)  -keep doing yoga (as of 4/16/20) Medication Management  -Prescribe and monitor medications  -Monitor and treat side effects  -Psychoeducation  -Initial and routine lab work    IRT/Psychotherapy  -Psychoeducation  -Motivation interviewing  -CBT  -Behavioral activation  -Mindfulness  -Family involvement during portions of sessions    Family Therapy  -Psychoeducation  -Motivational interviewing  -Behavioral family therapy  -CBT  -Behavioral activation    Supported Education & Employment  -Motivational interviewing  -Individualized placement and support   -Behavioral Activation  -Family involvement    Case Management  -Motivational interviewing   Target date:   6 months from 4/16/20    Discharge criteria:  Jerel, his supports and treatment team report no unmet health and basic living needs    Gains Made:  -Independently arrange transportation to/from appointments   -Identify areas of improvement for ADLs and IADLs and implement at least two skills with improved outcomes       Domain 3: Family & Other Supports  Identify and engage possible areas of improvement related to engaging family, friends and other supports     Measurable Objectives Interventions Discharge Criteria   -Identify support system  -Improve the quality of relationships by developing skills to better understand other people, communicate more effectively, manage disclosure, and understand social cues  -Learn and implement problem-solving and/or conflict resolution skills to manage personal and interpersonal problems constructively  -Identify and implement two approaches to how strengths and interests can be used to initiate social contacts and develop peer friendships  -Learn and implement calming and coping strategies to manage anxiety symptoms and focus attention usefully during moments of social anxiety    -Strengthen the social support network with  "friends by initiating social contact and participating in social activities with peers   -Increase participation in interpersonal or peer group activities   -Renew two old fun activities or develop two new fun activity     In Jerel's own words:  -\"expand social connections\" - keep as of 4/16/20)  -see cousins more (as of 4/16/20)   Medication Management  -Prescribe and monitor medications  -Monitor and treat side effects  -Psychoeducation  -Initial and routine lab work    IRT/Psychotherapy  -Psychoeducation  -Motivation interviewing  -CBT  -Behavioral activation  -Family involvement during portions of sessions    Family Therapy  -Psychoeducation  -Motivational interviewing  -Behavioral family therapy  -CBT  -Behavioral activation    Supported Education & Employment  -Motivational interviewing  -Individualized placement and support   -Behavioral Activation  -Family involvement    Case Management  -Motivational interviewing   Target date:   6 months from 4/16/20    Discharge criteria:  Jerel and his support system report feeling equipped with the necessary skills to communicate and problem solve during times of disagreement    Conflict with supports and peers are resolved constructively and consistently over time; 6 months    Gains Made:  -Invite support system to be involved in treatment  -Participate in family therapy  -Participate in group therapy   -Learn and implement calming and coping strategies to manage anxiety symptoms and focus attention usefully during moments of social anxiety    -Increase participation in interpersonal or peer group activities   -Identified social clubs/groups at school that may be of interest to Jerel    -Jerel has expanded social connections by forming a positive relationship with his roommate at the [employer] (as of 4/16/20)     Domain 4: Academic and Employment  Identify and engage possible areas of improvement relates to education and employment     Measurable Objectives Interventions " Discharge Criteria   -Explore areas of interest for continued educational opportunities   -Explore areas of interest for employment purposes  -Stay current with schoolwork, completing assignments and interacting appropriately with peers and teachers   -Utilize accommodations, effective study and test-taking skills on a regular basis to improve academic performance  -Increase participate in school-related activities   -Obtain/maintain gainful employment     In Jerel's own words:  -finish school (as of 4/16/20)  -get a job (as of 4/16/20) Medication Management  -Prescribe and monitor medications  -Monitor and treat side effects  -Psychoeducation  -Initial and routine lab work    IRT/Psychotherapy  -Psychoeducation  -Motivation interviewing  -CBT  -Behavioral activation  -Family involvement during portions of sessions    Family Therapy  -Psychoeducation  -Motivational interviewing  -Behavioral family therapy  -CBT  -Behavioral activation    Supported Education & Employment  -Motivational interviewing  -Individualized placement and support   -Behavioral Activation  -Family involvement    Case Management  -Motivational interviewing   Target date:   6 months from 4/16/20    Discharge criteria:  Work and school goals are achieved and maintained without follow along NAVIGATE Supported Education and Employment supports for 6 months    Gains Made:  -Explore areas of interest for continued educational opportunities   -Explore areas of interest for employment purposes  -Utilize accommodations, effective study and test-taking skills on a regular basis to improve academic performance  -Obtain/maintain gainful employment        9. Frequency of sessions and expected duration of treatment:   1-4x per month Medication Management with Prescriber ongoing  6 months of weekly IRT/Individual Psychotherapy followed by 12-18 months of biweekly or monthly IRT  2-4x per month Supported Education and Employment Services for 6 months  2-4x per  month Family Education and Support Services for 6 months    10. Participants in therapy plan:   Jerelraza Day  Support System: Mom and Dad    NAVIGATE Team:   -Director/Family Clinician: AGA Huerta, LICSW  -Family Clinician: EDELMIRA Morgan  -SEE: YOSEPH Raymundo  -Family : Sabra Villeda CFPS  -Prescriber: Dr. Shai Yusuf    Treatment plan was discussed virtually to limit patient exposure to COVID-19. Patient verbally agreed to treatment plan as documented. No mechanism in place for electronic signature at this time. Provider will sign the treatment plan signature form and will send to scanning when back in clinic.      Regulatory Guidelines for Updating Treatment Plan  [place] Medical Assistance: Reviewed & signed at least every 90 days  Medicare:  Update per policy

## 2020-07-27 NOTE — PROGRESS NOTES
NAVIGATE Outreach  A Part of the [facility] First Episode of Psychosis Program     Patient Name: Jerel Day  /Age:  1995 (24 year old)    Contact: Writer talked to Jerel's CM-Travis. Informed writer that she has revoked Jerel's provisional discharge and has made a referral for an ACT team and [facility] worker. Unsure how long those services will take to get set up. Travis is also communicating with hospital staff at [facility]. Travis did share that Jerel has expressed not wanting her to coordinate with his parents, so she is mainly coordinating with hospital staff and Jerel. Travis will keep writer updated re: ACT referral and ARM referral timeline once known.    Plan: Will route note to team for update purposes.    Tammi Connell, EDELMIRA  NAVIGATE Individual Resiliency [place] & Family Clinician

## 2020-07-28 NOTE — TELEPHONE ENCOUNTER
Call placed to Travis pt's , at [phone] to help determine pt's discharge plan.  Need to find out when patient was discharged/how much clozapine he has left, where he is living for blood draws, where medication should be mailed (if changed).  Per NAVIGATE notes, pt will be shifting to ACT team, so will coordinate with CM on new care team and whether [facility] will continue blood draws.

## 2020-07-28 NOTE — TELEPHONE ENCOUNTER
Received VM from [place] returning call. Attempted to reach [place] to obtain information below, but had to LV.

## 2020-07-30 NOTE — TELEPHONE ENCOUNTER
Spoke with , Travis, who reported that she revoked his discharge and he is currently inpatient at [facility], likely for the next few weeks.  If patient discharges to home, he will get [facility] worker and ACT team, for which Travis entered the referral on 7/27/20. Depending upon where he goes, and whether he has ACT team, he may not continue clozapine monitoring services through [facility].    Will call Travis in 2 weeks to get any discharge updates.

## 2020-07-31 NOTE — TELEPHONE ENCOUNTER
Hopefully [facility] will advance clozaril and add HO for assurance. Will he remain in Navigate program upon discharge?

## 2020-08-03 ENCOUNTER — PATIENT OUTREACH (OUTPATIENT)
Dept: PSYCHIATRY | Facility: CLINIC | Age: 25
End: 2020-08-03

## 2020-08-03 NOTE — PROGRESS NOTES
NAVIGATE Outreach  A Part of the [facility] First Episode of Psychosis Program     Patient Name: Jerel Day  /Age:  1995 (24 year old)    Contact: Writer called [facility] and spoke to Jerel's nurse. She stated Jerel is overall doing alright; he is calm, pleasant, going to groups and taking his medication. She reports he continues to make nonsensical statements and exhibits tangential thinking. No discussion of anticipated discharge as of today.     Plan: Will route to team for update purposes.     Tammi Connell, ARIANNE  NAVIGATE Individual Resiliency  & Family Clinician

## 2020-08-03 NOTE — PROGRESS NOTES
NAVIGATE Family Peer Support  A Part of the [facility] First Episode of Psychosis Program     Patient Name: Jerel Day  /Age:  1995 (24 year old)  Date of Encounter: 20  Length of Contact:  26 minutes    This writer reached out to offer resources and support to patient's mother, Mesha.     Mesha stated that Jerel had been admitted to [facility] inpatient a week ago. Due to COVID-19, parents are not able to see Jerel on the unit. Parents have called Jerel several times but he has been unresponsive and is angry with them.     Parents were told by unit staff that Jerel had started taking his medications, was participating more and was coming out of his room for meals.      Mesha had questions about guardianship and this writer referred her to [facility] for information.    This writer will follow-up with family as needed.    CONSTANTINO FletcherATE Family Peer    [Billing Code 78737 for No Billable Service as family peer support is a nonbillable service]

## 2020-08-03 NOTE — PROGRESS NOTES
NAVIGATE Outreach  A Part of the [facility] First Episode of Psychosis Program     Patient Name: Jerel Day  /Age:  1995 (24 year old)    Contact: Writer called and LVM for Jerelmarybeth Mast. Requested return call for update and care coordination purposes.     EDELMIRA Morgan  NAVIGATE Individual Resiliency  & Family Clinician

## 2020-08-04 ENCOUNTER — PATIENT OUTREACH (OUTPATIENT)
Dept: PSYCHIATRY | Facility: CLINIC | Age: 25
End: 2020-08-04

## 2020-08-05 ENCOUNTER — PATIENT OUTREACH (OUTPATIENT)
Dept: PSYCHIATRY | Facility: CLINIC | Age: 25
End: 2020-08-05

## 2020-08-05 NOTE — PROGRESS NOTES
NAVIGATE Outreach  A Part of the [facility] First Episode of Psychosis Program     Patient Name: Jerel Day  /Age:  1995 (24 year old)    Contact: Received the following message from clinic staff on this date:    Amna Olson CMA Linner, Laura, LGSW Anna ( with [facility]) returning call back on behalf of [facility]. She stated you try calling about this patient to work out discharge plans.     Call # [phone]      Writer returned call and left another  for [place] and/or Zulma. Provided main clinic number again for return call. Also shared writer is out this Thurs and Fri on FTO but directed them to call main clinic and ask to speak to either BYRON Priest or another member of Navigate team.     EDELMIRA Morgan Individual Resiliency [place] & Family Clinician

## 2020-08-05 NOTE — PROGRESS NOTES
NAVIGATE Outreach  A Part of the [facility] First Episode of Psychosis Program     Patient Name: Jerel Day  /Age:  1995 (24 year old)    Contact: Received voicemail from Jerel's CM-Travis who shared she connected with Tammi at [facility] earlier today who shared Jerel continues to be symptomatic and they are potentially thinking of initiating a medication change while he is inpatient. Travis faxed over the [employer] Order to the hospital for this purpose. Travis will keep writer updated.     Writer also received request to call Tammi at [phone]; writer attempted to call Senia with no answer. M for Senia providing main clinic phone number for return call.     Plan: Writer will route note to team for update purposes.     EDELMIRA Morgan  NAVIGATE Individual Resiliency  & Family Clinician

## 2020-08-06 NOTE — TELEPHONE ENCOUNTER
If they haven't pushed the Clozaril to 300mg they haven't maxed  the dose. Wonder what they will add (replace?) given you dont get to Cloz until other agents are exhausted.

## 2020-08-11 ENCOUNTER — PATIENT OUTREACH (OUTPATIENT)
Dept: PSYCHIATRY | Facility: CLINIC | Age: 25
End: 2020-08-11

## 2020-08-11 ENCOUNTER — TELEPHONE (OUTPATIENT)
Dept: BEHAVIORAL HEALTH | Facility: CLINIC | Age: 25
End: 2020-08-11

## 2020-08-11 NOTE — PROGRESS NOTES
NAVIGATE Outreach  A Part of the [facility] First Episode of Psychosis Program     Patient Name: Jerel Day  /Age:  1995 (24 year old)    Contact:   Writer noticed Jerel is on writer's schedule for a telephone call this Thursday. Confirmed with Maxine Watson who scheduled the appt that RN from [facility] scheduled on Jerel's behalf while coordinating his discharge.       Writer called and left VM for Senia ACUÑA at [place] [phone]; reiterated request for return call.     Called and spoke to Jerel's RN on the unit who confirmed he is being discharged today. She will page Tammi and request that she return 's call for care coordination purposes.     Called and spoke to Travis Salgado [phone]; who confirmed Jerel is being discharged today but ACT services will not start for at least another 3 weeks. She expressed frustration and hope that he could remain at the hospital until appropriate services are coordinated; apparently Travis and Mom both expressed this clearly to hospital staff during a care conference. Travis reports the plan for now to be her to check-in weekly with Jerel and brother/mother to stop over and see Jerel throughout the week. Travis and  will keep one another informed of any pertinent updates, concerns etc.    Plan: Will update team.     EDELMIRA Morgan Individual Resiliency  & Family Clinician    ------------    Following this note writer received call back from Tammi at [facility] at 3:04pm who confirmed Jerel was provisionally discharged today back to his home with referral for Critical access hospital worker and ACT team. Confirmed that ACT services will likely not be able to start for around three weeks. Writer voiced concerns with Jerel being discharged back to independent living with no significant change in existing services. Voiced this in the context of LILLIANA's history working with Jerel and the team's observations of contributing factors to decompensation and relapse often being  independently managing medications and independent living. Also recognized this is a moot point at this time. Thanked Senia for return call.

## 2020-08-13 ENCOUNTER — VIRTUAL VISIT (OUTPATIENT)
Dept: PSYCHIATRY | Facility: CLINIC | Age: 25
End: 2020-08-13
Payer: COMMERCIAL

## 2020-08-13 ENCOUNTER — PATIENT OUTREACH (OUTPATIENT)
Dept: PSYCHIATRY | Facility: CLINIC | Age: 25
End: 2020-08-13

## 2020-08-13 DIAGNOSIS — F20.9 SCHIZOPHRENIA, UNSPECIFIED TYPE (H): Primary | ICD-10-CM

## 2020-08-13 NOTE — Clinical Note
My note from session with Jerel today. He missed my call, but triston is working on his phone and he called the clinic back to get back in touch with me! He sounds way better - still some word salad and disorg but greatly improved. It was good to talk with him. Marilu - he was very open to you reaching out and even asked if you could text him Monday to remind him of Kwarter appt. We are scheduled again for next week. Thanks all!

## 2020-08-13 NOTE — PROGRESS NOTES
[facility] Clinician Contact & Progress Note  For Individual Resiliency Training (IRT)  A Part of the [facility] First Episode of Psychosis Program    NAVIGATE Enrollee: Jerel Day (1995)     MRN: [id number]  Date:  8/13/20  Diagnosis: Schizophrenia, unspecified type F20.9  Clinician: LILLIANA Individual Resiliency Trainer, EDELMIRA Morgan     1. Type of contact: (majority of time spent)  IRT Session via telehealth  Mode of communication: telephone. Video visit was offered, but client declined. Patient consented verbally to this mode of therapy today.  Reason for telehealth: COVID-19. This patient visit was converted to a telehealth visit to minimize exposure to COVID-19.     2. People present:   Writer  Client: Yes     3. Length of Actual Contact: Start Time: 11:20am; End Time: 11:50am     4. Location of contact:  Originating Location (patient location): apartment in [place], [place]  Distant Location (provider location): Home office, located in [place], [place], using appropriate privacy considerations and procedures    5. Did the client complete the home practice option(s) from the previous session: N/A    6. Motivational Teaching Strategies:  Connect info and skills with personal goals  Promote hope and positive expectations  Explore pros and cons of change  Re-frame experiences in positive light    7. Educational Teaching Strategies:  Review of written material/education  Relate information to client's experience  Ask questions to check comprehension  Break down information into small chunks  Adopt client's language     8. CBT Teaching Strategies:  Reinforcement and shaping (positive feedback for steps towards goals and gains in knowledge & skills)  Relapse prevention planning (review of stressors and early warning signs)  Coping skills training (review current coping skills and increase currently used skills)  Behavioral tailoring (fit taking medication into client's daily routine)    9. IRT Module(s)  "Addressed:  Module 4 - Relapse Prevention Planning    10. Techniques utilized:   [place] announced at beginning of session  Review of goal  Review of previous meeting  Present new material  Problem-solving practice  Help client choose a home practice option  Summarize progress made in current session    11. Measures:    Mental Status Exam  Alertness: alert  and oriented  Behavior/Demeanor: cooperative, pleasant and calm  Speech: regular rate and rhythm  Language: intact and no obvious problem.   Mood: description consistent with euthymia  Thought Process/Associations: loose associations, neologisms/ word salad and flight of ideas; significantly improved from last conversation  Thought Content:  Reports delusions and over-valued ideas;  Denies suicidal ideation and violent ideation  Perception:  Reports auditory hallucinations without commands [details in Interim History];  Denies visual hallucinations, depersonalization and derealization  Insight: limited  Judgment: limited  Cognition: does  appear grossly intact; formal cognitive testing was not done    Jerel opted not to completed PHQ-9, JUNAID-7 or [place]; assessed mood and safety verbally in conversation.    12. Assessment/Progress Note:     Writer called Jerel for phone visit at 11am. Jerel did not answer so writer PEPE, which is now working. Jerel then called the clinic back at 11:20 to speak with writer for visit; this demonstrates notable improvement in organization since the last time writer talked to Jerel before the hospital.     Met with Jerel via telephone for IRT session. Set agenda to check-in, hear how things have been going since the hospital, assess symptoms and safety, and discuss plans for services moving forward. Jerel sounded much clearer during this call. Still exhibited some disorganized thinking and word salad, but notably improved.     Jerel reports overall he is feeling better. He described this past hospital stay as \"better than most\" because they " "addressed some of his physical pain, engaged him in PT and gave him a lidocaine patch. At one point he described feeling \"way better\" in the hospital. Confirmed he is back at his apartment and he feels things are going well. He reports he is still prescribed Clozapine and is taking it everyday; reports he takes it before bed and notices he sleeps well. Writer emphasized the importance of continuing with medication and Jerel verbalized agreement.      Assessed symptoms; Jerel reports voices are still daily but described them as \"not too bad.\" He commented they are \"a different swath...it's in front of maybe equations in math that I wasn't really able to use and these are now taking primacy over my old style I guess.\" Denied any command SI/SH or command HI/VI auditory hallucinations. He did report sometimes the voices \"pit\" him against old friends or people around him. Jerel denies any SI/SH or VI/HI. Reports he is eating each day and shared with writer a meal he made last night for dinner that consisted of tomatoes, zucchini, mushrooms, tomato soup, spices and garlic in the crockpot. To writer, this also demonstrates greater organization in Jerel's ability to make meals and care for himself than prior to the hospital. Reports he is sleeping about 12 hours/night from around 9:30pm to 9:30am. Denies any substance use aside from one cigarette today, which he regrets. Discussed past marijuana use; writer voiced observation that when Jerel is using marijuana, his overall distress seems to increase and things seem to decline. Jerel neither agreed nor disagreed but stated he doesn't plan to use marijuana in the near future. Writer encouraged continued abstinence.     Discussed plan for services moving forward. Jerel confirmed he is meeting with Gina today at 3:30 to discuss ACT services. Jerel shared positively about interactions thus far with Travis and is looking forward to \"community setting\" of an ACT team. Jerel was also " "open to NAVIGATE SEE - YOSEPH Raymundo reaching out.    Scheduled next IRT telephone visit for next week 11am on Thursday. Reminded Jerel of med appt Monday with Dr. Yusuf at 12:30. Jerel shared he plans to be at his grandma's cabin this weekend, but will try to keep his phone charged for appt. Overall, Jerel was pleasant and actively engaged throughout the session. Symptom assessment and safety assessment was in support of Jerel's self-identified goal(s), as identified in most recent BEH Treatment Plan. Progress toward goal completion seems poor.    13. Plan/Referrals:     Will route note to team for update purposes. Client and family aware they can reach out to writer directly and/or NAVIGATE team should concerns or needs arise prior to next scheduled appointment.       Billing for \"Interactive Complexity\"?    No      EDELMIRA Morgan    NAVIGATE Individual Resiliency Trainer    Attestation:    I did not see this patient directly. This patient is discussed with me in individual and group supervision, and I agree with the plan as documented.     Ana Rose, LICSW, MSW, LICSW, August 18, 2020      "

## 2020-08-14 NOTE — TELEPHONE ENCOUNTER
Received VM from Travis indicating that patient has been discharged home, but without further information.  LVM for patient and Travis to check on current clozapine dose and remaining supply.  Will send message to home phlebotomy to restart home blood draws. Pt will be on Q2 week monitoring as of 8/19/20.  Pt has follow up visit with psychiatrist, Dr. Yusuf on Monday, 8/17.  Message sent to MD to check in on the above information.    Tita Salamanca, PharmD  Medication Therapy Management Pharmacist  [facility]  Phone: [phone]

## 2020-08-17 ENCOUNTER — VIRTUAL VISIT (OUTPATIENT)
Dept: PSYCHIATRY | Facility: CLINIC | Age: 25
End: 2020-08-17
Payer: COMMERCIAL

## 2020-08-17 DIAGNOSIS — F20.89 OTHER SCHIZOPHRENIA (H): ICD-10-CM

## 2020-08-17 ASSESSMENT — PATIENT HEALTH QUESTIONNAIRE - PHQ9: SUM OF ALL RESPONSES TO PHQ QUESTIONS 1-9: 14

## 2020-08-17 NOTE — TELEPHONE ENCOUNTER
As of brief communication this past week with JAVY at [facility] he is on 250mg clozaril

## 2020-08-17 NOTE — PROGRESS NOTES
NAVIGATE Medication Management Progress Note  A Part of the [facility] First Episode of Psychosis Program    NAVIGATE Enrollee: Jerel Day (1995)     MRN: [id number]  Date:  8/17/20         Contributors to the Assessment     Chart Reviewed.   Interview completed with Jerel Day.  Collateral information obtained from IRT.      Mode of communication: telephone only. Patient consented verbally to this mode of therapy today.  Reason for telehealth: COVID-19. This patient visit was converted to a telehealth visit to minimize exposure to COVID-19.    Originating Location (patient location): apartment, located in [place], [place]  Distant Location (provider location): Home office, located in [place], [place], using appropriate privacy considerations and procedures         Chief Complaint      Back in my apartment that I kept clean         Interim History       Jerel Day is a 24 year old male who was last seen in MD clinic on 6/2/20 at which time he was leaving the RTC/IRTS to his own apartment for lack of other resources that parents rented for him. No show 6/30 by phone contacted parents who had also lost contact with him. Strongly advised hospitalization.. He decompensated to psychosis as he stopped his clozaril upon discharge from IR. Then admitted to [facility] on hold 7/12/20 for suicide attempt by hanging . Transferred to [facility] when bed avail. admitted 7/17 thru 8/11/20. InVol. Admitted but held thereafter under w/d  PD for a renewed civil commitment with [facility].   The patient reports alleging adequate treatment adherence.  History was provided by self who was a limited historian difficult to understand.  Since the last visit:  During his hospital stay his Clozaril was increased to 250 mg.  Apparently no HO was considered.  Other medications included vitamin D 1000 units gabapentin 200 mg 3 times daily, Lidoderm patch 5% applied to the back, atropine ophthalmic 5 mL drops at bedtime.  He will be  transferring to a community ACT team ([facility] at ).  And  is Travis Storey ([phone]) with [facility].  Near-term he will be receiving psychiatric services at the [facility] program until such time as those details are fully in place and a new psychiatrist with the act team comes a board.    PSYCH ROS:  Clinician Rating Form in COMPASS  1. Depressed Mood: Ratin  0 Not reported     1 Very mild occasionally feels sad or  down ; of questionable clinical significance   2 Mild occasionally feels moderately depressed or often feels sad or  down    3 Moderate occasionally feels very depressed or often feels moderately depressed   4 Moderately severe often feels very depressed   5 Severe feels very depressed most of the time   6 Very severe constant extremely painful feelings of depression   U Unable to assess        2. Anxiety/Worry: Rating: 3  0 Not reported     1 Very mild occasionally feels a little anxious; of questionable clinical significance   2 Mild occasionally feels moderately anxious or often feels a little anxious or worried   3 Moderate occasionally feels very anxious or often feels moderately anxious   4 Moderately severe often feels very anxious or often feels moderately anxious   5 Severe feels very anxious or worried most of the time   6 Very severe patient is continually preoccupied with severe anxiety   U Unable to assess        3. Suicidal Ideation/Behavior: Ratin          0 Not reported     1 Very mild occasional thoughts of dying,  I d be better off dead  or  I wish I were dead    2 Mild frequents thoughts of dying or occasional thoughts of killing self, without plan or method   3 Moderate often thinks of suicide or has though of a specific method   4 Moderately severe has mentally rehearsed a specific method of suicide or has made a suicide attempt with questionable intent to die (e.r. takes aspirins and then tells family)   5 Severe has made preparations for a potentially  lethal suicide attempt (e.g acquires a gun and bullets for an attempt)   6 Very severe has made a suicide attempt with an intent to die   U Unable to assess                      4. Elevated/Expansive Mood: Rating: 3  0 Not at all     1 Very mild questionable; more cheerful than most people in his/her circumstances but of only possible clinical significance   2 Mild brief elevated/expansive mood but only somewhat out of proportion to the circumstances   3 Moderate brief/occasional elevation of mood which is clearly out of proportion to the circumstances   4 Moderately severe sustained/frequent elevation of mood which is clearly out of proportion to the circumstances   5 Severe mood is euphoric most of the time   6 Very severe sustained elevation;  everything is wonderful  almost all of the time   U Unable to assess        5. Hostility/Anger/Irritability/Aggressiveness: Ratin  0 Not at all     1 Very mild occasional irritability of doubtful clinical significance   2 Mild occasionally feels angry or mild or indirect expressions of anger, e.g. sarcasm, disrespect or hostile gestures   3 Moderate frequently feels angry, frequent irritability or occasional direct expression of anger, e.g. yelling at others   4 Moderately severe often feels very angry, often yells at others or occasionally threatens to harm others   5 Severe has acted on his anger by becoming physically abusive on one or two occasions or makes frequent threats to harm others or is very angry most of the time   6 Very severe has been physically aggressive and/or required intervention to prevent assaultiveness on several occasions; or any serious assaultive act   U Unable to assess        6. Impulsive Behavior: Ratin  0 Not at all     1 Very mild one instance of impulsive behavior which is of doubtful clinical significance   2 Mild occasional impulsive acts, e.g. making phone calls at odd hours   3 Moderate occasional impulsive acts with some  potential negative consequence, e.g. leaving work abruptly; changing plans without thinking   4 Moderately severe impulsive acts with definite negative consequences, e.g. overspending on non-essentials; repeated reckless sexual behavior   5 Severe impulsive acts with direct negative consequences, e.g. spends entire income on nonessentials without regard for basic needs   6 Very severe impulsive behavior which is potentially life threatening, e.g. jumps from dangerous height (without suicidal intent) or criminal behavior, e.g. impulsive robbery   U Unable to assess        7. Suspiciousness: Rating: 3  0 Not present     1 Very mild Seems on guard. Reluctant to respond to some  personal  questions. Reports being overly self-conscious in public   2 Mild Describes incidents in which others have harmed or wanted to harm him/her that sound plausible. Patient feels as if others are watching, laughing, or criticizing him/her in public, but this occurs only occasionally or rarely. Little or no preoccupation   3 Moderate Says others are talking about him/her maliciously, have negative intentions, or may harm him/her. Beyond the likelihood of plausibility, but not delusional. Incidents of suspected persecution occur occasionally (less than once per week) with some preoccupation   4 Moderately severe Same as 3, but incidents occur frequently such as more than once a week. Patient is moderately preoccupied with ideas of persecution OR patient reports persecutory delusions expressed with much doubt (e.g. partial delusion)   5 Severe Delusional -- speaks of Mafia plots, the FBI, or others poisoning his/her food, persecution by supernatural forces   6 Extremely severe Same as 5, but the beliefs are bizarre or more preoccupying. Patient tends to disclose or act on persecutory delusions.   U Unable to assess        8. Unusual Thought Content: Ratin  0 Not present     1 Very mild Ideas of reference (people may stare or may laugh at  him), ideas of persecution (people may mistreat him). Unusual beliefs in psychic esquivel, spirits, UFOs, or unrealistic beliefs in one's own abilities. Not strongly held. Some doubt   2 Mild Same as 1, but degree of reality distortion is more severe as indicated by highly unusual ideas or greater conviction. Content may be typical of delusions (even bizarre), but without full conviction. The delusion does not seem to have fully formed, but is considered as one possible explanation for an unusual experience   3 Moderate Delusion present but no preoccupation or functional impairment. May be an encapsulated delusion or a firmly endorsed absurd belief about past delusional circumstances   4 Moderately severe Full delusion(s) present with some preoccupation OR some areas of functioning disrupted by delusional thinking   5 Severe Full delusion(s) present with much preoccupation OR many areas of functioning are disrupted by delusional thinking   6 Extremely severe Full delusions present with almost   U Unable to assess        9. Hallucinations: Ratin  0 Not present     1 Very mild While resting or going to sleep, sees visions, smells odors, or hears voices, sounds or whispers in the absence of external stimulation, but no impairment in functioning   2 Mild While in a clear state of consciousness, hears a voice calling the subject s name, experiences non-verbal auditory hallucinations (e.g., sounds or whispers), formless visual hallucinations, or has sensory experiences in the presence of a modality-relevant stimulus (e.g., visual illusions) infrequently (e.g., 1-2 times per week) and with no functional impairment   3 Moderate Occasional verbal, visual, gustatory, olfactory, or tactile hallucinations with no functional impairment OR non-verbal auditory hallucinations/visual illusions more than infrequently or with impairment   4 Moderately severe Experiences daily hallucinations OR some areas of functioning are disrupted  by hallucinations   5 Severe Experiences verbal or visual hallucinations several times a day OR many areas of functioning are disrupted by these hallucinations   6 Extremely severe Persistent verbal or visual hallucinations throughout the day OR most areas of functioning are disrupted by these hallucinations   U Unable to assess        10. Conceptual Disorganization: Ratin  0 Not present     1 Very mild Peculiar use of words or rambling but speech is comprehensible   2 Mild Speech a bit hard to understand or make sense of due to tangentiality, circumstantiality, or sudden topic shifts   3 Moderate Speech difficult to understand due to tangentiality, circumstantiality, idiosyncratic speech, or topic shifts on many occasions OR 1-2 instances of incoherent phrases   4 Moderately severe Speech difficult to understand due to circumstantiality, tangentiality, neologisms, blocking, or topic shifts most of the time OR 3-5 instances of incoherent phrases   5 Severe Speech is incomprehensible due to severe impairments most of the time. Many PSRS items cannot be rated by self-report alone   6 Extremely severe Speech is incomprehensible throughout interview   U Unable to assess        11. Avolition/Apathy: Ratin  0 Not at all     1 Very mild questionable decrease in time spent in goal-directed activities   2 Mild spends less time in goal-directed activities than is appropriate for situation and age   3 Moderate initiates activities at times but does not follow through   4 Moderately severe rarely initiates activity but will passively engage with encouragement   5 Severe almost never initiates activities; requires assistance to accomplish basic activities   6 Very severe does not initiate or persist in any goal-directed activity even with outside assistance   U Unable to assess        12. Asociality/Low Social Drive: Rating: 3  0 Not at all     1 Very mild questionable   2 Mild slow to initiate social interactions but  usually responds to overtures by others   3 Moderate rarely initiates social interactions; sometimes responds to overtures by others   4 Moderately severe does not initiate but sometimes responds to overtures by others; little social interaction outside close family members   5 Severe never initiates and rarely encourages conversations or activities; avoids being with others unless prodded, may have contacts with family   6 Very severe avoids being with others (even family members) whenever possible, extreme social isolation   U Unable to assess        13. Adherence: Days: 3  The longest, continuous amount of time in days, since the last visit when the subject did not take medication      14. EPS Part I: Rating: U  Rate Elbow Rigidity for all subjects  0 Normal   1 Slight stiffness and resistance   2 Moderate stiffness and resistance   3 Marked rigidity with difficulty in passive movement   4 Extreme stiffness and rigidity with almost a frozen joint   U Unable to assess            EPS Part 2: Signs of EPS: U  Are there are other signs of EPS (eg diminished arm swing, postural instability, cogwheeling, tremor, akinesia) present based upon patient report or exam?  0 No   1 Yes      15. Akathesia: Rating: U  0 No restlessness reported or observed   1 Mild restlessness observed; e.g., occasional jiggling of the foot occurs when subject is seated   2 Moderate restlessness observed; e.g., on several occasions, jiggles foot, crosses and uncrosses legs or twists a part of the body   3 Restlessness is frequently observed; e.g., the foot or legs moving most of the time   4 Restlessness persistently observed; e.g., subject cannot sit still, may get up and walk   U Unable to assess      16. Dyskinetic Movement Ratings: Rating: U  0 None   1 Minimal, may be extreme normal   2 Mild   3 Moderate   4 Severe   U Unable to assess      SIDE EFFECT ASSESSMENT:  Clinician Rating Form in COMPASS - Side Effect Assessment  Address side  effects reported by the patient and rate using this scale  0 Not present   1 Minimal, may be extreme normal   2 Mild   3 Moderate   4 Severe   U Unable to assess   P Present, but not related         RECENT SUBSTANCE USE:  Clinician Rating Form in COMPASS - Substance Use Assessment  Alcohol Use Severity: Ratin  0 none   1 use without impairment: drinks but no immediate social or medical impairment   2 use with impairment: e.g. becomes grossly intoxicated; alcohol use or withdrawal compromises school, work or social functioning; alcohol use or withdrawal exacerbates symptoms (e.g. gets depressed when drinking)      Marijuana Use Severity: Ratin unconfirmed  0 none   1 occasional use without impairment: e.g. uses marijuana a few days a month and has no immediate social or medical impairment   2 frequent use without impairment: e.g. uses marijuana several or more days a week but has no immediate social or medical impairment   3 use with impairment: e.g. becomes grossly intoxicated; marijuana use compromises school, work or social functioning; marijuana use exacerbates symptoms (e.g. gets paranoid when using)      Other Drug Use Severity: Ratin  0 none   1 occasional use without impairment: e.g. uses drug(s) a few days a month and has no immediate social or medical impairment   2 frequent use without impairment: e.g. uses drug(s) several or more days a week but has no immediate social or medical impairment   3 use with impairment: e.g. becomes grossly intoxicated; drug use compromises school, work or social functioning; drug use exacerbates symptoms (e.g. gets paranoid when using)      RECENT SOCIAL HISTORY:  SEE HPI    Medical ROS:  The 7 point Review of Systems is negative other than noted in the HPI         First Episode of Psychosis History                 Medical/Surgical History     Penicillins    Patient Active Problem List   Diagnosis     Schizoaffective disorder (H)     Psychosis (H)     Suicidal  ideation     Polysubstance abuse (H)            Medications     Current Outpatient Medications   Medication Sig Dispense Refill     cloZAPine (CLOZARIL) 50 MG tablet Take 3 tablets (150 mg) by mouth every morning 90 tablet 1     Vitamin D3 (CHOLECALCIFEROL) 25 mcg (1000 units) tablet Take 1 tablet (25 mcg) by mouth daily for 100 doses 100 tablet 0     melatonin 3 MG tablet Take 1 tablet (3 mg) by mouth nightly as needed for sleep (Patient not taking: Reported on 8/17/2020) 30 tablet 0             Vitals     There were no vitals taken for this visit.      Weight prior to medication: NA         Mental Status Exam     Alertness: alert   Appearance: NA phone  Behavior/Demeanor: unable to cooperate, agitated, guarded and interruptive, with NA eye contact   Speech: increased rate and pressured  Language: word finding difficulty and undefined difficulty  Psychomotor: NA phone  Mood: depressed, anxious, worried, agitated, grandiose, paranoid and labile  Affect: indifferent; was not congruent to mood; was not congruent to content  Thought Process/Associations: Word Salad, incoherence  Thought Content:  Reports preoccupations, magical thinking and paranoid ideation;  Denies suicidal and violent ideation  Perception:  Reports auditory hallucinations and visual hallucinations;  Denies none  Insight: poor  Judgment: adequate for safety  Cognition: does not appear grossly intact; formal cognitive testing was not done         Labs and Data     RATING SCALES:  N/A    PHQ9 TODAY =   PHQ-9 SCORE 3/10/2020 6/2/2020 8/17/2020   PHQ-9 Total Score 9 10 14       ANTIPSYCHOTIC LABS ROUTINE    [glu, A1C, lipids (focus LDL), liver enzymes, WBC, ANEU, Hgb, plts]   q12 mo  Recent Labs   Lab Test 06/02/20  1230 01/18/20  0723   GLC 70 90   A1C 5.1  --      Recent Labs   Lab Test 01/18/20  0723 01/03/18  1054   CHOL 172 135   TRIG 137 93   LDL 97 70   HDL 48 46     Recent Labs   Lab Test 06/02/20  1230 01/18/20  0723   AST 39 19   ALT 27 32  "  ALKPHOS 79 66     Recent Labs   Lab Test 07/06/20  1025 06/29/20  1130   WBC 8.0 8.9   ANEU 4.3 5.3   HGB 15.9 15.5    Platelets clumped, platelet count unavailable            Psychiatric Diagnoses     Schizophrenia chronic severe  Cannabis use disorder (abstinence unconfirmed)         Assessment     TODAY he knows that he is back in his apartment but has no recollection of the condition he left it preferring to believe that he had kept it up himself.  His thought disorder was prominently displayed with marked incoherence and word salad with intermittent disconnected seemingly rational phrases.  His hearing voices who are trying to\" talk and reason but are also trying to segregate me\".  He is seeing that he is \"being followed by an executive with a big ROLEX watch\".  However he feels safe at the present.    However his psychosis is simply not controlled by oral Clozaril.  This physician would prefer to add an HO to ensure adherence going forward.  Historically he had been on Abilify maintaina in 2018.  Oral antipsychotics and mood olanzapine Abilify and brief exposure to Haldol.  Given the upcoming transition to the ACT team that manages medications in the community they will most likely initiate such if not done before then.  At present he is marginally functional from a psychosis standpoint but it remains to be seen whether he is able to provide for his own self-care in his apartment in the near term.  The [facility] blood draws involve biweekly meetings and they were the first ones to previously notify his deterioration of psychosis and nonadherence to Clozaril leading up to his hospitalization.                PSYCHOTROPIC DRUG INTERACTIONS:   None.  MANAGEMENT:  Monitoring for adverse effects, routine labs and adequate antipsychotic dosing         Plan     1) PSYCHOTROPIC MEDICATIONS:  - Clozaril 250mg  HS (w biweekly [facility] monitoring)  - vitamin D 1000 units   - gabapentin 200 mg 3 times daily,   - Lidoderm " patch 5% applied to the back,   - atropine ophthalmic 5 mL drops at bedtime.      2) THERAPY:  Supportive only    3) NEXT DUE:    Labs- biweekly CBC w [facility]  Rating Scales- N/A    4) REFERRALS:    NALINI thacker Travis Storey [phone]    5) RTC: 3 weeks    6) CRISIS NUMBERS:   Provided routinely in AVS.  Especially emphasized:  [facility] [place] [facility] for Adults [[phone]];  Child [[phone]]      TREATMENT RISK STATEMENT:  The risks, benefits, alternatives and potential adverse effects have been discussed and are understood by the pt. The pt understands the risks of using street drugs or alcohol. There are no medical contraindications, the pt agrees to treatment with the ability to do so. The pt knows to call the clinic for any problems or to access emergency care if needed.  Medical and substance use concerns are documented above.  Psychotropic drug interaction check was done, including changes made today.      PROVIDER:  Shai Yusuf MD

## 2020-08-17 NOTE — PROGRESS NOTES
"Jerel Day is a 24 year old male who is being evaluated via a billable telephone visit.      The patient has been notified of following:     \"This telephone visit will be conducted via a call between you and your physician/provider. We have found that certain health care needs can be provided without the need for a physical exam.  This service lets us provide the care you need with a short phone conversation.  If a prescription is necessary we can send it directly to your pharmacy.  If lab work is needed we can place an order for that and you can then stop by our lab to have the test done at a later time.    Telephone visits are billed at different rates depending on your insurance coverage. During this emergency period, for some insurers they may be billed the same as an in-person visit.  Please reach out to your insurance provider with any questions.    If during the course of the call the physician/provider feels a telephone visit is not appropriate, you will not be charged for this service.\"    Patient has given verbal consent for Telephone visit?  Yes    What phone number would you like to be contacted at? [phone]    How would you like to obtain your AVS? Mail a copy    Phone call duration: 30 minutes    Shai Yusuf MD      "

## 2020-08-17 NOTE — PROGRESS NOTES
Called patient to go through rooming process, he did not answer, a voicemail was left with a request for a return call to the office.

## 2020-08-18 DIAGNOSIS — F25.0 SCHIZOAFFECTIVE DISORDER, BIPOLAR TYPE (H): ICD-10-CM

## 2020-08-18 LAB
BASOPHILS # BLD AUTO: 0 10E9/L (ref 0–0.2)
BASOPHILS NFR BLD AUTO: 0.5 %
DIFFERENTIAL METHOD BLD: ABNORMAL
EOSINOPHIL # BLD AUTO: 0.6 10E9/L (ref 0–0.7)
EOSINOPHIL NFR BLD AUTO: 6.4 %
ERYTHROCYTE [DISTWIDTH] IN BLOOD BY AUTOMATED COUNT: 12.7 % (ref 10–15)
HCT VFR BLD AUTO: 53.8 % (ref 40–53)
HGB BLD-MCNC: 18.8 G/DL (ref 13.3–17.7)
LYMPHOCYTES # BLD AUTO: 3.4 10E9/L (ref 0.8–5.3)
LYMPHOCYTES NFR BLD AUTO: 38.7 %
MCH RBC QN AUTO: 30.8 PG (ref 26.5–33)
MCHC RBC AUTO-ENTMCNC: 34.9 G/DL (ref 31.5–36.5)
MCV RBC AUTO: 88 FL (ref 78–100)
MONOCYTES # BLD AUTO: 0.8 10E9/L (ref 0–1.3)
MONOCYTES NFR BLD AUTO: 8.8 %
NEUTROPHILS # BLD AUTO: 4 10E9/L (ref 1.6–8.3)
NEUTROPHILS NFR BLD AUTO: 45.6 %
PLATELET # BLD AUTO: 189 10E9/L (ref 150–450)
RBC # BLD AUTO: 6.1 10E12/L (ref 4.4–5.9)
WBC # BLD AUTO: 8.7 10E9/L (ref 4–11)

## 2020-08-18 PROCEDURE — 85025 COMPLETE CBC W/AUTO DIFF WBC: CPT | Performed by: INTERNAL MEDICINE

## 2020-08-18 PROCEDURE — 36415 COLL VENOUS BLD VENIPUNCTURE: CPT | Performed by: INTERNAL MEDICINE

## 2020-08-18 RX ORDER — CLOZAPINE 50 MG/1
250 TABLET ORAL EVERY MORNING
Qty: 70 TABLET | Refills: 1 | Status: SHIPPED | OUTPATIENT
Start: 2020-08-18 | End: 2020-09-18

## 2020-08-18 NOTE — TELEPHONE ENCOUNTER
Azeb Mclean, [facility]  Brittani Estrella RN; Tammi Connell, [facility]; Shai Yusuf MD; Tita Salamanca, [facility]    Caller: Unspecified (1 month ago)               Harris Peter,     Please see messages below. We monitor pt's clozapine and were hoping to mail out his new dose of clozapine today. Per Dr. Yusuf, his new dose is 250mg, but the only Rx we have on file is for 150mg HS. Is there a provider available that would be able to send in an Rx for clozapine 250mg, 14 day supply to [facility]?     Thank you,     Azeb Mclean, PharmD, BCPP   Medication Therapy Management Pharmacist   [facility]

## 2020-08-18 NOTE — TELEPHONE ENCOUNTER
-Writer spoke with Dr. Yusuf and got orders to enter script for Clozpaine 250 mg 14 day supply with one refill

## 2020-08-19 ENCOUNTER — TELEPHONE (OUTPATIENT)
Dept: PHARMACY | Facility: CLINIC | Age: 25
End: 2020-08-19

## 2020-08-19 NOTE — TELEPHONE ENCOUNTER
eJrel Day was referred to [facility] for outpatient clozapine monitoring.   Current Dose: 250mg QHS  Laboratory Monitoring: weekly home draws. Clozapine started 2/19/20 during inpatient hospitalization. He will be eligible to move to Q2 week monitoring 8/19/20.     Recent Labs   Lab Test 08/18/20  0920 07/06/20  1025 06/29/20  1130 06/22/20  1030   WBC 8.7 8.0 8.9 9.3   ANEU 4.0 4.3 5.3 6.4   HGB 18.8* 15.9 15.5 15.5    232 Platelets clumped, platelet count unavailable 283       Pt was hospitalized for several weeks, recently discharged to home with clozapine dose increase to 250mg daily. 8/18 outpatient ANC WNL.  Eligible to move to Q2 week monitoring. 14 day supply of clozapine will be mailed. Next draw due 8/31/20.    Patient has moved as of 6/19/20.   Home draws will take place at pt's apartment:   [address]  [place] [address]     Retail pharmacy will mail medication to pt's parents home:   [address] [place] [address]    Azeb Mclean, PharmD, BCPP  Medication Therapy Management Pharmacist  [facility]

## 2020-08-20 ENCOUNTER — PATIENT OUTREACH (OUTPATIENT)
Dept: PSYCHIATRY | Facility: CLINIC | Age: 25
End: 2020-08-20

## 2020-08-20 ENCOUNTER — VIRTUAL VISIT (OUTPATIENT)
Dept: PSYCHIATRY | Facility: CLINIC | Age: 25
End: 2020-08-20
Payer: COMMERCIAL

## 2020-08-20 DIAGNOSIS — F20.9 SCHIZOPHRENIA, UNSPECIFIED TYPE (H): Primary | ICD-10-CM

## 2020-08-20 NOTE — Clinical Note
Jerel still continues to sound good. I LVM already for his CM because he doesn't know where his mail gets delivered and wondering if that is how he gets his meds? Brittani or Shai - can you confirm with me how he gets his refills - he says he only has about 6 clozapine left. Thanks so much!    Milton - Ready to be signed, thanks!

## 2020-08-20 NOTE — PROGRESS NOTES
LILLIANA Clinician Contact & Progress Note  For Individual Resiliency Training (IRT)  A Part of the [facility] First Episode of Psychosis Program    NAVIGATE Enrollee: Jerel Day (1995)     MRN: [id number]  Date:  8/20/20  Diagnosis: Schizophrenia, unspecified type F20.9  Clinician: LILLIANA Individual Resiliency Trainer, EDELMIRA Morgan     1. Type of contact: (majority of time spent)  IRT Session via telehealth  Mode of communication: telephone. Video visit was offered, but client declined. Patient consented verbally to this mode of therapy today.  Reason for telehealth: COVID-19. This patient visit was converted to a telehealth visit to minimize exposure to COVID-19.     2. People present:   Writer  Client: Yes     3. Length of Actual Contact: Start Time: 11:35am; End Time: 11:57am     4. Location of contact:  Originating Location (patient location): apartment in [place], [place]  Distant Location (provider location): Home office, located in [place], [place], using appropriate privacy considerations and procedures    5. Did the client complete the home practice option(s) from the previous session: N/A    6. Motivational Teaching Strategies:  Connect info and skills with personal goals  Promote hope and positive expectations  Explore pros and cons of change  Re-frame experiences in positive light    7. Educational Teaching Strategies:  Review of written material/education  Relate information to client's experience  Ask questions to check comprehension  Break down information into small chunks  Adopt client's language     8. CBT Teaching Strategies:  Reinforcement and shaping (positive feedback for steps towards goals and gains in knowledge & skills)  Relapse prevention planning (review of stressors and early warning signs)  Coping skills training (review current coping skills and increase currently used skills)  Behavioral tailoring (fit taking medication into client's daily routine)    9. IRT Module(s)  "Addressed:  Module 4 - Relapse Prevention Planning    10. Techniques utilized:   [place] announced at beginning of session  Review of goal  Review of previous meeting  Present new material  Problem-solving practice  Help client choose a home practice option  Summarize progress made in current session    11. Measures:    Mental Status Exam  Alertness: alert  and oriented  Behavior/Demeanor: cooperative, pleasant and calm  Speech: regular rate and rhythm  Language: intact and no obvious problem.   Mood: description consistent with euthymia  Thought Process/Associations: loose associations, neologisms/ word salad and flight of ideas; improved from last week  Thought Content:  Reports delusions and over-valued ideas;  Denies suicidal ideation and violent ideation  Perception:  Reports auditory hallucinations without commands [details in Interim History];  Denies visual hallucinations, depersonalization and derealization  Insight: limited  Judgment: limited  Cognition: does  appear grossly intact; formal cognitive testing was not done    Jerel opted not to completed PHQ-9, JUNAID-7 or Wilkinson; assessed mood and safety verbally in conversation.    12. Assessment/Progress Note:     Writer called Jerel for phone visit at 11am and again at 11:15am with no answer. Jerel then returned call to clinic and writebrendan and Jerel met for telephone session. Jerel presented with more organized thought process. He still exhibited some word salad and preoccupation with somatic complaints including back and neck pain. Jerel reports ongoing AH and describes them as \"debriefing\" in nature. He also commented, \"I'm not sure if my head is just ledged in the airwaves, but I am picking up on what other people are saying.\" He denies any command SI or command HI/VI AH. He reports he is taking his medication daily. He isn't sure where he is getting his next refill of medication and thinks he has around 6 pills of Clozapine left. Writer inquired if they get " "mailed to him and he isn't sure where his mailbox is. He gave writer permission to call Travis Salgado CM to see if she could assist Jerel in obtaining mail; writer will also f/u with BYRON Priest to confirm how medications are getting to Jerel. Jerel commented positively that he has been reading a lot, is going for walks and that his family was over and brought him \"a nice desk and chair\" the other day. He also shared positively that his brother was over and brought him a cord for his amp and they went for a drive. Writer commented that Jerel sounds better and Jerel expressed agreement. Main concern is his back/neck pain, but he is hopeful potentially the ACT team could help.     Scheduled next IRT telephone visit for next week 2pm on Wednesday.     Overall, Jerel was pleasant and actively engaged throughout the session. Symptom assessment and safety assessment was in support of Jerel's self-identified goal(s), as identified in most recent BEH Treatment Plan. Progress toward goal completion seems poor.    13. Plan/Referrals:     Will route note to team for update purposes. Client and family aware they can reach out to writer directly and/or NAVIGATE team should concerns or needs arise prior to next scheduled appointment.     Next IRT scheduled for Wednesday at 2pm.     LVM for Travis Salgado CM regarding Jerel's concerns with obtaining next refill and not being sure where his mailbox is.     Will route note to team and inquire with BYRON Priest how medications are delivered to Jerel.     Billing for \"Interactive Complexity\"?    No      Tammi Connell ARIANNE    NAVIGATE Individual Resiliency Trainer    Attestation:    I did not see this patient directly. This patient is discussed with me in individual and group supervision, and I agree with the plan as documented.     Ana Rose, LICSW, MSW, LICSW, August 26, 2020      "

## 2020-08-24 ENCOUNTER — VIRTUAL VISIT (OUTPATIENT)
Dept: PSYCHIATRY | Facility: CLINIC | Age: 25
End: 2020-08-24
Payer: COMMERCIAL

## 2020-08-24 DIAGNOSIS — F20.9 SCHIZOPHRENIA, UNSPECIFIED TYPE (H): Primary | ICD-10-CM

## 2020-08-24 NOTE — PROGRESS NOTES
"NAVIGATE SEE Progress Note   For Supported Employment & Education    NAVIGATE Enrollee: Jerel Day (1995)     MRN: [id number]  Date:  8/24/20  Clinician: MIQUELATE Supported Employment & , Ana Michel    1. Client Status Update:   Jerel Day is interested in employment (Client developed employement goals)    2. People present:   SEE/Writer  Client: Jerel Day    3. Length of Actual Contact: 30 minutes   Traveled? No    4. Location of contact:  Telephone    5. Brief description of session, contact, or client status (include: strategies, interventions, client reaction to contact, next steps, etc)    Writer and Jerel Day met via telephone for a follow up Supported Employment and Education (SEE) session. Jerel Day has been working on the goal of obtaining external services, exploring benefits and other resources. Today's agenda included: general check in, created checklist for his apartment.    Writer checked in with Jerel who was discussing his recent hospitalization and how he has been doing at his apartment since discharge. He reports he has \"been okay, seeing family, playing guitar and going for walks\". Jerel says his apartment is \"pretty clean, I still am bad at dishes\". Jerel and this writer brainstormed lists of items or resources that might be helpful to him and he was able to identify: furniture, utilities and other financial support, a job (\"but probably not yet for me\"), more friends and things to do, as well as learning repairs and cleaning tasks. Jerel reports he recently got a desk from his parents and found a chair given away near his apartment which he carried in today. When asked if he cleaned it, Jerel said he \"oh yeah, I even sprayed the white Lysol all over it\". Jerel requested assistance with cleaning in his apartment, especially his kitchen . He wondered how to clean a garbage disposal as well as finding inexpensive or reduced cost internet.     Jerel was able " "to identify supportive people that might assist him including his dad, , and \"new team\" who could come to his apartment. Jerel mentioned research online of \"QRS waves to treat epilepsy\" and \"needing to realign some energies in my ankle and hip\" but was able to identify his goals and needed items for his apartment. Writer encouraged Jerel to share his list with his  and [facility] worker (not currently assigned yet). Writer will follow up next week Monday at 2pm with a phone call.    This patient visit was converted to a telephone call to minimize exposure to COVID-19.        6. Completion of mutually agreed upon client task from previous meeting:  Not Applicable    7. Orientation and Treatment Planning:  Pursuing current SEE goals    8. Assessment:  Other, specify: identifying housing and basic needs    9. Placement:  Not Applicable    10. Follow Along Supports: (for clients who are working or attending school)   Not Applicable    11. Mutually agreed upon client task for next meeting:     NA    12. Next Meeting Scheduled for: Monday at 2pm    Ana TIJERINA Supported Employment &   "

## 2020-08-25 DIAGNOSIS — F25.0 SCHIZOAFFECTIVE DISORDER, BIPOLAR TYPE (H): ICD-10-CM

## 2020-08-25 LAB
BASOPHILS # BLD AUTO: 0 10E9/L (ref 0–0.2)
BASOPHILS NFR BLD AUTO: 0.6 %
DIFFERENTIAL METHOD BLD: ABNORMAL
EOSINOPHIL # BLD AUTO: 0.4 10E9/L (ref 0–0.7)
EOSINOPHIL NFR BLD AUTO: 5.8 %
ERYTHROCYTE [DISTWIDTH] IN BLOOD BY AUTOMATED COUNT: 12.6 % (ref 10–15)
HCT VFR BLD AUTO: 51.2 % (ref 40–53)
HGB BLD-MCNC: 17.9 G/DL (ref 13.3–17.7)
LYMPHOCYTES # BLD AUTO: 2.6 10E9/L (ref 0.8–5.3)
LYMPHOCYTES NFR BLD AUTO: 38.3 %
MCH RBC QN AUTO: 30.5 PG (ref 26.5–33)
MCHC RBC AUTO-ENTMCNC: 35 G/DL (ref 31.5–36.5)
MCV RBC AUTO: 87 FL (ref 78–100)
MONOCYTES # BLD AUTO: 0.6 10E9/L (ref 0–1.3)
MONOCYTES NFR BLD AUTO: 8.7 %
NEUTROPHILS # BLD AUTO: 3.2 10E9/L (ref 1.6–8.3)
NEUTROPHILS NFR BLD AUTO: 46.6 %
PLATELET # BLD AUTO: 194 10E9/L (ref 150–450)
RBC # BLD AUTO: 5.87 10E12/L (ref 4.4–5.9)
WBC # BLD AUTO: 6.9 10E9/L (ref 4–11)

## 2020-08-25 PROCEDURE — 85025 COMPLETE CBC W/AUTO DIFF WBC: CPT | Performed by: INTERNAL MEDICINE

## 2020-08-25 PROCEDURE — 36415 COLL VENOUS BLD VENIPUNCTURE: CPT | Performed by: INTERNAL MEDICINE

## 2020-08-26 ENCOUNTER — PATIENT OUTREACH (OUTPATIENT)
Dept: PSYCHIATRY | Facility: CLINIC | Age: 25
End: 2020-08-26

## 2020-08-28 ENCOUNTER — PATIENT OUTREACH (OUTPATIENT)
Dept: PSYCHIATRY | Facility: CLINIC | Age: 25
End: 2020-08-28

## 2020-08-28 DIAGNOSIS — F20.9 SCHIZOPHRENIA, UNSPECIFIED TYPE (H): Primary | ICD-10-CM

## 2020-08-30 NOTE — PROGRESS NOTES
NAVIGATE Family Peer Support  A Part of the [facility] First Episode of Psychosis Program     Patient Name: Jerel Day  /Age:  1995 (24 year old)  Date of Encounter: 20  Length of Contact: 11 minutes    This writer reached out to offer resources and support to patient's mother, Mesha.     Mesha was disappointed that Jerel was discharged from the hospital before the ACT team was in place. The ACT team is currently scheduled to start the week of 20.  Mesha stressed with Jerel's , Travis, the importance of the team interacting with him on a daily basis in person.    Parents are concerned that Jerel is not consistently taking his medication and are receiving late night texts from him.  Parents have been delivering his medication to the apartment, although last week Jerel wouldn't let them in right away.      Mesha was furloughed last week and was glad for the break from work.  This writer will continue to offer family peer support services.    CONSTANTINO FletcherATE Family Peer    [Billing Code 43484 for No Billable Service as family peer support is a nonbillable service]

## 2020-08-31 NOTE — PROGRESS NOTES
NAVIGATE Outreach  A Part of the [facility] First Episode of Psychosis Program     Patient Name: Jerel Day  /Age:  1995 (24 year old)    Contact: Writer called Jerel for appt today at 2pm with no answer; LVM reminding Jerel of appt and shared that writer will try back in about 20 minutes. Writer then called back with no answer again. Left another  and both times included clinic number for return call should Jerel want to talk during this hour.     Plan: Did not receive return call. Writer will call next week to check-in and hopefully schedule another appt.     EDELMIRA Morgan  NAVIGATE Individual Resiliency  & Family Clinician

## 2020-09-01 ENCOUNTER — TELEPHONE (OUTPATIENT)
Dept: PHARMACY | Facility: CLINIC | Age: 25
End: 2020-09-01

## 2020-09-01 DIAGNOSIS — F25.0 SCHIZOAFFECTIVE DISORDER, BIPOLAR TYPE (H): ICD-10-CM

## 2020-09-01 LAB
BASOPHILS # BLD AUTO: 0 10E9/L (ref 0–0.2)
BASOPHILS NFR BLD AUTO: 0.5 %
CAPILLARY BLOOD COLLECTION: NORMAL
DIFFERENTIAL METHOD BLD: NORMAL
EOSINOPHIL # BLD AUTO: 0.4 10E9/L (ref 0–0.7)
EOSINOPHIL NFR BLD AUTO: 5.3 %
ERYTHROCYTE [DISTWIDTH] IN BLOOD BY AUTOMATED COUNT: 12.6 % (ref 10–15)
HCT VFR BLD AUTO: 44.1 % (ref 40–53)
HGB BLD-MCNC: 15.5 G/DL (ref 13.3–17.7)
LYMPHOCYTES # BLD AUTO: 3.5 10E9/L (ref 0.8–5.3)
LYMPHOCYTES NFR BLD AUTO: 42.7 %
MCH RBC QN AUTO: 31 PG (ref 26.5–33)
MCHC RBC AUTO-ENTMCNC: 35.1 G/DL (ref 31.5–36.5)
MCV RBC AUTO: 88 FL (ref 78–100)
MONOCYTES # BLD AUTO: 0.8 10E9/L (ref 0–1.3)
MONOCYTES NFR BLD AUTO: 9.3 %
NEUTROPHILS # BLD AUTO: 3.5 10E9/L (ref 1.6–8.3)
NEUTROPHILS NFR BLD AUTO: 42.2 %
PLATELET # BLD AUTO: 253 10E9/L (ref 150–450)
RBC # BLD AUTO: 5 10E12/L (ref 4.4–5.9)
WBC # BLD AUTO: 8.3 10E9/L (ref 4–11)

## 2020-09-01 PROCEDURE — 85025 COMPLETE CBC W/AUTO DIFF WBC: CPT | Performed by: INTERNAL MEDICINE

## 2020-09-01 PROCEDURE — 36416 COLLJ CAPILLARY BLOOD SPEC: CPT | Performed by: INTERNAL MEDICINE

## 2020-09-01 NOTE — TELEPHONE ENCOUNTER
Jerel Day was referred to [facility] for outpatient clozapine monitoring.   Current Dose: 250mg QHS  Laboratory Monitoring: weekly home draws. Clozapine started 2/19/20 during inpatient hospitalization. He will be eligible to move to Q4 week monitoring 2/19/21.     Recent Labs   Lab Test 09/01/20  0915 08/25/20  0915 08/18/20  0920 07/06/20  1025   WBC 8.3 6.9 8.7 8.0   ANEU 3.5 3.2 4.0 4.3   HGB 15.5 17.9* 18.8* 15.9    194 189 232       ANC WNL.  14 day supply of clozapine will be mailed. Next draw due 9/14/20.    Patient has moved as of 6/19/20.   Home draws will take place at pt's apartment:   [address]  [place] [address]     Retail pharmacy will mail medication to pt's parents home:   [address] [place] [address]    Azeb cMlean, PharmD, BCPP  Medication Therapy Management Pharmacist  [facility]

## 2020-09-02 ENCOUNTER — TELEPHONE (OUTPATIENT)
Dept: PSYCHIATRY | Facility: CLINIC | Age: 25
End: 2020-09-02

## 2020-09-02 ENCOUNTER — PATIENT OUTREACH (OUTPATIENT)
Dept: PSYCHIATRY | Facility: CLINIC | Age: 25
End: 2020-09-02

## 2020-09-02 NOTE — TELEPHONE ENCOUNTER
"NAVIGATE SEE Outgoing Telephone Call  For Supported Employment & Education    NAVIGATE Enrollee: Jerel Day (1995)     MRN: [id number]  Date of Call: 9/2/2020  Contacted: Jerel (via text) León- father (via phone)  Call Length: 25 min    Discussed:   Writer received a text from Jerel that stated \"I don't know if I have enough meds? Who should I call?\" Writer asked which meds he was referring to and he said \"clozapine\". Writer inquired about how many he had left and Jerel said \"its probably about a few more days worth\". Writer reviewed his chart and saw that his meds should have been mailed to his parents. Writer called Jerel's father Raf who checked the mail with me on the phone and reported that they did in fact receive his medications in the mail today. León confirmed they would like to continue doing this in order to be in contact with Jerel. He reports he will bring Jerel his medications this weekend when they will be going to the family cabin together.     Writer informed Jerel's father that we would like to schedule a transition meeting with Jerel and his parents, his  and new ACT team who will be providing in home supports similarly to services provided by NAVIGATE. Confirmed with León that Jerel will be discharged from [facility]IGATE program after the transition. León expressed gratitude for [facility]IGATE services and how much he and Mesha have learned and grown from the family component and was appreciative of the positive feedback from this writer about Jerel reaching out for help when he needs it.     Romanr will collaborate with team members to schedule a meeting for transitioning services.    Ana Michel    "

## 2020-09-02 NOTE — PROGRESS NOTES
NAVIGATE Outreach  A Part of the [facility] First Episode of Psychosis Program     Patient Name: Jerel Day  /Age:  1995 (24 year old)    Contact: Writer called and LVM for Jerel Robles's CM. Requested return call for update purposes regarding ACT team timeframe.     Writer also called and LVM for Jerel; requested return call to main clinic to schedule telephone call with writer. Provided open times this week.    Plan: Will await return call.     EDELMIRA Morgan  NAVIGATE Individual Resiliency [place] & Family Clinician

## 2020-09-03 ENCOUNTER — PATIENT OUTREACH (OUTPATIENT)
Dept: PSYCHIATRY | Facility: CLINIC | Age: 25
End: 2020-09-03

## 2020-09-03 NOTE — PROGRESS NOTES
NAVIGATE Outreach  A Part of the [facility] First Episode of Psychosis Program     Patient Name: Jerel Day  /Age:  1995 (24 year old)    Contact: Writer received VM today from Jerel's CM-Travis who shared that Jerel's ACT team started yesterday  and as of yesterday Travis is no longer his CM, he is being transferred to a  with [facility]. Travis wasn't aware of the CM name but provided writer with [facility]  ([phone]) who would have more information.     Writer called [facility] (direct ph: [phone]) and [facility]. Introduced self and role within Jerel's care team. Requested return call to connect for care coordination and transfer of services purposes.     Plan: Will await return call.    Tammi Connell, EDELMIRA  NAVIGATE Individual Resiliency [place] & Family Clinician

## 2020-09-11 ENCOUNTER — PATIENT OUTREACH (OUTPATIENT)
Dept: PSYCHIATRY | Facility: CLINIC | Age: 25
End: 2020-09-11

## 2020-09-11 NOTE — PROGRESS NOTES
NAVIGATE Outreach  A Part of the [facility] First Episode of Psychosis Program     Patient Name: Jerel Day  /Age:  1995 (24 year old)    Contact: Writer called and left second message for [facility]  ([phone]); requested return call to discuss transfer of services and Jerel's care.    Plan: Will await return call.     Tammi Connell, EDELMIRA  NAVIGATE Individual Resiliency [place] & Family Clinician

## 2020-09-12 NOTE — TELEPHONE ENCOUNTER
I have limited availability this coming week 9/14-15 then off for a household move thru end of Sept. Will try and arrange coverage from a TRD staff. My only remarks as above are are that Clozaril alone is inadequate and unreliable due to PO admin. HO in combination recommended to the ACT MD.

## 2020-09-14 ENCOUNTER — TELEPHONE (OUTPATIENT)
Dept: PHARMACY | Facility: CLINIC | Age: 25
End: 2020-09-14

## 2020-09-14 DIAGNOSIS — F25.0 SCHIZOAFFECTIVE DISORDER, BIPOLAR TYPE (H): ICD-10-CM

## 2020-09-14 LAB
BASOPHILS # BLD AUTO: 0 10E9/L (ref 0–0.2)
BASOPHILS NFR BLD AUTO: 0.5 %
DIFFERENTIAL METHOD BLD: NORMAL
EOSINOPHIL # BLD AUTO: 0.5 10E9/L (ref 0–0.7)
EOSINOPHIL NFR BLD AUTO: 6.8 %
ERYTHROCYTE [DISTWIDTH] IN BLOOD BY AUTOMATED COUNT: 12.4 % (ref 10–15)
HCT VFR BLD AUTO: 43.7 % (ref 40–53)
HGB BLD-MCNC: 15.2 G/DL (ref 13.3–17.7)
LYMPHOCYTES # BLD AUTO: 2.6 10E9/L (ref 0.8–5.3)
LYMPHOCYTES NFR BLD AUTO: 35.2 %
MCH RBC QN AUTO: 30.9 PG (ref 26.5–33)
MCHC RBC AUTO-ENTMCNC: 34.8 G/DL (ref 31.5–36.5)
MCV RBC AUTO: 89 FL (ref 78–100)
MONOCYTES # BLD AUTO: 0.7 10E9/L (ref 0–1.3)
MONOCYTES NFR BLD AUTO: 8.8 %
NEUTROPHILS # BLD AUTO: 3.7 10E9/L (ref 1.6–8.3)
NEUTROPHILS NFR BLD AUTO: 48.7 %
PLATELET # BLD AUTO: 252 10E9/L (ref 150–450)
RBC # BLD AUTO: 4.92 10E12/L (ref 4.4–5.9)
WBC # BLD AUTO: 7.5 10E9/L (ref 4–11)

## 2020-09-14 PROCEDURE — 36415 COLL VENOUS BLD VENIPUNCTURE: CPT | Performed by: INTERNAL MEDICINE

## 2020-09-14 PROCEDURE — 85025 COMPLETE CBC W/AUTO DIFF WBC: CPT | Performed by: INTERNAL MEDICINE

## 2020-09-14 NOTE — TELEPHONE ENCOUNTER
Jerel Day was referred to [facility] for outpatient clozapine monitoring.   Current Dose: 250mg QHS  Laboratory Monitoring: weekly home draws. Clozapine started 2/19/20 during inpatient hospitalization. He will be eligible to move to Q4 week monitoring 2/19/21.     Recent Labs   Lab Test 09/14/20  0910 09/01/20  0915 08/25/20  0915 08/18/20  0920   WBC 7.5 8.3 6.9 8.7   ANEU 3.7 3.5 3.2 4.0   HGB 15.2 15.5 17.9* 18.8*    253 194 189       ANC WNL.  14 day supply of clozapine will be mailed upon refill authorization - refill request sent. Next draw due 9/28/20.    Patient has moved as of 6/19/20.   Home draws will take place at pt's apartment:   [address]  [place] [address]     Retail pharmacy will mail medication to pt's parents home:   [address] [place] [address]    Azeb Mclean, PharmD, BCPP  Medication Therapy Management Pharmacist  [facility]

## 2020-09-18 DIAGNOSIS — F20.89 OTHER SCHIZOPHRENIA (H): ICD-10-CM

## 2020-09-18 RX ORDER — CLOZAPINE 50 MG/1
250 TABLET ORAL EVERY MORNING
Qty: 70 TABLET | Refills: 1 | Status: ON HOLD | OUTPATIENT
Start: 2020-09-18 | End: 2021-05-26

## 2020-09-18 NOTE — TELEPHONE ENCOUNTER
Last seen: 8/17/20  RTC: 3 weeks   Cancel: none  No-show: none  Next appt: none    Incoming refill from pharmacy via fax    Medication requested: Clozpaine 50 mg   Directions: Take 5 tablets by mouth every morning   Qty: 70  Last refilled: 8/18/20 with one refill     Medication refill approved per refill protocol

## 2020-09-18 NOTE — TELEPHONE ENCOUNTER
Refill request for:  clozapine 250mg daily, #70 (14 day supply), +1 refill    Rx pended, please send Rx to [facility]. [facility] has faxed refill request x2 with no response.     Thank you,     Azeb Mclean, PharmD, BCPP  Medication Therapy Management Pharmacist  [facility]

## 2020-09-23 ENCOUNTER — TELEPHONE (OUTPATIENT)
Dept: PSYCHIATRY | Facility: CLINIC | Age: 25
End: 2020-09-23

## 2020-09-23 NOTE — TELEPHONE ENCOUNTER
What is the concern that needs to be addressed by a nurse?  (Psychiatrist) would like a call back from  to discus patient.    May a detailed message be left on voicemail?    Date of last office visit: 08/17/2020    Message routed to: [facility]

## 2020-09-24 NOTE — TELEPHONE ENCOUNTER
-Writer received a call back from Dr. Tracy.    -Dr. Tracy is the current psychiatrist on the ACT team patient was referred to.  He reports that his team has had a difficult time engaging with patient as they call him and he answers his phone, but as soon as they start asking him questions he will say his phone is having issues and then hang up.      -Discussed current medications and that [facility] is currently managing the clozapine.      -Discussed different ways to get patient more engaged with them.  Writer suggested contacting parents to see if they can help arrange meetings.      -per Dr. Trcay they will continue to try to engage patient, but are taking over his care and will call should they have any further questions.

## 2020-09-24 NOTE — TELEPHONE ENCOUNTER
-Writer called and left message for Dr. Tracy, just letting him know that Dr. Yusuf is out of clinic until 10/5/20, so not to expect a call until after that.      -Message forwarded to Dr. Yusuf to call Dr. Tracy upon his return.

## 2020-10-01 NOTE — TELEPHONE ENCOUNTER
Received notification that mobile phlebotomist has visited pt's home x 2 without an answer and is concerned that he might be hospitalized.  Will route to Dr. Yusuf and Brittani Estrella as fyi and to see if they have further information.    Tita Salamanca, PharmD  Medication Therapy Management Pharmacist  [facility]  Phone: [phone]

## 2020-10-02 ENCOUNTER — PATIENT OUTREACH (OUTPATIENT)
Dept: PSYCHIATRY | Facility: CLINIC | Age: 25
End: 2020-10-02

## 2020-10-02 ENCOUNTER — TELEPHONE (OUTPATIENT)
Dept: PSYCHIATRY | Facility: CLINIC | Age: 25
End: 2020-10-02

## 2020-10-02 NOTE — TELEPHONE ENCOUNTER
Discussed with RN Brittani Estrella, who is working to get patient set up with ACT team.  Though unable to verify remaining medication quantity and patient's med adherence cannot be confirmed, Alicia ok with mailing medication refill to parents' home to ensure he does not run out.  Will continue to have home phlebotomist try to reach patient for blood draw.

## 2020-10-02 NOTE — TELEPHONE ENCOUNTER
-Writer called and spoke with Dr. Loc Tracy on the ACT team that is trying to take over patient's care.      -Per Dr. Tracy he has not met with patient face to face yet, and therefore is not taking over medication management at this point.      -Per Dr. Tracy, their nurse FRANNIE Gamez was able to have a face to face meeting with patient and found that patient is symptomatic and was unsure if patient was taking medications.  Writer informed Dr. Tracy that patient has missed two blood draws this week from [place] as well.      -Dr. Tracy will connect with his team to see next steps, but stated that if they are unable to make contact and initiate services soon in the next week or two; they will have to close patient as they have a waiting list of other patients.      -Dr. Tracy will update his team, as will this writer.

## 2020-10-02 NOTE — PROGRESS NOTES
"NAVIGATE Outreach  A Part of the [facility] First Episode of Psychosis Program     Patient Name: Jerel Day  /Age:  1995 (25 year old)    Contacted mom about transition of care to ACT services.     She reports she has talked with Franco the RN on a couple occassions including this week. Franco acknowledged the [facility] team would like to coordinate a dishcarge meeting. Mom reports she calls the main number to reach staff. Franco has met with Jerel several times; reaching out three times per week. He was supposed to see Jerel Tuesday this week. Unfortunately right now Jerel does not have a working phone.     She is wondering if there still needs to be a transfer of med mgmt services. She reports it sounds like they were still waiting for medical records. Will reach out to RNCC Brittani Estrella to see if she can reconnect with the Psychiatrist on the ACT team.     Mom spoke to how Jerel is doing. \"He is hanging by a thread.\" \"His moods are up and down.\" He went with family last weekend to the cabin and did \"well.\" Hearing voices that may be \"traumatic at times.\" Jerel came over yesterday and seemed \"manic.\"  She questions if he is taking medication as prescribed.      Ana Rose, [facility]  NAVIGATE Director & Family Clinician    "

## 2020-10-08 ENCOUNTER — PATIENT OUTREACH (OUTPATIENT)
Dept: PSYCHIATRY | Facility: CLINIC | Age: 25
End: 2020-10-08

## 2020-10-08 DIAGNOSIS — F20.9 SCHIZOPHRENIA, UNSPECIFIED TYPE (H): Primary | ICD-10-CM

## 2020-10-08 NOTE — PROGRESS NOTES
NAVIGATE Family Peer Support  A Part of the [facility] First Episode of Psychosis Program     Patient Name: Jerel Day  /Age:  1995 (25 year old)  Date of Encounter: 10/08/20  Length of Contact: 2 minutes    This writer reached out to offer resources and support to patient's mother, Mesha.     Mesha was on a conference call for work at the time of this call.  This writer will call her back early next week.    CONSTANTINO FletcherATE Family Peer    [Billing Code 34835 for No Billable Service as family peer support is a nonbillable service]

## 2020-10-08 NOTE — PROGRESS NOTES
NAVIGATE Outreach  A Part of the [facility] First Episode of Psychosis Program     Patient Name: Jerel Day  /Age:  1995 (25 year old)    Contact:   Writer spoke to Nakita who shared the following information: Dr. Tracy completed DA with Jerel on , Nakita met with him on  and completed initial treatment plan. Jerel has then been working with Madelaine and Deny, who is Jerel's primary care coordinator on the team. In-person visits are done outside and are less frequent due to COVID, so many visits are done via phone. Jerel has also had a phone call with their vocational worker.     Writer inquired about med management services being transferred and referenced Brittani Estrella RNCC last phone call with Dr. Tracy (see Epic note for full detail) where Dr. Tracy made mention of potential closure of services if they weren't able to reach Jerel. Nakita stated they plan to take over medication management services. They have team meeting today at 1pm. She plans to address the following questions with the team then follow-up with writer:    -What is the status of medication management at this time? And plans to take over med management by ACT team?  -Check in about blood draws as per documentation from Brittani Estrella RNCC it looks like Jerel had missed two blood draws from [facility] the week of 10/2  -Could a virtual meeting with Navigate providers, ACT team and Jerel be coordinated to transfer care?    Plan: Nakita will address questions with ACT providers in team meeting today and will follow-up with writer. Writer will route note to team for update purposes.    EDELMIRA Morgan Individual Resiliency [place] & Family Clinician

## 2020-10-08 NOTE — PROGRESS NOTES
"NAVIGATE Outreach  A Part of the [facility] First Episode of Psychosis Program     Patient Name: Jerel Day  /Age:  1995 (25 year old)    Contact: Writer called ACT  - Nakita directly ([phone]); did not get through, left another voicemail requesting return call.    Writer called main number and was connected to [facility] that way. Per Nakita's report they have been able to connect with Jerel via phone at times, but state he is having phone troubles and they are often disconnected. Nakita inquired if Jerel's main worker from ACT team has contacted writer; writer clarified that writer has not been contacted by anyone from the team. Nakita Gamez-FRANNIE has connected with Jerel at times and is working to \"figure out refills\" for medication.     Nakita asked if writer could then hold. Writer requested to arrange time today to connect as writer has 11am therapy appt. Nakita gave writer her cell phone number for return call today.     Plan: Writer will call Nakita later today. Will keep team informed.     EDELMIRA Morgan  NAVIGATE Individual Resiliency [place] & Family Clinician    "

## 2020-10-08 NOTE — TELEPHONE ENCOUNTER
"This is strange behavior from a MD to say they will have to close out pt services.  Iwould have expected access problems is the rule rather than the exception and why they (not the patient) are exected to \"ACT\"?!"

## 2020-10-12 DIAGNOSIS — F25.0 SCHIZOAFFECTIVE DISORDER, BIPOLAR TYPE (H): ICD-10-CM

## 2020-10-12 LAB
BASOPHILS # BLD AUTO: 0 10E9/L (ref 0–0.2)
BASOPHILS NFR BLD AUTO: 0.6 %
DIFFERENTIAL METHOD BLD: NORMAL
EOSINOPHIL # BLD AUTO: 0.3 10E9/L (ref 0–0.7)
EOSINOPHIL NFR BLD AUTO: 3.7 %
ERYTHROCYTE [DISTWIDTH] IN BLOOD BY AUTOMATED COUNT: 13.1 % (ref 10–15)
HCT VFR BLD AUTO: 43 % (ref 40–53)
HGB BLD-MCNC: 14.9 G/DL (ref 13.3–17.7)
LYMPHOCYTES # BLD AUTO: 2 10E9/L (ref 0.8–5.3)
LYMPHOCYTES NFR BLD AUTO: 27.4 %
MCH RBC QN AUTO: 31 PG (ref 26.5–33)
MCHC RBC AUTO-ENTMCNC: 34.7 G/DL (ref 31.5–36.5)
MCV RBC AUTO: 89 FL (ref 78–100)
MONOCYTES # BLD AUTO: 0.7 10E9/L (ref 0–1.3)
MONOCYTES NFR BLD AUTO: 9.2 %
NEUTROPHILS # BLD AUTO: 4.3 10E9/L (ref 1.6–8.3)
NEUTROPHILS NFR BLD AUTO: 59.1 %
PLATELET # BLD AUTO: 239 10E9/L (ref 150–450)
RBC # BLD AUTO: 4.81 10E12/L (ref 4.4–5.9)
WBC # BLD AUTO: 7.3 10E9/L (ref 4–11)

## 2020-10-12 PROCEDURE — 85025 COMPLETE CBC W/AUTO DIFF WBC: CPT | Performed by: PSYCHIATRY & NEUROLOGY

## 2020-10-13 ENCOUNTER — TELEPHONE (OUTPATIENT)
Dept: PHARMACY | Facility: CLINIC | Age: 25
End: 2020-10-13

## 2020-10-13 DIAGNOSIS — F20.89 OTHER SCHIZOPHRENIA (H): ICD-10-CM

## 2020-10-13 NOTE — TELEPHONE ENCOUNTER
Jerel Day was referred to [facility] for outpatient clozapine monitoring.   Current Dose: 250mg QHS  Laboratory Monitoring: Q2 week home draws. Clozapine started 2/19/20 during inpatient hospitalization. He will be eligible to move to Q4 week monitoring 2/19/21.     Recent Labs   Lab Test 10/12/20  0945 09/14/20  0910 09/01/20  0915 08/25/20  0915   WBC 7.3 7.5 8.3 6.9   ANEU 4.3 3.7 3.5 3.2   HGB 14.9 15.2 15.5 17.9*    [phone]       ANC WNL.  14 day supply of clozapine will be mailed upon refill authorization - refill request sent. Last mailed on 10/02/20. Next draw due 10/27/20.  Patient likely getting ACT team; will continue to follow until transition is complete.    Patient has moved as of 6/19/20.   Home draws will take place at pt's apartment:   [address]  [place] [address]     Retail pharmacy will mail medication to pt's parents home:   [address] [place] [address]    Tita Salamanca, PharmD  Medication Therapy Management Pharmacist  [facility]  Phone: [phone]

## 2020-10-15 ENCOUNTER — PATIENT OUTREACH (OUTPATIENT)
Dept: PSYCHIATRY | Facility: CLINIC | Age: 25
End: 2020-10-15

## 2020-10-15 DIAGNOSIS — F20.9 SCHIZOPHRENIA, UNSPECIFIED TYPE (H): Primary | ICD-10-CM

## 2020-10-16 ENCOUNTER — PATIENT OUTREACH (OUTPATIENT)
Dept: PSYCHIATRY | Facility: CLINIC | Age: 25
End: 2020-10-16

## 2020-10-16 NOTE — PROGRESS NOTES
NAVIGATE Outreach  A Part of the [facility] First Episode of Psychosis Program     Patient Name: Jerel Day  /Age:  1995 (25 year old)    Contact:   Writer exchanged the following emails with Nakita Joshi MS, LPSS, CRC, CPRP -  for Jerel's ACT team with Mark Ramos. Awaiting reply from writer's most recent email seeking to clarify if Jerel is in fact enrolled in their services (documented at the top).  ---------------------   Harris Mace,    I want to make sure we are talking about the correct patient, Jerel, is that right?    I am rather confused about this transfer of care and I am disappointed with the coordination that has taken place with attempts to transition him to an ACT team. I know this started when he was in the hospital but it saddens me to hear he is at risk of hospitalization again.     From the sound of your email, it seems that Jerel is not under your care, is that correct? I am confused because per our last conversation, it sounded like your psychiatrist did meet with him and complete a DA, and he has met with/spoken with other members of your team which gave me the impression he is enrolled in your services?    I am just trying to understand as my priority is getting Jerel the care that he needs right now. Someone needs to be overseeing and prescribing his medications. And if not you all right now, then I want to try to get him set up with a visit with our psychiatrist.    I am also happy to help assist in troubleshooting making contact with Jerel. From our experience, my recommendations for next steps would be looping in his family and coordinating a visit with their help for the psychiatrist to see Jerel - either when they are there or something of that nature.     Can you help clarify if you all are still attempting to make contact with him and if he is under your care?    Thanks so much!    Tammi Connell AM, [facility]  [facility] NAVIGATE Individual Resiliency [place] & Family  "Clinician  [facility]  [facility], [address]  [address] [place] [address]  Direct Phone: [phone]  Clinic Phone: [phone]  Fax: [phone]  Email: [email or web address]          This message contains information that may be confidential, privileged, and/or protected under the Health Insurance Portability and Accountability Act of 1996 and corresponding regulations. This message is intended solely for the addressee (or persons authorized to receive this message for the addressee). If you are not the addressee (or authorized to receive for the addressee), you may not read, use, copy or disclose to anyone this message or any information contained in the message. If you have received the message in error, please advise the sender by reply e-mail and delete the message from your computer system immediately.    --------------------------------------    From: Nakita Joshi, MS, [facility], CRC, CPRP <[email or web address]>   Sent: Wednesday, October 14, 2020 12:32 PM  To: Tammi Connell <[email or web address]>  Subject: secure: contact info    CAUTION: This email originated from outside of the organization. Do not click links or open attachments unless you recognize the sender and know the content is safe.    WARNING: The sender of this email could not be validated and may not match the person in the \"From\" field. When possible, visit any website by directly typing it in a web browser, instead of clicking links, for added safety.    This message was sent securely using [employer]! I forgot to loop back to you!     I talked with our team and currently, our doc is not prescribing anything because he's not gotten a chance to meet with Omid to review meds in any meaningful way.     Apparently he's missed his last two blood draws. It's unlikely that he is taking Cloz appropriately. We are trying to engage with him but it seems that at " this point, hospitalization is likely. If that occurs, it will give us a chance to engage and facilitate a discharge plan.      Is that helpful?      Let me know.    Thanks,  Nakita Joshi MS, LPCC, CRC, CPRP  She / Her / Hers  [facility] Rik ACT Team     [address] [place] [address]    [phone]   [phone]  [email or web address]           --------------------------------------------    From: Tammi Connell [[email or web address]]   Sent: Wednesday, October 14, 2020 10:54 AM  To: Nakita Joshi MS, LPCC, CRC, CPRP <[email or web address]>  Subject: RE: contact info     CAUTION: This email originated from outside of the organization. Do not click links or open attachments unless you recognize the sender and know the content is safe.     This message was sent securely using [employer],     Wanting to loop back from our conversations last week - any updates regarding KB? Or do you have time to touch base via phone sometime this week?     Thanks,     Tammi Connell AM, [facility]  [facility] MI[facility]ATE Individual Resiliency [place] & Family Clinician  [facility], [address]  [address] [place] [address]  Direct Phone: [phone]  Clinic Phone: [phone]  Fax: [phone]  Email: [email or web address]         Plan: Will route note to team for update purposes. Awaiting reply. If they are not attempting to reach Jerel again writer will reach out to schedule him with Dr. Yusuf and writer.     SHIRA Morgan  NAVIGATE Individual Resiliency  & Family Clinician

## 2020-10-16 NOTE — PROGRESS NOTES
NAVIGATE Outreach  A Part of the [facility] First Episode of Psychosis Program     Patient Name: Jerel Day  /Age:  1995 (25 year old)    Contact:   Exchanged the following emails with Nakita who clarified Jerel is under their care.     --------------    Harris Mace,    This is very helpful, thank you for clarifying! I agree things get lost in email so often so I appreciate your patience with my questions and your response.     I am glad to hear he is under your care and we don t need to refer out.     We never did an ending meeting with Jerel -we like to try to coordinate a meeting with new providers, the client and our team as a transition but I realize these circumstances are different with how challenging it has been at times to get in touch with him. I plan to update my team and see if there were any further items that we had hoped to follow-up with you all regarding.    Thanks so much and have a good weekend,    Tammi Connell AM, [facility]  [facility] NAVIGATE Individual Resiliency  & Family Clinician  [facility]  [facility], [address]  [address] [place] [address]  Direct Phone: [phone]  Clinic Phone: [phone]  Fax: [phone]  Email: [email or web address]          This message contains information that may be confidential, privileged, and/or protected under the Health Insurance Portability and Accountability Act of 1996 and corresponding regulations. This message is intended solely for the addressee (or persons authorized to receive this message for the addressee). If you are not the addressee (or authorized to receive for the addressee), you may not read, use, copy or disclose to anyone this message or any information contained in the message. If you have received the message in error, please advise the sender by reply e-mail and delete the message from your computer system  immediately.    -------------------------------      From: Nakita Joshi MS, LPCC, CRC, CPRP <[email or web address]>   Sent: Friday, October 16, 2020 12:12 PM  To: Tammi Connell <[email or web address]>  Cc: Deny Clark <[email or web address]>; Franco Johnston RN <[email or web address]>  Subject: RE: secure: contact info    This message was sent securely using [employer]       Harris Cadena,    Yes, this is in reference to Jerel TRAN. I am copying Franco, his nurse, and Deny, his primary mental  health practitioner, in this email.    I'm sorry that my email made it sound like we are not coordinating care with Jerel. I will say that we botched connecting with you in a timely manner. However, we are trying to connect with Jerel several times per week and having success. We plan to increase Face to face attempts as his phone is unreliable. He is under the care of our psychiatrist and our team so no need to refer out. A main reason that he may need hospitalization is to safely restart Clozaril. We can monitor it in the community but we need to be able to meet with a person daily to do so. This is currently unlikely with Jerel.     We have been in contact with his parents and are currently problem-solving ways to better engage with Jerel in the community. Our nurse had a lengthy conversation with his mom earlier this week.     I hope that helps to clarify. If you'd like to talk thru this, we certainly can. Sometimes things get lost or confused in email.    Thanks,  Nakita Joshi MS, LPCC, CRC, CPRP  She / Her / Hers  [facility] Rik ACT Team     [address] [place] [address]   M [phone]  F [phone]  [email or web address]  [email or web address]             Plan: Will route note to team for update purposes.     TRAVIS MorganSW  NAVIGATE Individual Resiliency [place] & Family Clinician

## 2020-10-16 NOTE — TELEPHONE ENCOUNTER
-Writer called Mesha, patients mother to follow up.  Per Mesha patient has not been taking his medication as he should and has plenty of medication still, and does not need a refill at this time.      -EDELMIRA Cadena is still working on coordinating with ACT team on whether they are managing medications or if Navigate is.

## 2020-10-18 RX ORDER — CLOZAPINE 50 MG/1
250 TABLET ORAL EVERY MORNING
Qty: 70 TABLET | Refills: 1 | OUTPATIENT
Start: 2020-10-18

## 2020-10-18 NOTE — TELEPHONE ENCOUNTER
I have advised [facility] of change in MD/RN. Sounds like they are not going to continue Clozaril w/o   another hospital stay with which I completely agree

## 2020-10-19 ENCOUNTER — PATIENT OUTREACH (OUTPATIENT)
Dept: PSYCHIATRY | Facility: CLINIC | Age: 25
End: 2020-10-19

## 2020-10-19 DIAGNOSIS — F20.9 SCHIZOPHRENIA, UNSPECIFIED TYPE (H): Primary | ICD-10-CM

## 2020-10-19 NOTE — PROGRESS NOTES
NAVIGATE Family Peer Support  A Part of the [facility] First Episode of Psychosis Program     Patient Name: Jerel Day  /Age:  1995 (25 year old)  Date of Encounter: 10/15/20  Length of Contact: 1 minute    This writer attempted to reached patient's mother, Georgina.  A voicemail message was left inviting a return call.     CONSTANTINO FletcherATE Family Peer    [Billing Code 21739 for No Billable Service as family peer support is a nonbillable service]

## 2020-10-19 NOTE — TELEPHONE ENCOUNTER
Per 10/16 patient outreach encounter, appears pt is no longer taking clozapine and has transferred care to ACT team. Called [facility] ACT team ([phone]) and spoke with FRANNIE Gamez, who confirms pt has not been compliant with clozapine and it will not be restarted at this time. Will cancel home draws and remove pt from [facility] clozapine monitoring at this time.

## 2020-10-20 ENCOUNTER — PATIENT OUTREACH (OUTPATIENT)
Dept: PSYCHIATRY | Facility: CLINIC | Age: 25
End: 2020-10-20

## 2020-10-20 NOTE — PROGRESS NOTES
"NAVIGATE Family Peer Support  A Part of the [facility] First Episode of Psychosis Program     Patient Name: Jerel Day  /Age:  1995 (25 year old)  Date of Encounter: 10/19/20  Length of Contact: 29 minutes    This writer responded to a voicemail message from patient's mother, Mesha, after family peer outreach call.    Mesha shared that the ACT team nurse, Franco, is currently visiting Jerel once a week, trying to establish rapport, and assessing for safety. ACT team is minimizing visits by other team members to Jerel's apartment to decrease confusion over \"too many new faces\" and to keep stress level low.    In addition to visits by Franco, parents currently visit Jerel twice a week and brother visits 1-2 times per week to offer support, help with cleaning, and drop off groceries.    This writer stated that the NAVIGATE team would like to have a closure meeting with family and that staff would be reaching out to coordinate same.  This writer offered ongoing VALERIE family peer support to parents \"post-NAVIGATE\" involvement.      Sabra Villeda CFPS  NAVIGATE Family Peer    [Billing Code 62133 for No Billable Service as family peer support is a nonbillable service]    "

## 2020-10-21 NOTE — PROGRESS NOTES
NAVIGATE Outreach  A Part of the [facility] First Episode of Psychosis Program     Patient Name: Jerel Day  /Age:  1995 (25 year old)    Contacted Jerel's mom, Mesha to inquire if family could facilitate and participate in a Navigate discharge meeting with Jerel. Proposed the following dates/times. Mom will talk with dad and see what fits their schedule.      at 2:00pm   at 1:00 or 2:00pm   at 3:00pm    Ana Rose, [facility]  NAVIGATE Director & Family Clinician

## 2020-11-08 NOTE — MR AVS SNAPSHOT
After Visit Summary   1/18/2018    Jerel Day    MRN: [id number]           Patient Information     Date Of Birth          1995        Visit Information        Provider Department      1/18/2018 1:00 PM Shai Londono, [facility] Psychiatry         Follow-ups after your visit        Your next 10 appointments already scheduled     Jan 19, 2018 11:00 AM CST   Return Walk In Ortho with Karl Mireles MD   [facility]opa[facility] In [facility] ([facility])    [address] [id number] [phone]            Jan 23, 2018  4:00 PM CST   Navigation Evaluation with Shai Londono [facility] Psychiatry ([facility])    [address] [id number] [phone]            Feb 01, 2018  9:30 AM CST   (Arrive by 9:15 AM)   Return Visit with Shelton Guo MD   [facility] Ear Nose and Throat ([facility])    [address] [place] [id number] [phone]              Who to contact     Please call your clinic at [phone] to:    Ask questions about your health    Make or cancel appointments    Discuss your medicines    Learn about your test results    Speak to your doctor   If you have compliments or concerns about an experience at your clinic, or if you wish to file a complaint, please contact [facility] Patient Relations at [phone] or email us at [email or web address]         Additional Information About Your Visit        MyChart Information     WebSideStory is an electronic gateway that provides easy, online access to your medical records. With WebSideStory, you can request a clinic appointment, read your test results, renew a prescription or communicate with your care team.     To sign up for WebSideStory visit the website at [email or web address]   You will be asked to enter the access  Cardiology Consult Note    Reason for Consult: new onset atrial fibrillation    History of Present Illness:   68 year old male with PMHx including hypertension, hyperlipidemia, asthma, and COPD who was recently discharged from [facility] after undergoing cervical decompression and fusion of C3 to C7 by Dr. Martin. Upon discharge home he noticed that he had neck swelling, difficulty swallowing solids and liquids. He returned to the hospital at the direction of Dr. Martin. Upon arrival he was noted to be in atrial fibrillation with RVR. He  was started on a cardizem gtt and has converted to sinus rhythm. He was given IV decadron for neck swelling. Anticoagulation was not started due to recent bleeding. He did not feel palpitations when in atrial fibrillation. Denies prior history of atiral fibrillation.    Past Medical History:   hypertension, hyperlipidemia, asthma, and COPD    Family History: no premature CAD or sudden cardiac death  Social History: no tobacco, EtOH, no illicits  Patient History: Home Medications  acetaminophen-hydrocodone (Norco 325 mg-5 mg oral tablet) 1 tab, PO, q6hr, PRN as needed for pain, 0 Refill(s)  11/07/2020 14:09  amLODIPine (amLODIPine 10 mg oral tablet) 10 mg = 1 tab(s), PO, Tab, qAM, # 30 tab(s)  11/02/2020 15:54  atenolol (atenolol 50 mg oral tablet) 50 mg = 1 tab, PO, Tablet, BID, # 90 tab  11/02/2020 15:54  atorvastatin (atorvastatin 20 mg oral tablet) 20 mg = 1 tab(s), PO, Tab, qAM, # 30 tab(s)  11/02/2020 15:54  cyclobenzaprine (cyclobenzaprine (Flexeril) 10 mg oral tablet) 10 mg = 1 tab(s), PO, Tab, qPM, PRN for spasm, # 30 tab(s)  11/02/2020 15:54  hydrALAZINE (hydrALAZINE 50 mg oral tablet) 50 mg = 1 tab, PO, Tablet, TID, # 90 tab  11/02/2020 15:54  omeprazole (omeprazole 40 mg oral delayed release capsule) 40 mg = 1 cap, PO, EC Capsule, qPM, # 90 cap  11/02/2020 15:54    Allergies:   Allergies (2) Active Reaction  Demerol HCl unknown  penicillin G benzathine  code listed below, as well as some personal information. Please follow the directions to create your username and password.     Your access code is: [email or web address]  Expires: 2018  6:30 AM     Your access code will  in 90 days. If you need help or a new code, please contact your [facility] or call [phone] for assistance.        Care EveryWhere ID     This is your Care EveryWhere ID. This could be used by other organizations to access your [place] medical records  [id number]         Blood Pressure from Last 3 Encounters:   18 120/68   17 104/71    Weight from Last 3 Encounters:   18 132 lb 7.9 oz (60.1 kg)   17 132 lb 9.6 oz (60.1 kg)   17 132 lb (59.9 kg)              Today, you had the following     No orders found for display       Primary Care Provider Fax #    Physician No Ref-Primary [phone]       No address on file        Equal Access to Services     MUNIRA MADDOX : Hadii zev ku hadasho Soomaali, waaxda luqadaha, qaybta kaalmada adeegyada, keagan moser . So [facility] [phone].    ATENCIÓN: Si habla español, tiene a grimm disposición servicios gratuitos de asistencia lingüística. Llame al [phone].    We comply with applicable federal civil rights laws and [place] laws. We do not discriminate on the basis of race, color, national origin, age, disability, sex, sexual orientation, or gender identity.            Thank you!     Thank you for choosing [facility]  for your care. Our goal is always to provide you with excellent care. Hearing back from our patients is one way we can continue to improve our services. Please take a few minutes to complete the written survey that you may receive in the mail after your visit with us. Thank you!             Your Updated Medication List - Protect others around you: Learn how to safely use, store and throw away your medicines at www.disposemymeds.org.         unknown      Review of Systems:    CONSTITUTIONAL:  No weight loss, fever, chills, weakness or fatigue.  HEENT:  Eyes:  No visual loss, blurred vision, double vision or yellow sclerae. Ears, Nose, Throat:  No hearing loss, sneezing, congestion, runny nose or sore throat.  SKIN:  No rash or itching.  RESPIRATORY:  No shortness of breath, cough or sputum.  GASTROINTESTINAL:  No anorexia, nausea, vomiting or diarrhea. No abdominal pain or blood.  GENITOURINARY:  No burning on urination.  NEUROLOGICAL:  No headache, dizziness, syncope, paralysis, ataxia, numbness or tingling in the extremities. No change in bowel or bladder control.  MUSCULOSKELETAL:  No muscle, back pain, joint pain or stiffness.  HEMATOLOGIC:  No anemia, bleeding or bruising.  LYMPHATICS:  No enlarged nodes. No history of splenectomy.  PSYCHIATRIC:  No history of depression or anxiety.  ENDOCRINOLOGIC:  No reports of sweating, cold or heat intolerance. No polyuria or polydipsia.  ALLERGIES:  No history of asthma, hives, eczema or rhinitis.    Physical Exam:  Vital Signs (last 24 hrs)_____ Last Charted___________Minimum____________ Maximum____________  Temp    L 97.6 (NOV 08 07:55) L 97.6 (NOV 08 07:55) 98  (NOV 08 05:43)  Heart Rate   L 56 (NOV 08 07:55) L 51 (NOV 07 12:23) H 143 (NOV 07 17:50)  Resp Rate       20  (NOV 08 07:55) L 13 (NOV 07 12:23) 20  (NOV 08 07:55)  SBP    H 142 (NOV 08 07:55) 96  (NOV 07 20:52) H 159 (NOV 07 12:23)  DBP    66  (NOV 08 07:55) 60  (NOV 07 20:52) 80  (NOV 07 17:50)    CONSTITUTIONAL: No acute distress  EYES:  PERRL, EOMI, anicteric sclera  CARDIOVASCULAR:  RRR, no M/R/G, S1 S2 ausculated  RESPIRATORY: CTAB  GASTROINTESTINAL: normoactive bowel sounds, nontender/nondistended  CHEST:  No chest wall tenderness to palpitation  MUSCULOSKELETAL: no joint effusions; no asymmetry   NEURO:  Grossly normal, AAO x3  SKIN:  No rashes  PSYCH: Appropriate     Labs (Last four charted values)  WBC                  7.4 (NOV 08) 9.5    This list is accurate as of: 1/18/18  2:31 PM.  Always use your most recent med list.                   Brand Name Dispense Instructions for use Diagnosis    lithium 450 MG CR tablet    ESKALITH    60 tablet    Take 2 tablets (900 mg) by mouth At Bedtime    Schizoaffective disorder, bipolar type (H)       OLANZapine 10 MG tablet    zyPREXA    30 tablet    Take 1 tablet (10 mg) by mouth At Bedtime    Schizoaffective disorder, bipolar type (H)          (NOV 07)   Hgb                  12.9 (NOV 08) L 11.9 (NOV 07)   Hct                  39 (NOV 08) 36 (NOV 07)   Plt                  298 (NOV 08) 276 (NOV 07)   Na                   136 (NOV 08) L 134 (NOV 07)   K                    H 5.4 (NOV 08) 4.4 (NOV 07)   CO2                  22 (NOV 08) 26 (NOV 07)   Cl                   102 (NOV 08) 101 (NOV 07)   Cr                   0.91 (NOV 08) 1.10 (NOV 07)   BUN                  15 (NOV 08) 17 (NOV 07)   Glucose              H 136 (NOV 08) 99 (NOV 07)   Ca                   9.6 (NOV 08) 9.7 (NOV 07)   PT                   10.6 (NOV 07)   INR                  1.0 (NOV 07)   PTT                  24 (NOV 07)     EKG: personally reviewed and interpreted study from 11/7/2020 and reveals atrial fibrillation with RVR    Telemetry: personally reviewed and interpreted and reveals atrial fibrillation with RVR and sinus rhythm    Impression/Plan:   68 year old male with PMHx including hypertension, hyperlipidemia, asthma, and COPD who was recently discharged from [facility] after undergoing cervical decompression and fusion of C3 to C7 by Dr. Martin. Upon discharge home he noticed that he had neck swelling, difficulty swallowing solids and liquids. Readmitted and noted to be in AF with RVR.    1. AF: RVR on presentation. New diagnosis. Started on cardizem gtt and converted to sinus rhythm. Switch to PO now. No anticoagulation due to recent surgery. Continue to monitor on telemetry. [id number] is 2. Longterm AC will be determined upon discharge. Echocardiogram ordered.    2. hypertension: BP is at goal.    3. hyperlipidemia: On statin.    4. recent cervical surgery: Swelling improved with IV decadron.    Please feel free to call with questions.    Len Caballero MD  Interventional Cardiology  [facility]  [facility]

## 2020-12-19 ENCOUNTER — DOCUMENTATION ONLY (OUTPATIENT)
Dept: PSYCHIATRY | Facility: CLINIC | Age: 25
End: 2020-12-19

## 2020-12-19 NOTE — PROGRESS NOTES
NAVIGATE Discharge  A Part of the [facility] First Episode of Psychosis Program     Patient Name: Jerel Day  /Age:  1995 (25 year old)  Diagnosis(es):   Schizoaffective disorder, bipolar type    Program Discharge Date:   20  Reason for Discharge:   Transferred to an ACT team    SHIRA Camarena   NAVIGATE Director & Family Clinician

## 2021-05-25 ENCOUNTER — HOSPITAL ENCOUNTER (INPATIENT)
Facility: CLINIC | Age: 26
LOS: 19 days | Discharge: HOME | DRG: 885 | End: 2021-06-14
Attending: FAMILY MEDICINE | Admitting: PSYCHIATRY & NEUROLOGY
Payer: COMMERCIAL

## 2021-05-25 DIAGNOSIS — M54.9 BILATERAL BACK PAIN, UNSPECIFIED BACK LOCATION, UNSPECIFIED CHRONICITY: ICD-10-CM

## 2021-05-25 DIAGNOSIS — H61.23 BILATERAL IMPACTED CERUMEN: ICD-10-CM

## 2021-05-25 DIAGNOSIS — Z11.52 ENCOUNTER FOR SCREENING LABORATORY TESTING FOR COVID-19 VIRUS: ICD-10-CM

## 2021-05-25 DIAGNOSIS — F17.290 OTHER TOBACCO PRODUCT NICOTINE DEPENDENCE, UNCOMPLICATED: ICD-10-CM

## 2021-05-25 DIAGNOSIS — F20.9 SCHIZOPHRENIA, UNSPECIFIED TYPE (H): ICD-10-CM

## 2021-05-25 DIAGNOSIS — F29 PSYCHOSIS, UNSPECIFIED PSYCHOSIS TYPE (H): ICD-10-CM

## 2021-05-25 DIAGNOSIS — F25.0 SCHIZOAFFECTIVE DISORDER, BIPOLAR TYPE (H): Primary | ICD-10-CM

## 2021-05-25 LAB
AMPHETAMINES UR QL SCN: NEGATIVE
BARBITURATES UR QL: NEGATIVE
BENZODIAZ UR QL: NEGATIVE
CANNABINOIDS UR QL SCN: POSITIVE
COCAINE UR QL: NEGATIVE
ETHANOL UR QL SCN: NEGATIVE
LABORATORY COMMENT REPORT: NORMAL
OPIATES UR QL SCN: NEGATIVE
SARS-COV-2 RNA RESP QL NAA+PROBE: NEGATIVE
SPECIMEN SOURCE: NORMAL

## 2021-05-25 PROCEDURE — 250N000013 HC RX MED GY IP 250 OP 250 PS 637: Performed by: FAMILY MEDICINE

## 2021-05-25 PROCEDURE — C9803 HOPD COVID-19 SPEC COLLECT: HCPCS | Performed by: FAMILY MEDICINE

## 2021-05-25 PROCEDURE — 90791 PSYCH DIAGNOSTIC EVALUATION: CPT

## 2021-05-25 PROCEDURE — 87635 SARS-COV-2 COVID-19 AMP PRB: CPT | Performed by: FAMILY MEDICINE

## 2021-05-25 PROCEDURE — 80320 DRUG SCREEN QUANTALCOHOLS: CPT | Performed by: FAMILY MEDICINE

## 2021-05-25 PROCEDURE — 80307 DRUG TEST PRSMV CHEM ANLYZR: CPT | Performed by: FAMILY MEDICINE

## 2021-05-25 PROCEDURE — 99285 EMERGENCY DEPT VISIT HI MDM: CPT | Mod: 25 | Performed by: FAMILY MEDICINE

## 2021-05-25 PROCEDURE — 99284 EMERGENCY DEPT VISIT MOD MDM: CPT | Performed by: FAMILY MEDICINE

## 2021-05-25 RX ORDER — OLANZAPINE 10 MG/1
10 TABLET, ORALLY DISINTEGRATING ORAL ONCE
Status: COMPLETED | OUTPATIENT
Start: 2021-05-25 | End: 2021-05-25

## 2021-05-25 RX ADMIN — OLANZAPINE 10 MG: 10 TABLET, ORALLY DISINTEGRATING ORAL at 21:39

## 2021-05-25 ASSESSMENT — ENCOUNTER SYMPTOMS: HALLUCINATIONS: 1

## 2021-05-25 NOTE — ED TRIAGE NOTES
Pt is having a difficult time expressing what brought him to the hospital. He states that he has had a recent breakup and is going through a lot. He states that he has an addiction to tobacco that he has recently stopped using. He admits that he has required medication that he needs to take, but does not understand why his doctor wanted him to be seen in the hospital.

## 2021-05-25 NOTE — ED NOTES
Bed: HW02  Expected date: 5/25/21  Expected time: 6:35 PM  Means of arrival:   Comments:  [facility] 24yo M schizoeffective on hold

## 2021-05-25 NOTE — ED PROVIDER NOTES
"ED Provider Note  [facility]      History     Chief Complaint   Patient presents with     Hallucinations     pt is having hallucinations (auditory and visual)     Manic Behavior     distracted conversations, weird behaviors (MH team thinks that he has not been taking his medications)     The history is provided by the patient and medical records.     Jerel Day is a 25 year old male with PMH notable for schizophrenia who presents to the ED with hallucinations and manic behavior. Patient is currently on a provisional discharge from [facility] for a civil commitment, but this is able to be revoked if needed. He is his own guardian. Patient is clearly paranoid and delusional. He is responding to internal and external stimuli. He is either making very intense eye contact or looking at things that are not present. He is extremely disorganized and disheveled. Patient reports both auditory and visual hallucinations. The voices are commanding in nature and the devil tells him to commit suicide every few days, but he is not actually suicidal. Patient met with his psychiatrist, Dr. Edward, today and collateral was obtained from him. He expressed a lot of concern for the patient as he is not being compliant with his medications because they give him \"too many toxins\". He stated that the patient is very abstract, hostile, disorganized, thought blocking, and hallucinating currently. The patient recently shaved his own head because of \"dead people and zombies\" and is intentionally drinking spoiled milk.     Past Medical History  Past Medical History:   Diagnosis Date     Anxiety      Arthritis      Depressive disorder      Gastroesophageal reflux disease      Head injury      Problem, psychiatric      No past surgical history on file.  cloZAPine (CLOZARIL) 50 MG tablet      Allergies   Allergen Reactions     Penicillins      Rash, Generalized     Family History  No family history on " file.  Social History   Social History     Tobacco Use     Smoking status: Current Every Day Smoker     Packs/day: 0.25     Years: 1.00     Pack years: 0.25     Types: Cigarettes     Smokeless tobacco: Never Used   Substance Use Topics     Alcohol use: Yes     Comment: socially     Drug use: Yes     Types: Marijuana     Comment: last used yesterday      Past medical history, past surgical history, medications, allergies, family history, and social history were reviewed with the patient. No additional pertinent items.       Review of Systems   Psychiatric/Behavioral: Positive for hallucinations.     A complete review of systems was performed with pertinent positives and negatives noted in the HPI, and all other systems negative.    Physical Exam   BP: 137/82  Pulse: 82  Temp: 97.6  F (36.4  C)  Resp: 18  SpO2: 98 %  Physical Exam  Constitutional:       General: He is not in acute distress.     Appearance: He is not diaphoretic.   HENT:      Head: Atraumatic.   Eyes:      General: No scleral icterus.     Pupils: Pupils are equal, round, and reactive to light.   Cardiovascular:      Heart sounds: Normal heart sounds.   Pulmonary:      Effort: No respiratory distress.      Breath sounds: Normal breath sounds.   Abdominal:      General: Bowel sounds are normal.      Palpations: Abdomen is soft.      Tenderness: There is no abdominal tenderness.   Musculoskeletal:         General: No tenderness.   Skin:     General: Skin is warm.      Findings: No rash.       ED Course      Procedures    The medical record was reviewed and interpreted.  Current labs reviewed and interpreted.       Results for orders placed or performed during the hospital encounter of 05/25/21   Drug abuse screen 6 urine (tox)     Status: Abnormal   Result Value Ref Range    Amphetamine Qual Urine Negative NEG^Negative    Barbiturates Qual Urine Negative NEG^Negative    Benzodiazepine Qual Urine Negative NEG^Negative    Cannabinoids Qual Urine Positive  (A) NEG^Negative    Cocaine Qual Urine Negative NEG^Negative    Ethanol Qual Urine Negative NEG^Negative    Opiates Qualitative Urine Negative NEG^Negative   Asymptomatic SARS-CoV-2 COVID-19 Virus (Coronavirus) by PCR     Status: None    Specimen: Nasopharyngeal   Result Value Ref Range    SARS-CoV-2 Virus Specimen Source Nasopharyngeal     SARS-CoV-2 PCR Result NEGATIVE     SARS-CoV-2 PCR Comment (Note)      Medications   OLANZapine zydis (zyPREXA) ODT tab 10 mg (10 mg Oral Given 5/25/21 2139)        Assessments & Plan (with Medical Decision Making)       I have reviewed the nursing notes. I have reviewed the findings, diagnosis, plan and need for follow up with the patient.    Patient with history of previous diagnosis of schizoaffective disorder now presenting with increased acute psychosis actively hallucinating not able to care for himself patient was placed on 72-hour hold and will be evaluated for possible revocation of his stay of commitment.    Final diagnoses:   Schizoaffective disorder, bipolar type (H)   Psychosis, unspecified psychosis type (H)   I, Silvia Godinez, am serving as a trained medical scribe to document services personally performed by Ruddy Pardo MD, based on the provider's statements to me.     I, Ruddy Pardo MD, was physically present and have reviewed and verified the accuracy of this note documented by Silvia Godinez.      --  Ruddy Pardo MD  [facility] EMERGENCY DEPARTMENT  5/25/2021     Ruddy Pardo MD  05/25/21 2563

## 2021-05-26 PROBLEM — F29 PSYCHOSIS (H): Status: ACTIVE | Noted: 2019-08-14

## 2021-05-26 LAB — INTERPRETATION ECG - MUSE: NORMAL

## 2021-05-26 PROCEDURE — 93010 ELECTROCARDIOGRAM REPORT: CPT | Performed by: INTERNAL MEDICINE

## 2021-05-26 PROCEDURE — 250N000013 HC RX MED GY IP 250 OP 250 PS 637: Performed by: STUDENT IN AN ORGANIZED HEALTH CARE EDUCATION/TRAINING PROGRAM

## 2021-05-26 PROCEDURE — 124N000002 HC R&B MH UMMC

## 2021-05-26 PROCEDURE — H2032 ACTIVITY THERAPY, PER 15 MIN: HCPCS

## 2021-05-26 PROCEDURE — 99223 1ST HOSP IP/OBS HIGH 75: CPT | Mod: AI | Performed by: PSYCHIATRY & NEUROLOGY

## 2021-05-26 PROCEDURE — 93005 ELECTROCARDIOGRAM TRACING: CPT

## 2021-05-26 RX ORDER — OLANZAPINE 10 MG/1
10 TABLET ORAL AT BEDTIME
Status: DISCONTINUED | OUTPATIENT
Start: 2021-05-26 | End: 2021-06-03

## 2021-05-26 RX ORDER — AMOXICILLIN 250 MG
1 CAPSULE ORAL 2 TIMES DAILY PRN
Status: DISCONTINUED | OUTPATIENT
Start: 2021-05-26 | End: 2021-06-14 | Stop reason: HOSPADM

## 2021-05-26 RX ORDER — OLANZAPINE 10 MG/2ML
10 INJECTION, POWDER, FOR SOLUTION INTRAMUSCULAR 3 TIMES DAILY PRN
Status: DISCONTINUED | OUTPATIENT
Start: 2021-05-26 | End: 2021-06-14 | Stop reason: HOSPADM

## 2021-05-26 RX ORDER — ACETAMINOPHEN 325 MG/1
650 TABLET ORAL EVERY 4 HOURS PRN
Status: DISCONTINUED | OUTPATIENT
Start: 2021-05-26 | End: 2021-06-14 | Stop reason: HOSPADM

## 2021-05-26 RX ORDER — MAGNESIUM HYDROXIDE/ALUMINUM HYDROXICE/SIMETHICONE 120; 1200; 1200 MG/30ML; MG/30ML; MG/30ML
30 SUSPENSION ORAL EVERY 4 HOURS PRN
Status: DISCONTINUED | OUTPATIENT
Start: 2021-05-26 | End: 2021-06-14 | Stop reason: HOSPADM

## 2021-05-26 RX ORDER — DIPHENHYDRAMINE HCL 50 MG
50 CAPSULE ORAL AT BEDTIME
Status: DISCONTINUED | OUTPATIENT
Start: 2021-05-26 | End: 2021-06-14 | Stop reason: HOSPADM

## 2021-05-26 RX ORDER — GABAPENTIN 100 MG/1
100 CAPSULE ORAL 3 TIMES DAILY PRN
Status: DISCONTINUED | OUTPATIENT
Start: 2021-05-26 | End: 2021-06-14 | Stop reason: HOSPADM

## 2021-05-26 RX ORDER — LANOLIN ALCOHOL/MO/W.PET/CERES
3 CREAM (GRAM) TOPICAL
Status: DISCONTINUED | OUTPATIENT
Start: 2021-05-26 | End: 2021-06-14 | Stop reason: HOSPADM

## 2021-05-26 RX ORDER — OLANZAPINE 10 MG/1
10 TABLET ORAL 3 TIMES DAILY PRN
Status: DISCONTINUED | OUTPATIENT
Start: 2021-05-26 | End: 2021-06-14 | Stop reason: HOSPADM

## 2021-05-26 RX ORDER — PALIPERIDONE 3 MG/1
3 TABLET, EXTENDED RELEASE ORAL AT BEDTIME
Status: ON HOLD | COMMUNITY
End: 2021-06-11

## 2021-05-26 RX ORDER — DIPHENHYDRAMINE HCL 50 MG
50 CAPSULE ORAL AT BEDTIME
Status: ON HOLD | COMMUNITY
End: 2021-09-09

## 2021-05-26 RX ORDER — NICOTINE 21 MG/24HR
1 PATCH, TRANSDERMAL 24 HOURS TRANSDERMAL DAILY PRN
Status: DISCONTINUED | OUTPATIENT
Start: 2021-05-26 | End: 2021-06-09

## 2021-05-26 RX ORDER — GABAPENTIN 100 MG/1
100 CAPSULE ORAL 3 TIMES DAILY PRN
Status: ON HOLD | COMMUNITY
End: 2021-06-14

## 2021-05-26 RX ADMIN — OLANZAPINE 10 MG: 10 TABLET, FILM COATED ORAL at 21:27

## 2021-05-26 RX ADMIN — DIPHENHYDRAMINE HYDROCHLORIDE 50 MG: 50 CAPSULE ORAL at 21:27

## 2021-05-26 ASSESSMENT — MIFFLIN-ST. JEOR: SCORE: 1709.31

## 2021-05-26 ASSESSMENT — ACTIVITIES OF DAILY LIVING (ADL)
HYGIENE/GROOMING: INDEPENDENT
ORAL_HYGIENE: INDEPENDENT
LAUNDRY: WITH SUPERVISION
HYGIENE/GROOMING: INDEPENDENT
DRESS: INDEPENDENT
DRESS: INDEPENDENT
ORAL_HYGIENE: INDEPENDENT

## 2021-05-26 NOTE — PLAN OF CARE
"Pt arrived to ED after pts psychiatrist called EMS because pt stopped taking his medications. Pt admitted to unit from ED @0000 with hx of schizophrenia and currently presenting with manic behaviors, delusions, and hallucinations including the devil telling him to commit suicide but reports not actively being suicidal per ED. Pt has hx suicide attempt by hanging in July 2020. Pt on 72 hour hold. Pt currently on a provisional discharge from [facility] for a civil commitment. Pt received 10mg oral Zyprexa in the ED. Covid negative 5/25/21. Utox pos for cannabinoids. Only medical hx is GERD.   SI and SIB precautions.    Pt arrived to unit and was initially uncooperative with the search. Pt said he has been \"violated enough already\" referring to his experience in the ED. It took persistent convincing to get pt to show us what was in his pockets, then he threw a tin card case out of his pocket and onto the floor. Pt did not want to exchange his pants with strings with scrubs, then he climbed on the exam table and refused to come out of the exam room. All removable medical equipment was taken out of the room to maintain safety. Pt presented as paranoid, manic, and aggressive. Pt yelled at all staff attempting to complete admission, name calling, swearing, and saying \"what, do you want me to flash you all?\" Pt eventually cooperated and changed into scrub pants and walked to his room and he refused all vitals and admission questions. Search completed. Pt given admission folder with bill of rights. Pt went to sleep without saying another word.            Problem: Adult Inpatient Plan of Care  Goal: Plan of Care Review  Recent Flowsheet Documentation  Taken 5/26/2021 0100 by Arminda Solis RN  Plan of Care Reviewed With: patient     Problem: Behavioral Health Plan of Care  Goal: Plan of Care Review  Recent Flowsheet Documentation  Taken 5/26/2021 0100 by Arminda Solis RN  Plan of Care Reviewed With: patient  Patient " Agreement with Plan of Care: refuses to participate

## 2021-05-26 NOTE — PLAN OF CARE
Assessment/Intervention/Current Symptoms and Care Coordination  - chart review  - team meeting  - team rounds/pt interview   See initial psychosocial note and H&P for details on patient interview and current symptoms  - post team rounds team meeting / discussion  - Initial Psychosocial Assessment completed  - Assessment of strengths and vulnerabilities completed for initial plan of care.  - Behavioral Team Discussion note completed for initial plan of care.  - care coordination with ACT team - see below legal section of this note         Discharge Plan or Goal  asessement ongoing for Level of Care    Will continue with ACT team at discharge       Barriers to Discharge   Acute psychosis -       Referral Status  None       Legal Status  72 hour hold     Request to revoke provisional discharge agreement is being filed with [facility] by the ACT team  When Ex Parte Emergency Return to Facility / (order revoking Providional Discharge) is received on unit providers will be notified to change legal order to committed      This writer did communicate with ACT team to obtain copies of most recent Committment/ Recommitment Order -   Copy of Order for Recommitment Obtained and placed for H.I.M scan into Media  Is committed  (MI) to Commissioner of Human Services and the head of the [facility] until November 24, 2021    No Clark Order in place at this time - this writer clarified with the ACT team - they confirm there is not a Clark Order currently.

## 2021-05-26 NOTE — ED NOTES
Armando Tinsley   [facility] Assertive Community Treatment (ACT) Team  [address]  [place] [address]  Telephone: [phone]  Fax: [phone]
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
## 2021-05-26 NOTE — SAFE
"Jerel Day  May 25, 2021    Pt. presents for acute symptoms of psychosis including increased delusions, paranoia, and command auditory hallucinations. Pt. appears disorganized and disoriented. Pt. referred for inpatient mental health admission. Pt. will be placed on a 72-hour hold until  can revoke his civil commitment provisional discharge. Pt.'s psychiatrist Dr. Harrison Salguero MD ([phone]) has reached out to  Roberts Laureano ([phone]) to request revocation.       Current Suicidal Ideation/Self-Injurious Concerns/Methods: Other auditory hallucinations \"the devil commands me to commit suicide\".    Inappropriate Sexual Behavior: No    Aggression/Homicidal Ideation: Impaired Self-Control Pt. was asked about homicidal ideation, to which he replies \"[internal stimuli] it sometimes asks me monstrously\".      For additional details see full DEC assessment.       Claudia Mercer, LGSW      "

## 2021-05-26 NOTE — PROGRESS NOTES
05/26/21 0046   Patient Belongings   Did you bring any home meds/supplements to the hospital?  No   Patient Belongings locker   Patient Belongings Put in Hospital Secure Location (Security or Locker, etc.) cash/credit card;cell phone/electronics;plastic bag   Belongings Search Yes   Clothing Search Yes   Second Staff Tavon GUERRA   Comment plastic bag c cell ph., book,shoes,and deck case/ visa and $45.00 to safe   A               Admission:  I am responsible for any personal items that are not sent to the safe or pharmacy.  [facility] is not responsible for loss, theft or damage of any property in my possession.    Signature:  _________________________________ Date: _______  Time: _____                                              Staff Signature:  ____________________________ Date: ________  Time: _____      2nd Staff person, if patient is unable/unwilling to sign:    Signature: ________________________________ Date: ________  Time: _____     Discharge:  [facility] has returned all of my personal belongings:    Signature: _________________________________ Date: ________  Time: _____                                          Staff Signature:  ____________________________ Date: ________  Time: _____

## 2021-05-26 NOTE — PHARMACY-ADMISSION MEDICATION HISTORY
Admission Medication History Completed by Pharmacy    See [facility] Admission Navigator for allergy information, preferred outpatient pharmacy, prior to admission medications and immunization status.     Medication History Sources:     RN from [facility] ACT team ([phone])    Changes made to PTA medication list (reason):    Added: gabapentin, Invega, Invega Sustenna, Banophen    Deleted: Clozapine    Changed: None    Additional Information:    RN from [facility] ACT team verified Jerel's home medications.    RN from [facility] ACT team last administered Invega Sustenna to Jerel on 05/17/2021.    Dispense report did not show Jerel filling Invega 3 mg tablets and [facility] ([phone]) did not have any active or discontinued orders for Invega 3 mg tablets.    Prior to Admission medications    Medication Sig Last Dose Taking? Auth Provider   diphenhydrAMINE (BENADRYL) 50 MG capsule Take 50 mg by mouth At Bedtime   Yes Unknown, Entered By History   gabapentin (NEURONTIN) 100 MG capsule Take 100 mg by mouth 3 times daily as needed (agitation)  Yes Unknown, Entered By History   paliperidone (INVEGA SUSTENNA) 234 MG/1.5ML ALDO Inject 234 mg into the muscle every 28 days 5/17/2021 Yes Unknown, Entered By History   paliperidone ER (INVEGA) 3 MG 24 hr tablet Take 3 mg by mouth At Bedtime  Yes Unknown, Entered By History       Date completed: 05/26/21    Medication history completed by: Jennifer Soto, PharmD

## 2021-05-26 NOTE — H&P
"    ----------------------------------------------------------------------------------------------------------  [facility]  History and Physical      Jerel Day MRN# [id number]   Age: 25 year old YOB: 1995   Date of Admission:  5/25/2021  (information obtained from patient interview and chart review)    Contacts:   Primary Outpatient Psychiatrist: Dr Harrison Salguero MD; [phone]  Primary Physician: Dr Vitaly Godoy MD   Therapist: Through [facility]  : Armando Tins[facility] ACT Team; [phone]   Probation/: none *  Family Members:   *Patient is under a provisional discharge from civil commitment through [facility].  Patient is his own legal guardian.     HPI:    Chief Complaint: \"I'm going through a lot of tormet in my own life. I'm trying to move through my [employer] and [employer] [place] dance.\"    -------------------------------------------------------------    History of Present Illness:  Jerel Day is a 25 year old male previously diagnosed with schizophrenia who presented on 05/26/2021 with psychotic symptoms, disorganized behavior and thought in the context of inconsistent medication adherence as well as AH telling him to kill himself.     Collateral from DEC report, May 26, 2021:   Patient was previously stable on his Haldol and Invega but stopped taking them following medication adjustments recently and has been decompensating.  Yesterday he saw his psychiatrist, and his psychiatrist noted that he had thought blocking, disorganized behavior, hostility and abstract thought.  During that appointment, I will noted that \"the voices make me angry.\"  Jerel says he does not take his medications because \"it has too much toxin  I get crushed by it.\"  Patient states that he instead prefers to take ashwagandha and ibuprofen.  He was agitated yesterday after being brought to the emergency department, stating that " "his psychiatrist brought him in because \"I have schizophrenia  Did not give me any good reasons  Took me out of my home likely kidnapper or robber.\"  He was responding to internal stimuli on exam, had tangential fleeting thoughts, had thought blocking with long pauses between responses to questions and required redirection constantly.  He appeared disheveled and had shaved patches of hair from his head earlier that day, because \"it felt too heavy and needed to lose the lash in order to turn bright.\"  He also said that \"my voice sounds husky because I am using my broke his brain.\"  He says that he hears auditory hallucinations and that they have been calling him evil, a psychopath, telling him to kill himself, and telling him that his providers are trying to murder him.  he endorses that sometimes the voices sound like birds animals and plants and people.  On admission he denied SI, HI, endorsed substance use including vaping nicotine.  Patient's urine toxicology was positive for cannabis in the emergency department.    He was medically cleared for admission to inpatient psychiatric unit. The risks, benefits, alternatives, and side effects of treatments including no treatment have been discussed and are understood by the patient and other caregivers.    Patient Interview from 5/26/21  Jerel Day was interviewed in his room. \"I'm going through a lot of tormet in my own life. I'm trying to move through my [employer] and [employer] dance. I've been on all the meds. I've slept for the first time in four days. I'm getting some good descriptive nature. I'm trying to explain my brain through science and math.\" When asked about what medications he is taking he said \"sometimes I don't take them to be honest.\"WHen asked about why he doesn't want to take the medications. \"it makes me feel sticky and I can't formulate my moves to work in my favor. Rehearsing bad thoughts into better physical movements.... ah I can't even " "right now... maybe through my education... my brain.\" Pt said he \"hated\" clozapine. \"I was getting scorched by it.... it wasn't pleasant. Honestly if I am on clozapine the suicidal ideation is 5 fold. Maybe 4 fold. Actually 6 fold. Too high for my threshold. A lot of pressure build up on my ear. It relates to how i'm studying. If it feels like my phone is talking to me.\" I felt like my medulla it's just like oblongata. I probably had medulla oblongata for a little bit but then it switched my brain just backlogging stuff. The things can say threats, honestly I hear other people sometimes. My fear factor stuff is up and bounce off my amygdala a little but because I do get a little paranoid about stuff. I'm not having self harm feelings. Sometimes I have trouble swallowing. I think my thyroid is bypassed. I do use nicotine and smoke sometimes. I prefer not to be asked if I have self harm thoughts. I would rather be approached by parental guidance. I think my doctor macked me.... I was using some other approach... I felt the stuff around rather than directly in... you have to infer what it means and I can't tell you. I was actually wondering if I can double dose.... I had a really good sleep last night. I can hear the soul of the girl i'm hitting on. She's kind of rejecting me. Sometimes I get pops in testosterone. I get pokey pokey sometimes. I do have issues with libido. I would chronically masturbate. I would get a lot out of it everyday. Too much. I'm trying to quit it. I have had intercourse with judge men. I consider myself bisexual. I would prefer a girlfriend. My self love.... I'm not trying to do the extraction anymore.\"    Review Of Systems  Skin: +scaling around scalp   Eyes: negative, negative for redness   Ears/Nose/Throat: + dry mouth   Respiratory: No shortness of breath, dyspnea on exertion, cough, or hemoptysis  Cardiovascular: negative  Gastrointestinal: no diarrhea or constipation. " +nausea  Genitourinary: negative   Musculoskeletal: +muscle pain/cramps   Neurologic: +dizziness   Psychiatric: positive for negative and hallucinations  Hematologic/Lymphatic/Immunologic: negative  Endocrine: negative    -------------------------------------------------------------    Psychiatric ROS:  Depression: overwhelmed  Meg/Hypomania:  none  Anxiety: None  Panic Attack:  none  Psychosis: delusions and auditory hallucinations with commands [details in Interim History]   Trauma Related: none  Personality Symptoms: none  Obsessive Compulsive Disorder: negative   DMDD: None  Eating Disorders: negative  Oppositional Defiant Disorder/ conduct: none  ADHD: No symptoms  LD: No previously diagnosed or signs of symptoms of learning disorder reported   ASD: none  RAD: none  Suicidal Ideation: Denies active SI, however, auditory hallucinations have been telling him to die from suicide every day for the last few days  Homicidal Ideation: Denies       Medical ROS: A complete medical review of systems was preformed and is negative other than noted in the HPI     Psychiatric History:   Prior diagnoses: Schizophrenia  Prior Hospitalizations: 10/29/2020 -11/17/2020  Suicide attempts: Yes, attempt by hanging less than 6 months ago; July 2020  Self-injurious behavior: Denies  Violence: denies   ECT/TMS: none   Past medications: haloperidol and paliperidone      Substance Use History:   Nicotine: denies  Alcohol: uses vape daily for nicotine          Cannabis: denies, UDS positive for cannabinoids   Denies current or past addiction to stimulants, opiates, benzodiazepines, hallucinogens, or other elicit substances.   Prior CD treatments: none      Social History:   Early History: declines to answer   Educational History: noncontributory   Occupation: student, has [employer] email address   Abuse/Trauma: denies  Legal: civil commitment    : n/a  Guns: denies      Medical History:     Past Medical History:   Diagnosis Date      "Anxiety      Arthritis      Depressive disorder      Gastroesophageal reflux disease      Head injury      Problem, psychiatric       Surgical History:   No past surgical history on file.   No History of seizures or head trauma.   Family History:   Patient's maternal great aunt had schizophrenia, maternal side with depression and anxiety, paternal side with anxiety     Allergies:     Allergies   Allergen Reactions     Penicillins      Rash, Generalized      Medications:     Prior to Admission Medications   Prescriptions Last Dose Informant Patient Reported? Taking?   diphenhydrAMINE (BENADRYL) 50 MG capsule   Yes Yes   Sig: Take 50 mg by mouth   paliperidone (INVEGA SUSTENNA) 234 MG/1.5ML ALDO   Yes Yes   Sig: Inject 234 mg into the muscle every 28 days      Facility-Administered Medications: None      No current outpatient medications on file.        Data (Labs/Imaging):   Data   Recent Results (from the past 24 hour(s))   Drug abuse screen 6 urine (tox)    Collection Time: 05/25/21  8:15 PM   Result Value Ref Range    Amphetamine Qual Urine Negative NEG^Negative    Barbiturates Qual Urine Negative NEG^Negative    Benzodiazepine Qual Urine Negative NEG^Negative    Cannabinoids Qual Urine Positive (A) NEG^Negative    Cocaine Qual Urine Negative NEG^Negative    Ethanol Qual Urine Negative NEG^Negative    Opiates Qualitative Urine Negative NEG^Negative   Asymptomatic SARS-CoV-2 COVID-19 Virus (Coronavirus) by PCR    Collection Time: 05/25/21  9:29 PM    Specimen: Nasopharyngeal   Result Value Ref Range    SARS-CoV-2 Virus Specimen Source Nasopharyngeal     SARS-CoV-2 PCR Result NEGATIVE     SARS-CoV-2 PCR Comment (Note)      Pending Results      Physical & Psychiatric Examinations:   /82   Pulse 82   Temp 97.6  F (36.4  C) (Oral)   Resp 18   Ht 1.727 m (5' 8\")   Wt 74.5 kg (164 lb 3.2 oz)   SpO2 98%   BMI 24.97 kg/m    See ED assessment note by ED physician on 05/26/2021  Appearance:  awake, alert, " "dressed in hospital scrubs and appeared as age stated  Muscle Strength and Tone: normal  Gait and Station: Normal  Behavior (Psychomotor):  no evidence of tardive dyskinesia, dystonia, or tics  Eye Contact:  good  Speech:  clear, coherent and normal prosody  Mood:  \"good\"  Affect:  appropriate and in normal range and reactive  Attitude:  cooperative  Thought Process:  illogical and tangental  Thought Content:  no evidence of suicidal ideation or homicidal ideation, auditory hallucinations present and visual hallucinations present  Associations:  loosening of associations present  Insight:  poor; \"believes the medications 'scorch' his brain and no demonstration of his understanding of his current psychosis\"   Judgment:  limited  Oriented to:  time, person, and place  Attention Span and Concentration:  fair  Recent and Remote Memory:  intact  Language: Fluent in English with appropriate syntax and vocabulary.  Fund of Knowledge: appropriate     Assessment & Plan                 Jerel Day is a 25 year old single White male previously diagnosed with schitzophrenia who presents with visual and auditory hallucinations in the context of medication non-adherence. Current psychosocial stressors include chronic mental health issues which he has been coping with by using substances.  Patient's support system includes family and peers.  Substance use does appear to be playing a contributing role in the patient's presentation, namely his cannabis use.  Biological contributions to mental health presentation include genetic loading from family history of psychosis and concurrent substance use.                 The patient's presentation is consistent with previously diagnosed schitzophrenia. Given recent substance use, substance induced psychosis could also be contributing to his current presentation.       Safety Assessment: Risk factors for self-harm: previous suicide attempt, recent psych inpt , recent symptom worsening " and hallucinations.  Mitigating factors: no plan or intent, h/o seeking help  and stable housing.     Given that he currently has psychosis, patient warrants inpatient psychiatric hospitalization to maintain his safety    -------------------------------------------------------------    PSYCHIATRIC DIAGNOSES:   # schizophrenia   - Admit to station 22 with Attending Physician Vitaly Godoy MD and will be treated in the therapeutic milieu with appropriate individual and group therapies.  - Medications:       - Continue pta paliperidone sustenna 234 mg [id number] IM and benadryl 50 mg at bedtime       - Hold pta p.o. paliperidone invega       - Start olanzapine 10 mg p.o. at bedtime for psychosis and agitation       - PRN medications: acetaminophen, alum & mag hydroxide-simethicone, melatonin, nicotine, OLANZapine **OR** OLANZapine, senna-docusate     OTHER MEDICAL DIAGNOSES:     # nicotine dependence   - nicotine lozenge, patch ordered     CONSULTS: None    LABS: pending      -------------------------------------------------------------    Legal Status: 72-h Hold signed on 5/25/21 at 1058  Disposition: TBD, pending clinical stabilization, medication optimization, and formulation of a safe discharge plan.   Behavioral Orders   Procedures     Code 1 - Restrict to Unit     Routine Programming     As clinically indicated     Self Injury Precaution     Status 15     Every 15 minutes.     Suicide precautions     Patients on Suicide Precautions should have a Combination Diet ordered that includes a Diet selection(s) AND a Behavioral Tray selection for Safe Tray - with utensils, or Safe Tray - NO utensils          Patient seen and discussed with my attending physician, Dr. Vitaly Godoy who is in agreement with my assessment and plan.    -------------------------------------------------------------  -  Cindi Hubbard, MS3     Cleve Ulloa, DO  Psychiatry  Resident    -------------------------------------------------------------  *This note was written with the assistance of an approved dictation software; please excuse any typos that might have been missed.     Attending Attestation:    I, Vitaly Godoy , have personally performed an examination of this patient and I have reviewed the resident's documentation.  I have edited the note to reflect all relevant changes.  I have discussed this patient with the house staff on 5/26/2021.  I agree with resident findings and plan in today's note and yesterdays resident H&P.  I have reviewed all vitals and laboratory findings.      Vitaly Antonio MD on 5/26/2021 at 9:00 PM

## 2021-05-26 NOTE — PHARMACY
Pharmacy consult note to assess conditions to be met prior to continuing Invega Sustenna while hospitalized.     The following conditions must be met to dispense Invega Sustenna per hospital policy:   1. Patient has been on Invega Sustenna at least 3 weeks prior to admission.   2. Patient has received both initiation doses.   3. Dose will be administered one week after the next scheduled outpatient maintenance dose is due.   4. The patient must still be hospitalized on the administration date determined by the pharmacy consult (one week after the next scheduled outpatient dose).   5. Order must include  dispense as written.    6. If six months or more have passed since the last maintenance dose, do not restart Invega Sustenna in the hospital, defer to outpatient treatment.     The criteria listed above allow for continuation of Invega Sustenna while this patient is hospitalized. The last dose of Invega Sustenna 234 mg was administered on 05/17/2021 prior to admission (per RN from [facility] ACT team). The next dose is due on 06/14/2021 , and will be scheduled for 06/21/21 (one week after the next scheduled outpatient dose) as listed above in condition #3.     Please contact unit decentralized pharmacist if you have questions or concerns.    Jennifer Soot, PharmD  Behavioral Health Pharmacist  [facility] Ascom: [phone] or [phone]  Emergency Room Ascom: *[phone]

## 2021-05-26 NOTE — ED NOTES
Mother did call asking about patient, patient at this time does not want information shared to his mother until after he has seen the doctor. He did state that she can come to the hospital and she is allowed to know that he is here at this time.

## 2021-05-26 NOTE — PLAN OF CARE
BEHAVIORAL TEAM DISCUSSION    Participants:   Vitaly Godoy MD Attending Psychiatrist  Cleve Ulloa DO, PGY1  Alison Jones, MSW, [facility] Clinical Treatment Coordinator  FRANNIE Drake, MS3  Cindi Hubbard, MS3   Progress: initial team meeting  Anticipated length of stay: assessment is ongoing  Continued Stay Criteria/Rationale: admitted due to acute psychosis  Medical/Physical: non current medical concerns to address  Precautions:   Behavioral Orders   Procedures    Code 1 - Restrict to Unit    Routine Programming     As clinically indicated    Self Injury Precaution    Status 15     Every 15 minutes.    Suicide precautions     Patients on Suicide Precautions should have a Combination Diet ordered that includes a Diet selection(s) AND a Behavioral Tray selection for Safe Tray - with utensils, or Safe Tray - NO utensils       Plan: Psychiatric assessment. Medication management. Therapeutic Milieu. Individual care planning and after care planning. Patient to participate in unit groups and activities. Individual and group support on unit.   patient is on a 72 hour hold - revocation of provisional discharge in process - see [facility] daily note for further details. Coordination and care planning to be done in collaboration with patient's ACT team.   Rationale for change in precautions or plan: per initial assessment.

## 2021-05-27 LAB
ALBUMIN SERPL-MCNC: 4.1 G/DL (ref 3.4–5)
ALP SERPL-CCNC: 78 U/L (ref 40–150)
ALT SERPL W P-5'-P-CCNC: 28 U/L (ref 0–70)
ANION GAP SERPL CALCULATED.3IONS-SCNC: 8 MMOL/L (ref 3–14)
AST SERPL W P-5'-P-CCNC: 13 U/L (ref 0–45)
BASOPHILS # BLD AUTO: 0.1 10E9/L (ref 0–0.2)
BASOPHILS NFR BLD AUTO: 1.3 %
BILIRUB SERPL-MCNC: 0.9 MG/DL (ref 0.2–1.3)
BUN SERPL-MCNC: 9 MG/DL (ref 7–30)
CALCIUM SERPL-MCNC: 9.4 MG/DL (ref 8.5–10.1)
CHLORIDE SERPL-SCNC: 103 MMOL/L (ref 94–109)
CHOLEST SERPL-MCNC: 131 MG/DL
CO2 SERPL-SCNC: 29 MMOL/L (ref 20–32)
CREAT SERPL-MCNC: 0.86 MG/DL (ref 0.66–1.25)
DIFFERENTIAL METHOD BLD: NORMAL
EOSINOPHIL # BLD AUTO: 0.4 10E9/L (ref 0–0.7)
EOSINOPHIL NFR BLD AUTO: 4.7 %
ERYTHROCYTE [DISTWIDTH] IN BLOOD BY AUTOMATED COUNT: 11.9 % (ref 10–15)
FOLATE SERPL-MCNC: 7.7 NG/ML
GFR SERPL CREATININE-BSD FRML MDRD: >90 ML/MIN/{1.73_M2}
GLUCOSE SERPL-MCNC: 92 MG/DL (ref 70–99)
HCT VFR BLD AUTO: 47.8 % (ref 40–53)
HDLC SERPL-MCNC: 37 MG/DL
HGB BLD-MCNC: 15.8 G/DL (ref 13.3–17.7)
IMM GRANULOCYTES # BLD: 0.1 10E9/L (ref 0–0.4)
IMM GRANULOCYTES NFR BLD: 0.8 %
LDLC SERPL CALC-MCNC: 70 MG/DL
LYMPHOCYTES # BLD AUTO: 2.5 10E9/L (ref 0.8–5.3)
LYMPHOCYTES NFR BLD AUTO: 29.5 %
MAGNESIUM SERPL-MCNC: 2.3 MG/DL (ref 1.6–2.3)
MCH RBC QN AUTO: 30.4 PG (ref 26.5–33)
MCHC RBC AUTO-ENTMCNC: 33.1 G/DL (ref 31.5–36.5)
MCV RBC AUTO: 92 FL (ref 78–100)
MONOCYTES # BLD AUTO: 0.7 10E9/L (ref 0–1.3)
MONOCYTES NFR BLD AUTO: 8 %
NEUTROPHILS # BLD AUTO: 4.7 10E9/L (ref 1.6–8.3)
NEUTROPHILS NFR BLD AUTO: 55.7 %
NONHDLC SERPL-MCNC: 94 MG/DL
NRBC # BLD AUTO: 0 10*3/UL
NRBC BLD AUTO-RTO: 0 /100
PHOSPHATE SERPL-MCNC: 3.2 MG/DL (ref 2.5–4.5)
PLATELET # BLD AUTO: 296 10E9/L (ref 150–450)
POTASSIUM SERPL-SCNC: 4.4 MMOL/L (ref 3.4–5.3)
PROT SERPL-MCNC: 7.9 G/DL (ref 6.8–8.8)
RBC # BLD AUTO: 5.19 10E12/L (ref 4.4–5.9)
SODIUM SERPL-SCNC: 140 MMOL/L (ref 133–144)
TRIGL SERPL-MCNC: 120 MG/DL
TSH SERPL DL<=0.005 MIU/L-ACNC: 0.65 MU/L (ref 0.4–4)
VIT B12 SERPL-MCNC: 214 PG/ML (ref 193–986)
WBC # BLD AUTO: 8.4 10E9/L (ref 4–11)

## 2021-05-27 PROCEDURE — 83735 ASSAY OF MAGNESIUM: CPT | Performed by: STUDENT IN AN ORGANIZED HEALTH CARE EDUCATION/TRAINING PROGRAM

## 2021-05-27 PROCEDURE — 82746 ASSAY OF FOLIC ACID SERUM: CPT | Performed by: STUDENT IN AN ORGANIZED HEALTH CARE EDUCATION/TRAINING PROGRAM

## 2021-05-27 PROCEDURE — 250N000013 HC RX MED GY IP 250 OP 250 PS 637: Performed by: STUDENT IN AN ORGANIZED HEALTH CARE EDUCATION/TRAINING PROGRAM

## 2021-05-27 PROCEDURE — 36415 COLL VENOUS BLD VENIPUNCTURE: CPT | Performed by: STUDENT IN AN ORGANIZED HEALTH CARE EDUCATION/TRAINING PROGRAM

## 2021-05-27 PROCEDURE — 124N000002 HC R&B MH UMMC

## 2021-05-27 PROCEDURE — 80053 COMPREHEN METABOLIC PANEL: CPT | Performed by: STUDENT IN AN ORGANIZED HEALTH CARE EDUCATION/TRAINING PROGRAM

## 2021-05-27 PROCEDURE — 82607 VITAMIN B-12: CPT | Performed by: STUDENT IN AN ORGANIZED HEALTH CARE EDUCATION/TRAINING PROGRAM

## 2021-05-27 PROCEDURE — 84443 ASSAY THYROID STIM HORMONE: CPT | Performed by: STUDENT IN AN ORGANIZED HEALTH CARE EDUCATION/TRAINING PROGRAM

## 2021-05-27 PROCEDURE — 99232 SBSQ HOSP IP/OBS MODERATE 35: CPT | Mod: GC | Performed by: PSYCHIATRY & NEUROLOGY

## 2021-05-27 PROCEDURE — 84100 ASSAY OF PHOSPHORUS: CPT | Performed by: STUDENT IN AN ORGANIZED HEALTH CARE EDUCATION/TRAINING PROGRAM

## 2021-05-27 PROCEDURE — 80061 LIPID PANEL: CPT | Performed by: STUDENT IN AN ORGANIZED HEALTH CARE EDUCATION/TRAINING PROGRAM

## 2021-05-27 PROCEDURE — 85025 COMPLETE CBC W/AUTO DIFF WBC: CPT | Performed by: STUDENT IN AN ORGANIZED HEALTH CARE EDUCATION/TRAINING PROGRAM

## 2021-05-27 RX ORDER — SALIVA STIMULANT COMB. NO.3
2 SPRAY, NON-AEROSOL (ML) MUCOUS MEMBRANE 4 TIMES DAILY PRN
Status: DISCONTINUED | OUTPATIENT
Start: 2021-05-27 | End: 2021-06-14 | Stop reason: HOSPADM

## 2021-05-27 RX ADMIN — DIPHENHYDRAMINE HYDROCHLORIDE 50 MG: 50 CAPSULE ORAL at 21:03

## 2021-05-27 RX ADMIN — OLANZAPINE 10 MG: 10 TABLET, FILM COATED ORAL at 21:03

## 2021-05-27 ASSESSMENT — ACTIVITIES OF DAILY LIVING (ADL)
HYGIENE/GROOMING: INDEPENDENT
DRESS: INDEPENDENT
LAUNDRY: WITH SUPERVISION
ORAL_HYGIENE: INDEPENDENT

## 2021-05-27 ASSESSMENT — MIFFLIN-ST. JEOR: SCORE: 1702.96

## 2021-05-27 NOTE — PLAN OF CARE
"  Problem: Sleep Disturbance  Goal: Adequate Sleep/Rest  Outcome: Improving  Observed to have slept for approx 7 hrs on all Q15\" rounds w/ regular non-labored respirations and no reported or observed distress. Safe, therapeutic environment maintained.   "

## 2021-05-27 NOTE — PLAN OF CARE
Problem: Adult Inpatient Plan of Care  Goal: Optimal Comfort and Wellbeing  Outcome: Improving   Pt states he is  feeling better , went t group , took a shower , ate dinner. He states he ahs his usual voices . The ones that are always there ans they don't bother him . Pt speech is tangential and rambling . Denies si , med compliant

## 2021-05-27 NOTE — PLAN OF CARE
"Pt attended 1 of 3 OT groups today. Pt participated in OT clinic with task set up, where he initiated a chosen project (painting a \"burning heart\" sea sponge), followed through with plan, and asked for support with supplies as needed. Selected several songs online to share with the group. Provided pt with OT self-assessment, however, pt declined to complete despite encouragement. Pt made bizarre statements throughout group (e.g. \"that's a dichotomy, there's surface tension on your heart as well as the water animals\") and presents with flat affect.   "

## 2021-05-27 NOTE — PROGRESS NOTES
"Pt participated in dance/movement therapy (DMT) using synchronized movements to connect with peers to support co-regulation through a body-up-cognition approach.  Verbalization within group processes revealed disorganization and dissociative responses, while physical movement allowed for greater individual organization as well a group cohesion.      Pt did recognize this therapist from a previous hospitalization however, that was the extent of his organized thinking.  He \"explained\" his behaviors in ways that did not align throughout the session.  He was pleasant and smiled often.  He appeared to enjoy the dance and asked questions to peers appearing to express authentic curiosity, though the questions led into greater confusion or abstractions that were often nonsensical.  Strong rhythmic movements were more orienting for the patient.         05/26/21 1130   Dance Movement Therapy   Type of Intervention structured groups   Hours 1     "

## 2021-05-27 NOTE — PLAN OF CARE
Assessment/Intervention/Current Symptoms and Care Coordination  - chart review  - team meeting  - post team rounds team meeting / discussion  - patient is present in the lounge throughout the day attempting to play musical instruments - first guitar and then the piano. Mostly was strumming the guitar over and over not chaging any notes, same behavior with piano - (electronic piano). Appears preoccupied as if responding to stimuli.       Collateral contact - Mesha - patient's mother - CONNOR in chart  This writer called and spoke with patient's mother at length -   She reports that prior to recent decompensation / Saturday 5/22 - Jerel was doing the best he had been doing in a long time (year(s)) - in terms of severity of overt psychotic symptoms , had also seemed to be more organized in his thoughts and behaviors and ability to communicate than had for some time.   She reports they saw him on Thursday 5/20 - he appears to be doing fairly well, there was no overt psychosis symptoms present, no odd topics in his conversation etc, he had also not cut his hair at that time. Parents went to the family cabin on Friday and they spoke with him on the phone, he was making some odd comments at that time, about decimals being off etc and still seemed 'pretty good'   On Saturday 5/22 Jerel's friend texted parents who were at the family cabin to say they were worried and Jerel wasn't doing well - was staring off into space,  On Sunday 5/23 parents came home and she says it seemed that half the time he was seemingly overtly psychotic and the other half the time okay. Symptoms included long delays in response or not responding at all verbally. On Tuesday 5/25 Jerel's friend texted them again this time saying he thought Jerel needed to go to the hospital psychosis present in his topics of conversation, was writing things in large letters about other residents in the apartment building.  Family had been in contact with the ACT team as  "well throughout this.     She said when she was at his apartment recently he had 2 weeks worth of medication packet and only one of the packs had any spot where the medication had been taken out - 2 days worth.     Says that patient lives in an apartment by himself, mom and dad are paying rent and buying / supplying groceries. Prior to COVID they would attempt to see him / stop there daily and over the past year has reduced to about 2 times a week. Says she is worried about his safety in ability to live there such as she had to throw out a lot of food as looked had gone bad. He had forgot / left the crock pot on. At one point over the winter they stopped by and it was only 50 degrees in the apartment because he hadn't shut all the windows properly and had not turned up the heat.   Said that she thinks if it were not for the COVID rules on stopping evictions his landlord may have evicted him from there. For example neighbors have complained about noise - she mentioned that sometimes he will \"play his electric guitar\" - this writer did ask further questions about his ability to play - she clarified he does not really know how to play the guitar and had only had a few lessons in his lifetime.     She mentioned concern that he should live in more supportive environment. This writer did speak briefly with her about barriers to having both ACT and waivers so group home type setting would likely not be beneficial as would lose the ACT service. However can explore if any other options.    - other collateral information obtained is now entered into the initial psychosocial assessment as patient was not able to provide accurate information yetsterday.        Discharge Plan or Goal  asessement ongoing for Level of Care    Will continue with ACT team at discharge       Barriers to Discharge   Acute psychosis -      Referral Status  None today       Legal Status  72 hour hold - expires 5/28/2021 at 2057    Request to revoke " provisional discharge agreement is being filed with [facility] by the ACT team  When [facility] Emergency Return to Facility / (order revoking Providional Discharge) is received on unit providers will be notified to change legal order to committed    5/26/2021  This writer did communicate with ACT team to obtain copies of most recent Committment/ Recommitment Order -   Copy of Order for Recommitment Obtained and placed for H.I.M scan into Media  Is committed  (MI) to Commissioner of Human Services and the head of the [facility], [place] until November 24, 2021    No Clark Order in place at this time - this writer clarified with the ACT team - they confirm there is not a Clark Order currently.

## 2021-05-27 NOTE — PLAN OF CARE
"\" I am really trying to deal with my might right now.\" Pt states that he has so many things he needs to think about and figure out. He declines a journal. Says that if he journals then he will look back and realized that \" I am not very wise.\" Easily distracted. Attends some groups. Asked to play the guitar. For half an hour he strummed the same chord. Later he played the electric piano and played the same chord for another half hour. Was irritated when staff said it was time for it to be put away. Stared off and appeared to be responding to internal stimulation. He is med compliant. Denies SI and SIB. Suzy Vivar RN    Problem: Psychotic Symptoms  Goal: Psychotic Symptoms  Description: Signs and symptoms of listed problems will be absent or manageable.  5/27/2021 1524 by Suzy Vivar RN  Outcome: No Change  Flowsheets (Taken 5/27/2021 1524)  Psychotic Symptoms Assessed: all  Psychotic Symptoms Present:   affect   thought process   mood   anxiety   insight   speech  5/27/2021 1135 by Suzy Vivar RN  Outcome: No Change  Flowsheets (Taken 5/26/2021 1119)  Psychotic Symptoms Assessed: all  Psychotic Symptoms Present:   affect   thought process   insight   speech  Note: Patient slept in this morning. He did get up and participated in groups. Affect is flat and blunted. Mood appears anxious.He is preoccupied and distracted. Speech rambles at times. He denies SI and SIB. Suzy Vivar RN            "

## 2021-05-27 NOTE — PLAN OF CARE
Problem: Psychotic Symptoms  Goal: Psychotic Symptoms  Description: Signs and symptoms of listed problems will be absent or manageable.  Outcome: No Change  Flowsheets (Taken 5/26/2021 1119)  Psychotic Symptoms Assessed: all  Psychotic Symptoms Present:   affect   thought process   insight   speech  Note: Patient slept in this morning. He did get up and participated in groups. Affect is flat and blunted. Mood appears anxious.He is preoccupied and distracted. Speech rambles at times. He denies SI and SIB. Suzy Vivar RN

## 2021-05-28 PROCEDURE — G0177 OPPS/PHP; TRAIN & EDUC SERV: HCPCS

## 2021-05-28 PROCEDURE — 99232 SBSQ HOSP IP/OBS MODERATE 35: CPT | Mod: GC | Performed by: PSYCHIATRY & NEUROLOGY

## 2021-05-28 PROCEDURE — 124N000002 HC R&B MH UMMC

## 2021-05-28 PROCEDURE — 250N000013 HC RX MED GY IP 250 OP 250 PS 637: Performed by: STUDENT IN AN ORGANIZED HEALTH CARE EDUCATION/TRAINING PROGRAM

## 2021-05-28 RX ADMIN — OLANZAPINE 10 MG: 10 TABLET, FILM COATED ORAL at 20:55

## 2021-05-28 RX ADMIN — DIPHENHYDRAMINE HYDROCHLORIDE 50 MG: 50 CAPSULE ORAL at 20:55

## 2021-05-28 ASSESSMENT — ACTIVITIES OF DAILY LIVING (ADL)
ORAL_HYGIENE: INDEPENDENT
HYGIENE/GROOMING: INDEPENDENT
DRESS: SCRUBS (BEHAVIORAL HEALTH);INDEPENDENT
LAUNDRY: WITH SUPERVISION

## 2021-05-28 NOTE — PLAN OF CARE
Problem: Sleep Disturbance  Goal: Adequate Sleep/Rest  Outcome: No Change   Pt slept 7 hours,respirations easy this night

## 2021-05-28 NOTE — PLAN OF CARE
Problem: Adult Inpatient Plan of Care  Goal: Optimal Comfort and Wellbeing  Outcome: Improving    Pt isolative and withdrawn to his room earlier in the shift but out in the milieu for supper. Watched a game on tv with peers.  Patient is now voluntary and signed paperwork. Mood calm, affect full range. Denies mental health symptoms including SI/SIB/AVH. Pt did not require/ask for prn meds but did take scheduled meds without issues. No behavioral concerns.

## 2021-05-28 NOTE — PROGRESS NOTES
Behavioral Health  Note   Behavioral Health  Spirituality Group Note     [facility]    Name: Jerel Day    YOB: 1995   MRN: [id number]    Age: 25 year old     Patient attended -led group, which included discussion of spirituality, coping with illness and building resilience.   Patient attended group for 1.0 hrs.   patient minimally participated, but was respectful to the group process.     Rober [facility]  Staff    Page [phone]

## 2021-05-28 NOTE — PROGRESS NOTES
" 05/27/21 1300   Groups   Details    Group Psychotherapy    Number of patients attending the group:  4  Group Length:  1/2 Hours     Group Therapy Type: Psychotherapy     Summary of Group / Topics Discussed:-Strengths- Written and Symbolic representation      The  Psychotherapy group goal is to promote insight to positive choice and change. Group processing is within a supportive and safe environment. Patients will process emotions using verbal group and expressive psychotherapy interventions including visual art/writing interventions.     Group interventions support patients by: self efficacy/empowerment and hope/optimism  Writing about strengths.     Modalities to reach these goals include: Process group      Subjective -patient report of mood today- \"heavy but I got a warm reception.\"     Objective/ Intervention- Goal of group and Therapeutic modality utilized- writing and symbolic drawing      Group Response- engaged     Patient Response-Pt attended group for a short time. He did seem to remember writer fro previous admissions explaining who he was and that he shaved his head when he wasn't doing mentally well and that he regretted it and felt different. Writer validated feelings but discussed it would grow back. He was restless and didn't have the attention span to stay for the exercise.     Jimenez Fernandez, SARTHAK, ATR-BC                   "

## 2021-05-28 NOTE — PLAN OF CARE
Problem: Behavioral Disturbance  Goal: Behavioral Disturbance  Description: Signs and symptoms of listed problems will be absent or manageable by discharge or transition of care.  Outcome: Improving  Flowsheets (Taken 5/28/2021 2834)  Behavioral Disturbance Present:    affect    insight     Problem: Suicidal Behavior  Goal: Suicidal Behavior is Absent or Managed  Outcome: Improving  Note: Pt denies SI/SIB    Pt isolated to his room for the majority of the shift. He appears responding to internal stimuli, but denies these symptoms. No SI/SIB. Pt is guarded but pleasant in his interactions. No request or indication for prn medications. No additional concerns at this time. Will continue to assess and offer support.

## 2021-05-28 NOTE — PLAN OF CARE
Problem: Psychotic Symptoms  Goal: Psychotic Symptoms  Description: Signs and symptoms of listed problems will be absent or manageable.  Outcome: Improving   Pt states he always hears voices , that they are not always bothersome > He was visited by his dad which he states went well. He  states he plans to talk to the doctors tomorrow , he wants them to explain why he is taking the Invega shot and oral Zyprexa . Pt william , tangential

## 2021-05-28 NOTE — PLAN OF CARE
"Spoke with his ACT team physician, Dr Danielito Salguero, who gave the following info:   - this has been his 4th paliperidone injection; does not seem to be an effective medication for his psychosis   - olanzapine long acting injectable okay to transition to; ACT team is able to administer that\  - patient did very well on Haldol and in fact began decompensating most recently when this was being downtapered after HO paliperidone was initiated. However, patient tends to experience somatic effects of his medications that are believed to be partially a result of his disorganized thought process.   - He suggests we consider clozapine, but the patient was on this in the past and felt that it was \"melting his brain\" and discontinued this and shortly afterwards attempted suicide   "

## 2021-05-28 NOTE — PROGRESS NOTES
"  ----------------------------------------------------------------------------------------------------------  [facility], [place]   Psychiatric Progress Note  Hospital Day #2     Interim History:   The patient's care was discussed with the treatment team and chart notes were reviewed.    Sleep 7 hours (05/28/21 2351)  Scheduled Medications: took all scheduled medications as prescribed   PRN medications: no psychiatric PRNs given     Staff Report:   \" I am really trying to deal with my might right now.\" Pt states that he has so many things he needs to think about and figure out. He declines a journal. Says that if he journals then he will look back and realized that \" I am not very wise.\" Easily distracted. Attends some groups. Asked to play the guitar. For half an hour he strummed the same chord. Later he played the electric piano and played the same chord for another half hour. Was irritated when staff said it was time for it to be put away. Stared off and appeared to be responding to internal stimulation. He is med compliant. Denies SI and SIB.    Patient Interview:   Jerel PALOMO Rogergrace was interviewed in the conference room. \"The Zyprexa is dampening me. Titrate titrate titrate.\" Pt asked questions related to how the medication works and how the team decides what medications to use. \"Sometimes I'm always chewing on dumb thoughts and I don't let the good ones up. I'm a little bit of a thin wire. I'm a little afraid. My vision is a little white or red.\"  We discussed benefit of increasing olanzapine to 12.5 mg and he said he would like to think about this first before agreeing to this; he agreed to revisit this idea tomorrow.      Review of systems:     Review Of Systems  Skin: negative   Eyes: negative, negative for redness  Ears/Nose/Throat: + dry mouth  Respiratory: No shortness of breath, dyspnea on exertion, cough, or hemoptysis  Cardiovascular: negative  Gastrointestinal: no diarrhea " "or constipation. + nausea  Genitourinary: negative   Musculoskeletal: +muscle pain/cramps (limited to shoulder and cervical spine)   Neurologic: - dizziness   Psychiatric: positive for negative and hallucinations  Hematologic/Lymphatic/Immunologic: negative  Endocrine: negative            Allergies:     Allergies   Allergen Reactions     Penicillins      Rash, Generalized            Psychiatric Examination:   BP (!) 142/103   Pulse 74   Temp 98.5  F (36.9  C) (Oral)   Resp 12   Ht 1.727 m (5' 8\")   Wt 73.8 kg (162 lb 12.8 oz)   SpO2 97%   BMI 24.75 kg/m    Weight is 162 lbs 12.8 oz  Body mass index is 24.75 kg/m .    MENTAL STATUS EXAM    Appearance:  normal posture, normal gait and appropriately dressed  Attitude:  cooperative and engaged  Psychomotor:  no evidence of tics, dystonia, or tardive dyskinesia  Eye Contact: appropriate  Speech:  fluent English, normal tone and normal rate  Mood: \"good\"  Affect:  congruent and appropriate, constricted   Thought Content: paranoid delusions  Thought Process: tangential, preoccupied   Sensorium: awake and endorses auditory hallucinations  Cognition: memory grossly intact  Impulse control: fair  Insight: poor  Judgment: questionable         Labs:     No results found for this or any previous visit (from the past 24 hour(s)).     Assessment    Principal Diagnosis:   # Schizophrenia     Diagnostic Impression:   Jerel Day is a 25 year old single White male previously diagnosed with schitzophrenia who was admitted with visual and auditory hallucinations in the context of medication non-adherence. Current psychosocial stressors include chronic mental health issues which he has been coping with by using substances, most recently heavy cannabis use.  Patient's support system includes family and peers, and the patient does have an ACT team. Biological contributions to mental health presentation include genetic loading from family history of psychosis and concurrent " substance use.                 The patient's presentation is consistent with previously diagnosed schitzophrenia. Given recent substance use, substance induced psychosis could also be contributing to his current presentation.     Psychiatric Hospital course:   Jerel Day was admitted to [facility] on a 72 hour hold. His PTA Paliperidone invega sustenna 234 mg HO & benadryl were continued. PTA p.o. Paliperidone Invega was held and replaced by olanzapine as the patient continuously declined the Paliperidone and endorsed having good therapeutic results from the olanzapine, namely sleep and with his disorganized thought.     Discontinued Medications (& Rationale):  Invega sustenna p.o. as patient has expressed desire to try a different antipsychotic     Medical course   The patient was medically cleared for admission to station [facility]; no medical problems arose during hospitalization.     Data:   UTox on admission positive for cannabinoids upon admission      Consults:   Pharmacy inpt consult for medication reconciliation assistance on 5/26/21.     Plan     Today's Changes:  - No new changes.      Scheduled Psych Meds:  - olanzapine 10 mg at bedtime for sleep   - Paliperidone Invega Sustenna 234 mg IM every 28 days   - diphenhydramine 50 mg at bedtime     PRN Medications:  - acetaminophen, alum & mag hydroxide-simethicone, artificial saliva, gabapentin, melatonin, nicotine, nicotine, OLANZapine **OR** OLANZapine, senna-docusate    Patient will be treated in therapeutic milieu with appropriate individual and group therapies as described.    Medical diagnoses to be addressed this admission:    # dry mouth   - artificial saliva     Legal Status:   Orders Placed This Encounter      Legal status 72 Hour Hold      Legal status Voluntary    Safety Assessment:   Behavioral Orders   Procedures     Code 1 - Restrict to Unit     Routine Programming     As clinically indicated     Self Injury Precaution     Status 15     Every 15  minutes.     Suicide precautions     Patients on Suicide Precautions should have a Combination Diet ordered that includes a Diet selection(s) AND a Behavioral Tray selection for Safe Tray - with utensils, or Safe Tray - NO utensils         Disposition: 6-9 days; Pending stabilization & development of a safe discharge plan.     _________________________________________________________________    This patient was seen and discussed with my attending physician.  -  Cleve Ulloa DO   Psychiatry Resident   _________________________________________________________________  *This note was written with the assistance of an approved dictation software; please excuse any typos that might have been missed.     Psychiatry Attending Attestation:   This patient has been seen and evaluated by me, Vitaly Godoy.  I have discussed this patient with the psychiatry resident and I agree with the findings and plan in this note.    I have reviewed today's vital signs, medications, labs and imaging.     Vitaly Antonio MD on 5/28/2021 at 6:50 PM

## 2021-05-29 PROCEDURE — 250N000013 HC RX MED GY IP 250 OP 250 PS 637: Performed by: STUDENT IN AN ORGANIZED HEALTH CARE EDUCATION/TRAINING PROGRAM

## 2021-05-29 PROCEDURE — 124N000002 HC R&B MH UMMC

## 2021-05-29 RX ADMIN — OLANZAPINE 10 MG: 10 TABLET, FILM COATED ORAL at 20:17

## 2021-05-29 RX ADMIN — GABAPENTIN 100 MG: 100 CAPSULE ORAL at 11:13

## 2021-05-29 RX ADMIN — DIPHENHYDRAMINE HYDROCHLORIDE 50 MG: 50 CAPSULE ORAL at 20:17

## 2021-05-29 NOTE — PLAN OF CARE
"  Problem: Sleep Disturbance  Goal: Adequate Sleep/Rest  Outcome: No Change   Observed to have slept well for approx 7 hrs on all Q 15 \" observations w/regular non-labored respirations and no reported or observed distress.  Safe, therapeutic environment maintained.   "

## 2021-05-29 NOTE — PROGRESS NOTES
Jerel reporting itchy rash on back. Says it is not new for him- he gets it at home. Says he uses Eucerin cream at home as well as vaseline.     - Eucerin cream ordered

## 2021-05-29 NOTE — PROGRESS NOTES
Pt had a quiet shift, was withdrawn and isolative to room for the beginning of the shift. Pt emerged for dinner and remained visible and present in the milieu for the rest of the evening. Pt watched sports and minimally socialized with peers and staff. Pt was frequently observed responding to internal stimuli, was seen laughing and smiling to himself. No immediate concerns observed or reported. Writer will continue to monitor.

## 2021-05-29 NOTE — PLAN OF CARE
"  Problem: Psychotic Symptoms  Goal: Psychotic Symptoms  Description: Signs and symptoms of listed problems will be absent or manageable.  Outcome: Improving  Flowsheets (Taken 5/29/2021 1606)  Psychotic Symptoms Assessed: all  Psychotic Symptoms Present:   insight   thought process   affect   speech   mood   anxiety  Note: Patient spending more time out and social with peers. Distracted on interaction. Delayed verbal responses. Reported to psych associate that he has \"lumber, thoracic and neck pain\". Denies pain with RN. Reports anxiety and given prn gabapentin at 1113. Has rash behind his right knee that he thinks is his eczema. Resident paged with that info. He is med compliant. Denies SI and SIB. Suzy Vivar RN      "

## 2021-05-29 NOTE — PLAN OF CARE
Problem: Behavioral Disturbance  Goal: Behavioral Disturbance  Description: Signs and symptoms of listed problems will be absent or manageable by discharge or transition of care.  Outcome: Improving  Pt was in his room when shift commenced. Visible in the milieu intermittently. Took a shower. On call here to see patient regarding rash on (R) knee, Eucerin cream ordered.  Observed reading quietly in the lounge. Pt seems tense and somewhat anxious.  States he feels better but does not elaborate further. No behavioral concerns and no prns given. Pt denies SI/SIB/AVH.

## 2021-05-30 PROCEDURE — 124N000002 HC R&B MH UMMC

## 2021-05-30 PROCEDURE — 90853 GROUP PSYCHOTHERAPY: CPT

## 2021-05-30 PROCEDURE — 250N000013 HC RX MED GY IP 250 OP 250 PS 637: Performed by: STUDENT IN AN ORGANIZED HEALTH CARE EDUCATION/TRAINING PROGRAM

## 2021-05-30 RX ADMIN — OLANZAPINE 10 MG: 10 TABLET, FILM COATED ORAL at 20:27

## 2021-05-30 RX ADMIN — GABAPENTIN 100 MG: 100 CAPSULE ORAL at 04:48

## 2021-05-30 RX ADMIN — Medication 2 SPRAY: at 05:34

## 2021-05-30 RX ADMIN — DIPHENHYDRAMINE HYDROCHLORIDE 50 MG: 50 CAPSULE ORAL at 20:27

## 2021-05-30 RX ADMIN — Medication 2 SPRAY: at 17:02

## 2021-05-30 RX ADMIN — GABAPENTIN 100 MG: 100 CAPSULE ORAL at 20:51

## 2021-05-30 ASSESSMENT — ACTIVITIES OF DAILY LIVING (ADL)
DIFFICULTY_EATING/SWALLOWING: NO
DRESS: INDEPENDENT
ORAL_HYGIENE: INDEPENDENT
DIFFICULTY_COMMUNICATING: NO
HYGIENE/GROOMING: INDEPENDENT
DRESSING/BATHING_DIFFICULTY: NO
LAUNDRY: WITH SUPERVISION
TOILETING_ISSUES: NO

## 2021-05-30 NOTE — PLAN OF CARE
"  Problem: Adult Inpatient Plan of Care  Goal: Optimal Comfort and Wellbeing  Outcome: Change based on patient need/priority     Problem: Sleep Disturbance  Goal: Adequate Sleep/Rest  Outcome: Declining   Observed to have slept for approx 5.5 hrs on all Q 15\" rounds w/regular non- labored respirations.  Delayed in speech, appearing distracted.  Quite guarded. Eye contact is intense.  Irritable and very easily agitated.  Medicated w/ prn gabapentin as ordered for anxiety and irritability per pt. Request at approx 0448 and used Biotene.  Spent quiet time in lounge area w/ minimal interaction w/wakeful peers.  Safe, supportive, therapeutic environment maintained.   "

## 2021-05-30 NOTE — PLAN OF CARE
"  Problem: Psychotic Symptoms  Goal: Psychotic Symptoms  Description: Signs and symptoms of listed problems will be absent or manageable.  Outcome: Improving  Flowsheets (Taken 5/30/2021 1960)  Psychotic Symptoms Assessed: all  Psychotic Symptoms Present:   affect   thought process   anxiety   insight   speech  Note: Patient stated to staff \"don't believe anything I say.\" \"I had the most seriously intense nightmare about zombies, but I wasn't scared at all.\" He is social with staff and peers. Appears preoccupied. He is med compliant. Denies SI and SIB. Suzy Vivar RN      "

## 2021-05-30 NOTE — PLAN OF CARE
"Patient visible intermittently in the lounge and his room. Patient states he is mad at himself for his reluctance to do things and is overcritical at times. He feels stumped and stuck at times but does feel better since admission stating Zyprexa is helping and although side effects are bothersome, benefits outweigh them. States when he talks about medication  he starts feeling side effects. Patient requested for Biotene for dry mouth. Mood is calm. Endorses AH but states these are \"coolingto me.\"  Denies SI/SIB. Patient changes topics during conversation and are not connected. States he would like to find a job upon discharge. He intends to use coping skills like stretching and taking time off for himself. Pt attended a psychotherapy group. Later this evening, patient played the keyboard, requested prn Gabapentin for anxiety.  "

## 2021-05-31 PROCEDURE — 250N000013 HC RX MED GY IP 250 OP 250 PS 637: Performed by: STUDENT IN AN ORGANIZED HEALTH CARE EDUCATION/TRAINING PROGRAM

## 2021-05-31 PROCEDURE — 124N000002 HC R&B MH UMMC

## 2021-05-31 RX ADMIN — Medication 2 SPRAY: at 11:07

## 2021-05-31 RX ADMIN — DIPHENHYDRAMINE HYDROCHLORIDE 50 MG: 50 CAPSULE ORAL at 20:33

## 2021-05-31 RX ADMIN — OLANZAPINE 10 MG: 10 TABLET, FILM COATED ORAL at 20:33

## 2021-05-31 RX ADMIN — Medication 2 SPRAY: at 16:35

## 2021-05-31 ASSESSMENT — ACTIVITIES OF DAILY LIVING (ADL)
DRESS: INDEPENDENT
ORAL_HYGIENE: INDEPENDENT
HYGIENE/GROOMING: INDEPENDENT

## 2021-05-31 NOTE — PROGRESS NOTES
" 05/30/21 1300   Groups   Details    Group Psychotherapy    Number of patients attending the group:  7  Group Length:  1 Hours      Group Therapy Type: Psychotherapy     Summary of Group / Topics Discussed:- Challenging negative thinking      The  Psychotherapy group goal is to promote insight to positive choice and change. Group processing is within a supportive and safe environment. Patients will process emotions using verbal group and expressive psychotherapy interventions including visual art/writing interventions.     Group interventions support patients by: empowerment, self esteem     Modalities to reach these goals include: Process group / written exploration     Subjective -patient report of mood today- \"balanced, fine, good, but confused about some stuff\"     Objective/ Intervention- Goal of group and Therapeutic modality utilized- DBT/ CBT     Group Response-  engaged     Patient Response-Pt attended group. He was pleasant, cooperative and quietly engaged. He seems to zone out and be responding to internal stimuli. He speaks about people not understanding or listening to him.    Jimenez Fernandez, SARTHAK, ATR-BC          "

## 2021-05-31 NOTE — PLAN OF CARE
"  Problem: Sleep Disturbance  Goal: Adequate Sleep/Rest  Outcome: Improving   Observed to have slept well for approx. 7 hrs on all Q 15\" rounds w/ regular non-labored respirations and no reported or observed distress.  Safe, therapeutic environment maintained.  "

## 2021-05-31 NOTE — PLAN OF CARE
Problem: Psychotic Symptoms  Goal: Psychotic Symptoms  Description: Signs and symptoms of listed problems will be absent or manageable.  Outcome: Improving   Patient observed playing cards and socializing with select peers. Walks around the milieu and stands near nurses station as if bored. Affect is flat/blunted. Requested for Biotene for dry mouth. Patient endorses AH that are positive and negative that tell him he is a good person and other times he should kill himself.  Jerel says he tries to keep busy to distract himself.  Ate dinner and is med compliant. Denies SI.

## 2021-05-31 NOTE — PLAN OF CARE
"Pt is flat and cooperative but not attending groups. Med compliant. Pt rates anxiety at 4/10 and depression 0/10. Pt rates pain at 0/10. Pt reports no SI/HI and contracts for safety. Pt reports auditory hallucinations. Pt states voices are telling him to \"stay away your a stupid person\" . Anxiety is increased by the AH. Pt fe with the voices by playing the key board, listening to music, and taking naps. Pt did state that he thinks the zyprexa is helping some. Pt was visible on unit but keeps to himself. Pt had good PO intake. Continue current POC.    "

## 2021-06-01 PROCEDURE — 99232 SBSQ HOSP IP/OBS MODERATE 35: CPT | Mod: GC | Performed by: PSYCHIATRY & NEUROLOGY

## 2021-06-01 PROCEDURE — 124N000002 HC R&B MH UMMC

## 2021-06-01 PROCEDURE — 250N000013 HC RX MED GY IP 250 OP 250 PS 637: Performed by: STUDENT IN AN ORGANIZED HEALTH CARE EDUCATION/TRAINING PROGRAM

## 2021-06-01 RX ADMIN — GABAPENTIN 100 MG: 100 CAPSULE ORAL at 17:12

## 2021-06-01 RX ADMIN — OLANZAPINE 10 MG: 10 TABLET, FILM COATED ORAL at 20:03

## 2021-06-01 RX ADMIN — GABAPENTIN 100 MG: 100 CAPSULE ORAL at 20:14

## 2021-06-01 RX ADMIN — Medication 2 SPRAY: at 11:54

## 2021-06-01 ASSESSMENT — ACTIVITIES OF DAILY LIVING (ADL)
ORAL_HYGIENE: INDEPENDENT
HYGIENE/GROOMING: INDEPENDENT
HYGIENE/GROOMING: INDEPENDENT
DRESS: SCRUBS (BEHAVIORAL HEALTH)
ORAL_HYGIENE: INDEPENDENT
DRESS: SCRUBS (BEHAVIORAL HEALTH);INDEPENDENT

## 2021-06-01 NOTE — PLAN OF CARE
"Assessment/Intervention/Current Symptoms and Care Coordination  - chart review  - team meeting  - team rounds/pt interview   patient met with team in the conference room - in regards to medication he says he does not want to increase the dosage - does not like to have too much medication , / medication at all . He was asked at one point if the medication helps with anything and he says sometimes \"it helps attach my brain more\" Says he has been thinking about how he failed out of school (college) and can't get a job - \"I went to a job fair and no one got back to me\" when asked why he thought he hadn't heard back he says \"word salad\", when asked if he experiences this he says \"some people have disorganized thinking\" then goes on to comment that his doctors probably \"misunderstood my word salad..\" He asked if the team had observed any times that his speech didn't make sense, then said he wanted examples of this started snapping his finger when saying he wanted to be given examples.   Says he believes he will be discharged today \"I signed the voluntary discharge forms\". When informed that he is still on a hold he became visibly upset. Asked to have his chart brought into the conference room so he could be shown legal forms and show us the voluntary form he is referring to. This writer informed him I would come with him to the desk to go through the chart with - with encouragement eventually agreed to leave the room with this writer and the Attending Psychiatrist. His affect was intense in the room...staring intensely.   - this writer met with patient at the , did pull out the paper chart in which he had signed a Mental Health Consent -   Was provided copies again of Intent to revoke. Phone number for his  per his request  He then tore up all the documents given to him. He asked if the ACT team doctor \"Dr. Esteves\" put the hold on - this writer explained that the hold was placed by a hospital provider " "when he came to the ED and not his ACT team. - at one point he asked for the ACT team phone number and after being given to him said \"I'm going to call them and cuss them out\". This writer encouraged him to wait to call if he is angry and also reminded him that they are not the ones who put the emergency hold on.   Walked away and said \"I want to kill myself.\"  He then was on the phone again in the nazario. Later followed this writer to the team conference room door area, was told I would be right out to talk to him. When I entered the room hear a loud noise outside of the room where patient was in the lounge, later was told by staff that patient had picked up and quickly released a chair as if wanting to throw it causing a loud thud. This writer did later give him another piece of paper with his 's phone number on it as he tore up the first papers. At times he asked who had authority or who is a third party who could override things and get him out of the hospital. Again encouraged him to contact his  in regards to the commitment process holds , to which he said \"but I need an Offensive  and he is a Defensive  and I don't have the money.\" this writer explained that the  assigned to him as 'defense'  for the civil commitment is assigned to him throughout and shouldn't cost anything to contact him for help right now. That even though is called 'defense' the  can help advocate and look into options for him . He also asked for a patient satisfaction survey and the phone for patient relations. He was provided with both by staff.   He was able to redirect with staff assistance - see other staffing notes for the day.       Discharge Plan or Goal  assessment ongoing for Level of Care - return to apartment versus considering     Will continue with ACT team at discharge     Barriers to Discharge   Acute psychosis - court / commitment revocation ongoing see legal status section " below    Referral Status  No referrals today    Legal Status    72 hour hold - Provisional Discharge is being revoked - Ex Parte Order for Emergency Return to Facility - (revoking PD) is being issued by the court - this writer has spoke with  staff twice this morning who report the order is with the  waiting signature.

## 2021-06-01 NOTE — PLAN OF CARE
"  Problem: Psychotic Symptoms  Goal: Psychotic Symptoms  Description: Signs and symptoms of listed problems will be absent or manageable.  Outcome: No Change   \"I'm better, I'm more confident. The pain is reduced in my neck and spine.\" Patient states depression is worse, anxiety has improved. Has intermittent racing thoughts and they have improved. Has frequent auditory and visual hallucinations, the auditory are more frequent then the visual, they are neutral in content. Thoughts are less clear, is able to focus better. Has some paranoia. Attends half the groups and states is social with peers. C/o left ear pain which started last evening when he took a shower. Declined Tylenol. Dr. Ulloa and Dr. Godoy were notified. During check in patient stared into space and would not respond to questions several times. After patient heard he was going to be placed on a court hold he became angry. He shouted at one time.  Staff states he said he wanted to kill himself. Patient declined Zyprexa. He started crying at one point. Writer later checked in with patient about the suicide statement he made and patient denied suicidal thoughts at that time. Patient is sleeping in bed this afternoon.   "

## 2021-06-01 NOTE — PLAN OF CARE
"  Problem: Sleep Disturbance  Goal: Adequate Sleep/Rest  Outcome: No Change  Observed to have slept well for approx 6.25 hrs on all Q 15\" rounds w/ regular non-labored respirations and no reported or observed distress.  Safe, therapeutic environment maintained.   "

## 2021-06-01 NOTE — PROGRESS NOTES
"  ----------------------------------------------------------------------------------------------------------  [facility], [place]   Psychiatric Progress Note  Hospital Day #6     Interim History:   The patient's care was discussed with the treatment team and chart notes were reviewed.    Sleep 6.15 hours (06/01/21 0600)  Scheduled Medications: took all scheduled medications as prescribed   PRN medications: biotine spray x2,     Staff Report:   Patient observed playing cards and socializing with select peers. Walks around the milieu and stands near nurses station as if bored. Affect is flat/blunted. Requested for Biotene for dry mouth. Patient endorses AH that are positive and negative that tell him he is a good person and other times he should kill himself.  Jerel says he tries to keep busy to distract himself.  Ate dinner and is med compliant. Denies SI.     Patient Interview:   Jerel Day was interviewed in the conference room.   \"fairly smooth, not many tangible problems, getting used to meds- does affect thinking patterns but is comfortable still\"- wants to keep same dose, doesn't want more meds.  Feels \"stunted with higher doses\" but feels it is helping  - has stayed up several nights but was able to sleep this past nigiht  Ear popping recently but admits is \"in pain with crackling feeling all night\" but denies needing it looked at further.  Occupied with memories of college and feeling like a \"failure and wasting lots of money,\" feels down about no responses from career fair, thinks its due to him having a \"word salad\" during his networking experiences.   Feels as if he won't be able to open up to Norberto anymore, feels betrayed that he was brought in. Feels he wasn't listened to.   Says meds help \"attach his brain more, uses different parts of brain\" unsure of clarity with meds though.  Says he would prefer to be discharged but he is on a hold.  Admits feeling panic knowing that " "he may not be able to leave today. Patient needed a break at the end of the interview as he was getting agitated and upset.      Review of systems:     Review Of Systems  Skin: negative   Eyes: negative, negative for redness  Ears/Nose/Throat: + dry mouth  Respiratory: No shortness of breath, dyspnea on exertion, cough, or hemoptysis  Cardiovascular: negative  Gastrointestinal: no diarrhea or constipation. + nausea  Genitourinary: negative   Musculoskeletal: +muscle pain/cramps (limited to shoulder and cervical spine)   Neurologic: - dizziness   Psychiatric: positive for negative and hallucinations  Hematologic/Lymphatic/Immunologic: negative  Endocrine: negative            Allergies:     Allergies   Allergen Reactions     Penicillins      Rash, Generalized            Psychiatric Examination:   /80   Pulse 74   Temp 97.9  F (36.6  C)   Resp 18   Ht 1.727 m (5' 8\")   Wt 73.8 kg (162 lb 12.8 oz)   SpO2 99%   BMI 24.75 kg/m    Weight is 162 lbs 12.8 oz  Body mass index is 24.75 kg/m .    MENTAL STATUS EXAM    Appearance:  normal posture, normal gait and appropriately dressed  Attitude:  cooperative and engaged  Psychomotor:  no evidence of tics, dystonia, or tardive dyskinesia  Eye Contact: appropriate  Speech:  fluent English, normal tone and normal rate  Mood: \"good\"  Affect:  congruent and appropriate, constricted   Thought Content: paranoid delusions  Thought Process: tangential, preoccupied   Sensorium: awake and endorses auditory hallucinations  Cognition: memory grossly intact  Impulse control: fair  Insight: poor  Judgment: questionable         Labs:     No results found for this or any previous visit (from the past 24 hour(s)).     Assessment    Principal Diagnosis:   # Schizophrenia     Diagnostic Impression:   Jerel Day is a 25 year old single White male previously diagnosed with schitzophrenia who was admitted with visual and auditory hallucinations in the context of medication " non-adherence. Current psychosocial stressors include chronic mental health issues which he has been coping with by using substances, most recently heavy cannabis use.  Patient's support system includes family and peers, and the patient does have an ACT team. Biological contributions to mental health presentation include genetic loading from family history of psychosis and concurrent substance use.                 The patient's presentation is consistent with previously diagnosed schitzophrenia. Given recent substance use, substance induced psychosis could also be contributing to his current presentation.     Psychiatric Hospital course:   Jerel Day was admitted to [facility] on a 72 hour hold. His PTA Paliperidone invega sustenna 234 mg HO & benadryl were continued. PTA p.o. Paliperidone Invega was held and replaced by olanzapine as the patient continuously declined the Paliperidone and endorsed having good therapeutic results from the olanzapine, namely sleep and with his disorganized thought.     Discontinued Medications (& Rationale):  Invega sustenna p.o. as patient has expressed desire to try a different antipsychotic     Medical course   The patient was medically cleared for admission to [facility]; no medical problems arose during hospitalization.     Data:   UTox on admission positive for cannabinoids upon admission      Consults:   Pharmacy inpt consult for medication reconciliation assistance on 5/26/21.     Plan     Today's Changes:  - No new changes.      Scheduled Psych Meds:  - olanzapine 10 mg at bedtime for sleep   - Paliperidone Invega Sustenna 234 mg IM every 28 days   - diphenhydramine 50 mg at bedtime     PRN Medications:  - acetaminophen, alum & mag hydroxide-simethicone, artificial saliva, eucerin, gabapentin, melatonin, nicotine, nicotine, OLANZapine **OR** OLANZapine, senna-docusate    Patient will be treated in therapeutic milieu with appropriate individual and group therapies as  described.    Medical diagnoses to be addressed this admission:    # dry mouth   - artificial saliva     Legal Status:   Orders Placed This Encounter      Legal status 72 Hour Hold      Legal status Voluntary    Safety Assessment:   Behavioral Orders   Procedures     Code 1 - Restrict to Unit     Routine Programming     As clinically indicated     Self Injury Precaution     Status 15     Every 15 minutes.     Suicide precautions     Patients on Suicide Precautions should have a Combination Diet ordered that includes a Diet selection(s) AND a Behavioral Tray selection for Safe Tray - with utensils, or Safe Tray - NO utensils         Disposition: 6-9 days; Pending stabilization & development of a safe discharge plan.     _________________________________________________________________    This patient was seen and discussed with my attending physician.  -  Cleve Ulloa DO   Psychiatry Resident   _________________________________________________________________  *This note was written with the assistance of an approved dictation software; please excuse any typos that might have been missed.     Psychiatry Attending Attestation:   This patient has been seen and evaluated by me, Vitaly Godoy.  I have discussed this patient with the psychiatry resident and I agree with the findings and plan in this note.    I have reviewed today's vital signs, medications, labs and imaging.     Cleve Ulloa DO     This patient has been seen and evaluated by me, Vitaly Godoy.  I have discussed this patient with the psychiatry resident and I agree with the findings and plan in this note.    I have reviewed today's vital signs, medications, labs and imaging.     Vitaly Antonio MD on 6/1/2021 at 11:26 PM

## 2021-06-02 LAB
LABORATORY COMMENT REPORT: NORMAL
SARS-COV-2 RNA RESP QL NAA+PROBE: NEGATIVE
SPECIMEN SOURCE: NORMAL

## 2021-06-02 PROCEDURE — U0005 INFEC AGEN DETEC AMPLI PROBE: HCPCS | Performed by: PSYCHIATRY & NEUROLOGY

## 2021-06-02 PROCEDURE — H2032 ACTIVITY THERAPY, PER 15 MIN: HCPCS

## 2021-06-02 PROCEDURE — 250N000013 HC RX MED GY IP 250 OP 250 PS 637: Performed by: STUDENT IN AN ORGANIZED HEALTH CARE EDUCATION/TRAINING PROGRAM

## 2021-06-02 PROCEDURE — 124N000002 HC R&B MH UMMC

## 2021-06-02 PROCEDURE — 99232 SBSQ HOSP IP/OBS MODERATE 35: CPT | Mod: GC | Performed by: PSYCHIATRY & NEUROLOGY

## 2021-06-02 PROCEDURE — U0003 INFECTIOUS AGENT DETECTION BY NUCLEIC ACID (DNA OR RNA); SEVERE ACUTE RESPIRATORY SYNDROME CORONAVIRUS 2 (SARS-COV-2) (CORONAVIRUS DISEASE [COVID-19]), AMPLIFIED PROBE TECHNIQUE, MAKING USE OF HIGH THROUGHPUT TECHNOLOGIES AS DESCRIBED BY CMS-2020-01-R: HCPCS | Performed by: PSYCHIATRY & NEUROLOGY

## 2021-06-02 RX ADMIN — GABAPENTIN 100 MG: 100 CAPSULE ORAL at 20:27

## 2021-06-02 RX ADMIN — Medication 2 SPRAY: at 16:49

## 2021-06-02 RX ADMIN — OLANZAPINE 10 MG: 10 TABLET, FILM COATED ORAL at 20:27
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
## 2021-06-02 NOTE — PLAN OF CARE
Pt appeared to sleep 7 hours. No PRNs given. No behavioral or medical events this shift.     Problem: Sleep Disturbance  Goal: Adequate Sleep/Rest  Outcome: No Change

## 2021-06-02 NOTE — PLAN OF CARE
Problem: Psychotic Symptoms  Goal: Psychotic Symptoms  Description: Signs and symptoms of listed problems will be absent or manageable.  Outcome: No Change  Goal: Social and Therapeutic (Psychotic Symptoms)  Description: Signs and symptoms of listed problems will be absent or manageable.  Outcome: No Change     Patient is irritable and dismissive.  He endorses anxiety this morning and explains that he is anxious about talking to provider.  He also explains that he is agitated with being in the hospital.  PRN gabapentin offered but patient declined, he said he would come to writer if he decided he wants to take it.  Patient approached again this afternoon and appeared less irritable and endorsed a decrease in anxiety.  Patient denies depression, SI, HI, and SIB.  He denies hallucinations today but is very dismissive with writer so it is unclear if this is accurate.  Will continue to monitor.  Bridgette Cha RN

## 2021-06-02 NOTE — PROGRESS NOTES
"  ----------------------------------------------------------------------------------------------------------  [facility], [place]   Psychiatric Progress Note  Hospital Day #7     Interim History:   The patient's care was discussed with the treatment team and chart notes were reviewed.    Sleep 7 hours (21 0600)  Scheduled Medications: took all scheduled medications as prescribed   PRN medications: biotine spray x2, gabapentin X2    Staff Report:   Attending some groups, frequent somatic complaints including head, neck, and ear pain which improved with tylenol. Yesterday became upset after learning court hold was being placed; shouted and attempted to throw chair and made suicidal statement. No emergency prns required. Later in the evening more calm, slept well, without behavioral concerns. Staff revisited his suicidal thoughts which he denied later in the evening.     SW informed team that patient is committed with  [facility], will confirm with courts.     Patient Interview:   Jerle Day was interviewed in his room. Initially declined interview \"there's nothing to talk about\" though was agreeable to meeting new team members.    Today feels \"great\" and states nothing is wrong with him \"all I want to talk about is discharge.\" Says that since being in the hospital \"my sleep is maybe better and everything else is the same,\" that he did not need to come into the hospital in the first place. Authors affirm that he appears better though is still somewhat difficult to understand \"that's an oxymoron, doctor. You speak in platitudes and no one will tell me what I want to know.\" He repeatedly asks for a a tardive dyskinesia test and cites his ongoing neck pain, ear pain, dry mouth, various somatic complaints as evidence for tardive dyskinesia and states that he typically treats these symptoms with CBD. When offered ibuprofen or ice he states that \"you don't want to help me with just " "that over the counter stuff. That's not a solution.\"     ROS: Endorsed dry mouth and msk pain though declined prn offers for these. ROS otherwise as above            Allergies:     Allergies   Allergen Reactions     Penicillins      Rash, Generalized            Psychiatric Examination:   /89   Pulse 87   Temp 98.3  F (36.8  C)   Resp 16   Ht 1.727 m (5' 8\")   Wt 73.8 kg (162 lb 12.8 oz)   SpO2 98%   BMI 24.75 kg/m    Weight is 162 lbs 12.8 oz  Body mass index is 24.75 kg/m .    MENTAL STATUS EXAM   Appearance: Initially sleeping in bed though wakes easily to name. Seated in bed under blankets for duration of the interview.  Appears adequately groomed in no acute distress. Gait not observed.  Attitude:  cooperative and engaged  Psychomotor:  no evidence of tics, dystonia, or tardive dyskinesia  Eye Contact: intense  Speech:  fluent English, normal tone and normal rate  Mood: \"good\"  Affect:  Appears frustrated, constricted. Does not brighten to humor nor affirmations.   Thought Content: paranoid delusions  Thought Process: tangential, preoccupied with discharge  Sensorium: awake and endorses auditory hallucinations  Cognition: memory grossly intact  Impulse control: fair  Insight: poor - does not acknowledge circumstances leading up to hospitalization  Judgment: questionable - does not acknowledge benefit of medication adjustments thus far         Labs:     No results found for this or any previous visit (from the past 24 hour(s)).     Assessment    Principal Diagnosis:   # Schizophrenia     Diagnostic Impression:   Jerel Day is a 25 year old single White male previously diagnosed with schitzophrenia who was admitted with visual and auditory hallucinations in the context of medication non-adherence. Current psychosocial stressors include chronic mental health issues which he has been coping with by using substances, most recently heavy cannabis use.  Patient's support system includes family and " peers, and the patient does have an ACT team. Biological contributions to mental health presentation include genetic loading from family history of psychosis and concurrent substance use.                 The patient's presentation is consistent with previously diagnosed schizophrenia. Given recent substance use and improvement since cannabis cessation in the hospital, substance induced psychosis could also be contributing to his current presentation.     Psychiatric Hospital course:   Jerel Day was [facility] to [facility] on a 72 hour hold. His PTA Paliperidone invega sustenna 234 mg HO & benadryl were continued. PTA p.o. Paliperidone Invega was held and replaced by olanzapine as the patient continuously declined the Paliperidone and endorsed having good therapeutic results from the olanzapine, namely sleep and with his disorganized thought. As of 6/2 has had improvement in organization though continued paranoia as well as some thought-blocking and tangential speech; he acknowledges improved sleep and thinking since being in the hospital though lacks insight into why he is still hospitalized and declining further medication adjustments at this time.     Discontinued Medications (& Rationale):  Invega sustenna p.o. as patient has expressed desire to try a different antipsychotic     Medical course   The patient was medically cleared for admission to [facility]; no medical problems arose during hospitalization.     Data:   UTox on admission positive for cannabinoids upon admission      Consults:   Pharmacy inpt consult for medication reconciliation assistance on 5/26/21.     Plan     Today's Changes:  - No medication changes, patient declined olanzapine adjustment  - legal status committed    Scheduled Psych Meds:  - olanzapine 10 mg at bedtime for sleep   - Paliperidone Invega Sustenna 234 mg IM every 28 days   - diphenhydramine 50 mg at bedtime     PRN Medications:  - acetaminophen, alum & mag  hydroxide-simethicone, artificial saliva, eucerin, gabapentin, melatonin, nicotine, nicotine, OLANZapine **OR** OLANZapine, senna-docusate    Patient will be treated in therapeutic milieu with appropriate individual and group therapies as described.    Medical diagnoses to be addressed this admission:    # dry mouth   - artificial saliva     Legal Status:    Orders Placed This Encounter      Legal status Patient is Committed    Safety Assessment:   Behavioral Orders   Procedures     Code 1 - Restrict to Unit     Routine Programming     As clinically indicated     Self Injury Precaution     Status 15     Every 15 minutes.     Suicide precautions     Patients on Suicide Precautions should have a Combination Diet ordered that includes a Diet selection(s) AND a Behavioral Tray selection for Safe Tray - with utensils, or Safe Tray - NO utensils         Disposition: 6-9 days; pending stabilization & development of a safe discharge plan.  possibly to home with ACT team in place     _________________________________________________________________    This patient was seen and discussed with my attending physician.  -  Shauna Davdi MD  PGY-1, Psychiatry     This patient has been seen and evaluated by me, Vitaly Godoy.  I have discussed this patient with the psychiatry resident and I agree with the findings and plan in this note.    I have reviewed today's vital signs, medications, labs and imaging.       Vitaly Antonio MD on 6/2/2021 at 11:28 PM

## 2021-06-02 NOTE — PLAN OF CARE
BEHAVIORAL TEAM DISCUSSION    Participants:   Vitaly Godoy MD Attending Psychiatrist  Shauna David MD, PGY1  Alison Jones, MSW, [facility] Clinical Treatment Coordinator  FRANNIE Garcia MS, OTR/L   Queta Estevez, MS3  Samaria Pitts, MS3  Progress: symptoms of psychosis continue  Anticipated length of stay: assessment ongoing   Continued Stay Criteria/Rationale: symptoms of psychosis present requiring ongoing medication management  Medical/Physical: Per psychiatry physician progress note:   Medical diagnoses to be addressed this admission:    # dry mouth   - artificial saliva   Precautions:   Behavioral Orders   Procedures    Code 1 - Restrict to Unit    Routine Programming     As clinically indicated    Self Injury Precaution    Status 15     Every 15 minutes.    Suicide precautions     Patients on Suicide Precautions should have a Combination Diet ordered that includes a Diet selection(s) AND a Behavioral Tray selection for Safe Tray - with utensils, or Safe Tray - NO utensils       Plan: continue with medication management for targeted symptom improvement. patient is currently hospitalized under commitment order. Will require a Provisional Discharge Agreement at discharge   Rationale for change in precautions or plan: per ongoing assessment.

## 2021-06-03 PROCEDURE — 250N000013 HC RX MED GY IP 250 OP 250 PS 637: Performed by: STUDENT IN AN ORGANIZED HEALTH CARE EDUCATION/TRAINING PROGRAM

## 2021-06-03 PROCEDURE — 124N000002 HC R&B MH UMMC

## 2021-06-03 PROCEDURE — 99232 SBSQ HOSP IP/OBS MODERATE 35: CPT | Mod: GC | Performed by: PSYCHIATRY & NEUROLOGY

## 2021-06-03 RX ORDER — OLANZAPINE 15 MG/1
15 TABLET ORAL AT BEDTIME
Status: DISCONTINUED | OUTPATIENT
Start: 2021-06-03 | End: 2021-06-14 | Stop reason: HOSPADM

## 2021-06-03 RX ADMIN — DIPHENHYDRAMINE HYDROCHLORIDE 50 MG: 50 CAPSULE ORAL at 20:24

## 2021-06-03 RX ADMIN — OLANZAPINE 15 MG: 15 TABLET, FILM COATED ORAL at 20:24

## 2021-06-03 RX ADMIN — Medication 2 SPRAY: at 10:47

## 2021-06-03 RX ADMIN — Medication 2 SPRAY: at 17:07

## 2021-06-03 NOTE — PLAN OF CARE
"Pt attended 1 of 3 OT groups today. Pt transitioned to OT group IND and engaged in therapeutic activity addressing functional cognition and interpersonal skills with task set up. With MIN prompting, pt participated in therapeutic group activity and discussion addressing self care and illness management. Educated pt on various types of self care and the importance of practicing good self-care with pt verbalizing understanding. Pt reports he would like to improve on physical, emotional, and social self care including: getting more sleep, getting away from distractions, and meeting new people. Pt reports he \"zoned out\" a few time during group which was observable by joyce novoa and pt not responding to direct prompt. Pt continues to make bizarre or disorganized statements during group such as calling the colored pencils \"O H Minus\" and saying the effect of exercise on the body is an \"electrolyte impact, it's hard to tape back together. It's like a grid or a clock.\" Pt will continue to benefit from OT intervention to address implementation of positive functional coping skills, role performance, and community reintegration.     "

## 2021-06-03 NOTE — PLAN OF CARE
Problem: Behavioral Disturbance  Goal: Behavioral Disturbance  Description: Signs and symptoms of listed problems will be absent or manageable by discharge or transition of care.  Outcome: Improving  Pt visible in the lounge, ate snacks and played the keyboard. Requested for Biotene for dry mouth. During check in, patient calm/pleasant. At times patient seems to delay when answering questions and sometimes goes blank. Patient would also smile without a reason as if responding.  States he is stable in a lot of ways. Jerel says he does mental planning to stay organized and he is repressing situational trauma. Pt changes subjects frequently. Endorses anxiety at a 3/10 due to paranoia and SOB as he was a smoker.  Denies SI/SIB, Depression is relatively low. Jerel feels that the doctors are not angling and taking the plunge with him. States he previously left a detox facility too soon and feels that in some ways, he should be here. Zyprexa is helping and 'dampens' things but hopes he will get to a place where it does not help. Patient visited with his mom, took a shower.

## 2021-06-03 NOTE — PLAN OF CARE
"  Problem: Psychotic Symptoms  Goal: Psychotic Symptoms  Description: Signs and symptoms of listed problems will be absent or manageable.  Outcome: Improving  Flowsheets (Taken 6/3/2021 1036)  Psychotic Symptoms Assessed: all  Psychotic Symptoms Present:    affect    thought process    mood    anxiety    insight  Note: Patient states that \"the doctors are just holding discharge over my head like a lever. They always seem to just loop around everything and not really what is going on with me. Somehow they are personalizing it all. I don't think I need to be here anymore. I am ready to leave and go out and take care of myself. I really would like for someone to be in the team meeting with me to keep them from doing all of this to me. \" He reports anxiety and requested medication. He ultimately decided to use sleepy time tea. At the end of the shift he asked what other things he could do for his anxiety. He was accepting the use of essential oils. He is med compliant. Denies SI and SIB.Suzy Vivar RN       "

## 2021-06-03 NOTE — PLAN OF CARE
Problem: Behavioral Health Plan of Care  Goal: Plan of Care Review  Recent Flowsheet Documentation  Taken 6/2/2021 2000 by Noemy Johns RN  Plan of Care Reviewed With: patient  Patient Agreement with Plan of Care: disagrees (describe)   Pt calm , pleasant , med compliant . He spends most of evening in his room , denies si . He did not attend group . Complained of ear pain after his shower . Wanted a Q  tip, told hospital does not provide this and he was ok with this.Patient states he wants to go home and is aware of court hold

## 2021-06-03 NOTE — PROGRESS NOTES
"  ----------------------------------------------------------------------------------------------------------  [facility], [place]   Psychiatric Progress Note  Hospital Day #8     Interim History:   The patient's care was discussed with the treatment team and chart notes were reviewed.    Sleep 7 hours (06/03/21 0600)  Scheduled Medications: took all scheduled medications as prescribed   PRN medications: biotine spray x2, gabapentin 100mg x1    Staff Report:   Mostly withdrawn and isolative to room. Anxiety reportedly decreasing over the day yesterday. Patient calm and cooperative in the evening, repeatedly states he wants to go home. No acute behavioral concerns.     Patient Interview:   Jerel PALOMO Rogergrace was interviewed in his room. This morning feels \"turbulent\" which he connects to watching anime shows \"and relating with the characters in that universe.\" Thinks he has been more calm today than previous days which he attributes to music group, art.     When discussing medication adjustments he says that \"I don't want to be a guinea pig. Well, I guess not a guinea pig in this case it'd be a gerbil. It's a gerbil because of the cage.\" Is concerned that \"how will these medication adjustments, doses, tapering, tapering, tapering. That won't change me from being a gerbil.\" Does share that he is concerned about increasing olanzapine because of ongoing dry mouth though is open to trying lozenges for this.     He brings up concerns about his gabapentin and states he has numerous concerns about this and agrees to defer this until we can meet again tomorrow.     ROS: Endorsed dry mouth and msk pain though declined prn offers for these. ROS otherwise as above            Allergies:     Allergies   Allergen Reactions     Penicillins      Rash, Generalized            Psychiatric Examination:   /89 (BP Location: Right arm)   Pulse 72   Temp 97.8  F (36.6  C) (Oral)   Resp 16   Ht 1.727 m (5' " "8\")   Wt 73.8 kg (162 lb 12.8 oz)   SpO2 97%   BMI 24.75 kg/m    Weight is 162 lbs 12.8 oz  Body mass index is 24.75 kg/m .    MENTAL STATUS EXAM   Appearance: Initially approached and interviewed in the milieu. Normal gait. Appears adequately groomed in no acute distress.  Attitude:  cooperative and engaged  Psychomotor:  no evidence of tics, dystonia, or tardive dyskinesia  Eye Contact: mostly appropriate, occasionally intense  Speech:  fluent English, normal tone and normal rate  Mood: \"turbulent\"  Affect:  Appears anxious  Thought Content: paranoid  Thought Process: tangential, loosening of associations  Sensorium: awake and endorses auditory hallucinations  Cognition: memory grossly intact  Impulse control: fair  Insight: poor - does acknowledge some MH symptoms though minimizes severity and not acknowledge circumstances leading up to hospitalization  Judgment: questionable - does not acknowledge benefit of medication adjustments thus far         Labs:     Results for orders placed or performed during the hospital encounter of 05/25/21 (from the past 24 hour(s))   Asymptomatic SARS-CoV-2 COVID-19 Virus (Coronavirus) by PCR    Specimen: Nasopharyngeal   Result Value Ref Range    SARS-CoV-2 Virus Specimen Source Nasopharyngeal     SARS-CoV-2 PCR Result NEGATIVE     SARS-CoV-2 PCR Comment       Testing was performed using the Xpert Xpress SARS-CoV-2 Assay on the Cepheid Gene-Xpert   Instrument Systems. Additional information about this Emergency Use Authorization (EUA)   assay can be found via the Lab Guide.          Assessment    Principal Diagnosis:   # Schizophrenia     Diagnostic Impression:   Jerel Day is a 25 year old single White male previously diagnosed with schitzophrenia who was admitted with visual and auditory hallucinations in the context of medication non-adherence. Current psychosocial stressors include chronic mental health issues which he has been coping with by using substances, most " recently heavy cannabis use.  Patient's support system includes family and peers, and the patient does have an ACT team. Biological contributions to mental health presentation include genetic loading from family history of psychosis and concurrent substance use.                 The patient's presentation is consistent with previously diagnosed schizophrenia. Given recent substance use and improvement since cannabis cessation in the hospital, substance induced psychosis could also be contributing to his current presentation.     Psychiatric Hospital course:   Jerel Day was [facility] on a [facility] hour hold. His PTA Paliperidone invega sustenna 234 mg HO & benadryl were continued. PTA p.o. Paliperidone Invega was held and replaced by olanzapine as the patient continuously declined the Paliperidone and endorsed having good therapeutic results from the olanzapine, namely sleep and with his disorganized thought. As of 6/2 has had improvement in organization though continued paranoia as well as some thought-blocking and tangential speech; he acknowledges improved sleep and thinking since being in the hospital though lacks insight into why he is still hospitalized and declining further medication adjustments at this time. Olanzapine increased to 15mg at bedtime on 6/3 which patient was hesitant about though agreeable to trying.     Discontinued Medications (& Rationale):  Invega sustenna p.o. as patient has expressed desire to try a different antipsychotic     Medical course   The patient was medically cleared for admission to [facility]; no medical problems arose during hospitalization.     Data:   UTox on admission positive for cannabinoids upon admission      Consults:   Pharmacy inpt consult for medication reconciliation assistance on 5/26/21.     Plan     Today's Changes:  - increase olanzapine to 15mg at bedtime   - add cepacol lozenges as needed for dry mouth    Scheduled Psych Meds:  - olanzapine 15  mg at bedtime for sleep   - Paliperidone Invega Sustenna 234 mg IM every 28 days   - diphenhydramine 50 mg at bedtime     PRN Medications:  - acetaminophen, alum & mag hydroxide-simethicone, artificial saliva, eucerin, gabapentin, melatonin, nicotine, nicotine, OLANZapine **OR** OLANZapine, senna-docusate    Patient will be treated in therapeutic milieu with appropriate individual and group therapies as described.    Medical diagnoses to be addressed this admission:    # dry mouth   - artificial saliva   - cepacol lozenges     Legal Status:    Orders Placed This Encounter      Legal status Patient is Committed    Safety Assessment:   Behavioral Orders   Procedures     Code 1 - Restrict to Unit     Routine Programming     As clinically indicated     Self Injury Precaution     Status 15     Every 15 minutes.     Suicide precautions     Patients on Suicide Precautions should have a Combination Diet ordered that includes a Diet selection(s) AND a Behavioral Tray selection for Safe Tray - with utensils, or Safe Tray - NO utensils         Disposition: 6-9 days; pending stabilization & development of a safe discharge plan.  possibly to home with ACT team in place     _________________________________________________________________    This patient was seen and discussed with my attending physician.  -  Shauna David MD  PGY-1, Psychiatry     This patient has been seen and evaluated by me, Vitaly Godoy.  I have discussed this patient with the psychiatry resident and I agree with the findings and plan in this note.    I have reviewed today's vital signs, medications, labs and imaging.     Vitaly Antonio MD on 6/3/2021 at 9:38 PM

## 2021-06-03 NOTE — PLAN OF CARE
Problem: Sleep Disturbance  Goal: Adequate Sleep/Rest  Outcome: No Change     Observed to have slept well for approx 7 hrs overnight w/ regular non-labored respirations and no reported or observed distress.  Safe, therapeutic environment maintained.

## 2021-06-03 NOTE — PROGRESS NOTES
Pt was isolative and socially withdrawn throughout most of the shift. Pt was observed laying in room, reading, showering, and eating dinner. He presented with a blunt/flat affect and remained calm throughout the evening. This  did not observe any acute safety concerns with this pt.

## 2021-06-04 PROCEDURE — 250N000013 HC RX MED GY IP 250 OP 250 PS 637: Performed by: STUDENT IN AN ORGANIZED HEALTH CARE EDUCATION/TRAINING PROGRAM

## 2021-06-04 PROCEDURE — 124N000002 HC R&B MH UMMC

## 2021-06-04 PROCEDURE — 99232 SBSQ HOSP IP/OBS MODERATE 35: CPT | Mod: GC | Performed by: PSYCHIATRY & NEUROLOGY

## 2021-06-04 RX ADMIN — OLANZAPINE 15 MG: 15 TABLET, FILM COATED ORAL at 20:25

## 2021-06-04 RX ADMIN — GABAPENTIN 100 MG: 100 CAPSULE ORAL at 18:22

## 2021-06-04 RX ADMIN — ACETAMINOPHEN 650 MG: 325 TABLET, FILM COATED ORAL at 01:07

## 2021-06-04 RX ADMIN — GABAPENTIN 100 MG: 100 CAPSULE ORAL at 12:06

## 2021-06-04 RX ADMIN — Medication 2 SPRAY: at 10:51

## 2021-06-04 RX ADMIN — DIPHENHYDRAMINE HYDROCHLORIDE 50 MG: 50 CAPSULE ORAL at 20:25

## 2021-06-04 ASSESSMENT — ACTIVITIES OF DAILY LIVING (ADL)
HYGIENE/GROOMING: INDEPENDENT
DRESS: SCRUBS (BEHAVIORAL HEALTH);INDEPENDENT
ORAL_HYGIENE: INDEPENDENT

## 2021-06-04 NOTE — PROGRESS NOTES
"  ----------------------------------------------------------------------------------------------------------  [facility], [place]   Psychiatric Progress Note  Hospital Day #9     Interim History:   The patient's care was discussed with the treatment team and chart notes were reviewed.    Sleep 5 hours (06/04/21 0600)  Scheduled Medications: took all scheduled medications as prescribed   PRN medications: biotine spray x2, gabapentin 100mg x1    Staff Report:   Pt participated in 1 of 3 group sessions, reporting that he would like to improve on self-care, including increasing sleep avoiding distractions, and meeting new people.  Pt zoned out and continued to make disorganized statements. Mostly withdrawn and isolative to room. Pt continues to express frustration with his care team, as he wants to be discharged. At the end of his shift yesterday, he reported increased anxiety and asked what he could do for his anxiety. In the evening, pt was pleasant with decreased anxiety. At times, he delayed when answering questions and would smile without a reason.    Last night, pt did not sleep well. This morning, approached staff at 0100 endorsing disorganized and delayed thoughts. Appeared to be experiencing hallucatory activity, declined prn zyprexa, but took tylenol for back pain. Pt returned to sleep after 0345. Pt. Is med compliant, denied SI and SIB.      Patient Interview:   Jerel PALOMO Dayamielvis was interviewed in the conference room. He reports, \"I'm doing alright, but tired. It took long time to get to sleep, had some dreams that made me woke up.\"  He reported hearing 'voices' more intense last night than they were this morning. Pt relates, regarding 'the voices i used to hear -one said I had ALS, the other said I had amoeba\"had anxiety and could \"see DNA sequencing in my body\"    Pt relates that he feels \"empty, i think i feel alignment problems in my back\". Yesterday was \"really boring and hard " "to find meaning through his actions.\" He went to groups, but didn't feel like it was helpful.      Pt reports chronic back pain that is worse at night and described as \"dark deep imprints in my spine.\" He thinks that the tylenol he took last night helped his back. He thinks that Gabapentin helped, but makes him feel \"loosey-goosey\" and like everything is 'squished and soft'. Gabapentin \"puts pain in a spot and makes me feel herniated.\" Pt claims to feel frequent \"painless herniations\" that the then describes and repeated discs is bulging \"so then i touch it and then it hurts.\"  Gabapentin \"pulls me apart more than I think it does, if I'm focused and relaxed it helps.\" Pt is adamant that a chiropractor or a message would help his pain. Denies feeling light headed.    Pt states he is resistant to increasing any medication doses. He 'is on too many meds and they're too high.' Pt feels that medications have prevented his success in life. Pt expressed concern about the dryness in his mouth. While resistant to changing medications and/or doses, he agreed to scheduled gabapentin. Pt repeatedly relates that he 'wants to be tested for Tardive Dyskinesia' and is unhappy with the medical staff for not doing more testing.     He wants to discuss discharge and author explains that he is not yet ready to leave the hospital \"but I don't think I even needed to come into the hospital in the first place so explain that to me.\" He feels that the team's explanations thus far have not made any sense \"it's like you're telling me things and none of it is funneling upwards. Patient left the room after author repeated that he is not discharging today.\"    ROS: Endorsed dry mouth and msk pain though declined prn offers for these. ROS otherwise as above          Allergies:     Allergies   Allergen Reactions     Penicillins      Rash, Generalized            Psychiatric Examination:   /89 (BP Location: Right arm)   Pulse 72   Temp 97.8 " " F (36.6  C) (Oral)   Resp 16   Ht 1.727 m (5' 8\")   Wt 73.8 kg (162 lb 12.8 oz)   SpO2 97%   BMI 24.75 kg/m    Weight is 162 lbs 12.8 oz  Body mass index is 24.75 kg/m .    MENTAL STATUS EXAM   Appearance: Initially approached and interviewed in the milieu. Normal gait. Appears adequately groomed in no acute distress.  Attitude:  cooperative and engaged  Psychomotor:  no evidence of tics, dystonia, or tardive dyskinesia  Eye Contact: mostly appropriate, occasionally intense  Speech:  fluent English, normal tone and normal rate  Mood: \"turbulent\"  Affect:  Appears anxious  Thought Content: paranoid  Thought Process: tangential, loosening of associations  Sensorium: awake and endorses auditory hallucinations  Cognition: memory grossly intact  Impulse control: fair  Insight: poor - does acknowledge some MH symptoms though minimizes severity and not acknowledge circumstances leading up to hospitalization  Judgment: questionable - does not acknowledge benefit of medication adjustments thus far         Labs:     No results found for this or any previous visit (from the past 24 hour(s)).     Assessment    Principal Diagnosis:   # Schizophrenia     Diagnostic Impression:   Jerel Day is a 25 year old single White male previously diagnosed with schitzophrenia who was admitted with visual and auditory hallucinations in the context of medication non-adherence. Current psychosocial stressors include chronic mental health issues which he has been coping with by using substances, most recently heavy cannabis use.  Patient's support system includes family and peers, and the patient does have an ACT team. Biological contributions to mental health presentation include genetic loading from family history of psychosis and concurrent substance use.                 The patient's presentation is consistent with previously diagnosed schizophrenia. Given recent substance use and improvement since cannabis cessation in the " hospital, substance induced psychosis could also be contributing to his current presentation.     Psychiatric Hospital course:   Jerel Day was admitted to [facility] on a 72 hour hold. His PTA Paliperidone invega sustenna 234 mg HO & benadryl were continued. PTA p.o. Paliperidone Invega was held and replaced by olanzapine as the patient continuously declined the Paliperidone and endorsed having good therapeutic results from the olanzapine, namely sleep and with his disorganized thought. As of 6/2 has had improvement in organization though continued paranoia as well as some thought-blocking and tangential speech; he acknowledges improved sleep and thinking since being in the hospital though lacks insight into why he is still hospitalized and declining further medication adjustments at this time. Olanzapine increased to 15mg at bedtime on 6/3 which patient was hesitant about though agreeable to trying and tolerated well. Clark filed 6/4 after discussions between inpatient and ACT treatment teams concerning Jerel's historic medication noncompliance.     Discontinued Medications (& Rationale):  Invega sustenna p.o. as patient has expressed desire to try a different antipsychotic     Medical course   The patient was medically cleared for admission to [facility]; no medical problems arose during hospitalization.     Data:   UTox on admission positive for cannabinoids upon admission      Consults:   Pharmacy inpt consult for medication reconciliation assistance on 5/26/21.     Plan     Today's Changes:  - no medication changes  - Clark completed  - attempted to update family, left voicemail     Scheduled Psych Meds:  - olanzapine 15 mg at bedtime for sleep   - Paliperidone Invega Sustenna 234 mg IM every 28 days   - diphenhydramine 50 mg at bedtime     PRN Medications:  - acetaminophen, alum & mag hydroxide-simethicone, artificial saliva, sore throat lozenge, eucerin, gabapentin, melatonin, nicotine, nicotine,  OLANZapine **OR** OLANZapine, senna-docusate    Patient will be treated in therapeutic milieu with appropriate individual and group therapies as described.    Medical diagnoses to be addressed this admission:    # dry mouth   - artificial saliva   - cepacol lozenges     Legal Status:    Orders Placed This Encounter      Legal status Patient is Committed    Safety Assessment:   Behavioral Orders   Procedures     Code 1 - Restrict to Unit     Routine Programming     As clinically indicated     Self Injury Precaution     Status 15     Every 15 minutes.     Suicide precautions     Patients on Suicide Precautions should have a Combination Diet ordered that includes a Diet selection(s) AND a Behavioral Tray selection for Safe Tray - with utensils, or Safe Tray - NO utensils         Disposition: 6-9 days; pending stabilization & development of a safe discharge plan.  possibly to home with ACT team in place     Queta Estevez MS3  _________________________________________________________________    This patient was seen and discussed with my attending physician.      ---------------------------------------------------------------------------------------------------------------------  I was present with the medical student who participated in the service and in the documentation of the note. I have verified the history and personally performed the physical exam and medical decision making. I agree with the assessment and plan of care as documented in the note.    Shauna David MD  PGY-1, Psychiatry       This patient has been seen and evaluated by me, Vitaly Godoy.  I have discussed this patient with the psychiatry resident and I agree with the findings and plan in this note.    I have reviewed today's vital signs, medications, labs and imaging.     Vitaly Antonio MD on 6/7/2021 at 10:40 PM

## 2021-06-04 NOTE — PLAN OF CARE
"\"I'm the same.\" Writer asked patient about depression, \"it's higher the longer I stay here.\" Anxiety is \"neutral. I've heard voices the last half hour, they're medium to nice.\" States has visual hallucinations. Patient said thoughts are baseline clear. Has difficulty with focusing which patient states is worse. Did not attend groups today, \"yesterday I did.\" States is social with peers.   "

## 2021-06-04 NOTE — PLAN OF CARE
Assessment/Intervention/Current Symtoms and Care Coordination  Psychosis, cannabis dependence.  Med refusal.    Discharge Plan or Goal  TBD    Barriers to Discharge   Pt is very ill.  Pt refusing medicine, not making progress.  Waiting for Eduardo paperwork to be completed by Larissa.    Referral Status  No referrals at this time.    Legal Status  Committed, but need Clark.

## 2021-06-04 NOTE — PLAN OF CARE
"  Problem: Sleep Disturbance  Goal: Adequate Sleep/Rest  Outcome: No Change     Problem: Psychotic Symptoms  Goal: Psychotic Symptoms  Description: Signs and symptoms of listed problems will be absent or manageable.  Outcome: No Change  Flowsheets (Taken 6/4/2021 0456)  Psychotic Symptoms Assessed:    thought process    affect    mood    anxiety  Psychotic Symptoms Present:    thought process    sleep    affect    speech    mood    orientation    anxiety    memory deficits     Problem: Psychotic Symptoms  Goal: Social and Therapeutic (Psychotic Symptoms)  Description: Signs and symptoms of listed problems will be absent or manageable.  Recent Flowsheet Documentation  Taken 6/4/2021 0456 by Yue Casanova RN  Psychotic Symptoms Assessed:    thought process    affect    mood    anxiety  Psychotic Symptoms Present:    thought process    sleep    affect    speech    mood    orientation    anxiety    memory deficits   Observed wakeful and visible on the unit as shift commenced, Tolerating fluids w/ minimal conversation w/staff initially.  Assured of staff availablility to assist w/ any unmet needs.  Approached  this staff just before 0100 endorsing disorganization,  impaired and sometimes delayed  thought processes:  \"all these thoughts keep coming in from Dr. Saba who put them in my head\".  Denies but appears to be experiencing hallucatory activity. Describes pain in his head as \"actual creases in the middle of my head which go  all the way down my spine  and it causes my equilibrium to shift\".  Declines zyprexa when offered and encouraged and stated that he would only be willing to take tylenol.  No irritability or agitation noted at this time.  Able to return to sleep but unable to remain asleep till after approx 0345. Verbalized that tylenol helped.    Observed to have slept for approx 5 hrs overnight.  Safe, therapeutic, supportive environment maintained.   "

## 2021-06-05 PROCEDURE — 250N000013 HC RX MED GY IP 250 OP 250 PS 637: Performed by: STUDENT IN AN ORGANIZED HEALTH CARE EDUCATION/TRAINING PROGRAM

## 2021-06-05 PROCEDURE — 124N000002 HC R&B MH UMMC

## 2021-06-05 RX ADMIN — OLANZAPINE 15 MG: 15 TABLET, FILM COATED ORAL at 20:33

## 2021-06-05 RX ADMIN — GABAPENTIN 100 MG: 100 CAPSULE ORAL at 12:01

## 2021-06-05 RX ADMIN — GABAPENTIN 100 MG: 100 CAPSULE ORAL at 15:59

## 2021-06-05 RX ADMIN — Medication 2 SPRAY: at 12:32

## 2021-06-05 ASSESSMENT — ACTIVITIES OF DAILY LIVING (ADL)
HYGIENE/GROOMING: INDEPENDENT
ORAL_HYGIENE: INDEPENDENT
DRESS: SCRUBS (BEHAVIORAL HEALTH);INDEPENDENT

## 2021-06-05 NOTE — PLAN OF CARE
Problem: Psychotic Symptoms  Goal: Psychotic Symptoms  Description: Signs and symptoms of listed problems will be absent or manageable.  Outcome: No Change   Pt states he states he still has voices but they are not bothersome . He states he worries about being si dependent on his parents financially , that he does not have a job . Pt had a short visit with his dad  which he said was not very good as father was upset about fraud today on his banking account

## 2021-06-05 NOTE — PLAN OF CARE
Problem: Psychotic Symptoms  Goal: Psychotic Symptoms  Description: Signs and symptoms of listed problems will be absent or manageable.  Outcome: No Change   Pt states he is hearing voices telling him to get a cat eye marble because it will give him electrical energy . Pt requests Gabapentin for anxiety

## 2021-06-05 NOTE — PLAN OF CARE
"  Problem: Psychotic Symptoms  Goal: Psychotic Symptoms  Description: Signs and symptoms of listed problems will be absent or manageable.  Outcome: No Change  Flowsheets (Taken 6/5/2021 1013)  Psychotic Symptoms Assessed: affect   \"I'm okay, I feel a little bleak, My outlook feels kind of bleak  since there's no doctor here it doesn't feel like I will be able to get out at the right time.\" Denies depression, states there is \"a little\" anxiety. Continues to have auditory and visual hallucinations. Can focus well enough to read. Did not attend community meeting. Has a blunted flat affect. Has spent most the morning lying in bed sleeping/resting. Patient was playing the guitar and keyboard in the OT room late morning.   "

## 2021-06-05 NOTE — PLAN OF CARE
Problem: Sleep Disturbance  Goal: Adequate Sleep/Rest  Outcome: Improving   Observed to have slept well for approx 7 hrs overnight w/ regular non-labored respirations and no reported or observed distress.  Safe, therapeutic environment maintained.

## 2021-06-06 PROCEDURE — G0177 OPPS/PHP; TRAIN & EDUC SERV: HCPCS

## 2021-06-06 PROCEDURE — 250N000013 HC RX MED GY IP 250 OP 250 PS 637: Performed by: STUDENT IN AN ORGANIZED HEALTH CARE EDUCATION/TRAINING PROGRAM

## 2021-06-06 PROCEDURE — H2032 ACTIVITY THERAPY, PER 15 MIN: HCPCS

## 2021-06-06 PROCEDURE — 124N000002 HC R&B MH UMMC

## 2021-06-06 RX ADMIN — CARBAMIDE PEROXIDE 6.5% 3 DROP: 6.5 LIQUID AURICULAR (OTIC) at 22:41

## 2021-06-06 RX ADMIN — OLANZAPINE 15 MG: 15 TABLET, FILM COATED ORAL at 20:53

## 2021-06-06 RX ADMIN — GABAPENTIN 100 MG: 100 CAPSULE ORAL at 15:29

## 2021-06-06 RX ADMIN — GABAPENTIN 100 MG: 100 CAPSULE ORAL at 08:15

## 2021-06-06 RX ADMIN — Medication 2 SPRAY: at 15:16

## 2021-06-06 RX ADMIN — Medication 2 SPRAY: at 10:59

## 2021-06-06 ASSESSMENT — ACTIVITIES OF DAILY LIVING (ADL)
ORAL_HYGIENE: INDEPENDENT
HYGIENE/GROOMING: INDEPENDENT
DRESS: SCRUBS (BEHAVIORAL HEALTH);INDEPENDENT

## 2021-06-06 NOTE — PLAN OF CARE
Problem: Sleep Disturbance  Goal: Adequate Sleep/Rest  Outcome: No Change   Observed to have slept well for approx 7 hrs on all ovenight rounds w/ regular non-labored respirations and no reported or observed distress.  Safe, therapeutic environment maintained.

## 2021-06-06 NOTE — PROGRESS NOTES
Jerel  attended a creative leisure group this afternoon that involved listening, taking turns, problem solving, and generating imaginative responses to prompt questions about the future. Agreeable and engaged with strange, loose comments.      06/06/21 1500   Occupational Therapy   Type of Intervention structured groups   Response Participates   Hours 1

## 2021-06-06 NOTE — PLAN OF CARE
"  Problem: Psychotic Symptoms  Goal: Psychotic Symptoms  Description: Signs and symptoms of listed problems will be absent or manageable.  Outcome: No Change   \"I feel grief. It's a little broken, my voices and how it materializes outside. Some of the voices are negative. I woke up with a bad, I got shelled right away, a lot of breaking me down too deeply. I asked for Gabapentin this morning. I see a skeleton hand go into the back of my pelvis, I go into an uncomfortable position. The visual hallucinations are running into me.\"Writer discussed with patient using the head phones and taking a Zyprexa prn for the voices. Patient did not want to do either one currently.  "

## 2021-06-07 PROCEDURE — G0177 OPPS/PHP; TRAIN & EDUC SERV: HCPCS

## 2021-06-07 PROCEDURE — 99233 SBSQ HOSP IP/OBS HIGH 50: CPT | Mod: GC | Performed by: PSYCHIATRY & NEUROLOGY

## 2021-06-07 PROCEDURE — 124N000002 HC R&B MH UMMC

## 2021-06-07 PROCEDURE — 250N000013 HC RX MED GY IP 250 OP 250 PS 637: Performed by: STUDENT IN AN ORGANIZED HEALTH CARE EDUCATION/TRAINING PROGRAM

## 2021-06-07 RX ADMIN — GABAPENTIN 100 MG: 100 CAPSULE ORAL at 10:55

## 2021-06-07 RX ADMIN — GABAPENTIN 100 MG: 100 CAPSULE ORAL at 16:58

## 2021-06-07 RX ADMIN — OLANZAPINE 15 MG: 15 TABLET, FILM COATED ORAL at 20:33

## 2021-06-07 RX ADMIN — Medication 2 SPRAY: at 11:35

## 2021-06-07 RX ADMIN — CARBAMIDE PEROXIDE 6.5% 3 DROP: 6.5 LIQUID AURICULAR (OTIC) at 12:31

## 2021-06-07 RX ADMIN — DIPHENHYDRAMINE HYDROCHLORIDE 50 MG: 50 CAPSULE ORAL at 20:33

## 2021-06-07 NOTE — PROGRESS NOTES
"  ----------------------------------------------------------------------------------------------------------  [facility], [place]   Psychiatric Progress Note  Hospital Day #12     Interim History:   The patient's care was discussed with the treatment team and chart notes were reviewed.    Sleep 7 hours (06/07/21 0600)  Scheduled Medications: took all scheduled medications as prescribed   PRN medications: biotine spray x2, gabapentin 100mg x1    Staff Report:   Pt only attended 2 group sessions on Sunday. For most of the weekend, he reported visual and auditory hallucinations to his nursing staff that interfered with focusing on tasks. Pt stated to the nursing staff that he was \"seeing a skeleton hand go into the back of my pelvis\" that was causing him back pain, and that he was hearing voices. Jerel refused gabapentin or Zyprexa PRN. This morning, he noted to his nursing staff that the voices and visual hallucinations decreased in intensity and frequency, allowing him to focus better today. Pt complained of earwax, but hadn't yet tried ear drops. Friday, Saturday, and Sunday, pt slept for 7h nightly without incident. He has a Clark hearing on Weds.      Patient Interview:   Jerel PALOMO Shay was interviewed in the conference room. Jerel reports still hearing voices that are 'ok but vibrant, some challenge him deeper than others. They reach into my thermal conductivity.' He thinks that the voices are becoming 'more powerful but I'm not trying to yank them out of my brain.'       Pt thinks he is 'doing better and ready to test himself in the outside world.' He feels like he isn't using right words and states that he sometimes 'uses difficult words and maybe wasn't making a strong argument today.' Pt asked about  discharge to home and knows he will have an ACT team.    He asked about Clark and feels as though he's \"being blown apart by meds.\" When asked if he thinks meds are helpful, he " "says\"sometimes they are helpful.\" Later, in response to suggestions of his improvements, he said \"I don't feel like I'm getting better here.\" Pt states that he is \"groggy with drugs and doesn't want to be sedated because he likes how he can think without them.\"    Pt reports continued back pain. The gabapentin first dose is 'aggravating' and he thinks it 'helped with pain but also slipped a lot of stuff and unraveled it.' He agreed to get scheduled gabapentin for back pain, but thinks it makes his \"thoughts change and focus on past relationships which is hard to reflect on.\"    When asked about his long-term goals, he responded, 'to get better.' He \"doesn't know where his next steps will take me.\" He stated that he wants to go back to his apartment and look for a job, but \"doesn't know if that will work.  He also asked where he \"can get help in the community for trauma he's experienced\" and expressed interest in non-traditional treatments such as ECT, chiropractor, and \"gene therapy\". Later stated, \"I don't really want [ECT] anymore. I don't want my memory zapped.\"    When asked about visit with mom, described as \"fine, three days ago was better, yesterday's visit was just ok.\"  Yesterday, \"I couldn't aim my heart at my mom.\"    Phone conversation with Mesha (mom) at noon today, on 6/7:  Mom explained circumstances of Jerel's admission. She saw Jerel 4d before his admit and didn't notice any symptoms. He 'seemed good' and 'the best he's been in a long time.' Saturday night, she received a text from Jerel's friend that he needed to go to the hospital. His friend reported that he stared into space, wouldn't respond to questions or was delayed, and had inappropriate speech. He also hadn't been sleeping well. Jerel's friend didn't think that Jerel had taken any new drugs and would have confided with him if he had. Mom states that he has taken marijuana for a long time, and isn't sure if his marijuana intake/source has " "changed.    The next day, mom inspected Jerel's weekly pill box and it looked like he had skipped several doses of his haldol (2.5mg) in the week previous to the current hospitalization. He had only taken 2x 2.5mg doses in 2 wks. Mom is concerned about Jerel's non-compliance with his medications and thinks that Jerel is more likely to take long-acting injectables. She recalls that on 5/17, following anxiety about his Paliperidone injection, Jerel fainted. She is concerned that Jerel may not be eager to take that medication again.    Mom reported that yesterday, during her visit, Jerel seemed 'happy overall' and was thinking about how to make money and sustain himself after leaving the hospital. She reported that he would regularly zone out, perhaps as she smiled or laughed, and guessed he was hearing voices. She stated that 50% of his conversation was 'appropriate' and the other 50% of his speech contained 'words that didn't quite make sense.' She guesses that the organization of his speech hasn't changed significantly since he was admitted.      ROS: Endorsed dry mouth and msk pain though declined prn offers for these. ROS otherwise as above          Allergies:     Allergies   Allergen Reactions     Penicillins      Rash, Generalized            Psychiatric Examination:   /85   Pulse 84   Temp 98  F (36.7  C) (Oral)   Resp 16   Ht 1.727 m (5' 8\")   Wt 73.8 kg (162 lb 12.8 oz)   SpO2 96%   BMI 24.75 kg/m    Weight is 162 lbs 12.8 oz  Body mass index is 24.75 kg/m .    MENTAL STATUS EXAM   Appearance: Initially approached and interviewed in the milieu. Appears adequately groomed in no acute distress. Pt wearing scrubs, shaved head.  Gait: Normal  Attitude:  cooperative and engaged  Psychomotor:  no evidence of tics, dystonia, or tardive dyskinesia  Eye Contact: mostly appropriate, occasionally intense  Speech:  fluent English, normal tone and normal rate   Language: frequently uses metaphores, scientific " vocabulary, difficult to follow due to unusual word choice  Mood: ok  Affect:  Appears less anxious, comfortable  Thought Content: somatic perseverations, no expressed SI/HI  Thought Process: tangential, loosening of associations  Sensorium: awake and endorses auditory hallucinations and visual hallucinations  Cognition: memory grossly intact  Impulse control: fair  Insight: poor - does acknowledge some MH symptoms though minimizes severity and not acknowledge circumstances leading up to hospitalization  Judgment: limited- does not acknowledge benefit of medication adjustments thus far         Labs:     No results found for this or any previous visit (from the past 24 hour(s)).     Assessment    Principal Diagnosis:   # Schizophrenia     Diagnostic Impression:   Jerel Day is a 25 year old single White male previously diagnosed with schitzophrenia who was admitted with visual and auditory hallucinations in the context of medication non-adherence. Current psychosocial stressors include chronic mental health issues which he has been coping with by using substances, most recently heavy cannabis use.  Patient's support system includes family and peers, and the patient does have an ACT team. Biological contributions to mental health presentation include genetic loading from family history of psychosis and concurrent substance use.                 The patient's presentation is consistent with previously diagnosed schizophrenia. Given recent substance use and improvement since cannabis cessation in the hospital, substance induced psychosis could also be contributing to his current presentation.     Psychiatric Hospital course:   Jerel Day was admitted to [facility] on a 72 hour hold. His PTA Paliperidone invega sustenna 234 mg HO & benadryl were continued. PTA p.o. Paliperidone Invega was held and replaced by olanzapine as the patient continuously declined the Paliperidone and endorsed having good therapeutic  results from the olanzapine, namely sleep and with his disorganized thought. As of 6/2 has had improvement in organization though continued paranoia as well as some thought-blocking and tangential speech; he acknowledges improved sleep and thinking since being in the hospital though lacks insight into why he is still hospitalized and declining further medication adjustments at this time. Olanzapine increased to 15mg at bedtime on 6/3 which patient was hesitant about though agreeable to trying and tolerated well. Clark filed 6/4 after discussions between inpatient and ACT treatment teams concerning Jerel's historic medication noncompliance.     Discontinued Medications (& Rationale):  Invega sustenna p.o. as patient has expressed desire to try a different antipsychotic     Medical course   The patient was medically cleared for admission to [facility]; no medical problems arose during hospitalization.     Data:   UTox on admission positive for cannabinoids upon admission      Consults:   Pharmacy inpt consult for medication reconciliation assistance on 5/26/21.     Plan     Today's Changes:  - no medication changes  - Clark completed  - attempted to update family, left voicemail     Scheduled Psych Meds:  - olanzapine 15 mg at bedtime for sleep   - Paliperidone Invega Sustenna 234 mg IM every 28 days   - diphenhydramine 50 mg at bedtime     PRN Medications:  - acetaminophen, alum & mag hydroxide-simethicone, artificial saliva, sore throat lozenge, carbamide peroxide, eucerin, gabapentin, melatonin, nicotine, nicotine, OLANZapine **OR** OLANZapine, senna-docusate    Patient will be treated in therapeutic milieu with appropriate individual and group therapies as described.    Medical diagnoses to be addressed this admission:    # dry mouth   - artificial saliva   - cepacol lozenges     Legal Status:    Orders Placed This Encounter      Legal status Patient is Committed  Lcark pending, hearing on Weds.    Safety  Assessment:   Behavioral Orders   Procedures     Code 1 - Restrict to Unit     Routine Programming     As clinically indicated     Self Injury Precaution     Status 15     Every 15 minutes.     Suicide precautions     Patients on Suicide Precautions should have a Combination Diet ordered that includes a Diet selection(s) AND a Behavioral Tray selection for Safe Tray - with utensils, or Safe Tray - NO utensils         Disposition: 6-9 days; pending stabilization & development of a safe discharge plan.  possibly to home with ACT team in place     Queta GRAJEDA Zenaida MS3  _________________________________________________________________  I was present with the medical student who participated in the service and in the documentation of the note. I have verified the history and personally performed the physical exam and medical decision making. I agree with the assessment and plan of care as documented in the note. - Queta Wayne    This patient was seen and discussed with the attending physician.    Queta Wayne MD   PGY-2 Psychiatry    This patient has been seen and evaluated by me, Vitaly Godoy.  I have discussed this patient with the psychiatry resident and I agree with the findings and plan in this note.    I have reviewed today's vital signs, medications, labs and imaging.     Vitaly Antonio MD on 6/7/2021 at 10:36 PM

## 2021-06-07 NOTE — PLAN OF CARE
"  Problem: Psychotic Symptoms  Goal: Psychotic Symptoms  Description: Signs and symptoms of listed problems will be absent or manageable.  Outcome: Improving   \"I'm a little bit better. I'm thinking through things, emotions and mood.\" Denies depression, anxiety and racing thoughts. States the voices and visual hallucinations have improved, both are less frequent and less intense. Attends more then half the groups. States is social with peers. Writer has not seen patient interact with peers. Went to one group this morning. Has been in room the rest of the morning.   "

## 2021-06-07 NOTE — PLAN OF CARE
BEHAVIORAL TEAM DISCUSSION    Participants: ANNELISE Crump, Vitaly Godoy MD, Queta Campos MD, Lesly Estrella, RN  Progress: Psychotic symptoms have decreased.  Anticipated length of stay: 5-10 days  Continued Stay Criteria/Rationale: Pt continues to stabilize and may discharge on 5/8/21.  Medical/Physical: None reported.  Precautions:   Behavioral Orders   Procedures     Code 1 - Restrict to Unit     Routine Programming     As clinically indicated     Self Injury Precaution     Status 15     Every 15 minutes.     Suicide precautions     Patients on Suicide Precautions should have a Combination Diet ordered that includes a Diet selection(s) AND a Behavioral Tray selection for Safe Tray - with utensils, or Safe Tray - NO utensils       Plan: Medication Management, Pt is committed now waiting for Clark proceedings.  Rationale for change in precautions or plan: No changes.      I have reviewed and agree with the plan of care as written above.    Vitaly Antonio MD on 6/7/2021 at 10:38 PM

## 2021-06-07 NOTE — PLAN OF CARE
"Pt attended 1 of 3 OT groups today. Pt participated in OT clinic IND, where he initiated a chosen project (painting bags), followed through with plan, and asked for support with supplies as needed. Pt reports he's doing \"fair\" and that the next hearing is having a negative impact on his mood.     "

## 2021-06-07 NOTE — PLAN OF CARE
Assessment/Intervention/Current Symtoms and Care Coordination    Called [facility] Pre-petition Kalyn [phone]  to confirm their receipt of Clark Petition.  They have it but no court date is set.    ReEntry ACT Team [phone]    He is doing better, reduced psychotic symptoms.    Discharge Plan or Goal  Pending Clark hearing.    Barriers to Discharge   Not stable and waiting for legal procedure.    Referral Status  Pending    Legal Status  Committed

## 2021-06-07 NOTE — PLAN OF CARE
Problem: Psychotic Symptoms  Goal: Psychotic Symptoms  Description: Signs and symptoms of listed problems will be absent or manageable.  Outcome: Improving   Pt states he is still hearing voices but at times he thinks they are just his thoughts. Visited by his mother that went well . Complains of ear pain and ear wax , order obtained for Debrox

## 2021-06-07 NOTE — PLAN OF CARE
Problem: Sleep Disturbance  Goal: Adequate Sleep/Rest  Outcome: Improving   Pt appeared asleep on all safety checks,respirations easy,pt appeared to sleep 7 hours

## 2021-06-07 NOTE — PLAN OF CARE
"Music Therapy Group note    Total time in session: 45 minutes     Number of patients in group: 6    Scope of service: Music Therapy Instrument Clinic     Patient progress: initial encounter this admission     Patient response/reaction to treatment intervention(s): Jerel expressed excitement to see writer, whom he recognized from previous admission.  Was tangential in his speech, talking about \"my brother and the IT movie\"... and other loosely associated topics that were difficult to follow fully.  He participated in instrument intervention, taking self-breaks at times.    Tara Disla, [facility]  Board-Certified Music Therapist           "

## 2021-06-08 PROCEDURE — 250N000013 HC RX MED GY IP 250 OP 250 PS 637: Performed by: STUDENT IN AN ORGANIZED HEALTH CARE EDUCATION/TRAINING PROGRAM

## 2021-06-08 PROCEDURE — 99232 SBSQ HOSP IP/OBS MODERATE 35: CPT | Mod: GC | Performed by: PSYCHIATRY & NEUROLOGY

## 2021-06-08 PROCEDURE — G0177 OPPS/PHP; TRAIN & EDUC SERV: HCPCS

## 2021-06-08 PROCEDURE — 124N000002 HC R&B MH UMMC

## 2021-06-08 RX ADMIN — DIPHENHYDRAMINE HYDROCHLORIDE 50 MG: 50 CAPSULE ORAL at 21:27

## 2021-06-08 RX ADMIN — CARBAMIDE PEROXIDE 6.5% 3 DROP: 6.5 LIQUID AURICULAR (OTIC) at 22:23

## 2021-06-08 RX ADMIN — CARBAMIDE PEROXIDE 6.5% 3 DROP: 6.5 LIQUID AURICULAR (OTIC) at 16:20

## 2021-06-08 RX ADMIN — OLANZAPINE 15 MG: 15 TABLET, FILM COATED ORAL at 21:27

## 2021-06-08 RX ADMIN — GABAPENTIN 100 MG: 100 CAPSULE ORAL at 15:33

## 2021-06-08 RX ADMIN — GABAPENTIN 100 MG: 100 CAPSULE ORAL at 22:23

## 2021-06-08 ASSESSMENT — ACTIVITIES OF DAILY LIVING (ADL)
HYGIENE/GROOMING: INDEPENDENT
DRESS: SCRUBS (BEHAVIORAL HEALTH)
DRESS: SCRUBS (BEHAVIORAL HEALTH);INDEPENDENT
ORAL_HYGIENE: INDEPENDENT
ORAL_HYGIENE: INDEPENDENT
HYGIENE/GROOMING: INDEPENDENT

## 2021-06-08 NOTE — PLAN OF CARE
"Assessment/Intervention/Current Symptoms and Care Coordination  - chart review  - team meeting  - post team rounds team meeting / discussion  - met with patient individually to discuss care ongoing and per his request also brought phone number for his . Attempted to discuss with him that a urbano request / petition - he was upset saying he shouldn't be held here if no urbano - attempted to explain that commitment order is what holds him here - he made statement asking if \"can you use induction\" \"inductive reasoning\"  This writer did give him a copy of his most recent commitment order - met with him at a table in the unge - when reading it at one point he became upset and slammed his closed fist on the table saying that people are lying to him. Spoke about being forced medications as he thought he had a Urbano \"it made me faint and now I have it in my system\". At one point when this writer attmped to talk to him further about Urbano order being for medications he stated in agitated tone \"you aren't the doctor are you?\"  This writer encouraged him to continue to talk to his  who he had been on the phone with prior to our talking at the table. His  called back while we were in the lounge and he took the call.       Discharge Plan or Goal  assessment ongoing for level of care for discharge - home to apartment with frequent ACT visits or IRTS    Will continue with ReEntry ACT team       Barriers to Discharge   Symptoms of psychosis present requiring ongoing medication management.     Referral Status  No current referrals      Legal Status  Committed     Urbano petition submitted to [facility] -    patient will require a provisional Discharge Agreement and Change of Status to be completed and submitted to [facility]  at discharge from this hospital .     "

## 2021-06-08 NOTE — PLAN OF CARE
"\"I'm kind of hearing trauma from my past. My thoughts are penetrating really deep, things I don't want to think about. Currently denies visual hallucinations. Is paranoid about \"the government out to get me and that I did something wrong that I didn't do and I'll get in trouble for it.\" Denies depression and anxiety. Attends some groups. Patient agreed to try headphones to help with thoughts and voices. Patient listened to the headphones and told writer he was hearing some of the same bad thoughts he was thinking this morning.  "

## 2021-06-08 NOTE — PLAN OF CARE
"  Problem: Sleep Disturbance  Goal: Adequate Sleep/Rest  Outcome: No Change   Observed to have slept well for approx 6.5 hrs on all Q 15\" rounds w/ regular non-labored respirations and no reported or observed distress.  Up once for po fluids, acknowledged staff appropriately then readily ret'd to bed/seep.  Safe, therapeutic environment maintained.   "

## 2021-06-08 NOTE — PLAN OF CARE
Problem: Psychotic Symptoms  Goal: Psychotic Symptoms  Description: Signs and symptoms of listed problems will be absent or manageable.  Outcome: Improving  Flowsheets (Taken 6/5/2021 1013 by Lesly Estrella RN)  Psychotic Symptoms Assessed: affect   Pt states he has voices but they are not bothering him and he feels he can handle them . Pleasant , calm , social , med compliant

## 2021-06-08 NOTE — PLAN OF CARE
"Pt up in lounge at start of shift. Pt visible in milieu throughout shift playing the keyboard. Attended group. Cooperative with medications and assessment. Pt states he is \"upset\" with his doctor and  r/t Eduardo that was filed. States that he doesn't want to be on any medications; denies effectiveness when asked if he feels any of them are working. Flat affect. Denies VH, SI, pain. When asked about anxiety, stated that he took a prn for that this afternoon. Endorses  stating that he hears someone from court and \"radio static.\"    1620 Prn debrox administered per pt request.   2100 Pt states his visit with his dad went well. States that he is more supportive now than he was in the beginning. Pt has been reading in his room for the past couple hours.   2223 Prn gabapentin administered per request for anxiety. Prn debrox administered.    Problem: Psychotic Symptoms  Goal: Psychotic Symptoms  Description: Signs and symptoms of listed problems will be absent or manageable.  Outcome: No Change     "

## 2021-06-08 NOTE — PROGRESS NOTES
"  ----------------------------------------------------------------------------------------------------------  [facility], [place]   Psychiatric Progress Note  Hospital Day #13     Interim History:   The patient's care was discussed with the treatment team and chart notes were reviewed.    Sleep 6.5 hours (06/08/21 0600)  Scheduled Medications: took all scheduled medications as prescribed   PRN medications: biotine spray x2, gabapentin 100mg x1    Staff Report:   -- PT attended 1/3 OT groups and spent the rest of the day in his room. Reported doing 'fair' and that his next hearing is having a negative impact on his mood.  -- Denied depression, anxiety, or racing thoughts. Stated that his auditory/visual hallucinations have improved in frequency and intensity.   --Pt slept well for 6.5h, waking up once to drink.    Patient Interview:   Jerel was interviewed in the conference room. Pt stated that his sleep was \"not great.\" He endorsed hearing voices for about 4h last night making it difficult to fall asleep. Patient isn't sure how much his Zyprexa is working, says his \"energy is knocked down but he is still hearing the voices.\"  Pt states \"ACT team is enough on my plate, and my voices tell me that my doctor is a psychopath.\" When asked if the voices are true, patient says \"yes I think they're trying to help me because I haven't liked working with Dr. Thornton\" and then acknowledges \"the voices are a part of my brain that is michael soupy.\" Pt says meds \"don't keep me out of the hospital.\"  He states he views insurance as taking advantage of him and that the \"doctors want to deduce that meds are helping but I'll just keep reiterating that they don't help\" \"I don't think the meds are good for me\" others think the meds help because \" they work for others so doctors think they work on me but they don't\" \"my rhythm and clock are off, even in my home danielle melted in my home taking meds, felt like I was " "taking ketamine.\" Pt states that \"more meds have just pierced my GI tract and disrupted my collagen.\" \"I don't think meds are long term solutions, I've already done the long term and I think they need to go back on the shelf.\" \"Clozapine rips you apart and I don't think anyone knows that.\" \"Pedro calls me a Nazi.\"    Pt states that his back hurts. Discussed that his back still hurt and he inquired about a cane to help him keep his back steady while he walks. He agreed that the gabapentin has been \"helping a lot but it makes me feel a little foggy.\"    Pt reflected on past social situations that he knows weren't positive. Describes himself as \"conscientious but sometimes people walk all over me.\" Pt asked about discharge and when that will be.      ROS: Endorsed msk pain. ROS otherwise as above          Allergies:     Allergies   Allergen Reactions     Penicillins      Rash, Generalized            Psychiatric Examination:   /85   Pulse 84   Temp 98  F (36.7  C) (Oral)   Resp 16   Ht 1.727 m (5' 8\")   Wt 73.8 kg (162 lb 12.8 oz)   SpO2 96%   BMI 24.75 kg/m    Weight is 162 lbs 12.8 oz  Body mass index is 24.75 kg/m .    MENTAL STATUS EXAM   Appearance: Initially approached and interviewed in the milieu. Appears adequately groomed in no acute distress. Pt wearing scrubs, shaved head.  Gait: Normal  Attitude:  cooperative and engaged  Psychomotor:  no evidence of tics, dystonia, or tardive dyskinesia  Eye Contact: mostly appropriate, occasionally intense  Speech:  fluent English, normal tone and normal rate   Language: frequently uses metaphors, scientific vocabulary, difficult to follow due to unusual word choice  Mood: ok  Affect:  Appears less anxious, comfortable  Thought Content: somatic perseverations, no expressed SI/HI  Thought Process: tangential, loosening of associations  Sensorium: awake and endorses auditory hallucinations and visual hallucinations  Cognition: memory grossly intact  Impulse " control: fair  Insight: poor- does acknowledge some MH symptoms though minimizes severity and not acknowledge circumstances leading up to hospitalization  Judgment: limited- does not acknowledge benefit of anti-psychotic medication adjustments thus far. Only acknowledges that they help him sleep.         Labs:     No results found for this or any previous visit (from the past 24 hour(s)).     Assessment    Principal Diagnosis:   # Schizophrenia     Diagnostic Impression:   Jerel Day is a 25 year old single White male previously diagnosed with schitzophrenia who was admitted with visual and auditory hallucinations in the context of medication non-adherence. Current psychosocial stressors include chronic mental health issues which he had been coping with by using substances, most recently heavy cannabis use.  Patient's support system includes family and peers, and the patient does have an ACT team. Biological contributions to mental health presentation include genetic loading from family history of psychosis and concurrent substance use.                 The patient's presentation is consistent with previously diagnosed schizophrenia. Given recent substance use and improvement since cannabis cessation in the hospital, substance induced psychosis could also be contributing to his current presentation. Jerel's made much improvement since his admission to the hospital. He appears to have more organized speech and more linear thought process. While pt still endorses hearing voices, he no longer states that they are telling him to kill himself. He also spoke of his improving back pain with fewer abstract metaphors and explained that the gabapentin has been working. In the last two days, Jerel appears more aware of his mental health, including his auditory delusions. He acknowledges that he hears voices and that they have made him feel angry. However, he states that the voices are telling him that his mental health team,  including psychiatrist, are not trying to help him. This may present a barrier to medication compliance after discharge.  Additionally, yesterday, mother suggested that medication compliance is big issue for Jerel and that he tends to do better with long-acting injectable anti-psychotics. The goal is to discharge Jerel to his home on [address]  with support of the ACT team.        Psychiatric Hospital course:   Jerel Day was admitted to [facility] on a 72 hour hold. His PTA Paliperidone invega sustenna 234 mg HO & benadryl were continued. PTA p.o. Paliperidone Invega was held and replaced by olanzapine as the patient continuously declined the Paliperidone and endorsed having good therapeutic results from the olanzapine, namely sleep and with his disorganized thought. As of 6/2 has had improvement in organization though continued paranoia as well as some thought-blocking and tangential speech; he acknowledges improved sleep and thinking since being in the hospital though lacks insight into why he is still hospitalized and declining further medication adjustments at this time. Olanzapine increased to 15mg at bedtime on 6/3 which patient was hesitant about though agreeable to trying and tolerated well. Clark filed 6/4 after discussions between inpatient and ACT treatment teams concerning Jerel's historic medication noncompliance.     Discontinued Medications (& Rationale):  Oral paliperidone     Medical course   The patient was medically cleared for admission to [facility]; no medical problems arose during hospitalization.     Data:   UTox on admission positive for cannabinoids upon admission      Consults:   Pharmacy inpt consult for medication reconciliation assistance on 5/26/21.     Plan     Today's Changes:  - no medication changes  - Clark completed  - attempted to update family, left voicemail     Scheduled Psych Meds:  - olanzapine 15 mg at bedtime for sleep   - Paliperidone Invega Sustenna 234 mg IM  every 28 days   - diphenhydramine 50 mg at bedtime     PRN Medications:  - acetaminophen, alum & mag hydroxide-simethicone, artificial saliva, sore throat lozenge, carbamide peroxide, eucerin, gabapentin, melatonin, nicotine, nicotine, OLANZapine **OR** OLANZapine, senna-docusate    Patient will be treated in therapeutic milieu with appropriate individual and group therapies as described.    Medical diagnoses to be addressed this admission:    # dry mouth   - artificial saliva   - cepacol lozenges     Legal Status:    Orders Placed This Encounter      Legal status Patient is Committed  Clark pending, hearing on Weds.    Safety Assessment:   Behavioral Orders   Procedures     Code 1 - Restrict to Unit     Routine Programming     As clinically indicated     Self Injury Precaution     Status 15     Every 15 minutes.     Suicide precautions     Patients on Suicide Precautions should have a Combination Diet ordered that includes a Diet selection(s) AND a Behavioral Tray selection for Safe Tray - with utensils, or Safe Tray - NO utensils         Disposition: 6-9 days; pending stabilization & development of a safe discharge plan.  possibly to home with ACT team in place     Queta Estevez MS3  _________________________________________________________________  I was present with the medical student who participated in the service and in the documentation of the note. I have verified the history and personally performed the physical exam and medical decision making. I agree with the assessment and plan of care as documented in the note. - Queta Wayne    This patient was seen and discussed with the attending physician.    Queta Wayne MD   PGY-2 Psychiatry    This patient has been seen and evaluated by me, Vitaly Godoy.  I have discussed this patient with the psychiatry resident and I agree with the findings and plan in this note.    I have reviewed today's vital signs, medications, labs and  imaging.     Vitaly Antonio MD on 6/8/2021 at 10:15 PM

## 2021-06-08 NOTE — PROGRESS NOTES
"    Group Psychotherapy note     Total time in session: 45 minutes      Number of patients in group: 3     Scope of service: Coping strategies with distress and discomfort.      Patient progress: Pt sat and participated throughout the encounter.      Patient response/reaction to treatment intervention(s): This patient participated in a group session that focused on strategies for coping with distress and discomfort. Pt reported his source of stress and discomfort is feeling as though he is not being respected by his treatment team. He discussed having a hearing that is coming up tomorrow and not wanting to take his medications, if he could change his \"Clark order.\" Pt reported he did not want to be admitted to the hospital. Pt received validation for his feelings and eedback on ways to communicate effectively with people.Pt identified music keyboard as something that helps him stay regulated. Reviewed ways to intentionally seek to understand his medical needs.    "

## 2021-06-09 ENCOUNTER — MEDICAL CORRESPONDENCE (OUTPATIENT)
Dept: HEALTH INFORMATION MANAGEMENT | Facility: CLINIC | Age: 26
End: 2021-06-09

## 2021-06-09 PROCEDURE — 250N000013 HC RX MED GY IP 250 OP 250 PS 637: Performed by: STUDENT IN AN ORGANIZED HEALTH CARE EDUCATION/TRAINING PROGRAM

## 2021-06-09 PROCEDURE — H2032 ACTIVITY THERAPY, PER 15 MIN: HCPCS

## 2021-06-09 PROCEDURE — 99233 SBSQ HOSP IP/OBS HIGH 50: CPT | Mod: GC | Performed by: PSYCHIATRY & NEUROLOGY

## 2021-06-09 PROCEDURE — 124N000002 HC R&B MH UMMC

## 2021-06-09 RX ADMIN — GABAPENTIN 100 MG: 100 CAPSULE ORAL at 19:08

## 2021-06-09 RX ADMIN — OLANZAPINE 15 MG: 15 TABLET, FILM COATED ORAL at 21:06

## 2021-06-09 RX ADMIN — GABAPENTIN 100 MG: 100 CAPSULE ORAL at 13:02

## 2021-06-09 RX ADMIN — DIPHENHYDRAMINE HYDROCHLORIDE 50 MG: 50 CAPSULE ORAL at 21:06

## 2021-06-09 RX ADMIN — Medication 2 SPRAY: at 16:46

## 2021-06-09 NOTE — PLAN OF CARE
Assessment/Intervention/Current Symptoms and Care Coordination  - chart review  - team meeting  - post team rounds team meeting / discussion  - Message left with ACT team RN and lead worker Noemy regarding targeted / tentative plan for discharge / Provisional Discharge on Monday. Asked for clarification as to how they would like any medications ordered, discussion of increased visits from ACT team upon discharge.. Also discussed need to coordinate for completion of Provisional Discharge Agreement. (see Psychiatry Progress note as team also reached out to ACT team RN - Noemy)   - spoke with patient's mother today - she reports that he did call his dad very upset yesterday regarding court process/ urbano. When his dad visited later this did not come up in conversation and he appeared more calm. She reports that they do think he is improving since admission. Discussed with her that the hearing for [place] will likely occur after discharge if he continues to improve and demonstrate willingness to take medications allowing for a discharge with provisional discharge to occur.  When discussing targeted plan for Monday she agrees it seems he is improving and if he is willing to continue with his ACT team in the community can be a safe plan. Discussed with her that we will ensure the ACT team has the information for the future hearing on [place]. She does have concerns that he has historically been non medication compliant without the court order in place and also is hopefull that he will continue with current plan. We also spoke about utilization of CSP program near his current apartment. She is hoping that he might be interested in this, I told her I will talk to the ACT team as well about the CSP and discuss with patient - currently Jeerl lives very close to [facility]. Mom is hopeful he may want to engage in this option, says she once told him she had heard about CSPs sometimes having cheap or free food which  "was of interest to him.   She also mentioned that he continues to verbalize being upset that feels no one is looking into if he has TD saying that no one has tested him (is what he is telling Mom and Dad). She asked if team thought it would be helpful to discuss with him further how this is assessed as seems he is thinking there is testing beyond ongoing assessment by providers.  - met with patient individually in the afternoon. He says he is hoping he can discharge tomorrow or Friday because the weekend aren't helpful here. Makes some odd statements about thinking maybe his \"DNA up here gets scrambled\" (pointing to his neck area). Says he thinks at times the doctors are too quick to \"be clairvoyant\" in regards to thinking he needs to be in the hospital. No signs of agitation present today - including when given feedback that targeted plan is for Monday.       Discharge Plan or Goal  will discharge home to apartment with ongoing services through ACT Team    ReEntry ACT Team    Tentative plan / targeted date of Monday 6/14/21    Barriers to Discharge   medication management/ assessment ongoing -   Coordination of safe discharge disposition including clarification of ability for increased visits from ACT at discharge    Referral Status  No new referrals - will continue with ACT team - see above care coordination and discharge plan or goal sections of this note.      Legal Status    Committed ([place])    Eduardo kelley submitted to [facility] -    patient will require a Provisional Discharge Agreement and Change of Status to be completed and submitted to [facility]  at discharge from this hospital .   "

## 2021-06-09 NOTE — PROGRESS NOTES
"Pt participated in dance/movement therapy (DMT) using organizing and vitalizing movements of individual self-expression, with self-determined choices for social connection and group cohesion.      Pt movement was active in the lower body with twists and curves; he became more organized with social engagement and shared he would \"water his plants\" if he was discharged today.  His plans upon discharge were mostly organized with some disorganized thinking.  He also offered to play some of his own music compositions on a synthesizer that were very discordant and abstract.         06/09/21 1130   Dance Movement Therapy   Type of Intervention structured groups   Hours 1     "

## 2021-06-09 NOTE — PROGRESS NOTES
"  ----------------------------------------------------------------------------------------------------------  [facility], [place]   Psychiatric Progress Note  Hospital Day #14     Interim History:   The patient's care was discussed with the treatment team and chart notes were reviewed.    Sleep 7 hours (06/09/21 0600)  Scheduled Medications: took all scheduled medications as prescribed   PRN medications: biotine spray x2, gabapentin 100mg x1    Staff Report:    Yesterday, Jerel participated in a group session, during which he reported that his source of stress is that he is not being respected by his treatment team. He expressed his desire to stop taking his medications and leave the hospital. Pt is upset that Clark was filed.  had conversation yesterday with Jerel about his Clark, which caused him to become angry and agitated.    Pt reported to nursing staff that he was 'hearing trauma from his past.' Pt experienced paranoia that 'the government was out to get me and that I did something wrong that I didn't do and I'll get in trouble for it.'   Pt spent yesterday evening playing on the keyboard and endorced auditory hallucinations, hearing someone from court and 'radio static.' Dad visited and patient reported visit went well. He slept well for 7h without incident.    Patient Interview:   Jerel was interviewed in the conference room.   Back pain: Pt endorses continued back pain that has improved. He states that \"gabapentin helps but brings forth trauma in other ways\" and that a \"certain postures make it better or worse.\"    Today, Jerel described his mood as the \"negative form of  with negative energy that is a little more like bipolar but not exactly. I was quite angry to hear that I didn't have a hearing.\" When asked if he was still feeling angry today he said \"I will hold my cards today.\"    When asked about his rapid increase in symptoms of psychosis just prior to " "his admission, he admits that at home \"things were going well but then I got the COVID shot.\" Pt endorses regular marijuana use (smoking). States \"I get it from an old friend. Over time \"I was using it more. I gained tolerance and it helped me with pain.\" He states that he didn't recently change marijuana dealers. He \"used marijuana to aim at ways that feel tight.\" Pt also states that massage has improved his back pain. Says \"zyprexa is nice. It works better than before. I don't want to be on it for the rest of my life.\"    When interviewer said discharge is planned for Monday he said \"I don't want to be here for the weekend I don't think that will improve me.\" Seemed upset that ACT team \"was static and not helping a lot with my healthy outlook.\" Pt says that he likes the [facility] team better.  \"If they [ACT team] don't help me with side effects and I don't want them to help me with side effects. I'd prefer to self medicate with marijuana.\" Pt does not like his ACT team, specifically Dr. Thornton and Dr. Tracy: \"I think it would be figuritively hard for me to talk to Dr. Thornton\" \"It's more robotic than I'd like it to be.\" \"I'm concerned more about myself than they are about me. My understanding about my meds are that they are successful in evidence I don't think it would take more than a few hours [to get out of the hospital].\"  \"I'm maturing here. I don't want to keep maturing here. I've missed out on plans.\" He continued: \"Plans working in my favor tend to bite the dust. It leaves me quaken...weak acids or bases or amines may do this, not every week but maybe every month\" When asked to clarify, \"quaken means if I draw a graph the enzymes working in my favor may get facilitated by the environment.\"  Pt. states that he no longer needs his nicotine patches.      Collateral- spoke with ACT team member(Noemy):  They are aware of planned discharge on Monday. Waiting to hear about medications and will call Social Work back on " "Monday. Were told about Clark on 23rd.       ROS: Endorsed msk pain. ROS otherwise as above          Allergies:     Allergies   Allergen Reactions     Penicillins      Rash, Generalized            Psychiatric Examination:   /71   Pulse 70   Temp 98  F (36.7  C) (Oral)   Resp 18   Ht 1.727 m (5' 8\")   Wt 73.8 kg (162 lb 12.8 oz)   SpO2 96%   BMI 24.75 kg/m    Weight is 162 lbs 12.8 oz  Body mass index is 24.75 kg/m .    MENTAL STATUS EXAM   Appearance: Initially approached and interviewed in the milieu. Appears adequately groomed in no acute distress. Pt wearing scrubs, shaved head.  Gait: Normal  Attitude:  cooperative and engaged  Psychomotor:  no evidence of tics, dystonia, or tardive dyskinesia  Eye Contact: mostly appropriate, occasionally intense  Speech:  fluent English, normal tone and normal rate   Language: frequently uses metaphors, scientific vocabulary, difficult to follow due to unusual word choice  Mood: 'negative form of '  Affect:  Appears less anxious, comfortable  Thought Content: somatic perseverations, no expressed SI/HI  Thought Process: tangential, loosening of associations  Sensorium: awake and endorses auditory hallucinations and visual hallucinations  Cognition: memory grossly intact  Impulse control: fair  Insight: poor- does acknowledge some MH symptoms though minimizes severity and not acknowledge circumstances leading up to hospitalization  Judgment: limited- Sometimes acknowledges benefits of anti-psychotic medication adjustments thus far. Mostly acknowledges that they help him sleep.         Labs:     No results found for this or any previous visit (from the past 24 hour(s)).     Assessment    Principal Diagnosis:   # Schizophrenia     Diagnostic Impression:   Jerel Day is a 25 year old single White male previously diagnosed with schitzophrenia who was admitted with visual and auditory hallucinations in the context of medication non-adherence. Current " psychosocial stressors include chronic mental health issues which he had been coping with by using substances, most recently heavy cannabis use.  Patient's support system includes family and peers, and the patient does have an ACT team. Biological contributions to mental health presentation include genetic loading from family history of psychosis and concurrent substance use.                 The patient's presentation is consistent with previously diagnosed schizophrenia. Given recent substance use and improvement since cannabis cessation in the hospital, substance induced psychosis could also be contributing to his current presentation. Jerel's made much improvement since his admission to the hospital. He appears to have more organized speech and more linear thought process. While pt still endorses hearing voices, he no longer states that they are telling him to kill himself. He also spoke of his improving back pain with fewer abstract metaphors and explained that the gabapentin has been working. In the last two days, Jerel appears more aware of his mental health, including his auditory delusions. He acknowledges that he hears voices and that they have made him feel angry. However, he recently has endorsed that the voices are telling him that his mental health team, including psychiatrist, are not trying to help him. When told that he would likely be discharged on Monday, he was frustrated that he couldn't leave sooner and that his ACT team was going to follow him. This may present a barrier to medication compliance after discharge.  Mother has additionally suggested that medication compliance is a big issue for Jerel and that he tends to do better with long-acting injectable anti-psychotics. The goal is to discharge Jerel to his home on Monday on [address]  with support of the ACT team.    Psychiatric Hospital course:   Jerel Day was admitted to [facility] on a 72 hour hold. His PTA Paliperidone invega sustenna  234 mg HO & benadryl were continued. PTA p.o. Paliperidone Invega was held and replaced by olanzapine as the patient continuously declined the Paliperidone and endorsed having good therapeutic results from the olanzapine, namely sleep and with his disorganized thought. As of 6/2 has had improvement in organization though continued paranoia as well as some thought-blocking and tangential speech; he acknowledges improved sleep and thinking since being in the hospital though lacks insight into why he is still hospitalized and declining further medication adjustments at this time. Olanzapine increased to 15mg at bedtime on 6/3 which patient was hesitant about though agreeable to trying and tolerated well. Clark filed 6/4 after discussions between inpatient and ACT treatment teams concerning Jerel's historic medication noncompliance.     Discontinued Medications (& Rationale):  Oral paliperidone     Medical course   The patient was medically cleared for admission to [facility]; no medical problems arose during hospitalization.     Data:   UTox on admission positive for cannabinoids upon admission      Consults:   Pharmacy inpt consult for medication reconciliation assistance on 5/26/21.     Plan     Today's Changes:  - no medication changes  - Eduardo completed    Scheduled Psych Meds:  - olanzapine 15 mg at bedtime for sleep and psychosis  - Paliperidone Invega Sustenna 234 mg IM every 28 days   - diphenhydramine 50 mg at bedtime     PRN Medications:  - acetaminophen, alum & mag hydroxide-simethicone, artificial saliva, sore throat lozenge, carbamide peroxide, eucerin, gabapentin, melatonin, nicotine, nicotine, OLANZapine **OR** OLANZapine, senna-docusate    Patient will be treated in therapeutic milieu with appropriate individual and group therapies as described.    Medical diagnoses to be addressed this admission:    # dry mouth   - artificial saliva   - cepacol lozenges     Legal Status:    Orders Placed This  Encounter      Legal status Patient is Committed  Clark pending, hearing on Weds.    Safety Assessment:   Behavioral Orders   Procedures     Code 1 - Restrict to Unit     Routine Programming     As clinically indicated     Self Injury Precaution     Status 15     Every 15 minutes.     Suicide precautions     Patients on Suicide Precautions should have a Combination Diet ordered that includes a Diet selection(s) AND a Behavioral Tray selection for Safe Tray - with utensils, or Safe Tray - NO utensils         Disposition: 6-9 days; pending stabilization & development of a safe discharge plan.  possibly to home with ACT team in place     Queta GRAJEDA Zenaida MS3  _________________________________________________________________  I was present with the medical student who participated in the service and in the documentation of the note. I have verified the history and personally performed the physical exam and medical decision making. I agree with the assessment and plan of care as documented in the note. - Queta Wayne    This patient was seen and discussed with the attending physician.    Queta Wayne MD   PGY-2 Psychiatry      This patient has been seen and evaluated by me, Vitaly Godoy.  I have discussed this patient with the psychiatry resident and I agree with the findings and plan in this note.    I have reviewed today's vital signs, medications, labs and imaging.     Vitaly Antonio MD on 6/9/2021 at 11:54 PM

## 2021-06-09 NOTE — PLAN OF CARE
"  Problem: Sleep Disturbance  Goal: Adequate Sleep/Rest  Outcome: No Change   Observed to have slept well for approx 7 hrs o all Q 15\" observations w/regular non-labored respirations and no reported or observed distress.  Safe, therapeutic environment maintained.   "

## 2021-06-10 PROCEDURE — 250N000013 HC RX MED GY IP 250 OP 250 PS 637: Performed by: STUDENT IN AN ORGANIZED HEALTH CARE EDUCATION/TRAINING PROGRAM

## 2021-06-10 PROCEDURE — 99233 SBSQ HOSP IP/OBS HIGH 50: CPT | Mod: GC | Performed by: PSYCHIATRY & NEUROLOGY

## 2021-06-10 PROCEDURE — 124N000002 HC R&B MH UMMC

## 2021-06-10 PROCEDURE — 90853 GROUP PSYCHOTHERAPY: CPT

## 2021-06-10 RX ADMIN — CARBAMIDE PEROXIDE 6.5% 3 DROP: 6.5 LIQUID AURICULAR (OTIC) at 14:19

## 2021-06-10 RX ADMIN — DIPHENHYDRAMINE HYDROCHLORIDE 50 MG: 50 CAPSULE ORAL at 20:59

## 2021-06-10 RX ADMIN — ACETAMINOPHEN 650 MG: 325 TABLET, FILM COATED ORAL at 11:36

## 2021-06-10 RX ADMIN — GABAPENTIN 100 MG: 100 CAPSULE ORAL at 11:11

## 2021-06-10 RX ADMIN — OLANZAPINE 15 MG: 15 TABLET, FILM COATED ORAL at 20:59

## 2021-06-10 RX ADMIN — ACETAMINOPHEN 650 MG: 325 TABLET, FILM COATED ORAL at 18:04

## 2021-06-10 RX ADMIN — GABAPENTIN 100 MG: 100 CAPSULE ORAL at 14:15

## 2021-06-10 ASSESSMENT — MIFFLIN-ST. JEOR: SCORE: 1712.48

## 2021-06-10 ASSESSMENT — ACTIVITIES OF DAILY LIVING (ADL)
ORAL_HYGIENE: INDEPENDENT
HYGIENE/GROOMING: INDEPENDENT
DRESS: INDEPENDENT
LAUNDRY: WITH SUPERVISION

## 2021-06-10 NOTE — PLAN OF CARE
Assessment/Intervention/Current Symptoms and Care Coordination  - chart review  - team meeting  - team rounds/pt interview   - post team rounds team meeting / discussion  - coordination with ACT team see below sections       Discharge Plan or Goal  Will discharge home with ACT team services.     [facility] ([facility])  [address] [place] [address] Main ph: [phone]  Fax: [phone]    discharge medications to be sent to below pharmacy :  [facility]   [address]  [facility] [address]   Phone  [phone]      Barriers to Discharge   Medication management / finalization of safe discharge planning.       Referral Status  Ongoing coordination with ACT team - they are working to increase visits to 5-6 days of the week.     Clarified with ACT team preference for medications at discharge - they would like medications sent to pharmacy which is listed un the Discharge Plan or Goal section above    Friday 6/11/2021 -1130 am - will have telephone conference call with team.     Legal Status  Committed -    Provisional Discharge Agreement and Change of Status Form to be submitted to [facility] at Discharge.     Clark Petition Filed during this hospitalization  -   Order for Examination and Hearing (Clark) served to the unit today to patient and hospital   Wednesday June 23, 2021 - 9:00am - examination hearing to be held at 9:45am

## 2021-06-10 NOTE — PROGRESS NOTES
"Brief Note - Cross Cover    S: Notified that patient was worried he had an ear infection. He reports that he has had L ear pain x8 days that has stayed the same (no worsening/improvement). Reports that it feels similar to having water in his ear. He denies any new jaw or neck pain. Reports some muffled hearing, although this may be attributable to significant build up of earwax. Does not remember if he had tubes put in as a child, did have several ear infections.    O: /78   Pulse 77   Temp 98.2  F (36.8  C) (Tympanic)   Resp 16   Ht 1.727 m (5' 8\")   Wt 74.8 kg (164 lb 14.4 oz)   SpO2 96%   BMI 25.07 kg/m       PE:  HEENT: no appreciably cervical lymphadenopathy, no tenderness along jaw/temple, external pinnae w/o erythema and not tender to touch, unable to appreciate TMs due to burden of cerumen    A/P:  - Has been using Debrox x4 days, would likely need irrigation/curettage for better visualization of TMs   - Discussed w/ Medicine, ENT consult would be needed for any irrigation, will defer this to primary team  - His description of symptoms could be c/w acute otitis media, unable to r/o without otoscopic exam  - Advised to continue using Tylenol for pain  - Will increase Debrox to BID for now (can increased to TID/QID in future if needed), symptoms may improve with decreased cerumen burden    Cassandra Faria  PGY2 Psychiatry  "

## 2021-06-10 NOTE — PROGRESS NOTES
"Total Time: 45 minutes  Number of patients in group: 4     Scope of service: Psychotheducation group - insight orientation through discussions.     Patient progress: Pt sat and participated throughout the encounter. Pt made significant contributions and stayed active throughout the group. Pt. received and provided feedback to group participants.        Patient response/reaction to treatment intervention(s): This patient participated in this group session that focused on insight orientation through structured conversations. Pt reported feeling frustrated and not knowing what to do about \"miscommunication happening around here.\" Pt received feedback on ways to communicate his feelings and thoughts with his treatment team in a clear and civil way. Participated in a conversation that focused on ways to identify real thoughts from unreal thoughts, even though they present as real. Pt voiced understanding of ways to engage in civil conversation with staff and team. Pt received feedback from group members. He also provided positive feedback to other group members.   "

## 2021-06-10 NOTE — PLAN OF CARE
Problem: Psychotic Symptoms  Goal: Psychotic Symptoms  Description: Signs and symptoms of listed problems will be absent or manageable.  Outcome: Improving   Pt states he still hears some voices but they are not bothersome .Med compliant . Refused Covid test . Denies si

## 2021-06-10 NOTE — PROGRESS NOTES
"  ----------------------------------------------------------------------------------------------------------  [facility], [place]   Psychiatric Progress Note  Hospital Day #15     Interim History:   The patient's care was discussed with the treatment team and chart notes were reviewed.    Sleep 7 hours (06/10/21 0600)  Scheduled Medications: took all scheduled medications as prescribed   PRN medications: biotine spray x2, gabapentin 100mg x1    Staff Report:   Yesterday, Jerel participated in group with some social engagment, explaining that he would like to 'water his plants' when he gets home. Offered to play some of his music compositions on a synthesizer. Pt reported to the nursing staff that he is still hearing voices but that they are less bothersom.    Alison (Social Work) spoke with mom yesterday who reported that Jerel called dad 2d ago very upset about court process/urbano. When dad visited later, this didn't come up and had pleasant visit. Mom agreed that Jerel is improving and also expressed concern about medication compliance upon discharge. She also inquired about CSP programs near his apartment.    Yesterday, Pt requested and was given gapapentin prn. Med compliant (scheduled benedryl, olanzapine). Refused covid test. Denies SI and SIB. He slept well for 7h without incident.    Patient Interview:   Jerel was interviewed in the conference room. When asked about mood, reported \"feeling stuck and blue- not visual but mental.\"    Regarding back pain, \"gabapentin helps but doesn't feel like it helps when I'm out and about, but when I'm exhausted it doesn't work well. I still having pain and I don't think you guys are here to help me with my pain.\"    Jerel stated that he wanted to be discharged today or tomorrow. \"I already made plans this weekend and they do not involve the hospital. Why Monday?  I will be cranky and smiht and I'm that way as it is.\" Team reiterated that Monday is " "the plan because of need to coordinate with ACT team. Jerel says, \"they don't coordinate with me. They don't help.\" Discussed ACT team coming 5-6 days a week. Talked about ACT team scheduling in person visits.     Jerel attributes his incomplete college degree and past relationships to his hospital stays. \"My hospital stays force me to lose roommates. I was unable to strengthen relationships. It prevented me from graduating college, making friends, and having a job.\"  Pt lives alone in [place] and likes his apartment. When discussing initial mental health treatment in college he expressed anger that no one understood his symptoms.     Team discusses urbano scheduled for June 23. \"I can do it without being controlled. I have a hard time believing this would be in my favor.\" States  that his case was \"managed  incorrectly and it slanted me incorrectly\" and that he was lied to about being on a Urbano previously. He states that he needs to talk to Robin his  for legal advice and papers. Says \"it's hard for me to talk with Robin because we don't overlap in understanding of case.\"\"I have to let this go, who gives a shit.\" Patient then stood up and left the conference room.     ROS: Endorsed msk pain improving on gabapentin. ROS otherwise as above          Allergies:     Allergies   Allergen Reactions     Penicillins      Rash, Generalized            Psychiatric Examination:   /82 (BP Location: Left arm)   Pulse 108   Temp 97.4  F (36.3  C) (Tympanic)   Resp 14   Ht 1.727 m (5' 8\")   Wt 74.8 kg (164 lb 14.4 oz)   SpO2 97%   BMI 25.07 kg/m    Weight is 164 lbs 14.4 oz  Body mass index is 25.07 kg/m .    MENTAL STATUS EXAM   Appearance: Initially approached and interviewed in the milieu. Appears adequately groomed in no acute distress. Pt wearing scrubs, shaved head.  Gait: Normal  Attitude: initially cooperative and engaged, but more antagonistic as the interview proceeded  Psychomotor:  no evidence of tics, " "dystonia, or tardive dyskinesia  Eye Contact: mostly appropriate, occasionally intense  Speech:  fluent English, normal tone and normal rate   Language: frequently uses metaphors, scientific vocabulary; at times, difficult to follow due to unusual word choice. More organized than previous interviews.  Mood:  \"feeling stuck and blue- not visual but mental\"  Affect:  Appears less anxious, comfortable  Thought Content: somatic perseverations, no expressed SI/HI  Thought Process: tangential, loosening of associations  Sensorium: awake and endorses auditory hallucinations and visual hallucinations that are less bothersom  Cognition: memory grossly intact  Impulse control: fair  Insight: poor- does acknowledge some MH symptoms though minimizes severity and not acknowledge circumstances leading up to hospitalization  Judgment: limited- Does not acknowledge benefits of anti-psychotic medication adjustments thus far. Mostly acknowledges that they help him sleep.         Labs:     No results found for this or any previous visit (from the past 24 hour(s)).     Assessment    Principal Diagnosis:   # Schizophrenia     Diagnostic Impression:   Jerel Day is a 25 year old single White male previously diagnosed with schitzophrenia who was admitted with visual and auditory hallucinations in the context of medication non-adherence. Current psychosocial stressors include chronic mental health issues which he had been coping with by using substances, most recently heavy cannabis use.  Patient's support system includes family and peers, and the patient does have an ACT team. Biological contributions to mental health presentation include genetic loading from family history of psychosis and concurrent substance use.                 The patient's presentation is consistent with previously diagnosed schizophrenia. Given recent substance use and improvement since cannabis cessation in the hospital, substance induced psychosis could also " be contributing to his current presentation. Jerel's made much improvement since his admission to the hospital. He appears to have more organized speech and more linear thought process. While pt still endorses hearing voices, he no longer states that they are telling him to kill himself. He also spoke of his improving back pain with fewer abstract metaphors and explained that the gabapentin has been working. Some days, Jerel appears more aware of his mental health, including his auditory delusions. He acknowledges that he hears voices and that they have made him feel angry. However, he recently has endorsed that the voices are telling him that his mental health team, including psychiatrist, are not trying to help him. Additionally, he has expressed that he attributes all problems in his life to hospitalizations, but not his mental health. He lacks awareness that his medications are helping him. When told that he would likely be discharged on Monday, he was frustrated that he couldn't leave sooner and that his ACT team was going to follow him. This may present a barrier to medication compliance after discharge.  Mother has additionally suggested that medication compliance is a big issue for Jerel and that he tends to do better with long-acting injectable anti-psychotics. The goal is to discharge Jerel to his home on Monday on [address]  with support of the ACT team.    Psychiatric Hospital course:   Jerel Day was admitted to [facility] on a 72 hour hold. His PTA Paliperidone invega sustenna 234 mg HO & benadryl were continued. PTA p.o. Paliperidone Invega was held and replaced by olanzapine as the patient continuously declined the Paliperidone and endorsed having good therapeutic results from the olanzapine, namely sleep and with his disorganized thought. As of 6/2 has had improvement in organization though continued paranoia as well as some thought-blocking and tangential speech; he acknowledges improved sleep  and thinking since being in the hospital though lacks insight into why he is still hospitalized and declining further medication adjustments at this time. Olanzapine increased to 15mg at bedtime on 6/3 which patient was hesitant about though agreeable to trying and tolerated well. Clark filed 6/4 after discussions between inpatient and ACT treatment teams concerning Jerel's historic medication noncompliance.     Discontinued Medications (& Rationale):  Oral paliperidone     Medical course   The patient was medically cleared for admission to [facility]; no medical problems arose during hospitalization.     Data:   UTox on admission positive for cannabinoids upon admission      Consults:   Pharmacy inpt consult for medication reconciliation assistance on 5/26/21.     Plan     Today's Changes:  - no changes     Scheduled Psych Meds:  - olanzapine 15 mg at bedtime   - Paliperidone Invega Sustenna 234 mg IM every 28 days   - diphenhydramine 50 mg at bedtime     PRN Medications:  - acetaminophen, alum & mag hydroxide-simethicone, artificial saliva, sore throat lozenge, carbamide peroxide, eucerin, gabapentin, melatonin, nicotine, OLANZapine **OR** OLANZapine, senna-docusate    Patient will be treated in therapeutic milieu with appropriate individual and group therapies as described.    Medical diagnoses to be addressed this admission:    # dry mouth   - artificial saliva /Biotene  - cepacol lozenges     Legal Status:    Orders Placed This Encounter      Legal status Patient is Committed  Clark pending, hearing on Weds.    Safety Assessment:   Behavioral Orders   Procedures     Code 1 - Restrict to Unit     Routine Programming     As clinically indicated     Self Injury Precaution     Status 15     Every 15 minutes.     Suicide precautions     Patients on Suicide Precautions should have a Combination Diet ordered that includes a Diet selection(s) AND a Behavioral Tray selection for Safe Tray - with utensils, or Safe Tray  - NO utensils         Disposition: 6-9 days; pending stabilization & development of a safe discharge plan.  possibly to home with ACT team in place     Quetanadege Estevez MS3  _________________________________________________________________  I was present with the medical student who participated in the service and in the documentation of the note. I have verified the history and personally performed the physical exam and medical decision making. I agree with the assessment and plan of care as documented in the note. - Queta Wayne    This patient was seen and discussed with the attending physician.    Queta Wayne MD   PGY-2 Psychiatry    This patient has been seen and evaluated by me, Vitaly Godoy.  I have discussed this patient with the psychiatry resident and I agree with the findings and plan in this note.    I have reviewed today's vital signs, medications, labs and imaging.     Vitaly Antonio MD on 6/10/2021 at 10:29 PM

## 2021-06-10 NOTE — PROGRESS NOTES
"Total Time: 45 minutes  Number of patients in group: 5     Scope of service: Psychotherapy Group  Symptoms reported: Irritability, some confusion. Reported he is feeling safe. No suicidal ideation. Appearance: Sweatshirt was soiled. Mood was flat.      Patient progress: Pt sat and participated (for 45 minutes). He left the group in-between the meeting to have a consult by a provider who was looking into his ears.  Pt made significant contributions and stayed active throughout the group. Pt. received and provided feedback to group participants.        Patient response/reaction to treatment intervention(s): This patient participated in this group session with his peers. Pt identified his goal as needing skills to be able to \"gather my thoughts and communicate effectively with the medical team when I meet with them.\" Pt reported he wants to \"sound coherent when I speak with them. I think I will need to meditate before meeting with them.\" Writer complimented pt on his insight and goal to communicate effectively and to seek ways to calm and gather his thoughts before meeting with his team. Received feedback from group members on practices that could help calm him before such encounters. Pt reported he has already learned about meditation and will practice it more and more.  Pt received feedback on ways to communicate his feelings and thoughts with his treatment team in a clear and civil way. Pt voiced understanding of feedback targeted at ways he could practice calming strategies including deep breathing and trying to notice his thoughts, prior to meeting with his team. Pt received feedback from group members. He also provided positive feedback to other group members.   "

## 2021-06-10 NOTE — PLAN OF CARE
"  Problem: Psychotic Symptoms  Goal: Psychotic Symptoms  Description: Signs and symptoms of listed problems will be absent or manageable.  Outcome: No Change  Flowsheets (Taken 6/10/2021 1224)  Psychotic Symptoms Assessed: all  Psychotic Symptoms Present:   affect   thought process   mood   anxiety   insight  Note: Pt  approached staff to say \"I'm really sick of the Dr's here, they're keeping me until Monday.\" \"They used a whole bottle of oxide in here to clean my room and it's making my allergies bad.\" Assured pt it wasn't oxide and that our room spray is scent free. Pt then walked away. Requested and was given @ 1137 gabapentin for anxiety. He also was accepting of using some essential oils. Appears distracted and preoccupied. Affect is flat and blunted. He is med compliant and denies SI and SIB. Suzy Vivar RN       "

## 2021-06-10 NOTE — PLAN OF CARE
"  Problem: Psychotic Symptoms  Goal: Psychotic Symptoms  Description: Signs and symptoms of listed problems will be absent or manageable.  Outcome: No Change   Pt states he still hears voices , that they are there often and are not all that bothersome . He sates the look like black candy canes  and talk in an androidmanner , 'Maybe that's why I like animation . He could not say what they say , something about technology. Med compliant , denies si , attended group. States his ears still feel weird , \" I think I might have an infection,\" Getting [facility] gtts Dr notified  "

## 2021-06-11 PROCEDURE — 99232 SBSQ HOSP IP/OBS MODERATE 35: CPT | Mod: GC | Performed by: PSYCHIATRY & NEUROLOGY

## 2021-06-11 PROCEDURE — 250N000013 HC RX MED GY IP 250 OP 250 PS 637: Performed by: STUDENT IN AN ORGANIZED HEALTH CARE EDUCATION/TRAINING PROGRAM

## 2021-06-11 PROCEDURE — G0177 OPPS/PHP; TRAIN & EDUC SERV: HCPCS

## 2021-06-11 PROCEDURE — 124N000002 HC R&B MH UMMC

## 2021-06-11 RX ORDER — OLANZAPINE 15 MG/1
15 TABLET ORAL AT BEDTIME
Qty: 30 TABLET | Refills: 0 | Status: ON HOLD | OUTPATIENT
Start: 2021-06-11 | End: 2021-09-09

## 2021-06-11 RX ADMIN — ACETAMINOPHEN 650 MG: 325 TABLET, FILM COATED ORAL at 11:55

## 2021-06-11 RX ADMIN — GABAPENTIN 100 MG: 100 CAPSULE ORAL at 11:11

## 2021-06-11 RX ADMIN — OLANZAPINE 15 MG: 15 TABLET, FILM COATED ORAL at 20:46

## 2021-06-11 RX ADMIN — GABAPENTIN 100 MG: 100 CAPSULE ORAL at 16:24

## 2021-06-11 RX ADMIN — ACETAMINOPHEN 650 MG: 325 TABLET, FILM COATED ORAL at 21:26

## 2021-06-11 RX ADMIN — Medication 2 SPRAY: at 12:53

## 2021-06-11 RX ADMIN — GABAPENTIN 100 MG: 100 CAPSULE ORAL at 08:05

## 2021-06-11 ASSESSMENT — ACTIVITIES OF DAILY LIVING (ADL)
HYGIENE/GROOMING: INDEPENDENT
ORAL_HYGIENE: INDEPENDENT
DRESS: INDEPENDENT
LAUNDRY: WITH SUPERVISION
HYGIENE/GROOMING: INDEPENDENT
DRESS: SCRUBS (BEHAVIORAL HEALTH)
ORAL_HYGIENE: INDEPENDENT

## 2021-06-11 NOTE — PLAN OF CARE
"Pt up in lounge at start of shift. Blunt affect. Prn gabapentin administered per pt request at 1624. States anxiety is \"not that bad\" but does report neck pain and ear pain. Hot pack provided per request for neck with good effect. Endorses AH. States the voices are mostly old girlfriends that he was interested in. Denies SI and VH, however states that he \"wishes\" he had visual hallucinations because \"with meds feels like I'm not seeing what my brain wants me to see.\" Does appear to be preoccupied at times during conversation.    2045 Bed resting. Declined scheduled benadryl; states makes him feel \"loopy.\" Pt states that bilateral ear pain is increasing; declined prn tylenol at this time. Scheduled debrox administered.  2110 Pt up to nurses station stating he cannot hear out of his ears. Oncall notified; see note.   2126 Prn tylenol administered for 6/10 bilateral ear pain. Warm cloth provided. Reports hearing voices at this time telling him his mom cheated on his dad; scheduled zyprexa administered already. States he knows what the voices are saying aren't true.    Problem: Behavioral Disturbance  Goal: Behavioral Disturbance  Description: Signs and symptoms of listed problems will be absent or manageable by discharge or transition of care.  Outcome: Improving     "

## 2021-06-11 NOTE — PROGRESS NOTES
"  ----------------------------------------------------------------------------------------------------------  [facility], [place]   Psychiatric Progress Note  Hospital Day #16     Interim History:   The patient's care was discussed with the treatment team and chart notes were reviewed.    Sleep 7 hours (06/11/21 0600)  Scheduled Medications: took all scheduled medications as prescribed   PRN medications: biotine spray x2, gabapentin 100mg x1    Staff Report:   Yesterday, he attended and actively participated in group. Pt stated to staff, 'I'm really sick of the Dr's here, they're keeping me until Monday.\" He told nursing staff to tell Drs that he doesn't want to talk to team today. Pt had a flat, blunted affect, and appeared distracted and preoccupied, per staff report. Told nursing staff that he is still hearing voices often, but they are less bothersome.     Pt told nursing staff that his ears still \"feel weird. I think I have an infection.\" Resident on call increased Debrox to BID and told pt to continue Tylenol for pain.  Jerel Requested and was given gabapentin at 1137 for anxiety. He agreed to use essential oils. Med compliant. Denies SI and SIB. Slept well for 7h overnight without incident      Patient Interview:   Jerel was interviewed briefly in his room. Pt was lying in the dark in his bed. He described his mood as feelings of 'low self worth.' When asked what we can help with, he 'requested better pain med.' Pt stated that he wants to leave.      Collateral: Phone call with Jerel's ACT team, mostly talking to Dr. Thornton (Olena Jones and Medical students)  We discussed that he is much improved since day 1. Dr. Thornton stated that the team will have someone administer his meds every evening in person when he's discharged. We discussed Jerel's discharge Monday to apartment and his medications.    ROS: Endorsed msk pain improving on gabapentin. ROS otherwise as above          Allergies: " "    Allergies   Allergen Reactions     Penicillins      Rash, Generalized            Psychiatric Examination:   /78   Pulse 77   Temp 98.2  F (36.8  C) (Tympanic)   Resp 16   Ht 1.727 m (5' 8\")   Wt 74.8 kg (164 lb 14.4 oz)   SpO2 96%   BMI 25.07 kg/m    Weight is 164 lbs 14.4 oz  Body mass index is 25.07 kg/m .    MENTAL STATUS EXAM   Appearance: Pt in bed with lights off. Appeared to be staring into the distance.  Pt wearing scrubs, shaved head.  Gait: In bed. Was not assessed.  Attitude: uncooperative, didn't want to speak with medical team today.  Psychomotor:  no evidence of tics, dystonia, or tardive dyskinesia  Eye Contact: mostly appropriate, occasionally intense  Speech:  fluent English, normal tone and normal rate   Language: Mostly organized, but responded to questions with brief 1-phrase responses.  Mood:  'low self-worth'  Affect:  Flat, blunted  Thought Content: fixation on discharge before the weekend  Thought Process: coherent, brief so difficult to asses  Sensorium: awake and alert   Cognition: memory grossly intact  Impulse control: fair  Insight: poor- does acknowledge some MH symptoms though minimizes severity and not acknowledge circumstances leading up to hospitalization  Judgment: limited- Does not acknowledge benefits of anti-psychotic medication adjustments thus far. Mostly acknowledges that they help him sleep.         Labs:     No results found for this or any previous visit (from the past 24 hour(s)).     Assessment    Principal Diagnosis:   # Schizophrenia     Diagnostic Impression:   Jerel Day is a 25 year old single White male previously diagnosed with schitzophrenia who was admitted with visual and auditory hallucinations in the context of medication non-adherence. Current psychosocial stressors include chronic mental health issues which he had been coping with by using substances, most recently heavy cannabis use.  Patient's support system includes family and " peers, and the patient does have an ACT team. Biological contributions to mental health presentation include genetic loading from family history of psychosis and concurrent substance use.                 The patient's presentation is consistent with previously diagnosed schizophrenia. Given recent substance use and improvement since cannabis cessation in the hospital, substance induced psychosis could also be contributing to his current presentation. Jerel's made much improvement since his admission to the hospital. He appears to have more organized speech and more linear thought process. While pt still endorses hearing voices, he no longer states that they are telling him to kill himself. He also spoke of his improving back pain with fewer abstract metaphors and explained that the gabapentin has been working. Some days, Jerel appears more aware of his mental health, including his auditory delusions. He acknowledges that he hears voices and that they have made him feel angry. However, he recently has endorsed that the voices are telling him that his mental health team, including psychiatrist, are not trying to help him. Additionally, he has expressed that he attributes all problems in his life to hospitalizations, but not his mental health. He lacks awareness that his medications are helping him. When told that he would likely be discharged on Monday, he was frustrated that he couldn't leave sooner and that his ACT team was going to follow him. This may present a barrier to medication compliance after discharge.  Mother has additionally suggested that medication compliance is a big issue for Jerel and that he tends to do better with long-acting injectable anti-psychotics. The goal is to discharge Jerel to his home on Monday on [address]  with support of the ACT team.    Psychiatric Hospital course:   Jerel Day was admitted to [facility] on a 72 hour hold. His PTA Paliperidone invega sustenna 234 mg HO & benadryl  were continued. PTA p.o. Paliperidone Invega was held and replaced by olanzapine as the patient continuously declined the Paliperidone and endorsed having good therapeutic results from the olanzapine, namely sleep and with his disorganized thought. As of 6/2 has had improvement in organization though continued paranoia as well as some thought-blocking and tangential speech; he acknowledges improved sleep and thinking since being in the hospital though lacks insight into why he is still hospitalized and declining further medication adjustments at this time. Olanzapine increased to 15mg at bedtime on 6/3 which patient was hesitant about though agreeable to trying and tolerated well. Clark filed 6/4 after discussions between inpatient and ACT treatment teams concerning Jerel's historic medication noncompliance.     Discontinued Medications (& Rationale):  Oral paliperidone     Medical course   The patient was medically cleared for admission to [facility]; no medical problems arose during hospitalization.     Data:   UTox on admission positive for cannabinoids upon admission      Consults:   Pharmacy inpt consult for medication reconciliation assistance on 5/26/21.     Plan     Today's Changes:  - no changes     Scheduled Psych Meds:  - olanzapine 15 mg at bedtime   - Paliperidone Invega Sustenna 234 mg IM every 28 days   - diphenhydramine 50 mg at bedtime     PRN Medications:  - acetaminophen, alum & mag hydroxide-simethicone, artificial saliva, sore throat lozenge, eucerin, gabapentin, melatonin, nicotine, OLANZapine **OR** OLANZapine, senna-docusate    Patient will be treated in therapeutic milieu with appropriate individual and group therapies as described.    Medical diagnoses to be addressed this admission:    # dry mouth   - artificial saliva /Biotene  - cepacol lozenges     Legal Status:    Orders Placed This Encounter      Legal status Patient is Committed  Clark pending, hearing on Weds.    Safety Assessment:    Behavioral Orders   Procedures     Code 1 - Restrict to Unit     Routine Programming     As clinically indicated     Self Injury Precaution     Status 15     Every 15 minutes.     Suicide precautions     Patients on Suicide Precautions should have a Combination Diet ordered that includes a Diet selection(s) AND a Behavioral Tray selection for Safe Tray - with utensils, or Safe Tray - NO utensils         Disposition: 6-9 days; pending stabilization & development of a safe discharge plan.  possibly to home with ACT team in place     Queta NICCI Kimberlijamie MS3  _________________________________________________________________      Queta Estevez on 6/11/2021    This patient has been seen and evaluated by me, Vitaly Godoy.  I have discussed this patient with the psychiatry resident and I agree with the findings and plan in this note.    I have reviewed today's vital signs, medications, labs and imaging.     Vitaly Antonio MD on 6/12/2021 at 8:53 AM

## 2021-06-11 NOTE — PLAN OF CARE
Problem: Sleep Disturbance  Goal: Adequate Sleep/Rest  Outcome: No Change   Observed to have slept well for approx 7 hrs overnight w/regular non-labored respirations and no reported or observed distress.  Safe, therapeutic environment maintained.

## 2021-06-11 NOTE — PLAN OF CARE
Problem: Behavioral Disturbance  Goal: Behavioral Disturbance  Description: Signs and symptoms of listed problems will be absent or manageable by discharge or transition of care.  Outcome: Improving  Flowsheets (Taken 6/11/2021 1204)  Behavioral Disturbance Assessed: all  Behavioral Disturbance Present:    affect    insight  Note: Pt able to effectively manage his behavior. Does still appear to lack some insight into his illness. Attended groups. Visible in the milieu off and on throughout the day. Pt reports feeling down and wanting to discharge from the hospital. Reported to this writer that he did not want to speak with his provider. Ate meals without issue.     Requested and given gabapentin prn x2 for anxiety and agitation. Given Tylenol 650 mg prn once for pain in scapula and neck (7/10).     Pt denies SI/SIB, as well as AH/VH. Pt appears distracted at times but was not observed responding to internal stimuli.     Provider placed order for COVID test. Pt declined, stating that he has had both immunizations.

## 2021-06-11 NOTE — PLAN OF CARE
"Pt attended 1 of 3 OT groups today. Pt transitioned to OT group IND and engaged in therapeutic activity addressing functional cognition and interpersonal skills with task set up. Pt is actively engaged throughout but at times makes comments unrelated to the topic at hand (e.g. when one patient was talking about a negative effect due to an MRI, pt responded, \"I would recommend avoiding the construction\"). Pt will continue to benefit from OT intervention to address implementation of positive functional coping skills, role performance, and community reintegration.     "

## 2021-06-11 NOTE — PLAN OF CARE
"Assessment/Intervention/Current Symptoms and Care Coordination  - chart review  - 9:00am re confernce call meeting with ACT team provider - called and spoke with [facility]  Nita to inform her that our system e-mail is down so best to not attempt to send any link or call information over e-mail. Plan will be for team to call Nita at main line when ready around 11:30 am ph: [phone] and she will connect / forward call to Dr. Johns. Will check with him prior to see if intent was for any other team members from ACT to be present on call. Noemy KATHLEEN is off today.   - team meeting  - post team rounds team meeting / discussion  - phone conference call with Dr. Salguero- Resident Psychiatrist on ACT team, this writer and team medical students present.  discussed discharge plan and that the one new medication has been sent to [facility] as requested by ACT team RN. Discussed overall symptom improvement since admission and that although is still experiencing some level of symptoms does appear to improved to point appropriate for discharge home Monday with increased visits from [facility]. Per Dr. Salguero the team is planning to do visits daily / nilton for \"eyes on\" medication administration for minimally period of post discharge. Discussed that Clark hearing is scheduled for later in the month (6/23/21) and will ensure the team has the information - Dr. Godoy is aware that as he is the petitioner for Clark may be requested to testify. Discussed that this writer will also ensure Provisional Discharge Agreement is completed for discharge.        Discharge Plan or Goal  Targeted plan for discharge home to apartment on Monday with daily visits from ACT team for medication administration.    Medication sent to [facility] per ACT team request      Barriers to Discharge   Finalization of medication management/ assessment and discharge plan       Referral Status  See above re discussion with [facility] ACT team Resident " Psychiatrist    Medication order sent to [facility] - this writer called and spoke with staff at [facility] who confirm receipt of the order and are aware to prepare medication as they do for patient's medications with ACT team.       Legal Status  Committed -    Provisional Discharge Agreement and Change of Status Form to be submitted to [facility] at Discharge.     Clark Petition Filed during this hospitalization  -   Order for Examination and Hearing (Clark) served to the unit today to patient and hospital   Wednesday June 23, 2021 - 9:00am - examination hearing to be held at 9:45am

## 2021-06-12 PROCEDURE — 124N000002 HC R&B MH UMMC

## 2021-06-12 PROCEDURE — 250N000013 HC RX MED GY IP 250 OP 250 PS 637: Performed by: STUDENT IN AN ORGANIZED HEALTH CARE EDUCATION/TRAINING PROGRAM

## 2021-06-12 RX ADMIN — OLANZAPINE 15 MG: 15 TABLET, FILM COATED ORAL at 21:21

## 2021-06-12 RX ADMIN — ACETAMINOPHEN 650 MG: 325 TABLET, FILM COATED ORAL at 04:18

## 2021-06-12 RX ADMIN — ACETAMINOPHEN 650 MG: 325 TABLET, FILM COATED ORAL at 11:18

## 2021-06-12 ASSESSMENT — ACTIVITIES OF DAILY LIVING (ADL)
HYGIENE/GROOMING: INDEPENDENT
DRESS: SCRUBS (BEHAVIORAL HEALTH)
DRESS: INDEPENDENT
LAUNDRY: WITH SUPERVISION
HYGIENE/GROOMING: INDEPENDENT
ORAL_HYGIENE: INDEPENDENT
ORAL_HYGIENE: INDEPENDENT

## 2021-06-12 NOTE — PLAN OF CARE
Problem: Psychotic Symptoms  Goal: Psychotic Symptoms  Description: Signs and symptoms of listed problems will be absent or manageable.  Outcome: Improving  Flowsheets (Taken 6/12/2021 1610)  Psychotic Symptoms Assessed: all  Psychotic Symptoms Present:   thought process   mood   anxiety   insight  Note: Jerel is irritable much of the shift. He expresses frustration at still being in hospital. Asks if someone can discharge him today. Complaints of pain in both ears. Received ear drops. Resident notified. Tylenol given at 1118. Little relief from that med. Plays the guitar using one chord. Plays it louder and louder. Needs some redirection with that. He is med compliant. Denies SI and SIB. Suzy Vivar RN

## 2021-06-12 NOTE — PROGRESS NOTES
"Brief Note - Cross Cover    S: Notified that patient was reporting worsening ear pain in both ears as well as muffled hearing. No other new associated symptoms. Has not been utilizing PRN Tylenol regularly for pain.    O: /81   Pulse 83   Temp 98.6  F (37  C) (Tympanic)   Resp 16   Ht 1.727 m (5' 8\")   Wt 74.8 kg (164 lb 14.4 oz)   SpO2 99%   BMI 25.07 kg/m      A/P:  I attempted to examine patient's ears yesterday and was unable to visualize his TMs d/t excess cerumen. Given that his pain is in both ears it is less likely that he has an acute otitis media.   - Increase Debrox to TID  - Continue to utilize PRN Tylenol and warm compresses for pain  - If no improvement tomorrow consider ENT consult (per Medicine they would be able to lavage his ear)    Cassandra Faria  PGY2 Psychiatry  "

## 2021-06-12 NOTE — PLAN OF CARE
"  Problem: Behavioral Disturbance  Goal: Behavioral Disturbance  Description: Signs and symptoms of listed problems will be absent or manageable by discharge or transition of care.  Outcome: Improving     Problem: Sleep Disturbance  Goal: Adequate Sleep/Rest  Outcome: No Change   Observed to have slept well for approx 6 hrs on all Q 15\" rounds w/ regular non-labored respirations.  Up to nurses station at approx 0415 requesting tylenol for c/o bilat ear pain.  Supportive intervention provided as appropriate.  Readily back to sleep w/o further complaints or observed distress.  Safe, supportive, therapeutic environment maintained.   "

## 2021-06-13 PROCEDURE — 250N000013 HC RX MED GY IP 250 OP 250 PS 637: Performed by: STUDENT IN AN ORGANIZED HEALTH CARE EDUCATION/TRAINING PROGRAM

## 2021-06-13 PROCEDURE — 124N000002 HC R&B MH UMMC

## 2021-06-13 RX ADMIN — GABAPENTIN 100 MG: 100 CAPSULE ORAL at 21:07

## 2021-06-13 RX ADMIN — ACETAMINOPHEN 650 MG: 325 TABLET, FILM COATED ORAL at 10:13

## 2021-06-13 RX ADMIN — GABAPENTIN 100 MG: 100 CAPSULE ORAL at 15:36

## 2021-06-13 RX ADMIN — ACETAMINOPHEN 650 MG: 325 TABLET, FILM COATED ORAL at 17:51

## 2021-06-13 RX ADMIN — GABAPENTIN 100 MG: 100 CAPSULE ORAL at 10:13

## 2021-06-13 RX ADMIN — Medication 2 SPRAY: at 21:40

## 2021-06-13 RX ADMIN — Medication 2 SPRAY: at 20:00

## 2021-06-13 RX ADMIN — DIPHENHYDRAMINE HYDROCHLORIDE 50 MG: 50 CAPSULE ORAL at 20:33

## 2021-06-13 RX ADMIN — OLANZAPINE 15 MG: 15 TABLET, FILM COATED ORAL at 20:32

## 2021-06-13 ASSESSMENT — ACTIVITIES OF DAILY LIVING (ADL)
HYGIENE/GROOMING: INDEPENDENT
DRESS: INDEPENDENT
HYGIENE/GROOMING: INDEPENDENT
DRESS: SCRUBS (BEHAVIORAL HEALTH)
ORAL_HYGIENE: INDEPENDENT
LAUNDRY: WITH SUPERVISION
ORAL_HYGIENE: INDEPENDENT

## 2021-06-13 NOTE — PLAN OF CARE
"Pt in room at start of shift. Reports feeling \"frustrated\" about being here. States he feels like a \"husk\" here. Continues to appear preoccupied with delayed responses at times. Denies SI/ SIB. Took prn gabapentin from previous RN; states it turns his anxiety to a \"crisp.\" Reports that he heard voices this morning that made him upset. Up to  with concern that he will have to follow up with the ICU after discharge. Informed him he should not have to do this, but could follow up with PCP.    1751 C/o 5/10 bilateral ear, headache and generalized pain. Prn tylenol administered per pt request.  1840 Pt in room doing exercises on yoga mat. Pt appears very focused on medical issues this shift; unsure what he is referencing other than his ears. Preoccupied, appears to be responding, smiling to self.  2000 Prn biotene administered per request for dry mouth.  2107 Pt up to  requesting and received prn gabapentin for \"pain.\" States that he feels there are thoughts from someone else trying to hurt him.  2140 While writer was doing checks, pt asked writer to come \"take a look\" at his room. Pt states the voices, of his grandfather and dad, have gotten \"deeper\" this evening. Pt worried that he is going to die in his sleep. Reassured pt that staff are always checking on and keeping pts safe. Stress ball, prn biotene and lemon lime drink provided per request.    Problem: Psychotic Symptoms  Goal: Psychotic Symptoms  Description: Signs and symptoms of listed problems will be absent or manageable.  Outcome: No Change     "

## 2021-06-13 NOTE — PLAN OF CARE
Pt out in lounge at start of shift playing [employer]. Pleasant, cooperative with staff. Flat affect. No prns administered this shift. Continues to report bilateral ear pain; scheduled debrox administered. Good appetite. Rested in bed at end of shift.    Problem: Psychotic Symptoms  Goal: Psychotic Symptoms  Description: Signs and symptoms of listed problems will be absent or manageable.  Outcome: Improving

## 2021-06-13 NOTE — PLAN OF CARE
"  Problem: Psychotic Symptoms  Goal: Psychotic Symptoms  Description: Signs and symptoms of listed problems will be absent or manageable.  Outcome: No Change  Flowsheets (Taken 6/13/2021 1617)  Psychotic Symptoms Assessed: all  Psychotic Symptoms Present:   affect   thought process   mood   anxiety   insight  Note: Patient stays on periphery of of milieu. Declined to have ear drops today. States that they are not helping. He is complaining of headaches that accompany is ear pain. States \"Its hard because I am hearing a voice telling me that the [employer] is using lasers and watching me. \" He later took a shower and was requesting new scrubs. Patient was given all new scrubs and towels. He was heard yelling from the shower that the scubs were not clean and had a funny smell to them. Did not want replacements as he thought the smell had gotten to all the scrubs. Says he is angry that he has had to be here so long. \"to use a word for it , I would say \"overexposed\". Mom called and mentioned to staff that he had talked about feeling suicidal while on the phone with her. RN checked in with patient who denied SI and SIB Suzy Vivar RN       "

## 2021-06-14 VITALS
RESPIRATION RATE: 16 BRPM | TEMPERATURE: 97.9 F | HEIGHT: 68 IN | DIASTOLIC BLOOD PRESSURE: 88 MMHG | OXYGEN SATURATION: 100 % | SYSTOLIC BLOOD PRESSURE: 127 MMHG | WEIGHT: 164.9 LBS | BODY MASS INDEX: 24.99 KG/M2 | HEART RATE: 83 BPM

## 2021-06-14 PROBLEM — M54.9 BACK PAIN: Status: ACTIVE | Noted: 2021-06-14

## 2021-06-14 PROBLEM — H61.23 BILATERAL IMPACTED CERUMEN: Status: ACTIVE | Noted: 2021-06-14

## 2021-06-14 PROCEDURE — 250N000013 HC RX MED GY IP 250 OP 250 PS 637: Performed by: STUDENT IN AN ORGANIZED HEALTH CARE EDUCATION/TRAINING PROGRAM

## 2021-06-14 PROCEDURE — 99239 HOSP IP/OBS DSCHRG MGMT >30: CPT | Mod: GC | Performed by: PSYCHIATRY & NEUROLOGY

## 2021-06-14 RX ORDER — GABAPENTIN 100 MG/1
100 CAPSULE ORAL 3 TIMES DAILY PRN
Qty: 90 CAPSULE | Refills: 0 | Status: ON HOLD | OUTPATIENT
Start: 2021-06-14 | End: 2021-09-09

## 2021-06-14 RX ADMIN — ACETAMINOPHEN 650 MG: 325 TABLET, FILM COATED ORAL at 08:41

## 2021-06-14 ASSESSMENT — ACTIVITIES OF DAILY LIVING (ADL)
HYGIENE/GROOMING: INDEPENDENT
ORAL_HYGIENE: INDEPENDENT
LAUNDRY: WITH SUPERVISION
DRESS: SCRUBS (BEHAVIORAL HEALTH)

## 2021-06-14 NOTE — PLAN OF CARE
Problem: Sleep Disturbance  Goal: Adequate Sleep/Rest  Outcome: No Change  Observed to have slept well w/ regular non-labored respirations for approx 7 hrs overnight.  No reported or observed distress.  Safe, therapeutic environment maintained.

## 2021-06-14 NOTE — PROGRESS NOTES
" 06/13/21 2200   Groups   Details   (Psychotherapy group)   Number of patients attending the group:  4  Group Length:  1 Hours No charge, he left at beginning of group  Group Therapy Type: Psychotherapy    Summary of Group / Topics Discussed:      The  Psychotherapy group goal is to promote insight to positive choice and change. Group processing is within a supportive and safe environment. Patients will process emotions using verbal group and expressive psychotherapy interventions including visual art/writing interventions.    Group interventions support patients by: self compassion and hope/optimism    Modalities to reach these goals include: positive/solution focused psychology     Subjective -patient report of mood today- \"steeped\", \"I want to toss out ideas,let go of things in the hospital\"    Objective/ Intervention- Goal of group and Therapeutic modality utilized- Self compassion- letter of encouragement to self    Group Response- engaged    Patient Response-Pt was very vocal at check in , speaking about being displeased about being hospitalized. He then said he had heard voices of \"Emiliano Esparza and Saumya Aguilar.\" Writer asked him if voices were getting better. He hesitated a lot and then answered that he is the only one who understands and knows how to handle the voices. He says doctors ask him questions, but don't \" test\" for the voices.\" He seemed irritable after voicing this and said he had to leave group. He did not return to group.    Jimenez Fernandez, SARTHAK, ATR-BC        "

## 2021-06-14 NOTE — PLAN OF CARE
Patient alert and oriented to person, place, and time, adequately groomed, showered. Pt is pleasant, cooperative, medication compliant. Pt given tylenol for headache. Pt on suicidal, SIB  precautions with no behaviors observed. Pt denies SI/HI/SIB, rates anxiety 5/10 and depression 0/10. Pt contracts for safety. Pt engaged in group activities, appropriate with peers and staff. Pt communicates and verbalizes needs to staff. No behaviors noted. Pt discharge at 1300 to home. Picked up by parents. Pt med filled at home pharmacy, pt signed for security items and belongings.      Problem: Adult Inpatient Plan of Care  Goal: Plan of Care Review  Outcome: Improving

## 2021-06-14 NOTE — DISCHARGE INSTRUCTIONS
Behavioral Discharge Planning and Instructions    Summary: You were admitted on 5/25/2021 due to Psychotic Symptomology. You were treated by Dr. Godoy and discharged on 6/14/21 from [facility] to home to continue to work with your ACT Team.    Today you will be Provisionally Discharged from this hospital. You have been court ordered for commitment for treatment and will be discharged from the hospital today. You have agreed to the terms of a Provisional Discharge  (please refer to your copy of the agreement) The minimal period of this Provisional Discharge Agreement being active is until your commitment order is terminated by the courts.     Main Diagnosis:   Schizophrenia    Health Care Follow-up:   Medication Management -   Appointment Date/Time: Wednesday-appointment in place  Prescriber: Dr Harrison Salguero MD   Address: [facility] ACT Team ACT Team     Phone Number: [phone]       Attend all scheduled appointments with your outpatient providers. Call at least 24 hours in advance if you need to reschedule an appointment to ensure continued access to your outpatient providers.     Major Treatments, Procedures and Findings:  You were provided with: a psychiatric assessment, assessed for medical stability, medication evaluation and/or management, group therapy, milieu management.    Symptoms to Report: Feeling more aggressive, increased confusion, losing more sleep, mood getting worse, or thoughts of suicide.    Early warning signs can include: Increased depression or anxiety sleep disturbances increased thoughts or behaviors of suicide or self-harm  increased unusual thinking, such as paranoia or hearing voices.    Safety and Wellness: Take all medicines as directed. Make no changes unless your doctor suggests them. Follow treatment recommendations. Refrain from alcohol and non-prescribed drugs.  Ask your support system to help you reduce your access to items that could harm yourself or others. If there is a  concern for safety, call 911.    Resources:   Crisis Intervention: 334.889.3936 or 761-956-5528 (TTY: 457.664.6261).  Call anytime for help.  National [place] on Mental Illness (www.mn.johnson.org): 104.300.5750 or 258-337-2030.  Suicide Awareness Voices of Education (SAVE) (www.save.org): 833-158-MAHR (7317)  National Suicide Prevention Line (www.mentalhealthmn.org): 574-284-DHZU (5984)  Mental Health Consumer/Survivor Network of [place] (www.mhcsn.net): 334.672.5481 or 446-032-7808  [facility] Crisis (COPE) Response - Adult 318 631-8948    General Medication Instructions:     See your medication sheet(s) for instructions.     Take all medicines as directed.  Make no changes unless your doctor suggests them.     Go to all your doctor visits.    Be sure to have all your required lab tests. This way, your medicines can be refilled on time.    Do not use any drugs not prescribed by your doctor.    Avoid alcohol.    Advance Directives:   Scanned document on file with [employer]? No scanned doc  Is document scanned? No. Copy Requested.  Honoring Choices Your Rights Handout: Informed and given  Was more information offered? Pt declined    The Treatment team has appreciated the opportunity to work with you. If you have any questions or concerns about your recent admission, you can contact the unit which can receive your call 24 hours a day, 7 days a week. They will be able to get in touch with a Provider if needed. The unit number is [phone] .   '

## 2021-06-14 NOTE — PLAN OF CARE
Pt discharged from [facility] on 06/14/21. He was in possession of all belongings upon discharge. This writer reviewed AVS with pt and he verbalized understanding. Denies SI/SIB, as well as homicidal ideation. Denies having access to guns. Pt in no acute distress. Walked to discharge vehicle by this writer.

## 2021-06-14 NOTE — PLAN OF CARE
Re: Provisional Discharge      Writer met with patient to go over provisional discharge. Patient verbalized an understanding. Copy provided to patient, copy sent to [facility] with change of status report. Copy placed in chart. Copy provided to primary CTC to follow up with patient .      Nicole Cordova, [facility], [facility]  Clinical Treatment Coordinator

## 2021-06-14 NOTE — DISCHARGE SUMMARY
"    ----------------------------------------------------------------------------------------------------------  [facility] [place]   Discharge Summary  Hospital Day #19  Jerel Day MRN# [id number]   Age: 25 year old YOB: 1995   Date of Admission:  5/25/2021  Date of Discharge:  6/14/2021 12:45 PM  Admitting Physician:  Vitaly Antonio MD  Discharge Physician:  Vitaly Ramsey*     Event Leading to Hospitalization:   Per H&P from Dr Antonio on 5/25/21     \"Chief Complaint: \"I'm going through a lot of tormet in my own life. I'm trying to move through my [employer] and [employer] dance.\"    History of Present Illness:  Jerel Day is a 25 year old male previously diagnosed with schizophrenia who presented on 05/26/2021 with psychotic symptoms, disorganized behavior and thought in the context of inconsistent medication adherence as well as AH telling him to kill himself.      Collateral from DEC report, May 26, 2021:   Patient was previously stable on his Haldol and Invega but stopped taking them following medication adjustments recently and has been decompensating.  Yesterday he saw his psychiatrist, and his psychiatrist noted that he had thought blocking, disorganized behavior, hostility and abstract thought.  During that appointment, I will noted that \"the voices make me angry.\"  Jerel says he does not take his medications because \"it has too much toxin  I get crushed by it.\"  Patient states that he instead prefers to take ashwagandha and ibuprofen.  He was agitated yesterday after being brought to the emergency department, stating that his psychiatrist brought him in because \"I have schizophrenia  Did not give me any good reasons  Took me out of my home likely kidnapper or robber.\"  He was responding to internal stimuli on exam, had tangential fleeting thoughts, had thought blocking with long pauses between responses to questions and required " "redirection constantly.  He appeared disheveled and had shaved patches of hair from his head earlier that day, because \"it felt too heavy and needed to lose the lash in order to turn bright.\"  \"    See Admission note by Vitaly Antonio MD on 5/25/2021 for additional details.      Objective:   B/P: 127/88, T: 97.9, P: 83, R: 16  Psychiatric Examination:  Appearance:  awake, alert, adequately groomed and dressed in hospital scrubs  Muscle Strength and Tone: normal  Gait and Station: Normal  Behavior (Psychomotor):  no evidence of tardive dyskinesia, dystonia, or tics  Eye Contact:  mostly good, occasionally intense  Speech:  clear, coherent and moderately incresed speech latency  Mood:  \"fine, colloquially\"  Affect:  mood congruent and guarded  Attitude:  somewhat cooperative  Thought Process:  logical, disorganized and tangental; initial responses are appropriate to the question though gets off-topic easily, is redirectible  Thought Content:  no evidence of suicidal ideation or homicidal ideation and patient appears to be responding to internal stimuli  Associations:  loosening of associations present  Insight:  limited - acknowledges though minimizes MH symptoms  Judgment:  limited  Oriented to:  time, person, and place  Attention Span and Concentration:  intact  Recent and Remote Memory:  intact  Language: Fluent in English with appropriate syntax and vocabulary.  Fund of Knowledge: appropriate     Hospital Course:   Diagnostic Impression:                Jerel Day is a 25 year old single White male previously diagnosed with schitzophrenia who was admitted with visual and auditory hallucinations in the context of medication non-adherence and cannabis use. Current psychosocial stressors include chronic mental health issues which he had been coping with by using substances, most recently heavy cannabis use.  Patient's support system includes family and peers, and the patient does have an ACT " team. Biological contributions to mental health presentation include genetic loading from family history of psychosis and concurrent substance use.                 The patient's presentation is consistent with previously diagnosed schizophrenia. Given recent substance use and improvement since cannabis cessation in the hospital, substance induced psychosis could also have been contributing to his presentation on admission.    Psychiatric Course:  Jerel Day was admitted to [facility] on a 72 hour hold. His PTA Paliperidone invega sustenna 234 mg HO & benadryl were continued. PTA p.o. Paliperidone Invega was held and replaced by olanzapine as the patient continuously declined the Paliperidone and endorsed having good therapeutic results from the olanzapine, namely sleep and with his disorganized thought. As of 6/2 has had improvement in organization though continued paranoia as well as some thought-blocking and tangential speech; he acknowledged improved sleep and thinking since being in the hospital though lacks insight into why he is still hospitalized and declining further medication adjustments at this time. Olanzapine increased to 15mg at bedtime on 6/3 which patient was hesitant about though agreeable to trying and tolerated well. Clark filed 6/4 after discussions between inpatient and ACT treatment teams concerning Jerel's historic medication noncompliance.      Jerel's made much improvement since his admission to the hospital. He appears to have more organized speech and more linear thought process. While pt still endorses hearing voices, he no longer states that they are telling him to kill himself. He is still frequently tangential though much more redirectable than on admission.  Some days, Jerel appears more aware of his mental health, including his auditory delusions. He acknowledges that he hears voices and that they have made him feel angry. However, he recently has endorsed that the voices are  telling him that his mental health team, including psychiatrist, are not trying to help him. Additionally, he has expressed that he attributes all problems in his life to hospitalizations, but not his mental health. He lacks awareness that his medications are helping him. When told that he would likely be discharged on Monday, he was frustrated that he couldn't leave sooner and that his ACT team was going to follow him. This may present a barrier to medication compliance after discharge.  Mother has additionally suggested that medication compliance is a big issue for Jerel and that he tends to do better with long-acting injectable anti-psychotics which is the ultimate goal for his care.      Medical Course:  Patient was medically cleared for admission to the unit. No major medical issues arose during this hospitalization. Patient did complain of back pain improved with pta gabapentin, ear pain improved with debrox ear drops, and dry mouth improved with lozenges and mouthwash.     Consults:   None    Risk Assessment:   Jerel Day has notable risk factors for self-harm, including single status, psychosis and substance abuse. However, risk is mitigated by ability to volunteer a safety plan. Additional steps taken to minimize risk include: medication titration and arrangement of close outpatient follow-up with existing ACT team. Therefore, based on all available evidence including the factors cited above, Jerel Day does not appear to be an imminent danger to self or others and is appropriate for outpatient level of care. However, if patient uses substances or is non-adherent with medication, their risk of decompensation and SI/HI will be elevated. This was discussed with the patient.    This document serves as a transfer of care to Jerel Day's outpatient providers.     Diagnoses:   #Schizophrenia      Discharge Plan:   Patient is being discharged to home with the following medications and appointments as  detailed below:    Medications:  Added/Changed:  - ADDED olanzapine 15 mg qhs  - ADDED deprox ear drops  Continued:  - diphenhydramine 50 mg at bedtime  - gabapentin 100mg tid prn for agitation  Discontinued:  - paliperidone ER    Psychiatric Appointments:   Wednesday-appointment in place  Prescriber: Dr Harrison Salguero MD with ACT Team    Pt seen and discussed with my attending, Dr Vitaly David MD  PGY-1 Psychiatry Resident    Attestation:  The patient has been seen and evaluated by me, Vitaly Godoy . I have examined the patient today and reviewed the discharge plan with the resident. I agree with the final assessment and plan, as noted in the discharge summary. I have reviewed today's vital signs, medications, labs and imaging.    Total time discharge plannin minutes  Vitaly Antonio MD on 6/15/2021 at 12:50 AM       Appendix A: All Labs This Admission:     Results for orders placed or performed during the hospital encounter of 21   Drug abuse screen 6 urine (tox)     Status: Abnormal   Result Value Ref Range    Amphetamine Qual Urine Negative NEG^Negative    Barbiturates Qual Urine Negative NEG^Negative    Benzodiazepine Qual Urine Negative NEG^Negative    Cannabinoids Qual Urine Positive (A) NEG^Negative    Cocaine Qual Urine Negative NEG^Negative    Ethanol Qual Urine Negative NEG^Negative    Opiates Qualitative Urine Negative NEG^Negative   Asymptomatic SARS-CoV-2 COVID-19 Virus (Coronavirus) by PCR     Status: None    Specimen: Nasopharyngeal   Result Value Ref Range    SARS-CoV-2 Virus Specimen Source Nasopharyngeal     SARS-CoV-2 PCR Result NEGATIVE     SARS-CoV-2 PCR Comment (Note)    CBC with platelets differential     Status: None   Result Value Ref Range    WBC 8.4 4.0 - 11.0 10e9/L    RBC Count 5.19 4.4 - 5.9 10e12/L    Hemoglobin 15.8 13.3 - 17.7 g/dL    Hematocrit 47.8 40.0 - 53.0 %    MCV 92 78 - 100 fl    MCH 30.4 26.5 - 33.0 pg    MCHC 33.1 31.5 -  36.5 g/dL    RDW 11.9 10.0 - 15.0 %    Platelet Count 296 150 - 450 10e9/L    Diff Method Automated Method     % Neutrophils 55.7 %    % Lymphocytes 29.5 %    % Monocytes 8.0 %    % Eosinophils 4.7 %    % Basophils 1.3 %    % Immature Granulocytes 0.8 %    Nucleated RBCs 0 0 /100    Absolute Neutrophil 4.7 1.6 - 8.3 10e9/L    Absolute Lymphocytes 2.5 0.8 - 5.3 10e9/L    Absolute Monocytes 0.7 0.0 - 1.3 10e9/L    Absolute Eosinophils 0.4 0.0 - 0.7 10e9/L    Absolute Basophils 0.1 0.0 - 0.2 10e9/L    Abs Immature Granulocytes 0.1 0 - 0.4 10e9/L    Absolute Nucleated RBC 0.0    Magnesium     Status: None   Result Value Ref Range    Magnesium 2.3 1.6 - 2.3 mg/dL   Phosphorus     Status: None   Result Value Ref Range    Phosphorus 3.2 2.5 - 4.5 mg/dL   Comprehensive metabolic panel     Status: None   Result Value Ref Range    Sodium 140 133 - 144 mmol/L    Potassium 4.4 3.4 - 5.3 mmol/L    Chloride 103 94 - 109 mmol/L    Carbon Dioxide 29 20 - 32 mmol/L    Anion Gap 8 3 - 14 mmol/L    Glucose 92 70 - 99 mg/dL    Urea Nitrogen 9 7 - 30 mg/dL    Creatinine 0.86 0.66 - 1.25 mg/dL    GFR Estimate >90 >60 mL/min/[1.73_m2]    GFR Estimate If Black >90 >60 mL/min/[1.73_m2]    Calcium 9.4 8.5 - 10.1 mg/dL    Bilirubin Total 0.9 0.2 - 1.3 mg/dL    Albumin 4.1 3.4 - 5.0 g/dL    Protein Total 7.9 6.8 - 8.8 g/dL    Alkaline Phosphatase 78 40 - 150 U/L    ALT 28 0 - 70 U/L    AST 13 0 - 45 U/L   Lipid panel     Status: Abnormal   Result Value Ref Range    Cholesterol 131 <200 mg/dL    Triglycerides 120 <150 mg/dL    HDL Cholesterol 37 (L) >39 mg/dL    LDL Cholesterol Calculated 70 <100 mg/dL    Non HDL Cholesterol 94 <130 mg/dL   TSH with free T4 reflex and/or T3 as indicated     Status: None   Result Value Ref Range    TSH 0.65 0.40 - 4.00 mU/L   Folate     Status: None   Result Value Ref Range    Folate 7.7 >5.4 ng/mL   Vitamin B12     Status: None   Result Value Ref Range    Vitamin B12 214 193 - 986 pg/mL   Asymptomatic  SARS-CoV-2 COVID-19 Virus (Coronavirus) by PCR     Status: None    Specimen: Nasopharyngeal   Result Value Ref Range    SARS-CoV-2 Virus Specimen Source Nasopharyngeal     SARS-CoV-2 PCR Result NEGATIVE     SARS-CoV-2 PCR Comment       Testing was performed using the Xpert Xpress SARS-CoV-2 Assay on the Cepheid Gene-Xpert   Instrument Systems. Additional information about this Emergency Use Authorization (EUA)   assay can be found via the Lab Guide.     EKG 12-lead, tracing only     Status: None   Result Value Ref Range    Interpretation ECG Click View Image link to view waveform and result

## 2021-08-04 ENCOUNTER — HOSPITAL ENCOUNTER (INPATIENT)
Facility: CLINIC | Age: 26
LOS: 40 days | Discharge: PSYCHIATRIC OR SUBSTANCE USE FACILITY | DRG: 885 | End: 2021-09-13
Attending: EMERGENCY MEDICINE | Admitting: PSYCHIATRY & NEUROLOGY
Payer: COMMERCIAL

## 2021-08-04 ENCOUNTER — TELEPHONE (OUTPATIENT)
Dept: BEHAVIORAL HEALTH | Facility: CLINIC | Age: 26
End: 2021-08-04

## 2021-08-04 DIAGNOSIS — G47.09 OTHER INSOMNIA: ICD-10-CM

## 2021-08-04 DIAGNOSIS — E55.9 VITAMIN D DEFICIENCY: Primary | ICD-10-CM

## 2021-08-04 DIAGNOSIS — F41.9 ANXIETY: ICD-10-CM

## 2021-08-04 DIAGNOSIS — R68.2 DRY MOUTH: ICD-10-CM

## 2021-08-04 DIAGNOSIS — F25.8 OTHER SCHIZOAFFECTIVE DISORDERS (H): ICD-10-CM

## 2021-08-04 DIAGNOSIS — Z11.52 ENCOUNTER FOR SCREENING LABORATORY TESTING FOR COVID-19 VIRUS: ICD-10-CM

## 2021-08-04 DIAGNOSIS — F20.9 SCHIZOPHRENIA, UNSPECIFIED TYPE (H): ICD-10-CM

## 2021-08-04 DIAGNOSIS — F25.0 SCHIZOAFFECTIVE DISORDER, BIPOLAR TYPE (H): ICD-10-CM

## 2021-08-04 DIAGNOSIS — M54.9 BILATERAL BACK PAIN, UNSPECIFIED BACK LOCATION, UNSPECIFIED CHRONICITY: ICD-10-CM

## 2021-08-04 DIAGNOSIS — M54.40 MIDLINE LOW BACK PAIN WITH SCIATICA, SCIATICA LATERALITY UNSPECIFIED, UNSPECIFIED CHRONICITY: ICD-10-CM

## 2021-08-04 PROCEDURE — 99285 EMERGENCY DEPT VISIT HI MDM: CPT | Mod: 25 | Performed by: EMERGENCY MEDICINE

## 2021-08-04 PROCEDURE — 124N000002 HC R&B MH UMMC

## 2021-08-04 PROCEDURE — C9803 HOPD COVID-19 SPEC COLLECT: HCPCS | Performed by: EMERGENCY MEDICINE

## 2021-08-04 PROCEDURE — 99285 EMERGENCY DEPT VISIT HI MDM: CPT | Performed by: EMERGENCY MEDICINE

## 2021-08-04 PROCEDURE — 90791 PSYCH DIAGNOSTIC EVALUATION: CPT

## 2021-08-04 RX ORDER — OLANZAPINE 15 MG/1
15 TABLET ORAL AT BEDTIME
Status: DISCONTINUED | OUTPATIENT
Start: 2021-08-04 | End: 2021-08-06

## 2021-08-04 RX ORDER — DIPHENHYDRAMINE HCL 50 MG
50 CAPSULE ORAL AT BEDTIME
Status: DISCONTINUED | OUTPATIENT
Start: 2021-08-04 | End: 2021-08-23

## 2021-08-04 RX ORDER — OLANZAPINE 10 MG/1
10 TABLET ORAL 3 TIMES DAILY PRN
Status: DISCONTINUED | OUTPATIENT
Start: 2021-08-04 | End: 2021-09-13 | Stop reason: HOSPADM

## 2021-08-04 RX ORDER — TRAZODONE HYDROCHLORIDE 50 MG/1
50 TABLET, FILM COATED ORAL
Status: DISCONTINUED | OUTPATIENT
Start: 2021-08-04 | End: 2021-09-13 | Stop reason: HOSPADM

## 2021-08-04 RX ORDER — AMOXICILLIN 250 MG
1 CAPSULE ORAL 2 TIMES DAILY PRN
Status: DISCONTINUED | OUTPATIENT
Start: 2021-08-04 | End: 2021-09-13 | Stop reason: HOSPADM

## 2021-08-04 RX ORDER — GABAPENTIN 100 MG/1
100 CAPSULE ORAL 3 TIMES DAILY PRN
Status: DISCONTINUED | OUTPATIENT
Start: 2021-08-04 | End: 2021-09-13 | Stop reason: HOSPADM

## 2021-08-04 RX ORDER — HYDROXYZINE HYDROCHLORIDE 25 MG/1
25 TABLET, FILM COATED ORAL EVERY 4 HOURS PRN
Status: DISCONTINUED | OUTPATIENT
Start: 2021-08-04 | End: 2021-09-13 | Stop reason: HOSPADM

## 2021-08-04 RX ORDER — MAGNESIUM HYDROXIDE/ALUMINUM HYDROXICE/SIMETHICONE 120; 1200; 1200 MG/30ML; MG/30ML; MG/30ML
30 SUSPENSION ORAL EVERY 4 HOURS PRN
Status: DISCONTINUED | OUTPATIENT
Start: 2021-08-04 | End: 2021-09-13 | Stop reason: HOSPADM

## 2021-08-04 RX ORDER — OLANZAPINE 10 MG/2ML
10 INJECTION, POWDER, FOR SOLUTION INTRAMUSCULAR 3 TIMES DAILY PRN
Status: DISCONTINUED | OUTPATIENT
Start: 2021-08-04 | End: 2021-09-13 | Stop reason: HOSPADM

## 2021-08-04 RX ORDER — ACETAMINOPHEN 325 MG/1
650 TABLET ORAL EVERY 4 HOURS PRN
Status: DISCONTINUED | OUTPATIENT
Start: 2021-08-04 | End: 2021-09-13 | Stop reason: HOSPADM

## 2021-08-04 ASSESSMENT — ACTIVITIES OF DAILY LIVING (ADL)
HYGIENE/GROOMING: INDEPENDENT
DRESS: INDEPENDENT
ORAL_HYGIENE: INDEPENDENT

## 2021-08-04 ASSESSMENT — MIFFLIN-ST. JEOR: SCORE: 1715.21

## 2021-08-04 NOTE — ED NOTES
Bed: ED14  Expected date: 8/4/21  Expected time: 6:30 PM  Means of arrival: Ambulance  Comments:  [facility] 438 24yo male, increased agitation

## 2021-08-05 PROCEDURE — 99207 PR CDG-CODE CATEGORY CHANGED: CPT | Performed by: PSYCHIATRY & NEUROLOGY

## 2021-08-05 PROCEDURE — 124N000002 HC R&B MH UMMC

## 2021-08-05 PROCEDURE — 99222 1ST HOSP IP/OBS MODERATE 55: CPT | Performed by: PSYCHIATRY & NEUROLOGY

## 2021-08-05 PROCEDURE — 250N000013 HC RX MED GY IP 250 OP 250 PS 637: Performed by: PSYCHIATRY & NEUROLOGY

## 2021-08-05 RX ORDER — MULTIVIT-MIN/IRON/FOLIC ACID/K 18-600-40
1 CAPSULE ORAL DAILY
Status: ON HOLD | COMMUNITY
End: 2021-09-09

## 2021-08-05 RX ORDER — LIDOCAINE 50 MG/G
1 PATCH TOPICAL DAILY PRN
Status: ON HOLD | COMMUNITY
End: 2021-09-09

## 2021-08-05 RX ORDER — LANOLIN ALCOHOL/MO/W.PET/CERES
1 CREAM (GRAM) TOPICAL
COMMUNITY

## 2021-08-05 RX ORDER — OMEGA-3 FATTY ACIDS/FISH OIL 300-1000MG
200 CAPSULE ORAL 4 TIMES DAILY PRN
Status: ON HOLD | COMMUNITY
End: 2021-09-09

## 2021-08-05 RX ORDER — HALOPERIDOL 5 MG/1
5 TABLET ORAL
Status: ON HOLD | COMMUNITY
Start: 2021-07-13 | End: 2021-09-09

## 2021-08-05 RX ADMIN — DIPHENHYDRAMINE HYDROCHLORIDE 50 MG: 50 CAPSULE ORAL at 21:29

## 2021-08-05 RX ADMIN — OLANZAPINE 15 MG: 15 TABLET, FILM COATED ORAL at 21:29

## 2021-08-05 ASSESSMENT — ACTIVITIES OF DAILY LIVING (ADL)
DRESS: INDEPENDENT
HYGIENE/GROOMING: INDEPENDENT
LAUNDRY: WITH SUPERVISION
ORAL_HYGIENE: INDEPENDENT

## 2021-08-05 NOTE — PLAN OF CARE
"  Problem: Adult Inpatient Plan of Care  Goal: Plan of Care Review  Outcome: No Change    Jerel is a 25 year old who man who came to the ED due to psychotic symptoms in the context of medication non compliance. Per chart review, pt has hx of schizophrenia. Pt has not been taking his medications. Pt believes he is in a storybook.  reports the pt is tangential, word salad, disorganized, and delusional and paranoid.    Pt went straight to bed as soon as he was done with safety search. Stated \" I'm tired.\"  Most PTA meds were restarted except (for) Invega Sustanna injection which would require accurate information as to when was the last time pt took the med. This info was lacking as pt refused the admission interview. Pt refused withdrawal assessment.     Plan: Status 15s; Build trust with pt. Continue to build on strengths. Encourage healthy coping.     Admission Notification: Refused    Encouraged to notify staff if he needs assistance.        "

## 2021-08-05 NOTE — PLAN OF CARE
"Nursing Plan of care   Problem: Sleep Disturbance  Goal: Adequate Sleep/Rest  Outcome: No Change   Pt was sleeping in bed at the start of the shift; around 0200, came out of his room with blunted affect; writer approached and introduced self, but pt replied with angry affect and stated \"I don't want to talk right now\"; asked if he needs PRN medication pt replied, \"No\"; was left alone and encouraged to let the writer know if he has needs to met; No sign of withdrawal noted. Pt  approached the nursing desk around 0515 and requested for melatonin; offered other available PRN since it is late in to the shift to administer melatonin, but pt declined other alternative PRN.pt appeared to have slept for 5.5 hrs; will continue to monitor and asses.      "

## 2021-08-05 NOTE — PHARMACY-ADMISSION MEDICATION HISTORY
Admission Medication History Completed by Pharmacy    See [facility] Admission Navigator for allergy information, preferred outpatient pharmacy, prior to admission medications and immunization status.     Medication History Sources:     Surescripts (fill history), [facility], and faxed medication list from ACT team (Mark Ramos, [phone])    Changes made to PTA medication list (reason):    Added:   o Haloperidol  o PRNs: ibuprofen, lidocaine patch, melatonin    Deleted: None    Changed:   o Changed PRN reason on gabapentin (agitation) per ACT team listed reasons (anxiety or agitation)    Additional Information:  Patient was not interviewed for medication history - unable to verify non-prescription medications patient may take (such as OTC, vitamins, herbal/supplements).    Invega Sustenna (restricted use) - last given by FRANNIE Salguero on 5/17/21 (next injection was due 6/14/21). Noemy reported that patient has been refusing injection for past few months.    Olanzapine - not listed on ACT team med list, but was recently prescribed on 7/6/21 for 28 day supply per [facility] [employer] [facility].    Prior to Admission medications    Medication Sig Last Dose Taking? Auth Provider   carbamide peroxide (DEBROX) 6.5 % otic solution Place 3 drops into both ears 3 times daily  Yes Shauna David MD   diphenhydrAMINE (BENADRYL) 50 MG capsule Take 50 mg by mouth At Bedtime   Yes Unknown, Entered By History   gabapentin (NEURONTIN) 100 MG capsule Take 1 capsule (100 mg) by mouth 3 times daily as needed (agitation)  Patient taking differently: Take 100 mg by mouth 3 times daily as needed (anxiety or agitation)   Yes Shauna David MD   haloperidol (HALDOL) 5 MG tablet Take 5 mg by mouth daily (with dinner)  Yes Unknown, Entered By History   ibuprofen (ADVIL/MOTRIN) 200 MG capsule Take 200 mg by mouth 4 times daily as needed for pain  Yes Unknown, Entered By History   lidocaine (LIDODERM) 5 % patch Place 1 patch onto the skin daily as  needed for moderate pain To prevent lidocaine toxicity, patient should be patch free for 12 hrs daily.  Yes Unknown, Entered By History   melatonin 3 MG tablet Take 1 mg by mouth nightly as needed for sleep  Yes Unknown, Entered By History   paliperidone (INVEGA SUSTENNA) 234 MG/1.5ML ALDO Inject 234 mg into the muscle every 28 days 5/17/2021 Yes Unknown, Entered By History   Vitamin D, Cholecalciferol, 25 MCG (1000 UT) TABS Take 1 tablet by mouth daily  Yes Unknown, Entered By History   OLANZapine (ZYPREXA) 15 MG tablet Take 1 tablet (15 mg) by mouth At Bedtime   Vitaly Antonio MD       Date completed: 08/05/21    Medication history completed by:     Nicollette McMann, PharmD  [facility]: Ascom *[id number]

## 2021-08-05 NOTE — PROGRESS NOTES
08/04/21 2213   Patient Belongings   Did you bring any home meds/supplements to the hospital?  No   Patient Belongings locker   Patient Belongings Put in Hospital Secure Location (Security or Locker, etc.) cell phone/electronics;clothing;MP3 Player;plastic bag;shoes   Belongings Search Yes   Clothing Search Yes   Second Staff Alicia BURGOS   Comment See note   Locker:  -Headphones w/ cord  -Cellphone  -Ipod Mp3 player  -Shirt  -Shorts  -Sandals    A               Admission:  I am responsible for any personal items that are not sent to the safe or pharmacy.  [facility] is not responsible for loss, theft or damage of any property in my possession.    Signature:  _________________________________ Date: _______  Time: _____                                              Staff Signature:  ____________________________ Date: ________  Time: _____      2nd Staff person, if patient is unable/unwilling to sign:    Signature: ________________________________ Date: ________  Time: _____     Discharge:  [facility] has returned all of my personal belongings:    Signature: _________________________________ Date: ________  Time: _____                                          Staff Signature:  ____________________________ Date: ________  Time: _____

## 2021-08-05 NOTE — PLAN OF CARE
Nursing Assessment    Recent Vitals: B/P: 146/87, T: 98, P: 65, R: 16    Hours of sleep at night: 5    General Shift Summary  Patient has been in and out of milieu. He is withdrawn, keeps to himself and doesn't engage in conversation with others. He presents with a flat/blunted affect and stares at staff when talking to him or stares off into space. Patient appears irritable with pressured speech. He doesn't appear to be responding to internal stimuli. He is delusional, has poor concentration, poor judgment, and poor incite. He refused to answer some assessment questions however did deny SI. He stated that he's wrongfully being kept here, that his  Juan Miguel has wire tapped his house, is watching his house from a distance, and is interfering with his life as well as watching his life style. Appetite is poor, he had an orange juice for breakfast and milk for lunch. No PRN meds received. Hygiene is poor, showering was encouraged.    Plan is to continue to monitor patient status q 15 mins, assess response to medications, and maintain the patients safety.    Madina Lane, RN MSN

## 2021-08-05 NOTE — ED NOTES
Pt arrived and refused vitals. Pt reports ACT team called PD and inappropriately sent him here because they don't like him. Pt intermittently responds appropriately then at times has tangential speech. Pt rambling about UV light and not having sunglasses so he had to take up smoking again. Pt listing off doctors (none that this writer is aware of) that he refuses to see and that he has malpractice against and that [facility] said he does not need an assessment.

## 2021-08-05 NOTE — PROGRESS NOTES
08/05/21 1436   General Information   Has Not Attended OT as of: 08/05/21     Plan: OT staff will meet with pt to review the role of occupational therapy and explain the value of having them involved in their treatment plan including options to meet current needs/self-identified goals. As group attendance is established, Pt will be given self-assessment to inform OT initial assessment.

## 2021-08-05 NOTE — ED NOTES
"Pt informed of need for COVID swab pt \"I don't have COVID\" and refused to give swab. Pt informed of need for urine sample and refuses as well \"I don't have to do that.\" Pt given 72H hold paperwork. Pt refuses to wear allergy band.   "

## 2021-08-05 NOTE — ED NOTES
ED to Behavioral Floor Handoff    SITUATION  Jerel Day is a 25 year old male who speaks English and lives in a home alone The patient arrived in the ED by ambulance from home with a complaint of Altered Mental Status (Pt rambling intermittent tangential speech, pt able to answer some questions, pt reports taking his medications, BIBA after ACT team called PD)  .The patient's current symptoms started/worsened 1 week(s) ago and during this time the symptoms have increased.   In the ED, pt was diagnosed with   Final diagnoses:   Schizophrenia, unspecified type (H)        Initial vitals were: BP:  (pt refused)  Resp: 16   --------  Is the patient diabetic? No   If yes, last blood glucose? --     If yes, was this treated in the ED? --  --------  Is the patient inebriated (ETOH) No or Impaired on other substances? No  MSSA done? No  Last MSSA score: --    Were withdrawal symptoms treated? N/A  Does the patient have a seizure history? No. If yes, date of most recent seizure--  --------  Is the patient patient experiencing suicidal ideation? denies current or recent suicidal ideation     Homicidal ideation? denies current or recent homicidal ideation or behaviors.    Self-injurious behavior/urges? denies current or recent self injurious behavior or ideation.  ------  Was pt aggressive in the ED No  Was a code called No  Is the pt now cooperative? No  -------  Meds given in ED: Medications - No data to display   Family present during ED course? No  Family currently present? No    BACKGROUND  Does the patient have a cognitive impairment or developmental disability? No  Allergies:   Allergies   Allergen Reactions     Penicillins      Rash, Generalized   .   Social demographics are   Social History     Socioeconomic History     Marital status: Single     Spouse name: None     Number of children: None     Years of education: None     Highest education level: None   Occupational History     None   Tobacco Use     Smoking  status: Current Every Day Smoker     Packs/day: 0.25     Years: 1.00     Pack years: 0.25     Types: Cigarettes     Smokeless tobacco: Never Used   Substance and Sexual Activity     Alcohol use: Yes     Comment: socially     Drug use: Yes     Types: Marijuana     Comment: last used yesterday     Sexual activity: None   Other Topics Concern     Parent/sibling w/ CABG, MI or angioplasty before 65F 55M? Not Asked   Social History Narrative     None     Social Determinants of Health     Financial Resource Strain:      Difficulty of Paying Living Expenses:    Food Insecurity:      Worried About Running Out of Food in the Last Year:      Ran Out of Food in the Last Year:    Transportation Needs:      Lack of Transportation (Medical):      Lack of Transportation (Non-Medical):    Physical Activity:      Days of Exercise per Week:      Minutes of Exercise per Session:    Stress:      Feeling of Stress :    Social Connections:      Frequency of Communication with Friends and Family:      Frequency of Social Gatherings with Friends and Family:      Attends Temple Services:      Active Member of Clubs or Organizations:      Attends Club or Organization Meetings:      Marital Status:    Intimate Partner Violence:      Fear of Current or Ex-Partner:      Emotionally Abused:      Physically Abused:      Sexually Abused:         ASSESSMENT  Labs results Labs Ordered and Resulted from Time of ED Arrival Up to the Time of Departure from the ED - No data to display   Imaging Studies: No results found for this or any previous visit (from the past 24 hour(s)).   Most recent vital signs Resp 16    Abnormal labs/tests/findings requiring intervention:---   Pain control: pt had none  Nausea control: pt had none    RECOMMENDATION  Are any infection precautions needed (MRSA, VRE, etc.)? No If yes, what infection? --  ---  Does the patient have mobility issues? independently. If yes, what device does the pt use? ---  ---  Is patient on 72  hour hold or commitment? Yes If on 72 hour hold, have hold and rights been given to patient? Yes  Are admitting orders written if after 10 p.m. ?N/A  Tasks needing to be completed:---     NAVYA GANDHI RN   asc--    [phone] [place] [facility]   [phone] [facility]

## 2021-08-05 NOTE — TELEPHONE ENCOUNTER
S: Carlos Manuel, [place] [facility], 25/M, psychosis     B: Hx of schizophrenia   Pt has not been taking his medications   Pt believes he is in a storybook   reports the pt is tangential, word salad, disorganized, and delusional and paranoid     Medically cleared, eating, drinking, ambulating indep  Patient cleared and ready for behavioral bed placement: Yes   No covid concerns, test ordered     A: [facility], 8/4/21 @835PM     R: 10/Prabhakar     843pm - Tejas, on call provider, paged   845pm - Tejas accepts   Pt placed in queue   846pm - intake called 22 to see if they would be able to cohort pts on the unit as the pt is followed by P and has hx on 22. Charge will call intake back   902pm - 22 charge reports they are cohorting pt to create and open bed   903pm - Luly, on call resident, paged   926pm - 22 called and repots they are no longer able to cohort any pts together   928pm - Luly notified there is no bed avail for 22   Pt placed in queue   928pm - 10 charge notified, ready for report   930pm - ED charge notified via text page

## 2021-08-05 NOTE — ED NOTES
8/4/2021  Jerel Day 1995     [facility] Crisis Assessment:    Started at: 8:00pm  Completed at: 8:30pm  Patient was assessed via in-person.    Chief Complaint and History of Presenting Problem:  Patient is a 25 year old  male who presented to the ED by EMS related to concerns for decompensating psychosis.     Psychotherapy techniques or interventions utilized throughout assessment include: Establishing rapport, Active listening, Assess dimensions of crisis, Apply solution-focused therapy to address current crisis, Identify additional supports and alternative coping skills, Motivational Interviewing, Brief Supportive Therapy, Trauma-Informed Care and Safety planning    Biopsychosocial Background  Patient is his own legal guardian. Patient is under provisional discharge from civil commitment through [facility]. Patient has additional prior civil commitments in 11/2018 and 8/2019. Patient has history of inpatient mental health admissions, most recently at [facility] from 5/25/2021-6/14/2021 . Patient has established [facility] Assertive Community Treatment (ACT) Team with wrap around psychiatric services. Patient has  Armando Tinsley and psychiatrist Dr. Harrison Salguero, both through this ACT team. Patient states he does not take his medication as court ordered because he forgets while trying to grooming himself and his house.    Mental Health History and Current Symptoms   Patient identifies historical diagnoses of schizophrenia. At baseline, patient lacks insight into their mental health symptoms.     Prior psychiatric hospitalizations: Patient has multiple prior admissions, most recently 5/25/2021-6/14/2021 here at [facility].    Civil commitments: Patient is currently under mental health civil commitment with urbano through [facility]. Patient has additional prior civil commitments in 11/2018 and 8/2019. Patient is his own  "legal guardian.    Programmatic care: None.    Family Mental and Chemical Health History: per previous DEC, patient's maternal great-aunt had schizophrenia; maternal side with depression and anxiety; paternal side with anxiety.    Psychotropic medications at most recent inpatient discharge, patient unable to verify:      Carbamide Peroxide 6.5 % 3 drops Both Ears 3 TIMES DAILY   diphenhydrAMINE HCl 50 mg Oral AT BEDTIME   Gabapentin 100 mg Oral 3 TIMES DAILY PRN   OLANZapine 15 mg Oral AT BEDTIME   Paliperidone Palmitate 234 mg Intramuscular EVERY 28 DAYS    Medication Adherent: No, patient reports forgetting medications due to \"trying to groom himself and his house\".    Recent medication changes? Unknown.    Current Providers  Primary Care Provider: Unknown.  Psychiatrist: Yes, Dr Harrison Salguero MD with [facility]  Therapist: No  : Yes, Armando Tinsley   [facility] Assertive Community Treatment (ACT) Team   [address]   [place] [address]   Cell phone: [phone]   Office phone: [phone]   Fax: [phone]  ACT Team: Yes, [facility] Assertive Community Treatment (ACT) Team    Has an CONNOR been signed? No, patient has impaired mental status and cannot give consent at this time.    Relevant Medical Concerns  Patient identifies concerns with completing ADLs? Yes  Patient can ambulate independently? Yes  Other medical health concerns? No  History of concussion or TBI? No     Trauma History   Physical, Emotional, or Sexual abuse: No  Loss of a friend or family member to suicide: No  Other identified traumatic event or significant stressor: No    Current Symptoms  Attention, Hyperactivity, and Impulsivity: Yes: Disorganized/Forgetful and Inattentive   Anxiety:Yes: Generalized Symptoms: Agitation, Avoidance and Cognitive anxiety - feelings of doom, racing thoughts, difficulty concentrating     Behavioral Difficulties: Yes: Impulsivity/Disinhibition and " "Wandering   Mood Symptoms: Yes: Impaired concentration, Impaired decision making , Increased irritability/agitation and Risky behaviors   Appetite: No   Feeding and Eating: No  Interpersonal Functioning: Yes: Cognitive Distortions, Emotional Deregulation, Impaired Impulse Control and Impaired Interpersonal Functioning  Learning Disabilities/Cognitive/Developmental Disorders: Yes: Mood and Self-Regulation   General Cognitive Impairments: Yes: Decision-Making, Judgment/Insight and Orientation  If yes, see completed Mini-Cog Assessment below.  Sleep: No   Psychosis: Yes: Delusions: Grandiose: reincarnated, Persecutory: by ACT staff and Paranoid: providers making \"judgement calls I haven't told them\", Paranoia and Grossly Disorganized Speech    Trauma: No     Substance Use History and Treatments  None reported.    Patient has recently completed a drug screen or BAL/Breathalyzer? Patient refused.    History of Suicidal Ideation, Suicide Attempts, and Risk Formulation:     ESS-6 Score: patient is unable to participate in screening tool due to acute psychosis.    Current risk factors for suicide include impulsivity/recklessness and decompensation of schizophrenia. Protective factors against suicide include strong bond to family/friends, community support and engaged and/or invested in treatment.    Other Risk Areas  Aggressive/assumptive/homicidal risk factors: No   Duty to warn?No   Was a Child Protection Report Made? No   Was a Adult Protection Report Made? No      Sexually inappropriate behavior? No      Vulnerability to sexual exploitation? No     Mental Status Exam:  Affect: Blunted, Constricted and Flat  Appearance: Appropriate   Attention Span/Concentration: Inattentive    Eye Contact: Avoidant  Fund of Knowledge: Appropriate   Language /Speech Content: Fluent  Language /Speech Volume: Normal   Language /Speech Rate/Productions: Varied between hyper-verbal/pressured and non-responsive.  Recent Memory: Poor  Remote " "Memory: Poor  Mood: Irritable   Orientation:   Person: No   Place: No  Time of Day: No   Date: No   Situation (Do they understand why they are here?): No   Psychomotor Behavior: Underactive   Thought Content: Delusions and Paranoia  Thought Form: Flight of Ideas, Loose Associations and Tangential    Clinical Summary and Disposition  Clinical summary:    Patient is alert but disoriented to person, place, time, and situation. Patient reports he is in a book right now, reports this is an \"embarrassing\" book that he refuses to disclose the title of. Patient is unable to detail to events preceding this emergency encounter. Patient reports he had a massage this morning. Patient omits any other events of note today. Patient does not endorse waving a bat in the street, struggling against police per report. Patient is minimally cooperative in ED, refusing vitals and labs.     Patient has tangential thought process with loose associations. Patient was unable to track conversation appropriately. Patient appears disorganized. Patient refuses to make eye contact with , facing the wall with his back turned. Patient appears withdrawn. Patient took long pauses and would occasionally become non-responsive, possibly attending to internal stimuli. Patient's speech included word salad, meaningless statements such as \"positioning myself in wildlife nature\", \"bats with binoculars\", and \"atrocious dignitaries about stuff I used to take for pride\".     Patient is perseverative on perceived malpractice against his outpatient provider team. Patient reports his ACT team staff should be interrogated. Patient reports they make \"judgement calls about things I haven't told them about\". Patient reports he can \"no longer receive practical medical care in this state\" because there are \"too many dissenting opinions, 5 types of questions, and multiple answers\". Patient talks about using \"bigger and better words to get out of here\". Patient " "then brings up marketing, business, \"[employer] not [employer]\", studying birds, \"referencing too many bookages\", etc.     Patient denies any self-injurious behavior. Patient denies any suicidal ideation, intent, or plan. Patient reports prior suicide attempt \"in the incarnate\". Patient speaks at length about being reincarnated. Patient endorses thought to harm \"plants and animals\". Patient denies any specific violent plan or intent. Patient endorses paranoia and delusions, including \"starting a new broadband frequency\". Patient reports he does so using books, objects, an idea, and water.     Diagnosis:  Schizophrenia, by history - (F20.9)    Disposition:  Attending provider, Jewell Oliav MD consulted and does  agree with recommended disposition which includes Inpatient Mental Health. Patient presents for acute symptoms of psychosis including increased delusions and paranoia. Patient appears disorganized and disoriented. Patient has been non-compliant with medication regiment. Patient will be placed on a 72-hour hold until current civil commitment status can be re-evaluated. Voicemail left for [facility] (ACT) team case dedrick Tinsley at [phone].    Details of final disposition include: Congruent with recommendation above.    If Inpatient, is patient admitted voluntary? No, 72 hour hold   Patient aware of potential for transfer if there is not appropriate placement? NA  [facility]   Central Intake Notified? Yes: Date: 8/4/2021 Time: 9:00pm.    Safety and After Care Planning:        Safety Plan Provided?  No, referred for admission.    Duration of face to face time with patient in minutes: .50 hrs    CPT code(s) utilized: 64649    EDELMIRA Courtney [facility] Hospital[facility]IANNE  "

## 2021-08-05 NOTE — PLAN OF CARE
"Initial Psychosocial Assessment    I have reviewed the chart, met with the patient, and developed Care Plan.      Presenting Problem:  The patient is a 25 year-old,  male admitted with psychosis and carries diagnosis of Schizophrenia.  He is [facility] in the [facility] (Dr. Loc Tracy).  The ACT Team RN/ Noemy Leigh ([phone]) called to report that they are Revoking the patient's PD.  He is under an MI Commitment with a [facility] Order.  He has been living in an apartment paid for by his parents and patient clearly has not been compliant.  His last Invega Sustenna injection was in May of 2021.  He needs medication management and stabilization of his mood and psychosis.  Significant inpatient admission and continued thought disorder with non-compliance.   He is Covid negative and is on a 72 Hour Hold (Started on 8/4/21 at 2018).      Patient was brought into the hospital ED by his ACT team.  They noted non-compliance with medications and he refused vitals. Patient told the ED staff that his ACT team \"doesn't like me so they sent me here.\"  Patient talked incessantly about being reincarnated and was perseverating about \"perceived malpractice\" by his AT Team.  Said he wanted to harm plants and animals but denied an violent plans or intent directly.  He is paranoid and delusional stating he is starting a new broadband frequency.  Tangential, word salad and responding to internal stimuli.      History of Mental Health and Chemical Dependency:  Patient has had admits at [facility] X3; [facility] X2 on 4A; [facility]; [facility] X2;  Is participating as a client of the [facility] ACT Team and his psychiatrist is Dr. Loc Tracy.    Family Description (Constellation, Family Psychiatric History):  Patient is single, never  and no children of issue.  His maternal great aunt had Schizophrenia and the maternal side is also positive for depression and anxiety.  Paternal " side with anxiety.    Significant Life Events (Illness, Abuse, Trauma, Death):  None    Living Situation:  Patient lives in an apartment alone paid for by his parents.    Educational Background:  Completed some college courses    Occupational History:  Unemployed     Financial Status:  Still fully supported by his parents.  Medica insurance through [facility] and also [facility]    Legal Issues:  Committed as MI with a [facility] and PD is being Revoked.  Patient is committed to [facility] and the Commissioner through November 24, 2021.  Patient had been under prior commitments in 2018 and also 2019.  He is his own legal guardian.    Ethnic/Cultural Issues:  None    Spiritual Orientation:  None     Service History:  None    Social Functioning (organization, interests):  Cannot assess    Current Treatment Providers are:  Dr. Loc Tracy (Psychiatrist  RN/ is Noemy Leigh [phone]  [facility]      Social Service Assessment/Plan:  Patient needs medications restarted and reviewed.  [facility] reports he has not had his Invega Sustenna Injection since May of 2021.  He also has not been taking other medications so needs stabilization of his psychosis.  ACT team feels that patient is not benefitting at all living alone in his own apartment that has been arranged and paid for by his parents.  May needs IRTS placement.  Will consult with the [facility] ACT team about this as well.  Patient will be encouraged to attend all programming when more stable. CTC to ensure that he has a comprehensive care plan in place at discharge and he will need another PD as well.

## 2021-08-05 NOTE — PLAN OF CARE
BEHAVIORAL TEAM DISCUSSION    Participants: Dr. Keyon Tran; Maryann SILVA; Ania Prakash RN  Progress: Patient is very psychotic  Anticipated length of stay: Unknown  Continued Stay Criteria/Rationale: New admit and needs medications restarted  Medical/Physical: Nothing noted  Precautions:   Behavioral Orders   Procedures    Assault precautions    Cheeking Precautions (behavioral units)    Code 1 - Restrict to Unit    Elopement precautions    Routine Programming     As clinically indicated    Status 15     Every 15 minutes.    Withdrawal precautions     Plan: Patient has been off of his medications for several months and will need to restart his Invega Sustenna.  Under Commitment with [facility] and PD is being revoked.  Rationale for change in precautions or plan: No changes

## 2021-08-05 NOTE — PLAN OF CARE
Shift Summary  Legal status: Pt is under commitment with [facility] and PD is being revoked.   Psych/Mental  Pt visible in milieu only for meals. Isolative and withdrawn to self. Mood is calm and affect is flat. Pt seems very delusional and disorgnized. During dinner time, pt seemed very distresses and upset because of auditory hallucination. pt said his health has been effected by a Jow[place] man who he used to play poker with. Pt declined to elaborate about his auditory hallucination. Few times, pt apologized for not making sense. Insight and judgement to his situation and illness is poor and impaired. Denies self harm urges or thought.   Prn: none  Physical  Pt alert and oriented to self. Denies pain. Vital sings WNL (see flow sheet for details). Slept 5.0 hours last night. Appetite adequate. No problem with bowel and bladder per pt.   Prn: none  Continue to monitor pt's status Q 15 minutes and stabilize the patient's symptoms with the use of medications and therapeutic programming.

## 2021-08-05 NOTE — SAFE
"Jerel Day  August 4, 2021    Critical Safety Issues:     Patient is currently under provisional discharge from civil commitment through [facility]. Patient has established [facility] (ACT) Team with wrap around psychiatric services. Patient states he does not take his medication as court ordered because he forgets while trying to grooming himself and his house.    Patient denies any self-injurious behavior. Patient denies any suicidal ideation, intent, or plan. Patient reports prior suicide attempt \"in the incarnate\". Patient speaks at length about being reincarnated. Patient endorses thought to harm \"plants and animals\". Patient denies any specific violent plan or intent. Patient endorses paranoia and delusions, including \"starting a new broadband frequency\". Patient reports he does so using books, objects, an idea, and water. For additional details see full [facility] assessment for further details of acute psychosis.         Current Suicidal Ideation/Self-Injurious Concerns/Methods: None - N/A      Current or Historical Inappropriate Sexual Behavior: No      Current or Historical Aggression/Homicidal Ideation: None - N/A      Triggers: decompensation of schizophrenia, medication non-compliance.      Updated care team: Yes: called intake, attending physician, and  Armando Tinsley at [phone]..    EDELMIRA Courtney      "

## 2021-08-06 PROCEDURE — 124N000002 HC R&B MH UMMC

## 2021-08-06 PROCEDURE — 99232 SBSQ HOSP IP/OBS MODERATE 35: CPT | Performed by: PSYCHIATRY & NEUROLOGY

## 2021-08-06 PROCEDURE — 250N000013 HC RX MED GY IP 250 OP 250 PS 637: Performed by: PSYCHIATRY & NEUROLOGY

## 2021-08-06 RX ORDER — LIDOCAINE 4 G/G
1 PATCH TOPICAL DAILY PRN
Status: DISCONTINUED | OUTPATIENT
Start: 2021-08-06 | End: 2021-09-13 | Stop reason: HOSPADM

## 2021-08-06 RX ORDER — IBUPROFEN 200 MG
200 TABLET ORAL 4 TIMES DAILY PRN
Status: DISCONTINUED | OUTPATIENT
Start: 2021-08-06 | End: 2021-09-13 | Stop reason: HOSPADM

## 2021-08-06 RX ORDER — HALOPERIDOL 5 MG/1
5 TABLET ORAL 2 TIMES DAILY
Status: DISCONTINUED | OUTPATIENT
Start: 2021-08-06 | End: 2021-08-09

## 2021-08-06 RX ORDER — HALOPERIDOL 5 MG/1
5 TABLET ORAL
Status: DISCONTINUED | OUTPATIENT
Start: 2021-08-06 | End: 2021-08-06

## 2021-08-06 RX ORDER — VITAMIN B COMPLEX
25 TABLET ORAL DAILY
Status: DISCONTINUED | OUTPATIENT
Start: 2021-08-06 | End: 2021-09-13 | Stop reason: HOSPADM

## 2021-08-06 RX ADMIN — GABAPENTIN 100 MG: 100 CAPSULE ORAL at 19:36

## 2021-08-06 RX ADMIN — HALOPERIDOL 5 MG: 5 TABLET ORAL at 19:36

## 2021-08-06 RX ADMIN — Medication 25 MCG: at 16:40

## 2021-08-06 ASSESSMENT — ACTIVITIES OF DAILY LIVING (ADL)
DRESS: SCRUBS (BEHAVIORAL HEALTH)
ORAL_HYGIENE: INDEPENDENT
LAUNDRY: UNABLE TO COMPLETE
HYGIENE/GROOMING: INDEPENDENT
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
## 2021-08-06 NOTE — PLAN OF CARE
"Patient stayed in room most of the time this shift.  He was up to the dinning room for breakfast and lunch.  Flat affect, denies SI/SIB; endorses depression/anxiety.  Patient did not want to have further discussion about his well being.  Told staff \"I'm done, thank you\".  Patient then walked out on staff.  No aggressive behavior noted, will continue to monitor closely.  "
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
## 2021-08-06 NOTE — PLAN OF CARE
Assessment/Intervention/Current Symtoms and Care Coordination  Called and left a message for the patient's RN/ on the [facility] Act Team - Noemy Leigh.  Requested she fax over a current medication list for the patient. ACT Team feels that the independent living is not working as he is currently in an apartment paid for by parents and is not following his court order, not taking medications or showing up for his Invega Sustenna injection since may.  They feel he needs more structure but there is not plan yet in place.     Discharge Plan or Goal  Patient will need to be compliant with medications and get back on his injectable medication    Barriers to Discharge   Very psychotic and unstable at this time need medications restarted and psychosis stabilized.    Referral Status  None    Legal Status   Committed CHEYENNE godinez/Eduardo

## 2021-08-06 NOTE — PLAN OF CARE
"Pt was in his room for almost the entire shift either reading or resting on his bed. Pt's affect was blunted/flat and his mood appeared calm. Pt endorsed anxiety and when asked to rate it he said \"I can't talk about that. It's just a feeling of dread, it's the feeling that comes after anxiety\". Writer asked if he was experiencing hallucinations as writer noticed he kept looking to the side of the room and smiling or laughing and appeared to have difficulty tracking the conversation. Pt stated \"I am not going to answer that. I know that is what it goes directly to. Can you ask me more questions that have been monotonzide?\". Writer asked if he could define monotonized and he said \"no, I can't talk about it\". Writer asked if he was feeling safe to which he replied \"No, I'm not\". Writer asked if he thought others were going to harm him or if he was going to harm others and he stated \"no\". Pt denied SI, SIB, and AVH. He endorsed pain, during check in he stated \"I'm in a lot of pain right now\". Writer asked if he could elaborate and he said \"I don't know if it is because I took the vitamin D laying down or if it was from working out. But it is centralized in my day thoughts and I was trying to ramificate them\". Pt's speech continued to be disorganized and contained several neoglisms. Pt was easily distracted during conversations. Pt ate most of his dinner was medication compliant, though he was unsure why he needed to be taking the haldol. He was also frustrated that the dose for the PRN gabapentin was 100 mg and not 600mg. Pt denied side effects to medications.   "

## 2021-08-07 PROCEDURE — 250N000013 HC RX MED GY IP 250 OP 250 PS 637: Performed by: PSYCHIATRY & NEUROLOGY

## 2021-08-07 PROCEDURE — 124N000002 HC R&B MH UMMC

## 2021-08-07 PROCEDURE — G0177 OPPS/PHP; TRAIN & EDUC SERV: HCPCS

## 2021-08-07 RX ADMIN — HYDROXYZINE HYDROCHLORIDE 25 MG: 25 TABLET, FILM COATED ORAL at 23:15

## 2021-08-07 RX ADMIN — HYDROXYZINE HYDROCHLORIDE 25 MG: 25 TABLET, FILM COATED ORAL at 04:07

## 2021-08-07 RX ADMIN — HALOPERIDOL 5 MG: 5 TABLET ORAL at 19:50

## 2021-08-07 RX ADMIN — Medication 25 MCG: at 09:18

## 2021-08-07 RX ADMIN — DIPHENHYDRAMINE HYDROCHLORIDE 50 MG: 50 CAPSULE ORAL at 19:50

## 2021-08-07 RX ADMIN — HALOPERIDOL 5 MG: 5 TABLET ORAL at 09:18

## 2021-08-07 ASSESSMENT — ACTIVITIES OF DAILY LIVING (ADL)
DRESS: INDEPENDENT
HYGIENE/GROOMING: INDEPENDENT
ORAL_HYGIENE: INDEPENDENT
DRESS: INDEPENDENT
LAUNDRY: WITH SUPERVISION
ORAL_HYGIENE: INDEPENDENT
HYGIENE/GROOMING: INDEPENDENT

## 2021-08-07 NOTE — PLAN OF CARE
Pt has been withdrawn. Appears responding. Confused, delusional and paranoid. Pt refused PRN medication and talked about how he does not medications, then says certain medications and then said he should not be telling me this. Tense affect. Irritable mood. Wants to leave. Pressured tangential speech. Refused breakfast and ate lunch. Prefers haldol vs zyprexa. Unable to finish admit profile. Refused Labs this am and also refused utox and covid test. PT was seen pulling on his ears.
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
## 2021-08-07 NOTE — PROGRESS NOTES
"Jerel participated in OT clinic this morning, where he initiated a chosen project (painted ceramic), followed through with plan, and asked for support with supplies as needed. Selected several songs online to share with the group. Made a series of bizarre and seemingly paranoid statements: \"the  ate me again;\" \"the people with money use cat eyes in a way that the people without money do not,\" etc.        08/07/21 1200   Occupational Therapy   Type of Intervention structured groups   Response Initiates, socially acceptable   Hours 1       "

## 2021-08-08 PROCEDURE — 250N000013 HC RX MED GY IP 250 OP 250 PS 637: Performed by: PSYCHIATRY & NEUROLOGY

## 2021-08-08 PROCEDURE — 124N000002 HC R&B MH UMMC

## 2021-08-08 RX ADMIN — HALOPERIDOL 5 MG: 5 TABLET ORAL at 09:01

## 2021-08-08 RX ADMIN — DIPHENHYDRAMINE HYDROCHLORIDE 50 MG: 50 CAPSULE ORAL at 20:19

## 2021-08-08 RX ADMIN — Medication 25 MCG: at 09:01

## 2021-08-08 RX ADMIN — HALOPERIDOL 5 MG: 5 TABLET ORAL at 20:19

## 2021-08-08 RX ADMIN — GABAPENTIN 100 MG: 100 CAPSULE ORAL at 02:25

## 2021-08-08 ASSESSMENT — ACTIVITIES OF DAILY LIVING (ADL)
LAUNDRY: UNABLE TO COMPLETE
DRESS: SCRUBS (BEHAVIORAL HEALTH)
ORAL_HYGIENE: INDEPENDENT
HYGIENE/GROOMING: INDEPENDENT
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
## 2021-08-08 NOTE — PLAN OF CARE
Patient stayed in room sleeping most of the time this shift.  Flat affect, denies SI/SIB.  Rated depression 8/10, anxiety 7/10.  Took scheduled medications with no problem, good appetite.  Patient resting comfortable in room.  No aggressive behavior noted.  Will continue to monitor closely.

## 2021-08-08 NOTE — PLAN OF CARE
Pt was isolative and withdrawn to his room for almost the entire shift. He came out for dinner, to take a shower, and to get medications. Pt's affect was blunted/flat and his mood was calm though he appeared tense at times. Pt denied all mental health concerns including SI, SIB, and AVH. Pt appeared to be responding to internal stimuli, he would laugh to himself in his room and look at open spaces intently and smile. Pt's speech was still disorganized and hard to follow. Pt was medication compliant and received no PRN's this shift. Pt denied side effects to medications and made statements about not needing to take the medication. Pt did not endorse any pain today.

## 2021-08-09 PROCEDURE — 99232 SBSQ HOSP IP/OBS MODERATE 35: CPT | Performed by: PSYCHIATRY & NEUROLOGY

## 2021-08-09 PROCEDURE — 124N000002 HC R&B MH UMMC

## 2021-08-09 PROCEDURE — 250N000013 HC RX MED GY IP 250 OP 250 PS 637: Performed by: PSYCHIATRY & NEUROLOGY

## 2021-08-09 RX ORDER — LANOLIN ALCOHOL/MO/W.PET/CERES
3 CREAM (GRAM) TOPICAL
Status: DISCONTINUED | OUTPATIENT
Start: 2021-08-09 | End: 2021-08-23

## 2021-08-09 RX ADMIN — GABAPENTIN 100 MG: 100 CAPSULE ORAL at 04:03

## 2021-08-09 RX ADMIN — DIPHENHYDRAMINE HYDROCHLORIDE 50 MG: 50 CAPSULE ORAL at 20:44

## 2021-08-09 RX ADMIN — ACETAMINOPHEN 650 MG: 325 TABLET, FILM COATED ORAL at 08:53

## 2021-08-09 RX ADMIN — Medication 7.5 MG: at 19:16

## 2021-08-09 RX ADMIN — GABAPENTIN 100 MG: 100 CAPSULE ORAL at 11:10

## 2021-08-09 RX ADMIN — Medication 25 MCG: at 08:53

## 2021-08-09 RX ADMIN — ACETAMINOPHEN 650 MG: 325 TABLET, FILM COATED ORAL at 03:01

## 2021-08-09 RX ADMIN — ACETAMINOPHEN 650 MG: 325 TABLET, FILM COATED ORAL at 17:02

## 2021-08-09 RX ADMIN — HALOPERIDOL 5 MG: 5 TABLET ORAL at 08:53

## 2021-08-09 ASSESSMENT — ACTIVITIES OF DAILY LIVING (ADL)
ORAL_HYGIENE: INDEPENDENT
DRESS: SCRUBS (BEHAVIORAL HEALTH);INDEPENDENT
HYGIENE/GROOMING: SHOWER;INDEPENDENT
LAUNDRY: WITH SUPERVISION
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
## 2021-08-09 NOTE — PLAN OF CARE
Shift Summary  Legal status: Committed and Clark  Psych/Mental  Visible in milieu with tense affect. Watching TV with other peers. Isolative and withdrawn. Still delusional and pasychotic. Able to communicate needs with tangential speech. Insight and judgement to his situation is poor. After dinner talked on phone for a while and asking friend and family member to come to visit. Denies any suicidal ideation, homicidal ideation, Self injury behavior, hallucination or racing thought. Pt still responding to internal stimuli.   Prn: none  Physical  Pt alert and oriented to self. Denies pain. Vital sings WNL (see flow sheet for details). Good medication compliance is noted. No side effect reported by pt or noted by this writer. Appetite adequate. No problem with bowel and bladder per pt.   Prn: none  Continue to monitor pt's status Q 15 minutes and stabilize the patient's symptoms with the use of medications and therapeutic programming.

## 2021-08-09 NOTE — PLAN OF CARE
Assessment/Intervention/Current Symtoms and Care Coordination  Patient is still responding to internal stimuli and psychotic.  He attended one group on Saturday.  Patient PD was Revoked and he is here for further stabilization of his psychosis.  He has been off of medications for months  And needs to have another Invega Sustenna Injection which he has not had since May of 2021.      Discharge Plan or Goal  Patient will return home after discharge and resume care with his comprehensive ACT Team.    Barriers to Discharge   Psychotic and needs further medication management and stabilization    Referral Status  Done    Legal Status  Committed as MI with a Clark Order in [facility]
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
## 2021-08-09 NOTE — PROGRESS NOTES
"[facility], [place]   Psychiatric Progress Note        Interim History:   The patient's care was discussed with the treatment team during the daily team meeting and/or staff's chart notes were reviewed.  Staff report patient spent most of time since his admission inside of his room. He was guarded and isolative, but compliant with meds, denied having any side effects. Last night was reported to get out of his room around 3 am and was standing in front of mirror in the hallway making bizarre arm movements. Stated that he pulled a muscle in his neck and it was: \"blocking signal in my neck\". He then, started flipping chairs in the patients' lounge, eventually, agreed to take prn and went back to his room. He refused his lab work, covid test, and VS.     Met with patient: Jerel was seen inside of his room. He was sitting on his bed. He presented as very guarded.. Stated that he felt sad about being at this hospital and talked extensively about his neck. I offered him muscle relaxant and Ibuprofen, Jerel declined them both. Made quite a number of bizarre statements, like: \"I need my liver and neck and the rest of my body to function well in order to be fertile\". He asked when he would be discharged. I explained that we would like him to get better prior to discharge, this would involve changes in his meds. Jerel visibly didn't like suggestion about changing his meds, but didn't reply to that and had no further questions or concerns.         Medications:       diphenhydrAMINE  50 mg Oral At Bedtime     haloperidol  7.5 mg Oral BID     lidocaine   Transdermal Q8H     Vitamin D3  25 mcg Oral Daily          Allergies:     Allergies   Allergen Reactions     Penicillins      Rash, Generalized          Labs:   No results found for this or any previous visit (from the past 24 hour(s)).       Psychiatric Examination:     /57 (BP Location: Left arm)   Pulse 71   Temp 97.7  F (36.5  C) (Oral)   " "Resp 16   Ht 1.753 m (5' 9\")   Wt 73.5 kg (162 lb)   SpO2 97%   BMI 23.92 kg/m    Weight is 162 lbs 0 oz  Body mass index is 23.92 kg/m .       Orthostatic Vitals       Most Recent      Lying Orthostatic /57 08/06 0846    Lying Orthostatic Pulse (bpm) 71 08/06 0846    Sitting Orthostatic BP --  Comment: declined 08/06 0846    Sitting Orthostatic Pulse (bpm) 65 08/05 1241    Standing Orthostatic BP --  Comment: declined 08/06 0846    Standing Orthostatic Pulse (bpm) 103 08/05 1241          Appearance: awake, alert, dressed in hospital scrubs and appeared as age stated  Attitude:  uncooperative  Eye Contact:  poor   Mood: sad   Affect:  constricted mobility  Speech:  paucity of speech, uses unusual words, possibly, \"clang\" associations  Psychomotor Behavior:  physical retardation, at times agitation  Throught Process:  disorganized and illogical  Associations:  loosening of associations present  Thought Content:  auditory hallucinations present  Insight:  limited  Judgement:  poor  Oriented to:  time, person, and place  Attention Span and Concentration:  fair  Recent and Remote Memory:  fair    Clinical Global Impressions  First: 7/4 8/6/2021      Most recent:            Precautions:     Behavioral Orders   Procedures     Assault precautions     Cheeking Precautions (behavioral units)     Code 1 - Restrict to Unit     Elopement precautions     Routine Programming     As clinically indicated     Sexual precautions     Status 15     Every 15 minutes.     Withdrawal precautions          DIagnoses:     1.  Schizophrenia presented paranoid type, acute exacerbation.  2.  Self-reported cannabis use disorder.         Plan:     Still exhibits significant psychotic symptoms. Will increase dose of Haldol to 7.5 mg bid. No other med changes. May need to switch patient from Invega Sustenna to Haldol Dec. Will continue to provide support and structure. Will change status from 72 hour hold to fully committed.        "

## 2021-08-10 PROCEDURE — 250N000013 HC RX MED GY IP 250 OP 250 PS 637: Performed by: PSYCHIATRY & NEUROLOGY

## 2021-08-10 PROCEDURE — 124N000002 HC R&B MH UMMC

## 2021-08-10 PROCEDURE — 99232 SBSQ HOSP IP/OBS MODERATE 35: CPT | Performed by: PSYCHIATRY & NEUROLOGY

## 2021-08-10 RX ADMIN — Medication 25 MCG: at 09:41

## 2021-08-10 RX ADMIN — Medication 7.5 MG: at 20:11

## 2021-08-10 RX ADMIN — Medication 7.5 MG: at 09:40

## 2021-08-10 RX ADMIN — DIPHENHYDRAMINE HYDROCHLORIDE 50 MG: 50 CAPSULE ORAL at 20:12

## 2021-08-10 ASSESSMENT — ACTIVITIES OF DAILY LIVING (ADL)
HYGIENE/GROOMING: INDEPENDENT
LAUNDRY: WITH SUPERVISION
ORAL_HYGIENE: INDEPENDENT
DRESS: SCRUBS (BEHAVIORAL HEALTH);INDEPENDENT

## 2021-08-10 NOTE — PLAN OF CARE
Shift Summary  Legal status: Committed and Clark  Mental  Slept till ~ 1000 this morning. Had breakfast at 1100. Visible in milieu, isolative and withdrawn to self. Still delusional and paranoid. Talking non sense and is very disorganized. Denies any suicidal ideation, homicidal ideation, Self injury behavior, hallucination or racing thought. Denies depression and anxiety.   Prn: none  Physical  Pt alert and oriented to self. Does not appear to be in any kind of distress or pain. Declined checking vital signs.  Slept 5.75 hours last night. Took medication with encouragement. Seems tolerating medications well. No side effect reported by pt or noted by this writer. Appetite adequate. No problem with bowel and bladder per pt.   Prn: none  Continue to monitor pt's status Q 15 minutes and stabilize the patient's symptoms with the use of medications and therapeutic programming.

## 2021-08-10 NOTE — PROGRESS NOTES
"[facility], [place]   Psychiatric Progress Note        Interim History:   The patient's care was discussed with the treatment team during the daily team meeting and/or staff's chart notes were reviewed.  Staff report patient spends more time outside of his room. He indicated that he recognized that his dose of Haldol was increased, but took it. Remains psychotic and disorganized, talking about conspiracy theories, about: \"being raped by the police\".     Met with patient: Jerel was seen in the patients' lounge. He appeared to be guarded and withdrawn. Asked me number of times about discharge date. I reminded him that he was committed to hospital and had Clark in place and that goal of this treatment team was to stabilize him and send him home. I specifically pointed out that Jerel's thoughts were loose and he frequently was deviating from the topic of conversation. Jerel was upset when he heard that his thinking process was illogical and stated that he often talks metaphorically and people who don't understand him should not talk to him?! I praised him for being compliant with meds and again, promised to discharge him as early as possible. Jerel denied Suicidal ideation, Homicidal thoughts, reported periodical Auditory hallucinations and had no further questions or concerns.         Medications:       diphenhydrAMINE  50 mg Oral At Bedtime     haloperidol  7.5 mg Oral BID     lidocaine   Transdermal Q8H     Vitamin D3  25 mcg Oral Daily          Allergies:     Allergies   Allergen Reactions     Penicillins      Rash, Generalized          Labs:   No results found for this or any previous visit (from the past 24 hour(s)).       Psychiatric Examination:     /80   Pulse 82   Temp 97.6  F (36.4  C) (Oral)   Resp 16   Ht 1.753 m (5' 9\")   Wt 73.5 kg (162 lb)   SpO2 97%   BMI 23.92 kg/m    Weight is 162 lbs 0 oz  Body mass index is 23.92 kg/m .       Orthostatic Vitals       Most " Recent      Sitting Orthostatic /80 08/09 1615    Sitting Orthostatic Pulse (bpm) 82 08/09 1615    Standing Orthostatic /85 08/09 1615    Standing Orthostatic Pulse (bpm) 114 08/09 1615         Appearance: awake, alert, dressed in hospital scrubs and appeared as age stated  Attitude: Partially cooperative  Eye Contact: poor   Mood: sad   Affect: constricted mobility  Speech:  paucity of speech,   Psychomotor Behavior:  physical retardation, at times agitation  Throught Process:  disorganized and illogical  Associations:  loosening of associations present  Thought Content:  auditory hallucinations present  Insight:  limited  Judgement:  poor  Oriented to:  time, person, and place  Attention Span and Concentration:  fair  Recent and Remote Memory:  fair    Clinical Global Impressions  First: 7/4 8/6/2021      Most recent:            Precautions:     Behavioral Orders   Procedures     Assault precautions     Cheeking Precautions (behavioral units)     Code 1 - Restrict to Unit     Elopement precautions     Routine Programming     As clinically indicated     Sexual precautions     Status 15     Every 15 minutes.     Withdrawal precautions          DIagnoses:     1.  Schizophrenia presented paranoid type, acute exacerbation.  2.  Self-reported cannabis use disorder.         Plan:     Still exhibits significant psychotic symptoms. Will continue dose of Haldol 7.5 mg bid. May need to switch patient from Invega Sustenna to Haldol Dec. Will continue to provide support and structure.

## 2021-08-10 NOTE — PLAN OF CARE
BEHAVIORAL TEAM DISCUSSION    Participants: Dr. Keyon Tran; Maryann Fraser [facility]; Ania Nazario RN  Progress: Patient taking medications but unable to attend programming  Anticipated length of stay: Unknown  Continued Stay Criteria/Rationale: Patient very psychotic  Medical/Physical: Nothing noted  Precautions:   Behavioral Orders   Procedures    Assault precautions    Cheeking Precautions (behavioral units)    Code 1 - Restrict to Unit    Elopement precautions    Routine Programming     As clinically indicated    Sexual precautions    Status 15     Every 15 minutes.    Withdrawal precautions     Plan: PD was Revoked and patient needs review of medications, medication management and stabilization of his psychosis.  Rationale for change in precautions or plan: No changes
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
## 2021-08-11 PROCEDURE — 99207 PR CDG-MDM COMPONENT: MEETS MODERATE - DOWN CODED: CPT | Performed by: PSYCHIATRY & NEUROLOGY

## 2021-08-11 PROCEDURE — 124N000002 HC R&B MH UMMC

## 2021-08-11 PROCEDURE — 90853 GROUP PSYCHOTHERAPY: CPT

## 2021-08-11 PROCEDURE — 250N000013 HC RX MED GY IP 250 OP 250 PS 637: Performed by: PSYCHIATRY & NEUROLOGY

## 2021-08-11 PROCEDURE — 99232 SBSQ HOSP IP/OBS MODERATE 35: CPT | Performed by: PSYCHIATRY & NEUROLOGY

## 2021-08-11 RX ORDER — HALOPERIDOL 5 MG/1
10 TABLET ORAL 2 TIMES DAILY
Status: DISCONTINUED | OUTPATIENT
Start: 2021-08-11 | End: 2021-08-23

## 2021-08-11 RX ORDER — BENZTROPINE MESYLATE 0.5 MG/1
0.5 TABLET ORAL 2 TIMES DAILY
Status: DISCONTINUED | OUTPATIENT
Start: 2021-08-11 | End: 2021-08-20

## 2021-08-11 RX ADMIN — DIPHENHYDRAMINE HYDROCHLORIDE 50 MG: 50 CAPSULE ORAL at 20:20

## 2021-08-11 RX ADMIN — BENZTROPINE MESYLATE 0.5 MG: 0.5 TABLET ORAL at 20:19

## 2021-08-11 RX ADMIN — Medication 25 MCG: at 08:38

## 2021-08-11 RX ADMIN — HALOPERIDOL 10 MG: 5 TABLET ORAL at 20:19

## 2021-08-11 RX ADMIN — Medication 7.5 MG: at 08:38

## 2021-08-11 NOTE — PLAN OF CARE
Shift Summary  Legal status: Committed and Clark  Mental  Still delusional and paranoid. Denies any suicidal ideation, homicidal ideation, Self injury behavior, hallucination or racing thought. Continues to be disorganized t. Visible in milieu. Isolative and withdrawn. Did not participate in any group activity.   Prn: none  Physical  Pt alert and oriented to self. Denies pain. Does not appear to be in any kind of distress or pain. Declined checking vital signs. Declined blood draw for lab. Slept 7.0 hours last night. Took medication with no difficulties. No evidence of cheeking noted.  Seems tolerating medications well. No side effect reported by pt or noted by this writer. Appetite adequate. No problem with bowel and bladder per pt.   Prn: none  Continue to monitor pt's status Q 15 minutes and stabilize the patient's symptoms with the use of medications and therapeutic programming.

## 2021-08-11 NOTE — PLAN OF CARE
Problem: Adult Inpatient Plan of Care  Goal: Plan of Care Review  Outcome: No Change  Flowsheets  Taken 8/10/2021 2059  Plan of Care Reviewed With: patient  Progress: no change  Taken 8/10/2021 2036  Plan of Care Reviewed With: patient     Patient had been isolative and withdrawn, resting in bed majority of the shift. Flat and blunted affect. Guarded and appears paranoid. Stated he is bored here at the hospital and wanted to go home. Encouraged to attend groups or stay in the milieu but declined. He preferred to stay in bed. He said he ate a little at supper because he didn't like his food. He denied mental health symptoms. Denied SI, SIB, depression and anxiety. He denied hallucinations, too. His grand mother came to visit him this shift. The visit went well. He was compliant with taking all his night time medications. He went back to bed and was noted sleeping by 2030. Will continue to monitor and assess pt.

## 2021-08-11 NOTE — PLAN OF CARE
Assessment/Intervention/Current Symtoms and Care Coordination  Patient remains floridly psychotic.  Refusing labs. Talking and making no sense.  Team discussed whether he may be cheeking his medications.  Dr. Tran putting in order to watch for cheeking.  He plans to call the [facility] ACT Team Resident Dr. Norberto Salguero ([phone]) to discuss the medications.    Discharge Plan or Goal  Patient needs to take all medications as prescribed    Barriers to Discharge   Patient is psychotic and needs much more stabilization and medication management.    Referral Status  None    Legal Status  Revoked PD under Full MI Commitment  with Eduardo in [facility]

## 2021-08-11 NOTE — PLAN OF CARE
Shift Summary  Legal status: Committed and Jarivs  Mental  Continues to be delusional and paranoid. Visible in milieu. Isolative and withdrawn to self. Mood is calm and affect is flat. Seems more confused than before. Denies all mental health symptoms. Had couple phone calls from family and friends. Pt's mother visited pt. After mother left ,pt complained of wire coming out from behind his head. Pt was reassure there is no wire coming out from behind his head. Pt made few phone calls to police department ,but upon checking with him, denied calling.   Prn: none  Physical  Pt alert and oriented to self. Denies pain. Does not appear to be in any kind of distress or pain. Vital sings WNL (see flow sheet for details). Took his medication with no difficulties. Seems tolerating medications well. No side effect reported by pt or noted by this writer. Appetite adequate. No problem with bowel and bladder per pt.   Prn: none  Continue to monitor pt's status Q 15 minutes and stabilize the patient's symptoms with the use of medications and therapeutic programming.

## 2021-08-11 NOTE — PLAN OF CARE
NOC Shift Report     Pt in bed at beginning of shift, breathing quiet and unlabored. Pt slept through shift. Pt slept 5.75 hours.      No pt complaints or concerns at this time.      No PRNs given. Will continue to monitor.    Kiko Garay RN

## 2021-08-11 NOTE — PROGRESS NOTES
"[facility], [place]   Psychiatric Progress Note        Interim History:   The patient's care was discussed with the treatment team during the daily team meeting and/or staff's chart notes were reviewed.  Staff report patient remains isolative, compliant with meds. was reported to sleep 5.75 hours last night. Still voices delusional ideas, reports Auditory hallucinations.     Met with patient: Jerel was seen in his room. He appeared guarded and mildly irritable. Stated that he didn't understand why he needed to be at this hospital and why he could not be discharged back to community. Admitted to still hearing voices, was reluctant to disclose their content. Disagreed with me when I told him that most of people don't hear voices and that presence of Auditory hallucinations indicates presence of mental illness. Stated that this is not true, that neighbors who see him with baseball miguel in his backyard have no reason to be concerned about him?! When I asked if intensity or frequency of voices had changed, Jerel simply replied: \"no comment\". He, then, stated that he requested me to talk to Dr. Tracy few days ago. I explained that I got phone Dr. Tracy's resident Dr. Norberto Salguero only yesterday and would call him today. Jerel seemed to be still dissatisfied with my answer, but had no further questions or concerns. After visit with Jerel I left a message for Dr. Salguero who called me back to say that he was busy and promised to get back to me in 1-2 hours.         Medications:       diphenhydrAMINE  50 mg Oral At Bedtime     haloperidol  7.5 mg Oral BID     lidocaine   Transdermal Q8H     Vitamin D3  25 mcg Oral Daily          Allergies:     Allergies   Allergen Reactions     Penicillins      Rash, Generalized          Labs:   No results found for this or any previous visit (from the past 24 hour(s)).       Psychiatric Examination:     /80   Pulse 82   Temp 97.6  F (36.4  C) (Oral)   Resp " "16   Ht 1.753 m (5' 9\")   Wt 73.5 kg (162 lb)   SpO2 97%   BMI 23.92 kg/m    Weight is 162 lbs 0 oz  Body mass index is 23.92 kg/m .       Orthostatic Vitals     None         Appearance: awake, alert, dressed in hospital scrubs and appeared as age stated  Attitude: Partially cooperative  Eye Contact: poor at times tense  Mood: sad   Affect: constricted mobility  Speech:  paucity of speech,   Psychomotor Behavior:  physical retardation, at times agitation  Throught Process:  disorganized and illogical  Associations:  loosening of associations present  Thought Content:  auditory hallucinations present  Insight:  limited  Judgement:  poor  Oriented to:  time, person, and place  Attention Span and Concentration:  fair  Recent and Remote Memory:  fair    Clinical Global Impressions  First: 7/4 8/6/2021      Most recent:            Precautions:     Behavioral Orders   Procedures     Assault precautions     Cheeking Precautions (behavioral units)     Code 1 - Restrict to Unit     Elopement precautions     Routine Programming     As clinically indicated     Sexual precautions     Status 15     Every 15 minutes.     Withdrawal precautions          DIagnoses:     1.  Schizophrenia presented paranoid type, acute exacerbation.  2.  Self-reported cannabis use disorder.         Plan:     Still exhibits significant psychotic symptoms. Will increase dose of Haldol to 10 mg bid. Will put an order to watch for cheeking meds. May need to switch patient from Invega Sustenna to Haldol Dec. Will continue to provide support and structure.        "

## 2021-08-12 PROCEDURE — 124N000002 HC R&B MH UMMC

## 2021-08-12 PROCEDURE — 250N000013 HC RX MED GY IP 250 OP 250 PS 637: Performed by: PSYCHIATRY & NEUROLOGY

## 2021-08-12 PROCEDURE — 99232 SBSQ HOSP IP/OBS MODERATE 35: CPT | Performed by: PSYCHIATRY & NEUROLOGY

## 2021-08-12 RX ADMIN — Medication 25 MCG: at 09:07

## 2021-08-12 RX ADMIN — BENZTROPINE MESYLATE 0.5 MG: 0.5 TABLET ORAL at 20:11

## 2021-08-12 RX ADMIN — HALOPERIDOL 10 MG: 5 TABLET ORAL at 20:11

## 2021-08-12 RX ADMIN — DIPHENHYDRAMINE HYDROCHLORIDE 50 MG: 50 CAPSULE ORAL at 20:11

## 2021-08-12 RX ADMIN — BENZTROPINE MESYLATE 0.5 MG: 0.5 TABLET ORAL at 09:07

## 2021-08-12 RX ADMIN — HALOPERIDOL 10 MG: 5 TABLET ORAL at 09:07

## 2021-08-12 ASSESSMENT — ACTIVITIES OF DAILY LIVING (ADL)
HYGIENE/GROOMING: INDEPENDENT
LAUNDRY: WITH SUPERVISION
DRESS: SCRUBS (BEHAVIORAL HEALTH)
HYGIENE/GROOMING: INDEPENDENT
ORAL_HYGIENE: INDEPENDENT
LAUNDRY: UNABLE TO COMPLETE
DRESS: INDEPENDENT
ORAL_HYGIENE: INDEPENDENT

## 2021-08-12 NOTE — PLAN OF CARE
Assessment/Intervention/Current Symtoms and Care Coordination  Patient still very psychotic.  He phoned 911 last night from the unit and they called back to let  staff know a patient was dialing emergency.  He is unable to attend programming due to psychosis.     Discharge Plan or Goal  Patient needs stabilization and medication management.     Barriers to Discharge   Patient still very psychotic    Referral Status  Done    Legal Status  Committed CHEYENNE godinez/ Eduardo

## 2021-08-12 NOTE — PROGRESS NOTES
08/11/21 1900   Groups   Details    (Psychotherapy)   Number of patients attending the group: 3  Group Length:  1 hour   Group Therapy Type: Psychotherapy     Summary of Group / Topics Discussed:        The  Psychotherapy group goal is to promote insight to positive choice and change. Group processing is within a supportive and safe environment. Patients will process emotions using verbal group and expressive psychotherapy interventions including visual art/writing interventions.     Group interventions support patients by: fostering self awareness, self efficacy/empowerment and mental health management     Modalities to reach these goals include: positive/solution focused psychology , Expressive Arts Therapies and Mindfulness practices     Subjective -patient report of mood today- low     Objective/ Intervention- Goal of group and Therapeutic modality utilized- Processing about strengths and mindfulness and mindful art making offered     Group Response- engaged     Patient Response- Pt had delayed answers and was guarded. He doesn't have insight why he is hospitalized. When speaking of his strengths he noted he is : kind, smart and resilient.      Jimenez Fernandez, [place], ATR-BC

## 2021-08-12 NOTE — PLAN OF CARE
NOC Shift Report     Pt in bed at beginning of shift, breathing quiet and unlabored. Pt slept through shift. Pt slept 7 hours.      No pt complaints or concerns at this time.      No PRNs given. Will continue to monitor.    Kiko Garay RN

## 2021-08-12 NOTE — PROGRESS NOTES
[facility], [place]   Psychiatric Progress Note        Interim History:   The patient's care was discussed with the treatment team during the daily team meeting and/or staff's chart notes were reviewed.  Staff report patient remains isolative, compliant with meds. Still voices delusional ideas, reports Auditory hallucinations. His mother visited yesterday, after her visit he complained of feeling that there was a wire coming out of his head. He also made number of calls to 911, although, later denied making them.    Met with patient: Jerel was seen in his room. He appeared guarded and mildly irritable. Stated that he didn't understand why he needed to be at this hospital and why he could not be discharged back to community. Admitted to still hearing voices, was reluctant to disclose their content. Seemed to have a lot of difficulties explaining if voices were stronger or weaker, more or less frequent. I told Jerel that yesterday I tried to get hold of Dr. Tracy, but was told that he was on vacations and got hold of his resident Dr. Norberto Salguero. We discussed with Dr. Norberto Nielsonle's previous treatment, he told me that Jerel seemed to be doing well on Haldol and Haldol Dec, then, developed EPSE and was switched to Invega. I told Jerel about this conversation and asked about his plans after discharge. Jerel said that he was planning to go back to his apartment. I informed him that both myself and his outpatient team were in agreement that he would benefit from at least temporary going to IR or, possibly, group home and being on HO. Jerel got visibly angry and said that he didn't care what we wanted, he would return to his apartment. He didn't want to discuss with me his delusional statement, either, and while I was leaving swore at me and punched the wall with his fist.         Medications:       benztropine  0.5 mg Oral BID     diphenhydrAMINE  50 mg Oral At Bedtime     haloperidol  10 mg  "Oral BID     lidocaine   Transdermal Q8H     Vitamin D3  25 mcg Oral Daily          Allergies:     Allergies   Allergen Reactions     Penicillins      Rash, Generalized          Labs:   No results found for this or any previous visit (from the past 24 hour(s)).       Psychiatric Examination:     /80   Pulse 71   Temp 98.7  F (37.1  C) (Oral)   Resp 18   Ht 1.753 m (5' 9\")   Wt 73.5 kg (162 lb)   SpO2 97%   BMI 23.92 kg/m    Weight is 162 lbs 0 oz  Body mass index is 23.92 kg/m .       Orthostatic Vitals     None         Appearance: awake, alert, dressed in hospital scrubs and appeared as age stated  Attitude: Minimally cooperative  Eye Contact: poor at times tense  Mood: sad   Affect: constricted mobility  Speech:  paucity of speech,   Psychomotor Behavior:  physical retardation, at times agitation  Throught Process:  disorganized and illogical  Associations:  loosening of associations present  Thought Content:  auditory hallucinations present  Insight:  limited  Judgement:  poor  Oriented to:  time, person, and place  Attention Span and Concentration:  fair  Recent and Remote Memory:  fair    Clinical Global Impressions  First: 7/4 8/6/2021      Most recent:            Precautions:     Behavioral Orders   Procedures     Assault precautions     Cheeking Precautions (behavioral units)     Code 1 - Restrict to Unit     Elopement precautions     Routine Programming     As clinically indicated     Sexual precautions     Status 15     Every 15 minutes.     Withdrawal precautions          DIagnoses:     1.  Schizophrenia presented paranoid type, acute exacerbation.  2.  Self-reported cannabis use disorder.         Plan:     Still exhibits significant psychotic symptoms. Will continue Haldol 10 mg bid. May need to switch patient from Invega Sustenna to Haldol Dec. Will continue to provide support and structure.        "

## 2021-08-12 NOTE — PLAN OF CARE
"  Problem: Psychotic Symptoms  Goal: Psychotic Symptoms  Description: Signs and symptoms of listed problems will be absent or manageable.  Outcome: No Change  Flowsheets (Taken 8/12/2021 1047)  Psychotic Symptoms Assessed: all  Psychotic Symptoms Present:    speech    insight    affect    thought process    RN Assessment:  SI/Self harm:  pt denies  Aggression/agitation/HI:  none reported or observed  AVH:  pt denies, though at times does appear to be responding to internal stimuli  Sleep:  7 hours of sleep documented by NOC shift.   PRN Med: No PRNs administered this shift  Medication AE: none reported or observed. Pt is being monitored for cheeking, and pt did swallow all his medications.   Physical Complaints/Issues: none reported  I & O: eating and drinking well  ADLs: independent  Vitals:  pt refused VS assessment  COVID 19 Assessment:  negative  Milieu Participation: minimal, keeps to self, guarded  Behavior: overall calm, no inappropriate phone calls noted today, continue to monitor pt for calling 911.   Writer went into patient's room to ask pt a question, and noticed a large dent in his wall. Pt stated \"I punched it this morning\". Writer asked to see his hand, pt refused. Writer attempted to assess pain, pt stated \"no\". Pt declined any further intervention. Writer will alert facilities to fix dent as needed.    No other concerns at this time. Nursing will continue to monitor and assess.   "

## 2021-08-13 PROCEDURE — 99232 SBSQ HOSP IP/OBS MODERATE 35: CPT | Performed by: PSYCHIATRY & NEUROLOGY

## 2021-08-13 PROCEDURE — 250N000013 HC RX MED GY IP 250 OP 250 PS 637: Performed by: PSYCHIATRY & NEUROLOGY

## 2021-08-13 PROCEDURE — 124N000002 HC R&B MH UMMC

## 2021-08-13 RX ADMIN — DIPHENHYDRAMINE HYDROCHLORIDE 50 MG: 50 CAPSULE ORAL at 20:40

## 2021-08-13 RX ADMIN — BENZTROPINE MESYLATE 0.5 MG: 0.5 TABLET ORAL at 08:43

## 2021-08-13 RX ADMIN — BENZTROPINE MESYLATE 0.5 MG: 0.5 TABLET ORAL at 20:40

## 2021-08-13 RX ADMIN — Medication 25 MCG: at 08:43

## 2021-08-13 RX ADMIN — HALOPERIDOL 10 MG: 5 TABLET ORAL at 08:43

## 2021-08-13 RX ADMIN — HALOPERIDOL 10 MG: 5 TABLET ORAL at 20:40

## 2021-08-13 NOTE — PLAN OF CARE
Shift Summary  Legal status: Committed and Clark  Mental  Visible in [facility] mostly for meals. Isolative and withdrawn to self. Mood is guarded and affect is flat. Still delusion and psychotic. No evidence of cheeking noted. Continues responding to internal stimuli. Denies any suicidal ideation, homicidal ideation, Self injury behavior, hallucination or racing thought. Insight and judgment to his condition remains impaired and very poor. After checking in with provider, pt got very upset. See provider's note for details. Pt flipped couple chair in milieu then tried to flip a computer int he nursing station, but redirected, at dinning room throw couple oranges around. This writer was able to redirect pt to his room. Pt went back to room, lying down in bed and reading book for a while. Later came out for lunch. Pt remained calm rest of shift.   Prn: none  Physical  Pt alert and oriented to self. Denies pain. Does not appear to be in any kind of distress or pain. Declined checking vital signs. Slept 6.25 hours last night. Was hesitant to take morning medication ,but took them with encouragement. Seems tolerating medications well. No side effect reported by pt or noted by this writer. Appetite adequate. Ate breakfast and lunch. No problem with bowel and bladder per pt.   Prn: none  Continue to monitor pt's status Q 15 minutes and stabilize the patient's symptoms with the use of medications and therapeutic programming.

## 2021-08-13 NOTE — PLAN OF CARE
Assessment/Intervention/Current Symtoms and Care Coordination  Patient's mother Mesha called.  She and her  and patient's brother have been taking turns visiting patient.  Prior to this admission, the patient was living alone in an apartment paid for by his parents.  Parents met with the [facility] ACT Team and the Resident Psychiatrist Dr. Salguero.  The [facility] ACT Team had been visiting patient on a daily basis and then he started refusing medications.  It is [facility] and patient's parents, that he will need to be stabilized and be PDd to an IRTS facility as they no longer feel it is safe or therapeutic for the patient to be living independently. Patient is very psychotic.  Punched a wall last evening.  Plan is to give the patient IM Haldol eventually.  Patient very agitated, yelling, going up and down the hallway tipping over chairs and angry    Discharge Plan or Goal  Patient's goal is to continue taking all medications prescribed.    Barriers to Discharge   Psychotic, needs further medication management for stabilization.    Referral Status  None as yet. Records need to reflect much more improvement.    Legal Status  [facility] Commitment w/[facility] Order

## 2021-08-13 NOTE — PLAN OF CARE
NOC Shift Report    Pt in bed at beginning of shift, breathing quiet and unlabored. Pt slept through shift until about 0345 and then was awake in bed for awhile. He could be heard laughing loudly in his room at times. Pt slept 6.25 hours.     No pt complaints or concerns at this time.     No PRNs given. Will continue to monitor.     Precautions: assault, elopement, sexual, cheeking

## 2021-08-13 NOTE — PROGRESS NOTES
"[facility], [place]   Psychiatric Progress Note        Interim History:   The patient's care was discussed with the treatment team during the daily team meeting and/or staff's chart notes were reviewed.  Staff report patient remains isolative, compliant with meds. Still voices delusional ideas, was reported by RN yesterday saying that staff was injecting him with blood trying to infect him. Punched wall again in the evening. [facility] got a call from the patient's mother who reported that she and ACT team didn't feel that Jerel should/could return to his apartment and would prefer him to go to IRTS, see [facility]'s note below:    \"Patient's mother Mesha called.  She and her  and patient's brother have been taking turns visiting patient.  Prior to this admission, the patient was living alone in an apartment paid for by his parents.  Parents met with the [facility] ACT Team and the Resident Psychiatrist Dr. Salguero.  [facility] had been visiting patient on a daily basis and then he started refusing medications.  It is position [facility] and patient's parents, that he will need to be stabilized and be PDd to an IRTS facility as they no longer feel it is safe or therapeutic for the patient to be living independently. Patient is very psychotic.  Punched a wall last evening.  Plan is to give the patient IM Haldol eventually.  Patient very agitated, yelling, going up and down the hallway tipping over chairs and angry\".    Met with patient: Jerel was seen in the patients' lounge. He was somewhat calmer today and I told him right away that I would not talk to him if he continued to yell as he did yesterday. Jerel's main focus was on his discharge. He repeatedly stated that he was doing alright prior to admission, that there was no need to hospitalize him to start with and he should be immediately discharged. I informed Jerel that I and our staff believed that he was doing better, but " "not to the point of being ready for discharge. I offered Jerel 2nd opinion by Dr. Lazaro. He said that he was familiar with Dr. Lazaro and didn't believe that he would get beneficial to him recommendations from her. I then asked his RN who approached us if she felt that Jerel was ready for discharge. RN said that she felt that he was doing better but still needed to improve more to be discharged. After hearing that Jerel got angry and said that he would go to his room and punch the wall. I told him that this most definitely, would delay his discharge. He didn't respond and on his way from me tipped a chair in unit corridor.           Medications:       benztropine  0.5 mg Oral BID     diphenhydrAMINE  50 mg Oral At Bedtime     haloperidol  10 mg Oral BID     lidocaine   Transdermal Q8H     Vitamin D3  25 mcg Oral Daily          Allergies:     Allergies   Allergen Reactions     Penicillins      Rash, Generalized          Labs:   No results found for this or any previous visit (from the past 24 hour(s)).       Psychiatric Examination:     /80   Pulse 71   Temp 98.7  F (37.1  C) (Oral)   Resp 18   Ht 1.753 m (5' 9\")   Wt 73.5 kg (162 lb)   SpO2 97%   BMI 23.92 kg/m    Weight is 162 lbs 0 oz  Body mass index is 23.92 kg/m .       Orthostatic Vitals     None         Appearance: awake, alert, dressed in hospital scrubs and appeared as age stated  Attitude: Minimally cooperative  Eye Contact: poor ,at times tense  Mood: sad   Affect: constricted mobility  Speech:  paucity of speech,   Psychomotor Behavior:  physical retardation, at times agitation  Throught Process:  disorganized and illogical  Associations:  loosening of associations present  Thought Content: denies all MH symptoms including Auditory hallucinations, although, earlier openly admitted having them.  Insight:  limited  Judgement:  poor  Oriented to:  time, person, and place  Attention Span and Concentration:  fair  Recent and Remote Memory:  " fair    Clinical Global Impressions  First: 7/4 8/6/2021      Most recent: 6/3 8/13/2021             Precautions:     Behavioral Orders   Procedures     Assault precautions     Cheeking Precautions (behavioral units)     Code 1 - Restrict to Unit     Elopement precautions     Routine Programming     As clinically indicated     Sexual precautions     Status 15     Every 15 minutes.     Withdrawal precautions          DIagnoses:     1.  Schizophrenia presented paranoid type, acute exacerbation.  2.  Self-reported cannabis use disorder.         Plan:     Still exhibits significant psychotic symptoms. Will continue Haldol 10 mg bid. Will eventually, give Haldol Dec. Will continue to provide support and structure.

## 2021-08-13 NOTE — PLAN OF CARE
"  Problem: Psychotic Symptoms  Goal: Psychotic Symptoms  Description: Signs and symptoms of listed problems will be absent or manageable.  Outcome: Improving     Problem: Suicidal Behavior  Goal: Suicidal Behavior is Absent or Managed  Outcome: Improving    Patient is isolative to self and room for the most part of the shift. Semi cooperative with nursing assessment. Blunted and flat affects, tense, avoiding eye contact. Not engaged when out on the unit just pacing the hallway.    During nursing assessment patient made a series of statements for not wanting to be hospitalized here, \"I don't like being here, I don't like that they're injecting me with blood trying to infect me, I don't have work, I don't have money. I don't like that they want me to go to a facility for a month instead of going home. I don't like that they are changing my meds and accusing me of not taking my meds while I was taking them.\"     Patient reports feeling nervous, anxious and depressed. Anxiety 5 depression 8 on a severity scale of 0 to 10. Denies hallucination. Patient got up in the middle of assessment and walked away calmly. Declined vital signs check, stating, \" I don't need to have my vitals checked, there is nothing wrong with me.\"    Patient's brother visited at 19:40, interaction was calm and pleasant between patient and visitor. Medication compliant. Patient declined Covid test, stating, \"I don't think I'm susceptible to that, I don't have any symptoms.\"     Patient showered. Consumed 100% of dinner and snack.  Staff continues to monitor patient closely.  "

## 2021-08-14 PROCEDURE — 250N000013 HC RX MED GY IP 250 OP 250 PS 637: Performed by: PSYCHIATRY & NEUROLOGY

## 2021-08-14 PROCEDURE — 124N000002 HC R&B MH UMMC

## 2021-08-14 RX ORDER — SALIVA STIMULANT COMB. NO.3
2 SPRAY, NON-AEROSOL (ML) MUCOUS MEMBRANE
Status: DISCONTINUED | OUTPATIENT
Start: 2021-08-14 | End: 2021-09-13 | Stop reason: HOSPADM

## 2021-08-14 RX ADMIN — HALOPERIDOL 10 MG: 5 TABLET ORAL at 20:15

## 2021-08-14 RX ADMIN — DIPHENHYDRAMINE HYDROCHLORIDE 50 MG: 50 CAPSULE ORAL at 20:15

## 2021-08-14 RX ADMIN — BENZTROPINE MESYLATE 0.5 MG: 0.5 TABLET ORAL at 08:40

## 2021-08-14 RX ADMIN — Medication 25 MCG: at 08:40

## 2021-08-14 RX ADMIN — HALOPERIDOL 10 MG: 5 TABLET ORAL at 08:40

## 2021-08-14 RX ADMIN — BENZTROPINE MESYLATE 0.5 MG: 0.5 TABLET ORAL at 20:15

## 2021-08-14 ASSESSMENT — ACTIVITIES OF DAILY LIVING (ADL)
LAUNDRY: WITH SUPERVISION
ORAL_HYGIENE: INDEPENDENT
HYGIENE/GROOMING: INDEPENDENT
DRESS: INDEPENDENT

## 2021-08-14 NOTE — PLAN OF CARE
Shift Summary  No new concern. Visible in milieu. Isolative and withdrawn. Calmer than yesterday. Denies all mental health symptoms. Still psychotic and delusional. This morning upon rounds, this writer noted that pt was sitting on yoga mat at his room and meditating. No emotional outburst noted. Mother visited pt.   Denies pain. Declined checking vital signs. Took morning medication with no difficulties. Ate breakfast and lunch. See new order for Biotin mouth spray for dry mouth.   Prn: none

## 2021-08-14 NOTE — PLAN OF CARE
Shift Summary  Legal status: Committed and Clark  Mental  No new concern. Pt was quiet this evening. Took shower. Declined to check in with this writer and said everything is ok. No evidence of punching wall or self harm behavior.   Visible in milieu for meals only. Father visited pt. No interaction or talk noted between pt and father.   Prn: none  Physical  Pt alert and oriented to self and place. Denies pain especially on left hand. Declined checking vital signs. No side effect reported by pt or noted by this writer. Appetite adequate. Ate dinner. No problem with bowel and bladder per pt.   Prn: none  Continue to monitor pt's status Q 15 minutes and stabilize the patient's symptoms with the use of medications and therapeutic programming.

## 2021-08-15 PROCEDURE — 250N000013 HC RX MED GY IP 250 OP 250 PS 637: Performed by: PSYCHIATRY & NEUROLOGY

## 2021-08-15 PROCEDURE — 124N000002 HC R&B MH UMMC

## 2021-08-15 RX ADMIN — BENZTROPINE MESYLATE 0.5 MG: 0.5 TABLET ORAL at 20:11

## 2021-08-15 RX ADMIN — HALOPERIDOL 10 MG: 5 TABLET ORAL at 08:37

## 2021-08-15 RX ADMIN — DIPHENHYDRAMINE HYDROCHLORIDE 50 MG: 50 CAPSULE ORAL at 20:11

## 2021-08-15 RX ADMIN — Medication 25 MCG: at 08:37

## 2021-08-15 RX ADMIN — HALOPERIDOL 10 MG: 5 TABLET ORAL at 20:11

## 2021-08-15 RX ADMIN — BENZTROPINE MESYLATE 0.5 MG: 0.5 TABLET ORAL at 08:36

## 2021-08-15 NOTE — PLAN OF CARE
Shift Summary  No new concern noted. Visible in milieu, isolative and withdrawn to self. Denies any mental health symptoms. Still delusional and psychotic. No evidence of cheeking medication noted. Took medication with no difficulties.   Ate breakfast and lunch. Took shower. Had couple phone calls.   PRN: none

## 2021-08-15 NOTE — PLAN OF CARE
Patient appeared asleep for 6.25hrs during the night shift. Patient had no medical or behavioral concern and remains on 15min checks.

## 2021-08-15 NOTE — PLAN OF CARE
Patient has been calm and cooperative all shift. He has been social with staff and peers. He talked with a staff and another peer for at least 30minutes each time and kept a bright affect for most of the shift. Patient did not attend groups. He hung out in the lounge watching TV when not talking to other patients. He is eating adequately and medication compliant. Patient denies all mental health symptoms. Staff observe him making delusional statements during his conversations. Vitals are WNL.

## 2021-08-16 PROCEDURE — G0177 OPPS/PHP; TRAIN & EDUC SERV: HCPCS

## 2021-08-16 PROCEDURE — 250N000013 HC RX MED GY IP 250 OP 250 PS 637: Performed by: PSYCHIATRY & NEUROLOGY

## 2021-08-16 PROCEDURE — 124N000002 HC R&B MH UMMC

## 2021-08-16 PROCEDURE — 99232 SBSQ HOSP IP/OBS MODERATE 35: CPT | Performed by: PSYCHIATRY & NEUROLOGY

## 2021-08-16 PROCEDURE — 250N000011 HC RX IP 250 OP 636: Performed by: PSYCHIATRY & NEUROLOGY

## 2021-08-16 RX ORDER — HALOPERIDOL DECANOATE 100 MG/ML
50 INJECTION INTRAMUSCULAR
Status: DISCONTINUED | OUTPATIENT
Start: 2021-08-16 | End: 2021-09-07

## 2021-08-16 RX ORDER — HALOPERIDOL 5 MG/ML
10 INJECTION INTRAMUSCULAR
Status: DISCONTINUED | OUTPATIENT
Start: 2021-08-16 | End: 2021-08-17

## 2021-08-16 RX ADMIN — DIPHENHYDRAMINE HYDROCHLORIDE 50 MG: 50 CAPSULE ORAL at 20:55

## 2021-08-16 RX ADMIN — HALOPERIDOL 10 MG: 5 TABLET ORAL at 20:55

## 2021-08-16 RX ADMIN — HALOPERIDOL LACTATE 10 MG: 5 INJECTION, SOLUTION INTRAMUSCULAR at 21:16

## 2021-08-16 RX ADMIN — HALOPERIDOL 10 MG: 5 TABLET ORAL at 08:54

## 2021-08-16 RX ADMIN — BENZTROPINE MESYLATE 0.5 MG: 0.5 TABLET ORAL at 20:55

## 2021-08-16 RX ADMIN — BENZTROPINE MESYLATE 0.5 MG: 0.5 TABLET ORAL at 08:54

## 2021-08-16 RX ADMIN — Medication 25 MCG: at 08:54

## 2021-08-16 NOTE — PLAN OF CARE
Assessment/Intervention/Current Symtoms and Care Coordination  The patient is psychotic but slowly improving. His parents are no longer paying for a private apartment and the [facility] ACT Team wants him in  an IRTS. Had call from patient's mother today who reported that the Act Team MONIQUE Fischer apparently called the patient on our unit to tell him this.  She did not call me first to discuss the situation.  Patient's mother upset that this happened.  She and her  would like to meet together with patient to explained why they will not longer will be paying for an apartment for him.  Let mother know that 1) Patient would have to agree to see them; 2) If he does, the Unit Charge RN would have to decide if the mileiu was okay for visitors and get permission from management for BOTH parents to come up; 3) we would need to have a conference room available for them to meet with patient.  Mother did say that he has been visiting with them indvidually off and on during visiting hours. Asked patient if he would sign an CONNOR for his parents and he refused stating he doesn't trust what they say and would like to converse with them himself.     Discharge Plan or Goal  Patient needs to continue to take medications to stabilize psychotic symptoms    Barriers to Discharge   Still psychotic and then will need IRTS placement    Referral Status  Not completed    Legal Status  [place] Commit w/[facility]

## 2021-08-16 NOTE — PROGRESS NOTES
"[facility], [place]   Psychiatric Progress Note        Interim History:   The patient's care was discussed with the treatment team during the daily team meeting and/or staff's chart notes were reviewed.  Staff report patient remains isolative, he is compliant with oral Haldol. Had quiet weekend. Was reported to sleep 6.5 hours last night.    Met with patient: Jerel was seen in one of interview rooms. During interview he presented as relatively calm, but quickly escalated when I told him that both his family and ACT team want him to go to IRTS and not to return to his apartment. He stated that it was not true and that he had talked to his family and family wanted him to stay in his apartment. I asked Jerel's permission to talk to his family, he refused to allow me to do so. He got even more agitated when I reminded him that accepting Haldol Decanoate shot would be a condition for his discharge. He stated that he didn't need Haldol Decanoate, but refused to meet with Dr. Lazaro when I offered him a 2nd opinion.          Medications:       benztropine  0.5 mg Oral BID     diphenhydrAMINE  50 mg Oral At Bedtime     haloperidol  10 mg Oral BID     haloperidol decanoate  50 mg Intramuscular Q30 Days    Or     haloperidol lactate  10 mg Intramuscular Q30 Days     lidocaine   Transdermal Q8H     Vitamin D3  25 mcg Oral Daily          Allergies:     Allergies   Allergen Reactions     Penicillins      Rash, Generalized          Labs:   No results found for this or any previous visit (from the past 24 hour(s)).       Psychiatric Examination:     /80   Pulse 63   Temp 98.2  F (36.8  C) (Oral)   Resp 18   Ht 1.753 m (5' 9\")   Wt 73.5 kg (162 lb)   SpO2 97%   BMI 23.92 kg/m    Weight is 162 lbs 0 oz  Body mass index is 23.92 kg/m .       Orthostatic Vitals     None         Appearance: awake, alert, dressed in hospital scrubs and appeared as age stated  Attitude: Minimally cooperative  Eye " Contact: poor ,at times tense  Mood: sad   Affect: constricted mobility  Speech:  paucity of speech,   Psychomotor Behavior:  physical retardation, at times agitation  Throught Process: less disorganized and illogical  Associations:  loosening of associations present  Thought Content: denies all MH symptoms including Auditory hallucinations, although, earlier openly admitted having them.  Insight:  limited  Judgement:  poor  Oriented to:  time, person, and place  Attention Span and Concentration:  fair  Recent and Remote Memory:  fair    Clinical Global Impressions  First: 7/4 8/6/2021      Most recent: 6/3 8/13/2021             Precautions:     Behavioral Orders   Procedures     Assault precautions     Cheeking Precautions (behavioral units)     Code 1 - Restrict to Unit     Elopement precautions     Routine Programming     As clinically indicated     Sexual precautions     Status 15     Every 15 minutes.     Withdrawal precautions          DIagnoses:     1.  Schizophrenia presented paranoid type, acute exacerbation.  2.  Self-reported cannabis use disorder.         Plan:     Still exhibits significant psychotic symptoms. Will continue Haldol 10 mg bid. Will order Haldol Dec 50 mg IM. Will continue to provide support and structure.

## 2021-08-16 NOTE — PROGRESS NOTES
08/16/21 1437   General Information   Date Initially Attended OT 08/16/21     OT Groups Attended: Clinic x2    Affect/Hygiene/Presentation: Calm/pleasant, engaged, congruent affect. No apparent hygiene concerns.     Patient Performance/Response: Pt actively participated in occupational therapy clinic with 3-4 patients for 90 minutes. Pt was able to ask for assistance as needed, and independently initiate a novel, goal-directed task with minimal assistance for task organization and sequencing. Pt demonstrated good focus (15 minutes without interruption), and was able to plan and problem solve with occasional assistance from writer. Pt appeared comfortable interacting with peers and writer, and frequently initiated pleasant conversation with the group. Pt did note that he does not understand why he is still in the hospital, and that he isn't sure what he needs to do to be discharged. Writer encouraged Pt to discuss these questions with provider and .     Plan: More information needed to complete OT Initial Assessment; OT staff will meet with pt to review the role of occupational therapy and explain the value of having them involved in their treatment plan including options to meet current needs/self-identified goals. As group attendance is established, Pt will be given self-assessment to inform OT initial assessment.

## 2021-08-16 NOTE — PLAN OF CARE
Patient has been calm and cooperative. He presents with a blunted affect and has been somewhat withdrawn this shift. He was in the lounge intermittently for short periods but he mostly was isolative to his room. Patient is eating adequately and is medication compliant. Vitals are WNL.

## 2021-08-16 NOTE — PLAN OF CARE
Shift Summary  Legal status: Committed and Clark  Mental  Visible in milieu. Isolative and withdrawn to self. Got very upset after meeting with Dr Tran. Insight and judgement still poor and not agrees with all medication he is taking. Played Guitar a little bit. Still psychotic and delusion but less than before. No hallucination noted.  Prn: none  Physical  Pt alert and oriented to self and place. Declined checking vital signs. Medication compliance is noted with lots of encouragement. No evidence of cheeking. Seems tolerating medications well. No side effect reported by pt or noted by this writer. Appetite adequate. Ate both breakfast and lunch. No problem with bowel and bladder per pt.   Prn: none  Continue to monitor pt's status Q 15 minutes and stabilize the patient's symptoms with the use of medications and therapeutic programming.

## 2021-08-16 NOTE — PLAN OF CARE
NOC Shift Report     Pt in bed at beginning of shift, breathing quiet and unlabored. Pt slept through shift. Pt slept 6.5 hours.      No pt complaints or concerns at this time.      No PRNs given. Will continue to monitor.    Kiko Garay RN

## 2021-08-17 PROCEDURE — 250N000013 HC RX MED GY IP 250 OP 250 PS 637: Performed by: PSYCHIATRY & NEUROLOGY

## 2021-08-17 PROCEDURE — 250N000011 HC RX IP 250 OP 636: Performed by: PSYCHIATRY & NEUROLOGY

## 2021-08-17 PROCEDURE — 99232 SBSQ HOSP IP/OBS MODERATE 35: CPT | Performed by: PSYCHIATRY & NEUROLOGY

## 2021-08-17 PROCEDURE — 124N000002 HC R&B MH UMMC

## 2021-08-17 RX ADMIN — Medication 25 MCG: at 08:47

## 2021-08-17 RX ADMIN — HALOPERIDOL 10 MG: 5 TABLET ORAL at 08:47

## 2021-08-17 RX ADMIN — HALOPERIDOL 10 MG: 5 TABLET ORAL at 20:36

## 2021-08-17 RX ADMIN — DIPHENHYDRAMINE HYDROCHLORIDE 50 MG: 50 CAPSULE ORAL at 20:36

## 2021-08-17 RX ADMIN — HALOPERIDOL DECANOATE 50 MG: 100 INJECTION INTRAMUSCULAR at 17:08

## 2021-08-17 RX ADMIN — BENZTROPINE MESYLATE 0.5 MG: 0.5 TABLET ORAL at 08:47

## 2021-08-17 RX ADMIN — GABAPENTIN 100 MG: 100 CAPSULE ORAL at 16:17

## 2021-08-17 RX ADMIN — BENZTROPINE MESYLATE 0.5 MG: 0.5 TABLET ORAL at 20:36

## 2021-08-17 ASSESSMENT — ACTIVITIES OF DAILY LIVING (ADL)
LAUNDRY: WITH SUPERVISION
HYGIENE/GROOMING: INDEPENDENT
LAUNDRY: WITH SUPERVISION
ORAL_HYGIENE: INDEPENDENT
HYGIENE/GROOMING: INDEPENDENT
ORAL_HYGIENE: INDEPENDENT
DRESS: INDEPENDENT
DRESS: INDEPENDENT

## 2021-08-17 NOTE — PLAN OF CARE
Assessment/Intervention/Current Symtoms and Care Coordination  Patient remains symptomatic with paranoia but is showing a little improvement.  He has been attending a few of the therapeutic groups. He did receive his Haldol Decanoate last night.   He is adamant that he will not enter an IRTS facility but this is a plan support by the hospital, his parents and the [facility] ACT Team. Spoke with [facility] ACT Team /RN Noemy Leigh ([phone]). Noemy reported that she and their Supervisor had a 1.5 hour Zoom call with the patient's parents about the next phase of care for Jerel.  Rather than an apartment paid for by parents, he would be best served in an IRTS facility for up to 90 days after PD from the hospital.  Everyone is in agreement with this plan.  Noemy plans to call me early next week to touch base and will ask     Discharge Plan or Goal  Patient will eventually be PDd to an IRTS facility for more structure and medication management.    Barriers to Discharge   Still psychotic and needing further medication mangagement and psychiatric assessment.    Referral Status  None     Legal Status  [facility] [place] Commitment w/ [facility] Order

## 2021-08-17 NOTE — PROGRESS NOTES
"[facility], [place]   Psychiatric Progress Note        Interim History:   The patient's care was discussed with the treatment team during the daily team meeting and/or staff's chart notes were reviewed.  Staff report patient remains isolative, he is compliant with oral Haldol. Yesterday chose with a lot of resistance to get Haldol IM instead of Haldol Decanoate shot. Reported to go to some groups and participate in group discussion. CTC talked to the patient's CM who reported that ACT team, parents are in support of Jerel going to IRTS, although, he refuses to do so.    Met with patient: Jerel was seen in his room. He looked at me and said that he didn't want to talk. All attempts to engage him in conversation were unsuccessful.          Medications:       benztropine  0.5 mg Oral BID     diphenhydrAMINE  50 mg Oral At Bedtime     haloperidol  10 mg Oral BID     haloperidol decanoate  50 mg Intramuscular Q30 Days     lidocaine   Transdermal Q8H     Vitamin D3  25 mcg Oral Daily          Allergies:     Allergies   Allergen Reactions     Penicillins      Rash, Generalized          Labs:   No results found for this or any previous visit (from the past 24 hour(s)).       Psychiatric Examination:     /72   Pulse 96   Temp 98.3  F (36.8  C) (Oral)   Resp 18   Ht 1.753 m (5' 9\")   Wt 73.5 kg (162 lb)   SpO2 96%   BMI 23.92 kg/m    Weight is 162 lbs 0 oz  Body mass index is 23.92 kg/m .     Orthostatic Vitals     None         Orthostatic Vitals     None         Appearance: awake, alert, dressed in hospital scrubs and appeared as age stated  Attitude: Minimally cooperative  Eye Contact: poor ,at times tense  Mood: sad   Affect: constricted mobility  Speech:  paucity of speech,   Psychomotor Behavior:  physical retardation, at times agitation  Throught Process: less disorganized and illogical  Associations:  loosening of associations present  Thought Content: denies all MH symptoms " including Auditory hallucinations, although, earlier openly admitted having them.  Insight:  limited  Judgement:  poor  Oriented to:  time, person, and place  Attention Span and Concentration:  fair  Recent and Remote Memory:  fair    Clinical Global Impressions  First: 7/4 8/6/2021      Most recent: 6/3 8/13/2021      Will get Haldol Decanoate today. Will continue to provide with support and structure.        Precautions:     Behavioral Orders   Procedures     Assault precautions     Cheeking Precautions (behavioral units)     Code 1 - Restrict to Unit     Elopement precautions     Routine Programming     As clinically indicated     Sexual precautions     Status 15     Every 15 minutes.     Withdrawal precautions          DIagnoses:     1.  Schizophrenia presented paranoid type, acute exacerbation.  2.  Self-reported cannabis use disorder.         Plan:     Still exhibits significant psychotic symptoms. Will continue Haldol 10 mg bid. Will order Haldol Dec 50 mg IM. Will continue to provide support and structure.

## 2021-08-17 NOTE — PLAN OF CARE
Nursing Assessment    Recent Vitals: Refused vitals    Sleep:  Hours of sleep at night: 7    Goal patient is wanting to discharge    General Shift Summary  Patient comes out for meals and will socialize with select peers, interactions seem appropriate. Otherwise he isolates to his room resting in bed. He presents with a blunted affect, is irritable, tense, and will have pressured speech when talking about medications or his provider. Patient refuses to check in with staff. Writer asked a few assessment questions and he turned away. Patient did talk about how he shouldn't be on the medications that were given to him and how he is taking too many. He voiced not wanting his current provider to be his doctor however wouldn't respond when asking if he wanting another provider. Incite and judgment are poor. Hygiene is good-fair. Appetite is good.    Plan is to continue to monitor patient status q 15 mins, assess response to medications, and maintain the patients safety.    Madina Lane RN MSN

## 2021-08-17 NOTE — PLAN OF CARE
BEHAVIORAL TEAM DISCUSSION    Participants: Dr. Keyon Tran; Maryann Fraser [facility]; Ervin Ferreira RN  Progress: Patient's psychosis and paranoia still evident but improving.  He is compliant with care and taking medications.  He is now attending more therapeutic programming on the unit.  Anticipated length of stay: Unknown  Continued Stay Criteria/Rationale: Needs further medication management and stabilization of psychotic symptoms.  Medical/Physical: Nothing Noted  Precautions:   Behavioral Orders   Procedures    Assault precautions    Cheeking Precautions (behavioral units)    Code 1 - Restrict to Unit    Elopement precautions    Routine Programming     As clinically indicated    Sexual precautions    Status 15     Every 15 minutes.    Withdrawal precautions     Plan: Patient to continue taking medications to stabilize mood.  He is under a full [facility] Commitment with a [facility] Order and the plan is to eventually discharge him to an IRTS facility.  Rationale for change in precautions or plan: No changes

## 2021-08-17 NOTE — PLAN OF CARE
"Pt was visible in the milieu for the first half of the shift. He was observed interacting appropriately with a few of his peers, socializing more with others than in previous shifts. Pt's affect was blunted/flat and his mood was anxious, paranoid, and still was exhibiting delusional thought processes. Pt told writer he is wrongfully hospitalized and should not be here. Pt denied SI, SIB, and AVH, though he appeared to be responding to internal stimuli when alone in his room. When writer explained to him that he had an order for Haldol Decanoate as part of his [facility] order he became agitated and upset stating \"You cannot give that to me. Legally you cannot force that medication on me as it is not laid out in the paperwork\". Writer attempted to explain the court paperwork that outlined the plan of care for neuroleptic medication but patient was not accepting of education. Patient demanded to speak with his  and with Dr. Tran and stated he was going to call the police. Phone privileges were revoked for a few hours in that he was not able to dial the phone numbers because of the threats of calling the police. Staff dialed the phone for his . Pt demanded to speak with Dr. Tran, he stated \"I need to tell him what an incompetent Alevism rat he is\". Writer attempted to explain him that that type of language was not appropriate nor tolerated on the unit but patient was not receptive to teaching. Writer asked if he would be willing to take the Haldol Decanoate and patient stated \"absolutely not\", writer explained to him that either he would have to take it willingly or a behavioral code would be called and then the medication would be given to him. Patient continued to refuse medication. He then started threatening to throw the computers at the nurse's station  A Code Green was called and per medication order Haldol Lactate 10 mg was given due to patient refusal of Haldol Decanoate. When writer and " code staff went into his room patient became very agitated and uncooperative with staff. He raised both middle fingers with towards staff and did not comply with requests for him to lay on his stomach. Staff eventually got him to agree to lay on his stomach, a brief hold was done to administer IM medication and was released immediately. There was no adverse reaction to medication and patient did not sustain any injuries from brief hold. Vital signs were WNL. Pt did not endorse pain or side effects to medications. Patient spent the remainder of the shift in his room sleeping. He was compliant with scheduled oral medications.

## 2021-08-17 NOTE — PLAN OF CARE
NOC Shift Report     Pt in bed at beginning of shift, breathing quiet and unlabored. Pt slept through shift. Pt slept 7 hours.      No pt complaints or concerns at this time.      No PRNs given. Will continue to monitor.

## 2021-08-17 NOTE — PLAN OF CARE
Problem: Adult Inpatient Plan of Care  Goal: Plan of Care Review  Outcome: No Change  Flowsheets  Taken 8/17/2021 1751  Plan of Care Reviewed With: patient  Progress: no change  Taken 8/17/2021 1735  Plan of Care Reviewed With: patient    Patient is resting in bed at start of shift. He was out in the milieu. Flat, blunted, guarded, paranoid. Writer discussed his Haldol Decanoate that he did not take yesterday and he was anxious and irritable, questioning why he'll be given again when he already did have it yesterday. Explained to him that he was given the regular Haldol yesterday. He was not happy but he did eventually agree to have his medication. Haldol Decanoate 50 mg IM was then administered to his right deltoid. He denied SI, SIB, depression, anxiety and hallucinations. He ate well at supper. He watched TV for a while then went to his bedroom resting. Writer prompted him to take his scheduled medications and reluctantly took them all. Unable to complete his Admission Profile due to pt's refusal. Will continue to monitor and assess pt.

## 2021-08-18 PROCEDURE — 99233 SBSQ HOSP IP/OBS HIGH 50: CPT | Performed by: PSYCHIATRY & NEUROLOGY

## 2021-08-18 PROCEDURE — 250N000013 HC RX MED GY IP 250 OP 250 PS 637: Performed by: PSYCHIATRY & NEUROLOGY

## 2021-08-18 PROCEDURE — 124N000002 HC R&B MH UMMC

## 2021-08-18 PROCEDURE — G0177 OPPS/PHP; TRAIN & EDUC SERV: HCPCS

## 2021-08-18 RX ADMIN — HALOPERIDOL 10 MG: 5 TABLET ORAL at 08:37

## 2021-08-18 RX ADMIN — Medication 25 MCG: at 08:37

## 2021-08-18 RX ADMIN — DIPHENHYDRAMINE HYDROCHLORIDE 50 MG: 50 CAPSULE ORAL at 20:31

## 2021-08-18 RX ADMIN — GABAPENTIN 100 MG: 100 CAPSULE ORAL at 13:14

## 2021-08-18 RX ADMIN — BENZTROPINE MESYLATE 0.5 MG: 0.5 TABLET ORAL at 08:37

## 2021-08-18 RX ADMIN — MELATONIN TAB 3 MG 3 MG: 3 TAB at 20:31

## 2021-08-18 RX ADMIN — HALOPERIDOL 10 MG: 5 TABLET ORAL at 20:31

## 2021-08-18 RX ADMIN — BENZTROPINE MESYLATE 0.5 MG: 0.5 TABLET ORAL at 20:31

## 2021-08-18 ASSESSMENT — ACTIVITIES OF DAILY LIVING (ADL)
LAUNDRY: WITH SUPERVISION
ORAL_HYGIENE: INDEPENDENT
ORAL_HYGIENE: INDEPENDENT
HYGIENE/GROOMING: INDEPENDENT
DRESS: INDEPENDENT
DRESS: INDEPENDENT
LAUNDRY: WITH SUPERVISION
HYGIENE/GROOMING: INDEPENDENT

## 2021-08-18 NOTE — PLAN OF CARE
OT Groups Attended: Clinic     Affect/Hygiene/Presentation: Calm, engaged, blunted affect. No apparent hygiene concerns.     Patient Performance/Response: Pt actively participated in occupational therapy clinic with 5 patients for 45 minutes. Pt was able to ask for assistance as needed, and independently initiate a familiar, single-step, creative expression task without difficulty. Pt demonstrated good focus (30 minutes without interruption), planning, and attention to detail during task completion. Pt appeared comfortable interacting with peers and writer, however he generally kept to himself and appeared focused on his selected task.     Plan: Pt will be encouraged to maintain group attendance for continued ongoing assessment and support in completion of occupational therapy treatment goals.

## 2021-08-18 NOTE — PLAN OF CARE
"Nursing Assessment    Recent Vitals: refused vitals    Sleep:  Hours of sleep at night: 7    General Shift Summary  Patient has been in and out of his room. When out he is either pacing the nazario, eating, or watching t.v. Patient interacts with select peers. He presents as guarded and irritable towards staff. He denied SI/HI/AVH/SIB, denied anxiety and depression. He stated that he wants to talk to the doctor about medications and how he was brought here. He presents to have some paranoia when talking about meds. Patient is eating well. Hygiene is good. Incite and judgment are poor.    Patient is medication compliant however requires education on the importance of following through with taking medications. In concerns to med side effects he stated \"I don't want to share with hospital staff.\".    Plan is to continue to monitor patient status q 15 mins, assess response to medications, and maintain the patients safety.    Madina Lane RN MSN  "

## 2021-08-18 NOTE — PLAN OF CARE
"Pt was very disorganized and tangential during check in. Nothing he said was making any sense to writer, but all seemed to make perfect sense to pt. He was talking about inspirations, tangents, trigonometry, rainbows, cycles, and the artist Kirk Vaishnavi's painting of a clock. Pt states he used to think he could be a famous artist and compared himself to Kirk Vaishnavi multiple times. He states that when he \"went back to my parent's house\", that stopped him from reaching his potential as an artist. Pt tangential when asked check in questions and did not give straight answers to any question. He did say \"no\" when asked if he is having thoughts about harming self or others. He ate dinner. He was out on the unit, but not social with peers. VSS.    Pt talking about feeling like he is stuck in a system with his ACT team. He mentions there are some things he likes, but other things he does not like. Pt telling staff that he is afraid of needles and has gotten severe side effects from IM injections and would prefer not to have these. Staff able to listen to pt concerns and provide emotional support. Pt states he does not think he needs to be on medications and he does not find medication helpful. He states he has trouble falling asleep at night. He did take PRN melatonin 3 mg at his request.          "

## 2021-08-18 NOTE — PLAN OF CARE
Patient appeared asleep for 7hr  during the night shift. Patient had no behavioral or medical concerns. Patient remains on 15min checks.

## 2021-08-18 NOTE — PLAN OF CARE
"Assessment/Intervention/Current Symtoms and Care Coordination  Patient is still psychotic.  Adamant that he will not enter an IRTS. Angry about having to take the Haldol Decanoate. Attended one group today.     Wanted to talk to me about the IRTS.  Reports his parents didn't think it was a good idea.  Asked him when they told him this?  He reported, \"A few days after I got here.\"  \"Do you think I want to go to an IRTS full of drugs?\" Became very angry, \"Are you laughing at me?  You are all incompetent. I want a second opinion.  I need to go back to my apartment with my plants and my fish. DO YOU HEAR ME???? I've already been to those places!\"  Writer who was standing in the doorway so with escalating behavior, left and let psychiatric associates know how angry he was.  Patient has no insight into his mental health and does not understand the importance of medication compliance.  Still experiencing paranoia.  They could hear him yelling down the nazario.      Discharge Plan or Goal  Patient will enter an IRTS facility for structure, programming and medication management.    Barriers to Discharge   Patient needs further medication management and stabilization of his psychosis.    Referral Status  None    Legal Status  [place] Committed w/[facility] [employer]  "

## 2021-08-18 NOTE — PROGRESS NOTES
[facility], [place]   Psychiatric Progress Note        Interim History:   The patient's care was discussed with the treatment team during the daily team meeting and/or staff's chart notes were reviewed.  Staff report patient remains isolative, he is compliant with oral Haldol. Yesterday he got first injection of Haldol Decanoate with some resistance. Reported to go to some groups and participate in group discussion. CTC talked to the patient's CM who reported that ACT team, parents are in support of Jerel going to IRTS, although, he refuses to do so.    Met with patient: Jerel was seen in his room. He, rather reluctantly, talked to me about meds. Was very focused on differences between Haldol which he mistakenly called Halidol and Haloperidol. I showed him in [employer] ap and on [employer] web page that Haldol and Haloperidol are the same medication. By the end of our visit he appeared to be at least somewhat receptive to my words. Reported still experiencing hallucinations, said that some of them were pretty disturbing: reported that the police were touching his genital areas, overall, though, said that intensity and frequency of hallucinations had decreased, though, I am not sure if he can be trusted. He still stated very firmly that he didn't want to go to IRTS. I suggested him to talk to his family and ask them if they were still going to pay for his apartment and if they were in support of him going to IRTS. Jerel had no further questions or concerns.         Medications:       benztropine  0.5 mg Oral BID     diphenhydrAMINE  50 mg Oral At Bedtime     haloperidol  10 mg Oral BID     haloperidol decanoate  50 mg Intramuscular Q30 Days     lidocaine   Transdermal Q8H     Vitamin D3  25 mcg Oral Daily          Allergies:     Allergies   Allergen Reactions     Penicillins      Rash, Generalized          Labs:   No results found for this or any previous visit (from the past 24 hour(s)).        "Psychiatric Examination:     /72   Pulse 96   Temp 98.3  F (36.8  C) (Oral)   Resp 18   Ht 1.753 m (5' 9\")   Wt 73.5 kg (162 lb)   SpO2 96%   BMI 23.92 kg/m    Weight is 162 lbs 0 oz  Body mass index is 23.92 kg/m .     Orthostatic Vitals     None         Orthostatic Vitals     None         Appearance: awake, alert, dressed in hospital scrubs and appeared as age stated  Attitude: Minimally cooperative  Eye Contact: poor ,at times tense  Mood: sad   Affect: constricted mobility  Speech:  paucity of speech,   Psychomotor Behavior:  physical retardation, at times agitation  Throught Process: less disorganized and illogical  Associations:  loosening of associations present, but no as prominent  Thought Content: denies all MH symptoms including Auditory hallucinations, although, earlier openly admitted having them.  Insight:  limited  Judgement:  poor  Oriented to:  time, person, and place  Attention Span and Concentration:  fair  Recent and Remote Memory:  fair    Clinical Global Impressions  First: 7/4 8/6/2021      Most recent: 6/3 8/13/2021      Got Haldol Decanoate yesterday. Will continue to provide with support and structure.        Precautions:     Behavioral Orders   Procedures     Assault precautions     Cheeking Precautions (behavioral units)     Code 1 - Restrict to Unit     Elopement precautions     Routine Programming     As clinically indicated     Sexual precautions     Status 15     Every 15 minutes.     Withdrawal precautions          DIagnoses:     1.  Schizophrenia presented paranoid type, acute exacerbation.  2.  Self-reported cannabis use disorder.         Plan:     Still exhibits significant psychotic symptoms. Will continue Haldol 10 mg bid. Will order Haldol Dec 50 mg IM. Will continue to provide support and structure.        "

## 2021-08-19 PROCEDURE — 99232 SBSQ HOSP IP/OBS MODERATE 35: CPT | Performed by: PSYCHIATRY & NEUROLOGY

## 2021-08-19 PROCEDURE — 250N000013 HC RX MED GY IP 250 OP 250 PS 637: Performed by: PSYCHIATRY & NEUROLOGY

## 2021-08-19 PROCEDURE — G0177 OPPS/PHP; TRAIN & EDUC SERV: HCPCS

## 2021-08-19 PROCEDURE — 124N000002 HC R&B MH UMMC

## 2021-08-19 RX ADMIN — Medication 25 MCG: at 08:39

## 2021-08-19 RX ADMIN — DIPHENHYDRAMINE HYDROCHLORIDE 50 MG: 50 CAPSULE ORAL at 19:47

## 2021-08-19 RX ADMIN — MELATONIN TAB 3 MG 3 MG: 3 TAB at 19:47

## 2021-08-19 RX ADMIN — BENZTROPINE MESYLATE 0.5 MG: 0.5 TABLET ORAL at 08:39

## 2021-08-19 RX ADMIN — HALOPERIDOL 10 MG: 5 TABLET ORAL at 19:47

## 2021-08-19 RX ADMIN — BENZTROPINE MESYLATE 0.5 MG: 0.5 TABLET ORAL at 19:47

## 2021-08-19 RX ADMIN — HALOPERIDOL 10 MG: 5 TABLET ORAL at 08:39

## 2021-08-19 RX ADMIN — GABAPENTIN 100 MG: 100 CAPSULE ORAL at 18:15

## 2021-08-19 ASSESSMENT — ACTIVITIES OF DAILY LIVING (ADL)
LAUNDRY: WITH SUPERVISION
HYGIENE/GROOMING: INDEPENDENT
ORAL_HYGIENE: INDEPENDENT
DRESS: INDEPENDENT

## 2021-08-19 NOTE — PROGRESS NOTES
"[facility], [place]   Psychiatric Progress Note        Interim History:   The patient's care was discussed with the treatment team during the daily team meeting and/or staff's chart notes were reviewed.  Staff report patient is more social. He goes to groups, talks to some peers and appears to be making good progress. He is compliant with his meds and appears to be tolerating them OK.    Met with patient: he was seen in his room. He was, overall, more cooperative, but quickly escalated when we started talking about this treatment team and ACT team recommendation to go to IRTS. Jerel continued to state that his family was in support of him going back to his apartment despite information that family doesn't want to continue to pay for his apartment and would like him to go to IRTS. Interestingly, he refuses to allow myself or ACT team to talk to his parents. We talked that we would not be able to discharge him home without knowing where he would go, but Jerel didn't budge and insisted that his family was perfectly fine with him going back to his apartment. I again suggested him to talk to his family about this topic. Jerel had no further questions or concerns.          Medications:       benztropine  0.5 mg Oral BID     diphenhydrAMINE  50 mg Oral At Bedtime     haloperidol  10 mg Oral BID     haloperidol decanoate  50 mg Intramuscular Q30 Days     lidocaine   Transdermal Q8H     Vitamin D3  25 mcg Oral Daily          Allergies:     Allergies   Allergen Reactions     Penicillins      Rash, Generalized          Labs:   No results found for this or any previous visit (from the past 24 hour(s)).       Psychiatric Examination:     /73   Pulse 62   Temp 98.4  F (36.9  C) (Oral)   Resp 18   Ht 1.753 m (5' 9\")   Wt 73.5 kg (162 lb)   SpO2 97%   BMI 23.92 kg/m    Weight is 162 lbs 0 oz  Body mass index is 23.92 kg/m .     Orthostatic Vitals     None             None         Appearance: " awake, alert, dressed in hospital scrubs and appeared as age stated  Attitude: slightly more cooperative  Eye Contact: less tense  Mood: sad   Affect: constricted mobility  Speech:  paucity of speech,   Psychomotor Behavior:  physical retardation, at times agitation  Throught Process: less disorganized and illogical  Associations:  loosening of associations present, but no as prominent  Thought Content: denies all MH symptoms including Auditory hallucinations, although, earlier openly admitted having them.  Insight:  limited  Judgement:  poor  Oriented to:  time, person, and place  Attention Span and Concentration:  fair  Recent and Remote Memory:  fair    Clinical Global Impressions  First: 7/4 8/6/2021      Most recent: 6/3 8/13/2021           Precautions:     Behavioral Orders   Procedures     Assault precautions     Cheeking Precautions (behavioral units)     Code 1 - Restrict to Unit     Elopement precautions     Routine Programming     As clinically indicated     Sexual precautions     Status 15     Every 15 minutes.     Withdrawal precautions          DIagnoses:     1.  Schizophrenia presented paranoid type, acute exacerbation.  2.  Self-reported cannabis use disorder.         Plan:     Got Haldol Decanoate. Will continue to provide with support and structure. He is making slow but steady progress and will, likely, be ready for discharge in 1-2 weeks. It is still unclear where he will have to be discharged. See discussion above.

## 2021-08-19 NOTE — PLAN OF CARE
Assessment/Intervention/Current Symtoms and Care Coordination  Patient is paranoid, angry and still stating he will not go to an IRTS.     Discharge Plan or Goal  Patient will enter an IRTS on a PD when he is stabilized.    Barriers to Discharge   Still paranoid and needs medication management.    Referral Status  None yet    Legal Status  [facility] Commitment w/ [facility]

## 2021-08-19 NOTE — PLAN OF CARE
NOC Shift Report     Pt in bed at beginning of shift, breathing quiet and unlabored. Pt slept through shift. Pt slept 6.75 hours.      No pt complaints or concerns at this time.      No PRNs given. Will continue to monitor.

## 2021-08-19 NOTE — PLAN OF CARE
Nursing Assessment    Recent Vitals: B/P: 107/73, T: 98.4, P: 62    Sleep:  Hours of sleep at night: 6.75    General Shift Summary  Patient has been in and out of milieu today. He attended groups. He interacts with select peers, interactions have been appropriate. Presents with a blunted affect. He denied any HI/SIB/SI/AVH, denied anxiety and depression. Patient voices wanting to discharge and go back to his apartment. Hygiene is fair. He is eating well. No signs of psychosis or paranoia. Incite and judgement are poor to fair.    He is compliant with medications, cooperative with mouth check, no signs of cheeking noted.    Plan is to continue to monitor patient status q 15 mins, assess response to medications, and maintain the patients safety.    Madina Lane RN MSN

## 2021-08-20 PROCEDURE — G0177 OPPS/PHP; TRAIN & EDUC SERV: HCPCS

## 2021-08-20 PROCEDURE — 99232 SBSQ HOSP IP/OBS MODERATE 35: CPT | Performed by: PSYCHIATRY & NEUROLOGY

## 2021-08-20 PROCEDURE — 250N000013 HC RX MED GY IP 250 OP 250 PS 637: Performed by: PSYCHIATRY & NEUROLOGY

## 2021-08-20 PROCEDURE — 124N000002 HC R&B MH UMMC

## 2021-08-20 RX ORDER — BENZTROPINE MESYLATE 1 MG/1
1 TABLET ORAL 2 TIMES DAILY
Status: DISCONTINUED | OUTPATIENT
Start: 2021-08-20 | End: 2021-09-13 | Stop reason: HOSPADM

## 2021-08-20 RX ADMIN — GABAPENTIN 100 MG: 100 CAPSULE ORAL at 17:08

## 2021-08-20 RX ADMIN — BENZTROPINE MESYLATE 0.5 MG: 0.5 TABLET ORAL at 09:03

## 2021-08-20 RX ADMIN — HALOPERIDOL 10 MG: 5 TABLET ORAL at 20:41

## 2021-08-20 RX ADMIN — MELATONIN TAB 3 MG 3 MG: 3 TAB at 20:43

## 2021-08-20 RX ADMIN — BENZTROPINE MESYLATE 1 MG: 1 TABLET ORAL at 20:41

## 2021-08-20 RX ADMIN — Medication 25 MCG: at 09:03

## 2021-08-20 RX ADMIN — DIPHENHYDRAMINE HYDROCHLORIDE 50 MG: 50 CAPSULE ORAL at 20:41

## 2021-08-20 RX ADMIN — HALOPERIDOL 10 MG: 5 TABLET ORAL at 09:03

## 2021-08-20 ASSESSMENT — ACTIVITIES OF DAILY LIVING (ADL)
ORAL_HYGIENE: INDEPENDENT
TOILETING_ISSUES: NO
CONCENTRATING,_REMEMBERING_OR_MAKING_DECISIONS_DIFFICULTY: NO
DRESSING/BATHING_DIFFICULTY: NO
DRESS: INDEPENDENT
LAUNDRY: WITH SUPERVISION
DIFFICULTY_EATING/SWALLOWING: NO
HYGIENE/GROOMING: INDEPENDENT
HEARING_DIFFICULTY_OR_DEAF: NO
DIFFICULTY_COMMUNICATING: NO
WALKING_OR_CLIMBING_STAIRS_DIFFICULTY: NO
DOING_ERRANDS_INDEPENDENTLY_DIFFICULTY: NO
WEAR_GLASSES_OR_BLIND: NO
FALL_HISTORY_WITHIN_LAST_SIX_MONTHS: NO

## 2021-08-20 NOTE — PLAN OF CARE
"Pt was visible in the milieu tonight watching movies with peers. He went to bed early tonight after appearing to have an episode of incontinence of urine in the lounge. Pt has been perseverating on thoughts of failing and worrying about failing. Pt states he feels like he has an \"organism in my neck\" that sometimes causes his chest to feel different. Pt cannot explain what this feels like any further.  Pt states he thinks this is related to his haldol decanoate shot that he received 2 days ago. Pt denies SI/SIB. Pt less disorganized in speech, but still somewhat tangential. He ate dinner. VSS.  "

## 2021-08-20 NOTE — PLAN OF CARE
"  Problem: OT General Care Plan  Goal: OT Goal 1  Description: OT Goal 1    OT Groups Attended: Mental Health Management     Affect/Hygiene/Presentation: Calm/pleasant, engaged, congruent affect. No apparent hygiene concerns.     Patient Performance/Response: Pt actively participated in a structured occupational therapy group of 3 patients total x45 minutes with a focus on coping through movement via chair yoga as a strategy to facilitate therapeutic exercise, calming, and stress management. Pt actively followed all of the movements, and remained attentive and engaged throughout group. Pt verbalized feeling \"relaxed\" at the end of group. Pt contributed at least one idea to a discussion at the end of group regarding the benefits of exercise, stretching, and deep breathing.     Plan: Pt will be encouraged to maintain group attendance for continued ongoing assessment and support in completion of occupational therapy treatment goals.     Outcome: Improving     "

## 2021-08-20 NOTE — PLAN OF CARE
"  Problem: Sleep Disturbance  Goal: Adequate Sleep/Rest  Outcome: No Change     Problem: Suicidal Behavior  Goal: Suicidal Behavior is Absent or Managed  Outcome: Improving  Flowsheets (Taken 8/20/2021 1205)  Mutually Determined Action Steps (Suicidal Behavior Absent/Managed): sets future-oriented goal     Patient alert and oriented x4. Visible in the milieu, avoids social contact, reports feeling \"bored\". Patient denies anxiety, depression, SI and SIB. He reported intermittent, not command,  auditory hallucinations, \"people talking\" When asked what they are talking about he said \"Just staff from school, like about a girl I had crush on when I was at high school\". Patient reports not being able to sleep during the night \"I just lay in my bed but don't fall asleep\". Patient also reported stiff neck. All assessment findings were communicated with the provider on morning meeting.   Appetite and oral fluid intake are good.   "

## 2021-08-20 NOTE — PLAN OF CARE
"Assessment/Intervention/Current Symtoms and Care Coordination  Patient reporting auditory hallucinations and is somewhat paranoid.  Called Resident Psychiatrist Dr. Salguero who is with his ReEntry ACT Team.  Dr. Salguero confirmed they had talked to his parents about patient not want to enter an IRTS facililty and it was not entirely clear where the parents stand on the issue.  Parents had been paying for an apartment for Jerel prior to this admit. Patient currently insisting he can return to the apartment but he has also rescinded the CONNOR his ReEntry Team has to speak to parents  and refuses to sign an CONNOR for us to speak to his parents.  Our issue is that we cannot \"force\" patient to enter an IRTS facility and there is no longer availability at [facility] for patients who refuse to comply with commitment treatment plan.    Discharge Plan or Goal  Patient needs to continue to take prescribed medications.    Barriers to Discharge   Unstable and needs further medication management.    Referral Status  ReEntry ACT Team w/ psychiatrist, RN, /Therapist.    Legal Status  MI Commitment w/ [facility] in [place]  "
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
## 2021-08-21 PROCEDURE — 250N000013 HC RX MED GY IP 250 OP 250 PS 637: Performed by: PSYCHIATRY & NEUROLOGY

## 2021-08-21 PROCEDURE — 124N000002 HC R&B MH UMMC

## 2021-08-21 RX ADMIN — GABAPENTIN 100 MG: 100 CAPSULE ORAL at 18:38

## 2021-08-21 RX ADMIN — HALOPERIDOL 10 MG: 5 TABLET ORAL at 20:47

## 2021-08-21 RX ADMIN — BENZTROPINE MESYLATE 1 MG: 1 TABLET ORAL at 20:47

## 2021-08-21 RX ADMIN — HALOPERIDOL 10 MG: 5 TABLET ORAL at 09:15

## 2021-08-21 RX ADMIN — DIPHENHYDRAMINE HYDROCHLORIDE 50 MG: 50 CAPSULE ORAL at 20:47

## 2021-08-21 RX ADMIN — Medication 25 MCG: at 09:15

## 2021-08-21 RX ADMIN — MELATONIN TAB 3 MG 3 MG: 3 TAB at 20:47

## 2021-08-21 RX ADMIN — BENZTROPINE MESYLATE 1 MG: 1 TABLET ORAL at 09:15

## 2021-08-21 ASSESSMENT — ACTIVITIES OF DAILY LIVING (ADL)
ORAL_HYGIENE: INDEPENDENT
HYGIENE/GROOMING: INDEPENDENT
DRESS: INDEPENDENT
DRESS: INDEPENDENT
HYGIENE/GROOMING: INDEPENDENT
ORAL_HYGIENE: INDEPENDENT
LAUNDRY: WITH SUPERVISION

## 2021-08-21 NOTE — PLAN OF CARE
"Pt showered this evening. He has been visible in the milieu but withdrawn to himself. He ate 100% of dinner. Pt states that AH are \"better today\" and states he does not know why they became so intense last night after 9 pm. Affect remains flat and blunted. Pt denies SI/SIB/HI. Pt endorses feeling down and bored. Pt expresses hopefulness that he will be able to discharge home soon. Pt given PRN gabapentin 100 mg at his request for endorsed anxiety. Pt speech was mostly organized tonight with limited tangents. Concentration appears better than yesterday. Pt endorses mid back pain but declines wanting medication, ice or heat for this. Pt states the stiffness of neck r/t Haldol that he had yesterday has dissipated.   "

## 2021-08-21 NOTE — PLAN OF CARE
"RN Note:    Pt presented with flat affect. Pt was calm and cooperative while interacting with the writer. Pt was alert and oriented x 4. Pt denied having SI, HI, thoughts of SIB, and hallucinations. Pt denied having a wish to be dead. Pt endorsed having having left foot pain. Pt declined all interventions offered for pain. Pt had no other medical concerns. Pt endorsed sleeping well last night. Pt feels the current ordered medications are working well. When writer asked pt to elaborate on that notion pt stated, \" I'm sleeping really well.\" Pt endorsed weight gain as only medication side effect. No medication side effects observed by the writer. Pt identified socializing as an effective coping skill. Pt was isolative and withdrawn. Continue to monitor for safety and changes in medical condition.     Incidents to Note:    Multiple attempts were made to assess pt's vital signs this shift. Pt declined at each attempt.  "
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
## 2021-08-22 PROCEDURE — 250N000013 HC RX MED GY IP 250 OP 250 PS 637: Performed by: PSYCHIATRY & NEUROLOGY

## 2021-08-22 PROCEDURE — 124N000002 HC R&B MH UMMC

## 2021-08-22 PROCEDURE — G0177 OPPS/PHP; TRAIN & EDUC SERV: HCPCS

## 2021-08-22 RX ADMIN — BENZTROPINE MESYLATE 1 MG: 1 TABLET ORAL at 08:52

## 2021-08-22 RX ADMIN — BENZTROPINE MESYLATE 1 MG: 1 TABLET ORAL at 19:50

## 2021-08-22 RX ADMIN — GABAPENTIN 100 MG: 100 CAPSULE ORAL at 19:50

## 2021-08-22 RX ADMIN — HALOPERIDOL 10 MG: 5 TABLET ORAL at 08:52

## 2021-08-22 RX ADMIN — HALOPERIDOL 10 MG: 5 TABLET ORAL at 19:50

## 2021-08-22 RX ADMIN — Medication 25 MCG: at 08:52

## 2021-08-22 ASSESSMENT — ACTIVITIES OF DAILY LIVING (ADL)
HYGIENE/GROOMING: INDEPENDENT
HYGIENE/GROOMING: INDEPENDENT
LAUNDRY: WITH SUPERVISION
LAUNDRY: WITH SUPERVISION
ORAL_HYGIENE: INDEPENDENT
DRESS: INDEPENDENT
DRESS: INDEPENDENT
ORAL_HYGIENE: INDEPENDENT
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
## 2021-08-22 NOTE — PROGRESS NOTES
Jerel participated in OT clinic today, where he initiated a chosen project (painted ceramic item), followed through with plan, and asked for support with supplies as needed. Selected several songs online to share with the group. Pleasant and talkative this date.      08/22/21 1500   Occupational Therapy   Type of Intervention structured groups   Response Initiates, socially acceptable   Hours 1

## 2021-08-22 NOTE — PLAN OF CARE
"Pt states he is \"bored\" but was happy he could get some reading in today. He likes the book he is reading and was actually discussing it with a peer. Pt has been more social with peers today. He spent some time riding the stationary bike. He ate 100% of dinner. Pt endorses auditory hallucinations. He states \"someone was trying to prove their intelligence in my head while I was watching the movie\". Pt states the voice is not commanding and was not saying anything bad. He denies SI/SIB/HI. Pt denies any medication side effects today. He does endorse mid back pain which appears to be chronic for pt. He endorses anxiety which he does not rate. He requests gabapentin PRN. He states gabapentin is helpful for his back pain and his anxiety. Pt declined to take his scheduled benadryl at bedtime stating he does not feel he needs it tonight.   "

## 2021-08-23 PROCEDURE — 250N000013 HC RX MED GY IP 250 OP 250 PS 637: Performed by: PSYCHIATRY & NEUROLOGY

## 2021-08-23 PROCEDURE — 99231 SBSQ HOSP IP/OBS SF/LOW 25: CPT | Performed by: PSYCHIATRY & NEUROLOGY

## 2021-08-23 PROCEDURE — 124N000002 HC R&B MH UMMC

## 2021-08-23 RX ORDER — DIPHENHYDRAMINE HCL 50 MG
50 CAPSULE ORAL
Status: DISCONTINUED | OUTPATIENT
Start: 2021-08-23 | End: 2021-09-13 | Stop reason: HOSPADM

## 2021-08-23 RX ORDER — HALOPERIDOL 5 MG/1
5 TABLET ORAL DAILY
Status: DISCONTINUED | OUTPATIENT
Start: 2021-08-24 | End: 2021-09-07

## 2021-08-23 RX ORDER — HALOPERIDOL 10 MG/1
10 TABLET ORAL AT BEDTIME
Status: DISCONTINUED | OUTPATIENT
Start: 2021-08-23 | End: 2021-09-13 | Stop reason: HOSPADM

## 2021-08-23 RX ADMIN — BENZTROPINE MESYLATE 1 MG: 1 TABLET ORAL at 09:11

## 2021-08-23 RX ADMIN — Medication 5 MG: at 20:31

## 2021-08-23 RX ADMIN — HALOPERIDOL 10 MG: 10 TABLET ORAL at 20:31

## 2021-08-23 RX ADMIN — MELATONIN TAB 3 MG 3 MG: 3 TAB at 01:58

## 2021-08-23 RX ADMIN — BENZTROPINE MESYLATE 1 MG: 1 TABLET ORAL at 20:31

## 2021-08-23 RX ADMIN — HALOPERIDOL 10 MG: 5 TABLET ORAL at 09:11

## 2021-08-23 RX ADMIN — Medication 25 MCG: at 09:11

## 2021-08-23 ASSESSMENT — ACTIVITIES OF DAILY LIVING (ADL)
ORAL_HYGIENE: INDEPENDENT
HYGIENE/GROOMING: INDEPENDENT
ORAL_HYGIENE: INDEPENDENT
HYGIENE/GROOMING: INDEPENDENT
LAUNDRY: WITH SUPERVISION
LAUNDRY: WITH SUPERVISION
DRESS: INDEPENDENT
DRESS: INDEPENDENT

## 2021-08-23 NOTE — PLAN OF CARE
".Assessment/Intervention/Current Symtoms and Care Coordination  Patient still psychotic and refusing some labs.  Thinks we \"ruined\" his summer and going to college.  No insight into his mental health.  Still refusing to sign CONNOR for parents. Still refusing to cooperate with entering an IRTS facility.     Patient up at the desk demanding to be discharged now! \"Why are you discharging all of these other patients and not me??\"     Discharge Plan or Goal  Patient will eventually PD to an IRTS but has to agree to go and is currently refusing.    Barriers to Discharge   Patient still psychotic and needs further stabilization with medication management.    Referral Status  Not yet referred due to psychosis    Legal Status  Committed as MI w/ [facility] in [employer]  "

## 2021-08-23 NOTE — PLAN OF CARE
RN Note:    Pt presented with flat affect. Pt was calm and cooperative while interacting with the writer. Pt was alert and oriented x 4. Pt denied having SI, HI, thoughts of SIB, and hallucinations. Pt denied having a wish to be dead. Pt denied having physical pain. Pt denied having medical concerns. Pt endorsed sleeping well last night. Pt feels no therapeutic effects from the current ordered medications. No medication side effects endorsed by pt or observed by writer. Pt identified socializing as an effective coping skill. Pt has been withdrawn this shift. Continue to monitor for safety and changes in medical condition.     Incidents to Note:    Pt declined to have his vital signs assessed this shift.
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
## 2021-08-23 NOTE — PROGRESS NOTES
Jerel came into the group room at the beginning of art therapy group. Writer introduced the topic of the group. Jerel decided he didn't want to participate and left group. (no charge)

## 2021-08-24 PROCEDURE — 250N000013 HC RX MED GY IP 250 OP 250 PS 637: Performed by: PSYCHIATRY & NEUROLOGY

## 2021-08-24 PROCEDURE — G0177 OPPS/PHP; TRAIN & EDUC SERV: HCPCS

## 2021-08-24 PROCEDURE — 124N000002 HC R&B MH UMMC

## 2021-08-24 PROCEDURE — H2032 ACTIVITY THERAPY, PER 15 MIN: HCPCS

## 2021-08-24 RX ADMIN — HALOPERIDOL 10 MG: 10 TABLET ORAL at 21:46

## 2021-08-24 RX ADMIN — BENZTROPINE MESYLATE 1 MG: 1 TABLET ORAL at 21:46

## 2021-08-24 RX ADMIN — GABAPENTIN 100 MG: 100 CAPSULE ORAL at 22:39

## 2021-08-24 RX ADMIN — Medication 5 MG: at 21:46

## 2021-08-24 RX ADMIN — BENZTROPINE MESYLATE 1 MG: 1 TABLET ORAL at 09:02

## 2021-08-24 RX ADMIN — HALOPERIDOL 5 MG: 5 TABLET ORAL at 09:02

## 2021-08-24 RX ADMIN — Medication 25 MCG: at 09:02

## 2021-08-24 ASSESSMENT — ACTIVITIES OF DAILY LIVING (ADL)
LAUNDRY: WITH SUPERVISION
HYGIENE/GROOMING: INDEPENDENT
ORAL_HYGIENE: INDEPENDENT
DRESS: INDEPENDENT
DRESS: INDEPENDENT
HYGIENE/GROOMING: INDEPENDENT
ORAL_HYGIENE: INDEPENDENT

## 2021-08-24 NOTE — PLAN OF CARE
"  Problem: General Rehab Plan of Care  Goal: Therapeutic Recreation/Music Therapy Goal  Description: The patient and/or their representative will achieve their patient-specific goals related to the plan of care.  Outcome: No Change     Jerel attended art therapy group with one other peer. Jerel chose to make a water color painting based on a \"vision\" he had in the past. He appeared calm and focused while painting. Jerel interacted appropriately with peer, asking curious questions and complimenting peer on his art. Jerel shared his art with peer and writer, explaining that his \"vision\" is something he saw while looking into a mirror. It involved a series of triangles on a desert. Jerel's speech was confusing while explaining the vision. He stated he didn't know what it was, but it could have been from a \"prism machine that malfunctioned\" or a \" exercise.\" He stated it could represent \"space-time.\" Writer attempted to reframe the vision as inspiration for a creative fictional story or fantasy landscape. Jerel appeared to have some difficulty differentiating between reality and delusion.   "

## 2021-08-24 NOTE — PLAN OF CARE
Pt has been withdrawn and isolative to room a large portion of the shift. Pt continues to have auditory hallucinations. Pt denies SI and SIB. Pt ate breakfast and lunch. Pt was medication compliant. Pt states he is eating and sleeping well.  6.75 hours.  Pt refused VS this am. Pt also refused covid vaccine stating he received one earlier and refused covid test. Encouraged fluids due to HR of 54 yesterday. No chest pain this shift. Pt attended group. Not social with peers. Did not finish admission profile.

## 2021-08-24 NOTE — PLAN OF CARE
"Pt has been visible on the unit, but remains withdrawn to himself. Affect remains flat/blunted. He ate dinner. He at first endorses AH which are very manageable today. He states it is barely there. He then clarifies the \"context\" and states \"it's not voices, it's my subconscious and my imagination\". Pt endorsed having some anxiety at start of shift during which time another pt was being loud and causing stress on the unit. Pt denies SI/SIB. Later this evening, pt expresses that he felt chest pain \"early this afternoon\". Pt does endorse feeling anxious at the time the \"chest pain\" was experienced. Pt then states \"yeah I think I had a little cardiac arrest\". Writer educated pt about what a cardiac arrest is and the seriousness of it. Pt then stated that it was not that. Pt currently denies any chest pain. VS were assessed and are WNL except pt pulse a bit low at 54. Pt was encouraged to drink more fluids. He did not respond to this. Pt expressed sadness about seeing others discharging this afternoon when he would like to discharge. He continues to state that he does not feel he needs medication and feels he is given too high of a dose. Pt endorses left leg pain which he does not rate and states he believes this is a medication side effect. Writer explained common side effects do not include leg pain and pt was offered tylenol PRN or ice for his leg. He declined.      Writer explained to pt that a staff member on this unit has tested positive for covid this week. Writer explained that covid tests were ordered for each patient to make sure everyone is safe. Pt refused to allow writer to administer covid swab. Pt states he is vaccinated and does not feel he needs to be tested.   "
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
## 2021-08-24 NOTE — PLAN OF CARE
BEHAVIORAL TEAM DISCUSSION    Participants: Dr. Hugo Cruz; [facility]; Patty Riley RN  Progress: Argumentative, still refusing to sign CONNOR for his parents who had been funding an apartment for him prior to this admission.  Pt is still psychotic but taking prescribed medications  Anticipated length of stay: Unknown  Continued Stay Criteria/Rationale: Committed w/[facility] and still responding to internal stimuli.  Medical/Physical: Nothing noted  Precautions:   Behavioral Orders   Procedures    Assault precautions    Cheeking Precautions (behavioral units)    Code 1 - Restrict to Unit    Elopement precautions    Routine Programming     As clinically indicated    Sexual precautions    Status 15     Every 15 minutes.    Withdrawal precautions     Plan: Patient is to continue taking medications to resolve his psychosis.  So far, he will not sign CONNOR for his parents who seemed indecisive about IRTS vs.another apartment.  At one point, mother called and said they decided not to fund an apartment.  Patient has since said they will.  The ReEntry ACT Team staff are also reporting patient has rescinded CONNOR for them to speak to parents so in effect, patient holding everyone hostage refusing to enter an IRTS and wanting to return to his apartment at discharge. Had long discussion with ReEntry RN Noemy Coffey today.  Suggested that Dr. Salguero talk with the [facility] Attorney of the day about patient rescinding his CONNOR for his parents and their legal rights as caring physicians when a committed patient is impeding their ability to place him into an IRTS.  At this point, we all need the parents to take a firm stand about the next step being an IRTS which will provide structure and medication management for the 90 days after PD.   None of us know if the parents have taken a strong stand with the patient.  FRANNIE Salguero planned to talk to both Feng Tracy and Jose M about the issue.  Ideally, we need the parents to say  absolutely no apartment again and fully support hospital and ACT Team efforts to send patient to an IRTS.  We also need progress notes to reflect that the patient as acquiesed and will enter an IRTs because so far, all of the Epic notes indicate patient's constant agitation and refusal to go.      Rationale for change in precautions or plan: No changes

## 2021-08-24 NOTE — PLAN OF CARE
OT Groups Attended: Mental Health Management     Affect/Hygiene/Presentation: Calm, engaged, congruent affect. No apparent hygiene concerns.     Patient Performance/Response: Pt actively participated in a structured occupational therapy group of 4 patients for a total of 50 minutes involving a self-reflecting, group leisure task. Pt appeared comfortable sharing positive past experiences and personal information with peers, and was respectful in listening and responding to peers. He was able to share a personal goal for the future that he has with the group, and expressed a need to work on setting boundaries with family.     Plan: Pt will be encouraged to maintain group attendance for continued ongoing assessment and support in completion of occupational therapy treatment goals.

## 2021-08-24 NOTE — PLAN OF CARE
Assessment/Intervention/Current Symtoms and Care Coordination  Patient remains argumentative and irritable.  States he will not go to an IRTS. Demanding discharge.  He is still psychotic and needs further medication management. We need parents fully on board with an IRTS placement because without it, patient continues to believe they will pay for an apartment again.  Patient holding both hospital and ReEntry plan for IRTS hostage by refusing to sign an CONNOR for hospital staff to talk freely to parents and now rescinding the CONNOR for the ReEntry ACT Team to speak to them as well. Suggested to the ReEntry RN Noemy Leigh, that their physicians call and speak to the Rik Rivera  of the Day about this dilemma.    Discharge Plan or Goal  Hospital and ReEntry Team want patient to PD to an IRTs facility which will provide more structure and medication management.    Barriers to Discharge   Patient still psychotic, needs further medication management and refuses to sign ROIS for hospital staff or ReEntry staff to speak to his parents.  Epic records continue to reflect patient's refusal to cooperate with an IRTS placement with belief his parents will pay for another apartment.     Referral Status  None    Legal Status  MI Committed with [facility]

## 2021-08-24 NOTE — PROGRESS NOTES
"Behavioral Health  Note    Behavioral Health  Spirituality Group Note    UNIT 10[facility]    Name: Jerel Day YOB: 1995   MRN: [id number] Age: 25 year old      Patient attended -led group, which included discussion of spirituality, coping with illness and building resilience.    Patient attended group for 1.0 hrs.    The patient actively participated in group discussion    Jerel engaged with our discussion related to identity, naming that he identifies as a \"passionate soul.\"      Arminda Matias  Chaplain Resident  Pager: [phone]   "

## 2021-08-24 NOTE — PROGRESS NOTES
"  PSYCHIATRIC PROGRESS NOTE    Admission Date; 08/04/2021  Date of Service; 08/23/2021    The patient was seen, his chart reviewed, his case discussed with staff at the Team meeting.  The patient is committed and Jarvised.  He slept only about 4 hours last night  Reportedly he showed some disruptive behavior by speaking too loud and disturbing the milieu. He was noted to having been laughing inappropriately. He was observed to being withdrawn.    This is a 25-chito-old white male with a long history of mental illness and multiple psychiatric hospitalizations who was admitted to [facility] on 72-hour hold and was seen by Dr. Tran for initial psychiatric evaluation. He presented with both visual and auditory hallucinations and delusional ideas with grandiose flavor. He believed that people from [place] and [place] have been interested in information he posessed. He also believed that he was reincarnated. It was suspected that he was not compliant with his preadmission neuroleptics.  Eventually his preadmission medications were chaged.    MEDICATIONS    Haldol Decanoate 50 mg IM Q30 days, last injection given 08/17/21  Haldol 10 mg BID   Cogentin 1 mg BID  Melatonin 3 mg HHS PRN  Vitamin D3 25 mcg daily    LABS: No new results    VS: T - 97.9, Pulse - 54, BP - 102/66, Resp - 16, SpO2 - 96%    MENTAL STATUS EXAMINATION  Revealed a normally built and normally developed, formally courteous and polite 25-year-old white male appearing his stated age. He presented with fine hand tremor R = L. His speech was coherent and goal related. He showed no objective signs of depression or hypomania. He denied SI or HI. He readily admitted to \"seing visions\" and hearing 'voices\" of his brother and his father. These perceptual disturbances wereinot threatening or imperative in nature and did not affect his behavior, he said. He has only marginal insight into his problems and his judgement is questionable.    DIAGNOSTIC " IMPRESSION    Schizophrenia, paranoid type  R/O Schizoaffective Disorder  Cannabis Use Disorder    PLAN: Consider increasing his Haldol Decanoate to 100 mg IM Q30 days. Also consider Seroquel trial at bedtime. I will reduce his oral Haldol to 5 mg QAM and 10 mg at bedtime to reduce EPSE.    Prince Romero MD

## 2021-08-25 PROCEDURE — 250N000013 HC RX MED GY IP 250 OP 250 PS 637: Performed by: PSYCHIATRY & NEUROLOGY

## 2021-08-25 PROCEDURE — 124N000002 HC R&B MH UMMC

## 2021-08-25 PROCEDURE — 99232 SBSQ HOSP IP/OBS MODERATE 35: CPT | Performed by: PSYCHIATRY & NEUROLOGY

## 2021-08-25 RX ADMIN — HYDROXYZINE HYDROCHLORIDE 25 MG: 25 TABLET, FILM COATED ORAL at 13:06

## 2021-08-25 RX ADMIN — BENZTROPINE MESYLATE 1 MG: 1 TABLET ORAL at 09:11

## 2021-08-25 RX ADMIN — Medication 25 MCG: at 09:11

## 2021-08-25 RX ADMIN — BENZTROPINE MESYLATE 1 MG: 1 TABLET ORAL at 20:47

## 2021-08-25 RX ADMIN — HALOPERIDOL 5 MG: 5 TABLET ORAL at 09:11

## 2021-08-25 RX ADMIN — Medication 5 MG: at 20:47

## 2021-08-25 RX ADMIN — HALOPERIDOL 10 MG: 10 TABLET ORAL at 20:47

## 2021-08-25 ASSESSMENT — ACTIVITIES OF DAILY LIVING (ADL)
DRESS: INDEPENDENT
LAUNDRY: WITH SUPERVISION
ORAL_HYGIENE: INDEPENDENT
HYGIENE/GROOMING: INDEPENDENT

## 2021-08-25 NOTE — PROGRESS NOTES
"  PSYCHIATRIC PROGRESS NOTE    Admission Date: 08/04/2021  Date of Service: 08/25/2021    The patient was seen in his room, his chart reviewed, his case discussed with staff at the Team Meeting.  Observation on the unit revealed the patient to show some fluctuating behavioral patterns from being courteous and pleasant to being irritable and angry. He has been isolating and appeared to be withdrawn. He continued to have problems falling asleep. He has been intermittently disorganized, experienced auditory hallucinations and verbalized bizarre delusions. On other occasions including my interview with him today he would deny any problems stating that he will be perfectly alright living in his apartment and question the validity of his commitment. He denied any mood fluctuation stating that there was always a good reason for him getting angry. He didn't understand why can't he be discharged today. He refused to take a mood stabilizer when I offered to put him on Depakote. He accused Dr. Tran and Carolin Galvan in belittling him and not listening to his opinion. Once again he refused to give me consent to talk to his parents. He doesn't want to go to [facility] facility because \"there are a lot of bad people there\". He told me that he can live with his brother or with his grandparents instead    Jerel is a 25-chito-old white male with a long history of mental illness and multiple psychiatric hospitalizations who was admitted to [facility] on 72-hour hold and was seen by Dr. Tran for initial psychiatric evaluation. He presented with both visual and auditory hallucinations and delusional ideas with grandiose flavor. He believed that people from [place] and [place] have been interested in information he posessed. He also believed that he was reincarnated. It was suspected that he was not compliant with his preadmission neuroleptics and his preadmission medications were chaged. However the patient showed no signs of " improvement and continued to be tense, guarded and delusional with no insight what-so-ever.    MEDICATIONS  Haldol Decanoate 50 mg IM Q30 days, last injection given 08/17/21   Haldol 5 mg QAM and 10 mg at bedtime   Cogentin 1 mg BID   Melatonin 3 mg HHS PRN   Vitamin D3 25 mcg daily     LABS: No new results    VS: T - 98.5, Pulse - 66, BP - 115/78, Resp - 16, SpO2 - 97%    MENTAL STATUS EXAMINATION   Revealed a normally built and normally developed, formally courteous and polite 25-year-old white male appearing his stated age. He is less tremulous today. He appeared to to be tense and guarded  His speech was slightly disorganized but of normal tempo and tone.. He showed no objective signs of depression or hypomania. He denied SI or HI. He denied hallucinations and no prominent delusions were noted. He has no insight into his problems and his judgement is impaired.     DIAGNOSTIC IMPRESSION   Schizophrenia, paranoid type   R/O Schizoaffective Disorder   Cannabis Use Disorder    PLAN: The patient was given a chance to think about a mood stabilizer trial and about consenting us to talk to his parents. For now continue medications without change.    Prince Romero MD

## 2021-08-25 NOTE — PLAN OF CARE
Shift Summary  Legal status: MI commitment with [facility]  No new concern. Intermittent confusion noted which is not new. Denies hearing voices. Denies any suicidal ideation, homicidal ideation, Self injury behavior or racing thought. Poor concentration. Visible in milieu, isolative and withdrawn to self. See CTC note for details regarding plan of care. Insight and judgement to his illness still poor and impaired. After lunch came to nursing station and said he wants to leave. Easily redirected.   Denies pain. Took shower. Declined checking vital signs. Slept 5.25 hours last night. Ate breakfast and lunch.   Prn: none  Continue to monitor pt's status Q 15 minutes and stabilize the patient's symptoms with the use of medications and therapeutic programming.

## 2021-08-25 NOTE — PLAN OF CARE
"Pt attended group this afternoon. Pt was visible in the milieu and was somewhat social with peers while watching movies. Pt ate dinner. He is medication compliant. Pt allowed VS check today and VSS. During HS medication administration, pt speech was very disorganized. Pt endorses auditory hallucinations which are telling him a story. He then started talking about how the book on his bedside table was \"the same exact copy of this book that I read in [facility]\". Pt went on to make paranoid statements about a smell coming from one of the unit showers stating the smell is \"noxious\" and \"we could all get very infected\". He states he is sure this is a \" leak\" and asks if he will get to leave if he gets infected by this. Writer attempted to reassure pt that we will put in a work order and that it is likely not as serious as he thinks. Pt did not seem convinced, but got distracted and moved on to a different subject. Pt denies SI/SIB/HI.    Pt complains of anxiety and mid back pain and asks for PRN gabapentin which was given. Pt also reports that \"I pulled a contact out of my eye that had been there 2-3 months\". Writer assessed pt right eye and it does appear bloodshot. Pt reports his eye feels better since pulling out the old contact. Pt given ice pack for his eye and hot pack for his mid back. Pt aware he has PRN lidocaine patch available for back pain if needed.    Pt again states that in the past, he has had \"charliehorses from the haldol\" and he feels he gets \"a big body load of side effects from haldol\".  "

## 2021-08-25 NOTE — PLAN OF CARE
Assessment/Intervention/Current Symtoms and Care Coordination  The patient is overall irritable with the treatment team.  Arguing about wanting to be discharged.  Arguing about wanting to return to his apartment.  His ReEntry ACT Team has not been here to meet with him.  Issue with the IRTS as a plan remains.  Because patient will not sign an CONNOR for parents and has rescinded CONNOR for his ACT Team to talk to them we are unable to ascertain if his parents are supportive of the IRTS as a plan.  We are unaware of what his parents are telling him because patient is insisting he can return to his apartment.  Hospital and ACT Team feel that he certainly is not yet ready to be discharged, we are in agreement that he should enter an IRTS for further stabilization and medication management and structure.  Spoke at length to the ACT Team Resident Psychiatrist Dr. Norberto Salguero today.  Suggested he call the [facility] to discuss if he as a treating physician can override an CONNOR and talk to his parents.    Dr. Salguero called back and reported that the current [facility]  will not allow physicians to override rescinded ROIs despite how this might impede treatment planning.   This writer mentioned that in Team today, various staff were discussing the behavioral cycling that seems to occur with patient.  He will be very respectful and then become very disrespectful, agitated and angry.  Dr. Salguero suggested we discuss a mood stabilizer in addition to his Haldol.     Discharge Plan or Goal  Patient needs mood and psychosis stabilized.    Barriers to Discharge   Patient needs further medication management and stabilization of mood and psychosis.    Referral Status  None    Legal Status  MI Commitment w/ [facility]
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
## 2021-08-26 PROCEDURE — 250N000013 HC RX MED GY IP 250 OP 250 PS 637: Performed by: PSYCHIATRY & NEUROLOGY

## 2021-08-26 PROCEDURE — G0177 OPPS/PHP; TRAIN & EDUC SERV: HCPCS

## 2021-08-26 PROCEDURE — 124N000002 HC R&B MH UMMC

## 2021-08-26 RX ADMIN — Medication 5 MG: at 19:29

## 2021-08-26 RX ADMIN — Medication 25 MCG: at 08:52

## 2021-08-26 RX ADMIN — GABAPENTIN 100 MG: 100 CAPSULE ORAL at 18:42

## 2021-08-26 RX ADMIN — BENZTROPINE MESYLATE 1 MG: 1 TABLET ORAL at 08:52

## 2021-08-26 RX ADMIN — BENZTROPINE MESYLATE 1 MG: 1 TABLET ORAL at 19:29

## 2021-08-26 RX ADMIN — HALOPERIDOL 5 MG: 5 TABLET ORAL at 08:52

## 2021-08-26 RX ADMIN — HALOPERIDOL 10 MG: 10 TABLET ORAL at 19:29

## 2021-08-26 RX ADMIN — GABAPENTIN 100 MG: 100 CAPSULE ORAL at 04:53

## 2021-08-26 ASSESSMENT — ACTIVITIES OF DAILY LIVING (ADL)
ORAL_HYGIENE: INDEPENDENT
HYGIENE/GROOMING: INDEPENDENT
DRESS: INDEPENDENT
ORAL_HYGIENE: INDEPENDENT
LAUNDRY: WITH SUPERVISION
DRESS: INDEPENDENT
HYGIENE/GROOMING: INDEPENDENT

## 2021-08-26 NOTE — PLAN OF CARE
Problem: OT General Care Plan  Goal: OT Goal 1  Description: Pt will fully complete OT self-assessment form in order to assist treatment team with understanding their needs and goals while present on the unit within 3 days.     OT Groups Attended: Clinic     Affect/Hygiene/Presentation: Calm, engaged, flat affect. Hygiene observed to be neglected.    Patient Performance/Response: Pt actively participated in occupational therapy clinic with 4 patients for 80 minutes. Pt was able to ask for assistance as needed, and independently initiate a novel, creative expression task with minimal assistance for task setup. Pt demonstrated good focus (30 minutes without interruption), planning, and attention to detail during task completion. Pt appeared comfortable interacting with peers and writer, and shared some of his current stressors about still being in the hospital with writer. He expressed discomfort about not knowing where he will be going after discharge, and indicated that he feels anxious about medication changes.     Plan: Pt will be encouraged to maintain group attendance for continued ongoing assessment and support in completion of occupational therapy treatment goals.     Outcome: Improving
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
## 2021-08-26 NOTE — PLAN OF CARE
Assessment/Intervention/Current Symtoms and Care Coordination  Patient is still irritable and has no insight into his mental health issues.  Mother called to report the following: She and patient's father want him in an IRTS.  He cannot return to the apartment, or live with his brother nor live with grandparents.  Parents plan to have a conference call with Jerel and be very clear that he must enter an IRTS when stabilized.      Discharge Plan or Goal  Patient will be PD to an IRTS once stabilized.      Barriers to Discharge   Patient's medical records reflecting his poor insight into his MH issues and repeatedly stating he will not go to an IRTS.  Patient still irritable and argumentative. We need records reflecting that patient understands he must enter an IRTS and will cooperate with referrals.    Referral Status  None    Legal Status  MI commitment w/[facility] Order [facility]

## 2021-08-26 NOTE — PROGRESS NOTES
"Clinical Nutrition Services Brief Note - MST score >/= 2     Jerel Day is a 25 year old male screened by the dietitian for MST score of 2 d/t \"unsure\" of possible weight loss. Pt has been here since 8/4, but risk screen wasnt completed until 8/24.     Per documented weight history, patient's weight variable, but stable since June. Pt refuses to be reweighed.  Wt Readings from Last 10 Encounters:   08/04/21 73.5 kg (162 lb)   06/10/21 74.8 kg (164 lb 14.4 oz)   03/10/20 81.6 kg (179 lb 12.8 oz)   02/25/20 77.1 kg (170 lb)   01/08/20 79.7 kg (175 lb 9.6 oz)   12/04/19 78 kg (172 lb)   11/06/19 75.8 kg (167 lb)   10/02/19 76.3 kg (168 lb 3.2 oz)   09/17/19 72.6 kg (160 lb)   06/26/19 72 kg (158 lb 12.8 oz)        Due to no weight loss and good intake which is likely to remain so  with symptom stabilization RD to sign off at this time. RD available by consult if further nutrition intervention warranted prior to discharge.    Omayra Wilson RD, LD  ICU/5A/OB/Mental Health Pager (M-F): [phone]  On Call Pager (weekends only): [phone]           "

## 2021-08-26 NOTE — PLAN OF CARE
"Pt has a flat blunted affect with a irritable mood. Pt attended community meeting and OT groups. Pt stated \"the dr wants to start me on Depakote and I need to know if its on my paperwork\". Pt later asked to see his [facility] paper work and stated \"I don't want the Depakote added to my [facility]\". Pt was withdrawn to self and not social with peers. Pt played the guitar for a while in the milieu. Pt rates anxiety at 0/10 and depression 5/10. Pt rates pain at 0/10. Pt reports no SI/HI and contracts for safety. Pt denies any hallucinations and not noted responding to any internal stimuli. No reported or observed medication side effects. Continue current POC.    "

## 2021-08-26 NOTE — PLAN OF CARE
Assessment    Patient is alert and oriented, calm and cooperative. Patient's affect is blunted and flat. Speech is Normal. Patient's insight is Poor. Patient is Neatly groomed, he showered this evening. Patient has been active in the milieu, social with peers, and watched movies. The patient voices their needs appropriately. Patient endorses auditory hallucination, depression (high), and anxiety (high), but denied SI,SIB, racing thoughts, and HI. His voices were talking to him about aliens today.  Patient has been eating, hydrating, and sleeping adequately. Patient is medication compliant, doesn't need reminders. Medication side effects were not observed or reported this shift.From what writer could assess, patient's skin is intact, free from injuries or open sores. Plan is to continue to monitor patient status q 15 mins, assess response to medications, and maintain the patients safety.    Vital signs are stable  Denied pain

## 2021-08-27 PROCEDURE — G0177 OPPS/PHP; TRAIN & EDUC SERV: HCPCS

## 2021-08-27 PROCEDURE — 124N000002 HC R&B MH UMMC

## 2021-08-27 PROCEDURE — 250N000013 HC RX MED GY IP 250 OP 250 PS 637: Performed by: PSYCHIATRY & NEUROLOGY

## 2021-08-27 PROCEDURE — 99232 SBSQ HOSP IP/OBS MODERATE 35: CPT | Performed by: PSYCHIATRY & NEUROLOGY

## 2021-08-27 RX ADMIN — HALOPERIDOL 10 MG: 10 TABLET ORAL at 20:21

## 2021-08-27 RX ADMIN — HALOPERIDOL 5 MG: 5 TABLET ORAL at 08:30

## 2021-08-27 RX ADMIN — Medication 25 MCG: at 08:30

## 2021-08-27 RX ADMIN — GABAPENTIN 100 MG: 100 CAPSULE ORAL at 15:11

## 2021-08-27 RX ADMIN — BENZTROPINE MESYLATE 1 MG: 1 TABLET ORAL at 08:30

## 2021-08-27 RX ADMIN — Medication 5 MG: at 20:22

## 2021-08-27 RX ADMIN — BENZTROPINE MESYLATE 1 MG: 1 TABLET ORAL at 20:22

## 2021-08-27 ASSESSMENT — ACTIVITIES OF DAILY LIVING (ADL)
LAUNDRY: WITH SUPERVISION
ORAL_HYGIENE: INDEPENDENT
DRESS: INDEPENDENT;SCRUBS (BEHAVIORAL HEALTH)
HYGIENE/GROOMING: INDEPENDENT

## 2021-08-27 NOTE — PLAN OF CARE
"MH Assessment    Patient is alert and oriented, calm and cooperative. Patient's affect is tense. Speech is Normal. Patient's insight is Poor. Patient is Neatly groomed, he showered this shift.  Patient has been isolative to his room most of the shift. The patient voices their needs appropriately. Patient endorses auditory hallucinations but denies depression, anxiety, SI,SIB, racing thoughts, and HI. Patient expresses frustration about going to an IRTs and that \" the process is taking too long.\" He requested PRN Gabapentin 100 mg due to feeling anxious about discharge. Patient has  been eating, hydrating, and sleeping adequately. Patient is medication compliant, doesn't need reminders. Medication side effects were not observed or reported this shift. From what writer could assess, patient's skin is intact, free from injuries or open sores. Plan is to continue to monitor patient status q 15 mins, assess response to medications, and maintain the patients safety.    Patient refused vital signs  Denied pain      "

## 2021-08-27 NOTE — PLAN OF CARE
Assessment/Intervention/Current Symtoms and Care Coordination  Attended team meeting  Reviewed chart  Pt requested to change CTC and attending again today, but was informed that would not be happening at this time. CTC confirmed with RN that pt was somewhat disorganized today when discussing discharge planning. CTC did not engage with pt about this topic as pt was not receptive.      Discharge Plan or Goal  Patient will be PD to an IRTS once stabilized.       Barriers to Discharge   Patient's medical records reflecting his poor insight into his MH issues and repeatedly stating he will not go to an IRTS.  Patient still irritable and argumentative. We need records reflecting that patient understands he must enter an IRTS and will cooperate with referrals.     Referral Status  None     Legal Status  MI commitment w/[facility] Order [facility]

## 2021-08-27 NOTE — PLAN OF CARE
Problem: OT General Care Plan  Goal: OT Goal 1  Description: Pt will practice using >2 coping strategies to manage stress and reduce symptoms to demonstrate increased readiness for discharge.     OT Groups Attended: Clinic, Mental Health Management     Affect/Hygiene/Presentation: Calm/pleasant, engaged, blunted affect. No apparent hygiene concerns.    Patient Performance/Response:  -Clinic: Pt actively participated in occupational therapy clinic with 5 patients for 45 minutes. Pt was able to ask for assistance as needed, and independently initiate a novel, goal-directed task without difficulty. Pt demonstrated good focus (30 minutes without interruption), planning, and problem solving during task completion. Pt demonstrated an improvement in his attention to detail, and worked at a slow, mindful pace. Pt appeared comfortable interacting with peers and writer, and occasionally socialized pleasantly during his time in clinic.  -Mental Health Management: Pt actively participated in a structured occupational therapy group of 3 patients total x45 minutes with a focus on coping through movement via chair yoga as a strategy to facilitate therapeutic exercise, calming, and stress management. Pt actively followed 80% of the movements, and remained attentive and engaged throughout group. Pt verbalized feeling relaxed at the end of group. Pt contributed at least one idea to a discussion at the end of group regarding the benefits of exercise, stretching, and deep breathing.     Plan: Pt will be encouraged to maintain group attendance for continued ongoing assessment and support in completion of occupational therapy treatment goals.     Outcome: Improving

## 2021-08-27 NOTE — PLAN OF CARE
Nursing Assessment    Recent Vitals: B/P: 113/73, T: 97.5, P: 60    Sleep:  Hours of sleep at night: 6.5    General Shift Summary  Patient has been visible in the milieu, played guitar in the lounge, interacts with select peers, participated in groups, and presents with a flat to irritable affect. Another patient made complaints that we were holding him here, Jerel then talked to this patient and stated he was going through the same issues. Both patients talked about their care to each other. Jerel talked to writer about wanting to discharge, how he can get hold of the notes, who writes them, wanting access to his chart, and feeling that false information is being put into the notes of his chart. Patient was explained to that all staff write notes and that he can access medical records once he discharges. Other then this episode patient was calm and cooperative throughout the shift.    Patient denied SI/HI/SIB/AVH, depression, and anxiety. He primarily voiced being frustrated with being here. Patient is medication compliant. Hygiene is good. Eating well.    Plan is to continue to monitor patient status q 15 mins, assess response to medications, and maintain the patients safety.    Madina Lane, RN MSN

## 2021-08-28 PROCEDURE — 250N000013 HC RX MED GY IP 250 OP 250 PS 637: Performed by: PSYCHIATRY & NEUROLOGY

## 2021-08-28 PROCEDURE — G0177 OPPS/PHP; TRAIN & EDUC SERV: HCPCS

## 2021-08-28 PROCEDURE — 124N000002 HC R&B MH UMMC

## 2021-08-28 RX ADMIN — Medication 25 MCG: at 08:41

## 2021-08-28 RX ADMIN — BENZTROPINE MESYLATE 1 MG: 1 TABLET ORAL at 19:42

## 2021-08-28 RX ADMIN — GABAPENTIN 100 MG: 100 CAPSULE ORAL at 09:49

## 2021-08-28 RX ADMIN — Medication 5 MG: at 19:42

## 2021-08-28 RX ADMIN — HALOPERIDOL 5 MG: 5 TABLET ORAL at 08:41

## 2021-08-28 RX ADMIN — BENZTROPINE MESYLATE 1 MG: 1 TABLET ORAL at 08:42

## 2021-08-28 RX ADMIN — HALOPERIDOL 10 MG: 10 TABLET ORAL at 19:41

## 2021-08-28 ASSESSMENT — ACTIVITIES OF DAILY LIVING (ADL)
HYGIENE/GROOMING: INDEPENDENT
ORAL_HYGIENE: INDEPENDENT
HYGIENE/GROOMING: SHOWER;INDEPENDENT
LAUNDRY: WITH SUPERVISION
DRESS: INDEPENDENT
ORAL_HYGIENE: INDEPENDENT
LAUNDRY: WITH SUPERVISION
DRESS: SCRUBS (BEHAVIORAL HEALTH);INDEPENDENT

## 2021-08-28 NOTE — PLAN OF CARE
Nursing Assessment    Recent Vitals: B/P: 113/73, T: 97.5, P: 60    Sleep:  Hours of sleep at night: 7    General Shift Summary  Patient has been visible in the milieu, social with peers, interactions have been appropriate. He presents as calm and cooperative with a flat affect. He participated in group. Patient denied depression however voiced having mild anxiety due to being here and wanting to discharge. Patient denied depression, SI/HI/AVH/SIB. Incite and judgement are fair. Hygiene is good. He is eating meals.     Patient is medication compliant with no voiced side effects. He voiced having leg pain 4/10 due to working out on the bike and requested Gabapentin for it. Patient was educated that Tylenol may work better since Gabapentin isn't generally for muscle pain however is more for nerve pain. Patient said the Gabapentin will also help with his anxiety and he was provided this a 0950. No other medical concerns    Plan is to continue to monitor patient status q 15 mins, assess response to medications, and maintain the patients safety.    Madina Lane RN MSN

## 2021-08-28 NOTE — PROGRESS NOTES
"  PSYCHIATRIC PROGRESS NOTE    Admission Date: 08/04/2021  Date of Service: 08/27/2021    The patient was seen, his chart reviewed, his case discussed with staff at the Team Meeting.  Reportedly, the patient has been isolative, withdrawn in a first part of the day but became mor visible in the milieu by the evening socializing with one particular peer. His mother was interviewed over the phone by the Social Service and made it clear that the parents didn't want him to go back to his apartment and living with his brother or his grandparents was not an option.  Upon evaluation today the patient was tense, guarded, irritable and evasive. He wanted to see the note describing his parents opinion. When I showed it to him he became quite angry when he saw a line stating that he had no insight in to his mental illness. He refused to discuss his psychopathology and his anger accelerated to the point when he got up, slammed the door and shouted: \"I am not going to take Depakote...it is not on my [facility] list...you can't make me take it!\". It should be noted that I did not talk about Depakote today but asked him to think about it during the last visit.    This is a 25-chito-old single white male with a long history of mental illness and multiple psychiatric hospitalizations who was admitted to [facility] on 72-hour hold and was seen by Dr. Tran for initial psychiatric evaluation. He presented with both visual and auditory hallucinations and delusional ideas with grandiose flavor. He believed that people from [place] and [place] have been interested in information he posessed. He also believed that he was reincarnated. It was suspected that he was not compliant with his preadmission neuroleptics and his preadmission medications were chaged. However the patient showed no signs of improvement and continued to be tense, guarded and delusional with no insight what-so-ever.    MEDICATIONS  Haldol Decanoate 50 mg IM Q30 days, last " injection given 08/17/21   Haldol 5 mg QAM and 10 mg at bedtime   Cogentin 1 mg BID   Melatonin 3 mg HHS PRN   Vitamin D3 25 mcg daily     LABS: No new results    VS: Pulse - 60, T - 97.5, BP - 113/73, Resp - 16, SpO2 - 97%    MENTAL STATUS EXAMINATION   Revealed a normally built and normally developed, tense guarded and irritable 25-year-old white male appearing his stated age. His speech was slightly disorganized but of normal tempo and tone.He showed no objective signs of depression or hypomania. His affect was best described as both subtle and overt anger. He denied SI or HI. He refused to answer questions about his hallucinations and his delusions persisted He had no insight into his problems and his judgement was impaired.     DIAGNOSTIC IMPRESSION   Schizophrenia, paranoid type   R/O Schizoaffective Disorder   Cannabis Use Disorder    PLAN to increase his Haldol Decanoate to 100 mg IM Q30 days. Continue Cogentin.oral Haldol without change.    Prince Romero MD

## 2021-08-28 NOTE — PROGRESS NOTES
Jerel participated in OT clinic this a.m., where he initiated a chosen project (painted ceramic), followed through with plan, and asked for support with supplies as needed. Selected several songs online to share with the group. Chatted eagerly with staff and peers.      08/28/21 1400   Occupational Therapy   Type of Intervention structured groups   Response Participates   Hours 1

## 2021-08-28 NOTE — PLAN OF CARE
Problem: Adult Inpatient Plan of Care  Goal: Plan of Care Review  Outcome: No Change  Flowsheets  Taken 8/27/2021 2043  Plan of Care Reviewed With: patient  Progress: no change  Taken 8/27/2021 2034  Plan of Care Reviewed With: patient    Patient is isolative and withdrawn, resting in bed at start of shift. He was out in the milieu when awake and had been watching TV. He was also noted socializing with a select male peer. He ate well at supper. Had a few phone calls this evening. Then went back to his bedroom resting. Prompted him to get up and take  his night time medications but he preferred to bring his meds to him. He was able to take them with a cup of water. He denied all mental health symptoms, denied being suicidal. Denied pain and side effects of medications.

## 2021-08-29 PROCEDURE — 250N000013 HC RX MED GY IP 250 OP 250 PS 637: Performed by: PSYCHIATRY & NEUROLOGY

## 2021-08-29 PROCEDURE — 124N000002 HC R&B MH UMMC

## 2021-08-29 RX ADMIN — HYDROXYZINE HYDROCHLORIDE 25 MG: 25 TABLET, FILM COATED ORAL at 10:14

## 2021-08-29 RX ADMIN — BENZTROPINE MESYLATE 1 MG: 1 TABLET ORAL at 08:02

## 2021-08-29 RX ADMIN — Medication 25 MCG: at 08:02

## 2021-08-29 RX ADMIN — HALOPERIDOL 5 MG: 5 TABLET ORAL at 08:02

## 2021-08-29 RX ADMIN — Medication 5 MG: at 19:41

## 2021-08-29 RX ADMIN — BENZTROPINE MESYLATE 1 MG: 1 TABLET ORAL at 19:41

## 2021-08-29 RX ADMIN — HALOPERIDOL 10 MG: 10 TABLET ORAL at 19:41

## 2021-08-29 RX ADMIN — GABAPENTIN 100 MG: 100 CAPSULE ORAL at 03:13

## 2021-08-29 ASSESSMENT — ACTIVITIES OF DAILY LIVING (ADL)
ORAL_HYGIENE: INDEPENDENT
HYGIENE/GROOMING: INDEPENDENT
LAUNDRY: WITH SUPERVISION
HYGIENE/GROOMING: INDEPENDENT
DRESS: INDEPENDENT
LAUNDRY: WITH SUPERVISION
ORAL_HYGIENE: INDEPENDENT
DRESS: SCRUBS (BEHAVIORAL HEALTH);INDEPENDENT

## 2021-08-29 NOTE — PLAN OF CARE
NOC Shift Report     Pt in bed at beginning of shift, breathing quiet and unlabored. Pt slept through shift. Pt slept 6.5 hours.      No pt complaints or concerns at this time.      Gabapentin 100 mg PO given PRN for pain and anxiety. Will continue to monitor.    Kiko Garay RN

## 2021-08-29 NOTE — PLAN OF CARE
"Nursing Assessment    Recent Vitals: B/P: 113/73, T: 97.5, P: 60    Sleep:  Hours of sleep at night: 6.5    General Shift Summary  Patient has primarily isolated to his room coming out for meals. He presents as guarded, withdrawn, and slightly irritable. Patient voiced being frustrated and hating Kyara his  \"If I had money I would kyara her\". He continues to discuss how he shouldn't be here. He voiced having depression and anxiety primarily due to \"Wrongfully being kept here\". Denies SI/HI/SIB/AVH. Hygiene is good. He is eating well.    Patient was compliant with taking medications however wouldn't drink water with his morning meds. Patient was encouraged to drink water but he refused. A mouth check was done and nothing noted. He did drink water with PRN meds. He received PRN Atarax at 1015 for anxiety.    Plan is to continue to monitor patient status q 15 mins, assess response to medications, and maintain the patients safety.    Madina Lane RN MSN  "

## 2021-08-29 NOTE — PLAN OF CARE
Problem: Adult Inpatient Plan of Care  Goal: Plan of Care Review  Outcome: No Change  Flowsheets  Taken 8/28/2021 2148  Plan of Care Reviewed With: patient  Progress: no change  Taken 8/28/2021 2122  Plan of Care Reviewed With: patient     Patient is still the same. He is isolative and withdrawn. Resting in bed majority of the shift. He was irritable when writer tried to encouraged him to be out in the milieu. He was noted reading his book at times. He did not attend the group therapy but he did participate in the morning. He ate well at supper. Then went back to his bedroom. Noted resting for the rest of the shift. He was prompted to take his medications but he does not want to get up. Writer brought his medication to his bedroom and he was able to take his meds with a cup of water. He denied SI, SIB, hallucinations and HI. No complaints of pain and side effects of medications. He showered this evening. Will continue to monitor and assess pt.

## 2021-08-30 PROCEDURE — 250N000013 HC RX MED GY IP 250 OP 250 PS 637: Performed by: PSYCHIATRY & NEUROLOGY

## 2021-08-30 PROCEDURE — 99231 SBSQ HOSP IP/OBS SF/LOW 25: CPT | Performed by: PSYCHIATRY & NEUROLOGY

## 2021-08-30 PROCEDURE — 124N000002 HC R&B MH UMMC

## 2021-08-30 PROCEDURE — 93005 ELECTROCARDIOGRAM TRACING: CPT

## 2021-08-30 PROCEDURE — 99231 SBSQ HOSP IP/OBS SF/LOW 25: CPT | Performed by: NURSE PRACTITIONER

## 2021-08-30 PROCEDURE — 93010 ELECTROCARDIOGRAM REPORT: CPT | Performed by: INTERNAL MEDICINE

## 2021-08-30 RX ADMIN — GABAPENTIN 100 MG: 100 CAPSULE ORAL at 18:27

## 2021-08-30 RX ADMIN — Medication 5 MG: at 20:08

## 2021-08-30 RX ADMIN — GABAPENTIN 100 MG: 100 CAPSULE ORAL at 04:19

## 2021-08-30 RX ADMIN — TRAZODONE HYDROCHLORIDE 50 MG: 50 TABLET ORAL at 20:03

## 2021-08-30 RX ADMIN — HALOPERIDOL 5 MG: 5 TABLET ORAL at 08:08

## 2021-08-30 RX ADMIN — LIDOCAINE 1 PATCH: 246 PATCH TOPICAL at 20:09

## 2021-08-30 RX ADMIN — Medication 25 MCG: at 08:08

## 2021-08-30 RX ADMIN — HALOPERIDOL 10 MG: 10 TABLET ORAL at 20:08

## 2021-08-30 RX ADMIN — BENZTROPINE MESYLATE 1 MG: 1 TABLET ORAL at 08:08

## 2021-08-30 RX ADMIN — BENZTROPINE MESYLATE 1 MG: 1 TABLET ORAL at 20:03

## 2021-08-30 ASSESSMENT — ACTIVITIES OF DAILY LIVING (ADL)
HYGIENE/GROOMING: INDEPENDENT
ORAL_HYGIENE: INDEPENDENT
DRESS: INDEPENDENT

## 2021-08-30 NOTE — PROGRESS NOTES
"Brief Internal Medicine Note  30 August 2021     Internal medicine was paged due to patient with complaints of chest pain.    Jerel Day is a 25-year-old male with no known previous cardiac history admitted for psychosis and hallucinations.  He was working out on the exercise bike on the unit earlier today, and told his nurse that he had some chest pain.  At that time he did not report any shortness of breath.  On exam he states that his symptoms of chest pain began the previous day, but he is unable to characterize the quality of the pain.  He is a little equivocal but says that it is more of like a pressure with some radiation into his neck and shoulder.  He denies nausea and vomiting.  He has not had any syncopal episodes on the unit.  He reports some visual field disturbances, \"black waves careening in the corners\".  EKG reviewed notable for sinus bradycardia but otherwise reassuring.  Exam is unremarkable.  Discussed with patient that I recommend checking basic metabolic panel to ensure normal electrolytes, as well as magnesium, Phos, and troponin to check cardiac enzymes.  Patient refuses lab draws at this time.  No further recs at this time.  Please notify IM if recurrent issue or if pt agreeable to further work up.  VSS.     Fiorella Nicole, CNP, APRN  Internal Medicine [facility]  Pager [phone]    "

## 2021-08-30 NOTE — PLAN OF CARE
Assessment/Intervention/Current Symtoms and Care Coordination  Patient still irritable. Upset with Team.  Demanding the phone number for his ReEntry Act Team which was provided to him.  ReEntry ACT Team Psychiatrist Dr. Loc Tracy recommending patient change to Clozaril as they feel the current medication regimen is not stabilizing patient. Dr. Tracy pagchula Resident Psychiatrist Dr. Salguero requesting he call Jerel on Tuesday and try to come up and meet with Jerel in person about the IRTS.    Discharge Plan or Goal  Patient needs to continue to take medications and agree to enter an IRTS.    Barriers to Discharge   Continues to argue with staff, malign his Team including this writer and the Psychiatrist.      Referral Status  Unable to refer due to patient's continued instability    Legal Status  MI commit w/[facility] [employer]

## 2021-08-30 NOTE — PLAN OF CARE
Problem: Adult Inpatient Plan of Care  Goal: Plan of Care Review  Recent Flowsheet Documentation  Taken 8/29/2021 2133 by Chanel Trjeo, RN  Plan of Care Reviewed With: patient  Taken 8/29/2021 1928 by Chanel Trejo, RN  Plan of Care Reviewed With: patient     Patient had been isolative and withdrawn majority of the shift. He had been resting in bed more than half of the shift. He was out for supper and ate well.  Encouraged him to be out in the milieu. However he said there is nothing to do aside from watching movies. Encouraged him to verbalize his concerns. He said he has lots of insights. Stated that he can handle stress, listen to music, taking his medications, can do yoga exercises, reading books, likes aromatherapy and can have conversations to other people. He said he is depressed and anxious because he is still here at the hospital. He admitted to having auditory hallucinations at times. Denied VH. He said he is already at his best. And he is ready to go home. Reassurance given to patient. Later, he was noted playing scrabble game with 3 other peers before bed time. He requested his medications and were given to him. After taking his meds, he went to his bedroom resting. Denied pain and side effects of meds. Will continue to monitor and assess pt.

## 2021-08-30 NOTE — PLAN OF CARE
"Pt has a flat blunted affect with a depressed mood. Pt didn't attend groups. Pts speech is mono tone. Pt c/o chest pain and his symptoms were inconsistent at times. Pt reported some sob and increased pain while on the exercise bike. Stat ekg was ordered and medicine NP was called to assess. EKG was completed but pt refused to have labs drawn. provider was informed. VSS. Pt rates anxiety at 4/10 and depression 6/10. Pt reports no SI/HI and contracts for safety. Pt endorses auditory hallucinations. When asked what the voices were saying pt stated \"that I was cheating in school but I wasn't, and that I'm not going to find a job\". When asked how he fe with the voices pt stated \"I use my insight to deal with them\". Discussed distraction techniques with pt. Pt was medication compliant. No reported or observed medication side effects. Pt was visible on unit but withdrawn to self. Continue current POC.    "

## 2021-08-30 NOTE — PLAN OF CARE
NOC Shift Report     Pt in bed at beginning of shift, breathing quiet and unlabored. Pt slept through shift. Pt slept 5.5 hours.      No pt complaints or concerns at this time.      Gabapentin 100 mg PO given PRN for restlessness. Will continue to monitor.    Kiko Garay RN

## 2021-08-31 LAB
ATRIAL RATE - MUSE: 54 BPM
DIASTOLIC BLOOD PRESSURE - MUSE: NORMAL MMHG
INTERPRETATION ECG - MUSE: NORMAL
P AXIS - MUSE: 23 DEGREES
PR INTERVAL - MUSE: 156 MS
QRS DURATION - MUSE: 100 MS
QT - MUSE: 386 MS
QTC - MUSE: 366 MS
R AXIS - MUSE: 76 DEGREES
SYSTOLIC BLOOD PRESSURE - MUSE: NORMAL MMHG
T AXIS - MUSE: 54 DEGREES
VENTRICULAR RATE- MUSE: 54 BPM

## 2021-08-31 PROCEDURE — H2032 ACTIVITY THERAPY, PER 15 MIN: HCPCS

## 2021-08-31 PROCEDURE — 250N000013 HC RX MED GY IP 250 OP 250 PS 637: Performed by: PSYCHIATRY & NEUROLOGY

## 2021-08-31 PROCEDURE — 124N000002 HC R&B MH UMMC

## 2021-08-31 RX ADMIN — Medication 5 MG: at 19:55

## 2021-08-31 RX ADMIN — Medication 25 MCG: at 08:03

## 2021-08-31 RX ADMIN — HALOPERIDOL 5 MG: 5 TABLET ORAL at 08:03

## 2021-08-31 RX ADMIN — BENZTROPINE MESYLATE 1 MG: 1 TABLET ORAL at 19:55

## 2021-08-31 RX ADMIN — BENZTROPINE MESYLATE 1 MG: 1 TABLET ORAL at 08:22

## 2021-08-31 RX ADMIN — HALOPERIDOL 10 MG: 10 TABLET ORAL at 19:55

## 2021-08-31 ASSESSMENT — ACTIVITIES OF DAILY LIVING (ADL)
DRESS: INDEPENDENT
HYGIENE/GROOMING: INDEPENDENT
ORAL_HYGIENE: INDEPENDENT
HYGIENE/GROOMING: INDEPENDENT
ORAL_HYGIENE: INDEPENDENT
DRESS: INDEPENDENT

## 2021-08-31 ASSESSMENT — MIFFLIN-ST. JEOR: SCORE: 1725.19

## 2021-08-31 NOTE — PLAN OF CARE
Pt attended the structured Therapeutic Recreation group with a focus on leisure participation, stress reduction, and social engagement via an active group game. Pt remained focused and engaged throughout full duration of group.  Pt mood was calm and quiet, keeping to himself throughout the group. Pt was active in the tossing large, foam dice for the activity.

## 2021-08-31 NOTE — PLAN OF CARE
Patient observed sleeping during 15 minutes checks with non labored breathing for about 6 hours tonight. No prns requested or given. No behavioral issues.

## 2021-08-31 NOTE — PLAN OF CARE
"Assessment/Intervention/Current Symtoms and Care Coordination  Patient's parents finally made it clear they would not longer provide financial support in the way of an apartment and living expenses.  He would need to enter an IRTS facility.  Patient not accepting of this information.  Had call from the ReEntry ACT Team Resident Psychiatrist Dr. Norberto Salguero.  He plans to call the unit tomorrow and meet with Jerel to discuss the IRTS, etc.    Patient back at the desk requesting to talk to this writer. \"So when am I being discharged?\" this writer told him he has been stating he will not go to an IRTS and asked him if he would be willing to do this? \"Well, so you have been wasting my time?  I have been wanting to go for at least 2 weeks now.\"  This writer reminded him that he has NOT wanted an IRTS. \"Well, now I am homeless and that is a worse prospect.\"  This writer asked him if he would now sign ROIs for IRTS referrals and he said he would.     Discharge Plan or Goal  Patient is committed MI and must enter an IRTS facility for supervision, medication management, and stabilization.    Barriers to Discharge   Patient is still psychotic and needs continued medication management. Patient continuously dismisses the IRTS and reports he will not go.  We have no [facility] records reflecting his acceptance of the IRTS plan as yet.    Referral Status  Will make referrals when the patient agrees to sign ROIS and agrees to enter an IRTS facility with records reflecting as such.    Legal Status  Committed MI w/[facility] in [facility]  "

## 2021-08-31 NOTE — PLAN OF CARE
Problem: Psychotic Symptoms  Goal: Psychotic Symptoms  Description: Signs and symptoms of listed problems will be absent or manageable.  Outcome: No Change     Pt's care was assumed at 1915. He was in bed reading a book. Pt said he was not doing good due to his parents not willing to continue to support him financially. His affect was flat. Insight poor about his situation. Still expect his parents to pay, not accepting of writer's encouragement to use every opportunity to get on his feet and be financially stable. Still states he does not understand why he can't go back to his apartment though he cannot afford it.     Pt was med compliant. Came out on the unit briefly for meds and back to his room.  No peer interaction noted.

## 2021-08-31 NOTE — PLAN OF CARE
Pt has a flat blunted affect with a calm mood. Pt stared straight forward during interview and would not make eye contact with writer. Pt did attend OT group this shift. Pt was withdrawn to self and not social with peers. Pt rates anxiety at 6/10 and depression 7/10. Pt rates pain at 0/10. Pt reports no SI/HI and contracts for safety. Pt denies any hallucinations and not noted responding to any internal stimuli. Pt was med compliant with no cheeking noted. No reported or observed medication side effects. Continue current POC.

## 2021-08-31 NOTE — PROGRESS NOTES
"  PSYCHIATRIC PROGRESS NOTE    Admission Date: 08/04/2021  Date of Service: 07/30/2021    The patient was seen , his chart reviewed, his case discussed with staff.  He continued to be isolative and withdrawn but less tense and guarded. He has been hearing \"voices\" including one of the girl he used to date telling him that he is not going to get a job. He c/o chest pain and Internal Medicine was contacted. Basic Metabolic Panel and Troponin were ordered. I also ordered EKG.  This is a 25-chito-old single white male with a long history of mental illness and multiple psychiatric hospitalizations who was admitted to [facility] on 72-hour hold and was seen by Dr. Tran for initial psychiatric evaluation. He presented with both visual and auditory hallucinations and delusional ideas with grandiose flavor. He believed that people from [place] and [place] have been interested in information he posessed. He also believed that he was reincarnated. It was suspected that he was not compliant with his preadmission neuroleptics and his preadmission medications were chaged. However the patient showed no signs of improvement and continued to be tense, guarded and delusional with no insight what-so-ever.     MEDICATIONS   Haldol Decanoate 50 mg IM Q30 days, last injection given 08/17/21   Haldol 5 mg QAM and 10 mg at bedtime   Cogentin 1 mg BID   Melatonin 3 mg HHS PRN   Vitamin D3 25 mcg daily     LABS: No new results     VS: Pulse - 64, T - 98.4, BP - 108/76, Resp - 16, SpO2 - 97%     MENTAL STATUS EXAMINATION   Revealed a normally built and normally developed, tense guarded and irritable 25-year-old white male appearing his stated age. His speech was slightly disorganized but of normal tempo and tone.He showed no objective signs of depression or hypomania. His affect was best described as both subtle and covert anger. He denied SI or HI. He admitted to auditory hallucination with negative content and his delusions persisted He " had no insight into his problems and his judgement was impaired.     DIAGNOSTIC IMPRESSION   Schizophrenia, paranoid type   R/O Schizoaffective Disorder   Cannabis Use Disorder     PLAN to increase his Haldol Decanoate to 100 mg IM Q30 days. Continue Cogentin.oral Haldol without change. Disposition - IRTS facility    Prince Romero MD

## 2021-09-01 PROCEDURE — 99231 SBSQ HOSP IP/OBS SF/LOW 25: CPT | Performed by: PSYCHIATRY & NEUROLOGY

## 2021-09-01 PROCEDURE — 250N000013 HC RX MED GY IP 250 OP 250 PS 637: Performed by: PSYCHIATRY & NEUROLOGY

## 2021-09-01 PROCEDURE — H2032 ACTIVITY THERAPY, PER 15 MIN: HCPCS

## 2021-09-01 PROCEDURE — 124N000002 HC R&B MH UMMC

## 2021-09-01 RX ADMIN — BENZTROPINE MESYLATE 1 MG: 1 TABLET ORAL at 20:05

## 2021-09-01 RX ADMIN — HALOPERIDOL 5 MG: 5 TABLET ORAL at 08:08

## 2021-09-01 RX ADMIN — BENZTROPINE MESYLATE 1 MG: 1 TABLET ORAL at 08:08

## 2021-09-01 RX ADMIN — HALOPERIDOL 10 MG: 10 TABLET ORAL at 20:05

## 2021-09-01 RX ADMIN — Medication 5 MG: at 20:05

## 2021-09-01 RX ADMIN — Medication 25 MCG: at 08:08

## 2021-09-01 ASSESSMENT — ACTIVITIES OF DAILY LIVING (ADL)
ORAL_HYGIENE: INDEPENDENT
DRESS: SCRUBS (BEHAVIORAL HEALTH);INDEPENDENT
LAUNDRY: WITH SUPERVISION
DRESS: INDEPENDENT
ORAL_HYGIENE: INDEPENDENT
HYGIENE/GROOMING: INDEPENDENT
HYGIENE/GROOMING: INDEPENDENT

## 2021-09-01 NOTE — PLAN OF CARE
Pt has a flat blunted affect with a calm mood. Pt had some eye contact during assessment but mostly stared away. Pt attend group this shift. Pt was withdrawn to self and not social with peers. Pt rates anxiety at 7/10 and depression 5/10. Pt is in denial about his mental health illness. Pt discussed with writer that he really wants to return to his apt upon discharge. After discussing IRTS placement he was in agreement but stated he thinks he would be better off at his apt. Pt rates pain at 0/10. Pt reports no SI/HI and contracts for safety. Pt denies any hallucinations and not noted responding to any internal stimuli. Pt was med compliant with no cheeking noted. No reported or observed medication side effects. Continue current POC.

## 2021-09-01 NOTE — PLAN OF CARE
Assessment/Intervention/Current Symtoms and Care Coordination  Patient's ReEntry ACT Team Psychiatrist Dr. Norberto Salguero came to meet with the patient.  Dr. Salguero aware that patient has now signed ROIs for IRTS facilities.  Patient trying to sit through programming this week but leaves early.  He did complete one group - Art Therapy- so far today.  Signed ROIs and referrals with H&P, MAR sent to [facility] and also [facility] I[facility] (3 facilities).    Discharge Plan or Goal  Patient will enter the IRTS for structure, medication management and further stabilization.    Barriers to Discharge   Needs to be accepted onto IRTS wait-lists.  He also is still psychotic.  Needs further psychiatric medication mangement but improving.    Referral Status  DONE    Legal Status  Committed MI w/[facility] in [facility]
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
## 2021-09-02 LAB
ALBUMIN UR-MCNC: NEGATIVE MG/DL
APPEARANCE UR: CLEAR
BILIRUB UR QL STRIP: NEGATIVE
COLOR UR AUTO: NORMAL
GLUCOSE UR STRIP-MCNC: NEGATIVE MG/DL
HGB UR QL STRIP: NEGATIVE
KETONES UR STRIP-MCNC: NEGATIVE MG/DL
LEUKOCYTE ESTERASE UR QL STRIP: NEGATIVE
NITRATE UR QL: NEGATIVE
PH UR STRIP: 6.5 [PH] (ref 5–7)
RBC URINE: <1 /HPF
SP GR UR STRIP: 1 (ref 1–1.03)
UROBILINOGEN UR STRIP-MCNC: NORMAL MG/DL
WBC URINE: <1 /HPF

## 2021-09-02 PROCEDURE — 93010 ELECTROCARDIOGRAM REPORT: CPT | Performed by: INTERNAL MEDICINE

## 2021-09-02 PROCEDURE — 250N000013 HC RX MED GY IP 250 OP 250 PS 637: Performed by: PSYCHIATRY & NEUROLOGY

## 2021-09-02 PROCEDURE — 124N000002 HC R&B MH UMMC

## 2021-09-02 PROCEDURE — G0177 OPPS/PHP; TRAIN & EDUC SERV: HCPCS

## 2021-09-02 PROCEDURE — 81003 URINALYSIS AUTO W/O SCOPE: CPT | Performed by: PHYSICIAN ASSISTANT

## 2021-09-02 PROCEDURE — 93005 ELECTROCARDIOGRAM TRACING: CPT

## 2021-09-02 RX ORDER — MULTIVITAMIN,THERAPEUTIC
1 TABLET ORAL DAILY
Status: DISCONTINUED | OUTPATIENT
Start: 2021-09-02 | End: 2021-09-02 | Stop reason: CLARIF

## 2021-09-02 RX ADMIN — BENZTROPINE MESYLATE 1 MG: 1 TABLET ORAL at 09:08

## 2021-09-02 RX ADMIN — HALOPERIDOL 10 MG: 10 TABLET ORAL at 20:26

## 2021-09-02 RX ADMIN — BENZTROPINE MESYLATE 1 MG: 1 TABLET ORAL at 20:26

## 2021-09-02 RX ADMIN — Medication 25 MCG: at 09:08

## 2021-09-02 RX ADMIN — HALOPERIDOL 5 MG: 5 TABLET ORAL at 09:08

## 2021-09-02 RX ADMIN — GABAPENTIN 100 MG: 100 CAPSULE ORAL at 15:50

## 2021-09-02 RX ADMIN — Medication 5 MG: at 20:26

## 2021-09-02 NOTE — PROGRESS NOTES
"  PSYCHIATRIC PROGRESS NOTE    Admission Date: 0804/2021  Date of Service: 09/01/2021    The patient was seen earlier today, his chart reviewed, his case discussed with staff at the Team Meeting.  Although he has been accepting his IRTS placement he continued to talk about his apartment as the best place for him to live. He continued to be guarded with minimal socialization. He did attend some scheduled activities.  He has been medication compliant.    Jerel is a 25-ch[age]-old single white male with a long history of mental illness and multiple psychiatric hospitalizations who was admitted to [facility] on 72-hour hold and was seen by Dr. Tran for initial psychiatric evaluation. He presented with both visual and auditory hallucinations and delusional ideas with grandiose flavor. He believed that people from [place] and [place] have been interested in information he posessed. He also believed that he was reincarnated. It was suspected that he was not compliant with his preadmission neuroleptics and his preadmission medications were changed. However the patient showed no signs of improvement and continued to be tense, guarded and delusional with poor insight. He continued to hear \"voces\" telling his that he was cheating in school and will never get a job. He has been refusing to take mood stabilizers. He shows no signs of TD and no EPSE    MEDICATIONS   Haldol Decanoate 50 mg IM Q30 days, last injection given 08/17/21   Haldol 5 mg QAM and 10 mg at bedtime   Cogentin 1 mg BID   Melatonin 3 mg HHS PRN   Vitamin D3 25 mcg daily     LABS: No new results    VS: Pulse - 66, BP - 106/72, T - 97.6, Resp - 16, SpO2 - 97%    MENTAL STATUS EXAMINATION   Revealed a normally built and normally developed, tense guarded and irritable 25-year-old white male appearing his stated age. His speech was slightly disorganized but of normal tempo and tone. He showed no objective signs of depression or hypomania. His affect was blunted. He " denied SI or HI. He continued to experience auditory hallucination with negative content and his delusions persisted. He had no insight into his problems and his judgement was impaired.     DIAGNOSTIC IMPRESSION   Schizophrenia, paranoid type   R/O Schizoaffective Disorder   Cannabis Use Disorder     Plan: Continue medications without change with a plan to increase Haldol Decanoate at his due date.    Prince Romero MD

## 2021-09-02 NOTE — PLAN OF CARE
"Problem: OT General Care Plan  Goal: OT Goal 1  Description: Pt will practice using >2 coping strategies to manage stress and reduce symptoms to demonstrate increased readiness for discharge.     Pt attended 1 out of 3 OT groups offered. Pt actively participated in a mental health management group of 2 patients total x45 minutes with a focus on coping through movement to facilitate relaxation and stress management via chair yoga. Pt followed and engaged in 100% of the yoga poses, and verbalized feeling \"good\" at the end of group. Shared that he enjoyed the yoga sequence. Calm, pleasant, and cooperative.       "

## 2021-09-02 NOTE — PLAN OF CARE
"  Problem: Psychotic Symptoms  Goal: Psychotic Symptoms  Description: Signs and symptoms of listed problems will be absent or manageable.  Outcome: Improving     Behavioral  Pt was in and out his room intermittently but spent majority of this shift in his room. Pt denies VH but endorses AH where voices are positive, and telling him about things he need to work on like applying for a job, his school and similar things. Pt says his anxiety and depression has been okay and he's feeling much better and happier today. He rates both anxiety and depression at 4-5/10. Pt's mood was anxious and restricted while his affect was flat and blunted. He endorses good appetite and looking forward to discharge \"sometime soon.\"   Medical Alerts  Declined to have Covid swab. Please f/u  Plan  Continue to monitor     "

## 2021-09-02 NOTE — PLAN OF CARE
Patient spent most of the time in his room. He is withdrawn.He attended one OT group this shift. He came out for meals and mediations. Mood is calm. Affect is flat and blunted. He reports high depression due to the fact that he is still in the hospital. He was seen by IM this morning, please refer to their note. He did mention that he had CP and EKG was ordered, results came back normal. During check in pt stated CP is from a few days ago and none today. He reports frequency urination, urinalysis was ordered and pt is yet to give urine sample.  He denies SI/SIB/AVH. Reports good appetite. Denies any physical pain. Staff will continue to monitor.

## 2021-09-02 NOTE — PLAN OF CARE
Problem: General Rehab Plan of Care  Goal: Therapeutic Recreation/Music Therapy Goal  Description: The patient and/or their representative will achieve their patient-specific goals related to the plan of care.  The patient-specific goals include: social engagement   Outcome: No Change     Jerel attended art therapy group for 0.5 hour (5 patients total). He appeared withdrawn and chose to sit away from peers. He stated he didn't want to share about how he was feeling. He declined to join in the group activity and instead chose to draw with markers on paper. He appeared clam and focused while drawing.

## 2021-09-02 NOTE — PLAN OF CARE
Assessment/Intervention/Current Symtoms and Care Coordination  Patient taking medications as prescribed. Needs encouragement to attend the full programming.  Went to one group yesterday for half hour only.  Referred to [facility] (I3 RTS) and [facility] (1 IRT) Both programs called back to report that they are no longer taking commercial insurance.  [facility] reported that they bill commercial and have to fight the insurance company for payment.  [facility] said the same thing and but with the caveat the only commercial insurance they will take is [employer]. Referred patient to [facility] (3 IRTS) and awaiting word from them.     Discharge Plan or Goal  Patient needs to discharge to an IRTS facility.    Barriers to Discharge   Awaiting IRTS placement    Referral Status  In process    Legal Status  MI Commitment w/[employer]

## 2021-09-03 LAB
ATRIAL RATE - MUSE: 71 BPM
DIASTOLIC BLOOD PRESSURE - MUSE: NORMAL MMHG
INTERPRETATION ECG - MUSE: NORMAL
P AXIS - MUSE: 33 DEGREES
PR INTERVAL - MUSE: 138 MS
QRS DURATION - MUSE: 86 MS
QT - MUSE: 354 MS
QTC - MUSE: 384 MS
R AXIS - MUSE: 75 DEGREES
SYSTOLIC BLOOD PRESSURE - MUSE: NORMAL MMHG
T AXIS - MUSE: 67 DEGREES
VENTRICULAR RATE- MUSE: 71 BPM

## 2021-09-03 PROCEDURE — 124N000002 HC R&B MH UMMC

## 2021-09-03 PROCEDURE — 250N000013 HC RX MED GY IP 250 OP 250 PS 637: Performed by: PSYCHIATRY & NEUROLOGY

## 2021-09-03 PROCEDURE — G0177 OPPS/PHP; TRAIN & EDUC SERV: HCPCS

## 2021-09-03 PROCEDURE — 99232 SBSQ HOSP IP/OBS MODERATE 35: CPT | Performed by: PSYCHIATRY & NEUROLOGY

## 2021-09-03 RX ADMIN — BENZTROPINE MESYLATE 1 MG: 1 TABLET ORAL at 08:01

## 2021-09-03 RX ADMIN — Medication 25 MCG: at 08:01

## 2021-09-03 RX ADMIN — HALOPERIDOL 10 MG: 10 TABLET ORAL at 19:08

## 2021-09-03 RX ADMIN — Medication 5 MG: at 19:08

## 2021-09-03 RX ADMIN — HALOPERIDOL 5 MG: 5 TABLET ORAL at 08:01

## 2021-09-03 RX ADMIN — BENZTROPINE MESYLATE 1 MG: 1 TABLET ORAL at 19:08

## 2021-09-03 RX ADMIN — LIDOCAINE 1 PATCH: 246 PATCH TOPICAL at 17:16

## 2021-09-03 NOTE — PLAN OF CARE
"  Problem: Psychotic Symptoms  Goal: Psychotic Symptoms  Description: Signs and symptoms of listed problems will be absent or manageable.  Outcome: Improving  Note: Patient reported having voices, but that they are better. Denied feeling paranoid. Stated: \"I feel depressed because I'm here\", \"Gloria been in the hospital for a very long time\", \"The plan for me is IRTS, but I missed to sign some paper work on time, now I have to wait\". Denied anxiety.  Behavior included: isolating in his room, in bed through the entire shift, out to eat only. He avoided social interaction. Did not attend to his hygiene and grooming.     Thoughts with poor insights of his current situation and own responsibilities. Speech was minimal, but clear and organized. Mood was sad and depressed, affect blunted. Memory appeared intact.     Patient took scheduled medications    Pain in the neck, rated at 5/10 at the beginning of the shift. Patient requested PRN Gabapentin for pain and anxiety. Given PRN Gabapentin 100 mg, that was reported as effective later on.      "

## 2021-09-03 NOTE — PLAN OF CARE
Patient spent majority of time I his room. Came out for meals and his medications. He was compliant with taking medications and VS check. Patient did not attend any groups this shift. Still refused to have COVID test. He presents with a depressed, calm mood. Affect is flat and blunted. Patient did not attend any groups this shift. He endorses depression and some anxiety. Endorses AH but unable to elaborate on them. Denies SI/SIB. Medication compliant, denies any SE.
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
## 2021-09-03 NOTE — PLAN OF CARE
Assessment/Intervention/Current Symtoms and Care Coordination  The patient still isolates to his room off and on.  Still has some psychotic symptoms.  He made it to only 2 groups this week.  [facility] is calling this writer on Tuesday to see if patient is ready to phone interview with them.      Discharge Plan or Goal  Patient will enter an IRTS facility and resume care with his ReEntry ACT Team.    Barriers to Discharge   Needs more medication management, psychiatric stabilization.    Referral Status  Done    Legal Status  Committed MI w/ [employer]

## 2021-09-03 NOTE — PLAN OF CARE
"Problem: OT General Care Plan  Goal: OT Goal 1  Description: Pt will practice using >2 coping strategies to manage stress and reduce symptoms to demonstrate increased readiness for discharge.     Pt attended 1 out of 3 OT groups offered. Pt actively participated in occupational therapy clinic with 4 patients total x60 minutes. Pt was able to ask for assistance as needed, and independently initiate a novel, goal-directed task. Pt demonstrated good attention to task (x60 minutes) and attention to detail. Pt appeared comfortable interacting with peers, though primarily socialized with writer throughout this group. After hearing a commercial on the radio about the [employer], he mentioned \"last time I went to a [employer] game I got really paranoid penitentiary through, but at least we weren't sitting by drinkers,\" demonstrating some insight regarding his paranoia. Calm, pleasant, cooperative, and engaged with a congruent affect throughout this group.    "

## 2021-09-04 PROCEDURE — H2032 ACTIVITY THERAPY, PER 15 MIN: HCPCS

## 2021-09-04 PROCEDURE — 124N000002 HC R&B MH UMMC

## 2021-09-04 PROCEDURE — 250N000013 HC RX MED GY IP 250 OP 250 PS 637: Performed by: PSYCHIATRY & NEUROLOGY

## 2021-09-04 RX ADMIN — BENZTROPINE MESYLATE 1 MG: 1 TABLET ORAL at 21:40

## 2021-09-04 RX ADMIN — BENZTROPINE MESYLATE 1 MG: 1 TABLET ORAL at 08:36

## 2021-09-04 RX ADMIN — HALOPERIDOL 5 MG: 5 TABLET ORAL at 08:36

## 2021-09-04 RX ADMIN — HALOPERIDOL 10 MG: 10 TABLET ORAL at 21:39

## 2021-09-04 RX ADMIN — Medication 25 MCG: at 08:36

## 2021-09-04 RX ADMIN — Medication 5 MG: at 21:40

## 2021-09-04 NOTE — PROGRESS NOTES
"  PSYCHIATRIC PROGRESS NOTE    Admission Date: 08/04/2021  Date of Service: 09/03/2021    The patient was seen, his chart reviewed, his case discussed with staff at the Team meeting.  He attended some groups but mainly has been isolating in his room. He continued to hear \"voices\" but would not elaborate on their content. He has been tense and evasive. He did not show any positive reaction when I told him that [facility] may be accepting him there. He will have an interview on Tuesday. He keeps maintaining that he will be better of in his apartment even if his parents will stop paying for it because he had some money which are on his dad's account. He tells me that he had about $3,000.  He has been medication compliant but showed no signs of improvement.  The patient was seen by Angelica Valencia PA-C of Internal Medicine for atypical chest pain. She concluded that his chest pain was related to anxiety.    This is a 25-chito-old single white male with a long history of mental illness and multiple psychiatric hospitalizations who was admitted to [facility] on 72-hour hold and was seen by Dr. Tran for initial psychiatric evaluation. He presented with both visual and auditory hallucinations and delusional ideas with grandiose flavor. He believed that people from [place] and [place] have been interested in information he posessed. He also believed that he was reincarnated. It was suspected that he was not compliant with his preadmission neuroleptics and his preadmission medications were changed. However the patient showed no signs of improvement and continued to be tense, guarded and delusional with poor insight. He continued to hear \"voces\" telling his that he was cheating in school and will never get a job. He has been refusing to take mood stabilizers. He shows no EPSE since his morning Haldol dose was decreased.    MEDICATIONS   Haldol Decanoate 50 mg IM Q30 days, last injection given 08/17/21   Haldol 5 " mg QAM and 10 mg at bedtime   Cogentin 1 mg BID   Melatonin 3 mg HHS PRN   Vitamin D3 25 mcg daily    LABS: His urinalysis was WNL    VS: Pulse - 64, BP - 116/77, T - 98.6, SpO2 - 95%    MENTAL STATUS EXAMINATION   Revealed a normally built and normally developed, tense, guarded and irritable 25-year-old white male appearing his stated age. His speech was of normal tempo and tone. He showed no objective signs of depression or hypomania. His affect was blunted. He denied SI or HI. He continued to experience auditory hallucination with negative content and his delusions persisted. He had no insight into his problems and his judgement was impaired.     DIAGNOSTIC IMPRESSION   Schizophrenia, paranoid type   R/O Schizoaffective Disorder   Cannabis Use Disorder     PLAN: Increase his Haldol Decanoate to 100 mg IM at his due date in mid September. His case will be resumed by Dr. Tran on Tuesday.    Prince Romero MD
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
## 2021-09-04 NOTE — PLAN OF CARE
Patient is alert and oriented. Mood is calm.affect is tense, flat and blunted. Patient stayed in room all shift, only came out for his meals and medications. Patient denies SI/SIB/AVH. He endorses depression but declined to rate it. Patient is not interacting with peers. He is polite with his request. He is well groomed. Medication compliant and denies any medication SE. Patient denies any physical pain.

## 2021-09-05 PROCEDURE — 250N000013 HC RX MED GY IP 250 OP 250 PS 637: Performed by: PSYCHIATRY & NEUROLOGY

## 2021-09-05 PROCEDURE — H2032 ACTIVITY THERAPY, PER 15 MIN: HCPCS

## 2021-09-05 PROCEDURE — 124N000002 HC R&B MH UMMC

## 2021-09-05 RX ADMIN — Medication 5 MG: at 20:56

## 2021-09-05 RX ADMIN — BENZTROPINE MESYLATE 1 MG: 1 TABLET ORAL at 20:56

## 2021-09-05 RX ADMIN — HALOPERIDOL 5 MG: 5 TABLET ORAL at 08:27

## 2021-09-05 RX ADMIN — GABAPENTIN 100 MG: 100 CAPSULE ORAL at 22:13

## 2021-09-05 RX ADMIN — BENZTROPINE MESYLATE 1 MG: 1 TABLET ORAL at 08:26

## 2021-09-05 RX ADMIN — Medication 25 MCG: at 08:27

## 2021-09-05 RX ADMIN — HALOPERIDOL 10 MG: 10 TABLET ORAL at 20:56

## 2021-09-05 ASSESSMENT — ACTIVITIES OF DAILY LIVING (ADL)
ORAL_HYGIENE: INDEPENDENT
ORAL_HYGIENE: INDEPENDENT
DRESS: INDEPENDENT
HYGIENE/GROOMING: INDEPENDENT
DRESS: INDEPENDENT
LAUNDRY: WITH SUPERVISION
HYGIENE/GROOMING: INDEPENDENT
LAUNDRY: WITH SUPERVISION

## 2021-09-05 ASSESSMENT — MIFFLIN-ST. JEOR: SCORE: 1722.92

## 2021-09-05 NOTE — PLAN OF CARE
Problem: General Rehab Plan of Care  Goal: Therapeutic Recreation/Music Therapy Goal  Description: The patient and/or their representative will achieve their patient-specific goals related to the plan of care.  The patient-specific goals include: art therapy to explore identity  Outcome: Viktoria Beltrán attended the first half of art therapy group (0.5 hour, 5 patients total). He participated in art making, creating a drawing of the boundary dunn. He interacted minimally with peers. He shared his drawing with the group and then left group abruptly.

## 2021-09-05 NOTE — PLAN OF CARE
Patient is alert and oriented. He has been in and out of his room more today. Interacting with a specific patient (A.H) and paced together in the hallway. Patient denies all MH symptoms. Medication compliant. VSS. Denies any pain. Eating adequately. Staff will continue to monitor.

## 2021-09-05 NOTE — PLAN OF CARE
"Pt was isolative and withdrawn to his room for the majority of the shift. He came out for a few hours to watch a movie and to eat dinner. Pt socialized minimally with select peers. Pt's affect was blunted/flat and his mood was depressed. Pt endorsed 3/10 depression and 5/10 anxiety. He endorsed auditory and visual hallucinations. Pt reported the visual hallucinations started over the last 24 hours and he reports seeing \"my spine is black and with one of my vertebra it is completely blacked out with three lines coming out around my ribs\". Writer assessed patient's back to make sure there was no bruising and skin was clean, dry, and intact. Pt was medication compliant and received no PRN's this shift. Pt endorsed 1/10 pain on his back.   "
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
## 2021-09-05 NOTE — PLAN OF CARE
Pt observed in the milieu briefly for meals otherwise isolative and withdrawn for the most part to self in his room. Pt presented with blunted flat affect but calm mood. Pt denies any anxiety but endorses some depression this shift that he rated 4/10. Pt appropriate and social with staff and select  Peer especially one male peer in the unit when in the lounge.  Pt denies SI/Self harm thoughts, urges, plan, and intent. Pt denies any pain this shift. Pt was medication compliant this shift. Pt reported sleep and appetite as adequate.Staff will continue to monitor and support.

## 2021-09-06 PROCEDURE — 250N000013 HC RX MED GY IP 250 OP 250 PS 637: Performed by: PSYCHIATRY & NEUROLOGY

## 2021-09-06 PROCEDURE — H2032 ACTIVITY THERAPY, PER 15 MIN: HCPCS

## 2021-09-06 PROCEDURE — 124N000002 HC R&B MH UMMC

## 2021-09-06 PROCEDURE — G0177 OPPS/PHP; TRAIN & EDUC SERV: HCPCS

## 2021-09-06 RX ADMIN — HALOPERIDOL 5 MG: 5 TABLET ORAL at 08:08

## 2021-09-06 RX ADMIN — Medication 5 MG: at 19:26

## 2021-09-06 RX ADMIN — Medication 25 MCG: at 08:08

## 2021-09-06 RX ADMIN — BENZTROPINE MESYLATE 1 MG: 1 TABLET ORAL at 08:08

## 2021-09-06 RX ADMIN — BENZTROPINE MESYLATE 1 MG: 1 TABLET ORAL at 19:26

## 2021-09-06 RX ADMIN — GABAPENTIN 100 MG: 100 CAPSULE ORAL at 22:45

## 2021-09-06 RX ADMIN — HALOPERIDOL 10 MG: 10 TABLET ORAL at 19:26

## 2021-09-06 ASSESSMENT — ACTIVITIES OF DAILY LIVING (ADL)
ORAL_HYGIENE: INDEPENDENT
DRESS: SCRUBS (BEHAVIORAL HEALTH);INDEPENDENT
LAUNDRY: WITH SUPERVISION
HYGIENE/GROOMING: INDEPENDENT

## 2021-09-06 NOTE — PLAN OF CARE
Patient is in and out of his room. Paced in the hallway a few times with another peer. He is interacting appropriately with one specific patient on the unit otherwise keeps to himself. Mood is calm. Affect is flat and blunted. He stayed in the [place] are and played guitar a little bit. Patient denies anxiety, depression, SI/SIB/hallucinations. He is medication compliant. He denies any medication SE. Denies any pain.
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
## 2021-09-06 NOTE — PLAN OF CARE
"Pt was visible in the milieu for several hours this shift, he attended and participated in group, watched movies in the lounge with peers, and social with select peers. Conversations and interactions were appropriate. Pt was polite and cooperative with staff. His affect was blunted/flat and his mood was calm. Pt endorsed 2/10 depression and denied anxiety at first, however shortly before bed he endorsed some anxiety and asked for PRN gabapentin. Pt endorsed auditory hallucinations and reported they got worse as he was getting ready for bed, he stated \"it sounds like the FBI, like they're talking about me. And it also sounds like people I went to [facility] with. I don't know it just definitely sound like it's about me\". Pt did not endorse visual hallucinations, when asked about them he told writer \"I think that was just a spiritual connection I needed to resolve yesterday, but there was no resolution\". Pt denied SI, and SIB. He ate approximately 75% of his dinner and a few snacks. He was medication compliant and received no other PRN's. Pt denied having any pain.   "

## 2021-09-06 NOTE — PLAN OF CARE
Problem: OT General Care Plan  Goal: OT Goal 1  Description: Pt will practice using >2 coping strategies to manage stress and reduce symptoms to demonstrate increased readiness for discharge.   Outcome: No Change     Group attendance today:   1    of    1   groups    Group type: OT clinic  Number of participants: 4  Participation level: actively engaged  Additional notes: Pt chose to work on a scratch art project, politely asked for supplies, and watched writer with demonstration, though didn't feel he needed to practice.  Chose to sit at table with peer, occasional interaction noted, demonstrated good focus on task work.  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

## 2021-09-07 PROCEDURE — 250N000013 HC RX MED GY IP 250 OP 250 PS 637: Performed by: PSYCHIATRY & NEUROLOGY

## 2021-09-07 PROCEDURE — 124N000002 HC R&B MH UMMC

## 2021-09-07 PROCEDURE — 99232 SBSQ HOSP IP/OBS MODERATE 35: CPT | Performed by: PSYCHIATRY & NEUROLOGY

## 2021-09-07 PROCEDURE — G0177 OPPS/PHP; TRAIN & EDUC SERV: HCPCS

## 2021-09-07 RX ORDER — HALOPERIDOL DECANOATE 100 MG/ML
75 INJECTION INTRAMUSCULAR
Status: DISCONTINUED | OUTPATIENT
Start: 2021-09-15 | End: 2021-09-13 | Stop reason: HOSPADM

## 2021-09-07 RX ADMIN — HALOPERIDOL 10 MG: 10 TABLET ORAL at 19:37

## 2021-09-07 RX ADMIN — BENZTROPINE MESYLATE 1 MG: 1 TABLET ORAL at 19:37

## 2021-09-07 RX ADMIN — Medication 25 MCG: at 08:19

## 2021-09-07 RX ADMIN — HALOPERIDOL 5 MG: 5 TABLET ORAL at 08:19

## 2021-09-07 RX ADMIN — BENZTROPINE MESYLATE 1 MG: 1 TABLET ORAL at 08:19

## 2021-09-07 RX ADMIN — Medication 5 MG: at 19:37

## 2021-09-07 ASSESSMENT — ACTIVITIES OF DAILY LIVING (ADL)
ORAL_HYGIENE: INDEPENDENT
HYGIENE/GROOMING: INDEPENDENT
LAUNDRY: WITH SUPERVISION
DRESS: SCRUBS (BEHAVIORAL HEALTH)

## 2021-09-07 ASSESSMENT — MIFFLIN-ST. JEOR: SCORE: 1715.66

## 2021-09-07 NOTE — PLAN OF CARE
Assessment/Intervention/Current Symtoms and Care Coordination  Patient's symptoms continue to improve.  Still having a little anxiety.  ([facility] interested in phone interviewing patient.  Their Clinical Director Shelton plans to call me sometime today for an update and set up a time to interview patient.  Called and left a message for Shelton since I have not yet heard from him.        Discharge Plan or Goal  Patient to enter an IRTS Facility for further stepdown stabilization and resume care with her ReEntry ACT Team.    Barriers to Discharge   Needs IRTS Placement     Referral Status  Done    Legal Status  MI Commitment w/[employer]

## 2021-09-07 NOTE — PLAN OF CARE
Music Therapy Group note    Clinical Hours in session: 0.75    Number of patients in group: 3    Scope of service: psychodynamic     Patient progress: emotionally labile but cooperative    Intervention: Soaking/sound immersion     Goal of group: centering     Patient response/reaction to treatment intervention(s): Jerel was very responsive to the music, showing a wide range of emotion, including becoming tearful at times.  The tearfulness faded in and out and MT monitored his emotional state for emotional safety.  He appeared to regulate in a cyclical pattern, then become tearful again.  He presents as being in a very vulnerable state emotionally but was overall calm/cooperative.     Tara Disla, MT-BC  Board-Certified Music Therapist
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
## 2021-09-07 NOTE — PLAN OF CARE
Problem: OT General Care Plan  Goal: OT Goal 1  Description: Pt will practice using >2 coping strategies to manage stress and reduce symptoms to demonstrate increased readiness for discharge.     OT Groups Attended: Clinic x2    Affect/Hygiene/Presentation: Calm, engaged, blunted affect. No apparent hygiene concerns.      Patient Performance/Response: Pt actively participated in occupational therapy clinic with 3 patients for 90 minutes. Pt was able to ask for assistance as needed, and independently return to a novel, goal-directed task without difficulty. Pt demonstrated good focus (40 minutes without interruption) and attention to detail during task completion. Pt appeared comfortable interacting with peers and writer, and occasionally initiated pleasant conversation during his time in clinic.     Goal Progress: GOAL REVIEW: care plan reviewed, continue with current plan for further progress and additional opportunities for skill acquisition.     Plan: Pt will be encouraged to maintain group attendance for continued ongoing assessment and support in completion of occupational therapy treatment goals.     Outcome: Improving

## 2021-09-07 NOTE — PLAN OF CARE
Problem: Psychotic Symptoms  Goal: Psychotic Symptoms  Description: Signs and symptoms of listed problems will be absent or manageable.  Outcome: Improving  Flowsheets (Taken 9/6/2021 2030)  Psychotic Symptoms Assessed: all  Psychotic Symptoms Present:    affect    thought process    sleep    anxiety    insight    speech    suicidality     Patient was out in the milieu this shift. Calm, pleasant and cooperative. Flat and blunted affect but smiles at times upon approach. He attended the group therapy. He watched TV this shift, too. He ate well at supper. Then later requested to take his night time medications at past 1900. He took them all without issue. Pt didn't rate anxiety and depression. He denied AVH, SI, SIB. So far, there's an improvement noted with this pt as he used to be prompted with meds but now he is requesting and taking them without hesitation. Will continue to monitor and assess pt.

## 2021-09-07 NOTE — PROGRESS NOTES
"[facility], [place]   Psychiatric Progress Note        Interim History:   The patient's care was discussed with the treatment team during the daily team meeting and/or staff's chart notes were reviewed.  Staff report patient is calm and pleasant, there have been no behavioral outbursts lately. He takes meds and goes to most of groups. Denies Suicidal ideation, Homicidal thoughts, Auditory hallucinations and Visual hallucinations.     Met with patient: he was seen with PA student present. He recognized me from being under my care few weeks ago. Said that he had been doing better, overall, denied hearing any voices recently. He complained of tightness in his jaw and wondered if it was because of Haldol. Reported not feeling alert enough and asked to decrease his Haldol dose. I informed him that we could increase his Cogentin dose and, in light of him being on Haldol Decanoate, we could decrease his oral Haldol dose. Jerel was OK with that. He didn't show any reaction when I told him that he would, likely, be interviewed by I[facility]S and, if accepted, could go there even as early as this week.           Medications:       benztropine  1 mg Oral BID     haloperidol  10 mg Oral At Bedtime     haloperidol decanoate  50 mg Intramuscular Q30 Days     lidocaine   Transdermal Q8H     melatonin  5 mg Oral At Bedtime     Vitamin D3  25 mcg Oral Daily          Allergies:     Allergies   Allergen Reactions     Penicillins      Rash, Generalized          Labs:   No results found for this or any previous visit (from the past 24 hour(s)).       Psychiatric Examination:     /74   Pulse 72   Temp 97.4  F (36.3  C) (Tympanic)   Resp 16   Ht 1.753 m (5' 9\")   Wt 73.5 kg (162 lb 1.6 oz)   SpO2 99%   BMI 23.94 kg/m    Weight is 162 lbs 1.6 oz  Body mass index is 23.94 kg/m .  Orthostatic Vitals       Most Recent      Sitting Orthostatic /78 09/07 0807    Sitting Orthostatic Pulse (bpm) 92 09/07 " 0807    Standing Orthostatic /73 09/07 0807    Standing Orthostatic Pulse (bpm) 104 09/07 0807            Appearance: awake, alert and dressed in hospital scrubs  Attitude:  cooperative  Eye Contact:  fair  Mood:  good  Affect:  intensity is blunted  Speech:  decreased prosody and slow  Psychomotor Behavior:  no evidence of tardive dyskinesia, dystonia, or tics  Throught Process:  goal oriented  Associations:  no loose associations  Thought Content:  no evidence of suicidal ideation or homicidal ideation and no evidence of psychotic thought  Insight:  partial  Judgement:  fair  Oriented to:  time, person, and place  Attention Span and Concentration:  intact  Recent and Remote Memory:  intact    Clinical Global Impressions  First: on 7/4 8/4/21     Most recent: 3/2 9/7/2021           Precautions:     Behavioral Orders   Procedures     Assault precautions     Cheeking Precautions (behavioral units)     Code 1 - Restrict to Unit     Elopement precautions     Routine Programming     As clinically indicated     Sexual precautions     Status 15     Every 15 minutes.          DIagnoses:     Schizophrenia, paranoid type, chronic       Plan:     Will decrease dose of Haldol and increase Haldol Decanoate. No other med changes. CTC is with contact with [facility] staff re: admitting patient there.

## 2021-09-08 PROCEDURE — 99232 SBSQ HOSP IP/OBS MODERATE 35: CPT | Performed by: PSYCHIATRY & NEUROLOGY

## 2021-09-08 PROCEDURE — 250N000013 HC RX MED GY IP 250 OP 250 PS 637: Performed by: PSYCHIATRY & NEUROLOGY

## 2021-09-08 PROCEDURE — 124N000002 HC R&B MH UMMC

## 2021-09-08 PROCEDURE — G0177 OPPS/PHP; TRAIN & EDUC SERV: HCPCS

## 2021-09-08 RX ADMIN — HALOPERIDOL 10 MG: 10 TABLET ORAL at 20:50

## 2021-09-08 RX ADMIN — GABAPENTIN 100 MG: 100 CAPSULE ORAL at 18:35

## 2021-09-08 RX ADMIN — BENZTROPINE MESYLATE 1 MG: 1 TABLET ORAL at 08:00

## 2021-09-08 RX ADMIN — Medication 5 MG: at 20:50

## 2021-09-08 RX ADMIN — Medication 25 MCG: at 08:00

## 2021-09-08 RX ADMIN — BENZTROPINE MESYLATE 1 MG: 1 TABLET ORAL at 20:50

## 2021-09-08 ASSESSMENT — ACTIVITIES OF DAILY LIVING (ADL)
LAUNDRY: WITH SUPERVISION
HYGIENE/GROOMING: INDEPENDENT
DRESS: SCRUBS (BEHAVIORAL HEALTH)
ORAL_HYGIENE: INDEPENDENT

## 2021-09-08 NOTE — ED PROVIDER NOTES
"  History     Chief Complaint   Patient presents with     Altered Mental Status     Pt rambling intermittent tangential speech, pt able to answer some questions, pt reports taking his medications, BIBA after ACT team called PD     HPI  Jerel Day is a 25 year old male with history of schizoaffective/schizophrenia disorder and polysubstance abuse who presents for evaluation of psychosis.  Patient is a very difficult historian due to rambling and tangential speech and signs of psychosis.  He is under a provisional discharge from civil commitment through [facility].  He has an act team that was concerned about him and called police today who then brought him here for evaluation.  Patient states he does not take his medication as court ordered because he forgets.  He denies any self-injurious behavior.  He denies any suicidal ideation.  He does report a prior suicide attempt \"in the incarnate\" and speaks at length about being reincarnated.  He endorses thoughts to harm \"plants and animals.\"  He denies any specific violent plan or intent.  He endorses paranoia and delusions including starting a new broadband frequency.  He states he does this by using books objects and idea and water.  He continues rambling speech.  He denies any drug or alcohol use.  He denies any medical complaints including fever, cough, chest pain, shortness of breath, nausea, vomiting, abdominal pain, diarrhea.    Allergies:  Allergies   Allergen Reactions     Penicillins      Rash, Generalized       Problem List:    Patient Active Problem List    Diagnosis Date Noted     Bilateral impacted cerumen 06/14/2021     Priority: Medium     Back pain 06/14/2021     Priority: Medium     Suicidal ideation 01/17/2020     Priority: Medium     Psychosis (H) 08/14/2019     Priority: Medium     Schizoaffective disorder (H) 01/03/2018     Priority: Medium     Polysubstance abuse (H) 01/28/2017     Priority: Medium     Overview: " "  Polysubstance abuse, including THC and LSD  Overview:   Overview:   Polysubstance abuse, including THC and LSD          Past Medical History:    Past Medical History:   Diagnosis Date     Anxiety      Arthritis      Depressive disorder      Gastroesophageal reflux disease      Head injury      Problem, psychiatric        Past Surgical History:    History reviewed. No pertinent surgical history.    Family History:    No family history on file.    Social History:  Marital Status:  Single [1]  Social History     Tobacco Use     Smoking status: Current Every Day Smoker     Packs/day: 0.25     Years: 1.00     Pack years: 0.25     Types: Cigarettes     Smokeless tobacco: Never Used   Substance Use Topics     Alcohol use: Yes     Comment: socially     Drug use: Yes     Types: Marijuana     Comment: last used yesterday        Medications:    No current outpatient medications on file.        Review of Systems  10 point full review of systems was attempted but limited due to psychosis.  Physical Exam   BP:  (pt refused)  Pulse: 65  Temp: (!) 96.1  F (35.6  C)  Resp: 16  Height: 175.3 cm (5' 9\")  Weight: 73.5 kg (162 lb)  SpO2: 96 %  Lying Orthostatic BP: 102/57  Lying Orthostatic Pulse: 71 bpm  Sitting Orthostatic BP: 146/87  Sitting Orthostatic Pulse: 65 bpm  Standing Orthostatic BP: 145/80  Standing Orthostatic Pulse: 103 bpm      Physical Exam  Vitals reviewed.   Constitutional:       Appearance: He is well-developed.      Comments: Actively psychotic.   HENT:      Head: Normocephalic and atraumatic.      Mouth/Throat:      Mouth: Mucous membranes are moist.   Eyes:      Extraocular Movements: Extraocular movements intact.      Conjunctiva/sclera: Conjunctivae normal.      Pupils: Pupils are equal, round, and reactive to light.   Cardiovascular:      Rate and Rhythm: Normal rate and regular rhythm.      Pulses: Normal pulses.      Heart sounds: Normal heart sounds. No murmur heard.     Pulmonary:      Effort: Pulmonary " effort is normal. No respiratory distress.      Breath sounds: Normal breath sounds. No stridor. No wheezing or rales.   Abdominal:      General: Bowel sounds are normal. There is no distension.      Palpations: Abdomen is soft. There is no mass.      Tenderness: There is no abdominal tenderness. There is no guarding or rebound.   Musculoskeletal:         General: Normal range of motion.      Cervical back: Normal range of motion and neck supple. No rigidity.   Skin:     General: Skin is warm and dry.      Capillary Refill: Capillary refill takes less than 2 seconds.      Findings: No rash.   Neurological:      General: No focal deficit present.      Mental Status: He is alert.      GCS: GCS eye subscore is 4. GCS verbal subscore is 5. GCS motor subscore is 6.      Cranial Nerves: No cranial nerve deficit.      Sensory: No sensory deficit.      Motor: No weakness or abnormal muscle tone.      Gait: Gait normal.   Psychiatric:         Attention and Perception: He is inattentive. He perceives auditory and visual hallucinations.         Speech: Speech is tangential.         Behavior: Behavior is hyperactive.         Thought Content: Thought content is paranoid and delusional.         Judgment: Judgment is impulsive.         ED Course        Procedures                  No results found for this or any previous visit (from the past 24 hour(s)).    Medications   gabapentin (NEURONTIN) capsule 100 mg (100 mg Oral Given 9/6/21 2245)   acetaminophen (TYLENOL) tablet 650 mg (650 mg Oral Not Given 8/20/21 1710)   alum & mag hydroxide-simethicone (MAALOX) suspension 30 mL ( Oral Canceled Entry 9/3/21 0853)   senna-docusate (SENOKOT-S/PERICOLACE) 8.6-50 MG per tablet 1 tablet (has no administration in time range)   traZODone (DESYREL) tablet 50 mg (50 mg Oral Given 8/30/21 2003)   hydrOXYzine (ATARAX) tablet 25 mg (25 mg Oral Given 8/29/21 1014)   OLANZapine (zyPREXA) tablet 10 mg (has no administration in time range)     Or    OLANZapine (zyPREXA) injection 10 mg (has no administration in time range)   ibuprofen (ADVIL/MOTRIN) tablet 200 mg (has no administration in time range)   Lidocaine (LIDOCARE) 4 % Patch 1 patch (0 patches Transdermal Hold 9/4/21 0618)   Vitamin D3 (CHOLECALCIFEROL) tablet 25 mcg (25 mcg Oral Given 9/8/21 0800)   lidocaine patch in PLACE ( Transdermal Patch Free Period 9/8/21 1131)   artificial saliva (BIOTENE MT) solution 2 spray (has no administration in time range)   nicotine (NICORETTE) gum 2-4 mg (has no administration in time range)   benztropine (COGENTIN) tablet 1 mg (1 mg Oral Given 9/8/21 0800)   haloperidol (HALDOL) tablet 10 mg (10 mg Oral Given 9/7/21 1937)   diphenhydrAMINE (BENADRYL) capsule 50 mg (has no administration in time range)   melatonin tablet 5 mg (5 mg Oral Given 9/7/21 1937)   haloperidol decanoate (HALDOL DECANOATE) injection 75 mg (has no administration in time range)       Assessments & Plan (with Medical Decision Making)   Patient is floridly psychotic here and is on a current stay of commitment but clearly this will be revoked as he is not complying with court ordered medications and thus has deteriorated psychiatrically.  He was placed on a 72-hour hold here.  He has been calm and cooperative otherwise and denies any suicidal ideation or self-injurious behavior.  Asymptomatic COVID-19 PCR was obtained for psychiatric admission which the patient refused.  Drug screen was also ordered.  Patient was seen by the behavioral health  who agreed with need for inpatient psychiatric admission.  Please see their separate note.  At this time patient is awaiting placement to his psychiatric bed in stable condition.  He is medically stable for psychiatric admission.      I have reviewed the nursing notes.    I have reviewed the findings, diagnosis, plan and need for follow up with the patient.      Current Discharge Medication List          Final diagnoses:   Schizophrenia, unspecified  type (H)       8/4/2021   [facility] MENTAL HEALTH & ADDI[facility]     Jewell Oliva MD  09/08/21 3355

## 2021-09-08 NOTE — PLAN OF CARE
Assessment/Intervention/Current Symtoms and Care Coordination  Patient fully cooperative with all care and taking medications.  Agrees with IRTS placement.  Last Friday, this writer had call from [facility] Clinical Director Shelton who had planned to call me yesterday for an update and interview our patient.  He never called after I had left several messages.  [facility] messages indicated that was his plan but he apparently didn't come into work yesterday.  Left another message for him today and have a backup if he does not call me.  Called again and apparently Shelton is not in today either and it is unplanned absence.  I spoke with another [facility] Facility - [facility] in [place] and spoke to Nathalie.  She is reviewing Jerel's records as well and will let me know if they can take him (as a backup plan if [facility] falls through.)       Discharge Plan or Goal  Patient to enter an IRTS facility and return to outpatient providers [facility] ACT Team.    Barriers to Discharge   No IRTS placement as yet.    Referral Status  Done    Legal Status  Voluntary

## 2021-09-08 NOTE — PROGRESS NOTES
"[facility], [place]   Psychiatric Progress Note        Interim History:     The patient's care was discussed with the treatment team during the daily team meeting and/or staff's chart notes were reviewed.  Staff report patient is calm and cooperative. He is med compliant and denies SI/HI    Met with patient: Pt is calm and cooperative with a blunted affect. Pt reports having some anxiety and difficulty focusing on tasks. Pt admits to having both auditory and visual hallucinations but denies any SI/SIB/HI. When asked to elaborate more on his hallucinations, pt said that he sees and hears a  that continues to provoke him. Pt was asked if he hears any commands or to act out on these voices but pt denies. He manages to cope by ignoring these voices. Overall he does believe his hallucinations are getting much better. In addition, pt has been complaining about some somatic symptoms such as generalized body aches. Otherwise pt is med compliant. Doesn't complain of med side effects. Pt is aware and understanding of his ongoing discharge plans and awaits for his placement. He voiced some frustration that his interview with [facility] didn't take place as it was supposed to yesterday, but didn't get agitated.          Medications:       benztropine  1 mg Oral BID     haloperidol  10 mg Oral At Bedtime     [START ON 9/15/2021] haloperidol decanoate  75 mg Intramuscular Q30 Days     lidocaine   Transdermal Q8H     melatonin  5 mg Oral At Bedtime     Vitamin D3  25 mcg Oral Daily          Allergies:     Allergies   Allergen Reactions     Penicillins      Rash, Generalized          Labs:   No results found for this or any previous visit (from the past 24 hour(s)).       Psychiatric Examination:     /83 (BP Location: Left arm)   Pulse 83   Temp 97.5  F (36.4  C) (Oral)   Resp 16   Ht 1.753 m (5' 9\")   Wt 73.5 kg (162 lb 1.6 oz)   SpO2 99%   BMI 23.94 kg/m    Weight is 162 lbs " 1.6 oz  Body mass index is 23.94 kg/m .    Orthostatic Vitals         Most Recent      Sitting Orthostatic /83 09/08 0756    Sitting Orthostatic Pulse (bpm) 83 09/08 0756    Standing Orthostatic /78 09/08 0756    Standing Orthostatic Pulse (bpm) 97 09/08 0756          Appearance: awake, alert and dressed in hospital scrubs  Attitude:  cooperative  Eye Contact:  fair  Mood:  good  Affect:  intensity is blunted  Speech:  decreased prosody  Psychomotor Behavior:  no evidence of tardive dyskinesia, dystonia, or tics  Throught Process:  goal oriented  Associations:  no loose associations  Thought Content:  auditory hallucinations present and visual hallucinations present  Insight:  partial  Judgement:  fair  Oriented to:  time, person, and place  Attention Span and Concentration:  intact  Recent and Remote Memory:  intact    Clinical Global Impressions  First: 5/3 9/8/2021      Most recent:            Precautions:     Behavioral Orders   Procedures     Assault precautions     Cheeking Precautions (behavioral units)     Code 1 - Restrict to Unit     Elopement precautions     Routine Programming     As clinically indicated     Sexual precautions     Status 15     Every 15 minutes.          DIagnoses:     Schizophrenia, paranoid type, chronic       Plan:     No med changes after yesterday decrease in Haldol, pt awaits IRTS placement. Will, hopefully, have an interview tomorrow.     I was present with PA student who participated in the service and in the documentation of the note. I have verified the history and personally performed the physical exam and medical decision making. I agree with the assessment and plan of care as documented in the note.     Keyon Tran MD  [facility]
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
## 2021-09-08 NOTE — PROGRESS NOTES
Jerel entered group room at the start of art therapy group. He declined to check in with how he was feeling and stated he just wanted to make his own thing. He spent about 5 minutes painting with watercolors and then left group. Writer encouraged Jerel to stay and join the group activity, but he declined. (no charge)

## 2021-09-08 NOTE — PLAN OF CARE
"MH Assessment    Patient is alert and oriented, calm and cooperative. Patient's affect is blunted and flat. Speech is Normal. Patient's insight is Fair. Patient is Neatly groomed.  Patient has been active in the milieu but withdrawn and keeps to self. He watched TV most of the shift and was socially selective with peers. The patient voices their needs appropriately. Patient denies depression, anxiety, SI,SIB, racing thoughts,Hallucinations, and HI. No evidence of delusional thinking or psychotic symptoms observed. Patient was bummed about not having an interview with an IRTS. Patient has been eating, hydrating, and sleeping adequately. Patient is medication compliant, doesn't need reminders. Medication side effects were not observed, but did report that he notice weight gain and increase in appetite. He stated, \" the Cogentin is helping though.\" From what writer could assess, patient's skin is intact, free from injuries or open sores. Plan is to continue to monitor patient status q 15 mins, assess response to medications, and maintain the patients safety.    Vital signs are stable  Denied pain        "

## 2021-09-08 NOTE — PLAN OF CARE
Patient is in and out of his room. Alert and oriented. Able to communicate needs. He is polite and cooperative with staff. Mood is calm. Affect is flat and blunted. He denies all MH symptoms including depression and anxiety. He attended OT group this morning. He reports good appetite and sleep. No complain of pain. Awaits IRTS placement.

## 2021-09-08 NOTE — PLAN OF CARE
Problem: OT General Care Plan  Goal: OT Goal 1  Description: Pt will practice using >2 coping strategies to manage stress and reduce symptoms to demonstrate increased readiness for discharge.     OT Groups Attended: Clinic     Affect/Hygiene/Presentation: Calm/pleasant, engaged, congruent affect. No apparent hygiene concerns.     Patient Performance/Response: Pt actively participated in occupational therapy clinic with 4 patients for 60 minutes. Pt was able to ask for assistance as needed, and independently initiate a familiar, goal-directed task without difficulty. Pt demonstrated good focus (30 minutes without interruption) and attention to detail during task completion. Pt appeared comfortable interacting with peers and writer, and occasionally socialized with the group during his time in clinic.     Plan: Pt will be encouraged to maintain group attendance for continued ongoing assessment and support in completion of occupational therapy treatment goals.     Outcome: Improving

## 2021-09-09 PROCEDURE — 99207 PR CONSULT E&M CHANGED TO SUBSEQUENT LEVEL: CPT | Performed by: PHYSICIAN ASSISTANT

## 2021-09-09 PROCEDURE — 250N000013 HC RX MED GY IP 250 OP 250 PS 637: Performed by: PSYCHIATRY & NEUROLOGY

## 2021-09-09 PROCEDURE — 124N000002 HC R&B MH UMMC

## 2021-09-09 PROCEDURE — 99231 SBSQ HOSP IP/OBS SF/LOW 25: CPT | Performed by: PHYSICIAN ASSISTANT

## 2021-09-09 PROCEDURE — G0177 OPPS/PHP; TRAIN & EDUC SERV: HCPCS

## 2021-09-09 PROCEDURE — 99233 SBSQ HOSP IP/OBS HIGH 50: CPT | Performed by: PSYCHIATRY & NEUROLOGY

## 2021-09-09 RX ORDER — LIDOCAINE 50 MG/G
1 PATCH TOPICAL DAILY PRN
Qty: 30 PATCH | Refills: 1 | Status: SHIPPED | OUTPATIENT
Start: 2021-09-09

## 2021-09-09 RX ORDER — GABAPENTIN 100 MG/1
100 CAPSULE ORAL 3 TIMES DAILY PRN
Qty: 90 CAPSULE | Refills: 0 | Status: SHIPPED | OUTPATIENT
Start: 2021-09-09

## 2021-09-09 RX ORDER — TRAZODONE HYDROCHLORIDE 50 MG/1
50 TABLET, FILM COATED ORAL
Qty: 30 TABLET | Refills: 1 | Status: SHIPPED | OUTPATIENT
Start: 2021-09-09

## 2021-09-09 RX ORDER — HALOPERIDOL DECANOATE 100 MG/ML
75 INJECTION INTRAMUSCULAR
Qty: 0.75 ML | Refills: 1
Start: 2021-09-15

## 2021-09-09 RX ORDER — OMEGA-3 FATTY ACIDS/FISH OIL 300-1000MG
200 CAPSULE ORAL 4 TIMES DAILY PRN
Qty: 60 CAPSULE | Refills: 1 | Status: SHIPPED | OUTPATIENT
Start: 2021-09-09

## 2021-09-09 RX ORDER — SALIVA STIMULANT COMB. NO.3
2 SPRAY, NON-AEROSOL (ML) MUCOUS MEMBRANE
Qty: 44.3 ML | Refills: 0 | Status: SHIPPED | OUTPATIENT
Start: 2021-09-09

## 2021-09-09 RX ORDER — MULTIVIT-MIN/IRON/FOLIC ACID/K 18-600-40
1 CAPSULE ORAL DAILY
Qty: 30 TABLET | Refills: 1 | Status: SHIPPED | OUTPATIENT
Start: 2021-09-09

## 2021-09-09 RX ORDER — BENZTROPINE MESYLATE 1 MG/1
1 TABLET ORAL 2 TIMES DAILY
Qty: 60 TABLET | Refills: 1 | Status: SHIPPED | OUTPATIENT
Start: 2021-09-09

## 2021-09-09 RX ORDER — HYDROXYZINE HYDROCHLORIDE 25 MG/1
25 TABLET, FILM COATED ORAL EVERY 4 HOURS PRN
Qty: 60 TABLET | Refills: 1 | Status: SHIPPED | OUTPATIENT
Start: 2021-09-09

## 2021-09-09 RX ORDER — HALOPERIDOL 10 MG/1
10 TABLET ORAL AT BEDTIME
Qty: 30 TABLET | Refills: 1 | Status: SHIPPED | OUTPATIENT
Start: 2021-09-09

## 2021-09-09 RX ORDER — DIPHENHYDRAMINE HCL 50 MG
50 CAPSULE ORAL AT BEDTIME
Qty: 30 CAPSULE | Refills: 1 | Status: SHIPPED | OUTPATIENT
Start: 2021-09-09

## 2021-09-09 RX ADMIN — DIPHENHYDRAMINE HYDROCHLORIDE 50 MG: 50 CAPSULE ORAL at 22:41

## 2021-09-09 RX ADMIN — Medication 5 MG: at 20:15

## 2021-09-09 RX ADMIN — BENZTROPINE MESYLATE 1 MG: 1 TABLET ORAL at 20:15

## 2021-09-09 RX ADMIN — GABAPENTIN 100 MG: 100 CAPSULE ORAL at 21:39

## 2021-09-09 RX ADMIN — Medication 25 MCG: at 07:57

## 2021-09-09 RX ADMIN — BENZTROPINE MESYLATE 1 MG: 1 TABLET ORAL at 07:57

## 2021-09-09 RX ADMIN — HALOPERIDOL 10 MG: 10 TABLET ORAL at 20:15

## 2021-09-09 ASSESSMENT — ACTIVITIES OF DAILY LIVING (ADL)
ORAL_HYGIENE: INDEPENDENT
HYGIENE/GROOMING: INDEPENDENT
DRESS: SCRUBS (BEHAVIORAL HEALTH)
LAUNDRY: WITH SUPERVISION

## 2021-09-09 ASSESSMENT — MIFFLIN-ST. JEOR: SCORE: 1736.98

## 2021-09-09 NOTE — PLAN OF CARE
" Assessment    Patient is alert and oriented, calm and cooperative. Patient's affect is blunted and flat. Speech is Normal. Patient's insight is Fair. Patient is Neatly groomed, he showered this evening.  Patient has been actively participating in group, pacing the halls, watching TV, and social with peers.The patient voices their needs appropriately. Patient endorses auditory hallucinations and anxiety, but denied  depression, SI,SIB, racing thoughts, and HI. He stated, \" I can here them a little, they are saying things that don't make sense.\" He received PRN Gabapentin 100mg for anxiety. Patient has been eating, hydrating, and sleeping adequately. Patient is medication compliant, he needed a reminder to take his medication tonight. Medication side effects were not observed or reported this shift.From what writer could assess, patient's skin is intact, free from injuries or open sores. Plan is to continue to monitor patient status q 15 mins, assess response to medications, and maintain the patients safety.    Vital signs are stable  Denied pain    "

## 2021-09-09 NOTE — PLAN OF CARE
Patient is in and out out of his room Present in the milieu. He is social with peers. He is calm and cooperative with staff. Affect is flat and blunted. Patient endorses auditory hallucination but reports they are not commanding voices. He denies all other MH symptoms. He is medication compliant. No side effects noted from medications. Eating and sleeping okay. Patient has a phone interview with [facility] and plans to discharge on Monday. He has no concerns at this time. Staff will continue to monitor.    1445. Patient was seen by IM. He has an order for Covid test but he declined the test.

## 2021-09-09 NOTE — CONSULTS
[facility]  Consult Note - Hospitalist Service       Date of Admission:  8/4/2021    Consult Requested by: Psychiatry  Reason for Consult: IRTS placement physical exam       ASSESSMENT & PLAN:         Jerel Day is a 25 year old male with history of schizophrenia admitted to inpatient psychiatry with acute psychosis.     From a medical perspective, history inpatient stay was uncomplicated. He had several episodes of atypical chest pain, which I suspect was secondary to anxiety. EKG was negative for any acute ischemic changes however patient declined blood draw for cardiac enzymes.     See physical exam below. No acute medical concerns at the time of this note.       The patient's care was discussed with the Bedside Nurse and Primary team (via note).    Panda Bro PA-C  [facility]  Securely message with the Vocera Web Console (learn more here)  Text page via [employer] Paging/Directory      Clinically Significant Risk Factors Present on Admission                ______________________________________________________________________    History of Present Illness   Jerel Day is a 25 year old male with history of schizophrenia who presented with auditory hallucinations, disorganized behavior, and delusional thoughts. He is now being evaluated for placement at [facility]. His hospital stay has been uncomplicated. He has no significant past medical history. Vitals have remained stable. He was evaluated by the medicine team on 8/30 and 9/2 for atypical chest pain. Patient refused cardiac enzymes. EKG showed diffuse J point elevation but no ST elevation or depression or T wave abnormalities concerning for ischemia. Chest pain was felt to be d/t anxiety.     At present, patient denies any acute complaints. No recurrent chest pain.     Review of Systems   The 10 point Review of Systems is negative other than noted in the HPI or  here.     Past Medical History    I have reviewed this patient's medical history and updated it with pertinent information if needed.   Past Medical History:   Diagnosis Date     Anxiety      Arthritis      Depressive disorder      Gastroesophageal reflux disease      Head injury      Problem, psychiatric      Schizophrenia (H)        Past Surgical History   I have reviewed this patient's surgical history and updated it with pertinent information if needed.  History reviewed. No pertinent surgical history.    Social History   I have reviewed this patient's social history and updated it with pertinent information if needed.  Social History     Tobacco Use     Smoking status: Current Every Day Smoker     Packs/day: 0.25     Years: 1.00     Pack years: 0.25     Types: Cigarettes     Smokeless tobacco: Never Used   Substance Use Topics     Alcohol use: Yes     Comment: socially     Drug use: Yes     Types: Marijuana     Comment: last used yesterday       Family History     Notable for schizophrenia, depression, and anxiety.     Medications   I have reviewed this patient's current medications    Allergies   Allergies   Allergen Reactions     Penicillins      Rash, Generalized       Physical Exam   Vital Signs: Temp: 97.5  F (36.4  C) Temp src: Oral BP: 120/81 Pulse: 84   Resp: 16 SpO2: 96 % O2 Device: None (Room air)    Weight: 166 lbs 12.8 oz    GENERAL:  Well developed male. Appears stated age. Awake. Alert. NAD. Flat affect.  HEENT:  Unremarkable.  CV:  RRR. S1, S2. No murmur.   PULM:  Normal effort. CTAB. No wheezing, rhonchi or rales.  GI:  Abdomen soft, non-distended. Active bowel sounds. No tenderness, guarding, or rebound. No mass or HSM.    NEURO:  Grossly non-focal. Moves all extremities.    EXTREMITIES:  No peripheral edema. No calf tenderness.   SKIN:  Dry. No visible rash.     Data   No labs this admission

## 2021-09-09 NOTE — PLAN OF CARE
Problem: OT General Care Plan  Goal: OT Goal 1  Description: Pt will practice using >2 coping strategies to manage stress and reduce symptoms to demonstrate increased readiness for discharge.     OT Groups Attended: Clinic     Affect/Hygiene/Presentation: Calm/pleasant, engaged, congruent affect. No apparent hygiene concerns.     Patient Performance/Response: Pt actively participated in occupational therapy clinic with 4-5 patients for 60 minutes. Pt continues to demonstrate adequate performance skills and patterns during participation in occupational therapy clinic groups. See prior notes for further details.     Plan: Pt will be encouraged to maintain group attendance for continued ongoing assessment and support in completion of occupational therapy treatment goals.     Outcome: Improving

## 2021-09-09 NOTE — PLAN OF CARE
Assessment/Intervention/Current Symtoms and Care Coordination  The patient is calm, fully cooperative and anxious to be discharged to an IRTS.  He is frustrated by the lack of communication we have had with [facility] Clinical Director Shelton but he has been out unexpectedly the last two days but staff report he is back in the office today.  Left Shelton a message asking him to please call (as he promised to do 2 days ago) because the patient and the hospital are ready to discharge him but he needs this placement in the IRTS.      Clinical Director Shelton called back and apologized for the delay as he was unexpectedly detained.  He is setting up a time with his Staff Therapist Jamil to call and interview Jerel (a formality at this point) and he is including this appointment time today along with their medical paperwork which needs to be completed.  Jerel can be PDd to [facility] on Monday, Sept 13th TBD.  Awaiting the paperwork.  Updated Jerel about this.     Paperwork here and given to psychiatrist.  Jerel will have his phone interview with Jamil from [facility] at 1pm      Discharge Plan or Goal  Patient will be PDd to an IRTS and resume care with his ReEntry ACT Team    Barriers to Discharge   Waiting for IRTS placement    Referral Status  Done    Legal Status  MI Commitment w/Eduardo

## 2021-09-09 NOTE — PROGRESS NOTES
"[facility], [place]   Psychiatric Progress Note        Interim History:   The patient's care was discussed with the treatment team during the daily team meeting and/or staff's chart notes were reviewed.  Staff report patient is calm, fully cooperative. Pt is med compliant and denies SI/SIB.    Met with patient: Pt is calm and reports that he is in a lower mood today. Pt has a blunted affect. Pt reports having both auditory and visual hallucinations. According to pt, the hallucinations tend to be worse at night. Pt denies any auditory commands to do harm to self or others. He shares that the AH make it difficult for him to meditate and he finds them irritating. Pt also shares he's been experiencing nightmares. Pt was offered to an increase in his haldol dose at night time. Pt firmly declined, despite our attempts to explain to him that increase in Auditory hallucinations could be connected with decrease in his oral Haldol dose. Otherwise pt said the he feels generally safe. Pt is aware and understanding of his upcoming discharge plans. Pt is med compliant and denies adverse drug reactions. Pt denies any SI/SIB/HI.          Medications:       benztropine  1 mg Oral BID     haloperidol  10 mg Oral At Bedtime     [START ON 9/15/2021] haloperidol decanoate  75 mg Intramuscular Q30 Days     lidocaine   Transdermal Q8H     melatonin  5 mg Oral At Bedtime     Vitamin D3  25 mcg Oral Daily          Allergies:     Allergies   Allergen Reactions     Penicillins      Rash, Generalized          Labs:   No results found for this or any previous visit (from the past 24 hour(s)).       Psychiatric Examination:     /81   Pulse 84   Temp 97.5  F (36.4  C) (Oral)   Resp 16   Ht 1.753 m (5' 9\")   Wt 75.7 kg (166 lb 12.8 oz)   SpO2 96%   BMI 24.63 kg/m    Weight is 166 lbs 12.8 oz  Body mass index is 24.63 kg/m .  Orthostatic Vitals       Most Recent      Sitting Orthostatic /79 09/09 0821 "    Sitting Orthostatic Pulse (bpm) 84 09/09 0821    Standing Orthostatic /63 09/09 0821     will  Will start on Ibuprofen. No other med change.               Appearance: awake, alert and adequately groomed, growing beard  Attitude:  cooperative  Eye Contact:  fair  Mood:  better  Affect:  intensity is blunted  Speech:  decreased prosody  Psychomotor Behavior:  no evidence of tardive dyskinesia, dystonia, or tics  Throught Process:  logical and goal oriented  Associations:  no loose associations  Thought Content:  auditory hallucinations present and visual hallucinations present; he appears to have some insight into them.  Insight:  partial  Judgement:  fair  Oriented to:  time, person, and place  Attention Span and Concentration:  intact  Recent and Remote Memory:  intact    Clinical Global Impressions    First: 5/4 9/9/2021      Most recent:            Precautions:     Behavioral Orders   Procedures     Assault precautions     Cheeking Precautions (behavioral units)     Code 1 - Restrict to Unit     Elopement precautions     Routine Programming     As clinically indicated     Sexual precautions     Status 15     Every 15 minutes.          DIagnoses:   Schizophrenia, paranoid type, chronic  Schizophrenia, paranoid type, chronic       Plan:      No med changes. Pt is set up for discharge to  residence on Monday.

## 2021-09-09 NOTE — PLAN OF CARE
Problem: Sleep Disturbance  Goal: Adequate Sleep/Rest  Outcome: No Change          Pt in bed at beginning of shift, breathing quiet and unlabored. Pt slept through shift. Pt slept 7 hours.      No pt complaints or concerns at this time.      No PRNs given. Will continue to monitor.     Precautions: Assault, Elopement, Cheeking, Sexual.

## 2021-09-10 PROCEDURE — 250N000013 HC RX MED GY IP 250 OP 250 PS 637: Performed by: PSYCHIATRY & NEUROLOGY

## 2021-09-10 PROCEDURE — 99232 SBSQ HOSP IP/OBS MODERATE 35: CPT | Performed by: PSYCHIATRY & NEUROLOGY

## 2021-09-10 PROCEDURE — 124N000002 HC R&B MH UMMC

## 2021-09-10 RX ADMIN — HALOPERIDOL 10 MG: 10 TABLET ORAL at 20:04

## 2021-09-10 RX ADMIN — Medication 5 MG: at 20:04

## 2021-09-10 RX ADMIN — Medication 25 MCG: at 08:16

## 2021-09-10 RX ADMIN — BENZTROPINE MESYLATE 1 MG: 1 TABLET ORAL at 20:04

## 2021-09-10 RX ADMIN — BENZTROPINE MESYLATE 1 MG: 1 TABLET ORAL at 08:16

## 2021-09-10 ASSESSMENT — ACTIVITIES OF DAILY LIVING (ADL)
HYGIENE/GROOMING: INDEPENDENT
HYGIENE/GROOMING: INDEPENDENT
DRESS: INDEPENDENT
LAUNDRY: WITH SUPERVISION
ORAL_HYGIENE: INDEPENDENT
DRESS: INDEPENDENT
ORAL_HYGIENE: INDEPENDENT

## 2021-09-10 NOTE — PLAN OF CARE
"Pt has a flat blunted affect with a calm mood. Pt didn't attend groups. Pts speech is mono tone. VSS. Pt rates anxiety at 4/10 and depression 5/10. Pt reports no SI/HI and contracts for safety. Pt endorses auditory hallucinations. When asked what the voices were saying pt stated \"things that make me irate\". No assaultive behaviors noted this shift. Pt was medication compliant with no cheeking noted. No reported or observed medication side effects. Pt was visible on unit but withdrawn to self. Pt is eager to discharge to IRTS on Monday stating \"I wish I could go their today and not stay here another weekend\". Pt reported difficulty sleeping and provider was updated during team meeting. Continue current POC.  "

## 2021-09-10 NOTE — PLAN OF CARE
BEHAVIORAL TEAM DISCUSSION    Participants: Dr. Keyon Tran; [facility]; Ervin Ferreira RN  Progress: Patient compliant with all care.  Taking prescribed medications without issue.  Attending therapeutic programming. Looking forward to going to the IRTS.  Anticipated length of stay: 4 more days  Continued Stay Criteria/Rationale: [facility] w/Eduardo Rivera  Medical/Physical: None  Precautions:   Behavioral Orders   Procedures     Assault precautions     Cheeking Precautions (behavioral units)     Code 1 - Restrict to Unit     Elopement precautions     Routine Programming     As clinically indicated     Sexual precautions     Status 15     Every 15 minutes.     Plan: The patient has been accepted by [facility] IRTS for admit on Monday, 8/13 and will be cabbed over to the facility.  Medications sent to their outpatient pharmacy [facility].  Parents aware of the disposition.  ReEntry RN notified he will be discharged as well and she will alert her team.    Rationale for change in precautions or plan: No changes

## 2021-09-10 NOTE — PROGRESS NOTES
"[facility], [place]   Psychiatric Progress Note        Interim History:   The patient's care was discussed with the treatment team during the daily team meeting and/or staff's chart notes were reviewed.  Staff report patient is calm, fully cooperative. Pt is med compliant and denies SI/SIB. He was seen by IM yesterday who did his physical for IRTS placement.     Met with patient: Pt was initially calm, then, more irritable. Said that he still didn't believe that there was a need for him to be hospitalized and spend more than one month at this hospital, there was no need for him to be committed and jarvised. Stated repeatedly that he should have a right to refuse injectable medication and asked us about that. We informed Jerel that he should talk to his outpatient provider about all med changes and reminded him that he was court ordered to take neuroleptics. Jerel clearly didn't like that suggestion,. He, then, asked us if he could leave today and we told him that his discharge and admission to [facility] will take place on coming Monday and not today. When asked about Auditory hallucinations, Jerel described them as more irritating, said they didn't bother him as much, though.he denied presence of Suicidal ideation, Homicidal thoughts and had no further questions or concerns.          Medications:       benztropine  1 mg Oral BID     haloperidol  10 mg Oral At Bedtime     [START ON 9/15/2021] haloperidol decanoate  75 mg Intramuscular Q30 Days     lidocaine   Transdermal Q8H     melatonin  5 mg Oral At Bedtime     Vitamin D3  25 mcg Oral Daily          Allergies:     Allergies   Allergen Reactions     Penicillins      Rash, Generalized          Labs:   No results found for this or any previous visit (from the past 24 hour(s)).       Psychiatric Examination:     /81   Pulse 84   Temp 98  F (36.7  C) (Tympanic)   Resp 16   Ht 1.753 m (5' 9\")   Wt 75.7 kg (166 lb 12.8 oz)  "  SpO2 96%   BMI 24.63 kg/m    Weight is 166 lbs 12.8 oz  Body mass index is 24.63 kg/m .  Orthostatic Vitals       Most Recent      Sitting Orthostatic /79 09/09 0821    Sitting Orthostatic Pulse (bpm) 84 09/09 0821    Standing Orthostatic /63 09/09 0821     will  Will start on Ibuprofen. No other med change.               Appearance: awake, alert and adequately groomed, growing beard  Attitude:  cooperative  Eye Contact:  fair  Mood:  better  Affect:  intensity is blunted  Speech:  decreased prosody  Psychomotor Behavior:  no evidence of tardive dyskinesia, dystonia, or tics  Throught Process:  logical and goal oriented  Associations:  no loose associations  Thought Content:  auditory hallucinations present and visual hallucinations present; he appears to have some insight into them.  Insight:  partial  Judgement:  fair  Oriented to:  time, person, and place  Attention Span and Concentration:  intact  Recent and Remote Memory:  intact    Clinical Global Impressions    First: 5/4 9/9/2021      Most recent:            Precautions:     Behavioral Orders   Procedures     Assault precautions     Cheeking Precautions (behavioral units)     Code 1 - Restrict to Unit     Elopement precautions     Routine Programming     As clinically indicated     Sexual precautions     Status 15     Every 15 minutes.          DIagnoses:   Schizophrenia, paranoid type, chronic  Schizophrenia, paranoid type, chronic       Plan:      No med changes. Pt is set up for discharge to  residence on Monday.

## 2021-09-10 NOTE — PLAN OF CARE
"Patient has been calm and cooperative this shift. He presents with a blunted affect. He has been isolative to his room. He came out for dinner. He denies all mental health symptoms. He stated that his \"mood is good.\" He is medication compliant and he has refused the covid test. Vitals are WNL.  "

## 2021-09-11 PROCEDURE — 250N000013 HC RX MED GY IP 250 OP 250 PS 637: Performed by: PSYCHIATRY & NEUROLOGY

## 2021-09-11 PROCEDURE — 124N000002 HC R&B MH UMMC

## 2021-09-11 RX ADMIN — Medication 5 MG: at 19:55

## 2021-09-11 RX ADMIN — BENZTROPINE MESYLATE 1 MG: 1 TABLET ORAL at 19:55

## 2021-09-11 RX ADMIN — BENZTROPINE MESYLATE 1 MG: 1 TABLET ORAL at 07:58

## 2021-09-11 RX ADMIN — Medication 25 MCG: at 07:58

## 2021-09-11 RX ADMIN — HALOPERIDOL 10 MG: 10 TABLET ORAL at 19:55

## 2021-09-11 ASSESSMENT — ACTIVITIES OF DAILY LIVING (ADL)
HYGIENE/GROOMING: INDEPENDENT
DRESS: INDEPENDENT
ORAL_HYGIENE: INDEPENDENT
DRESS: INDEPENDENT
ORAL_HYGIENE: INDEPENDENT
HYGIENE/GROOMING: INDEPENDENT

## 2021-09-11 NOTE — PLAN OF CARE
Nursing Assessment    Recent Vitals: B/P: 119/82, T: 98.6, P: 76    General Shift Summary  Patient has been visible in the milieu, social with peers, interactions have been appropriate. He presents with a blunted affect however is calm and cooperative. Patient does stare however doesn't appear to be responding to AVH. He denied AVH/SI/SIB/HI, anxiety, and depression. Hygiene is good. He is eating well. Incite and judgement are fair.    Patient is medication compliant, no voiced side effects. Denied pain.    Plan is discharge patient on Monday.    Madina Lane, FRANNIE MSN

## 2021-09-11 NOTE — PLAN OF CARE
Pt has a flat blunted affect with a calm mood. Pt didn't attend groups. VSS. Pt rates anxiety at 0/10 and depression 4/10. Pt reports no SI/HI and contracts for safety. Pt denies any hallucinations. No assaultive behaviors noted this shift. Pt was medication compliant with no cheeking noted. No reported or observed medication side effects. Pt was visible on unit and social with select peers. Pt played the guitar in the milieu for a while. Pt reported he slept better last night. Continue current POC.

## 2021-09-12 PROCEDURE — 250N000013 HC RX MED GY IP 250 OP 250 PS 637: Performed by: PSYCHIATRY & NEUROLOGY

## 2021-09-12 PROCEDURE — 124N000002 HC R&B MH UMMC

## 2021-09-12 RX ADMIN — GABAPENTIN 100 MG: 100 CAPSULE ORAL at 17:27

## 2021-09-12 RX ADMIN — Medication 25 MCG: at 08:06

## 2021-09-12 RX ADMIN — Medication 5 MG: at 20:03

## 2021-09-12 RX ADMIN — BENZTROPINE MESYLATE 1 MG: 1 TABLET ORAL at 08:06

## 2021-09-12 RX ADMIN — LIDOCAINE 1 PATCH: 246 PATCH TOPICAL at 20:03

## 2021-09-12 RX ADMIN — BENZTROPINE MESYLATE 1 MG: 1 TABLET ORAL at 20:03

## 2021-09-12 RX ADMIN — HALOPERIDOL 10 MG: 10 TABLET ORAL at 20:03

## 2021-09-12 ASSESSMENT — ACTIVITIES OF DAILY LIVING (ADL)
ORAL_HYGIENE: INDEPENDENT
DRESS: INDEPENDENT
DRESS: SCRUBS (BEHAVIORAL HEALTH);INDEPENDENT
HYGIENE/GROOMING: SHOWER;INDEPENDENT
ORAL_HYGIENE: INDEPENDENT
LAUNDRY: WITH SUPERVISION
HYGIENE/GROOMING: INDEPENDENT

## 2021-09-12 NOTE — PLAN OF CARE
Pt has a flat blunted affect with a depressed mood. Pt refused VS this shift x's 2.. Pt rates anxiety at 4/10 and depression 3/10. Pt reports no SI/HI and contracts for safety. Pt denies any hallucinations. No assaultive behaviors noted this shift. Pt was medication compliant with no cheeking noted. No reported or observed medication side effects. Pt was visible on unit and social with select peers. Continue current POC.

## 2021-09-12 NOTE — PLAN OF CARE
"  Problem: Adult Inpatient Plan of Care  Goal: Plan of Care Review  Recent Flowsheet Documentation  Taken 9/12/2021 1700 by Chanel Trejo, RN  Plan of Care Reviewed With: patient  Progress: no change  Taken 9/12/2021 1613 by Chanel Trejo, RN  Plan of Care Reviewed With: patient     Patient was visible in the milieu, walking around the unit at start of the shift. Ate snacks at 1600. Flat and blunted affect. Stated he is 'positively anxious' because he is excited to be discharged tomorrow. He said he still wanted to go home instead of going to an IRTS. Worried about the other peers he will meet at the IRTS. Stated, \"I hope they are nice people.\" Provided reassurance to pt.  He denied SI, SIB, hallucinations and HI. No complaints of pain. He requested for towels and then took a shower at 1700. Later, approached writer, asking for a PRN medication for anxiety. PRN Gabapentin 100 mg was given at 1727 which is effective. Ate well at supper. Watched TV for a while. Then requested to take his medications. He took them all at 2003 without issue. PRN Lidocaine patch was applied to his mid lower back for complaints of pain. He is sleeping at this time at 2135. No further concerns noted.   "

## 2021-09-12 NOTE — PLAN OF CARE
Pt has a flat blunted affect with a calm mood. Pt refused VS. Pt rates anxiety at 0/10 and depression 2/10. Pt reports no SI/HI and contracts for safety. Pt denies any hallucinations. No assaultive behaviors noted this shift. Pt was medication compliant with no cheeking noted. No reported or observed medication side effects. Pt was visible on unit and social with select peers. Continue current POC.

## 2021-09-12 NOTE — PLAN OF CARE
NOC Shift Report     Pt in bed at beginning of shift, breathing quiet and unlabored. Pt slept through shift. Pt slept 6.75 hours.      No pt complaints or concerns at this time.      No PRNs given. Will continue to monitor.    Kiko Garay RN

## 2021-09-13 VITALS
HEART RATE: 67 BPM | DIASTOLIC BLOOD PRESSURE: 78 MMHG | RESPIRATION RATE: 16 BRPM | OXYGEN SATURATION: 97 % | TEMPERATURE: 97.8 F | WEIGHT: 166.8 LBS | HEIGHT: 69 IN | BODY MASS INDEX: 24.71 KG/M2 | SYSTOLIC BLOOD PRESSURE: 121 MMHG

## 2021-09-13 PROCEDURE — 250N000013 HC RX MED GY IP 250 OP 250 PS 637: Performed by: PSYCHIATRY & NEUROLOGY

## 2021-09-13 PROCEDURE — 99239 HOSP IP/OBS DSCHRG MGMT >30: CPT | Performed by: PSYCHIATRY & NEUROLOGY

## 2021-09-13 RX ADMIN — Medication 25 MCG: at 07:50

## 2021-09-13 RX ADMIN — BENZTROPINE MESYLATE 1 MG: 1 TABLET ORAL at 07:50

## 2021-09-13 ASSESSMENT — ACTIVITIES OF DAILY LIVING (ADL)
DRESS: INDEPENDENT
HYGIENE/GROOMING: INDEPENDENT
ORAL_HYGIENE: INDEPENDENT

## 2021-09-13 NOTE — PLAN OF CARE
Patient discharged from station 10 to IR at 0955. Adult suicide risk completed. Pt vebalized understanding of all discharge instructions and medications. Medications sent to [facility]. Pt left with all personal belongings. Patient denies any SI and contracts for safety. Pt escorted out by psych associate.

## 2021-09-13 NOTE — DISCHARGE INSTRUCTIONS
Behavioral Discharge Planning and Instructions    Summary: You were admitted on 8/4/2021  due to agitation and psychosis..  You were treated by Dr. Keyon Tran and Provisionally Discharged on 9/13/2021 from [facility] to [address] [place]  [address] with transport by cab.    You are currently committed as mentally ill in [facility] and there is a [facility] Order in place through December 29, 2021.   It is important that you follow all instructions on your Provisional Discharge, taking all medications as prescribed, staying in touch with your ReEntry Act Team and enter an IRTS facility for structure and medication management     Main Diagnosis:  Schizophrenia, Paranoid Type    Health Care Follow-up:   ReEntry ACT Team [phone]  Psychiatrists Dr. Loc Tracy and Resident Dr. Norberto Salguero  RN Case Manager Noemy Leigh [phone]    Pharmacist for [facility] is [facility]      Attend all scheduled appointments with your outpatient providers. Call at least 24 hours in advance if you need to reschedule an appointment to ensure continued access to your outpatient providers.     Major Treatments, Procedures and Findings:  You were provided with: a psychiatric assessment, assessed for medical stability, medication evaluation and/or management, group therapy and milieu management    Symptoms to Report: feeling more aggressive, increased confusion, losing more sleep, mood getting worse or thoughts of suicide    Early warning signs can include: increased depression or anxiety sleep disturbances increased thoughts or behaviors of suicide or self-harm  increased unusual thinking, such as paranoia or hearing voices    Safety and Wellness:  Take all medicines as directed.  Make no changes unless your doctor suggests them.      Follow treatment recommendations.  Refrain from alcohol and non-prescribed drugs.  If there is a concern for  safety, call 911.    Resources:   [place] on Mental Illness (www.[place].johnson.org): 598.861.5104 or 876-351-4688.  [facility] Crisis (COPE) Response - Adult [phone]    General Medication Instructions:   See your medication sheet(s) for instructions.   Take all medicines as directed.  Make no changes unless your doctor suggests them.   Go to all your doctor visits.  Be sure to have all your required lab tests. This way, your medicines can be refilled on time.  Do not use any drugs not prescribed by your doctor.  Avoid alcohol.    Advance Directives:   Scanned document on file with [employer]? No scanned doc  Is document scanned?    Honoring Choices Your Rights Handout:    Was more information offered?      The Treatment team has appreciated the opportunity to work with you. If you have any questions or concerns about your recent admission, you can contact the unit which can receive your call 24 hours a day, 7 days a week. They will be able to get in touch with a Provider if needed. The unit number is [phone]

## 2022-02-06 ENCOUNTER — NURSE TRIAGE (OUTPATIENT)
Dept: NURSING | Facility: CLINIC | Age: 27
End: 2022-02-06
Payer: COMMERCIAL

## 2022-02-06 NOTE — TELEPHONE ENCOUNTER
"Triage call:     Patient is calling and is unable to comprehend. He is not speaking in complete sentences on the call. Unable to determine reason for call. He does not report voices, hallucinations or SI. He does not appear violent and is speaking calmly throughout the call.   He is alert and oriented x3 but does not speak in clear sentences.   He is unable to answer questions appropriately.     \"im wearing my black pumas. Let me remember with a little less sarcasm.\"     Offered to call mother or family member. He does not want his mother called and does not report anyone else at home with him.   Call disconneccted.   Call to 911 for Crisis team for well check on patient.     Tonya Chapman RN   02/06/22 12:24 PM  [facility] Nurse Advisor    Reason for Disposition    [1] Patient is very confused (disoriented, slurred speech) AND [2] no other adult (e.g., friend or family member) available    Additional Information    Negative: Patient attempted suicide    Negative: Patient is threatening suicide now    Negative: Hearing voices that are telling patient to commit suicide now    Negative: Violent behavior, or threatening to physically hurt or kill someone    Negative: Hearing voices that are telling patient to kill a specific person    Negative: [1] Schizophrenia AND [2] unable to do any of normal activities (e.g., self care, school, work; in comparison to baseline).    Protocols used: SCHIZOPHRENIA-A-AH    COVID 19 Nurse Triage Plan/Patient Instructions    Please be aware that novel coronavirus (COVID-19) may be circulating in the community. If you develop symptoms such as fever, cough, or SOB or if you have concerns about the presence of another infection including coronavirus (COVID-19), please contact your health care provider or visit [email or web address].     Disposition/Instructions    Call to EMS/911 recommended. Follow protocol based instructions.     Bring Your Own Device:  Please also bring " your smart device(s) (smart phones, tablets, laptops) and their charging cables for your personal use and to communicate with your care team during your visit.    Thank you for taking steps to prevent the spread of this virus.  o Limit your contact with others.  o Wear a simple mask to cover your cough.  o Wash your hands well and often.    Resources    [facility]: About COVID-19: [email or web address]    CDC: What to Do If You're Sick: www.cdc.gov/coronavirus/2019-ncov/about/steps-when-sick.html    CDC: Ending Home Isolation: www.cdc.gov/coronavirus/2019-ncov/hcp/disposition-in-home-patients.html     CDC: Caring for Someone: www.cdc.gov/coronavirus/2019-ncov/if-you-are-sick/care-for-someone.html     [facility]: Interim Guidance for Hospital Discharge to Home: www.[facility].[place]/diseases/coronavirus/hcp/hospdischarge.pdf    [facility] clinical trials (COVID-19 research studies): clinicalaffairs.[facility]-clinical-trials     Below are the COVID-19 hotlines at the [place] [facility] ([facility]). Interpreters are available.   o For health questions: Call 214-499-2270 or 1-177.293.4337 (7 a.m. to 7 p.m.)  o For questions about schools and childcare: Call 889-657-8362 or 1-866.422.5094 (7 a.m. to 7 p.m.)
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
## 2022-09-16 NOTE — LETTER
Date:February 2, 2018      Patient was self referred, no letter generated. Do not send.        [facility] Information       *Would like to discuss chronic fatigue.    *Pt is taking Metformin 500 mg twice a day and 0.5 mg Ozempic weekly.    *Questions about Lipids  Low HDL, high Triglycerides - previous PCP discussed starting a fibrate to treat - is open to this or other options.

## 2023-01-23 ENCOUNTER — HEALTH MAINTENANCE LETTER (OUTPATIENT)
Age: 28
End: 2023-01-23

## 2023-01-27 NOTE — PLAN OF CARE
Pt was isolative and withdrawn to his room reading for most of the shift. He came out for meals and to receive medications. Pt's affect was blunted/flat and his mood was calm and at times depressed. Pt endorsed 5/10 depression. Pt denied SI, SIB, AVH. Pt received a lidocaine patch at 17:15 for 5/10 pain on his upper back, side, and chest. Pt denied SOB, pain when breathing, and said it felt more muscular and possibly related to anxiety. Lidocaine patch appeared effective he was later sleeping in his room. Pt ate most of his dinner and had several snacks. Pt's reports difficulty falling asleep and attributes it laying in bed for most of the day. Writer encouraged him to spend more time out of his room and discussed sleep hygiene, he seemed  somewhat receptive to education. Pt was medication compliant and did not endorse any side effects to medications.    [place] Counseling: I discussed with the patient the risks of tildrakizumab including but not limited to immunosuppression, malignancy, posterior leukoencephalopathy syndrome, and serious infections.  The patient understands that monitoring is required including a PPD at baseline and must alert us or the primary physician if symptoms of infection or other concerning signs are noted.

## 2023-03-13 NOTE — PROGRESS NOTES
Here for follow-up for numerous medical problems and annual physical.  His blood pressure is still elevated. Discussed management to increase amlodipine to 10 mg daily change lisinopril hydrochlorothiazide to lisinopril 20 mg and chlorthalidone metoprolol. Stressed compliance with medication. Left ankle foot pain has orthopedics follow-up as able to gout however better. He has cut down on drinking alcohol. No smoking alcohol or drug abuse at present. Colonoscopy pending. No significant weight change BMI 32 obesity. He did not take flu shot recommended every she has. Review of Systems   Constitutional:  Negative for activity change, appetite change, chills, diaphoresis, fatigue, fever and unexpected weight change. HENT:  Negative for congestion, ear pain, hearing loss, nosebleeds, rhinorrhea, sinus pressure, sinus pain, sore throat, trouble swallowing and voice change. Eyes:  Negative for photophobia, pain, discharge, redness and visual disturbance. Respiratory:  Negative for cough, choking, chest tightness, shortness of breath and wheezing. Cardiovascular:  Negative for chest pain, palpitations and leg swelling. Gastrointestinal:  Negative for abdominal distention, abdominal pain, blood in stool, constipation, diarrhea, nausea and vomiting. Endocrine: Negative for cold intolerance, heat intolerance, polydipsia, polyphagia and polyuria. Genitourinary:  Negative for difficulty urinating, dysuria, frequency, genital sores, hematuria, penile discharge, penile pain, penile swelling, scrotal swelling and urgency. Musculoskeletal:  Positive for arthralgias. Negative for back pain, gait problem, joint swelling, myalgias and neck pain. Chronic left ankle foot pain and some swelling has orthopedics follow-up with pending evaluated   Skin:  Negative for color change and rash. Allergic/Immunologic: Negative for environmental allergies and food allergies.    Neurological:  Negative for LILLIANA SEE Incoming Telephone Call  For Supported Employment & Education    NAVIGATE Enrollee: Jerel Day (1995)     MRN: [id number]  Incoming Call Received on: 2/5/19  Call Received from: Mesha Day     SEE's response to incoming call:   Mesha had called and left this writer a vm at 9am and this writer returned her call at 2pm. Mesha reported to this writer that Jerel's friend had called her with some concerns and she would be interested in assisting help Jerel get additional supports at home. Mesha reports that Jerel's friend called Mesha and informed her that he had been in [place] and was concerned about the state of his apartment. He reports that Jerel had cooked a yost of beans and had left it on the counter for at least several days and proceeded to eat them. The friend said he was worried Jerel would leave the stove on for long periods of time as well. This friend also witnessed Jerel attempting to brush his teeth but had put anti-fungal cream on the toothbrush.   Mesha also reported that she and Jerel's friend are concerned with the amount of marijuana he has been smoking. This writer offered to inform his  and put in a request for additional services and potentially apply for social security as well as waivered services or [facility] Funding.   Mesha informed this writer that [place] apartment will be demolished and he will need to move by March. Mesha says the found him an apartment but it is very expensive. We also discussed the forms that the [facility] is requiring additional information regarding his disability as well as a letter from Jerel (personal statement) for tuition reimbursement. Writer will f/u with  to put in request for [facility] case management services.     Ana Michel   dizziness, tremors, seizures, syncope, speech difficulty, weakness, numbness and headaches. Hematological:  Negative for adenopathy. Does not bruise/bleed easily. Psychiatric/Behavioral:  Negative for behavioral problems, confusion, decreased concentration, hallucinations, self-injury, sleep disturbance and suicidal ideas. The patient is not nervous/anxious. Physical Exam  Vitals and nursing note reviewed. Constitutional:       General: He is not in acute distress. Appearance: Normal appearance. He is obese. He is not ill-appearing, toxic-appearing or diaphoretic. HENT:      Head: Atraumatic. Right Ear: External ear normal.      Left Ear: External ear normal.      Nose: Nose normal. No congestion or rhinorrhea. Mouth/Throat:      Mouth: Mucous membranes are moist.      Pharynx: No oropharyngeal exudate or posterior oropharyngeal erythema. Eyes:      General: No scleral icterus. Right eye: No discharge. Left eye: No discharge. Extraocular Movements: Extraocular movements intact. Conjunctiva/sclera: Conjunctivae normal.      Pupils: Pupils are equal, round, and reactive to light. Cardiovascular:      Rate and Rhythm: Normal rate and regular rhythm. Pulses: Normal pulses. Heart sounds: Normal heart sounds. No murmur heard. No friction rub. Pulmonary:      Effort: Pulmonary effort is normal. No respiratory distress. Breath sounds: Normal breath sounds. No stridor. No wheezing, rhonchi or rales. Chest:      Chest wall: No tenderness. Abdominal:      General: Abdomen is flat. There is no distension. Palpations: Abdomen is soft. There is no mass. Tenderness: There is no abdominal tenderness. There is no right CVA tenderness, left CVA tenderness or guarding. Hernia: No hernia is present. Musculoskeletal:         General: No swelling, tenderness, deformity or signs of injury. Cervical back: Neck supple. No rigidity. Right lower leg: No edema. Left lower leg: No edema. Comments: Slight swelling left ankle has a boot and upcoming orthopedics appointment however functional status improved and he can does yard work   Skin:     Capillary Refill: Capillary refill takes less than 2 seconds. Findings: No bruising or erythema. Neurological:      General: No focal deficit present. Mental Status: He is alert and oriented to person, place, and time. Mental status is at baseline. Cranial Nerves: No cranial nerve deficit. Sensory: No sensory deficit. Motor: No weakness. Coordination: Coordination normal.      Gait: Gait normal.      Deep Tendon Reflexes: Reflexes normal.   Psychiatric:         Mood and Affect: Mood normal.         Behavior: Behavior normal.        1. Encounter for general adult medical examination with abnormal findings  Preventative care discussed including risk factor management for coronary artery disease optimize blood pressure    2. Uncontrolled hypertension  Discussed management to adjust her medications  - lisinopril (PRINIVIL;ZESTRIL) 20 MG tablet; Take 1 tablet by mouth daily  Dispense: 90 tablet; Refill: 5  - chlorthalidone (HYGROTON) 25 MG tablet; Take 1 tablet by mouth daily  Dispense: 90 tablet; Refill: 5    3. Hyperuricemia  Low purine diet continue allopurinol last uric acid 9 target less than 6  4. Chronic gout of left ankle, unspecified cause    Discussed management of gout  5. Mixed hyperlipidemia    Statins,  cholesterol lowering agents, simvastatin Lipitor pravastatin, has unequivocal evidence of decreased heart attack strokes, long-term benefits,  with very little risks,  side effects, in spite of all the  the negative publicity, strongly recommended, can reduce dose to half pill , not stop. If diabetic and CKD benefit of taking statins are profound, irrespective of baseline LDL , even if less than 70.   High intensity statin therapy is recommended inpatient with stable coronary artery disease history, irrespective of LDL level by American heart association [employer] of cardiology    6. Pre-diabetes  Type 2 diabetes is very common, obesity is the main reason for diabetes and  insulin resistance, most of the type 2 diabetes can be cured by weight management exercise. . Most type 2 diabetes has high insulin level  and high insulin level causes most of diabetic complications microvascular and macrovascular, damage to kidneys, eyes , cardiovascular , and neuropathy,, medications that correct insulin resistance such as metformin has been shown to decrease these complications by lowering insulin level and correcting insulin resistance. Frequent blood sugar checking is unnecessary    Frequent blood sugar checking is not necessity, normal person without diabetess fasting blood sugar is usually less than 105, after 3 -4 weeks of treatment, either diet alone, or diet and metformin, if fasting blood sugar less than 120, frequent BS checking is not necessary and continue diet exercise Metformin is enough. Starting metformin early and preventing diabetic complications. Exercise and weight management is most important    Adding insulin and continuing increasing dose,  not usually prevent diabetic complications .  Some newer medications that do not cause low BS, may help diabeted by supressing apetite and making pee sugar , may help loose weight ,  may be more beneficial when over weight, but are quite expensive and often not covered by insurance, long term benefits are not known, and do have lot of side effects and risks    High blood sugar less than 300 usually causes no symptoms and patient is unaware, of the diabetes, and causes a significant diabetic complications and #1 cause of losing legs , kidneys and eye sight and cardiovascular risk     Focusing on blood sugar does not prevent diabetic complication, but diet, exercise , weight management ,  metformin early on , do prevent diabetic complications    If insulin do become necessary, usually 30-40 unit long acting insulin taken bed time, with small frequent meals may be more beneficial, keeping fasting blood sugar less than 140, through diet , exercise, weight management and metformin- recommended as first line diabetic medication with GFR more than 30 by all most medical organizations, and need be continued with or without insulin. In normal weight persons BMI less than 25, may be insulin deficient and Insulin log acting usually less than 30 units may help , with or without metformin if fasting BS more than 140     7. Medication management      8. Alcohol use    Has stopped drinking  9. Class 1 obesity due to excess calories with serious comorbidity and body mass index (BMI) of 32.0 to 32.9 in adult  Diabetic teaching, diet, increase vegetables- at least 5 portions a day, roughly half plate, beans, whole grains, grilled or baked white meats , dairy products, exercise at least an hr - brisk walk aerobic of choice, No sugar/ suggary drinks including juice/ fats/ fried foods.  starch- bread/ potato/ rice. ( It takes roughly 40 minute of walk  To burn a 12 oz regular drink/ juice). Cutting out 12 oz drink a day equals to roughly 4 lb weight a yr! Decrease screen time- TV, Video, computer , cell phones- recommended less than 2 hrs a day     10. Erectile dysfunction, unspecified erectile dysfunction type    This is better continue Cialis     . Increase fruits vegetables, fiber, whole grains, beans, exercise, tree nuts, will decrease risk of heart attacks and strokes, may reduce cancer risks     once a day multivitamin such as Centrum silver store brand, due to benefit of folic acid vitamin D, has already mineral vitamin is recommended doses.   Multiple different vitamins not recommended may carry increased risk, including vitamin E, take foods rich in omega 3 and fibre, pills are not recommended, including omega 3 in high doses, may have increased risks   Ole Rehman MD

## 2023-05-16 NOTE — PROGRESS NOTES
01/22/20 2100   Behavioral Health   Hallucinations denies / not responding to hallucinations   Thinking distractable   Orientation person: oriented;place: oriented   Memory baseline memory   Insight insight appropriate to situation;insight appropriate to events   Judgement impaired   Eye Contact into space   Affect blunted, flat   Mood mood is calm   Physical Appearance/Attire appears stated age;attire appropriate to age and situation   Hygiene neglected grooming - unclean body, hair, teeth;body odor   Suicidality other (see comments)  (denies)   1. Wish to be Dead (Recent) No   Self Injury other (see comment)  (denies)   Elopement   (none stated observed)   Activity isolative;withdrawn   Speech coherent   Medication Sensitivity no stated side effects;no observed side effects   Psychomotor / Gait balanced;steady     Pt. Was isolative and withdrawn. He spent majority of the time in his room. He denied SI/SIB/HI. Patient endorsed auditory hallucinations. He was observed laughing to himself alone in his room this evening. He had a visitor and stated the visit went well. Patient denied SI/SIB/HI   Olumiant Pregnancy And Lactation Text: Based on animal studies, Olumiant may cause embryo-fetal harm when administered to pregnant women.  The medication should not be used in pregnancy.  Breastfeeding is not recommended during treatment.

## 2023-08-25 NOTE — PROGRESS NOTES
Participated in Music Therapy group with focus on mood elevation, validation and decreasing anxiety and improved group cohesiveness. Engaged and cooperative in music listening interventions.       Prep Survey      Flowsheet Row Responses   [facility] patient discharged from? [facility]   Is LACE score < 7 ? No   Eligibility [facility]   Date of Admission 08/22/23   Date of Discharge 08/25/23   Discharge Disposition Home or Self Care  [Pt homeless--living with  in car]   Discharge diagnosis COPD exacerbation   Does the patient have one of the following disease processes/diagnoses(primary or secondary)? COPD   Does the patient have Home health ordered? No   Is there a DME ordered? No   Comments regarding appointments new pcp appt   Medication alerts for this patient see avs for all meds--new meds ordered   General alerts for this patient see CM note   Prep survey completed? Yes            Melissa CHAIREZ - Registered Nurse

## 2023-08-26 NOTE — PLAN OF CARE
"Initial Psychosocial Assessment    I have reviewed the chart, met with the patient, and developed Care Plan.  Information for assessment was obtained from:     Patient: team interview, Chart: review of ED / DEC and historical encounters and Outside Providers: [facility] ACT Team     Presenting Problem:  patient is 25 year old male with a significant mental health history including diagnosis of primary psychotic disorder - admitted for assessment/ treatment of acute psychosis in the context of non medication adherence and cannabis use      Jerel met with the treatment team in his room, was pleasant and agreeable to talking. Overall presents as disorganized , psychotic.     He reports he is here because that he is experiencing a lot of \"tourment\" and is \"not sure how to swivel out of it...\" that he is \"trying to move through my Mashups slide and Misoca dance\". In regards to medication he says \"sometimes I don't take it to be honest..\" when asked further questions about why he doesn't take his medication at times, he says \"it makes me feel sticky and I can't formulate my move sets...\" -- \"rehersing bad thoughts into physical movements\"    Says \"felt like my medulla, ... I probably just have my oblongata on right now\"    It also appears that patient may have recently shaved his head and had left a small patch of hair on the back of his head.     History of Mental Health and Chemical Dependency:  Diagnosis of schizophrenia / with past diagnosis including schizoaffective disorder, bipolar type     History of Cannabis use disorder.     Multiple past mental health admissions - per this writer's review of our EHR and CareEverywhere past admissions include:    10/4/2017 - 10/ - [facility] [place]    11/11/2017 - 11/16/2017 - [facility] [place]    11/6/2018 - 12/7/2018 - [facility] [place]    1/2/2018 - 1/9/2018 (7 days) [facility] - 4A Young Adult     8/14/2019 - 9/18/2019 (35 days) [facility] - 4A Young Adult    1/17/2020 - 2/26/2020 (40 days) [facility] - [facility] Team    7/17/2020- 8/11/2020 [facility] -     10/29/2020 - 11/17/2020 [facility]       Has participate in the first episode out patient program / clinic track through the [facility] in the past. Has been working with an ACT team since around December 2020    He reports recent frequent use of marijuana. Denies current use of other substances.       Family Description (Constellation, Family Psychiatric History):  Patient's maternal great aunt had schizophrenia, maternal side with depression and anxiety, paternal side with anxiety     Significant Life Events (Illness, Abuse, Trauma, Death):  Denies    Living Situation:   lives alone in an apartment - parents pay rent     Educational Background:  Some college    Occupational History:  Unable to assess currently     Financial Status:  parents are supporting financially currently - they pay rent and are paying / supplying him with groceries weekly, do put some money into a checking account for him.     Mom reports she does not think that he has ever applied for SSDI and thinks that he would benefit from it, he has been apprehensive to do so in the past.     He does not have a Rep Payee.     Legal Issues:  On provisional discharge - civil committment ([place]) - see CTC daily note from today for further discussion of current legal pertaining to mental health.    Ethnic/Cultural Issues:  None identified     Spiritual Orientation:  denies     Service History:  No known     Social Functioning (organization, interests):  Unable to assess given current disorganized thought process/ acute psychosis.     Current Treatment Providers are:      [facility] Assertive Community Treatment (ACT) Team  [address] [place] [address] phone: [phone] Fax: [phone]  Psychiatrist - Dr. Harrison Salguero direct ph: [phone]   - Armanod Tinsley Telephone: [phone]   Nurse - Noemy Leigh         Social Service Assessment/Plan:  As noted above patient has a significant history of primary thought disorder and presents in acute psychosis. He lacks insight into his current symptoms and need for treatment. Currently he is working with an ACT team - which is the highest level of psychiatric/mental health care provided in the community currently. This writer strongly recommends that all treatment planning is done in coordination with the ACT team as they will continue with wrap around services in the community once discharged and have been working with him for several months. Prior to starting with the ACT team patient was seen in the [facility] First Episode clinic program for years. - medication management was provided by a variety of psychiatry practitioners while participating in that program ranging from Nurse Practitioners to MDs - again this writer emphasizes recommendation that there is ongoing coordinated care planning with his ACT team.    CTC will continue communicating with the ACT team for any further social service referral needs.    Yes

## 2024-02-24 ENCOUNTER — HEALTH MAINTENANCE LETTER (OUTPATIENT)
Age: 29
End: 2024-02-24

## 2024-07-05 NOTE — PROGRESS NOTES
LILLIANA SEE Progress Note   For Supported Employment & Education    NAVIGATE Enrollee: Jerel Day (1995)     MRN: [id number]  Date:  12/03/19  Clinician: LILLIANA Supported Employment & , Ana Michel    1. Client Status Update:   Jerel Day is interested in education (Client developed educational goals) and employment (Client developed employement goals)    2. People present:   SEE/Writer  Client: Jerel Day  Significant Other/Family/Friend:  Mother and Father, LILLIANA Director/Family Clinian, Ana Rose, MSW, LISCW, LILLIANA IRT & Family Clinician, Tammi Connell AM, [facility],  Nola Summers    3. Length of Actual Contact: 60 minutes   Traveled? No    4. Location of contact:  Psychiatry Clinic, [place]    5. Brief description of session, contact, or client status (include: strategies, interventions, client reaction to contact, next steps, etc)    Norma met with team for IRTS discharge meeting and to review goals. Jerel would like to return to school, explore getting a job, apply for social security and other benefits as well as work on budgeting. Jerel requested that this writer attend a meeting at the [facility] on Monday, 12/9. Scheduled follow up meeting for that appt. For other goals related to treatment planning meeting, please see other team member's notes from this day.     6. Completion of mutually agreed upon client task from previous meeting:  Completed    7. Orientation and Treatment Planning:  Developing SEE treatment plan goals    8. Assessment:  Assessing client's need for follow-along supports    9. Placement:  Not Applicable    10. Follow Along Supports: (for clients who are working or attending school)   Education (Assisting with development and use of natural support for school)    11. Mutually agreed upon client task for next meeting:     na    12. Next Meeting Scheduled for: Monday, 12/9 at school    Ana Almazan  Employment &    No

## 2024-07-12 NOTE — TELEPHONE ENCOUNTER
-Writer received a phone call from patient stating that he needed some forms filled out to withdraw from classes, and they are due at noon today.  Per patient his mother has given these forms to Dillon Gibbons.      -Writer let Jerel know that she will follow up with Shai, but was unsure if these forms would be accomplished by noon.  Encouraged patient to call Dillon Perez for assistance in navigating this situation.   
-Writer spoke with SUSAN Perez.  She is unsure what forms are needed and that they will not get done today.  She will assist patient in appealing when he fails classes.    
[facility]

## 2024-09-03 NOTE — PLAN OF CARE
Patient is alert and oriented. He is in and out of his room. Visible in the hallway and lounge area. He exercised in his room a little bit and paced in the hallway He presents with a flat and blunted affect. Mood is calm. He interacts appropriately with a specific patient. He attended OT group this shift. He is medication compliant and denies any side effects. He denies SI/SIB/AVH. Endorses a little bit of anxiety due to upcoming IRTS interview. Patient reports good sleep and appetite. He denies any physical pain.   1
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
